# Patient Record
Sex: FEMALE | Race: WHITE | NOT HISPANIC OR LATINO | Employment: OTHER | URBAN - METROPOLITAN AREA
[De-identification: names, ages, dates, MRNs, and addresses within clinical notes are randomized per-mention and may not be internally consistent; named-entity substitution may affect disease eponyms.]

---

## 2017-01-18 ENCOUNTER — RECORDS - HEALTHEAST (OUTPATIENT)
Dept: LAB | Facility: CLINIC | Age: 62
End: 2017-01-18

## 2017-01-18 ENCOUNTER — RECORDS - HEALTHEAST (OUTPATIENT)
Dept: ADMINISTRATIVE | Facility: OTHER | Age: 62
End: 2017-01-18

## 2017-01-18 LAB
CHOLEST SERPL-MCNC: 164 MG/DL
FASTING STATUS PATIENT QL REPORTED: ABNORMAL
HDLC SERPL-MCNC: 52 MG/DL
LDLC SERPL CALC-MCNC: 64 MG/DL
TRIGL SERPL-MCNC: 240 MG/DL

## 2017-01-25 ENCOUNTER — HOSPITAL ENCOUNTER (OUTPATIENT)
Dept: NUCLEAR MEDICINE | Facility: HOSPITAL | Age: 62
Discharge: HOME | End: 2017-01-25
Attending: INTERNAL MEDICINE

## 2017-01-25 DIAGNOSIS — C50.919 BREAST CANCER (H): ICD-10-CM

## 2017-01-31 ENCOUNTER — RECORDS - HEALTHEAST (OUTPATIENT)
Dept: ADMINISTRATIVE | Facility: OTHER | Age: 62
End: 2017-01-31

## 2017-02-01 ENCOUNTER — HOSPITAL ENCOUNTER (OUTPATIENT)
Dept: MRI IMAGING | Facility: CLINIC | Age: 62
Discharge: HOME | End: 2017-02-01
Attending: INTERNAL MEDICINE

## 2017-02-01 DIAGNOSIS — C50.919 BREAST CANCER (H): ICD-10-CM

## 2017-02-01 DIAGNOSIS — R11.0 NAUSEA: ICD-10-CM

## 2017-05-03 ENCOUNTER — RECORDS - HEALTHEAST (OUTPATIENT)
Dept: ADMINISTRATIVE | Facility: OTHER | Age: 62
End: 2017-05-03

## 2017-05-09 ENCOUNTER — HOSPITAL ENCOUNTER (OUTPATIENT)
Dept: NUCLEAR MEDICINE | Facility: HOSPITAL | Age: 62
Discharge: HOME | End: 2017-05-09
Attending: INTERNAL MEDICINE

## 2017-05-09 DIAGNOSIS — C50.919 BREAST CANCER (H): ICD-10-CM

## 2017-05-16 ENCOUNTER — RECORDS - HEALTHEAST (OUTPATIENT)
Dept: ADMINISTRATIVE | Facility: OTHER | Age: 62
End: 2017-05-16

## 2017-06-28 ENCOUNTER — HOSPITAL ENCOUNTER (OUTPATIENT)
Dept: MRI IMAGING | Facility: HOSPITAL | Age: 62
Discharge: HOME | End: 2017-06-28
Attending: RADIOLOGY

## 2017-06-28 DIAGNOSIS — C79.51 BONE METASTASIS: ICD-10-CM

## 2017-08-01 ENCOUNTER — RECORDS - HEALTHEAST (OUTPATIENT)
Dept: ADMINISTRATIVE | Facility: OTHER | Age: 62
End: 2017-08-01

## 2017-08-02 ENCOUNTER — HOSPITAL ENCOUNTER (OUTPATIENT)
Dept: NUCLEAR MEDICINE | Facility: HOSPITAL | Age: 62
Discharge: HOME | End: 2017-08-02
Attending: INTERNAL MEDICINE

## 2017-08-02 DIAGNOSIS — C50.919 BREAST CANCER (H): ICD-10-CM

## 2017-10-03 ENCOUNTER — RECORDS - HEALTHEAST (OUTPATIENT)
Dept: ADMINISTRATIVE | Facility: OTHER | Age: 62
End: 2017-10-03

## 2017-10-10 ENCOUNTER — RECORDS - HEALTHEAST (OUTPATIENT)
Dept: ADMINISTRATIVE | Facility: OTHER | Age: 62
End: 2017-10-10

## 2017-10-25 ENCOUNTER — RECORDS - HEALTHEAST (OUTPATIENT)
Dept: ADMINISTRATIVE | Facility: OTHER | Age: 62
End: 2017-10-25

## 2017-10-25 ENCOUNTER — COMMUNICATION - HEALTHEAST (OUTPATIENT)
Dept: PULMONOLOGY | Facility: OTHER | Age: 62
End: 2017-10-25

## 2017-10-30 ENCOUNTER — COMMUNICATION - HEALTHEAST (OUTPATIENT)
Dept: PULMONOLOGY | Facility: OTHER | Age: 62
End: 2017-10-30

## 2017-11-07 ENCOUNTER — RECORDS - HEALTHEAST (OUTPATIENT)
Dept: ADMINISTRATIVE | Facility: OTHER | Age: 62
End: 2017-11-07

## 2017-11-09 ENCOUNTER — OFFICE VISIT - HEALTHEAST (OUTPATIENT)
Dept: PULMONOLOGY | Facility: OTHER | Age: 62
End: 2017-11-09

## 2017-11-09 DIAGNOSIS — R91.8 PULMONARY INFILTRATE: ICD-10-CM

## 2017-11-15 ENCOUNTER — HOSPITAL ENCOUNTER (OUTPATIENT)
Dept: RADIOLOGY | Facility: CLINIC | Age: 62
Discharge: HOME | End: 2017-11-15
Attending: INTERNAL MEDICINE | Admitting: INTERNAL MEDICINE

## 2017-11-15 ENCOUNTER — HOSPITAL ENCOUNTER (OUTPATIENT)
Dept: CARDIOLOGY | Facility: CLINIC | Age: 62
Discharge: HOME | End: 2017-11-15
Attending: INTERNAL MEDICINE | Admitting: INTERNAL MEDICINE

## 2017-11-15 DIAGNOSIS — R93.89 ABNORMAL CHEST CT: ICD-10-CM

## 2017-11-15 DIAGNOSIS — R93.89 ABNORMAL FINDINGS ON DIAGNOSTIC IMAGING OF OTHER SPECIFIED BODY STRUCTURES: ICD-10-CM

## 2017-11-15 DIAGNOSIS — R91.8 PULMONARY INFILTRATE: ICD-10-CM

## 2017-11-15 ASSESSMENT — MIFFLIN-ST. JEOR: SCORE: 1493.43

## 2017-11-16 ENCOUNTER — AMBULATORY - HEALTHEAST (OUTPATIENT)
Dept: PULMONOLOGY | Facility: OTHER | Age: 62
End: 2017-11-16

## 2017-11-16 LAB
LAB AP CHARGES (HE HISTORICAL CONVERSION): NORMAL
LAB AP CHARGES (HE HISTORICAL CONVERSION): NORMAL
LAB MED GENERAL PATH INTERP (HE HISTORICAL CONVERSION): NORMAL
PATH REPORT.COMMENTS IMP SPEC: NORMAL
PATH REPORT.FINAL DX SPEC: NORMAL
PATH REPORT.FINAL DX SPEC: NORMAL
PATH REPORT.GROSS SPEC: NORMAL
PATH REPORT.MICROSCOPIC SPEC OTHER STN: NORMAL
PATH REPORT.MICROSCOPIC SPEC OTHER STN: NORMAL
PATH REPORT.RELEVANT HX SPEC: NORMAL
PATH REPORT.RELEVANT HX SPEC: NORMAL
RESULT FLAG (HE HISTORICAL CONVERSION): NORMAL
SPECIMEN DESCRIPTION: NORMAL

## 2017-11-17 ENCOUNTER — OFFICE VISIT - HEALTHEAST (OUTPATIENT)
Dept: PULMONOLOGY | Facility: OTHER | Age: 62
End: 2017-11-17

## 2017-11-17 DIAGNOSIS — R91.8 LUNG INFILTRATE: ICD-10-CM

## 2017-11-29 ENCOUNTER — HOSPITAL ENCOUNTER (OUTPATIENT)
Dept: CT IMAGING | Facility: HOSPITAL | Age: 62
Discharge: HOME | End: 2017-11-29
Attending: INTERNAL MEDICINE

## 2017-11-29 DIAGNOSIS — R91.8 LUNG INFILTRATE: ICD-10-CM

## 2017-12-01 ENCOUNTER — HOSPITAL ENCOUNTER (OUTPATIENT)
Dept: NUCLEAR MEDICINE | Facility: HOSPITAL | Age: 62
Discharge: HOME | End: 2017-12-01
Attending: INTERNAL MEDICINE

## 2017-12-01 DIAGNOSIS — C50.919 BREAST CANCER (H): ICD-10-CM

## 2017-12-13 ENCOUNTER — COMMUNICATION - HEALTHEAST (OUTPATIENT)
Dept: PULMONOLOGY | Facility: OTHER | Age: 62
End: 2017-12-13

## 2017-12-15 ENCOUNTER — AMBULATORY - HEALTHEAST (OUTPATIENT)
Dept: PULMONOLOGY | Facility: OTHER | Age: 62
End: 2017-12-15

## 2017-12-15 DIAGNOSIS — J18.9 PNEUMONIA: ICD-10-CM

## 2017-12-15 DIAGNOSIS — D84.9 IMMUNOCOMPROMISED (H): ICD-10-CM

## 2018-01-29 ENCOUNTER — RECORDS - HEALTHEAST (OUTPATIENT)
Dept: LAB | Facility: CLINIC | Age: 63
End: 2018-01-29

## 2018-01-29 LAB
ALBUMIN SERPL-MCNC: 3.7 G/DL (ref 3.5–5)
ALP SERPL-CCNC: 59 U/L (ref 45–120)
ALT SERPL W P-5'-P-CCNC: 59 U/L (ref 0–45)
ANION GAP SERPL CALCULATED.3IONS-SCNC: 12 MMOL/L (ref 5–18)
AST SERPL W P-5'-P-CCNC: 47 U/L (ref 0–40)
BILIRUB SERPL-MCNC: 0.4 MG/DL (ref 0–1)
BUN SERPL-MCNC: 16 MG/DL (ref 8–22)
CALCIUM SERPL-MCNC: 9 MG/DL (ref 8.5–10.5)
CHLORIDE BLD-SCNC: 107 MMOL/L (ref 98–107)
CHOLEST SERPL-MCNC: 170 MG/DL
CO2 SERPL-SCNC: 22 MMOL/L (ref 22–31)
CREAT SERPL-MCNC: 0.69 MG/DL (ref 0.6–1.1)
FASTING STATUS PATIENT QL REPORTED: ABNORMAL
GFR SERPL CREATININE-BSD FRML MDRD: >60 ML/MIN/1.73M2
GLUCOSE BLD-MCNC: 166 MG/DL (ref 70–125)
HDLC SERPL-MCNC: 51 MG/DL
LDLC SERPL CALC-MCNC: 64 MG/DL
POTASSIUM BLD-SCNC: 4.4 MMOL/L (ref 3.5–5)
PROT SERPL-MCNC: 6.6 G/DL (ref 6–8)
SODIUM SERPL-SCNC: 141 MMOL/L (ref 136–145)
TRIGL SERPL-MCNC: 273 MG/DL

## 2018-02-27 ENCOUNTER — RECORDS - HEALTHEAST (OUTPATIENT)
Dept: ADMINISTRATIVE | Facility: OTHER | Age: 63
End: 2018-02-27

## 2018-03-20 ENCOUNTER — HOSPITAL ENCOUNTER (OUTPATIENT)
Dept: NUCLEAR MEDICINE | Facility: HOSPITAL | Age: 63
Discharge: HOME | End: 2018-03-20
Attending: INTERNAL MEDICINE

## 2018-03-20 DIAGNOSIS — C50.919 BREAST CANCER (H): ICD-10-CM

## 2018-03-21 ENCOUNTER — RECORDS - HEALTHEAST (OUTPATIENT)
Dept: ADMINISTRATIVE | Facility: OTHER | Age: 63
End: 2018-03-21

## 2018-03-27 ENCOUNTER — RECORDS - HEALTHEAST (OUTPATIENT)
Dept: ADMINISTRATIVE | Facility: OTHER | Age: 63
End: 2018-03-27

## 2018-04-06 ENCOUNTER — COMMUNICATION - HEALTHEAST (OUTPATIENT)
Dept: PULMONOLOGY | Facility: OTHER | Age: 63
End: 2018-04-06

## 2018-04-06 ENCOUNTER — HOSPITAL ENCOUNTER (OUTPATIENT)
Dept: RADIOLOGY | Facility: HOSPITAL | Age: 63
Discharge: HOME | End: 2018-04-06
Attending: INTERNAL MEDICINE

## 2018-04-06 DIAGNOSIS — D84.9 IMMUNOCOMPROMISED (H): ICD-10-CM

## 2018-04-06 DIAGNOSIS — J18.9 PNEUMONIA: ICD-10-CM

## 2018-04-09 ENCOUNTER — OFFICE VISIT - HEALTHEAST (OUTPATIENT)
Dept: PULMONOLOGY | Facility: OTHER | Age: 63
End: 2018-04-09

## 2018-04-09 DIAGNOSIS — J01.90 ACUTE SINUSITIS: ICD-10-CM

## 2018-04-09 DIAGNOSIS — J30.9 ALLERGIC RHINITIS: ICD-10-CM

## 2018-04-11 ENCOUNTER — COMMUNICATION - HEALTHEAST (OUTPATIENT)
Dept: PULMONOLOGY | Facility: OTHER | Age: 63
End: 2018-04-11

## 2018-04-16 ENCOUNTER — COMMUNICATION - HEALTHEAST (OUTPATIENT)
Dept: PULMONOLOGY | Facility: OTHER | Age: 63
End: 2018-04-16

## 2018-06-06 ENCOUNTER — COMMUNICATION - HEALTHEAST (OUTPATIENT)
Dept: PULMONOLOGY | Facility: OTHER | Age: 63
End: 2018-06-06

## 2018-06-12 ENCOUNTER — HOSPITAL ENCOUNTER (OUTPATIENT)
Dept: NUCLEAR MEDICINE | Facility: HOSPITAL | Age: 63
Discharge: HOME | End: 2018-06-12
Attending: INTERNAL MEDICINE

## 2018-06-12 DIAGNOSIS — C50.919 BREAST CANCER (H): ICD-10-CM

## 2018-06-13 ENCOUNTER — RECORDS - HEALTHEAST (OUTPATIENT)
Dept: ADMINISTRATIVE | Facility: OTHER | Age: 63
End: 2018-06-13

## 2018-06-19 ENCOUNTER — COMMUNICATION - HEALTHEAST (OUTPATIENT)
Dept: PULMONOLOGY | Facility: OTHER | Age: 63
End: 2018-06-19

## 2018-06-19 ENCOUNTER — RECORDS - HEALTHEAST (OUTPATIENT)
Dept: ADMINISTRATIVE | Facility: OTHER | Age: 63
End: 2018-06-19

## 2018-06-21 ENCOUNTER — COMMUNICATION - HEALTHEAST (OUTPATIENT)
Dept: PULMONOLOGY | Facility: OTHER | Age: 63
End: 2018-06-21

## 2018-06-25 ENCOUNTER — COMMUNICATION - HEALTHEAST (OUTPATIENT)
Dept: PULMONOLOGY | Facility: OTHER | Age: 63
End: 2018-06-25

## 2018-09-04 ENCOUNTER — HOSPITAL ENCOUNTER (OUTPATIENT)
Dept: NUCLEAR MEDICINE | Facility: HOSPITAL | Age: 63
Discharge: HOME | End: 2018-09-04
Attending: INTERNAL MEDICINE

## 2018-09-04 DIAGNOSIS — C50.919 BREAST CANCER (H): ICD-10-CM

## 2018-09-05 ENCOUNTER — RECORDS - HEALTHEAST (OUTPATIENT)
Dept: ADMINISTRATIVE | Facility: OTHER | Age: 63
End: 2018-09-05

## 2018-09-11 ENCOUNTER — RECORDS - HEALTHEAST (OUTPATIENT)
Dept: ADMINISTRATIVE | Facility: OTHER | Age: 63
End: 2018-09-11

## 2018-11-26 ENCOUNTER — HOSPITAL ENCOUNTER (OUTPATIENT)
Dept: NUCLEAR MEDICINE | Facility: HOSPITAL | Age: 63
Discharge: HOME | End: 2018-11-26
Attending: INTERNAL MEDICINE

## 2018-11-26 ENCOUNTER — COMMUNICATION - HEALTHEAST (OUTPATIENT)
Dept: TELEHEALTH | Facility: CLINIC | Age: 63
End: 2018-11-26

## 2018-11-26 DIAGNOSIS — C50.919 BREAST CANCER (H): ICD-10-CM

## 2018-11-26 ASSESSMENT — MIFFLIN-ST. JEOR: SCORE: 1479.83

## 2019-02-27 ENCOUNTER — RECORDS - HEALTHEAST (OUTPATIENT)
Dept: LAB | Facility: CLINIC | Age: 64
End: 2019-02-27

## 2019-02-27 LAB
ALBUMIN SERPL-MCNC: 3.7 G/DL (ref 3.5–5)
ALP SERPL-CCNC: 76 U/L (ref 45–120)
ALT SERPL W P-5'-P-CCNC: 48 U/L (ref 0–45)
ANION GAP SERPL CALCULATED.3IONS-SCNC: 11 MMOL/L (ref 5–18)
AST SERPL W P-5'-P-CCNC: 40 U/L (ref 0–40)
BILIRUB SERPL-MCNC: 0.3 MG/DL (ref 0–1)
BUN SERPL-MCNC: 17 MG/DL (ref 8–22)
CALCIUM SERPL-MCNC: 9.1 MG/DL (ref 8.5–10.5)
CHLORIDE BLD-SCNC: 107 MMOL/L (ref 98–107)
CHOLEST SERPL-MCNC: 127 MG/DL
CO2 SERPL-SCNC: 22 MMOL/L (ref 22–31)
CREAT SERPL-MCNC: 0.76 MG/DL (ref 0.6–1.1)
FASTING STATUS PATIENT QL REPORTED: ABNORMAL
GFR SERPL CREATININE-BSD FRML MDRD: >60 ML/MIN/1.73M2
GLUCOSE BLD-MCNC: 123 MG/DL (ref 70–125)
HDLC SERPL-MCNC: 42 MG/DL
LDLC SERPL CALC-MCNC: 32 MG/DL
POTASSIUM BLD-SCNC: 4.4 MMOL/L (ref 3.5–5)
PROT SERPL-MCNC: 6.5 G/DL (ref 6–8)
SODIUM SERPL-SCNC: 140 MMOL/L (ref 136–145)
TRIGL SERPL-MCNC: 263 MG/DL
TSH SERPL DL<=0.005 MIU/L-ACNC: 0.7 UIU/ML (ref 0.3–5)
VIT B12 SERPL-MCNC: 471 PG/ML (ref 213–816)

## 2019-03-18 ENCOUNTER — HOSPITAL ENCOUNTER (OUTPATIENT)
Dept: MAMMOGRAPHY | Facility: CLINIC | Age: 64
Discharge: HOME | End: 2019-03-18
Attending: FAMILY MEDICINE

## 2019-03-18 DIAGNOSIS — Z12.31 VISIT FOR SCREENING MAMMOGRAM: ICD-10-CM

## 2019-05-30 LAB
HPV SOURCE: ABNORMAL
HUMAN PAPILLOMA VIRUS 16 DNA: NEGATIVE
HUMAN PAPILLOMA VIRUS 18 DNA: NEGATIVE
HUMAN PAPILLOMA VIRUS FINAL DIAGNOSIS: ABNORMAL
HUMAN PAPILLOMA VIRUS OTHER HR: POSITIVE
SPECIMEN DESCRIPTION: ABNORMAL

## 2019-06-05 ENCOUNTER — RECORDS - HEALTHEAST (OUTPATIENT)
Dept: ADMINISTRATIVE | Facility: OTHER | Age: 64
End: 2019-06-05

## 2019-06-05 LAB
BKR LAB AP ABNORMAL BLEEDING: NO
BKR LAB AP BIRTH CONTROL/HORMONES: NORMAL
BKR LAB AP CERVICAL APPEARANCE: NORMAL
BKR LAB AP GYN ADEQUACY: NORMAL
BKR LAB AP GYN INTERPRETATION: NORMAL
BKR LAB AP HPV REFLEX: NORMAL
BKR LAB AP LMP: 1999
BKR LAB AP PATIENT STATUS: NORMAL
BKR LAB AP PREVIOUS ABNORMAL: NORMAL
BKR LAB AP PREVIOUS NORMAL: 2010
HIGH RISK?: NO
PATH REPORT.COMMENTS IMP SPEC: NORMAL
RESULT FLAG (HE HISTORICAL CONVERSION): NORMAL

## 2019-09-16 ENCOUNTER — RECORDS - HEALTHEAST (OUTPATIENT)
Dept: LAB | Facility: CLINIC | Age: 64
End: 2019-09-16

## 2019-09-16 LAB
ALBUMIN SERPL-MCNC: 3 G/DL (ref 3.5–5)
ALP SERPL-CCNC: 68 U/L (ref 45–120)
ALT SERPL W P-5'-P-CCNC: 62 U/L (ref 0–45)
ANION GAP SERPL CALCULATED.3IONS-SCNC: 10 MMOL/L (ref 5–18)
AST SERPL W P-5'-P-CCNC: 54 U/L (ref 0–40)
BILIRUB SERPL-MCNC: 0.3 MG/DL (ref 0–1)
BUN SERPL-MCNC: 10 MG/DL (ref 8–22)
CALCIUM SERPL-MCNC: 8.4 MG/DL (ref 8.5–10.5)
CHLORIDE BLD-SCNC: 105 MMOL/L (ref 98–107)
CO2 SERPL-SCNC: 24 MMOL/L (ref 22–31)
CREAT SERPL-MCNC: 0.71 MG/DL (ref 0.6–1.1)
GFR SERPL CREATININE-BSD FRML MDRD: >60 ML/MIN/1.73M2
GLUCOSE BLD-MCNC: 172 MG/DL (ref 70–125)
LIPASE SERPL-CCNC: 30 U/L (ref 0–52)
POTASSIUM BLD-SCNC: 4 MMOL/L (ref 3.5–5)
PROT SERPL-MCNC: 5.9 G/DL (ref 6–8)
SODIUM SERPL-SCNC: 139 MMOL/L (ref 136–145)

## 2019-10-02 ENCOUNTER — RECORDS - HEALTHEAST (OUTPATIENT)
Dept: ADMINISTRATIVE | Facility: OTHER | Age: 64
End: 2019-10-02

## 2019-10-02 LAB
PATH REPORT.COMMENTS IMP SPEC: NORMAL
PATH REPORT.FINAL DX SPEC: NORMAL
PATH REPORT.RELEVANT HX SPEC: NORMAL
RESULT FLAG (HE HISTORICAL CONVERSION): NORMAL

## 2019-10-07 ENCOUNTER — AMBULATORY - HEALTHEAST (OUTPATIENT)
Dept: PHARMACY | Facility: CLINIC | Age: 64
End: 2019-10-07

## 2019-10-07 DIAGNOSIS — E11.9 TYPE 2 DIABETES MELLITUS WITHOUT COMPLICATION, WITH LONG-TERM CURRENT USE OF INSULIN (H): ICD-10-CM

## 2019-10-07 DIAGNOSIS — C50.912 BREAST CANCER METASTASIZED TO BRAIN, LEFT (H): ICD-10-CM

## 2019-10-07 DIAGNOSIS — Z79.4 TYPE 2 DIABETES MELLITUS WITHOUT COMPLICATION, WITH LONG-TERM CURRENT USE OF INSULIN (H): ICD-10-CM

## 2019-10-07 DIAGNOSIS — R60.9 EDEMA, UNSPECIFIED TYPE: ICD-10-CM

## 2019-10-07 DIAGNOSIS — G47.00 INSOMNIA, UNSPECIFIED TYPE: ICD-10-CM

## 2019-10-07 DIAGNOSIS — M25.559 ARTHRALGIA OF HIP, UNSPECIFIED LATERALITY: ICD-10-CM

## 2019-10-07 DIAGNOSIS — K21.9 GASTROESOPHAGEAL REFLUX DISEASE, ESOPHAGITIS PRESENCE NOT SPECIFIED: ICD-10-CM

## 2019-10-07 DIAGNOSIS — F32.A DEPRESSION, UNSPECIFIED DEPRESSION TYPE: ICD-10-CM

## 2019-10-07 DIAGNOSIS — E78.2 MIXED HYPERLIPIDEMIA: ICD-10-CM

## 2019-10-07 DIAGNOSIS — Z71.89 ENCOUNTER FOR HERB AND VITAMIN SUPPLEMENT MANAGEMENT: ICD-10-CM

## 2019-10-07 DIAGNOSIS — C79.31 BREAST CANCER METASTASIZED TO BRAIN, LEFT (H): ICD-10-CM

## 2019-10-16 ENCOUNTER — RECORDS - HEALTHEAST (OUTPATIENT)
Dept: ADMINISTRATIVE | Facility: OTHER | Age: 64
End: 2019-10-16

## 2019-10-16 ENCOUNTER — HOSPITAL ENCOUNTER (OUTPATIENT)
Dept: ULTRASOUND IMAGING | Facility: HOSPITAL | Age: 64
Discharge: HOME | End: 2019-10-16
Attending: INTERNAL MEDICINE

## 2019-10-16 DIAGNOSIS — R60.9 SWELLING: ICD-10-CM

## 2019-10-25 ENCOUNTER — RECORDS - HEALTHEAST (OUTPATIENT)
Dept: LAB | Facility: CLINIC | Age: 64
End: 2019-10-25

## 2019-10-25 LAB
ALBUMIN SERPL-MCNC: 2.7 G/DL (ref 3.5–5)
ALP SERPL-CCNC: 73 U/L (ref 45–120)
ALT SERPL W P-5'-P-CCNC: 17 U/L (ref 0–45)
ANION GAP SERPL CALCULATED.3IONS-SCNC: 11 MMOL/L (ref 5–18)
AST SERPL W P-5'-P-CCNC: 25 U/L (ref 0–40)
BASOPHILS # BLD AUTO: 0 THOU/UL (ref 0–0.2)
BASOPHILS NFR BLD AUTO: 1 % (ref 0–2)
BILIRUB SERPL-MCNC: 0.2 MG/DL (ref 0–1)
BUN SERPL-MCNC: 11 MG/DL (ref 8–22)
CALCIUM SERPL-MCNC: 8.4 MG/DL (ref 8.5–10.5)
CHLORIDE BLD-SCNC: 108 MMOL/L (ref 98–107)
CO2 SERPL-SCNC: 24 MMOL/L (ref 22–31)
CREAT SERPL-MCNC: 0.73 MG/DL (ref 0.6–1.1)
EOSINOPHIL # BLD AUTO: 0.1 THOU/UL (ref 0–0.4)
EOSINOPHIL NFR BLD AUTO: 2 % (ref 0–6)
ERYTHROCYTE [DISTWIDTH] IN BLOOD BY AUTOMATED COUNT: 16.6 % (ref 11–14.5)
GFR SERPL CREATININE-BSD FRML MDRD: >60 ML/MIN/1.73M2
GLUCOSE BLD-MCNC: 148 MG/DL (ref 70–125)
HCT VFR BLD AUTO: 32.4 % (ref 35–47)
HGB BLD-MCNC: 9.4 G/DL (ref 12–16)
LYMPHOCYTES # BLD AUTO: 0.3 THOU/UL (ref 0.8–4.4)
LYMPHOCYTES NFR BLD AUTO: 5 % (ref 20–40)
MAGNESIUM SERPL-MCNC: 1.8 MG/DL (ref 1.8–2.6)
MCH RBC QN AUTO: 24.8 PG (ref 27–34)
MCHC RBC AUTO-ENTMCNC: 29 G/DL (ref 32–36)
MCV RBC AUTO: 86 FL (ref 80–100)
MONOCYTES # BLD AUTO: 0.5 THOU/UL (ref 0–0.9)
MONOCYTES NFR BLD AUTO: 9 % (ref 2–10)
NEUTROPHILS # BLD AUTO: 4.8 THOU/UL (ref 2–7.7)
NEUTROPHILS NFR BLD AUTO: 83 % (ref 50–70)
PLATELET # BLD AUTO: 282 THOU/UL (ref 140–440)
PMV BLD AUTO: 10.7 FL (ref 8.5–12.5)
POTASSIUM BLD-SCNC: 4.7 MMOL/L (ref 3.5–5)
PROT SERPL-MCNC: 5.6 G/DL (ref 6–8)
RBC # BLD AUTO: 3.79 MILL/UL (ref 3.8–5.4)
SODIUM SERPL-SCNC: 143 MMOL/L (ref 136–145)
WBC: 5.8 THOU/UL (ref 4–11)

## 2019-10-27 NOTE — PROGRESS NOTES
SUBJECTIVE/OBJECTIVE:                Elenita Hardin is a 64 year old female coming in for a transitions of care visit.    She was discharged from [facility] on 10/19/19 for bilateral upper extremity edema/lymphedema.   Hospital follow-up visit with PCP today prior to our visit.    Chief Complaint: Hospital follow-up comprehensive medication review and education.    Allergies/ADRs: Reviewed in eCW and HE Epic  Tobacco:  reports that she has quit smoking. Her smoking use included cigarettes. She smoked 0.50 packs per day. She has never used smokeless tobacco.  Alcohol: 1 glass of wine/week  PMH: Reviewed in eCW and HE Epic, significant for breast cancer w/ bone metastasis, s/p mastectomy, T2DM, HTN, HLD, MDD, insomnia, chronic pain    Medication Reconciliation/Adherence/Access:  Patient takes medications directly from bottles.  Patient takes medications 3 time(s) per day.   Per patient, misses medication 0 times per week.   Medication barriers: none reported  Discharge Med Rec:  Started:   - potassium chloride 20 mEq daily with furosemide (qty-5 tabs)  Changed:  - acetaminophen 1000 mg three times a day  - furosemide 20 mg twice a day  Stopped:  - gabapentin 100 mg and 400 mg  - Maalox      Bilateral upper extremity edema: Current medications:  Furosemide 20 mg twice a day  Potassium 20 mEq once a day- 5 tabs dispensed at discharge, she's unsure if she needs to continue this  Continues to have significant edema in both upper limbs without much improvement from furosemide.  Gabapentin was stopped during hospitalization and at discharge but she has resumed taking it since discharge because she had significant neuropathy pain. Gabapentin was started at beginning of October and noticed edema symptoms after this was started. She is interested in stopping gabapentin if there is an alternative for her neuropathy pain.  Everolimus therapy was started in April. She is aware peripheral edema is a significant  side effect of this medication but notes the timing of gabapentin and wishes to pursue changing this first.     Breast Cancer with Metastasis: Current medications:  Exemestane 25 mg once daily   Everolimus 10 mg (2x 5 mg tab) once daily    Follows with [facility]. Takes both of these with lunch.  Denies any issues with cost.     Chronic pain, Neuropathy, Bone Pain:  Current medications include:   Oxycodone 5-10 mg q4h as needed-1 tab for pain level 5-8, 2 tabs pain level 9-10  Naproxen  mg twice daily  Cyclobenzaprine 5 mg three times a day as needed  Gabapentin 100 mg three times a day and 400 mg at bedtime- resumed after discharge for pain  Lidocaine 4% patch, apply 3 patches on for 12 h as needed- difficulty putting on, not routinely using  Pain is described as Exhausting, Shooting and Stabbing.     How is your pain? At times tolerable with discomfort and other times unmanageable.    Patient reports the following side effects:  Edema, Fatigue.    Type 2 Diabetes: Current medications:  Basaglar 40 units every morning  Humalog 3 units: 15 g carbs three times a day w/ meals  Metformin 500 mg twice a day   SMBG: Noemi CGM- unable to upload readings today  Denies experiencing any medication side effects.  Patient is not experiencing hypoglycemia  Recent symptoms of high blood sugar? tingling and numbness  Eye exam: up to date; Foot exam: up to date  ACEi/ARB: No. Urine Albumin: 2/27/19- Alb:Cr <30 mg/g  Aspirin: Taking 81mg daily and denies side effects  Hemoglobin A1c:  9/27/2019- 8.1%  9/5/2019- 7.0%  5/29/2019- 7.8%     Today's Vitals: /72 (BP Location: Right arm, Patient Position: Sitting, Cuff Size: Adult Large)   Pulse 80    Relevant Labs:  Comprehensive Metabolic PanelResulted: 10/25/2019 7:32 PM- [facility]  Component Name Value Ref Range   Sodium 143 136 - 145 mmol/L   Potassium 4.7 3.5 - 5 mmol/L   Chloride 108 (H) 98 - 107 mmol/L   CO2 24 22 - 31 mmol/L   Anion Gap, Calculation 11  5 - 18 mmol/L   Glucose 148 (H) 70 - 125 mg/dL   BUN 11 8 - 22 mg/dL   Creatinine 0.73 0.6 - 1.1 mg/dL   GFR MDRD Af Amer >60 >60 mL/min/1.73m2   GFR MDRD Non Af Amer >60 >60 mL/min/1.73m2   Bilirubin, Total 0.2 0 - 1 mg/dL   Calcium 8.4 (L) 8.5 - 10.5 mg/dL   Protein, Total 5.6 (L) 6 - 8 g/dL   Albumin 2.7 (L) 3.5 - 5 g/dL   Alkaline Phosphatase 73 45 - 120 U/L   AST 25 0 - 40 U/L   ALT 17 0 - 45 U/L       ASSESSMENT:                 Medication Adherence: good, no issues identified    Bilateral upper extremity edema: Needs improvement.   Peripheral edema symptoms may be medication-related with gabapentin and everolimus having highest potential to cause this.  Associated incidience of peripheral edema is 1.7-8.3% with gabapentin vs 13-39% with everolimus and 19% with everolimus plus exemestane.  Timing of symptom onset with initiation of gabapentin makes it reasonable to consider alternate therapy for neuropathy pain to see if edema improves.  May consider switching gabapentin to Lyrica or nortriptyline for neuropathic pain. Lyrica still has potential to cause peripheral edema but typically requires lower dose for similar/better pain relief.  Further workup of potential lymphedema by oncology needed to assess safety of continuing current chemo regimen.  Diuretic therapy does not appear effective at improving edema, further assessment of appropriateness and safety recommended.  Recommend repeating BMP to assess if patient should continue potassium supplement with diuretic.    Breast Cancer with Metastasis: Needs further assessment. See above    Chronic pain, Neuropathy, Bone Pain: Needs improvement. See above.  Current regimen of oxycodone, naproxen, and cyclobenzaprine appears to provide adequate pain relief for musculoskeletal pain.  Benefits and risks of opioid therapy reviewed with patient.     Type 2 Diabetes: Improving with use of Buy buy tea CGM.   Patient is not meeting A1c goal of < 8%. Self monitoring  of blood glucose is mostly at goal of fasting  mg/dL and post prandial < 180 mg/dL.   Aspirin therapy is safe and appropriate. Reasonable to continue current insulin regimen and recheck A1c in 2 months.      PLAN:                Post Discharge Medication Reconciliation Status: discharge medications reconciled, continue medications without change.    1. Consider switching gabapentin 700 mg/day to Lyrica 75 mg/day (divided) or nortriptyline 25-50 mg at bedtime  2. Recommend repeat BMP and further evaluation of need for ongoing potassium supplementation with diuretic     I spent 30 minutes with this patient today. I offer these suggestions for consideration by Dr. Jones. A copy of the visit note was provided to the patient's primary care provider.    Will follow up as needed at patient or provider request.    The patient declined a summary of these recommendations as an after visit summary.    Grace Wiseman, PharmD  Medication Therapy Management Pharmacist  [facility]  Pager: [phone]

## 2019-10-28 ENCOUNTER — RECORDS - HEALTHEAST (OUTPATIENT)
Dept: LAB | Facility: CLINIC | Age: 64
End: 2019-10-28

## 2019-10-28 ENCOUNTER — OFFICE VISIT (OUTPATIENT)
Dept: PHARMACY | Facility: PHYSICIAN GROUP | Age: 64
End: 2019-10-28
Payer: COMMERCIAL

## 2019-10-28 DIAGNOSIS — R60.0 PERIPHERAL EDEMA: Primary | ICD-10-CM

## 2019-10-28 DIAGNOSIS — G62.9 NEUROPATHY: ICD-10-CM

## 2019-10-28 DIAGNOSIS — C50.919 MALIGNANT NEOPLASM OF FEMALE BREAST, UNSPECIFIED ESTROGEN RECEPTOR STATUS, UNSPECIFIED LATERALITY, UNSPECIFIED SITE OF BREAST (H): ICD-10-CM

## 2019-10-28 DIAGNOSIS — Z79.4 TYPE 2 DIABETES MELLITUS WITHOUT COMPLICATION, WITH LONG-TERM CURRENT USE OF INSULIN (H): ICD-10-CM

## 2019-10-28 DIAGNOSIS — E11.9 TYPE 2 DIABETES MELLITUS WITHOUT COMPLICATION, WITH LONG-TERM CURRENT USE OF INSULIN (H): ICD-10-CM

## 2019-10-28 DIAGNOSIS — G89.3 CHRONIC PAIN DUE TO NEOPLASM: ICD-10-CM

## 2019-10-28 PROCEDURE — 99607 MTMS BY PHARM ADDL 15 MIN: CPT | Performed by: PHARMACIST

## 2019-10-28 PROCEDURE — 99605 MTMS BY PHARM NP 15 MIN: CPT | Performed by: PHARMACIST

## 2019-10-29 LAB — TSH SERPL DL<=0.005 MIU/L-ACNC: 2.42 UIU/ML (ref 0.3–5)

## 2019-11-03 VITALS — DIASTOLIC BLOOD PRESSURE: 72 MMHG | SYSTOLIC BLOOD PRESSURE: 136 MMHG | HEART RATE: 80 BPM

## 2019-11-03 RX ORDER — FLASH GLUCOSE SCANNING READER
EACH MISCELLANEOUS
COMMUNITY

## 2019-11-03 RX ORDER — ASPIRIN 81 MG/1
81 TABLET ORAL DAILY
COMMUNITY
End: 2022-01-01

## 2019-11-03 RX ORDER — OXYCODONE HYDROCHLORIDE 5 MG/1
5-10 TABLET ORAL EVERY 4 HOURS PRN
COMMUNITY
Start: 2019-09-30 | End: 2021-07-16

## 2019-11-03 RX ORDER — FUROSEMIDE 20 MG
20 TABLET ORAL 2 TIMES DAILY
COMMUNITY
Start: 2019-10-19 | End: 2021-07-16

## 2019-11-03 RX ORDER — EZETIMIBE AND SIMVASTATIN 10; 20 MG/1; MG/1
1 TABLET ORAL DAILY
COMMUNITY
End: 2021-08-23

## 2019-11-03 RX ORDER — EXEMESTANE 25 MG/1
25 TABLET ORAL
COMMUNITY
End: 2021-07-16

## 2019-11-03 RX ORDER — PREGABALIN 25 MG/1
25 CAPSULE ORAL 3 TIMES DAILY
COMMUNITY
End: 2021-07-16

## 2019-11-03 RX ORDER — CYCLOBENZAPRINE HCL 5 MG
5 TABLET ORAL 3 TIMES DAILY PRN
COMMUNITY
End: 2021-07-16

## 2019-11-03 RX ORDER — LIDOCAINE 4 G/G
PATCH TOPICAL
COMMUNITY
Start: 2019-10-01 | End: 2021-07-16

## 2019-11-03 RX ORDER — EVEROLIMUS 5 MG/1
10 TABLET ORAL
COMMUNITY
End: 2021-07-16

## 2019-11-03 RX ORDER — INSULIN GLARGINE 100 [IU]/ML
40 INJECTION, SOLUTION SUBCUTANEOUS EVERY MORNING
COMMUNITY
End: 2019-11-03 | Stop reason: ALTCHOICE

## 2019-11-03 RX ORDER — VITS A,C,E/LUTEIN/MINERALS 300MCG-200
1 TABLET ORAL DAILY
COMMUNITY
End: 2021-07-16

## 2019-11-03 RX ORDER — INSULIN LISPRO 100 [IU]/ML
1-20 INJECTION, SOLUTION INTRAVENOUS; SUBCUTANEOUS
Status: ON HOLD | COMMUNITY
End: 2021-07-28

## 2019-11-03 RX ORDER — POTASSIUM CHLORIDE 1500 MG/1
20 TABLET, EXTENDED RELEASE ORAL DAILY
COMMUNITY
Start: 2019-10-19 | End: 2021-07-16

## 2019-11-03 RX ORDER — ESCITALOPRAM OXALATE 20 MG/1
20 TABLET ORAL AT BEDTIME
COMMUNITY
End: 2021-07-16

## 2019-11-03 RX ORDER — DEXAMETHASONE 0.5 MG/5ML
10 SOLUTION ORAL 4 TIMES DAILY PRN
COMMUNITY
End: 2021-07-16

## 2019-11-03 RX ORDER — ACETAMINOPHEN 500 MG
1000 TABLET ORAL 3 TIMES DAILY PRN
COMMUNITY
Start: 2019-09-30 | End: 2021-08-23

## 2019-11-03 RX ORDER — ZOLPIDEM TARTRATE 5 MG/1
5 TABLET ORAL
COMMUNITY
End: 2021-07-16

## 2019-11-03 RX ORDER — MULTIVIT WITH MINERALS/LUTEIN
1 TABLET ORAL DAILY
COMMUNITY
End: 2021-08-23

## 2019-11-03 RX ORDER — INSULIN GLARGINE 100 [IU]/ML
55 INJECTION, SOLUTION SUBCUTANEOUS EVERY MORNING
Status: ON HOLD | COMMUNITY
End: 2021-07-28

## 2019-11-03 RX ORDER — PHENOL 1.4 %
1-2 AEROSOL, SPRAY (ML) MUCOUS MEMBRANE DAILY
Status: ON HOLD | COMMUNITY
End: 2022-01-01

## 2019-11-03 RX ORDER — FLASH GLUCOSE SENSOR
KIT MISCELLANEOUS
COMMUNITY

## 2019-11-11 ENCOUNTER — RECORDS - HEALTHEAST (OUTPATIENT)
Dept: LAB | Facility: CLINIC | Age: 64
End: 2019-11-11

## 2019-11-11 LAB
ALBUMIN SERPL-MCNC: 3 G/DL (ref 3.5–5)
ALP SERPL-CCNC: 76 U/L (ref 45–120)
ALT SERPL W P-5'-P-CCNC: 16 U/L (ref 0–45)
ANION GAP SERPL CALCULATED.3IONS-SCNC: 12 MMOL/L (ref 5–18)
AST SERPL W P-5'-P-CCNC: 24 U/L (ref 0–40)
BILIRUB SERPL-MCNC: 0.3 MG/DL (ref 0–1)
BUN SERPL-MCNC: 14 MG/DL (ref 8–22)
CALCIUM SERPL-MCNC: 8.5 MG/DL (ref 8.5–10.5)
CHLORIDE BLD-SCNC: 104 MMOL/L (ref 98–107)
CO2 SERPL-SCNC: 25 MMOL/L (ref 22–31)
CREAT SERPL-MCNC: 0.73 MG/DL (ref 0.6–1.1)
GFR SERPL CREATININE-BSD FRML MDRD: >60 ML/MIN/1.73M2
GLUCOSE BLD-MCNC: 155 MG/DL (ref 70–125)
POTASSIUM BLD-SCNC: 3.7 MMOL/L (ref 3.5–5)
PROT SERPL-MCNC: 5.7 G/DL (ref 6–8)
SODIUM SERPL-SCNC: 141 MMOL/L (ref 136–145)

## 2019-11-20 ENCOUNTER — RECORDS - HEALTHEAST (OUTPATIENT)
Dept: ADMINISTRATIVE | Facility: OTHER | Age: 64
End: 2019-11-20

## 2019-11-25 ENCOUNTER — AMBULATORY - HEALTHEAST (OUTPATIENT)
Dept: PALLIATIVE MEDICINE | Facility: OTHER | Age: 64
End: 2019-11-25

## 2019-11-25 DIAGNOSIS — C50.919 METASTATIC BREAST CANCER: ICD-10-CM

## 2019-11-25 DIAGNOSIS — C79.89 METASTATIC CANCER TO PELVIS (H): ICD-10-CM

## 2019-11-27 ENCOUNTER — OFFICE VISIT - HEALTHEAST (OUTPATIENT)
Dept: PALLIATIVE MEDICINE | Facility: OTHER | Age: 64
End: 2019-11-27

## 2019-12-06 ENCOUNTER — AMBULATORY - HEALTHEAST (OUTPATIENT)
Dept: INTERVENTIONAL RADIOLOGY/VASCULAR | Facility: HOSPITAL | Age: 64
End: 2019-12-06

## 2019-12-06 DIAGNOSIS — C50.919 MALIGNANT NEOPLASM OF FEMALE BREAST, UNSPECIFIED ESTROGEN RECEPTOR STATUS, UNSPECIFIED LATERALITY, UNSPECIFIED SITE OF BREAST (H): ICD-10-CM

## 2019-12-11 ENCOUNTER — COMMUNICATION - HEALTHEAST (OUTPATIENT)
Dept: MULTI SPECIALTY CLINIC | Facility: CLINIC | Age: 64
End: 2019-12-11

## 2019-12-12 ENCOUNTER — HOSPITAL ENCOUNTER (OUTPATIENT)
Dept: INTERVENTIONAL RADIOLOGY/VASCULAR | Facility: HOSPITAL | Age: 64
Discharge: HOME | End: 2019-12-12
Attending: PHYSICIAN ASSISTANT | Admitting: RADIOLOGY

## 2019-12-12 DIAGNOSIS — C50.919 MALIGNANT NEOPLASM OF FEMALE BREAST, UNSPECIFIED ESTROGEN RECEPTOR STATUS, UNSPECIFIED LATERALITY, UNSPECIFIED SITE OF BREAST (H): ICD-10-CM

## 2019-12-12 ASSESSMENT — MIFFLIN-ST. JEOR: SCORE: 1369.15

## 2019-12-18 ENCOUNTER — RECORDS - HEALTHEAST (OUTPATIENT)
Dept: LAB | Facility: CLINIC | Age: 64
End: 2019-12-18

## 2019-12-18 ENCOUNTER — TRANSFERRED RECORDS (OUTPATIENT)
Dept: HEALTH INFORMATION MANAGEMENT | Facility: CLINIC | Age: 64
End: 2019-12-18

## 2019-12-18 LAB
CHOLEST SERPL-MCNC: 140 MG/DL
FASTING STATUS PATIENT QL REPORTED: ABNORMAL
HBA1C MFR BLD: 5.8 % (ref 4.2–6.1)
HDLC SERPL-MCNC: 34 MG/DL
LDLC SERPL CALC-MCNC: 63 MG/DL
TRIGL SERPL-MCNC: 217 MG/DL

## 2020-01-24 ENCOUNTER — HOME CARE/HOSPICE - HEALTHEAST (OUTPATIENT)
Dept: HOME HEALTH SERVICES | Facility: HOME HEALTH | Age: 65
End: 2020-01-24

## 2020-01-27 ENCOUNTER — RECORDS - HEALTHEAST (OUTPATIENT)
Dept: ADMINISTRATIVE | Facility: OTHER | Age: 65
End: 2020-01-27

## 2020-01-28 ENCOUNTER — HOME CARE/HOSPICE - HEALTHEAST (OUTPATIENT)
Dept: HOME HEALTH SERVICES | Facility: HOME HEALTH | Age: 65
End: 2020-01-28

## 2020-01-30 ENCOUNTER — HOSPITAL ENCOUNTER (OUTPATIENT)
Dept: NUCLEAR MEDICINE | Facility: HOSPITAL | Age: 65
Discharge: HOME | End: 2020-01-30
Attending: INTERNAL MEDICINE

## 2020-01-30 DIAGNOSIS — C50.412 PRIMARY MALIGNANT NEOPLASM OF UPPER OUTER QUADRANT OF FEMALE BREAST, LEFT (H): ICD-10-CM

## 2020-03-04 ENCOUNTER — RECORDS - HEALTHEAST (OUTPATIENT)
Dept: ADMINISTRATIVE | Facility: OTHER | Age: 65
End: 2020-03-04

## 2020-03-24 ENCOUNTER — HOSPITAL ENCOUNTER (OUTPATIENT)
Dept: CT IMAGING | Facility: HOSPITAL | Age: 65
Discharge: HOME | End: 2020-03-24
Attending: INTERNAL MEDICINE

## 2020-03-24 DIAGNOSIS — C50.412 PRIMARY MALIGNANT NEOPLASM OF UPPER OUTER QUADRANT OF BREAST, LEFT (H): ICD-10-CM

## 2020-03-24 LAB
CREAT BLD-MCNC: 0.6 MG/DL (ref 0.6–1.1)
GFR SERPL CREATININE-BSD FRML MDRD: >60 ML/MIN/1.73M2

## 2020-03-26 ENCOUNTER — HOSPITAL ENCOUNTER (OUTPATIENT)
Dept: NUCLEAR MEDICINE | Facility: HOSPITAL | Age: 65
Discharge: HOME | End: 2020-03-26
Attending: INTERNAL MEDICINE

## 2020-03-26 DIAGNOSIS — C50.412 PRIMARY MALIGNANT NEOPLASM OF UPPER OUTER QUADRANT OF BREAST, LEFT (H): ICD-10-CM

## 2020-05-12 ENCOUNTER — RECORDS - HEALTHEAST (OUTPATIENT)
Dept: LAB | Facility: CLINIC | Age: 65
End: 2020-05-12

## 2020-05-14 LAB — BACTERIA SPEC CULT: ABNORMAL

## 2020-06-02 ENCOUNTER — RECORDS - HEALTHEAST (OUTPATIENT)
Dept: ADMINISTRATIVE | Facility: OTHER | Age: 65
End: 2020-06-02

## 2020-06-17 ENCOUNTER — HOSPITAL ENCOUNTER (OUTPATIENT)
Dept: CT IMAGING | Facility: HOSPITAL | Age: 65
Discharge: HOME | End: 2020-06-17
Attending: NURSE PRACTITIONER

## 2020-06-17 ENCOUNTER — COMMUNICATION - HEALTHEAST (OUTPATIENT)
Dept: TELEHEALTH | Facility: CLINIC | Age: 65
End: 2020-06-17

## 2020-06-17 DIAGNOSIS — C50.919 BREAST CANCER (H): ICD-10-CM

## 2020-06-17 LAB
CREAT BLD-MCNC: 0.7 MG/DL (ref 0.6–1.1)
GFR SERPL CREATININE-BSD FRML MDRD: >60 ML/MIN/1.73M2

## 2020-06-23 ENCOUNTER — HOSPITAL ENCOUNTER (OUTPATIENT)
Dept: NUCLEAR MEDICINE | Facility: HOSPITAL | Age: 65
Discharge: HOME | End: 2020-06-23
Attending: NURSE PRACTITIONER

## 2020-06-23 DIAGNOSIS — C50.919 BREAST CANCER (H): ICD-10-CM

## 2020-09-11 ENCOUNTER — RECORDS - HEALTHEAST (OUTPATIENT)
Dept: ADMINISTRATIVE | Facility: OTHER | Age: 65
End: 2020-09-11

## 2020-09-24 ENCOUNTER — HOSPITAL ENCOUNTER (OUTPATIENT)
Dept: CT IMAGING | Facility: HOSPITAL | Age: 65
Discharge: HOME | End: 2020-09-24
Attending: INTERNAL MEDICINE

## 2020-09-24 ENCOUNTER — HOSPITAL ENCOUNTER (OUTPATIENT)
Dept: NUCLEAR MEDICINE | Facility: HOSPITAL | Age: 65
Discharge: HOME | End: 2020-09-24
Attending: INTERNAL MEDICINE

## 2020-09-24 DIAGNOSIS — C50.919 BREAST CANCER (H): ICD-10-CM

## 2020-09-24 DIAGNOSIS — C79.51 SECONDARY MALIGNANT NEOPLASM OF BONE AND BONE MARROW (H): ICD-10-CM

## 2020-09-24 DIAGNOSIS — C79.52 SECONDARY MALIGNANT NEOPLASM OF BONE AND BONE MARROW (H): ICD-10-CM

## 2020-09-24 LAB
CREAT BLD-MCNC: 0.6 MG/DL (ref 0.6–1.1)
GFR SERPL CREATININE-BSD FRML MDRD: >60 ML/MIN/1.73M2

## 2020-09-24 ASSESSMENT — MIFFLIN-ST. JEOR: SCORE: 1307.46

## 2020-11-18 ENCOUNTER — RECORDS - HEALTHEAST (OUTPATIENT)
Dept: LAB | Facility: CLINIC | Age: 65
End: 2020-11-18

## 2020-11-18 LAB — TSH SERPL DL<=0.005 MIU/L-ACNC: 1.19 UIU/ML (ref 0.3–5)

## 2020-11-19 LAB
ALBUMIN SERPL-MCNC: 3.6 G/DL (ref 3.5–5)
ALP SERPL-CCNC: 113 U/L (ref 45–120)
ALT SERPL W P-5'-P-CCNC: 17 U/L (ref 0–45)
ANION GAP SERPL CALCULATED.3IONS-SCNC: 13 MMOL/L (ref 5–18)
AST SERPL W P-5'-P-CCNC: 21 U/L (ref 0–40)
BILIRUB SERPL-MCNC: 0.3 MG/DL (ref 0–1)
BUN SERPL-MCNC: 18 MG/DL (ref 8–22)
CALCIUM SERPL-MCNC: 8.6 MG/DL (ref 8.5–10.5)
CHLORIDE BLD-SCNC: 105 MMOL/L (ref 98–107)
CHOLEST SERPL-MCNC: 107 MG/DL
CO2 SERPL-SCNC: 24 MMOL/L (ref 22–31)
CREAT SERPL-MCNC: 0.67 MG/DL (ref 0.6–1.1)
FASTING STATUS PATIENT QL REPORTED: ABNORMAL
GFR SERPL CREATININE-BSD FRML MDRD: >60 ML/MIN/1.73M2
GLUCOSE BLD-MCNC: 59 MG/DL (ref 70–125)
HDLC SERPL-MCNC: 48 MG/DL
LDLC SERPL CALC-MCNC: 19 MG/DL
POTASSIUM BLD-SCNC: 4 MMOL/L (ref 3.5–5)
PROT SERPL-MCNC: 6.2 G/DL (ref 6–8)
SODIUM SERPL-SCNC: 142 MMOL/L (ref 136–145)
TRIGL SERPL-MCNC: 202 MG/DL

## 2020-11-30 ENCOUNTER — RECORDS - HEALTHEAST (OUTPATIENT)
Dept: ADMINISTRATIVE | Facility: OTHER | Age: 65
End: 2020-11-30

## 2020-12-09 ENCOUNTER — HOSPITAL ENCOUNTER (OUTPATIENT)
Dept: NUCLEAR MEDICINE | Facility: CLINIC | Age: 65
Discharge: HOME | End: 2020-12-09
Attending: INTERNAL MEDICINE

## 2020-12-09 ENCOUNTER — HOSPITAL ENCOUNTER (OUTPATIENT)
Dept: CT IMAGING | Facility: CLINIC | Age: 65
Discharge: HOME | End: 2020-12-09
Attending: INTERNAL MEDICINE

## 2020-12-09 DIAGNOSIS — C50.919 PRIMARY MALIGNANT NEOPLASM OF FEMALE BREAST (H): ICD-10-CM

## 2020-12-09 LAB
CREAT BLD-MCNC: 0.8 MG/DL (ref 0.6–1.1)
GFR SERPL CREATININE-BSD FRML MDRD: >60 ML/MIN/1.73M2

## 2021-01-01 ENCOUNTER — OFFICE VISIT (OUTPATIENT)
Dept: INFECTIOUS DISEASES | Facility: CLINIC | Age: 66
End: 2021-01-01
Payer: COMMERCIAL

## 2021-01-01 ENCOUNTER — LAB (OUTPATIENT)
Dept: LAB | Facility: CLINIC | Age: 66
End: 2021-01-01

## 2021-01-01 ENCOUNTER — HOSPITAL ENCOUNTER (OUTPATIENT)
Dept: CARDIOLOGY | Facility: HOSPITAL | Age: 66
Discharge: HOME | End: 2021-12-23
Attending: INTERNAL MEDICINE | Admitting: INTERNAL MEDICINE
Payer: COMMERCIAL

## 2021-01-01 VITALS — DIASTOLIC BLOOD PRESSURE: 60 MMHG | TEMPERATURE: 98.2 F | HEART RATE: 96 BPM | SYSTOLIC BLOOD PRESSURE: 126 MMHG

## 2021-01-01 DIAGNOSIS — Z01.818 EXAMINATION PRIOR TO CHEMOTHERAPY: ICD-10-CM

## 2021-01-01 DIAGNOSIS — M46.20 PARASPINAL ABSCESS (H): ICD-10-CM

## 2021-01-01 LAB
BKR LAB AP GYN ADEQUACY: ABNORMAL
BKR LAB AP GYN INTERPRETATION: ABNORMAL
BKR LAB AP HPV REFLEX: ABNORMAL
BKR LAB AP PREVIOUS ABNL DX: ABNORMAL
BKR LAB AP PREVIOUS ABNORMAL: ABNORMAL
C REACTIVE PROTEIN LHE: 5.9 MG/DL (ref 0–0.8)
HUMAN PAPILLOMA VIRUS 16 DNA: NEGATIVE
HUMAN PAPILLOMA VIRUS 18 DNA: NEGATIVE
HUMAN PAPILLOMA VIRUS FINAL DIAGNOSIS: ABNORMAL
HUMAN PAPILLOMA VIRUS OTHER HR: POSITIVE
LVEF ECHO: NORMAL
PATH REPORT.COMMENTS IMP SPEC: ABNORMAL
PATH REPORT.COMMENTS IMP SPEC: ABNORMAL
PATH REPORT.RELEVANT HX SPEC: ABNORMAL

## 2021-01-01 PROCEDURE — 86141 C-REACTIVE PROTEIN HS: CPT

## 2021-01-01 PROCEDURE — 36415 COLL VENOUS BLD VENIPUNCTURE: CPT

## 2021-01-01 PROCEDURE — 93308 TTE F-UP OR LMTD: CPT | Mod: 26 | Performed by: INTERNAL MEDICINE

## 2021-01-01 PROCEDURE — 93321 DOPPLER ECHO F-UP/LMTD STD: CPT | Mod: 26 | Performed by: INTERNAL MEDICINE

## 2021-01-01 PROCEDURE — 255N000002 HC RX 255 OP 636: Performed by: INTERNAL MEDICINE

## 2021-01-01 PROCEDURE — 99213 OFFICE O/P EST LOW 20 MIN: CPT

## 2021-01-01 RX ORDER — MEGESTROL ACETATE 40 MG/ML
SUSPENSION ORAL
COMMUNITY
Start: 2021-10-19 | End: 2022-01-01

## 2021-01-01 RX ORDER — EZETIMIBE AND SIMVASTATIN 10; 20 MG/1; MG/1
1 TABLET ORAL AT BEDTIME
Status: ON HOLD | COMMUNITY
Start: 2021-11-22 | End: 2022-01-01

## 2021-01-01 RX ORDER — CEPHALEXIN 500 MG/1
500 CAPSULE ORAL 2 TIMES DAILY
Qty: 120 CAPSULE | Refills: 3 | Status: SHIPPED | OUTPATIENT
Start: 2021-01-01 | End: 2022-01-01

## 2021-01-01 RX ADMIN — PERFLUTREN 3 ML: 6.52 INJECTION, SUSPENSION INTRAVENOUS at 15:51

## 2021-01-13 ENCOUNTER — RECORDS - HEALTHEAST (OUTPATIENT)
Dept: LAB | Facility: CLINIC | Age: 66
End: 2021-01-13

## 2021-01-13 LAB
C DIFF TOX B STL QL: NEGATIVE
RIBOTYPE 027/NAP1/BI: NORMAL

## 2021-02-10 ENCOUNTER — AMBULATORY - HEALTHEAST (OUTPATIENT)
Dept: PALLIATIVE MEDICINE | Facility: OTHER | Age: 66
End: 2021-02-10

## 2021-02-10 DIAGNOSIS — C79.89 METASTATIC CANCER TO PELVIS (H): ICD-10-CM

## 2021-02-10 DIAGNOSIS — C50.919 METASTATIC BREAST CANCER: ICD-10-CM

## 2021-02-10 DIAGNOSIS — G89.3 CHRONIC PAIN DUE TO NEOPLASM: ICD-10-CM

## 2021-04-27 ENCOUNTER — RECORDS - HEALTHEAST (OUTPATIENT)
Dept: ADMINISTRATIVE | Facility: OTHER | Age: 66
End: 2021-04-27

## 2021-05-05 ENCOUNTER — HOSPITAL ENCOUNTER (OUTPATIENT)
Dept: CARDIOLOGY | Facility: HOSPITAL | Age: 66
Discharge: HOME | End: 2021-05-05
Attending: INTERNAL MEDICINE
Payer: COMMERCIAL

## 2021-05-05 DIAGNOSIS — Z01.818 EXAMINATION PRIOR TO CHEMOTHERAPY: ICD-10-CM

## 2021-05-05 LAB
BSA FOR ECHO PROCEDURE: 1.87 M2
CV BLOOD PRESSURE: NORMAL MMHG
CV ECHO HEIGHT: 65 IN
CV ECHO WEIGHT: 169 LBS
EJECTION FRACTION: 51 % (ref 55–75)
FRACTIONAL SHORTENING: 30.9 % (ref 28–44)
INTERVENTRICULAR SEPTUM IN END DIASTOLE: 1.07 CM (ref 0.6–0.9)
IVS/PW RATIO: 1.1
LEFT VENTRICLE DIASTOLIC VOLUME INDEX: 34.1 CM3/M2 (ref 29–61)
LEFT VENTRICLE DIASTOLIC VOLUME: 63.8 CM3 (ref 46–106)
LEFT VENTRICLE HEART RATE: 101 BPM
LEFT VENTRICLE MASS INDEX: 102.9 G/M2
LEFT VENTRICLE SYSTOLIC VOLUME INDEX: 16.7 CM3/M2 (ref 8–24)
LEFT VENTRICLE SYSTOLIC VOLUME: 31.3 CM3 (ref 14–42)
LEFT VENTRICULAR INTERNAL DIMENSION IN DIASTOLE: 5.01 CM (ref 3.8–5.2)
LEFT VENTRICULAR INTERNAL DIMENSION IN SYSTOLE: 3.46 CM (ref 2.2–3.5)
LEFT VENTRICULAR MASS: 192.5 G
LEFT VENTRICULAR POSTERIOR WALL IN END DIASTOLE: 1.01 CM (ref 0.6–0.9)
NUC REST DIASTOLIC VOLUME INDEX: 2704 LBS
NUC REST SYSTOLIC VOLUME INDEX: 65 IN
TRICUSPID VALVE ANULAR PLANE SYSTOLIC EXCURSION: 2.3 CM

## 2021-05-05 ASSESSMENT — MIFFLIN-ST. JEOR: SCORE: 1307.46

## 2021-05-25 ENCOUNTER — RECORDS - HEALTHEAST (OUTPATIENT)
Dept: ADMINISTRATIVE | Facility: CLINIC | Age: 66
End: 2021-05-25

## 2021-05-26 ENCOUNTER — RECORDS - HEALTHEAST (OUTPATIENT)
Dept: ADMINISTRATIVE | Facility: CLINIC | Age: 66
End: 2021-05-26

## 2021-05-27 ENCOUNTER — RECORDS - HEALTHEAST (OUTPATIENT)
Dept: ADMINISTRATIVE | Facility: CLINIC | Age: 66
End: 2021-05-27

## 2021-05-27 VITALS — HEIGHT: 65 IN | WEIGHT: 169 LBS | BODY MASS INDEX: 28.16 KG/M2

## 2021-05-28 ENCOUNTER — RECORDS - HEALTHEAST (OUTPATIENT)
Dept: ADMINISTRATIVE | Facility: CLINIC | Age: 66
End: 2021-05-28

## 2021-05-30 ENCOUNTER — RECORDS - HEALTHEAST (OUTPATIENT)
Dept: ADMINISTRATIVE | Facility: CLINIC | Age: 66
End: 2021-05-30

## 2021-05-31 VITALS — BODY MASS INDEX: 34.43 KG/M2 | WEIGHT: 212 LBS

## 2021-05-31 VITALS — BODY MASS INDEX: 35.61 KG/M2 | WEIGHT: 214 LBS

## 2021-05-31 VITALS — BODY MASS INDEX: 34.99 KG/M2 | WEIGHT: 210 LBS | HEIGHT: 65 IN

## 2021-06-01 VITALS — BODY MASS INDEX: 36.23 KG/M2 | WEIGHT: 217.7 LBS

## 2021-06-02 ENCOUNTER — RECORDS - HEALTHEAST (OUTPATIENT)
Dept: ADMINISTRATIVE | Facility: CLINIC | Age: 66
End: 2021-06-02

## 2021-06-02 VITALS — HEIGHT: 65 IN | WEIGHT: 207 LBS | BODY MASS INDEX: 34.49 KG/M2

## 2021-06-02 NOTE — PROGRESS NOTES
MTM Transition of Care Encounter  Assessment & Plan                                                     Post Discharge Medication Reconciliation Status: discharge medications reconciled and changed, per note/orders (see AVS)    Pain: Improved. Reviwed different pain medications she is now on. Could consider increasing gabapentin in the future if needed.     Breast Cancer: Patient to continue to follow up with oncology.     Type 2 Diabetes:  reasonably well controlled. A1C was not at goal of <7%, possibly higher due to stress and pain. FBG not at goal  mg/dL, but patient only has two values to report to me -- consider increasing basal insulin 2 units. Edema is a possible side effect from Tresiba, therefore could consider switching to Lantus to see it improves. Would also recommend trying to max metformin to see if that will improve her BG and lower her insulin need. Patient will discuss this with PCP at upcoming follow up.   Everolimus likely contributing to elevated BG.   Due for microalbuminuria recheck, but possible it has more recently been checked at [facility].   PLAN:   1. Discuss with PCP: Increase metformin to 2000 mg/day and change Tresiba to Lantus.     Hyperlipidemia: Patient is on a moderate intensity statin. Recommended moving to PM due to simvastatin.     Depression: Stable. Recommended to continue current regimen.     Insomnia: Stable. Recommended to continue current regimen.     GERD: Stable. Recommended to continue current regimen.     Edema: Not helpful. See about regarding Lantus. If no improvement, could consider switching to a stronger diuretic such as furosemide PRN.     Supplements: Ok to continue supplement. Last Hgb level was low, but I do not have other more recent values to compare. No Vitamin D level on file, but may have been checked at [facility].     Follow Up  As needed with MTM     Subjective & Objective                                                       Elenita Hardin is a  64 y.o. female called for a transitions of care visit. she was discharged from [facility] on 9/30/19 for hip pain.    Chief Complaint: Is going stir crazy. Nerve pain is so much better.     Pain: Admitted with poor pain control. Pain in low back and pelvic area. Hx fracture and bone mets. Discharged on gabapentin 400 mg HS, oxycodone 5 mg, lidocaine 4% patch, naproxen 375 mg two times a day/ibuprofen and bowel regimen of Miralax and senna S. Recommended to schedule oxycodone 5-10 mg every 4-6 hours. Nerve pain from fracture is incredibly painful, but gabapentin has helped. Yesterday was a bad day -- took oxycodone AM and PM, but other day took 1/2 AM. Takes the edge off. Two max per day. Has dulcolax PRN at home -- did not receive bowel regimen that she was discharged on. Using lidocaine 4% on at night -- is helpful. Started naproxen two times a day, no ibuprofen. Taking APAP 1000 mg in between naproxen.     Breast Cancer: Taking Exemestane 25 mg daily and Everolimus 10 mg daily for metastatic breast cancer and Dexamethasone four times a day swish and spit PRN mouth sores.     Type 2 Diabetes: Currently taking Tresiba 40 units daily, Humalog 1-20 units three times a day (correction scale + carb count 4 units:15 g CHO) and metformin 500 mg two times a day. Humalog 12 with breakfast this morning.   135 this morning, 203 before bed, 113 before dinner, 199 before lunch, fasting   Has a Noemi. Tresiba causes her to retain water. Was on Lantus in hospital which does not make her swell. Reports blood sugars high due to everolimus.   Feels like her BG also higher because she was in a lot of pain and stressed.   Has not been on more metformin.   Last A1c checked 9/27/19 = 8.1%.   Microalbumin checked 2015  Is taking aspirin 81 mg for primary prevention.     Hyperlipidemia: Currently taking ezetimibe-simvastatin 10-20 mg daily AM. Denies myalgias. Last lipids checked 2/27/19.    Depression: Currently taking escitalopram  20 mg daily. Is off during the summer, but is back on in the fall. Helps with SAD.     Insomnia: Currently taking zolpidem 10 mg PRN sleep. Using almost every night due to pain. Helps her sleep. No hangover feeling.      GERD: Currently taking omeprazole 20 mg two times a day. Denies GERD now.     Edema: Currently taking spironolactone 25 mg PRN. Has been needing more due to tresiba. Not really helped.     Supplements: Currently taking B complex, multivitamin, eye vitamin, calcium carbonate 600 mg daily, Vitamin D 5000 IU daily, and iron supplement daily  Last Hgb level = 10.5 on 9/27/19  Last DEXA 2015 T score 0.3  No results found for: WMIGFBCE95MS      PMH: reviewed in EPIC   Allergies/ADRs: reviewed in EPIC   Alcohol: reviewed in EPIC  Tobacco:   Social History     Tobacco Use   Smoking Status Former Smoker     Packs/day: 0.75     Years: 20.00     Pack years: 15.00   Smokeless Tobacco Never Used     Recent Vitals:   BP Readings from Last 3 Encounters:   09/30/19 136/83   04/09/18 112/74   11/17/17 114/76      Wt Readings from Last 3 Encounters:   09/28/19 198 lb 1.6 oz (89.9 kg)   11/26/18 207 lb (93.9 kg)   04/09/18 217 lb 11.2 oz (98.7 kg)     ----------------    The patient declined an after visit summary    I spent 30 minutes with this patient today;  . I offer these suggestions with the understanding that I don't fully understand Elenita's past medical history and the complexity of her health conditions.  Elenita should make no changes without the approval of his primary care provider.. A copy of the visit note was provided to the patient's provider.     Sudha Carney Pharm.D., BCACP  Medication Therapy Management Pharmacist  [facility]     Current Outpatient Medications   Medication Sig Dispense Refill     acetaminophen (TYLENOL) 500 MG tablet Take 2 tablets (1,000 mg total) by mouth 3 (three) times a day.  0     aluminum-magnesium hydroxide-simethicone (MAALOX ADVANCED) 200-200-20 mg/5  mL Susp Take 30 mL by mouth every 4 (four) hours as needed.       aspirin 81 MG EC tablet Take 81 mg by mouth daily.       b complex vitamins tablet Take 1 tablet by mouth daily.       blood-glucose meter kit by Other route 2 (two) times a day. Use as instructed       calcium carbonate (OS-BENJI) 600 mg calcium (1,500 mg) tablet Take 600 mg by mouth daily.       cholecalciferol, vitamin D3, 5,000 unit Tab Take 5,000 Units by mouth daily.              dexamethasone 0.5 mg/5 mL solution Take 1 mg by mouth 4 (four) times a day. Swish and spit   Must be alcohol-free formulation             escitalopram oxalate (LEXAPRO) 20 MG tablet Take 20 mg by mouth at bedtime.              everolimus, antineoplastic, 5 mg Tab Take 10 mg by mouth daily with lunch.       exemestane (AROMASIN) 25 mg tablet Take 25 mg by mouth daily with lunch.       ezetimibe-simvastatin (VYTORIN) 10-20 mg per tablet Take 1 tablet by mouth every morning.              folic acid/multivit-min/lutein (CENTRUM SILVER ORAL) Take 1 tablet by mouth daily.       gabapentin (NEURONTIN) 400 MG capsule Take 1 capsule (400 mg total) by mouth at bedtime. 30 capsule 1     generic lancets (ACCU-CHEK MULTICLIX LANCET) Misc Use 3 times daily 102 each 2     ibuprofen (ADVIL,MOTRIN) 200 MG tablet Take 600 mg by mouth every 6 (six) hours as needed for pain.       insulin lispro (HUMALOG) 100 unit/mL injection Inject 1-20 Units under the skin 3 (three) times a day before meals. Sliding scale + carb count       iron bis-glycinat/vit C/FA/B12 (GENTLE IRON ORAL) Take 1 tablet by mouth daily.       LANCETS & BLOOD GLUCOSE STRIPS MISC Use As Directed.       lidocaine 4 % patch Place 3 patches on the skin daily. Remove and discard patch with 12 hours or as directed by MD. 15 patch 0     metFORMIN (GLUCOPHAGE) 500 MG tablet Take 500 mg by mouth 2 (two) times a day with meals.       naproxen (NAPROSYN) 375 MG tablet Take 1 tablet (375 mg total) by mouth 2 (two) times a day with  meals. 30 tablet 1     omeprazole (PRILOSEC) 20 MG capsule Take 20 mg by mouth 2 (two) times a day before meals.       oxyCODONE (ROXICODONE) 5 MG immediate release tablet Take 1-2 tablets (5-10 mg total) by mouth every 4 (four) hours as needed. 1 tab for pain score 5-8 and 2 tabs for pain score 9-10 25 tablet 0     polyethylene glycol (MIRALAX) 17 gram packet Take 1 packet (17 g total) by mouth daily.  0     senna-docusate (PERICOLACE) 8.6-50 mg tablet Take 1 tablet by mouth 2 (two) times a day.  0     spironolactone (ALDACTONE) 25 MG tablet Take 1 tablet by mouth daily as needed for edema.  0     TRESIBA FLEXTOUCH U-100 100 unit/mL (3 mL) InPn Inject 40 Units under the skin every morning.         3     vitamin A-vitamin C-vit E-min (OCUVITE) Tab tablet Take 1 tablet by mouth daily.       zolpidem (AMBIEN) 5 MG tablet Take 10 mg by mouth at bedtime as needed for sleep.              No current facility-administered medications for this visit.

## 2021-06-03 ENCOUNTER — RECORDS - HEALTHEAST (OUTPATIENT)
Dept: ADMINISTRATIVE | Facility: CLINIC | Age: 66
End: 2021-06-03

## 2021-06-04 VITALS — HEIGHT: 65 IN | WEIGHT: 182.6 LBS | BODY MASS INDEX: 30.42 KG/M2

## 2021-06-04 NOTE — PROGRESS NOTES
H&P documented within 30 days (by Skylar Gilliland CNP on 12/12/19).  I have also reviewed the pertinent progress notes since the initial H&P. I have performed an assessment and examined the patient, as necessary, to update the patient's current status that may have changed.

## 2021-06-04 NOTE — TELEPHONE ENCOUNTER
Spoke with Elenita today at 404 PM regarding her outpatient Palliative Care appt today in the lung clinic. She indicates she went to the pain clinic. Does not recall hearing about the lung clinic. Unfortunately no referral was ordered to the lung clinic as requested in Palliative Care last inpatient note. Pt is frustrated, crying. Having pain. Does not want to talk long.  She is focused on getting her port Thursday at [facility] and then starting chemotherapy Monday. Gave permission to call Breonna Gilliland at [facility] (who was involved in her inpatient care last week) (will see if can get prompt appt with [facility] provider Denilson Valles/perhaps be seen by a provider at [facility] tomorrow while getting port). Tried to offer her [facility] home visit contact information but she was not interested.

## 2021-06-04 NOTE — PRE-PROCEDURE
Procedure Name: port   Date/Time: 12/12/2019 12:20 PM  Written consent obtained?: Yes  Risks and benefits: Risks, benefits and alternatives were discussed  Consent given by: patient  Expected level of sedation: moderate  ASA Class: Class 3- Severe systemic disease, definite functional limitations  Mallampati: Grade 2- soft palate, base of uvula, tonsillar pillars, and portion of posterior pharyngeal wall visible  Patient states understanding of procedure being performed: Yes  Patient's understanding of procedure matches consent: Yes  Procedure consent matches procedure scheduled: Yes  Appropriately NPO: yes  Lungs: lungs clear with good breath sounds bilaterally  Heart: normal heart sounds and rate  History & Physical reviewed: Abbreviated history and physical done prior to moderate sedation  Statement of review: I have reviewed the lab findings, diagnostic data, medications, and the plan for sedation

## 2021-06-04 NOTE — PROCEDURES
[facility]    Procedure: IR Procedure Note  Date/Time: 12/12/2019 2:27 PM  Performed by: Cristian Zhang MD  Authorized by: Cristian Zhang MD       Universal Protocol    Site marked: NA    Prior images obtained and reviewed: NA    Required items: required blood products, implants, devices, and special equipment available    Patient identity confirmed: verbally with patient, arm band, provided demographic data and hospital-assigned identification number    Reevaluation: Patient was reevaluated immediately before administering moderate or deep sedation or anesthesia    Confirmation checklist: patient's identity using two indicators, relevant allergies, procedure was appropriate and matched the consent or emergent situation and correct equipment/implants were available    Time out: Immediately prior to procedure a time out was called to verify the correct patient, procedure, equipment, support staff and site/side marked as required    Universal Protocol: Joint Commission Universal Protocol was followed    Preparation: Patient was prepped and draped in the usual sterile fashion    Anesthesia    Local anesthesia used?: Yes    Sedation    Patient sedation: Yes    Vital signs: Vital signs monitored during sedation  Specimens: none  Complications: None  Condition: Stable    Post-procedure   Length of time physician present for 1:1 monitoring during sedation: 30

## 2021-06-05 VITALS — BODY MASS INDEX: 28.16 KG/M2 | HEIGHT: 65 IN | WEIGHT: 169 LBS

## 2021-06-14 NOTE — PROGRESS NOTES
CCx: Follow-up of left upper lobe pulmonary infiltrate in the background of ongoing chemotherapy for metastatic breast cancer    HPI:Ms. Hardin is a 62-year-old lady with a past medical history significant for depression, diabetes, fibromyalgia, hyperlipidemia osteoarthritis, breast cancer diagnosed in August 2010 status post left-sided mastectomy and chemoradiation for metastatic disease with subsequent treatment with anastrozole for the last 6 years who was recently diagnosed with new metastatic disease to have vertebrae and ribs.   She was seen by me in clinic last week for evaluation of a left upper lobe infiltrate while on chemotherapy and had no symptoms that were concerning for an infectious process.  She underwent a bronchoscopy with a trans-bronchial biopsy 2 days ago and is here to follow-up with the results of these tests.  She states that she has significant congestion in her chest since a bronchoscopy but is otherwise feeling well.  Of note, unfortunately, the patient's BAL was not run for infectious organisms as the entire sample was obtained for cytology.  I explained to the patient and we have filed an incident report to this effect.    Pertinent medical history:  She presents to clinic today for an abnormal CT scan of her chest which demonstrates bilateral consolidation in the left and the right upper and middle lobe.  She denies fevers, chills, night sweats but does state that she becomes winded and wheezy when walking up a flight of stairs.  She notes that the symptoms generally become worse when she is initiated on her Faslodex and Ibrance therapy (resumed earlier this year for recurrence of her breast cancer).  She endorses hot flashes and some night sweats but denies weight loss.    ROS:  Positives alluded to in the HPI.  Remainder of 10 point review of systems is negative.    PMH (unchanged from prior visit):  1. Depression  2. Diabetes  3. Fibromyalgia  4. Osteoarthritis  5. Breast  cancer diagnosed in August 2010 status post left-sided mastectomy  6. Chemoradiation with doxorubicin and Cytoxan in November - December 2010 ×4  7. 12 rounds of Taxol January 2011 through March 2011.  8. Maintenance anastrozole from March 2011 to January 2017    PSH (unchanged from prior visit):  1. Left-sided mastectomy    Allergies:  Reviewed in epic    Family HX (unchanged from prior visit):  1. Mother: Pancreatic cancer, lung cancer  2. Father: Coronary artery disease    Social Hx (unchanged from prior visit):  Marital status:   Number of children: 2 biologic and 3 from her   Resides in a house, concern for mold in one of the bathrooms  Occupational history: retired    service: No  Pets: No  Smoking history: 40 years of intermittent tobacco use  Alcohol use: Social   Recreational drug use: No  Hobbies: Reading, smith  Recent Travel: No    Current Meds:  Current Outpatient Prescriptions   Medication Sig Dispense Refill     aspirin 81 MG EC tablet Take 81 mg by mouth daily.       blood-glucose meter kit by Other route 2 (two) times a day. Use as instructed       calcium carbonate-vitamin D2 500 mg(1,250mg) -200 unit tablet Take 1 tablet by mouth 2 (two) times a day. 2000mg calium per day and 7000iu daily of Vitamin d       cholecalciferol, vitamin D3, 5,000 unit Tab Take by mouth. 7000 units a day       CINNAMON BARK (CINNAMON ORAL) Take by mouth.       CLOBETASOL PROPIONATE (CLOBETASOL TOP) Apply topically.       dexamethasone 0.5 mg/5 mL solution Take by mouth daily.       escitalopram oxalate (LEXAPRO) 20 MG tablet Take 20 mg by mouth daily.       ezetimibe-simvastatin (VYTORIN) 10-20 mg per tablet Take 1 tablet by mouth at bedtime.       generic lancets (ACCU-CHEK MULTICLIX LANCET) Misc Use 3 times daily 102 each 2     GREEN TEA EXTRACT ORAL Take by mouth. Patient takes 3 daily       insulin aspart (NOVOLOG) 100 unit/mL injection Inject under the skin 3 (three) times a day  before meals.       insulin detemir (LEVEMIR) 100 unit/mL injection Inject 20 Units under the skin every morning.       LANCETS & BLOOD GLUCOSE STRIPS MISC Use As Directed.       metFORMIN (GLUCOPHAGE) 500 MG tablet Take 500 mg by mouth 2 (two) times a day with meals.       multivitamin with minerals (THERA-M) 9 mg iron-400 mcg Tab tablet Take by mouth daily.       OMEGA-3/DHA/EPA/FISH OIL (FISH OIL-OMEGA-3 FATTY ACIDS) 300-1,000 mg capsule Take 2 g by mouth daily.       traMADol (ULTRAM) 50 mg tablet Take 50 mg by mouth every 6 (six) hours as needed for pain.       vitamin A-vitamin C-vit E-min (OCUVITE) Tab tablet Take by mouth daily.       zolpidem (AMBIEN) 5 MG tablet Take 5 mg by mouth bedtime as needed for sleep.       No current facility-administered medications for this visit.      Labs 11/15/17:  BRONCHOALVEOLAR LAVAGE, UPPER LOBE OF LEFT LUNG:     -  NEGATIVE FOR ATYPICAL OR MALIGNANT CELLS     -  MILD ACUTE AND CHRONIC INFLAMMATORY EXUDATE     -  SINGLE LARGE MULTINUCLEATED HISTIOCYTE (SEE COMMENT BELOW)  The finding of a large and multinucleated histiocyte, even though only one cell, is indicative of a possible granulomatous inflammation. This may be an infectious inflammation, or may be sarcoidosis. As noted above, no malignant cells are identified.    TRANSBRONCHIAL BIOPSY, UPPER LOBE OF LEFT LUNG:     -  NORMAL AND REACTIVE BRONCHIAL EPITHELIUM     -  MINIMAL CHRONIC INFLAMMATORY INFILTRATE IN SUBEPITHELIAL STROMA     -  NEGATIVE FOR ACUTE INFLAMMATION OR GRANULOMATOUS TISSUE     -  NEGATIVE FOR METASTATIC NEOPLASM    Imaging studies (unchanged from prior visit):  CT Chest 10/3/17:  1. Unchanged appearance of the scattered areas of scarring in the lungs likely related to prior treatment.  No new pulmonary nodules identified.  2. Bony hepatic lesions have not changed from prior study.  3. No new lymphadenopathy.  4. Hepatic steatosis.    Bone scan 8/2/17:  1.  Probable metastatic disease in bilateral  ribs, mid to upper thoracic spine and right aspect of the sacrum, not significantly changed from the prior examination.    Physical Exam:  /76  Pulse 70  Resp 16  Wt 214 lb (97.1 kg)  SpO2 98% Comment: RA  BMI 35.61 kg/m2  Heart Rate: 70  Resp: 16  BP: 114/76  SpO2: 98 % (RA)  Weight: 214 lb (97.1 kg)  Physical Exam   Constitutional: She is oriented to person, place, and time. She appears well-developed and well-nourished.   HENT:   Head: Normocephalic and atraumatic.   Eyes: Pupils are equal, round, and reactive to light.   Neck: Normal range of motion.   Cardiovascular: Normal rate and regular rhythm.    Pulmonary/Chest: Effort normal and breath sounds normal.   Abdominal: Soft.   Musculoskeletal: Normal range of motion.   Neurological: She is alert and oriented to person, place, and time.   Skin: Skin is warm.   Psychiatric: She has a normal mood and affect.       Assessment and Plan:Elenita COX Wilfred is a 62 y.o. with a past medical history significant for depression, diabetes, fibromyalgia, hyperlipidemia osteoarthritis, breast cancer diagnosed in August 2010 status post left-sided mastectomy and chemoradiation for metastatic disease who presents to clinic today for follow-up visit after undergoing a bronchoscopy with bronchoalveolar lavage and transbronchial biopsies left upper lobe pulmonary infiltrate concerning for an infectious versus inflammatory versus metastatic process.  Cytology from both the transbronchial biopsies and the BAL were unremarkable, however, unfortunately the BAL fluid was only stained for cytology and not run for any infections leaving is still concerned that this could be an infectious process.  For the present we would recommend;    1. Left upper lobe pulmonary infiltrate status post bronchoscopy with BAL and transbronchial biopsies: Otology from both the BAL and transbronchial biopsies is unremarkable and unfortunately we were not able to run the BAL fluid for  infection.  I discussed this latter in depth with the patient recommended the following plan:.    Repeat CT scan of the chest next week as it will be almost 5 weeks from her initial scan.  If the latter demonstrates worsening of her left upper lobe infiltrate have explained to her that we would have to repeat her bronchoscopy with lavage and potentially with more transbronchial biopsies.  She is in agreement with this plan.    If the aforementioned are unrevealing we will order a 6 minute walk test with complete pulmonary function testing.  2. Follow-up: We will determine this based on the results of his CT scan.      Cyndi Lucasqvi  Pulmonary and Critical Care  [id number]

## 2021-06-14 NOTE — PROGRESS NOTES
CCx:Establishment of care for abnormal CT in the background of ongoing treatment for breast cancer    HPI:Ms. Hardin is a 62-year-old lady with a past medical history significant for depression, diabetes, fibromyalgia, hyperlipidemia osteoarthritis, breast cancer diagnosed in August 2010 status post left-sided mastectomy and chemoradiation for metastatic disease with subsequent treatment with anastrozole for the last 6 years who was recently diagnosed with new metastatic disease to have vertebrae and ribs.  She presents to clinic today for an abnormal CT scan of her chest which demonstrates bilateral consolidation in the left and the right upper and middle lobe.  She denies fevers, chills, night sweats but does state that she becomes winded and wheezy when walking up a flight of stairs.  She notes that the symptoms generally become worse when she is initiated on her Faslodex and Ibrance therapy (resumed earlier this year for recurrence of her breast cancer).  She endorses hot flashes and some night sweats but denies weight loss.    ROS:  Positives alluded to in the HPI.  Remainder of 10 point review of systems is negative.    PMH:  1. Depression  2. Diabetes  3. Fibromyalgia  4. Osteoarthritis  5. Breast cancer diagnosed in August 2010 status post left-sided mastectomy  6. Chemoradiation with doxorubicin and Cytoxan in November - December 2010 ×4  7. 12 rounds of Taxol January 2011 through March 2011.  8. Maintenance anastrozole from March 2011 to January 2017    PSH:  1. Left-sided breast    Allergies:  Reviewed in epic    Family HX:  1. Mother: Pancreatic cancer, lung cancer  2. Father: Coronary artery disease    Social Hx:  Marital status:   Number of children: 2 biologic and 3 from her   Resides in a house, concern for mold in one of the bathrooms  Occupational history: retired    service: No  Pets: No  Smoking history: 40 years of intermittent tobacco use  Alcohol  use: Social   Recreational drug use: No  Hobbies: Reading, smith  Recent Travel: No    Current Meds:  Current Outpatient Prescriptions   Medication Sig Dispense Refill     aspirin 81 MG EC tablet Take 81 mg by mouth daily.       blood-glucose meter kit by Other route 2 (two) times a day. Use as instructed       calcium carbonate-vitamin D2 500 mg(1,250mg) -200 unit tablet Take 1 tablet by mouth 2 (two) times a day. 2000mg calium per day and 7000iu daily of Vitamin d       cholecalciferol, vitamin D3, 5,000 unit Tab Take by mouth. 7000 units a day       CINNAMON BARK (CINNAMON ORAL) Take by mouth.       CLOBETASOL PROPIONATE (CLOBETASOL TOP) Apply topically.       dexamethasone 0.5 mg/5 mL solution Take by mouth daily.       escitalopram oxalate (LEXAPRO) 20 MG tablet Take 20 mg by mouth daily.       ezetimibe-simvastatin (VYTORIN) 10-20 mg per tablet Take 1 tablet by mouth at bedtime.       generic lancets (ACCU-CHEK MULTICLIX LANCET) Misc Use 3 times daily 102 each 2     GREEN TEA EXTRACT ORAL Take by mouth. Patient takes 3 daily       insulin aspart (NOVOLOG) 100 unit/mL injection Inject under the skin 3 (three) times a day before meals.       insulin detemir (LEVEMIR) 100 unit/mL injection Inject 28 Units under the skin bedtime.        LANCETS & BLOOD GLUCOSE STRIPS MISC Use As Directed.       metFORMIN (GLUCOPHAGE) 500 MG tablet Take 500 mg by mouth 2 (two) times a day with meals.       multivitamin with minerals (THERA-M) 9 mg iron-400 mcg Tab tablet Take by mouth daily.       OMEGA-3/DHA/EPA/FISH OIL (FISH OIL-OMEGA-3 FATTY ACIDS) 300-1,000 mg capsule Take 2 g by mouth daily.       traMADol (ULTRAM) 50 mg tablet Take 50 mg by mouth every 6 (six) hours as needed for pain.       vitamin A-vitamin C-vit E-min (OCUVITE) Tab tablet Take by mouth daily.       zolpidem (AMBIEN) 5 MG tablet Take 5 mg by mouth bedtime as needed for sleep.       No current facility-administered medications for this visit.       Labs:  Reviewed.     Imaging studies:  CT Chest 10/3/17:  1. Unchanged appearance of the scattered areas of scarring in the lungs likely related to prior treatment.  No new pulmonary nodules identified.  2. Bony hepatic lesions have not changed from prior study.  3. No new lymphadenopathy.  4. Hepatic steatosis.    Bone scan 8/2/17:  1.  Probable metastatic disease in bilateral ribs, mid to upper thoracic spine and right aspect of the sacrum, not significantly changed from the prior examination.    Physical Exam:  /68  Pulse 92  Resp 17  Wt 212 lb (96.2 kg)  SpO2 98% Comment: RA  BMI 34.43 kg/m2  Heart Rate: 92  Resp: 17  BP: 134/68  SpO2: 98 % (RA)  Weight: 212 lb (96.2 kg)  Physical Exam   Constitutional: She is oriented to person, place, and time. She appears well-developed and well-nourished.   HENT:   Head: Normocephalic and atraumatic.   Eyes: Pupils are equal, round, and reactive to light.   Neck: Normal range of motion.   Cardiovascular: Normal rate and regular rhythm.    Pulmonary/Chest: Effort normal and breath sounds normal.   Abdominal: Soft.   Musculoskeletal: Normal range of motion.   Neurological: She is alert and oriented to person, place, and time.   Skin: Skin is warm.   Psychiatric: She has a normal mood and affect.       Assessment and Plan:Elenita COX Wilfred is a 62 y.o. with a past medical history significant for depression, diabetes, fibromyalgia, hyperlipidemia osteoarthritis, breast cancer diagnosed in August 2010 status post left-sided mastectomy and chemoradiation for metastatic disease who presents to clinic today for bilateral consolidation noted on chest CT scan in the background of Ibrance and Faslodex chemotherapy using concerns for organizing pneumonia related to the latter versus an infection.  For the present we would recommend;    1. Bilateral pulmonary infiltrates: Less likely infectious; inflammatory versus malignant process given her widely metastatic breast  cancer.    We have arranged for the patient to undergo a bronchoscopy with bronchoalveolar lavage and transbronchial biopsies early next week.    If the aforementioned are unrevealing we will order a 6 minute walk test with complete pulmonary function testing.  2. Follow-up: She will be seen in clinic later next week to follow-up with the results of these tests.      Cyndi Carroll  Pulmonary and Critical Care  [phone]

## 2021-06-14 NOTE — PROCEDURES
Procedures    FIBEROPTIC BRONCHOSCOPY PROCEDURE NOTE (NON-OR)  Date of Procedure: 11/15/2017  Performing Physician: Cristian Laughlin  Pre-Procedure Diagnosis:   Abnormal chest CT  Post-Procedure Diagnosis:  Same as Pre-Procedure Diagnosis  Procedure:  Diagnostic Flexible Fiberoptic Bronchoscopy   Indications:  Elenita Hardin is a 62 y.o. female with a history of breast cancer with bone metastases s/p bilateral rib radiation, chemotherapy, now with persistent bilateral pulmonary opacities on chest CT  Preop evaluation:  Procedure:  Intravenous Sedation.    Expected level:  Moderate sedation  ASA Class:  ASA 4 - Patient with severe systemic disease that is a constant threat to life  Mallampati:  III (soft and hard palate and base of uvula visible).  Anesthesia:  2 mg Versed IV, 100 mcg fentanyl, 6 mL of 4% lidocaine above the cords, 12 ml of 1% lidocaine below the cords  Specimen:  1. Bronchoalveolar lavage, posterior left upper lobe  2. Transbronchial biopsies, posterior left upper lobe  Estimated Blood Loss:  Minimal ml  Complications:  none    Findings:  Vocal Cords  Normal    Trachea  Normal    Caridad  Normal    Right Bronchial Tree  Normal    Left Bronchial Tree  Normal     Procedure Details:   The patient was seen and the risks, benefits, complications, treatment options, and expected outcomes were discussed with the patient.  The risks and potential complications of their problem and proposed treatment include but are not limited to infection, bleeding, pain, adverse drug reaction, pulmonary aspiration, pneumothorax, the need for additional procedures, failure to diagnose a condition, creating a complication requiring transfusion or operation, and complication secondary to the anesthetic.  The patient concurred with the proposed plan, giving informed consent.  The patient was identified as Elenita Hardin with Date of Birth 1955 and the procedure verified as Diagnostic Flexible Fiberoptic  Bronchoscopy .  A Time Out was held and the above information confirmed.    After application of topical nasopharyngeal anesthesia, the patient was placed in the supine position and the bronchoscope was passed through the left naris.  The vocal cords were visualized. The cord findings are noted above.  The scope was then passed into the trachea  Careful inspection of the tracheal lumen was accomplished.  The scope was advanced into the right main bronchus and then into the RUL, RML, and RLL bronchi and segmental bronchi.  The scope was sequentially passed into the left main and then left upper and lower bronchi and segmental bronchi with no abnormalities identified.  Bronchoalveolar lavage was performed in the posterior left upper lobe; 50 mL instilled, 15 mL pink-tinged return.  Under fluoroscopic guidance, transbronchial biopsies were obtained from the posterior left upper lobe; 5 passes made, 5 samples obtained.  Minimal airway bleeding was noted that resolved spontaneously and with cold saline.  The bronchoscope was then withdrawn.    The patient tolerated the procedure well.      Cristian Laughlin, 11/15/2017, 9:13 AM    Referring Physician: Cyndi Carroll  Primary Care Physician: MD Cristian Childs MD  Pulmonary and Critical Care Medicine  [facility]  Cell [phone]  Office [phone]  Pager [phone]

## 2021-06-14 NOTE — PROGRESS NOTES
initial VS  SpO2 98%  RR 18  HR 64  EtCO2 25           Bronchoscope Procedure     At 0821 4cc of 4% lido neb given    2cc 4% lido given X3, above cords  2cc 1% lido given X6, below cords    Bronchoscope procedure was done successfully without any complication. Bronchoalveolar lavage, posterior left upper lobe and transbronchial biopsies, posterior left upper lobe done, sample sent. Patient on RA pre-procedure; Patient left the procedure room on RA.     Post-procedure VS  SpO2 96  HR 69  RR 22  EtCO2 remained b/n 25-40 throughout the procedure.

## 2021-06-14 NOTE — PROGRESS NOTES
Call from Raine in [facility].  This patient had Bronch with BAL on Wed Nov 15.  Raine stated that specimen somehow first got sent to cytology and they added a liquid that they use for their specimens.  This liquid makes it impossible for them to run the other testing that was ordered on this specimen.  They are only able to do the cytology and surgical path.  Will notify both Henry Laughlin and Carly.

## 2021-06-14 NOTE — PROGRESS NOTES
Patient  inform  that Bronch is scheduled for Wed .Nov  15 th.at 0800 at [facility].  Instructed to arrive at 0700.  An IV will be placed and IV sedation will be given.  Biopsies may be done.  Instructed to have nothing to eat or drink for 6 hours before procedure.  Medication instructions: Hold morning Meds.  Stop Aspirin date:  Five days before procedure. Nov. 10th.  Stop blood thinner date: N/A  Patient understands that they will need a ride home after the procedure and someone to stay with them at home after the procedure.  Patient had no further questions and is agreeable to procedure.  Phone number given for questions.

## 2021-06-14 NOTE — PROGRESS NOTES
I called Ms. Moran to discuss the results of her CT scan which demonstrated improvement in her previously noted left upper lobe opacity.  As per our prior discussion we had decided that if this was worsening we would repeat a bronchoscopy with biopsies (given that her prior bronchoscopy did not reveal any abnormalities on the biopsies but that her BAL fluid had been misplaced).  I have explained to the patient that if she develops any respiratory symptoms she should call us immediately and we would see her in clinic otherwise we will follow-up with her in 3 months with a follow-up chest x-ray.  She is in agreement with this plan.    Cyndi Carroll  Pulmonary and Critical Care  [phone]

## 2021-06-14 NOTE — SEDATION DOCUMENTATION
Pt returns to [facility] per w/c NAD. Stable .Report to Theresa SOLIS Family at bedside.

## 2021-06-16 PROBLEM — R60.0 EDEMA OF BOTH UPPER EXTREMITIES: Status: ACTIVE | Noted: 2019-10-17

## 2021-06-16 PROBLEM — I89.0 LYMPHEDEMA: Status: ACTIVE | Noted: 2020-01-23

## 2021-06-16 PROBLEM — S32.599G: Status: ACTIVE | Noted: 2020-01-23

## 2021-06-16 PROBLEM — R74.02 ELEVATION OF LEVEL OF TRANSAMINASE AND LACTIC ACID DEHYDROGENASE (LDH): Status: ACTIVE | Noted: 2019-12-04

## 2021-06-16 PROBLEM — G89.3 CANCER ASSOCIATED PAIN: Status: ACTIVE | Noted: 2019-09-27

## 2021-06-16 PROBLEM — M54.30 SCIATICA: Status: ACTIVE | Noted: 2019-12-03

## 2021-06-16 PROBLEM — R74.01 ELEVATION OF LEVEL OF TRANSAMINASE AND LACTIC ACID DEHYDROGENASE (LDH): Status: ACTIVE | Noted: 2019-12-04

## 2021-06-16 PROBLEM — M25.551 HIP PAIN, RIGHT: Status: ACTIVE | Noted: 2019-12-03

## 2021-06-16 PROBLEM — M84.454A: Status: ACTIVE | Noted: 2020-01-23

## 2021-06-16 PROBLEM — M84.550A: Status: ACTIVE | Noted: 2020-01-22

## 2021-06-16 PROBLEM — M25.559 HIP PAIN: Status: ACTIVE | Noted: 2019-09-27

## 2021-06-16 PROBLEM — E11.9 TYPE 2 DIABETES MELLITUS WITHOUT COMPLICATIONS (H): Status: ACTIVE | Noted: 2019-10-17

## 2021-06-17 NOTE — PROGRESS NOTES
Pulmonary Clinic Follow-up Visit    Assessment/Plan:  63 year old female former smoker with a history of breast cancer diagnosed in 2010 s/p left mastectomy and chemoradiation for metastatic disease, DM, dyslipidemia, presenting for follow-up of abnormal chest CT and chronic rhinosinusitis.    Abnormal chest CT: Left upper lobe focal consolidation that appeared infectious, s/p antibiotics with improved cough and fatigue.  Bronchoscopy with BAL of MEME showed no malignant cells on cytology; the specimen was processed incorrectly and could not be sent for microbiologic cultures.  I again apologized to the patient for this very serious laboratory error; a report has been filed on this incident.  She was diagnosed with a right-sided pneumonia on January 2 at [facility] and received a Z-veronica and prednisone; she feels that her cough and breathing have improved.  CXR April 6 showed resolution, and she had a chest CT on March 21 at [facility]; the images and interpretation are not currently available to me.    Plan:  - obtain images and interpretation from chest CT from 3/21/18  - I will contact the patient after I review these    Chronic allergic rhinosinusitis: Worsening left sinus pressure and eyes watering when outside the last few days, with occasional sweats/subjective fever.  Has chronic seasonal allergies.  Uses occasional loratadine alternating with a first-generation antihistamine.  Prior use of nasal fluticasone did not help and only changed her sense of taste.  Decongestant use in the past did not help.    Plan:  - doxycycline 100 mg BID x 7 days  - prednisone 40 mg daily x 5 days  - nasal azelastine two sprays in each nostril BID  - I will call her in June after she returns from [facility] to check on her sinus symptoms  - encouraged her to call any time with questions or concerning symptoms    CCx: abnormal chest CT, allergic rhinosinusitis    HPI: 63 year old female former smoker with a history of  breast cancer diagnosed in 2010 s/p left mastectomy and chemoradiation for metastatic disease, DM, dyslipidemia, presenting for follow-up of abnormal chest CT and chronic rhinosinusitis. Left upper lobe focal consolidation that appeared infectious, s/p antibiotics with improved cough and fatigue.  Bronchoscopy with BAL of MEME showed no malignant cells on cytology; the specimen was processed incorrectly and could not be sent for microbiologic cultures.  I again apologized to the patient for this very serious laboratory error; a report has been filed on this incident.  She was diagnosed with a right-sided pneumonia on January 2 at [facility] and received a Z-veronica and prednisone; she feels that her cough and breathing have improved.  CXR April 6 showed resolution, and she had a chest CT on March 21 at [facility]; the images and interpretation are not currently available to me.  Worsening left sinus pressure and eyes watering when outside the last few days, with occasional sweats/subjective fever.  Has chronic seasonal allergies.  Uses occasional loratadine alternating with a first-generation antihistamine.  Prior use of nasal fluticasone did not help and only changed her sense of taste.  Decongestant use in the past did not help.    ROS:  A 12-system review was obtained and was negative with the exception of the symptoms endorsed in the history of present illness.    PMH:  - Depression  - Diabetes  - Fibromyalgia  - Osteoarthritis  - Breast cancer diagnosed in August 2010 status post left-sided mastectomy; chemoradiation with doxorubicin and Cytoxan in November - December 2010 ×4; 12 rounds of Taxol January 2011 through March 2011; maintenance anastrozole from March 2011 to January 2017    PSH:  Past Surgical History:   Procedure Laterality Date     APPENDECTOMY       biopsy of upper and right tongue       BREAST RECONSTRUCTION Left     March 11, 2015     LIPOMA RESECTION  1985    R Hand     MASTECTOMY -  LEFT Left 2010     OVARIAN CYST REMOVAL         Allergies:  Allergies   Allergen Reactions     Aleve [Naproxen Sodium] Hives     Celebrex [Celecoxib]      Naproxen Hives     Sulfa (Sulfonamide Antibiotics) Hives       Family HX:  Family History   Problem Relation Age of Onset     Lung cancer Mother      Pancreatic cancer Mother      Kidney cancer Maternal Grandmother      Lung cancer Paternal Aunt      Breast cancer Cousin        Social Hx:  Social History     Social History     Marital status:      Spouse name: N/A     Number of children: N/A     Years of education: N/A     Occupational History     Not on file.     Social History Main Topics     Smoking status: Former Smoker     Packs/day: 0.75     Years: 20.00     Smokeless tobacco: Never Used     Alcohol use 0.6 oz/week     1 Glasses of wine per week     Drug use: No     Sexual activity: Yes     Partners: Male     Birth control/ protection: Post-menopausal     Other Topics Concern     Not on file     Social History Narrative    Patient works at home, owns online bookstore               Current Meds:  Current Outpatient Prescriptions   Medication Sig Dispense Refill     aspirin 81 MG EC tablet Take 81 mg by mouth daily.       blood-glucose meter kit by Other route 2 (two) times a day. Use as instructed       calcium carbonate-vitamin D2 500 mg(1,250mg) -200 unit tablet Take 1 tablet by mouth 2 (two) times a day. 2000mg calium per day and 7000iu daily of Vitamin d       cholecalciferol, vitamin D3, 5,000 unit Tab Take by mouth. 7000 units a day       CINNAMON BARK (CINNAMON ORAL) Take by mouth.       CLOBETASOL PROPIONATE (CLOBETASOL TOP) Apply topically.       dexamethasone 0.5 mg/5 mL solution Take by mouth daily.       escitalopram oxalate (LEXAPRO) 20 MG tablet Take 20 mg by mouth daily.       ezetimibe-simvastatin (VYTORIN) 10-20 mg per tablet Take 1 tablet by mouth at bedtime.       generic lancets (ACCU-CHEK MULTICLIX LANCET) Misc Use 3 times daily  102 each 2     GREEN TEA EXTRACT ORAL Take by mouth. Patient takes 3 daily       HUMALOG KWIKPEN INSULIN 100 unit/mL pen Per sliding scale  3     insulin detemir (LEVEMIR) 100 unit/mL injection Inject 20 Units under the skin every morning.       LANCETS & BLOOD GLUCOSE STRIPS MISC Use As Directed.       metFORMIN (GLUCOPHAGE) 500 MG tablet Take 500 mg by mouth 2 (two) times a day with meals.       multivitamin with minerals (THERA-M) 9 mg iron-400 mcg Tab tablet Take by mouth daily.       OMEGA-3/DHA/EPA/FISH OIL (FISH OIL-OMEGA-3 FATTY ACIDS) 300-1,000 mg capsule Take 2 g by mouth daily.       traMADol (ULTRAM) 50 mg tablet Take 50 mg by mouth every 6 (six) hours as needed for pain.       vitamin A-vitamin C-vit E-min (OCUVITE) Tab tablet Take by mouth daily.       zolpidem (AMBIEN) 5 MG tablet Take 5 mg by mouth bedtime as needed for sleep.       azelastine (ASTELIN) 137 mcg (0.1 %) nasal spray 2 sprays in each nostril twice daily 30 mL 12     doxycycline (MONODOX) 100 MG capsule Take 1 capsule (100 mg total) by mouth 2 (two) times a day for 7 days. 14 capsule 0     predniSONE (DELTASONE) 20 MG tablet Take 2 tablets (40 mg total) by mouth daily for 5 days. 10 tablet 0     No current facility-administered medications for this visit.        Physical Exam:  /74  Pulse 80  Resp 16  Wt 217 lb 11.2 oz (98.7 kg)  SpO2 97% Comment: RA  BMI 36.23 kg/m2  Gen: alert, oriented, no distress  HEENT: nasal turbinates are mildly edematous, no oropharyngeal lesions, no cervical or supraclavicular lymphadenopathy  CV: RRR, no M/G/R  Resp: CTAB, no focal crackles or wheezes  Abd: soft, nontender, no palpable organomegaly  Skin: no apparent rashes  Ext: no cyanosis, clubbing or edema  Neuro: alert, nonfocal    Labs:  reviewed    Imaging studies:  Chest CT (11/29/17):  - images directly reviewed, formal interpretation follows:  FINDINGS:  LUNGS AND PLEURA: There is a small area of focal consolidation in the apical posterior  segment of the left upper lobe with air bronchograms. There is mild volume loss in this region. There is mild subpleural linear opacity in the right upper lobe which   could represent subpleural fibrosis. The linear subpleural opacity is also seen in the right middle lobe. No pneumothorax or pleural effusion. No focal nodule or mass.     MEDIASTINUM: No mediastinal or hilar lymphadenopathy. No pericardial effusion. No coronary artery calcification. Patient is status post left mastectomy and left axillary node dissection. No axillary lymphadenopathy.     LIMITED UPPER ABDOMEN: There is diffuse hepatic steatosis with probable mild sparing around the gallbladder fossa. The adrenal glands are normal. No lymphadenopathy in the visualized upper abdomen.     MUSCULOSKELETAL: There is diffuse increased density in the right fifth rib suspicious for bony metastasis. There are mixed lytic and sclerotic lesions in the thoracic spine in the T3, T4, T5, T10 and T12 levels. There are also mixed lytic and sclerotic   lesions in left posterior ribs consistent with osseous metastases. No acute fracture.     IMPRESSION:   CONCLUSION:  1.  Consolidation with air bronchograms and mild volume loss in the left upper lobe most likely representing pneumonia. Patient is status post left mastectomy and less likely would be postradiation change. The opacities are new from prior CT of 2/3/2016   which was post mastectomy, I would favor infectious or inflammatory infiltrate. Subpleural changes on the right could represent a posttreatment change.  2.  Mixed osteoblastic and osteolytic bony metastases in the thoracic spine and bilateral ribs.    CXR (4/6/18):  - images directly reviewed, formal interpretation follows:  FINDINGS: A band of fibrosis in the left upper lobe. Changes to bilateral ribs are stable. Surgical clips overlie the left apex. No evidence of pneumonia.    Bone scan (3/20/18):  FINDINGS: Again noted is abnormal uptake in several  ribs, thoracic spine, and right sacral alar. These findings are unchanged when compared to previous. Right ankle degenerative or posttraumatic uptake is also similar to previous. No new abnormal foci of   uptake.     IMPRESSION:   CONCLUSION:  1.  Stable skeletal metastases.    Cristian Laughlin MD  Pulmonary and Critical Care Medicine  [facility]  Cell [phone]  Office [phone]  Pager [phone]

## 2021-07-02 NOTE — H&P
H&P by Skylar Gilliland CNP at 12/12/2019 12:30 PM     Author: Skylar Gilliland CNP Service: Interventional Radiology Author Type: Nurse Practitioner    Filed: 12/12/2019 12:20 PM Date of Service: 12/12/2019 12:30 PM Status: Signed    : Skylar Gilliland CNP (Nurse Practitioner)         Interventional Radiology - History and Physical  12/12/2019    Procedure Requested: port placement -RIGHt side  Requesting Provider: Mireille Blank PA-C    HPI: Elenita Hardin is a 64 y.o. old female metastatic breast cancer with bony mets and bone marrow involvement.To undergo chemotherapy and port placement requested.    Of note - pt had previous right chest port in the past ([id number]).     NPO Status: Mn  Anticoagulation/Antiplatelets/Bleeding tendencies: ASA   Antibiotics: Ancef for IR     Review of Systems: A comprehensive 10-point review of systems was performed. All systems were reviewed and negative with exception to those reported in the HPI.    PMH:  Past Medical History:   Diagnosis Date   ? Allergic rhinitis    ? Bone cancer (H) 2011    breast mets   ? Breast cancer (H) 2010    left mastectomy   ? Depression    ? DM (diabetes mellitus) (H)    ? Fibromyalgia    ? Hx antineoplastic chemotherapy 2010   ? Hx of radiation therapy 2010   ? Hyperlipemia    ? Lactose intolerance    ? Mini stroke (H)     R EYE   ? Osteoarthritis    ? Tobacco dependency        PSH:  Past Surgical History:   Procedure Laterality Date   ? APPENDECTOMY     ? biopsy of upper and right tongue     ? BREAST RECONSTRUCTION Left     March 11, 2015   ? IR LUMBAR TRANSFORAMINAL EPIDURAL STEROID INJECTION  12/5/2019   ? LIPOMA RESECTION  1985    R Hand   ? MASTECTOMY Left 2010   ? OVARIAN CYST REMOVAL     ? REDUCTION MAMMAPLASTY Right 2015    hx of left mastectomy and right breast reduction       ALLERGIES  Gabapentin and Sulfa (sulfonamide antibiotics)    MEDICATIONS:  Current Outpatient Medications on File Prior to Encounter    Medication Sig Dispense Refill   ? acetaminophen (TYLENOL) 500 MG tablet Take 2 tablets (1,000 mg total) by mouth 3 (three) times a day. (Patient taking differently: Take 1,000 mg by mouth 2 (two) times a day.       )  0   ? aspirin 81 MG EC tablet Take 81 mg by mouth daily.     ? b complex vitamins tablet Take 1 tablet by mouth daily.     ? blood-glucose meter kit by Other route 2 (two) times a day. Use as instructed     ? bumetanide (BUMEX) 1 MG tablet Take 1 mg by mouth as needed.      ? calcium carbonate (OS-BENJI) 600 mg calcium (1,500 mg) tablet Take 600 mg by mouth daily.     ? cholecalciferol, vitamin D3, 5,000 unit Tab Take 5,000 Units by mouth daily.            ? dexamethasone 0.5 mg/5 mL solution Take 1 mg by mouth 4 (four) times a day as needed. Swish and spit   Must be alcohol-free formulation           ? escitalopram oxalate (LEXAPRO) 20 MG tablet Take 20 mg by mouth at bedtime.            ? everolimus, antineoplastic, 5 mg Tab Take 10 mg by mouth daily with lunch.     ? ezetimibe-simvastatin (VYTORIN) 10-20 mg per tablet Take 1 tablet by mouth at bedtime.      ? fluticasone propionate (FLONASE) 50 mcg/actuation nasal spray Apply 1 spray into each nostril daily as needed for rhinitis.     ? folic acid/multivit-min/lutein (CENTRUM SILVER ORAL) Take 1 tablet by mouth daily.     ? generic lancets (ACCU-CHEK MULTICLIX LANCET) Misc Use 3 times daily 102 each 2   ? HYDROmorphone (DILAUDID) 2 MG tablet Take 1-2 tablets (2-4 mg total) by mouth every 6 (six) hours as needed. 30 tablet 0   ? HYDROmorphone (DILAUDID) 2 MG tablet Take 1 tablet (2 mg total) by mouth every 6 (six) hours. 20 tablet 0   ? hydrOXYzine pamoate (VISTARIL) 25 MG capsule Take 1 capsule (25 mg total) by mouth 3 (three) times a day as needed for anxiety. 12 capsule 0   ? insulin glargine (LANTUS) 100 unit/mL vial Inject 30 Units under the skin every morning.      ? insulin lispro (HUMALOG) 100 unit/mL injection Inject 1-20 Units under the  "skin 3 (three) times a day before meals. Sliding scale + carb count     ? iron bis-glycinat/vit C/FA/B12 (GENTLE IRON ORAL) Take 1 tablet by mouth daily.     ? LANCETS & BLOOD GLUCOSE STRIPS MISC Use As Directed.     ? lidocaine 4 % patch Place 3 patches on the skin daily. Remove and discard patch with 12 hours or as directed by MD. (Patient taking differently: Place 3 patches on the skin daily as needed. Remove and discard patch with 12 hours or as directed by MD.) 15 patch 0   ? melatonin 3 mg Tab tablet Take 1 tablet (3 mg total) by mouth at bedtime as needed.  0   ? metFORMIN (GLUCOPHAGE) 500 MG tablet Take 500 mg by mouth 2 (two) times a day with meals.     ? potassium chloride (K-DUR,KLOR-CON) 20 MEQ tablet Take 1 tablet (20 mEq total) by mouth daily. Do not take if not taking Lasix 5 tablet 0   ? pregabalin (LYRICA) 25 MG capsule Take 50 mg by mouth 3 (three) times a day.     ? vitamin A-vitamin C-vit E-min (OCUVITE) Tab tablet Take 1 tablet by mouth daily.     ? zolpidem (AMBIEN) 5 MG tablet Take 10 mg by mouth at bedtime.      ? [DISCONTINUED] exemestane (AROMASIN) 25 mg tablet Take 25 mg by mouth daily with lunch.     ? [DISCONTINUED] methylPREDNISolone (MEDROL) 4 MG tablet Take 1 tablet (4 mg total) by mouth daily with breakfast. 1 tablet 0     No current facility-administered medications on file prior to encounter.          LABS:    Hemoglobin (g/dL)   Date Value   12/03/2019 11.5 (L)     Platelets (thou/uL)   Date Value   12/07/2019 290       EXAM:  /70   Pulse 97   Temp 99  F (37.2  C) (Oral)   Resp 16   Ht 5' 5\" (1.651 m)   Wt 182 lb 9.6 oz (82.8 kg)   SpO2 97%   BMI 30.39 kg/m      General: Stable. In no acute distress  Neuro: A&O x3.   Resp: Lungs CTA bilaterally.  Cardio: S1S2 and reg, without murmur, clicks or rubs.  Abdomen: +BS. Soft, non-distended and non-tender.  Pre-Sedation Assessment:  Mallampati Airway Classification: Class 2: upper half of tonsil fossa visible  Previous " reaction to anesthesia/sedation: no  Sedation plan based on assessment: Moderate  ASA Classification: ASA 3 - Patient with moderate systemic disease with functional limitations     ASSESSMENT:    Metastatic breast cancer; chemotherapy    PLAN:    Port placement     The procedure, risks and moderate sedation were discussed with patient, all questions answered and patient agrees to proceed with the procedure.  Written consent obtained.      NOTE: notified by IR RN patient lost balance getting gown on and fell and hit head (frontal region) on bedside rail ; PE with no abrasions or bleeding or bruising or swelling noted on head (entire head examined), skin intact, neuro check WNL, denies pain or HA or neck pain-Dr. Zhang made aware of incident    Skylar Gilliland, CNP  Interventional Radiology  [phone]

## 2021-07-13 ENCOUNTER — RECORDS - HEALTHEAST (OUTPATIENT)
Dept: ADMINISTRATIVE | Facility: CLINIC | Age: 66
End: 2021-07-13

## 2021-07-16 ENCOUNTER — HOSPITAL ENCOUNTER (EMERGENCY)
Dept: CT IMAGING | Facility: HOSPITAL | Age: 66
DRG: 853 | End: 2021-07-16
Attending: EMERGENCY MEDICINE
Payer: COMMERCIAL

## 2021-07-16 ENCOUNTER — HOSPITAL ENCOUNTER (INPATIENT)
Facility: HOSPITAL | Age: 66
LOS: 12 days | Discharge: HOME HEALTH | DRG: 853 | End: 2021-07-28
Attending: EMERGENCY MEDICINE | Admitting: EMERGENCY MEDICINE
Payer: COMMERCIAL

## 2021-07-16 ENCOUNTER — HOSPITAL ENCOUNTER (INPATIENT)
Dept: RADIOLOGY | Facility: HOSPITAL | Age: 66
DRG: 853 | End: 2021-07-16
Attending: EMERGENCY MEDICINE
Payer: COMMERCIAL

## 2021-07-16 ENCOUNTER — HOSPITAL ENCOUNTER (INPATIENT)
Dept: MRI IMAGING | Facility: HOSPITAL | Age: 66
DRG: 853 | End: 2021-07-16
Attending: EMERGENCY MEDICINE
Payer: COMMERCIAL

## 2021-07-16 DIAGNOSIS — M25.551 HIP PAIN, RIGHT: ICD-10-CM

## 2021-07-16 DIAGNOSIS — K59.03 DRUG INDUCED CONSTIPATION: ICD-10-CM

## 2021-07-16 DIAGNOSIS — R44.3 HALLUCINATIONS: ICD-10-CM

## 2021-07-16 DIAGNOSIS — M54.31 SCIATICA OF RIGHT SIDE: ICD-10-CM

## 2021-07-16 DIAGNOSIS — M46.20 PARASPINAL ABSCESS (H): ICD-10-CM

## 2021-07-16 DIAGNOSIS — R10.32 LEFT GROIN PAIN: Primary | ICD-10-CM

## 2021-07-16 DIAGNOSIS — G89.3 CANCER ASSOCIATED PAIN: ICD-10-CM

## 2021-07-16 DIAGNOSIS — R41.0 CONFUSION: ICD-10-CM

## 2021-07-16 LAB
ALBUMIN SERPL-MCNC: 2.8 G/DL (ref 3.5–5)
ALBUMIN UR-MCNC: 30 MG/DL
ALP SERPL-CCNC: 157 U/L (ref 45–120)
ALT SERPL W P-5'-P-CCNC: 36 U/L (ref 0–45)
AMMONIA PLAS-SCNC: 25 UMOL/L (ref 11–35)
ANION GAP SERPL CALCULATED.3IONS-SCNC: 12 MMOL/L (ref 5–18)
APPEARANCE UR: CLEAR
APTT PPP: 48 SECONDS (ref 22–38)
AST SERPL W P-5'-P-CCNC: 23 U/L (ref 0–40)
BACTERIA #/AREA URNS HPF: ABNORMAL /HPF
BASOPHILS # BLD MANUAL: 0 10E3/UL (ref 0–0.2)
BASOPHILS NFR BLD MANUAL: 0 %
BILIRUB SERPL-MCNC: 0.8 MG/DL (ref 0–1)
BILIRUB UR QL STRIP: NEGATIVE
BUN SERPL-MCNC: 14 MG/DL (ref 8–22)
CALCIUM SERPL-MCNC: 8.8 MG/DL (ref 8.5–10.5)
CHLORIDE BLD-SCNC: 99 MMOL/L (ref 98–107)
CO2 SERPL-SCNC: 21 MMOL/L (ref 22–31)
COLOR UR AUTO: ABNORMAL
CREAT SERPL-MCNC: 0.69 MG/DL (ref 0.6–1.1)
EOSINOPHIL # BLD MANUAL: 0.3 10E3/UL (ref 0–0.7)
EOSINOPHIL NFR BLD MANUAL: 6 %
ERYTHROCYTE [DISTWIDTH] IN BLOOD BY AUTOMATED COUNT: 15.9 % (ref 10–15)
GFR SERPL CREATININE-BSD FRML MDRD: >90 ML/MIN/1.73M2
GLUCOSE BLD-MCNC: 109 MG/DL (ref 70–125)
GLUCOSE BLDC GLUCOMTR-MCNC: 101 MG/DL (ref 70–125)
GLUCOSE BLDC GLUCOMTR-MCNC: 110 MG/DL (ref 70–125)
GLUCOSE BLDC GLUCOMTR-MCNC: 73 MG/DL (ref 70–125)
GLUCOSE UR STRIP-MCNC: NEGATIVE MG/DL
HBA1C MFR BLD: 6.3 %
HCT VFR BLD AUTO: 26.7 % (ref 35–47)
HGB BLD-MCNC: 8.5 G/DL (ref 11.7–15.7)
HGB UR QL STRIP: ABNORMAL
INR PPP: 1.45 (ref 0.85–1.15)
KETONES UR STRIP-MCNC: 10 MG/DL
LACTATE SERPL-SCNC: 0.8 MMOL/L (ref 0.7–2)
LEUKOCYTE ESTERASE UR QL STRIP: NEGATIVE
LYMPHOCYTES # BLD MANUAL: 0.1 10E3/UL (ref 0.8–5.3)
LYMPHOCYTES NFR BLD MANUAL: 2 %
MAGNESIUM SERPL-MCNC: 2.2 MG/DL (ref 1.8–2.6)
MCH RBC QN AUTO: 31 PG (ref 26.5–33)
MCHC RBC AUTO-ENTMCNC: 31.8 G/DL (ref 31.5–36.5)
MCV RBC AUTO: 97 FL (ref 78–100)
MONOCYTES # BLD MANUAL: 0.7 10E3/UL (ref 0–1.3)
MONOCYTES NFR BLD MANUAL: 16 %
NEUTROPHILS # BLD MANUAL: 3.3 10E3/UL (ref 1.6–8.3)
NEUTROPHILS NFR BLD MANUAL: 76 %
NITRATE UR QL: NEGATIVE
PH UR STRIP: 5.5 [PH] (ref 5–7)
PLAT MORPH BLD: ABNORMAL
PLATELET # BLD AUTO: 248 10E3/UL (ref 150–450)
POLYCHROMASIA BLD QL SMEAR: SLIGHT
POTASSIUM BLD-SCNC: 4 MMOL/L (ref 3.5–5)
PROCALCITONIN SERPL-MCNC: 3.64 NG/ML (ref 0–0.49)
PROT SERPL-MCNC: 6.6 G/DL (ref 6–8)
RBC # BLD AUTO: 2.74 10E6/UL (ref 3.8–5.2)
RBC MORPH BLD: ABNORMAL
RBC URINE: 1 /HPF
SARS-COV-2 RNA RESP QL NAA+PROBE: NEGATIVE
SODIUM SERPL-SCNC: 132 MMOL/L (ref 136–145)
SP GR UR STRIP: 1.01 (ref 1–1.03)
TRANSITIONAL EPI: 1 /HPF
TROPONIN I SERPL-MCNC: 0.01 NG/ML (ref 0–0.29)
UROBILINOGEN UR STRIP-MCNC: <2 MG/DL
WBC # BLD AUTO: 4.3 10E3/UL (ref 4–11)
WBC URINE: 6 /HPF

## 2021-07-16 PROCEDURE — 36592 COLLECT BLOOD FROM PICC: CPT | Performed by: EMERGENCY MEDICINE

## 2021-07-16 PROCEDURE — 83605 ASSAY OF LACTIC ACID: CPT | Performed by: EMERGENCY MEDICINE

## 2021-07-16 PROCEDURE — 82040 ASSAY OF SERUM ALBUMIN: CPT | Performed by: EMERGENCY MEDICINE

## 2021-07-16 PROCEDURE — 82140 ASSAY OF AMMONIA: CPT | Performed by: EMERGENCY MEDICINE

## 2021-07-16 PROCEDURE — 84484 ASSAY OF TROPONIN QUANT: CPT | Performed by: EMERGENCY MEDICINE

## 2021-07-16 PROCEDURE — 73630 X-RAY EXAM OF FOOT: CPT | Mod: LT

## 2021-07-16 PROCEDURE — 93005 ELECTROCARDIOGRAM TRACING: CPT | Performed by: EMERGENCY MEDICINE

## 2021-07-16 PROCEDURE — 999N000250 HC STATISTIC ECG ASSIST

## 2021-07-16 PROCEDURE — 93010 ELECTROCARDIOGRAM REPORT: CPT | Performed by: INTERNAL MEDICINE

## 2021-07-16 PROCEDURE — C9803 HOPD COVID-19 SPEC COLLECT: HCPCS

## 2021-07-16 PROCEDURE — 70551 MRI BRAIN STEM W/O DYE: CPT

## 2021-07-16 PROCEDURE — 99285 EMERGENCY DEPT VISIT HI MDM: CPT | Mod: 25

## 2021-07-16 PROCEDURE — 83735 ASSAY OF MAGNESIUM: CPT | Performed by: EMERGENCY MEDICINE

## 2021-07-16 PROCEDURE — 85014 HEMATOCRIT: CPT | Performed by: EMERGENCY MEDICINE

## 2021-07-16 PROCEDURE — 85730 THROMBOPLASTIN TIME PARTIAL: CPT | Performed by: EMERGENCY MEDICINE

## 2021-07-16 PROCEDURE — 87635 SARS-COV-2 COVID-19 AMP PRB: CPT | Performed by: EMERGENCY MEDICINE

## 2021-07-16 PROCEDURE — 120N000001 HC R&B MED SURG/OB

## 2021-07-16 PROCEDURE — 250N000013 HC RX MED GY IP 250 OP 250 PS 637: Performed by: EMERGENCY MEDICINE

## 2021-07-16 PROCEDURE — 85610 PROTHROMBIN TIME: CPT | Performed by: EMERGENCY MEDICINE

## 2021-07-16 PROCEDURE — 258N000003 HC RX IP 258 OP 636: Performed by: EMERGENCY MEDICINE

## 2021-07-16 PROCEDURE — 99223 1ST HOSP IP/OBS HIGH 75: CPT | Performed by: EMERGENCY MEDICINE

## 2021-07-16 PROCEDURE — 83036 HEMOGLOBIN GLYCOSYLATED A1C: CPT | Performed by: EMERGENCY MEDICINE

## 2021-07-16 PROCEDURE — 250N000011 HC RX IP 250 OP 636: Performed by: EMERGENCY MEDICINE

## 2021-07-16 PROCEDURE — 81001 URINALYSIS AUTO W/SCOPE: CPT | Performed by: EMERGENCY MEDICINE

## 2021-07-16 PROCEDURE — 84145 PROCALCITONIN (PCT): CPT | Performed by: EMERGENCY MEDICINE

## 2021-07-16 PROCEDURE — 71045 X-RAY EXAM CHEST 1 VIEW: CPT

## 2021-07-16 PROCEDURE — 70450 CT HEAD/BRAIN W/O DYE: CPT

## 2021-07-16 RX ORDER — ASPIRIN 81 MG/1
81 TABLET ORAL DAILY
Status: DISCONTINUED | OUTPATIENT
Start: 2021-07-16 | End: 2021-07-28 | Stop reason: HOSPADM

## 2021-07-16 RX ORDER — NICOTINE POLACRILEX 4 MG
15-30 LOZENGE BUCCAL
Status: DISCONTINUED | OUTPATIENT
Start: 2021-07-16 | End: 2021-07-28 | Stop reason: HOSPADM

## 2021-07-16 RX ORDER — EZETIMIBE 10 MG/1
10 TABLET ORAL AT BEDTIME
Status: DISCONTINUED | OUTPATIENT
Start: 2021-07-16 | End: 2021-07-28 | Stop reason: HOSPADM

## 2021-07-16 RX ORDER — OXYBUTYNIN CHLORIDE 5 MG/1
5 TABLET ORAL 2 TIMES DAILY
Status: ON HOLD | COMMUNITY
End: 2022-01-01

## 2021-07-16 RX ORDER — MULTIPLE VITAMINS W/ MINERALS TAB 9MG-400MCG
1 TAB ORAL DAILY
Status: DISCONTINUED | OUTPATIENT
Start: 2021-07-17 | End: 2021-07-28 | Stop reason: HOSPADM

## 2021-07-16 RX ORDER — HYDROMORPHONE HYDROCHLORIDE 4 MG/1
4 TABLET ORAL EVERY 4 HOURS PRN
Status: DISCONTINUED | OUTPATIENT
Start: 2021-07-16 | End: 2021-07-17

## 2021-07-16 RX ORDER — NAPROXEN SODIUM 220 MG
220 TABLET ORAL 2 TIMES DAILY PRN
Status: ON HOLD | COMMUNITY
End: 2021-07-28

## 2021-07-16 RX ORDER — HALOPERIDOL 5 MG/ML
1 INJECTION INTRAMUSCULAR EVERY 6 HOURS PRN
Status: DISCONTINUED | OUTPATIENT
Start: 2021-07-16 | End: 2021-07-17

## 2021-07-16 RX ORDER — NALOXONE HYDROCHLORIDE 0.4 MG/ML
0.4 INJECTION, SOLUTION INTRAMUSCULAR; INTRAVENOUS; SUBCUTANEOUS
Status: DISCONTINUED | OUTPATIENT
Start: 2021-07-16 | End: 2021-07-24

## 2021-07-16 RX ORDER — IBUPROFEN 200 MG
200 TABLET ORAL 3 TIMES DAILY
Status: ON HOLD | COMMUNITY
End: 2022-01-01

## 2021-07-16 RX ORDER — NALOXONE HYDROCHLORIDE 0.4 MG/ML
0.2 INJECTION, SOLUTION INTRAMUSCULAR; INTRAVENOUS; SUBCUTANEOUS
Status: DISCONTINUED | OUTPATIENT
Start: 2021-07-16 | End: 2021-07-24

## 2021-07-16 RX ORDER — EZETIMIBE AND SIMVASTATIN 10; 20 MG/1; MG/1
1 TABLET ORAL DAILY
Status: DISCONTINUED | OUTPATIENT
Start: 2021-07-16 | End: 2021-07-16 | Stop reason: RX

## 2021-07-16 RX ORDER — SODIUM CHLORIDE 9 MG/ML
INJECTION, SOLUTION INTRAVENOUS CONTINUOUS
Status: DISCONTINUED | OUTPATIENT
Start: 2021-07-16 | End: 2021-07-17

## 2021-07-16 RX ORDER — BENZTROPINE MESYLATE 0.5 MG/1
1 TABLET ORAL 3 TIMES DAILY PRN
Status: DISCONTINUED | OUTPATIENT
Start: 2021-07-16 | End: 2021-07-28 | Stop reason: HOSPADM

## 2021-07-16 RX ORDER — HYDROMORPHONE HYDROCHLORIDE 4 MG/1
4 TABLET ORAL 3 TIMES DAILY
Status: ON HOLD | COMMUNITY
End: 2022-01-01

## 2021-07-16 RX ORDER — DEXTROSE MONOHYDRATE 25 G/50ML
25-50 INJECTION, SOLUTION INTRAVENOUS
Status: DISCONTINUED | OUTPATIENT
Start: 2021-07-16 | End: 2021-07-28 | Stop reason: HOSPADM

## 2021-07-16 RX ORDER — CALCIUM CARBONATE 500 MG/1
500 TABLET, CHEWABLE ORAL DAILY
Status: DISCONTINUED | OUTPATIENT
Start: 2021-07-17 | End: 2021-07-28 | Stop reason: HOSPADM

## 2021-07-16 RX ORDER — OXYCODONE HYDROCHLORIDE 30 MG/1
30 TABLET, FILM COATED, EXTENDED RELEASE ORAL EVERY 12 HOURS
Status: ON HOLD | COMMUNITY
End: 2021-07-26

## 2021-07-16 RX ORDER — DULOXETIN HYDROCHLORIDE 30 MG/1
30 CAPSULE, DELAYED RELEASE ORAL EVERY MORNING
Status: DISCONTINUED | OUTPATIENT
Start: 2021-07-17 | End: 2021-07-28 | Stop reason: HOSPADM

## 2021-07-16 RX ORDER — DOCUSATE SODIUM 100 MG/1
200 CAPSULE, LIQUID FILLED ORAL DAILY
Status: DISCONTINUED | OUTPATIENT
Start: 2021-07-17 | End: 2021-07-28 | Stop reason: HOSPADM

## 2021-07-16 RX ORDER — IBUPROFEN 400 MG/1
400 TABLET, FILM COATED ORAL EVERY 6 HOURS PRN
Status: DISCONTINUED | OUTPATIENT
Start: 2021-07-16 | End: 2021-07-28 | Stop reason: HOSPADM

## 2021-07-16 RX ORDER — OLANZAPINE 10 MG/2ML
5 INJECTION, POWDER, FOR SOLUTION INTRAMUSCULAR EVERY 6 HOURS PRN
Status: DISCONTINUED | OUTPATIENT
Start: 2021-07-16 | End: 2021-07-17

## 2021-07-16 RX ORDER — VITAMIN B COMPLEX
25 TABLET ORAL DAILY
Status: DISCONTINUED | OUTPATIENT
Start: 2021-07-16 | End: 2021-07-28 | Stop reason: HOSPADM

## 2021-07-16 RX ORDER — OXYBUTYNIN CHLORIDE 5 MG/1
5 TABLET ORAL 2 TIMES DAILY PRN
Status: DISCONTINUED | OUTPATIENT
Start: 2021-07-16 | End: 2021-07-20

## 2021-07-16 RX ORDER — HYDROXYZINE HYDROCHLORIDE 25 MG/1
25 TABLET, FILM COATED ORAL 3 TIMES DAILY PRN
Status: DISCONTINUED | OUTPATIENT
Start: 2021-07-16 | End: 2021-07-16 | Stop reason: CLARIF

## 2021-07-16 RX ORDER — NAPROXEN SODIUM 220 MG
220 TABLET ORAL 2 TIMES DAILY PRN
Status: DISCONTINUED | OUTPATIENT
Start: 2021-07-16 | End: 2021-07-17

## 2021-07-16 RX ORDER — ACETAMINOPHEN 500 MG
1000 TABLET ORAL 3 TIMES DAILY PRN
Status: DISCONTINUED | OUTPATIENT
Start: 2021-07-16 | End: 2021-07-20

## 2021-07-16 RX ORDER — SIMVASTATIN 10 MG
20 TABLET ORAL EVERY EVENING
Status: DISCONTINUED | OUTPATIENT
Start: 2021-07-16 | End: 2021-07-28 | Stop reason: HOSPADM

## 2021-07-16 RX ORDER — DOCUSATE SODIUM 100 MG/1
200 CAPSULE, LIQUID FILLED ORAL DAILY
COMMUNITY
End: 2022-01-01

## 2021-07-16 RX ORDER — CEFTRIAXONE 1 G/1
1 INJECTION, POWDER, FOR SOLUTION INTRAMUSCULAR; INTRAVENOUS EVERY 24 HOURS
Status: DISCONTINUED | OUTPATIENT
Start: 2021-07-16 | End: 2021-07-20

## 2021-07-16 RX ORDER — HYDROXYZINE HYDROCHLORIDE 25 MG/1
25 TABLET, FILM COATED ORAL 3 TIMES DAILY PRN
COMMUNITY
End: 2021-08-23

## 2021-07-16 RX ORDER — ZOLPIDEM TARTRATE 10 MG/1
10 TABLET ORAL AT BEDTIME
Status: ON HOLD | COMMUNITY
End: 2022-01-01

## 2021-07-16 RX ORDER — DULOXETIN HYDROCHLORIDE 30 MG/1
30 CAPSULE, DELAYED RELEASE ORAL EVERY MORNING
Status: ON HOLD | COMMUNITY
End: 2022-01-01

## 2021-07-16 RX ORDER — CHOLECALCIFEROL (VITAMIN D3) 125 MCG
1 CAPSULE ORAL DAILY
Status: ON HOLD | COMMUNITY
End: 2022-01-01

## 2021-07-16 RX ADMIN — AZITHROMYCIN MONOHYDRATE 500 MG: 500 INJECTION, POWDER, LYOPHILIZED, FOR SOLUTION INTRAVENOUS at 20:04

## 2021-07-16 RX ADMIN — Medication 12.5 MG: at 21:22

## 2021-07-16 RX ADMIN — SIMVASTATIN 20 MG: 10 TABLET, FILM COATED ORAL at 21:06

## 2021-07-16 RX ADMIN — ASPIRIN 81 MG: 81 TABLET, COATED ORAL at 20:19

## 2021-07-16 RX ADMIN — CEFTRIAXONE SODIUM 1 G: 1 INJECTION, POWDER, FOR SOLUTION INTRAMUSCULAR; INTRAVENOUS at 21:00

## 2021-07-16 RX ADMIN — HALOPERIDOL LACTATE 1 MG: 5 INJECTION, SOLUTION INTRAMUSCULAR at 22:20

## 2021-07-16 RX ADMIN — SODIUM CHLORIDE 1000 ML: 9 INJECTION, SOLUTION INTRAVENOUS at 18:47

## 2021-07-16 RX ADMIN — SODIUM CHLORIDE: 9 INJECTION, SOLUTION INTRAVENOUS at 19:52

## 2021-07-16 RX ADMIN — Medication 25 MCG: at 23:15

## 2021-07-16 RX ADMIN — ACETAMINOPHEN 1000 MG: 500 TABLET ORAL at 22:30

## 2021-07-16 RX ADMIN — EZETIMIBE 10 MG: 10 TABLET ORAL at 21:23

## 2021-07-16 ASSESSMENT — ENCOUNTER SYMPTOMS
VOMITING: 0
COUGH: 1
HEADACHES: 1
APPETITE CHANGE: 1
WOUND: 1
FEVER: 0
DYSURIA: 0
CONFUSION: 1
SPEECH DIFFICULTY: 1
ABDOMINAL PAIN: 1
TROUBLE SWALLOWING: 0
HALLUCINATIONS: 1
MYALGIAS: 1
DIARRHEA: 0

## 2021-07-16 ASSESSMENT — MIFFLIN-ST. JEOR
SCORE: 1295.67
SCORE: 1248.49

## 2021-07-16 ASSESSMENT — ACTIVITIES OF DAILY LIVING (ADL)
DEPENDENT_IADLS:: CLEANING;COOKING;LAUNDRY;SHOPPING;MEAL PREPARATION;MEDICATION MANAGEMENT;MONEY MANAGEMENT;TRANSPORTATION

## 2021-07-16 NOTE — PHARMACY-ADMISSION MEDICATION HISTORY
Pharmacy Note - Admission Medication History    Pertinent Provider Information:  believes Elenita uses less Basaglar since she is off her oral cancer treatment but he is unsure exactly how much she injects.The prescription is written for 55 units daily per their outpatient pharmacy.      ______________________________________________________________________    Prior To Admission (PTA) med list completed and updated in EMR.       Prior to Admission Medications   Prescriptions Last Dose Informant Patient Reported? Taking?   Continuous Blood Gluc  (FREESTYLE KIMBERLY 14 DAY READER) COLETTE 7/16/2021 at Unknown time  Yes Yes   Sig: Use to check blood sugar at least 4 times a day or as directed   Continuous Blood Gluc Sensor (FREESTYLE KIMBERLY 14 DAY SENSOR) MISC 7/16/2021 at Unknown time  Yes Yes   Sig: Use as directed to test blood sugar at least 4 times a day or as directed   DULoxetine (CYMBALTA) 30 MG capsule 7/15/2021 at Unknown time  Yes Yes   Sig: Take 30 mg by mouth every morning And 60 mg by mouth in the evening   HYDROmorphone (DILAUDID) 4 MG tablet 7/16/2021 at Unknown time  Yes Yes   Sig: Take 4 mg by mouth every 4 hours as needed for severe pain    IRON-VIT C-VIT B12-FOLIC ACID (GENTLE IRON) 28-60-0.008-0.4 MG CAPS 7/16/2021 at Unknown time  Yes Yes   Sig: Take 1 capsule by mouth daily   Vitamin D3 (CHOLECALCIFEROL) 25 mcg (1000 units) tablet 7/16/2021 at Unknown time  Yes Yes   Sig: Take 1 tablet by mouth daily   acetaminophen (TYLENOL) 500 MG tablet 7/16/2021 at Unknown time  Yes Yes   Sig: Take 1,000 mg by mouth 3 times daily as needed for pain   aspirin 81 MG EC tablet 7/13/2021  Yes No   Sig: Take 81 mg by mouth daily   calcium carbonate (CALCIUM CARBONATE) 600 MG tablet 7/16/2021 at Unknown time  Yes Yes   Sig: Take 600 mg by mouth daily   docusate sodium (COLACE) 100 MG capsule 7/16/2021 at Unknown time  Yes Yes   Sig: Take 200 mg by mouth daily   ezetimibe-simvastatin (VYTORIN) 10-20 MG tablet  7/15/2021 at Unknown time  Yes Yes   Sig: Take 1 tablet by mouth daily   hydrOXYzine (ATARAX) 25 MG tablet 7/16/2021 at Unknown time  Yes Yes   Sig: Take 25 mg by mouth 3 times daily as needed for anxiety    ibuprofen (ADVIL/MOTRIN) 200 MG tablet 7/16/2021 at AM  Yes Yes   Sig: Take 400 mg by mouth every 6 hours as needed for mild pain   insulin glargine (BASAGLAR KWIKPEN) 100 UNIT/ML pen  Pharmacy Yes No   Sig: Inject 55 Units Subcutaneous every morning    insulin lispro (HUMALOG KWIKPEN) 100 UNIT/ML (1 unit dial) pen Unknown at Unknown time  Yes No   Sig: Inject 1-20 Units Subcutaneous 3 times daily (before meals) (sliding scale + 3 units for every 15 grams of carbs)   multivitamin (CENTRUM SILVER) tablet 7/16/2021 at Unknown time  Yes Yes   Sig: Take 1 tablet by mouth daily   naproxen sodium (ANAPROX) 220 MG tablet Past Week at Unknown time  Yes Yes   Sig: Take 220 mg by mouth 2 times daily as needed for moderate pain   omeprazole (PRILOSEC) 20 MG DR capsule Unknown at Unknown time  Yes No   Sig: Take 20 mg by mouth daily as needed   oxyCODONE (OXYCONTIN) 30 MG 12 hr tablet 7/15/2021 at AM  Yes Yes   Sig: Take 30 mg by mouth every 12 hours   oxybutynin (DITROPAN) 5 MG tablet 7/16/2021 at Unknown time  Yes Yes   Sig: Take 5 mg by mouth 2 times daily as needed    vitamin (B COMPLEX) tablet 7/16/2021 at Unknown time  Yes Yes   Sig: Take 1 tablet by mouth daily   zolpidem (AMBIEN) 10 MG tablet 7/13/2021  Yes Yes   Sig: Take 10 mg by mouth nightly as needed for sleep      Facility-Administered Medications: None       Information source(s):   Method of interview communication: in-person    Summary of Changes to PTA Med List  New:     -duloxetine   -hydroxyzine  -docusate  -dilaudid  -oxycontin  -oxybutynin    Discontinued:     -everolimus 10 mg by mouth daily  -cyclobenzaprine 5 mg by mouth three times daily as needed  -escitalopram 20 mg daily as needed  -exmestane 25 mg by mouth daily  -furosemide 20 mg by  mouth twice daily  -metforming 500 mg by mouth twice daily  -pregabalin 25 mg by mouth three times daily    Changed:   -zolpidem 5 mg nightly as needed >>> 10 mg nightly as needed    Patient was asked about OTC/herbal products specifically.  PTA med list reflects this.    In the past week, patient estimated taking medication this percent of the time:  greater than 90%.    Allergies were reviewed, assessed, and updated with the patient.      Patient does not use any multi-dose medications prior to admission.    The information provided in this note is only as accurate as the sources available at the time of the update(s).    Thank you for the opportunity to participate in the care of this patient.    Ariella Barker, [facility]  7/16/2021 12:45 PM

## 2021-07-16 NOTE — H&P
ADMISSION HISTORY & PHYSICAL      Sandi Jones, [phone]  ASSESSMENT AND PLAN:  66 year old female with metastatic breast cancer presents with confusion and agitation.    1.  Acute metabolic encephalopathy: At this point, suspecting secondary to OxyContin.  She was recently started on this on top of her usual pain regimen and took 1 dose 2 days ago and 1 dose yesterday, symptoms started after starting this medication.  Her  reports somewhat similar symptoms once started on MS Contin.  Had MRI shows no acute findings to account for the symptoms.  We will hold her OxyContin.  Her  states that it was given to them as an as needed medication for breakthrough pain.  I will place a pain team consult.  Will initiate encephalopathy order set.    2.  Fever: After she came up to the floor she developed a temperature of 100.7.  UA shows 6 white cells, culture ordered.  Procalcitonin ordered and is pending.  Chest x-ray shows patchy airspace infiltrate throughout the lungs with diffuse indistinctness of the pulmonary interstitium with a differential of pulmonary edema versus inflammatory versus infectious process.  She has had no cough or respiratory symptoms.  We will start Rocephin and Zithromax to cover the potential both lung and urine bacteria.  Lactate ordered and is pending.  She is likely dehydrated as she has not really been eating and drinking, will initiate IV fluids also.    3.  Metastatic breast cancer.  Stage IV with spread to pelvis, femur, vertebrae, ribs and lymph nodes of her left arm.  Dr. Varma from [facility] is her primary oncologist.  Pain medications prescribed through [facility], will consult oncology.  She is also on as needed Dilaudid and Vistaril which she gets approximately 3 times a day.    4.  Type 2 diabetes: Blood sugar normal here, she has not really eaten over the past couple days.  She is on Humalog sliding scale and also basal insulin but her   states that she does not receive that every day and he has decreased that dose and is not sure how much she is getting.  She usually gets basal insulin in the morning.  We will hold off on ordering for now as I have no idea how much she is usually on or when she last actually received it.  Recommend resuming in the morning as typically scheduled with dosing based on how much she is eating and blood sugar levels.  Will initiate diabetic order set.  No recent A1c on file.    5.  Depression: Continue home Cymbalta    Barriers to discharge: Encephalopathy      CHIEF COMPLAINT:  Confusion, disorientation, hallucinations    HISTORY OF PRESENTING ILLNESS:  Elenita Hardin is a 66 year old female with history significant for metastatic breast cancer with resulting chronic pain.  She was brought in by her  with disorientation, slurred speech, intermittent combativeness and visual hallucinations and frequent falls despite using her walker.  2 days ago she was started on OxyContin by Denilson Valles from [facility] for increased pain.  She took 1 dose of 30 mg OxyContin CR approximately 48 hours ago.  The next morning she took another dose of OxyContin with her usual morning meds.  She slept most of the day yesterday and woke up at 4 PM disoriented and hallucinating.  She did not take her usual evening meds at that time.  Her symptoms have persisted today.  She has had no fever at home but now upon arrival to the floor has fever.  She currently remains confused but is currently not hallucinating.  Her  is present to provide history.    PMH/PSH:  Patient Active Problem List   Diagnosis     Post-mastectomy lymphedema syndrome     Scar condition and fibrosis of skin     Personal history of breast cancer     Diabetes (H)     Obesity (BMI 30-39.9)     Abnormal chest CT     Hip pain     Cancer associated pain     Metastatic breast cancer (H)     Edema of both upper extremities     Insulin dependent  diabetes mellitus     Sciatica     Hip pain, right     Elevation of level of transaminase or lactic acid dehydrogenase (LDH)     Palliative care patient     Encounter for palliative care     Depression, unspecified depression type     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, pelvis, initial encounter for fracture     Closed fracture of multiple pubic rami with delayed healing, subsequent encounter     Lymphedema     Pathological fracture, pelvis, initial encounter for fracture     Left groin pain     Confusion       ALLERGIES:  Allergies   Allergen Reactions     Sulfa Drugs Hives and Rash     welts         MEDICATIONS:  Reviewed.  Current Facility-Administered Medications   Medication     acetaminophen (TYLENOL) tablet 1,000 mg     aspirin EC tablet 81 mg     calcium carbonate (OS-BENJI) tablet 600 mg     docusate sodium (COLACE) capsule 200 mg     [START ON 7/17/2021] DULoxetine (CYMBALTA) DR capsule 30 mg     ezetimibe-simvastatin (VYTORIN) 10-20 MG per tablet 1 tablet     HYDROmorphone (DILAUDID) tablet 4 mg     hydrOXYzine (ATARAX) tablet 25 mg     ibuprofen (ADVIL/MOTRIN) tablet 400 mg     IRON-VIT C-VIT B12-FOLIC ACID 28-60-0.008-0.4 MG CAPS 1 capsule     multivitamin (CENTRUM SILVER) TABS 1 tablet     naproxen sodium (ANAPROX) tablet 220 mg     [START ON 7/17/2021] omeprazole (priLOSEC) CR capsule 20 mg     oxybutynin (DITROPAN) tablet 5 mg     vitamin (B COMPLEX) TABS 1 tablet     Vitamin D3 (CHOLECALCIFEROL) tablet 25 mcg       SOCIAL HISTORY:  Social History     Socioeconomic History     Marital status:      Spouse name: Not on file     Number of children: Not on file     Years of education: Not on file     Highest education level: Not on file   Occupational History     Not on file   Tobacco Use     Smoking status: Former Smoker     Packs/day: 0.75     Years: 20.00     Pack years: 15.00     Types: Cigarettes     Smokeless tobacco: Never Used   Substance and Sexual Activity     Alcohol use: Yes      "Alcohol/week: 1.0 standard drinks     Comment: 1 glass of wine/week     Drug use: No     Sexual activity: Yes     Partners: Male     Birth control/protection: Post-menopausal   Other Topics Concern     Parent/sibling w/ CABG, MI or angioplasty before 65F 55M? Not Asked   Social History Narrative    Patient works at home, owns [employer].  She lives with her  and 1 of her sons.         Social Determinants of Health     Financial Resource Strain:      Difficulty of Paying Living Expenses:    Food Insecurity:      Worried About Running Out of Food in the Last Year:      Ran Out of Food in the Last Year:    Transportation Needs:      Lack of Transportation (Medical):      Lack of Transportation (Non-Medical):    Physical Activity:      Days of Exercise per Week:      Minutes of Exercise per Session:    Stress:      Feeling of Stress :    Social Connections:      Frequency of Communication with Friends and Family:      Frequency of Social Gatherings with Friends and Family:      Attends Scientologist Services:      Active Member of Clubs or Organizations:      Attends Club or Organization Meetings:      Marital Status:    Intimate Partner Violence:      Fear of Current or Ex-Partner:      Emotionally Abused:      Physically Abused:      Sexually Abused:        FAMILY HISTORY:  Family History   Problem Relation Age of Onset     Lung Cancer Mother      Pancreatic Cancer Mother      Kidney Cancer Maternal Grandmother      Lung Cancer Paternal Aunt      Breast Cancer Cousin        ROS:  12 point ROS was performed and found to be negative except for the pertinent positives mentioned in the HPI.      PHYSICAL EXAM:  BP (!) 169/78   Pulse 118   Temp (!) 100.7  F (38.2  C) (Oral)   Resp 28   Ht 1.651 m (5' 5\")   Wt 75.5 kg (166 lb 6.4 oz)   SpO2 97%   BMI 27.69 kg/m    No intake/output data recorded.  No intake/output data recorded.  CONSTITUTIONAL: Awake, answers questions but appears confused  HEENT: " Atraumatic      Sclera- anicteric      Mucous membrane-dry  LUNGS: Clear to auscultation bilaterally  CARDIOVASCULAR: S1S2 regular. No murmurs, rubs or gallops,no pedal edema  GI: Soft. Non-tender, non-distended. No organomegaly. No guarding or rigidity. Bowel sounds- active  NEURO: Cranial nerves II-XII grossly intact. No focal neurological deficit. No involuntary movements  INTGM: No skin rash, no cyanosis or clubbing  LYMPH: no adenopathy  MSK: no joint swelling or tenderness  PSYCH: alert and confused      DIAGNOSTIC DATA:  Recent Results (from the past 24 hour(s))   Glucose by meter    Collection Time: 07/16/21 10:23 AM   Result Value Ref Range    GLUCOSE BY METER POCT 110 70 - 125 mg/dL   Comprehensive metabolic panel    Collection Time: 07/16/21 10:54 AM   Result Value Ref Range    Sodium 132 (L) 136 - 145 mmol/L    Potassium 4.0 3.5 - 5.0 mmol/L    Chloride 99 98 - 107 mmol/L    Carbon Dioxide (CO2) 21 (L) 22 - 31 mmol/L    Anion Gap 12 5 - 18 mmol/L    Urea Nitrogen 14 8 - 22 mg/dL    Creatinine 0.69 0.60 - 1.10 mg/dL    Calcium 8.8 8.5 - 10.5 mg/dL    Glucose 109 70 - 125 mg/dL    Alkaline Phosphatase 157 (H) 45 - 120 U/L    AST 23 0 - 40 U/L    ALT 36 0 - 45 U/L    Protein Total 6.6 6.0 - 8.0 g/dL    Albumin 2.8 (L) 3.5 - 5.0 g/dL    Bilirubin Total 0.8 0.0 - 1.0 mg/dL    GFR Estimate >90 >60 mL/min/1.73m2   INR    Collection Time: 07/16/21 10:54 AM   Result Value Ref Range    INR 1.45 (H) 0.85 - 1.15   PTT    Collection Time: 07/16/21 10:54 AM   Result Value Ref Range    aPTT 48 (H) 22 - 38 Seconds   Ammonia (on ice)    Collection Time: 07/16/21 10:54 AM   Result Value Ref Range    Ammonia 25 11 - 35 umol/L   Troponin I (now)    Collection Time: 07/16/21 10:54 AM   Result Value Ref Range    Troponin I 0.01 0.00 - 0.29 ng/mL   CBC with platelets and differential    Collection Time: 07/16/21 10:54 AM   Result Value Ref Range    WBC Count 4.3 4.0 - 11.0 10e3/uL    RBC Count 2.74 (L) 3.80 - 5.20 10e6/uL     Hemoglobin 8.5 (L) 11.7 - 15.7 g/dL    Hematocrit 26.7 (L) 35.0 - 47.0 %    MCV 97 78 - 100 fL    MCH 31.0 26.5 - 33.0 pg    MCHC 31.8 31.5 - 36.5 g/dL    RDW 15.9 (H) 10.0 - 15.0 %    Platelet Count 248 150 - 450 10e3/uL   Magnesium    Collection Time: 07/16/21 10:54 AM   Result Value Ref Range    Magnesium 2.2 1.8 - 2.6 mg/dL   Manual Differential    Collection Time: 07/16/21 10:54 AM   Result Value Ref Range    % Neutrophils 76 %    % Lymphocytes 2 %    % Monocytes 16 %    % Eosinophils 6 %    % Basophils 0 %    Absolute Neutrophils 3.3 1.6 - 8.3 10e3/uL    Absolute Lymphocytes 0.1 (L) 0.8 - 5.3 10e3/uL    Absolute Monocytes 0.7 0.0 - 1.3 10e3/uL    Absolute Eosinophils 0.3 0.0 - 0.7 10e3/uL    Absolute Basophils 0.0 0.0 - 0.2 10e3/uL    RBC Morphology Confirmed RBC Indices     Platelet Assessment  Automated Count Confirmed. Platelet morphology is normal.     Automated Count Confirmed. Platelet morphology is normal.    Polychromasia Slight (A) None Seen   SARS-COV2 (COVID-19) Virus RT-PCR    Collection Time: 07/16/21 10:56 AM    Specimen: Nasopharyngeal; Swab   Result Value Ref Range    SARS CoV2 PCR Negative Negative   UA with Microscopic reflex to Culture    Collection Time: 07/16/21  3:38 PM    Specimen: Urine, Clean Catch   Result Value Ref Range    Color Urine Light Yellow Colorless, Straw, Light Yellow, Yellow    Appearance Urine Clear Clear    Glucose Urine Negative Negative mg/dL    Bilirubin Urine Negative Negative    Ketones Urine 10  (A) Negative mg/dL    Specific Gravity Urine 1.008 1.001 - 1.030    Blood Urine 0.03 mg/dL (A) Negative    pH Urine 5.5 5.0 - 7.0    Protein Albumin Urine 30  (A) Negative mg/dL    Urobilinogen Urine <2.0 <2.0 mg/dL    Nitrite Urine Negative Negative    Leukocyte Esterase Urine Negative Negative    Bacteria Urine Few (A) None Seen /HPF    RBC Urine 1 <=2 /HPF    WBC Urine 6 (H) <=5 /HPF    Transitional Epithelials Urine 1 <=1 /HPF     All lab studies reviewed  personally    MR Brain w/o Contrast    Result Date: 7/16/2021  EXAM: MR BRAIN W/O CONTRAST LOCATION: [facility] DATE/TIME: 7/16/2021 12:29 PM INDICATION: Confusion. COMPARISON: Head CT 07/16/2021, brain MRI 02/01/2017 TECHNIQUE: Routine multiplanar multisequence head MRI without intravenous contrast. FINDINGS: There is no restricted diffusion. Diffusely diminished T1 signal intensity throughout the calvarium and upper visualized cervical spine consistent with the sclerosis noted on the comparison CT. Paranasal sinuses are free from significant disease. Mastoid  air cells appear free from significant disease. Intraorbital contents are unremarkable. Ventricles are within normal limits in size for the patient's age. Intracranial flow voids are intact. There is no mass effect, midline shift, or extraaxial collection. There are scattered foci of T2/FLAIR hyperintensity within the cerebral white matter that are nonspecific but most commonly reflect the sequela of chronic small vessel ischemic disease. No evidence for acute or chronic intracranial blood products.     IMPRESSION: 1.  No acute intracranial finding. No evidence for recent ischemia, intracranial hemorrhage, or mass. 2.  A few T2 hyperintense foci in the white matter are nonspecific but most commonly reflect gliosis related to prior ischemic or inflammatory insult. 3.  Presumed osseous metastatic disease within the calvarium and upper visualized cervical spine.    XR Chest Port 1 View    Result Date: 7/16/2021  EXAM: XR CHEST PORT 1 VIEW LOCATION: [facility] DATE/TIME: 7/16/2021 12:20 PM INDICATION: Confusion. Slightly decreased O2 sat. COMPARISON: 04/06/2018.     IMPRESSION: Right IJ chemotherapy port is in place with tip in the high right atrium. Heart and mediastinal size are normal. There is patchy airspace infiltrate throughout the bilateral lungs with diffuse indistinctness of the pulmonary interstitium. These findings may  represent pulmonary edema. An inflammatory infectious process is also a consideration. No definite pleural effusion or pneumothorax. Extensive sclerotic osseous lesions are present, compatible with known bony metastases.         XR Foot Left G/E 3 Views    Result Date: 7/16/2021  EXAM: XR FOOT LEFT G/E 3 VIEWS LOCATION: [facility] DATE/TIME: 7/16/2021 1:02 PM INDICATION: 5th toe trauma COMPARISON: None.     IMPRESSION: Bones are demineralized. There is no definitive evidence of acute fracture, with attention to the fifth toe. Mild, scattered degenerative changes.    Head CT w/o contrast    Result Date: 7/16/2021  EXAM: CT HEAD W/O CONTRAST LOCATION: [facility] DATE/TIME: 7/16/2021 11:12 AM INDICATION: Mental status change, unknown cause. Breast cancer. COMPARISON: Brain MRI 02/01/2017 TECHNIQUE: Routine CT Head without IV contrast. Multiplanar reformats. Dose reduction techniques were used. FINDINGS: INTRACRANIAL CONTENTS: No intracranial hemorrhage, extraaxial collection, or mass effect.  No CT evidence of acute infarct. Mild presumed chronic small vessel ischemic changes. Normal ventricles and sulci. VISUALIZED ORBITS/SINUSES/MASTOIDS: No intraorbital abnormality. No paranasal sinus mucosal disease. No middle ear or mastoid effusion. BONES/SOFT TISSUES: No acute abnormality. Diffusely sclerotic calvarium and upper visualized cervical spine structures.     IMPRESSION: 1.  No acute intracranial injury, hemorrhage, mass, or CT evidence of recent ischemia. 2.  Diffusely sclerotic calvarium presumed to reflect metastatic disease given history of breast cancer.    Radiology report reviewed.        Brnadon Valles MD  [facility] Medicine Service

## 2021-07-16 NOTE — ED NOTES
Purewick placed. Pt tolerated well. Soiled peripad removed and clean brief placed on pt. Call light in reach.

## 2021-07-16 NOTE — ED NOTES
Pt back from MRI and xray. Back on monitors. Call light in reach. Pt offers no complaints at this time. Awaiting admission.

## 2021-07-16 NOTE — ED TRIAGE NOTES
Patient was assisted out of car, history of breast  cancer, in treatment. Here for confusion since last night. Patient is weepy, diabetic, blood sugar per patient 105. Patient is on narcotics, did have dilaudid this am. MD evaluating in triage. Patient is having visual hallucinations while in triage,  states started last night

## 2021-07-16 NOTE — ED NOTES
Accessed pt's power port without difficulty using sterile technique. Flushed with saline flush. Labs drawn from port. Pt tolerated well. Dressing applied. Labs sent. Pt resting on cart. Spouse at bedside. Call light in reach. Pt marked ready for CT.

## 2021-07-16 NOTE — CONSULTS
"Care Management Initial Consult    General Information  Assessment completed with: Patient, Spouse or significant other, Denilson   Type of CM/SW Visit: Initial Assessment    Primary Care Provider verified and updated as needed: Yes   Readmission within the last 30 days: no previous admission in last 30 days      Reason for Consult: discharge planning  Advance Care Planning: Advance Care Planning Reviewed: other (comment) (\"doesn't have\")          Communication Assessment  Patient's communication style: spoken language (English or Bilingual)             Cognitive  Cognitive/Neuro/Behavioral: orientation, speech        Orientation: disoriented x 4     Best Language: 1 - Mild to moderate  Speech: inappropriate words    Living Environment:   People in home: spouse   Denilson  Current living Arrangements: house (\"split level house then able to live on the upper level\")      Able to return to prior arrangements: yes       Family/Social Support:  Care provided by: spouse/significant other  Provides care for: no one, unable/limited ability to care for self  Marital Status:     Denilson       Description of Support System: Supportive, Involved    Support Assessment: Adequate family and caregiver support, Adequate social supports    Current Resources:   Patient receiving home care services: No     Community Resources: None  Equipment currently used at home: walker, rolling, walker, standard (\"power standing reclining chair\")  Supplies currently used at home: Hearing Aid Batteries, Other (\"hearing aids, glasses\")    Employment/Financial:  Employment Status: retired        Financial Concerns:     Referral to Financial Counselor: No       Lifestyle & Psychosocial Needs:  Social Determinants of Health     Tobacco Use: Medium Risk     Smoking Tobacco Use: Former Smoker     Smokeless Tobacco Use: Never Used   Alcohol Use:      Frequency of Alcohol Consumption:      Average Number of Drinks:      Frequency of " "Binge Drinking:    Financial Resource Strain:      Difficulty of Paying Living Expenses:    Food Insecurity:      Worried About Running Out of Food in the Last Year:      Ran Out of Food in the Last Year:    Transportation Needs:      Lack of Transportation (Medical):      Lack of Transportation (Non-Medical):    Physical Activity:      Days of Exercise per Week:      Minutes of Exercise per Session:    Stress:      Feeling of Stress :    Social Connections:      Frequency of Communication with Friends and Family:      Frequency of Social Gatherings with Friends and Family:      Attends Muslim Services:      Active Member of Clubs or Organizations:      Attends Club or Organization Meetings:      Marital Status:    Intimate Partner Violence:      Fear of Current or Ex-Partner:      Emotionally Abused:      Physically Abused:      Sexually Abused:    Depression:      PHQ-2 Score:    Housing Stability:      Unable to Pay for Housing in the Last Year:      Number of Places Lived in the Last Year:      Unstable Housing in the Last Year:        Functional Status:  Prior to admission patient needed assistance:   Dependent ADLs:: Ambulation-walker, Bathing, Dressing, Eating, Grooming, Toileting  Dependent IADLs:: Cleaning, Cooking, Laundry, Shopping, Meal Preparation, Medication Management, Money Management, Transportation  Assesssment of Functional Status: Not at  functional baseline (d/t confusion)    Mental Health Status:          Chemical Dependency Status:                Values/Beliefs:  Spiritual, Cultural Beliefs, Muslim Practices, Values that affect care:                 Additional Information:  Elenita lives in a house with her . He is her primary care giver and she is total cares at baseline. He states, \"I am retired and so I can take care of her all the time, but now this confusion is a new thing\". It is a split entry house so \"she has to use about 7 steps to get to the upper level of the house and " "then she can live on that level almost all the time\".    May need Home Care help at discharge.     to transport at discharge.    Clara Jeffery RN      "

## 2021-07-16 NOTE — ED NOTES
"[facility] ED Handoff Report    ED Chief Complaint:  chief complaint    ED Diagnosis:  (R41.0) Confusion  Comment: started last night  Plan: admission and further evaluation       PMH:    Past Medical History:   Diagnosis Date     Allergic rhinitis      Bone cancer (H) 2011    breast mets     Breast cancer (H) 2010    left mastectomy     Depression      DM (diabetes mellitus) (H)      Fibromyalgia      Hx antineoplastic chemotherapy 2010     Hx of radiation therapy 2010     Hyperlipemia      Lactose intolerance      Mini stroke (H)     R EYE     Osteoarthritis      Tobacco dependency         Code Status:  No Order     Falls Risk: Yes    Current Living Situation/Residence: home  With spouse    Elimination Status:  incontinent    Activity Level:  Needs assistance    Patients Preferred Language:  English     Needed: No    Infection:  [unfilled]     Vital Signs:  /58   Pulse 98   Temp 98.6  F (37  C)   Resp 15   Ht 1.651 m (5' 5\")   Wt 70.8 kg (156 lb)   SpO2 94%   BMI 25.96 kg/m       Cardiac Rhythm: sinus tachycardia    Pain Score:  0/10    Is the Patient Confused:  Yes    Last Food or Drink: today PTA    Focused Assessment:  Pt brought in by spouse from home. Pt fell last Sunday and again early this AM. Pt confused, not making sense when she talks, hallucinating upon arrival to ER triage. Pt has metastatic breast cancer, has been getting radiation and chemo. Pt has port that has been accessed, pt tolerated well.     Tests Performed:  Labs and imaging    Abnormal Results:    Abnormal Labs Reviewed   COMPREHENSIVE METABOLIC PANEL - Abnormal; Notable for the following components:       Result Value    Sodium 132 (*)     Carbon Dioxide (CO2) 21 (*)     Alkaline Phosphatase 157 (*)     Albumin 2.8 (*)     All other components within normal limits   INR - Abnormal; Notable for the following components:    INR 1.45 (*)     All other components within normal limits   PARTIAL THROMBOPLASTIN " TIME - Abnormal; Notable for the following components:    aPTT 48 (*)     All other components within normal limits   CBC WITH PLATELETS AND DIFFERENTIAL - Abnormal; Notable for the following components:    RBC Count 2.74 (*)     Hemoglobin 8.5 (*)     Hematocrit 26.7 (*)     RDW 15.9 (*)     All other components within normal limits   DIFFERENTIAL - Abnormal; Notable for the following components:    Absolute Lymphocytes 0.1 (*)     Polychromasia Slight (*)     All other components within normal limits       XR Foot Left G/E 3 Views   Final Result   IMPRESSION: Bones are demineralized. There is no definitive evidence of acute fracture, with attention to the fifth toe. Mild, scattered degenerative changes.      MR Brain w/o Contrast   Final Result   IMPRESSION:   1.  No acute intracranial finding. No evidence for recent ischemia, intracranial hemorrhage, or mass.   2.  A few T2 hyperintense foci in the white matter are nonspecific but most commonly reflect gliosis related to prior ischemic or inflammatory insult.   3.  Presumed osseous metastatic disease within the calvarium and upper visualized cervical spine.      XR Chest Port 1 View   Preliminary Result   IMPRESSION: Right IJ chemotherapy port is in place with tip in the high right atrium. Heart and mediastinal size are normal. There is patchy airspace infiltrate throughout the bilateral lungs with diffuse indistinctness of the pulmonary interstitium.    These findings may represent pulmonary edema. An inflammatory infectious process is also a consideration. No definite pleural effusion or pneumothorax. Extensive sclerotic osseous lesions are present, compatible with known bony metastases.                          Head CT w/o contrast   Final Result   IMPRESSION:   1.  No acute intracranial injury, hemorrhage, mass, or CT evidence of recent ischemia.   2.  Diffusely sclerotic calvarium presumed to reflect metastatic disease given history of breast cancer.            Treatments Provided:  See chart    Family Dynamics/Concerns: no    Family Updated On Visitor Policy: Yes    Plan of Care Communicated to Family: Yes    Who Was Updated about Plan of Care: pt's  has been with pt and has been updated    Belongings Checklist Done and Signed by Patient: Yes    Asymptomatic swab completed. Negative result    ED Medications:  Medications - No data to display     Additional Information: see chart    @[facility]@ 7/16/2021 2:46 PM

## 2021-07-16 NOTE — ED PROVIDER NOTES
EMERGENCY DEPARTMENT ENCOUNTER      NAME: Elenita Hardin  AGE: 66 year old female  YOB: 1955  MRN: [id number]  EVALUATION DATE & TIME: No admission date for patient encounter.    PCP: No primary care provider on file.        Chief Complaint   Patient presents with     Altered Mental Status         FINAL IMPRESSION:  1. Confusion    2. Hallucinations          ED COURSE & MEDICAL DECISION MAKING:    Pertinent Labs & Imaging studies reviewed. (See chart for details)  66 year old female presents to the Emergency Department for evaluation of hallucinations    ED Course as of Jul 16 1611 Fri Jul 16, 2021   1601 Patient presents with hallucinations and confusion and intermittent combativeness and has been dragging her left leg for about 2 days and is also had some slurred speech      1601 Patient was recently started on OxyContin otherwise no new medications.  No history of seizures.  No known brain metastasis.      1602 Based on duration of symptoms stroke code was not initiated.  Plan for CT head to look for intracranial hemorrhage or brain metastasis      1602 Differential includes medication effects, sepsis, electrolyte abnormalities      1602 Blood sugar was normal.  Ammonia levels normal.  Patient has some slightly decreased oxygen levels of her chest x-ray was ordered      1603 Chest x-ray shows likely pulmonary edema.      1606 SARS CoV2 PCR: Negative   1607 Procalcitonin ordered based on chest x-ray.  Procalcitonin elevated would consider pneumonia however no fever or leukocytosis      1607 UA is pending.      1607 Check patient and he seems to be clinically improving.  I discussed with patient's       1607 Discussed with Dr. Valles hospitalist who agreed to admit patient      1608 No clonus on examination to suggest serotonin syndrome      1608 Covid negative          10:13 AM I immediately met with the patient in triage to gather history and to perform my initial exam. I  discussed the plan for care while in the Emergency Department. PPE (gloves, face shield, and N95 mask) was worn by me during patient encounters while patient wore mask.   11:44 AM I re-evaluated patient and updated her on results. Gathered additional history.    At the conclusion of the encounter I discussed the results of all of the tests and the disposition. The questions were answered. The patient or family acknowledged understanding and was agreeable with the care plan.       MEDICATIONS GIVEN IN THE EMERGENCY:  Medications - No data to display    NEW PRESCRIPTIONS STARTED AT TODAY'S ER VISIT  New Prescriptions    No medications on file            =================================================================    HPI  HPI limited due to confusion.  Patient information was obtained from: Patient's     Use of : N/A       Elenita Hardin is a 66 year old female with a pertinent history of metastatic breast cancer s/p radiation therapy and antineoplastic chemotherapy, IDDM, HLD, who presents to this ED by private vehicle for evaluation of altered mental status.    Per Patient's , patient took an oxycontin Yesterday morning and then slept all day. When she awoke at 1600 she was confused and disoriented. Patient was hallucinating and talking about people who were not there like they were present, such as her sister who lives in [place]. Patient woke up this morning at 0400 to use the bathroom when she fell, then became uncooperative and combative. Patient injured her left small toe.  was able to get patient back to the power chair but when she awoke again she was still confused. No fever, vomiting, or diarrhea. Of note, patient fell 5 days ago in a parking lot. She was fine 4 days ago but began to complain of hip pain 3 days ago. Patient has stage IV cancer and receives chemotherapy once every four weeks.  Patient had a dry cough family has been contributing to allergies.      Patient has been dragging her left leg the last 2 days and her speech has been noted to be more garbled and slurred. Patient has had decreased appetite the last few days and has been falling asleep while eating. Patient does not administered her own medications. She did not take an Ambien last night. No alcohol use.    Patient is unable to provide a history. She endorses feeling hot, abdominal pain, and headache. No dysuria.    No additional medical concerns or complaints at this time.      REVIEW OF SYSTEMS ROS limited due to confusion.  Review of Systems   Constitutional: Positive for appetite change (decrease). Negative for fever.   HENT: Negative for trouble swallowing.    Eyes: Positive for visual disturbance.   Respiratory: Positive for cough.    Cardiovascular: Negative for chest pain.   Gastrointestinal: Positive for abdominal pain. Negative for diarrhea and vomiting.   Endocrine: Negative for polyuria. Polydipsia: dry cough.   Genitourinary: Negative for dysuria.   Musculoskeletal: Positive for myalgias.   Skin: Positive for wound. Negative for rash.   Neurological: Positive for speech difficulty (more garbled and slurred) and headaches.        Positive for dragging left leg.   Psychiatric/Behavioral: Positive for behavioral problems, confusion and hallucinations.      PAST MEDICAL HISTORY:  Past Medical History:   Diagnosis Date     Allergic rhinitis      Bone cancer (H) 2011    breast mets     Breast cancer (H) 2010    left mastectomy     Depression      DM (diabetes mellitus) (H)      Fibromyalgia      Hx antineoplastic chemotherapy 2010     Hx of radiation therapy 2010     Hyperlipemia      Lactose intolerance      Mini stroke (H)     R EYE     Osteoarthritis      Tobacco dependency        PAST SURGICAL HISTORY:  Past Surgical History:   Procedure Laterality Date     APPENDECTOMY       INSERT INTRACORONARY STENT  1985    R Hand     IR CHEST PORT PLACEMENT > 5 YRS OF AGE  12/12/2019     IR LUMBAR  TRANSFORAMINAL EPIDURAL STEROID INJECTION  12/5/2019     IR LUMBAR TRANSFORAMINAL EPIDURAL STRD INJ  12/5/2019     IR PORT PLACEMENT >5 YEARS  12/12/2019     MAMMOPLASTY REDUCTION Right 2015    hx of left mastectomy and right breast reduction     MASTECTOMY Left 2010     OTHER SURGICAL HISTORY      biopsy of upper and right tongue     OVARIAN CYST REMOVAL       REVISE RECONSTRUCTED BREAST Left     March 11, 2015           CURRENT MEDICATIONS:    Patient's Medications   New Prescriptions    No medications on file   Previous Medications    ACETAMINOPHEN (TYLENOL) 500 MG TABLET    Take 1,000 mg by mouth 3 times daily as needed for pain    ASPIRIN 81 MG EC TABLET    Take 81 mg by mouth daily    CALCIUM CARBONATE (CALCIUM CARBONATE) 600 MG TABLET    Take 600 mg by mouth daily    CONTINUOUS BLOOD GLUC  (FREESTYLE KIMBERLY 14 DAY READER) COLETTE    Use to check blood sugar at least 4 times a day or as directed    CONTINUOUS BLOOD GLUC SENSOR (FREESTYLE KIMBERLY 14 DAY SENSOR) MISC    Use as directed to test blood sugar at least 4 times a day or as directed    DOCUSATE SODIUM (COLACE) 100 MG CAPSULE    Take 200 mg by mouth daily    DULOXETINE (CYMBALTA) 30 MG CAPSULE    Take 30 mg by mouth every morning And 60 mg by mouth in the evening    EZETIMIBE-SIMVASTATIN (VYTORIN) 10-20 MG TABLET    Take 1 tablet by mouth daily    HYDROMORPHONE (DILAUDID) 4 MG TABLET    Take 4 mg by mouth every 4 hours as needed for severe pain     HYDROXYZINE (ATARAX) 25 MG TABLET    Take 25 mg by mouth 3 times daily as needed for anxiety     IBUPROFEN (ADVIL/MOTRIN) 200 MG TABLET    Take 400 mg by mouth every 6 hours as needed for mild pain    INSULIN GLARGINE (BASAGLAR KWIKPEN) 100 UNIT/ML PEN    Inject 55 Units Subcutaneous every morning     INSULIN LISPRO (HUMALOG KWIKPEN) 100 UNIT/ML (1 UNIT DIAL) PEN    Inject 1-20 Units Subcutaneous 3 times daily (before meals) (sliding scale + 3 units for every 15 grams of carbs)    IRON-VIT C-VIT B12-FOLIC  ACID (GENTLE IRON) 28-60-0.008-0.4 MG CAPS    Take 1 capsule by mouth daily    MULTIVITAMIN (CENTRUM SILVER) TABLET    Take 1 tablet by mouth daily    NAPROXEN SODIUM (ANAPROX) 220 MG TABLET    Take 220 mg by mouth 2 times daily as needed for moderate pain    OMEPRAZOLE (PRILOSEC) 20 MG DR CAPSULE    Take 20 mg by mouth daily as needed    OXYBUTYNIN (DITROPAN) 5 MG TABLET    Take 5 mg by mouth 2 times daily as needed     OXYCODONE (OXYCONTIN) 30 MG 12 HR TABLET    Take 30 mg by mouth every 12 hours    VITAMIN (B COMPLEX) TABLET    Take 1 tablet by mouth daily    VITAMIN D3 (CHOLECALCIFEROL) 25 MCG (1000 UNITS) TABLET    Take 1 tablet by mouth daily    ZOLPIDEM (AMBIEN) 10 MG TABLET    Take 10 mg by mouth nightly as needed for sleep   Modified Medications    No medications on file   Discontinued Medications    CHOLECALCIFEROL (VITAMIN D3) 125 MCG (5000 UT) TABS TABLET    Take 5,000 Units by mouth daily    CYCLOBENZAPRINE (FLEXERIL) 5 MG TABLET    Take 5 mg by mouth 3 times daily as needed    DEXAMETHASONE AF (DECADRON) 0.1 MG/ML SOLUTION    Take 10 mLs by mouth 4 times daily as needed Swish and spit 4 times a day as needed for mouth sores with chemo    ESCITALOPRAM (LEXAPRO) 20 MG TABLET    Take 20 mg by mouth At Bedtime    EVEROLIMUS (AFINITOR) 5 MG TABLET    Take 10 mg by mouth daily (with lunch)    EXEMESTANE (AROMASIN) 25 MG TABLET    Take 25 mg by mouth daily (with lunch)    FUROSEMIDE (LASIX) 20 MG TABLET    Take 20 mg by mouth 2 times daily    LIDOCAINE (LIDOCARE) 4 % PATCH    Place 3 patches on the skin daily. Remove and discard patch with 12 hours or as directed by MD.    METFORMIN (GLUCOPHAGE) 500 MG TABLET    Take 500 mg by mouth 2 times daily (with meals)    MULTIVITAMIN (OCUVITE) TABS TABLET    Take 1 tablet by mouth daily    NAPROXEN (NAPROSYN) 375 MG TABLET    Take 375 mg by mouth 2 times daily (with meals)    OXYCODONE (ROXICODONE) 5 MG TABLET    Take 5-10 mg by mouth every 4 hours as needed for pain  (Pain score 5-8: 1 tablet, Pain score 9-10: 2 tablets)    POTASSIUM CHLORIDE ER (K-DUR/KLOR-CON M) 20 MEQ CR TABLET    Take 20 mEq by mouth daily (Do not take if not taking Lasix)    PREGABALIN (LYRICA) 25 MG CAPSULE    Take 25 mg by mouth 3 times daily    ZOLPIDEM (AMBIEN) 5 MG TABLET    Take 5 mg by mouth nightly as needed for sleep       ALLERGIES:  Allergies   Allergen Reactions     Sulfa Drugs Hives and Rash     welts         FAMILY HISTORY:  Family History   Problem Relation Age of Onset     Lung Cancer Mother      Pancreatic Cancer Mother      Kidney Cancer Maternal Grandmother      Lung Cancer Paternal Aunt      Breast Cancer Cousin        SOCIAL HISTORY:   Social History     Socioeconomic History     Marital status:      Spouse name: Not on file     Number of children: Not on file     Years of education: Not on file     Highest education level: Not on file   Occupational History     Not on file   Tobacco Use     Smoking status: Former Smoker     Packs/day: 0.75     Years: 20.00     Pack years: 15.00     Types: Cigarettes     Smokeless tobacco: Never Used   Substance and Sexual Activity     Alcohol use: Yes     Alcohol/week: 1.0 standard drinks     Comment: 1 glass of wine/week     Drug use: No     Sexual activity: Yes     Partners: Male     Birth control/protection: Post-menopausal   Other Topics Concern     Parent/sibling w/ CABG, MI or angioplasty before 65F 55M? Not Asked   Social History Narrative    Patient works at home, owns [employer].  She lives with her  and 1 of her sons.         Social Determinants of Health     Financial Resource Strain:      Difficulty of Paying Living Expenses:    Food Insecurity:      Worried About Running Out of Food in the Last Year:      Ran Out of Food in the Last Year:    Transportation Needs:      Lack of Transportation (Medical):      Lack of Transportation (Non-Medical):    Physical Activity:      Days of Exercise per Week:      Minutes of  Exercise per Session:    Stress:      Feeling of Stress :    Social Connections:      Frequency of Communication with Friends and Family:      Frequency of Social Gatherings with Friends and Family:      Attends Gnosticist Services:      Active Member of Clubs or Organizations:      Attends Club or Organization Meetings:      Marital Status:    Intimate Partner Violence:      Fear of Current or Ex-Partner:      Emotionally Abused:      Physically Abused:      Sexually Abused:        VITALS:  Patient Vitals for the past 24 hrs:   BP Temp Pulse Resp SpO2 Height Weight   07/16/21 1559 101/58 -- 103 27 99 % -- --   07/16/21 1538 -- -- 101 20 97 % -- --   07/16/21 1520 -- -- 101 25 95 % -- --   07/16/21 1515 102/51 -- 97 21 97 % -- --   07/16/21 1510 -- -- 97 24 98 % -- --   07/16/21 1505 108/60 -- 98 19 96 % -- --   07/16/21 1500 -- -- 98 21 96 % -- --   07/16/21 1455 -- -- 101 27 97 % -- --   07/16/21 1450 -- -- 99 20 95 % -- --   07/16/21 1445 107/57 -- 100 19 96 % -- --   07/16/21 1440 -- -- 99 21 96 % -- --   07/16/21 1435 -- -- 97 15 94 % -- --   07/16/21 1430 101/58 -- 98 15 94 % -- --   07/16/21 1425 -- -- 97 22 96 % -- --   07/16/21 1420 -- -- 97 24 96 % -- --   07/16/21 1415 100/55 -- 97 21 96 % -- --   07/16/21 1410 -- -- 97 24 95 % -- --   07/16/21 1405 -- -- 96 19 95 % -- --   07/16/21 1400 101/52 -- 96 21 95 % -- --   07/16/21 1355 -- -- 94 15 95 % -- --   07/16/21 1350 110/55 -- 95 16 95 % -- --   07/16/21 1345 -- -- 96 18 95 % -- --   07/16/21 1340 -- -- 95 18 95 % -- --   07/16/21 1335 -- -- 96 19 96 % -- --   07/16/21 1330 106/54 -- 96 16 95 % -- --   07/16/21 1329 -- -- 94 16 95 % -- --   07/16/21 1325 -- -- 96 24 96 % -- --   07/16/21 1320 -- -- 96 25 95 % -- --   07/16/21 1315 113/55 -- 95 14 95 % -- --   07/16/21 1310 111/59 -- 94 16 96 % -- --   07/16/21 1229 112/57 -- -- -- -- -- --   07/16/21 1215 112/57 -- 99 25 95 % -- --   07/16/21 1212 108/54 -- 98 23 95 % -- --   07/16/21 1200 108/54 -- 97 14  "91 % -- --   07/16/21 1145 111/54 -- 100 30 94 % -- --   07/16/21 1130 110/52 -- 100 13 92 % -- --   07/16/21 1128 112/59 -- 102 26 95 % -- --   07/16/21 1100 95/49 -- 105 15 90 % -- --   07/16/21 1045 101/50 -- 109 24 93 % -- --   07/16/21 1043 -- -- 109 17 93 % -- --   07/16/21 1019 (!) 141/69 98.6  F (37  C) 120 18 94 % 1.651 m (5' 5\") 70.8 kg (156 lb)       PHYSICAL EXAM      Vitals: /58   Pulse 103   Temp 98.6  F (37  C)   Resp 27   Ht 1.651 m (5' 5\")   Wt 70.8 kg (156 lb)   SpO2 99%   BMI 25.96 kg/m    General: Sitting in a wheelchair in triage. Crying and visibly upset.  Eyes: Conjunctivae non-injected. Sclera anicteric. Eyes are equal. No occular nystagmus.  HENT: Atraumatic.  Neck: Supple.  Respiratory/Chest: Respiration unlabored. No increased work of breathing.  Heart: RRR  Abdomen: non distended. Soft and non tender.  Musculoskeletal: Bruising and tenderness to left fifth digit. No clonus to ankles.  Skin: Normal color. No rash or diaphoresis. Tenderness and bruising to left 5th toe.  Neurologic: Face symmetric, moves all extremities spontaneously. Questionable left leg weakness. Speech clear. Follows commands, mild confused, mildly tremulous.   Psychiatric: Cooperative, pleasant    LAB:  All pertinent labs reviewed and interpreted.  Results for orders placed or performed during the hospital encounter of 07/16/21   Head CT w/o contrast    Impression    IMPRESSION:  1.  No acute intracranial injury, hemorrhage, mass, or CT evidence of recent ischemia.  2.  Diffusely sclerotic calvarium presumed to reflect metastatic disease given history of breast cancer.   MR Brain w/o Contrast    Impression    IMPRESSION:  1.  No acute intracranial finding. No evidence for recent ischemia, intracranial hemorrhage, or mass.  2.  A few T2 hyperintense foci in the white matter are nonspecific but most commonly reflect gliosis related to prior ischemic or inflammatory insult.  3.  Presumed osseous metastatic " disease within the calvarium and upper visualized cervical spine.   XR Chest Port 1 View    Impression    IMPRESSION: Right IJ chemotherapy port is in place with tip in the high right atrium. Heart and mediastinal size are normal. There is patchy airspace infiltrate throughout the bilateral lungs with diffuse indistinctness of the pulmonary interstitium.   These findings may represent pulmonary edema. An inflammatory infectious process is also a consideration. No definite pleural effusion or pneumothorax. Extensive sclerotic osseous lesions are present, compatible with known bony metastases.                  XR Foot Left G/E 3 Views    Impression    IMPRESSION: Bones are demineralized. There is no definitive evidence of acute fracture, with attention to the fifth toe. Mild, scattered degenerative changes.   Comprehensive metabolic panel   Result Value Ref Range    Sodium 132 (L) 136 - 145 mmol/L    Potassium 4.0 3.5 - 5.0 mmol/L    Chloride 99 98 - 107 mmol/L    Carbon Dioxide (CO2) 21 (L) 22 - 31 mmol/L    Anion Gap 12 5 - 18 mmol/L    Urea Nitrogen 14 8 - 22 mg/dL    Creatinine 0.69 0.60 - 1.10 mg/dL    Calcium 8.8 8.5 - 10.5 mg/dL    Glucose 109 70 - 125 mg/dL    Alkaline Phosphatase 157 (H) 45 - 120 U/L    AST 23 0 - 40 U/L    ALT 36 0 - 45 U/L    Protein Total 6.6 6.0 - 8.0 g/dL    Albumin 2.8 (L) 3.5 - 5.0 g/dL    Bilirubin Total 0.8 0.0 - 1.0 mg/dL    GFR Estimate >90 >60 mL/min/1.73m2   Result Value Ref Range    INR 1.45 (H) 0.85 - 1.15   Result Value Ref Range    aPTT 48 (H) 22 - 38 Seconds   Ammonia (on ice)   Result Value Ref Range    Ammonia 25 11 - 35 umol/L   Troponin I (now)   Result Value Ref Range    Troponin I 0.01 0.00 - 0.29 ng/mL   Glucose by meter   Result Value Ref Range    GLUCOSE BY METER POCT 110 70 - 125 mg/dL   CBC with platelets and differential   Result Value Ref Range    WBC Count 4.3 4.0 - 11.0 10e3/uL    RBC Count 2.74 (L) 3.80 - 5.20 10e6/uL    Hemoglobin 8.5 (L) 11.7 - 15.7 g/dL     Hematocrit 26.7 (L) 35.0 - 47.0 %    MCV 97 78 - 100 fL    MCH 31.0 26.5 - 33.0 pg    MCHC 31.8 31.5 - 36.5 g/dL    RDW 15.9 (H) 10.0 - 15.0 %    Platelet Count 248 150 - 450 10e3/uL   SARS-COV2 (COVID-19) Virus RT-PCR    Specimen: Nasopharyngeal; Swab   Result Value Ref Range    SARS CoV2 PCR Negative Negative   Result Value Ref Range    Magnesium 2.2 1.8 - 2.6 mg/dL   Manual Differential   Result Value Ref Range    % Neutrophils 76 %    % Lymphocytes 2 %    % Monocytes 16 %    % Eosinophils 6 %    % Basophils 0 %    Absolute Neutrophils 3.3 1.6 - 8.3 10e3/uL    Absolute Lymphocytes 0.1 (L) 0.8 - 5.3 10e3/uL    Absolute Monocytes 0.7 0.0 - 1.3 10e3/uL    Absolute Eosinophils 0.3 0.0 - 0.7 10e3/uL    Absolute Basophils 0.0 0.0 - 0.2 10e3/uL    RBC Morphology Confirmed RBC Indices     Platelet Assessment  Automated Count Confirmed. Platelet morphology is normal.     Automated Count Confirmed. Platelet morphology is normal.    Polychromasia Slight (A) None Seen   UA with Microscopic reflex to Culture    Specimen: Urine, Clean Catch   Result Value Ref Range    Color Urine Light Yellow Colorless, Straw, Light Yellow, Yellow    Appearance Urine Clear Clear    Glucose Urine Negative Negative mg/dL    Bilirubin Urine Negative Negative    Ketones Urine 10  (A) Negative mg/dL    Specific Gravity Urine 1.008 1.001 - 1.030    Blood Urine 0.03 mg/dL (A) Negative    pH Urine 5.5 5.0 - 7.0    Protein Albumin Urine 30  (A) Negative mg/dL    Urobilinogen Urine <2.0 <2.0 mg/dL    Nitrite Urine Negative Negative    Leukocyte Esterase Urine Negative Negative    Bacteria Urine Few (A) None Seen /HPF    RBC Urine 1 <=2 /HPF    WBC Urine 6 (H) <=5 /HPF    Transitional Epithelials Urine 1 <=1 /HPF       RADIOLOGY:  Reviewed all pertinent imaging. Please see official radiology report.  XR Foot Left G/E 3 Views   Final Result   IMPRESSION: Bones are demineralized. There is no definitive evidence of acute fracture, with attention to the  fifth toe. Mild, scattered degenerative changes.      MR Brain w/o Contrast   Final Result   IMPRESSION:   1.  No acute intracranial finding. No evidence for recent ischemia, intracranial hemorrhage, or mass.   2.  A few T2 hyperintense foci in the white matter are nonspecific but most commonly reflect gliosis related to prior ischemic or inflammatory insult.   3.  Presumed osseous metastatic disease within the calvarium and upper visualized cervical spine.      XR Chest Port 1 View   Final Result   IMPRESSION: Right IJ chemotherapy port is in place with tip in the high right atrium. Heart and mediastinal size are normal. There is patchy airspace infiltrate throughout the bilateral lungs with diffuse indistinctness of the pulmonary interstitium.    These findings may represent pulmonary edema. An inflammatory infectious process is also a consideration. No definite pleural effusion or pneumothorax. Extensive sclerotic osseous lesions are present, compatible with known bony metastases.                          Head CT w/o contrast   Final Result   IMPRESSION:   1.  No acute intracranial injury, hemorrhage, mass, or CT evidence of recent ischemia.   2.  Diffusely sclerotic calvarium presumed to reflect metastatic disease given history of breast cancer.          EKG:    Performed at: 1041    Impression: Sinus tachycardia    Rate: 110 bpm  Rhythm: Sinus  Axis: -30  [place] Interval: 144 ms  QRS Interval: 90 ms  QTc Interval: 400 ms  ST Changes: No ST elevations or depressions.  Comparison: 12/3/19; Criteria for Inferior infarct are no longer Present. Nonspecific T wave abnormality now evident in inferior leads. Nonspecific T wave abnormality, worse in Anterolateral leads.    I have independently reviewed and interpreted the EKG(s) documented above.    I, Stacy Charlton, am serving as a scribe to document services personally performed by Dr. Romano based on my observation and the provider's statements to me. I, Faisal Romano MD  attest that Stacy Charlton is acting in a scribe capacity, has observed my performance of the services and has documented them in accordance with my direction.    Faisal Romano M.D.  Emergency Medicine  [facility] EMERGENCY DEPARTMENT  [address] [place] [id number]  [phone]  Dept: [phone]     Garrett Romano MD  07/16/21 6104

## 2021-07-17 ENCOUNTER — HOSPITAL ENCOUNTER (INPATIENT)
Dept: RADIOLOGY | Facility: HOSPITAL | Age: 66
DRG: 853 | End: 2021-07-17
Attending: INTERNAL MEDICINE
Payer: COMMERCIAL

## 2021-07-17 LAB
ANION GAP SERPL CALCULATED.3IONS-SCNC: 12 MMOL/L (ref 5–18)
BUN SERPL-MCNC: 12 MG/DL (ref 8–22)
CALCIUM SERPL-MCNC: 8.8 MG/DL (ref 8.5–10.5)
CHLORIDE BLD-SCNC: 107 MMOL/L (ref 98–107)
CO2 SERPL-SCNC: 19 MMOL/L (ref 22–31)
CREAT SERPL-MCNC: 0.64 MG/DL (ref 0.6–1.1)
ERYTHROCYTE [DISTWIDTH] IN BLOOD BY AUTOMATED COUNT: 15.9 % (ref 10–15)
GFR SERPL CREATININE-BSD FRML MDRD: >90 ML/MIN/1.73M2
GLUCOSE BLD-MCNC: 119 MG/DL (ref 70–125)
GLUCOSE BLDC GLUCOMTR-MCNC: 105 MG/DL (ref 70–125)
GLUCOSE BLDC GLUCOMTR-MCNC: 162 MG/DL (ref 70–125)
GLUCOSE BLDC GLUCOMTR-MCNC: 177 MG/DL (ref 70–125)
GLUCOSE BLDC GLUCOMTR-MCNC: 189 MG/DL (ref 70–125)
HCT VFR BLD AUTO: 28.9 % (ref 35–47)
HGB BLD-MCNC: 9.2 G/DL (ref 11.7–15.7)
MAGNESIUM SERPL-MCNC: 2 MG/DL (ref 1.8–2.6)
MAGNESIUM SERPL-MCNC: 2.3 MG/DL (ref 1.8–2.6)
MCH RBC QN AUTO: 30.7 PG (ref 26.5–33)
MCHC RBC AUTO-ENTMCNC: 31.8 G/DL (ref 31.5–36.5)
MCV RBC AUTO: 96 FL (ref 78–100)
PLATELET # BLD AUTO: 334 10E3/UL (ref 150–450)
POTASSIUM BLD-SCNC: 3.3 MMOL/L (ref 3.5–5)
POTASSIUM BLD-SCNC: 3.5 MMOL/L (ref 3.5–5)
RBC # BLD AUTO: 3 10E6/UL (ref 3.8–5.2)
SODIUM SERPL-SCNC: 138 MMOL/L (ref 136–145)
WBC # BLD AUTO: 5.1 10E3/UL (ref 4–11)

## 2021-07-17 PROCEDURE — 258N000003 HC RX IP 258 OP 636: Performed by: EMERGENCY MEDICINE

## 2021-07-17 PROCEDURE — 120N000001 HC R&B MED SURG/OB

## 2021-07-17 PROCEDURE — 250N000011 HC RX IP 250 OP 636: Performed by: INTERNAL MEDICINE

## 2021-07-17 PROCEDURE — 250N000009 HC RX 250: Performed by: PAIN MEDICINE

## 2021-07-17 PROCEDURE — 250N000013 HC RX MED GY IP 250 OP 250 PS 637: Performed by: PAIN MEDICINE

## 2021-07-17 PROCEDURE — 250N000011 HC RX IP 250 OP 636: Performed by: EMERGENCY MEDICINE

## 2021-07-17 PROCEDURE — 99233 SBSQ HOSP IP/OBS HIGH 50: CPT | Performed by: INTERNAL MEDICINE

## 2021-07-17 PROCEDURE — 99222 1ST HOSP IP/OBS MODERATE 55: CPT | Performed by: PAIN MEDICINE

## 2021-07-17 PROCEDURE — 250N000012 HC RX MED GY IP 250 OP 636 PS 637: Performed by: EMERGENCY MEDICINE

## 2021-07-17 PROCEDURE — 80048 BASIC METABOLIC PNL TOTAL CA: CPT | Performed by: EMERGENCY MEDICINE

## 2021-07-17 PROCEDURE — 250N000013 HC RX MED GY IP 250 OP 250 PS 637: Performed by: INTERNAL MEDICINE

## 2021-07-17 PROCEDURE — 250N000013 HC RX MED GY IP 250 OP 250 PS 637: Performed by: EMERGENCY MEDICINE

## 2021-07-17 PROCEDURE — 71046 X-RAY EXAM CHEST 2 VIEWS: CPT

## 2021-07-17 PROCEDURE — 83735 ASSAY OF MAGNESIUM: CPT | Performed by: INTERNAL MEDICINE

## 2021-07-17 PROCEDURE — 85014 HEMATOCRIT: CPT | Performed by: EMERGENCY MEDICINE

## 2021-07-17 PROCEDURE — 84132 ASSAY OF SERUM POTASSIUM: CPT | Performed by: INTERNAL MEDICINE

## 2021-07-17 RX ORDER — HYDROMORPHONE HYDROCHLORIDE 4 MG/1
4 TABLET ORAL
Status: DISCONTINUED | OUTPATIENT
Start: 2021-07-17 | End: 2021-07-25

## 2021-07-17 RX ORDER — HYDROMORPHONE HYDROCHLORIDE 2 MG/1
2 TABLET ORAL
Status: DISCONTINUED | OUTPATIENT
Start: 2021-07-17 | End: 2021-07-17

## 2021-07-17 RX ORDER — POLYETHYLENE GLYCOL 3350 17 G/17G
17 POWDER, FOR SOLUTION ORAL DAILY PRN
Status: DISCONTINUED | OUTPATIENT
Start: 2021-07-17 | End: 2021-07-28 | Stop reason: HOSPADM

## 2021-07-17 RX ORDER — LIDOCAINE 50 MG/G
OINTMENT TOPICAL 4 TIMES DAILY
Status: DISCONTINUED | OUTPATIENT
Start: 2021-07-17 | End: 2021-07-28 | Stop reason: HOSPADM

## 2021-07-17 RX ORDER — POTASSIUM CHLORIDE 1500 MG/1
40 TABLET, EXTENDED RELEASE ORAL ONCE
Status: COMPLETED | OUTPATIENT
Start: 2021-07-17 | End: 2021-07-17

## 2021-07-17 RX ADMIN — ENOXAPARIN SODIUM 40 MG: 100 INJECTION SUBCUTANEOUS at 16:28

## 2021-07-17 RX ADMIN — DOCUSATE SODIUM 200 MG: 100 CAPSULE, LIQUID FILLED ORAL at 08:02

## 2021-07-17 RX ADMIN — INSULIN ASPART 1 UNITS: 100 INJECTION, SOLUTION INTRAVENOUS; SUBCUTANEOUS at 16:49

## 2021-07-17 RX ADMIN — SIMVASTATIN 20 MG: 10 TABLET, FILM COATED ORAL at 20:34

## 2021-07-17 RX ADMIN — CEFTRIAXONE SODIUM 1 G: 1 INJECTION, POWDER, FOR SOLUTION INTRAMUSCULAR; INTRAVENOUS at 19:29

## 2021-07-17 RX ADMIN — LIDOCAINE: 50 OINTMENT TOPICAL at 16:28

## 2021-07-17 RX ADMIN — LIDOCAINE: 50 OINTMENT TOPICAL at 20:23

## 2021-07-17 RX ADMIN — IBUPROFEN 400 MG: 400 TABLET, FILM COATED ORAL at 20:35

## 2021-07-17 RX ADMIN — EZETIMIBE 10 MG: 10 TABLET ORAL at 20:35

## 2021-07-17 RX ADMIN — ACETAMINOPHEN 1000 MG: 500 TABLET ORAL at 19:21

## 2021-07-17 RX ADMIN — INSULIN ASPART 1 UNITS: 100 INJECTION, SOLUTION INTRAVENOUS; SUBCUTANEOUS at 12:02

## 2021-07-17 RX ADMIN — Medication 1 TABLET: at 08:11

## 2021-07-17 RX ADMIN — OMEPRAZOLE 20 MG: 20 CAPSULE, DELAYED RELEASE ORAL at 08:02

## 2021-07-17 RX ADMIN — Medication 1 TABLET: at 08:02

## 2021-07-17 RX ADMIN — HYDROMORPHONE HYDROCHLORIDE 4 MG: 4 TABLET ORAL at 23:51

## 2021-07-17 RX ADMIN — ACETAMINOPHEN 1000 MG: 500 TABLET ORAL at 10:54

## 2021-07-17 RX ADMIN — AZITHROMYCIN MONOHYDRATE 250 MG: 500 INJECTION, POWDER, LYOPHILIZED, FOR SOLUTION INTRAVENOUS at 10:54

## 2021-07-17 RX ADMIN — POTASSIUM CHLORIDE 40 MEQ: 20 TABLET, EXTENDED RELEASE ORAL at 19:23

## 2021-07-17 RX ADMIN — SODIUM CHLORIDE: 9 INJECTION, SOLUTION INTRAVENOUS at 08:02

## 2021-07-17 RX ADMIN — HYDROMORPHONE HYDROCHLORIDE 4 MG: 4 TABLET ORAL at 20:35

## 2021-07-17 RX ADMIN — LIDOCAINE: 50 OINTMENT TOPICAL at 10:53

## 2021-07-17 RX ADMIN — CALCIUM CARBONATE (ANTACID) CHEW TAB 500 MG 500 MG: 500 CHEW TAB at 08:04

## 2021-07-17 RX ADMIN — POLYETHYLENE GLYCOL 3350 17 G: 17 POWDER, FOR SOLUTION ORAL at 19:26

## 2021-07-17 RX ADMIN — DULOXETINE HYDROCHLORIDE 30 MG: 30 CAPSULE, DELAYED RELEASE ORAL at 08:02

## 2021-07-17 RX ADMIN — Medication 25 MCG: at 09:35

## 2021-07-17 RX ADMIN — HYDROMORPHONE HYDROCHLORIDE 4 MG: 4 TABLET ORAL at 14:03

## 2021-07-17 RX ADMIN — ASPIRIN 81 MG: 81 TABLET, COATED ORAL at 09:35

## 2021-07-17 ASSESSMENT — MIFFLIN-ST. JEOR: SCORE: 1355.54

## 2021-07-17 NOTE — PLAN OF CARE
Problem: Adult Inpatient Plan of Care  Goal: Plan of Care Review  Outcome: Improving    Close observation with 1:1 staff.   Patient was confused and was reoriented few times. Slept on and off during the night. Denied pain. Vital signs are stable. Continue to monitor.

## 2021-07-17 NOTE — PLAN OF CARE
Problem: Adult Inpatient Plan of Care  Goal: Plan of Care Review  Outcome: No Change   Pt is on 1:1. Confused, restless, agitated, pulling out iv and trying to get out of bed. Prn Seroquel and Haldol given but ineffective. Hospitalist paged. Pt slept 5 minutes before getting Zyprexa order.

## 2021-07-17 NOTE — CONSULTS
"[facility] ACUTE PAIN SERVICE CONSULTATION ([facility], [facility])     Date of Admission:  7/16/2021  Date of Consult (When I saw the patient): 07/17/21  Physician requesting consult: Dr. Valles   Reason for consult: Pain medication management for cancer pain     Assessment/Plan:     Elenita Hardin is a 66 year old female who was admitted on 7/16/2021.  I was asked to see the patient for Pain medication management for cancer pain. Admitted for encephalopathy after a new prescription for OxyContin & fever present as well. History of DM, metastatic breast cancer. Per the  the patient takes ambien, dilaudid 4mg and was given OxyContin back in April. She also previously tried fentanyl in April and lyrica in October. Was admitted in January for pathologic pubic rami/sacral fractures.  Unable to report pain and falls asleep mid conversation.  Called her  x2 and have not heard back.  It is not clear to me why the OxyContin was started as it appears that it was prescribed back in April and she has had other adverse reactions to long-acting opioids.    Addendum:  notes that this confusion began on Thursday am when she woke up. She was \"complaing about her right hip pain.\" That has been present for 2 years. He indicates chemo and radiation where helpful although the \"sciatic nerve\" on the right continues to \"hurt\". She took one OxyContin on Wednesday and then again on Thursday am, then slept all day on Thursday. She was then very confused on Thursday evening. She was taking dilaudid 4mg every 4 hours and also taking vistaril and ambien.     PLAN:   1) Cancer pain with adverse reaction to opioid. Stage IV with spread to pelvis, femur, vertebrae, ribs and lymph nodes of her left arm. CT scan on 7/16 did indicate Diffusely sclerotic calvarium presumed to be from mets. Would resume low dose dilaudid and consider oncology, palliative care/hospice consult. Unable to reach " family.   2)Multimodal Medication Therapy  Topical:  Lidocaine ointment 5%  NSAID'S: discontinue naproxen 2 NSAIDs ordered   Muscle Relaxants: None   Adjuvants:  Tyenol 1G TID, vistaril listed as home med - may benefit from steroids   Antidepressants/anxiolytics: cymbalta   Opioids:  Hydromorphone(Dilaudid)2mg today, may need to change to sublingual - can increase to 4mg with caution   3)Non-medication interventions- rest and turning   4)Constipation Prophylaxis  Sennokot BID  5) Follow up   -Opioid prescriber has been [facility]  -Discharge Recommendations - We recommend prescribing the following at the time of discharge: TBD          History of Present Illness (HPI):       Elenita Hardin is a 66 year old old female who presented with encephalopathy..  He has a known history of metastatic breast cancer which is in the pelvis femur vertebrae, ribs, lymph nodes in the left arm.  She did have a CT scan here on 7/16 that did indicate diffuse sclerotic calvarium.  This was presumed to be from metastatic disease.  Per chart review pain began with pathologic sacral cranial fractures back in January.  Per the  I can see that she has tried fentanyl, Lyrica, MS Contin.  She does have similar symptoms with encephalopathy when she starts other opioids.  For now we will resume the Dilaudid and may need to change to liquid.         07/12/2021  1   06/07/2021  Zolpidem Tartrate 10 MG Tablet  30.00  30 Ni Robert   [id number]   Sup ([id number])   1  0.50 [place]   06/20/2021  1   06/04/2021  Hydromorphone 4 MG Tablet  120.00  20 Br And   [id number]   Sup (3686)   0  96.[place]   06/08/2021  1   06/07/2021  Zolpidem Tartrate 10 MG Tablet  30.00  30 Ni Robert   [id number]   Sup (3686)   0  0.50 [place]   05/07/2021  1   05/07/2021  Hydromorphone 4 MG Tablet  120.00  20 Br And   [id number]   Sup (3686)   0  96.[place]   05/05/2021  1   03/05/2021  Zolpidem Tartrate 10 MG Tablet   30.00  30 St Too   [id number]   Sup (3686)   2  0.50 [place]   04/13/2021  3   04/09/2021  Oxycontin Er 30 MG Tablet  60.00  30 Br And   [id number]   Sup (3686)   0  90.[place]   04/05/2021  1   03/05/2021  Zolpidem Tartrate 10 MG Tablet  30.00  30 St Too   [id number]   Sup (3686)   1  0.50 [place]   04/02/2021  1   04/01/2021  Hydromorphone 4 MG Tablet  120.00  20 Br And   [id number]   Sup (3686)   0  96.[place]   04/02/2021  1   04/01/2021  Fentanyl 25 Mcg/Hr Patch  10.00  30 Br And   [id number]   Sup (3686)   0  60.00 [place]   03/30/2021  1   10/17/2020  Pregabalin 75 MG Capsule  90.00                  Medical History  [unfilled]  [unfilled]  Patient Active Problem List    Diagnosis Date Noted     Confusion 07/16/2021     Priority: Medium     Depression, unspecified depression type      Priority: Medium     Closed fracture of multiple pubic rami with delayed healing, subsequent encounter 01/23/2020     Priority: Medium     Lymphedema 01/23/2020     Priority: Medium     Pathological fracture, pelvis, initial encounter for fracture 01/23/2020     Priority: Medium     Metastatic breast cancer (H)      Priority: Medium     Left groin pain      Priority: Medium     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, pelvis, initial encounter for fracture 01/22/2020     Priority: Medium     Encounter for palliative care      Priority: Medium     Elevation of level of transaminase or lactic acid dehydrogenase (LDH) 12/04/2019     Priority: Medium     IMO Regulatory Load OCT 2020         Palliative care patient      Priority: Medium     Sciatica 12/03/2019     Priority: Medium     Hip pain, right 12/03/2019     Priority: Medium     Edema of both upper extremities 10/17/2019     Priority: Medium     Insulin dependent diabetes mellitus 10/17/2019     Priority: Medium     IMO SNOMED LOAD SPRING 2020 [.6/.18/.2020 9:04 PM]  Michael Palomares:           Hip pain 09/27/2019      Priority: Medium     Cancer associated pain 09/27/2019     Priority: Medium     Abnormal chest CT      Priority: Medium     Obesity (BMI 30-39.9) 09/25/2015     Priority: Medium     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     Priority: Medium     Scar condition and fibrosis of skin 06/29/2015     Priority: Medium     Personal history of breast cancer 06/29/2015     Priority: Medium     Diabetes (H) 07/11/2013     Priority: Medium        Surgical History  She  has a past surgical history that includes IR Lumbar Transforaminal Epidural Strd Inj (12/5/2019); IR Chest Port Placement > 5 Yrs of Age (12/12/2019); Mastectomy (Left, 2010); appendectomy; Ovarian Cyst Removal; Insert Intracoronary Stent (1985); other surgical history; Revise reconstructed breast (Left); Mammoplasty reduction (Right, 2015); Ir Lumbar Transforaminal Epidural Steroid Injection (12/5/2019); and Ir Port Placement >5 Years (12/12/2019).     Past Surgical History:   Procedure Laterality Date     APPENDECTOMY       INSERT INTRACORONARY STENT  1985    R Hand     IR CHEST PORT PLACEMENT > 5 YRS OF AGE  12/12/2019     IR LUMBAR TRANSFORAMINAL EPIDURAL STEROID INJECTION  12/5/2019     IR LUMBAR TRANSFORAMINAL EPIDURAL STRD INJ  12/5/2019     IR PORT PLACEMENT >5 YEARS  12/12/2019     MAMMOPLASTY REDUCTION Right 2015    hx of left mastectomy and right breast reduction     MASTECTOMY Left 2010     OTHER SURGICAL HISTORY      biopsy of upper and right tongue     OVARIAN CYST REMOVAL       REVISE RECONSTRUCTED BREAST Left     March 11, 2015       Allergies  Allergies   Allergen Reactions     Sulfa Drugs Hives and Rash     welts         Prior to Admission Medications   Medications Prior to Admission   Medication Sig Dispense Refill Last Dose     acetaminophen (TYLENOL) 500 MG tablet Take 1,000 mg by mouth 3 times daily as needed for pain   7/16/2021 at Unknown time     calcium carbonate (CALCIUM CARBONATE) 600 MG tablet Take 600 mg by mouth daily    7/16/2021 at Unknown time     Continuous Blood Gluc  (FREESTYLE KIMBERLY 14 DAY READER) COLETTE Use to check blood sugar at least 4 times a day or as directed   7/16/2021 at Unknown time     Continuous Blood Gluc Sensor (FREESTYLE KIMBERLY 14 DAY SENSOR) MISC Use as directed to test blood sugar at least 4 times a day or as directed   7/16/2021 at Unknown time     docusate sodium (COLACE) 100 MG capsule Take 200 mg by mouth daily   7/16/2021 at Unknown time     DULoxetine (CYMBALTA) 30 MG capsule Take 30 mg by mouth every morning And 60 mg by mouth in the evening   7/15/2021 at Unknown time     ezetimibe-simvastatin (VYTORIN) 10-20 MG tablet Take 1 tablet by mouth daily   7/15/2021 at Unknown time     HYDROmorphone (DILAUDID) 4 MG tablet Take 4 mg by mouth every 4 hours as needed for severe pain    7/16/2021 at Unknown time     hydrOXYzine (ATARAX) 25 MG tablet Take 25 mg by mouth 3 times daily as needed for anxiety    7/16/2021 at Unknown time     ibuprofen (ADVIL/MOTRIN) 200 MG tablet Take 400 mg by mouth every 6 hours as needed for mild pain   7/16/2021 at AM     IRON-VIT C-VIT B12-FOLIC ACID (GENTLE IRON) 28-60-0.008-0.4 MG CAPS Take 1 capsule by mouth daily   7/16/2021 at Unknown time     multivitamin (CENTRUM SILVER) tablet Take 1 tablet by mouth daily   7/16/2021 at Unknown time     naproxen sodium (ANAPROX) 220 MG tablet Take 220 mg by mouth 2 times daily as needed for moderate pain   Past Week at Unknown time     oxybutynin (DITROPAN) 5 MG tablet Take 5 mg by mouth 2 times daily as needed    7/16/2021 at Unknown time     oxyCODONE (OXYCONTIN) 30 MG 12 hr tablet Take 30 mg by mouth every 12 hours   7/15/2021 at AM     vitamin (B COMPLEX) tablet Take 1 tablet by mouth daily   7/16/2021 at Unknown time     Vitamin D3 (CHOLECALCIFEROL) 25 mcg (1000 units) tablet Take 1 tablet by mouth daily   7/16/2021 at Unknown time     zolpidem (AMBIEN) 10 MG tablet Take 10 mg by mouth nightly as needed for sleep   7/13/2021  "    aspirin 81 MG EC tablet Take 81 mg by mouth daily   7/13/2021     insulin glargine (BASAGLAR KWIKPEN) 100 UNIT/ML pen Inject 55 Units Subcutaneous every morning         insulin lispro (HUMALOG KWIKPEN) 100 UNIT/ML (1 unit dial) pen Inject 1-20 Units Subcutaneous 3 times daily (before meals) (sliding scale + 3 units for every 15 grams of carbs)   Unknown at Unknown time     omeprazole (PRILOSEC) 20 MG DR capsule Take 20 mg by mouth daily as needed   Unknown at Unknown time       Social History  Reviewed, and she  reports that she has quit smoking. Her smoking use included cigarettes. She has a 15.00 pack-year smoking history. She has never used smokeless tobacco. She reports current alcohol use of about 1.0 standard drinks of alcohol per week. She reports that she does not use drugs.  Social History     Tobacco Use     Smoking status: Former Smoker     Packs/day: 0.75     Years: 20.00     Pack years: 15.00     Types: Cigarettes     Smokeless tobacco: Never Used   Substance Use Topics     Alcohol use: Yes     Alcohol/week: 1.0 standard drinks     Comment: 1 glass of wine/week       Family History  Reviewed care everywhere to find family history  Nothing relevant to pain consult    Reviewed, and family history includes Breast Cancer in her cousin; Kidney Cancer in her maternal grandmother; Lung Cancer in her mother and paternal aunt; Pancreatic Cancer in her mother.    Review of Systems  Complete ROS reviewed, unless noted  , all other systems reviewed (with patient) and all others found to be negative.         Objective:     Physical Exam:  [unfilled]  BP 97/50   Pulse 113   Temp 98.1  F (36.7  C) (Oral)   Resp 20   Ht 1.651 m (5' 5\")   Wt 75.5 kg (166 lb 6.4 oz)   SpO2 95%   BMI 27.69 kg/m    Weight:   @THISENCWEIGHTS(1)@  Weight change:   Body mass index is 27.69 kg/m .      General Appearance:  Lethargic   ~Keep her eyes open throughout the interview   Head:  Normocephalic, without obvious " abnormality, atraumatic   Eyes:  PERRL, conjunctiva/corneas clear, EOM's intact   ENT/Throat: Lips small   Lymph/Neck: Supple, symmetrical, trachea midline, no adenopathy, thyroid: not enlarged, symmetric    Lungs:   Clear to auscultation bilaterally, respirations unlabored   Chest Wall:  No tenderness or deformity   Cardiovascular/Heart:  Regular rate and rhythm, S1, S2 normal,no murmur, rub or gallop.     Abdomen:   Soft, non-tender, bowel sounds active all four quadrants,  no masses, no organomegaly   Musculoskeletal: Extremities multiple abrasions   Skin: Skin color pale, lesions noted on the legs   Neurologic:  Is lethargic and slow to respond and does not provide meaningful responses            Imaging Reviewed Personally By Myself    MR Brain w/o Contrast    Result Date: 7/16/2021  EXAM: MR BRAIN W/O CONTRAST LOCATION: [facility] DATE/TIME: 7/16/2021 12:29 PM INDICATION: Confusion. COMPARISON: Head CT 07/16/2021, brain MRI 02/01/2017 TECHNIQUE: Routine multiplanar multisequence head MRI without intravenous contrast. FINDINGS: There is no restricted diffusion. Diffusely diminished T1 signal intensity throughout the calvarium and upper visualized cervical spine consistent with the sclerosis noted on the comparison CT. Paranasal sinuses are free from significant disease. Mastoid  air cells appear free from significant disease. Intraorbital contents are unremarkable. Ventricles are within normal limits in size for the patient's age. Intracranial flow voids are intact. There is no mass effect, midline shift, or extraaxial collection. There are scattered foci of T2/FLAIR hyperintensity within the cerebral white matter that are nonspecific but most commonly reflect the sequela of chronic small vessel ischemic disease. No evidence for acute or chronic intracranial blood products.     IMPRESSION: 1.  No acute intracranial finding. No evidence for recent ischemia, intracranial hemorrhage, or mass. 2.  A  few T2 hyperintense foci in the white matter are nonspecific but most commonly reflect gliosis related to prior ischemic or inflammatory insult. 3.  Presumed osseous metastatic disease within the calvarium and upper visualized cervical spine.    XR Chest Port 1 View    Result Date: 7/16/2021  EXAM: XR CHEST PORT 1 VIEW LOCATION: [facility] DATE/TIME: 7/16/2021 12:20 PM INDICATION: Confusion. Slightly decreased O2 sat. COMPARISON: 04/06/2018.     IMPRESSION: Right IJ chemotherapy port is in place with tip in the high right atrium. Heart and mediastinal size are normal. There is patchy airspace infiltrate throughout the bilateral lungs with diffuse indistinctness of the pulmonary interstitium. These findings may represent pulmonary edema. An inflammatory infectious process is also a consideration. No definite pleural effusion or pneumothorax. Extensive sclerotic osseous lesions are present, compatible with known bony metastases.         XR Foot Left G/E 3 Views    Result Date: 7/16/2021  EXAM: XR FOOT LEFT G/E 3 VIEWS LOCATION: [facility] DATE/TIME: 7/16/2021 1:02 PM INDICATION: 5th toe trauma COMPARISON: None.     IMPRESSION: Bones are demineralized. There is no definitive evidence of acute fracture, with attention to the fifth toe. Mild, scattered degenerative changes.    Head CT w/o contrast    Result Date: 7/16/2021  EXAM: CT HEAD W/O CONTRAST LOCATION: [facility] DATE/TIME: 7/16/2021 11:12 AM INDICATION: Mental status change, unknown cause. Breast cancer. COMPARISON: Brain MRI 02/01/2017 TECHNIQUE: Routine CT Head without IV contrast. Multiplanar reformats. Dose reduction techniques were used. FINDINGS: INTRACRANIAL CONTENTS: No intracranial hemorrhage, extraaxial collection, or mass effect.  No CT evidence of acute infarct. Mild presumed chronic small vessel ischemic changes. Normal ventricles and sulci. VISUALIZED ORBITS/SINUSES/MASTOIDS: No intraorbital abnormality. No  paranasal sinus mucosal disease. No middle ear or mastoid effusion. BONES/SOFT TISSUES: No acute abnormality. Diffusely sclerotic calvarium and upper visualized cervical spine structures.     IMPRESSION: 1.  No acute intracranial injury, hemorrhage, mass, or CT evidence of recent ischemia. 2.  Diffusely sclerotic calvarium presumed to reflect metastatic disease given history of breast cancer.      Labs Reviewed Personally By Myself  Results for orders placed or performed during the hospital encounter of 07/16/21   Head CT w/o contrast     Status: None    Narrative    EXAM: CT HEAD W/O CONTRAST  LOCATION: [facility]  DATE/TIME: 7/16/2021 11:12 AM    INDICATION: Mental status change, unknown cause. Breast cancer.  COMPARISON: Brain MRI 02/01/2017  TECHNIQUE: Routine CT Head without IV contrast. Multiplanar reformats. Dose reduction techniques were used.    FINDINGS:  INTRACRANIAL CONTENTS: No intracranial hemorrhage, extraaxial collection, or mass effect.  No CT evidence of acute infarct. Mild presumed chronic small vessel ischemic changes. Normal ventricles and sulci.     VISUALIZED ORBITS/SINUSES/MASTOIDS: No intraorbital abnormality. No paranasal sinus mucosal disease. No middle ear or mastoid effusion.    BONES/SOFT TISSUES: No acute abnormality. Diffusely sclerotic calvarium and upper visualized cervical spine structures.      Impression    IMPRESSION:  1.  No acute intracranial injury, hemorrhage, mass, or CT evidence of recent ischemia.  2.  Diffusely sclerotic calvarium presumed to reflect metastatic disease given history of breast cancer.   MR Brain w/o Contrast     Status: None    Narrative    EXAM: MR BRAIN W/O CONTRAST  LOCATION: [facility]  DATE/TIME: 7/16/2021 12:29 PM    INDICATION: Confusion.  COMPARISON: Head CT 07/16/2021, brain MRI 02/01/2017  TECHNIQUE: Routine multiplanar multisequence head MRI without intravenous contrast.    FINDINGS:  There is no restricted  diffusion. Diffusely diminished T1 signal intensity throughout the calvarium and upper visualized cervical spine consistent with the sclerosis noted on the comparison CT. Paranasal sinuses are free from significant disease. Mastoid   air cells appear free from significant disease. Intraorbital contents are unremarkable. Ventricles are within normal limits in size for the patient's age. Intracranial flow voids are intact. There is no mass effect, midline shift, or extraaxial   collection. There are scattered foci of T2/FLAIR hyperintensity within the cerebral white matter that are nonspecific but most commonly reflect the sequela of chronic small vessel ischemic disease. No evidence for acute or chronic intracranial blood   products.      Impression    IMPRESSION:  1.  No acute intracranial finding. No evidence for recent ischemia, intracranial hemorrhage, or mass.  2.  A few T2 hyperintense foci in the white matter are nonspecific but most commonly reflect gliosis related to prior ischemic or inflammatory insult.  3.  Presumed osseous metastatic disease within the calvarium and upper visualized cervical spine.   XR Chest Port 1 View     Status: None    Narrative    EXAM: XR CHEST PORT 1 VIEW  LOCATION: [facility]  DATE/TIME: 7/16/2021 12:20 PM    INDICATION: Confusion. Slightly decreased O2 sat.  COMPARISON: 04/06/2018.      Impression    IMPRESSION: Right IJ chemotherapy port is in place with tip in the high right atrium. Heart and mediastinal size are normal. There is patchy airspace infiltrate throughout the bilateral lungs with diffuse indistinctness of the pulmonary interstitium.   These findings may represent pulmonary edema. An inflammatory infectious process is also a consideration. No definite pleural effusion or pneumothorax. Extensive sclerotic osseous lesions are present, compatible with known bony metastases.                  XR Foot Left G/E 3 Views     Status: None    Narrative    EXAM:  XR FOOT LEFT G/E 3 VIEWS  LOCATION: [facility]  DATE/TIME: 7/16/2021 1:02 PM    INDICATION: 5th toe trauma  COMPARISON: None.      Impression    IMPRESSION: Bones are demineralized. There is no definitive evidence of acute fracture, with attention to the fifth toe. Mild, scattered degenerative changes.   Comprehensive metabolic panel     Status: Abnormal   Result Value Ref Range    Sodium 132 (L) 136 - 145 mmol/L    Potassium 4.0 3.5 - 5.0 mmol/L    Chloride 99 98 - 107 mmol/L    Carbon Dioxide (CO2) 21 (L) 22 - 31 mmol/L    Anion Gap 12 5 - 18 mmol/L    Urea Nitrogen 14 8 - 22 mg/dL    Creatinine 0.69 0.60 - 1.10 mg/dL    Calcium 8.8 8.5 - 10.5 mg/dL    Glucose 109 70 - 125 mg/dL    Alkaline Phosphatase 157 (H) 45 - 120 U/L    AST 23 0 - 40 U/L    ALT 36 0 - 45 U/L    Protein Total 6.6 6.0 - 8.0 g/dL    Albumin 2.8 (L) 3.5 - 5.0 g/dL    Bilirubin Total 0.8 0.0 - 1.0 mg/dL    GFR Estimate >90 >60 mL/min/1.73m2   INR     Status: Abnormal   Result Value Ref Range    INR 1.45 (H) 0.85 - 1.15   PTT     Status: Abnormal   Result Value Ref Range    aPTT 48 (H) 22 - 38 Seconds   Ammonia (on ice)     Status: Normal   Result Value Ref Range    Ammonia 25 11 - 35 umol/L   Troponin I (now)     Status: Normal   Result Value Ref Range    Troponin I 0.01 0.00 - 0.29 ng/mL   Glucose by meter     Status: Normal   Result Value Ref Range    GLUCOSE BY METER POCT 110 70 - 125 mg/dL   CBC with platelets and differential     Status: Abnormal   Result Value Ref Range    WBC Count 4.3 4.0 - 11.0 10e3/uL    RBC Count 2.74 (L) 3.80 - 5.20 10e6/uL    Hemoglobin 8.5 (L) 11.7 - 15.7 g/dL    Hematocrit 26.7 (L) 35.0 - 47.0 %    MCV 97 78 - 100 fL    MCH 31.0 26.5 - 33.0 pg    MCHC 31.8 31.5 - 36.5 g/dL    RDW 15.9 (H) 10.0 - 15.0 %    Platelet Count 248 150 - 450 10e3/uL   SARS-COV2 (COVID-19) Virus RT-PCR     Status: Normal    Specimen: Nasopharyngeal; Swab   Result Value Ref Range    SARS CoV2 PCR Negative Negative    Narrative     Testing was performed using the [employer]  SARS-CoV-2 & Influenza A/B Assay on the [employer]  System.  This test should be ordered for the detection of SARS-COV-2 in individuals who meet SARS-CoV-2 clinical and/or epidemiological criteria. Test performance is unknown in asymptomatic patients.  This test is for in vitro diagnostic use under the FDA EUA for laboratories certified under CLIA to perform moderate and/or high complexity testing. This test has not been FDA cleared or approved.  A negative test does not rule out the presence of PCR inhibitors in the specimen or target RNA in concentration below the limit of detection for the assay. The possibility of a false negative should be considered if the patient's recent exposure or clinical presentation suggests COVID-19.  [facility] are certified under the Clinical Laboratory Improvement Amendments of 1988 (CLIA-88) as qualified to perform moderate and/or high complexity laboratory testing.   Magnesium     Status: Normal   Result Value Ref Range    Magnesium 2.2 1.8 - 2.6 mg/dL   Manual Differential     Status: Abnormal   Result Value Ref Range    % Neutrophils 76 %    % Lymphocytes 2 %    % Monocytes 16 %    % Eosinophils 6 %    % Basophils 0 %    Absolute Neutrophils 3.3 1.6 - 8.3 10e3/uL    Absolute Lymphocytes 0.1 (L) 0.8 - 5.3 10e3/uL    Absolute Monocytes 0.7 0.0 - 1.3 10e3/uL    Absolute Eosinophils 0.3 0.0 - 0.7 10e3/uL    Absolute Basophils 0.0 0.0 - 0.2 10e3/uL    RBC Morphology Confirmed RBC Indices     Platelet Assessment  Automated Count Confirmed. Platelet morphology is normal.     Automated Count Confirmed. Platelet morphology is normal.    Polychromasia Slight (A) None Seen   UA with Microscopic reflex to Culture     Status: Abnormal    Specimen: Urine, Clean Catch   Result Value Ref Range    Color Urine Light Yellow Colorless, Straw, Light Yellow, Yellow    Appearance Urine Clear Clear    Glucose Urine Negative Negative mg/dL  "   Bilirubin Urine Negative Negative    Ketones Urine 10  (A) Negative mg/dL    Specific Gravity Urine 1.008 1.001 - 1.030    Blood Urine 0.03 mg/dL (A) Negative    pH Urine 5.5 5.0 - 7.0    Protein Albumin Urine 30  (A) Negative mg/dL    Urobilinogen Urine <2.0 <2.0 mg/dL    Nitrite Urine Negative Negative    Leukocyte Esterase Urine Negative Negative    Bacteria Urine Few (A) None Seen /HPF    RBC Urine 1 <=2 /HPF    WBC Urine 6 (H) <=5 /HPF    Transitional Epithelials Urine 1 <=1 /HPF    Narrative    Urine Culture not indicated   Procalcitonin     Status: Abnormal   Result Value Ref Range    Procalcitonin 3.64 (H) 0.00 - 0.49 ng/mL   Lactic Acid STAT     Status: Normal   Result Value Ref Range    Lactic Acid 0.8 0.7 - 2.0 mmol/L   Hemoglobin A1c     Status: Abnormal   Result Value Ref Range    Hemoglobin A1C 6.3 (H) <=5.6 %   Glucose by meter     Status: Normal   Result Value Ref Range    GLUCOSE BY METER POCT 73 70 - 125 mg/dL   Glucose by meter     Status: Normal   Result Value Ref Range    GLUCOSE BY METER POCT 101 70 - 125 mg/dL   Social Work/ Care Management IP Consult     Status: None ()    Narrative    Clara Jeffery RN     7/16/2021  2:21 PM  Care Management Initial Consult    General Information  Assessment completed with: Patient, Spouse or significant other,   Denilson   Type of CM/SW Visit: Initial Assessment    Primary Care Provider verified and updated as needed: Yes   Readmission within the last 30 days: no previous admission in   last 30 days      Reason for Consult: discharge planning  Advance Care Planning: Advance Care Planning Reviewed: other   (comment) (\"doesn't have\")          Communication Assessment  Patient's communication style: spoken language (English or   Bilingual)             Cognitive  Cognitive/Neuro/Behavioral: orientation, speech          Orientation: disoriented x 4     Best Language: 1 - Mild to   moderate  Speech: inappropriate words    Living Environment:   People in " "home: spouse   Denilson  Current living Arrangements: house (\"split level house then able   to live on the upper level\")      Able to return to prior arrangements: yes       Family/Social Support:  Care provided by: spouse/significant other  Provides care for: no one, unable/limited ability to care for   self  Marital Status:     Denilson       Description of Support System: Supportive, Involved    Support Assessment: Adequate family and caregiver support,   Adequate social supports    Current Resources:   Patient receiving home care services: No     Community Resources: None  Equipment currently used at home: walker, rolling, walker,   standard (\"power standing reclining chair\")  Supplies currently used at home: Hearing Aid Batteries, Other   (\"hearing aids, glasses\")    Employment/Financial:  Employment Status: retired        Financial Concerns:     Referral to Financial Counselor: No       Lifestyle & Psychosocial Needs:  Social Determinants of Health     Tobacco Use: Medium Risk     Smoking Tobacco Use: Former Smoker     Smokeless Tobacco Use: Never Used   Alcohol Use:      Frequency of Alcohol Consumption:      Average Number of Drinks:      Frequency of Binge Drinking:    Financial Resource Strain:      Difficulty of Paying Living Expenses:    Food Insecurity:      Worried About Running Out of Food in the Last Year:      Ran Out of Food in the Last Year:    Transportation Needs:      Lack of Transportation (Medical):      Lack of Transportation (Non-Medical):    Physical Activity:      Days of Exercise per Week:      Minutes of Exercise per Session:    Stress:      Feeling of Stress :    Social Connections:      Frequency of Communication with Friends and Family:      Frequency of Social Gatherings with Friends and Family:      Attends Restorationist Services:      Active Member of Clubs or Organizations:      Attends Club or Organization Meetings:      Marital Status:    Intimate Partner Violence:  " "    Fear of Current or Ex-Partner:      Emotionally Abused:      Physically Abused:      Sexually Abused:    Depression:      PHQ-2 Score:    Housing Stability:      Unable to Pay for Housing in the Last Year:      Number of Places Lived in the Last Year:      Unstable Housing in the Last Year:        Functional Status:  Prior to admission patient needed assistance:   Dependent ADLs:: Ambulation-walker, Bathing, Dressing, Eating,   Grooming, Toileting  Dependent IADLs:: Cleaning, Cooking, Laundry, Shopping, Meal   Preparation, Medication Management, Money Management,   Transportation  Assesssment of Functional Status: Not at  functional baseline   (d/t confusion)    Mental Health Status:          Chemical Dependency Status:                Values/Beliefs:  Spiritual, Cultural Beliefs, Hinduism Practices, Values that   affect care:                 Additional Information:  Elenita lives in a house with her . He is her primary care   giver and she is total cares at baseline. He states, \"I am   retired and so I can take care of her all the time, but now this   confusion is a new thing\". It is a split entry house so \"she has   to use about 7 steps to get to the upper level of the house and   then she can live on that level almost all the time\".    May need Home Care help at discharge.     to transport at discharge.    Clara Jeffery RN       Asymptomatic COVID-19 Virus (Coronavirus) by PCR Nasopharyngeal     Status: Normal    Specimen: Nasopharyngeal; Swab    Narrative    The following orders were created for panel order Asymptomatic COVID-19 Virus (Coronavirus) by PCR Nasopharyngeal.  Procedure                               Abnormality         Status                     ---------                               -----------         ------                     SARS-COV2 (COVID-19) Vir...[[id number]]  Normal              Final result                 Please view results for these tests on the individual orders. "   CBC with platelets + differential     Status: Abnormal    Narrative    The following orders were created for panel order CBC with platelets + differential.  Procedure                               Abnormality         Status                     ---------                               -----------         ------                     CBC with platelets and d...[[id number]]  Abnormal            Final result               Manual Differential[[id number]]          Abnormal            Final result                 Please view results for these tests on the individual orders.           Total time spent 65 minutes with greater than 50% in consultation, education and coordination of care.     Also discussed with MD Dr. Kenney and called .     Thank you for this consultation.          Janice DALTON, CNS-BC, CNP, ACHPN  Acute Care Pain Management Program Hour 7a-1700  [facility] ([facility] JO, Joes, [place]i)   Page via [employer]- Click HERE to page Janice or call [phone]

## 2021-07-17 NOTE — PROGRESS NOTES
Care Management Follow Up Note    Length of Stay (days) 1    Patient plan of care discussed at Interdisciplinary Rounds: yes                Expected Discharge Date: 7/18/21 or 7/19/21.       Concerns to be Addressed / Barriers to Discharge: Alteration in mentation likely due to change in pain medication (history of metastatic breast cancer, Stage IV, with spread to pelvis, femur, vertebrae, ribs and lymph nodes of her left arm). Pain team following.     Anticipated Discharge Disposition: Goal is home but may need skilled in home care.   Anticipated Discharge Services: To be determined by destination, patient/family preferences, medical needs and mobility status closer to the time of discharge.  Anticipated Discharge DME: Per therapy (if indicated).     Plan:  Patient is  and her  is her primary caregiver. Their goal is to return home at discharge but would consider in-home skilled home care if recommended. Family plans to transport.     Susan Barragan RN    Abbreviation  Code:  [facility] [employer] [place] Wheelchair ([facility] WC), [employer] [place] Stretcher ([facility] STR), Patient (pt), Transitional Care Unit (TCU), Skilled Nursing Facility (SNF), Long Term Care (LTC), Assisted Living (intermediate or AL), Care Management (CM), Physical Therapy (PT), Occupational Therapy (OT), Speech Therapy (ST), Respiratory Therapy (RT).

## 2021-07-17 NOTE — PROGRESS NOTES
[facility]    Medicine Progress Note - Hospitalist Service       Date of Admission:  7/16/2021    Summary: 66 year old female with history significant for metastatic breast cancer, multiple bony lesions with resulting chronic pain, presented in the company of her  with disorientation, slurred speech, intermittent combativeness and visual hallucinations and frequent falls despite using her walker.    Two days prior to admission her pain was increasing in intensity despite being on her routine Dilaudid and medical marijuana and she decided to take one tablet of OxyContin that was prescribed in April 2021 for prn pain breakthrough by Denilson Valles from [facility].  The following morning she took another dose of OxyContin with her usual morning meds.  She slept most of the day prior to admission and then woke up at 4 PM disoriented and hallucinating.  Her symptoms persisted and she was brought in for evaluation, she had no reported fever at home but upon arrival to the floor was febrile.    Assessment & Plan         #Acute metabolic encephalopathy: Much improved today after holding OxyContin and Dilaudid overnight and starting treatment of pneumonia.  Likely multifactorial secondary to underlying chronic illness, dehydration, possibly early infection and then additional dosing of OxyContin.  Brain MRI shows no acute findings to account for the symptoms.   Appreciate pain team consult.    #Acute on chronic cancer bone pain: Continue oral Dilaudid as previously prescribed.  Resume home dosing medical cannabis.  Will follow patient's pain curve and titrate medications as needed     #Community-acquired pneumonia/aspiration pneumonia: After she came up to the floor she developed a temperature of 100.7.  UA shows 6 white cells, culture ordered.  Procalcitonin is elevated.    Portable single view chest x-ray shows patchy airspace infiltrate throughout the lungs with diffuse  indistinctness of the pulmonary interstitium with a differential of pulmonary edema versus inflammatory versus infectious process.  She has had no cough or respiratory symptoms.  We will start Rocephin and Zithromax to cover the potential both lung and urine bacteria.  Lactate is normal.    Repeat due 2 view chest film and plan IV antibiotics through Monday changing to orals for another 7 days.    #Dehydration: Resolving with IV fluids and now able to take p.o. prior to admission not been eating and drinking very well.     #Metastatic breast cancer.  Stage IV with spread to pelvis, femur, vertebrae, ribs and lymph nodes of her left arm.  Dr. Varma from [facility] is her primary oncologist.  Pain medications prescribed through [facility], will consult oncology.  She is also on as needed Dilaudid and Vistaril which she gets approximately 3 times a day.     #Type 2 diabetes, insulin dependent: Blood sugar normal here, she has not really eaten over the past couple days.  She is on Humalog sliding scale and also basal insulin but her  states that she does not receive that every day and he has decreased that dose and is not sure how much she is getting.  Plan to hold basal insulin and reestablish patient's baseline as she begins to eat and drink.     #Depression: Continue home Cymbalta        Diet: Consistent Carb 45 grams CHO per Meal Diet    DVT Prophylaxis: Enoxaparin (Lovenox) SQ and Pneumatic Compression Devices  Jiang Catheter: Not present  Central Lines: None  Code Status: Full Code      Disposition Plan   Expected discharge: 7/19/2021 recommended to prior living arrangement once adequate pain management/ tolerating PO medications.     The patient's care was discussed with the Bedside Nurse, Patient and Patient's Family.    Freeman Kenney MD  Hospitalist Service  [facility]  Securely message with the Vocera Web Console (learn more here)  Text page via [employer]  Paging/Directory      Risk Factors Present on Admission         # Hyponatremia: Na = 132 mmol/L (Ref range: 136 - 145 mmol/L) on admission, will monitor as appropriate     # Coagulation Defect: INR = 1.45 (Ref range: 0.85 - 1.15) and/or PTT = 48 Seconds (Ref range: 22 - 38 Seconds) on admission, will monitor for bleeding  # Platelet Defect: home medication list includes an antiplatelet medication      ______________________________________________________________________    Interval History   Patient awake and alert and calm this morning, complains of pain in pelvis and hips which is chronic for her.  Feels that her medical marijuana has been very helpful and we will resume this inpatient.    Data reviewed today: I reviewed all medications, new labs and imaging results over the last 24 hours. I personally reviewed no images or EKG's today.    Physical Exam   Vital Signs: Temp: 98.1  F (36.7  C) Temp src: Oral BP: 97/50 Pulse: 113   Resp: 20 SpO2: 95 % O2 Device: None (Room air)    Weight: 166 lbs 6.4 oz  Constitutional: awake, alert, cooperative and mild distress  ENT: normocepalic, without obvious abnormality, atramatic  Respiratory: No increased work of breathing, good air exchange, clear to auscultation bilaterally, no crackles or wheezing  Cardiovascular: Normal apical impulse, regular rate and rhythm, normal S1 and S2, no S3 or S4, and no murmur noted  GI: normal bowel sounds, soft, non-distended and non-tender  Neurologic: Mental Status Exam:  Level of Alertness:   awake  Orientation:   person, place, time  Memory:   normal  Cranial Nerves:  cranial nerves II-XII are grossly intact  Motor Exam:  Motor exam is symmetrical 5 out of 5 all extremities bilaterally    Data   Recent Labs   Lab 07/17/21  1201 07/17/21  0810 07/17/21  0705 07/16/21  1054   WBC  --   --  5.1 4.3   HGB  --   --  9.2* 8.5*   MCV  --   --  96 97   PLT  --   --  334 248   INR  --   --   --  1.45*   NA  --   --  138 132*   POTASSIUM  --   --   3.5 4.0   CHLORIDE  --   --  107 99   CO2  --   --  19* 21*   BUN  --   --  12 14   CR  --   --  0.64 0.69   ANIONGAP  --   --  12 12   BENJI  --   --  8.8 8.8   * 105 119 109   ALBUMIN  --   --   --  2.8*   PROTTOTAL  --   --   --  6.6   BILITOTAL  --   --   --  0.8   ALKPHOS  --   --   --  157*   ALT  --   --   --  36   AST  --   --   --  23

## 2021-07-17 NOTE — PLAN OF CARE
Problem: Adult Inpatient Plan of Care  Goal: Plan of Care Review  Outcome: Improving   Janice Mitchellkeeley called into room this morning to talk to pt's . Dr. Kenney notified  is here. On unit at this time to see pt. RN 1:1 sitter updated  on the day's plan of care.     Problem: Adult Inpatient Plan of Care  Goal: Absence of Hospital-Acquired Illness or Injury  Outcome: Improving  Intervention: Identify and Manage Fall Risk  Recent Flowsheet Documentation  Taken 7/17/2021 0815 by Felecia Wynn RN  Safety Promotion/Fall Prevention:    activity supervised    bedside attendant  Intervention: Prevent Skin Injury  Recent Flowsheet Documentation  Taken 7/17/2021 0815 by Felecia Wynn RN  Body Position: position changed independently    Pt able to turn side to side to have brief changed. Ceiling lift used to position pt in chair for lunch. Tolerated well.     Tylenol given for headache at 1054. Dilaudid 4 mg given at 1403 for 6/10 pain. Helpful.

## 2021-07-18 ENCOUNTER — APPOINTMENT (OUTPATIENT)
Dept: PHYSICAL THERAPY | Facility: HOSPITAL | Age: 66
DRG: 853 | End: 2021-07-18
Attending: INTERNAL MEDICINE
Payer: COMMERCIAL

## 2021-07-18 LAB
C REACTIVE PROTEIN LHE: 31.7 MG/DL (ref 0–0.8)
GLUCOSE BLDC GLUCOMTR-MCNC: 119 MG/DL (ref 70–125)
GLUCOSE BLDC GLUCOMTR-MCNC: 146 MG/DL (ref 70–125)
GLUCOSE BLDC GLUCOMTR-MCNC: 165 MG/DL (ref 70–125)
GLUCOSE BLDC GLUCOMTR-MCNC: 176 MG/DL (ref 70–125)
HOLD SPECIMEN: NORMAL
MAGNESIUM SERPL-MCNC: 2.2 MG/DL (ref 1.8–2.6)
POTASSIUM BLD-SCNC: 3.5 MMOL/L (ref 3.5–5)

## 2021-07-18 PROCEDURE — 258N000003 HC RX IP 258 OP 636: Performed by: EMERGENCY MEDICINE

## 2021-07-18 PROCEDURE — 250N000009 HC RX 250: Performed by: PAIN MEDICINE

## 2021-07-18 PROCEDURE — 120N000001 HC R&B MED SURG/OB

## 2021-07-18 PROCEDURE — 250N000013 HC RX MED GY IP 250 OP 250 PS 637: Performed by: PAIN MEDICINE

## 2021-07-18 PROCEDURE — 250N000013 HC RX MED GY IP 250 OP 250 PS 637: Performed by: EMERGENCY MEDICINE

## 2021-07-18 PROCEDURE — 250N000011 HC RX IP 250 OP 636: Performed by: INTERNAL MEDICINE

## 2021-07-18 PROCEDURE — 97530 THERAPEUTIC ACTIVITIES: CPT | Mod: GP

## 2021-07-18 PROCEDURE — 86141 C-REACTIVE PROTEIN HS: CPT | Performed by: INTERNAL MEDICINE

## 2021-07-18 PROCEDURE — 36415 COLL VENOUS BLD VENIPUNCTURE: CPT | Performed by: INTERNAL MEDICINE

## 2021-07-18 PROCEDURE — 84132 ASSAY OF SERUM POTASSIUM: CPT | Performed by: INTERNAL MEDICINE

## 2021-07-18 PROCEDURE — 250N000011 HC RX IP 250 OP 636: Performed by: EMERGENCY MEDICINE

## 2021-07-18 PROCEDURE — 250N000013 HC RX MED GY IP 250 OP 250 PS 637: Performed by: INTERNAL MEDICINE

## 2021-07-18 PROCEDURE — 99233 SBSQ HOSP IP/OBS HIGH 50: CPT | Performed by: INTERNAL MEDICINE

## 2021-07-18 PROCEDURE — 97162 PT EVAL MOD COMPLEX 30 MIN: CPT | Mod: GP

## 2021-07-18 PROCEDURE — 83735 ASSAY OF MAGNESIUM: CPT | Performed by: INTERNAL MEDICINE

## 2021-07-18 PROCEDURE — 99233 SBSQ HOSP IP/OBS HIGH 50: CPT | Performed by: PAIN MEDICINE

## 2021-07-18 RX ORDER — KETAMINE HYDROCHLORIDE 10 MG/ML
10 INJECTION INTRAMUSCULAR; INTRAVENOUS EVERY 12 HOURS PRN
Status: DISCONTINUED | OUTPATIENT
Start: 2021-07-18 | End: 2021-07-19

## 2021-07-18 RX ADMIN — HYDROMORPHONE HYDROCHLORIDE 4 MG: 4 TABLET ORAL at 22:02

## 2021-07-18 RX ADMIN — IBUPROFEN 400 MG: 400 TABLET, FILM COATED ORAL at 03:41

## 2021-07-18 RX ADMIN — AZITHROMYCIN MONOHYDRATE 250 MG: 500 INJECTION, POWDER, LYOPHILIZED, FOR SOLUTION INTRAVENOUS at 11:33

## 2021-07-18 RX ADMIN — CEFTRIAXONE SODIUM 1 G: 1 INJECTION, POWDER, FOR SOLUTION INTRAMUSCULAR; INTRAVENOUS at 19:59

## 2021-07-18 RX ADMIN — HYDROMORPHONE HYDROCHLORIDE 4 MG: 4 TABLET ORAL at 08:18

## 2021-07-18 RX ADMIN — ASPIRIN 81 MG: 81 TABLET, COATED ORAL at 09:27

## 2021-07-18 RX ADMIN — LIDOCAINE: 50 OINTMENT TOPICAL at 09:28

## 2021-07-18 RX ADMIN — INSULIN ASPART 1 UNITS: 100 INJECTION, SOLUTION INTRAVENOUS; SUBCUTANEOUS at 18:19

## 2021-07-18 RX ADMIN — DULOXETINE HYDROCHLORIDE 30 MG: 30 CAPSULE, DELAYED RELEASE ORAL at 08:10

## 2021-07-18 RX ADMIN — KETAMINE HYDROCHLORIDE 10 MG: 10 INJECTION, SOLUTION INTRAMUSCULAR; INTRAVENOUS at 19:39

## 2021-07-18 RX ADMIN — HYDROMORPHONE HYDROCHLORIDE 4 MG: 4 TABLET ORAL at 14:28

## 2021-07-18 RX ADMIN — LIDOCAINE: 50 OINTMENT TOPICAL at 14:02

## 2021-07-18 RX ADMIN — POLYETHYLENE GLYCOL 3350 17 G: 17 POWDER, FOR SOLUTION ORAL at 09:25

## 2021-07-18 RX ADMIN — Medication 1 TABLET: at 09:27

## 2021-07-18 RX ADMIN — Medication 25 MCG: at 11:24

## 2021-07-18 RX ADMIN — OMEPRAZOLE 20 MG: 20 CAPSULE, DELAYED RELEASE ORAL at 08:10

## 2021-07-18 RX ADMIN — INSULIN ASPART 1 UNITS: 100 INJECTION, SOLUTION INTRAVENOUS; SUBCUTANEOUS at 11:27

## 2021-07-18 RX ADMIN — CALCIUM CARBONATE (ANTACID) CHEW TAB 500 MG 500 MG: 500 CHEW TAB at 09:26

## 2021-07-18 RX ADMIN — DOCUSATE SODIUM 200 MG: 100 CAPSULE, LIQUID FILLED ORAL at 09:26

## 2021-07-18 RX ADMIN — SIMVASTATIN 20 MG: 10 TABLET, FILM COATED ORAL at 22:03

## 2021-07-18 RX ADMIN — ENOXAPARIN SODIUM 40 MG: 100 INJECTION SUBCUTANEOUS at 18:18

## 2021-07-18 RX ADMIN — OMEPRAZOLE 20 MG: 20 CAPSULE, DELAYED RELEASE ORAL at 06:20

## 2021-07-18 RX ADMIN — EZETIMIBE 10 MG: 10 TABLET ORAL at 22:03

## 2021-07-18 NOTE — PROGRESS NOTES
Care Management Follow Up Note    Length of Stay (days) 2    Patient plan of care discussed at Interdisciplinary Rounds: yes                Expected Discharge Date: 7/20/21.       Concerns to be Addressed / Barriers to Discharge: Alteration in mentation likely due to change in pain medication (history of metastatic breast cancer, Stage IV, with spread to pelvis, femur, vertebrae, ribs and lymph nodes of her left arm) (improving). Pain team following.     Anticipated Discharge Disposition: Goal is home but may need skilled in home care.   Anticipated Discharge Services: To be determined by destination, patient/family preferences, medical needs and mobility status closer to the time of discharge.  Anticipated Discharge DME: Per therapy (if indicated).     Plan:  Patient is  and her  is her primary caregiver. Their goal is to return home at discharge but would consider in-home skilled home care if recommended. Family plans to transport.     Susan Barragan RN    Abbreviation  Code:  [facility] [place] Wheelchair ([facility] WC), [facility] [place] Stretcher ([facility] STR), Patient (pt), Transitional Care Unit (TCU), Skilled Nursing Facility (SNF), Long Term Care (LTC), Assisted Living (retirement or AL), Care Management (CM), Physical Therapy (PT), Occupational Therapy (OT), Speech Therapy (ST), Respiratory Therapy (RT).

## 2021-07-18 NOTE — PLAN OF CARE
Pt A&O x4 with some forgetfulness.  Pt is aware of lack of mobility.  1:1 sitter/observation trial off on overnight.  Pt did not pull at lines and used call light appropriately.  Bed alarm on.  Repositioned every 2 hours.  Floated heels.  Pt had intake of about 500 mL and output >1000 mL.  Had to make pt a lab draw due to no blood return from port a cath.  VSS.  Pt behavior appropriate for situation.  Full bed/linene change Will continue to monitor.    Problem: Adult Inpatient Plan of Care  Goal: Plan of Care Review  Outcome: Improving     Administered PRN Dilaudid x1 and Ibuprofen x1 per request.      Problem: Pain Chronic (Persistent)  Goal: Acceptable Pain Control and Functional Ability  Outcome: Improving

## 2021-07-18 NOTE — PLAN OF CARE
A&Ox4 for most of shift.  Carries conversation without difficult.  At times mis-remembering events (talking about making spaghetti for lunch) or rambling speech.  Very fatigued.  Transferred using Addison lift.  Tachycardic, HR in 100s, increased to 110s/120s with activity and discomfort.  BP elevated, 1513 133/61, 1910 177/98 - this was taken when patient was experiencing discomfort (extreme bout of chills).  Reassessed BP when more comfortable - 1940 146/67  Moves limbs independently, 1A with rolling.  Very high intake and output, continuously gulping water due to dry mouth.  Problem: Violence Risk or Actual  Goal: Anger and Impulse Control  7/17/2021 2217 by Josh Watson, RN  Outcome: Improving     Problem: Pain Chronic (Persistent)  Goal: Acceptable Pain Control and Functional Ability  Outcome: Improving

## 2021-07-18 NOTE — PROGRESS NOTES
[facility] ACUTE PAIN SERVICE    ([facility], [facility], [facility])  Daily PAIN Progress Note    Assessment/Plan:  Elenita Hardin is a 66 year old female who was admitted on 7/16/2021.   . I was asked to see the patient for chronic cancer pain. Admitted for encephalopathy and fever.  It is likely that 2 doses of OxyContin as well as community-acquired pneumonia are contributing here to the encephalopathy on admission.  This has resolved though.  History of DM, metastatic breast cancer. Pain began 2 years ago. Takes dilaudid and medical cannabis at baseline. In 24 hours she has used 12mg po dilaudid.     PLAN:   1) Cancer pain with adverse reaction to opioid. Stage IV with spread to pelvis, femur, vertebrae, ribs and lymph nodes of her left arm.  Would continue with Dilaudid as needed and would avoid OxyContin.  Could trial low-dose ketamine.  2)Multimodal Medication Therapy  Topical:  Lidocaine ointment 5%  NSAID'S: ibuprofen    Muscle Relaxants: None   Adjuvants:  Tyenol 1G TID, vistaril listed as home med - may benefit from steroids   Antidepressants/anxiolytics: cymbalta   Opioids:  Hydromorphone(Dilaudid) 4mg PRN   3)Non-medication interventions- rest and turning   4)Constipation Prophylaxis  Sennokot BID  5) Follow up   -Opioid prescriber has been [facility]  -Discharge Recommendations - We recommend prescribing the following at the time of discharge: TBD                 Subjective:  Reports pain is poorly controlled.  She is more alert and oriented today.  She is frustrated about this event because she knew that OxyContin has caused encephalopathy for her in the past.  She states she was desperate though, because the pain was so severe.  She has been having this pain for over 2 years.  She did discuss the plan to resume OxyContin with Denilson Valles her palliative care nurse practitioner.  She informs me that she was given a prognosis of 2 to 5 years back in  2011.  Unfortunately she continues to suffer.  She is very tearful as she talks about her children and her sister and her lack of quality of life.  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  She acknowledges some benefit with the Dilaudid as well as the medical cannabis.  Discussed with Dr. Kenney today.     Called  to discuss (0939am) he informs me about the poor trials with fentanyl and morphine.     Active Problems:    Confusion     LOS: 2 days       aspirin  81 mg Oral Daily     azithromycin  250 mg Intravenous Q24H     B Complex-B12  1 tablet Oral Daily     calcium carbonate  500 mg Oral Daily     cefTRIAXone  1 g Intravenous Q24H     docusate sodium  200 mg Oral Daily     DULoxetine  30 mg Oral QAM     enoxaparin ANTICOAGULANT  40 mg Subcutaneous Q24H     ezetimibe  10 mg Oral At Bedtime    And     simvastatin  20 mg Oral QPM     insulin aspart  1-3 Units Subcutaneous TID AC     insulin aspart  1-3 Units Subcutaneous At Bedtime     lidocaine   Topical 4x Daily     multivitamin w/minerals  1 tablet Oral Daily     omeprazole  20 mg Oral QAM AC     Vitamin D3  25 mcg Oral Daily       Objective:  Vital signs in last 24 hours:  Temp:  [97.8  F (36.6  C)-98.7  F (37.1  C)] 98  F (36.7  C)  Pulse:  [] 91  Resp:  [16-20] 20  BP: (133-177)/(61-89) 133/64  SpO2:  [96 %-99 %] 99 %  Weight:   Weight:   @THISENCWEIGHTS(1)@  Weight change: 10.7 kg (23 lb 9.6 oz)  Body mass index is 29.89 kg/m .    Intake/Output last 3 shifts:  I/O last 3 completed shifts:  In: 3034 [P.O.:2900; I.V.:134]  Out: 5400 [Urine:5400]  Intake/Output this shift:  No intake/output data recorded.    Review of Systems:   As per subjective, all others negative.    Physical Exam:    General Appearance:  Alert, cooperative, no distress, appears stated age    Head:   Alopecia   Eyes:  PERRL, conjunctiva/corneas clear, EOM's intact   Nose: Nares normal, septum midline, mucosa normal, no drainage    Throat: Lips normal    Neck: Supple, symmetrical, trachea midline, no adenopathy, thyroid: not enlarged, symmetric    Back:   Symmetric, no curvature, ROM normal   Lungs:   Clear to auscultation bilaterally, respirations unlabored   Chest Wall:  No tenderness or deformity   Heart:  Regular rate and rhythm, S1, S2 normal,no murmur, rub or gallop   Abdomen:   Soft, non-tender, bowel sounds active all four quadrants,  no masses, no organomegaly   Extremities: Extremities normal, atraumatic   Skin: Skin color pale    Neurologic: Alert and oriented X 3, Moves all 4 extremities    Lab Results:  Personally Reviewed.   Recent Labs   Lab 07/17/21 0705 07/16/21  1054   WBC 5.1 4.3   HGB 9.2* 8.5*   HCT 28.9* 26.7*    248     Recent Labs   Lab 07/17/21 0705 07/16/21  1054    132*   CO2 19* 21*   BUN 12 14   ALBUMIN  --  2.8*   ALKPHOS  --  157*   ALT  --  36   AST  --  23     Recent Labs   Lab 07/16/21  1054   INR 1.45*          Total unit/floor time 35  minutes, time consisted of the following, examination of patient, reviewing the record and lab results, and completing documentation. Coordination of care time with Dr. Kenney.     Janice Arredondo APRN, CNS-BC, CNP, ACHPN  Acute Care Pain Management Program Hour 7a-1700  [facility] ([facility], [place]es, [place]i)   Page via [employer]- Click HERE to page Janice or call [phone]

## 2021-07-18 NOTE — PROGRESS NOTES
[facility]    Medicine Progress Note - Hospitalist Service       Date of Admission:  7/16/2021    Summary: 66 year old female with history significant for metastatic breast cancer, multiple bony lesions with resulting chronic pain, presented in the company of her  with disorientation, slurred speech, intermittent combativeness and visual hallucinations and frequent falls despite using her walker.    Two days prior to admission her pain was increasing in intensity despite being on her routine Dilaudid and medical marijuana and she decided to take one tablet of OxyContin that was prescribed in April 2021 for prn pain breakthrough by Denilson Valles from [facility].  The following morning she took another dose of OxyContin with her usual morning meds.  She slept most of the day prior to admission and then woke up at 4 PM disoriented and hallucinating.  Her symptoms persisted and she was brought in for evaluation, she had no reported fever at home but upon arrival to the floor was febrile.    Assessment & Plan              #Acute metabolic encephalopathy: Resolved after stopping OxyContin and starting treatment of pneumonia.  Likely multifactorial secondary to underlying chronic illness, dehydration, possibly early infection and then additional dosing of OxyContin.  Brain MRI shows no acute findings to account for the symptoms.   Appreciate pain team consult.    #Acute on chronic cancer bone pain: Continue oral Dilaudid as previously prescribed.  Resume home dosing medical cannabis.  Will follow patient's pain curve and titrate medications as needed.  Acute pain team planning to trial ketamine, low-dose every 12 hours as needed for pain breakthrough.     #Community-acquired pneumonia/aspiration pneumonia: After she came up to the floor she developed a temperature of 100.7.  UA shows 6 white cells, culture ordered.  Procalcitonin is elevated.    Initiated Rocephin and Zithromax to  cover the potential both lung and urine bacteria.  Lactate is normal.  Initial portable film with question of infiltrates but repeat 2 view chest film without clear evidence of infiltrate.  However with fever, underlying immunocompromise state, elevated procalcitonin we will plan minimum 3 days IV antibiotics through Monday 7/19 then changing to orals for a total of 10 days therapy.    #Dehydration: Resolved with IV fluids and now able to take p.o. Prior to admission not been eating and drinking very well.     #Metastatic breast cancer.  Stage IV with spread to pelvis, femur, vertebrae, ribs and lymph nodes of her left arm.  Dr. Varma from [facility] is her primary oncologist.  Pain medications prescribed through [facility], will consult.     #Type 2 diabetes, insulin dependent: Initial blood sugar on admission normal range.  Had not been eating well over the previous 2 days prior to admission. Hold basal insulin and reestablish patient's baseline as she begins to eat and drink.     #Depression: Continue home Cymbalta     Diet: Consistent Carb 45 grams CHO per Meal Diet    DVT Prophylaxis: Enoxaparin (Lovenox) SQ and Pneumatic Compression Devices  Jiang Catheter: Not present  Central Lines: None  Code Status: Full Code      Disposition Plan   Expected discharge: 07/20/2021 recommended to prior living arrangement once adequate pain management/ tolerating PO medications and antibiotic plan established.     The patient's care was discussed with the Patient and Acute pain Consultant.    Freeman Kenney MD  Hospitalist Service  [facility]  Securely message with the Vocera Web Console (learn more here)  Text page via [employer] Paging/Directory    ______________________________________________________________________    Interval History   Patient reports pain at baseline and currently adequately controlled.  She is interested in increasing her mobility.  States that her appetite is back  to baseline.  Denies chest pain, shortness of breath, nausea, diarrhea.    Data reviewed today: I reviewed all medications, new labs and imaging results over the last 24 hours. I personally reviewed the chest x-ray image(s) showing Absence of clear infiltrate.    Physical Exam   Vital Signs: Temp: 98  F (36.7  C) Temp src: Oral BP: 133/64 Pulse: 91   Resp: 20 SpO2: 99 % O2 Device: None (Room air)    Weight: 179 lbs 9.6 oz  Constitutional: awake, alert, cooperative, no apparent distress, and appears stated age  ENT: normocepalic, without obvious abnormality, atramatic  Respiratory: No increased work of breathing, good air exchange, clear to auscultation bilaterally, no crackles or wheezing  Cardiovascular: Normal apical impulse, regular rate and rhythm, normal S1 and S2, no S3 or S4, and no murmur noted  GI: normal bowel sounds, soft, non-distended and non-tender    Data   Recent Labs   Lab 07/18/21  0813 07/18/21  0207 07/17/21  2041 07/17/21  1709 07/17/21  1648 07/17/21  0705 07/16/21  1054   WBC  --   --   --   --   --  5.1 4.3   HGB  --   --   --   --   --  9.2* 8.5*   MCV  --   --   --   --   --  96 97   PLT  --   --   --   --   --  334 248   INR  --   --   --   --   --   --  1.45*   NA  --   --   --   --   --  138 132*   POTASSIUM  --  3.5  --  3.3*  --  3.5 4.0   CHLORIDE  --   --   --   --   --  107 99   CO2  --   --   --   --   --  19* 21*   BUN  --   --   --   --   --  12 14   CR  --   --   --   --   --  0.64 0.69   ANIONGAP  --   --   --   --   --  12 12   BENJI  --   --   --   --   --  8.8 8.8     --  162*  --  189* 119 109   ALBUMIN  --   --   --   --   --   --  2.8*   PROTTOTAL  --   --   --   --   --   --  6.6   BILITOTAL  --   --   --   --   --   --  0.8   ALKPHOS  --   --   --   --   --   --  157*   ALT  --   --   --   --   --   --  36   AST  --   --   --   --   --   --  23

## 2021-07-18 NOTE — PLAN OF CARE
"  Problem: Adult Inpatient Plan of Care  Goal: Plan of Care Review  Outcome: Improving     Problem: Adult Inpatient Plan of Care  Goal: Absence of Hospital-Acquired Illness or Injury  Intervention: Identify and Manage Fall Risk  Recent Flowsheet Documentation  Taken 7/18/2021 0818 by Yasmin Ball, RN  Safety Promotion/Fall Prevention:    activity supervised    assistive device/personal items within reach    bed alarm on    chair alarm on    lighting adjusted    nonskid shoes/slippers when out of bed    patient and family education    room door open    safety round/check completed     Problem: Adult Inpatient Plan of Care  Goal: Optimal Comfort and Wellbeing  Outcome: Improving     Problem: Pain Chronic (Persistent)  Goal: Acceptable Pain Control and Functional Ability  Intervention: Develop Pain Management Plan  Recent Flowsheet Documentation  Taken 7/18/2021 0818 by Yasmin Ball, RN  Pain Management Interventions: medication (see MAR)     VSS. Pt reports pain in her hips, lower back and legs today. PRN Dilaudid given x 2. Pt does have Ketamine PRN, but has yet to use. Tearful when working with therapy. \"I just hurt\" but determined to get up with therapy in room. Pt was a luis lift to the chair earlier today, but used a walker to stand with therapy. Using a purewick for incontinence. Has been alert and orientated this shift. Appropriate with staff. Port in place. Flushes with good blood return.   "

## 2021-07-18 NOTE — PROGRESS NOTES
07/18/21 1400   Quick Adds   Type of Visit Initial PT Evaluation       Present no   Living Environment   People in home spouse;child(isaiah), adult   Current Living Arrangements house   Home Accessibility stairs within home   Number of Stairs, Within Home, Primary 7   Stair Railings, Within Home, Primary railings on both sides of stairs   Self-Care   Equipment Currently Used at Home walker, rolling;other (see comments)  (FWW inside, 4WW outside )   Disability/Function   Walking or Climbing Stairs Difficulty yes   Walking or Climbing Stairs stair climbing difficulty, assistance 1 person   Dressing/Bathing Difficulty   (shower bench, handheld shower head , SBA , sometimes A undre)   Toileting issues yes  (sock aide for getting socks on )   Number of times patient has fallen within last six months   (2 in last week)   General Information   Onset of Illness/Injury or Date of Surgery 07/16/21   Referring Physician Dr. Freeman Kenney    Patient/Family Therapy Goals Statement (PT) return home   (pt adament she does not want TCU)   Existing Precautions/Restrictions fall   General Observations pt weepy at times during eval    Cognition   Orientation Status (Cognition)   (pt reports difficulty with memory )   Pain Assessment   Patient Currently in Pain Yes, see Vital Sign flowsheet  (4/10 back, pt also reporting L groin pain and R hip with act)   Range of Motion (ROM)   ROM Quick Adds Ankle, Left   ROM Comment decreased DF, pt reports no brace for L ankle    Strength   Manual Muscle Testing Quick Adds Deficits observed during functional mobility   Strength Comments general weakness    Bed Mobility   Bed Mobility Limitations decreased ability to use legs for bridging/pushing   Impairments Contributing to Impaired Bed Mobility pain;decreased strength;decreased ROM   Assistive Device (Bed Mobility) draw sheet;other (see comments)  (HOB elevated )   Comment (Bed Mobility) mod/max Ax1 Pt wanted to pull up on PT's  arm    Transfers   Transfers sit-stand transfer   Transfer Safety Concerns Noted decreased weight-shifting ability;other (see comments)  (increased time and effort to get to standing position )   Impairments Contributing to Impaired Transfers impaired balance;pain;decreased ROM;decreased strength;decreased sensation   Transfer Safety Comments significant flexed posture to start stand, mod Ax1 to achieve full stand , pt transferred x2    Sit-Stand Transfer   Sit-Stand [place] (Transfers) moderate assist (50% patient effort);1 person to manage equipment;verbal cues   Assistive Device (Sit-Stand Transfers) walker, front-wheeled   Sit/Stand Transfer Comments pt able to stand 15-20 sec modAx1 to control decent to seated position    Balance   Balance Comments unsteady    Clinical Impression   Criteria for Skilled Therapeutic Intervention yes, treatment indicated   PT Diagnosis (PT) decreased transfers and mobility   Influenced by the following impairments pain,decreased strength    Functional limitations due to impairments pain    Clinical Presentation Evolving/Changing   Clinical Presentation Rationale medical diagnosis    Clinical Decision Making (Complexity) moderate complexity   Therapy Frequency (PT) Daily   Predicted Duration of Therapy Intervention (days/wks) 10   Planned Therapy Interventions (PT) bed mobility training;transfer training   Anticipated Equipment Needs at Discharge (PT) gait belt;walker, standard;walker, rolling   Risk & Benefits of therapy have been explained evaluation/treatment results reviewed   Clinical Impression Comments decreased mobilty    PT Discharge Planning    PT Discharge Recommendation (DC Rec)   (pt adament about no TCU , but would benefit from TCU,)   PT Rationale for DC Rec decreased transfers and mobility   PT Brief overview of current status  pt has split entry home, needs mod AX1 for bed mobility and sit<>stand    Total Evaluation Time   Total Evaluation Time (Minutes) 10

## 2021-07-19 ENCOUNTER — HOSPITAL ENCOUNTER (INPATIENT)
Dept: CT IMAGING | Facility: HOSPITAL | Age: 66
DRG: 853 | End: 2021-07-19
Payer: COMMERCIAL

## 2021-07-19 ENCOUNTER — APPOINTMENT (OUTPATIENT)
Dept: OCCUPATIONAL THERAPY | Facility: HOSPITAL | Age: 66
DRG: 853 | End: 2021-07-19
Attending: INTERNAL MEDICINE
Payer: COMMERCIAL

## 2021-07-19 LAB
ATRIAL RATE - MUSE: 115 BPM
DIASTOLIC BLOOD PRESSURE - MUSE: NORMAL MMHG
GLUCOSE BLDC GLUCOMTR-MCNC: 122 MG/DL (ref 70–125)
GLUCOSE BLDC GLUCOMTR-MCNC: 157 MG/DL (ref 70–125)
GLUCOSE BLDC GLUCOMTR-MCNC: 170 MG/DL (ref 70–125)
GLUCOSE BLDC GLUCOMTR-MCNC: 191 MG/DL (ref 70–125)
HOLD SPECIMEN: NORMAL
INTERPRETATION ECG - MUSE: NORMAL
LACTATE SERPL-SCNC: 0.8 MMOL/L (ref 0.7–2)
MAGNESIUM SERPL-MCNC: 1.9 MG/DL (ref 1.8–2.6)
P AXIS - MUSE: 80 DEGREES
POTASSIUM BLD-SCNC: 3.2 MMOL/L (ref 3.5–5)
POTASSIUM BLD-SCNC: 3.8 MMOL/L (ref 3.5–5)
PR INTERVAL - MUSE: 150 MS
QRS DURATION - MUSE: 102 MS
QT - MUSE: 350 MS
QTC - MUSE: 484 MS
R AXIS - MUSE: -34 DEGREES
SYSTOLIC BLOOD PRESSURE - MUSE: NORMAL MMHG
T AXIS - MUSE: 79 DEGREES
VENTRICULAR RATE- MUSE: 115 BPM

## 2021-07-19 PROCEDURE — 36415 COLL VENOUS BLD VENIPUNCTURE: CPT | Performed by: INTERNAL MEDICINE

## 2021-07-19 PROCEDURE — 250N000011 HC RX IP 250 OP 636

## 2021-07-19 PROCEDURE — 250N000013 HC RX MED GY IP 250 OP 250 PS 637: Performed by: INTERNAL MEDICINE

## 2021-07-19 PROCEDURE — 250N000013 HC RX MED GY IP 250 OP 250 PS 637: Performed by: EMERGENCY MEDICINE

## 2021-07-19 PROCEDURE — 71260 CT THORAX DX C+: CPT

## 2021-07-19 PROCEDURE — 99233 SBSQ HOSP IP/OBS HIGH 50: CPT | Performed by: INTERNAL MEDICINE

## 2021-07-19 PROCEDURE — 99232 SBSQ HOSP IP/OBS MODERATE 35: CPT | Performed by: NURSE PRACTITIONER

## 2021-07-19 PROCEDURE — 83605 ASSAY OF LACTIC ACID: CPT | Performed by: INTERNAL MEDICINE

## 2021-07-19 PROCEDURE — 83735 ASSAY OF MAGNESIUM: CPT | Performed by: SURGERY

## 2021-07-19 PROCEDURE — 250N000009 HC RX 250: Performed by: INTERNAL MEDICINE

## 2021-07-19 PROCEDURE — 97535 SELF CARE MNGMENT TRAINING: CPT | Mod: GO

## 2021-07-19 PROCEDURE — 120N000001 HC R&B MED SURG/OB

## 2021-07-19 PROCEDURE — 97166 OT EVAL MOD COMPLEX 45 MIN: CPT | Mod: GO

## 2021-07-19 PROCEDURE — 99223 1ST HOSP IP/OBS HIGH 75: CPT

## 2021-07-19 PROCEDURE — 250N000013 HC RX MED GY IP 250 OP 250 PS 637: Performed by: PAIN MEDICINE

## 2021-07-19 PROCEDURE — 250N000011 HC RX IP 250 OP 636: Performed by: INTERNAL MEDICINE

## 2021-07-19 PROCEDURE — 250N000011 HC RX IP 250 OP 636: Performed by: EMERGENCY MEDICINE

## 2021-07-19 PROCEDURE — 87040 BLOOD CULTURE FOR BACTERIA: CPT | Performed by: INTERNAL MEDICINE

## 2021-07-19 PROCEDURE — 84132 ASSAY OF SERUM POTASSIUM: CPT | Performed by: INTERNAL MEDICINE

## 2021-07-19 PROCEDURE — 258N000003 HC RX IP 258 OP 636: Performed by: EMERGENCY MEDICINE

## 2021-07-19 RX ORDER — POTASSIUM CHLORIDE 1500 MG/1
40 TABLET, EXTENDED RELEASE ORAL ONCE
Status: COMPLETED | OUTPATIENT
Start: 2021-07-19 | End: 2021-07-19

## 2021-07-19 RX ORDER — IOPAMIDOL 755 MG/ML
100 INJECTION, SOLUTION INTRAVASCULAR ONCE
Status: COMPLETED | OUTPATIENT
Start: 2021-07-19 | End: 2021-07-19

## 2021-07-19 RX ORDER — SALIVA STIMULANT COMB. NO.3
1 SPRAY, NON-AEROSOL (ML) MUCOUS MEMBRANE 4 TIMES DAILY
Status: DISCONTINUED | OUTPATIENT
Start: 2021-07-19 | End: 2021-07-28 | Stop reason: HOSPADM

## 2021-07-19 RX ORDER — KETAMINE HYDROCHLORIDE 10 MG/ML
20 INJECTION INTRAMUSCULAR; INTRAVENOUS EVERY 12 HOURS
Status: DISCONTINUED | OUTPATIENT
Start: 2021-07-19 | End: 2021-07-20

## 2021-07-19 RX ADMIN — Medication 1 TABLET: at 11:28

## 2021-07-19 RX ADMIN — INSULIN ASPART 1 UNITS: 100 INJECTION, SOLUTION INTRAVENOUS; SUBCUTANEOUS at 17:15

## 2021-07-19 RX ADMIN — Medication 1 TABLET: at 08:22

## 2021-07-19 RX ADMIN — KETAMINE HYDROCHLORIDE 20 MG: 10 INJECTION, SOLUTION INTRAMUSCULAR; INTRAVENOUS at 15:01

## 2021-07-19 RX ADMIN — OMEPRAZOLE 20 MG: 20 CAPSULE, DELAYED RELEASE ORAL at 07:07

## 2021-07-19 RX ADMIN — LIDOCAINE: 50 OINTMENT TOPICAL at 08:22

## 2021-07-19 RX ADMIN — Medication 25 MCG: at 11:28

## 2021-07-19 RX ADMIN — IOPAMIDOL 100 ML: 755 INJECTION, SOLUTION INTRAVENOUS at 18:06

## 2021-07-19 RX ADMIN — EZETIMIBE 10 MG: 10 TABLET ORAL at 21:34

## 2021-07-19 RX ADMIN — CALCIUM CARBONATE (ANTACID) CHEW TAB 500 MG 500 MG: 500 CHEW TAB at 08:22

## 2021-07-19 RX ADMIN — ENOXAPARIN SODIUM 40 MG: 100 INJECTION SUBCUTANEOUS at 17:05

## 2021-07-19 RX ADMIN — ASPIRIN 81 MG: 81 TABLET, COATED ORAL at 08:22

## 2021-07-19 RX ADMIN — Medication 1 SPRAY: at 17:06

## 2021-07-19 RX ADMIN — DOCUSATE SODIUM 200 MG: 100 CAPSULE, LIQUID FILLED ORAL at 08:22

## 2021-07-19 RX ADMIN — AZITHROMYCIN MONOHYDRATE 250 MG: 500 INJECTION, POWDER, LYOPHILIZED, FOR SOLUTION INTRAVENOUS at 11:29

## 2021-07-19 RX ADMIN — INSULIN ASPART 1 UNITS: 100 INJECTION, SOLUTION INTRAVENOUS; SUBCUTANEOUS at 13:06

## 2021-07-19 RX ADMIN — LIDOCAINE: 50 OINTMENT TOPICAL at 17:05

## 2021-07-19 RX ADMIN — SIMVASTATIN 20 MG: 10 TABLET, FILM COATED ORAL at 21:34

## 2021-07-19 RX ADMIN — DULOXETINE HYDROCHLORIDE 30 MG: 30 CAPSULE, DELAYED RELEASE ORAL at 08:22

## 2021-07-19 RX ADMIN — HYDROMORPHONE HYDROCHLORIDE 4 MG: 4 TABLET ORAL at 07:07

## 2021-07-19 RX ADMIN — POLYETHYLENE GLYCOL 3350 17 G: 17 POWDER, FOR SOLUTION ORAL at 08:36

## 2021-07-19 RX ADMIN — ACETAMINOPHEN 1000 MG: 500 TABLET ORAL at 00:19

## 2021-07-19 RX ADMIN — POTASSIUM CHLORIDE 40 MEQ: 1500 TABLET, EXTENDED RELEASE ORAL at 08:22

## 2021-07-19 RX ADMIN — CEFTRIAXONE SODIUM 1 G: 1 INJECTION, POWDER, FOR SOLUTION INTRAMUSCULAR; INTRAVENOUS at 21:41

## 2021-07-19 NOTE — PLAN OF CARE
Problem: Adult Inpatient Plan of Care  Goal: Plan of Care Review  Outcome: Improving     Problem: Pain Chronic (Persistent)  Goal: Acceptable Pain Control and Functional Ability  Outcome: Improving   Patient complains of pain in both hips.  Encouraging repositioning and Ketamine dose increased.  States some little help.    Patient has been alert and oriented.  Voice slightly slow at times.  States she is tired and got little sleep last night.  Addison lift used for transfers.

## 2021-07-19 NOTE — PROGRESS NOTES
Care Management Follow Up    Length of Stay (days): 3    Expected Discharge Date: 07/21/2021     Concerns to be Addressed:       Patient plan of care discussed at interdisciplinary rounds: Yes  Anticipated Discharge  Disposition:  Home  with home care   Anticipated Discharge Services:  Home care   Anticipated Discharge DME:      Patient/family educated on Medicare website which has current facility and service quality ratings:    Education Provided on the Discharge Plan:    Patient/Family in Agreement with the Plan:      Referrals Placed by CM/SW:    Private pay costs discussed:       Additional Information:      JERRELL GARRISON met with patient   in her room and discuss her discharge goal. PT recommendations is TCU. Pt declined to go discharge TCU. Pt reports her discharge goal is to go home with home care when she is medically to ready. Pt family to transport at discharge. CM to continue following.     Merle ESTRELLA/PIO/MELI   7/19/21

## 2021-07-19 NOTE — PROGRESS NOTES
Occupational Therapy       07/19/21 0915   Quick Adds   Type of Visit Initial Occupational Therapy Evaluation   Living Environment   Transportation Anticipated family or friend will provide   Living Environment Comments see PT comments   Self-Care   Usual Activity Tolerance good   Current Activity Tolerance fair   Regular Exercise No   Equipment Currently Used at Home walker, rolling;grab bar, toilet;grab bar, tub/shower;raised toilet seat;shower chair   Instrumental Activities of Daily Living (IADL)   IADL Comments pt does all dressing, G/H, mobility/transfers   Disability/Function   Hearing Difficulty or Deaf yes   Use of hearing assistive devices right hearing aid;left hearing aid   Wear Glasses or Blind yes   Concentrating, Remembering or Making Decisions Difficulty yes   Difficulty Communicating no   Walking or Climbing Stairs Difficulty no   Dressing/Bathing Difficulty no   Toileting issues no   Fall history within last six months yes   Number of times patient has fallen within last six months 2   General Information   Onset of Illness/Injury or Date of Surgery 07/16/21   Referring Physician Dr Jones   Patient/Family Therapy Goal Statement (OT) go home   Additional Occupational Profile Info/Pertinent History of Current Problem mod complexity   Existing Precautions/Restrictions other (see comments)  (none)   Limitations/Impairments other (see comments)  (none)   Left Upper Extremity (Weight-bearing Status) full weight-bearing (FWB)   Right Upper Extremity (Weight-bearing Status) full weight-bearing (FWB)   Left Lower Extremity (Weight-bearing Status) full weight-bearing (FWB)   Right Lower Extremity (Weight-bearing Status) full weight-bearing (FWB)   Cognitive Status Examination   Orientation Status person;place;time   Affect/Mental Status (Cognitive) confused   Follows Commands follows two-step commands;25-49% accuracy   Safety  Deficit minimal deficit   Memory Deficit minimal deficit   Attention Deficit minimal deficit   Executive Function Deficit minimal deficit   Visual Perception   Visual Impairment/Limitations corrective lenses full-time   Sensory   Sensory Quick Adds No deficits were identified   Pain Assessment   Patient Currently in Pain Yes, see Vital Sign flowsheet   Integumentary/Edema   Integumentary/Edema no deficits were identifed   Posture   Posture forward head position   Range of Motion Comprehensive   General Range of Motion no range of motion deficits identified   Strength Comprehensive (MMT)   General Manual Muscle Testing (MMT) Assessment no strength deficits identified   Muscle Tone Assessment   Muscle Tone Quick Adds No deficits were identified   Coordination   Upper Extremity Coordination No deficits were identified   Bed Mobility   Bed Mobility supine-sit;sit-supine   Supine-Sit [place] (Bed Mobility) contact guard   Sit-Supine [place] (Bed Mobility) contact guard   Assistive Device (Bed Mobility) bed rails   Transfers   Transfers bed-chair transfer   Transfer Comments luis   Transfer Skill: Bed to Chair/Chair to Bed   Bed-Chair [place] (Transfers) dependent (less than 25% patient effort)   Balance   Balance Assessment sit to stand balance   Sit-to-Stand Balance poor balance   Activities of Daily Living   BADL Assessment lower body dressing   Lower Body Dressing Assessment   [place] Level (Lower Body Dressing) maximum assist (25% patient effort)   Clinical Impression   Criteria for Skilled Therapeutic Interventions Met (OT) skilled treatment is necessary   OT Diagnosis confusion   OT Problem List-Impairments impacting ADL mobility;cognition;balance;strength;postural control   Assessment of Occupational Performance 3-5 Performance Deficits   Identified Performance Deficits decreased ADLs, strength, mobility   Planned Therapy Interventions (OT) ADL retraining;strengthening;transfer training    Clinical Decision Making Complexity (OT) moderate complexity   Therapy Frequency (OT) Daily   Predicted Duration of Therapy 7 days   OT Discharge Planning    OT Discharge Recommendation (DC Rec) Transitional Care Facility   OT Rationale for DC Rec needs assist with all ADLs and mobility   Total Evaluation Time (Minutes)   Total Evaluation Time (Minutes) 15   Coping Strategies   Trust Relationship/Rapport care explained;emotional support provided

## 2021-07-19 NOTE — PROGRESS NOTES
[facility]    Medicine Progress Note - Hospitalist Service       Date of Admission:  7/16/2021     Summary: 66 year old female with history significant for metastatic breast cancer, multiple bony lesions with resulting chronic pain, presented in the company of her  with disorientation, slurred speech, intermittent combativeness and visual hallucinations and frequent falls despite using her walker.    Two days prior to admission her pain was increasing in intensity despite being on her routine Dilaudid and medical marijuana and she decided to take one tablet of OxyContin that was prescribed in April 2021 for prn pain breakthrough by  [facility].  The following morning she took another dose of OxyContin with her usual morning meds.  She slept most of the day prior to admission and then woke up at 4 PM disoriented and hallucinating.  Her symptoms persisted and she was brought in for evaluation, she had no reported fever at home but upon arrival to the floor was febrile.    Assessment & Plan            #Acute metabolic encephalopathy: Resolved after stopping OxyContin and starting treatment of pneumonia.  Likely multifactorial secondary to underlying chronic illness, dehydration, possibly early infection and then additional dosing of OxyContin.  Brain MRI shows no acute findings to account for the symptoms.   Appreciate pain team consult.    #Acute on chronic cancer bone pain: Continue oral Dilaudid as previously prescribed.  Resume home dosing medical cannabis.  Will follow patient's pain curve and titrate medications as needed.  Acute pain team trialing ketamine, low-dose, patient tried last pm, not much relief. Will increase dose to 20 mg and scheduled every 12 hours - will discuss w pain consultant today if this regimen needs additional adjusting.     #Fever: After she came up to the floor she developed a temperature of 100.7.  UA shows 6 white cells, culture ordered.  Procalcitonin is elevated.    Initiated Rocephin and Zithromax to cover the potential both lung and urine bacteria.  Lactate is normal.  Initial portable film with question of infiltrates but repeat 2 view chest film without clear evidence of infiltrate.  Patient with recurrent fever 7/18/2021, underlying immunocompromise state, elevated procalcitonin, elevated CRP we will plan IV antibiotics through Tuesday 7/20 then changing to orals for a total of 10 days therapy. Draw blood culture from peripheral blood and from portacath. Consult ID.    #Dehydration: Resolved with IV fluids and now able to take p.o. Prior to admission not been eating and drinking very well.     #Metastatic breast cancer.  Stage IV with spread to pelvis, femur, vertebrae, ribs and lymph nodes of her left arm.  Dr. Varma from [facility] is her primary oncologist.  Pain medications prescribed through [facility].     #Type 2 diabetes, insulin dependent: Initial blood sugar on admission normal range.  Had not been eating well over the previous 2 days prior to admission. Hold basal insulin and reestablish patient's baseline as she begins to eat and drink.     #Depression: Continue home Cymbalta    #Hypokalemia: replace       Diet: Consistent Carb 45 grams CHO per Meal Diet    DVT Prophylaxis: Enoxaparin (Lovenox) SQ and Pneumatic Compression Devices  Jiang Catheter: Not present  Central Lines: None  Code Status: Full Code      Disposition Plan   Expected discharge: 07/21/2021 recommended to prior living arrangement once adequate pain management/ tolerating PO medications.     The patient's care was discussed with the Patient.    Freeman Kenney MD  Hospitalist Service  [facility]  Securely message with the Vocera Web Console (learn more here)  Text page via [employer] Paging/Directory  _____________________________________________    Interval History   Patient reports days of increased severe pain in right hip  radiating down right leg when sitting up or standing - doesn't feel that the dose of ketamine last pm helped much in pain relief. No dyspnea, chest pain, abd pain, cough. No BM yet for days.    Data reviewed today: I reviewed all medications, new labs and imaging results over the last 24 hours. I personally reviewed no images or EKG's today.    Physical Exam   Vital Signs: Temp: 98.3  F (36.8  C) Temp src: Oral BP: 133/84 Pulse: 108   Resp: 20 SpO2: 98 % O2 Device: None (Room air)    Weight: 179 lbs 9.6 oz  Constitutional: Tearful and awake  ENT: normocepalic, without obvious abnormality, atramatic  Respiratory: No increased work of breathing, good air exchange, clear to auscultation bilaterally, no crackles or wheezing  Cardiovascular: Normal apical impulse, regular rate and rhythm, normal S1 and S2, no S3 or S4, and no murmur noted  GI: normal bowel sounds, soft, non-distended and non-tender  Neurologic: Mental Status Exam:  Level of Alertness:   awake  Orientation:   person, place, time  Memory:   normal  Cranial Nerves:  cranial nerves II-XII are grossly intact  Motor Exam:  Motor exam is symmetrical 5 out of 5 all extremities bilaterally  Sensory:  Sensory intact    Data   Recent Labs   Lab 07/19/21  0813 07/19/21  0554 07/18/21  2207 07/18/21  1751 07/18/21  0207 07/17/21  1709 07/17/21  0705 07/16/21  1054   WBC  --   --   --   --   --   --  5.1 4.3   HGB  --   --   --   --   --   --  9.2* 8.5*   MCV  --   --   --   --   --   --  96 97   PLT  --   --   --   --   --   --  334 248   INR  --   --   --   --   --   --   --  1.45*   NA  --   --   --   --   --   --  138 132*   POTASSIUM  --  3.2*  --   --  3.5 3.3* 3.5 4.0   CHLORIDE  --   --   --   --   --   --  107 99   CO2  --   --   --   --   --   --  19* 21*   BUN  --   --   --   --   --   --  12 14   CR  --   --   --   --   --   --  0.64 0.69   ANIONGAP  --   --   --   --   --   --  12 12   BENJI  --   --   --   --   --   --  8.8 8.8     --  165* 176*  --    --  119 109   ALBUMIN  --   --   --   --   --   --   --  2.8*   PROTTOTAL  --   --   --   --   --   --   --  6.6   BILITOTAL  --   --   --   --   --   --   --  0.8   ALKPHOS  --   --   --   --   --   --   --  157*   ALT  --   --   --   --   --   --   --  36   AST  --   --   --   --   --   --   --  23

## 2021-07-19 NOTE — CONSULTS
"Consultation - [facility]  Elenita Hardin,  1955, MRN [id number]    [facility]  Hallucinations [R44.3]  Confusion [R41.0]    PCP: Sandi Jones, [phone]   Code status:  Full Code       Extended Emergency Contact Information  Primary Emergency Contact: Denilson Moran  Address: [address] [place] [address]  Home Phone: [phone]  Relation: Spouse  Secondary Emergency Contact: Hakan Moran  Mobile Phone: [phone]  Relation: Son       Assessment and Plan   Active Problems:    Confusion    Impression: Fevers, multiple potential etiologies.    Initial presentation of encephalopathy which has improved.    Had a Port-A-Cath in for couple years, but seems to be working okay.    Has multiple excoriations on both legs that she says are \"from mosquito bites\" none are grossly purulent.    Has distended abdomen.    Recommendations: Agree with work-up to date including blood cultures x2.    We will check a CT of the chest abdomen pelvis to see if there is any cause for her abdominal distention.    Further recommendations to follow clinical course.    Thank you for consulting [facility]    Sahil Garza MD  [phone]     Chief Complaint <principal problem not specified>       HPI   We have been requested by Dr. Freeman Kenney to evaluate Elenita Hardin for fevers who is a 66 year old year old female.    Patient is a 66-year-old woman with past medical history significant for stage IV metastatic breast cancer widely static to her spine, femur, ribs, lymph nodes.    She also has a history of type 2 diabetes mellitus.    She initially presented to the hospital a few days ago with encephalopathy felt to possibly be related to medications. According to her , her encephalopathy is greatly improved.    Over the last few days, she has been noted to have fever up to 101.7. " Blood cultures x2 have been obtained.    Patient's main complaint includes stomach upset as well as pain related to her widely metastatic cancer.    She is also had problems with excoriations on both lower extremities. She says these are due to mosquito bites. She scratched so hard that she is blood on them.    Additionally, she says she put a walker down on her left fifth toe and this is bleeding now.    She denies any dyspnea or cough. She was noted to have some infiltrates on chest x-ray on admission. She was started on azithromycin and ceftriaxone for possible community-acquired pneumonia.         Medical History  Patient Active Problem List   Diagnosis     Post-mastectomy lymphedema syndrome     Scar condition and fibrosis of skin     Personal history of breast cancer     Diabetes (H)     Obesity (BMI 30-39.9)     Abnormal chest CT     Hip pain     Cancer associated pain     Metastatic breast cancer (H)     Edema of both upper extremities     Insulin dependent diabetes mellitus     Sciatica     Hip pain, right     Elevation of level of transaminase or lactic acid dehydrogenase (LDH)     Palliative care patient     Encounter for palliative care     Depression, unspecified depression type     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, pelvis, initial encounter for fracture     Closed fracture of multiple pubic rami with delayed healing, subsequent encounter     Lymphedema     Pathological fracture, pelvis, initial encounter for fracture     Left groin pain     Confusion      Surgical History  She  has a past surgical history that includes IR Lumbar Transforaminal Epidural Strd Inj (12/5/2019); IR Chest Port Placement > 5 Yrs of Age (12/12/2019); Mastectomy (Left, 2010); appendectomy; Ovarian Cyst Removal; Insert Intracoronary Stent (1985); other surgical history; Revise reconstructed breast (Left); Mammoplasty reduction (Right, 2015); Ir Lumbar Transforaminal Epidural Steroid Injection (12/5/2019); and Ir Port  Placement >5 Years (12/12/2019).   Social History  Reviewed, and she  reports that she has quit smoking. Her smoking use included cigarettes. She has a 15.00 pack-year smoking history. She has never used smokeless tobacco. She reports current alcohol use of about 1.0 standard drinks of alcohol per week. She reports that she does not use drugs.   Allergies  Allergies   Allergen Reactions     Sulfa Drugs Hives and Rash     welts      Family History  Noncontributory to current problem except as mentioned above    Psychosocial Needs  Social History     Social History Narrative    Patient works at home, owns [email or web address].  She lives with her  and 1 of her sons.         Additional psychosocial needs reviewed per nursing assessment.     Prior to Admission Medications   Medications Prior to Admission   Medication Sig Dispense Refill Last Dose     acetaminophen (TYLENOL) 500 MG tablet Take 1,000 mg by mouth 3 times daily as needed for pain   7/16/2021 at Unknown time     calcium carbonate (CALCIUM CARBONATE) 600 MG tablet Take 600 mg by mouth daily   7/16/2021 at Unknown time     Continuous Blood Gluc  (FREESTYLE KIMBERLY 14 DAY READER) COLETTE Use to check blood sugar at least 4 times a day or as directed   7/16/2021 at Unknown time     Continuous Blood Gluc Sensor (FREESTYLE KIMBERLY 14 DAY SENSOR) MISC Use as directed to test blood sugar at least 4 times a day or as directed   7/16/2021 at Unknown time     docusate sodium (COLACE) 100 MG capsule Take 200 mg by mouth daily   7/16/2021 at Unknown time     DULoxetine (CYMBALTA) 30 MG capsule Take 30 mg by mouth every morning And 60 mg by mouth in the evening   7/15/2021 at Unknown time     ezetimibe-simvastatin (VYTORIN) 10-20 MG tablet Take 1 tablet by mouth daily   7/15/2021 at Unknown time     HYDROmorphone (DILAUDID) 4 MG tablet Take 4 mg by mouth every 4 hours as needed for severe pain    7/16/2021 at Unknown time     hydrOXYzine (ATARAX) 25 MG tablet Take 25  "mg by mouth 3 times daily as needed for anxiety    7/16/2021 at Unknown time     ibuprofen (ADVIL/MOTRIN) 200 MG tablet Take 400 mg by mouth every 6 hours as needed for mild pain   7/16/2021 at AM     IRON-VIT C-VIT B12-FOLIC ACID (GENTLE IRON) 28-60-0.008-0.4 MG CAPS Take 1 capsule by mouth daily   7/16/2021 at Unknown time     multivitamin (CENTRUM SILVER) tablet Take 1 tablet by mouth daily   7/16/2021 at Unknown time     naproxen sodium (ANAPROX) 220 MG tablet Take 220 mg by mouth 2 times daily as needed for moderate pain   Past Week at Unknown time     oxybutynin (DITROPAN) 5 MG tablet Take 5 mg by mouth 2 times daily as needed    7/16/2021 at Unknown time     oxyCODONE (OXYCONTIN) 30 MG 12 hr tablet Take 30 mg by mouth every 12 hours   7/15/2021 at AM     vitamin (B COMPLEX) tablet Take 1 tablet by mouth daily   7/16/2021 at Unknown time     Vitamin D3 (CHOLECALCIFEROL) 25 mcg (1000 units) tablet Take 1 tablet by mouth daily   7/16/2021 at Unknown time     zolpidem (AMBIEN) 10 MG tablet Take 10 mg by mouth nightly as needed for sleep   7/13/2021     aspirin 81 MG EC tablet Take 81 mg by mouth daily   7/13/2021     insulin glargine (BASAGLAR KWIKPEN) 100 UNIT/ML pen Inject 55 Units Subcutaneous every morning         insulin lispro (HUMALOG KWIKPEN) 100 UNIT/ML (1 unit dial) pen Inject 1-20 Units Subcutaneous 3 times daily (before meals) (sliding scale + 3 units for every 15 grams of carbs)   Unknown at Unknown time     omeprazole (PRILOSEC) 20 MG DR capsule Take 20 mg by mouth daily as needed   Unknown at Unknown time          Review of Systems:  Pertinent items are noted in HPI., otherwise all others negative. Physical Exam:  Temp:  [97.7  F (36.5  C)-101.3  F (38.5  C)] 99.3  F (37.4  C)  Pulse:  [] 102  Resp:  [19-24] 19  BP: (130-158)/(60-84) 138/80  SpO2:  [92 %-98 %] 94 %    /80 (BP Location: Right arm)   Pulse 102   Temp 99.3  F (37.4  C) (Oral)   Resp 19   Ht 1.651 m (5' 5\")   Wt 81.5 " kg (179 lb 9.6 oz)   SpO2 94%   BMI 29.89 kg/m    General appearance: alert, appears stated age and cooperative  Head: Normocephalic, without obvious abnormality, atraumatic  Eyes: EOMI, no icterus  Neck: no adenopathy and supple, symmetrical, trachea midline  Lungs: clear to auscultation bilaterally   There is a Port-A-Cath in the right chest. There is a little bit of erythema toward where it crosses over the right clavicle  Heart: Regular rate and rhythm, no murmur  Abdomen: soft, non-tender; bowel sounds normal; no masses,  no organomegaly  Extremities: Full excoriations on both lower legs. She has a bloody wound on her left great toe.  Skin: No rash, but excoriations as described above.  Lymph nodes: Cervical, supraclavicular, and axillary nodes normal.  Neurologic: Mental status: Emotional and weepy, but answers questions and is fairly lucid.       Pertinent Labs  Lab Results: personally reviewed.   WBC   Date/Time Value Ref Range Status   01/25/2020 07:09 AM 3.3 (L) 4.0 - 11.0 thou/uL Final   01/22/2020 07:57 PM 5.6 4.0 - 11.0 thou/uL Final   12/03/2019 04:50 PM 10.1 4.0 - 11.0 thou/uL Final     WBC Count   Date/Time Value Ref Range Status   07/17/2021 07:05 AM 5.1 4.0 - 11.0 10e3/uL Final   07/16/2021 10:54 AM 4.3 4.0 - 11.0 10e3/uL Final     Hemoglobin   Date/Time Value Ref Range Status   07/17/2021 07:05 AM 9.2 (L) 11.7 - 15.7 g/dL Final   07/16/2021 10:54 AM 8.5 (L) 11.7 - 15.7 g/dL Final   01/25/2020 07:09 AM 9.6 (L) 12.0 - 16.0 g/dL Final     Hematocrit   Date/Time Value Ref Range Status   07/17/2021 07:05 AM 28.9 (L) 35.0 - 47.0 % Final   07/16/2021 10:54 AM 26.7 (L) 35.0 - 47.0 % Final   01/25/2020 07:09 AM 29.9 (L) 35.0 - 47.0 % Final     Platelet Count   Date/Time Value Ref Range Status   07/17/2021 07:05  150 - 450 10e3/uL Final   07/16/2021 10:54  150 - 450 10e3/uL Final   01/25/2020 07:09  140 - 440 thou/uL Final        Sodium   Date/Time Value Ref Range Status   07/17/2021  07:05  136 - 145 mmol/L Final   07/16/2021 10:54  (L) 136 - 145 mmol/L Final   11/18/2020 02:36  136 - 145 mmol/L Final     Carbon Dioxide (CO2)   Date/Time Value Ref Range Status   07/17/2021 07:05 AM 19 (L) 22 - 31 mmol/L Final   07/16/2021 10:54 AM 21 (L) 22 - 31 mmol/L Final   11/18/2020 02:36 PM 24 22 - 31 mmol/L Final     Urea Nitrogen   Date/Time Value Ref Range Status   07/17/2021 07:05 AM 12 8 - 22 mg/dL Final   07/16/2021 10:54 AM 14 8 - 22 mg/dL Final   11/18/2020 02:36 PM 18 8 - 22 mg/dL Final     Collected Updated Procedure Result Status    07/19/2021 1249 07/19/2021 1301 Blood Culture Line, Other [[id number]]   Blood from Line, Other    In process Component Value   No component results              07/19/2021 1201 07/19/2021 1212 Blood Culture Arm, Right [[id number]]   Blood from Arm, Right    In process Component Value   No component results              07/16/2021 1056 07/16/2021 1139 Asymptomatic COVID-19 Virus (Coronavirus) by PCR Nasopharyngeal [[id number]]    Swab from Nasopharyngeal    Final result Component Value   No component results           07/16/2021 1056 07/16/2021 1139 SARS-COV2 (COVID-19) Virus RT-PCR [[id number]]    Swab from Nasopharyngeal    Final result           Pertinent Radiology  Radiology Results:   XR Chest 2 Views    Result Date: 7/17/2021  EXAM: XR CHEST 2 VW LOCATION: [facility] DATE/TIME: 7/17/2021 3:31 PM INDICATION: evaluate infiltrates seen on portable CXR for pneumonia vs fluid overload COMPARISON: 7/16/2021     IMPRESSION: Indistinct pulmonary vasculature. Left breast implant projects over the left lung, which increases the appearance of airspace disease. No definite new consolidation. No pleural effusions or pneumothorax. Unchanged cardiac size. Right internal  jugular port tip projects at the cavoatrial junction. Left axillary surgical clips. Lower thoracic vertebral body compression fracture. Bony metastases better seen on  prior imaging.

## 2021-07-19 NOTE — PROGRESS NOTES
[facility] ACUTE PAIN SERVICE    Daily PAIN Progress Note    Assessment/Plan:  Elenita Hardin is a 66 year old female who was admitted on 7/16/2021. Pain team was asked to see the patient for chronic cancer pain. Admitted for encephalopathy and fever.  It is likely that 2 doses of OxyContin as well as community-acquired pneumonia were contributing to the encephalopathy on admission, this has been resolved and OxyContin has been stopped. Oncology and Infectious Disease has been consulted.   History of DM, metastatic breast cancer. Pain began 2 years ago. Takes Dilaudid and medical cannabis at baseline. Has used gabapentin and Lyrica in the [ast but reports BLE edema with use.      Opioid Induced Respiratory Depression Risk Assessment: moderate-age, concomitant CNS depressants.    The patient's home MME was about 48 mg, but appears she had taken an Oxycontin 30 mg for a couple of days prior to admission(was prescribed in April).  In the last 24 hours, patient has utilized 3 doses of Dilaudid 4 mg, for total MME of about 48 mg.    PLAN:   1) Cancer pain with adverse reaction to opioid. Stage IV with spread to pelvis, femur, vertebrae, ribs and lymph nodes of her left arm.  Would continue with Dilaudid as needed, would avoid OxyContin, trial low-dose ketamine.  2)Multimodal Medication Therapy  Topical:  Lidocaine ointment 5% qid  NSAID'S: ibuprofen  400 mg q 6 h prn   Muscle Relaxants: None   Adjuvants:  Tyenol 1G TID prn, ketamine 20 mg po q 12 h  Antidepressants/anxiolytics: Cymbalta 30 mg q day, Seroquel 12.5 mg q 6 h prn for agitation, haloperidol  Opioids:  Hydromorphone(Dilaudid) 4 mg q 3 h prn   3)Non-medication interventions- rest and turning   4)Constipation Prophylaxis- docusate 200 mg daily, Miralax daily PRN    -Opioid prescriber has been [facility]  -Discharge Recommendations - We recommend prescribing the following at the time of discharge: TBD   -Opioid prescriber has  "been [facility], CNP  -[place]  pulled from system on 07/17/21. This indicates current opioid use. Most recent MN :  07/12/21 zolpidem 10 mg #30  06/20/21 Dilaudid 4 mg #120  Not filled Oxycontin 30 mg #60 since 04/13/21  Has not filled Pregabalin 75 mg since 03/30/2021    Subjective:  Reports pain 7/10 in low back, both hips and radiating down RLE, aching. Denies N/V/C/D, chest pain, sob. Would like acupuncture today. Did try ketamine but not sure if helpful last night due to multiple interruptions to rest from staff per patient report.     Active Problems:    Confusion      Objective:  Vital signs in last 24 hours:  /84 (BP Location: Right arm)   Pulse 108   Temp 98.3  F (36.8  C) (Oral)   Resp 20   Ht 1.651 m (5' 5\")   Wt 81.5 kg (179 lb 9.6 oz)   SpO2 98%   BMI 29.89 kg/m    Weight:   Vitals:    07/16/21 1019 07/16/21 1708 07/17/21 1516   Weight: 70.8 kg (156 lb) 75.5 kg (166 lb 6.4 oz) 81.5 kg (179 lb 9.6 oz)      Weight change:   Body mass index is 29.89 kg/m .    Intake/Output last 3 shifts:  I/O last 3 completed shifts:  In: 620 [P.O.:620]  Out: 1400 [Urine:1400]  Intake/Output this shift:  No intake/output data recorded.    Review of Systems:   As per subjective, all others negative.    Physical Exam:  General Appearance:  Alert, cooperative, no distress, up in recliner    Head:  alopecia   Eyes:  PERRL, conjunctiva/corneas clear, EOM's intact   Nose: Nares normal, septum midline   Throat: Lips, mucosa, and tongue normal; teeth and gums normal   Neck: Supple, symmetrical, trachea midline   Back:   Symmetric, no curvature, ROM normal   Lungs:   Clear to auscultation bilaterally, respirations unlabored   Chest Wall:  No tenderness or deformity   Heart:  Regular rate and rhythm, S1, S2 normal    Abdomen:   Soft, non-tender, bowel sounds active all four quadrants   Extremities: Extremities normal, atraumatic   Skin: Skin warm, dry, pale   Neurologic: Alert and oriented X 3     Imaging: " Reviewed      Labs: Reviewed   Recent Results (from the past 24 hour(s))   Glucose by meter    Collection Time: 07/18/21  5:51 PM   Result Value Ref Range    GLUCOSE BY METER POCT 176 (H) 70 - 125 mg/dL   Glucose by meter    Collection Time: 07/18/21 10:07 PM   Result Value Ref Range    GLUCOSE BY METER POCT 165 (H) 70 - 125 mg/dL   Lactic Acid STAT    Collection Time: 07/19/21 12:11 AM   Result Value Ref Range    Lactic Acid 0.8 0.7 - 2.0 mmol/L   Potassium    Collection Time: 07/19/21  5:54 AM   Result Value Ref Range    Potassium 3.2 (L) 3.5 - 5.0 mmol/L   Magnesium    Collection Time: 07/19/21  5:54 AM   Result Value Ref Range    Magnesium 1.9 1.8 - 2.6 mg/dL   Glucose by meter    Collection Time: 07/19/21  8:13 AM   Result Value Ref Range    GLUCOSE BY METER POCT 122 70 - 125 mg/dL       Total time spent 25 minutes with greater than 50% in consultation, education and coordination of care, examination of patient, review of medical record, lab and imaging results, completion of documentation, and discussion with RN, MD, Senior Acupuncturist, and pharmacist.      LOUISE DelacruzP-C  Acute Care Pain Management Program  [facility] ([place], [place], [place])  Monday-Friday 8a-4p   Page via online paging system  or call [phone]

## 2021-07-20 ENCOUNTER — APPOINTMENT (OUTPATIENT)
Dept: OCCUPATIONAL THERAPY | Facility: HOSPITAL | Age: 66
DRG: 853 | End: 2021-07-20
Payer: COMMERCIAL

## 2021-07-20 ENCOUNTER — APPOINTMENT (OUTPATIENT)
Dept: PHYSICAL THERAPY | Facility: HOSPITAL | Age: 66
DRG: 853 | End: 2021-07-20
Payer: COMMERCIAL

## 2021-07-20 LAB
C REACTIVE PROTEIN LHE: 35.4 MG/DL (ref 0–0.8)
CREAT SERPL-MCNC: 0.63 MG/DL (ref 0.6–1.1)
GFR SERPL CREATININE-BSD FRML MDRD: >90 ML/MIN/1.73M2
GLUCOSE BLDC GLUCOMTR-MCNC: 193 MG/DL (ref 70–125)
GLUCOSE BLDC GLUCOMTR-MCNC: 197 MG/DL (ref 70–125)
GLUCOSE BLDC GLUCOMTR-MCNC: 234 MG/DL (ref 70–125)
GLUCOSE BLDC GLUCOMTR-MCNC: 269 MG/DL (ref 70–125)
MAGNESIUM SERPL-MCNC: 1.9 MG/DL (ref 1.8–2.6)
PLATELET # BLD AUTO: 317 10E3/UL (ref 150–450)
POTASSIUM BLD-SCNC: 3.3 MMOL/L (ref 3.5–5)
POTASSIUM BLD-SCNC: 3.5 MMOL/L (ref 3.5–5)
PROCALCITONIN SERPL-MCNC: 1.36 NG/ML (ref 0–0.49)

## 2021-07-20 PROCEDURE — 250N000013 HC RX MED GY IP 250 OP 250 PS 637: Performed by: PAIN MEDICINE

## 2021-07-20 PROCEDURE — 85049 AUTOMATED PLATELET COUNT: CPT | Performed by: INTERNAL MEDICINE

## 2021-07-20 PROCEDURE — 250N000011 HC RX IP 250 OP 636: Performed by: INTERNAL MEDICINE

## 2021-07-20 PROCEDURE — 99232 SBSQ HOSP IP/OBS MODERATE 35: CPT

## 2021-07-20 PROCEDURE — 84132 ASSAY OF SERUM POTASSIUM: CPT | Performed by: INTERNAL MEDICINE

## 2021-07-20 PROCEDURE — 250N000013 HC RX MED GY IP 250 OP 250 PS 637: Performed by: EMERGENCY MEDICINE

## 2021-07-20 PROCEDURE — 36415 COLL VENOUS BLD VENIPUNCTURE: CPT | Performed by: INTERNAL MEDICINE

## 2021-07-20 PROCEDURE — 82565 ASSAY OF CREATININE: CPT | Performed by: INTERNAL MEDICINE

## 2021-07-20 PROCEDURE — 83735 ASSAY OF MAGNESIUM: CPT | Performed by: INTERNAL MEDICINE

## 2021-07-20 PROCEDURE — 84145 PROCALCITONIN (PCT): CPT

## 2021-07-20 PROCEDURE — 99233 SBSQ HOSP IP/OBS HIGH 50: CPT | Performed by: INTERNAL MEDICINE

## 2021-07-20 PROCEDURE — 97129 THER IVNTJ 1ST 15 MIN: CPT | Mod: GO

## 2021-07-20 PROCEDURE — 97535 SELF CARE MNGMENT TRAINING: CPT | Mod: GO

## 2021-07-20 PROCEDURE — 120N000001 HC R&B MED SURG/OB

## 2021-07-20 PROCEDURE — 86141 C-REACTIVE PROTEIN HS: CPT

## 2021-07-20 PROCEDURE — 258N000003 HC RX IP 258 OP 636: Performed by: EMERGENCY MEDICINE

## 2021-07-20 PROCEDURE — 250N000009 HC RX 250: Performed by: INTERNAL MEDICINE

## 2021-07-20 PROCEDURE — 97530 THERAPEUTIC ACTIVITIES: CPT | Mod: GP

## 2021-07-20 PROCEDURE — 250N000013 HC RX MED GY IP 250 OP 250 PS 637: Performed by: INTERNAL MEDICINE

## 2021-07-20 PROCEDURE — 250N000011 HC RX IP 250 OP 636: Performed by: EMERGENCY MEDICINE

## 2021-07-20 PROCEDURE — 97110 THERAPEUTIC EXERCISES: CPT | Mod: GP

## 2021-07-20 PROCEDURE — 99232 SBSQ HOSP IP/OBS MODERATE 35: CPT | Performed by: CLINICAL NURSE SPECIALIST

## 2021-07-20 RX ORDER — HYDROXYZINE HCL 10 MG/5 ML
25 SOLUTION, ORAL ORAL EVERY 6 HOURS PRN
Status: DISCONTINUED | OUTPATIENT
Start: 2021-07-20 | End: 2021-07-20

## 2021-07-20 RX ORDER — ZOLPIDEM TARTRATE 5 MG/1
10 TABLET ORAL AT BEDTIME
Status: DISCONTINUED | OUTPATIENT
Start: 2021-07-20 | End: 2021-07-28 | Stop reason: HOSPADM

## 2021-07-20 RX ORDER — POTASSIUM CHLORIDE 1500 MG/1
40 TABLET, EXTENDED RELEASE ORAL ONCE
Status: COMPLETED | OUTPATIENT
Start: 2021-07-20 | End: 2021-07-20

## 2021-07-20 RX ORDER — ACETAMINOPHEN 500 MG
1000 TABLET ORAL 3 TIMES DAILY
Status: DISCONTINUED | OUTPATIENT
Start: 2021-07-20 | End: 2021-07-21

## 2021-07-20 RX ORDER — OXYBUTYNIN CHLORIDE 5 MG/1
5 TABLET ORAL 2 TIMES DAILY
Status: DISCONTINUED | OUTPATIENT
Start: 2021-07-20 | End: 2021-07-28 | Stop reason: HOSPADM

## 2021-07-20 RX ORDER — CEFDINIR 300 MG/1
300 CAPSULE ORAL EVERY 12 HOURS SCHEDULED
Status: COMPLETED | OUTPATIENT
Start: 2021-07-20 | End: 2021-07-26

## 2021-07-20 RX ORDER — HYDROXYZINE HCL 10 MG/5 ML
25 SOLUTION, ORAL ORAL EVERY 6 HOURS PRN
Status: DISCONTINUED | OUTPATIENT
Start: 2021-07-20 | End: 2021-07-28 | Stop reason: HOSPADM

## 2021-07-20 RX ORDER — KETAMINE HYDROCHLORIDE 10 MG/ML
30 INJECTION INTRAMUSCULAR; INTRAVENOUS EVERY 8 HOURS
Status: DISCONTINUED | OUTPATIENT
Start: 2021-07-20 | End: 2021-07-21

## 2021-07-20 RX ADMIN — IBUPROFEN 400 MG: 400 TABLET, FILM COATED ORAL at 08:58

## 2021-07-20 RX ADMIN — ASPIRIN 81 MG: 81 TABLET, COATED ORAL at 08:29

## 2021-07-20 RX ADMIN — Medication 25 MCG: at 08:29

## 2021-07-20 RX ADMIN — HYDROMORPHONE HYDROCHLORIDE 4 MG: 4 TABLET ORAL at 01:59

## 2021-07-20 RX ADMIN — OXYBUTYNIN CHLORIDE 5 MG: 5 TABLET ORAL at 21:27

## 2021-07-20 RX ADMIN — Medication 1 SPRAY: at 12:33

## 2021-07-20 RX ADMIN — Medication 1 SPRAY: at 16:26

## 2021-07-20 RX ADMIN — Medication 1 TABLET: at 08:29

## 2021-07-20 RX ADMIN — ENOXAPARIN SODIUM 40 MG: 100 INJECTION SUBCUTANEOUS at 16:26

## 2021-07-20 RX ADMIN — POLYETHYLENE GLYCOL 3350 17 G: 17 POWDER, FOR SOLUTION ORAL at 08:29

## 2021-07-20 RX ADMIN — ZOLPIDEM TARTRATE 10 MG: 5 TABLET ORAL at 21:27

## 2021-07-20 RX ADMIN — AZITHROMYCIN MONOHYDRATE 250 MG: 500 INJECTION, POWDER, LYOPHILIZED, FOR SOLUTION INTRAVENOUS at 11:16

## 2021-07-20 RX ADMIN — KETAMINE HYDROCHLORIDE 30 MG: 10 INJECTION, SOLUTION INTRAMUSCULAR; INTRAVENOUS at 18:25

## 2021-07-20 RX ADMIN — DOCUSATE SODIUM 200 MG: 100 CAPSULE, LIQUID FILLED ORAL at 08:30

## 2021-07-20 RX ADMIN — INSULIN ASPART 1 UNITS: 100 INJECTION, SOLUTION INTRAVENOUS; SUBCUTANEOUS at 12:34

## 2021-07-20 RX ADMIN — LIDOCAINE: 50 OINTMENT TOPICAL at 21:29

## 2021-07-20 RX ADMIN — LIDOCAINE: 50 OINTMENT TOPICAL at 08:29

## 2021-07-20 RX ADMIN — EZETIMIBE 10 MG: 10 TABLET ORAL at 21:27

## 2021-07-20 RX ADMIN — SIMVASTATIN 20 MG: 10 TABLET, FILM COATED ORAL at 21:27

## 2021-07-20 RX ADMIN — Medication 1 SPRAY: at 08:29

## 2021-07-20 RX ADMIN — ACETAMINOPHEN 1000 MG: 500 TABLET ORAL at 21:27

## 2021-07-20 RX ADMIN — ACETAMINOPHEN 1000 MG: 500 TABLET ORAL at 14:52

## 2021-07-20 RX ADMIN — OXYBUTYNIN CHLORIDE 5 MG: 5 TABLET ORAL at 11:16

## 2021-07-20 RX ADMIN — POTASSIUM CHLORIDE 40 MEQ: 1500 TABLET, EXTENDED RELEASE ORAL at 04:26

## 2021-07-20 RX ADMIN — INSULIN ASPART 1 UNITS: 100 INJECTION, SOLUTION INTRAVENOUS; SUBCUTANEOUS at 09:16

## 2021-07-20 RX ADMIN — CALCIUM CARBONATE (ANTACID) CHEW TAB 500 MG 500 MG: 500 CHEW TAB at 08:29

## 2021-07-20 RX ADMIN — KETAMINE HYDROCHLORIDE 20 MG: 10 INJECTION, SOLUTION INTRAMUSCULAR; INTRAVENOUS at 06:10

## 2021-07-20 RX ADMIN — Medication 1 SPRAY: at 21:30

## 2021-07-20 RX ADMIN — LIDOCAINE: 50 OINTMENT TOPICAL at 12:33

## 2021-07-20 RX ADMIN — DULOXETINE HYDROCHLORIDE 30 MG: 30 CAPSULE, DELAYED RELEASE ORAL at 08:29

## 2021-07-20 RX ADMIN — OMEPRAZOLE 20 MG: 20 CAPSULE, DELAYED RELEASE ORAL at 08:30

## 2021-07-20 RX ADMIN — INSULIN ASPART 2 UNITS: 100 INJECTION, SOLUTION INTRAVENOUS; SUBCUTANEOUS at 17:39

## 2021-07-20 RX ADMIN — ACETAMINOPHEN 1000 MG: 500 TABLET ORAL at 11:16

## 2021-07-20 RX ADMIN — CEFDINIR 300 MG: 300 CAPSULE ORAL at 20:43

## 2021-07-20 NOTE — CONSULTS
Consultation    Elenita Hardin MRN# [id number]   YOB: 1955 Age: 66 year old   Date of Admission: 7/16/2021  Requesting physician:   Reason for consult:            Assessment and Plan:     Assessment & Plan         Elenita is a 66 year old female with    1.   Metastatic breast adenocarcinoma with bone mets and bone marrow involvement:   - On 3rd line chemo Doxil 50 mg/m2 IV which is given every 4 weeks until progression or intolerance.   - Last received cycle 3 of Doxil on 6/29/21. She did not receive Neulasta support for neutropenia prevention with cycle 3.   - She also receives Zometa 4 mg IV every 4 weeks.   - She is scheduled for consideration of cycle 4 on 7/27/2021 with Dr. Varma.    2. Acute metabolic encephalopathy, multifactorial.  - Resolved after stopping OxyContin and starting treatment of pneumonia.  - Brain MRI shows no acute findings to account for the symptoms.  - Appreciate palliative care team to help with pain management. I will notify our palliative care NP Denilson Valles regarding this event.    3. Acute on chronic cancer bone pain:   - Appreciate palliative care team assistance in managing pain.   - She is scheduled to follow up with palliative care NP on 7/27/21.    4. Community-acquired pneumonia/aspiration pneumonia:   - ID following; on abx       If there are any questions or concerns, please do not hesitate to call. We will continue to follow.    Clara Cook PA-C  [facility]  [phone]                Chief Complaint:   Altered Mental Status           History of Present Illness:   This patient is a 66 year old female     Mrs. Elenita Moran is a 66-year-old woman with metastatic lobular carcinoma of left breast to multiple bone sites she is here for radiation follow-up.    ONCOLOGIC HISTORY:    1.     The patient had presented with multifocal tumor in the left breast, 7 x 6 x 4 cm, in August 2010.    2.     The patient underwent left breast mastectomy.  Her tumor was ER positive 63%, IN positive 17%, and HER-2 negative by FISH.    3.     The patient was treated with Adriamycin plus Cytoxan, followed by weekly Taxol.    4.     In May 2011, the patient had started anastrozole 1 mg orally daily.    5.     The patient was found to have a bony metastasis and the patient underwent radiation from July 2011 until October 2011 for L1, L2, and L3 vertebral bodies.    6.     In January 2015, PET scan showed no evidence of malignancy.    7.  On January 31, 2017 patient has started second line hormonal treatment with Faslodex plus Ibrance.    8.  The patient also underwent CyberKnife to painful L3 vertebral body mass she completed 2400 cGy in 4 fractions on 3/28/2017.  Prior to that she was also treated to a right rib lesion as well as T4, T6-T7 and a posterior left-sided rib lesion.  The latter received 3000 cGy in 10 fractions completing those treatments 3/14/2017..      9. She had disease progression in March 2019 per PET imaging and therapy was changed to everolimus and exemestane on 4/24/19.    10. From 12/6/2019 until 3/30/2020 she was treated with 1st line chemo (refused PIQRAY) and has completed 6 cycles of Abraxane and but discontinue due to neuropathy. She had partial response to treatment.     11. On 3/30/2020 she started 3rd line hormonal treatment: Piqray orally daily plus Faslodex.     12. On 9/24/2020 CT chest abdomen and pelvis Stable appearance of the sacral fractures, left superior and inferior pubic rami fracture, and T10 vertebral body fracture and left 11th rib posteriorly.    13. On 2/9/2021 PET scan demonstrated progressive disease with renewed metabolic activity in a few scattered skeletal metastases. Significant muscular FDG uptake, which decreases the bioavailability of FDG for tumor. As a result, the scan likely underestimates the extent of disease activity. Healing fractures in the pelvis and spine with a presumed small hematoma overlying the sacral  "fractures.    14. On 2021 she started Xeloda orally due to progression.     15. On 2021 PET scan showed mild progression in anterolateral 5th rib and possible liver lesion.     16. On 5/3/2021 she has started Doxil 50 mg/m2 IV every 4 weeks    The patient was last seen in clinic on 21 for consideration of cycle 3 of Doxil. She continues to have pain in the right hip and continues to use Dilaudid. She had a lot of side effects due to fentanyl patch. She is now on medical marijuana which has improved her appetite and anxiety. She does not have diarrhea. However she has constipation. She is able to take iron twice a week. Her hemoglobin has improved. She feels that she tolerates treatment reasonably well. She has noticed discoloration of her hands. She does not have it in her feet. The patient continues to have incontinence issues.    The patient came to the ER on 21 due to slurred speech, word finding, confusion, and hallucination after taking two doses of oxycontin for severe pain, as prescribed by Denilson Valles, palliative care NP. She now feels better now that she is off the oxycontin. MRI of the brain was negative. She developed a fever and CXR showed patchy airspace infiltrate throughout the lungs with diffuse indistinctness of the pulmonary interstitium. She was started on antibiotics.    Today she is tearful but feeling better. She expresses much gratitude regarding her care. She states that she will never take oxycontin again. She denies feeling confused and having any hallucinations. No fevers today.         Physical Exam:   Vitals were reviewed  Blood pressure 132/82, pulse 112, temperature 97.2  F (36.2  C), temperature source Oral, resp. rate 18, height 1.651 m (5' 5\"), weight 81.5 kg (179 lb 9.6 oz), SpO2 99 %.  Temperatures:  Current - Temp: 97.2  F (36.2  C); Max - Temp  Av.5  F (36.9  C)  Min: 97.2  F (36.2  C)  Max: 99.3  F (37.4  C)  Respiration range: Resp  Av.4  Min: " 18  Max: 19  Pulse range: Pulse  Av.2  Min: 90  Max: 112  Blood pressure range: Systolic (24hrs), Av , Min:126 , Max:169   ; Diastolic (24hrs), Av, Min:61, Max:82    Pulse oximetry range: SpO2  Av %  Min: 94 %  Max: 99 %    Intake/Output Summary (Last 24 hours) at 2021 1323  Last data filed at 2021 1109  Gross per 24 hour   Intake 3905 ml   Output 3800 ml   Net 105 ml       GENERAL: No acute distress. Tearful.  SKIN: No rashes or jaundice.  HEENT: Normocephalic, atraumatic. Eyes anicteric. Oropharynx is clear.  HEART: Regular rate and rhythm with no murmurs.  LUNGS: Clear bilaterally.  ABDOMEN: Soft, nontender, nondistended with no palpable hepatosplenomegaly.  EXTREMITIES: No clubbing, cyanosis, or edema.  MUSCULOSKELETAL: No pain to percussion over entire spine.  MENTAL: Alert and oriented to person, place, and time.  NEURO: Cranial nerves II through XII grossly intact with no focal motor or sensory deficits.              Past Medical History:   I have reviewed this patient's past medical history  Past Medical History:   Diagnosis Date     Allergic rhinitis      Bone cancer (H)     breast mets     Breast cancer (H) 2010    left mastectomy     Depression      DM (diabetes mellitus) (H)      Fibromyalgia      Hx antineoplastic chemotherapy 2010     Hx of radiation therapy 2010     Hyperlipemia      Lactose intolerance      Mini stroke (H)     R EYE     Osteoarthritis      Tobacco dependency              Past Surgical History:   I have reviewed this patient's past surgical history  Past Surgical History:   Procedure Laterality Date     APPENDECTOMY       INSERT INTRACORONARY STENT  1985    R Hand     IR CHEST PORT PLACEMENT > 5 YRS OF AGE  2019     IR LUMBAR TRANSFORAMINAL EPIDURAL STEROID INJECTION  2019     IR LUMBAR TRANSFORAMINAL EPIDURAL STRD INJ  2019     IR PORT PLACEMENT >5 YEARS  2019     MAMMOPLASTY REDUCTION Right 2015    hx of left mastectomy and  right breast reduction     MASTECTOMY Left 2010     OTHER SURGICAL HISTORY      biopsy of upper and right tongue     OVARIAN CYST REMOVAL       REVISE RECONSTRUCTED BREAST Left     March 11, 2015               Social History:   I have reviewed this patient's social history  Social History     Tobacco Use     Smoking status: Former Smoker     Packs/day: 0.75     Years: 20.00     Pack years: 15.00     Types: Cigarettes     Smokeless tobacco: Never Used   Substance Use Topics     Alcohol use: Yes     Alcohol/week: 1.0 standard drinks     Comment: 1 glass of wine/week             Family History:   I have reviewed this patient's family history  Family History   Problem Relation Age of Onset     Lung Cancer Mother      Pancreatic Cancer Mother      Kidney Cancer Maternal Grandmother      Lung Cancer Paternal Aunt      Breast Cancer Cousin              Allergies:     Allergies   Allergen Reactions     Sulfa Drugs Hives and Rash     welts               Medications:   I have reviewed this patient's current medications  Medications Prior to Admission   Medication Sig Dispense Refill Last Dose     acetaminophen (TYLENOL) 500 MG tablet Take 1,000 mg by mouth 3 times daily as needed for pain   7/16/2021 at Unknown time     calcium carbonate (CALCIUM CARBONATE) 600 MG tablet Take 600 mg by mouth daily   7/16/2021 at Unknown time     Continuous Blood Gluc  (FREESTYLE KIMBERLY 14 DAY READER) COLETTE Use to check blood sugar at least 4 times a day or as directed   7/16/2021 at Unknown time     Continuous Blood Gluc Sensor (FREESTYLE KIMBERLY 14 DAY SENSOR) MISC Use as directed to test blood sugar at least 4 times a day or as directed   7/16/2021 at Unknown time     docusate sodium (COLACE) 100 MG capsule Take 200 mg by mouth daily   7/16/2021 at Unknown time     DULoxetine (CYMBALTA) 30 MG capsule Take 30 mg by mouth every morning And 60 mg by mouth in the evening   7/15/2021 at Unknown time     ezetimibe-simvastatin (VYTORIN) 10-20  MG tablet Take 1 tablet by mouth daily   7/15/2021 at Unknown time     HYDROmorphone (DILAUDID) 4 MG tablet Take 4 mg by mouth every 4 hours as needed for severe pain    7/16/2021 at Unknown time     hydrOXYzine (ATARAX) 25 MG tablet Take 25 mg by mouth 3 times daily as needed for anxiety    7/16/2021 at Unknown time     ibuprofen (ADVIL/MOTRIN) 200 MG tablet Take 400 mg by mouth every 6 hours as needed for mild pain   7/16/2021 at AM     IRON-VIT C-VIT B12-FOLIC ACID (GENTLE IRON) 28-60-0.008-0.4 MG CAPS Take 1 capsule by mouth daily   7/16/2021 at Unknown time     multivitamin (CENTRUM SILVER) tablet Take 1 tablet by mouth daily   7/16/2021 at Unknown time     naproxen sodium (ANAPROX) 220 MG tablet Take 220 mg by mouth 2 times daily as needed for moderate pain   Past Week at Unknown time     oxybutynin (DITROPAN) 5 MG tablet Take 5 mg by mouth 2 times daily as needed    7/16/2021 at Unknown time     oxyCODONE (OXYCONTIN) 30 MG 12 hr tablet Take 30 mg by mouth every 12 hours   7/15/2021 at AM     vitamin (B COMPLEX) tablet Take 1 tablet by mouth daily   7/16/2021 at Unknown time     Vitamin D3 (CHOLECALCIFEROL) 25 mcg (1000 units) tablet Take 1 tablet by mouth daily   7/16/2021 at Unknown time     zolpidem (AMBIEN) 10 MG tablet Take 10 mg by mouth nightly as needed for sleep   7/13/2021     aspirin 81 MG EC tablet Take 81 mg by mouth daily   7/13/2021     insulin glargine (BASAGLAR KWIKPEN) 100 UNIT/ML pen Inject 55 Units Subcutaneous every morning         insulin lispro (HUMALOG KWIKPEN) 100 UNIT/ML (1 unit dial) pen Inject 1-20 Units Subcutaneous 3 times daily (before meals) (sliding scale + 3 units for every 15 grams of carbs)   Unknown at Unknown time     omeprazole (PRILOSEC) 20 MG DR capsule Take 20 mg by mouth daily as needed   Unknown at Unknown time     Current Facility-Administered Medications Ordered in Epic   Medication Dose Route Frequency Last Rate Last Admin     acetaminophen (TYLENOL) tablet 1,000  mg  1,000 mg Oral TID   1,000 mg at 07/20/21 1116     artificial saliva (BIOTENE MT) solution 1 spray  1 spray Swish & Spit 4x Daily   1 spray at 07/20/21 1233     aspirin EC tablet 81 mg  81 mg Oral Daily   81 mg at 07/20/21 0829     benztropine (COGENTIN) tablet 1 mg  1 mg Oral TID PRN         calcium carbonate (TUMS) chewable tablet 500 mg  500 mg Oral Daily   500 mg at 07/20/21 0829     cefTRIAXone (ROCEPHIN) 1 g vial to attach to  mL bag for ADULTS or NS 50 mL bag for PEDS  1 g Intravenous Q24H   1 g at 07/19/21 2141     glucose gel 15-30 g  15-30 g Oral Q15 Min PRN        Or     dextrose 50 % injection 25-50 mL  25-50 mL Intravenous Q15 Min PRN        Or     glucagon injection 1 mg  1 mg Subcutaneous Q15 Min PRN         docusate sodium (COLACE) capsule 200 mg  200 mg Oral Daily   200 mg at 07/20/21 0830     DULoxetine (CYMBALTA) DR capsule 30 mg  30 mg Oral QAM   30 mg at 07/20/21 0829     enoxaparin ANTICOAGULANT (LOVENOX) injection 40 mg  40 mg Subcutaneous Q24H   40 mg at 07/19/21 1705     ezetimibe (ZETIA) tablet 10 mg  10 mg Oral At Bedtime   10 mg at 07/19/21 2134    And     simvastatin (ZOCOR) tablet 20 mg  20 mg Oral QPM   20 mg at 07/19/21 2134     HYDROmorphone (DILAUDID) tablet 4 mg  4 mg Oral Q3H PRN   4 mg at 07/20/21 0159     ibuprofen (ADVIL/MOTRIN) tablet 400 mg  400 mg Oral Q6H PRN   400 mg at 07/20/21 0858     insulin aspart (NovoLOG) injection (RAPID ACTING)  1-3 Units Subcutaneous TID AC   1 Units at 07/20/21 1234     insulin aspart (NovoLOG) injection (RAPID ACTING)  1-3 Units Subcutaneous At Bedtime         ketamine (KETALAR) ORAL for ANALGESIA 20 mg  20 mg Oral Q12H   20 mg at 07/20/21 0610     lidocaine (XYLOCAINE) 5 % ointment   Topical 4x Daily   Given at 07/20/21 1233     multivitamin w/minerals (THERA-VIT-M) tablet 1 tablet  1 tablet Oral Daily   1 tablet at 07/20/21 0829     naloxone (NARCAN) injection 0.2 mg  0.2 mg Intravenous Q2 Min PRN        Or     naloxone (NARCAN)  injection 0.4 mg  0.4 mg Intravenous Q2 Min PRN        Or     naloxone (NARCAN) injection 0.2 mg  0.2 mg Intramuscular Q2 Min PRN        Or     naloxone (NARCAN) injection 0.4 mg  0.4 mg Intramuscular Q2 Min PRN         omeprazole (priLOSEC) CR capsule 20 mg  20 mg Oral QAM AC   20 mg at 07/20/21 0830     oxybutynin (DITROPAN) tablet 5 mg  5 mg Oral BID   5 mg at 07/20/21 1116     polyethylene glycol (MIRALAX) Packet 17 g  17 g Oral Daily PRN   17 g at 07/20/21 0829     QUEtiapine (SEROquel) half-tab 12.5 mg  12.5 mg Oral Q6H PRN   12.5 mg at 07/16/21 2122     [START ON 7/21/2021] vitamin B complex with vitamin C (STRESS TAB) tablet 1 tablet  1 tablet Oral Daily         Vitamin D3 (CHOLECALCIFEROL) tablet 25 mcg  25 mcg Oral Daily   25 mcg at 07/20/21 0829     zolpidem (AMBIEN) tablet 10 mg  10 mg Oral At Bedtime         No current [facility]-ordered outpatient medications on file.             Review of Systems:     The 10 point Review of Systems is negative other than noted in the HPI.            Data:   Data   Results for orders placed or performed during the hospital encounter of 07/16/21 (from the past 24 hour(s))   Glucose by meter   Result Value Ref Range    GLUCOSE BY METER POCT 157 (H) 70 - 125 mg/dL   CT Chest/Abdomen/Pelvis w Contrast    Narrative    EXAM: CT CHEST/ABDOMEN/PELVIS W CONTRAST  LOCATION: [facility]   DATE/TIME: 7/19/2021 5:36 PM    INDICATION: Sepsis  COMPARISON: 12/9/2020  TECHNIQUE: CT scan of the chest, abdomen, and pelvis was performed following injection of IV contrast. Multiplanar reformats were obtained. Dose reduction techniques were used.   CONTRAST: 100 mL [id number]    FINDINGS:   LUNGS AND PLEURA: Stable linear scarring posterior left upper lobe unchanged. Linear scarring peripheral right middle lobe and lateral right upper lobe also unchanged, no new pneumonia. There is a new tiny right pleural effusion.    MEDIASTINUM/AXILLAE: No lymphadenopathy.  Normal-sized heart. Port-A-Cath present    CORONARY ARTERY CALCIFICATION: None.    HEPATOBILIARY: Normal.    PANCREAS: Normal.    SPLEEN: Normal.    ADRENAL GLANDS: Normal.    KIDNEYS/BLADDER: There is minimal bilateral hydronephrosis and mild prominence of each ureter. Bladder is distended with estimated volume more than 800 cc consistent with bladder outlet obstruction or neurogenic bladder.    BOWEL: There are a few loops of mildly dilated air and fluid distended loops of jejunum without a definitive site of transition or obstructing focus. This may relate to a mild mid small bowel obstruction or mild ileus. There is no inflammatory bowel wall   thickening.    LYMPH NODES: Normal.    VASCULATURE: Unremarkable.    PELVIC ORGANS: No adnexal lesion or ascites.    MUSCULOSKELETAL: Diffuse mixed lytic/sclerotic bony metastases unchanged. There are prominent bilateral sacral alar fractures with dense bony sclerosis and what appears to be heterotopic ossifications about the sacral alar fractures.    Prior fractures left pubic ring unchanged but there are now small surrounding soft tissue fullness suggesting small hematomas associated with the fractures. There are tiny air bubble seen adjacent to the left inferior pubic ramus fracture within the   abductor musculature these air bubbles reside in a small collection that measures 2.5 x 1 cm (image 452). These air bubbles are new compared to previous exam. There is also a small fluid collection associated with the superior pubic ramus fracture (image   415) measuring 2 x 1.5 cm. In addition, there is also a tiny collection along the undersurface of the left pubic symphysis measuring 2.5 x 1.2 cm (image 425).    There is mild fullness of right paraspinous, erectus spinal muscle at low lumbar spine which appears new, can't exclude myositis or inflammation involving the right paraspinous musculature. No definite discrete fluid collection evident.      Impression     IMPRESSION:  1.  Mild new fullness of right paraspinal musculature low lumbar spine raising possibility of myositis. A discrete abscess collection is not evident.  2.  Left obturator ring fracture with small fluid pockets associated with the fractures, one of which also demonstrates a few tiny air bubbles.  3.  Complex bilateral sacral alar fractures with surrounding zones of heterotopic ossification.  4.  Prominent distention of bladder with borderline bilateral hydronephrosis. Suggest determining post void residual.  5.  MRI of pelvis and lower lumbar spine region may be beneficial to assess for paraspinal myositis or fluid collection, as well as to assess the tiny fluid collections associated with left obturator ring fractures.   Glucose by meter   Result Value Ref Range    GLUCOSE BY METER POCT 191 (H) 70 - 125 mg/dL   Extra Tube ([place] Draw)    Narrative    The following orders were created for panel order Extra Tube ([place] Draw).  Procedure                               Abnormality         Status                     ---------                               -----------         ------                     Extra Blood Culture Bottle[[id number]]                       Final result                 Please view results for these tests on the individual orders.   Extra Blood Culture Bottle   Result Value Ref Range    Hold Specimen [facility]    Creatinine   Result Value Ref Range    Creatinine 0.63 0.60 - 1.10 mg/dL    GFR Estimate >90 >60 mL/min/1.73m2   Magnesium   Result Value Ref Range    Magnesium 1.9 1.8 - 2.6 mg/dL   Potassium   Result Value Ref Range    Potassium 3.3 (L) 3.5 - 5.0 mmol/L   CRP inflammation   Result Value Ref Range    CRP 35.4 (H) 0.0-<0.8 mg/dL   Platelet count   Result Value Ref Range    Platelet Count 317 150 - 450 10e3/uL   Procalcitonin   Result Value Ref Range    Procalcitonin 1.36 (H) 0.00 - 0.49 ng/mL   Glucose by meter   Result Value Ref Range    GLUCOSE BY METER POCT 193 (H) 70 - 125 mg/dL    Potassium   Result Value Ref Range    Potassium 3.5 3.5 - 5.0 mmol/L   Glucose by meter   Result Value Ref Range    GLUCOSE BY METER POCT 234 (H) 70 - 125 mg/dL

## 2021-07-20 NOTE — PHARMACY-PHARMACOTHERAPY NOTE
Medical Cannabis: Registration in the [facility] Medical Cannabis Registry is confirmed.         Medical Cannabis visually inspected and does NOT appear obviously adulterated.

## 2021-07-20 NOTE — PROGRESS NOTES
[facility]    Medicine Progress Note - Hospitalist Service    Date of Admission:  7/16/2021     Summary: 66 year old female with history significant for metastatic breast cancer, multiple bony lesions with resulting chronic pain, presented in the company of her  with disorientation, slurred speech, intermittent combativeness and visual hallucinations and frequent falls despite using her walker.    Two days prior to admission her pain was increasing in intensity despite being on her routine Dilaudid and medical marijuana and she decided to take one tablet of OxyContin that was prescribed in April 2021 for prn pain breakthrough by  [facility].  The following morning she took another dose of OxyContin with her usual morning meds.  She slept most of the day prior to admission and then woke up at 4 PM disoriented and hallucinating.  Her symptoms persisted and she was brought in for evaluation, she had no reported fever at home but upon arrival to the floor was febrile.     Assessment & Plan      #Acute metabolic encephalopathy: Resolved after stopping OxyContin and starting treatment of pneumonia.  Likely multifactorial secondary to underlying chronic illness, dehydration, possibly early infection and then additional dosing of OxyContin.  Brain MRI shows no acute findings to account for the symptoms.   Appreciate pain team consult.     #Acute on chronic cancer bone pain: Continue oral Dilaudid as previously prescribed.  Resume home dosing medical cannabis.  Will follow patient's pain curve and titrate medications as needed.  Acute pain team trialing ketamine, low-dose, patient tried last pm, not much relief. Will increase dose to 30 mg and scheduled every 8 hours    #History of left lumbar spine foraminal stenosis with neuropathic pain: responded well to epidural injection. Plan for outpatient followup with spine clinic to set up for repeat injection.     #Fever: After she came up  to the floor she developed a temperature of 100.7.  UA shows 6 white cells, culture ordered. Procalcitonin is elevated.  Initiated Rocephin and Zithromax to cover the potential both lung and urine bacteria.  Lactate is normal.  Initial portable film with question of infiltrates but repeat 2 view chest film without clear evidence of infiltrate.  Patient with recurrent fever 7/18/2021, underlying immunocompromise state, elevated procalcitonin, elevated CRP we will plan IV antibiotics through Tuesday 7/20 then changing to orals for a total of 10 days therapy. Draw blood culture from peripheral blood and from portacath. Consult ID.     #Dehydration: Resolved with IV fluids and now able to take p.o. Prior to admission not been eating and drinking very well.     #Metastatic breast cancer.  Stage IV with spread to pelvis, femur, vertebrae, ribs and lymph nodes of her left arm.  Dr. Varma from [facility] is her primary oncologist.  Pain medications prescribed through [facility].     #Type 2 diabetes, insulin dependent: Initial blood sugar on admission normal range.  Had not been eating well over the previous 2 days prior to admission. Hold basal insulin and reestablish patient's baseline as she begins to eat and drink.     #Depression: Continue home Cymbalta     #Hypokalemia: replace    DVT prophylaxis:    Medical:  subcutaneous enoxaparin    Mechanical:  PCD's    Disposition Plan: Expected discharge: 07/22/2021 recommended to Home once adequate pain management/ tolerating PO medications.     Diet: Orders Placed This Encounter      Consistent Carb 45 grams CHO per Meal Diet    Jiang Catheter: Not present  Central Lines/Port-a-cath: Present  Drains: Not present    The patient's care was discussed with the Patient and ID Consultant.    Freeman Kenney MD  Hospitalist Service  [facility]  Securely message with the Vocera Web Console (learn more here)  Text page via Mail[employer]Inside  "Paging/Directory    ______________________________________________________________________    Interval History   Patient able to mobilize better today, feels her thinking is better. Good appetite. Having urinary retention    ROS: Denies chest pain, denies shortness of breath, slept well and good appetite    Data reviewed from the last 24 hours:   I personally reviewed:  Radiology: CT reports  Reviewed medications, Laboratory results and Consultant recommendations       Physical Exam   /63 (BP Location: Right arm)   Pulse 89   Temp 98.6  F (37  C) (Oral)   Resp 19   Ht 1.651 m (5' 5\")   Wt 81.5 kg (179 lb 9.6 oz)   SpO2 97%   BMI 29.89 kg/m      Physical Exam    General Appearance:  HEENT:  Awake, Alert, Cooperative, in no distress and appears stated age   Normocephalic, atraumatic, conjunctiva clear without icterus and ears without discharge   Lungs:   Clear to auscultation bilaterally, no wheezing, good air exchange, normal work of breathing   Cardiovascular:  Regular Rate and Rythm, normal apical impulse, normal S1 and S2, no lower extremity edema bilaterally   Abdomen: Soft and non-tender, active bowel sounds   Skin:  Skin color, texture normal and no bruising or bleeding. Healing excoriations over shins bilaterally, turgor normal.   Neurologic:    Neuropsychiatric: Alert & Oriented X 3, Facial symmetry preserved and upper & lower extremities moving well with symmetry  Calm, normal eye contact, Affect normal     Data   Recent Labs   Lab 07/20/21  1216 07/20/21  0941 07/20/21  0930 07/20/21  0537 07/20/21  0206 07/19/21  2047 07/19/21  1320 07/17/21  0810 07/17/21  0705 07/16/21  1054   WBC  --   --   --   --   --   --   --   --  5.1 4.3   HGB  --   --   --   --   --   --   --   --  9.2* 8.5*   MCV  --   --   --   --   --   --   --   --  96 97   PLT  --   --   --  317  --   --   --   --  334 248   INR  --   --   --   --   --   --   --   --   --  1.45*   NA  --   --   --   --   --   --   --   --  138 " 132*   POTASSIUM  --  3.5  --   --  3.3*  --  3.8   < > 3.5 4.0   CHLORIDE  --   --   --   --   --   --   --   --  107 99   CO2  --   --   --   --   --   --   --   --  19* 21*   BUN  --   --   --   --   --   --   --   --  12 14   CR  --   --   --   --  0.63  --   --   --  0.64 0.69   ANIONGAP  --   --   --   --   --   --   --   --  12 12   BENJI  --   --   --   --   --   --   --   --  8.8 8.8   *  --  193*  --   --  191*  --   --  119 109   ALBUMIN  --   --   --   --   --   --   --   --   --  2.8*   PROTTOTAL  --   --   --   --   --   --   --   --   --  6.6   BILITOTAL  --   --   --   --   --   --   --   --   --  0.8   ALKPHOS  --   --   --   --   --   --   --   --   --  157*   ALT  --   --   --   --   --   --   --   --   --  36   AST  --   --   --   --   --   --   --   --   --  23    < > = values in this interval not displayed.       Freeman Kenney MD, [facility]  Internal Medicine Hospitalist  7/20/2021  4:17 PM

## 2021-07-20 NOTE — PROVIDER NOTIFICATION
Pt had emesis of likely >750ml (500ml in emesis bag + soaked gown/sheets.  She had just received PO HS medications.  Pt has been chugging water for days but states this is the first time she has had an emesis.  On call hospitalist paged.    Maricarmen Jones RN

## 2021-07-20 NOTE — PLAN OF CARE
Problem: Pain Chronic (Persistent)  Goal: Acceptable Pain Control and Functional Ability  Outcome: Improving  Intervention: Develop Pain Management Plan  Recent Flowsheet Documentation  Taken 7/20/2021 0155 by Tiffanie Verduzco RN  Pain Management Interventions: medication (see MAR)   Pt c/o pain on right hip, requested and took prn oral dilaudid which she had 400ml output of  emesis few minutes after that, denied nausea and states she feels better now, a/o x4, VSS port cath patent C/D/I, purewick in place, potassium 3.3, did protocol and coverage  given.

## 2021-07-20 NOTE — PLAN OF CARE
Problem: Adult Inpatient Plan of Care  Goal: Optimal Comfort and Wellbeing  Outcome: Improving    Pt reports only chronic pain, declines use of lidocaine.    While assessing patient, following administration of HS meds, she projectile vomited in bed, plus another 500ml into an emesis bag.  She was assisted to change sheets and gown and get cleaned up.  Denies nausea.  On call hospitalist paged, no response at this time.    Maricarmen Jones RN

## 2021-07-20 NOTE — PLAN OF CARE
Problem: Adult Inpatient Plan of Care  Goal: Plan of Care Review  Outcome: Improving     Problem: Pain Chronic (Persistent)  Goal: Acceptable Pain Control and Functional Ability  Outcome: Improving  Intervention: Develop Pain Management Plan  Recent Flowsheet Documentation  Taken 7/20/2021 1528 by Armando Guerrero RN  Pain Management Interventions: relaxation techniques promoted  Taken 7/20/2021 0858 by Armando Guerrero RN  Pain Management Interventions:   medication (see MAR)   relaxation techniques promoted  Intervention: Manage Persistent Pain  Recent Flowsheet Documentation  Taken 7/20/2021 1531 by Armando Guerrero RN  Bowel Elimination Promotion:   adequate fluid intake promoted   other (see comments)  Taken 7/20/2021 0900 by Armando Guerrero RN  Bowel Elimination Promotion: (medicated)   adequate fluid intake promoted   other (see comments)   Pt stating having most pain in right hip.  Rating it lower then yesterday.  3-4 however when she moves it is much worse.  Pt has been tearful and overwhelmed today.  Encouraged relaxation and deep breathing.

## 2021-07-20 NOTE — PROGRESS NOTES
"Infectious Disease Progress Note    Assessment/Plan     Impression: Fevers, multiple potential etiologies.  Improved today.      Initial presentation of encephalopathy which has improved.     Had a Port-A-Cath in for couple years, but seems to be working okay.     Has multiple excoriations on both legs that she says are \"from mosquito bites\" none are grossly purulent.     Has distended abdomen.  CT shows old fractures and some fluid around fractures.      Recommendations: Agree with work-up to date including blood cultures x2.    Could change to po antibiotics when taking po  Active Problems:    Confusion      RIDGE BARAJAS MD   [phone]      Subjective  Feels much better today.  No fever.     Objective    Vital signs in last 24 hours  Temp:  [97.2  F (36.2  C)-99.3  F (37.4  C)] 97.2  F (36.2  C)  Pulse:  [] 112  Resp:  [18-19] 18  BP: (126-169)/(61-82) 132/82  SpO2:  [94 %-99 %] 99 %  Wt Readings from Last 3 Encounters:   07/17/21 81.5 kg (179 lb 9.6 oz)   04/09/18 98.7 kg (217 lb 11.2 oz)   11/17/17 97.1 kg (214 lb)           Intake/Output last 3 shifts  I/O last 3 completed shifts:  In: 3725 [P.O.:3600; I.V.:125]  Out: 3800 [Urine:2650; Emesis/NG output:1150]  Intake/Output this shift:  I/O this shift:  In: 240 [P.O.:240]  Out: -     Review of Systems   Pertinent items are noted in HPI., otherwise negative.    Physical Exam  General appearance: alert, appears stated age and cooperative  Head: Normocephalic, without obvious abnormality, atraumatic  Eyes: EOMI, no icterus  Neck: no adenopathy and supple, symmetrical, trachea midline  Lungs: clear to auscultation bilaterally   There is a Port-A-Cath in the right chest. There is a little bit of erythema toward where it crosses over the right clavicle  Heart: Regular rate and rhythm, no murmur  Abdomen: soft, non-tender; bowel sounds normal; no masses,  no organomegaly  Extremities: Full excoriations on both lower legs. She has a bloody wound on her " left great toe.  Skin: No rash, but excoriations as described above.  Lymph nodes: Cervical, supraclavicular, and axillary nodes normal.  Neurologic: Mental status: Emotional and weepy, but answers questions and is quite lucid.    Pertinent Labs   Lab Results: personally reviewed.     Recent Labs   Lab 07/20/21  0537 07/17/21  0705 07/16/21  1054   WBC  --  5.1 4.3   HGB  --  9.2* 8.5*   HCT  --  28.9* 26.7*    334 248        Recent Labs   Lab 07/17/21  0705 07/16/21  1054    132*   CO2 19* 21*   BUN 12 14     Collected Updated Procedure Result Status    07/19/2021 1249 07/20/2021 0616 Blood Culture Line, Other [[id number]]   Blood from Line, Other    Preliminary result Component Value   Culture No growth after 12 hours P              07/19/2021 1201 07/20/2021 0616 Blood Culture Arm, Right [[id number]]   Blood from Arm, Right    Preliminary result Component Value   Culture No growth after 12 hours P              07/16/2021 1056 07/16/2021 1139 Asymptomatic COVID-19 Virus (Coronavirus) by PCR Nasopharyngeal [[id number]]    Swab from Nasopharyngeal    Final result Component Value   No component results           07/16/2021 1056 07/16/2021 1139 SARS-COV2 (COVID-19) Virus RT-PCR [[id number]]    Swab from Nasopharyngeal    Final        Pertinent Radiology   Radiology Results:   Procedure Component Value Units Date/Time   CT Chest/Abdomen/Pelvis w Contrast [[id number]] Collected: 07/19/21 1736   Order Status: Completed Updated: 07/19/21 2014   Narrative:     EXAM: CT CHEST/ABDOMEN/PELVIS W CONTRAST   LOCATION: [facility]   DATE/TIME: 7/19/2021 5:36 PM     INDICATION: Sepsis   COMPARISON: 12/9/2020   TECHNIQUE: CT scan of the chest, abdomen, and pelvis was performed following injection of IV contrast. Multiplanar reformats were obtained. Dose reduction techniques were used.   CONTRAST: 100 mL Isovue-370     FINDINGS:   LUNGS AND PLEURA: Stable linear scarring posterior left  upper lobe unchanged. Linear scarring peripheral right middle lobe and lateral right upper lobe also unchanged, no new pneumonia. There is a new tiny right pleural effusion.     MEDIASTINUM/AXILLAE: No lymphadenopathy. Normal-sized heart. Port-A-Cath present     CORONARY ARTERY CALCIFICATION: None.     HEPATOBILIARY: Normal.     PANCREAS: Normal.     SPLEEN: Normal.     ADRENAL GLANDS: Normal.     KIDNEYS/BLADDER: There is minimal bilateral hydronephrosis and mild prominence of each ureter. Bladder is distended with estimated volume more than 800 cc consistent with bladder outlet obstruction or neurogenic bladder.     BOWEL: There are a few loops of mildly dilated air and fluid distended loops of jejunum without a definitive site of transition or obstructing focus. This may relate to a mild mid small bowel obstruction or mild ileus. There is no inflammatory bowel wall    thickening.     LYMPH NODES: Normal.     VASCULATURE: Unremarkable.     PELVIC ORGANS: No adnexal lesion or ascites.     MUSCULOSKELETAL: Diffuse mixed lytic/sclerotic bony metastases unchanged. There are prominent bilateral sacral alar fractures with dense bony sclerosis and what appears to be heterotopic ossifications about the sacral alar fractures.     Prior fractures left pubic ring unchanged but there are now small surrounding soft tissue fullness suggesting small hematomas associated with the fractures. There are tiny air bubble seen adjacent to the left inferior pubic ramus fracture within the   abductor musculature these air bubbles reside in a small collection that measures 2.5 x 1 cm (image 452). These air bubbles are new compared to previous exam. There is also a small fluid collection associated with the superior pubic ramus fracture (image    415) measuring 2 x 1.5 cm. In addition, there is also a tiny collection along the undersurface of the left pubic symphysis measuring 2.5 x 1.2 cm (image 425).     There is mild fullness of right  paraspinous, erectus spinal muscle at low lumbar spine which appears new, can't exclude myositis or inflammation involving the right paraspinous musculature. No definite discrete fluid collection evident.    Impression:     IMPRESSION:   1.  Mild new fullness of right paraspinal musculature low lumbar spine raising possibility of myositis. A discrete abscess collection is not evident.   2.  Left obturator ring fracture with small fluid pockets associated with the fractures, one of which also demonstrates a few tiny air bubbles.   3.  Complex bilateral sacral alar fractures with surrounding zones of heterotopic ossification.   4.  Prominent distention of bladder with borderline bilateral hydronephrosis. Suggest determining post void residual.   5.  MRI of pelvis and lower lumbar spine region may be beneficial to assess for paraspinal myositis or fluid collection, as well as to assess the tiny fluid collections associated with left obturator ring fractures.

## 2021-07-20 NOTE — PROGRESS NOTES
"[facility] ACUTE PAIN SERVICE    ([facility], [facility], [facility])   Daily PAIN Progress Note    Assessment/Plan:  Elenita Hardin is a 66 year old female who was admitted on 7/16/2021.   Pain Service was asked to see the patient for cancer associated pain. Admitted for evaluation of altered mental status, fevers. Patient presented disoriented with slurred speech, intermittent combativeness, visual hallucination and frequent falls. From review with spouse patient had taken OxyContin Wed evening and Thursday AM, then slept all day on Thursday.  When she woke up on Thursday evening she was confused. (newly prescribed)  She had also been taking hydromorphone 4 mg every four hours, medicalo cannabis, vistaril and Ambien.  Medical history Stage IV Breast Cancer with mets to pelvis, femur, vertebrae, ribs and lymph nodes of her left arm,  Type II diabetes.    Infectious Disease is following.     Encephalopathy improving with OxyContin stopped, also being treated for pneumonia, possible UTI.  Brain MRI with no acute findings.    Patient identifies difficulty with sciatic type pain since 2018, with pain recently flaring up.  Describes pain as being located in right side of  lumbar spine, going to mid buttocks and down right leg.  Pain minimal at rest, increases with movement.  Pain is sharp gripping pain.  Pain at a \"3\" at time of visit.  She states she was able to ambulate to commode without pain flaring up.      PLAN:   1) Pain is consistent with cancer associated pain, mediation overdose with addition of long acting opioid.   Stage IV with spread to pelvis, femur, vertebrae, ribs and lymph nodes of her left arm.  Per review of pelvic CT:  MRI of pelvis and lower lumbar spine region may be beneficial to assess for paraspinal myositis or fluid collection, as well as to assess the tiny fluid collections associated with left obturator ring fractures.  , possible myositis per MRI imaging and " " Would continue with Dilaudid as needed, would avoid OxyContin, trial low-dose ketamine.  2)Multimodal Medication Therapy  Topical:  Lidocaine ointment 5% qid  NSAID'S: ibuprofen  400 mg q 6 h prn   Muscle Relaxants: None   Adjuvants:  Tyenol 1G TID prn, ketamine 20 mg po q 12 h, vistaril 25 mg every 6 hours prn  Antidepressants/anxiolytics: Cymbalta 30 mg q day, Seroquel 12.5 mg q 6 h prn for agitation, haloperidol  Opioids:  Hydromorphone(Dilaudid) 4 mg q 3 h prn   3)Non-medication interventions- rest and turning   4)Constipation Prophylaxis- docusate 200 mg daily, Miralax daily PRN     -Opioid prescriber has been [facility]  -Discharge Recommendations - We recommend prescribing the following at the time of discharge: TBD   -Opioid prescriber has been Denilson Valles, CNP  -[place]  pulled from system on 07/17/21. This indicates current opioid use. Most recent [place] :  07/12/21 zolpidem 10 mg #30  06/20/21 Dilaudid 4 mg #120  Not filled Oxycontin 30 mg #60 since 04/13/21 (please dispose of any unused supply)  Has not filled Pregabalin 75 mg since 03/30/2021       Active Problems:    Confusion     LOS: 4 days       Subjective:  Patient reports pain is \"3\" at rest, increases to a \"10\" out of \"10\" \"if moves a wrong\".      acetaminophen  1,000 mg Oral TID     artificial saliva  1 spray Swish & Spit 4x Daily     aspirin  81 mg Oral Daily     azithromycin  250 mg Intravenous Q24H     B Complex-B12  1 tablet Oral Daily     calcium carbonate  500 mg Oral Daily     cefTRIAXone  1 g Intravenous Q24H     docusate sodium  200 mg Oral Daily     DULoxetine  30 mg Oral QAM     enoxaparin ANTICOAGULANT  40 mg Subcutaneous Q24H     ezetimibe  10 mg Oral At Bedtime    And     simvastatin  20 mg Oral QPM     insulin aspart  1-3 Units Subcutaneous TID AC     insulin aspart  1-3 Units Subcutaneous At Bedtime     ketamine  20 mg Oral Q12H     lidocaine   Topical 4x Daily     multivitamin w/minerals  1 tablet Oral Daily "     omeprazole  20 mg Oral QAM AC     oxybutynin  5 mg Oral BID     Vitamin D3  25 mcg Oral Daily     zolpidem  10 mg Oral At Bedtime       Objective:  Vital signs in last 24 hours:  Temp:  [97.2  F (36.2  C)-99.3  F (37.4  C)] 97.2  F (36.2  C)  Pulse:  [] 112  Resp:  [18-19] 18  BP: (126-169)/(61-80) 169/80  SpO2:  [94 %-99 %] 99 %  Weight:   Weight change:   Body mass index is 29.89 kg/m .    Intake/Output last 3 shifts:  I/O last 3 completed shifts:  In: 3725 [P.O.:3600; I.V.:125]  Out: 3800 [Urine:2650; Emesis/NG output:1150]  Intake/Output this shift:  No intake/output data recorded.    Review of Systems:   As per subjective, all others negative.    Physical Exam:    General Appearance:  Alert, irritable, tearful at times, frustrated   Head:  Normocephalic, without obvious abnormality, atraumatic   Eyes:  PERRL, conjunctiva/corneas clear, EOM's intact   Nose: Nares normal, septum midline, mucosa normal, no drainage   Throat: Lips, mucosa, and tongue normal; teeth and gums normal   Neck: Supple, symmetrical, trachea midline, no adenopathy, thyroid: not enlarged, symmetric, no carotid bruit or JVD   Back:   Symmetric, no curvature, ROM normal, no CVA tenderness   Lungs:   Clear to auscultation bilaterally, respirations unlabored   Chest Wall:  No tenderness or deformity   Heart:  Regular rate and rhythm, S1, S2 normal,no murmur, rub or gallop   Abdomen:   Soft, non-tender, bowel sounds active all four quadrants,  no masses, no organomegaly   Extremities: Extremities normal, atraumatic, no cyanosis or edema   Skin: Skin color, texture, turgor normal, no rashes or lesions   Neurologic: Alert and oriented X 3, Moves all 4 extremities          Imaging:  Personally Reviewed.  CT Chest/Abdomen/Pelvis w Contrast    Result Date: 7/19/2021  EXAM: CT CHEST/ABDOMEN/PELVIS W CONTRAST LOCATION: [facility] DATE/TIME: 7/19/2021 5:36 PM INDICATION: Sepsis COMPARISON: 12/9/2020 TECHNIQUE: CT scan  of the chest, abdomen, and pelvis was performed following injection of IV contrast. Multiplanar reformats were obtained. Dose reduction techniques were used.     IMPRESSION: 1.  Mild new fullness of right paraspinal musculature low lumbar spine raising possibility of myositis. A discrete abscess collection is not evident. 2.  Left obturator ring fracture with small fluid pockets associated with the fractures, one of which also demonstrates a few tiny air bubbles. 3.  Complex bilateral sacral alar fractures with surrounding zones of heterotopic ossification. 4.  Prominent distention of bladder with borderline bilateral hydronephrosis. Suggest determining post void residual. 5.  MRI of pelvis and lower lumbar spine region may be beneficial to assess for paraspinal myositis or fluid collection, as well as to assess the tiny fluid collections associated with left obturator ring fractures.    MR Brain w/o Contrast    Result Date: 7/16/2021  EXAM: MR BRAIN W/O CONTRAST LOCATION: [facility] DATE/TIME: 7/16/2021 12:29 PM INDICATION: Confusion. COMPARISON: Head CT 07/16/2021, brain MRI 02/01/2017     IMPRESSION: 1.  No acute intracranial finding. No evidence for recent ischemia, intracranial hemorrhage, or mass. 2.  A few T2 hyperintense foci in the white matter are nonspecific but most commonly reflect gliosis related to prior ischemic or inflammatory insult. 3.  Presumed osseous metastatic disease within the calvarium and upper visualized cervical spine.    XR Chest 2 Views    Result Date: 7/17/2021  EXAM: XR CHEST 2 VW LOCATION: [facility] DATE/TIME: 7/17/2021 3:31 PM INDICATION: evaluate infiltrates seen on portable CXR for pneumonia vs fluid overload COMPARISON: 7/16/2021     IMPRESSION: Indistinct pulmonary vasculature. Left breast implant projects over the left lung, which increases the appearance of airspace disease. No definite new consolidation. No pleural effusions or pneumothorax.  Unchanged cardiac size. Right internal  jugular port tip projects at the cavoatrial junction. Left axillary surgical clips. Lower thoracic vertebral body compression fracture. Bony metastases better seen on prior imaging.    XR Chest Port 1 View    Result Date: 7/16/2021  EXAM: XR CHEST PORT 1 VIEW LOCATION: [facility] DATE/TIME: 7/16/2021 12:20 PM INDICATION: Confusion. Slightly decreased O2 sat. COMPARISON: 04/06/2018.     IMPRESSION: Right IJ chemotherapy port is in place with tip in the high right atrium. Heart and mediastinal size are normal. There is patchy airspace infiltrate throughout the bilateral lungs with diffuse indistinctness of the pulmonary interstitium. These findings may represent pulmonary edema. An inflammatory infectious process is also a consideration. No definite pleural effusion or pneumothorax. Extensive sclerotic osseous lesions are present, compatible with known bony metastases.         XR Foot Left G/E 3 Views    Result Date: 7/16/2021  EXAM: XR FOOT LEFT G/E 3 VIEWS LOCATION: [facility] DATE/TIME: 7/16/2021 1:02 PM INDICATION: 5th toe trauma COMPARISON: None.     IMPRESSION: Bones are demineralized. There is no definitive evidence of acute fracture, with attention to the fifth toe. Mild, scattered degenerative changes.    Head CT w/o contrast    Result Date: 7/16/2021  EXAM: CT HEAD W/O CONTRAST LOCATION: [facility] DATE/TIME: 7/16/2021 11:12 AM INDICATION: Mental status change, unknown cause. Breast cancer. COMPARISON: Brain MRI 02/01/2017 TECHNIQUE: Routine CT Head without IV contrast. Multiplanar reformats. Dose reduction techniques were used. FINDINGS: INTRACRANIAL CONTENTS: No intracranial hemorrhage, extraaxial collection, or mass effect.  No CT evidence of acute infarct. Mild presumed chronic small vessel ischemic changes. Normal ventricles and sulci. VISUALIZED ORBITS/SINUSES/MASTOIDS: No intraorbital abnormality. No paranasal sinus  mucosal disease. No middle ear or mastoid effusion. BONES/SOFT TISSUES: No acute abnormality. Diffusely sclerotic calvarium and upper visualized cervical spine structures.     IMPRESSION: 1.  No acute intracranial injury, hemorrhage, mass, or CT evidence of recent ischemia. 2.  Diffusely sclerotic calvarium presumed to reflect metastatic disease given history of breast cancer.      Lab Results:  Personally Reviewed.   Recent Labs   Lab 07/20/21  0537 07/17/21  0705 07/16/21  1054   WBC  --  5.1 4.3   HGB  --  9.2* 8.5*   HCT  --  28.9* 26.7*    334 248     Recent Labs   Lab 07/17/21  0705 07/16/21  1054    132*   CO2 19* 21*   BUN 12 14   ALBUMIN  --  2.8*   ALKPHOS  --  157*   ALT  --  36   AST  --  23     Recent Labs   Lab 07/16/21  1054   INR 1.45*       Total time spent 25 minutes with greater than 50% in consultation, education and coordination of care.     Also discussed with RN      Estephanie Castanon APRN, CNS-BC, DNP  Acute Care Pain Management Program  [facility] ([place], [place], [place])   With questions call [phone]  Preference if for [employer] Paging - Lg  Click HERE to page Yessenia

## 2021-07-21 ENCOUNTER — APPOINTMENT (OUTPATIENT)
Dept: OCCUPATIONAL THERAPY | Facility: HOSPITAL | Age: 66
DRG: 853 | End: 2021-07-21
Payer: COMMERCIAL

## 2021-07-21 ENCOUNTER — HOSPITAL ENCOUNTER (INPATIENT)
Dept: MRI IMAGING | Facility: HOSPITAL | Age: 66
DRG: 853 | End: 2021-07-21
Attending: FAMILY MEDICINE
Payer: COMMERCIAL

## 2021-07-21 ENCOUNTER — RECORDS - HEALTHEAST (OUTPATIENT)
Dept: ADMINISTRATIVE | Facility: CLINIC | Age: 66
End: 2021-07-21

## 2021-07-21 LAB
GLUCOSE BLDC GLUCOMTR-MCNC: 178 MG/DL (ref 70–125)
GLUCOSE BLDC GLUCOMTR-MCNC: 180 MG/DL (ref 70–125)
GLUCOSE BLDC GLUCOMTR-MCNC: 236 MG/DL (ref 70–125)
GLUCOSE BLDC GLUCOMTR-MCNC: 242 MG/DL (ref 70–125)
POTASSIUM BLD-SCNC: 3.6 MMOL/L (ref 3.5–5)
PROCALCITONIN SERPL-MCNC: 0.99 NG/ML (ref 0–0.49)

## 2021-07-21 PROCEDURE — 99233 SBSQ HOSP IP/OBS HIGH 50: CPT | Performed by: FAMILY MEDICINE

## 2021-07-21 PROCEDURE — 250N000013 HC RX MED GY IP 250 OP 250 PS 637: Performed by: PAIN MEDICINE

## 2021-07-21 PROCEDURE — 250N000012 HC RX MED GY IP 250 OP 636 PS 637: Performed by: EMERGENCY MEDICINE

## 2021-07-21 PROCEDURE — 93005 ELECTROCARDIOGRAM TRACING: CPT

## 2021-07-21 PROCEDURE — 120N000001 HC R&B MED SURG/OB

## 2021-07-21 PROCEDURE — 250N000013 HC RX MED GY IP 250 OP 250 PS 637: Performed by: FAMILY MEDICINE

## 2021-07-21 PROCEDURE — 250N000013 HC RX MED GY IP 250 OP 250 PS 637: Performed by: INTERNAL MEDICINE

## 2021-07-21 PROCEDURE — 36415 COLL VENOUS BLD VENIPUNCTURE: CPT | Performed by: INTERNAL MEDICINE

## 2021-07-21 PROCEDURE — 250N000013 HC RX MED GY IP 250 OP 250 PS 637

## 2021-07-21 PROCEDURE — 250N000013 HC RX MED GY IP 250 OP 250 PS 637: Performed by: CLINICAL NURSE SPECIALIST

## 2021-07-21 PROCEDURE — 250N000011 HC RX IP 250 OP 636: Performed by: INTERNAL MEDICINE

## 2021-07-21 PROCEDURE — 250N000009 HC RX 250: Performed by: INTERNAL MEDICINE

## 2021-07-21 PROCEDURE — 84132 ASSAY OF SERUM POTASSIUM: CPT | Performed by: INTERNAL MEDICINE

## 2021-07-21 PROCEDURE — 99232 SBSQ HOSP IP/OBS MODERATE 35: CPT

## 2021-07-21 PROCEDURE — 250N000013 HC RX MED GY IP 250 OP 250 PS 637: Performed by: EMERGENCY MEDICINE

## 2021-07-21 PROCEDURE — 250N000012 HC RX MED GY IP 250 OP 636 PS 637

## 2021-07-21 PROCEDURE — 250N000012 HC RX MED GY IP 250 OP 636 PS 637: Performed by: FAMILY MEDICINE

## 2021-07-21 PROCEDURE — 97535 SELF CARE MNGMENT TRAINING: CPT | Mod: GO

## 2021-07-21 PROCEDURE — 84145 PROCALCITONIN (PCT): CPT

## 2021-07-21 PROCEDURE — 255N000002 HC RX 255 OP 636: Performed by: FAMILY MEDICINE

## 2021-07-21 PROCEDURE — 72197 MRI PELVIS W/O & W/DYE: CPT

## 2021-07-21 PROCEDURE — 999N000054 HC STATISTIC EKG NON-CHARGEABLE

## 2021-07-21 PROCEDURE — 72158 MRI LUMBAR SPINE W/O & W/DYE: CPT

## 2021-07-21 PROCEDURE — A9585 GADOBUTROL INJECTION: HCPCS | Performed by: FAMILY MEDICINE

## 2021-07-21 PROCEDURE — 99232 SBSQ HOSP IP/OBS MODERATE 35: CPT | Performed by: CLINICAL NURSE SPECIALIST

## 2021-07-21 RX ORDER — AZITHROMYCIN 250 MG/1
250 TABLET, FILM COATED ORAL DAILY
Status: DISCONTINUED | OUTPATIENT
Start: 2021-07-21 | End: 2021-07-21

## 2021-07-21 RX ORDER — AZITHROMYCIN 250 MG/1
250 TABLET, FILM COATED ORAL DAILY
Status: COMPLETED | OUTPATIENT
Start: 2021-07-21 | End: 2021-07-26

## 2021-07-21 RX ORDER — VITAMIN B COMPLEX
1 TABLET ORAL DAILY
Status: DISCONTINUED | OUTPATIENT
Start: 2021-07-21 | End: 2021-07-28 | Stop reason: HOSPADM

## 2021-07-21 RX ORDER — GADOBUTROL 604.72 MG/ML
8 INJECTION INTRAVENOUS ONCE
Status: COMPLETED | OUTPATIENT
Start: 2021-07-21 | End: 2021-07-21

## 2021-07-21 RX ORDER — ACETAMINOPHEN 325 MG/1
975 TABLET ORAL 3 TIMES DAILY
Status: DISCONTINUED | OUTPATIENT
Start: 2021-07-21 | End: 2021-07-28 | Stop reason: HOSPADM

## 2021-07-21 RX ADMIN — EZETIMIBE 10 MG: 10 TABLET ORAL at 20:04

## 2021-07-21 RX ADMIN — ENOXAPARIN SODIUM 40 MG: 100 INJECTION SUBCUTANEOUS at 17:06

## 2021-07-21 RX ADMIN — ACETAMINOPHEN 1000 MG: 500 TABLET ORAL at 09:05

## 2021-07-21 RX ADMIN — ZOLPIDEM TARTRATE 10 MG: 5 TABLET ORAL at 20:05

## 2021-07-21 RX ADMIN — OXYBUTYNIN CHLORIDE 5 MG: 5 TABLET ORAL at 20:04

## 2021-07-21 RX ADMIN — IBUPROFEN 400 MG: 400 TABLET, FILM COATED ORAL at 09:04

## 2021-07-21 RX ADMIN — CEFDINIR 300 MG: 300 CAPSULE ORAL at 09:05

## 2021-07-21 RX ADMIN — OMEPRAZOLE 20 MG: 20 CAPSULE, DELAYED RELEASE ORAL at 09:05

## 2021-07-21 RX ADMIN — HYDROMORPHONE HYDROCHLORIDE 4 MG: 4 TABLET ORAL at 19:32

## 2021-07-21 RX ADMIN — LIDOCAINE: 50 OINTMENT TOPICAL at 20:05

## 2021-07-21 RX ADMIN — GADOBUTROL 8 ML: 604.72 INJECTION INTRAVENOUS at 22:26

## 2021-07-21 RX ADMIN — SIMVASTATIN 20 MG: 10 TABLET, FILM COATED ORAL at 20:03

## 2021-07-21 RX ADMIN — Medication 1 SPRAY: at 17:07

## 2021-07-21 RX ADMIN — Medication 1 TABLET: at 13:26

## 2021-07-21 RX ADMIN — Medication 1 SPRAY: at 20:04

## 2021-07-21 RX ADMIN — AZITHROMYCIN MONOHYDRATE 250 MG: 250 TABLET ORAL at 13:26

## 2021-07-21 RX ADMIN — INSULIN ASPART 2 UNITS: 100 INJECTION, SOLUTION INTRAVENOUS; SUBCUTANEOUS at 12:25

## 2021-07-21 RX ADMIN — DULOXETINE HYDROCHLORIDE 30 MG: 30 CAPSULE, DELAYED RELEASE ORAL at 09:05

## 2021-07-21 RX ADMIN — DOCUSATE SODIUM 200 MG: 100 CAPSULE, LIQUID FILLED ORAL at 09:05

## 2021-07-21 RX ADMIN — HYDROMORPHONE HYDROCHLORIDE 4 MG: 4 TABLET ORAL at 09:04

## 2021-07-21 RX ADMIN — ACETAMINOPHEN 975 MG: 325 TABLET ORAL at 13:26

## 2021-07-21 RX ADMIN — CEFDINIR 300 MG: 300 CAPSULE ORAL at 20:04

## 2021-07-21 RX ADMIN — ASPIRIN 81 MG: 81 TABLET, COATED ORAL at 09:05

## 2021-07-21 RX ADMIN — OXYBUTYNIN CHLORIDE 5 MG: 5 TABLET ORAL at 09:05

## 2021-07-21 RX ADMIN — KETAMINE HYDROCHLORIDE 30 MG: 10 INJECTION, SOLUTION INTRAMUSCULAR; INTRAVENOUS at 02:19

## 2021-07-21 RX ADMIN — CALCIUM CARBONATE (ANTACID) CHEW TAB 500 MG 500 MG: 500 CHEW TAB at 09:05

## 2021-07-21 RX ADMIN — ACETAMINOPHEN 975 MG: 325 TABLET ORAL at 20:03

## 2021-07-21 RX ADMIN — METHADONE HYDROCHLORIDE 2.5 MG: 5 TABLET ORAL at 22:51

## 2021-07-21 RX ADMIN — LIDOCAINE: 50 OINTMENT TOPICAL at 17:06

## 2021-07-21 RX ADMIN — INSULIN ASPART 1 UNITS: 100 INJECTION, SOLUTION INTRAVENOUS; SUBCUTANEOUS at 22:54

## 2021-07-21 RX ADMIN — INSULIN ASPART 1 UNITS: 100 INJECTION, SOLUTION INTRAVENOUS; SUBCUTANEOUS at 17:51

## 2021-07-21 RX ADMIN — HYDROMORPHONE HYDROCHLORIDE 4 MG: 4 TABLET ORAL at 03:43

## 2021-07-21 RX ADMIN — Medication 1 TABLET: at 09:04

## 2021-07-21 RX ADMIN — Medication 25 MCG: at 09:05

## 2021-07-21 RX ADMIN — METHADONE HYDROCHLORIDE 2.5 MG: 5 TABLET ORAL at 13:26

## 2021-07-21 RX ADMIN — INSULIN ASPART 1 UNITS: 100 INJECTION, SOLUTION INTRAVENOUS; SUBCUTANEOUS at 09:06

## 2021-07-21 RX ADMIN — KETAMINE HYDROCHLORIDE 30 MG: 10 INJECTION, SOLUTION INTRAMUSCULAR; INTRAVENOUS at 10:27

## 2021-07-21 RX ADMIN — INSULIN GLARGINE 5 UNITS: 100 INJECTION, SOLUTION SUBCUTANEOUS at 20:05

## 2021-07-21 NOTE — PLAN OF CARE
Problem: Adult Inpatient Plan of Care  Goal: Plan of Care Review  Outcome: Improving  Plan of care discussed with patient.  Goal: Absence of Hospital-Acquired Illness or Injury  Outcome: Improving  Intervention: Identify and Manage Fall Risk  Recent Flowsheet Documentation  Taken 7/21/2021 1600 by Mario Smith RN  Safety Promotion/Fall Prevention:    activity supervised. Yes.    assistive device/personal items within reach. Yes.    bed alarm on. Yes.     Problem: Pain Chronic (Persistent)  Goal: Acceptable Pain Control and Functional Ability  Outcome: Improving  Intervention: Develop Pain Management Plan  Recent Flowsheet Documentation  Taken 7/21/2021 1604 by Mario Smith RN  Pain Management Interventions: (stated she can tolerate pain at this time.)    declines    emotional support   at the beginning of the shift Patient stated her pain was 2-3 when writer offered pain med patient stated she doesn't need med pain is tolerable emotional support was given. At 1900 patient c/o hip pain 6/10 PRN dilaudid was given with effective result. Patient went down to MRI at present time. Will continue to monitor.

## 2021-07-21 NOTE — PLAN OF CARE
Problem: Adult Inpatient Plan of Care  Goal: Patient-Specific Goal (Individualized)  Outcome: Improving  Flowsheets (Taken 7/21/2021 1423)  Anxieties, Fears or Concerns: pain control  Patient-Specific Goals (Include Timeframe): pain medications still being adjusted by pain/palliative team     Problem: Pain Chronic (Persistent)  Goal: Acceptable Pain Control and Functional Ability  Outcome: No Change  Intervention: Develop Pain Management Plan  Recent Flowsheet Documentation  Taken 7/21/2021 0914 by Susan Solomon RN  Pain Management Interventions:   medication (see MAR)   MD notified (comment)   emotional support     Problem: Adult Inpatient Plan of Care  Goal: Optimal Comfort and Wellbeing  Outcome: Improving  Intervention: Provide Person-Centered Care  Recent Flowsheet Documentation  Taken 7/21/2021 0918 by Susan Solomon RN  Trust Relationship/Rapport:   care explained   choices provided   emotional support provided   empathic listening provided   questions answered   questions encouraged   reassurance provided   thoughts/feelings acknowledged     Problem: Adult Inpatient Plan of Care  Goal: Plan of Care Review  Flowsheets (Taken 7/21/2021 1423)  Plan of Care Reviewed With: patient  Outcome Summary: pt started shift very upset and anxious regarding continued hospital stay- allowed pt to vent feelings- offered healing arts visit- which she accepted.

## 2021-07-21 NOTE — PLAN OF CARE
Problem: Adult Inpatient Plan of Care  Goal: Optimal Comfort and Wellbeing  Intervention: Provide Person-Centered Care  Recent Flowsheet Documentation  Taken 7/21/2021 0523 by Whitney Mandel RN  Trust Relationship/Rapport: emotional support provided  Taken 7/20/2021 2101 by Whitney Mandel RN  Trust Relationship/Rapport: emotional support provided     Problem: Pain Chronic (Persistent)  Goal: Acceptable Pain Control and Functional Ability  Intervention: Develop Pain Management Plan  Recent Flowsheet Documentation  Taken 7/20/2021 2126 by Whitney Mandel RN  Pain Management Interventions: medication (see MAR)   Patient alert and oriented x 4. Able to verbalize her needs. Complained of right hip pain 5/10, Prn Hydromorphone 4 mg given with relief. Repositioned and made comfortable.Slept on and off.

## 2021-07-21 NOTE — PROGRESS NOTES
CHART CHECK     Primary Oncologist/Hematologist:            Assessment and Plan:New actions/orders today (07/21/2021) are underlined.       Assessment & Plan         Elenita is a 66 year old female with       1. Metastatic breast adenocarcinoma with bone mets and bone marrow involvement:   - On 3rd line chemo Doxil 50 mg/m2 IV which is given every 4 weeks until progression or intolerance.   - Last received cycle 3 of Doxil on 6/29/21. She did not receive Neulasta support for neutropenia prevention with cycle 3.   - She also receives Zometa 4 mg IV every 4 weeks.   - She is scheduled for consideration of cycle 4 on 7/27/2021 with Dr. Varma.     2. Acute metabolic encephalopathy, multifactorial.  - Resolved after stopping OxyContin and starting treatment of pneumonia.  - Brain MRI shows no acute findings to account for the symptoms.  - Appreciate palliative care team to help with pain management. I will notify our palliative care NP Denilson Valles regarding this event.     3. Acute on chronic cancer bone pain:   - Appreciate palliative care team assistance in managing pain.   - She is scheduled to follow up with palliative care NP on 7/27/21.     4. Community-acquired pneumonia/aspiration pneumonia:   - ID following; on abx    5. 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     If there are any questions or concerns, please do not hesitate to call. We will continue to follow.     Clara Cook PA-C  [facility]  [phone]          EXAM: CT CHEST/ABDOMEN/PELVIS W CONTRAST  LOCATION: [facility]   DATE/TIME: 7/19/2021 5:36 PM     INDICATION: Sepsis  COMPARISON: 12/9/2020  TECHNIQUE: CT scan of the chest, abdomen, and pelvis was performed following injection of IV contrast. Multiplanar reformats were obtained. Dose reduction techniques were used.   CONTRAST: 100 mL [id number]     FINDINGS:   LUNGS AND PLEURA:  Stable linear scarring posterior left upper lobe unchanged. Linear scarring peripheral right middle lobe and lateral right upper lobe also unchanged, no new pneumonia. There is a new tiny right pleural effusion.     MEDIASTINUM/AXILLAE: No lymphadenopathy. Normal-sized heart. Port-A-Cath present     CORONARY ARTERY CALCIFICATION: None.     HEPATOBILIARY: Normal.     PANCREAS: Normal.     SPLEEN: Normal.     ADRENAL GLANDS: Normal.     KIDNEYS/BLADDER: There is minimal bilateral hydronephrosis and mild prominence of each ureter. Bladder is distended with estimated volume more than 800 cc consistent with bladder outlet obstruction or neurogenic bladder.     BOWEL: There are a few loops of mildly dilated air and fluid distended loops of jejunum without a definitive site of transition or obstructing focus. This may relate to a mild mid small bowel obstruction or mild ileus. There is no inflammatory bowel wall   thickening.     LYMPH NODES: Normal.     VASCULATURE: Unremarkable.     PELVIC ORGANS: No adnexal lesion or ascites.     MUSCULOSKELETAL: Diffuse mixed lytic/sclerotic bony metastases unchanged. There are prominent bilateral sacral alar fractures with dense bony sclerosis and what appears to be heterotopic ossifications about the sacral alar fractures.     Prior fractures left pubic ring unchanged but there are now small surrounding soft tissue fullness suggesting small hematomas associated with the fractures. There are tiny air bubble seen adjacent to the left inferior pubic ramus fracture within the   abductor musculature these air bubbles reside in a small collection that measures 2.5 x 1 cm (image [id number]). These air bubbles are new compared to previous exam. There is also a small fluid collection associated with the superior pubic ramus fracture (image   415) measuring 2 x 1.5 cm. In addition, there is also a tiny collection along the undersurface of the left pubic symphysis measuring 2.5 x 1.2 cm (image  [id number]).     There is mild fullness of right paraspinous, erectus spinal muscle at low lumbar spine which appears new, can't exclude myositis or inflammation involving the right paraspinous musculature. No definite discrete fluid collection evident.                                                                      IMPRESSION:  1.  Mild new fullness of right paraspinal musculature low lumbar spine raising possibility of myositis. A discrete abscess collection is not evident.  2.  Left obturator ring fracture with small fluid pockets associated with the fractures, one of which also demonstrates a few tiny air bubbles.  3.  Complex bilateral sacral alar fractures with surrounding zones of heterotopic ossification.  4.  Prominent distention of bladder with borderline bilateral hydronephrosis. Suggest determining post void residual.  5.  MRI of pelvis and lower lumbar spine region may be beneficial to assess for paraspinal myositis or fluid collection, as well as to assess the tiny fluid collections associated with left obturator ring fractures.

## 2021-07-21 NOTE — PROGRESS NOTES
"Infectious Disease Progress Note    Assessment/Plan     Impression: Fevers, multiple potential etiologies.  Improved today.      Initial presentation of encephalopathy which has improved.     Had a Port-A-Cath in for couple years, but seems to be working okay.     Has multiple excoriations on both legs that she says are \"from mosquito bites\" none are grossly purulent.  Improved.      Has distended abdomen.  CT shows old fractures and some fluid around fractures.     Says her longstanding sinus symptoms are improved.      Recommendations: change to po cefdinir and azithromycin for 5 days.     OK to discharge to home anytime from Infectious Disease standpoint.    Active Problems:    Confusion      RIDGE BARAJAS MD   [phone]      Subjective  Feels much better today.  No fever. Says sinus symptoms feel better.     Objective    Vital signs in last 24 hours  Temp:  [98.6  F (37  C)-98.8  F (37.1  C)] 98.8  F (37.1  C)  Pulse:  [] 115  Resp:  [16-20] 16  BP: (120-155)/(63-81) 155/79  SpO2:  [97 %-98 %] 97 %  Wt Readings from Last 3 Encounters:   07/17/21 81.5 kg (179 lb 9.6 oz)   04/09/18 98.7 kg (217 lb 11.2 oz)   11/17/17 97.1 kg (214 lb)           Intake/Output last 3 shifts  I/O last 3 completed shifts:  In: 1480 [P.O.:1480]  Out: 3700 [Urine:3700]  Intake/Output this shift:  I/O this shift:  In: -   Out: 1000 [Urine:1000]    Review of Systems   Pertinent items are noted in HPI., otherwise negative.    Physical Exam  General appearance: alert, appears stated age and cooperative  Head: Normocephalic, without obvious abnormality, atraumatic  Eyes: EOMI, no icterus  Neck: no adenopathy and supple, symmetrical, trachea midline  Lungs: normal respiratory pattern  Extremities: Full excoriations on both lower legs. She has a bloody wound on her left great toe.  Skin: No rash, but excoriations as described above--this are improved.   Lymph nodes: Cervical, supraclavicular, and axillary nodes " normal.  Neurologic: Mental status:clear.   Pertinent Labs   Lab Results: personally reviewed.     Recent Labs   Lab 07/20/21  0537 07/17/21  0705 07/16/21  1054   WBC  --  5.1 4.3   HGB  --  9.2* 8.5*   HCT  --  28.9* 26.7*    334 248        Recent Labs   Lab 07/17/21  0705 07/16/21  1054    132*   CO2 19* 21*   BUN 12 14     Collected Updated Procedure Result Status    07/19/2021 1249 07/20/2021 0616 Blood Culture Line, Other [[id number]]   Blood from Line, Other    Preliminary result Component Value   Culture No growth after 12 hours P              07/19/2021 1201 07/20/2021 0616 Blood Culture Arm, Right [[id number]]   Blood from Arm, Right    Preliminary result Component Value   Culture No growth after 12 hours P              07/16/2021 1056 07/16/2021 1139 Asymptomatic COVID-19 Virus (Coronavirus) by PCR Nasopharyngeal [[id number]]    Swab from Nasopharyngeal    Final result Component Value   No component results           07/16/2021 1056 07/16/2021 1139 SARS-COV2 (COVID-19) Virus RT-PCR [[id number]]    Swab from Nasopharyngeal    Final        Pertinent Radiology   Radiology Results:   Procedure Component Value Units Date/Time   CT Chest/Abdomen/Pelvis w Contrast [[id number]] Collected: 07/19/21 1736   Order Status: Completed Updated: 07/19/21 2014   Narrative:     EXAM: CT CHEST/ABDOMEN/PELVIS W CONTRAST   LOCATION: [facility]   DATE/TIME: 7/19/2021 5:36 PM     INDICATION: Sepsis   COMPARISON: 12/9/2020   TECHNIQUE: CT scan of the chest, abdomen, and pelvis was performed following injection of IV contrast. Multiplanar reformats were obtained. Dose reduction techniques were used.   CONTRAST: 100 mL Isovue-370     FINDINGS:   LUNGS AND PLEURA: Stable linear scarring posterior left upper lobe unchanged. Linear scarring peripheral right middle lobe and lateral right upper lobe also unchanged, no new pneumonia. There is a new tiny right pleural effusion.      MEDIASTINUM/AXILLAE: No lymphadenopathy. Normal-sized heart. Port-A-Cath present     CORONARY ARTERY CALCIFICATION: None.     HEPATOBILIARY: Normal.     PANCREAS: Normal.     SPLEEN: Normal.     ADRENAL GLANDS: Normal.     KIDNEYS/BLADDER: There is minimal bilateral hydronephrosis and mild prominence of each ureter. Bladder is distended with estimated volume more than 800 cc consistent with bladder outlet obstruction or neurogenic bladder.     BOWEL: There are a few loops of mildly dilated air and fluid distended loops of jejunum without a definitive site of transition or obstructing focus. This may relate to a mild mid small bowel obstruction or mild ileus. There is no inflammatory bowel wall    thickening.     LYMPH NODES: Normal.     VASCULATURE: Unremarkable.     PELVIC ORGANS: No adnexal lesion or ascites.     MUSCULOSKELETAL: Diffuse mixed lytic/sclerotic bony metastases unchanged. There are prominent bilateral sacral alar fractures with dense bony sclerosis and what appears to be heterotopic ossifications about the sacral alar fractures.     Prior fractures left pubic ring unchanged but there are now small surrounding soft tissue fullness suggesting small hematomas associated with the fractures. There are tiny air bubble seen adjacent to the left inferior pubic ramus fracture within the   abductor musculature these air bubbles reside in a small collection that measures 2.5 x 1 cm (image 452). These air bubbles are new compared to previous exam. There is also a small fluid collection associated with the superior pubic ramus fracture (image    415) measuring 2 x 1.5 cm. In addition, there is also a tiny collection along the undersurface of the left pubic symphysis measuring 2.5 x 1.2 cm (image 425).     There is mild fullness of right paraspinous, erectus spinal muscle at low lumbar spine which appears new, can't exclude myositis or inflammation involving the right paraspinous musculature. No definite  discrete fluid collection evident.    Impression:     IMPRESSION:   1.  Mild new fullness of right paraspinal musculature low lumbar spine raising possibility of myositis. A discrete abscess collection is not evident.   2.  Left obturator ring fracture with small fluid pockets associated with the fractures, one of which also demonstrates a few tiny air bubbles.   3.  Complex bilateral sacral alar fractures with surrounding zones of heterotopic ossification.   4.  Prominent distention of bladder with borderline bilateral hydronephrosis. Suggest determining post void residual.   5.  MRI of pelvis and lower lumbar spine region may be beneficial to assess for paraspinal myositis or fluid collection, as well as to assess the tiny fluid collections associated with left obturator ring fractures.

## 2021-07-21 NOTE — CONSULTS
[facility]     Row Name 07/21/21 0938    Post-session Anxiety  7   07/21/21 0939    Post-session Pain  6   07/21/21 0939    Row Name 07/21/21 0915    Healing Presence  Yes   07/21/21 0939    Essential Oil Method  Topical (EO/Topical Oil);Inhalation (EO/Topical oil)   07/21/21 0939    Essential Oils  Lavender Massage Oil - HC;Support Healing process blend - HC   07/21/21 0939    Healing Music  Patient declined   07/21/21 0939    Reason Declined  Not interested   07/21/21 0939    Massage  Hand;Foot   07/21/21 0939    Intervention Reason  Anxiety/Stress;Pain   07/21/21 0939    Patient Desires Treatment  yes   07/21/21 0939    Pre-Session Anxiety  10   07/21/21 0939    Pre-session Pain  6   07/21/21 0939      \

## 2021-07-21 NOTE — PROGRESS NOTES
"[facility] ACUTE PAIN SERVICE    ([facility], [facility], [facility])   Daily PAIN Progress Note    Assessment/Plan:  Elenita Hardin is a 66 year old female who was admitted on 7/16/2021.   Pain Service was asked to see the patient for cancer associated pain. Admitted for evaluation of altered mental status, fevers. Patient presented disoriented with slurred speech, intermittent combativeness, visual hallucination and frequent falls. From review with spouse patient had taken OxyContin Wed evening and Thursday AM, then slept all day on Thursday.  When she woke up on Thursday evening she was confused. (newly prescribed)  She had also been taking hydromorphone 4 mg every four hours, medical cannabis, vistaril and Ambien.  Medical history Stage IV Breast Cancer with mets to pelvis, femur, vertebrae, ribs and lymph nodes of her left arm (next chemo cycle 4 due 7/27/2021 with Dr. Varma),  Type II diabetes. Infectious Disease is following. Encephalopathy improving with OxyContin stopped, also being treated for pneumonia, possible UTI.  Brain MRI with no acute findings.  Patient identifies difficulty with sciatic type pain since 2018, with pain recently flaring up.  Describes pain as being located in right side of  lumbar spine, going to mid buttocks and down right leg.  Pain minimal at rest, increases with movement.  Pain is sharp gripping pain.  Pain at a \"3\" at time of visit.    Discussed with her starting a long acting pain medication.  She would like to try that.  Also informed her that we can follow up with her Outpatient Pain Provider regarding ongoing pain management.      Patient has used x 2 doses of dilaudid 4 mg = 32 MME.      PLAN:   1) Pain is consistent with cancer associated pain, mediation overdose with addition of long acting opioid.   Stage IV with spread to pelvis, femur, vertebrae, ribs and lymph nodes of her left arm.  Per review of pelvic CT:  MRI of pelvis and lower lumbar " "spine region may be beneficial to assess for paraspinal myositis or fluid collection, as well as to assess the tiny fluid collections associated with left obturator ring fractures, possible myositis per MRI imaging and  Would continue with Dilaudid as needed, would avoid OxyContin, trial low-dose ketamine.  2)Multimodal Medication Therapy  Topical:  Lidocaine ointment 5% qid  NSAID'S: ibuprofen  400 mg q 6 h prn   Muscle Relaxants: None   Adjuvants:  Tyenol 1G TID, ketamine 30 mg po q8h, (just for hospital only)   vistaril 25 mg every 6 hours prn  Antidepressants/anxiolytics: Cymbalta 30 mg q day, Seroquel 12.5 mg q 6 h prn for agitation, haloperidol  Opioids: Methadone 2.5 mg every 8 hours,  Hydromorphone(Dilaudid) 4 mg q 3 h prn   3)Non-medication interventions- rest and turning   4)Constipation Prophylaxis- docusate 200 mg daily, Miralax daily PRN     -Opioid prescriber has been [facility]  -Discharge Recommendations - We recommend prescribing the following at the time of discharge: Follow up with [facility] NP and opioid prescriber. Follow up outpatient with spine clinic to set up repeat epidural injection, which was effective in past.   -Opioid prescriber has been [facility] CNP  -MN  pulled from system on 07/17/21. This indicates current opioid use. Most recent MN :  07/12/21 zolpidem 10 mg #30  06/20/21 Dilaudid 4 mg #120  Not filled Oxycontin 30 mg #60 since 04/13/21 (please dispose of any unused supply)  Has not filled Pregabalin 75 mg since 03/30/2021     Active Problems:    Confusion     LOS: 5 days       Subjective:  Patient reports pain is right hip and \"sciatica\" pain, sharp \"4\" at rest.      acetaminophen  1,000 mg Oral TID     artificial saliva  1 spray Swish & Spit 4x Daily     aspirin  81 mg Oral Daily     calcium carbonate  500 mg Oral Daily     cefdinir  300 mg Oral Q12H GREG     docusate sodium  200 mg Oral Daily     DULoxetine  30 mg Oral QAM     " "enoxaparin ANTICOAGULANT  40 mg Subcutaneous Q24H     ezetimibe  10 mg Oral At Bedtime    And     simvastatin  20 mg Oral QPM     insulin aspart  1-3 Units Subcutaneous TID AC     insulin aspart  1-3 Units Subcutaneous At Bedtime     ketamine  30 mg Oral Q8H     lidocaine   Topical 4x Daily     multivitamin w/minerals  1 tablet Oral Daily     omeprazole  20 mg Oral QAM AC     oxybutynin  5 mg Oral BID     vitamin B complex with vitamin C  1 tablet Oral Daily     Vitamin D3  25 mcg Oral Daily     zolpidem  10 mg Oral At Bedtime       Objective:  Vital signs in last 24 hours:  Temp:  [98.6  F (37  C)-98.8  F (37.1  C)] 98.8  F (37.1  C)  Pulse:  [] 115  Resp:  [16-20] 16  BP: (120-155)/(63-81) 155/79  SpO2:  [97 %-98 %] 97 %  Weight:   Weight change:   Body mass index is 29.89 kg/m .    Intake/Output last 3 shifts:  I/O last 3 completed shifts:  In: 1480 [P.O.:1480]  Out: 3700 [Urine:3700]  Intake/Output this shift:  I/O this shift:  In: -   Out: 1000 [Urine:1000]    Review of Systems:   As per subjective, all others negative.    Physical Exam:    General Appearance:  Alert, cooperative, easily becomes tearful   Head:  Normocephalic, without obvious abnormality, atraumatic   Eyes:  PERRL, conjunctiva/corneas clear, EOM's intact   Nose: Nares normal, septum midline, mucosa normal, no drainage   Throat: Lips, mucosa, and tongue normal; teeth and gums normal   Neck: Supple, symmetrical, trachea midline   Back:   Symmetric, no curvature, limited range of motion   Lungs:   respirations unlabored   Chest Wall:  No tenderness or deformity   Heart:  Regular rate and rhythm   Abdomen:   Soft, non-tender, bowel sounds active all four quadrants,  no masses, no organomegaly   Extremities: Bilateral lower extremity excoriations  \"mosquito bites\"   Skin: Skin color, texture, turgor normal, no rashes or lesions   Neurologic: Alert and oriented X 3, Moves all 4 extremities          Imaging:  Personally Reviewed.  CT " Chest/Abdomen/Pelvis w Contrast    Result Date: 7/19/2021  EXAM: CT CHEST/ABDOMEN/PELVIS W CONTRAST LOCATION: [facility] DATE/TIME: 7/19/2021 5:36 PM INDICATION: Sepsis COMPARISON: 12/9/2020    IMPRESSION: 1.  Mild new fullness of right paraspinal musculature low lumbar spine raising possibility of myositis. A discrete abscess collection is not evident. 2.  Left obturator ring fracture with small fluid pockets associated with the fractures, one of which also demonstrates a few tiny air bubbles. 3.  Complex bilateral sacral alar fractures with surrounding zones of heterotopic ossification. 4.  Prominent distention of bladder with borderline bilateral hydronephrosis. Suggest determining post void residual. 5.  MRI of pelvis and lower lumbar spine region may be beneficial to assess for paraspinal myositis or fluid collection, as well as to assess the tiny fluid collections associated with left obturator ring fractures.    MR Brain w/o Contrast    Result Date: 7/16/2021  EXAM: MR BRAIN W/O CONTRAST LOCATION: [facility] DATE/TIME: 7/16/2021 12:29 PM INDICATION: Confusion. COMPARISON: Head CT 07/16/2021, brain MRI 02/01/2017 TECHNIQUE: Routine multiplanar multisequence head MRI without intravenous contrast. FINDINGS: There is no restricted diffusion. Diffusely diminished T1 signal intensity throughout the calvarium and upper visualized cervical spine consistent with the sclerosis noted on the comparison CT. Paranasal sinuses are free from significant disease. Mastoid  air cells appear free from significant disease. Intraorbital contents are unremarkable. Ventricles are within normal limits in size for the patient's age. Intracranial flow voids are intact. There is no mass effect, midline shift, or extraaxial collection. There are scattered foci of T2/FLAIR hyperintensity within the cerebral white matter that are nonspecific but most commonly reflect the sequela of chronic small  vessel ischemic disease. No evidence for acute or chronic intracranial blood products.     IMPRESSION: 1.  No acute intracranial finding. No evidence for recent ischemia, intracranial hemorrhage, or mass. 2.  A few T2 hyperintense foci in the white matter are nonspecific but most commonly reflect gliosis related to prior ischemic or inflammatory insult. 3.  Presumed osseous metastatic disease within the calvarium and upper visualized cervical spine.    XR Chest 2 Views    Result Date: 7/17/2021  EXAM: XR CHEST 2 VW LOCATION: [facility] DATE/TIME: 7/17/2021 3:31 PM INDICATION: evaluate infiltrates seen on portable CXR for pneumonia vs fluid overload COMPARISON: 7/16/2021     IMPRESSION: Indistinct pulmonary vasculature. Left breast implant projects over the left lung, which increases the appearance of airspace disease. No definite new consolidation. No pleural effusions or pneumothorax. Unchanged cardiac size. Right internal  jugular port tip projects at the cavoatrial junction. Left axillary surgical clips. Lower thoracic vertebral body compression fracture. Bony metastases better seen on prior imaging.    XR Chest Port 1 View    Result Date: 7/16/2021  EXAM: XR CHEST PORT 1 VIEW LOCATION: [facility] DATE/TIME: 7/16/2021 12:20 PM INDICATION: Confusion. Slightly decreased O2 sat. COMPARISON: 04/06/2018.     IMPRESSION: Right IJ chemotherapy port is in place with tip in the high right atrium. Heart and mediastinal size are normal. There is patchy airspace infiltrate throughout the bilateral lungs with diffuse indistinctness of the pulmonary interstitium. These findings may represent pulmonary edema. An inflammatory infectious process is also a consideration. No definite pleural effusion or pneumothorax. Extensive sclerotic osseous lesions are present, compatible with known bony metastases.         XR Foot Left G/E 3 Views    Result Date: 7/16/2021  EXAM: XR FOOT LEFT G/E 3 VIEWS LOCATION:  [facility] DATE/TIME: 7/16/2021 1:02 PM INDICATION: 5th toe trauma COMPARISON: None.     IMPRESSION: Bones are demineralized. There is no definitive evidence of acute fracture, with attention to the fifth toe. Mild, scattered degenerative changes.    Head CT w/o contrast    Result Date: 7/16/2021  EXAM: CT HEAD W/O CONTRAST LOCATION: [facility] DATE/TIME: 7/16/2021 11:12 AM INDICATION: Mental status change, unknown cause. Breast cancer. COMPARISON: Brain MRI 02/01/2017 TECHNIQUE: Routine CT Head without IV contrast. Multiplanar reformats. Dose reduction techniques were used. FINDINGS: INTRACRANIAL CONTENTS: No intracranial hemorrhage, extraaxial collection, or mass effect.  No CT evidence of acute infarct. Mild presumed chronic small vessel ischemic changes. Normal ventricles and sulci. VISUALIZED ORBITS/SINUSES/MASTOIDS: No intraorbital abnormality. No paranasal sinus mucosal disease. No middle ear or mastoid effusion. BONES/SOFT TISSUES: No acute abnormality. Diffusely sclerotic calvarium and upper visualized cervical spine structures.     IMPRESSION: 1.  No acute intracranial injury, hemorrhage, mass, or CT evidence of recent ischemia. 2.  Diffusely sclerotic calvarium presumed to reflect metastatic disease given history of breast cancer.      Lab Results:  Personally Reviewed.   Recent Labs   Lab 07/20/21  0537 07/17/21  0705 07/16/21  1054   WBC  --  5.1 4.3   HGB  --  9.2* 8.5*   HCT  --  28.9* 26.7*    334 248     Recent Labs   Lab 07/17/21  0705 07/16/21  1054    132*   CO2 19* 21*   BUN 12 14   ALBUMIN  --  2.8*   ALKPHOS  --  157*   ALT  --  36   AST  --  23     Recent Labs   Lab 07/16/21  1054   INR 1.45*       Total time spent 25 minutes with greater than 50% in consultation, education and coordination of care.     Also discussed with RN and  MD Estephanie Castanon APRN, CNS-BC, DNP  Acute Care Pain Management Program  [facility] ([place], [place], [place])   With  questions call [phone]  Preference if for [facility]  Click HERE to page Yessenia

## 2021-07-21 NOTE — PROGRESS NOTES
[facility] PROGRESS NOTE      ADMIT DATE: 7/16/2021     FACILITY: [facility]    PCP: Sandi Jones, [phone]    ASSESSMENT AND PLAN:    66 year old female with history significant for metastatic breast cancer, multiple bony lesions with resulting chronic pain, presented in the company of her  with disorientation, slurred speech, intermittent combativeness and visual hallucinations and frequent falls despite using her walker.    Two days prior to admission her pain was increasing in intensity despite being on her routine Dilaudid and medical marijuana and she decided to take one tablet of OxyContin that was prescribed in April 2021 for prn pain breakthrough by  [facility].  The following morning she took another dose of OxyContin with her usual morning meds.  She slept most of the day prior to admission and then woke up at 4 PM disoriented and hallucinating.  Her symptoms persisted and she was brought in for evaluation, she had no reported fever at home but upon arrival to the floor was febrile.    Active Problems:    Confusion      #Acute metabolic encephalopathy: Resolved after stopping OxyContin and starting treatment of pneumonia.  Likely multifactorial secondary to underlying chronic illness, dehydration, possibly early infection and then additional dosing of OxyContin.  Brain MRI shows no acute findings to account for the symptoms.   Appreciate pain team consult.     #Acute on chronic cancer bone pain: Continue oral Dilaudid as previously prescribed.  Resume home dosing medical cannabis.  Will follow patient's pain curve and titrate medications as needed.  Acute pain team trialing ketamine, low-dose, patient tried last pm, not much relief. Pain team started patient on methadone on 7/21 and ketamine discontinued.      #History of left lumbar spine foraminal stenosis with neuropathic pain: responded well to epidural injection. Plan for outpatient followup with spine clinic  "to set up for repeat injection.     #Fever: After she came up to the floor she developed a temperature of 100.7.  UA shows 6 white cells, culture ordered. Procalcitonin is elevated.  Initiated Rocephin and Zithromax to cover the potential both lung and urine bacteria.  Lactate is normal.  Initial portable film with question of infiltrates but repeat 2 view chest film without clear evidence of infiltrate.  Patient with recurrent fever 7/18/2021, underlying immunocompromise state, elevated procalcitonin, elevated CRP we will plan IV antibiotics through Tuesday 7/20 then changing to orals for a total of 10 days therapy. Draw blood culture from peripheral blood and from portacath. Consulted ID-->ID switched patient to PO cefdinir and zithromax on 7/21.     #myositis  -CT abdomen on admission shows: \"There is mild fullness of right paraspinous, erectus spinal muscle at low lumbar spine which appears new, can't exclude myositis or inflammation involving the right paraspinous musculature. No definite discrete fluid collection evident.\" Radiology stated: \"MRI of pelvis and lower lumbar spine region may be beneficial to assess for paraspinal myositis or fluid collection\"  -discussed imaging results with Heme/Onc who recommended pursuing MRI  -MRI pelvis and lower lumbar spine ordered and pending       #Dehydration: Resolved with IV fluids and now able to take p.o. Prior to admission not been eating and drinking very well.     #Metastatic breast cancer.  Stage IV with spread to pelvis, femur, vertebrae, ribs and lymph nodes of her left arm. On 3rd line chemo Doxil 50 mg/m2 IV which is given every 4 weeks until progression or intolerance. Last received cycle 3 of Doxil on 6/29/21. She did not receive Neulasta support for neutropenia prevention with cycle 3. She also receives Zometa 4 mg IV every 4 weeks. She is scheduled for consideration of cycle 4 on 7/27/2021 with Dr. Varma. Dr. Varma from [facility] is her primary " "oncologist.  Pain medications prescribed through [facility].     #Type 2 diabetes, insulin dependent: Initial blood sugar on admission normal range.  Had not been eating well over the previous 2 days prior to admission. Appetite increasing. Blood sugars are elevated now. Start Lantus 5 units subcutaneous at bedtime and c/w low resistance sliding scale insulin for now.      #Depression: Continue home Cymbalta     #Hypokalemia: replace             FEN/GI: diabetic diet  DVT proph: lovenox  Code status: Full Code      Discharge barriers:  -pain control, myositis work-up, Heme/Onc following   -ADOD: 1-2 days       SUBJECTIVE:    Patient complains of right hip pain. She states she neuropathy in both feet at baseline due to her chemo. She feels \"sciatic pain\" in her right hip. She denies any other complaints at this time.     ROS:  12 Points review of systems reviewed and is negative except for what has already been mentioned above    OBJECTIVE:  Patient Vitals for the past 24 hrs:   BP Temp Temp src Pulse Resp SpO2   07/21/21 0041 (!) 150/81 98.7  F (37.1  C) Oral 107 20 98 %   07/20/21 2009 131/75 98.6  F (37  C) Oral 97 20 98 %   07/20/21 1531 120/63 98.6  F (37  C) Oral 89 19 97 %   07/20/21 0945 132/82 -- -- -- -- --   07/20/21 0834 (!) 169/80 97.2  F (36.2  C) Oral 112 18 99 %          Intake/Output Summary (Last 24 hours) at 7/21/2021 0718  Last data filed at 7/21/2021 0400  Gross per 24 hour   Intake 1480 ml   Output 3700 ml   Net -2220 ml     GENRL: Alert and oriented X 3. Not in acute distress. Satting at 97% on room air.   HEENT: NC/AT      Neck- supple      Sclera- anicteric      Mucous membrane- moist and pink  CHEST: Clear to auscultation bilaterally  HEART: S1S2 regular. No murmurs, rubs or gallops  ABDMN: Soft. Non-tender.No guarding or rigidity. Bowel sounds- active  EXTRM: Extremities are warm and non-tender. Non-tender on palpation of right hip and right side of lower back. No pedal edema. "   NEURO: No involuntary movements  INTGM: please see nursing assessment for full skin assessment  PULSES: 2+ and symmetric all extremities  PSYCH: normal affect, normal speech     DIAGNOSTIC DATA:          Recent Results (from the past 24 hour(s))   Glucose by meter    Collection Time: 07/20/21  9:30 AM   Result Value Ref Range    GLUCOSE BY METER POCT 193 (H) 70 - 125 mg/dL   Potassium    Collection Time: 07/20/21  9:41 AM   Result Value Ref Range    Potassium 3.5 3.5 - 5.0 mmol/L   Glucose by meter    Collection Time: 07/20/21 12:16 PM   Result Value Ref Range    GLUCOSE BY METER POCT 234 (H) 70 - 125 mg/dL   Glucose by meter    Collection Time: 07/20/21  5:19 PM   Result Value Ref Range    GLUCOSE BY METER POCT 269 (H) 70 - 125 mg/dL   Glucose by meter    Collection Time: 07/20/21  8:45 PM   Result Value Ref Range    GLUCOSE BY METER POCT 197 (H) 70 - 125 mg/dL        Results for orders placed or performed during the hospital encounter of 07/16/21   Head CT w/o contrast    Impression    IMPRESSION:  1.  No acute intracranial injury, hemorrhage, mass, or CT evidence of recent ischemia.  2.  Diffusely sclerotic calvarium presumed to reflect metastatic disease given history of breast cancer.   MR Brain w/o Contrast    Impression    IMPRESSION:  1.  No acute intracranial finding. No evidence for recent ischemia, intracranial hemorrhage, or mass.  2.  A few T2 hyperintense foci in the white matter are nonspecific but most commonly reflect gliosis related to prior ischemic or inflammatory insult.  3.  Presumed osseous metastatic disease within the calvarium and upper visualized cervical spine.   XR Chest Port 1 View    Impression    IMPRESSION: Right IJ chemotherapy port is in place with tip in the high right atrium. Heart and mediastinal size are normal. There is patchy airspace infiltrate throughout the bilateral lungs with diffuse indistinctness of the pulmonary interstitium.   These findings may represent pulmonary  edema. An inflammatory infectious process is also a consideration. No definite pleural effusion or pneumothorax. Extensive sclerotic osseous lesions are present, compatible with known bony metastases.                  XR Foot Left G/E 3 Views    Impression    IMPRESSION: Bones are demineralized. There is no definitive evidence of acute fracture, with attention to the fifth toe. Mild, scattered degenerative changes.   XR Chest 2 Views    Impression    IMPRESSION: Indistinct pulmonary vasculature. Left breast implant projects over the left lung, which increases the appearance of airspace disease. No definite new consolidation. No pleural effusions or pneumothorax. Unchanged cardiac size. Right internal   jugular port tip projects at the cavoatrial junction. Left axillary surgical clips. Lower thoracic vertebral body compression fracture. Bony metastases better seen on prior imaging.   CT Chest/Abdomen/Pelvis w Contrast    Impression    IMPRESSION:  1.  Mild new fullness of right paraspinal musculature low lumbar spine raising possibility of myositis. A discrete abscess collection is not evident.  2.  Left obturator ring fracture with small fluid pockets associated with the fractures, one of which also demonstrates a few tiny air bubbles.  3.  Complex bilateral sacral alar fractures with surrounding zones of heterotopic ossification.  4.  Prominent distention of bladder with borderline bilateral hydronephrosis. Suggest determining post void residual.  5.  MRI of pelvis and lower lumbar spine region may be beneficial to assess for paraspinal myositis or fluid collection, as well as to assess the tiny fluid collections associated with left obturator ring fractures.          All recent labs reviewed personally  Radiology report reviewed.         The total time spent in preparing this progress note is about 40 minutes, >50% time spent in care co-ordination that includes reviewing labs, images, discussing the plan of care  with patient/family, consultants, and .      Cherri Gonzalez MD.   [facility]   [phone]   Pager [phone]

## 2021-07-21 NOTE — PROGRESS NOTES
"Diabetes Care Screen Note  Situation:  Diabetes blood glucose screen    Background:  Noted blood sugars remain elevated above hospital goal of < 180 for improved outcomes.  Home PTA diabetes meds:  Basaglar every evening but not the 55 units listed in PTA per , less but ? Dose  Humalog at meals 1:5 I:C ratio  Current Inpatient diabetes meds:  Very sensitive correction scale, 1 unit to decrease every 100 mg/dl over 140  Values;  A1C: 6.3 but Hgb 8.5           GFR:>90        BMI:29.89       Intake documented 100%    Assessment: Note blood sugars starting to rise above hospital goal of < 180    Recommendations:  Sole use of sliding scale insulin in the inpatient hospital setting is strongly discouraged.  An insulin regimen with basal, nutritional, and correction components is the preferred treatment for non critically ill hospitalized patient with good nutritional intake.    See \"Guidelines for Insulin Initiation and Care in Hospitalized Adults\" link in the Diabetes Management order set for dosing guidelines    Hospital BG goals < 180 for improved outcomes. There is impaired immune function at BG levels above 180 mg/dl.    Thanks.     Dominga Cutler RN, Certified Diabetes Care and   [address] [address]  [email or web address]  [email or web address]   Office: [phone]  Pager: [phone]                                    "

## 2021-07-22 LAB
C REACTIVE PROTEIN LHE: 30.8 MG/DL (ref 0–0.8)
CK SERPL-CCNC: 77 U/L (ref 30–190)
CREAT SERPL-MCNC: 0.57 MG/DL (ref 0.6–1.1)
GFR SERPL CREATININE-BSD FRML MDRD: >90 ML/MIN/1.73M2
GLUCOSE BLDC GLUCOMTR-MCNC: 167 MG/DL (ref 70–125)
GLUCOSE BLDC GLUCOMTR-MCNC: 171 MG/DL (ref 70–125)
GLUCOSE BLDC GLUCOMTR-MCNC: 179 MG/DL (ref 70–125)
GLUCOSE BLDC GLUCOMTR-MCNC: 271 MG/DL (ref 70–125)
GLUCOSE BLDC GLUCOMTR-MCNC: 287 MG/DL (ref 70–125)
HOLD SPECIMEN: NORMAL
MAGNESIUM SERPL-MCNC: 1.8 MG/DL (ref 1.8–2.6)
MAGNESIUM SERPL-MCNC: 1.9 MG/DL (ref 1.8–2.6)
POTASSIUM BLD-SCNC: 3.3 MMOL/L (ref 3.5–5)
POTASSIUM BLD-SCNC: 3.5 MMOL/L (ref 3.5–5)

## 2021-07-22 PROCEDURE — 250N000013 HC RX MED GY IP 250 OP 250 PS 637: Performed by: EMERGENCY MEDICINE

## 2021-07-22 PROCEDURE — 99232 SBSQ HOSP IP/OBS MODERATE 35: CPT

## 2021-07-22 PROCEDURE — 83735 ASSAY OF MAGNESIUM: CPT | Performed by: FAMILY MEDICINE

## 2021-07-22 PROCEDURE — 120N000001 HC R&B MED SURG/OB

## 2021-07-22 PROCEDURE — 250N000012 HC RX MED GY IP 250 OP 636 PS 637

## 2021-07-22 PROCEDURE — 36415 COLL VENOUS BLD VENIPUNCTURE: CPT | Performed by: FAMILY MEDICINE

## 2021-07-22 PROCEDURE — 99233 SBSQ HOSP IP/OBS HIGH 50: CPT | Performed by: FAMILY MEDICINE

## 2021-07-22 PROCEDURE — 250N000013 HC RX MED GY IP 250 OP 250 PS 637: Performed by: INTERNAL MEDICINE

## 2021-07-22 PROCEDURE — 250N000013 HC RX MED GY IP 250 OP 250 PS 637

## 2021-07-22 PROCEDURE — 99232 SBSQ HOSP IP/OBS MODERATE 35: CPT | Performed by: CLINICAL NURSE SPECIALIST

## 2021-07-22 PROCEDURE — 250N000013 HC RX MED GY IP 250 OP 250 PS 637: Performed by: FAMILY MEDICINE

## 2021-07-22 PROCEDURE — 86141 C-REACTIVE PROTEIN HS: CPT

## 2021-07-22 PROCEDURE — 250N000013 HC RX MED GY IP 250 OP 250 PS 637: Performed by: CLINICAL NURSE SPECIALIST

## 2021-07-22 PROCEDURE — 82550 ASSAY OF CK (CPK): CPT

## 2021-07-22 PROCEDURE — 84132 ASSAY OF SERUM POTASSIUM: CPT | Performed by: FAMILY MEDICINE

## 2021-07-22 PROCEDURE — 250N000013 HC RX MED GY IP 250 OP 250 PS 637: Performed by: PAIN MEDICINE

## 2021-07-22 PROCEDURE — 82565 ASSAY OF CREATININE: CPT | Performed by: INTERNAL MEDICINE

## 2021-07-22 PROCEDURE — 250N000012 HC RX MED GY IP 250 OP 636 PS 637: Performed by: FAMILY MEDICINE

## 2021-07-22 RX ORDER — POTASSIUM CHLORIDE 1500 MG/1
40 TABLET, EXTENDED RELEASE ORAL ONCE
Status: COMPLETED | OUTPATIENT
Start: 2021-07-22 | End: 2021-07-22

## 2021-07-22 RX ADMIN — SIMVASTATIN 20 MG: 10 TABLET, FILM COATED ORAL at 20:39

## 2021-07-22 RX ADMIN — METHADONE HYDROCHLORIDE 2.5 MG: 5 TABLET ORAL at 21:56

## 2021-07-22 RX ADMIN — ACETAMINOPHEN 975 MG: 325 TABLET ORAL at 08:24

## 2021-07-22 RX ADMIN — Medication 1 TABLET: at 08:24

## 2021-07-22 RX ADMIN — Medication 1 SPRAY: at 20:42

## 2021-07-22 RX ADMIN — OXYBUTYNIN CHLORIDE 5 MG: 5 TABLET ORAL at 08:24

## 2021-07-22 RX ADMIN — POTASSIUM CHLORIDE 40 MEQ: 1500 TABLET, EXTENDED RELEASE ORAL at 10:11

## 2021-07-22 RX ADMIN — CEFDINIR 300 MG: 300 CAPSULE ORAL at 20:41

## 2021-07-22 RX ADMIN — ZOLPIDEM TARTRATE 10 MG: 5 TABLET ORAL at 20:40

## 2021-07-22 RX ADMIN — METHADONE HYDROCHLORIDE 2.5 MG: 5 TABLET ORAL at 14:02

## 2021-07-22 RX ADMIN — DULOXETINE HYDROCHLORIDE 30 MG: 30 CAPSULE, DELAYED RELEASE ORAL at 08:24

## 2021-07-22 RX ADMIN — AZITHROMYCIN MONOHYDRATE 250 MG: 250 TABLET ORAL at 08:24

## 2021-07-22 RX ADMIN — METHADONE HYDROCHLORIDE 2.5 MG: 5 TABLET ORAL at 06:44

## 2021-07-22 RX ADMIN — LIDOCAINE: 50 OINTMENT TOPICAL at 17:07

## 2021-07-22 RX ADMIN — OXYBUTYNIN CHLORIDE 5 MG: 5 TABLET ORAL at 20:39

## 2021-07-22 RX ADMIN — INSULIN ASPART 1 UNITS: 100 INJECTION, SOLUTION INTRAVENOUS; SUBCUTANEOUS at 08:24

## 2021-07-22 RX ADMIN — CALCIUM CARBONATE (ANTACID) CHEW TAB 500 MG 500 MG: 500 CHEW TAB at 08:24

## 2021-07-22 RX ADMIN — IBUPROFEN 400 MG: 400 TABLET, FILM COATED ORAL at 17:26

## 2021-07-22 RX ADMIN — DOCUSATE SODIUM 200 MG: 100 CAPSULE, LIQUID FILLED ORAL at 08:24

## 2021-07-22 RX ADMIN — ACETAMINOPHEN 975 MG: 325 TABLET ORAL at 14:02

## 2021-07-22 RX ADMIN — ACETAMINOPHEN 975 MG: 325 TABLET ORAL at 20:39

## 2021-07-22 RX ADMIN — CEFDINIR 300 MG: 300 CAPSULE ORAL at 08:24

## 2021-07-22 RX ADMIN — INSULIN GLARGINE 8 UNITS: 100 INJECTION, SOLUTION SUBCUTANEOUS at 20:40

## 2021-07-22 RX ADMIN — INSULIN ASPART 1 UNITS: 100 INJECTION, SOLUTION INTRAVENOUS; SUBCUTANEOUS at 21:54

## 2021-07-22 RX ADMIN — OMEPRAZOLE 20 MG: 20 CAPSULE, DELAYED RELEASE ORAL at 06:44

## 2021-07-22 RX ADMIN — Medication 25 MCG: at 08:24

## 2021-07-22 RX ADMIN — HYDROMORPHONE HYDROCHLORIDE 4 MG: 4 TABLET ORAL at 06:52

## 2021-07-22 RX ADMIN — EZETIMIBE 10 MG: 10 TABLET ORAL at 20:41

## 2021-07-22 RX ADMIN — INSULIN ASPART 1 UNITS: 100 INJECTION, SOLUTION INTRAVENOUS; SUBCUTANEOUS at 17:31

## 2021-07-22 RX ADMIN — LIDOCAINE: 50 OINTMENT TOPICAL at 20:42

## 2021-07-22 NOTE — PROGRESS NOTES
"Infectious Disease Progress Note    Assessment/Plan     Impression: Fevers, multiple potential etiologies. Fevers resolved     Initial presentation of encephalopathy which has improved.     Had a Port-A-Cath in for couple years, but seems to be working okay.     Has multiple excoriations on both legs that she says are \"from mosquito bites\" none are grossly purulent.  Improved.      Has distended abdomen.  CT shows old fractures and some fluid around fractures. MRI confirms fluid--unclear if abscess or cancer    Says her longstanding sinus symptoms are improved.      Recommendations: continue cefdinir and azithromycin    Agree with aspiration of fluid per Dr JACKSON.  Send for culture and cytology.     Active Problems:    Confusion      RIDGE BARAJAS MD   [phone]      Subjective  Feels much better today. Still has hip pain.     Objective    Vital signs in last 24 hours  Temp:  [97.9  F (36.6  C)-98.4  F (36.9  C)] 98.1  F (36.7  C)  Pulse:  [] 113  Resp:  [18-22] 18  BP: (130-156)/(61-73) 156/73  SpO2:  [97 %-100 %] 97 %  Wt Readings from Last 3 Encounters:   07/17/21 81.5 kg (179 lb 9.6 oz)   04/09/18 98.7 kg (217 lb 11.2 oz)   11/17/17 97.1 kg (214 lb)           Intake/Output last 3 shifts  I/O last 3 completed shifts:  In: 2160 [P.O.:2160]  Out: 7780 [Urine:7780]  Intake/Output this shift:  I/O this shift:  In: 940 [P.O.:940]  Out: 1600 [Urine:1600]    Review of Systems   Pertinent items are noted in HPI., otherwise negative.    Physical Exam  General appearance: alert, appears stated age and cooperative  Head: Normocephalic, without obvious abnormality, atraumatic  Eyes: EOMI, no icterus  Neck: no adenopathy and supple, symmetrical, trachea midline  Lungs: normal respiratory pattern  Extremities: Full excoriations on both lower legs. She has a bloody wound on her left great toe and left fifth toe--improving  Skin: No rash, but excoriations as described above--this are improved.   Lymph nodes: " Cervical, supraclavicular, and axillary nodes normal.  Neurologic: Mental status:clear.   Pertinent Labs   Lab Results: personally reviewed.     Recent Labs   Lab 07/20/21  0537 07/17/21  0705 07/16/21  1054   WBC  --  5.1 4.3   HGB  --  9.2* 8.5*   HCT  --  28.9* 26.7*    334 248      Results for JULES ROPER (MRN [id number]) as of 7/22/2021 13:51   Ref. Range 7/18/2021 07:22 7/20/2021 02:06 7/22/2021 07:03   CRP Latest Ref Range: 0.0-<0.8 mg/dL 31.7 (H) 35.4 (H) 30.8 (H)     Recent Labs   Lab 07/17/21  0705 07/16/21  1054    132*   CO2 19* 21*   BUN 12 14     Collected Updated Procedure Result Status    07/19/2021 1249 07/20/2021 0616 Blood Culture Line, Other [[id number]]   Blood from Line, Other    Preliminary result Component Value   Culture No growth after 12 hours P              07/19/2021 1201 07/20/2021 0616 Blood Culture Arm, Right [[id number]]   Blood from Arm, Right    Preliminary result Component Value   Culture No growth after 12 hours P              07/16/2021 1056 07/16/2021 1139 Asymptomatic COVID-19 Virus (Coronavirus) by PCR Nasopharyngeal [[id number]]    Swab from Nasopharyngeal    Final result Component Value   No component results           07/16/2021 1056 07/16/2021 1139 SARS-COV2 (COVID-19) Virus RT-PCR [[id number]]    Swab from Nasopharyngeal    Final        Pertinent Radiology   Radiology Results:   Procedure Component Value Units Date/Time   CT Chest/Abdomen/Pelvis w Contrast [[id number]] Collected: 07/19/21 1736   Order Status: Completed Updated: 07/19/21 2014   Narrative:     EXAM: CT CHEST/ABDOMEN/PELVIS W CONTRAST   LOCATION: [facility]   DATE/TIME: 7/19/2021 5:36 PM     INDICATION: Sepsis   COMPARISON: 12/9/2020   TECHNIQUE: CT scan of the chest, abdomen, and pelvis was performed following injection of IV contrast. Multiplanar reformats were obtained. Dose reduction techniques were used.   CONTRAST: 100 mL Isovue-370      FINDINGS:   LUNGS AND PLEURA: Stable linear scarring posterior left upper lobe unchanged. Linear scarring peripheral right middle lobe and lateral right upper lobe also unchanged, no new pneumonia. There is a new tiny right pleural effusion.     MEDIASTINUM/AXILLAE: No lymphadenopathy. Normal-sized heart. Port-A-Cath present     CORONARY ARTERY CALCIFICATION: None.     HEPATOBILIARY: Normal.     PANCREAS: Normal.     SPLEEN: Normal.     ADRENAL GLANDS: Normal.     KIDNEYS/BLADDER: There is minimal bilateral hydronephrosis and mild prominence of each ureter. Bladder is distended with estimated volume more than 800 cc consistent with bladder outlet obstruction or neurogenic bladder.     BOWEL: There are a few loops of mildly dilated air and fluid distended loops of jejunum without a definitive site of transition or obstructing focus. This may relate to a mild mid small bowel obstruction or mild ileus. There is no inflammatory bowel wall    thickening.     LYMPH NODES: Normal.     VASCULATURE: Unremarkable.     PELVIC ORGANS: No adnexal lesion or ascites.     MUSCULOSKELETAL: Diffuse mixed lytic/sclerotic bony metastases unchanged. There are prominent bilateral sacral alar fractures with dense bony sclerosis and what appears to be heterotopic ossifications about the sacral alar fractures.     Prior fractures left pubic ring unchanged but there are now small surrounding soft tissue fullness suggesting small hematomas associated with the fractures. There are tiny air bubble seen adjacent to the left inferior pubic ramus fracture within the   abductor musculature these air bubbles reside in a small collection that measures 2.5 x 1 cm (image 452). These air bubbles are new compared to previous exam. There is also a small fluid collection associated with the superior pubic ramus fracture (image    415) measuring 2 x 1.5 cm. In addition, there is also a tiny collection along the undersurface of the left pubic symphysis  measuring 2.5 x 1.2 cm (image 425).     There is mild fullness of right paraspinous, erectus spinal muscle at low lumbar spine which appears new, can't exclude myositis or inflammation involving the right paraspinous musculature. No definite discrete fluid collection evident.    Impression:     IMPRESSION:   1.  Mild new fullness of right paraspinal musculature low lumbar spine raising possibility of myositis. A discrete abscess collection is not evident.   2.  Left obturator ring fracture with small fluid pockets associated with the fractures, one of which also demonstrates a few tiny air bubbles.   3.  Complex bilateral sacral alar fractures with surrounding zones of heterotopic ossification.   4.  Prominent distention of bladder with borderline bilateral hydronephrosis. Suggest determining post void residual.   5.  MRI of pelvis and lower lumbar spine region may be beneficial to assess for paraspinal myositis or fluid collection, as well as to assess the tiny fluid collections associated with left obturator ring fractures.

## 2021-07-22 NOTE — SIGNIFICANT EVENT
Significant Event Note    Time of event: 11:30 PM July 21, 2021    Description of event:  Radiology reported abnormal MRI, will follow full report.  Essentially extensive metastatic disease and intramuscular drainable abscesses.  Patient has been afebrile, WBC is normal and procalcitonin was decreasing while on azithromycin and ceftriaxone.  She has been followed by infectious disease in which she was switched to cefdinir and oral azithromycin yesterday.    Plan:  Consult IR for drains placement  Hold aspirin and Lovenox    Discussed with: Radiology    Jeremiah Vergara MD

## 2021-07-22 NOTE — PLAN OF CARE
Problem: Adult Inpatient Plan of Care  Goal: Readiness for Transition of Care  Outcome: No Change  Flowsheets (Taken 7/22/2021 1428)  Anticipated Changes Related to Illness: inability to care for self  Concerns to be Addressed: patient refuses services  Intervention: Mutually Develop Transition Plan  Recent Flowsheet Documentation  Taken 7/22/2021 1428 by Susan Solomon RN  Anticipated Changes Related to Illness: inability to care for self  Concerns to be Addressed: patient refuses services     Problem: Adult Inpatient Plan of Care  Goal: Plan of Care Review  Outcome: Improving  Flowsheets (Taken 7/22/2021 1428)  Plan of Care Reviewed With:   spouse   patient  Outcome Summary: pt reports better pain level today-no prns given- refused to work with Pt/OT. to have drain placed for abcess- CT stated will be done tomorrow- NPO after MN.  Progress: improving

## 2021-07-22 NOTE — PLAN OF CARE
Problem: Adult Inpatient Plan of Care  Goal: Optimal Comfort and Wellbeing  Outcome: No Change     Slept intermittently. Purewick in place. Increased pain to R hip this am, PRN dilaudid given x1.

## 2021-07-22 NOTE — PROGRESS NOTES
[facility] ACUTE PAIN SERVICE    ([facility], [facility], [facility])   Daily PAIN Progress Note    Assessment/Plan:  Elenita Hardin is a 66 year old female who was admitted on 7/16/2021.   Pain Service was asked to see the patient for cancer associated pain. Admitted for evaluation of altered mental status, fevers. Patient presented disoriented with slurred speech, intermittent combativeness, visual hallucination and frequent falls. From review with spouse patient had taken OxyContin Wed evening and Thursday AM, then slept all day on Thursday.  When she woke up on Thursday evening she was confused. (newly prescribed)  She had also been taking hydromorphone 4 mg every four hours, medical cannabis, vistaril and Ambien.  Medical history Stage IV Breast Cancer with mets to pelvis, femur, vertebrae, ribs and lymph nodes of her left arm (next chemo cycle 4 due 7/27/2021 with Dr. Varma),  Type II diabetes. Infectious Disease is following. Encephalopathy improving with OxyContin stopped, also being treated for pneumonia, possible UTI.  Brain MRI with no acute findings.  Patient identifies difficulty with sciatic type pain since 2018, with pain recently flaring up, has old sacral fractures and paraspinal myositis, hem/onc recommended a MRI of the lumbar and pelvis for better assessment.     Abnormal Lumbar MRI reported 7/21/2021: 1.  Diffuse sclerotic metastatic disease of thoracic spine, lumbar spine, sacrum and pelvis with no evidence of a pathologic fracture in the lumbar spine. 2.  No evidence of canal compromise or neural foraminal narrowing in the lumbar region. 3.  Prominent metastatic involvement of sacrum and sacral alar regions, sacroiliac joints with extraosseous soft tissue extension in these regions. Please refer to MRI of the pelvis for further evaluation. 4.  Probable extra osseous extension of metastasis into the right sacral neural foramen along with enhancement of the cauda  equina from L4 to the sacrum and thickening and enhancement of the thecal sac. 5.  Bony destruction and fluid within the sacroiliac joints bilaterally highly suggestive of an inflammatory/infectious etiology. 6.  Multiple abscess formations right greater than left of posterior musculature from L4 to  mid sacral region. Probable involvement of the iliacus muscles bilaterally right greater than left. These findings were communicated by phone to Dr. Vergara at 11:30 PM on 07/21/2021.  Plan for drain placement tomorrow.       Patient has used x 3 doses of dilaudid 4 mg and x 3 doses of methadone 2.5 mg = 12 mg dilaudid po + methadone 7.5 mg/day = approx 70.5 - 85.5 MME.     Patient feels the methadone is helping with the pain, not requiring as much breakthrough pain medication today.     PLAN:   1) Pain is consistent with cancer associated pain, mediation overdose with addition of long acting opioid.   Stage IV with spread to pelvis, femur, vertebrae, ribs and lymph nodes of her left arm.  Per review of pelvic CT:  MRI of pelvis and lower lumbar spine region may be beneficial to assess for paraspinal myositis or fluid collection, as well as to assess the tiny fluid collections associated with left obturator ring fractures, possible myositis per MRI imaging   2)Multimodal Medication Therapy  Topical:  Lidocaine ointment 5% qid, artificial saliva x 1 spray QID   NSAID'S: ibuprofen  400 mg q 6 h prn   Muscle Relaxants: None   Adjuvants:  Tyenol 975 mg po TID, cymbalta 30 mg po qam, vistaril 25 mg every 6 hours prn, self administered medical cannabis, benzotropine 1 mg po tid prn extrapyramidal effects  Antidepressants/anxiolytics: Cymbalta 30 mg q day, Seroquel 12.5 mg q 6 h prn for agitation, haloperidol  Opioids: Methadone 2.5 mg every 8 hours,  Hydromorphone(Dilaudid) 4 mg q 3 h prn   3)Non-medication interventions- rest and turning   4)Constipation Prophylaxis- docusate 200 mg daily, Miralax daily PRN     -Opioid  prescriber has been [facility]  -Discharge Recommendations - We recommend prescribing the following at the time of discharge: Follow up with [facility] care NP and opioid prescriber. Follow up outpatient with spine clinic to set up repeat epidural injection, which was effective in past.   -Opioid prescriber has been [facility]  -[place]  pulled from system on 07/17/21. This indicates current opioid use. Most recent MN :  07/12/21 zolpidem 10 mg #30  06/20/21 Dilaudid 4 mg #120  Not filled Oxycontin 30 mg #60 since 04/13/21 (please dispose of any unused supply)  Has not filled Pregabalin 75 mg since 03/30/2021     Active Problems:    Confusion     LOS: 6 days       Subjective:  Patient reports pain is right buttock sharp, increases with movement. States that acupuncture was helpful for pain and relaxation      acetaminophen  975 mg Oral TID     artificial saliva  1 spray Swish & Spit 4x Daily     [Held by provider] aspirin  81 mg Oral Daily     azithromycin  250 mg Oral Daily     B-Complex/B-12  1 tablet Oral Daily     calcium carbonate  500 mg Oral Daily     cefdinir  300 mg Oral Q12H GREG     docusate sodium  200 mg Oral Daily     DULoxetine  30 mg Oral QAM     [Held by provider] enoxaparin ANTICOAGULANT  40 mg Subcutaneous Q24H     ezetimibe  10 mg Oral At Bedtime    And     simvastatin  20 mg Oral QPM     insulin aspart  1-3 Units Subcutaneous TID AC     insulin aspart  1-3 Units Subcutaneous At Bedtime     insulin glargine  5 Units Subcutaneous At Bedtime     lidocaine   Topical 4x Daily     medical cannabis  1 Dose Other See Admin Instructions     methadone  2.5 mg Oral Q8H     multivitamin w/minerals  1 tablet Oral Daily     omeprazole  20 mg Oral QAM AC     oxybutynin  5 mg Oral BID     potassium chloride  40 mEq Oral Once     Vitamin D3  25 mcg Oral Daily     zolpidem  10 mg Oral At Bedtime       Objective:  Vital signs in last 24 hours:  Temp:  [97.9  F (36.6  C)-98.4   F (36.9  C)] 98.1  F (36.7  C)  Pulse:  [] 113  Resp:  [18-22] 18  BP: (130-156)/(61-73) 156/73  SpO2:  [97 %-100 %] 97 %  Weight:   Weight change:   Body mass index is 29.89 kg/m .    Intake/Output last 3 shifts:  I/O last 3 completed shifts:  In: 2160 [P.O.:2160]  Out: 7780 [Urine:7780]  Intake/Output this shift:  I/O this shift:  In: 520 [P.O.:520]  Out: 1000 [Urine:1000]    Review of Systems:   As per subjective, all others negative.    Physical Exam:    General Appearance:  Alert, cooperative, easily becomes tearful   Head:  Normocephalic, without obvious abnormality, atraumatic   Eyes:  PERRL, conjunctiva/corneas clear, EOM's intact   Nose: Nares normal, septum midline, mucosa normal, no drainage   Throat: Lips, mucosa, and tongue normal; teeth and gums normal   Neck: Supple, symmetrical, trachea midline   Back:   Symmetric, no curvature, ROM normal, no CVA tenderness   Lungs:   respirations unlabored   Chest Wall:  No tenderness or deformity   Heart:  Regular rate and rhythm   Abdomen:   Soft, non-tender, bowel sounds    Extremities: Extremities normal, atraumatic, no cyanosis or edema   Skin: Skin color, texture, turgor normal, no rashes or lesions   Neurologic: Alert and oriented X 3, Moves all 4 extremities          Imaging:  Personally Reviewed.  CT Chest/Abdomen/Pelvis w Contrast    Result Date: 7/19/2021  EXAM: CT CHEST/ABDOMEN/PELVIS W CONTRAST LOCATION: [facility] DATE/TIME: 7/19/2021 5:36 PM INDICATION: Sepsis COMPARISON: 12/9/2020 TECHNIQUE: CT scan of the chest, abdomen, and pelvis was performed following injection of IV contrast. Multiplanar reformats were obtained. Dose reduction techniques were used. CONTRAST: 100 mL Isovue-370 FINDINGS: LUNGS AND PLEURA: Stable linear scarring posterior left upper lobe unchanged. Linear scarring peripheral right middle lobe and lateral right upper lobe also unchanged, no new pneumonia. There is a new tiny right pleural  effusion. MEDIASTINUM/AXILLAE: No lymphadenopathy. Normal-sized heart. Port-A-Cath present CORONARY ARTERY CALCIFICATION: None. HEPATOBILIARY: Normal. PANCREAS: Normal. SPLEEN: Normal. ADRENAL GLANDS: Normal. KIDNEYS/BLADDER: There is minimal bilateral hydronephrosis and mild prominence of each ureter. Bladder is distended with estimated volume more than 800 cc consistent with bladder outlet obstruction or neurogenic bladder. BOWEL: There are a few loops of mildly dilated air and fluid distended loops of jejunum without a definitive site of transition or obstructing focus. This may relate to a mild mid small bowel obstruction or mild ileus. There is no inflammatory bowel wall  thickening. LYMPH NODES: Normal. VASCULATURE: Unremarkable. PELVIC ORGANS: No adnexal lesion or ascites. MUSCULOSKELETAL: Diffuse mixed lytic/sclerotic bony metastases unchanged. There are prominent bilateral sacral alar fractures with dense bony sclerosis and what appears to be heterotopic ossifications about the sacral alar fractures. Prior fractures left pubic ring unchanged but there are now small surrounding soft tissue fullness suggesting small hematomas associated with the fractures. There are tiny air bubble seen adjacent to the left inferior pubic ramus fracture within the abductor musculature these air bubbles reside in a small collection that measures 2.5 x 1 cm (image [id number]). These air bubbles are new compared to previous exam. There is also a small fluid collection associated with the superior pubic ramus fracture (image  415) measuring 2 x 1.5 cm. In addition, there is also a tiny collection along the undersurface of the left pubic symphysis measuring 2.5 x 1.2 cm (image 425). There is mild fullness of right paraspinous, erectus spinal muscle at low lumbar spine which appears new, can't exclude myositis or inflammation involving the right paraspinous musculature. No definite discrete fluid collection evident.     IMPRESSION: 1.   Mild new fullness of right paraspinal musculature low lumbar spine raising possibility of myositis. A discrete abscess collection is not evident. 2.  Left obturator ring fracture with small fluid pockets associated with the fractures, one of which also demonstrates a few tiny air bubbles. 3.  Complex bilateral sacral alar fractures with surrounding zones of heterotopic ossification. 4.  Prominent distention of bladder with borderline bilateral hydronephrosis. Suggest determining post void residual. 5.  MRI of pelvis and lower lumbar spine region may be beneficial to assess for paraspinal myositis or fluid collection, as well as to assess the tiny fluid collections associated with left obturator ring fractures.    MR Brain w/o Contrast    Result Date: 7/16/2021  EXAM: MR BRAIN W/O CONTRAST LOCATION: [facility] DATE/TIME: 7/16/2021 12:29 PM INDICATION: Confusion. COMPARISON: Head CT 07/16/2021, brain MRI 02/01/2017 TECHNIQUE: Routine multiplanar multisequence head MRI without intravenous contrast. FINDINGS: There is no restricted diffusion. Diffusely diminished T1 signal intensity throughout the calvarium and upper visualized cervical spine consistent with the sclerosis noted on the comparison CT. Paranasal sinuses are free from significant disease. Mastoid  air cells appear free from significant disease. Intraorbital contents are unremarkable. Ventricles are within normal limits in size for the patient's age. Intracranial flow voids are intact. There is no mass effect, midline shift, or extraaxial collection. There are scattered foci of T2/FLAIR hyperintensity within the cerebral white matter that are nonspecific but most commonly reflect the sequela of chronic small vessel ischemic disease. No evidence for acute or chronic intracranial blood products.     IMPRESSION: 1.  No acute intracranial finding. No evidence for recent ischemia, intracranial hemorrhage, or mass. 2.  A few T2 hyperintense foci in  the white matter are nonspecific but most commonly reflect gliosis related to prior ischemic or inflammatory insult. 3.  Presumed osseous metastatic disease within the calvarium and upper visualized cervical spine.    XR Chest 2 Views    Result Date: 7/17/2021  EXAM: XR CHEST 2 VW LOCATION: [facility] DATE/TIME: 7/17/2021 3:31 PM INDICATION: evaluate infiltrates seen on portable CXR for pneumonia vs fluid overload COMPARISON: 7/16/2021     IMPRESSION: Indistinct pulmonary vasculature. Left breast implant projects over the left lung, which increases the appearance of airspace disease. No definite new consolidation. No pleural effusions or pneumothorax. Unchanged cardiac size. Right internal  jugular port tip projects at the cavoatrial junction. Left axillary surgical clips. Lower thoracic vertebral body compression fracture. Bony metastases better seen on prior imaging.    MR Pelvis (Intrapelvic Organs) wo&w Contrast    Result Date: 7/22/2021  EXAM: MRI PELVIS WITHOUT AND WITH IV CONTRAST LOCATION: [facility] DATE/TIME: 7/21/2021 9:34 PM INDICATION: Soft tissue infection suspected, pelvis, xray done. Metastatic disease. Paraspinal abscesses. COMPARISON: Lumbar spine MRI today. CT pelvis 7/19/2021. TECHNIQUE: Routine MRI pelvis without and with IV contrast. Axial, sagittal, and coronal high-resolution T2 and post gadolinium T1 with fat saturation. CONTRAST: 8ml gadavist FINDINGS: There are 2 discrete fluid collections at the margins of the left pubic ring fractures suspicious for abscesses with edema and enhancement surrounding complex fluid collections. Both collections are somewhat dumbbell shaped; the more inferior collection adjacent to fracture inferior pubic ramus has maximal dimensions of approximately 4.5 x 1.5 cm with its largest loculation (2.5 x 1.3 cm) along the lateral aspect of the fractured bone within the abductor musculature; the more cephalad collection is  associated with the superior pubic ramus fracture and its largest extent is along the medial aspect of the fracture and adjacent to bladder measuring approximately 4 x 1.5 cm. There are other soft tissue abnormalities that likely relate to patient's chronic appearing sacral insufficiency fractures and possibly heterotopic ossification or tumor extension into soft tissue but no additional pelvic abscess. No adnexal lesions or ascites. Distended bladder.     IMPRESSION: 1.  There are 2 complex fluid collections are seen along the fractured left superior and inferior pubic rami which are nonspecific but given the enhancement pattern on current exam and air bubbles and fluid seen on the CT scan, these collections likely represent intramuscular abscesses.    XR Chest Port 1 View    Result Date: 7/16/2021  EXAM: XR CHEST PORT 1 VIEW LOCATION: [facility] DATE/TIME: 7/16/2021 12:20 PM INDICATION: Confusion. Slightly decreased O2 sat. COMPARISON: 04/06/2018.     IMPRESSION: Right IJ chemotherapy port is in place with tip in the high right atrium. Heart and mediastinal size are normal. There is patchy airspace infiltrate throughout the bilateral lungs with diffuse indistinctness of the pulmonary interstitium. These findings may represent pulmonary edema. An inflammatory infectious process is also a consideration. No definite pleural effusion or pneumothorax. Extensive sclerotic osseous lesions are present, compatible with known bony metastases.         XR Foot Left G/E 3 Views    Result Date: 7/16/2021  EXAM: XR FOOT LEFT G/E 3 VIEWS LOCATION: [facility] DATE/TIME: 7/16/2021 1:02 PM INDICATION: 5th toe trauma COMPARISON: None.     IMPRESSION: Bones are demineralized. There is no definitive evidence of acute fracture, with attention to the fifth toe. Mild, scattered degenerative changes.    MR Lumbar Spine w/o & w Contrast    Result Date: 7/21/2021  EXAM: MR LUMBAR SPINE W/O and W CONTRAST LOCATION:  [facility] DATE/TIME: 7/21/2021 9:34 PM INDICATION: Low back pain, cancer suspected Low back pain, infection suspected patient has a history of breast cancer. COMPARISON: 7/19/2021. CONTRAST: 8ml gadavist TECHNIQUE: Routine Lumbar Spine MRI without and with IV contrast. FINDINGS: Nomenclature is based on 5 lumbar type vertebral bodies. There is good anatomic alignment of the lumbar spine. The vertebral body heights are well-maintained throughout. There is diffuse abnormal bone marrow signal with edema and enhancement of the diffuse sclerotic metastasis involving both the anterior and posterior elements of the spine visualized on this study. This is most prominent involving the lower thoracic vertebral bodies visualized on this study and the upper lumbar vertebral bodies. Of  note there is an old fracture of the S2 vertebral body with abnormal angulation of the sacrum in this region.  Normal distal spinal cord and cauda equina with conus medullaris at L1.. There is no abnormal signal or change in size of the conus medullaris. There is enhancement of the cauda equina primarily from L4 to the sacrum along with thickening and enhancement of the thecal sac. There is diffuse abnormal bone marrow signal consistent with diffuse metastatic disease involving the sacrum especially the right sacral alar region, the iliac wings and the ischium especially on the left side. There is extra osseous extension of the tumor involving the sacral alar region and the sacroiliac joints bilaterally right greater than left. There is probable tumor extension into the sacral foramen primarily on the right side. Best visualized on the coronal images there is extra osseous extension of metastatic disease from the left ischium leading to compression upon the bladder. Please refer to the MRI of the pelvis for further evaluation. There is fluid visualized involving the sacroiliac joints bilaterally with widening and  distraction of these joint spaces. There is associated enhancement of these joints suggestive of an inflammatory infectious process. There is prominent edema involving the posterior musculature from the bottom of L3 to the sacrum. There are multiple rim-enhancing fluid collections involving the posterior musculature right greater than left from L4 to the sacrum. There is a conglomeration of abscesses at the L4 vertebral body level posteriorly on the right that measures 5.5 cm anteriorly posteriorly by 4.0 cm transversely. This is consistent with a multiseptated abscess collection. There is also an abscess formation on the left side posteriorly at the L5-S1 level measuring approximately 1.8 cm x 1.9 cm.  There is probable involve of the iliacus muscles bilaterally right greater than left. The psoas muscles appear within normal limits. The T12-L1 through the L5-S1 disc space levels have appropriate disc space height and signal. The disc spaces have minimal diffuse annular bulges but no significant canal compromise. There is mild facet arthropathy but the neural foramen are patent bilaterally.     IMPRESSION: 1.  Diffuse sclerotic metastatic disease of thoracic spine, lumbar spine, sacrum and pelvis with no evidence of a pathologic fracture in the lumbar spine. 2.  No evidence of canal compromise or neural foraminal narrowing in the lumbar region. 3.  Prominent metastatic involvement of sacrum and sacral alar regions, sacroiliac joints with extraosseous soft tissue extension in these regions. Please refer to MRI of the pelvis for further evaluation. 4.  Probable extra osseous extension of metastasis into the right sacral neural foramen along with enhancement of the cauda equina from L4 to the sacrum and thickening and enhancement of the thecal sac. 5.  Bony destruction and fluid within the sacroiliac joints bilaterally highly suggestive of an inflammatory/infectious etiology. 6.  Multiple abscess formations right  greater than left of posterior musculature from L4 to  mid sacral region. Probable involvement of the iliacus muscles bilaterally right greater than left. These findings were communicated by phone to Dr. Vergara at 11:30 PM on 07/21/2021.    Head CT w/o contrast    Result Date: 7/16/2021  EXAM: CT HEAD W/O CONTRAST LOCATION: [facility] DATE/TIME: 7/16/2021 11:12 AM INDICATION: Mental status change, unknown cause. Breast cancer. COMPARISON: Brain MRI 02/01/2017 TECHNIQUE: Routine CT Head without IV contrast. Multiplanar reformats. Dose reduction techniques were used. FINDINGS: INTRACRANIAL CONTENTS: No intracranial hemorrhage, extraaxial collection, or mass effect.  No CT evidence of acute infarct. Mild presumed chronic small vessel ischemic changes. Normal ventricles and sulci. VISUALIZED ORBITS/SINUSES/MASTOIDS: No intraorbital abnormality. No paranasal sinus mucosal disease. No middle ear or mastoid effusion. BONES/SOFT TISSUES: No acute abnormality. Diffusely sclerotic calvarium and upper visualized cervical spine structures.     IMPRESSION: 1.  No acute intracranial injury, hemorrhage, mass, or CT evidence of recent ischemia. 2.  Diffusely sclerotic calvarium presumed to reflect metastatic disease given history of breast cancer.      Lab Results:  Personally Reviewed.   Recent Labs   Lab 07/20/21  0537 07/17/21  0705 07/16/21  1054   WBC  --  5.1 4.3   HGB  --  9.2* 8.5*   HCT  --  28.9* 26.7*    334 248     Recent Labs   Lab 07/17/21  0705 07/16/21  1054    132*   CO2 19* 21*   BUN 12 14   ALBUMIN  --  2.8*   ALKPHOS  --  157*   ALT  --  36   AST  --  23     Recent Labs   Lab 07/16/21  1054   INR 1.45*         Total time spent 25 minutes with greater than 50% in consultation, education and coordination of care.     Also discussed with RN,  Senior Acupuncturist, and  time spent in discussion with Acute Inpatient Pain Pharmacist.       Estephanie DALTON, CNS-BC, DNP  Acute Care Pain  Management Program  [facility] ([facility])   With questions call [phone]  Preference if for [employer] Paging - [place]  Click HERE to page [place]

## 2021-07-22 NOTE — PROGRESS NOTES
[facility] [place]      ADMIT DATE: 7/16/2021     FACILITY: [facility]    PCP: Sandi Jones, [phone]    ASSESSMENT AND PLAN:    66 year old female with history significant for metastatic breast cancer, multiple bony lesions with resulting chronic pain, presented in the company of her  with disorientation, slurred speech, intermittent combativeness and visual hallucinations and frequent falls despite using her walker.    Two days prior to admission her pain was increasing in intensity despite being on her routine Dilaudid and medical marijuana and she decided to take one tablet of OxyContin that was prescribed in April 2021 for prn pain breakthrough by  [place] [facility].  The following morning she took another dose of OxyContin with her usual morning meds.  She slept most of the day prior to admission and then woke up at 4 PM disoriented and hallucinating.  Her symptoms persisted and she was brought in for evaluation, she had no reported fever at home but upon arrival to the floor was febrile.    Active Problems:    Confusion      #Acute metabolic encephalopathy: Resolved after stopping OxyContin and starting treatment of pneumonia.  Likely multifactorial secondary to underlying chronic illness, dehydration, possibly early infection and then additional dosing of OxyContin.  Brain MRI shows no acute findings to account for the symptoms.   Appreciate pain team consult.     #Acute on chronic cancer bone pain: Continue home Dilaudid at q 3 hour frequency .  C/w scheduled tylenol. Resume home dosing medical cannabis.  Will follow patient's pain curve and titrate medications as needed.  Acute pain team trialing ketamine, low-dose, patient tried last pm, not much relief. Pain team started patient on methadone on 7/21 and ketamine discontinued.      #History of left lumbar spine foraminal stenosis with neuropathic pain: responded well to epidural injection. Plan for outpatient  "followup with spine clinic to set up for repeat injection.     #Fever: After she came up to the floor she developed a temperature of 100.7.  UA shows 6 white cells, culture ordered. Procalcitonin is elevated.  Initiated Rocephin and Zithromax to cover the potential both lung and urine bacteria.  Lactate is normal.  Initial portable film with question of infiltrates but repeat 2 view chest film without clear evidence of infiltrate.  Patient with recurrent fever 7/18/2021, underlying immunocompromise state, elevated procalcitonin, elevated CRP we will plan IV antibiotics through Tuesday 7/20 then changing to orals for a total of 10 days therapy. Draw blood culture from peripheral blood and from portacath. Consulted ID-->ID switched patient to PO cefdinir and zithromax on 7/21.     #myositis  -CT abdomen on admission shows: \"There is mild fullness of right paraspinous, erectus spinal muscle at low lumbar spine which appears new, can't exclude myositis or inflammation involving the right paraspinous musculature. No definite discrete fluid collection evident.\" Radiology stated: \"MRI of pelvis and lower lumbar spine region may be beneficial to assess for paraspinal myositis or fluid collection\"  -discussed imaging results with Heme/Onc who recommended pursuing MRI  -MRI pelvis and lower lumbar spine showed: \"There are 2 complex fluid collections are seen along the fractured left superior and inferior pubic rami which are nonspecific but given the enhancement pattern on current exam and air bubbles and fluid seen on the CT scan, these collections likely   represent intramuscular abscesses.\"  -CT abscess aspiration ordered and pending  -fluids cultures and cytology pending       #Dehydration: Resolved with IV fluids and now able to take p.o. Prior to admission not been eating and drinking very well.     #Metastatic breast cancer.  Stage IV with spread to pelvis, femur, vertebrae, ribs and lymph nodes of her left arm. On 3rd " line chemo Doxil 50 mg/m2 IV which is given every 4 weeks until progression or intolerance. Last received cycle 3 of Doxil on 6/29/21. She did not receive Neulasta support for neutropenia prevention with cycle 3. She also receives Zometa 4 mg IV every 4 weeks. She is scheduled for consideration of cycle 4 on 7/27/2021 with Dr. Varma. Dr. Varma from [facility] is her primary oncologist.  Pain medications prescribed through [place] [facility].     #Type 2 diabetes, insulin dependent: Initial blood sugar on admission normal range.  Had not been eating well over the previous 2 days prior to admission. Appetite increasing. Blood sugars are elevated now. Start Lantus 5 units subcutaneous at bedtime on 7/21 and c/w low resistance sliding scale insulin for now. Switch from Lantus 5 units to 8 units on 7/22.      #Depression: Continue home Cymbalta     #Hypokalemia: replace per protocol             FEN/GI: diabetic diet, NPO at midnight for CT abscess aspiration   DVT proph: lovenox  Code status: Full Code      Discharge barriers:  -pain control, CT abscess aspiration ordered and pending, fluid cultures and cytology pending  -ADOD: 2-3 days       SUBJECTIVE:    Patient states her right hip pain is about the same as yesterday even on the methadone. She denies any other complaints today. Writer discusses latest MRI results with patient.     ROS:  12 Points review of systems reviewed and is negative except for what has already been mentioned above    OBJECTIVE:  Patient Vitals for the past 24 hrs:   BP Temp Temp src Pulse Resp SpO2   07/22/21 0031 130/61 97.9  F (36.6  C) Oral 98 22 97 %   07/21/21 2251 -- -- -- -- 18 --   07/21/21 2030 -- -- -- -- 18 --   07/21/21 1932 -- -- -- -- 18 --   07/21/21 1544 (!) 143/72 98.4  F (36.9  C) Oral 96 18 100 %          Intake/Output Summary (Last 24 hours) at 7/21/2021 0718  Last data filed at 7/21/2021 0400  Gross per 24 hour   Intake 1480 ml   Output 3700 ml   Net -2220 ml      GENRL: Alert and oriented X 3. Not in acute distress. Satting at 97% on room air.   HEENT: NC/AT      Neck- supple      Sclera- anicteric      Mucous membrane- moist and pink  CHEST: Clear to auscultation bilaterally  HEART: S1S2 regular. No murmurs, rubs or gallops  ABDMN: Soft. Non-tender.No guarding or rigidity. Bowel sounds- active  EXTRM: Extremities are warm and non-tender. Non-tender on palpation of right hip. No pedal edema.   NEURO: No involuntary movements  INTGM: please see nursing assessment for full skin assessment  PULSES: 2+ and symmetric all extremities  PSYCH: normal affect, normal speech     DIAGNOSTIC DATA:          Recent Results (from the past 24 hour(s))   Glucose by meter    Collection Time: 07/21/21  7:58 AM   Result Value Ref Range    GLUCOSE BY METER POCT 178 (H) 70 - 125 mg/dL   Glucose by meter    Collection Time: 07/21/21 12:15 PM   Result Value Ref Range    GLUCOSE BY METER POCT 242 (H) 70 - 125 mg/dL   Glucose by meter    Collection Time: 07/21/21  5:13 PM   Result Value Ref Range    GLUCOSE BY METER POCT 180 (H) 70 - 125 mg/dL   Glucose by meter    Collection Time: 07/21/21 10:52 PM   Result Value Ref Range    GLUCOSE BY METER POCT 236 (H) 70 - 125 mg/dL   Potassium    Collection Time: 07/22/21  7:03 AM   Result Value Ref Range    Potassium 3.3 (L) 3.5 - 5.0 mmol/L        Results for orders placed or performed during the hospital encounter of 07/16/21   Head CT w/o contrast    Impression    IMPRESSION:  1.  No acute intracranial injury, hemorrhage, mass, or CT evidence of recent ischemia.  2.  Diffusely sclerotic calvarium presumed to reflect metastatic disease given history of breast cancer.   MR Brain w/o Contrast    Impression    IMPRESSION:  1.  No acute intracranial finding. No evidence for recent ischemia, intracranial hemorrhage, or mass.  2.  A few T2 hyperintense foci in the white matter are nonspecific but most commonly reflect gliosis related to prior ischemic or  inflammatory insult.  3.  Presumed osseous metastatic disease within the calvarium and upper visualized cervical spine.   XR Chest Port 1 View    Impression    IMPRESSION: Right IJ chemotherapy port is in place with tip in the high right atrium. Heart and mediastinal size are normal. There is patchy airspace infiltrate throughout the bilateral lungs with diffuse indistinctness of the pulmonary interstitium.   These findings may represent pulmonary edema. An inflammatory infectious process is also a consideration. No definite pleural effusion or pneumothorax. Extensive sclerotic osseous lesions are present, compatible with known bony metastases.                  XR Foot Left G/E 3 Views    Impression    IMPRESSION: Bones are demineralized. There is no definitive evidence of acute fracture, with attention to the fifth toe. Mild, scattered degenerative changes.   XR Chest 2 Views    Impression    IMPRESSION: Indistinct pulmonary vasculature. Left breast implant projects over the left lung, which increases the appearance of airspace disease. No definite new consolidation. No pleural effusions or pneumothorax. Unchanged cardiac size. Right internal   jugular port tip projects at the cavoatrial junction. Left axillary surgical clips. Lower thoracic vertebral body compression fracture. Bony metastases better seen on prior imaging.   CT Chest/Abdomen/Pelvis w Contrast    Impression    IMPRESSION:  1.  Mild new fullness of right paraspinal musculature low lumbar spine raising possibility of myositis. A discrete abscess collection is not evident.  2.  Left obturator ring fracture with small fluid pockets associated with the fractures, one of which also demonstrates a few tiny air bubbles.  3.  Complex bilateral sacral alar fractures with surrounding zones of heterotopic ossification.  4.  Prominent distention of bladder with borderline bilateral hydronephrosis. Suggest determining post void residual.  5.  MRI of pelvis and  lower lumbar spine region may be beneficial to assess for paraspinal myositis or fluid collection, as well as to assess the tiny fluid collections associated with left obturator ring fractures.          All recent labs reviewed personally  Radiology report reviewed.         The total time spent in preparing this progress note is about 40 minutes, >50% time spent in care co-ordination that includes reviewing labs, images, discussing the plan of care with patient/family, consultants, and .      Cherri Gonzalez MD.   [facility] Medicine Service   [phone]   Pager [phone]

## 2021-07-22 NOTE — PROGRESS NOTES
Progress Note     Primary Oncologist/Hematologist:            Assessment and Plan:New actions/orders today (07/22/2021) are underlined.     Elenita is a 66 year old female with       1. Metastatic breast adenocarcinoma with bone mets and bone marrow involvement:   - On 3rd line chemo Doxil 50 mg/m2 IV which is given every 4 weeks until progression or intolerance.   - Last received cycle 3 of Doxil on 6/29/21. She did not receive Neulasta support for neutropenia prevention with cycle 3.   - She also receives Zometa 4 mg IV every 4 weeks.   - She is scheduled for consideration of cycle 4 on 7/27/2021 with Dr. Varma.     2. Acute metabolic encephalopathy, multifactorial.  - Resolved after stopping OxyContin and starting treatment of pneumonia.  - Brain MRI shows no acute findings to account for the symptoms.  - Appreciate palliative care team to help with pain management. I will notify our palliative care NP Denilson Valles regarding this event.     3. Acute on chronic cancer bone pain:   - Appreciate palliative care team assistance in managing pain.   - She is scheduled to follow up with palliative care NP on 7/27/21.     4. Community-acquired pneumonia/aspiration pneumonia:   - ID following; on abx     5. Extensive metastatic disease of the thoracic, lumbar, sacrum, and pelvis with not evidence of a pathologist fracture in the lumbar spine.  - Patient will benefit from radiation therapy; case discussed with Dr. Celia Torre MD. Patient's radiation oncologist is Dr. Maria Del Carmen Orozco MD.     6. Intramuscular abscesses along the fractured left superior and inferior pubic rami.  - IR has been consulted  - Fluid will be aspirated and sent for culture and cytology.  - ID following; she was switched from cefdinir to oral azithromycin. Appreciate ID involvement.    7. Anxiety:  - She has been on Vistaril for anxiety.  - She has been tearful each time I have seen her; she will benefit from an anxiolytic. Will defer to  primary team for anxiety management.      If there are any questions or concerns, please do not hesitate to call. We will continue to follow.     Clara Cook PA-C  [facility]  [phone]          Interval History:     Elenita feels overwhelmed by the news of the 2 intramuscular abscesses. She states that she will be see IR tomorrow for drain placement and that the fluid will be collected and cultured. She expresses a desire to have a break from her cancer. She feels very anxious. She states that she has been on Vistaril for anxiety and requests get Vistaril to help her cope. She takes Cymbalta and sees a therapist for her depression. Her appetite has been poor. She is also taking medical cannabis for pain management. She states that medical cannabis does not improve her appetite. She continues to have hip pain; pain at this time is 4 out of 10.  She denies any fevers, chills, nausea, vomiting, shortness of breath, and chest pain.                Review of Systems:     The 5 point Review of Systems is negative other than noted in the HPI             Medications:   Scheduled Medications    acetaminophen  975 mg Oral TID     artificial saliva  1 spray Swish & Spit 4x Daily     [Held by provider] aspirin  81 mg Oral Daily     azithromycin  250 mg Oral Daily     B-Complex/B-12  1 tablet Oral Daily     calcium carbonate  500 mg Oral Daily     cefdinir  300 mg Oral Q12H GREG     docusate sodium  200 mg Oral Daily     DULoxetine  30 mg Oral QAM     [Held by provider] enoxaparin ANTICOAGULANT  40 mg Subcutaneous Q24H     ezetimibe  10 mg Oral At Bedtime    And     simvastatin  20 mg Oral QPM     insulin aspart  1-3 Units Subcutaneous TID AC     insulin aspart  1-3 Units Subcutaneous At Bedtime     insulin glargine  5 Units Subcutaneous At Bedtime     lidocaine   Topical 4x Daily     medical cannabis  1 Dose Other See Admin Instructions     methadone  2.5 mg Oral Q8H     multivitamin w/minerals  1 tablet Oral Daily      "omeprazole  20 mg Oral QAM AC     oxybutynin  5 mg Oral BID     Vitamin D3  25 mcg Oral Daily     zolpidem  10 mg Oral At Bedtime     PRN Medications  benztropine, glucose **OR** dextrose **OR** glucagon, HYDROmorphone, hydrOXYzine, ibuprofen, naloxone **OR** naloxone **OR** naloxone **OR** naloxone, polyethylene glycol, QUEtiapine               Physical Exam:   Vitals were reviewed  Blood pressure (!) 156/73, pulse 113, temperature 98.1  F (36.7  C), temperature source Oral, resp. rate 18, height 1.651 m (5' 5\"), weight 81.5 kg (179 lb 9.6 oz), SpO2 97 %.  Wt Readings from Last 4 Encounters:   07/17/21 81.5 kg (179 lb 9.6 oz)   04/09/18 98.7 kg (217 lb 11.2 oz)   11/17/17 97.1 kg (214 lb)   11/09/17 96.2 kg (212 lb)       I/O last 3 completed shifts:  In: 2160 [P.O.:2160]  Out: 7780 [Urine:7780]    Constitutional: Awake, alert, cooperative, no apparent distress, tearful   Lungs: Clear to auscultation bilaterally, no crackles or wheezing   Cardiovascular: Regular rate and rhythm, normal S1 and S2, and no murmur noted   Abdomen: Normal bowel sounds, soft, non-distended, non-tender   Skin: No rashes, no cyanosis, no edema   Other:               Data:   All laboratory data and imaging studies reviewed.    CMP  Recent Labs   Lab 07/22/21  1144 07/22/21  0757 07/22/21  0703 07/21/21  2252 07/21/21  1713 07/21/21  0736 07/20/21  0941 07/20/21  0206 07/19/21  0813 07/19/21  0554 07/18/21  0722 07/17/21  0810 07/17/21  0705 07/16/21  1054   NA  --   --   --   --   --   --   --   --   --   --   --   --  138 132*   POTASSIUM  --   --  3.3*  --   --  3.6 3.5 3.3*   < > 3.2*  --    < > 3.5 4.0   CHLORIDE  --   --   --   --   --   --   --   --   --   --   --   --  107 99   CO2  --   --   --   --   --   --   --   --   --   --   --   --  19* 21*   ANIONGAP  --   --   --   --   --   --   --   --   --   --   --   --  12 12   * 171*  --  236* 180*  --   --   --   --   --   --   --  119 109   BUN  --   --   --   --   --   --   " --   --   --   --   --   --  12 14   CR  --   --   --   --   --   --   --  0.63  --   --   --   --  0.64 0.69   GFRESTIMATED  --   --   --   --   --   --   --  >90  --   --   --   --  >90 >90   BENJI  --   --   --   --   --   --   --   --   --   --   --   --  8.8 8.8   MAG  --   --  1.8  --   --   --   --  1.9  --  1.9 2.2   < > 2.3 2.2   PROTTOTAL  --   --   --   --   --   --   --   --   --   --   --   --   --  6.6   ALBUMIN  --   --   --   --   --   --   --   --   --   --   --   --   --  2.8*   BILITOTAL  --   --   --   --   --   --   --   --   --   --   --   --   --  0.8   ALKPHOS  --   --   --   --   --   --   --   --   --   --   --   --   --  157*   AST  --   --   --   --   --   --   --   --   --   --   --   --   --  23   ALT  --   --   --   --   --   --   --   --   --   --   --   --   --  36    < > = values in this interval not displayed.     CBC  Recent Labs   Lab 07/20/21  0537 07/17/21  0705 07/16/21  1054   WBC  --  5.1 4.3   RBC  --  3.00* 2.74*   HGB  --  9.2* 8.5*   HCT  --  28.9* 26.7*   MCV  --  96 97   MCH  --  30.7 31.0   MCHC  --  31.8 31.8   RDW  --  15.9* 15.9*    334 248     INR  Recent Labs   Lab 07/16/21  1054   INR 1.45*     Data   Results for orders placed or performed during the hospital encounter of 07/16/21 (from the past 24 hour(s))   Glucose by meter   Result Value Ref Range    GLUCOSE BY METER POCT 180 (H) 70 - 125 mg/dL   MR Pelvis (Intrapelvic Organs) wo&w Contrast    Narrative    EXAM: MRI PELVIS WITHOUT AND WITH IV CONTRAST  LOCATION: [facility]   DATE/TIME: 7/21/2021 9:34 PM    INDICATION: Soft tissue infection suspected, pelvis, xray done. Metastatic disease. Paraspinal abscesses.  COMPARISON: Lumbar spine MRI today. CT pelvis 7/19/2021.    TECHNIQUE: Routine MRI pelvis without and with IV contrast. Axial, sagittal, and coronal high-resolution T2 and post gadolinium T1 with fat saturation.   CONTRAST: 8ml gadavist    FINDINGS: There are 2 discrete  fluid collections at the margins of the left pubic ring fractures suspicious for abscesses with edema and enhancement surrounding complex fluid collections. Both collections are somewhat dumbbell shaped; the more inferior   collection adjacent to fracture inferior pubic ramus has maximal dimensions of approximately 4.5 x 1.5 cm with its largest loculation (2.5 x 1.3 cm) along the lateral aspect of the fractured bone within the abductor musculature; the more cephalad   collection is associated with the superior pubic ramus fracture and its largest extent is along the medial aspect of the fracture and adjacent to bladder measuring approximately 4 x 1.5 cm.    There are other soft tissue abnormalities that likely relate to patient's chronic appearing sacral insufficiency fractures and possibly heterotopic ossification or tumor extension into soft tissue but no additional pelvic abscess.    No adnexal lesions or ascites. Distended bladder.      Impression    IMPRESSION:  1.  There are 2 complex fluid collections are seen along the fractured left superior and inferior pubic rami which are nonspecific but given the enhancement pattern on current exam and air bubbles and fluid seen on the CT scan, these collections likely   represent intramuscular abscesses.   MR Lumbar Spine w/o & w Contrast    Narrative    EXAM: MR LUMBAR SPINE W/O and W CONTRAST  LOCATION: [facility] [place]   DATE/TIME: 7/21/2021 9:34 PM    INDICATION: Low back pain, cancer suspected  Low back pain, infection suspected patient has a history of breast cancer.  COMPARISON: 7/19/2021.  CONTRAST: 8ml gadavist  TECHNIQUE: Routine Lumbar Spine MRI without and with IV contrast.    FINDINGS:   Nomenclature is based on 5 lumbar type vertebral bodies. There is good anatomic alignment of the lumbar spine. The vertebral body heights are well-maintained throughout. There is diffuse abnormal bone marrow signal with edema and enhancement of the    diffuse sclerotic metastasis involving both the anterior and posterior elements of the spine visualized on this study. This is most prominent involving the lower thoracic vertebral bodies visualized on this study and the upper lumbar vertebral bodies. Of   note there is an old fracture of the S2 vertebral body with abnormal angulation of the sacrum in this region.  Normal distal spinal cord and cauda equina with conus medullaris at L1.. There is no abnormal signal or change in size of the conus   medullaris. There is enhancement of the cauda equina primarily from L4 to the sacrum along with thickening and enhancement of the thecal sac. There is diffuse abnormal bone marrow signal consistent with diffuse metastatic disease involving the sacrum   especially the right sacral alar region, the iliac wings and the ischium especially on the left side. There is extra osseous extension of the tumor involving the sacral alar region and the sacroiliac joints bilaterally right greater than left. There is   probable tumor extension into the sacral foramen primarily on the right side. Best visualized on the coronal images there is extra osseous extension of metastatic disease from the left ischium leading to compression upon the bladder. Please refer to the   MRI of the pelvis for further evaluation.     There is fluid visualized involving the sacroiliac joints bilaterally with widening and distraction of these joint spaces. There is associated enhancement of these joints suggestive of an inflammatory infectious process. There is prominent edema   involving the posterior musculature from the bottom of L3 to the sacrum. There are multiple rim-enhancing fluid collections involving the posterior musculature right greater than left from L4 to the sacrum. There is a conglomeration of abscesses at the   L4 vertebral body level posteriorly on the right that measures 5.5 cm anteriorly posteriorly by 4.0 cm transversely. This is  consistent with a multiseptated abscess collection. There is also an abscess formation on the left side posteriorly at the L5-S1   level measuring approximately 1.8 cm x 1.9 cm.  There is probable involve of the iliacus muscles bilaterally right greater than left. The psoas muscles appear within normal limits.    The T12-L1 through the L5-S1 disc space levels have appropriate disc space height and signal. The disc spaces have minimal diffuse annular bulges but no significant canal compromise. There is mild facet arthropathy but the neural foramen are patent   bilaterally.      Impression    IMPRESSION:  1.  Diffuse sclerotic metastatic disease of thoracic spine, lumbar spine, sacrum and pelvis with no evidence of a pathologic fracture in the lumbar spine.    2.  No evidence of canal compromise or neural foraminal narrowing in the lumbar region.    3.  Prominent metastatic involvement of sacrum and sacral alar regions, sacroiliac joints with extraosseous soft tissue extension in these regions. Please refer to MRI of the pelvis for further evaluation.    4.  Probable extra osseous extension of metastasis into the right sacral neural foramen along with enhancement of the cauda equina from L4 to the sacrum and thickening and enhancement of the thecal sac.    5.  Bony destruction and fluid within the sacroiliac joints bilaterally highly suggestive of an inflammatory/infectious etiology.    6.  Multiple abscess formations right greater than left of posterior musculature from L4 to  mid sacral region. Probable involvement of the iliacus muscles bilaterally right greater than left.    These findings were communicated by phone to Dr. Vergara at 11:30 PM on 07/21/2021.   Glucose by meter   Result Value Ref Range    GLUCOSE BY METER POCT 236 (H) 70 - 125 mg/dL   Potassium   Result Value Ref Range    Potassium 3.3 (L) 3.5 - 5.0 mmol/L   Extra Tube ([place] Draw)    Narrative    The following orders were created for panel order  Extra Tube ([place] Draw).  Procedure                               Abnormality         Status                     ---------                               -----------         ------                     Extra Purple Top Tube[[id number]]                            Final result                 Please view results for these tests on the individual orders.   Extra Purple Top Tube   Result Value Ref Range    Hold Specimen JI    Magnesium   Result Value Ref Range    Magnesium 1.8 1.8 - 2.6 mg/dL   CK total   Result Value Ref Range    CK 77 30 - 190 U/L   CRP inflammation   Result Value Ref Range    CRP 30.8 (H) 0.0-<0.8 mg/dL   Glucose by meter   Result Value Ref Range    GLUCOSE BY METER POCT 171 (H) 70 - 125 mg/dL   Glucose by meter   Result Value Ref Range    GLUCOSE BY METER POCT 167 (H) 70 - 125 mg/dL

## 2021-07-22 NOTE — PROGRESS NOTES
"Diabetes Care Screen Note  Situation:  Diabetes blood glucose screen    Background:  Noted blood sugars remain elevated above hospital goal of < 180 for improved outcomes.  Home PTA diabetes meds:  Basaglar 55 units every am but  stated (in PTA notes) that she takes less and not every day  Humalog 1:5 I:C ratio at meals plus a correction scale  Current Inpatient diabetes meds:  5 units of Lantus at hs  Correction scale: 1/100 over 140  Values;  A1C: 6.3 with 8.5 Hgb           GFR:.>90        BMI:29.89       Intake documented 100%    Assessment: Patient will need mealtime insulin for improved BG control     Recommendations:  See \"Guidelines for Insulin Initiation and Care in Hospitalized Adults\" link in the Diabetes Management order set for dosing guidelines    Assess BG pattern daily and adjust doses as needed.    Hospital BG goals < 180 for improved outcomes. There is impaired immune function at BG levels above 180 mg/dl.    Thanks.     Dominga Cutler RN, Certified Diabetes [place] [address]  [email or web address]   Office: [phone]  Pager: [phone]                                    "

## 2021-07-22 NOTE — PROGRESS NOTES
Care Management Follow Up    Length of Stay (days): 6    Expected Discharge Date: 7/24/21     Concerns to be Addressed:       Patient plan of care discussed at interdisciplinary rounds: Yes    Anticipated Discharge Disposition: Home with home care      Anticipated Discharge Services:  [facility]     Anticipated Discharge DME:      Patient/family educated on Medicare website which has current facility and service quality ratings:      Education Provided on the Discharge Plan:      Patient/Family in Agreement with the Plan:  Yes    Referrals Placed by CM/SW:    Private pay costs discussed:    Additional Information:      CM following medical progression. Current plan is to discharge home with home care once medically to cleared. ID is following. Pt family to transport at discharge.     Merle ESTRELLA/PIO/MELI   7/22/21

## 2021-07-23 ENCOUNTER — APPOINTMENT (OUTPATIENT)
Dept: CT IMAGING | Facility: HOSPITAL | Age: 66
DRG: 853 | End: 2021-07-23
Attending: FAMILY MEDICINE
Payer: COMMERCIAL

## 2021-07-23 LAB
APPEARANCE FLD: ABNORMAL
C REACTIVE PROTEIN LHE: 31.9 MG/DL (ref 0–0.8)
COLOR FLD: ABNORMAL
GLUCOSE BLDC GLUCOMTR-MCNC: 162 MG/DL (ref 70–125)
GLUCOSE BLDC GLUCOMTR-MCNC: 169 MG/DL (ref 70–125)
GLUCOSE BLDC GLUCOMTR-MCNC: 176 MG/DL (ref 70–125)
GLUCOSE BLDC GLUCOMTR-MCNC: 189 MG/DL (ref 70–125)
LYMPHOCYTES NFR FLD MANUAL: 3 %
MAGNESIUM SERPL-MCNC: 2.1 MG/DL (ref 1.8–2.6)
MONOS+MACROS NFR FLD MANUAL: 13 %
NEUTS BAND NFR FLD MANUAL: 84 %
PLATELET # BLD AUTO: 367 10E3/UL (ref 150–450)
POTASSIUM BLD-SCNC: 3.7 MMOL/L (ref 3.5–5)
RBC # FLD: ABNORMAL /UL
SARS-COV-2 RNA RESP QL NAA+PROBE: NEGATIVE
WBC # FLD AUTO: ABNORMAL /UL
WBC # FLD AUTO: ABNORMAL 10*3/UL

## 2021-07-23 PROCEDURE — 250N000012 HC RX MED GY IP 250 OP 636 PS 637

## 2021-07-23 PROCEDURE — 250N000013 HC RX MED GY IP 250 OP 250 PS 637: Performed by: FAMILY MEDICINE

## 2021-07-23 PROCEDURE — 120N000001 HC R&B MED SURG/OB

## 2021-07-23 PROCEDURE — 99231 SBSQ HOSP IP/OBS SF/LOW 25: CPT | Performed by: CLINICAL NURSE SPECIALIST

## 2021-07-23 PROCEDURE — 99232 SBSQ HOSP IP/OBS MODERATE 35: CPT

## 2021-07-23 PROCEDURE — 99233 SBSQ HOSP IP/OBS HIGH 50: CPT | Performed by: FAMILY MEDICINE

## 2021-07-23 PROCEDURE — 250N000013 HC RX MED GY IP 250 OP 250 PS 637: Performed by: CLINICAL NURSE SPECIALIST

## 2021-07-23 PROCEDURE — 87635 SARS-COV-2 COVID-19 AMP PRB: CPT | Performed by: FAMILY MEDICINE

## 2021-07-23 PROCEDURE — 87075 CULTR BACTERIA EXCEPT BLOOD: CPT

## 2021-07-23 PROCEDURE — 250N000013 HC RX MED GY IP 250 OP 250 PS 637: Performed by: INTERNAL MEDICINE

## 2021-07-23 PROCEDURE — 88342 IMHCHEM/IMCYTCHM 1ST ANTB: CPT | Mod: 26 | Performed by: PATHOLOGY

## 2021-07-23 PROCEDURE — 77012 CT SCAN FOR NEEDLE BIOPSY: CPT | Mod: XS

## 2021-07-23 PROCEDURE — 250N000013 HC RX MED GY IP 250 OP 250 PS 637

## 2021-07-23 PROCEDURE — 36415 COLL VENOUS BLD VENIPUNCTURE: CPT | Performed by: INTERNAL MEDICINE

## 2021-07-23 PROCEDURE — 87205 SMEAR GRAM STAIN: CPT | Performed by: FAMILY MEDICINE

## 2021-07-23 PROCEDURE — 250N000012 HC RX MED GY IP 250 OP 636 PS 637: Performed by: FAMILY MEDICINE

## 2021-07-23 PROCEDURE — 85049 AUTOMATED PLATELET COUNT: CPT | Performed by: INTERNAL MEDICINE

## 2021-07-23 PROCEDURE — 87070 CULTURE OTHR SPECIMN AEROBIC: CPT | Performed by: FAMILY MEDICINE

## 2021-07-23 PROCEDURE — 0K9G3ZX DRAINAGE OF LEFT TRUNK MUSCLE, PERCUTANEOUS APPROACH, DIAGNOSTIC: ICD-10-PCS | Performed by: RADIOLOGY

## 2021-07-23 PROCEDURE — 89051 BODY FLUID CELL COUNT: CPT | Performed by: FAMILY MEDICINE

## 2021-07-23 PROCEDURE — 272N000431 CT RETROPERITONEAL ABSCESS ASPIRATION

## 2021-07-23 PROCEDURE — 250N000011 HC RX IP 250 OP 636: Performed by: RADIOLOGY

## 2021-07-23 PROCEDURE — 88112 CYTOPATH CELL ENHANCE TECH: CPT | Mod: 26 | Performed by: PATHOLOGY

## 2021-07-23 PROCEDURE — 88342 IMHCHEM/IMCYTCHM 1ST ANTB: CPT | Mod: TC | Performed by: FAMILY MEDICINE

## 2021-07-23 PROCEDURE — 88305 TISSUE EXAM BY PATHOLOGIST: CPT | Mod: 26 | Performed by: PATHOLOGY

## 2021-07-23 PROCEDURE — 83735 ASSAY OF MAGNESIUM: CPT | Performed by: FAMILY MEDICINE

## 2021-07-23 PROCEDURE — 250N000013 HC RX MED GY IP 250 OP 250 PS 637: Performed by: EMERGENCY MEDICINE

## 2021-07-23 PROCEDURE — 84132 ASSAY OF SERUM POTASSIUM: CPT | Performed by: FAMILY MEDICINE

## 2021-07-23 PROCEDURE — 86141 C-REACTIVE PROTEIN HS: CPT

## 2021-07-23 PROCEDURE — 88305 TISSUE EXAM BY PATHOLOGIST: CPT | Mod: TC | Performed by: FAMILY MEDICINE

## 2021-07-23 RX ORDER — FLUMAZENIL 0.1 MG/ML
0.2 INJECTION, SOLUTION INTRAVENOUS
Status: DISCONTINUED | OUTPATIENT
Start: 2021-07-23 | End: 2021-07-27 | Stop reason: CLARIF

## 2021-07-23 RX ORDER — NALOXONE HYDROCHLORIDE 0.4 MG/ML
0.4 INJECTION, SOLUTION INTRAMUSCULAR; INTRAVENOUS; SUBCUTANEOUS
Status: DISCONTINUED | OUTPATIENT
Start: 2021-07-23 | End: 2021-07-27 | Stop reason: CLARIF

## 2021-07-23 RX ORDER — NALOXONE HYDROCHLORIDE 0.4 MG/ML
0.2 INJECTION, SOLUTION INTRAMUSCULAR; INTRAVENOUS; SUBCUTANEOUS
Status: DISCONTINUED | OUTPATIENT
Start: 2021-07-23 | End: 2021-07-27 | Stop reason: CLARIF

## 2021-07-23 RX ORDER — HYDRALAZINE HYDROCHLORIDE 20 MG/ML
5 INJECTION INTRAMUSCULAR; INTRAVENOUS EVERY 6 HOURS PRN
Status: DISCONTINUED | OUTPATIENT
Start: 2021-07-23 | End: 2021-07-28 | Stop reason: HOSPADM

## 2021-07-23 RX ORDER — FENTANYL CITRATE 50 UG/ML
25-50 INJECTION, SOLUTION INTRAMUSCULAR; INTRAVENOUS EVERY 5 MIN PRN
Status: DISCONTINUED | OUTPATIENT
Start: 2021-07-23 | End: 2021-07-27 | Stop reason: CLARIF

## 2021-07-23 RX ADMIN — CALCIUM CARBONATE (ANTACID) CHEW TAB 500 MG 500 MG: 500 CHEW TAB at 08:30

## 2021-07-23 RX ADMIN — FENTANYL CITRATE 50 MCG: 50 INJECTION, SOLUTION INTRAMUSCULAR; INTRAVENOUS at 15:16

## 2021-07-23 RX ADMIN — ZOLPIDEM TARTRATE 10 MG: 5 TABLET ORAL at 20:53

## 2021-07-23 RX ADMIN — ACETAMINOPHEN 975 MG: 325 TABLET ORAL at 08:30

## 2021-07-23 RX ADMIN — AZITHROMYCIN MONOHYDRATE 250 MG: 250 TABLET ORAL at 08:30

## 2021-07-23 RX ADMIN — ACETAMINOPHEN 975 MG: 325 TABLET ORAL at 20:51

## 2021-07-23 RX ADMIN — INSULIN GLARGINE 10 UNITS: 100 INJECTION, SOLUTION SUBCUTANEOUS at 22:04

## 2021-07-23 RX ADMIN — METHADONE HYDROCHLORIDE 2.5 MG: 5 TABLET ORAL at 22:05

## 2021-07-23 RX ADMIN — MIDAZOLAM HYDROCHLORIDE 1 MG: 1 INJECTION, SOLUTION INTRAMUSCULAR; INTRAVENOUS at 15:14

## 2021-07-23 RX ADMIN — MIDAZOLAM HYDROCHLORIDE 1 MG: 1 INJECTION, SOLUTION INTRAMUSCULAR; INTRAVENOUS at 14:59

## 2021-07-23 RX ADMIN — CEFDINIR 300 MG: 300 CAPSULE ORAL at 08:31

## 2021-07-23 RX ADMIN — DULOXETINE HYDROCHLORIDE 30 MG: 30 CAPSULE, DELAYED RELEASE ORAL at 08:31

## 2021-07-23 RX ADMIN — Medication 1 TABLET: at 08:31

## 2021-07-23 RX ADMIN — FENTANYL CITRATE 50 MCG: 50 INJECTION, SOLUTION INTRAMUSCULAR; INTRAVENOUS at 15:01

## 2021-07-23 RX ADMIN — INSULIN ASPART 1 UNITS: 100 INJECTION, SOLUTION INTRAVENOUS; SUBCUTANEOUS at 08:46

## 2021-07-23 RX ADMIN — Medication 1 SPRAY: at 08:34

## 2021-07-23 RX ADMIN — SIMVASTATIN 20 MG: 10 TABLET, FILM COATED ORAL at 20:51

## 2021-07-23 RX ADMIN — Medication 1 SPRAY: at 12:51

## 2021-07-23 RX ADMIN — EZETIMIBE 10 MG: 10 TABLET ORAL at 20:52

## 2021-07-23 RX ADMIN — CEFDINIR 300 MG: 300 CAPSULE ORAL at 20:52

## 2021-07-23 RX ADMIN — Medication 1 TABLET: at 08:30

## 2021-07-23 RX ADMIN — OXYBUTYNIN CHLORIDE 5 MG: 5 TABLET ORAL at 08:31

## 2021-07-23 RX ADMIN — Medication 12.5 MG: at 08:40

## 2021-07-23 RX ADMIN — METHADONE HYDROCHLORIDE 2.5 MG: 5 TABLET ORAL at 17:00

## 2021-07-23 RX ADMIN — Medication 25 MCG: at 08:30

## 2021-07-23 RX ADMIN — METHADONE HYDROCHLORIDE 2.5 MG: 5 TABLET ORAL at 07:16

## 2021-07-23 RX ADMIN — OMEPRAZOLE 20 MG: 20 CAPSULE, DELAYED RELEASE ORAL at 07:16

## 2021-07-23 RX ADMIN — Medication 1 SPRAY: at 20:52

## 2021-07-23 RX ADMIN — INSULIN ASPART 1 UNITS: 100 INJECTION, SOLUTION INTRAVENOUS; SUBCUTANEOUS at 18:44

## 2021-07-23 RX ADMIN — OXYBUTYNIN CHLORIDE 5 MG: 5 TABLET ORAL at 20:52

## 2021-07-23 RX ADMIN — ACETAMINOPHEN 975 MG: 325 TABLET ORAL at 17:00

## 2021-07-23 RX ADMIN — INSULIN ASPART 1 UNITS: 100 INJECTION, SOLUTION INTRAVENOUS; SUBCUTANEOUS at 12:49

## 2021-07-23 RX ADMIN — DOCUSATE SODIUM 200 MG: 100 CAPSULE, LIQUID FILLED ORAL at 08:30

## 2021-07-23 NOTE — PLAN OF CARE
Problem: Adult Inpatient Plan of Care  Goal: Absence of Hospital-Acquired Illness or Injury  Outcome: Declining  Intervention: Identify and Manage Fall Risk  Recent Flowsheet Documentation  Taken 7/22/2021 1700 by Felecia Wynn RN  Safety Promotion/Fall Prevention:   bed alarm on   fall prevention program maintained   mobility aid in reach   nonskid shoes/slippers when out of bed   patient and family education   room door open   Pt had assisted fall this evening in bathroom. Pt assessed by H/O, L toe cleansed.     PRN Ibu given this evening for R low back pain, helpful.

## 2021-07-23 NOTE — SEDATION DOCUMENTATION
Pt awake and able to asst staff with reposition to supine for second phase of procedure.  Pt calm and relaxed.

## 2021-07-23 NOTE — PROGRESS NOTES
"Infectious Disease Progress Note    Assessment/Plan     Impression: Fevers, multiple potential etiologies. Fevers resolved     Initial presentation of encephalopathy which has improved--likely secondary to narcotics     Had a Port-A-Cath in for couple years, but seems to be working okay.     Has multiple excoriations on both legs that she says are \"from mosquito bites\" none are grossly purulent.  Improved.      Has distended abdomen.  CT shows old fractures and some fluid around fractures. MRI confirms fluid--unclear if abscess or cancer.  S/p aspiration on 7/23.     Says her longstanding sinus symptoms are improved.      Recommendations: continue cefdinir and azithromycin pending analysis of aspiration on 7/23--sent for culture and cytology.     Principal Problem:    Hip pain, right  Active Problems:    Confusion      RIDGE BARAJAS MD   [phone]      Subjective  Feels good. Just back from aspiration.     Objective    Vital signs in last 24 hours  Temp:  [98  F (36.7  C)-98.8  F (37.1  C)] 98.8  F (37.1  C)  Pulse:  [] 114  Resp:  [10-18] 18  BP: (104-185)/(52-84) 185/84  SpO2:  [93 %-99 %] 99 %  Wt Readings from Last 3 Encounters:   07/17/21 81.5 kg (179 lb 9.6 oz)   04/09/18 98.7 kg (217 lb 11.2 oz)   11/17/17 97.1 kg (214 lb)           Intake/Output last 3 shifts  I/O last 3 completed shifts:  In: 960 [P.O.:960]  Out: 7300 [Urine:7300]  Intake/Output this shift:  No intake/output data recorded.    Review of Systems   Pertinent items are noted in HPI., otherwise negative.    Physical Exam  General appearance: alert, appears stated age and cooperative  Head: Normocephalic, without obvious abnormality, atraumatic  Eyes: EOMI, no icterus  Neck: no adenopathy and supple, symmetrical, trachea midline  Lungs: normal respiratory pattern  Extremities: Full excoriations on both lower legs. She has a bloody wound on her left great toe and left fifth toe--improving  Skin: No rash, but excoriations as described " above--this are improved.   Lymph nodes: Cervical, supraclavicular, and axillary nodes normal.  Neurologic: Mental status:clear.   Pertinent Labs   Lab Results: personally reviewed.     Recent Labs   Lab 07/23/21  0642 07/20/21  0537 07/17/21  0705   WBC  --   --  5.1   HGB  --   --  9.2*   HCT  --   --  28.9*    317 334      Results for JULES ROPER (MRN [id number]) as of 7/22/2021 13:51   Ref. Range 7/18/2021 07:22 7/20/2021 02:06 7/22/2021 07:03   CRP Latest Ref Range: 0.0-<0.8 mg/dL 31.7 (H) 35.4 (H) 30.8 (H)     Recent Labs   Lab 07/17/21  0705      CO2 19*   BUN 12     Collected Updated Procedure Result Status    07/19/2021 1249 07/20/2021 0616 Blood Culture Line, Other [[id number]]   Blood from Line, Other    Preliminary result Component Value   Culture No growth after 12 hours P              07/19/2021 1201 07/20/2021 0616 Blood Culture Arm, Right [[id number]]   Blood from Arm, Right    Preliminary result Component Value   Culture No growth after 12 hours P              07/16/2021 1056 07/16/2021 1139 Asymptomatic COVID-19 Virus (Coronavirus) by PCR Nasopharyngeal [[id number]]    Swab from Nasopharyngeal    Final result Component Value   No component results           07/16/2021 1056 07/16/2021 1139 SARS-COV2 (COVID-19) Virus RT-PCR [[id number]]    Swab from Nasopharyngeal    Final        Pertinent Radiology   Radiology Results:   Procedure Component Value Units Date/Time   CT Chest/Abdomen/Pelvis w Contrast [[id number]] Collected: 07/19/21 1736   Order Status: Completed Updated: 07/19/21 2014   Narrative:     EXAM: CT CHEST/ABDOMEN/PELVIS W CONTRAST   LOCATION: [facility]   DATE/TIME: 7/19/2021 5:36 PM     INDICATION: Sepsis   COMPARISON: 12/9/2020   TECHNIQUE: CT scan of the chest, abdomen, and pelvis was performed following injection of IV contrast. Multiplanar reformats were obtained. Dose reduction techniques were used.   CONTRAST: 100 mL  Isovue-370     FINDINGS:   LUNGS AND PLEURA: Stable linear scarring posterior left upper lobe unchanged. Linear scarring peripheral right middle lobe and lateral right upper lobe also unchanged, no new pneumonia. There is a new tiny right pleural effusion.     MEDIASTINUM/AXILLAE: No lymphadenopathy. Normal-sized heart. Port-A-Cath present     CORONARY ARTERY CALCIFICATION: None.     HEPATOBILIARY: Normal.     PANCREAS: Normal.     SPLEEN: Normal.     ADRENAL GLANDS: Normal.     KIDNEYS/BLADDER: There is minimal bilateral hydronephrosis and mild prominence of each ureter. Bladder is distended with estimated volume more than 800 cc consistent with bladder outlet obstruction or neurogenic bladder.     BOWEL: There are a few loops of mildly dilated air and fluid distended loops of jejunum without a definitive site of transition or obstructing focus. This may relate to a mild mid small bowel obstruction or mild ileus. There is no inflammatory bowel wall    thickening.     LYMPH NODES: Normal.     VASCULATURE: Unremarkable.     PELVIC ORGANS: No adnexal lesion or ascites.     MUSCULOSKELETAL: Diffuse mixed lytic/sclerotic bony metastases unchanged. There are prominent bilateral sacral alar fractures with dense bony sclerosis and what appears to be heterotopic ossifications about the sacral alar fractures.     Prior fractures left pubic ring unchanged but there are now small surrounding soft tissue fullness suggesting small hematomas associated with the fractures. There are tiny air bubble seen adjacent to the left inferior pubic ramus fracture within the   abductor musculature these air bubbles reside in a small collection that measures 2.5 x 1 cm (image 452). These air bubbles are new compared to previous exam. There is also a small fluid collection associated with the superior pubic ramus fracture (image    415) measuring 2 x 1.5 cm. In addition, there is also a tiny collection along the undersurface of the left pubic  symphysis measuring 2.5 x 1.2 cm (image 425).     There is mild fullness of right paraspinous, erectus spinal muscle at low lumbar spine which appears new, can't exclude myositis or inflammation involving the right paraspinous musculature. No definite discrete fluid collection evident.    Impression:     IMPRESSION:   1.  Mild new fullness of right paraspinal musculature low lumbar spine raising possibility of myositis. A discrete abscess collection is not evident.   2.  Left obturator ring fracture with small fluid pockets associated with the fractures, one of which also demonstrates a few tiny air bubbles.   3.  Complex bilateral sacral alar fractures with surrounding zones of heterotopic ossification.   4.  Prominent distention of bladder with borderline bilateral hydronephrosis. Suggest determining post void residual.   5.  MRI of pelvis and lower lumbar spine region may be beneficial to assess for paraspinal myositis or fluid collection, as well as to assess the tiny fluid collections associated with left obturator ring fractures.

## 2021-07-23 NOTE — PROVIDER NOTIFICATION
Pt transported back to P2 via cart with RN.  Pt awake and alert, pain free at present.  Bedside hand off report and assessment to P2 RN

## 2021-07-23 NOTE — PROGRESS NOTES
[facility] PROGRESS NOTE      ADMIT DATE: 7/16/2021     FACILITY: [facility]    PCP: Sandi Jones, [phone]    ASSESSMENT AND PLAN:    66 year old female with history significant for metastatic breast cancer, multiple bony lesions with resulting chronic pain, presented in the company of her  with disorientation, slurred speech, intermittent combativeness and visual hallucinations and frequent falls despite using her walker.    Two days prior to admission her pain was increasing in intensity despite being on her routine Dilaudid and medical marijuana and she decided to take one tablet of OxyContin that was prescribed in April 2021 for prn pain breakthrough by  [facility].  The following morning she took another dose of OxyContin with her usual morning meds.  She slept most of the day prior to admission and then woke up at 4 PM disoriented and hallucinating.  Her symptoms persisted and she was brought in for evaluation, she had no reported fever at home but upon arrival to the floor was febrile.    Principal Problem:    Hip pain, right  Active Problems:    Confusion      #Acute metabolic encephalopathy: Resolved after stopping OxyContin and starting treatment of pneumonia.  Likely multifactorial secondary to underlying chronic illness, dehydration, possibly early infection and then additional dosing of OxyContin.  Brain MRI shows no acute findings to account for the symptoms.   Appreciate pain team consult.     #Acute on chronic cancer bone pain: Continue home Dilaudid at q 3 hour frequency .  C/w scheduled tylenol. Resume home dosing medical cannabis.  Will follow patient's pain curve and titrate medications as needed.  Acute pain team trialing ketamine, low-dose, patient tried last pm, not much relief. Pain team started patient on methadone on 7/21 and ketamine discontinued. Pain improving.      #History of left lumbar spine foraminal stenosis with neuropathic pain: responded  "well to epidural injection. Plan for outpatient followup with spine clinic to set up for repeat injection.     #Fever: After she came up to the floor she developed a temperature of 100.7.  UA shows 6 white cells, culture ordered. Procalcitonin is elevated.  Initiated Rocephin and Zithromax to cover the potential both lung and urine bacteria.  Lactate is normal.  Initial portable film with question of infiltrates but repeat 2 view chest film without clear evidence of infiltrate.  Patient with recurrent fever 7/18/2021, underlying immunocompromise state, elevated procalcitonin, elevated CRP we will plan IV antibiotics through Tuesday 7/20 then changing to orals for a total of 10 days therapy. Draw blood culture from peripheral blood and from portacath. Consulted ID-->ID switched patient to PO cefdinir and zithromax on 7/21.     #myositis  -CT abdomen on admission shows: \"There is mild fullness of right paraspinous, erectus spinal muscle at low lumbar spine which appears new, can't exclude myositis or inflammation involving the right paraspinous musculature. No definite discrete fluid collection evident.\" Radiology stated: \"MRI of pelvis and lower lumbar spine region may be beneficial to assess for paraspinal myositis or fluid collection\"  -discussed imaging results with Heme/Onc who recommended pursuing MRI  -MRI pelvis and lower lumbar spine showed: \"There are 2 complex fluid collections are seen along the fractured left superior and inferior pubic rami which are nonspecific but given the enhancement pattern on current exam and air bubbles and fluid seen on the CT scan, these collections likely   represent intramuscular abscesses.\"  -CT abscess aspiration ordered and pending  -fluids cultures and cytology pending       #Dehydration: Resolved with IV fluids and now able to take p.o. Prior to admission not been eating and drinking very well.     #Metastatic breast cancer.  Stage IV with spread to pelvis, femur, " "vertebrae, ribs and lymph nodes of her left arm. On 3rd line chemo Doxil 50 mg/m2 IV which is given every 4 weeks until progression or intolerance. Last received cycle 3 of Doxil on 6/29/21. She did not receive Neulasta support for neutropenia prevention with cycle 3. She also receives Zometa 4 mg IV every 4 weeks. She is scheduled for consideration of cycle 4 on 7/27/2021 with Dr. Varma. Dr. Varma from [facility] is her primary oncologist.  Pain medications prescribed through [facility].     #Type 2 diabetes, insulin dependent: Initial blood sugar on admission normal range.  Had not been eating well over the previous 2 days prior to admission. Appetite increasing. Blood sugars are elevated. Start Lantus 5 units subcutaneous at bedtime on 7/21 and c/w low resistance sliding scale insulin for now. Switch from Lantus 5 units to 8 units on 7/22. Switch from Lantus 8 units to 10 units on 7/23.      #Depression and anxiety: Patient has been tearful per Heme/Onc and Pain team. When writer discussed anxiety/depression with patient, patient stated \"I'm okay\" and did not want her current meds adjusted. Continue home Cymbalta and atarax. C/w seroquel PRN and ambien started during this admission.      #Hypokalemia: replace per protocol             FEN/GI: NPO for CT abscess aspiration   DVT proph: lovenox-->holding prior to CT aspiration   Code status: Full Code      Discharge barriers:  -pain control, CT abscess aspiration ordered and pending, fluid cultures and cytology pending  -ADOD: 2-3 days       SUBJECTIVE:    Patient states her right hip pain is \"okay\" right now since she is not moving. When writer discussed anxiety/depression with patient, patient stated \"I'm okay\" and did not want her current meds adjusted. Patient denies any other complaints at this time.     ROS:  12 Points review of systems reviewed and is negative except for what has already been mentioned above    OBJECTIVE:  Patient Vitals for " the past 24 hrs:   BP Temp Temp src Pulse Resp SpO2   07/22/21 2311 121/64 98  F (36.7  C) Oral 84 18 96 %   07/22/21 1552 135/65 98.3  F (36.8  C) Oral 96 18 96 %   07/22/21 0758 (!) 156/73 98.1  F (36.7  C) Oral 113 18 97 %          Intake/Output Summary (Last 24 hours) at 7/21/2021 0718  Last data filed at 7/21/2021 0400  Gross per 24 hour   Intake 1480 ml   Output 3700 ml   Net -2220 ml     GENRL: Alert and oriented X 3. Not in acute distress. Satting at 95% on room air.   HEENT: NC/AT      Neck- supple      Sclera- anicteric      Mucous membrane- moist and pink  CHEST: Clear to auscultation bilaterally  HEART: S1S2 regular. No murmurs, rubs or gallops  ABDMN: Soft. Non-tender.No guarding or rigidity. Bowel sounds- active  EXTRM:  No pedal edema.   NEURO: No involuntary movements  INTGM: please see nursing assessment for full skin assessment  PULSES: 2+ and symmetric all extremities  PSYCH: normal affect, normal speech     DIAGNOSTIC DATA:          Recent Results (from the past 24 hour(s))   Glucose by meter    Collection Time: 07/22/21  7:57 AM   Result Value Ref Range    GLUCOSE BY METER POCT 171 (H) 70 - 125 mg/dL   Glucose by meter    Collection Time: 07/22/21 11:44 AM   Result Value Ref Range    GLUCOSE BY METER POCT 167 (H) 70 - 125 mg/dL   Magnesium    Collection Time: 07/22/21  2:42 PM   Result Value Ref Range    Magnesium 1.9 1.8 - 2.6 mg/dL   Potassium    Collection Time: 07/22/21  2:42 PM   Result Value Ref Range    Potassium 3.5 3.5 - 5.0 mmol/L   Creatinine    Collection Time: 07/22/21  2:42 PM   Result Value Ref Range    Creatinine 0.57 (L) 0.60 - 1.10 mg/dL    GFR Estimate >90 >60 mL/min/1.73m2   Glucose by meter    Collection Time: 07/22/21  5:25 PM   Result Value Ref Range    GLUCOSE BY METER POCT 179 (H) 70 - 125 mg/dL   Glucose by meter    Collection Time: 07/22/21  8:24 PM   Result Value Ref Range    GLUCOSE BY METER POCT 287 (H) 70 - 125 mg/dL   Glucose by meter    Collection Time: 07/22/21   9:47 PM   Result Value Ref Range    GLUCOSE BY METER POCT 271 (H) 70 - 125 mg/dL   Platelet count    Collection Time: 07/23/21  6:42 AM   Result Value Ref Range    Platelet Count 367 150 - 450 10e3/uL   Magnesium    Collection Time: 07/23/21  6:42 AM   Result Value Ref Range    Magnesium 2.1 1.8 - 2.6 mg/dL   Potassium    Collection Time: 07/23/21  6:42 AM   Result Value Ref Range    Potassium 3.7 3.5 - 5.0 mmol/L        Results for orders placed or performed during the hospital encounter of 07/16/21   Head CT w/o contrast    Impression    IMPRESSION:  1.  No acute intracranial injury, hemorrhage, mass, or CT evidence of recent ischemia.  2.  Diffusely sclerotic calvarium presumed to reflect metastatic disease given history of breast cancer.   MR Brain w/o Contrast    Impression    IMPRESSION:  1.  No acute intracranial finding. No evidence for recent ischemia, intracranial hemorrhage, or mass.  2.  A few T2 hyperintense foci in the white matter are nonspecific but most commonly reflect gliosis related to prior ischemic or inflammatory insult.  3.  Presumed osseous metastatic disease within the calvarium and upper visualized cervical spine.   XR Chest Port 1 View    Impression    IMPRESSION: Right IJ chemotherapy port is in place with tip in the high right atrium. Heart and mediastinal size are normal. There is patchy airspace infiltrate throughout the bilateral lungs with diffuse indistinctness of the pulmonary interstitium.   These findings may represent pulmonary edema. An inflammatory infectious process is also a consideration. No definite pleural effusion or pneumothorax. Extensive sclerotic osseous lesions are present, compatible with known bony metastases.                  XR Foot Left G/E 3 Views    Impression    IMPRESSION: Bones are demineralized. There is no definitive evidence of acute fracture, with attention to the fifth toe. Mild, scattered degenerative changes.   XR Chest 2 Views    Impression     IMPRESSION: Indistinct pulmonary vasculature. Left breast implant projects over the left lung, which increases the appearance of airspace disease. No definite new consolidation. No pleural effusions or pneumothorax. Unchanged cardiac size. Right internal   jugular port tip projects at the cavoatrial junction. Left axillary surgical clips. Lower thoracic vertebral body compression fracture. Bony metastases better seen on prior imaging.   CT Chest/Abdomen/Pelvis w Contrast    Impression    IMPRESSION:  1.  Mild new fullness of right paraspinal musculature low lumbar spine raising possibility of myositis. A discrete abscess collection is not evident.  2.  Left obturator ring fracture with small fluid pockets associated with the fractures, one of which also demonstrates a few tiny air bubbles.  3.  Complex bilateral sacral alar fractures with surrounding zones of heterotopic ossification.  4.  Prominent distention of bladder with borderline bilateral hydronephrosis. Suggest determining post void residual.  5.  MRI of pelvis and lower lumbar spine region may be beneficial to assess for paraspinal myositis or fluid collection, as well as to assess the tiny fluid collections associated with left obturator ring fractures.          All recent labs reviewed personally  Radiology report reviewed.         The total time spent in preparing this progress note is about 40 minutes, >50% time spent in care co-ordination that includes reviewing labs, images, discussing the plan of care with patient/family, consultants, and .      Cherri Gonzalez MD.   [facility]   [phone]   Pager [phone]

## 2021-07-23 NOTE — PROCEDURES
[facility]    Procedure: Imaging Procedure Note    Date/Time: 7/23/2021 4:08 PM  Performed by: Evan Castro DO  Authorized by: Evan Castro DO     UNIVERSAL PROTOCOL   Site Marked: Yes  Prior Images Obtained and Reviewed:  Yes  Required items: Required blood products, implants, devices and special equipment available    Patient identity confirmed:  Verbally with patient and provided demographic data  Patient was reevaluated immediately before administering moderate or deep sedation or anesthesia  Confirmation Checklist:  Patient's identity using two indicators, relevant allergies, procedure was appropriate and matched the consent or emergent situation and correct equipment/implants were available  Time out: Immediately prior to the procedure a time out was called    Universal Protocol: the Joint Commission Universal Protocol was followed    Preparation: Patient was prepped and draped in usual sterile fashion    ESBL (mL):  0         ANESTHESIA    Local Anesthetic: Lidocaine 1% without epinephrine  Anesthetic Total (mL):  8      SEDATION    Patient Sedated: Yes    Sedation:  Fentanyl and midazolam  Vital signs: Vital signs monitored during sedation    See dictated procedure note for full details.  PROCEDURE   Patient Tolerance:  Patient tolerated the procedure well with no immediate complications  Describe Procedure: EXAM:  1. PERCUTANEOUS ASPIRATION LEFT PARASYMPHYSEAL COLLECTION.  2. CT GUIDANCE  3. CONSCIOUS SEDATION    LOCATION: [facility]  DATE/TIME: 7/23/2021 3:44 PM    INDICATION: Parasymphyseal fluid collection.  TECHNIQUE: Dose reduction techniques were used.    PROCEDURE: Informed consent obtained. Site marked. Prior images reviewed. Required items made available. Patient identity confirmed verbally and with arm band. Patient reevaluated immediately before administering sedation. Universal protocol was followed. Time out performed. The site was  prepped and draped in sterile fashion. 8 mL of 1% lidocaine was infused into the local soft tissues. Using standard technique and under direct CT guidance, a 5 Telugu Yueh catheter was inserted the left parasymphyseal fluid collection for aspiration.    SPECIMEN: 8 mL of puslike fluid was aspirated and sent to lab for cultures and Gram stain.    BLOOD LOSS: Minimal.    The patient tolerated the procedure well. No immediate complications.    SEDATION: Versed 2 mg. Fentanyl 100 mcg. The procedure was performed with administration intravenous conscious sedation with appropriate preoperative, intraoperative, and postoperative evaluation.    30 minutes of supervised face to face conscious sedation time was provided by a radiology nurse under my direct supervision.    IMPRESSION:  Successful CT-guided aspiration left parasymphyseal collection.  Puslike fluid was aspirated.    Length of time physician/provider present for 1:1 monitoring during sedation: 30

## 2021-07-23 NOTE — PLAN OF CARE
Problem: Adult Inpatient Plan of Care  Goal: Absence of Hospital-Acquired Illness or Injury  Intervention: Identify and Manage Fall Risk   Encouraged pt to call for assistance. Bed in low position, alarm set, and call light within reach. Pt remained free of fall this shift.      Problem: Adult Inpatient Plan of Care  Goal: Plan of Care Review  Outcome: Improving   Tx plan explained, pt agreed to NPO for procedure this morning. Plan is for aspiration of fluid. Pt NPO since midnight.

## 2021-07-23 NOTE — PLAN OF CARE
Hospital coping:  Patient initially very frustrated with perceived procedure delay as she feels she was told the procedure would occur at 9:30 rather than updated time of 1300 today. Patient mood improved following procedure.    Cardiac: blood pressure elevated at 185/84 with a recheck of 172/78. Order for hydralazine obtained from MD. Recheck SBP less than 170 and medication was not administered.    GI/: Patient opting to use purewick throughout the shift rather than get up to use the bathroom/commode.    Diabetes: blood glucose slightly elevated throughout the shift.    Critical lab: abscess cultures preliminary positive. Infectious Disease informed.

## 2021-07-23 NOTE — SIGNIFICANT EVENT
Significant Event Note    Time of event: 7:26 PM July 22, 2021    Description of event:  Patient fell from the commode onto the ground.  She lowered herself as she had slipped in some loose stool. She did not hit her had or lose consciousness.  Patient did open a scabbed over injury on her left lateral fifth toe.  It was instructed to clean this thoroughly to prevent the risk of infection.  Discussed with the patient needing to be sure she waits until staff arrives to help them with things.  Patient verbalized understanding.  There was no appreciable spinal tenderness, hip tenderness, knee joint tenderness, ankle/foot tenderness or other obvious injury.  No imaging was felt necessary at this time.      Drake Terry MD

## 2021-07-23 NOTE — PRE-PROCEDURE
GENERAL PRE-PROCEDURE:   Procedure:  CT guided right paraspinal and left parasymphaseal collection aspiration / drain placement with moderate seadtion   Date/Time:  7/23/2021 2:51 PM    Written consent obtained?: Yes    Risks and benefits: Risks, benefits and alternatives were discussed    Consent given by:  Patient  Patient states understanding of procedure being performed: Yes    Patient's understanding of procedure matches consent: Yes    Procedure consent matches procedure scheduled: Yes    Expected level of sedation:  Moderate  Appropriately NPO:  Yes  ASA Class:  Class 3- Severe systemic disease, definite functional limitations  Mallampati  :  Grade 2- soft palate, base of uvula, tonsillar pillars, and portion of posterior pharyngeal wall visible  Lungs:  Lungs clear with good breath sounds bilaterally  Heart:  Normal heart sounds and rate  History & Physical reviewed:  History and physical reviewed and no updates needed  Statement of review:  I have reviewed the lab findings, diagnostic data, medications, and the plan for sedation

## 2021-07-23 NOTE — PROGRESS NOTES
CHART CHECK     Primary Oncologist/Hematologist:        Chart reviewed. Patient fell yesterday evening.         Assessment and Plan:      Elenita is a 66 year old female with       1. Metastatic breast adenocarcinoma with bone mets and bone marrow involvement:   - On 3rd line chemo Doxil 50 mg/m2 IV which is given every 4 weeks until progression or intolerance.   - Last received cycle 3 of Doxil on 6/29/21. She did not receive Neulasta support for neutropenia prevention with cycle 3.   - She also receives Zometa 4 mg IV every 4 weeks.   - She is scheduled for consideration of cycle 4 on 7/27/2021 with Dr. Varma.     2. Acute metabolic encephalopathy, multifactorial.  - Resolved after stopping OxyContin and starting treatment of pneumonia.  - Brain MRI shows no acute findings to account for the symptoms.  - Appreciate palliative care team to help with pain management. I will notify our palliative care NP Denilson Valles regarding this event.     3. Acute on chronic cancer bone pain:   - Appreciate palliative care team assistance in managing pain.   - She is scheduled to follow up with palliative care NP on 7/27/21.     4. Community-acquired pneumonia/aspiration pneumonia:   - ID following; on abx     5. Extensive metastatic disease of the thoracic, lumbar, sacrum, and pelvis with not evidence of a pathologist fracture in the lumbar spine.  - Patient will benefit from radiation therapy; case discussed with Dr. Celia Torre MD. Patient's radiation oncologist is Dr. Maria Del Carmen Orozco MD.      6. Intramuscular abscesses along the fractured left superior and inferior pubic rami.  - IR has been consulted  - Fluid will be aspirated and will be sent for culture and cytology.  - ID following; she was switched from cefdinir to oral azithromycin. Appreciate ID involvement.     7. Anxiety:  - Will defer to primary team for anxiety management.        The patient will not be seen over the weekend. If there are any questions or  concerns, please do not hesitate to call. The on-call physician over the weekend will be Dr. Lu.       Clara Cook PA-C  [facility]  [phone]

## 2021-07-23 NOTE — PROGRESS NOTES
[facility] ACUTE PAIN SERVICE    ([facility], [facility], [facility])   Daily PAIN Progress Note    Assessment/Plan:  Elenita Hardin is a 66 year old female who was admitted on 7/16/2021.   Pain Service was asked to see the patient for cancer associated pain. Admitted for evaluation of altered mental status, fevers. Patient presented disoriented with slurred speech, intermittent combativeness, visual hallucination and frequent falls. From review with spouse patient had taken OxyContin Wed evening and Thursday AM, then slept all day on Thursday.  When she woke up on Thursday evening she was confused. (newly prescribed)  She had also been taking hydromorphone 4 mg every four hours, medical cannabis, vistaril and Ambien.  Medical history Stage IV Breast Cancer with mets to pelvis, femur, vertebrae, ribs and lymph nodes of her left arm (next chemo cycle 4 due 7/27/2021 with Dr. Varma),  Type II diabetes. Infectious Disease is following. Encephalopathy improving with OxyContin stopped, also being treated for pneumonia, possible UTI.  Brain MRI with no acute findings.  Patient identifies difficulty with sciatic type pain since 2018, with pain recently flaring up, has old sacral fractures and paraspinal myositis, hem/onc recommended a MRI of the lumbar and pelvis for better assessment.      Abnormal Lumbar MRI reported 7/21/2021: 1.  Diffuse sclerotic metastatic disease of thoracic spine, lumbar spine, sacrum and pelvis with no evidence of a pathologic fracture in the lumbar spine. 2.  No evidence of canal compromise or neural foraminal narrowing in the lumbar region. 3.  Prominent metastatic involvement of sacrum and sacral alar regions, sacroiliac joints with extraosseous soft tissue extension in these regions. Please refer to MRI of the pelvis for further evaluation. 4.  Probable extra osseous extension of metastasis into the right sacral neural foramen along with enhancement of the cauda  equina from L4 to the sacrum and thickening and enhancement of the thecal sac. 5.  Bony destruction and fluid within the sacroiliac joints bilaterally highly suggestive of an inflammatory/infectious etiology. 6.  Multiple abscess formations right greater than left of posterior musculature from L4 to  mid sacral region. Probable involvement of the iliacus muscles bilaterally right greater than left.   Plan for drain placement this afternoon.       Frustrated because having drain placed later than she had expected.  Otherwise pain under good control.  Has not used any prns in addition to scheduled methadone 2.5 mg.      No changes otherwise to current pain plan today.    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   Also with intramuscular abscesses along the fractured left superior and inferior pubic rami - ID following.   2)Multimodal Medication Therapy  Topical:  Lidocaine ointment 5% qid, artificial saliva x 1 spray QID   NSAID'S: ibuprofen  400 mg q 6 h prn   Muscle Relaxants: None   Adjuvants:  Tyenol 975 mg po TID, cymbalta 30 mg po qam, atarax 25 mg every 6 hours prn, self administered medical cannabis, benzotropine 1 mg po tid prn extrapyramidal effects  Antidepressants/anxiolytics: Cymbalta 30 mg q day, Seroquel 12.5 mg q 6 h prn for agitation, haloperidol  Opioids: Methadone 2.5 mg every 8 hours,  Hydromorphone(Dilaudid) 2-4 mg q 3 h prn   3)Non-medication interventions- rest and turning   4)Constipation Prophylaxis- docusate 200 mg daily, Miralax daily PRN     -Opioid prescriber has been [facility]  -Discharge Recommendations - We recommend prescribing the following at the time of discharge: Follow up with Denilson Valles [facility] NP and opioid prescriber. Follow up outpatient with  spine clinic to set up repeat epidural injection, which was effective in past.   -Opioid prescriber has been Denilson Valles CNP  -[place]  pulled from system on 07/17/21. This indicates current opioid use. Most recent [place] :  07/12/21 zolpidem 10 mg #30  06/20/21 Dilaudid 4 mg #120  Not filled Oxycontin 30 mg #60 since 04/13/21 (please dispose of any unused supply)  Has not filled Pregabalin 75 mg since 03/30/2021     Principal Problem:    Hip pain, right  Active Problems:    Confusion     LOS: 7 days       Subjective:  Patient reports pain is not too bad when at rest.  Hasn't been out of bed yet today.      acetaminophen  975 mg Oral TID     artificial saliva  1 spray Swish & Spit 4x Daily     [Held by provider] aspirin  81 mg Oral Daily     azithromycin  250 mg Oral Daily     B-Complex/B-12  1 tablet Oral Daily     calcium carbonate  500 mg Oral Daily     cefdinir  300 mg Oral Q12H GREG     docusate sodium  200 mg Oral Daily     DULoxetine  30 mg Oral QAM     [Held by provider] enoxaparin ANTICOAGULANT  40 mg Subcutaneous Q24H     ezetimibe  10 mg Oral At Bedtime    And     simvastatin  20 mg Oral QPM     insulin aspart  1-3 Units Subcutaneous TID AC     insulin aspart  1-3 Units Subcutaneous At Bedtime     insulin glargine  8 Units Subcutaneous At Bedtime     lidocaine   Topical 4x Daily     medical cannabis  1 Dose Other See Admin Instructions     methadone  2.5 mg Oral Q8H     multivitamin w/minerals  1 tablet Oral Daily     omeprazole  20 mg Oral QAM AC     oxybutynin  5 mg Oral BID     Vitamin D3  25 mcg Oral Daily     zolpidem  10 mg Oral At Bedtime       Objective:  Vital signs in last 24 hours:  Temp:  [98  F (36.7  C)-98.5  F (36.9  C)] 98.5  F (36.9  C)  Pulse:  [] 115  Resp:  [18] 18  BP: (121-153)/(64-65) 153/65  SpO2:  [95 %-96 %] 95 %  Weight:   Weight change:   Body mass index is 29.89 kg/m .    Intake/Output last 3 shifts:  I/O last 3 completed shifts:  In: 1900 [P.O.:1900]  Out: 7000  [Urine:7000]  Intake/Output this shift:  I/O this shift:  In: -   Out: 900 [Urine:900]    Review of Systems:   As per subjective, all others negative.    Physical Exam:    General Appearance:  Alert, cooperative, frustrated, feels sad, port being accessed by RN   Head:  Normocephalic, without obvious abnormality, atraumatic   Eyes:  PERRL, conjunctiva/corneas clear, EOM's intact   Nose: Nares normal, septum midline, mucosa normal, no drainage   Throat: Lips, mucosa, and tongue normal; teeth and gums normal   Neck: Supple, symmetrical, trachea midline, no adenopathy, thyroid: not enlarged, symmetric, no carotid bruit or JVD   Back:   Symmetric, no curvature, ROM normal, no CVA tenderness   Lungs:   respirations unlabored   Chest Wall:  No tenderness or deformity   Heart:  Regular rate and rhythm   Abdomen:   Soft, non-tender, non distended   Extremities: Extremities normal, deconditioned   Skin: Skin color, texture, turgor normal, no rashes or lesions   Neurologic: Alert and oriented X 3, Moves all 4 extremities          Imaging:  Personally Reviewed.  CT Chest/Abdomen/Pelvis w Contrast    Result Date: 7/19/2021  EXAM: CT CHEST/ABDOMEN/PELVIS W CONTRAST LOCATION: [facility] DATE/TIME: 7/19/2021 5:36 PM INDICATION: Sepsis COMPARISON: 12/9/2020     IMPRESSION: 1.  Mild new fullness of right paraspinal musculature low lumbar spine raising possibility of myositis. A discrete abscess collection is not evident. 2.  Left obturator ring fracture with small fluid pockets associated with the fractures, one of which also demonstrates a few tiny air bubbles. 3.  Complex bilateral sacral alar fractures with surrounding zones of heterotopic ossification. 4.  Prominent distention of bladder with borderline bilateral hydronephrosis. Suggest determining post void residual. 5.  MRI of pelvis and lower lumbar spine region may be beneficial to assess for paraspinal myositis or fluid collection, as well as to  assess the tiny fluid collections associated with left obturator ring fractures.    MR Brain w/o Contrast    Result Date: 7/16/2021  EXAM: MR BRAIN W/O CONTRAST LOCATION: [facility] DATE/TIME: 7/16/2021 12:29 PM INDICATION: Confusion. COMPARISON: Head CT 07/16/2021, brain MRI 02/01/2017     IMPRESSION: 1.  No acute intracranial finding. No evidence for recent ischemia, intracranial hemorrhage, or mass. 2.  A few T2 hyperintense foci in the white matter are nonspecific but most commonly reflect gliosis related to prior ischemic or inflammatory insult. 3.  Presumed osseous metastatic disease within the calvarium and upper visualized cervical spine.    XR Chest 2 Views    Result Date: 7/17/2021  EXAM: XR CHEST 2 VW LOCATION: [facility] DATE/TIME: 7/17/2021 3:31 PM INDICATION: evaluate infiltrates seen on portable CXR for pneumonia vs fluid overload COMPARISON: 7/16/2021     IMPRESSION: Indistinct pulmonary vasculature. Left breast implant projects over the left lung, which increases the appearance of airspace disease. No definite new consolidation. No pleural effusions or pneumothorax. Unchanged cardiac size. Right internal  jugular port tip projects at the cavoatrial junction. Left axillary surgical clips. Lower thoracic vertebral body compression fracture. Bony metastases better seen on prior imaging.    MR Pelvis (Intrapelvic Organs) wo&w Contrast    Result Date: 7/22/2021  EXAM: MRI PELVIS WITHOUT AND WITH IV CONTRAST LOCATION: [facility] DATE/TIME: 7/21/2021 9:34 PM INDICATION: Soft tissue infection suspected, pelvis, xray done. Metastatic disease. Paraspinal abscesses.    IMPRESSION: 1.  There are 2 complex fluid collections are seen along the fractured left superior and inferior pubic rami which are nonspecific but given the enhancement pattern on current exam and air bubbles and fluid seen on the CT scan, these collections likely represent intramuscular  abscesses.    XR Chest Port 1 View    Result Date: 7/16/2021  EXAM: XR CHEST PORT 1 VIEW LOCATION: [facility] DATE/TIME: 7/16/2021 12:20 PM INDICATION: Confusion. Slightly decreased O2 sat. COMPARISON: 04/06/2018.     IMPRESSION: Right IJ chemotherapy port is in place with tip in the high right atrium. Heart and mediastinal size are normal. There is patchy airspace infiltrate throughout the bilateral lungs with diffuse indistinctness of the pulmonary interstitium. These findings may represent pulmonary edema. An inflammatory infectious process is also a consideration. No definite pleural effusion or pneumothorax. Extensive sclerotic osseous lesions are present, compatible with known bony metastases.         XR Foot Left G/E 3 Views    Result Date: 7/16/2021  EXAM: XR FOOT LEFT G/E 3 VIEWS LOCATION: [facility] DATE/TIME: 7/16/2021 1:02 PM INDICATION: 5th toe trauma COMPARISON: None.     IMPRESSION: Bones are demineralized. There is no definitive evidence of acute fracture, with attention to the fifth toe. Mild, scattered degenerative changes.    MR Lumbar Spine w/o & w Contrast    Result Date: 7/21/2021  EXAM: MR LUMBAR SPINE W/O and W CONTRAST LOCATION: [facility] DATE/TIME: 7/21/2021 9:34 PM INDICATION: Low back pain, cancer suspected Low back pain, infection suspected patient has a history of breast cancer. COMPARISON: 7/19/2021.     IMPRESSION: 1.  Diffuse sclerotic metastatic disease of thoracic spine, lumbar spine, sacrum and pelvis with no evidence of a pathologic fracture in the lumbar spine. 2.  No evidence of canal compromise or neural foraminal narrowing in the lumbar region. 3.  Prominent metastatic involvement of sacrum and sacral alar regions, sacroiliac joints with extraosseous soft tissue extension in these regions. Please refer to MRI of the pelvis for further evaluation. 4.  Probable extra osseous extension of metastasis into the right sacral  neural foramen along with enhancement of the cauda equina from L4 to the sacrum and thickening and enhancement of the thecal sac. 5.  Bony destruction and fluid within the sacroiliac joints bilaterally highly suggestive of an inflammatory/infectious etiology. 6.  Multiple abscess formations right greater than left of posterior musculature from L4 to  mid sacral region. Probable involvement of the iliacus muscles bilaterally right greater than left. These findings were communicated by phone to Dr. Vergara at 11:30 PM on 07/21/2021.    Head CT w/o contrast    Result Date: 7/16/2021  EXAM: CT HEAD W/O CONTRAST LOCATION: [facility] DATE/TIME: 7/16/2021 11:12 AM INDICATION: Mental status change, unknown cause. Breast cancer. COMPARISON: Brain MRI 02/01/2017     IMPRESSION: 1.  No acute intracranial injury, hemorrhage, mass, or CT evidence of recent ischemia. 2.  Diffusely sclerotic calvarium presumed to reflect metastatic disease given history of breast cancer.      Lab Results:  Personally Reviewed.   Recent Labs   Lab 07/23/21  0642 07/20/21  0537 07/17/21  0705 07/16/21  1054   WBC  --   --  5.1 4.3   HGB  --   --  9.2* 8.5*   HCT  --   --  28.9* 26.7*    [phone]     Recent Labs   Lab 07/17/21  0705 07/16/21  1054    132*   CO2 19* 21*   BUN 12 14   ALBUMIN  --  2.8*   ALKPHOS  --  157*   ALT  --  36   AST  --  23     Recent Labs   Lab 07/16/21  1054   INR 1.45*       Total time spent 25 minutes with greater than 50% in consultation, education and coordination of care.     Also discussed with RN      Estephanie DALTON, CNS-BC, DNP  Acute Care Pain Management Program  [facility] ([place], [place], [place])   With questions call [phone]  Preference if for [employer] Paging - Lg  Click HERE to page Yessenia

## 2021-07-23 NOTE — PROGRESS NOTES
"CLINICAL NUTRITION SERVICES - ASSESSMENT NOTE     Nutrition Prescription    RECOMMENDATIONS FOR MDs/PROVIDERS TO ORDER:  None at this time    Malnutrition Status:    Not noted         REASON FOR ASSESSMENT  Elenita Hardin is a/an 66 year old female assessed by the dietitian for LOS    NUTRITION HISTORY  Unable to obtain at this time as pt is sleeping    CURRENT NUTRITION ORDERS  Diet: NPO for procedure this morning  Intake/Tolerance: previously eating mostly 100% of those meals recorded (7/16-7/23)-previous diet was consistent CHO 45 g/meal    LABS  Labs reviewed: K 3.7, Mg 2.1, Glu 169    MEDICATIONS  Medications reviewed: zithromax, TUMS, colace, novolog, lantus, multi vitamin with minerals, prilosec, vit D 3    ANTHROPOMETRICS  Height: 165.1 cm (5' 5\")  Most Recent Weight: 81.5 kg (179 lb 9.6 oz)    IBW: 56.8 kg (125 lb)  BMI: Overweight BMI 25-29.9  Weight History:   Wt Readings from Last 10 Encounters:   07/17/21 81.5 kg (179 lb 9.6 oz)   04/09/18 98.7 kg (217 lb 11.2 oz)   11/17/17 97.1 kg (214 lb)   11/09/17 96.2 kg (212 lb)   01/06/16 98.4 kg (217 lb)   09/25/15 97.3 kg (214 lb 6.4 oz)   06/29/15 95.3 kg (210 lb)   per above data, pt has lost 38 lb in the past 3 years (17%) which is not clinically significant    Dosing Weight: 63 kg (adjusted weight)    ASSESSED NUTRITION NEEDS  Estimated Energy Needs: 0262-7938 kcals/day (25 - 30 kcals/kg)  Justification: Maintenance  Estimated Protein Needs: 50-63 grams protein/day (0.8 - 1 grams of pro/kg)  Justification: Maintenance  Estimated Fluid Needs: 2759-0028 mL/day (25 - 30 mL/kg)   Justification: Maintenance    PHYSICAL FINDINGS  See malnutrition section below.  No abnormal nutrition-related physical findings observed.     MALNUTRITION  % Intake: No decreased intake noted  % Weight Loss: Weight loss does not meet criteria  Subcutaneous Fat Loss: Unable to assess  Muscle Loss: Unable to assess  Fluid Accumulation/Edema: None noted  Malnutrition " Diagnosis: Patient does not meet two of the established criteria necessary for diagnosing malnutrition    NUTRITION DIAGNOSIS  None at this time    INTERVENTIONS  Follow for diet advance and po intake    Goals  Patient to consume % of nutritionally adequate meal trays TID, or the equivalent with supplements/snacks.(when diet advances)     Monitoring/Evaluation  Progress toward goals will be monitored and evaluated per protocol.

## 2021-07-23 NOTE — PROCEDURES
[facility]    Procedure: Imaging Procedure Note    Date/Time: 7/23/2021 4:00 PM  Performed by: Evan Castro DO  Authorized by: Evan Castro DO     UNIVERSAL PROTOCOL   Site Marked: Yes  Prior Images Obtained and Reviewed:  Yes  Required items: Required blood products, implants, devices and special equipment available    Patient identity confirmed:  Verbally with patient and provided demographic data  Patient was reevaluated immediately before administering moderate or deep sedation or anesthesia  Confirmation Checklist:  Patient's identity using two indicators, relevant allergies, procedure was appropriate and matched the consent or emergent situation and correct equipment/implants were available  Time out: Immediately prior to the procedure a time out was called    Universal Protocol: the Joint Commission Universal Protocol was followed    Preparation: Patient was prepped and draped in usual sterile fashion    ESBL (mL):  0         ANESTHESIA    Local Anesthetic: Lidocaine 1% without epinephrine  Anesthetic Total (mL):  8      SEDATION    Patient Sedated: Yes    Sedation:  Fentanyl and midazolam  Vital signs: Vital signs monitored during sedation    See dictated procedure note for full details.  PROCEDURE   Patient Tolerance:  Patient tolerated the procedure well with no immediate complications  Describe Procedure: EXAM:  1. PERCUTANEOUS ASPIRATION PLACEMENT RIGHT PARASPINAL COLLECTION  2. CT GUIDANCE  3. CONSCIOUS SEDATION    LOCATION: [facility]  DATE/TIME: 7/23/2021 2:34 PM    INDICATION: Right paraspinal collection.  TECHNIQUE: Dose reduction techniques were used.    PROCEDURE: Informed consent obtained. Site marked. Prior images reviewed. Required items made available. Patient identity confirmed verbally and with arm band. Patient reevaluated immediately before administering sedation. Universal protocol was followed. Time out performed. The site  was prepped and draped in sterile fashion. 8 mL of 1% lidocaine was infused into the local soft tissues. Using standard technique and under direct CT guidance, a 5 Arabic Yueh catheter was inserted into the fluid collection for aspiration.    SPECIMEN: 35 mL of puslike fluid was aspirated and a portion sent to lab for cultures and Gram stain.    BLOOD LOSS: Minimal.    The patient tolerated the procedure well. No immediate complications.    SEDATION: Versed 2 mg. Fentanyl 100 mcg. The procedure was performed with administration intravenous conscious sedation with appropriate preoperative, intraoperative, and postoperative evaluation.    30 minutes of supervised face to face conscious sedation time was provided by a radiology nurse under my direct supervision.    IMPRESSION:  Successful CT-guided aspiration right paraspinal collection.    Length of time physician/provider present for 1:1 monitoring during sedation: 30

## 2021-07-24 ENCOUNTER — APPOINTMENT (OUTPATIENT)
Dept: PHYSICAL THERAPY | Facility: HOSPITAL | Age: 66
DRG: 853 | End: 2021-07-24
Payer: COMMERCIAL

## 2021-07-24 ENCOUNTER — APPOINTMENT (OUTPATIENT)
Dept: OCCUPATIONAL THERAPY | Facility: HOSPITAL | Age: 66
DRG: 853 | End: 2021-07-24
Payer: COMMERCIAL

## 2021-07-24 LAB
BACTERIA BLD CULT: NO GROWTH
BACTERIA BLD CULT: NO GROWTH
GLUCOSE BLDC GLUCOMTR-MCNC: 134 MG/DL (ref 70–125)
GLUCOSE BLDC GLUCOMTR-MCNC: 191 MG/DL (ref 70–125)
GLUCOSE BLDC GLUCOMTR-MCNC: 192 MG/DL (ref 70–125)
GLUCOSE BLDC GLUCOMTR-MCNC: 236 MG/DL (ref 70–125)
HOLD SPECIMEN: NORMAL
MAGNESIUM SERPL-MCNC: 2 MG/DL (ref 1.8–2.6)
POTASSIUM BLD-SCNC: 3.3 MMOL/L (ref 3.5–5)
POTASSIUM BLD-SCNC: 3.9 MMOL/L (ref 3.5–5)
PROCALCITONIN SERPL-MCNC: 0.52 NG/ML (ref 0–0.49)

## 2021-07-24 PROCEDURE — 99233 SBSQ HOSP IP/OBS HIGH 50: CPT | Performed by: INTERNAL MEDICINE

## 2021-07-24 PROCEDURE — 83735 ASSAY OF MAGNESIUM: CPT | Performed by: FAMILY MEDICINE

## 2021-07-24 PROCEDURE — 250N000013 HC RX MED GY IP 250 OP 250 PS 637: Performed by: EMERGENCY MEDICINE

## 2021-07-24 PROCEDURE — 250N000013 HC RX MED GY IP 250 OP 250 PS 637

## 2021-07-24 PROCEDURE — 250N000013 HC RX MED GY IP 250 OP 250 PS 637: Performed by: CLINICAL NURSE SPECIALIST

## 2021-07-24 PROCEDURE — 99231 SBSQ HOSP IP/OBS SF/LOW 25: CPT | Performed by: CLINICAL NURSE SPECIALIST

## 2021-07-24 PROCEDURE — 250N000013 HC RX MED GY IP 250 OP 250 PS 637: Performed by: PAIN MEDICINE

## 2021-07-24 PROCEDURE — 250N000013 HC RX MED GY IP 250 OP 250 PS 637: Performed by: FAMILY MEDICINE

## 2021-07-24 PROCEDURE — 250N000013 HC RX MED GY IP 250 OP 250 PS 637: Performed by: INTERNAL MEDICINE

## 2021-07-24 PROCEDURE — 250N000009 HC RX 250: Performed by: PAIN MEDICINE

## 2021-07-24 PROCEDURE — 99207 PR CONSULT E&M CHANGED TO SUBSEQUENT LEVEL: CPT | Performed by: CLINICAL NURSE SPECIALIST

## 2021-07-24 PROCEDURE — 250N000012 HC RX MED GY IP 250 OP 636 PS 637: Performed by: FAMILY MEDICINE

## 2021-07-24 PROCEDURE — 99233 SBSQ HOSP IP/OBS HIGH 50: CPT | Performed by: FAMILY MEDICINE

## 2021-07-24 PROCEDURE — 120N000001 HC R&B MED SURG/OB

## 2021-07-24 PROCEDURE — 250N000012 HC RX MED GY IP 250 OP 636 PS 637

## 2021-07-24 PROCEDURE — 84132 ASSAY OF SERUM POTASSIUM: CPT | Performed by: FAMILY MEDICINE

## 2021-07-24 PROCEDURE — 258N000003 HC RX IP 258 OP 636: Performed by: FAMILY MEDICINE

## 2021-07-24 PROCEDURE — 250N000011 HC RX IP 250 OP 636: Performed by: FAMILY MEDICINE

## 2021-07-24 PROCEDURE — 84145 PROCALCITONIN (PCT): CPT | Performed by: FAMILY MEDICINE

## 2021-07-24 PROCEDURE — 97110 THERAPEUTIC EXERCISES: CPT | Mod: GP

## 2021-07-24 PROCEDURE — 97535 SELF CARE MNGMENT TRAINING: CPT | Mod: GO

## 2021-07-24 RX ORDER — POTASSIUM CHLORIDE 1500 MG/1
40 TABLET, EXTENDED RELEASE ORAL ONCE
Status: COMPLETED | OUTPATIENT
Start: 2021-07-24 | End: 2021-07-24

## 2021-07-24 RX ADMIN — DULOXETINE HYDROCHLORIDE 30 MG: 30 CAPSULE, DELAYED RELEASE ORAL at 08:51

## 2021-07-24 RX ADMIN — CEFDINIR 300 MG: 300 CAPSULE ORAL at 08:51

## 2021-07-24 RX ADMIN — METHADONE HYDROCHLORIDE 2.5 MG: 5 TABLET ORAL at 22:09

## 2021-07-24 RX ADMIN — DOCUSATE SODIUM 200 MG: 100 CAPSULE, LIQUID FILLED ORAL at 08:51

## 2021-07-24 RX ADMIN — VANCOMYCIN HYDROCHLORIDE 1250 MG: 1 INJECTION, POWDER, LYOPHILIZED, FOR SOLUTION INTRAVENOUS at 23:41

## 2021-07-24 RX ADMIN — LIDOCAINE 1 INCH: 50 OINTMENT TOPICAL at 16:17

## 2021-07-24 RX ADMIN — HYDROXYZINE HYDROCHLORIDE 25 MG: 10 SYRUP ORAL at 18:29

## 2021-07-24 RX ADMIN — Medication 1 SPRAY: at 12:57

## 2021-07-24 RX ADMIN — SIMVASTATIN 20 MG: 10 TABLET, FILM COATED ORAL at 20:58

## 2021-07-24 RX ADMIN — Medication 1 SPRAY: at 08:57

## 2021-07-24 RX ADMIN — Medication 1 SPRAY: at 16:17

## 2021-07-24 RX ADMIN — HYDROMORPHONE HYDROCHLORIDE 4 MG: 4 TABLET ORAL at 17:47

## 2021-07-24 RX ADMIN — ZOLPIDEM TARTRATE 10 MG: 5 TABLET ORAL at 20:58

## 2021-07-24 RX ADMIN — IBUPROFEN 400 MG: 400 TABLET, FILM COATED ORAL at 04:43

## 2021-07-24 RX ADMIN — ACETAMINOPHEN 975 MG: 325 TABLET ORAL at 08:52

## 2021-07-24 RX ADMIN — Medication 1 TABLET: at 08:51

## 2021-07-24 RX ADMIN — VANCOMYCIN HYDROCHLORIDE 1500 MG: 1 INJECTION, POWDER, LYOPHILIZED, FOR SOLUTION INTRAVENOUS at 01:10

## 2021-07-24 RX ADMIN — Medication 1 SPRAY: at 22:09

## 2021-07-24 RX ADMIN — VANCOMYCIN HYDROCHLORIDE 1250 MG: 1 INJECTION, POWDER, LYOPHILIZED, FOR SOLUTION INTRAVENOUS at 11:26

## 2021-07-24 RX ADMIN — CEFDINIR 300 MG: 300 CAPSULE ORAL at 20:54

## 2021-07-24 RX ADMIN — Medication 25 MCG: at 08:51

## 2021-07-24 RX ADMIN — ACETAMINOPHEN 975 MG: 325 TABLET ORAL at 20:57

## 2021-07-24 RX ADMIN — ACETAMINOPHEN 975 MG: 325 TABLET ORAL at 14:03

## 2021-07-24 RX ADMIN — LIDOCAINE: 50 OINTMENT TOPICAL at 20:59

## 2021-07-24 RX ADMIN — METHADONE HYDROCHLORIDE 2.5 MG: 5 TABLET ORAL at 14:03

## 2021-07-24 RX ADMIN — CALCIUM CARBONATE (ANTACID) CHEW TAB 500 MG 500 MG: 500 CHEW TAB at 08:51

## 2021-07-24 RX ADMIN — EZETIMIBE 10 MG: 10 TABLET ORAL at 20:58

## 2021-07-24 RX ADMIN — POTASSIUM CHLORIDE 40 MEQ: 20 TABLET, EXTENDED RELEASE ORAL at 08:52

## 2021-07-24 RX ADMIN — LIDOCAINE 1 INCH: 50 OINTMENT TOPICAL at 08:53

## 2021-07-24 RX ADMIN — AZITHROMYCIN MONOHYDRATE 250 MG: 250 TABLET ORAL at 08:52

## 2021-07-24 RX ADMIN — OMEPRAZOLE 20 MG: 20 CAPSULE, DELAYED RELEASE ORAL at 06:48

## 2021-07-24 RX ADMIN — INSULIN ASPART 1 UNITS: 100 INJECTION, SOLUTION INTRAVENOUS; SUBCUTANEOUS at 17:02

## 2021-07-24 RX ADMIN — HYDROMORPHONE HYDROCHLORIDE 4 MG: 4 TABLET ORAL at 11:34

## 2021-07-24 RX ADMIN — OXYBUTYNIN CHLORIDE 5 MG: 5 TABLET ORAL at 08:52

## 2021-07-24 RX ADMIN — INSULIN GLARGINE 10 UNITS: 100 INJECTION, SOLUTION SUBCUTANEOUS at 20:59

## 2021-07-24 RX ADMIN — METHADONE HYDROCHLORIDE 2.5 MG: 5 TABLET ORAL at 06:48

## 2021-07-24 RX ADMIN — OXYBUTYNIN CHLORIDE 5 MG: 5 TABLET ORAL at 20:58

## 2021-07-24 RX ADMIN — INSULIN ASPART 1 UNITS: 100 INJECTION, SOLUTION INTRAVENOUS; SUBCUTANEOUS at 12:56

## 2021-07-24 NOTE — PROGRESS NOTES
"[facility] ACUTE PAIN SERVICE    ([facility], [facility], [facility]   Daily PAIN Progress Note    Assessment/Plan:  Elenita Hardin is a 66 year old female who was admitted on 7/16/2021.   Pain Service was asked to see the patient for cancer associated pain. Admitted for evaluation of altered mental status, fevers. Patient presented disoriented with slurred speech, intermittent combativeness, visual hallucination and frequent falls.  Mental status issues have improved.     In 24 hours, patient has utilized 7.5mg of scheduled methadone one 4 mg hydromorphone.    Aspiration of right paraspinal collection and left para symphyseal collection.  No drain placed.  Fluid sent for cytology and cultures.  Infectious Disease following.  On Iv antibiotics.  Plan for follow up with Oncology as Outpatient next week.     Patient identifies that she continues to have pain right buttocks.  Pain levels are diminished some with the addition of the methadone.    She appears to be tolerating it well.  No sedation noted.  Patient states that she has felt like she is resting better.  Ambulated to BR tolerated with some increase in pain to a \"5\" and that was why she took prn dose of Hydromorphone.      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   Also with intramuscular abscesses along the fractured left superior and inferior pubic rami - ID following.   2)Multimodal Medication Therapy  Topical:  Lidocaine ointment 5% qid, artificial saliva x 1 spray QID   NSAID'S: ibuprofen  400 mg q 6 h prn   Muscle Relaxants: None   Adjuvants:  Tyenol 975 mg po TID, cymbalta 30 mg po qam, atarax 25 mg every 6 hours prn, self administered medical cannabis, benzotropine 1 mg po tid prn extrapyramidal " effects  Antidepressants/anxiolytics: Cymbalta 30 mg q day, Seroquel 12.5 mg q 6 h prn for agitation, haloperidol  Opioids: Methadone 2.5 mg every 8 hours,  Hydromorphone(Dilaudid) 2-4 mg q 3 h prn   3)Non-medication interventions- rest and turning   4)Constipation Prophylaxis- docusate 200 mg daily, Miralax daily PRN     -Opioid prescriber has been [facility]  -Discharge Recommendations - We recommend prescribing the following at the time of discharge: Follow up with [facility] NP and opioid prescriber. Follow up outpatient with spine clinic to set up repeat epidural injection, which was effective in past.   -Opioid prescriber has been [facility]  -MN  pulled from system on 07/17/21. This indicates current opioid use. Most recent MN :  07/12/21 zolpidem 10 mg #30  06/20/21 Dilaudid 4 mg #120  Not filled Oxycontin 30 mg #60 since 04/13/21 (please dispose of any unused supply)  Has not filled Pregabalin 75 mg since 03/30/2021     Principal Problem:    Hip pain, right  Active Problems:    Confusion     LOS: 7 days       Subjective:  Patient reports pain is not too bad when at rest.  Hasn't been out of bed yet today.      acetaminophen  975 mg Oral TID     artificial saliva  1 spray Swish & Spit 4x Daily     [Held by provider] aspirin  81 mg Oral Daily     azithromycin  250 mg Oral Daily     B-Complex/B-12  1 tablet Oral Daily     calcium carbonate  500 mg Oral Daily     cefdinir  300 mg Oral Q12H GREG     docusate sodium  200 mg Oral Daily     DULoxetine  30 mg Oral QAM     [Held by provider] enoxaparin ANTICOAGULANT  40 mg Subcutaneous Q24H     ezetimibe  10 mg Oral At Bedtime    And     simvastatin  20 mg Oral QPM     insulin aspart  1-3 Units Subcutaneous TID AC     insulin aspart  1-3 Units Subcutaneous At Bedtime     insulin glargine  10 Units Subcutaneous At Bedtime     lidocaine   Topical 4x Daily     medical cannabis  1 Dose Other See Admin Instructions      methadone  2.5 mg Oral Q8H     multivitamin w/minerals  1 tablet Oral Daily     omeprazole  20 mg Oral QAM AC     oxybutynin  5 mg Oral BID     sodium chloride (PF)  10-20 mL Intracatheter Q28 Days     vancomycin  1,250 mg Intravenous Q12H     Vitamin D3  25 mcg Oral Daily     zolpidem  10 mg Oral At Bedtime       Objective:  Vital signs in last 24 hours:  Temp:  [97.8  F (36.6  C)-99.2  F (37.3  C)] 98.1  F (36.7  C)  Pulse:  [] 90  Resp:  [10-20] 16  BP: (104-185)/(52-84) 111/60  SpO2:  [93 %-100 %] 95 %  Weight:   Weight change:   Body mass index is 29.89 kg/m .    Intake/Output last 3 shifts:  I/O last 3 completed shifts:  In: 2760 [P.O.:2760]  Out: 4350 [Urine:4350]  Intake/Output this shift:  No intake/output data recorded.    Review of Systems:   As per subjective, all others negative.    Physical Exam:    General Appearance:  Alert, cooperative, frustrated, feels sad, port being accessed by RN   Head:  Normocephalic, without obvious abnormality, atraumatic   Eyes:  PERRL, conjunctiva/corneas clear, EOM's intact   Nose: Nares normal, septum midline, mucosa normal, no drainage   Throat: Lips, mucosa, and tongue normal; teeth and gums normal   Neck: Supple, symmetrical, trachea midline, no adenopathy, thyroid: not enlarged, symmetric, no carotid bruit or JVD   Back:   Symmetric, no curvature, ROM normal, no CVA tenderness   Lungs:   respirations unlabored   Chest Wall:  No tenderness or deformity   Heart:  Regular rate and rhythm   Abdomen:   Soft, non-tender, non distended   Extremities: Extremities normal, deconditioned   Skin: Skin color, texture, turgor normal, lesions lower extremity healing   Neurologic: Alert and oriented X 3, Moves all 4 extremities          Imaging:  Personally Reviewed.  CT Chest/Abdomen/Pelvis w Contrast    Result Date: 7/19/2021  EXAM: CT CHEST/ABDOMEN/PELVIS W CONTRAST LOCATION: [facility] DATE/TIME: 7/19/2021 5:36 PM INDICATION: Sepsis COMPARISON:  12/9/2020     IMPRESSION: 1.  Mild new fullness of right paraspinal musculature low lumbar spine raising possibility of myositis. A discrete abscess collection is not evident. 2.  Left obturator ring fracture with small fluid pockets associated with the fractures, one of which also demonstrates a few tiny air bubbles. 3.  Complex bilateral sacral alar fractures with surrounding zones of heterotopic ossification. 4.  Prominent distention of bladder with borderline bilateral hydronephrosis. Suggest determining post void residual. 5.  MRI of pelvis and lower lumbar spine region may be beneficial to assess for paraspinal myositis or fluid collection, as well as to assess the tiny fluid collections associated with left obturator ring fractures.    MR Brain w/o Contrast    Result Date: 7/16/2021  EXAM: MR BRAIN W/O CONTRAST LOCATION: [facility] DATE/TIME: 7/16/2021 12:29 PM INDICATION: Confusion. COMPARISON: Head CT 07/16/2021, brain MRI 02/01/2017     IMPRESSION: 1.  No acute intracranial finding. No evidence for recent ischemia, intracranial hemorrhage, or mass. 2.  A few T2 hyperintense foci in the white matter are nonspecific but most commonly reflect gliosis related to prior ischemic or inflammatory insult. 3.  Presumed osseous metastatic disease within the calvarium and upper visualized cervical spine.    XR Chest 2 Views    Result Date: 7/17/2021  EXAM: XR CHEST 2 VW LOCATION: [facility] DATE/TIME: 7/17/2021 3:31 PM INDICATION: evaluate infiltrates seen on portable CXR for pneumonia vs fluid overload COMPARISON: 7/16/2021     IMPRESSION: Indistinct pulmonary vasculature. Left breast implant projects over the left lung, which increases the appearance of airspace disease. No definite new consolidation. No pleural effusions or pneumothorax. Unchanged cardiac size. Right internal  jugular port tip projects at the cavoatrial junction. Left axillary surgical clips. Lower thoracic vertebral  body compression fracture. Bony metastases better seen on prior imaging.    MR Pelvis (Intrapelvic Organs) wo&w Contrast    Result Date: 7/22/2021  EXAM: MRI PELVIS WITHOUT AND WITH IV CONTRAST LOCATION: [facility] DATE/TIME: 7/21/2021 9:34 PM INDICATION: Soft tissue infection suspected, pelvis, xray done. Metastatic disease. Paraspinal abscesses.    IMPRESSION: 1.  There are 2 complex fluid collections are seen along the fractured left superior and inferior pubic rami which are nonspecific but given the enhancement pattern on current exam and air bubbles and fluid seen on the CT scan, these collections likely represent intramuscular abscesses.    XR Chest Port 1 View    Result Date: 7/16/2021  EXAM: XR CHEST PORT 1 VIEW LOCATION: [facility] DATE/TIME: 7/16/2021 12:20 PM INDICATION: Confusion. Slightly decreased O2 sat. COMPARISON: 04/06/2018.     IMPRESSION: Right IJ chemotherapy port is in place with tip in the high right atrium. Heart and mediastinal size are normal. There is patchy airspace infiltrate throughout the bilateral lungs with diffuse indistinctness of the pulmonary interstitium. These findings may represent pulmonary edema. An inflammatory infectious process is also a consideration. No definite pleural effusion or pneumothorax. Extensive sclerotic osseous lesions are present, compatible with known bony metastases.         XR Foot Left G/E 3 Views    Result Date: 7/16/2021  EXAM: XR FOOT LEFT G/E 3 VIEWS LOCATION: [facility] DATE/TIME: 7/16/2021 1:02 PM INDICATION: 5th toe trauma COMPARISON: None.     IMPRESSION: Bones are demineralized. There is no definitive evidence of acute fracture, with attention to the fifth toe. Mild, scattered degenerative changes.    MR Lumbar Spine w/o & w Contrast    Result Date: 7/21/2021  EXAM: MR LUMBAR SPINE W/O and W CONTRAST LOCATION: [facility] DATE/TIME: 7/21/2021 9:34 PM INDICATION:  Low back pain, cancer suspected Low back pain, infection suspected patient has a history of breast cancer. COMPARISON: 7/19/2021.     IMPRESSION: 1.  Diffuse sclerotic metastatic disease of thoracic spine, lumbar spine, sacrum and pelvis with no evidence of a pathologic fracture in the lumbar spine. 2.  No evidence of canal compromise or neural foraminal narrowing in the lumbar region. 3.  Prominent metastatic involvement of sacrum and sacral alar regions, sacroiliac joints with extraosseous soft tissue extension in these regions. Please refer to MRI of the pelvis for further evaluation. 4.  Probable extra osseous extension of metastasis into the right sacral neural foramen along with enhancement of the cauda equina from L4 to the sacrum and thickening and enhancement of the thecal sac. 5.  Bony destruction and fluid within the sacroiliac joints bilaterally highly suggestive of an inflammatory/infectious etiology. 6.  Multiple abscess formations right greater than left of posterior musculature from L4 to  mid sacral region. Probable involvement of the iliacus muscles bilaterally right greater than left. These findings were communicated by phone to Dr. Vergara at 11:30 PM on 07/21/2021.    Head CT w/o contrast    Result Date: 7/16/2021  EXAM: CT HEAD W/O CONTRAST LOCATION: [facility] DATE/TIME: 7/16/2021 11:12 AM INDICATION: Mental status change, unknown cause. Breast cancer. COMPARISON: Brain MRI 02/01/2017     IMPRESSION: 1.  No acute intracranial injury, hemorrhage, mass, or CT evidence of recent ischemia. 2.  Diffusely sclerotic calvarium presumed to reflect metastatic disease given history of breast cancer.      Lab Results:  Personally Reviewed.   Recent Labs   Lab 07/23/21  0642 07/20/21  0537    317     No results for input(s): NA, CO2, BUN, CREATININE, ALBUMIN, BILITOT, ALKPHOS, ALT, AST, GLUCOSE in the last 168 hours.    Invalid input(s): K, CL, CALCIUM, LABALBU, PROT  No results for  input(s): INR in the last 168 hours.    Total time spent 25 minutes with greater than 50% in consultation, education and coordination of care.     Also discussed with RN      Estephanie DALTON, CNS-BC, DNP  Acute Care Pain Management Program  [facility] ([place], [place], [place])   With questions call [phone]  Preference if for [facility]  Click HERE to page [place]

## 2021-07-24 NOTE — PROGRESS NOTES
[facility] [place] PROGRESS NOTE      ADMIT DATE: 7/16/2021     FACILITY: [facility]    PCP: Sandi Jones, [phone]    ASSESSMENT AND PLAN:    66 year old female with history significant for metastatic breast cancer, multiple bony lesions with resulting chronic pain, presented in the company of her  with disorientation, slurred speech, intermittent combativeness and visual hallucinations and frequent falls despite using her walker.    Two days prior to admission her pain was increasing in intensity despite being on her routine Dilaudid and medical marijuana and she decided to take one tablet of OxyContin that was prescribed in April 2021 for prn pain breakthrough by  [place] [facility].  The following morning she took another dose of OxyContin with her usual morning meds.  She slept most of the day prior to admission and then woke up at 4 PM disoriented and hallucinating.  Her symptoms persisted and she was brought in for evaluation, she had no reported fever at home but upon arrival to the floor was febrile.    Principal Problem:    Hip pain, right  Active Problems:    Confusion      #Acute metabolic encephalopathy: Resolved after stopping OxyContin and starting treatment of pneumonia.  Likely multifactorial secondary to underlying chronic illness, dehydration, possibly early infection and then additional dosing of OxyContin.  Brain MRI shows no acute findings to account for the symptoms.   Appreciate pain team consult.     #Acute on chronic cancer bone pain: Continue home Dilaudid at q 3 hour frequency .  C/w scheduled tylenol. Resume home dosing medical cannabis.  Will follow patient's pain curve and titrate medications as needed.  Acute pain team trialing ketamine, low-dose, patient tried last pm, not much relief. Pain team started patient on methadone on 7/21 and ketamine discontinued. Pain has improved after the CT aspiration on 7/23. Pain improving.   -patient will see radiation  "therapy in OP setting and pain should show improvement with radiation therapy.     #History of left lumbar spine foraminal stenosis with neuropathic pain: responded well to epidural injection. Plan for outpatient followup with spine clinic to set up for repeat injection.     #Fever: After she came up to the floor she developed a temperature of 100.7.  UA shows 6 white cells, culture ordered. Procalcitonin is elevated.  Initiated Rocephin and Zithromax to cover the potential both lung and urine bacteria.  Lactate is normal.  Initial portable film with question of infiltrates but repeat 2 view chest film without clear evidence of infiltrate.  Patient with recurrent fever 7/18/2021, underlying immunocompromise state, elevated procalcitonin, elevated CRP with plan for IV antibiotics through Tuesday 7/20 then changing to orals for a total of 10 days therapy. Consulted ID-->ID switched patient to PO cefdinir and zithromax on 7/21.   -after CT aspiration on 7/23, gram stain culture from this aspiration shows growth of  GPC in pairs and patient started on IV vanco on 7/23 which will c/w along with azithromycin and cefdinir.     #myositis  -CT abdomen on admission shows: \"There is mild fullness of right paraspinous, erectus spinal muscle at low lumbar spine which appears new, can't exclude myositis or inflammation involving the right paraspinous musculature. No definite discrete fluid collection evident.\" Radiology stated: \"MRI of pelvis and lower lumbar spine region may be beneficial to assess for paraspinal myositis or fluid collection\"  -discussed imaging results with Heme/Onc who recommended pursuing MRI  -MRI pelvis and lower lumbar spine showed: \"There are 2 complex fluid collections are seen along the fractured left superior and inferior pubic rami which are nonspecific but given the enhancement pattern on current exam and air bubbles and fluid seen on the CT scan, these collections likely   represent intramuscular " "abscesses.\"  -CT abscess aspiration performed on 7/23 and fluid cultures sent and pending. Patient has had some pain relief in right hip/lower back after the aspiration.        #Dehydration: Resolved with IV fluids and now able to take p.o. Prior to admission not been eating and drinking very well.     #Metastatic breast cancer.  Stage IV with spread to pelvis, femur, vertebrae, ribs and lymph nodes of her left arm. On 3rd line chemo Doxil 50 mg/m2 IV which is given every 4 weeks until progression or intolerance. Last received cycle 3 of Doxil on 6/29/21. She did not receive Neulasta support for neutropenia prevention with cycle 3. She also receives Zometa 4 mg IV every 4 weeks. She is scheduled for consideration of cycle 4 on 7/27/2021 with Dr. Varma. Dr. Varma from [facility] is her primary oncologist.  Pain medications prescribed through [facility].     #Type 2 diabetes, insulin dependent: Initial blood sugar on admission normal range.  Had not been eating well over the previous 2 days prior to admission. Appetite increasing. Blood sugars are elevated. Start Lantus 5 units subcutaneous at bedtime on 7/21 and c/w low resistance sliding scale insulin for now. Switch from Lantus 5 units to 8 units on 7/22. Switch from Lantus 8 units to 10 units on 7/23. On 7/24, will switch to medium resistance sliding scale insulin.       #Depression and anxiety: Patient has been tearful per Heme/Onc and Pain team. When writer discussed anxiety/depression with patient, patient stated \"I'm okay\" and did not want her current meds adjusted. Continue home Cymbalta and atarax. C/w seroquel PRN and ambien started during this admission.      #Hypokalemia: replace per protocol             FEN/GI: diabetic diet  DVT proph: lovenox  Code status: Full Code      Discharge barriers:  - fluid cultures and cytology pending, ID following, IV antibxs  -ADOD: 1-2 days       SUBJECTIVE:    Patient states her right hip/lower back pain " is better today. She denies any other complaints at this time.     ROS:  12 Points review of systems reviewed and is negative except for what has already been mentioned above    OBJECTIVE:  Patient Vitals for the past 24 hrs:   BP Temp Temp src Pulse Resp SpO2   07/24/21 0712 111/60 98.1  F (36.7  C) Oral 90 16 95 %   07/24/21 0427 (!) 165/74 99.2  F (37.3  C) Oral -- 20 100 %   07/23/21 2328 116/53 98.6  F (37  C) Oral 103 18 93 %   07/23/21 2205 -- -- -- 98 -- --   07/23/21 1920 108/56 98.6  F (37  C) Oral 107 18 100 %   07/23/21 1810 132/62 99  F (37.2  C) Oral 103 18 100 %   07/23/21 1712 (!) 163/73 97.8  F (36.6  C) Oral 106 18 100 %   07/23/21 1702 (!) 172/78 -- -- -- -- --   07/23/21 1610 (!) 185/84 98.8  F (37.1  C) Oral 114 18 99 %   07/23/21 1527 -- -- -- 113 18 96 %   07/23/21 1525 122/58 -- -- 113 11 97 %   07/23/21 1520 114/56 -- -- 113 12 95 %   07/23/21 1515 121/61 -- -- 115 12 99 %   07/23/21 1510 (!) 144/65 -- -- 96 10 99 %   07/23/21 1505 104/52 -- -- 116 17 96 %   07/23/21 1500 120/59 -- -- (!) 121 17 93 %   07/23/21 1455 138/62 -- -- (!) 128 13 97 %   07/23/21 0759 (!) 153/65 98.5  F (36.9  C) Oral 115 18 95 %          Intake/Output Summary (Last 24 hours) at 7/21/2021 0718  Last data filed at 7/21/2021 0400  Gross per 24 hour   Intake 1480 ml   Output 3700 ml   Net -2220 ml     GENRL: Alert and oriented X 3. Not in acute distress. Satting at 99% on room air.   HEENT: NC/AT      Neck- supple      Sclera- anicteric      Mucous membrane- moist and pink  CHEST: Clear to auscultation bilaterally  HEART: S1S2 regular. No murmurs, rubs or gallops  ABDMN: Soft. Non-tender.No guarding or rigidity. Bowel sounds- active  EXTRM:  No pedal edema.   NEURO: No involuntary movements  INTGM: please see nursing assessment for full skin assessment  PULSES: 2+ and symmetric all extremities  PSYCH: normal affect, normal speech     DIAGNOSTIC DATA:          Recent Results (from the past 24 hour(s))   Glucose by meter     Collection Time: 07/23/21  8:41 AM   Result Value Ref Range    GLUCOSE BY METER POCT 169 (H) 70 - 125 mg/dL   Glucose by meter    Collection Time: 07/23/21 12:11 PM   Result Value Ref Range    GLUCOSE BY METER POCT 176 (H) 70 - 125 mg/dL   Gram stain    Collection Time: 07/23/21  3:00 PM    Specimen: Retroperitoneal; Abscess   Result Value Ref Range    Gram Stain Result 4+ PMNs (A)     Gram Stain Result 4+ Gram positive cocci in pairs (A)    Cell Count Body Fluid    Collection Time: 07/23/21  4:39 PM   Result Value Ref Range    Color Brown (A) Colorless    Clarity Turbid (A) Clear    Total Nucleated Cells      Total Nucleated Cells 171,882 /uL    RBC Count 108,000 /uL   Differential Body Fluid    Collection Time: 07/23/21  4:39 PM   Result Value Ref Range    % Neutrophils 84 %    % Lymphocytes 3 %    % Monocyte/Macrophages 13 %   Glucose by meter    Collection Time: 07/23/21  6:11 PM   Result Value Ref Range    GLUCOSE BY METER POCT 162 (H) 70 - 125 mg/dL   SARS-COV2 (COVID-19) Virus RT-PCR    Collection Time: 07/23/21  6:54 PM    Specimen: Nasopharyngeal; Swab   Result Value Ref Range    SARS CoV2 PCR Negative Negative   Glucose by meter    Collection Time: 07/23/21  9:20 PM   Result Value Ref Range    GLUCOSE BY METER POCT 189 (H) 70 - 125 mg/dL   Potassium    Collection Time: 07/24/21  6:57 AM   Result Value Ref Range    Potassium 3.3 (L) 3.5 - 5.0 mmol/L        Results for orders placed or performed during the hospital encounter of 07/16/21   Head CT w/o contrast    Impression    IMPRESSION:  1.  No acute intracranial injury, hemorrhage, mass, or CT evidence of recent ischemia.  2.  Diffusely sclerotic calvarium presumed to reflect metastatic disease given history of breast cancer.   MR Brain w/o Contrast    Impression    IMPRESSION:  1.  No acute intracranial finding. No evidence for recent ischemia, intracranial hemorrhage, or mass.  2.  A few T2 hyperintense foci in the white matter are nonspecific but  most commonly reflect gliosis related to prior ischemic or inflammatory insult.  3.  Presumed osseous metastatic disease within the calvarium and upper visualized cervical spine.   XR Chest Port 1 View    Impression    IMPRESSION: Right IJ chemotherapy port is in place with tip in the high right atrium. Heart and mediastinal size are normal. There is patchy airspace infiltrate throughout the bilateral lungs with diffuse indistinctness of the pulmonary interstitium.   These findings may represent pulmonary edema. An inflammatory infectious process is also a consideration. No definite pleural effusion or pneumothorax. Extensive sclerotic osseous lesions are present, compatible with known bony metastases.                  XR Foot Left G/E 3 Views    Impression    IMPRESSION: Bones are demineralized. There is no definitive evidence of acute fracture, with attention to the fifth toe. Mild, scattered degenerative changes.   XR Chest 2 Views    Impression    IMPRESSION: Indistinct pulmonary vasculature. Left breast implant projects over the left lung, which increases the appearance of airspace disease. No definite new consolidation. No pleural effusions or pneumothorax. Unchanged cardiac size. Right internal   jugular port tip projects at the cavoatrial junction. Left axillary surgical clips. Lower thoracic vertebral body compression fracture. Bony metastases better seen on prior imaging.   CT Chest/Abdomen/Pelvis w Contrast    Impression    IMPRESSION:  1.  Mild new fullness of right paraspinal musculature low lumbar spine raising possibility of myositis. A discrete abscess collection is not evident.  2.  Left obturator ring fracture with small fluid pockets associated with the fractures, one of which also demonstrates a few tiny air bubbles.  3.  Complex bilateral sacral alar fractures with surrounding zones of heterotopic ossification.  4.  Prominent distention of bladder with borderline bilateral hydronephrosis.  Suggest determining post void residual.  5.  MRI of pelvis and lower lumbar spine region may be beneficial to assess for paraspinal myositis or fluid collection, as well as to assess the tiny fluid collections associated with left obturator ring fractures.          All recent labs reviewed personally  Radiology report reviewed.         The total time spent in preparing this progress note is about 40 minutes, >50% time spent in care co-ordination that includes reviewing labs, images, discussing the plan of care with patient/family, consultants, and .      Cherri Gonzalez MD.   [facility]   [phone]   Pager [phone]

## 2021-07-24 NOTE — PHARMACY-VANCOMYCIN DOSING SERVICE
Pharmacy Vancomycin Initial Note  Date of Service 2021  Patient's  1955  66 year old, female    Indication: Abscess    Current estimated CrCl = Estimated Creatinine Clearance: 102.4 mL/min (A) (based on SCr of 0.57 mg/dL (L)).    Creatinine for last 3 days  2021:  2:42 PM Creatinine 0.57 mg/dL    Recent Vancomycin Level(s) for last 3 days  No results found for requested labs within last 72 hours.      Vancomycin IV Administrations (past 72 hours)      No vancomycin orders with administrations in past 72 hours.                Nephrotoxins and other renal medications (From now, onward)    Start     Dose/Rate Route Frequency Ordered Stop    21 0800  vancomycin 1250 mg in 0.9% NaCl 250 mL intermittent infusion 1,250 mg      1,250 mg  over 90 Minutes Intravenous EVERY 12 HOURS 21  vancomycin 1500 mg in 0.9% NaCl 250 ml intermittent infusion 1,500 mg      1,500 mg  over 90 Minutes Intravenous ONCE 21 171  ibuprofen (ADVIL/MOTRIN) tablet 400 mg      400 mg Oral EVERY 6 HOURS PRN 21 1713            Contrast Orders - past 72 hours (72h ago, onward)    None        Loading dose: N/A  Regimen: 1000 mg IV every 12 hours.  Start time: 20:00 on 2021  Exposure target: AUC24 (range)400-600 mg/L.hr   AUC24,ss: 430 mg/L.hr  Probability of AUC24 > 400: 58 %  Ctrough,ss: 12.9 mg/L  Probability of Ctrough,ss > 20: 18 %  Probability of nephrotoxicity (Lodise LENNY ): 8 %        Plan:  1. Start vancomycin  1500 mg IV once followed by 1250 mg q12h.   2. Vancomycin monitoring method: AUC  3. Vancomycin therapeutic monitoring goal: 400-600 mg*h/L  4. Pharmacy will check vancomycin levels as appropriate in 1-3 Days.    5. Serum creatinine levels will be ordered a minimum of twice weekly.      Gayatri Chicas [facility]

## 2021-07-24 NOTE — PROGRESS NOTES
ID on-call note    Called by RN that gram stain with GPC in pairs from aspirate this afternoon. IR note indicates 8 ml of purulent fluid aspirated.     I will start vancomycin pending return of culture results. Currently on cefdinir and azithromycin     ID will f/up in am.    Robert Gutierrez MD, MD  [facility]  Direct messaging: [facility] Paging  On-Call ID provider: [phone], option: [phone]

## 2021-07-24 NOTE — PROGRESS NOTES
"Infectious Disease Progress Note    Assessment/Plan     Impression: Fevers, multiple potential etiologies. Fevers resolved     Initial presentation of encephalopathy which has improved--likely secondary to narcotics     Had a Port-A-Cath in for couple years, but seems to be working okay.     Has multiple excoriations on both legs that she says are \"from mosquito bites\" none are grossly purulent.  Improved.      Has distended abdomen.  CT shows old fractures and some fluid around fractures. MRI confirms fluid--unclear if abscess or cancer.  S/p aspiration on 7/23-GS with GPC, culture is pending    Says her longstanding sinus symptoms are improved.      Recommendations:   continue cefdinir and azithromycin   Continue vancomycin IV pending analysis of aspiration on 7/23--sent for culture and cytology.     Principal Problem:    Hip pain, right  Active Problems:    Confusion      Deepthi Carpenter MD   [phone]      Subjective  Pain improved a bit. Tired as difficult to get sleep. vanc added yesterday evening after GPC on GS.     Objective    Vital signs in last 24 hours  Temp:  [97.8  F (36.6  C)-99.2  F (37.3  C)] 98.1  F (36.7  C)  Pulse:  [] 96  Resp:  [10-20] 18  BP: (104-185)/(52-84) 127/63  SpO2:  [93 %-100 %] 99 %  Wt Readings from Last 3 Encounters:   07/17/21 81.5 kg (179 lb 9.6 oz)   04/09/18 98.7 kg (217 lb 11.2 oz)   11/17/17 97.1 kg (214 lb)           Intake/Output last 3 shifts  I/O last 3 completed shifts:  In: 6510 [P.O.:6260; I.V.:250]  Out: 4100 [Urine:4100]  Intake/Output this shift:  No intake/output data recorded.    Review of Systems   Pertinent items are noted in HPI., otherwise negative.    Physical Exam  General appearance: alert, appears stated age and cooperative  Head: Normocephalic, without obvious abnormality, atraumatic  Lungs: normal respiratory pattern  Extremities: Full excoriations on both lower legs. She has a bloody wound on her left great toe and left fifth " toe--improving  Skin: No rash, but excoriations as described above--this are improved.   Neurologic: Mental status:clear.   Pertinent Labs   Lab Results: personally reviewed.     Recent Labs   Lab 07/23/21  0642 07/20/21  0537    317      Results for JULES ROPER (MRN [id number]) as of 7/22/2021 13:51   Ref. Range 7/18/2021 07:22 7/20/2021 02:06 7/22/2021 07:03   CRP Latest Ref Range: 0.0-<0.8 mg/dL 31.7 (H) 35.4 (H) 30.8 (H)     No results for input(s): NA, CO2, BUN, CREATININE, GLUCOSE in the last 168 hours.    Invalid input(s): K, CL, LABGLOM, CALCIUM  Collected Updated Procedure Result Status    07/19/2021 1249 07/20/2021 0616 Blood Culture Line, Other [[id number]]   Blood from Line, Other    Preliminary result Component Value   Culture No growth after 12 hours P              07/19/2021 1201 07/20/2021 0616 Blood Culture Arm, Right [[id number]]   Blood from Arm, Right    Preliminary result Component Value   Culture No growth after 12 hours P              07/16/2021 1056 07/16/2021 1139 Asymptomatic COVID-19 Virus (Coronavirus) by PCR Nasopharyngeal [[id number]]    Swab from Nasopharyngeal    Final result Component Value   No component results           07/16/2021 1056 07/16/2021 1139 SARS-COV2 (COVID-19) Virus RT-PCR [[id number]]    Swab from Nasopharyngeal    Final    Aspiration GS with GPC, culture pending    Pertinent Radiology   Radiology Results:   Procedure Component Value Units Date/Time   CT Chest/Abdomen/Pelvis w Contrast [[id number]] Collected: 07/19/21 1736   Order Status: Completed Updated: 07/19/21 2014   Narrative:     EXAM: CT CHEST/ABDOMEN/PELVIS W CONTRAST   LOCATION: [facility]   DATE/TIME: 7/19/2021 5:36 PM     INDICATION: Sepsis   COMPARISON: 12/9/2020   TECHNIQUE: CT scan of the chest, abdomen, and pelvis was performed following injection of IV contrast. Multiplanar reformats were obtained. Dose reduction techniques were used.   CONTRAST:  100 mL Isovue-370     FINDINGS:   LUNGS AND PLEURA: Stable linear scarring posterior left upper lobe unchanged. Linear scarring peripheral right middle lobe and lateral right upper lobe also unchanged, no new pneumonia. There is a new tiny right pleural effusion.     MEDIASTINUM/AXILLAE: No lymphadenopathy. Normal-sized heart. Port-A-Cath present     CORONARY ARTERY CALCIFICATION: None.     HEPATOBILIARY: Normal.     PANCREAS: Normal.     SPLEEN: Normal.     ADRENAL GLANDS: Normal.     KIDNEYS/BLADDER: There is minimal bilateral hydronephrosis and mild prominence of each ureter. Bladder is distended with estimated volume more than 800 cc consistent with bladder outlet obstruction or neurogenic bladder.     BOWEL: There are a few loops of mildly dilated air and fluid distended loops of jejunum without a definitive site of transition or obstructing focus. This may relate to a mild mid small bowel obstruction or mild ileus. There is no inflammatory bowel wall    thickening.     LYMPH NODES: Normal.     VASCULATURE: Unremarkable.     PELVIC ORGANS: No adnexal lesion or ascites.     MUSCULOSKELETAL: Diffuse mixed lytic/sclerotic bony metastases unchanged. There are prominent bilateral sacral alar fractures with dense bony sclerosis and what appears to be heterotopic ossifications about the sacral alar fractures.     Prior fractures left pubic ring unchanged but there are now small surrounding soft tissue fullness suggesting small hematomas associated with the fractures. There are tiny air bubble seen adjacent to the left inferior pubic ramus fracture within the   abductor musculature these air bubbles reside in a small collection that measures 2.5 x 1 cm (image 452). These air bubbles are new compared to previous exam. There is also a small fluid collection associated with the superior pubic ramus fracture (image    415) measuring 2 x 1.5 cm. In addition, there is also a tiny collection along the undersurface of the left  pubic symphysis measuring 2.5 x 1.2 cm (image 425).     There is mild fullness of right paraspinous, erectus spinal muscle at low lumbar spine which appears new, can't exclude myositis or inflammation involving the right paraspinous musculature. No definite discrete fluid collection evident.    Impression:     IMPRESSION:   1.  Mild new fullness of right paraspinal musculature low lumbar spine raising possibility of myositis. A discrete abscess collection is not evident.   2.  Left obturator ring fracture with small fluid pockets associated with the fractures, one of which also demonstrates a few tiny air bubbles.   3.  Complex bilateral sacral alar fractures with surrounding zones of heterotopic ossification.   4.  Prominent distention of bladder with borderline bilateral hydronephrosis. Suggest determining post void residual.   5.  MRI of pelvis and lower lumbar spine region may be beneficial to assess for paraspinal myositis or fluid collection, as well as to assess the tiny fluid collections associated with left obturator ring fractures.

## 2021-07-24 NOTE — PLAN OF CARE
Shift from 0700 to 1930-    Problem: Pain Chronic (Persistent)  Goal: Acceptable Pain Control and Functional Ability  7/24/2021 1850 by Natasha Rojas RN  Outcome: Improving  7/24/2021 1848 by Natasha Rojas RN  Outcome: Improving  Intervention: Develop Pain Management Plan  Recent Flowsheet Documentation  Taken 7/24/2021 1747 by Natasha Rojas RN  Pain Management Interventions:   emotional support   medication (see MAR)  Taken 7/24/2021 1620 by Natasha Rojas RN  Pain Management Interventions: emotional support  Taken 7/24/2021 1403 by Natasha Rojas RN  Pain Management Interventions:   medication (see MAR)   emotional support  Taken 7/24/2021 1134 by Natasha Rojas RN  Pain Management Interventions:   medication (see MAR)   emotional support  Taken 7/24/2021 0812 by Natasha Rojas RN  Pain Management Interventions: emotional support  Intervention: Manage Persistent Pain  Recent Flowsheet Documentation  Taken 7/24/2021 1527 by Natasha Rojas RN  Bowel Elimination Promotion:   adequate fluid intake promoted   ambulation promoted  Taken 7/24/2021 0843 by Natasha Rojas RN  Bowel Elimination Promotion:   adequate fluid intake promoted   ambulation promoted     Problem: Social, Occupational or Functional Impairment (Anxiety Signs/Symptoms)  Goal: Enhanced Social, Occupational or Functional Skills (Anxiety Signs/Symptoms)  Outcome: Improving  Intervention: Promote Social, Occupational and Functional Ability  Recent Flowsheet Documentation  Taken 7/24/2021 1527 by Natasha Rojas RN  Trust Relationship/Rapport:   care explained   choices provided   emotional support provided   empathic listening provided   reassurance provided   thoughts/feelings acknowledged   questions encouraged   questions answered   Right hip pain controlled with scheduled methadone, tylenol and prn dilaudid given twice today.   Purewick in place; Patient drinks a lot of water.   K+ protocol; K+ was 3.3; Given Kdur and recheck was 3.9;  "Recheck in am.  Refused chair but did work with PT/OT  Tearful today when talking about her  not making her a priority.   Given vistaril at end of shift for anxiety before  visits.   \"She feels like her mental health is struggling again.\"  Cannabis in lock box connected to bed; Pt using independently.  Continued on IV and oral antibiotics.  "

## 2021-07-24 NOTE — PLAN OF CARE
Problem: Pain Chronic (Persistent)  Goal: Acceptable Pain Control and Functional Ability  Outcome: Improving  Intervention: Develop Pain Management Plan  Recent Flowsheet Documentation  Taken 7/24/2021 6100 by Natalya Warren, RN  Pain Management Interventions:   medication (see MAR)   repositioned       Pain control adequate overnight with oral medication, repositioning and rest.  IV vanco started for preliminary positive cultures per MD order.

## 2021-07-25 LAB
GLUCOSE BLDC GLUCOMTR-MCNC: 133 MG/DL (ref 70–125)
GLUCOSE BLDC GLUCOMTR-MCNC: 178 MG/DL (ref 70–125)
GLUCOSE BLDC GLUCOMTR-MCNC: 193 MG/DL (ref 70–125)
GLUCOSE BLDC GLUCOMTR-MCNC: 206 MG/DL (ref 70–125)
GRAM STAIN RESULT: ABNORMAL
GRAM STAIN RESULT: ABNORMAL
MAGNESIUM SERPL-MCNC: 1.9 MG/DL (ref 1.8–2.6)
POTASSIUM BLD-SCNC: 3.6 MMOL/L (ref 3.5–5)
PROCALCITONIN SERPL-MCNC: 0.44 NG/ML (ref 0–0.49)
VANCOMYCIN SERPL-MCNC: 13.2 MG/L

## 2021-07-25 PROCEDURE — 83735 ASSAY OF MAGNESIUM: CPT | Performed by: FAMILY MEDICINE

## 2021-07-25 PROCEDURE — 250N000012 HC RX MED GY IP 250 OP 636 PS 637

## 2021-07-25 PROCEDURE — 84145 PROCALCITONIN (PCT): CPT | Performed by: FAMILY MEDICINE

## 2021-07-25 PROCEDURE — 250N000013 HC RX MED GY IP 250 OP 250 PS 637: Performed by: EMERGENCY MEDICINE

## 2021-07-25 PROCEDURE — 120N000001 HC R&B MED SURG/OB

## 2021-07-25 PROCEDURE — 250N000013 HC RX MED GY IP 250 OP 250 PS 637: Performed by: INTERNAL MEDICINE

## 2021-07-25 PROCEDURE — 250N000013 HC RX MED GY IP 250 OP 250 PS 637

## 2021-07-25 PROCEDURE — 250N000012 HC RX MED GY IP 250 OP 636 PS 637: Performed by: HOSPITALIST

## 2021-07-25 PROCEDURE — 250N000011 HC RX IP 250 OP 636: Performed by: INTERNAL MEDICINE

## 2021-07-25 PROCEDURE — 258N000003 HC RX IP 258 OP 636: Performed by: FAMILY MEDICINE

## 2021-07-25 PROCEDURE — 250N000013 HC RX MED GY IP 250 OP 250 PS 637: Performed by: CLINICAL NURSE SPECIALIST

## 2021-07-25 PROCEDURE — 250N000013 HC RX MED GY IP 250 OP 250 PS 637: Performed by: FAMILY MEDICINE

## 2021-07-25 PROCEDURE — 84132 ASSAY OF SERUM POTASSIUM: CPT | Performed by: FAMILY MEDICINE

## 2021-07-25 PROCEDURE — 250N000011 HC RX IP 250 OP 636: Performed by: FAMILY MEDICINE

## 2021-07-25 PROCEDURE — 80202 ASSAY OF VANCOMYCIN: CPT | Performed by: HOSPITALIST

## 2021-07-25 PROCEDURE — 99232 SBSQ HOSP IP/OBS MODERATE 35: CPT | Performed by: CLINICAL NURSE SPECIALIST

## 2021-07-25 PROCEDURE — 99233 SBSQ HOSP IP/OBS HIGH 50: CPT | Performed by: HOSPITALIST

## 2021-07-25 PROCEDURE — 99207 PR NO CHARGE LOS: CPT | Performed by: INTERNAL MEDICINE

## 2021-07-25 RX ORDER — HYDROMORPHONE HYDROCHLORIDE 4 MG/1
4 TABLET ORAL
Status: DISCONTINUED | OUTPATIENT
Start: 2021-07-25 | End: 2021-07-28 | Stop reason: HOSPADM

## 2021-07-25 RX ADMIN — Medication 1 SPRAY: at 09:32

## 2021-07-25 RX ADMIN — ACETAMINOPHEN 975 MG: 325 TABLET ORAL at 21:14

## 2021-07-25 RX ADMIN — CALCIUM CARBONATE (ANTACID) CHEW TAB 500 MG 500 MG: 500 CHEW TAB at 09:28

## 2021-07-25 RX ADMIN — HYDROXYZINE HYDROCHLORIDE 25 MG: 10 SYRUP ORAL at 11:57

## 2021-07-25 RX ADMIN — LIDOCAINE: 50 OINTMENT TOPICAL at 11:58

## 2021-07-25 RX ADMIN — HYDROMORPHONE HYDROCHLORIDE 4 MG: 4 TABLET ORAL at 11:56

## 2021-07-25 RX ADMIN — HYDROMORPHONE HYDROCHLORIDE 4 MG: 4 TABLET ORAL at 17:10

## 2021-07-25 RX ADMIN — VANCOMYCIN HYDROCHLORIDE 1250 MG: 1 INJECTION, POWDER, LYOPHILIZED, FOR SOLUTION INTRAVENOUS at 12:04

## 2021-07-25 RX ADMIN — LIDOCAINE: 50 OINTMENT TOPICAL at 12:30

## 2021-07-25 RX ADMIN — OXYBUTYNIN CHLORIDE 5 MG: 5 TABLET ORAL at 21:13

## 2021-07-25 RX ADMIN — INSULIN ASPART 1 UNITS: 100 INJECTION, SOLUTION INTRAVENOUS; SUBCUTANEOUS at 12:28

## 2021-07-25 RX ADMIN — LIDOCAINE: 50 OINTMENT TOPICAL at 09:29

## 2021-07-25 RX ADMIN — LIDOCAINE: 50 OINTMENT TOPICAL at 21:15

## 2021-07-25 RX ADMIN — IBUPROFEN 400 MG: 400 TABLET, FILM COATED ORAL at 04:39

## 2021-07-25 RX ADMIN — AZITHROMYCIN MONOHYDRATE 250 MG: 250 TABLET ORAL at 09:26

## 2021-07-25 RX ADMIN — Medication 25 MCG: at 09:28

## 2021-07-25 RX ADMIN — VANCOMYCIN HYDROCHLORIDE 1250 MG: 1 INJECTION, POWDER, LYOPHILIZED, FOR SOLUTION INTRAVENOUS at 22:48

## 2021-07-25 RX ADMIN — SIMVASTATIN 20 MG: 10 TABLET, FILM COATED ORAL at 21:14

## 2021-07-25 RX ADMIN — EZETIMIBE 10 MG: 10 TABLET ORAL at 21:14

## 2021-07-25 RX ADMIN — METHADONE HYDROCHLORIDE 2.5 MG: 5 TABLET ORAL at 21:58

## 2021-07-25 RX ADMIN — Medication 1 TABLET: at 09:28

## 2021-07-25 RX ADMIN — OXYBUTYNIN CHLORIDE 5 MG: 5 TABLET ORAL at 09:40

## 2021-07-25 RX ADMIN — DOCUSATE SODIUM 200 MG: 100 CAPSULE, LIQUID FILLED ORAL at 09:26

## 2021-07-25 RX ADMIN — ZOLPIDEM TARTRATE 10 MG: 5 TABLET ORAL at 21:58

## 2021-07-25 RX ADMIN — ENOXAPARIN SODIUM 40 MG: 100 INJECTION SUBCUTANEOUS at 17:11

## 2021-07-25 RX ADMIN — INSULIN ASPART 1 UNITS: 100 INJECTION, SOLUTION INTRAVENOUS; SUBCUTANEOUS at 17:32

## 2021-07-25 RX ADMIN — METHADONE HYDROCHLORIDE 2.5 MG: 5 TABLET ORAL at 06:51

## 2021-07-25 RX ADMIN — CEFDINIR 300 MG: 300 CAPSULE ORAL at 21:14

## 2021-07-25 RX ADMIN — LIDOCAINE: 50 OINTMENT TOPICAL at 17:12

## 2021-07-25 RX ADMIN — INSULIN ASPART 2 UNITS: 100 INJECTION, SOLUTION INTRAVENOUS; SUBCUTANEOUS at 18:23

## 2021-07-25 RX ADMIN — ACETAMINOPHEN 975 MG: 325 TABLET ORAL at 14:50

## 2021-07-25 RX ADMIN — Medication 1 TABLET: at 09:26

## 2021-07-25 RX ADMIN — METHADONE HYDROCHLORIDE 2.5 MG: 5 TABLET ORAL at 14:50

## 2021-07-25 RX ADMIN — ACETAMINOPHEN 975 MG: 325 TABLET ORAL at 09:24

## 2021-07-25 RX ADMIN — Medication 1 SPRAY: at 21:15

## 2021-07-25 RX ADMIN — INSULIN GLARGINE 13 UNITS: 100 INJECTION, SOLUTION SUBCUTANEOUS at 21:15

## 2021-07-25 RX ADMIN — Medication 1 SPRAY: at 17:12

## 2021-07-25 RX ADMIN — OMEPRAZOLE 20 MG: 20 CAPSULE, DELAYED RELEASE ORAL at 09:26

## 2021-07-25 RX ADMIN — CEFDINIR 300 MG: 300 CAPSULE ORAL at 09:27

## 2021-07-25 RX ADMIN — Medication 1 SPRAY: at 12:31

## 2021-07-25 RX ADMIN — DULOXETINE HYDROCHLORIDE 30 MG: 30 CAPSULE, DELAYED RELEASE ORAL at 09:26

## 2021-07-25 ASSESSMENT — MIFFLIN-ST. JEOR: SCORE: 1466.22

## 2021-07-25 NOTE — PLAN OF CARE
"Care Plan Progress Note      Name: Elenita Hardin  : 1955  MRN: [id number]    VS: /59 (BP Location: Right arm)   Pulse 111   Temp 98.7  F (37.1  C) (Oral)   Resp 18   Ht 1.651 m (5' 5\")   Wt 81.5 kg (179 lb 9.6 oz)   SpO2 96%   BMI 29.89 kg/m      Problem: Adult Inpatient Plan of Care  Goal: Optimal Comfort and Wellbeing  Outcome: No Change  Intervention: Provide Person-Centered Care  Recent Flowsheet Documentation  Taken 2021 by Tayler Arreola RN  Trust Relationship/Rapport: emotional support provided  Taken 2021 by Tayler Arreola RN  Trust Relationship/Rapport: emotional support provided     Problem: Adult Inpatient Plan of Care  Goal: Absence of Hospital-Acquired Illness or Injury  Intervention: Prevent Skin Injury  Recent Flowsheet Documentation  Taken 2021 by Tayler Arreola RN  Body Position: position changed independently  Taken 2021 by Tayler Arreola RN  Body Position: position changed independently  Taken 2021 by Tayler Arreola RN  Body Position: position changed independently  Taken 2021 by Tayler Arreola RN  Body Position: position changed independently     Problem: Pain Chronic (Persistent)  Goal: Acceptable Pain Control and Functional Ability  Intervention: Optimize Psychosocial Wellbeing  Recent Flowsheet Documentation  Taken 2021 by Tayler Arreola RN  Diversional Activities: (offered her canabis at bedside ) other (see comments)  Taken 2021 by Tayler Arreola RN  Diversional Activities: (offered her canabis at bedside ) other (see comments)     Problem: Social, Occupational or Functional Impairment (Anxiety Signs/Symptoms)  Goal: Enhanced Social, Occupational or Functional Skills (Anxiety Signs/Symptoms)  Intervention: Promote Social, Occupational and Functional Ability  Recent Flowsheet Documentation  Taken 2021 by Tayler Arreola RN  Trust Relationship/Rapport: " emotional support provided  Taken 7/24/2021 2107 by Tayler Arreola, RN  Trust Relationship/Rapport: emotional support provided     VSS, TPN and lipids infusing.  PCA dilaudid in use with minimal change in pain. Ice applied and patient up and ambulating frequently.  Good urine output.  +BM and flatus.  Surgical site intact. Leonardo site leaking, dressing changed..        Tayler Arreola, RN   7/25/2021  5:04 AM

## 2021-07-25 NOTE — PLAN OF CARE
Problem: Violence Risk or Actual  Goal: Anger and Impulse Control  Outcome: Improving     Problem: Pain Chronic (Persistent)  Goal: Acceptable Pain Control and Functional Ability  Outcome: Improving  Patient requested breakthrough Pain medication once with good relief.   Intervention: Develop Pain Management Plan  Recent Flowsheet Documentation  Taken 7/25/2021 0919 by Amenea Burns, RN  Pain Management Interventions: medication (see MAR)  Intervention: Optimize Psychosocial Wellbeing  Recent Flowsheet Documentation  Taken 7/25/2021 0845 by Ameena Burns, RN  Diversional Activities: other (see comments)

## 2021-07-25 NOTE — PROGRESS NOTES
Care Management Follow Up    Length of Stay (days): 9    Expected Discharge Date: 07/26/2021     Concerns to be Addressed: patient refuses services     Patient plan of care discussed at interdisciplinary rounds: Yes    Anticipated Discharge Disposition:  Home     Anticipated Discharge Services:  Home IV antibiotics, home RN  Anticipated Discharge DME:  Antibiotics      Referrals Placed by CM/SW:  [facility]    Additional Information:  The writer called and spoke with intake at [facility] to let them know the patient may need home antibiotics. They are aware and will plan on doing a benefits check on Monday morning. RN CM please follow to arrange antibiotics when Infectious Disease has the plan finalized.       Cristian Arenas RN

## 2021-07-25 NOTE — PROGRESS NOTES
[facility]    Medicine Progress Note - Hospitalist Service       Date of Admission:  7/16/2021    Assessment & Plan           Elenita Hardin is a 66 year old female admitted on 7/16/2021. She has history of stage IV breast cancer with bone mets, insulin-dependent diabetes, depression presented on 7/16 for evaluation of agitation and confusion, found to have sepsis secondary to spine abscesses.    #Spine abscesses  #Sepsis, improving  During hospital stay patient had fever up to 101.8F.  She has chronic back pain, MRI was obtained showed bony destruction and fluid within the SI joints bilaterally concerning for infection, as well as multiple abscesses right greater than left of the iliacus and paraspinal muscles from L4 to mid sacrum.  Underwent CT-guided aspiration on 7/23, cultures with preliminary staph aureus.  Some of the imaging findings are likely related to metastatic cancer but with positive culture at least some of this is infection. Numerous skin lesions may be source, patient immunocompromised has been on chemo; has also been receiving epidural steroid injections.  ID following.  Sepsis improving.  Patient initially treated with Rocephin and Azo throw, then changed to Omnicef and azithromycin, then started vancomycin yesterday.  Anticipate she will need IV antibiotics at home probably for several weeks.  Has a working port.  Care management notified of anticipated discharge needs.  Expect we may have sensitivities back tomorrow, she is fairly medically stable could likely go home tomorrow if arrangements can be made and I really would like to make this happen for her, cancer patient.  There was question of possible pneumonia on portable CXR on admission, but without clear infiltrate on subsequent CXR or CT. She remains on azithro and cephalosporin. Defer to ID if this is still needed.    #Acute encephalopathy  Presenting complaint.  She was hallucinating, agitated,  confused  Thought to be secondary to oxycodone which is probably not a great medication for her, possibly also was septic secondary to spine abscesses  Seems much better  Avoid oxycodone    #Chronic cancer pain  Due to bone mets  Pain team following here, appreciate assistance  Continue scheduled Tylenol, home medical cannabis, Cymbalta, Vistaril, ibuprofen  Pain team started her on methadone  Consideration being given to outpatient radiation, uncertain if this is an option with active infection.  Oncology were going to round on her tomorrow.    #Stage 4 breast cancer  Patient of Dr Varma  Has been on third line chemo  Oncology saw here and going to round on her tomorrow  Appreciate clarification of plan going forward- radiation/chemo may need to be on a pause with active infection?    #IDDM  Home regimen is Basaglar 55 units QAM and Humalog 3:15 carb count and sliding scale  Here has been on HS Lantus 10 units plus sliding scale sensitive  Discontinue bedtime sliding scale  Increase Lantus to 13 units, AM BG not at goal  With daytime BG rise, will restart mealtime insulin with carb count of 1:15 to start    #HLD  Home Vytorin    #Insomnia  Ok to continue home Ambien which she seems to be tolerating here even though this is not a great med in the hospital and is a very high dose    #OAB  #Urine retention?  Holding home Ditropan. Question of distended bladder and mild hydronephrosis on admission CT, PVR ordered 7/16 but I cannot find charted. Repeat random bladder scan to assess for retention.    #GERD, home PPI       Diet: Consistent Carb 45 grams CHO per Meal Diet    DVT Prophylaxis: Enoxaparin (Lovenox) SQ  Jiang Catheter: Not present  Central Lines: None  Code Status: Full Code      Disposition Plan   Expected discharge: 07/26/2021 recommended to prior living arrangement once antibiotic plan established.     The patient's care was discussed with the Patient.    Neha Doherty MD  [facility]  [facility]  Text page via Myandb Paging/Directory      Risk Factors Present on Admission                ______________________________________________________________________    Interval History   Patient states still with significant pain in her low back, hurts very much anytime that she moves.  Currently comfortable sitting in the chair but very much would like to go home.  Open to IV antibiotics at home which explained to her she likely will need this.  Appetite is okay.  Slept okay last night, chronically has insomnia, but slept a few hours.  No new issues.  She did ask that I call and update her , Denilson; I tried him multiple times and left VM stating I was trying to reach him, but no answer.    Data reviewed today: I reviewed all medications, new labs and imaging results over the last 24 hours. I personally reviewed no images or EKG's today.    Physical Exam   Vital Signs: Temp: 98.2  F (36.8  C) Temp src: Axillary BP: 134/60 Pulse: 107   Resp: 16 SpO2: 97 % O2 Device: None (Room air)    Weight: 204 lbs 0 oz  General: in no apparent distress, non-toxic and alert female sitting in bedside chair oriented x3  HEENT: Head normocephalic atraumatic, oral mucosa moist. Sclerae anicteric  CV: Regular rhythm, normal rate, no murmurs  Resp: No wheezes, no rales or rhonchi, no focal consolidations  GI: Belly soft, nondistended, nontender, bowel sounds present  Skin: Numerous scabs BLE  Extremities: No peripheral edema  Psych: Normal affect, mood dysthymic  Neuro: CNII-XII grossly intact, moving all 4 extremities      Data   Recent Labs   Lab 07/25/21  1208 07/25/21  0838 07/25/21  0653 07/24/21  2049 07/24/21  1301 07/24/21  0657 07/23/21  0642 07/22/21  1442 07/20/21  0930 07/20/21  0537 07/20/21  0206   PLT  --   --   --   --   --   --  367  --   --  317  --    POTASSIUM  --   --  3.6  --  3.9 3.3* 3.7 3.5   < >  --  3.3*   CR  --   --   --   --   --   --   --  0.57*  --   --  0.63   * 133*   --  191*  --   --   --   --   --   --   --     < > = values in this interval not displayed.

## 2021-07-25 NOTE — PLAN OF CARE
"Care Plan Progress Note      Name: Elenita Hardin  : 1955  MRN: [id number]    VS: /59 (BP Location: Right arm)   Pulse 111   Temp 98.7  F (37.1  C) (Oral)   Resp 18   Ht 1.651 m (5' 5\")   Wt 81.5 kg (179 lb 9.6 oz)   SpO2 96%   BMI 29.89 kg/m        Problem: Adult Inpatient Plan of Care  Goal: Optimal Comfort and Wellbeing  Outcome: No Change  Intervention: Provide Person-Centered Care  Recent Flowsheet Documentation  Taken 2021 by Tayler Arreola RN  Trust Relationship/Rapport: emotional support provided  Taken 2021 by Tayler Arreola RN  Trust Relationship/Rapport: emotional support provided     Problem: Pain Chronic (Persistent)  Goal: Acceptable Pain Control and Functional Ability  Outcome: No Change  Intervention: Optimize Psychosocial Wellbeing  Recent Flowsheet Documentation  Taken 2021 by Tayler Arreola RN  Diversional Activities: (offered her canabis at bedside ) other (see comments)  Taken 2021 by Tayler Arreola RN  Diversional Activities: (offered her canabis at bedside ) other (see comments)     Problem: Activity and Energy Impairment (Anxiety Signs/Symptoms)  Goal: Optimized Energy Level (Anxiety Signs/Symptoms)  Outcome: No Change     VSS, patient very tearful.  Crying while discussing mosquito bites from , and later about her .  Taking in large amounts of PO intake, refusing to get OOB to use the bathroom.  Purewick in place with large output. Sleeping off and on this shift.        Tayler Arreola RN   2021  5:10 AM    "

## 2021-07-25 NOTE — PROGRESS NOTES
ID chart check  Aspiration with staph aureus, sensitivities pending. On vanc. Will follow.  Deepthi Carpenter MD  [facility]  [phone]

## 2021-07-25 NOTE — PROGRESS NOTES
[facility] ACUTE PAIN SERVICE    ([facility], [facility], [facility])   Daily PAIN Progress Note    Assessment/Plan:  Elenita Hardin is a 66 year old female who was admitted on 7/16/2021.   Pain Service was asked to see the patient for cancer associated pain. Admitted for evaluation of altered mental status, fevers. Patient presented disoriented with slurred speech, intermittent combativeness, visual hallucination and frequent falls.  Mental status issues have improved.     In 24 hours, patient has utilized 7.5mg of scheduled methadone and 8mg hydromorphone.  Patient having increased pain after ambulating to Bathroom.  Sitting up in chair currently.  She struggles with persistent pain and becomes overwhelmed at times    Moderate constipation bowel movement about every other day.      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   Also with intramuscular abscesses along the fractured left superior and inferior pubic rami - ID following.   2)Multimodal Medication Therapy  Topical:  Lidocaine ointment 5% qid, artificial saliva x 1 spray QID   NSAID'S: ibuprofen  400 mg q 6 h prn   Muscle Relaxants: None   Adjuvants:  Tyenol 975 mg po TID, cymbalta 30 mg po qam, atarax 25 mg every 6 hours prn please give with the hydromorphone, self administered medical cannabis, benzotropine 1 mg po tid prn extrapyramidal effects  Antidepressants/anxiolytics: Cymbalta 30 mg q day, Seroquel 12.5 mg q 6 h prn for agitation, haloperidol  Opioids: Methadone 2.5 mg every 8 hours,  Hydromorphone(Dilaudid) 2-4 mg q 3 h prn (moderate to severe pain)  3)Non-medication interventions- rest and turning   4)Constipation Prophylaxis- docusate 200 mg daily, Miralax daily PRN     -Opioid prescriber has been Denilson Valles  "Palliative Care  -Discharge Recommendations - We recommend prescribing the following at the time of discharge: Methadone 2.5 mg tid 7 day script #21 tablets   Should have enough hydromorphone at home   Follow up with mirza Plascencia care NP and opioid prescriber. Follow up outpatient with spine clinic to set up repeat epidural injection, which was effective in past.   -Opioid prescriber has been Denilson Valles CNP  -MN  pulled from system on 07/17/21. This indicates current opioid use. Most recent MN :  07/12/21 zolpidem 10 mg #30  06/20/21 Dilaudid 4 mg #120  Not filled Oxycontin 30 mg #60 since 04/13/21 (please dispose of any unused supply)  Has not filled Pregabalin 75 mg since 03/30/2021     Principal Problem:    Hip pain, right  Active Problems:    Confusion     LOS: 7 days       Subjective:  \"12\" out of \"10\" after going to bathroom.  Sharp pain right buttock hard to describe.  Subsiding but persists now at \"5\"      acetaminophen  975 mg Oral TID     artificial saliva  1 spray Swish & Spit 4x Daily     [Held by provider] aspirin  81 mg Oral Daily     azithromycin  250 mg Oral Daily     B-Complex/B-12  1 tablet Oral Daily     calcium carbonate  500 mg Oral Daily     cefdinir  300 mg Oral Q12H GREG     docusate sodium  200 mg Oral Daily     DULoxetine  30 mg Oral QAM     enoxaparin ANTICOAGULANT  40 mg Subcutaneous Q24H     ezetimibe  10 mg Oral At Bedtime    And     simvastatin  20 mg Oral QPM     insulin aspart  1-3 Units Subcutaneous TID AC     insulin aspart  1-3 Units Subcutaneous At Bedtime     insulin glargine  10 Units Subcutaneous At Bedtime     lidocaine   Topical 4x Daily     medical cannabis  1 Dose Other See Admin Instructions     methadone  2.5 mg Oral Q8H     multivitamin w/minerals  1 tablet Oral Daily     omeprazole  20 mg Oral QAM AC     oxybutynin  5 mg Oral BID     sodium chloride (PF)  10-20 mL Intracatheter Q28 Days     vancomycin  1,250 mg Intravenous Q12H     Vitamin D3  25 mcg " Oral Daily     zolpidem  10 mg Oral At Bedtime       Objective:  Vital signs in last 24 hours:  Temp:  [98.1  F (36.7  C)-99.4  F (37.4  C)] 98.7  F (37.1  C)  Pulse:  [] 111  Resp:  [16-18] 18  BP: (117-164)/(57-75) 117/59  SpO2:  [96 %-99 %] 96 %  Weight:   Weight change:   Body mass index is 29.89 kg/m .    Intake/Output last 3 shifts:  I/O last 3 completed shifts:  In: 77045 [P.O.:9930; I.V.:250]  Out: 7850 [Urine:7850]  Intake/Output this shift:  I/O this shift:  In: -   Out: 700 [Urine:700]    Review of Systems:   As per subjective, all others negative.    Physical Exam:    General Appearance:  Alert, cooperative, overwhelmed   Head:  Normocephalic, without obvious abnormality, atraumatic   Eyes:  PERRL, conjunctiva/corneas clear, EOM's intact   Nose: Nares normal, septum midline, mucosa normal, no drainage   Throat: Lips, mucosa, and tongue normal; teeth and gums normal   Neck: Supple, symmetrical, trachea midline, no adenopathy, thyroid: not enlarged, symmetric, no carotid bruit or JVD   Back:   Symmetric, no curvature, ROM normal, no CVA tenderness   Lungs:   respirations unlabored   Chest Wall:  No tenderness or deformity   Heart:  Regular rate and rhythm   Abdomen:   Soft, non-tender, non distended   Extremities: Extremities normal, deconditioned   Skin: Skin color, texture, turgor normal, lesions lower extremity healing   Neurologic: Alert and oriented X 3, Moves all 4 extremities          Imaging:  Personally Reviewed.  CT Chest/Abdomen/Pelvis w Contrast    Result Date: 7/19/2021  EXAM: CT CHEST/ABDOMEN/PELVIS W CONTRAST LOCATION: [facility] DATE/TIME: 7/19/2021 5:36 PM INDICATION: Sepsis COMPARISON: 12/9/2020     IMPRESSION: 1.  Mild new fullness of right paraspinal musculature low lumbar spine raising possibility of myositis. A discrete abscess collection is not evident. 2.  Left obturator ring fracture with small fluid pockets associated with the fractures, one of  which also demonstrates a few tiny air bubbles. 3.  Complex bilateral sacral alar fractures with surrounding zones of heterotopic ossification. 4.  Prominent distention of bladder with borderline bilateral hydronephrosis. Suggest determining post void residual. 5.  MRI of pelvis and lower lumbar spine region may be beneficial to assess for paraspinal myositis or fluid collection, as well as to assess the tiny fluid collections associated with left obturator ring fractures.    MR Brain w/o Contrast    Result Date: 7/16/2021  EXAM: MR BRAIN W/O CONTRAST LOCATION: [facility] DATE/TIME: 7/16/2021 12:29 PM INDICATION: Confusion. COMPARISON: Head CT 07/16/2021, brain MRI 02/01/2017     IMPRESSION: 1.  No acute intracranial finding. No evidence for recent ischemia, intracranial hemorrhage, or mass. 2.  A few T2 hyperintense foci in the white matter are nonspecific but most commonly reflect gliosis related to prior ischemic or inflammatory insult. 3.  Presumed osseous metastatic disease within the calvarium and upper visualized cervical spine.    XR Chest 2 Views    Result Date: 7/17/2021  EXAM: XR CHEST 2 VW LOCATION: [facility] DATE/TIME: 7/17/2021 3:31 PM INDICATION: evaluate infiltrates seen on portable CXR for pneumonia vs fluid overload COMPARISON: 7/16/2021     IMPRESSION: Indistinct pulmonary vasculature. Left breast implant projects over the left lung, which increases the appearance of airspace disease. No definite new consolidation. No pleural effusions or pneumothorax. Unchanged cardiac size. Right internal  jugular port tip projects at the cavoatrial junction. Left axillary surgical clips. Lower thoracic vertebral body compression fracture. Bony metastases better seen on prior imaging.    MR Pelvis (Intrapelvic Organs) wo&w Contrast    Result Date: 7/22/2021  EXAM: MRI PELVIS WITHOUT AND WITH IV CONTRAST LOCATION: [facility] DATE/TIME: 7/21/2021 9:34 PM  INDICATION: Soft tissue infection suspected, pelvis, xray done. Metastatic disease. Paraspinal abscesses.    IMPRESSION: 1.  There are 2 complex fluid collections are seen along the fractured left superior and inferior pubic rami which are nonspecific but given the enhancement pattern on current exam and air bubbles and fluid seen on the CT scan, these collections likely represent intramuscular abscesses.    XR Chest Port 1 View    Result Date: 7/16/2021  EXAM: XR CHEST PORT 1 VIEW LOCATION: [facility] DATE/TIME: 7/16/2021 12:20 PM INDICATION: Confusion. Slightly decreased O2 sat. COMPARISON: 04/06/2018.     IMPRESSION: Right IJ chemotherapy port is in place with tip in the high right atrium. Heart and mediastinal size are normal. There is patchy airspace infiltrate throughout the bilateral lungs with diffuse indistinctness of the pulmonary interstitium. These findings may represent pulmonary edema. An inflammatory infectious process is also a consideration. No definite pleural effusion or pneumothorax. Extensive sclerotic osseous lesions are present, compatible with known bony metastases.         XR Foot Left G/E 3 Views    Result Date: 7/16/2021  EXAM: XR FOOT LEFT G/E 3 VIEWS LOCATION: [facility] DATE/TIME: 7/16/2021 1:02 PM INDICATION: 5th toe trauma COMPARISON: None.     IMPRESSION: Bones are demineralized. There is no definitive evidence of acute fracture, with attention to the fifth toe. Mild, scattered degenerative changes.    MR Lumbar Spine w/o & w Contrast    Result Date: 7/21/2021  EXAM: MR LUMBAR SPINE W/O and W CONTRAST LOCATION: [facility] DATE/TIME: 7/21/2021 9:34 PM INDICATION: Low back pain, cancer suspected Low back pain, infection suspected patient has a history of breast cancer. COMPARISON: 7/19/2021.     IMPRESSION: 1.  Diffuse sclerotic metastatic disease of thoracic spine, lumbar spine, sacrum and pelvis with no evidence of a pathologic  fracture in the lumbar spine. 2.  No evidence of canal compromise or neural foraminal narrowing in the lumbar region. 3.  Prominent metastatic involvement of sacrum and sacral alar regions, sacroiliac joints with extraosseous soft tissue extension in these regions. Please refer to MRI of the pelvis for further evaluation. 4.  Probable extra osseous extension of metastasis into the right sacral neural foramen along with enhancement of the cauda equina from L4 to the sacrum and thickening and enhancement of the thecal sac. 5.  Bony destruction and fluid within the sacroiliac joints bilaterally highly suggestive of an inflammatory/infectious etiology. 6.  Multiple abscess formations right greater than left of posterior musculature from L4 to  mid sacral region. Probable involvement of the iliacus muscles bilaterally right greater than left. These findings were communicated by phone to Dr. Vergara at 11:30 PM on 07/21/2021.    Head CT w/o contrast    Result Date: 7/16/2021  EXAM: CT HEAD W/O CONTRAST LOCATION: [facility] DATE/TIME: 7/16/2021 11:12 AM INDICATION: Mental status change, unknown cause. Breast cancer. COMPARISON: Brain MRI 02/01/2017     IMPRESSION: 1.  No acute intracranial injury, hemorrhage, mass, or CT evidence of recent ischemia. 2.  Diffusely sclerotic calvarium presumed to reflect metastatic disease given history of breast cancer.      Lab Results:  Personally Reviewed.   Recent Labs   Lab 07/23/21  0642 07/20/21  0537    317     No results for input(s): NA, CO2, BUN, CREATININE, ALBUMIN, BILITOT, ALKPHOS, ALT, AST, GLUCOSE in the last 168 hours.    Invalid input(s): K, CL, CALCIUM, LABALBU, PROT  No results for input(s): INR in the last 168 hours.    Total time spent 25 minutes with greater than 50% in consultation, education and coordination of care.     Also discussed with RN      Estephanie DALTON, CNS-BC, DNP  Acute Care Pain Management Program  [facility] ([place], [place],  [place])   With questions call [phone]  Preference if for [facility]  Click HERE to page Yessenia

## 2021-07-26 ENCOUNTER — APPOINTMENT (OUTPATIENT)
Dept: PHYSICAL THERAPY | Facility: HOSPITAL | Age: 66
DRG: 853 | End: 2021-07-26
Payer: COMMERCIAL

## 2021-07-26 LAB
ANION GAP SERPL CALCULATED.3IONS-SCNC: 10 MMOL/L (ref 5–18)
BACTERIA ABSC ANAEROBE+AEROBE CULT: ABNORMAL
BACTERIA ASPIRATE CULT: NORMAL
BASOPHILS # BLD MANUAL: 0 10E3/UL (ref 0–0.2)
BASOPHILS NFR BLD MANUAL: 0 %
BUN SERPL-MCNC: 7 MG/DL (ref 8–22)
CALCIUM SERPL-MCNC: 9.1 MG/DL (ref 8.5–10.5)
CHLORIDE BLD-SCNC: 106 MMOL/L (ref 98–107)
CO2 SERPL-SCNC: 27 MMOL/L (ref 22–31)
CREAT SERPL-MCNC: 0.6 MG/DL (ref 0.6–1.1)
EOSINOPHIL # BLD MANUAL: 0.1 10E3/UL (ref 0–0.7)
EOSINOPHIL NFR BLD MANUAL: 1 %
ERYTHROCYTE [DISTWIDTH] IN BLOOD BY AUTOMATED COUNT: 17 % (ref 10–15)
GFR SERPL CREATININE-BSD FRML MDRD: >90 ML/MIN/1.73M2
GLUCOSE BLD-MCNC: 128 MG/DL (ref 70–125)
GLUCOSE BLDC GLUCOMTR-MCNC: 105 MG/DL (ref 70–125)
GLUCOSE BLDC GLUCOMTR-MCNC: 131 MG/DL (ref 70–125)
GLUCOSE BLDC GLUCOMTR-MCNC: 159 MG/DL (ref 70–125)
GLUCOSE BLDC GLUCOMTR-MCNC: 190 MG/DL (ref 70–125)
HCT VFR BLD AUTO: 23.7 % (ref 35–47)
HGB BLD-MCNC: 7.6 G/DL (ref 11.7–15.7)
LYMPHOCYTES # BLD MANUAL: 0.7 10E3/UL (ref 0.8–5.3)
LYMPHOCYTES NFR BLD MANUAL: 6 %
MAGNESIUM SERPL-MCNC: 1.8 MG/DL (ref 1.8–2.6)
MCH RBC QN AUTO: 29.8 PG (ref 26.5–33)
MCHC RBC AUTO-ENTMCNC: 32.1 G/DL (ref 31.5–36.5)
MCV RBC AUTO: 93 FL (ref 78–100)
MONOCYTES # BLD MANUAL: 0.2 10E3/UL (ref 0–1.3)
MONOCYTES NFR BLD MANUAL: 2 %
NEUTROPHILS # BLD MANUAL: 11.2 10E3/UL (ref 1.6–8.3)
NEUTROPHILS NFR BLD MANUAL: 91 %
PLAT MORPH BLD: ABNORMAL
PLATELET # BLD AUTO: 332 10E3/UL (ref 150–450)
POTASSIUM BLD-SCNC: 3.7 MMOL/L (ref 3.5–5)
RBC # BLD AUTO: 2.55 10E6/UL (ref 3.8–5.2)
RBC MORPH BLD: ABNORMAL
SODIUM SERPL-SCNC: 143 MMOL/L (ref 136–145)
WBC # BLD AUTO: 12.3 10E3/UL (ref 4–11)

## 2021-07-26 PROCEDURE — 97116 GAIT TRAINING THERAPY: CPT | Mod: GP

## 2021-07-26 PROCEDURE — 83735 ASSAY OF MAGNESIUM: CPT | Performed by: HOSPITALIST

## 2021-07-26 PROCEDURE — 250N000013 HC RX MED GY IP 250 OP 250 PS 637: Performed by: INTERNAL MEDICINE

## 2021-07-26 PROCEDURE — 99232 SBSQ HOSP IP/OBS MODERATE 35: CPT | Performed by: CLINICAL NURSE SPECIALIST

## 2021-07-26 PROCEDURE — 250N000013 HC RX MED GY IP 250 OP 250 PS 637

## 2021-07-26 PROCEDURE — 250N000013 HC RX MED GY IP 250 OP 250 PS 637: Performed by: EMERGENCY MEDICINE

## 2021-07-26 PROCEDURE — 80048 BASIC METABOLIC PNL TOTAL CA: CPT | Performed by: HOSPITALIST

## 2021-07-26 PROCEDURE — 250N000011 HC RX IP 250 OP 636: Performed by: INTERNAL MEDICINE

## 2021-07-26 PROCEDURE — 99233 SBSQ HOSP IP/OBS HIGH 50: CPT

## 2021-07-26 PROCEDURE — 250N000013 HC RX MED GY IP 250 OP 250 PS 637: Performed by: CLINICAL NURSE SPECIALIST

## 2021-07-26 PROCEDURE — 97530 THERAPEUTIC ACTIVITIES: CPT | Mod: GP

## 2021-07-26 PROCEDURE — 250N000013 HC RX MED GY IP 250 OP 250 PS 637: Performed by: FAMILY MEDICINE

## 2021-07-26 PROCEDURE — 250N000011 HC RX IP 250 OP 636

## 2021-07-26 PROCEDURE — 250N000012 HC RX MED GY IP 250 OP 636 PS 637

## 2021-07-26 PROCEDURE — 258N000003 HC RX IP 258 OP 636: Performed by: FAMILY MEDICINE

## 2021-07-26 PROCEDURE — 99233 SBSQ HOSP IP/OBS HIGH 50: CPT | Performed by: HOSPITALIST

## 2021-07-26 PROCEDURE — 250N000012 HC RX MED GY IP 250 OP 636 PS 637: Performed by: HOSPITALIST

## 2021-07-26 PROCEDURE — 97110 THERAPEUTIC EXERCISES: CPT | Mod: GP

## 2021-07-26 PROCEDURE — 250N000011 HC RX IP 250 OP 636: Performed by: FAMILY MEDICINE

## 2021-07-26 PROCEDURE — 85027 COMPLETE CBC AUTOMATED: CPT | Performed by: HOSPITALIST

## 2021-07-26 PROCEDURE — 120N000001 HC R&B MED SURG/OB

## 2021-07-26 RX ORDER — PANTOPRAZOLE SODIUM 20 MG/1
40 TABLET, DELAYED RELEASE ORAL
Status: DISCONTINUED | OUTPATIENT
Start: 2021-07-27 | End: 2021-07-28 | Stop reason: HOSPADM

## 2021-07-26 RX ORDER — CEFAZOLIN SODIUM 2 G/100ML
2 INJECTION, SOLUTION INTRAVENOUS EVERY 8 HOURS
Status: DISCONTINUED | OUTPATIENT
Start: 2021-07-26 | End: 2021-07-28 | Stop reason: HOSPADM

## 2021-07-26 RX ORDER — METHADONE HYDROCHLORIDE 5 MG/1
2.5 TABLET ORAL EVERY 8 HOURS
Qty: 21 TABLET | Refills: 0 | Status: SHIPPED | OUTPATIENT
Start: 2021-07-26 | End: 2021-08-02

## 2021-07-26 RX ADMIN — LIDOCAINE 1 INCH: 50 OINTMENT TOPICAL at 14:03

## 2021-07-26 RX ADMIN — ACETAMINOPHEN 975 MG: 325 TABLET ORAL at 08:42

## 2021-07-26 RX ADMIN — INSULIN GLARGINE 13 UNITS: 100 INJECTION, SOLUTION SUBCUTANEOUS at 22:01

## 2021-07-26 RX ADMIN — Medication 1 TABLET: at 08:42

## 2021-07-26 RX ADMIN — ZOLPIDEM TARTRATE 10 MG: 5 TABLET ORAL at 21:51

## 2021-07-26 RX ADMIN — AZITHROMYCIN MONOHYDRATE 250 MG: 250 TABLET ORAL at 08:42

## 2021-07-26 RX ADMIN — LIDOCAINE 1 INCH: 50 OINTMENT TOPICAL at 10:13

## 2021-07-26 RX ADMIN — ACETAMINOPHEN 975 MG: 325 TABLET ORAL at 13:54

## 2021-07-26 RX ADMIN — DULOXETINE HYDROCHLORIDE 30 MG: 30 CAPSULE, DELAYED RELEASE ORAL at 08:41

## 2021-07-26 RX ADMIN — CALCIUM CARBONATE (ANTACID) CHEW TAB 500 MG 500 MG: 500 CHEW TAB at 08:42

## 2021-07-26 RX ADMIN — OXYBUTYNIN CHLORIDE 5 MG: 5 TABLET ORAL at 08:42

## 2021-07-26 RX ADMIN — INSULIN ASPART 2 UNITS: 100 INJECTION, SOLUTION INTRAVENOUS; SUBCUTANEOUS at 14:03

## 2021-07-26 RX ADMIN — DOCUSATE SODIUM 200 MG: 100 CAPSULE, LIQUID FILLED ORAL at 08:42

## 2021-07-26 RX ADMIN — HYDROXYZINE HYDROCHLORIDE 25 MG: 10 SYRUP ORAL at 08:34

## 2021-07-26 RX ADMIN — IBUPROFEN 400 MG: 400 TABLET, FILM COATED ORAL at 14:15

## 2021-07-26 RX ADMIN — HYDROMORPHONE HYDROCHLORIDE 4 MG: 4 TABLET ORAL at 13:54

## 2021-07-26 RX ADMIN — HYDROXYZINE HYDROCHLORIDE 25 MG: 10 SYRUP ORAL at 13:54

## 2021-07-26 RX ADMIN — HYDRALAZINE HYDROCHLORIDE 5 MG: 20 INJECTION INTRAMUSCULAR; INTRAVENOUS at 08:39

## 2021-07-26 RX ADMIN — METHADONE HYDROCHLORIDE 2.5 MG: 5 TABLET ORAL at 21:51

## 2021-07-26 RX ADMIN — INSULIN ASPART 1 UNITS: 100 INJECTION, SOLUTION INTRAVENOUS; SUBCUTANEOUS at 18:20

## 2021-07-26 RX ADMIN — HYDROMORPHONE HYDROCHLORIDE 4 MG: 4 TABLET ORAL at 04:12

## 2021-07-26 RX ADMIN — METHADONE HYDROCHLORIDE 2.5 MG: 5 TABLET ORAL at 06:53

## 2021-07-26 RX ADMIN — METHADONE HYDROCHLORIDE 2.5 MG: 5 TABLET ORAL at 13:55

## 2021-07-26 RX ADMIN — Medication 25 MCG: at 08:42

## 2021-07-26 RX ADMIN — INSULIN ASPART 1 UNITS: 100 INJECTION, SOLUTION INTRAVENOUS; SUBCUTANEOUS at 10:05

## 2021-07-26 RX ADMIN — ENOXAPARIN SODIUM 40 MG: 100 INJECTION SUBCUTANEOUS at 18:19

## 2021-07-26 RX ADMIN — HYDROMORPHONE HYDROCHLORIDE 4 MG: 4 TABLET ORAL at 08:34

## 2021-07-26 RX ADMIN — CEFDINIR 300 MG: 300 CAPSULE ORAL at 08:42

## 2021-07-26 RX ADMIN — INSULIN ASPART 2 UNITS: 100 INJECTION, SOLUTION INTRAVENOUS; SUBCUTANEOUS at 18:21

## 2021-07-26 RX ADMIN — SIMVASTATIN 20 MG: 10 TABLET, FILM COATED ORAL at 21:51

## 2021-07-26 RX ADMIN — ACETAMINOPHEN 975 MG: 325 TABLET ORAL at 21:50

## 2021-07-26 RX ADMIN — INSULIN ASPART 1 UNITS: 100 INJECTION, SOLUTION INTRAVENOUS; SUBCUTANEOUS at 14:01

## 2021-07-26 RX ADMIN — EZETIMIBE 10 MG: 10 TABLET ORAL at 21:51

## 2021-07-26 RX ADMIN — OMEPRAZOLE 20 MG: 20 CAPSULE, DELAYED RELEASE ORAL at 06:53

## 2021-07-26 RX ADMIN — VANCOMYCIN HYDROCHLORIDE 1250 MG: 1 INJECTION, POWDER, LYOPHILIZED, FOR SOLUTION INTRAVENOUS at 11:28

## 2021-07-26 RX ADMIN — CEFAZOLIN SODIUM 2 G: 2 INJECTION, SOLUTION INTRAVENOUS at 21:56

## 2021-07-26 RX ADMIN — OXYBUTYNIN CHLORIDE 5 MG: 5 TABLET ORAL at 21:51

## 2021-07-26 NOTE — PLAN OF CARE
Problem: Pain Chronic (Persistent)  Goal: Acceptable Pain Control and Functional Ability  Intervention: Develop Pain Management Plan  Recent Flowsheet Documentation  Taken 7/26/2021 0011 by Danielle Garcia RN  Pain Management Interventions:    emotional support    repositioned   R hip pain rated at 4/10. Prn dilaudid po given with good effect. Pt asleep on and off during the shift. Pt drinks a lot of water. Pure wick in place with a lot of urine output.

## 2021-07-26 NOTE — PLAN OF CARE
Problem: Pain Chronic (Persistent)  Goal: Acceptable Pain Control and Functional Ability  Outcome: Improving  Intervention: Develop Pain Management Plan  Recent Flowsheet Documentation  Taken 7/25/2021 2114 by Sendy Samuels RN  Pain Management Interventions: medication (see MAR)  Taken 7/25/2021 1710 by Sendy Samuels RN  Pain Management Interventions: medication (see MAR)  Intervention: Manage Persistent Pain  Recent Flowsheet Documentation  Taken 7/25/2021 1710 by Sendy Samuels RN  Bowel Elimination Promotion:   adequate fluid intake promoted   ambulation promoted  Intervention: Optimize Psychosocial Wellbeing  Recent Flowsheet Documentation  Taken 7/25/2021 1710 by Snedy Samuels RN  Diversional Activities: television   Hip pain is well controlled with lidocaine TP,   methadone, TY and dilaudid.    Problem: Diabetes Comorbidity  Goal: Blood Glucose Level Within Targeted Range  Outcome: Improving   ZM=512 and 193.Scheduled  insulin given.    Problem: Activity and Energy Impairment (Anxiety Signs/Symptoms)  Goal: Optimized Energy Level (Anxiety Signs/Symptoms)  Outcome: Improving   Pt has been pleasant, calm and cooperative. Walked in the hallways and tolerated well.

## 2021-07-26 NOTE — PROGRESS NOTES
Referral made to [facility] for anticipated IV abx at discharge. Awaiting to hear back on benefits. [facility]. No longer able to take if discharging on IV abx. [facility]. Will need to find HC for RN and also will need PT and OT at discharge.

## 2021-07-26 NOTE — PROGRESS NOTES
[facility] ACUTE PAIN SERVICE    ([facility], [facility], [facility])   Daily PAIN Progress Note    Assessment/Plan:  Elenita Hardin is a 66 year old female who was admitted on 7/16/2021.   Pain Service was asked to see the patient for cancer associated pain. Admitted for evaluation of altered mental status, fevers. Patient presented disoriented with slurred speech, intermittent combativeness, visual hallucination and frequent falls.  Mental status issues have improved, oxycontin possibly contributing.  This was stopped.   Medical history significant for Stage IV Breast Cancer with mets to pelvis, femur, vertebrae, ribs and lymph nodes.  Patient of Dr. Rizo, Type II Diabetes.  Infectious Disease following. Recent aspiration of paraspinal fluid with cultures showing MSSA.   Patient wanting to return home.  Plan for home once antibiotic therapy gets figured out.    Started on Methadone last week.  Tolerating well, some improvement in pain.  Is receiving prn hydromorphone.  Discussed with patient to continue to use hydromorphone for breakthrough pain and to hold on increasing methadone until follows up with Palliative Care/Oncology NP and with Dr. Varma this week.       Moderate constipation bowel movement about every other day, continue to monitor       PLAN:   1) Pain is consistent with cancer associated pain extensive metastasis disease of the thoracic, lumbar, sacral, and pelvis, c/b medivation overdose with addition of long acting opioid, sepsis.    intramuscular abscesses along the fractured left superior and inferior pubic rami - ID following.   2)Multimodal Medication Therapy  Topical:  Lidocaine ointment 5% qid, artificial saliva x 1 spray QID   NSAID'S: ibuprofen  400 mg q 6 h prn   Muscle Relaxants: None   Adjuvants:  Tyenol 975 mg po TID, cymbalta 30 mg po qam, atarax 25 mg every 6 hours prn please give with the hydromorphone, self administered medical cannabis, benzotropine 1  mg po tid prn extrapyramidal effects  Antidepressants/anxiolytics: Cymbalta 30 mg q day, Seroquel 12.5 mg q 6 h prn for agitation, haloperidol  Opioids: Methadone 2.5 mg every 8 hours,  Hydromorphone(Dilaudid) 2-4 mg q 3 h prn (moderate to severe pain)  3)Non-medication interventions- rest and turning   4)Constipation Prophylaxis- docusate 200 mg daily, Miralax daily PRN     -Opioid prescriber has been [facility]  -Discharge Recommendations - We recommend prescribing the following at the time of discharge: Methadone 2.5 mg tid 7 day script #21 tablets         Principal Problem:    Hip pain, right  Active Problems:    Confusion     LOS: 10 days       Subjective:  Patient reports pain is minimal pain at rest tolerated walking around unit feeling hopeful will be able to manage mobility at home.      acetaminophen  975 mg Oral TID     artificial saliva  1 spray Swish & Spit 4x Daily     [Held by provider] aspirin  81 mg Oral Daily     B-Complex/B-12  1 tablet Oral Daily     calcium carbonate  500 mg Oral Daily     docusate sodium  200 mg Oral Daily     DULoxetine  30 mg Oral QAM     enoxaparin ANTICOAGULANT  40 mg Subcutaneous Q24H     ezetimibe  10 mg Oral At Bedtime    And     simvastatin  20 mg Oral QPM     insulin aspart   Subcutaneous TID w/meals     insulin aspart  1-3 Units Subcutaneous TID AC     insulin glargine  13 Units Subcutaneous At Bedtime     lidocaine   Topical 4x Daily     medical cannabis  1 Dose Other See Admin Instructions     methadone  2.5 mg Oral Q8H     multivitamin w/minerals  1 tablet Oral Daily     omeprazole  20 mg Oral QAM AC     oxybutynin  5 mg Oral BID     sodium chloride (PF)  10-20 mL Intracatheter Q28 Days     vancomycin  1,250 mg Intravenous Q12H     Vitamin D3  25 mcg Oral Daily     zolpidem  10 mg Oral At Bedtime       Objective:  Vital signs in last 24 hours:  Temp:  [97.5  F (36.4  C)-99  F (37.2  C)] 98.7  F (37.1  C)  Pulse:  [] 118  Resp:  [16-18]  18  BP: (129-177)/(60-86) 177/86  SpO2:  [96 %-99 %] 97 %  Weight:   Weight change:   Body mass index is 33.95 kg/m .    Intake/Output last 3 shifts:  I/O last 3 completed shifts:  In: 3320 [P.O.:3320]  Out: 7850 [Urine:7850]  Intake/Output this shift:  I/O this shift:  In: 480 [P.O.:480]  Out: 1350 [Urine:1350]    Review of Systems:   As per subjective, all others negative.    Physical Exam:    General Appearance:  Alert, fatigues easily, resting in bed   Head:  Normocephalic, without obvious abnormality, atraumatic   Eyes:  PERRL, conjunctiva/corneas clear, EOM's intact   Nose: Nares normal, septum midline, mucosa normal, no drainage   Throat: Lips, mucosa, and tongue normal; teeth and gums normal   Neck: Supple, symmetrical, trachea midline   Back:   Symmetric, no curvature, ROM normal, no CVA tenderness   Lungs:   respirations unlabored   Chest Wall:  No tenderness or deformity   Heart:  Regular rate and rhythm, S1, S2 normal,no murmur, rub or gallop   Abdomen:   Soft, non-tender,non distended   Extremities: Extremities normal, atraumatic, no cyanosis or edema   Skin: Skin color, texture, turgor normal, no rashes or lesions   Neurologic: Alert and oriented X 3, decreased mobility due to pain          Imaging:  Reviewed.  Lab Results:  Personally Reviewed.   Recent Labs   Lab 07/26/21  0657 07/23/21  0642 07/20/21  0537   WBC 12.3*  --   --    HGB 7.6*  --   --    HCT 23.7*  --   --     [phone]     Recent Labs   Lab 07/26/21  0657      CO2 27   BUN 7*         Total time spent 25 minutes with greater than 50% in consultation, education and coordination of care.     Also discussed with RN and MD Estephanie Castanon APRN, CNS-BC, DNP  Acute Care Pain Management Program  [facility] (MARY [place], [place], [place])   With questions call [phone]  Preference if for [employer] Paging - Roxyg  Click HERE to page Yessenia

## 2021-07-26 NOTE — PROGRESS NOTES
[facility]    Medicine Progress Note - Hospitalist Service       Date of Admission:  7/16/2021    Assessment & Plan           Elenita Hardin is a 66 year old female admitted on 7/16/2021. She has history of stage IV breast cancer with bone mets, insulin-dependent diabetes, depression presented on 7/16 for evaluation of agitation and confusion, found to have sepsis secondary to spine abscesses.    #Spine abscesses  #Sepsis, improving  During hospital stay patient had fever up to 101.8F.  She has chronic back pain, MRI was obtained showed bony destruction and fluid within the SI joints bilaterally concerning for infection, as well as multiple abscesses right greater than left of the iliacus and paraspinal muscles from L4 to mid sacrum.  Underwent CT-guided aspiration on 7/23, cultures with 4+ MSSA.  Some of the imaging findings are likely related to metastatic cancer but with positive culture at least some of this is infection. Numerous skin lesions may be source, patient immunocompromised has been on chemo; has also been receiving epidural steroid injections (though not for years).  ID following.  Sepsis improving. WBC up today, had not been checked in a few days and may be just due to immune system not recovering as she gets further out from chemo.  Patient initially treated with Rocephin and Azithro, then changed to Omnicef and azithromycin, then started vancomycin 7/24.  Anticipate she will need IV antibiotics at home probably for several weeks.  Has a working port.  Care management notified of anticipated discharge needs.  Stable to discharge when discharge arrangements made.  There was question of possible pneumonia on portable CXR on admission, but without clear infiltrate on subsequent CXR or CT. She completed full course of azithro and cephalosporin.    #Acute encephalopathy  Presenting complaint.  She was hallucinating, agitated, confused  Thought to be secondary to  oxycontin which is probably not a great medication for her, possibly also was septic secondary to spine abscesses  Seems much better  Avoid oxycodone    #Chronic cancer pain  Due to bone mets  Pain team following here, appreciate assistance  Continue scheduled Tylenol, home medical cannabis, Cymbalta, Vistaril, ibuprofen  Pain team started her on methadone  Consideration being given to outpatient radiation, she will need to complete her IV antibiotics before this can be initiated.    #Stage 4 breast cancer  Patient of Dr Varma  Has been on third line chemo  Oncology saw here and going to round on her tomorrow  Appreciate clarification of plan going forward.  Per oncology, she does need to be off of IV antibiotics before she can proceed with any further cancer treatment.    #IDDM  Home regimen according to med rec is Basaglar 55 units QAM and Humalog 3:15 carb count and sliding scale.  states this is old information though and she is not taking this much insulin at home  Here has been on HS Lantus 10 units plus sliding scale sensitive  Discontinue bedtime sliding scale  Increase Lantus to 13 units, AM BG not at goal  With daytime BG rise, will restart mealtime insulin with carb count of 1:15 to start    #HLD  Home Vytorin    #Insomnia  Ok to continue home Ambien which she seems to be tolerating here even though this is not a great med in the hospital and is a very high dose    #OAB  #Urine retention?  Holding home Ditropan. Question of distended bladder and mild hydronephrosis on admission CT, PVR ordered 7/16 but I cannot find charted. Repeat random bladder scan to assess for retention- I still can't find this result, left note for RN to obtain    #GERD, home PPI    #Normocytic anemia  Hemoglobin check today 7.6, down from previous 9.2.  No active bleeding.  Follow with oncology.       Diet: Consistent Carb 45 grams CHO per Meal Diet    DVT Prophylaxis: Enoxaparin (Lovenox) SQ  Jiang Catheter: Not  present  Central Lines: None  Code Status: Full Code      Disposition Plan   Expected discharge: 07/27/2021 recommended to prior living arrangement once antibiotic plan established.     The patient's care was discussed with the Patient.    Neha Doherty MD  Hospitalist Service  [facility]  Text page via [email or web address] Paging/Directory      Risk Factors Present on Admission                ____________    Interval History   Patient states doing fine today. Pain is improving bit by bit. She would like to go home. Eating and drinking fine. Voiding fine, no BM today but one charted yesterday.    Culture back and shows MSSA. Anticipate patient could discharge home today on weeks of IV antibiotic. WBC did increase today 12.3 from 5.1 on 7/15, she is further out from chemo though (last chemo 6/29) so maybe this is why? Defer to ID if should monitor longer due to this. Afebrile by chart. Hoping to get her home soon. Stage 4 cancer patient who wants to go home    I called and updated  Denilson    Data reviewed today: I reviewed all medications, new labs and imaging results over the last 24 hours. I personally reviewed no images or EKG's today.    Physical Exam   Vital Signs: Temp: 98.2  F (36.8  C) Temp src: Oral BP: 108/60 Pulse: 89   Resp: 16 SpO2: 94 % O2 Device: None (Room air)    Weight: 204 lbs 0 oz  General: in no apparent distress, non-toxic and alert female lying in hospital bed oriented x3  HEENT: Head normocephalic atraumatic, oral mucosa moist. Sclerae anicteric  Skin: Numerous scabs BLE  Extremities: No peripheral edema  Psych: Normal affect, mood dysthymic  Neuro: CNII-XII grossly intact, moving all 4 extremities      Data   Recent Results (from the past 24 hour(s))   Glucose by meter    Collection Time: 07/25/21  5:15 PM   Result Value Ref Range    GLUCOSE BY METER POCT 206 (H) 70 - 125 mg/dL   Glucose by meter    Collection Time: 07/25/21  9:02 PM   Result Value Ref Range    GLUCOSE BY  METER POCT 193 (H) 70 - 125 mg/dL   Vancomycin level    Collection Time: 07/25/21  9:57 PM   Result Value Ref Range    Vancomycin 13.2   mg/L   Magnesium    Collection Time: 07/26/21  6:57 AM   Result Value Ref Range    Magnesium 1.8 1.8 - 2.6 mg/dL   Basic metabolic panel    Collection Time: 07/26/21  6:57 AM   Result Value Ref Range    Sodium 143 136 - 145 mmol/L    Potassium 3.7 3.5 - 5.0 mmol/L    Chloride 106 98 - 107 mmol/L    Carbon Dioxide (CO2) 27 22 - 31 mmol/L    Anion Gap 10 5 - 18 mmol/L    Urea Nitrogen 7 (L) 8 - 22 mg/dL    Creatinine 0.60 0.60 - 1.10 mg/dL    Calcium 9.1 8.5 - 10.5 mg/dL    Glucose 128 (H) 70 - 125 mg/dL    GFR Estimate >90 >60 mL/min/1.73m2   CBC with platelets and differential    Collection Time: 07/26/21  6:57 AM   Result Value Ref Range    WBC Count 12.3 (H) 4.0 - 11.0 10e3/uL    RBC Count 2.55 (L) 3.80 - 5.20 10e6/uL    Hemoglobin 7.6 (L) 11.7 - 15.7 g/dL    Hematocrit 23.7 (L) 35.0 - 47.0 %    MCV 93 78 - 100 fL    MCH 29.8 26.5 - 33.0 pg    MCHC 32.1 31.5 - 36.5 g/dL    RDW 17.0 (H) 10.0 - 15.0 %    Platelet Count 332 150 - 450 10e3/uL   Manual Differential    Collection Time: 07/26/21  6:57 AM   Result Value Ref Range    % Neutrophils 91 %    % Lymphocytes 6 %    % Monocytes 2 %    % Eosinophils 1 %    % Basophils 0 %    Absolute Neutrophils 11.2 (H) 1.6 - 8.3 10e3/uL    Absolute Lymphocytes 0.7 (L) 0.8 - 5.3 10e3/uL    Absolute Monocytes 0.2 0.0 - 1.3 10e3/uL    Absolute Eosinophils 0.1 0.0 - 0.7 10e3/uL    Absolute Basophils 0.0 0.0 - 0.2 10e3/uL    RBC Morphology Confirmed RBC Indices     Platelet Assessment  Automated Count Confirmed. Platelet morphology is normal.     Automated Count Confirmed. Platelet morphology is normal.   Glucose by meter    Collection Time: 07/26/21  8:22 AM   Result Value Ref Range    GLUCOSE BY METER POCT 105 70 - 125 mg/dL   Glucose by meter    Collection Time: 07/26/21 12:15 PM   Result Value Ref Range    GLUCOSE BY METER POCT 131 (H) 70 - 125  mg/dL

## 2021-07-26 NOTE — PHARMACY-VANCOMYCIN DOSING SERVICE
Pharmacy Vancomycin Note  Date of Service 2021  Patient's  1955   66 year old, female    Indication: Abscess  Day of Therapy: 2  Current vancomycin regimen:  1250 mg IV q12h  Current vancomycin monitoring method: AUC  Current vancomycin therapeutic monitoring goal: 400-600 mg*h/L    Current estimated CrCl = Estimated Creatinine Clearance: 109.1 mL/min (A) (based on SCr of 0.57 mg/dL (L)).    Creatinine for last 3 days  No results found for requested labs within last 72 hours.    Recent Vancomycin Levels (past 3 days)  2021:  9:57 PM Vancomycin 13.2 mg/L    Vancomycin IV Administrations (past 72 hours)                   vancomycin (VANCOCIN) 1,250 mg in sodium chloride 0.9 % 250 mL intermittent infusion (mg) 1,250 mg New Bag 21 2248     1,250 mg New Bag  1204     1,250 mg New Bag 21 2341     1,250 mg New Bag  1126    vancomycin (VANCOCIN) 1,500 mg in sodium chloride 0.9 % 250 mL intermittent infusion (mg) 1,500 mg New Bag 21 0110                Nephrotoxins and other renal medications (From now, onward)    Start     Dose/Rate Route Frequency Ordered Stop    21 1100  vancomycin (VANCOCIN) 1,250 mg in sodium chloride 0.9 % 250 mL intermittent infusion      1,250 mg  over 60 Minutes Intravenous EVERY 12 HOURS 21 2158      21 1712  ibuprofen (ADVIL/MOTRIN) tablet 400 mg      400 mg Oral EVERY 6 HOURS PRN 21 1713               Contrast Orders - past 72 hours (72h ago, onward)    None          Interpretation of levels and current regimen:  Vancomycin level is reflective of -600    Has serum creatinine changed greater than 50% in last 72 hours: No    Renal Function: Stable    Regimen: 1250 mg IV every 12 hours.  Start time: 23:00 on 2021  Exposure target: AUC24 (range)400-600 mg/L.hr   AUC24,ss: 475 mg/L.hr  Probability of AUC24 > 400: 83 %  Ctrough,ss: 13.6 mg/L  Probability of Ctrough,ss > 20: 7 %  Probability of nephrotoxicity (Lodise LENNY ):  9 %    Plan:  1. Continue Current Dose  2. Vancomycin monitoring method: AUC  3. Vancomycin therapeutic monitoring goal: 400-600 mg*h/L  4. Pharmacy will check vancomycin levels as appropriate in 3-5 Days.  5. Serum creatinine levels will be checked q3days.    Rachel Melendez RPH

## 2021-07-26 NOTE — PROGRESS NOTES
Progress Note     Primary Oncologist/Hematologist:            Assessment and Plan:New actions/orders today (07/26/2021) are underlined.     Elenita is a 66 year old female with       1. Metastatic breast adenocarcinoma with bone mets and bone marrow involvement:   - On 3rd line chemo Doxil 50 mg/m2 IV which is given every 4 weeks until progression or intolerance.   - Last received cycle 3 of Doxil on 6/29/21. She did not receive Neulasta support for neutropenia prevention with cycle 3.   - She also receives Zometa 4 mg IV every 4 weeks.   - Next cycle of Doxil was due 7/27/2021 however this will be postponed for at least 2 weeks.  The patient will be seen in outpatient setting for reevaluation at that time.  Would prefer course of IV antibiotics be tailored as best to allow IV chemotherapy sooner than later for her stage IV disease.     2. Acute metabolic encephalopathy, multifactorial.  - Resolved after stopping OxyContin and starting treatment of pneumonia.  - Brain MRI shows no acute findings to account for the symptoms.  - Appreciate palliative care team to help with pain management. I will notify our palliative care NP Denilson Valles regarding this event.     3. Acute on chronic cancer bone pain:   - Appreciate palliative care team assistance in managing pain.   - She is scheduled to follow up with palliative care NP on 7/27/21- if plan to discharge can change this appointment to telemedicine.     4. Community-acquired pneumonia/aspiration pneumonia:   - ID following; on abx     5. Diffuse sclerotic metastatic disease of thoracic, lumbar, sacrum, and pelvis with no evidence of pathologic fracture to lumbar spine. Also seen is probable extra osseous extension of metastasis into the right sacral neural foramen along with enhancement of the cauda equina from L4 to the sacrum and thickening and enhancement of the thecal sac.  - Patient will followup with Dr. Maria Del Carmen Orozco MD for palliative treatment plan as  outpatient. Will place consult order pending discharge. Patient would need to complete IV antibiotics prior to initiating radiation treatment.     6. Intramuscular abscess along the fractured left superior and inferior pubic rami.  - Aspirate shows MSSA- plan per ID      7. Anxiety:  - Will defer to primary team for anxiety management.      Ok with discharge from oncology standpoint with plan to followup in clinic in 2 weeks for treatment plan.     Celia Gilliland  [facility]  Office: [phone]  Cell: [phone] Monday through Friday, 8AM-5PM. After hours and weekends, please use answering service.         Interval History:     Elenita is hopeful she can go home today.  Her pain is better managed with current plan.  She is aware that we will have her follow-up with our palliative care team tomorrow via telemedicine if she discharges.  I discussed that we will have her follow-up in 2 weeks with Dr. Judah Lee to reevaluate plan for restarting chemotherapy.  She is eating and drinking all right.               Review of Systems:     The 5 point Review of Systems is negative other than noted in the HPI             Medications:   Scheduled Medications    acetaminophen  975 mg Oral TID     artificial saliva  1 spray Swish & Spit 4x Daily     [Held by provider] aspirin  81 mg Oral Daily     B-Complex/B-12  1 tablet Oral Daily     calcium carbonate  500 mg Oral Daily     docusate sodium  200 mg Oral Daily     DULoxetine  30 mg Oral QAM     enoxaparin ANTICOAGULANT  40 mg Subcutaneous Q24H     ezetimibe  10 mg Oral At Bedtime    And     simvastatin  20 mg Oral QPM     insulin aspart   Subcutaneous TID w/meals     insulin aspart  1-3 Units Subcutaneous TID AC     insulin glargine  13 Units Subcutaneous At Bedtime     lidocaine   Topical 4x Daily     medical cannabis  1 Dose Other See Admin Instructions     methadone  2.5 mg Oral Q8H     multivitamin w/minerals  1 tablet Oral Daily     oxybutynin  5 mg Oral BID      "[START ON 7/27/2021] pantoprazole  40 mg Oral QAM AC     sodium chloride (PF)  10-20 mL Intracatheter Q28 Days     vancomycin  1,250 mg Intravenous Q12H     Vitamin D3  25 mcg Oral Daily     zolpidem  10 mg Oral At Bedtime     PRN Medications  benztropine, glucose **OR** dextrose **OR** glucagon, fentaNYL, flumazenil, hydrALAZINE, HYDROmorphone, hydrOXYzine, ibuprofen, lidocaine (buffered or not buffered), midazolam, naloxone **OR** naloxone **OR** naloxone **OR** naloxone, polyethylene glycol, QUEtiapine, sodium chloride (PF), sodium chloride (PF)               Physical Exam:   Vitals were reviewed  Blood pressure 105/57, pulse 99, temperature 98.4  F (36.9  C), temperature source Oral, resp. rate 16, height 1.651 m (5' 5\"), weight 92.5 kg (204 lb), SpO2 95 %.  Wt Readings from Last 4 Encounters:   07/25/21 92.5 kg (204 lb)   04/09/18 98.7 kg (217 lb 11.2 oz)   11/17/17 97.1 kg (214 lb)   11/09/17 96.2 kg (212 lb)       I/O last 3 completed shifts:  In: 3320 [P.O.:3320]  Out: 7850 [Urine:7850]    Constitutional: Awake, alert, cooperative, no apparent distress   Lungs: Clear to auscultation bilaterally, no crackles or wheezing   Cardiovascular: Regular rate and rhythm, normal S1 and S2, and no murmur noted   Abdomen: Normal bowel sounds, soft, non-distended, non-tender   Skin: No rashes, no cyanosis, no edema   Other: PSYCH: Appropriate              Data:   All laboratory data and imaging studies reviewed.    CMP  Recent Labs   Lab 07/26/21  1215 07/26/21  0822 07/26/21  0657 07/25/21  2102 07/25/21  0653 07/24/21  1301 07/24/21  0657 07/23/21  0642 07/22/21  1442 07/20/21  0930 07/20/21  0206   NA  --   --  143  --   --   --   --   --   --   --   --    POTASSIUM  --   --  3.7  --  3.6 3.9 3.3* 3.7 3.5   < > 3.3*   CHLORIDE  --   --  106  --   --   --   --   --   --   --   --    CO2  --   --  27  --   --   --   --   --   --   --   --    ANIONGAP  --   --  10  --   --   --   --   --   --   --   --    * 105 " 128* 193*  --   --   --   --   --   --   --    BUN  --   --  7*  --   --   --   --   --   --   --   --    CR  --   --  0.60  --   --   --   --   --  0.57*  --  0.63   GFRESTIMATED  --   --  >90  --   --   --   --   --  >90  --  >90   BENJI  --   --  9.1  --   --   --   --   --   --   --   --    MAG  --   --  1.8  --  1.9  --  2.0 2.1 1.9   < > 1.9    < > = values in this interval not displayed.     CBC  Recent Labs   Lab 07/26/21  0657 07/23/21  0642 07/20/21  0537   WBC 12.3*  --   --    RBC 2.55*  --   --    HGB 7.6*  --   --    HCT 23.7*  --   --    MCV 93  --   --    MCH 29.8  --   --    MCHC 32.1  --   --    RDW 17.0*  --   --     367 317     INRNo lab results found in last 7 days.  Data   Results for orders placed or performed during the hospital encounter of 07/16/21 (from the past 24 hour(s))   Glucose by meter   Result Value Ref Range    GLUCOSE BY METER POCT 206 (H) 70 - 125 mg/dL   Glucose by meter   Result Value Ref Range    GLUCOSE BY METER POCT 193 (H) 70 - 125 mg/dL   Vancomycin level   Result Value Ref Range    Vancomycin 13.2   mg/L    Narrative    Traditional Dosing Therapeutic Range:  Trough 8-20 mg/L  Peak 20-50 mg/L    Critical:  Greater than 25.0 mg/L     CBC with Platelets & Differential    Narrative    The following orders were created for panel order CBC with Platelets & Differential.  Procedure                               Abnormality         Status                     ---------                               -----------         ------                     CBC with platelets and d...[[id number]]  Abnormal            Edited Result - FINAL      Manual Differential[[id number]]          Abnormal            Final result                 Please view results for these tests on the individual orders.   Magnesium   Result Value Ref Range    Magnesium 1.8 1.8 - 2.6 mg/dL   Basic metabolic panel   Result Value Ref Range    Sodium 143 136 - 145 mmol/L    Potassium 3.7 3.5 - 5.0 mmol/L    Chloride 106  "98 - 107 mmol/L    Carbon Dioxide (CO2) 27 22 - 31 mmol/L    Anion Gap 10 5 - 18 mmol/L    Urea Nitrogen 7 (L) 8 - 22 mg/dL    Creatinine 0.60 0.60 - 1.10 mg/dL    Calcium 9.1 8.5 - 10.5 mg/dL    Glucose 128 (H) 70 - 125 mg/dL    GFR Estimate >90 >60 mL/min/1.73m2   CBC with platelets and differential   Result Value Ref Range    WBC Count 12.3 (H) 4.0 - 11.0 10e3/uL    RBC Count 2.55 (L) 3.80 - 5.20 10e6/uL    Hemoglobin 7.6 (L) 11.7 - 15.7 g/dL    Hematocrit 23.7 (L) 35.0 - 47.0 %    MCV 93 78 - 100 fL    MCH 29.8 26.5 - 33.0 pg    MCHC 32.1 31.5 - 36.5 g/dL    RDW 17.0 (H) 10.0 - 15.0 %    Platelet Count 332 150 - 450 10e3/uL    Narrative    Previously reported component [ NRBCs ] is no longer reported.\"  Previously reported component [ NRBCs Absolute ] is no longer reported.\"   Manual Differential   Result Value Ref Range    % Neutrophils 91 %    % Lymphocytes 6 %    % Monocytes 2 %    % Eosinophils 1 %    % Basophils 0 %    Absolute Neutrophils 11.2 (H) 1.6 - 8.3 10e3/uL    Absolute Lymphocytes 0.7 (L) 0.8 - 5.3 10e3/uL    Absolute Monocytes 0.2 0.0 - 1.3 10e3/uL    Absolute Eosinophils 0.1 0.0 - 0.7 10e3/uL    Absolute Basophils 0.0 0.0 - 0.2 10e3/uL    RBC Morphology Confirmed RBC Indices     Platelet Assessment  Automated Count Confirmed. Platelet morphology is normal.     Automated Count Confirmed. Platelet morphology is normal.   Glucose by meter   Result Value Ref Range    GLUCOSE BY METER POCT 105 70 - 125 mg/dL   Glucose by meter   Result Value Ref Range    GLUCOSE BY METER POCT 131 (H) 70 - 125 mg/dL               "

## 2021-07-27 ENCOUNTER — APPOINTMENT (OUTPATIENT)
Dept: PHYSICAL THERAPY | Facility: HOSPITAL | Age: 66
DRG: 853 | End: 2021-07-27
Payer: COMMERCIAL

## 2021-07-27 ENCOUNTER — HOME INFUSION (PRE-WILLOW HOME INFUSION) (OUTPATIENT)
Dept: PHARMACY | Facility: CLINIC | Age: 66
End: 2021-07-27

## 2021-07-27 LAB
ATRIAL RATE - MUSE: 97 BPM
DIASTOLIC BLOOD PRESSURE - MUSE: NORMAL MMHG
ERYTHROCYTE [DISTWIDTH] IN BLOOD BY AUTOMATED COUNT: 17.1 % (ref 10–15)
GLUCOSE BLDC GLUCOMTR-MCNC: 104 MG/DL (ref 70–125)
GLUCOSE BLDC GLUCOMTR-MCNC: 143 MG/DL (ref 70–125)
GLUCOSE BLDC GLUCOMTR-MCNC: 159 MG/DL (ref 70–125)
GLUCOSE BLDC GLUCOMTR-MCNC: 212 MG/DL (ref 70–125)
HCT VFR BLD AUTO: 24.1 % (ref 35–47)
HGB BLD-MCNC: 7.6 G/DL (ref 11.7–15.7)
INTERPRETATION ECG - MUSE: NORMAL
MAGNESIUM SERPL-MCNC: 1.9 MG/DL (ref 1.8–2.6)
MCH RBC QN AUTO: 29.8 PG (ref 26.5–33)
MCHC RBC AUTO-ENTMCNC: 31.5 G/DL (ref 31.5–36.5)
MCV RBC AUTO: 95 FL (ref 78–100)
P AXIS - MUSE: 61 DEGREES
PATH REPORT.COMMENTS IMP SPEC: NORMAL
PATH REPORT.FINAL DX SPEC: NORMAL
PATH REPORT.GROSS SPEC: NORMAL
PATH REPORT.MICROSCOPIC SPEC OTHER STN: NORMAL
PATH REPORT.RELEVANT HX SPEC: NORMAL
PLATELET # BLD AUTO: 389 10E3/UL (ref 150–450)
POTASSIUM BLD-SCNC: 3.6 MMOL/L (ref 3.5–5)
PR INTERVAL - MUSE: 148 MS
QRS DURATION - MUSE: 96 MS
QT - MUSE: 384 MS
QTC - MUSE: 487 MS
R AXIS - MUSE: -31 DEGREES
RBC # BLD AUTO: 2.55 10E6/UL (ref 3.8–5.2)
SYSTOLIC BLOOD PRESSURE - MUSE: NORMAL MMHG
T AXIS - MUSE: 48 DEGREES
VENTRICULAR RATE- MUSE: 97 BPM
WBC # BLD AUTO: 11 10E3/UL (ref 4–11)

## 2021-07-27 PROCEDURE — 250N000013 HC RX MED GY IP 250 OP 250 PS 637: Performed by: PAIN MEDICINE

## 2021-07-27 PROCEDURE — 83735 ASSAY OF MAGNESIUM: CPT | Performed by: HOSPITALIST

## 2021-07-27 PROCEDURE — 250N000013 HC RX MED GY IP 250 OP 250 PS 637: Performed by: INTERNAL MEDICINE

## 2021-07-27 PROCEDURE — 84132 ASSAY OF SERUM POTASSIUM: CPT | Performed by: HOSPITALIST

## 2021-07-27 PROCEDURE — 120N000001 HC R&B MED SURG/OB

## 2021-07-27 PROCEDURE — 97116 GAIT TRAINING THERAPY: CPT | Mod: GP

## 2021-07-27 PROCEDURE — 250N000012 HC RX MED GY IP 250 OP 636 PS 637

## 2021-07-27 PROCEDURE — 99232 SBSQ HOSP IP/OBS MODERATE 35: CPT

## 2021-07-27 PROCEDURE — 97530 THERAPEUTIC ACTIVITIES: CPT | Mod: GP

## 2021-07-27 PROCEDURE — 250N000011 HC RX IP 250 OP 636: Performed by: INTERNAL MEDICINE

## 2021-07-27 PROCEDURE — 250N000013 HC RX MED GY IP 250 OP 250 PS 637: Performed by: CLINICAL NURSE SPECIALIST

## 2021-07-27 PROCEDURE — 93005 ELECTROCARDIOGRAM TRACING: CPT

## 2021-07-27 PROCEDURE — 250N000013 HC RX MED GY IP 250 OP 250 PS 637: Performed by: EMERGENCY MEDICINE

## 2021-07-27 PROCEDURE — 99233 SBSQ HOSP IP/OBS HIGH 50: CPT | Performed by: PAIN MEDICINE

## 2021-07-27 PROCEDURE — 93010 ELECTROCARDIOGRAM REPORT: CPT | Performed by: INTERNAL MEDICINE

## 2021-07-27 PROCEDURE — 99233 SBSQ HOSP IP/OBS HIGH 50: CPT | Performed by: HOSPITALIST

## 2021-07-27 PROCEDURE — 97110 THERAPEUTIC EXERCISES: CPT | Mod: GP

## 2021-07-27 PROCEDURE — 250N000013 HC RX MED GY IP 250 OP 250 PS 637: Performed by: HOSPITALIST

## 2021-07-27 PROCEDURE — 85027 COMPLETE CBC AUTOMATED: CPT | Performed by: HOSPITALIST

## 2021-07-27 PROCEDURE — 250N000011 HC RX IP 250 OP 636

## 2021-07-27 PROCEDURE — 250N000013 HC RX MED GY IP 250 OP 250 PS 637: Performed by: FAMILY MEDICINE

## 2021-07-27 PROCEDURE — 250N000012 HC RX MED GY IP 250 OP 636 PS 637: Performed by: HOSPITALIST

## 2021-07-27 RX ORDER — POLYETHYLENE GLYCOL 3350 17 G/17G
17 POWDER, FOR SOLUTION ORAL DAILY
Status: DISCONTINUED | OUTPATIENT
Start: 2021-07-27 | End: 2021-07-28 | Stop reason: HOSPADM

## 2021-07-27 RX ORDER — CEFAZOLIN SODIUM 2 G/100ML
2 INJECTION, SOLUTION INTRAVENOUS EVERY 8 HOURS
Qty: 8100 ML | Refills: 0 | Status: CANCELLED | OUTPATIENT
Start: 2021-07-27 | End: 2021-08-23

## 2021-07-27 RX ORDER — MAGNESIUM OXIDE 400 MG/1
400 TABLET ORAL DAILY
Status: DISCONTINUED | OUTPATIENT
Start: 2021-07-27 | End: 2021-07-28 | Stop reason: HOSPADM

## 2021-07-27 RX ORDER — ACETAMINOPHEN 500 MG
1000 TABLET ORAL 3 TIMES DAILY
Status: CANCELLED | COMMUNITY
Start: 2021-07-27

## 2021-07-27 RX ORDER — CEFAZOLIN SODIUM 2 G/100ML
2 INJECTION, SOLUTION INTRAVENOUS EVERY 8 HOURS
Qty: 8400 ML | Refills: 0
Start: 2021-07-27 | End: 2021-08-24

## 2021-07-27 RX ADMIN — CEFAZOLIN SODIUM 2 G: 2 INJECTION, SOLUTION INTRAVENOUS at 21:15

## 2021-07-27 RX ADMIN — DOCUSATE SODIUM 200 MG: 100 CAPSULE, LIQUID FILLED ORAL at 08:43

## 2021-07-27 RX ADMIN — Medication 25 MCG: at 08:44

## 2021-07-27 RX ADMIN — PANTOPRAZOLE SODIUM 40 MG: 20 TABLET, DELAYED RELEASE ORAL at 09:00

## 2021-07-27 RX ADMIN — HYDROXYZINE HYDROCHLORIDE 25 MG: 10 SYRUP ORAL at 19:05

## 2021-07-27 RX ADMIN — DULOXETINE HYDROCHLORIDE 30 MG: 30 CAPSULE, DELAYED RELEASE ORAL at 08:43

## 2021-07-27 RX ADMIN — HYDROMORPHONE HYDROCHLORIDE 4 MG: 4 TABLET ORAL at 19:05

## 2021-07-27 RX ADMIN — CALCIUM CARBONATE (ANTACID) CHEW TAB 500 MG 500 MG: 500 CHEW TAB at 08:42

## 2021-07-27 RX ADMIN — ACETAMINOPHEN 975 MG: 325 TABLET ORAL at 14:37

## 2021-07-27 RX ADMIN — EZETIMIBE 10 MG: 10 TABLET ORAL at 21:15

## 2021-07-27 RX ADMIN — OXYBUTYNIN CHLORIDE 5 MG: 5 TABLET ORAL at 21:16

## 2021-07-27 RX ADMIN — IBUPROFEN 400 MG: 400 TABLET, FILM COATED ORAL at 08:44

## 2021-07-27 RX ADMIN — Medication 1 TABLET: at 08:43

## 2021-07-27 RX ADMIN — ACETAMINOPHEN 975 MG: 325 TABLET ORAL at 21:15

## 2021-07-27 RX ADMIN — INSULIN ASPART 4 UNITS: 100 INJECTION, SOLUTION INTRAVENOUS; SUBCUTANEOUS at 14:14

## 2021-07-27 RX ADMIN — HYDROMORPHONE HYDROCHLORIDE 4 MG: 4 TABLET ORAL at 08:43

## 2021-07-27 RX ADMIN — OXYBUTYNIN CHLORIDE 5 MG: 5 TABLET ORAL at 08:42

## 2021-07-27 RX ADMIN — INSULIN ASPART 1 UNITS: 100 INJECTION, SOLUTION INTRAVENOUS; SUBCUTANEOUS at 14:13

## 2021-07-27 RX ADMIN — INSULIN ASPART 2 UNITS: 100 INJECTION, SOLUTION INTRAVENOUS; SUBCUTANEOUS at 18:53

## 2021-07-27 RX ADMIN — METHADONE HYDROCHLORIDE 2.5 MG: 5 TABLET ORAL at 15:34

## 2021-07-27 RX ADMIN — POLYETHYLENE GLYCOL 3350 17 G: 17 POWDER, FOR SOLUTION ORAL at 14:37

## 2021-07-27 RX ADMIN — CEFAZOLIN SODIUM 2 G: 2 INJECTION, SOLUTION INTRAVENOUS at 06:35

## 2021-07-27 RX ADMIN — CEFAZOLIN SODIUM 2 G: 2 INJECTION, SOLUTION INTRAVENOUS at 14:37

## 2021-07-27 RX ADMIN — LIDOCAINE: 50 OINTMENT TOPICAL at 21:26

## 2021-07-27 RX ADMIN — MAGNESIUM OXIDE TAB 400 MG (241.3 MG ELEMENTAL MG) 400 MG: 400 (241.3 MG) TAB at 14:37

## 2021-07-27 RX ADMIN — LIDOCAINE 1 INCH: 50 OINTMENT TOPICAL at 08:45

## 2021-07-27 RX ADMIN — INSULIN ASPART 1 UNITS: 100 INJECTION, SOLUTION INTRAVENOUS; SUBCUTANEOUS at 18:53

## 2021-07-27 RX ADMIN — SIMVASTATIN 20 MG: 10 TABLET, FILM COATED ORAL at 21:16

## 2021-07-27 RX ADMIN — INSULIN GLARGINE 13 UNITS: 100 INJECTION, SOLUTION SUBCUTANEOUS at 21:15

## 2021-07-27 RX ADMIN — ENOXAPARIN SODIUM 40 MG: 100 INJECTION SUBCUTANEOUS at 21:14

## 2021-07-27 RX ADMIN — HYDROMORPHONE HYDROCHLORIDE 4 MG: 4 TABLET ORAL at 12:29

## 2021-07-27 RX ADMIN — ZOLPIDEM TARTRATE 10 MG: 5 TABLET ORAL at 21:16

## 2021-07-27 RX ADMIN — LIDOCAINE: 50 OINTMENT TOPICAL at 18:52

## 2021-07-27 RX ADMIN — METHADONE HYDROCHLORIDE 2.5 MG: 5 TABLET ORAL at 21:16

## 2021-07-27 RX ADMIN — Medication 1 TABLET: at 08:44

## 2021-07-27 RX ADMIN — ACETAMINOPHEN 975 MG: 325 TABLET ORAL at 08:43

## 2021-07-27 RX ADMIN — LIDOCAINE 1 INCH: 50 OINTMENT TOPICAL at 14:16

## 2021-07-27 RX ADMIN — METHADONE HYDROCHLORIDE 2.5 MG: 5 TABLET ORAL at 06:28

## 2021-07-27 RX ADMIN — Medication 1 SPRAY: at 21:26

## 2021-07-27 NOTE — PROGRESS NOTES
Care Management Follow Up    Length of Stay (days): 11    Expected Discharge Date: 07/28/2021     Concerns to be Addressed: patient refuses services     Patient plan of care discussed at interdisciplinary rounds: Yes    Anticipated Discharge Disposition:  Home      Anticipated Discharge Services:  [facility] and they are to set up HC for RN, PT and OT  Anticipated Discharge DME:      Patient/family educated on Medicare website which has current facility and service quality ratings:    Education Provided on the Discharge Plan:    Patient/Family in Agreement with the Plan:      Referrals Placed by CM/SW:    Private pay costs discussed: insurance costs out of pocket expenses, co-pays and deductibles, Gaetano Hudson to meet with pt./ to do teach and inform of coverage    Additional Information:  Received benefits back from [facility]. See Alma Rosa Ramos's note. Referral also sent to Gaetano to have benefits run to see if costs would be less with [facility]. Will await return call from Gaetano with benefits.  Gaetano stated pt. Has 100% coverage and has met her deductible.  Home with [facility] and they are to set up HC for RN, OT and PT      Ashley Katz RN

## 2021-07-27 NOTE — PLAN OF CARE
Writer was with patient for 12 hour shift.  Pt got out of bed once with PT and walked in hallway.  Pt was tearful with PT because she was in pain and received pain meds only 10 min prior.  PT will call tomorrow to schedule pain medications with therapy an hour prior.  Pt is eating and drinking well. IV antibiotics ordered.  Pt usually takes vistaril with pain meds.Tammie Warren RN

## 2021-07-27 NOTE — PROGRESS NOTES
Therapy: IV ABX   Insurance: [facility] MEDICARE PLAN     Pt has a Medicare replacement plan (which follows Medicare guidelines), which does not cover IV ABX in the home. (Pt would have coverage for short term TCU or IC). Below is what pt would be responsible for if pt wanted to go w [facility]      Pt would have to self-pay for the drug dispensed & per-narciso (daily)    If not homebound, nursing would also be self-pay ($90.00 per visit)    Cost for Cefazolin 2gm Q8 is $35.01 per day for drug and supplies    In reference to admission date 07/16/2021 [facility] to check iv abx coverage.    Please contact Intake with any questions, [phone] or In Basket pool, [facility] ([id number]).

## 2021-07-27 NOTE — PROGRESS NOTES
"[facility] ACUTE PAIN SERVICE    ([facility], [facility], [facility])  Daily PAIN Progress Note    Assessment/Plan:    Elenita Hardin is a 66 year old female who was admitted on 7/16/2021. I was asked to see the patient for cancer associated pain. Admitted for evaluation of altered metal status, fevers. Patient had presented with slurred speech, intermittent combativeness, visual hallucinations and frequent falls. Was on oxycontin that was possibly contributing that was stopped. Brain MRI showed no acute findings to account for symptoms. Mental status has improved, today patient reported she still has time \"finding word\" and some reports of not remembering things. Medical history of metastatic breast cancer with metastases to pelvis, femur, vertebrae, ribs and lymph nodes. Type 2 DM, obesity, and depression. Currently on ongoing antibiotic therapy managed by ID, recent cultures on retroperitoneal spinal abscess showed gram positive cocci and staph aureus. ID is recommending at least 4 weeks of home IV cefazolin therapy with repeat MRI to check abscess formations that were present bilaterally from L4 to mid sacral region. Oncology following, also recommends IV antibiotics to best allow IV chemotherapy sooner for her stage IV disease. This patient sees palliative care NP Denilson Valles for pain management. Left VM to connect with provider to update on plan. Pt will likely discharge soon to be managed at home.    Describes pain primarily in her right hip. States that it currently 3-4/10 to right hip. States that this is managable; she was able to ambulate in [place] with therapy. Was started on methadone on 7/21 and reports it has done well managing her pain. Has PRN hydromorphone for breakthrough pain.  States pain is at its worst while sitting in the bathroom, no BM since last Thursday, discussed this with Dr. Doherty, plan to start Miralax and give suppository.  Pt does not smoke, has " prescription for medical self-directed cannabis use.    PLAN:   1) Pain is consistent with cancer associated pain with extensive metastatic involvement. Discussed checking 12-lead ECG with patient to monitor QTc with recent start of methadone therapy, on 7/16/21 QTc was 484. Will recheck today (resulted at 487).  Will offer a secondary review of Qtc prolonging medications. Discussed with ID and pharmacy. Will discontinue seroquel (not receiving this). Add mag daily. Discussed side effects and interactions for methadone with patient. Educated on proper storage, use, and disposal of narcotics. Pt understanding of plan, has  who will help administer medications and monitor for side effects at home. Will continue with current plan for pain management for discharging home.  2)Multimodal Medication Therapy  Topical:Lidocaine ointment 5% QID, artificial salvia spray QID  NSAID'S: ibuprofen 400 mg q6hrs  Adjuvants: Tyenol 975 mg po TID, cymbalta 30 mg po qam, atarax 25 mg every 6 hours prn please give with the hydromorphone, self administered medical cannabis, benzotropine 1 mg po tid prn extrapyramidal effects  Antidepressants/anxiolytics: Cymbalta 30 mg q day, Seroquel 12.5 mg q 6 h prn for agitation, haloperidol  Opioids: Methadone 2.5 mg every 8 hours,  Hydromorphone(Dilaudid) 2-4 mg q 3 h prn (moderate to severe pain)  IV Pain medication: none  3)Non-medication interventions: distraction, rest and turning, family involvement  4)Constipation Prophylaxis: docusate 200 mg daily, Miralax daily, suppository.   5) Follow up   -Opioid prescriber has been Denilson Valles Palliative Care NP  -Discharge Recommendations - We recommend prescribing the following at the time of discharge: Methadone 2.5 mg TID 7 day script # 21 tablets (called placed to Denilson Valles NP to discuss) placed call to Denilson () to discuss as well. (No answer).               Subjective:  Today patient is in positive mood.   Stated pain was  better managed today when nursing staff gave PRN dilaudid prior to therapy, was able to ambulate to and from nursing station in the [place] with walker.   Reports having assistive devices and adaptive devices for bathing at home and feels comfortable about transitioning to home.   Appetite is still reduced, tolerating foods without nausea/vomiting. Denies side effects from methadone. Is no longer sedated or confused.   Reports no BM since last Thursday, states sitting on toilet is the most painful. Plan to increase constipation prophylaxis today.       Provided education on use of opioids, storage, disposal, and overdose risk.       Principal Problem:    Hip pain, right  Active Problems:    Confusion     LOS: 11 days       acetaminophen  975 mg Oral TID     artificial saliva  1 spray Swish & Spit 4x Daily     [Held by provider] aspirin  81 mg Oral Daily     B-Complex/B-12  1 tablet Oral Daily     calcium carbonate  500 mg Oral Daily     ceFAZolin  2 g Intravenous Q8H     docusate sodium  200 mg Oral Daily     DULoxetine  30 mg Oral QAM     enoxaparin ANTICOAGULANT  40 mg Subcutaneous Q24H     ezetimibe  10 mg Oral At Bedtime    And     simvastatin  20 mg Oral QPM     insulin aspart   Subcutaneous TID w/meals     insulin aspart  1-3 Units Subcutaneous TID AC     insulin glargine  13 Units Subcutaneous At Bedtime     lidocaine   Topical 4x Daily     medical cannabis  1 Dose Other See Admin Instructions     methadone  2.5 mg Oral Q8H     multivitamin w/minerals  1 tablet Oral Daily     oxybutynin  5 mg Oral BID     pantoprazole  40 mg Oral QAM AC     sodium chloride (PF)  10-20 mL Intracatheter Q28 Days     Vitamin D3  25 mcg Oral Daily     zolpidem  10 mg Oral At Bedtime       Objective:  Vital signs in last 24 hours:  Temp:  [98.2  F (36.8  C)-99.6  F (37.6  C)] 98.9  F (37.2  C)  Pulse:  [] 100  Resp:  [16] 16  BP: (108-152)/(60-70) 152/70  SpO2:  [94 %-98 %] 98 %  Weight:   Weight:   @THISENCWEIGHTS(1)@  Weight  change:   Body mass index is 33.95 kg/m .    Intake/Output last 3 shifts:  I/O last 3 completed shifts:  In: 1840 [P.O.:1840]  Out: 5350 [Urine:5350]  Intake/Output this shift:  I/O this shift:  In: 1200 [P.O.:1200]  Out: 1500 [Urine:1500]    Review of Systems:   As per subjective, all others negative.    Physical Exam:    General Appearance:  Alert, cooperative, no distress, appears stated age, fatigues easily, sitting in recliner.   Head:  Normocephalic, without obvious abnormality, atraumatic   Eyes:  PERRL, conjunctiva/corneas clear, EOM's intact   Nose: Nares normal, septum midline, mucosa normal, no drainage   Throat: Lips, mucosa, and tongue, pink and dry. Teeth intact.   Neck: Supple, symmetrical, trachea midline, no adenopathy, thyroid: not enlarged, symmetric    Back:   Symmetric, no curvature, ROM normal   Lungs:   Clear to auscultation bilaterally, respirations unlabored   Chest Wall:  No tenderness or deformity   Heart:  Regular rate and rhythm, S1, S2 normal,no murmur, rub or gallop   Abdomen:   Soft, non-tender, bowel sounds active all four quadrants,  no masses, no organomegaly   Extremities: Extremities normal, atraumatic, no cyanosis or edema   Skin: Skin color pale, excoriations present on BLE, improving     Neurologic: Alert and oriented X 3, Moves all 4 extremities, decreased mobility related to pain and discomfort      Lab Results:  Personally Reviewed.   Recent Labs   Lab 07/27/21  0634 07/26/21  0657 07/23/21  0642   WBC 11.0 12.3*  --    HGB 7.6* 7.6*  --    HCT 24.1* 23.7*  --     332 367     Recent Labs   Lab 07/26/21  0657      CO2 27   BUN 7*     No results for input(s): INR in the last 168 hours.      Total unit/floor time 35  minutes, time consisted of the following, examination of patient, reviewing the record and lab results, and completing documentation. Coordination of care time with nurse and other healthcare providers  Including primary MD and ID. Made call to speak  with  and palliative care NP. Also discussed plan of care with daughter during visit.  Dr. Doherty joined us at the end of visit.       Janice Arredondo APRN, CNS-BC, CNP, ACHPN  Acute Care Pain Management Program Hour 7a-1700  [facility] ([place], [place], [place])   Page via [facility]- Click HERE to page Janice or call [phone]

## 2021-07-27 NOTE — PROGRESS NOTES
CHART CHECK     Primary Oncologist/Hematologist:            Assessment and Plan:New actions/orders today (07/27/2021) are underlined.     Elenita is a 66 year old female with       1. Metastatic breast adenocarcinoma with bone mets and bone marrow involvement:   - On 3rd line chemo Doxil 50 mg/m2 IV which is given every 4 weeks until progression or intolerance.   - Last received cycle 3 of Doxil on 6/29/21. She did not receive Neulasta support for neutropenia prevention with cycle 3.   - She also receives Zometa 4 mg IV every 4 weeks.   - Next cycle of Doxil was due 7/27/2021 however this will be postponed for at least 2 weeks.  The patient will be seen in outpatient setting for reevaluation at that time.  Would prefer course of IV antibiotics be tailored as best to allow IV chemotherapy sooner than later for her stage IV disease.       2. Acute metabolic encephalopathy, multifactorial.  - Resolved after stopping OxyContin and starting treatment of pneumonia.  - Brain MRI shows no acute findings to account for the symptoms.  - Appreciate palliative care team to help with pain management. I will notify our palliative care NP Denilson Valles regarding this event.     3. Acute on chronic cancer bone pain:   - Appreciate palliative care team assistance in managing pain.   - Our Palliative Care NP will not be able to see the patient via telemedicine today. We will reschedule her appointment to 2 weeks after discharge.     4. Community-acquired pneumonia/aspiration pneumonia:   - ID following; on abx     5. Diffuse sclerotic metastatic disease of thoracic, lumbar, sacrum, and pelvis with no evidence of pathologic fracture to lumbar spine. Also seen is probable extra osseous extension of metastasis into the right sacral neural foramen along with enhancement of the cauda equina from L4 to the sacrum and thickening and enhancement of the thecal sac.  - Patient will followup with Dr. Maria Del Carmen Orozco MD for palliative  treatment plan as outpatient. Will place consult order pending discharge. Patient would need to complete IV antibiotics prior to initiating radiation treatment.     6. Intramuscular abscess along the fractured left superior and inferior pubic rami.  - Aspirate shows MSSA- plan per ID      7. Anxiety:  - Will defer to primary team for anxiety management.      Ok with discharge from oncology standpoint with plan to follow up with medical oncology and palliative care in clinic in 2 weeks for treatment plan.    Clara Cook PA-C  [facility]  Office: [phone]  Cell: [phone] Monday through Friday, 8AM-5PM. After hours and weekends, please use answering service.                    Medications:   Scheduled Medications    acetaminophen  975 mg Oral TID     artificial saliva  1 spray Swish & Spit 4x Daily     [Held by provider] aspirin  81 mg Oral Daily     B-Complex/B-12  1 tablet Oral Daily     calcium carbonate  500 mg Oral Daily     ceFAZolin  2 g Intravenous Q8H     docusate sodium  200 mg Oral Daily     DULoxetine  30 mg Oral QAM     enoxaparin ANTICOAGULANT  40 mg Subcutaneous Q24H     ezetimibe  10 mg Oral At Bedtime    And     simvastatin  20 mg Oral QPM     insulin aspart   Subcutaneous TID w/meals     insulin aspart  1-3 Units Subcutaneous TID AC     insulin glargine  13 Units Subcutaneous At Bedtime     lidocaine   Topical 4x Daily     medical cannabis  1 Dose Other See Admin Instructions     methadone  2.5 mg Oral Q8H     multivitamin w/minerals  1 tablet Oral Daily     oxybutynin  5 mg Oral BID     pantoprazole  40 mg Oral QAM AC     sodium chloride (PF)  10-20 mL Intracatheter Q28 Days     Vitamin D3  25 mcg Oral Daily     zolpidem  10 mg Oral At Bedtime     PRN Medications  benztropine, glucose **OR** dextrose **OR** glucagon, hydrALAZINE, HYDROmorphone, hydrOXYzine, ibuprofen, polyethylene glycol, QUEtiapine, sodium chloride (PF), sodium chloride (PF)               Physical Exam:   Vitals were  "reviewed  Blood pressure 114/56, pulse 90, temperature 97.9  F (36.6  C), temperature source Axillary, resp. rate 16, height 1.651 m (5' 5\"), weight 92.5 kg (204 lb), SpO2 98 %.  Wt Readings from Last 4 Encounters:   07/25/21 92.5 kg (204 lb)   04/09/18 98.7 kg (217 lb 11.2 oz)   11/17/17 97.1 kg (214 lb)   11/09/17 96.2 kg (212 lb)       I/O last 3 completed shifts:  In: 1840 [P.O.:1840]  Out: 5350 [Urine:5350]    Constitutional: Awake, alert, cooperative, no apparent distress   Lungs: Clear to auscultation bilaterally, no crackles or wheezing   Cardiovascular: Regular rate and rhythm, normal S1 and S2, and no murmur noted   Abdomen: Normal bowel sounds, soft, non-distended, non-tender   Skin: No rashes, no cyanosis, no edema   Other: PSYCH: Appropriate              Data:   All laboratory data and imaging studies reviewed.    CMP  Recent Labs   Lab 07/27/21  1219 07/27/21  0807 07/27/21  0632 07/26/21  2053 07/26/21  1725 07/26/21  0657 07/25/21  0653 07/24/21  1301 07/24/21  0657 07/22/21  1725 07/22/21  1442   NA  --   --   --   --   --  143  --   --   --   --   --    POTASSIUM  --   --  3.6  --   --  3.7 3.6 3.9 3.3*   < > 3.5   CHLORIDE  --   --   --   --   --  106  --   --   --   --   --    CO2  --   --   --   --   --  27  --   --   --   --   --    ANIONGAP  --   --   --   --   --  10  --   --   --   --   --    * 104  --  190* 159* 128*  --   --   --   --   --    BUN  --   --   --   --   --  7*  --   --   --   --   --    CR  --   --   --   --   --  0.60  --   --   --   --  0.57*   GFRESTIMATED  --   --   --   --   --  >90  --   --   --   --  >90   BENJI  --   --   --   --   --  9.1  --   --   --   --   --    MAG  --   --  1.9  --   --  1.8 1.9  --  2.0   < > 1.9    < > = values in this interval not displayed.     CBC  Recent Labs   Lab 07/27/21  0634 07/26/21  0657 07/23/21  0642   WBC 11.0 12.3*  --    RBC 2.55* 2.55*  --    HGB 7.6* 7.6*  --    HCT 24.1* 23.7*  --    MCV 95 93  --    MCH 29.8 29.8  --  "   MCHC 31.5 32.1  --    RDW 17.1* 17.0*  --     332 367     INRNo lab results found in last 7 days.  Data   Results for orders placed or performed during the hospital encounter of 07/16/21 (from the past 24 hour(s))   Glucose by meter   Result Value Ref Range    GLUCOSE BY METER POCT 159 (H) 70 - 125 mg/dL   Glucose by meter   Result Value Ref Range    GLUCOSE BY METER POCT 190 (H) 70 - 125 mg/dL   Potassium   Result Value Ref Range    Potassium 3.6 3.5 - 5.0 mmol/L   Magnesium   Result Value Ref Range    Magnesium 1.9 1.8 - 2.6 mg/dL   CBC with platelets   Result Value Ref Range    WBC Count 11.0 4.0 - 11.0 10e3/uL    RBC Count 2.55 (L) 3.80 - 5.20 10e6/uL    Hemoglobin 7.6 (L) 11.7 - 15.7 g/dL    Hematocrit 24.1 (L) 35.0 - 47.0 %    MCV 95 78 - 100 fL    MCH 29.8 26.5 - 33.0 pg    MCHC 31.5 31.5 - 36.5 g/dL    RDW 17.1 (H) 10.0 - 15.0 %    Platelet Count 389 150 - 450 10e3/uL   Glucose by meter   Result Value Ref Range    GLUCOSE BY METER POCT 104 70 - 125 mg/dL   Glucose by meter   Result Value Ref Range    GLUCOSE BY METER POCT 143 (H) 70 - 125 mg/dL   ECG 12-LEAD WITH MUSE (LHE)   Result Value Ref Range    Systolic Blood Pressure  mmHg    Diastolic Blood Pressure  mmHg    Ventricular Rate 97 BPM    Atrial Rate 97 BPM    IL Interval 148 ms    QRS Duration 96 ms     ms    QTc 487 ms    P Axis 61 degrees    R AXIS -31 degrees    T Axis 48 degrees    Interpretation ECG       Sinus rhythm  Left axis deviation  Nonspecific T wave abnormality  Prolonged QT  Abnormal ECG  When compared with ECG of 16-JUL-2021 21:28,  Nonspecific T wave abnormality now evident in Inferior leads  Nonspecific T wave abnormality, worse in Anterolateral leads

## 2021-07-27 NOTE — PLAN OF CARE
Occupational Therapy Discharge Summary    Reason for therapy discharge:    Pt not interested in further OT services    Progress towards therapy goal(s). See goals on Care Plan in [facility] electronic health record for goal details.  Goals not met.  Barriers to achieving goals:   limited tolerance for therapy.    Therapy recommendation(s):    No further therapy is recommended.

## 2021-07-27 NOTE — PLAN OF CARE
Problem: Adult Inpatient Plan of Care  Goal: Optimal Comfort and Wellbeing        Outcome: Improving  Patient appears to bed resting comfortably. Denies pain when asked.     Problem: Sleep Disturbance (Anxiety Signs/Symptoms)  Goal: Improved Sleep (Anxiety Signs/Symptoms)  Outcome: Improving  Patient sleeping well throughout the night.

## 2021-07-27 NOTE — PROGRESS NOTES
[facility]    Medicine Progress Note - Hospitalist Service       Date of Admission:  7/16/2021    Assessment & Plan           Elenita Hardin is a 66 year old female admitted on 7/16/2021. She has history of stage IV breast cancer with bone mets, insulin-dependent diabetes, depression presented on 7/16 for evaluation of agitation and confusion, found to have sepsis secondary to spine abscesses.    #Spine abscesses  #Sepsis, improving  During hospital stay patient had fever up to 101.8F.  She has chronic back pain, MRI was obtained showed bony destruction and fluid within the SI joints bilaterally concerning for infection, as well as multiple abscesses right greater than left of the iliacus and paraspinal muscles from L4 to mid sacrum.  Underwent CT-guided aspiration on 7/23, cultures with 4+ MSSA.  Some of the imaging findings are likely related to metastatic cancer but with positive culture at least some of this is infection. Numerous skin lesions may be source, patient immunocompromised has been on chemo; has also been receiving epidural steroid injections (though not for years).  ID following.  Sepsis improving. WBC up today, had not been checked in a few days and may be just due to immune system recovering as she gets further out from chemo.  Patient was on IV/PO cephalosporins, then changed to vancomycin, now changed to Ancef.  ID recommend total 4-week course.  Would consider first day of treatment 7/24 since that is when the IV vancomycin was started.  Has a working port.  Care management notified of anticipated discharge needs.  Stable to discharge when discharge arrangements made. Hopefully tomorrow  There was question of possible pneumonia on portable CXR on admission, but without clear infiltrate on subsequent CXR or CT. She completed full course of azithro and cephalosporin.    #Acute encephalopathy  Presenting complaint.  She was hallucinating, agitated,  confused  Thought to be secondary to oxycontin which is probably not a great medication for her, possibly also was septic secondary to spine abscesses  Seems much better  Avoid oxycodone. Holding home Ditropan    #Chronic cancer pain  Due to bone mets  Pain team following here, appreciate assistance  Continue scheduled Tylenol, home medical cannabis, Cymbalta, Vistaril, ibuprofen  Pain team started her on methadone. QTc slightly prolonged 487 ms but having better pain relief with this. Adding oral magnesium to help with arrhythmia risk and constipation both.  Consideration being given to outpatient radiation, she will need to complete her IV antibiotics before this can be initiated.    #Stage 4 breast cancer  Original diagnosis 2010   Patient of Dr Varma  Has been on third line chemo  Oncology following here  Appreciate clarification of plan going forward.  Per oncology, she does need to be off of IV antibiotics before she can proceed with any further cancer treatment.    #IDDM  Home regimen according to med rec is Basaglar 55 units QAM and Humalog 3:15 carb count and sliding scale.  states this is old information though and she is not taking this much insulin at home  Here has been on HS Lantus 13 units plus novolog sliding scale sensitive and carb count of 1:15, BG well controlled with this    #HLD  Home Vytorin.  Statin may not be a great med for her anymore with increasing medical complexity, frailty, med interaction risk    #Insomnia  Ok to continue home Ambien which she seems to be tolerating here even though this is not a great med in the hospital and is a very high dose    #OAB    Holding home Ditropan. Question of distended bladder and mild hydronephrosis on admission CT, random bladder scan done 4cc    #GERD, home PPI    #Normocytic anemia  Hemoglobin stable 7.6.  No active bleeding.  Follow with oncology.    #Opioid induced constipation  She is too painful to spend much time sitting on  toilet  Continue stool softener  Add Miralax       Diet: Consistent Carb 45 grams CHO per Meal Diet    DVT Prophylaxis: Enoxaparin (Lovenox) SQ  Jiang Catheter: Not present  Central Lines: None  Code Status: Full Code      Disposition Plan   Expected discharge: 07/28/2021 recommended to prior living arrangement once home care set up.     The patient's care was discussed with the Patient and Patient's Family.    eNha Doherty MD  Hospitalist Service  [facility]  Text page via [employer] Paging/Directory      Risk Factors Present on Admission                ____________    Interval History   Patient doing ok today. Pain controlled. Looking forward to going home soon. No BM few days and open to adding miralax.    Dtr updated at bedside    Data reviewed today: I reviewed all medications, new labs and imaging results over the last 24 hours. I personally reviewed no images or EKG's today.    Physical Exam   Vital Signs: Temp: 97.9  F (36.6  C) Temp src: Axillary BP: 114/56 Pulse: 90   Resp: 16 SpO2: 98 % O2 Device: None (Room air)    Weight: 204 lbs 0 oz  General: in no apparent distress, non-toxic and alert female lying in hospital bed oriented x3  HEENT: Head normocephalic atraumatic, oral mucosa moist. Sclerae anicteric  Skin: Numerous scabs BLE  Extremities: No peripheral edema  Psych: Normal affect, mood dysthymic  Neuro: CNII-XII grossly intact, moving all 4 extremities      Data   Recent Results (from the past 24 hour(s))   Glucose by meter    Collection Time: 07/26/21  5:25 PM   Result Value Ref Range    GLUCOSE BY METER POCT 159 (H) 70 - 125 mg/dL   Glucose by meter    Collection Time: 07/26/21  8:53 PM   Result Value Ref Range    GLUCOSE BY METER POCT 190 (H) 70 - 125 mg/dL   Potassium    Collection Time: 07/27/21  6:32 AM   Result Value Ref Range    Potassium 3.6 3.5 - 5.0 mmol/L   Magnesium    Collection Time: 07/27/21  6:32 AM   Result Value Ref Range    Magnesium 1.9 1.8 - 2.6 mg/dL   CBC  with platelets    Collection Time: 07/27/21  6:34 AM   Result Value Ref Range    WBC Count 11.0 4.0 - 11.0 10e3/uL    RBC Count 2.55 (L) 3.80 - 5.20 10e6/uL    Hemoglobin 7.6 (L) 11.7 - 15.7 g/dL    Hematocrit 24.1 (L) 35.0 - 47.0 %    MCV 95 78 - 100 fL    MCH 29.8 26.5 - 33.0 pg    MCHC 31.5 31.5 - 36.5 g/dL    RDW 17.1 (H) 10.0 - 15.0 %    Platelet Count 389 150 - 450 10e3/uL   Glucose by meter    Collection Time: 07/27/21  8:07 AM   Result Value Ref Range    GLUCOSE BY METER POCT 104 70 - 125 mg/dL   Glucose by meter    Collection Time: 07/27/21 12:19 PM   Result Value Ref Range    GLUCOSE BY METER POCT 143 (H) 70 - 125 mg/dL   ECG 12-LEAD WITH MUSE (LHE)    Collection Time: 07/27/21 12:40 PM   Result Value Ref Range    Systolic Blood Pressure  mmHg    Diastolic Blood Pressure  mmHg    Ventricular Rate 97 BPM    Atrial Rate 97 BPM    AZ Interval 148 ms    QRS Duration 96 ms     ms    QTc 487 ms    P Axis 61 degrees    R AXIS -31 degrees    T Axis 48 degrees    Interpretation ECG       Sinus rhythm  Left axis deviation  Nonspecific T wave abnormality  Prolonged QT  Abnormal ECG  When compared with ECG of 16-JUL-2021 21:28,  Nonspecific T wave abnormality now evident in Inferior leads  Nonspecific T wave abnormality, worse in Anterolateral leads

## 2021-07-27 NOTE — PROGRESS NOTES
"Infectious Disease Progress Note    Assessment/Plan     Impression: Fevers, multiple potential etiologies. Fevers resolved     Initial presentation of encephalopathy which has improved--likely secondary to narcotics     Had a Port-A-Cath in for couple years, but seems to be working okay.     Has multiple excoriations on both legs that she says are \"from mosquito bites\" none are grossly purulent.  Improved.      Has distended abdomen.  CT shows old fractures and some fluid around fractures. MRI confirms fluid--unclear if abscess or cancer.  S/p aspiration on 7/23-GS with [facility], culture is pending    Says her longstanding sinus symptoms are improved.      Recommendations:   Would recommend sending home on at least 4 weeks of cefazolin with repeat MRI at that time.    OK to discharge to home anytime from Infectious Disease standpoint.      Principal Problem:    Hip pain, right  Active Problems:    Confusion      RIDGE BARAJAS MD    [phone]      Subjective  Feels good.  Awaiting discharge logistics.     Objective    Vital signs in last 24 hours  Temp:  [98.2  F (36.8  C)-99.6  F (37.6  C)] 98.9  F (37.2  C)  Pulse:  [] 100  Resp:  [16] 16  BP: (108-152)/(60-70) 152/70  SpO2:  [94 %-98 %] 98 %  Wt Readings from Last 3 Encounters:   07/25/21 92.5 kg (204 lb)   04/09/18 98.7 kg (217 lb 11.2 oz)   11/17/17 97.1 kg (214 lb)           Intake/Output last 3 shifts  I/O last 3 completed shifts:  In: 1840 [P.O.:1840]  Out: 5350 [Urine:5350]  Intake/Output this shift:  I/O this shift:  In: 1200 [P.O.:1200]  Out: 1500 [Urine:1500]    Review of Systems   Pertinent items are noted in HPI., otherwise negative.    Physical Exam  General appearance: alert, appears stated age and cooperative  Head: Normocephalic, without obvious abnormality, atraumatic  Lungs: normal respiratory pattern  Extremities: good ROM   Neurologic: Mental status:clear.   Pertinent Labs   Lab Results: personally reviewed.     Recent Labs   Lab " 07/27/21  0634 07/26/21  0657 07/23/21  0642   WBC 11.0 12.3*  --    HGB 7.6* 7.6*  --    HCT 24.1* 23.7*  --     332 367      Results for JULES ROPER (MRN [id number]) as of 7/22/2021 13:51   Ref. Range 7/18/2021 07:22 7/20/2021 02:06 7/22/2021 07:03   CRP Latest Ref Range: 0.0-<0.8 mg/dL 31.7 (H) 35.4 (H) 30.8 (H)     Recent Labs   Lab 07/26/21  0657      CO2 27   BUN 7*     7-Day Micro Results    Collected Updated Procedure Result Status    07/23/2021 1854 07/23/2021 2035 Asymptomatic COVID-19 Virus (Coronavirus) by PCR Nasopharyngeal [[id number]]    Swab from Nasopharyngeal    Final result Component Value   No component results           07/23/2021 1854 07/23/2021 2035 SARS-COV2 (COVID-19) Virus RT-PCR [[id number]]    Swab from Nasopharyngeal    Final result Component Value   SARS CoV2 PCR Negative   NEGATIVE: SARS-CoV-2 (COVID-19) RNA not detected, presumed negative.           07/23/2021 1500 07/26/2021 0806 Abscess Aerobic Bacterial Culture Routine [[id number]]    (Abnormal)   Abscess from Retroperitoneal    Final result Component Value   Culture 4+ Staphylococcus aureusAbnormal            07/23/2021 1500 07/25/2021 0759 Gram stain [[id number]]   (Abnormal)   Abscess from Retroperitoneal    Final result Component Value   Gram Stain Result 4+ PMNsAbnormal    Gram Stain Result 4+ Gram positive cocci in pairsAbnormal            07/23/2021 1500 07/26/2021 0707 Anaerobic Bacterial Culture Routine [[id number]]   Aspirate from Pelvis              Pertinent Radiology   Radiology Results:   Procedure Component Value Units Date/Time   CT Chest/Abdomen/Pelvis w Contrast [[id number]] Collected: 07/19/21 1736   Order Status: Completed Updated: 07/19/21 2014   Narrative:     EXAM: CT CHEST/ABDOMEN/PELVIS W CONTRAST   LOCATION: [facility]   DATE/TIME: 7/19/2021 5:36 PM     INDICATION: Sepsis   COMPARISON: 12/9/2020   TECHNIQUE: CT scan of the chest, abdomen, and  pelvis was performed following injection of IV contrast. Multiplanar reformats were obtained. Dose reduction techniques were used.   CONTRAST: 100 mL Isovue-370     FINDINGS:   LUNGS AND PLEURA: Stable linear scarring posterior left upper lobe unchanged. Linear scarring peripheral right middle lobe and lateral right upper lobe also unchanged, no new pneumonia. There is a new tiny right pleural effusion.     MEDIASTINUM/AXILLAE: No lymphadenopathy. Normal-sized heart. Port-A-Cath present     CORONARY ARTERY CALCIFICATION: None.     HEPATOBILIARY: Normal.     PANCREAS: Normal.     SPLEEN: Normal.     ADRENAL GLANDS: Normal.     KIDNEYS/BLADDER: There is minimal bilateral hydronephrosis and mild prominence of each ureter. Bladder is distended with estimated volume more than 800 cc consistent with bladder outlet obstruction or neurogenic bladder.     BOWEL: There are a few loops of mildly dilated air and fluid distended loops of jejunum without a definitive site of transition or obstructing focus. This may relate to a mild mid small bowel obstruction or mild ileus. There is no inflammatory bowel wall    thickening.     LYMPH NODES: Normal.     VASCULATURE: Unremarkable.     PELVIC ORGANS: No adnexal lesion or ascites.     MUSCULOSKELETAL: Diffuse mixed lytic/sclerotic bony metastases unchanged. There are prominent bilateral sacral alar fractures with dense bony sclerosis and what appears to be heterotopic ossifications about the sacral alar fractures.     Prior fractures left pubic ring unchanged but there are now small surrounding soft tissue fullness suggesting small hematomas associated with the fractures. There are tiny air bubble seen adjacent to the left inferior pubic ramus fracture within the   abductor musculature these air bubbles reside in a small collection that measures 2.5 x 1 cm (image 452). These air bubbles are new compared to previous exam. There is also a small fluid collection associated with the  superior pubic ramus fracture (image    415) measuring 2 x 1.5 cm. In addition, there is also a tiny collection along the undersurface of the left pubic symphysis measuring 2.5 x 1.2 cm (image 425).     There is mild fullness of right paraspinous, erectus spinal muscle at low lumbar spine which appears new, can't exclude myositis or inflammation involving the right paraspinous musculature. No definite discrete fluid collection evident.    Impression:     IMPRESSION:   1.  Mild new fullness of right paraspinal musculature low lumbar spine raising possibility of myositis. A discrete abscess collection is not evident.   2.  Left obturator ring fracture with small fluid pockets associated with the fractures, one of which also demonstrates a few tiny air bubbles.   3.  Complex bilateral sacral alar fractures with surrounding zones of heterotopic ossification.   4.  Prominent distention of bladder with borderline bilateral hydronephrosis. Suggest determining post void residual.   5.  MRI of pelvis and lower lumbar spine region may be beneficial to assess for paraspinal myositis or fluid collection, as well as to assess the tiny fluid collections associated with left obturator ring fractures.

## 2021-07-27 NOTE — PROGRESS NOTES
[facility]    Referral received  from Cristian Arenas CM,  for home IV antibiotics.    Benefits verified.   Pt does not have coverage under her [employer] Medicare plan.  She is self pay.  Cost for Cefazolin 2 gm IV q 8 hrs is $35.01/day for drug and supplies. Nursing is covered if she is homebound.  If not, [facility] charges $90/visit.  She should have coverage in a TCU or an infusion center.    Writer will speak with pt to review benefits, home infusion and to offer choice of provider/agency.    Thank you for the referral.    Alma Rosa Ramos, RN, BSN  [facility] Liaison  [phone] (Mon through Fri, 8:00 am-5:00 pm)  [phone] (Office)

## 2021-07-28 VITALS
SYSTOLIC BLOOD PRESSURE: 138 MMHG | HEART RATE: 115 BPM | HEIGHT: 65 IN | OXYGEN SATURATION: 94 % | TEMPERATURE: 98.1 F | RESPIRATION RATE: 18 BRPM | BODY MASS INDEX: 33.99 KG/M2 | WEIGHT: 204 LBS | DIASTOLIC BLOOD PRESSURE: 68 MMHG

## 2021-07-28 PROBLEM — Z95.828 PORT-A-CATH IN PLACE: Chronic | Status: ACTIVE | Noted: 2021-07-28

## 2021-07-28 PROBLEM — M46.20 PARASPINAL ABSCESS (H): Status: ACTIVE | Noted: 2021-07-28

## 2021-07-28 PROBLEM — Z95.828 PORT-A-CATH IN PLACE: Status: ACTIVE | Noted: 2021-07-28

## 2021-07-28 LAB
GLUCOSE BLDC GLUCOMTR-MCNC: 121 MG/DL (ref 70–125)
GLUCOSE BLDC GLUCOMTR-MCNC: 126 MG/DL (ref 70–125)
MAGNESIUM SERPL-MCNC: 1.8 MG/DL (ref 1.8–2.6)
POTASSIUM BLD-SCNC: 3.8 MMOL/L (ref 3.5–5)

## 2021-07-28 PROCEDURE — 250N000013 HC RX MED GY IP 250 OP 250 PS 637: Performed by: FAMILY MEDICINE

## 2021-07-28 PROCEDURE — 250N000013 HC RX MED GY IP 250 OP 250 PS 637: Performed by: INTERNAL MEDICINE

## 2021-07-28 PROCEDURE — 84132 ASSAY OF SERUM POTASSIUM: CPT | Performed by: HOSPITALIST

## 2021-07-28 PROCEDURE — 99239 HOSP IP/OBS DSCHRG MGMT >30: CPT | Performed by: INTERNAL MEDICINE

## 2021-07-28 PROCEDURE — 250N000013 HC RX MED GY IP 250 OP 250 PS 637: Performed by: HOSPITALIST

## 2021-07-28 PROCEDURE — 250N000013 HC RX MED GY IP 250 OP 250 PS 637: Performed by: PAIN MEDICINE

## 2021-07-28 PROCEDURE — 250N000013 HC RX MED GY IP 250 OP 250 PS 637: Performed by: CLINICAL NURSE SPECIALIST

## 2021-07-28 PROCEDURE — 99231 SBSQ HOSP IP/OBS SF/LOW 25: CPT

## 2021-07-28 PROCEDURE — 250N000011 HC RX IP 250 OP 636

## 2021-07-28 PROCEDURE — 83735 ASSAY OF MAGNESIUM: CPT | Performed by: HOSPITALIST

## 2021-07-28 PROCEDURE — 99231 SBSQ HOSP IP/OBS SF/LOW 25: CPT | Performed by: PAIN MEDICINE

## 2021-07-28 PROCEDURE — 250N000013 HC RX MED GY IP 250 OP 250 PS 637: Performed by: EMERGENCY MEDICINE

## 2021-07-28 RX ORDER — POLYETHYLENE GLYCOL 3350 17 G/17G
17 POWDER, FOR SOLUTION ORAL DAILY
Qty: 510 G | Refills: 1 | Status: SHIPPED | OUTPATIENT
Start: 2021-07-28 | End: 2022-01-01

## 2021-07-28 RX ORDER — LIDOCAINE 50 MG/G
OINTMENT TOPICAL 4 TIMES DAILY
Qty: 50 G | Refills: 0 | Status: ON HOLD | OUTPATIENT
Start: 2021-07-28 | End: 2022-01-01

## 2021-07-28 RX ORDER — INSULIN LISPRO 100 [IU]/ML
1-20 INJECTION, SOLUTION INTRAVENOUS; SUBCUTANEOUS
COMMUNITY
Start: 2021-07-28 | End: 2022-01-01

## 2021-07-28 RX ORDER — INSULIN GLARGINE 100 [IU]/ML
13 INJECTION, SOLUTION SUBCUTANEOUS AT BEDTIME
COMMUNITY
Start: 2021-07-28 | End: 2022-01-01

## 2021-07-28 RX ORDER — POLYETHYLENE GLYCOL 3350 17 G/17G
17 POWDER, FOR SOLUTION ORAL DAILY
Qty: 510 G | Refills: 1 | Status: SHIPPED | OUTPATIENT
Start: 2021-07-28 | End: 2021-07-28

## 2021-07-28 RX ADMIN — PANTOPRAZOLE SODIUM 40 MG: 20 TABLET, DELAYED RELEASE ORAL at 06:33

## 2021-07-28 RX ADMIN — POLYETHYLENE GLYCOL 3350 17 G: 17 POWDER, FOR SOLUTION ORAL at 08:36

## 2021-07-28 RX ADMIN — INSULIN ASPART: 100 INJECTION, SOLUTION INTRAVENOUS; SUBCUTANEOUS at 10:28

## 2021-07-28 RX ADMIN — Medication 25 MCG: at 08:36

## 2021-07-28 RX ADMIN — HYDROMORPHONE HYDROCHLORIDE 4 MG: 4 TABLET ORAL at 04:39

## 2021-07-28 RX ADMIN — INSULIN ASPART 2 UNITS: 100 INJECTION, SOLUTION INTRAVENOUS; SUBCUTANEOUS at 14:04

## 2021-07-28 RX ADMIN — METHADONE HYDROCHLORIDE 2.5 MG: 5 TABLET ORAL at 14:03

## 2021-07-28 RX ADMIN — Medication 1 TABLET: at 08:36

## 2021-07-28 RX ADMIN — CEFAZOLIN SODIUM 2 G: 2 INJECTION, SOLUTION INTRAVENOUS at 06:34

## 2021-07-28 RX ADMIN — OXYBUTYNIN CHLORIDE 5 MG: 5 TABLET ORAL at 08:36

## 2021-07-28 RX ADMIN — DOCUSATE SODIUM 200 MG: 100 CAPSULE, LIQUID FILLED ORAL at 08:36

## 2021-07-28 RX ADMIN — ACETAMINOPHEN 975 MG: 325 TABLET ORAL at 08:36

## 2021-07-28 RX ADMIN — CEFAZOLIN SODIUM 2 G: 2 INJECTION, SOLUTION INTRAVENOUS at 14:03

## 2021-07-28 RX ADMIN — METHADONE HYDROCHLORIDE 2.5 MG: 5 TABLET ORAL at 06:33

## 2021-07-28 RX ADMIN — HYDROXYZINE HYDROCHLORIDE 25 MG: 10 SYRUP ORAL at 04:39

## 2021-07-28 RX ADMIN — ACETAMINOPHEN 975 MG: 325 TABLET ORAL at 14:03

## 2021-07-28 RX ADMIN — CALCIUM CARBONATE (ANTACID) CHEW TAB 500 MG 500 MG: 500 CHEW TAB at 08:36

## 2021-07-28 RX ADMIN — HYDROMORPHONE HYDROCHLORIDE 4 MG: 4 TABLET ORAL at 08:35

## 2021-07-28 RX ADMIN — DULOXETINE HYDROCHLORIDE 30 MG: 30 CAPSULE, DELAYED RELEASE ORAL at 08:36

## 2021-07-28 RX ADMIN — MAGNESIUM OXIDE TAB 400 MG (241.3 MG ELEMENTAL MG) 400 MG: 400 (241.3 MG) TAB at 08:35

## 2021-07-28 NOTE — CONSULTS
[facility]     Row Name 07/28/21 0923    Healing Presence  Yes   07/28/21 0924    Essential Oil Method  Inhalation (EO/Topical oil);Topical (EO/Topical Oil)   07/28/21 0924    Essential Oils  Support Healing process blend - HC;Lavender Massage Oil - HC   07/28/21 0924    Massage  Hand;Foot   07/28/21 0924    Intervention Reason  Anxiety/Stress;Pain   07/28/21 0924    Patient Desires Treatment  yes   07/28/21 0924    Pre-Session Anxiety  10   07/28/21 0924    Post-session Anxiety  8   07/28/21 0924    Pre-session Pain  8   07/28/21 0924    Post-session Pain  8   07/28/21 0924

## 2021-07-28 NOTE — PROGRESS NOTES
[facility]    Writer called and spoke with pt's spouse, Denilson, at pt's request, to review benefits, home infusion and to offer choice of agency.  [facility] can provide home infusion supplies at no cost to the patient and the cost of the med would be $20/week at the most, per Rachel at [facility].  Explained this to Denilson and he would like to proceed with [facility] for pt's home IV antibiotic infusions. All of  Denilson's questions were answered.  Will update Susan Knight CM.    Alma Rosa Ramos RN, BSN  [facility] Liaison  [phone] (Mon through Fri 8:00 am-5:00 pm)  [phone] Office

## 2021-07-28 NOTE — PLAN OF CARE
Problem: Adult Inpatient Plan of Care  Goal: Optimal Comfort and Wellbeing  Outcome: Improving     Problem: Pain Chronic (Persistent)  Goal: Acceptable Pain Control and Functional Ability  Outcome: Improving  Intervention: Develop Pain Management Plan  Recent Flowsheet Documentation  Taken 7/27/2021 1637 by China Cui RN  Pain Management Interventions: repositioned     Problem: Diabetes Comorbidity  Goal: Blood Glucose Level Within Targeted Range  Outcome: Improving     Problem: Somatic Disturbance (Anxiety Signs/Symptoms)  Goal: Improved Somatic Symptoms (Anxiety Signs/Symptoms)  Outcome: Improving   Pt resting in room rating pain level 2-3/10.  Pt received scheduled pain medications and sleep medications.

## 2021-07-28 NOTE — DISCHARGE INSTRUCTIONS
Pain Team Instructions:   1. Recheck QtC in 2 weeks   2. Continue methadone 3 times per day  3. You have been taking dilaudid as needed (usually 4 times per day while hospitalized)  4. Take miralax and senna daily   5. Follow up with Denilson Valles

## 2021-07-28 NOTE — DISCHARGE SUMMARY
[facility] MEDICINE  DISCHARGE SUMMARY     Primary Care Physician: Sandi Jones  Admission Date: 7/16/2021   Discharge Provider: Kuldip Cortes MD Discharge Date: 7/28/2021   Diet:   Active Diet and Nourishment Order   Procedures     Consistent Carb 45 grams CHO per Meal Diet     Diet       Code Status: Full Code   Activity: DCACTIVITY: Activity as tolerated        Condition at Discharge: Satisfactory     REASON FOR PRESENTATION(See Admission Note for Details)   Confusion/fever    PRINCIPAL & ACTIVE DISCHARGE DIAGNOSES     Principal Problem:    Paraspinal abscess /RETROPERITONEAL ABSCESS(H); MSSA  Active Problems:    Metastatic breast cancer (H)    Confusion    Port-A-Cath in place      PENDING LABS     4+ PMNs      4+ Gram positive cocci in pairs     Final Diagnosis   A) RIGHT PARASPINAL ABSCESS, CYTOLOGY:    NEGATIVE FOR MALIGNANCY    MARKED AMORPHOUS DEBRIS WITH MIXED ACUTE AND CHRONIC INFLAMMATION     B) PELVIC ABSCESS, LEFT PARASYMPHYSEAL COLLECTION, CYTOLOGY:    NEGATIVE FOR MALIGNANCY    MARKED AMORPHOUS DEBRIS WITH MIXED ACUTE AND CHRONIC INFLAMMATION    Unresulted Labs Ordered in the Past 30 Days of this Admission     No orders found from 6/16/2021 to 7/17/2021.            PROCEDURES ( this hospitalization only)          RECOMMENDATIONS TO OUTPATIENT PROVIDER FOR F/U VISIT     Follow-up Appointments     Follow-up and recommended labs and tests       Follow up with primary care provider, Sandi Jones, within 7 days for   hospital follow- up.    Follow up with Dr Varma in 2 weeks in clinic  Follow up with Dr Garza with Infectious Disease in 3 weeks after   repeat MRI of your back               DISPOSITION     Home with home care    SUMMARY OF HOSPITAL COURSE:    67 Y/O woman with metastatic breast Ca on chemoRx (Dr TROY Varma- [facility]) who presented with hip/back pain fever and confusion  Evaluation suggested evidence of a paraspinal retroperitoneal abscess possibly  "associated with tumor her condition improved significantly with intravenous antibiotic cultures grew out methicillin sensitive staph aureus and she is being discharged on cefazolin for a total course of 28 days  See consultation notes below from infectious disease;  and the oncology service who know her well      INFECTIOUS DISEASE/DISCHARGE DAY  Impression: Fevers, multiple potential etiologies. Fevers resolved     Initial presentation of encephalopathy which has improved--likely secondary to narcotics     Had a Port-A-Cath in for couple years, but seems to be working okay.     Has multiple excoriations on both legs that she says are \"from mosquito bites\" none are grossly purulent.  Improved.      Had distended abdomen.  CT shows old fractures and some fluid around fractures. MRI confirms fluid--unclear if abscess or cancer.  S/p aspiration on 7/23-GS with [facility], culture is pending     Says her longstanding sinus symptoms are improved.      Recommendations:   Would recommend sending home on at least 4 weeks of cefazolin with repeat MRI at that time.      7/28/2021   Celia Gilliland PA-C MOPMICHELLE        Metastatic breast adenocarcinoma with bone mets and bone marrow involvement:   - On 3rd line chemo Doxil 50 mg/m2 IV which is given every 4 weeks until progression or intolerance.   - Last received cycle 3 of Doxil on 6/29/21. She did not receive Neulasta support for neutropenia prevention with cycle 3.   - She also receives Zometa 4 mg IV every 4 weeks.   - Next cycle of Doxil was due 7/27/2021 however this will be postponed for at least 2 weeks. We would like infection be controlled prior to restarting chemo. The patient will be seen in outpatient setting for reevaluation at that time.         - Plan per ID is 4 weeks of IV antibiotics       Acute metabolic encephalopathy, multifactorial.  - Resolved after stopping OxyContin and starting treatment of pneumonia.  - Brain MRI shows no acute findings to account " for the symptoms.     Acute on chronic cancer bone pain:   - Appreciate palliative care team to help with pain management while here.   - Plan for patient to followup with Denilson Valles for ongoing management(        I have personally reached out to Denilson and orders will be placed for followup)      Discharge Medications with Med changes:     Discharge Medication List as of 7/28/2021  2:57 PM      START taking these medications    Details   ceFAZolin (ANCEF) intermittent infusion 2 g in 100 mL dextrose PRE-MIX Inject 100 mLs (2 g) into the vein every 8 hours for 28 days, Disp-8400 mL, R-0, No Print Out      lidocaine (XYLOCAINE) 5 % external ointment Apply topically 4 times dailyDisp-50 g, [id number]      methadone (DOLOPHINE) 5 MG tablet Take 0.5 tablets (2.5 mg) by mouth every 8 hours for 7 days, Disp-21 tablet, R-0, E-Prescribe         CONTINUE these medications which have CHANGED    Details   insulin glargine (BASAGLAR KWIKPEN) 100 UNIT/ML pen Inject 13 Units Subcutaneous At Bedtime, HistoricalIf Basaglar is not covered by insurance, may substitute Lantus at same dose and frequency.        insulin lispro (HUMALOG KWIKPEN) 100 UNIT/ML (1 unit dial) KWIKPEN Inject 1-20 Units Subcutaneous 3 times daily (before meals) (sliding scale + 1 unit for every 15 grams of carbs), Historical      polyethylene glycol (MIRALAX) 17 GM/Dose powder Take 17 g by mouth daily, Disp-510 g, R-1, E-Prescribe         CONTINUE these medications which have NOT CHANGED    Details   acetaminophen (TYLENOL) 500 MG tablet Take 1,000 mg by mouth 3 times daily as needed for pain, Historical      calcium carbonate (CALCIUM CARBONATE) 600 MG tablet Take 600 mg by mouth daily, Historical      Continuous Blood Gluc  (FREESTYLE KIMBERLY 14 DAY READER) COLETTE Use to check blood sugar at least 4 times a day or as directed, Historical      Continuous Blood Gluc Sensor (FREESTYLE KIMBERLY 14 DAY SENSOR) MISC Use as directed to test blood sugar at  least 4 times a day or as directed, Historical      docusate sodium (COLACE) 100 MG capsule Take 200 mg by mouth daily, Historical      DULoxetine (CYMBALTA) 30 MG capsule Take 30 mg by mouth every morning And 60 mg by mouth in the evening, Historical      ezetimibe-simvastatin (VYTORIN) 10-20 MG tablet Take 1 tablet by mouth daily, Historical      HYDROmorphone (DILAUDID) 4 MG tablet Take 4 mg by mouth every 4 hours as needed for severe pain , Historical      hydrOXYzine (ATARAX) 25 MG tablet Take 25 mg by mouth 3 times daily as needed for anxiety , Historical      ibuprofen (ADVIL/MOTRIN) 200 MG tablet Take 400 mg by mouth every 6 hours as needed for mild pain, Historical      IRON-VIT C-VIT B12-FOLIC ACID (GENTLE IRON) 28-60-0.008-0.4 MG CAPS Take 1 capsule by mouth daily, Historical      medical cannabis (Patient's own supply) See Admin Instructions (The purpose of this order is to document that the patient reports taking medical cannabis.  This is not a prescription, and is not used to certify that the patient has a qualifying medical condition.), Historical      multivitamin (CENTRUM SILVER) tablet Take 1 tablet by mouth daily, Historical      oxybutynin (DITROPAN) 5 MG tablet Take 5 mg by mouth 2 times daily as needed , Historical      vitamin (B COMPLEX) tablet Take 1 tablet by mouth daily, Historical      Vitamin D3 (CHOLECALCIFEROL) 25 mcg (1000 units) tablet Take 1 tablet by mouth daily, Historical      zolpidem (AMBIEN) 10 MG tablet Take 10 mg by mouth nightly as needed for sleep, Historical      aspirin 81 MG EC tablet Take 81 mg by mouth daily, Historical      omeprazole (PRILOSEC) 20 MG DR capsule Take 20 mg by mouth daily as needed, Historical         STOP taking these medications       naproxen sodium (ANAPROX) 220 MG tablet Comments:   Reason for Stopping:         oxyCODONE (OXYCONTIN) 30 MG 12 hr tablet Comments:   Reason for Stopping:                     Rationale for medication changes:               Consults       SOCIAL WORK IP CONSULT  HEMATOLOGY & ONCOLOGY IP CONSULT  PAIN MANAGEMENT ADULT IP CONSULT  PHARMACY IP CONSULT  PHYSICAL THERAPY ADULT IP CONSULT  OCCUPATIONAL THERAPY ADULT IP CONSULT  INFECTIOUS DISEASES IP CONSULT  ACUPUNCTURE IP CONSULT HE  HEMATOLOGY & ONCOLOGY IP CONSULT  INTEGRATIVE THERAPIES CONSULT  INFECTIOUS DISEASES IP CONSULT  ACUPUNCTURE IP CONSULT HE  PHARMACY TO DOSE VANCO  INFECTIOUS DISEASE GENERAL ADULT IP CONSULT    Immunizations given this encounter     Most Recent Immunizations   Administered Date(s) Administered     DTaP / Hep B / IPV 05/08/2018     FLU 6-35 months 09/24/2009     Flu, Unspecified 09/05/2019     HepA-Adult 08/20/2018     HepB-Adult 08/20/2018     Influenza (IIV3) PF 12/01/2014     Influenza Vaccine, 6+MO IM (QUADRIVALENT W/PRESERVATIVES) 09/05/2019     Pneumococcal 23 valent 07/24/2017     TD (ADULT, 7+) 06/13/2013           Anticoagulation Information      Recent INR results: No results for input(s): INR in the last 168 hours.  Warfarin doses (if applicable) or name of other anticoagulant: NA      SIGNIFICANT IMAGING FINDINGS     Results for orders placed or performed during the hospital encounter of 07/16/21   Head CT w/o contrast    Impression    IMPRESSION:  1.  No acute intracranial injury, hemorrhage, mass, or CT evidence of recent ischemia.  2.  Diffusely sclerotic calvarium presumed to reflect metastatic disease given history of breast cancer.   MR Brain w/o Contrast    Impression    IMPRESSION:  1.  No acute intracranial finding. No evidence for recent ischemia, intracranial hemorrhage, or mass.  2.  A few T2 hyperintense foci in the white matter are nonspecific but most commonly reflect gliosis related to prior ischemic or inflammatory insult.  3.  Presumed osseous metastatic disease within the calvarium and upper visualized cervical spine.   XR Chest Port 1 View    Impression    IMPRESSION: Right IJ chemotherapy port is in place with tip  in the high right atrium. Heart and mediastinal size are normal. There is patchy airspace infiltrate throughout the bilateral lungs with diffuse indistinctness of the pulmonary interstitium.   These findings may represent pulmonary edema. An inflammatory infectious process is also a consideration. No definite pleural effusion or pneumothorax. Extensive sclerotic osseous lesions are present, compatible with known bony metastases.                  XR Foot Left G/E 3 Views    Impression    IMPRESSION: Bones are demineralized. There is no definitive evidence of acute fracture, with attention to the fifth toe. Mild, scattered degenerative changes.   XR Chest 2 Views    Impression    IMPRESSION: Indistinct pulmonary vasculature. Left breast implant projects over the left lung, which increases the appearance of airspace disease. No definite new consolidation. No pleural effusions or pneumothorax. Unchanged cardiac size. Right internal   jugular port tip projects at the cavoatrial junction. Left axillary surgical clips. Lower thoracic vertebral body compression fracture. Bony metastases better seen on prior imaging.   CT Chest/Abdomen/Pelvis w Contrast    Impression    IMPRESSION:  1.  Mild new fullness of right paraspinal musculature low lumbar spine raising possibility of myositis. A discrete abscess collection is not evident.  2.  Left obturator ring fracture with small fluid pockets associated with the fractures, one of which also demonstrates a few tiny air bubbles.  3.  Complex bilateral sacral alar fractures with surrounding zones of heterotopic ossification.  4.  Prominent distention of bladder with borderline bilateral hydronephrosis. Suggest determining post void residual.  5.  MRI of pelvis and lower lumbar spine region may be beneficial to assess for paraspinal myositis or fluid collection, as well as to assess the tiny fluid collections associated with left obturator ring fractures.   MR Lumbar Spine w/o & w  Contrast    Impression    IMPRESSION:  1.  Diffuse sclerotic metastatic disease of thoracic spine, lumbar spine, sacrum and pelvis with no evidence of a pathologic fracture in the lumbar spine.    2.  No evidence of canal compromise or neural foraminal narrowing in the lumbar region.    3.  Prominent metastatic involvement of sacrum and sacral alar regions, sacroiliac joints with extraosseous soft tissue extension in these regions. Please refer to MRI of the pelvis for further evaluation.    4.  Probable extra osseous extension of metastasis into the right sacral neural foramen along with enhancement of the cauda equina from L4 to the sacrum and thickening and enhancement of the thecal sac.    5.  Bony destruction and fluid within the sacroiliac joints bilaterally highly suggestive of an inflammatory/infectious etiology.    6.  Multiple abscess formations right greater than left of posterior musculature from L4 to  mid sacral region. Probable involvement of the iliacus muscles bilaterally right greater than left.    These findings were communicated by phone to Dr. Vergara at 11:30 PM on 07/21/2021.   MR Pelvis (Intrapelvic Organs) wo&w Contrast    Impression    IMPRESSION:  1.  There are 2 complex fluid collections are seen along the fractured left superior and inferior pubic rami which are nonspecific but given the enhancement pattern on current exam and air bubbles and fluid seen on the CT scan, these collections likely   represent intramuscular abscesses.   CT Retroperitoneal Abscess Aspiration    Impression    IMPRESSION:  1.  Successful CT-guided aspiration right paraspinal collection.  2.  Puslike fluid was aspirated.    Reference CPT Codes: 87865, 77450, 81184, 50345   CT Abdomen Peritoneum Abscess Aspiration    Impression    IMPRESSION:  1.  Successful CT-guided aspiration left parasymphyseal collection.  2.  Puslike fluid was aspirated.    Reference CPT Codes: 16674, 54777, 11861, 69627       SIGNIFICANT  LABORATORY FINDINGS     Culture 4+ Staphylococcus aureusAbnormal           Resulting Agency: [email or web address]   Susceptibility     Staphylococcus aureus     VIOLET     Cefazolin <=2 ug/mL Susceptible     Clindamycin <=0.5 ug/mL Susceptible     Erythromycin <=0.5 ug/mL Susceptible     Oxacillin <=0.25 ug/mL Susceptible     Vancomycin 1 ug/mL Susceptible                 Specimen Collected: 07/23/21  3:00 PM                       Recent Results (from the past 120 hour(s))   Glucose by meter    Collection Time: 07/23/21  6:11 PM   Result Value Ref Range    GLUCOSE BY METER POCT 162 (H) 70 - 125 mg/dL   SARS-COV2 (COVID-19) Virus RT-PCR    Collection Time: 07/23/21  6:54 PM    Specimen: Nasopharyngeal; Swab   Result Value Ref Range    SARS CoV2 PCR Negative Negative   Glucose by meter    Collection Time: 07/23/21  9:20 PM   Result Value Ref Range    GLUCOSE BY METER POCT 189 (H) 70 - 125 mg/dL   Procalcitonin    Collection Time: 07/24/21  6:56 AM   Result Value Ref Range    Procalcitonin 0.52 (H) 0.00 - 0.49 ng/mL   Potassium    Collection Time: 07/24/21  6:57 AM   Result Value Ref Range    Potassium 3.3 (L) 3.5 - 5.0 mmol/L   Magnesium    Collection Time: 07/24/21  6:57 AM   Result Value Ref Range    Magnesium 2.0 1.8 - 2.6 mg/dL   Extra Purple Top Tube    Collection Time: 07/24/21  6:57 AM   Result Value Ref Range    Hold Specimen JIC    Glucose by meter    Collection Time: 07/24/21  8:17 AM   Result Value Ref Range    GLUCOSE BY METER POCT 134 (H) 70 - 125 mg/dL   Glucose by meter    Collection Time: 07/24/21 11:35 AM   Result Value Ref Range    GLUCOSE BY METER POCT 236 (H) 70 - 125 mg/dL   Potassium    Collection Time: 07/24/21  1:01 PM   Result Value Ref Range    Potassium 3.9 3.5 - 5.0 mmol/L   Glucose by meter    Collection Time: 07/24/21  4:51 PM   Result Value Ref Range    GLUCOSE BY METER POCT 192 (H) 70 - 125 mg/dL   Glucose by meter    Collection Time: 07/24/21  8:49 PM   Result Value Ref Range    GLUCOSE BY METER POCT 191  (H) 70 - 125 mg/dL   Procalcitonin    Collection Time: 07/25/21  6:53 AM   Result Value Ref Range    Procalcitonin 0.44 0.00 - 0.49 ng/mL   Magnesium    Collection Time: 07/25/21  6:53 AM   Result Value Ref Range    Magnesium 1.9 1.8 - 2.6 mg/dL   Potassium    Collection Time: 07/25/21  6:53 AM   Result Value Ref Range    Potassium 3.6 3.5 - 5.0 mmol/L   Glucose by meter    Collection Time: 07/25/21  8:38 AM   Result Value Ref Range    GLUCOSE BY METER POCT 133 (H) 70 - 125 mg/dL   Glucose by meter    Collection Time: 07/25/21 12:08 PM   Result Value Ref Range    GLUCOSE BY METER POCT 178 (H) 70 - 125 mg/dL   Glucose by meter    Collection Time: 07/25/21  5:15 PM   Result Value Ref Range    GLUCOSE BY METER POCT 206 (H) 70 - 125 mg/dL   Glucose by meter    Collection Time: 07/25/21  9:02 PM   Result Value Ref Range    GLUCOSE BY METER POCT 193 (H) 70 - 125 mg/dL   Vancomycin level    Collection Time: 07/25/21  9:57 PM   Result Value Ref Range    Vancomycin 13.2   mg/L   Magnesium    Collection Time: 07/26/21  6:57 AM   Result Value Ref Range    Magnesium 1.8 1.8 - 2.6 mg/dL   Basic metabolic panel    Collection Time: 07/26/21  6:57 AM   Result Value Ref Range    Sodium 143 136 - 145 mmol/L    Potassium 3.7 3.5 - 5.0 mmol/L    Chloride 106 98 - 107 mmol/L    Carbon Dioxide (CO2) 27 22 - 31 mmol/L    Anion Gap 10 5 - 18 mmol/L    Urea Nitrogen 7 (L) 8 - 22 mg/dL    Creatinine 0.60 0.60 - 1.10 mg/dL    Calcium 9.1 8.5 - 10.5 mg/dL    Glucose 128 (H) 70 - 125 mg/dL    GFR Estimate >90 >60 mL/min/1.73m2   CBC with platelets and differential    Collection Time: 07/26/21  6:57 AM   Result Value Ref Range    WBC Count 12.3 (H) 4.0 - 11.0 10e3/uL    RBC Count 2.55 (L) 3.80 - 5.20 10e6/uL    Hemoglobin 7.6 (L) 11.7 - 15.7 g/dL    Hematocrit 23.7 (L) 35.0 - 47.0 %    MCV 93 78 - 100 fL    MCH 29.8 26.5 - 33.0 pg    MCHC 32.1 31.5 - 36.5 g/dL    RDW 17.0 (H) 10.0 - 15.0 %    Platelet Count 332 150 - 450 10e3/uL   Manual  Differential    Collection Time: 07/26/21  6:57 AM   Result Value Ref Range    % Neutrophils 91 %    % Lymphocytes 6 %    % Monocytes 2 %    % Eosinophils 1 %    % Basophils 0 %    Absolute Neutrophils 11.2 (H) 1.6 - 8.3 10e3/uL    Absolute Lymphocytes 0.7 (L) 0.8 - 5.3 10e3/uL    Absolute Monocytes 0.2 0.0 - 1.3 10e3/uL    Absolute Eosinophils 0.1 0.0 - 0.7 10e3/uL    Absolute Basophils 0.0 0.0 - 0.2 10e3/uL    RBC Morphology Confirmed RBC Indices     Platelet Assessment  Automated Count Confirmed. Platelet morphology is normal.     Automated Count Confirmed. Platelet morphology is normal.   Glucose by meter    Collection Time: 07/26/21  8:22 AM   Result Value Ref Range    GLUCOSE BY METER POCT 105 70 - 125 mg/dL   Glucose by meter    Collection Time: 07/26/21 12:15 PM   Result Value Ref Range    GLUCOSE BY METER POCT 131 (H) 70 - 125 mg/dL   Glucose by meter    Collection Time: 07/26/21  5:25 PM   Result Value Ref Range    GLUCOSE BY METER POCT 159 (H) 70 - 125 mg/dL   Glucose by meter    Collection Time: 07/26/21  8:53 PM   Result Value Ref Range    GLUCOSE BY METER POCT 190 (H) 70 - 125 mg/dL   Potassium    Collection Time: 07/27/21  6:32 AM   Result Value Ref Range    Potassium 3.6 3.5 - 5.0 mmol/L   Magnesium    Collection Time: 07/27/21  6:32 AM   Result Value Ref Range    Magnesium 1.9 1.8 - 2.6 mg/dL   CBC with platelets    Collection Time: 07/27/21  6:34 AM   Result Value Ref Range    WBC Count 11.0 4.0 - 11.0 10e3/uL    RBC Count 2.55 (L) 3.80 - 5.20 10e6/uL    Hemoglobin 7.6 (L) 11.7 - 15.7 g/dL    Hematocrit 24.1 (L) 35.0 - 47.0 %    MCV 95 78 - 100 fL    MCH 29.8 26.5 - 33.0 pg    MCHC 31.5 31.5 - 36.5 g/dL    RDW 17.1 (H) 10.0 - 15.0 %    Platelet Count 389 150 - 450 10e3/uL   Glucose by meter    Collection Time: 07/27/21  8:07 AM   Result Value Ref Range    GLUCOSE BY METER POCT 104 70 - 125 mg/dL   Glucose by meter    Collection Time: 07/27/21 12:19 PM   Result Value Ref Range    GLUCOSE BY METER  POCT 143 (H) 70 - 125 mg/dL   ECG 12-LEAD WITH MUSE ([facility])    Collection Time: 07/27/21 12:40 PM   Result Value Ref Range    Systolic Blood Pressure  mmHg    Diastolic Blood Pressure  mmHg    Ventricular Rate 97 BPM    Atrial Rate 97 BPM    NV Interval 148 ms    QRS Duration 96 ms     ms    QTc 487 ms    P Axis 61 degrees    R AXIS -31 degrees    T Axis 48 degrees    Interpretation ECG       Sinus rhythm  Left axis deviation  Nonspecific T wave abnormality  Prolonged QT  Abnormal ECG  When compared with ECG of 16-JUL-2021 21:28,  Nonspecific T wave abnormality now evident in Inferior leads  Nonspecific T wave abnormality, worse in Anterolateral leads  Confirmed by AARON ZHANG MD LOC:[facility] ([id number]) on 7/27/2021 2:58:11 PM     Glucose by meter    Collection Time: 07/27/21  5:14 PM   Result Value Ref Range    GLUCOSE BY METER POCT 212 (H) 70 - 125 mg/dL   Glucose by meter    Collection Time: 07/27/21  8:59 PM   Result Value Ref Range    GLUCOSE BY METER POCT 159 (H) 70 - 125 mg/dL   Magnesium    Collection Time: 07/28/21  6:39 AM   Result Value Ref Range    Magnesium 1.8 1.8 - 2.6 mg/dL   Potassium    Collection Time: 07/28/21  6:39 AM   Result Value Ref Range    Potassium 3.8 3.5 - 5.0 mmol/L   Glucose by meter    Collection Time: 07/28/21  9:26 AM   Result Value Ref Range    GLUCOSE BY METER POCT 121 70 - 125 mg/dL   Glucose by meter    Collection Time: 07/28/21 12:23 PM   Result Value Ref Range    GLUCOSE BY METER POCT 126 (H) 70 - 125 mg/dL       Discharge Orders        HOME CARE NURSING REFERRAL      Reason for your hospital stay    Confusion, sepsis, spine abscesses     Follow-up and recommended labs and tests     Follow up with primary care provider, Sandi Jones, within 7 days for hospital follow- up.    Follow up with Dr Varma in 2 weeks in clinic  Follow up with Dr Garza with Infectious Disease in 3 weeks after repeat MRI of your back     Activity    Your activity upon discharge: activity as tolerated      When to contact your care team    Call your primary doctor if you have any of the following: fever, chills, worsening pain, numbness in your legs, shortness of breath, dizziness, constipation, urinary troubles, or any other symptoms that are new or concerning to you.     Patient care order    [facility], [facility].  Post Discharge orders    Admit to home care.    Fax laboratory test results to: [phone], Attention: RIDGE BARAJAS MD, MD         Diagnosis: MSSA abscess    Routine central line cares and flushing.  Please perform the following tests, weekly:      CBC, diff, platelets      Creatinine      SGOT        C-reactive protein        Amikacin trough, draw one half hour before dose is infused    Call [phone] to schedule follow-up appointment in 3 weeks    @     Infectious Disease General Adult IP Consult     Diet    Follow this diet upon discharge: Resume your regular diet       Examination   Physical Exam   Temp:  [98.1  F (36.7  C)-99.6  F (37.6  C)] 98.1  F (36.7  C)  Pulse:  [105-115] 115  Resp:  [18-20] 18  BP: (113-138)/(57-68) 138/68  SpO2:  [93 %-94 %] 94 %  Wt Readings from Last 1 Encounters:   07/25/21 92.5 kg (204 lb)     Chronically ill but remarkably pleasant woman with Port-A-Cath in place right upper chest        Please see EMR for more detailed significant labs, imaging, consultant notes etc.    Kuldip SARMIENTO MD, personally saw the patient today and spent greater than 30 minutes discharging this patient.    Kuldip Cortes MD  [facility]    CC:Sandi Jones

## 2021-07-28 NOTE — PROGRESS NOTES
[facility] ACUTE PAIN SERVICE    ([facility], [facility], [facility])  Daily PAIN Progress Note    Assessment/Plan:  Elenita Hardin is a 66 year old female who was admitted on 7/16/2021. I was asked to see the patient for cancer associated pain. Admitted for evaluation of altered metal status, fevers. Patient had presented with slurred speech, intermittent combativeness, visual hallucinations and frequent falls. Was on oxycontin that was possibly contributing that was stopped.  Medical history of metastatic breast cancer with metastases to pelvis, femur, vertebrae, ribs and lymph nodes. Type 2 DM, obesity, and depression. Currently on ongoing antibiotic therapy managed by ID, on methadone and dilaudid.     PLAN:   1) Pain is consistent with cancer associated pain with extensive metastatic involvement. Qtc resulted at 487.  Will discontinue seroquel (not receiving this) and added mag daily.  Recheck in 14 days with Denilson Valles.   2)Multimodal Medication Therapy  Topical:Lidocaine ointment 5% QID, artificial salvia spray QID  NSAID'S: ibuprofen 400 mg q6hrs  Adjuvants: Tyenol 975 mg po TID, cymbalta 30 mg po qam, atarax 25 mg every 6 hours prn please give with the hydromorphone, self administered medical cannabis, benzotropine 1 mg po tid prn extrapyramidal effects  Antidepressants/anxiolytics: Cymbalta 30 mg q day, Seroquel 12.5 mg q 6 h prn for agitation, haloperidol  Opioids: Methadone 2.5 mg every 8 hours,  Hydromorphone(Dilaudid) 2-4 mg q 3 h prn (moderate to severe pain)  IV Pain medication: none  3)Non-medication interventions: distraction, rest and turning, family involvement  4)Constipation Prophylaxis: docusate 200 mg daily, Miralax daily, suppository.   5) Follow up   -Opioid prescriber has been [facility] NP  -Discharge Recommendations - We recommend prescribing the following at the time of discharge: Methadone 2.5 mg TID 7 day script # 21 tablets (called  placed to Denilson Valles NP to discuss) placed call to Denilson () to discuss as well. (No answer).               Subjective:  Today patient is tearful  Bed is wet with urine   Hip pain (last pain med at 4am)  Discussed QtC recheck         Principal Problem:    Hip pain, right  Active Problems:    Confusion     LOS: 11 days       acetaminophen  975 mg Oral TID     artificial saliva  1 spray Swish & Spit 4x Daily     [Held by provider] aspirin  81 mg Oral Daily     B-Complex/B-12  1 tablet Oral Daily     calcium carbonate  500 mg Oral Daily     ceFAZolin  2 g Intravenous Q8H     docusate sodium  200 mg Oral Daily     DULoxetine  30 mg Oral QAM     enoxaparin ANTICOAGULANT  40 mg Subcutaneous Q24H     ezetimibe  10 mg Oral At Bedtime    And     simvastatin  20 mg Oral QPM     insulin aspart   Subcutaneous TID w/meals     insulin aspart  1-3 Units Subcutaneous TID AC     insulin glargine  13 Units Subcutaneous At Bedtime     lidocaine   Topical 4x Daily     magnesium oxide  400 mg Oral Daily     medical cannabis  1 Dose Other See Admin Instructions     methadone  2.5 mg Oral Q8H     multivitamin w/minerals  1 tablet Oral Daily     oxybutynin  5 mg Oral BID     pantoprazole  40 mg Oral QAM AC     polyethylene glycol  17 g Oral Daily     sodium chloride (PF)  10-20 mL Intracatheter Q28 Days     Vitamin D3  25 mcg Oral Daily     zolpidem  10 mg Oral At Bedtime       Objective:  Vital signs in last 24 hours:  Temp:  [97.9  F (36.6  C)-99.6  F (37.6  C)] 98.1  F (36.7  C)  Pulse:  [] 115  Resp:  [16-20] 18  BP: (108-138)/(53-68) 138/68  SpO2:  [93 %-98 %] 94 %  Weight:   Weight:   @THISENCWEIGHTS(1)@  Weight change:   Body mass index is 33.95 kg/m .    Intake/Output last 3 shifts:  I/O last 3 completed shifts:  In: 2640 [P.O.:2640]  Out: 6075 [Urine:6075]  Intake/Output this shift:  I/O this shift:  In: 240 [P.O.:240]  Out: -     Review of Systems:   As per subjective, all others negative.    Physical  Exam:    General Appearance:  Alert, cooperative, tearful   Laying in bed    Head:  Normocephalic, without obvious abnormality, atraumatic   Eyes:  PERRL, conjunctiva/corneas clear, EOM's intact   Nose: Nares normal, septum midline, mucosa normal, no drainage   Throat: Lips, mucosa, and tongue, pink and dry. Teeth intact.   Neck: Supple, symmetrical, trachea midline, no adenopathy, thyroid: not enlarged, symmetric    Back:   Symmetric, no curvature, ROM normal   Lungs:   Clear to auscultation bilaterally, respirations unlabored   Chest Wall:  No tenderness or deformity   Heart:  Regular rate and rhythm, S1, S2 normal,no murmur, rub or gallop   Abdomen:   Soft, non-tender, bowel sounds active all four quadrants,  no masses, no organomegaly   Extremities: Extremities with small abrasions healing/scabbed    Skin: Skin color pale, excoriations present on BLE, improving     Neurologic: Alert and oriented X 3, Moves all 4 extremities, decreased mobility related to pain and discomfort      Lab Results:  Personally Reviewed.   Recent Labs   Lab 07/27/21  0634 07/26/21  0657 07/23/21  0642   WBC 11.0 12.3*  --    HGB 7.6* 7.6*  --    HCT 24.1* 23.7*  --     332 367     Recent Labs   Lab 07/26/21  0657      CO2 27   BUN 7*     No results for input(s): INR in the last 168 hours.      Total unit/floor time 25 minutes, time consisted of the following, examination of patient, reviewing the record and lab results, and completing documentation.        Janice Arredondo APRN, CNS-BC, CNP, ACHPN  Acute Care Pain Management Program Hour 7a-1700  [facility] ([place], [place], [place])   Page via [employer]- Click HERE to page Janice or call [phone]

## 2021-07-28 NOTE — PLAN OF CARE
Problem: Adult Inpatient Plan of Care  Goal: Optimal Comfort and Wellbeing  Outcome: Improving   Pt had a hard morning with no pain meds overnight, once pain meds given, pt able to participate with therapy and walked in the halls, sat in the chair for a few hours.  Pt had lunch with spouse and daughter, Neal Juarez.  Medicating with dilaudid and vistaril for lower back pain and right hip pain, scheduled lidocaine to right hip.  Patient takes her own medical marijuana for anxiety, self administers.Tammie Warren RN

## 2021-07-28 NOTE — PROGRESS NOTES
Care Management Follow Up    Length of Stay (days): 12    Expected Discharge Date: 07/29/2021     Concerns to be Addressed: patient refuses services     Patient plan of care discussed at interdisciplinary rounds: Yes    Anticipated Discharge Disposition:       Anticipated Discharge Services:    Anticipated Discharge DME:      Patient/family educated on Medicare website which has current facility and service quality ratings:    Education Provided on the Discharge Plan:    Patient/Family in Agreement with the Plan:      Referrals Placed by CM/SW:    Private pay costs discussed: insurance costs out of pocket expenses    Additional Information:  See below       Susan Knight RN          Message left for Rachel with [facility]. Awaiting call back     10:16 AM  Spoke to Rachel from [facility], she spoke to patient and  and plans to meet today to do a teach at 1030    11:16 AM  Spoke to Rachel from [facility]. Says teaching went well with patient and . She will get home care RN PT OT set up. Paged MD to determine if patient will be discharging today or not    11:55 AM  Met with patient. She declines TCU as recommended by therapy. Would like to go home with home care. Says  will transport at 1400 dose. Updated Rachel with [facility]. Updated nurse Mireille    Care Management Discharge Note    Discharge Date: 07/28/2021       Discharge Disposition: Home Care, Home Infusion    Discharge Services:      Discharge DME:      Discharge Transportation: family or friend will provide    Private pay costs discussed: insurance costs out of pocket expenses    PAS Confirmation Code:    Patient/family educated on Medicare website which has current facility and service quality ratings:      Education Provided on the Discharge Plan:    Persons Notified of Discharge Plans: patient, nurse, MD, Rachel from [facility]   Patient/Family in Agreement with the Plan: yes    Handoff Referral Completed: No    Additional Information:  See above         Susan Knight  RN

## 2021-07-28 NOTE — PLAN OF CARE
Patient discharging home with home infusion. Mell cath still accessed per instruction and was not heparinized due to patient infusion tonight. Patient and significant other informed. Discussed plan with home infusion.    Prior to receiving discharge orders, patient was insistent upon using purewick for voiding and declined therapy due to timing of pain medications. However, patient states she has a plan and help for home. Patient demonstrated ability to walk to bathroom prior to leaving.    Discussed diabetes management. Patient will use freedom kat monitor at home and states she has the supplies she needs.

## 2021-07-28 NOTE — PROGRESS NOTES
"Infectious Disease Progress Note    Assessment/Plan     Impression: Fevers, multiple potential etiologies. Fevers resolved     Initial presentation of encephalopathy which has improved--likely secondary to narcotics     Had a Port-A-Cath in for couple years, but seems to be working okay.     Has multiple excoriations on both legs that she says are \"from mosquito bites\" none are grossly purulent.  Improved.      Had distended abdomen.  CT shows old fractures and some fluid around fractures. MRI confirms fluid--unclear if abscess or cancer.  S/p aspiration on 7/23-GS with [facility], culture is pending    Says her longstanding sinus symptoms are improved.      Recommendations:   Would recommend sending home on at least 4 weeks of cefazolin with repeat MRI at that time.    OK to discharge to home anytime from Infectious Disease standpoint.    We'll sign off.  Please call if questions or problems.         Principal Problem:    Hip pain, right  Active Problems:    Confusion      RIDGE BARAJAS MD    [phone]      Subjective  Feels good.  Getting teaching re home IV antibiotics.     Objective    Vital signs in last 24 hours  Temp:  [97.9  F (36.6  C)-99.6  F (37.6  C)] 98.1  F (36.7  C)  Pulse:  [] 115  Resp:  [16-20] 18  BP: (108-138)/(53-68) 138/68  SpO2:  [93 %-98 %] 94 %  Wt Readings from Last 3 Encounters:   07/25/21 92.5 kg (204 lb)   04/09/18 98.7 kg (217 lb 11.2 oz)   11/17/17 97.1 kg (214 lb)           Intake/Output last 3 shifts  I/O last 3 completed shifts:  In: 2640 [P.O.:2640]  Out: 6075 [Urine:6075]  Intake/Output this shift:  I/O this shift:  In: 840 [P.O.:840]  Out: 400 [Urine:400]    Review of Systems   Pertinent items are noted in HPI., otherwise negative.    Physical Exam  General appearance: alert, appears stated age and cooperative  Head: Normocephalic, without obvious abnormality, atraumatic  Lungs: normal respiratory pattern  Extremities: good ROM, decreased swelling in toe.    Neurologic: " Mental status:clear.   Pertinent Labs   Lab Results: personally reviewed.     Recent Labs   Lab 07/27/21  0634 07/26/21  0657 07/23/21  0642   WBC 11.0 12.3*  --    HGB 7.6* 7.6*  --    HCT 24.1* 23.7*  --     332 367      Results for JULES ROPER (MRN [id number]) as of 7/22/2021 13:51   Ref. Range 7/18/2021 07:22 7/20/2021 02:06 7/22/2021 07:03   CRP Latest Ref Range: 0.0-<0.8 mg/dL 31.7 (H) 35.4 (H) 30.8 (H)     Recent Labs   Lab 07/26/21  0657      CO2 27   BUN 7*     7-Day Micro Results    Collected Updated Procedure Result Status    07/23/2021 1854 07/23/2021 2035 Asymptomatic COVID-19 Virus (Coronavirus) by PCR Nasopharyngeal [[id number]]    Swab from Nasopharyngeal    Final result Component Value   No component results           07/23/2021 1854 07/23/2021 2035 SARS-COV2 (COVID-19) Virus RT-PCR [[id number]]    Swab from Nasopharyngeal    Final result Component Value   SARS CoV2 PCR Negative   NEGATIVE: SARS-CoV-2 (COVID-19) RNA not detected, presumed negative.           07/23/2021 1500 07/26/2021 0806 Abscess Aerobic Bacterial Culture Routine [[id number]]    (Abnormal)   Abscess from Retroperitoneal    Final result Component Value   Culture 4+ Staphylococcus aureusAbnormal            07/23/2021 1500 07/25/2021 0759 Gram stain [[id number]]   (Abnormal)   Abscess from Retroperitoneal    Final result Component Value   Gram Stain Result 4+ PMNsAbnormal    Gram Stain Result 4+ Gram positive cocci in pairsAbnormal            07/23/2021 1500 07/26/2021 0707 Anaerobic Bacterial Culture Routine [[id number]]   Aspirate from Pelvis              Pertinent Radiology   Radiology Results:   Procedure Component Value Units Date/Time   CT Chest/Abdomen/Pelvis w Contrast [[id number]] Collected: 07/19/21 1736   Order Status: Completed Updated: 07/19/21 2014   Narrative:     EXAM: CT CHEST/ABDOMEN/PELVIS W CONTRAST   LOCATION: [facility]   DATE/TIME: 7/19/2021 5:36 PM      INDICATION: Sepsis   COMPARISON: 12/9/2020   TECHNIQUE: CT scan of the chest, abdomen, and pelvis was performed following injection of IV contrast. Multiplanar reformats were obtained. Dose reduction techniques were used.   CONTRAST: 100 mL Isovue-370     FINDINGS:   LUNGS AND PLEURA: Stable linear scarring posterior left upper lobe unchanged. Linear scarring peripheral right middle lobe and lateral right upper lobe also unchanged, no new pneumonia. There is a new tiny right pleural effusion.     MEDIASTINUM/AXILLAE: No lymphadenopathy. Normal-sized heart. Port-A-Cath present     CORONARY ARTERY CALCIFICATION: None.     HEPATOBILIARY: Normal.     PANCREAS: Normal.     SPLEEN: Normal.     ADRENAL GLANDS: Normal.     KIDNEYS/BLADDER: There is minimal bilateral hydronephrosis and mild prominence of each ureter. Bladder is distended with estimated volume more than 800 cc consistent with bladder outlet obstruction or neurogenic bladder.     BOWEL: There are a few loops of mildly dilated air and fluid distended loops of jejunum without a definitive site of transition or obstructing focus. This may relate to a mild mid small bowel obstruction or mild ileus. There is no inflammatory bowel wall    thickening.     LYMPH NODES: Normal.     VASCULATURE: Unremarkable.     PELVIC ORGANS: No adnexal lesion or ascites.     MUSCULOSKELETAL: Diffuse mixed lytic/sclerotic bony metastases unchanged. There are prominent bilateral sacral alar fractures with dense bony sclerosis and what appears to be heterotopic ossifications about the sacral alar fractures.     Prior fractures left pubic ring unchanged but there are now small surrounding soft tissue fullness suggesting small hematomas associated with the fractures. There are tiny air bubble seen adjacent to the left inferior pubic ramus fracture within the   abductor musculature these air bubbles reside in a small collection that measures 2.5 x 1 cm (image 452). These air bubbles  are new compared to previous exam. There is also a small fluid collection associated with the superior pubic ramus fracture (image    415) measuring 2 x 1.5 cm. In addition, there is also a tiny collection along the undersurface of the left pubic symphysis measuring 2.5 x 1.2 cm (image 425).     There is mild fullness of right paraspinous, erectus spinal muscle at low lumbar spine which appears new, can't exclude myositis or inflammation involving the right paraspinous musculature. No definite discrete fluid collection evident.    Impression:     IMPRESSION:   1.  Mild new fullness of right paraspinal musculature low lumbar spine raising possibility of myositis. A discrete abscess collection is not evident.   2.  Left obturator ring fracture with small fluid pockets associated with the fractures, one of which also demonstrates a few tiny air bubbles.   3.  Complex bilateral sacral alar fractures with surrounding zones of heterotopic ossification.   4.  Prominent distention of bladder with borderline bilateral hydronephrosis. Suggest determining post void residual.   5.  MRI of pelvis and lower lumbar spine region may be beneficial to assess for paraspinal myositis or fluid collection, as well as to assess the tiny fluid collections associated with left obturator ring fractures.

## 2021-07-28 NOTE — PROGRESS NOTES
CHART CHECK     Primary Oncologist/Hematologist:            Assessment and Plan:New actions/orders today (07/28/2021) are underlined.     Elenita is a 66 year old female with       Metastatic breast adenocarcinoma with bone mets and bone marrow involvement:   - On 3rd line chemo Doxil 50 mg/m2 IV which is given every 4 weeks until progression or intolerance.   - Last received cycle 3 of Doxil on 6/29/21. She did not receive Neulasta support for neutropenia prevention with cycle 3.   - She also receives Zometa 4 mg IV every 4 weeks.   - Next cycle of Doxil was due 7/27/2021 however this will be postponed for at least 2 weeks. We would like infection be controlled prior to restarting chemo. The patient will be seen in outpatient setting for reevaluation at that time.         - Plan per ID is 4 weeks of IV antibiotics      Acute metabolic encephalopathy, multifactorial.  - Resolved after stopping OxyContin and starting treatment of pneumonia.  - Brain MRI shows no acute findings to account for the symptoms.    Acute on chronic cancer bone pain:   - Appreciate palliative care team to help with pain management while here.   - Plan for patient to followup with Denilson Valles for ongoing management(        I have personally reached out to Denilson and orders will be placed for followup)    Community-acquired pneumonia/aspiration pneumonia:   - ID following; on abx     Diffuse sclerotic metastatic disease of thoracic, lumbar, sacrum, and pelvis with no evidence of pathologic fracture to lumbar spine. Also seen is probable extra osseous extension of metastasis into the right sacral neural foramen along with enhancement of the cauda equina from L4 to the sacrum and thickening and enhancement of the thecal sac.  - Patient will followup with Dr. Maria Del Carmen Orozco MD for palliative treatment plan as outpatient.     Intramuscular abscess along the fractured left superior and inferior pubic rami.  - Aspirate shows MSSA- plan per ID  "     Anxiety:  - Will defer to primary team for anxiety management.      Ok with discharge from oncology standpoint with plan to follow up with medical oncology in 2 weeks for treatment plan.    Celia Gilliland  [facility]  Office: [phone]  Cell: [phone] Monday through Friday, 8AM-5PM. After hours and weekends, please use answering service.                    Medications:   Scheduled Medications    acetaminophen  975 mg Oral TID     artificial saliva  1 spray Swish & Spit 4x Daily     [Held by provider] aspirin  81 mg Oral Daily     B-Complex/B-12  1 tablet Oral Daily     calcium carbonate  500 mg Oral Daily     ceFAZolin  2 g Intravenous Q8H     docusate sodium  200 mg Oral Daily     DULoxetine  30 mg Oral QAM     enoxaparin ANTICOAGULANT  40 mg Subcutaneous Q24H     ezetimibe  10 mg Oral At Bedtime    And     simvastatin  20 mg Oral QPM     insulin aspart   Subcutaneous TID w/meals     insulin aspart  1-3 Units Subcutaneous TID AC     insulin glargine  13 Units Subcutaneous At Bedtime     lidocaine   Topical 4x Daily     magnesium oxide  400 mg Oral Daily     medical cannabis  1 Dose Other See Admin Instructions     methadone  2.5 mg Oral Q8H     multivitamin w/minerals  1 tablet Oral Daily     oxybutynin  5 mg Oral BID     pantoprazole  40 mg Oral QAM AC     polyethylene glycol  17 g Oral Daily     sodium chloride (PF)  10-20 mL Intracatheter Q28 Days     Vitamin D3  25 mcg Oral Daily     zolpidem  10 mg Oral At Bedtime     PRN Medications  benztropine, glucose **OR** dextrose **OR** glucagon, hydrALAZINE, HYDROmorphone, hydrOXYzine, ibuprofen, polyethylene glycol, sodium chloride (PF), sodium chloride (PF)               Physical Exam:   Vitals were reviewed  Blood pressure 138/68, pulse 115, temperature 98.1  F (36.7  C), temperature source Oral, resp. rate 18, height 1.651 m (5' 5\"), weight 92.5 kg (204 lb), SpO2 94 %.  Wt Readings from Last 4 Encounters:   07/25/21 92.5 kg (204 lb) "   04/09/18 98.7 kg (217 lb 11.2 oz)   11/17/17 97.1 kg (214 lb)   11/09/17 96.2 kg (212 lb)       I/O last 3 completed shifts:  In: 2640 [P.O.:2640]  Out: 6075 [Urine:6075]    Constitutional: Awake, alert, cooperative, no apparent distress   Lungs: Clear to auscultation bilaterally, no crackles or wheezing   Cardiovascular: Regular rate and rhythm, normal S1 and S2, and no murmur noted   Abdomen: Normal bowel sounds, soft, non-distended, non-tender   Skin: No rashes, no cyanosis, no edema   Other: PSYCH: Appropriate              Data:   All laboratory data and imaging studies reviewed.    CMP  Recent Labs   Lab 07/28/21  0926 07/28/21  0639 07/27/21 2059 07/27/21  1714 07/27/21  1219 07/27/21  0632 07/26/21  0657 07/25/21  0653 07/22/21  1725 07/22/21  1442   NA  --   --   --   --   --   --  143  --   --   --    POTASSIUM  --  3.8  --   --   --  3.6 3.7 3.6   < > 3.5   CHLORIDE  --   --   --   --   --   --  106  --   --   --    CO2  --   --   --   --   --   --  27  --   --   --    ANIONGAP  --   --   --   --   --   --  10  --   --   --      --  159* 212* 143*  --  128*  --   --   --    BUN  --   --   --   --   --   --  7*  --   --   --    CR  --   --   --   --   --   --  0.60  --   --  0.57*   GFRESTIMATED  --   --   --   --   --   --  >90  --   --  >90   BENJI  --   --   --   --   --   --  9.1  --   --   --    MAG  --  1.8  --   --   --  1.9 1.8 1.9   < > 1.9    < > = values in this interval not displayed.     CBC  Recent Labs   Lab 07/27/21  0634 07/26/21  0657 07/23/21  0642   WBC 11.0 12.3*  --    RBC 2.55* 2.55*  --    HGB 7.6* 7.6*  --    HCT 24.1* 23.7*  --    MCV 95 93  --    MCH 29.8 29.8  --    MCHC 31.5 32.1  --    RDW 17.1* 17.0*  --     332 367     INRNo lab results found in last 7 days.  Data   Results for orders placed or performed during the hospital encounter of 07/16/21 (from the past 24 hour(s))   Glucose by meter   Result Value Ref Range    GLUCOSE BY METER POCT 143 (H) 70 - 125  mg/dL   ECG 12-LEAD WITH MUSE (LHE)   Result Value Ref Range    Systolic Blood Pressure  mmHg    Diastolic Blood Pressure  mmHg    Ventricular Rate 97 BPM    Atrial Rate 97 BPM    DE Interval 148 ms    QRS Duration 96 ms     ms    QTc 487 ms    P Axis 61 degrees    R AXIS -31 degrees    T Axis 48 degrees    Interpretation ECG       Sinus rhythm  Left axis deviation  Nonspecific T wave abnormality  Prolonged QT  Abnormal ECG  When compared with ECG of 16-JUL-2021 21:28,  Nonspecific T wave abnormality now evident in Inferior leads  Nonspecific T wave abnormality, worse in Anterolateral leads  Confirmed by AARON ZHANG MD LOC:JN ([id number]) on 7/27/2021 2:58:11 PM     Glucose by meter   Result Value Ref Range    GLUCOSE BY METER POCT 212 (H) 70 - 125 mg/dL   Glucose by meter   Result Value Ref Range    GLUCOSE BY METER POCT 159 (H) 70 - 125 mg/dL   Magnesium   Result Value Ref Range    Magnesium 1.8 1.8 - 2.6 mg/dL   Potassium   Result Value Ref Range    Potassium 3.8 3.5 - 5.0 mmol/L   Glucose by meter   Result Value Ref Range    GLUCOSE BY METER POCT 121 70 - 125 mg/dL

## 2021-07-28 NOTE — PLAN OF CARE
Problem: Pain Chronic (Persistent)  Goal: Acceptable Pain Control and Functional Ability  Outcome: Improving  Patient did c/o pain this morning. PRN dilaudid and hydroxyzine effect for pain relief.     Problem: Sleep Disturbance (Anxiety Signs/Symptoms)  Goal: Improved Sleep (Anxiety Signs/Symptoms)  Outcome: Improving  Patient reports that she has been sleeping well off and on throughout the night.    Priyank Zepeda RN

## 2021-07-28 NOTE — PLAN OF CARE
Physical Therapy Discharge Summary    Reason for therapy discharge:    Discharged to home with home therapy.    Progress towards therapy goal(s). See goals on Care Plan in [facility] electronic health record for goal details.  Goals partially met.  Barriers to achieving goals:   discharge from facility.    Therapy recommendation(s):    Continued therapy is recommended.  Rationale/Recommendations:  Recommend home PT.    Anna Marie Connolly, PT  7/28/2021

## 2021-08-05 ENCOUNTER — LAB REQUISITION (OUTPATIENT)
Dept: LAB | Facility: CLINIC | Age: 66
End: 2021-08-05
Payer: COMMERCIAL

## 2021-08-05 DIAGNOSIS — B95.61 METHICILLIN SUSCEPTIBLE STAPHYLOCOCCUS AUREUS INFECTION AS THE CAUSE OF DISEASES CLASSIFIED ELSEWHERE: ICD-10-CM

## 2021-08-05 DIAGNOSIS — G06.1 INTRASPINAL ABSCESS AND GRANULOMA: ICD-10-CM

## 2021-08-05 LAB
AMIKACIN SERPL-MCNC: <3 MG/L
AST SERPL W P-5'-P-CCNC: 32 U/L (ref 0–45)
BASOPHILS # BLD AUTO: 0 10E3/UL (ref 0–0.2)
BASOPHILS NFR BLD AUTO: 0 %
CREAT SERPL-MCNC: 0.52 MG/DL (ref 0.52–1.04)
CRP SERPL-MCNC: 180 MG/L (ref 0–8)
EOSINOPHIL # BLD AUTO: 0.2 10E3/UL (ref 0–0.7)
EOSINOPHIL NFR BLD AUTO: 2 %
ERYTHROCYTE [DISTWIDTH] IN BLOOD BY AUTOMATED COUNT: 17.8 % (ref 10–15)
GFR SERPL CREATININE-BSD FRML MDRD: >90 ML/MIN/1.73M2
HCT VFR BLD AUTO: 22.3 % (ref 35–47)
HGB BLD-MCNC: 6.7 G/DL (ref 11.7–15.7)
HOLD SPECIMEN: NORMAL
HOLD SPECIMEN: NORMAL
IMM GRANULOCYTES # BLD: 0.1 10E3/UL
IMM GRANULOCYTES NFR BLD: 1 %
LYMPHOCYTES # BLD AUTO: 0.3 10E3/UL (ref 0.8–5.3)
LYMPHOCYTES NFR BLD AUTO: 3 %
MCH RBC QN AUTO: 29.4 PG (ref 26.5–33)
MCHC RBC AUTO-ENTMCNC: 30 G/DL (ref 31.5–36.5)
MCV RBC AUTO: 98 FL (ref 78–100)
MONOCYTES # BLD AUTO: 0.7 10E3/UL (ref 0–1.3)
MONOCYTES NFR BLD AUTO: 6 %
NEUTROPHILS # BLD AUTO: 9.4 10E3/UL (ref 1.6–8.3)
NEUTROPHILS NFR BLD AUTO: 88 %
NRBC # BLD AUTO: 0 10E3/UL
NRBC BLD AUTO-RTO: 0 /100
PLATELET # BLD AUTO: 372 10E3/UL (ref 150–450)
RBC # BLD AUTO: 2.28 10E6/UL (ref 3.8–5.2)
WBC # BLD AUTO: 10.8 10E3/UL (ref 4–11)

## 2021-08-05 PROCEDURE — 84450 TRANSFERASE (AST) (SGOT): CPT | Mod: ORL

## 2021-08-05 PROCEDURE — 82565 ASSAY OF CREATININE: CPT | Mod: ORL

## 2021-08-05 PROCEDURE — 80150 ASSAY OF AMIKACIN: CPT | Mod: ORL

## 2021-08-05 PROCEDURE — 86140 C-REACTIVE PROTEIN: CPT | Mod: ORL

## 2021-08-05 PROCEDURE — 85025 COMPLETE CBC W/AUTO DIFF WBC: CPT | Mod: ORL

## 2021-08-09 ENCOUNTER — TELEPHONE (OUTPATIENT)
Dept: INFECTIOUS DISEASES | Facility: CLINIC | Age: 66
End: 2021-08-09

## 2021-08-09 NOTE — TELEPHONE ENCOUNTER
Dr Varma's RN Jose called and informed labs received. Pt is being seen tomorrow, will likely have a transfusion. Will informed provider. Closing encounter

## 2021-08-09 NOTE — TELEPHONE ENCOUNTER
Called [facility] @ [phone] fax [phone]  Sent labs to Dr Varma    ----- Message from Sahil Garza MD sent at 8/6/2021  7:56 AM CDT -----  Please send CBC results to [facility] and ask if they want to transfuse.

## 2021-08-12 ENCOUNTER — LAB REQUISITION (OUTPATIENT)
Dept: LAB | Facility: HOSPITAL | Age: 66
End: 2021-08-12
Payer: COMMERCIAL

## 2021-08-12 DIAGNOSIS — G06.1 INTRASPINAL ABSCESS AND GRANULOMA: ICD-10-CM

## 2021-08-12 DIAGNOSIS — B95.61 METHICILLIN SUSCEPTIBLE STAPHYLOCOCCUS AUREUS INFECTION AS THE CAUSE OF DISEASES CLASSIFIED ELSEWHERE: ICD-10-CM

## 2021-08-12 LAB
AST SERPL W P-5'-P-CCNC: 23 U/L (ref 0–40)
BASOPHILS # BLD MANUAL: 0 10E3/UL (ref 0–0.2)
BASOPHILS NFR BLD MANUAL: 0 %
C REACTIVE PROTEIN LHE: 18.1 MG/DL (ref 0–0.8)
CREAT SERPL-MCNC: 0.58 MG/DL (ref 0.6–1.1)
EOSINOPHIL # BLD MANUAL: 0.2 10E3/UL (ref 0–0.7)
EOSINOPHIL NFR BLD MANUAL: 2 %
ERYTHROCYTE [DISTWIDTH] IN BLOOD BY AUTOMATED COUNT: 17.9 % (ref 10–15)
GFR SERPL CREATININE-BSD FRML MDRD: >90 ML/MIN/1.73M2
HCT VFR BLD AUTO: 25.8 % (ref 35–47)
HGB BLD-MCNC: 8 G/DL (ref 11.7–15.7)
LYMPHOCYTES # BLD MANUAL: 0.2 10E3/UL (ref 0.8–5.3)
LYMPHOCYTES NFR BLD MANUAL: 2 %
MCH RBC QN AUTO: 29.4 PG (ref 26.5–33)
MCHC RBC AUTO-ENTMCNC: 31 G/DL (ref 31.5–36.5)
MCV RBC AUTO: 95 FL (ref 78–100)
MONOCYTES # BLD MANUAL: 0.4 10E3/UL (ref 0–1.3)
MONOCYTES NFR BLD MANUAL: 4 %
NEUTROPHILS # BLD MANUAL: 8.5 10E3/UL (ref 1.6–8.3)
NEUTROPHILS NFR BLD MANUAL: 92 %
PLAT MORPH BLD: ABNORMAL
PLATELET # BLD AUTO: 347 10E3/UL (ref 150–450)
POLYCHROMASIA BLD QL SMEAR: SLIGHT
RBC # BLD AUTO: 2.72 10E6/UL (ref 3.8–5.2)
RBC MORPH BLD: ABNORMAL
WBC # BLD AUTO: 9.2 10E3/UL (ref 4–11)

## 2021-08-12 PROCEDURE — 80150 ASSAY OF AMIKACIN: CPT | Mod: ORL

## 2021-08-12 PROCEDURE — 36591 DRAW BLOOD OFF VENOUS DEVICE: CPT | Mod: ORL

## 2021-08-12 PROCEDURE — 85027 COMPLETE CBC AUTOMATED: CPT | Mod: ORL

## 2021-08-12 PROCEDURE — 82565 ASSAY OF CREATININE: CPT | Mod: ORL

## 2021-08-12 PROCEDURE — 86141 C-REACTIVE PROTEIN HS: CPT | Mod: ORL

## 2021-08-12 PROCEDURE — 84450 TRANSFERASE (AST) (SGOT): CPT | Mod: ORL

## 2021-08-13 LAB — AMIKACIN SERPL-MCNC: <3 MG/L

## 2021-08-16 ENCOUNTER — TELEPHONE (OUTPATIENT)
Dept: INFECTIOUS DISEASES | Facility: CLINIC | Age: 66
End: 2021-08-16

## 2021-08-16 DIAGNOSIS — M46.20 PARASPINAL ABSCESS (H): Primary | ICD-10-CM

## 2021-08-16 NOTE — TELEPHONE ENCOUNTER
Labs in epic. CRP 18.1 mg/dL  F/u with Dr Garza before end of treatment? 8/24? Order MRI?  Per Dr Garza hosp note below:  Recommendations:   Would recommend sending home on at least 4 weeks of cefazolin with repeat MRI at that time.

## 2021-08-19 ENCOUNTER — HOSPITAL ENCOUNTER (OUTPATIENT)
Dept: MRI IMAGING | Facility: CLINIC | Age: 66
End: 2021-08-19
Attending: INTERNAL MEDICINE
Payer: COMMERCIAL

## 2021-08-19 ENCOUNTER — LAB REQUISITION (OUTPATIENT)
Dept: LAB | Facility: HOSPITAL | Age: 66
End: 2021-08-19
Payer: COMMERCIAL

## 2021-08-19 DIAGNOSIS — M46.20 PARASPINAL ABSCESS (H): ICD-10-CM

## 2021-08-19 DIAGNOSIS — B95.61 METHICILLIN SUSCEPTIBLE STAPHYLOCOCCUS AUREUS INFECTION AS THE CAUSE OF DISEASES CLASSIFIED ELSEWHERE: ICD-10-CM

## 2021-08-19 LAB
AST SERPL W P-5'-P-CCNC: 29 U/L (ref 0–40)
BASOPHILS # BLD MANUAL: 0 10E3/UL (ref 0–0.2)
BASOPHILS NFR BLD MANUAL: 0 %
C REACTIVE PROTEIN LHE: 20.5 MG/DL (ref 0–0.8)
CREAT SERPL-MCNC: 0.64 MG/DL (ref 0.6–1.1)
EOSINOPHIL # BLD MANUAL: 0.7 10E3/UL (ref 0–0.7)
EOSINOPHIL NFR BLD MANUAL: 3 %
ERYTHROCYTE [DISTWIDTH] IN BLOOD BY AUTOMATED COUNT: 16.7 % (ref 10–15)
GFR SERPL CREATININE-BSD FRML MDRD: >90 ML/MIN/1.73M2
HCT VFR BLD AUTO: 25.5 % (ref 35–47)
HGB BLD-MCNC: 7.9 G/DL (ref 11.7–15.7)
LYMPHOCYTES # BLD MANUAL: 0.2 10E3/UL (ref 0.8–5.3)
LYMPHOCYTES NFR BLD MANUAL: 1 %
MCH RBC QN AUTO: 29.5 PG (ref 26.5–33)
MCHC RBC AUTO-ENTMCNC: 31 G/DL (ref 31.5–36.5)
MCV RBC AUTO: 95 FL (ref 78–100)
MONOCYTES # BLD MANUAL: 0.5 10E3/UL (ref 0–1.3)
MONOCYTES NFR BLD MANUAL: 2 %
NEUTROPHILS # BLD MANUAL: 23 10E3/UL (ref 1.6–8.3)
NEUTROPHILS NFR BLD MANUAL: 94 %
PLAT MORPH BLD: ABNORMAL
PLATELET # BLD AUTO: 385 10E3/UL (ref 150–450)
RBC # BLD AUTO: 2.68 10E6/UL (ref 3.8–5.2)
RBC MORPH BLD: ABNORMAL
WBC # BLD AUTO: 24.5 10E3/UL (ref 4–11)

## 2021-08-19 PROCEDURE — 84450 TRANSFERASE (AST) (SGOT): CPT | Mod: ORL

## 2021-08-19 PROCEDURE — 80150 ASSAY OF AMIKACIN: CPT | Mod: ORL

## 2021-08-19 PROCEDURE — 72158 MRI LUMBAR SPINE W/O & W/DYE: CPT

## 2021-08-19 PROCEDURE — 36415 COLL VENOUS BLD VENIPUNCTURE: CPT | Mod: ORL

## 2021-08-19 PROCEDURE — 86141 C-REACTIVE PROTEIN HS: CPT | Mod: ORL

## 2021-08-19 PROCEDURE — 85027 COMPLETE CBC AUTOMATED: CPT | Mod: ORL

## 2021-08-19 PROCEDURE — 255N000002 HC RX 255 OP 636: Performed by: INTERNAL MEDICINE

## 2021-08-19 PROCEDURE — A9585 GADOBUTROL INJECTION: HCPCS | Performed by: INTERNAL MEDICINE

## 2021-08-19 PROCEDURE — 82565 ASSAY OF CREATININE: CPT | Mod: ORL

## 2021-08-19 PROCEDURE — 72197 MRI PELVIS W/O & W/DYE: CPT

## 2021-08-19 RX ORDER — GADOBUTROL 604.72 MG/ML
9 INJECTION INTRAVENOUS ONCE
Status: COMPLETED | OUTPATIENT
Start: 2021-08-19 | End: 2021-08-19

## 2021-08-19 RX ADMIN — GADOBUTROL 9 ML: 604.72 INJECTION INTRAVENOUS at 21:40

## 2021-08-20 LAB — AMIKACIN SERPL-MCNC: <3 MG/L

## 2021-08-23 ENCOUNTER — OFFICE VISIT (OUTPATIENT)
Dept: INFECTIOUS DISEASES | Facility: CLINIC | Age: 66
End: 2021-08-23
Payer: COMMERCIAL

## 2021-08-23 VITALS
TEMPERATURE: 98.1 F | BODY MASS INDEX: 25.96 KG/M2 | DIASTOLIC BLOOD PRESSURE: 60 MMHG | SYSTOLIC BLOOD PRESSURE: 118 MMHG | WEIGHT: 156 LBS | HEART RATE: 72 BPM

## 2021-08-23 DIAGNOSIS — M46.20 PARASPINAL ABSCESS (H): Primary | ICD-10-CM

## 2021-08-23 PROCEDURE — 99203 OFFICE O/P NEW LOW 30 MIN: CPT

## 2021-08-23 RX ORDER — METHADONE HYDROCHLORIDE 5 MG/1
2.5 TABLET ORAL EVERY MORNING
COMMUNITY
Start: 2021-08-10 | End: 2022-01-01

## 2021-08-23 RX ORDER — CAPECITABINE 500 MG/1
TABLET, FILM COATED ORAL
COMMUNITY
Start: 2021-04-13 | End: 2022-01-01

## 2021-08-23 RX ORDER — CHLORHEXIDINE GLUCONATE ORAL RINSE 1.2 MG/ML
15 SOLUTION DENTAL DAILY PRN
Status: ON HOLD | COMMUNITY
Start: 2021-07-05 | End: 2022-01-01

## 2021-08-23 RX ORDER — HYDROXYZINE PAMOATE 25 MG/1
25 CAPSULE ORAL 3 TIMES DAILY
Status: ON HOLD | COMMUNITY
Start: 2021-08-15 | End: 2022-01-01

## 2021-08-23 RX ORDER — BUMETANIDE 1 MG/1
1 TABLET ORAL DAILY PRN
Status: ON HOLD | COMMUNITY
End: 2022-01-01

## 2021-08-23 NOTE — PROGRESS NOTES
Infectious Disease Progress Note    Assessment/Plan  Impression: Widely metastatic breast cancer on salvage chemotherapy as well as certainly deferred radiation therapy    MSSA pelvic abscesses, receiving cefazolin with decreasing size of abscesses as seen on MRI last week.    Recommendations: Continue cefazolin at least a few more weeks.  We will get an MRI in about 3 weeks.  Follow-up with me in 4 weeks.        RIDGE BARAJAS MD  [phone]      Subjective  She is feeling better with little less pain.    She did have a blood transfusion when her hemoglobin got below 7.    Rating IV antibiotics without difficulties.  Has had some constipation.  Objective    Vital signs in last 24 hours  Temp:  [98.1  F (36.7  C)] 98.1  F (36.7  C)  Pulse:  [72] 72  BP: (118)/(60) 118/60  Wt Readings from Last 3 Encounters:   08/23/21 70.8 kg (156 lb)   07/25/21 92.5 kg (204 lb)   04/09/18 98.7 kg (217 lb 11.2 oz)           Review of Systems   Pertinent items are noted in HPI., otherwise negative.    Physical Exam  General appearance: alert, appears stated age and cooperative  Head: Normocephalic, without obvious abnormality, atraumatic  Eyes: Extraocular muscles intact, no icterus.  Neck: no adenopathy, no carotid bruit, no JVD, supple, symmetrical, trachea midline and Mild erythema over the catheter tract of the cath.  Patient and spouse say this is chronic.  Lungs: clear to auscultation bilaterally  Heart: Regular rate and rhythm, no murmur  Abdomen: soft, non-tender; bowel sounds normal; no masses,  no organomegaly  Extremities: Moves lower extremities    Skin: Skin color, texture, turgor normal. No rashes or lesions  Neurologic: Grossly normal    Pertinent Labs   CRP   Date Value Ref Range Status   08/19/2021 20.5 (H) 0.0-<0.8 mg/dL Final     Lab Results   Component Value Date    WBC 24.5 08/19/2021     Lab Results   Component Value Date    RBC 2.68 08/19/2021     Lab Results   Component Value Date    HGB 7.9 08/19/2021      Lab Results   Component Value Date    HCT 25.5 08/19/2021     Lab Results   Component Value Date    MCV 95 08/19/2021     Lab Results   Component Value Date    MCH 29.5 08/19/2021     Lab Results   Component Value Date    MCHC 31.0 08/19/2021     Lab Results   Component Value Date    RDW 16.7 08/19/2021     Lab Results   Component Value Date     08/19/2021     Last Comprehensive Metabolic Panel:  Sodium   Date Value Ref Range Status   07/26/2021 143 136 - 145 mmol/L Final     Comment:     Standard Replacement.     Potassium   Date Value Ref Range Status   07/28/2021 3.8 3.5 - 5.0 mmol/L Final     Comment:     Standard Replacement.     Chloride   Date Value Ref Range Status   07/26/2021 106 98 - 107 mmol/L Final     Comment:     Standard Replacement.     Carbon Dioxide (CO2)   Date Value Ref Range Status   07/26/2021 27 22 - 31 mmol/L Final     Comment:     Standard Replacement.     Anion Gap   Date Value Ref Range Status   07/26/2021 10 5 - 18 mmol/L Final     Glucose   Date Value Ref Range Status   07/26/2021 128 (H) 70 - 125 mg/dL Final     Comment:     Standard Replacement.     GLUCOSE BY METER POCT   Date Value Ref Range Status   07/28/2021 126 (H) 70 - 125 mg/dL Final     Urea Nitrogen   Date Value Ref Range Status   07/26/2021 7 (L) 8 - 22 mg/dL Final     Comment:     Standard Replacement.     Creatinine   Date Value Ref Range Status   08/19/2021 0.64 0.60 - 1.10 mg/dL Final     GFR Estimate   Date Value Ref Range Status   08/19/2021 >90 >60 mL/min/1.73m2 Final     Comment:     As of July 11, 2021, eGFR is calculated by the CKD-EPI creatinine equation, without race adjustment. eGFR can be influenced by muscle mass, exercise, and diet. The reported eGFR is an estimation only and is only applicable if the renal function is stable.   12/09/2020 >60 >60 mL/min/1.73m2 Final     Calcium   Date Value Ref Range Status   07/26/2021 9.1 8.5 - 10.5 mg/dL Final     Comment:     Standard Replacement.        Pertinent Radiology   Radiology Results:        Show images for MR Pelvis Bone wo & w Contrast   Study Result    Narrative & Impression   EXAM: MRI PELVIS WITHOUT AND WITH IV CONTRAST  LOCATION: [facility]  DATE/TIME: 8/19/2021 8:42 PM     INDICATION: Right posterior paraspinal muscle abscess and left parasymphysis abscess status post aspiration 07/23/2021. Breast cancer metastatic to bone and bone marrow.  COMPARISON: 08/05/2021 PET/CT, 07/23/2021 CT-guided aspiration, 07/21/2021 pelvic MRI, and 07/19/2021 CT CAP.     TECHNIQUE: Routine MRI pelvis without and with IV contrast. Axial, sagittal, and coronal high-resolution T2 and post gadolinium T1 with fat saturation.   CONTRAST: 9 mL Gadavist.     FINDINGS: Extensive patient motion artifact. Diffuse skeletal metastases and fractures of the left superior and inferior pubic rami and both sacral ala again seen.     The left superior pubic ramus fracture site fluid collection has decreased from 4.0 x 1.5 cm to 1.5 x 1.0 cm (image 20 series 5).     The left inferior pubic ramus fracture site fluid collection has decreased from 4.0 x 3.0 cm to 3.0 x 1.0 cm today (image 25). Persistent edema and enhancement in the adjacent obturator externus and obturator internus musculature.     The right sacral alar fracture site fluid collection has decreased from 5.0 x 4.5 cm to 4.0 x 3.5 cm today (image 12) and its inferolateral extension into the right superior gluteal musculature has decreased from 6.5 x 2.7 cm to 6.0 x 1.2 cm (image 16).     Partially visualized right posterior paraspinal musculature fluid collection better demonstrated at same day 8/19/2021 MRI lumbar spine.                                                                      IMPRESSION:  1.  Diffuse skeletal metastases and fractures of the left superior and inferior pubic rami and both sacral ala again seen.  2.  Left superior and inferior pubic rami fracture site fluid  collections have decreased in size.  3.  Right sacral

## 2021-08-26 ENCOUNTER — LAB REQUISITION (OUTPATIENT)
Dept: LAB | Facility: HOSPITAL | Age: 66
End: 2021-08-26
Payer: COMMERCIAL

## 2021-08-26 ENCOUNTER — TELEPHONE (OUTPATIENT)
Dept: INFECTIOUS DISEASES | Facility: CLINIC | Age: 66
End: 2021-08-26

## 2021-08-26 DIAGNOSIS — G06.1 INTRASPINAL ABSCESS AND GRANULOMA: ICD-10-CM

## 2021-08-26 DIAGNOSIS — B95.61 METHICILLIN SUSCEPTIBLE STAPHYLOCOCCUS AUREUS INFECTION AS THE CAUSE OF DISEASES CLASSIFIED ELSEWHERE: ICD-10-CM

## 2021-08-26 LAB
AST SERPL W P-5'-P-CCNC: 28 U/L (ref 0–40)
BASOPHILS # BLD MANUAL: 0.1 10E3/UL (ref 0–0.2)
BASOPHILS NFR BLD MANUAL: 1 %
C REACTIVE PROTEIN LHE: 12.3 MG/DL (ref 0–0.8)
CREAT SERPL-MCNC: 0.62 MG/DL (ref 0.6–1.1)
EOSINOPHIL # BLD MANUAL: 0.4 10E3/UL (ref 0–0.7)
EOSINOPHIL NFR BLD MANUAL: 7 %
ERYTHROCYTE [DISTWIDTH] IN BLOOD BY AUTOMATED COUNT: 17.2 % (ref 10–15)
GFR SERPL CREATININE-BSD FRML MDRD: >90 ML/MIN/1.73M2
HCT VFR BLD AUTO: 24.2 % (ref 35–47)
HGB BLD-MCNC: 7.3 G/DL (ref 11.7–15.7)
LYMPHOCYTES # BLD MANUAL: 0.2 10E3/UL (ref 0.8–5.3)
LYMPHOCYTES NFR BLD MANUAL: 4 %
MCH RBC QN AUTO: 28.3 PG (ref 26.5–33)
MCHC RBC AUTO-ENTMCNC: 30.2 G/DL (ref 31.5–36.5)
MCV RBC AUTO: 94 FL (ref 78–100)
MONOCYTES # BLD MANUAL: 0.1 10E3/UL (ref 0–1.3)
MONOCYTES NFR BLD MANUAL: 1 %
NEUTROPHILS # BLD MANUAL: 4.9 10E3/UL (ref 1.6–8.3)
NEUTROPHILS NFR BLD MANUAL: 87 %
PLAT MORPH BLD: ABNORMAL
PLATELET # BLD AUTO: 133 10E3/UL (ref 150–450)
POLYCHROMASIA BLD QL SMEAR: SLIGHT
RBC # BLD AUTO: 2.58 10E6/UL (ref 3.8–5.2)
RBC MORPH BLD: ABNORMAL
WBC # BLD AUTO: 5.6 10E3/UL (ref 4–11)

## 2021-08-26 PROCEDURE — 86141 C-REACTIVE PROTEIN HS: CPT | Mod: ORL

## 2021-08-26 PROCEDURE — 85027 COMPLETE CBC AUTOMATED: CPT | Mod: ORL

## 2021-08-26 PROCEDURE — 80150 ASSAY OF AMIKACIN: CPT | Mod: ORL

## 2021-08-26 PROCEDURE — 36415 COLL VENOUS BLD VENIPUNCTURE: CPT | Mod: ORL

## 2021-08-26 PROCEDURE — 84450 TRANSFERASE (AST) (SGOT): CPT | Mod: ORL

## 2021-08-26 PROCEDURE — 82565 ASSAY OF CREATININE: CPT | Mod: ORL

## 2021-08-26 NOTE — TELEPHONE ENCOUNTER
Home care RN Linda called reporting labs drawn about 30 minutes after pt's 2pm dose today. FYI on [facility] level.    RN #[phone]

## 2021-08-27 LAB — AMIKACIN SERPL-MCNC: <3 MG/L

## 2021-09-02 ENCOUNTER — LAB REQUISITION (OUTPATIENT)
Dept: LAB | Facility: CLINIC | Age: 66
End: 2021-09-02
Payer: COMMERCIAL

## 2021-09-02 DIAGNOSIS — B95.61 METHICILLIN SUSCEPTIBLE STAPHYLOCOCCUS AUREUS INFECTION AS THE CAUSE OF DISEASES CLASSIFIED ELSEWHERE: ICD-10-CM

## 2021-09-02 DIAGNOSIS — G06.1 INTRASPINAL ABSCESS AND GRANULOMA: ICD-10-CM

## 2021-09-02 LAB
AST SERPL W P-5'-P-CCNC: 20 U/L (ref 0–40)
BASOPHILS # BLD AUTO: 0 10E3/UL (ref 0–0.2)
BASOPHILS NFR BLD AUTO: 1 %
C REACTIVE PROTEIN LHE: 10.6 MG/DL (ref 0–0.8)
CK SERPL-CCNC: 125 U/L (ref 30–190)
EOSINOPHIL # BLD AUTO: 0.1 10E3/UL (ref 0–0.7)
EOSINOPHIL NFR BLD AUTO: 2 %
ERYTHROCYTE [DISTWIDTH] IN BLOOD BY AUTOMATED COUNT: 17.6 % (ref 10–15)
HCT VFR BLD AUTO: 26.7 % (ref 35–47)
HGB BLD-MCNC: 8.2 G/DL (ref 11.7–15.7)
IMM GRANULOCYTES # BLD: 0.1 10E3/UL
IMM GRANULOCYTES NFR BLD: 1 %
LYMPHOCYTES # BLD AUTO: 0.4 10E3/UL (ref 0.8–5.3)
LYMPHOCYTES NFR BLD AUTO: 7 %
MCH RBC QN AUTO: 28.7 PG (ref 26.5–33)
MCHC RBC AUTO-ENTMCNC: 30.7 G/DL (ref 31.5–36.5)
MCV RBC AUTO: 93 FL (ref 78–100)
MONOCYTES # BLD AUTO: 0.5 10E3/UL (ref 0–1.3)
MONOCYTES NFR BLD AUTO: 8 %
NEUTROPHILS # BLD AUTO: 4.5 10E3/UL (ref 1.6–8.3)
NEUTROPHILS NFR BLD AUTO: 81 %
NRBC # BLD AUTO: 0 10E3/UL
NRBC BLD AUTO-RTO: 0 /100
PLATELET # BLD AUTO: 150 10E3/UL (ref 150–450)
RBC # BLD AUTO: 2.86 10E6/UL (ref 3.8–5.2)
WBC # BLD AUTO: 5.5 10E3/UL (ref 4–11)

## 2021-09-02 PROCEDURE — 85025 COMPLETE CBC W/AUTO DIFF WBC: CPT | Mod: ORL

## 2021-09-02 PROCEDURE — 82550 ASSAY OF CK (CPK): CPT | Mod: ORL

## 2021-09-02 PROCEDURE — 86141 C-REACTIVE PROTEIN HS: CPT | Mod: ORL

## 2021-09-02 PROCEDURE — 84450 TRANSFERASE (AST) (SGOT): CPT | Mod: ORL

## 2021-09-09 ENCOUNTER — LAB REQUISITION (OUTPATIENT)
Dept: LAB | Facility: HOSPITAL | Age: 66
End: 2021-09-09
Payer: COMMERCIAL

## 2021-09-09 DIAGNOSIS — B95.61 METHICILLIN SUSCEPTIBLE STAPHYLOCOCCUS AUREUS INFECTION AS THE CAUSE OF DISEASES CLASSIFIED ELSEWHERE: ICD-10-CM

## 2021-09-09 DIAGNOSIS — G06.1 INTRASPINAL ABSCESS AND GRANULOMA: ICD-10-CM

## 2021-09-09 LAB
AST SERPL W P-5'-P-CCNC: 26 U/L (ref 0–40)
BASOPHILS # BLD AUTO: 0 10E3/UL (ref 0–0.2)
BASOPHILS NFR BLD AUTO: 1 %
C REACTIVE PROTEIN LHE: 9.1 MG/DL (ref 0–0.8)
CREAT SERPL-MCNC: 0.55 MG/DL (ref 0.6–1.1)
EOSINOPHIL # BLD AUTO: 0.2 10E3/UL (ref 0–0.7)
EOSINOPHIL NFR BLD AUTO: 2 %
ERYTHROCYTE [DISTWIDTH] IN BLOOD BY AUTOMATED COUNT: 17.6 % (ref 10–15)
GFR SERPL CREATININE-BSD FRML MDRD: >90 ML/MIN/1.73M2
HCT VFR BLD AUTO: 26.6 % (ref 35–47)
HGB BLD-MCNC: 7.9 G/DL (ref 11.7–15.7)
IMM GRANULOCYTES # BLD: 0.1 10E3/UL
IMM GRANULOCYTES NFR BLD: 1 %
LYMPHOCYTES # BLD AUTO: 0.6 10E3/UL (ref 0.8–5.3)
LYMPHOCYTES NFR BLD AUTO: 7 %
MCH RBC QN AUTO: 27.9 PG (ref 26.5–33)
MCHC RBC AUTO-ENTMCNC: 29.7 G/DL (ref 31.5–36.5)
MCV RBC AUTO: 94 FL (ref 78–100)
MONOCYTES # BLD AUTO: 1.5 10E3/UL (ref 0–1.3)
MONOCYTES NFR BLD AUTO: 17 %
NEUTROPHILS # BLD AUTO: 6.4 10E3/UL (ref 1.6–8.3)
NEUTROPHILS NFR BLD AUTO: 72 %
NRBC # BLD AUTO: 0 10E3/UL
NRBC BLD AUTO-RTO: 0 /100
PLATELET # BLD AUTO: 322 10E3/UL (ref 150–450)
RBC # BLD AUTO: 2.83 10E6/UL (ref 3.8–5.2)
WBC # BLD AUTO: 8.8 10E3/UL (ref 4–11)

## 2021-09-09 PROCEDURE — 85025 COMPLETE CBC W/AUTO DIFF WBC: CPT | Mod: ORL

## 2021-09-09 PROCEDURE — 82565 ASSAY OF CREATININE: CPT | Mod: ORL

## 2021-09-09 PROCEDURE — 36415 COLL VENOUS BLD VENIPUNCTURE: CPT | Mod: ORL

## 2021-09-09 PROCEDURE — 84450 TRANSFERASE (AST) (SGOT): CPT | Mod: ORL

## 2021-09-09 PROCEDURE — 86141 C-REACTIVE PROTEIN HS: CPT | Mod: ORL

## 2021-09-14 ENCOUNTER — HOSPITAL ENCOUNTER (OUTPATIENT)
Dept: MRI IMAGING | Facility: CLINIC | Age: 66
Discharge: HOME | End: 2021-09-14
Payer: COMMERCIAL

## 2021-09-14 DIAGNOSIS — M46.20 PARASPINAL ABSCESS (H): ICD-10-CM

## 2021-09-14 PROCEDURE — A9585 GADOBUTROL INJECTION: HCPCS

## 2021-09-14 PROCEDURE — 72197 MRI PELVIS W/O & W/DYE: CPT

## 2021-09-14 PROCEDURE — 255N000002 HC RX 255 OP 636

## 2021-09-14 RX ORDER — GADOBUTROL 604.72 MG/ML
7 INJECTION INTRAVENOUS ONCE
Status: COMPLETED | OUTPATIENT
Start: 2021-09-14 | End: 2021-09-14

## 2021-09-14 RX ADMIN — GADOBUTROL 7 ML: 604.72 INJECTION INTRAVENOUS at 17:33

## 2021-09-16 ENCOUNTER — LAB REQUISITION (OUTPATIENT)
Dept: LAB | Facility: CLINIC | Age: 66
End: 2021-09-16
Payer: COMMERCIAL

## 2021-09-16 DIAGNOSIS — B95.61 METHICILLIN SUSCEPTIBLE STAPHYLOCOCCUS AUREUS INFECTION AS THE CAUSE OF DISEASES CLASSIFIED ELSEWHERE: ICD-10-CM

## 2021-09-16 LAB
AST SERPL W P-5'-P-CCNC: 24 U/L (ref 0–40)
BASOPHILS # BLD AUTO: 0.1 10E3/UL (ref 0–0.2)
BASOPHILS NFR BLD AUTO: 1 %
C REACTIVE PROTEIN LHE: 10.6 MG/DL (ref 0–0.8)
CREAT SERPL-MCNC: 0.58 MG/DL (ref 0.6–1.1)
EOSINOPHIL # BLD AUTO: 0.1 10E3/UL (ref 0–0.7)
EOSINOPHIL NFR BLD AUTO: 2 %
ERYTHROCYTE [DISTWIDTH] IN BLOOD BY AUTOMATED COUNT: 18.2 % (ref 10–15)
GFR SERPL CREATININE-BSD FRML MDRD: >90 ML/MIN/1.73M2
HCT VFR BLD AUTO: 26 % (ref 35–47)
HGB BLD-MCNC: 7.8 G/DL (ref 11.7–15.7)
IMM GRANULOCYTES # BLD: 0.1 10E3/UL
IMM GRANULOCYTES NFR BLD: 1 %
LYMPHOCYTES # BLD AUTO: 0.7 10E3/UL (ref 0.8–5.3)
LYMPHOCYTES NFR BLD AUTO: 8 %
MCH RBC QN AUTO: 27.9 PG (ref 26.5–33)
MCHC RBC AUTO-ENTMCNC: 30 G/DL (ref 31.5–36.5)
MCV RBC AUTO: 93 FL (ref 78–100)
MONOCYTES # BLD AUTO: 1 10E3/UL (ref 0–1.3)
MONOCYTES NFR BLD AUTO: 12 %
NEUTROPHILS # BLD AUTO: 6.4 10E3/UL (ref 1.6–8.3)
NEUTROPHILS NFR BLD AUTO: 76 %
NRBC # BLD AUTO: 0 10E3/UL
NRBC BLD AUTO-RTO: 0 /100
PLATELET # BLD AUTO: 388 10E3/UL (ref 150–450)
RBC # BLD AUTO: 2.8 10E6/UL (ref 3.8–5.2)
WBC # BLD AUTO: 8.4 10E3/UL (ref 4–11)

## 2021-09-16 PROCEDURE — 86141 C-REACTIVE PROTEIN HS: CPT | Mod: ORL

## 2021-09-16 PROCEDURE — 82565 ASSAY OF CREATININE: CPT | Mod: ORL

## 2021-09-16 PROCEDURE — 85025 COMPLETE CBC W/AUTO DIFF WBC: CPT | Mod: ORL

## 2021-09-16 PROCEDURE — 84450 TRANSFERASE (AST) (SGOT): CPT | Mod: ORL

## 2021-09-20 ENCOUNTER — OFFICE VISIT (OUTPATIENT)
Dept: INFECTIOUS DISEASES | Facility: CLINIC | Age: 66
End: 2021-09-20
Payer: COMMERCIAL

## 2021-09-20 VITALS — SYSTOLIC BLOOD PRESSURE: 110 MMHG | DIASTOLIC BLOOD PRESSURE: 64 MMHG | TEMPERATURE: 98.9 F | HEART RATE: 100 BPM

## 2021-09-20 DIAGNOSIS — M46.20 PARASPINAL ABSCESS (H): Primary | ICD-10-CM

## 2021-09-20 PROCEDURE — 99214 OFFICE O/P EST MOD 30 MIN: CPT

## 2021-09-20 RX ORDER — CEFAZOLIN SODIUM 1 G/3ML
INJECTION, POWDER, FOR SOLUTION INTRAMUSCULAR; INTRAVENOUS
COMMUNITY
Start: 2021-09-13 | End: 2021-10-11

## 2021-09-20 RX ORDER — CEPHALEXIN 500 MG/1
1000 CAPSULE ORAL 3 TIMES DAILY
Qty: 168 CAPSULE | Refills: 0 | Status: SHIPPED | OUTPATIENT
Start: 2021-09-20 | End: 2021-10-11

## 2021-09-20 NOTE — PROGRESS NOTES
"Infectious Disease Progress Note    Assessment/Plan  Impression: Widely metastatic breast cancer on salvage chemotherapy as well as certainly deferred radiation therapy    MSSA pelvic abscesses, not as much improvement on the last MRI as the previous one. She may have a hit the maximal expected benefit of IV antibiotics.    Recommendations:     Change to oral cephalexin 1000 mg p.o. 3 times daily for 4 weeks.    Can decannulate her Port-A-Cath.    Follow-up with me in 3 weeks.    I have no objection to chemotherapy, as I do not think this is going to be a \"curable\" infection. We are aiming for suppression or control at this point    RIDGE BARAJAS MD  [phone]      Subjective  She says she is still having a bit more constant pain than she did before.    She seems mentally more clear and she says she is eating well.    Rating IV antibiotics without difficulties.  Has had some constipation.  Objective    Vital signs in last 24 hours  Temp:  [98.9  F (37.2  C)] 98.9  F (37.2  C)  Pulse:  [100] 100  BP: (110)/(64) 110/64  Wt Readings from Last 3 Encounters:   08/23/21 70.8 kg (156 lb)   07/25/21 92.5 kg (204 lb)   04/09/18 98.7 kg (217 lb 11.2 oz)           Review of Systems   Pertinent items are noted in HPI., otherwise negative.    Physical Exam  General appearance: alert, appears stated age and cooperative  Head: Normocephalic, without obvious abnormality, atraumatic  Eyes: Extraocular muscles intact, no icterus.  Neck: no adenopathy, no carotid bruit, no JVD, supple, symmetrical, trachea midline and no erythema over the catheter tract of the cath.  Patient and spouse say this is chronic.  Lungs: clear to auscultation bilaterally  Heart: Regular rate and rhythm, no murmur  Abdomen: soft, non-tender; bowel sounds normal; no masses,  no organomegaly  Extremities: Moves lower extremities    Skin: Skin color, texture, turgor normal. No rashes or lesions  Neurologic: Grossly normal    Pertinent Labs   CRP   Date " Value Ref Range Status   09/16/2021 10.6 (H) 0.0-<0.8 mg/dL Final     Lab Results   Component Value Date    WBC 8.4 09/16/2021     Lab Results   Component Value Date    RBC 2.80 09/16/2021     Lab Results   Component Value Date    HGB 7.8 09/16/2021     Lab Results   Component Value Date    HCT 26.0 09/16/2021     Lab Results   Component Value Date    MCV 93 09/16/2021     Lab Results   Component Value Date    MCH 27.9 09/16/2021     Lab Results   Component Value Date    MCHC 30.0 09/16/2021     Lab Results   Component Value Date    RDW 18.2 09/16/2021     Lab Results   Component Value Date     09/16/2021       Last Comprehensive Metabolic Panel:  Sodium   Date Value Ref Range Status   07/26/2021 143 136 - 145 mmol/L Final     Comment:     Standard Replacement.     Potassium   Date Value Ref Range Status   07/28/2021 3.8 3.5 - 5.0 mmol/L Final     Comment:     Standard Replacement.     Chloride   Date Value Ref Range Status   07/26/2021 106 98 - 107 mmol/L Final     Comment:     Standard Replacement.     Carbon Dioxide (CO2)   Date Value Ref Range Status   07/26/2021 27 22 - 31 mmol/L Final     Comment:     Standard Replacement.     Anion Gap   Date Value Ref Range Status   07/26/2021 10 5 - 18 mmol/L Final     Glucose   Date Value Ref Range Status   07/26/2021 128 (H) 70 - 125 mg/dL Final     Comment:     Standard Replacement.     GLUCOSE BY METER POCT   Date Value Ref Range Status   07/28/2021 126 (H) 70 - 125 mg/dL Final     Urea Nitrogen   Date Value Ref Range Status   07/26/2021 7 (L) 8 - 22 mg/dL Final     Comment:     Standard Replacement.     Creatinine   Date Value Ref Range Status   09/16/2021 0.58 (L) 0.60 - 1.10 mg/dL Final     GFR Estimate   Date Value Ref Range Status   09/16/2021 >90 >60 mL/min/1.73m2 Final     Comment:     As of July 11, 2021, eGFR is calculated by the CKD-EPI creatinine equation, without race adjustment. eGFR can be influenced by muscle mass, exercise, and diet. The reported  eGFR is an estimation only and is only applicable if the renal function is stable.   12/09/2020 >60 >60 mL/min/1.73m2 Final     Calcium   Date Value Ref Range Status   07/26/2021 9.1 8.5 - 10.5 mg/dL Final     Comment:     Standard Replacement.       Pertinent Radiology   Radiology Results:       MR Pelvis Bone wo & w Contrast    Result Date: 9/15/2021  EXAM: MRI PELVIS WITHOUT AND WITH IV CONTRAST LOCATION: [facility] DATE/TIME: 9/14/2021 4:54 PM INDICATION: Bone lesion, pelvis, breast cancer. Pelvic pain. COMPARISON: 8/19/2021 MRI and 7/19/2021 CT. TECHNIQUE: Routine MRI pelvis without and with IV contrast. Axial, sagittal, and coronal high-resolution T2 and post gadolinium T1 with fat saturation. CONTRAST: 7 mL Gadavist given. FINDINGS: Multiple marrow replacing lesions in the lower lumbar spine, pelvis and both proximal femurs compatible with the patient's known metastatic disease. There are chronic ununited moderately displaced fractures of both sacral ala and the left superior and inferior pubic rami. There is a small fluid collection associated with the fracture of the left inferior pubic ramus-this collection is mildly smaller than on the prior 7/19/2021 CT. There is a moderate-sized fluid collection associated with the right sacral ala fracture-this collection extends inferiorly and laterally through the sciatic notch along the posterolateral margin of the right hip. This collection is slightly smaller than  on the prior MRI. It previously measured 4.0 cm x 3.5 cm and now measures approximately 2.8 cm transverse by 2.5 cm AP. There are small peripherally enhancing fluid collections in the inferior portion of the posterior paraspinal musculature bilaterally-these have mildly decreased in size since the prior 8/19/2021 MRI. There is some enhancing intramuscular T2 signal within the left short adductors obturator musculature adjacent the inferior pubic ramus fracture as well as in the  right gluteus medius and minimus musculature adjacent to the fluid collection and right iliopsoas musculature as well as the left gluteus medius and minimus muscles. Pelvic intraperitoneal structures normal.     IMPRESSION: 1.  Extensive skeletal metastatic disease. 2.  Chronic ununited moderately displaced fractures of both sacral ala and the left superior and inferior pubic rami. 3.  The fluid collections associated with the right sacral ala and left inferior pubic ramus fracture have mildly decreased in size since the prior 8/19/2021 MRI. 4.  The paraspinal collections have mildly decreased in size as well. 5.  Multifocal areas of abnormal intramuscular T2 signal are stable.

## 2021-10-10 ENCOUNTER — HEALTH MAINTENANCE LETTER (OUTPATIENT)
Age: 66
End: 2021-10-10

## 2021-10-11 ENCOUNTER — OFFICE VISIT (OUTPATIENT)
Dept: INFECTIOUS DISEASES | Facility: CLINIC | Age: 66
End: 2021-10-11
Payer: COMMERCIAL

## 2021-10-11 VITALS — HEART RATE: 100 BPM | SYSTOLIC BLOOD PRESSURE: 108 MMHG | DIASTOLIC BLOOD PRESSURE: 56 MMHG | TEMPERATURE: 98.8 F

## 2021-10-11 DIAGNOSIS — M46.20 PARASPINAL ABSCESS (H): ICD-10-CM

## 2021-10-11 DIAGNOSIS — L30.4 INTERTRIGO: Primary | ICD-10-CM

## 2021-10-11 PROCEDURE — 99213 OFFICE O/P EST LOW 20 MIN: CPT

## 2021-10-11 RX ORDER — CEPHALEXIN 500 MG/1
500 CAPSULE ORAL 3 TIMES DAILY
Qty: 168 CAPSULE | Refills: 0 | Status: SHIPPED | OUTPATIENT
Start: 2021-10-11 | End: 2021-01-01

## 2021-10-11 RX ORDER — DULOXETIN HYDROCHLORIDE 60 MG/1
CAPSULE, DELAYED RELEASE ORAL
COMMUNITY
Start: 2021-09-27 | End: 2021-11-08

## 2021-10-11 RX ORDER — NYSTATIN 100000 U/G
CREAM TOPICAL 2 TIMES DAILY
Qty: 30 G | Refills: 1 | Status: ON HOLD | OUTPATIENT
Start: 2021-10-11 | End: 2022-01-01

## 2021-10-11 RX ORDER — OLANZAPINE 5 MG/1
5 TABLET ORAL EVERY EVENING
COMMUNITY
Start: 2021-09-21

## 2021-10-11 RX ORDER — OXYBUTYNIN CHLORIDE 10 MG/1
10 TABLET, EXTENDED RELEASE ORAL AT BEDTIME
Status: ON HOLD | COMMUNITY
Start: 2021-10-08 | End: 2022-01-01

## 2021-10-11 RX ORDER — PROCHLORPERAZINE MALEATE 10 MG
TABLET ORAL
COMMUNITY
Start: 2021-05-03 | End: 2021-01-01

## 2021-10-11 NOTE — PROGRESS NOTES
Infectious Disease Progress Note    Assessment/Plan  Impression: Widely metastatic breast cancer on doxorubicin chemotherapy      MSSA pelvic abscesses, not as much improvement on the last MRI as the previous one. She may have a hit the maximal expected benefit of IV antibiotics.    Pain continues to go away, most recently on the 1000 mg of cephalexin 3 times daily    Developed intertrigo    Recommendations:     Change to oral cephalexin 500 mg p.o. 3 times daily for 4 weeks.    Nystatin cream for intertrigo    Follow-up with me in 4 weeks.        RIDGE BARAJAS MD  [phone]      Subjective  She says her pain is much better.  She is requiring less narcotics.    She has developed a new rash on the anterior digitus areas around her belly and groin.    Rating IV antibiotics without difficulties.  Has had some constipation.  Objective    Vital signs in last 24 hours  Temp:  [98.8  F (37.1  C)] 98.8  F (37.1  C)  Pulse:  [100] 100  BP: (108)/(56) 108/56  Wt Readings from Last 3 Encounters:   08/23/21 70.8 kg (156 lb)   07/25/21 92.5 kg (204 lb)   04/09/18 98.7 kg (217 lb 11.2 oz)           Review of Systems   Pertinent items are noted in HPI., otherwise negative.    Physical Exam  General appearance: alert, appears stated age and cooperative  Head: Normocephalic, without obvious abnormality, atraumatic  Eyes: Extraocular muscles intact, no icterus.  Neck: no adenopathy, no carotid bruit, no JVD, supple, symmetrical, trachea midline and no erythema over the catheter tract of the cath.  Patient and spouse say this is chronic.  Lungs: Normal respiratory pattern  Extremities: Moves lower extremities    Skin:  Intertrigo noted in lower abdomen and groin  Neurologic: Grossly normal    Pertinent Labs   CRP   Date Value Ref Range Status   09/16/2021 10.6 (H) 0.0-<0.8 mg/dL Final     Lab Results   Component Value Date    WBC 8.4 09/16/2021     Lab Results   Component Value Date    RBC 2.80 09/16/2021     Lab Results    Component Value Date    HGB 7.8 09/16/2021     Lab Results   Component Value Date    HCT 26.0 09/16/2021     Lab Results   Component Value Date    MCV 93 09/16/2021     Lab Results   Component Value Date    MCH 27.9 09/16/2021     Lab Results   Component Value Date    MCHC 30.0 09/16/2021     Lab Results   Component Value Date    RDW 18.2 09/16/2021     Lab Results   Component Value Date     09/16/2021       Last Comprehensive Metabolic Panel:  Sodium   Date Value Ref Range Status   07/26/2021 143 136 - 145 mmol/L Final     Comment:     Standard Replacement.     Potassium   Date Value Ref Range Status   07/28/2021 3.8 3.5 - 5.0 mmol/L Final     Comment:     Standard Replacement.     Chloride   Date Value Ref Range Status   07/26/2021 106 98 - 107 mmol/L Final     Comment:     Standard Replacement.     Carbon Dioxide (CO2)   Date Value Ref Range Status   07/26/2021 27 22 - 31 mmol/L Final     Comment:     Standard Replacement.     Anion Gap   Date Value Ref Range Status   07/26/2021 10 5 - 18 mmol/L Final     Glucose   Date Value Ref Range Status   07/26/2021 128 (H) 70 - 125 mg/dL Final     Comment:     Standard Replacement.     GLUCOSE BY METER POCT   Date Value Ref Range Status   07/28/2021 126 (H) 70 - 125 mg/dL Final     Urea Nitrogen   Date Value Ref Range Status   07/26/2021 7 (L) 8 - 22 mg/dL Final     Comment:     Standard Replacement.     Creatinine   Date Value Ref Range Status   09/16/2021 0.58 (L) 0.60 - 1.10 mg/dL Final     GFR Estimate   Date Value Ref Range Status   09/16/2021 >90 >60 mL/min/1.73m2 Final     Comment:     As of July 11, 2021, eGFR is calculated by the CKD-EPI creatinine equation, without race adjustment. eGFR can be influenced by muscle mass, exercise, and diet. The reported eGFR is an estimation only and is only applicable if the renal function is stable.   12/09/2020 >60 >60 mL/min/1.73m2 Final     Calcium   Date Value Ref Range Status   07/26/2021 9.1 8.5 - 10.5 mg/dL Final      Comment:     Standard Replacement.       Pertinent Radiology   Radiology Results:       MR Pelvis Bone wo & w Contrast    Result Date: 9/15/2021  EXAM: MRI PELVIS WITHOUT AND WITH IV CONTRAST LOCATION: [facility] DATE/TIME: 9/14/2021 4:54 PM INDICATION: Bone lesion, pelvis, breast cancer. Pelvic pain. COMPARISON: 8/19/2021 MRI and 7/19/2021 CT. TECHNIQUE: Routine MRI pelvis without and with IV contrast. Axial, sagittal, and coronal high-resolution T2 and post gadolinium T1 with fat saturation. CONTRAST: 7 mL Gadavist given. FINDINGS: Multiple marrow replacing lesions in the lower lumbar spine, pelvis and both proximal femurs compatible with the patient's known metastatic disease. There are chronic ununited moderately displaced fractures of both sacral ala and the left superior and inferior pubic rami. There is a small fluid collection associated with the fracture of the left inferior pubic ramus-this collection is mildly smaller than on the prior 7/19/2021 CT. There is a moderate-sized fluid collection associated with the right sacral ala fracture-this collection extends inferiorly and laterally through the sciatic notch along the posterolateral margin of the right hip. This collection is slightly smaller than  on the prior MRI. It previously measured 4.0 cm x 3.5 cm and now measures approximately 2.8 cm transverse by 2.5 cm AP. There are small peripherally enhancing fluid collections in the inferior portion of the posterior paraspinal musculature bilaterally-these have mildly decreased in size since the prior 8/19/2021 MRI. There is some enhancing intramuscular T2 signal within the left short adductors obturator musculature adjacent the inferior pubic ramus fracture as well as in the right gluteus medius and minimus musculature adjacent to the fluid collection and right iliopsoas musculature as well as the left gluteus medius and minimus muscles. Pelvic intraperitoneal structures  normal.     IMPRESSION: 1.  Extensive skeletal metastatic disease. 2.  Chronic ununited moderately displaced fractures of both sacral ala and the left superior and inferior pubic rami. 3.  The fluid collections associated with the right sacral ala and left inferior pubic ramus fracture have mildly decreased in size since the prior 8/19/2021 MRI. 4.  The paraspinal collections have mildly decreased in size as well. 5.  Multifocal areas of abnormal intramuscular T2 signal are stable.

## 2021-11-08 ENCOUNTER — LAB (OUTPATIENT)
Dept: LAB | Facility: CLINIC | Age: 66
End: 2021-11-08
Payer: COMMERCIAL

## 2021-11-08 ENCOUNTER — OFFICE VISIT (OUTPATIENT)
Dept: INFECTIOUS DISEASES | Facility: CLINIC | Age: 66
End: 2021-11-08
Payer: COMMERCIAL

## 2021-11-08 VITALS
TEMPERATURE: 98.2 F | DIASTOLIC BLOOD PRESSURE: 56 MMHG | BODY MASS INDEX: 24.63 KG/M2 | HEART RATE: 90 BPM | WEIGHT: 148 LBS | SYSTOLIC BLOOD PRESSURE: 108 MMHG

## 2021-11-08 DIAGNOSIS — M46.20 PARASPINAL ABSCESS (H): ICD-10-CM

## 2021-11-08 DIAGNOSIS — M46.20 PARASPINAL ABSCESS (H): Primary | ICD-10-CM

## 2021-11-08 LAB — C REACTIVE PROTEIN LHE: 7.4 MG/DL (ref 0–0.8)

## 2021-11-08 PROCEDURE — 36415 COLL VENOUS BLD VENIPUNCTURE: CPT

## 2021-11-08 PROCEDURE — 87070 CULTURE OTHR SPECIMN AEROBIC: CPT

## 2021-11-08 PROCEDURE — 86141 C-REACTIVE PROTEIN HS: CPT

## 2021-11-08 PROCEDURE — 99214 OFFICE O/P EST MOD 30 MIN: CPT

## 2021-11-08 NOTE — PROGRESS NOTES
Infectious Disease Progress Note    Assessment/Plan  Impression: Widely metastatic breast cancer on doxorubicin chemotherapy      MSSA pelvic abscesses, not as much improvement on the last MRI as the previous one. She may have a hit the maximal expected benefit of IV antibiotics.    Still having pain near the biopsy site of her pelvic abscesses.    New development of blisters on the right foot, that look more like friction blisters.  One of them is unroofed and sent for Gram stain and culture.    Has a dental abscess for which she will have a root canal this afternoon.    Recommendations:     Wound culture sent from right great toe.    Check CRP today.    She will be getting a PET scan tomorrow.  I would like to know the results of the culture, CRP and PET scan before proceeding.        RIDGE BARAJAS MD  [phone]      Subjective  New complaints blisters on the right foot.  The started about a week ago.  She is a most prominent blister on the medial aspect of the left great toe.  She has a small blister on the lateral aspect of the foot near the fifth metatarsal phalangeal joint.  She has another blister near the first metatarsophalangeal joint.      Objective    Vital signs in last 24 hours  Temp:  [98.2  F (36.8  C)] 98.2  F (36.8  C)  Pulse:  [90] 90  BP: (108)/(56) 108/56  Wt Readings from Last 3 Encounters:   11/08/21 67.1 kg (148 lb)   08/23/21 70.8 kg (156 lb)   07/25/21 92.5 kg (204 lb)           Review of Systems   Pertinent items are noted in HPI., otherwise negative.    Physical Exam  General appearance: alert, appears stated age and cooperative  Head: Normocephalic, without obvious abnormality, atraumatic  Eyes: Extraocular muscles intact, no icterus.  Neck: no adenopathy, no carotid bruit, no JVD, supple, symmetrical, trachea midline and no erythema over the catheter tract of the cath.  Patient and spouse say this is chronic.  Lungs: Normal respiratory pattern  Extremities: Moves lower  extremities  Skin: Blister seen on the right great toe.  This was unroofed with a 21-gauge needle and some serosanguineous fluid sent for Gram stain and culture.    Neurologic: Grossly normal    Pertinent Labs   CRP   Date Value Ref Range Status   09/16/2021 10.6 (H) 0.0-<0.8 mg/dL Final     Lab Results   Component Value Date    WBC 8.4 09/16/2021     Lab Results   Component Value Date    RBC 2.80 09/16/2021     Lab Results   Component Value Date    HGB 7.8 09/16/2021     Lab Results   Component Value Date    HCT 26.0 09/16/2021     Lab Results   Component Value Date    MCV 93 09/16/2021     Lab Results   Component Value Date    MCH 27.9 09/16/2021     Lab Results   Component Value Date    MCHC 30.0 09/16/2021     Lab Results   Component Value Date    RDW 18.2 09/16/2021     Lab Results   Component Value Date     09/16/2021       Last Comprehensive Metabolic Panel:  Sodium   Date Value Ref Range Status   07/26/2021 143 136 - 145 mmol/L Final     Comment:     Standard Replacement.     Potassium   Date Value Ref Range Status   07/28/2021 3.8 3.5 - 5.0 mmol/L Final     Comment:     Standard Replacement.     Chloride   Date Value Ref Range Status   07/26/2021 106 98 - 107 mmol/L Final     Comment:     Standard Replacement.     Carbon Dioxide (CO2)   Date Value Ref Range Status   07/26/2021 27 22 - 31 mmol/L Final     Comment:     Standard Replacement.     Anion Gap   Date Value Ref Range Status   07/26/2021 10 5 - 18 mmol/L Final     Glucose   Date Value Ref Range Status   07/26/2021 128 (H) 70 - 125 mg/dL Final     Comment:     Standard Replacement.     GLUCOSE BY METER POCT   Date Value Ref Range Status   07/28/2021 126 (H) 70 - 125 mg/dL Final     Urea Nitrogen   Date Value Ref Range Status   07/26/2021 7 (L) 8 - 22 mg/dL Final     Comment:     Standard Replacement.     Creatinine   Date Value Ref Range Status   09/16/2021 0.58 (L) 0.60 - 1.10 mg/dL Final     GFR Estimate   Date Value Ref Range Status    09/16/2021 >90 >60 mL/min/1.73m2 Final     Comment:     As of July 11, 2021, eGFR is calculated by the CKD-EPI creatinine equation, without race adjustment. eGFR can be influenced by muscle mass, exercise, and diet. The reported eGFR is an estimation only and is only applicable if the renal function is stable.   12/09/2020 >60 >60 mL/min/1.73m2 Final     Calcium   Date Value Ref Range Status   07/26/2021 9.1 8.5 - 10.5 mg/dL Final     Comment:     Standard Replacement.       Pertinent Radiology   Radiology Results:     No results found.

## 2021-11-08 NOTE — PATIENT INSTRUCTIONS
When getting the PET scan on 11/9, please mention to the radiology tech that you had MRI scan at [facility] on 9/14, and ask the radiologist to compare.

## 2021-11-09 ENCOUNTER — TRANSFERRED RECORDS (OUTPATIENT)
Dept: HEALTH INFORMATION MANAGEMENT | Facility: CLINIC | Age: 66
End: 2021-11-09
Payer: COMMERCIAL

## 2021-11-10 LAB — BACTERIA WND CULT: NO GROWTH

## 2021-11-26 DIAGNOSIS — T45.1X5A ANTINEOPLASTIC CHEMOTHERAPY INDUCED ANEMIA: Primary | ICD-10-CM

## 2021-11-26 DIAGNOSIS — D64.81 ANTINEOPLASTIC CHEMOTHERAPY INDUCED ANEMIA: Primary | ICD-10-CM

## 2021-11-26 RX ORDER — HEPARIN SODIUM (PORCINE) LOCK FLUSH IV SOLN 100 UNIT/ML 100 UNIT/ML
5 SOLUTION INTRAVENOUS
Status: CANCELLED | OUTPATIENT
Start: 2021-11-26

## 2021-11-26 RX ORDER — HEPARIN SODIUM,PORCINE 10 UNIT/ML
5 VIAL (ML) INTRAVENOUS
Status: CANCELLED | OUTPATIENT
Start: 2021-11-26

## 2021-11-30 ENCOUNTER — INFUSION THERAPY VISIT (OUTPATIENT)
Dept: INFUSION THERAPY | Facility: HOSPITAL | Age: 66
End: 2021-11-30
Attending: PHYSICIAN ASSISTANT
Payer: COMMERCIAL

## 2021-11-30 VITALS
OXYGEN SATURATION: 97 % | TEMPERATURE: 98.3 F | DIASTOLIC BLOOD PRESSURE: 67 MMHG | HEART RATE: 95 BPM | SYSTOLIC BLOOD PRESSURE: 124 MMHG | RESPIRATION RATE: 18 BRPM

## 2021-11-30 DIAGNOSIS — D64.81 ANTINEOPLASTIC CHEMOTHERAPY INDUCED ANEMIA: Primary | ICD-10-CM

## 2021-11-30 DIAGNOSIS — T45.1X5A ANTINEOPLASTIC CHEMOTHERAPY INDUCED ANEMIA: Primary | ICD-10-CM

## 2021-11-30 LAB
ABO/RH(D): NORMAL
ABO/RH(D): NORMAL
ANTIBODY SCREEN: NEGATIVE
BLD PROD TYP BPU: NORMAL
BLD PROD TYP BPU: NORMAL
BLOOD COMPONENT TYPE: NORMAL
BLOOD COMPONENT TYPE: NORMAL
CODING SYSTEM: NORMAL
CODING SYSTEM: NORMAL
CROSSMATCH: NORMAL
CROSSMATCH: NORMAL
ISSUE DATE AND TIME: NORMAL
ISSUE DATE AND TIME: NORMAL
SPECIMEN EXPIRATION DATE: NORMAL
SPECIMEN EXPIRATION DATE: NORMAL
UNIT ABO/RH: NORMAL
UNIT ABO/RH: NORMAL
UNIT NUMBER: NORMAL
UNIT NUMBER: NORMAL
UNIT STATUS: NORMAL
UNIT STATUS: NORMAL
UNIT TYPE ISBT: 6200
UNIT TYPE ISBT: 6200

## 2021-11-30 PROCEDURE — P9016 RBC LEUKOCYTES REDUCED: HCPCS | Performed by: PHYSICIAN ASSISTANT

## 2021-11-30 PROCEDURE — 250N000011 HC RX IP 250 OP 636: Performed by: PHYSICIAN ASSISTANT

## 2021-11-30 PROCEDURE — 86900 BLOOD TYPING SEROLOGIC ABO: CPT | Mod: 91 | Performed by: PHYSICIAN ASSISTANT

## 2021-11-30 PROCEDURE — 36430 TRANSFUSION BLD/BLD COMPNT: CPT

## 2021-11-30 PROCEDURE — 86923 COMPATIBILITY TEST ELECTRIC: CPT | Performed by: PHYSICIAN ASSISTANT

## 2021-11-30 PROCEDURE — 86900 BLOOD TYPING SEROLOGIC ABO: CPT | Performed by: PHYSICIAN ASSISTANT

## 2021-11-30 RX ORDER — HEPARIN SODIUM,PORCINE 10 UNIT/ML
5 VIAL (ML) INTRAVENOUS
Status: CANCELLED | OUTPATIENT
Start: 2021-11-30

## 2021-11-30 RX ORDER — HEPARIN SODIUM (PORCINE) LOCK FLUSH IV SOLN 100 UNIT/ML 100 UNIT/ML
5 SOLUTION INTRAVENOUS
Status: DISCONTINUED | OUTPATIENT
Start: 2021-11-30 | End: 2021-11-30 | Stop reason: HOSPADM

## 2021-11-30 RX ORDER — HEPARIN SODIUM (PORCINE) LOCK FLUSH IV SOLN 100 UNIT/ML 100 UNIT/ML
5 SOLUTION INTRAVENOUS
Status: CANCELLED | OUTPATIENT
Start: 2021-11-30

## 2021-11-30 RX ADMIN — HEPARIN SODIUM (PORCINE) LOCK FLUSH IV SOLN 100 UNIT/ML 5 ML: 100 SOLUTION at 14:28

## 2021-11-30 NOTE — PROGRESS NOTES
Infusion Nursing Note:  Elenita Moran presents today for 2 units of pRBC.    Patient seen by provider today: No   present during visit today: Not Applicable.    Note: Elenita comes to infusion today in a w/c in stable condition accompanied by her . Confirms that she is here for a blood transfusion today. Port accessed under sterile technique with excellent blood return noted. T/S done. 2 units of pRBC given without issues. VSS throughout today's appointment. Up to the bathroom x 3. After the blood was completed, the line was flushed with NS, Heparin, and then de accessed. Covered the site with 2x2 gauze and paper tape. Left infusion in stable condition with her  around 1435.     Intravenous Access:  Labs drawn without difficulty.  Implanted Port.    Treatment Conditions:  Results reviewed, labs MET treatment parameters, ok to proceed with treatment.  Blood transfusion consent signed 11/26/21.    Post Infusion Assessment:  Patient tolerated infusion without incident.  Blood return noted pre and post infusion.  Site patent and intact, free from redness, edema or discomfort.  No evidence of extravasations.  Access discontinued per protocol.     Discharge Plan:   Discharge instructions reviewed with: Patient.  Patient and/or family verbalized understanding of discharge instructions and all questions answered.  Copy of AVS reviewed with patient and/or family.  Patient discharged in stable condition accompanied by: .  Departure Mode: Wheelchair.    Yasmin Ball RN

## 2021-12-01 ENCOUNTER — LAB REQUISITION (OUTPATIENT)
Dept: LAB | Facility: CLINIC | Age: 66
End: 2021-12-01

## 2021-12-01 DIAGNOSIS — Z01.419 ENCOUNTER FOR GYNECOLOGICAL EXAMINATION (GENERAL) (ROUTINE) WITHOUT ABNORMAL FINDINGS: ICD-10-CM

## 2021-12-01 PROCEDURE — G0123 SCREEN CERV/VAG THIN LAYER: HCPCS | Performed by: FAMILY MEDICINE

## 2021-12-01 PROCEDURE — 87624 HPV HI-RISK TYP POOLED RSLT: CPT | Performed by: FAMILY MEDICINE

## 2021-12-01 PROCEDURE — G0124 SCREEN C/V THIN LAYER BY MD: HCPCS | Performed by: PATHOLOGY

## 2021-12-13 NOTE — PROGRESS NOTES
Infectious Disease Progress Note    Assessment/Plan  Impression: Widely metastatic breast cancer on doxorubicin chemotherapy      MSSA pelvic abscesses, not as much improvement on the last MRI as the previous one. She may have a hit the maximal expected benefit of IV antibiotics.    Still having pain near the biopsy site of her pelvic abscesses.    Previous blister on her foot resolved.    PET scan last month showed improvement or stability.    Recommendations:     Wound culture sent from right great toe.    Check CRP today.  Plan to reduce cephalexin to twice a day and follow-up in 2 months        RIDGE BARAJAS MD  [phone]      Subjective  Generally feeling better, although she continues to have some pain.  Blister that she had in her foot on her last visit has resolved.  However she has gotten new blisters.    Denies diarrhea, has had constipation.  Denies yeast infections.  No fevers chills or sweats.      Objective    Vital signs in last 24 hours  Temp:  [98.2  F (36.8  C)] 98.2  F (36.8  C)  Pulse:  [96] 96  BP: (126)/(60) 126/60  Wt Readings from Last 3 Encounters:   11/08/21 67.1 kg (148 lb)   08/23/21 70.8 kg (156 lb)   07/25/21 92.5 kg (204 lb)           Review of Systems   Pertinent items are noted in HPI., otherwise negative.    Physical Exam  General appearance: alert, appears stated age and cooperative  Head: Normocephalic, without obvious abnormality, atraumatic  Eyes: Extraocular muscles intact, no icterus.  Neck: no adenopathy, no carotid bruit, no JVD, supple, symmetrical, trachea midline and no erythema over the catheter tract of the cath.  Patient and spouse say this is chronic.    Extremities: Moves lower extremities  Skin: Feet not examined today.    Neurologic: Grossly normal    Pertinent Labs   CRP   Date Value Ref Range Status   11/08/2021 7.4 (H) 0.0-<0.8 mg/dL Final     Lab Results   Component Value Date    WBC 8.4 09/16/2021     Lab Results   Component Value Date    RBC 2.80  09/16/2021     Lab Results   Component Value Date    HGB 7.8 09/16/2021     Lab Results   Component Value Date    HCT 26.0 09/16/2021     Lab Results   Component Value Date    MCV 93 09/16/2021     Lab Results   Component Value Date    MCH 27.9 09/16/2021     Lab Results   Component Value Date    MCHC 30.0 09/16/2021     Lab Results   Component Value Date    RDW 18.2 09/16/2021     Lab Results   Component Value Date     09/16/2021       Last Comprehensive Metabolic Panel:  Sodium   Date Value Ref Range Status   07/26/2021 143 136 - 145 mmol/L Final     Comment:     Standard Replacement.     Potassium   Date Value Ref Range Status   07/28/2021 3.8 3.5 - 5.0 mmol/L Final     Comment:     Standard Replacement.     Chloride   Date Value Ref Range Status   07/26/2021 106 98 - 107 mmol/L Final     Comment:     Standard Replacement.     Carbon Dioxide (CO2)   Date Value Ref Range Status   07/26/2021 27 22 - 31 mmol/L Final     Comment:     Standard Replacement.     Anion Gap   Date Value Ref Range Status   07/26/2021 10 5 - 18 mmol/L Final     Glucose   Date Value Ref Range Status   07/26/2021 128 (H) 70 - 125 mg/dL Final     Comment:     Standard Replacement.     GLUCOSE BY METER POCT   Date Value Ref Range Status   07/28/2021 126 (H) 70 - 125 mg/dL Final     Urea Nitrogen   Date Value Ref Range Status   07/26/2021 7 (L) 8 - 22 mg/dL Final     Comment:     Standard Replacement.     Creatinine   Date Value Ref Range Status   09/16/2021 0.58 (L) 0.60 - 1.10 mg/dL Final     GFR Estimate   Date Value Ref Range Status   09/16/2021 >90 >60 mL/min/1.73m2 Final     Comment:     As of July 11, 2021, eGFR is calculated by the CKD-EPI creatinine equation, without race adjustment. eGFR can be influenced by muscle mass, exercise, and diet. The reported eGFR is an estimation only and is only applicable if the renal function is stable.   12/09/2020 >60 >60 mL/min/1.73m2 Final     Calcium   Date Value Ref Range Status   07/26/2021  9.1 8.5 - 10.5 mg/dL Final     Comment:     Standard Replacement.       Pertinent Radiology   Radiology Results:     No results found.

## 2022-01-01 ENCOUNTER — APPOINTMENT (OUTPATIENT)
Dept: OCCUPATIONAL THERAPY | Facility: HOSPITAL | Age: 67
DRG: 205 | End: 2022-01-01
Payer: COMMERCIAL

## 2022-01-01 ENCOUNTER — OFFICE VISIT (OUTPATIENT)
Dept: VASCULAR SURGERY | Facility: CLINIC | Age: 67
End: 2022-01-01
Attending: FAMILY MEDICINE
Payer: COMMERCIAL

## 2022-01-01 ENCOUNTER — APPOINTMENT (OUTPATIENT)
Dept: MRI IMAGING | Facility: CLINIC | Age: 67
DRG: 040 | End: 2022-01-01
Attending: SPECIALIST
Payer: COMMERCIAL

## 2022-01-01 ENCOUNTER — OFFICE VISIT (OUTPATIENT)
Dept: VASCULAR SURGERY | Facility: CLINIC | Age: 67
End: 2022-01-01
Payer: COMMERCIAL

## 2022-01-01 ENCOUNTER — APPOINTMENT (OUTPATIENT)
Dept: OCCUPATIONAL THERAPY | Facility: CLINIC | Age: 67
DRG: 040 | End: 2022-01-01
Attending: HOSPITALIST
Payer: COMMERCIAL

## 2022-01-01 ENCOUNTER — LAB (OUTPATIENT)
Dept: LAB | Facility: HOSPITAL | Age: 67
End: 2022-01-01
Attending: PODIATRIST
Payer: COMMERCIAL

## 2022-01-01 ENCOUNTER — APPOINTMENT (OUTPATIENT)
Dept: PHYSICAL THERAPY | Facility: CLINIC | Age: 67
DRG: 040 | End: 2022-01-01
Attending: HOSPITALIST
Payer: COMMERCIAL

## 2022-01-01 ENCOUNTER — HOSPITAL ENCOUNTER (EMERGENCY)
Facility: HOSPITAL | Age: 67
Discharge: ANOTHER HOSPITAL | End: 2022-10-18
Attending: EMERGENCY MEDICINE | Admitting: EMERGENCY MEDICINE
Payer: COMMERCIAL

## 2022-01-01 ENCOUNTER — APPOINTMENT (OUTPATIENT)
Dept: CT IMAGING | Facility: CLINIC | Age: 67
DRG: 040 | End: 2022-01-01
Attending: HOSPITALIST
Payer: COMMERCIAL

## 2022-01-01 ENCOUNTER — APPOINTMENT (OUTPATIENT)
Dept: PHYSICAL THERAPY | Facility: HOSPITAL | Age: 67
DRG: 205 | End: 2022-01-01
Attending: INTERNAL MEDICINE
Payer: COMMERCIAL

## 2022-01-01 ENCOUNTER — HOSPITAL ENCOUNTER (OUTPATIENT)
Dept: INTERVENTIONAL RADIOLOGY/VASCULAR | Facility: HOSPITAL | Age: 67
Discharge: HOME | End: 2022-04-15
Attending: NURSE PRACTITIONER | Admitting: NURSE PRACTITIONER
Payer: COMMERCIAL

## 2022-01-01 ENCOUNTER — ANESTHESIA (OUTPATIENT)
Dept: SURGERY | Facility: CLINIC | Age: 67
DRG: 040 | End: 2022-01-01
Payer: COMMERCIAL

## 2022-01-01 ENCOUNTER — HOSPITAL ENCOUNTER (OUTPATIENT)
Dept: MRI IMAGING | Facility: HOSPITAL | Age: 67
Discharge: HOME | End: 2022-03-16
Attending: INTERNAL MEDICINE
Payer: COMMERCIAL

## 2022-01-01 ENCOUNTER — TELEPHONE (OUTPATIENT)
Dept: NEUROSURGERY | Facility: CLINIC | Age: 67
End: 2022-01-01

## 2022-01-01 ENCOUNTER — OFFICE VISIT (OUTPATIENT)
Dept: INFECTIOUS DISEASES | Facility: CLINIC | Age: 67
End: 2022-01-01
Payer: COMMERCIAL

## 2022-01-01 ENCOUNTER — MEDICAL CORRESPONDENCE (OUTPATIENT)
Dept: HEALTH INFORMATION MANAGEMENT | Facility: CLINIC | Age: 67
End: 2022-01-01

## 2022-01-01 ENCOUNTER — TRANSFERRED RECORDS (OUTPATIENT)
Dept: HEALTH INFORMATION MANAGEMENT | Facility: CLINIC | Age: 67
End: 2022-01-01

## 2022-01-01 ENCOUNTER — TRANSCRIBE ORDERS (OUTPATIENT)
Dept: OTHER | Age: 67
End: 2022-01-01

## 2022-01-01 ENCOUNTER — APPOINTMENT (OUTPATIENT)
Dept: ULTRASOUND IMAGING | Facility: HOSPITAL | Age: 67
DRG: 205 | End: 2022-01-01
Attending: INTERNAL MEDICINE
Payer: COMMERCIAL

## 2022-01-01 ENCOUNTER — HOSPITAL ENCOUNTER (OUTPATIENT)
Dept: RESPIRATORY THERAPY | Facility: CLINIC | Age: 67
Discharge: HOME | End: 2022-03-25
Attending: INTERNAL MEDICINE | Admitting: INTERNAL MEDICINE
Payer: COMMERCIAL

## 2022-01-01 ENCOUNTER — LAB REQUISITION (OUTPATIENT)
Dept: LAB | Facility: CLINIC | Age: 67
End: 2022-01-01

## 2022-01-01 ENCOUNTER — HOSPITAL ENCOUNTER (OUTPATIENT)
Dept: INTERVENTIONAL RADIOLOGY/VASCULAR | Facility: HOSPITAL | Age: 67
Discharge: HOME | End: 2022-03-14
Attending: INTERNAL MEDICINE
Payer: COMMERCIAL

## 2022-01-01 ENCOUNTER — APPOINTMENT (OUTPATIENT)
Dept: SURGERY | Facility: PHYSICIAN GROUP | Age: 67
End: 2022-01-01
Payer: COMMERCIAL

## 2022-01-01 ENCOUNTER — APPOINTMENT (OUTPATIENT)
Dept: OCCUPATIONAL THERAPY | Facility: HOSPITAL | Age: 67
DRG: 205 | End: 2022-01-01
Attending: INTERNAL MEDICINE
Payer: COMMERCIAL

## 2022-01-01 ENCOUNTER — HOSPITAL ENCOUNTER (OUTPATIENT)
Dept: CARDIOLOGY | Facility: CLINIC | Age: 67
Discharge: HOME | End: 2022-06-14
Attending: INTERNAL MEDICINE | Admitting: INTERNAL MEDICINE
Payer: COMMERCIAL

## 2022-01-01 ENCOUNTER — HOSPITAL ENCOUNTER (OUTPATIENT)
Dept: CT IMAGING | Facility: HOSPITAL | Age: 67
Discharge: HOME | End: 2022-03-18
Attending: INTERNAL MEDICINE | Admitting: INTERNAL MEDICINE
Payer: COMMERCIAL

## 2022-01-01 ENCOUNTER — APPOINTMENT (OUTPATIENT)
Dept: CARDIOLOGY | Facility: HOSPITAL | Age: 67
DRG: 205 | End: 2022-01-01
Attending: INTERNAL MEDICINE
Payer: COMMERCIAL

## 2022-01-01 ENCOUNTER — APPOINTMENT (OUTPATIENT)
Dept: GENERAL RADIOLOGY | Facility: CLINIC | Age: 67
DRG: 040 | End: 2022-01-01
Attending: HOSPITALIST
Payer: COMMERCIAL

## 2022-01-01 ENCOUNTER — TELEPHONE (OUTPATIENT)
Dept: CARDIOLOGY | Facility: CLINIC | Age: 67
End: 2022-01-01
Payer: COMMERCIAL

## 2022-01-01 ENCOUNTER — APPOINTMENT (OUTPATIENT)
Dept: CT IMAGING | Facility: HOSPITAL | Age: 67
DRG: 205 | End: 2022-01-01
Payer: COMMERCIAL

## 2022-01-01 ENCOUNTER — TELEPHONE (OUTPATIENT)
Dept: INFECTIOUS DISEASES | Facility: CLINIC | Age: 67
End: 2022-01-01
Payer: COMMERCIAL

## 2022-01-01 ENCOUNTER — APPOINTMENT (OUTPATIENT)
Dept: SPEECH THERAPY | Facility: CLINIC | Age: 67
DRG: 040 | End: 2022-01-01
Attending: HOSPITALIST
Payer: COMMERCIAL

## 2022-01-01 ENCOUNTER — APPOINTMENT (OUTPATIENT)
Dept: CT IMAGING | Facility: HOSPITAL | Age: 67
End: 2022-01-01
Attending: EMERGENCY MEDICINE
Payer: COMMERCIAL

## 2022-01-01 ENCOUNTER — HOSPITAL ENCOUNTER (INPATIENT)
Dept: NEUROLOGY | Facility: CLINIC | Age: 67
Discharge: HOME | DRG: 040 | End: 2022-10-19
Attending: PHYSICIAN ASSISTANT | Admitting: HOSPITALIST
Payer: COMMERCIAL

## 2022-01-01 ENCOUNTER — OFFICE VISIT (OUTPATIENT)
Dept: VASCULAR SURGERY | Facility: CLINIC | Age: 67
End: 2022-01-01
Attending: PODIATRIST
Payer: COMMERCIAL

## 2022-01-01 ENCOUNTER — OFFICE VISIT (OUTPATIENT)
Dept: PULMONOLOGY | Facility: OTHER | Age: 67
End: 2022-01-01
Payer: COMMERCIAL

## 2022-01-01 ENCOUNTER — OFFICE VISIT (OUTPATIENT)
Dept: CARDIOLOGY | Facility: CLINIC | Age: 67
End: 2022-01-01
Payer: COMMERCIAL

## 2022-01-01 ENCOUNTER — TELEPHONE (OUTPATIENT)
Dept: CARDIOLOGY | Facility: CLINIC | Age: 67
End: 2022-01-01

## 2022-01-01 ENCOUNTER — HEALTH MAINTENANCE LETTER (OUTPATIENT)
Age: 67
End: 2022-01-01

## 2022-01-01 ENCOUNTER — APPOINTMENT (OUTPATIENT)
Dept: CARDIOLOGY | Facility: CLINIC | Age: 67
DRG: 040 | End: 2022-01-01
Attending: HOSPITALIST
Payer: COMMERCIAL

## 2022-01-01 ENCOUNTER — LAB (OUTPATIENT)
Dept: CARDIOLOGY | Facility: CLINIC | Age: 67
End: 2022-01-01
Payer: COMMERCIAL

## 2022-01-01 ENCOUNTER — PATIENT OUTREACH (OUTPATIENT)
Dept: CARE COORDINATION | Facility: CLINIC | Age: 67
End: 2022-01-01
Payer: COMMERCIAL

## 2022-01-01 ENCOUNTER — APPOINTMENT (OUTPATIENT)
Dept: RADIOLOGY | Facility: HOSPITAL | Age: 67
End: 2022-01-01
Attending: EMERGENCY MEDICINE
Payer: COMMERCIAL

## 2022-01-01 ENCOUNTER — HOSPITAL ENCOUNTER (INPATIENT)
Facility: CLINIC | Age: 67
LOS: 34 days | Discharge: HOSPICE | DRG: 040 | End: 2022-11-21
Attending: HOSPITALIST | Admitting: HOSPITALIST
Payer: COMMERCIAL

## 2022-01-01 ENCOUNTER — ANESTHESIA EVENT (OUTPATIENT)
Dept: SURGERY | Facility: CLINIC | Age: 67
DRG: 040 | End: 2022-01-01
Payer: COMMERCIAL

## 2022-01-01 ENCOUNTER — APPOINTMENT (OUTPATIENT)
Dept: CT IMAGING | Facility: CLINIC | Age: 67
DRG: 040 | End: 2022-01-01
Attending: PHYSICIAN ASSISTANT
Payer: COMMERCIAL

## 2022-01-01 ENCOUNTER — DOCUMENTATION ONLY (OUTPATIENT)
Dept: OTHER | Facility: CLINIC | Age: 67
End: 2022-01-01
Payer: COMMERCIAL

## 2022-01-01 ENCOUNTER — APPOINTMENT (OUTPATIENT)
Dept: PHYSICAL THERAPY | Facility: HOSPITAL | Age: 67
DRG: 205 | End: 2022-01-01
Payer: COMMERCIAL

## 2022-01-01 ENCOUNTER — TELEPHONE (OUTPATIENT)
Dept: INTERVENTIONAL RADIOLOGY/VASCULAR | Facility: HOSPITAL | Age: 67
End: 2022-01-01
Payer: COMMERCIAL

## 2022-01-01 ENCOUNTER — APPOINTMENT (OUTPATIENT)
Dept: RADIOLOGY | Facility: HOSPITAL | Age: 67
DRG: 205 | End: 2022-01-01
Attending: INTERNAL MEDICINE
Payer: COMMERCIAL

## 2022-01-01 ENCOUNTER — HOSPITAL ENCOUNTER (INPATIENT)
Facility: CLINIC | Age: 67
LOS: 8 days | Discharge: HOME | End: 2022-11-30
Attending: INTERNAL MEDICINE | Admitting: INTERNAL MEDICINE
Payer: OTHER MISCELLANEOUS

## 2022-01-01 ENCOUNTER — HOSPITAL ENCOUNTER (OUTPATIENT)
Dept: MRI IMAGING | Facility: HOSPITAL | Age: 67
Discharge: HOME | End: 2022-03-25
Attending: INTERNAL MEDICINE | Admitting: INTERNAL MEDICINE
Payer: COMMERCIAL

## 2022-01-01 ENCOUNTER — HOSPITAL ENCOUNTER (OUTPATIENT)
Dept: NEUROLOGY | Facility: CLINIC | Age: 67
Discharge: HOME | DRG: 040 | End: 2022-10-18
Attending: HOSPITALIST | Admitting: HOSPITALIST
Payer: COMMERCIAL

## 2022-01-01 ENCOUNTER — TRANSFERRED RECORDS (OUTPATIENT)
Dept: HEALTH INFORMATION MANAGEMENT | Facility: CLINIC | Age: 67
End: 2022-01-01
Payer: COMMERCIAL

## 2022-01-01 ENCOUNTER — LAB (OUTPATIENT)
Dept: LAB | Facility: CLINIC | Age: 67
End: 2022-01-01
Attending: INTERNAL MEDICINE
Payer: COMMERCIAL

## 2022-01-01 ENCOUNTER — APPOINTMENT (OUTPATIENT)
Dept: GENERAL RADIOLOGY | Facility: CLINIC | Age: 67
DRG: 040 | End: 2022-01-01
Attending: INTERNAL MEDICINE
Payer: COMMERCIAL

## 2022-01-01 ENCOUNTER — HOSPITAL ENCOUNTER (OUTPATIENT)
Dept: NUCLEAR MEDICINE | Facility: HOSPITAL | Age: 67
Discharge: HOME | End: 2022-08-23
Attending: INTERNAL MEDICINE | Admitting: INTERNAL MEDICINE
Payer: COMMERCIAL

## 2022-01-01 ENCOUNTER — APPOINTMENT (OUTPATIENT)
Dept: CT IMAGING | Facility: CLINIC | Age: 67
DRG: 040 | End: 2022-01-01
Attending: INTERNAL MEDICINE
Payer: COMMERCIAL

## 2022-01-01 ENCOUNTER — HOSPITAL ENCOUNTER (INPATIENT)
Facility: HOSPITAL | Age: 67
LOS: 7 days | Discharge: HOME HEALTH | DRG: 205 | End: 2022-02-15
Attending: EMERGENCY MEDICINE | Admitting: INTERNAL MEDICINE
Payer: COMMERCIAL

## 2022-01-01 VITALS
TEMPERATURE: 97.9 F | DIASTOLIC BLOOD PRESSURE: 56 MMHG | BODY MASS INDEX: 25.54 KG/M2 | HEART RATE: 76 BPM | SYSTOLIC BLOOD PRESSURE: 100 MMHG | WEIGHT: 153.5 LBS

## 2022-01-01 VITALS — HEART RATE: 96 BPM | SYSTOLIC BLOOD PRESSURE: 110 MMHG | TEMPERATURE: 97.8 F | DIASTOLIC BLOOD PRESSURE: 62 MMHG

## 2022-01-01 VITALS
TEMPERATURE: 97 F | WEIGHT: 154 LBS | BODY MASS INDEX: 25.63 KG/M2 | HEART RATE: 76 BPM | RESPIRATION RATE: 16 BRPM | SYSTOLIC BLOOD PRESSURE: 100 MMHG | DIASTOLIC BLOOD PRESSURE: 60 MMHG

## 2022-01-01 VITALS
HEIGHT: 65 IN | SYSTOLIC BLOOD PRESSURE: 118 MMHG | HEART RATE: 104 BPM | WEIGHT: 140 LBS | DIASTOLIC BLOOD PRESSURE: 58 MMHG | BODY MASS INDEX: 23.32 KG/M2 | RESPIRATION RATE: 20 BRPM

## 2022-01-01 VITALS
SYSTOLIC BLOOD PRESSURE: 114 MMHG | RESPIRATION RATE: 16 BRPM | WEIGHT: 157.6 LBS | DIASTOLIC BLOOD PRESSURE: 66 MMHG | HEART RATE: 96 BPM | TEMPERATURE: 97.6 F | BODY MASS INDEX: 26.23 KG/M2 | HEART RATE: 84 BPM | SYSTOLIC BLOOD PRESSURE: 122 MMHG | DIASTOLIC BLOOD PRESSURE: 62 MMHG

## 2022-01-01 VITALS
WEIGHT: 158.6 LBS | HEART RATE: 60 BPM | SYSTOLIC BLOOD PRESSURE: 138 MMHG | DIASTOLIC BLOOD PRESSURE: 56 MMHG | BODY MASS INDEX: 26.39 KG/M2

## 2022-01-01 VITALS
DIASTOLIC BLOOD PRESSURE: 58 MMHG | BODY MASS INDEX: 25.96 KG/M2 | SYSTOLIC BLOOD PRESSURE: 104 MMHG | RESPIRATION RATE: 16 BRPM | HEART RATE: 86 BPM | WEIGHT: 156 LBS

## 2022-01-01 VITALS — DIASTOLIC BLOOD PRESSURE: 58 MMHG | HEART RATE: 88 BPM | SYSTOLIC BLOOD PRESSURE: 90 MMHG | TEMPERATURE: 98.1 F

## 2022-01-01 VITALS
HEART RATE: 91 BPM | WEIGHT: 207.5 LBS | RESPIRATION RATE: 16 BRPM | OXYGEN SATURATION: 98 % | TEMPERATURE: 97.9 F | BODY MASS INDEX: 34.53 KG/M2

## 2022-01-01 VITALS
TEMPERATURE: 97.5 F | HEIGHT: 65 IN | OXYGEN SATURATION: 97 % | WEIGHT: 175.04 LBS | SYSTOLIC BLOOD PRESSURE: 113 MMHG | RESPIRATION RATE: 14 BRPM | HEART RATE: 114 BPM | BODY MASS INDEX: 29.16 KG/M2 | DIASTOLIC BLOOD PRESSURE: 74 MMHG

## 2022-01-01 VITALS — SYSTOLIC BLOOD PRESSURE: 96 MMHG | HEART RATE: 88 BPM | DIASTOLIC BLOOD PRESSURE: 60 MMHG

## 2022-01-01 VITALS
BODY MASS INDEX: 27.46 KG/M2 | DIASTOLIC BLOOD PRESSURE: 52 MMHG | WEIGHT: 165 LBS | RESPIRATION RATE: 16 BRPM | SYSTOLIC BLOOD PRESSURE: 92 MMHG | HEART RATE: 87 BPM

## 2022-01-01 VITALS
RESPIRATION RATE: 16 BRPM | DIASTOLIC BLOOD PRESSURE: 64 MMHG | TEMPERATURE: 98.1 F | HEART RATE: 80 BPM | SYSTOLIC BLOOD PRESSURE: 112 MMHG

## 2022-01-01 VITALS — OXYGEN SATURATION: 99 % | SYSTOLIC BLOOD PRESSURE: 103 MMHG | DIASTOLIC BLOOD PRESSURE: 55 MMHG | HEART RATE: 102 BPM

## 2022-01-01 VITALS
RESPIRATION RATE: 18 BRPM | DIASTOLIC BLOOD PRESSURE: 78 MMHG | SYSTOLIC BLOOD PRESSURE: 119 MMHG | WEIGHT: 136.69 LBS | HEIGHT: 65 IN | HEART RATE: 115 BPM | TEMPERATURE: 98.2 F | BODY MASS INDEX: 22.77 KG/M2 | OXYGEN SATURATION: 95 %

## 2022-01-01 VITALS
HEART RATE: 96 BPM | OXYGEN SATURATION: 97 % | RESPIRATION RATE: 26 BRPM | SYSTOLIC BLOOD PRESSURE: 82 MMHG | TEMPERATURE: 98.2 F | DIASTOLIC BLOOD PRESSURE: 52 MMHG

## 2022-01-01 VITALS
RESPIRATION RATE: 16 BRPM | TEMPERATURE: 97.1 F | DIASTOLIC BLOOD PRESSURE: 51 MMHG | SYSTOLIC BLOOD PRESSURE: 94 MMHG | HEART RATE: 68 BPM

## 2022-01-01 VITALS
WEIGHT: 159 LBS | HEART RATE: 84 BPM | RESPIRATION RATE: 14 BRPM | DIASTOLIC BLOOD PRESSURE: 50 MMHG | SYSTOLIC BLOOD PRESSURE: 90 MMHG | BODY MASS INDEX: 26.46 KG/M2

## 2022-01-01 VITALS
TEMPERATURE: 97.9 F | WEIGHT: 154 LBS | BODY MASS INDEX: 25.63 KG/M2 | SYSTOLIC BLOOD PRESSURE: 122 MMHG | DIASTOLIC BLOOD PRESSURE: 60 MMHG | HEART RATE: 78 BPM

## 2022-01-01 VITALS
HEART RATE: 84 BPM | SYSTOLIC BLOOD PRESSURE: 108 MMHG | RESPIRATION RATE: 14 BRPM | WEIGHT: 156 LBS | DIASTOLIC BLOOD PRESSURE: 62 MMHG | BODY MASS INDEX: 25.96 KG/M2

## 2022-01-01 VITALS
TEMPERATURE: 98.4 F | OXYGEN SATURATION: 98 % | WEIGHT: 208.9 LBS | HEART RATE: 110 BPM | SYSTOLIC BLOOD PRESSURE: 108 MMHG | DIASTOLIC BLOOD PRESSURE: 70 MMHG | BODY MASS INDEX: 34.76 KG/M2

## 2022-01-01 VITALS — HEART RATE: 88 BPM | SYSTOLIC BLOOD PRESSURE: 100 MMHG | DIASTOLIC BLOOD PRESSURE: 50 MMHG | TEMPERATURE: 97.9 F

## 2022-01-01 VITALS — DIASTOLIC BLOOD PRESSURE: 78 MMHG | RESPIRATION RATE: 12 BRPM | TEMPERATURE: 96.8 F | SYSTOLIC BLOOD PRESSURE: 119 MMHG

## 2022-01-01 VITALS
WEIGHT: 160 LBS | TEMPERATURE: 97.9 F | RESPIRATION RATE: 16 BRPM | BODY MASS INDEX: 26.63 KG/M2 | SYSTOLIC BLOOD PRESSURE: 110 MMHG | HEART RATE: 96 BPM | DIASTOLIC BLOOD PRESSURE: 60 MMHG

## 2022-01-01 VITALS
DIASTOLIC BLOOD PRESSURE: 44 MMHG | HEART RATE: 88 BPM | RESPIRATION RATE: 16 BRPM | SYSTOLIC BLOOD PRESSURE: 84 MMHG | BODY MASS INDEX: 24.32 KG/M2 | HEIGHT: 65 IN | WEIGHT: 146 LBS

## 2022-01-01 VITALS
BODY MASS INDEX: 25.74 KG/M2 | DIASTOLIC BLOOD PRESSURE: 42 MMHG | SYSTOLIC BLOOD PRESSURE: 108 MMHG | RESPIRATION RATE: 16 BRPM | HEART RATE: 88 BPM | WEIGHT: 154.7 LBS

## 2022-01-01 VITALS
SYSTOLIC BLOOD PRESSURE: 102 MMHG | RESPIRATION RATE: 17 BRPM | DIASTOLIC BLOOD PRESSURE: 60 MMHG | TEMPERATURE: 97.7 F | OXYGEN SATURATION: 100 % | HEART RATE: 78 BPM

## 2022-01-01 VITALS
HEART RATE: 90 BPM | SYSTOLIC BLOOD PRESSURE: 110 MMHG | BODY MASS INDEX: 24 KG/M2 | RESPIRATION RATE: 16 BRPM | OXYGEN SATURATION: 98 % | WEIGHT: 144.2 LBS | DIASTOLIC BLOOD PRESSURE: 60 MMHG

## 2022-01-01 VITALS — TEMPERATURE: 98 F | HEART RATE: 100 BPM | DIASTOLIC BLOOD PRESSURE: 46 MMHG | SYSTOLIC BLOOD PRESSURE: 88 MMHG

## 2022-01-01 DIAGNOSIS — Z85.3 PERSONAL HISTORY OF BREAST CANCER: ICD-10-CM

## 2022-01-01 DIAGNOSIS — L89.153 STAGE III PRESSURE ULCER OF SACRAL REGION (H): Primary | ICD-10-CM

## 2022-01-01 DIAGNOSIS — S06.5XAA SUBDURAL HEMATOMA (H): Primary | ICD-10-CM

## 2022-01-01 DIAGNOSIS — I50.20 HEART FAILURE WITH REDUCED EJECTION FRACTION, NYHA CLASS I (H): Primary | ICD-10-CM

## 2022-01-01 DIAGNOSIS — J98.4 PNEUMONITIS: ICD-10-CM

## 2022-01-01 DIAGNOSIS — M84.454A PATHOLOGICAL FRACTURE, PELVIS, INITIAL ENCOUNTER FOR FRACTURE: ICD-10-CM

## 2022-01-01 DIAGNOSIS — Z51.5 PALLIATIVE CARE PATIENT: ICD-10-CM

## 2022-01-01 DIAGNOSIS — M84.550A PATHOLOGICAL FRACTURE IN NEOPLASTIC DISEASE, PELVIS, INITIAL ENCOUNTER FOR FRACTURE: ICD-10-CM

## 2022-01-01 DIAGNOSIS — R41.82 ALTERED MENTAL STATUS, UNSPECIFIED ALTERED MENTAL STATUS TYPE: ICD-10-CM

## 2022-01-01 DIAGNOSIS — R13.10 DYSPHAGIA: ICD-10-CM

## 2022-01-01 DIAGNOSIS — C50.919 PRIMARY MALIGNANT NEOPLASM OF FEMALE BREAST (H): ICD-10-CM

## 2022-01-01 DIAGNOSIS — M25.551 HIP PAIN, RIGHT: ICD-10-CM

## 2022-01-01 DIAGNOSIS — E11.69 TYPE 2 DIABETES MELLITUS WITH OTHER SPECIFIED COMPLICATION, UNSPECIFIED WHETHER LONG TERM INSULIN USE (H): ICD-10-CM

## 2022-01-01 DIAGNOSIS — S91.302A NON HEALING LEFT HEEL WOUND: Primary | ICD-10-CM

## 2022-01-01 DIAGNOSIS — Z79.4 TYPE 2 DIABETES MELLITUS WITHOUT COMPLICATION, WITH LONG-TERM CURRENT USE OF INSULIN (H): ICD-10-CM

## 2022-01-01 DIAGNOSIS — E87.5 HYPERKALEMIA: ICD-10-CM

## 2022-01-01 DIAGNOSIS — I50.20 HEART FAILURE WITH REDUCED EJECTION FRACTION, NYHA CLASS I (H): ICD-10-CM

## 2022-01-01 DIAGNOSIS — D64.81 ANTINEOPLASTIC CHEMOTHERAPY INDUCED ANEMIA: ICD-10-CM

## 2022-01-01 DIAGNOSIS — S91.302A NON HEALING LEFT HEEL WOUND: ICD-10-CM

## 2022-01-01 DIAGNOSIS — T45.1X5A ANTINEOPLASTIC CHEMOTHERAPY INDUCED ANEMIA: ICD-10-CM

## 2022-01-01 DIAGNOSIS — J18.9 PNEUMONIA OF BOTH LUNGS DUE TO INFECTIOUS ORGANISM, UNSPECIFIED PART OF LUNG: ICD-10-CM

## 2022-01-01 DIAGNOSIS — Z71.89 OTHER SPECIFIED COUNSELING: ICD-10-CM

## 2022-01-01 DIAGNOSIS — E11.9 TYPE 2 DIABETES MELLITUS WITHOUT COMPLICATION, WITH LONG-TERM CURRENT USE OF INSULIN (H): ICD-10-CM

## 2022-01-01 DIAGNOSIS — I95.9 HYPOTENSION, UNSPECIFIED HYPOTENSION TYPE: ICD-10-CM

## 2022-01-01 DIAGNOSIS — E87.1 HYPONATREMIA: ICD-10-CM

## 2022-01-01 DIAGNOSIS — L89.153 STAGE III PRESSURE ULCER OF SACRAL REGION (H): ICD-10-CM

## 2022-01-01 DIAGNOSIS — S32.599G: ICD-10-CM

## 2022-01-01 DIAGNOSIS — M46.20 PARASPINAL ABSCESS (H): Primary | ICD-10-CM

## 2022-01-01 DIAGNOSIS — J98.4 PNEUMONITIS: Primary | ICD-10-CM

## 2022-01-01 DIAGNOSIS — R10.32 LEFT GROIN PAIN: ICD-10-CM

## 2022-01-01 DIAGNOSIS — I50.9 HEART FAILURE, UNSPECIFIED (H): ICD-10-CM

## 2022-01-01 DIAGNOSIS — Z95.828 PORT-A-CATH IN PLACE: ICD-10-CM

## 2022-01-01 DIAGNOSIS — R39.15 URGENCY OF URINATION: ICD-10-CM

## 2022-01-01 DIAGNOSIS — J96.01 ACUTE RESPIRATORY FAILURE WITH HYPOXIA (H): ICD-10-CM

## 2022-01-01 DIAGNOSIS — I50.42 CHRONIC COMBINED SYSTOLIC AND DIASTOLIC CONGESTIVE HEART FAILURE (H): Primary | ICD-10-CM

## 2022-01-01 DIAGNOSIS — Z11.59 ENCOUNTER FOR SCREENING FOR OTHER VIRAL DISEASES: ICD-10-CM

## 2022-01-01 DIAGNOSIS — L89.313 DECUBITUS ULCER OF RIGHT ISCHIAL AREA, STAGE III (H): ICD-10-CM

## 2022-01-01 DIAGNOSIS — Z11.59 ENCOUNTER FOR SCREENING FOR OTHER VIRAL DISEASES: Primary | ICD-10-CM

## 2022-01-01 DIAGNOSIS — D69.6 THROMBOCYTOPENIA (H): ICD-10-CM

## 2022-01-01 DIAGNOSIS — R30.0 DYSURIA: ICD-10-CM

## 2022-01-01 DIAGNOSIS — I50.22 CHRONIC SYSTOLIC HEART FAILURE (H): ICD-10-CM

## 2022-01-01 DIAGNOSIS — L89.302 PRESSURE INJURY OF BUTTOCK, STAGE 2, UNSPECIFIED LATERALITY (H): Primary | ICD-10-CM

## 2022-01-01 DIAGNOSIS — D64.9 CHRONIC ANEMIA: ICD-10-CM

## 2022-01-01 DIAGNOSIS — N39.0 URINARY TRACT INFECTION WITHOUT HEMATURIA, SITE UNSPECIFIED: ICD-10-CM

## 2022-01-01 DIAGNOSIS — R06.02 SHORTNESS OF BREATH: ICD-10-CM

## 2022-01-01 DIAGNOSIS — R09.02 HYPOXIA: Primary | ICD-10-CM

## 2022-01-01 DIAGNOSIS — Z79.899 HIGH RISK MEDICATION USE: ICD-10-CM

## 2022-01-01 DIAGNOSIS — J38.00 VOCAL CORD PARALYSIS: Primary | ICD-10-CM

## 2022-01-01 DIAGNOSIS — N17.9 ACUTE RENAL FAILURE, UNSPECIFIED ACUTE RENAL FAILURE TYPE (H): ICD-10-CM

## 2022-01-01 DIAGNOSIS — Z51.5 END OF LIFE CARE: Primary | ICD-10-CM

## 2022-01-01 DIAGNOSIS — R47.89 OTHER SPEECH DISTURBANCES: ICD-10-CM

## 2022-01-01 DIAGNOSIS — C50.412 MALIGNANT NEOPLASM OF UPPER-OUTER QUADRANT OF LEFT FEMALE BREAST (H): ICD-10-CM

## 2022-01-01 DIAGNOSIS — E78.5 HYPERLIPIDEMIA, UNSPECIFIED: ICD-10-CM

## 2022-01-01 DIAGNOSIS — C50.919 METASTATIC BREAST CANCER: ICD-10-CM

## 2022-01-01 DIAGNOSIS — S06.5XAA SUBDURAL HEMATOMA (H): ICD-10-CM

## 2022-01-01 DIAGNOSIS — Z95.828 PORT-A-CATH IN PLACE: Chronic | ICD-10-CM

## 2022-01-01 DIAGNOSIS — I62.9 INTRACRANIAL HEMORRHAGE (H): Primary | ICD-10-CM

## 2022-01-01 DIAGNOSIS — M46.20 PARASPINAL ABSCESS (H): ICD-10-CM

## 2022-01-01 DIAGNOSIS — C50.919 METASTATIC BREAST CANCER: Chronic | ICD-10-CM

## 2022-01-01 DIAGNOSIS — I89.0 LYMPHEDEMA: Primary | ICD-10-CM

## 2022-01-01 DIAGNOSIS — Z95.828 PORT-A-CATH IN PLACE: Primary | ICD-10-CM

## 2022-01-01 DIAGNOSIS — R79.89 ELEVATED TROPONIN: ICD-10-CM

## 2022-01-01 DIAGNOSIS — R09.02 HYPOXIA: ICD-10-CM

## 2022-01-01 DIAGNOSIS — Z01.818 EXAMINATION PRIOR TO CHEMOTHERAPY: ICD-10-CM

## 2022-01-01 LAB
ABO/RH(D): NORMAL
ALBUMIN SERPL BCG-MCNC: 2.8 G/DL (ref 3.5–5.2)
ALBUMIN SERPL-MCNC: 1.9 G/DL (ref 3.4–5)
ALBUMIN SERPL-MCNC: 2 G/DL (ref 3.4–5)
ALBUMIN SERPL-MCNC: 2.6 G/DL (ref 3.5–5)
ALBUMIN SERPL-MCNC: 3 G/DL (ref 3.5–5)
ALBUMIN UR-MCNC: 50 MG/DL
ALBUMIN UR-MCNC: 70 MG/DL
ALP SERPL-CCNC: 172 U/L (ref 45–120)
ALP SERPL-CCNC: 216 U/L (ref 45–120)
ALP SERPL-CCNC: 437 U/L (ref 40–150)
ALP SERPL-CCNC: 468 U/L (ref 40–150)
ALP SERPL-CCNC: 564 U/L (ref 35–104)
ALT SERPL W P-5'-P-CCNC: 10 U/L (ref 0–45)
ALT SERPL W P-5'-P-CCNC: 13 U/L (ref 0–45)
ALT SERPL W P-5'-P-CCNC: 86 U/L (ref 0–50)
ALT SERPL W P-5'-P-CCNC: 93 U/L (ref 0–50)
ALT SERPL W P-5'-P-CCNC: 99 U/L (ref 10–35)
ANION GAP SERPL CALCULATED.3IONS-SCNC: 10 MMOL/L (ref 3–14)
ANION GAP SERPL CALCULATED.3IONS-SCNC: 10 MMOL/L (ref 3–14)
ANION GAP SERPL CALCULATED.3IONS-SCNC: 11 MMOL/L (ref 3–14)
ANION GAP SERPL CALCULATED.3IONS-SCNC: 11 MMOL/L (ref 5–18)
ANION GAP SERPL CALCULATED.3IONS-SCNC: 12 MMOL/L (ref 3–14)
ANION GAP SERPL CALCULATED.3IONS-SCNC: 12 MMOL/L (ref 5–18)
ANION GAP SERPL CALCULATED.3IONS-SCNC: 12 MMOL/L (ref 5–18)
ANION GAP SERPL CALCULATED.3IONS-SCNC: 13 MMOL/L (ref 3–14)
ANION GAP SERPL CALCULATED.3IONS-SCNC: 13 MMOL/L (ref 3–14)
ANION GAP SERPL CALCULATED.3IONS-SCNC: 13 MMOL/L (ref 5–18)
ANION GAP SERPL CALCULATED.3IONS-SCNC: 13 MMOL/L (ref 5–18)
ANION GAP SERPL CALCULATED.3IONS-SCNC: 13 MMOL/L (ref 7–15)
ANION GAP SERPL CALCULATED.3IONS-SCNC: 14 MMOL/L (ref 3–14)
ANION GAP SERPL CALCULATED.3IONS-SCNC: 14 MMOL/L (ref 5–18)
ANION GAP SERPL CALCULATED.3IONS-SCNC: 15 MMOL/L (ref 3–14)
ANION GAP SERPL CALCULATED.3IONS-SCNC: 15 MMOL/L (ref 5–18)
ANION GAP SERPL CALCULATED.3IONS-SCNC: 15 MMOL/L (ref 5–18)
ANION GAP SERPL CALCULATED.3IONS-SCNC: 20 MMOL/L (ref 7–15)
ANION GAP SERPL CALCULATED.3IONS-SCNC: 5 MMOL/L (ref 3–14)
ANION GAP SERPL CALCULATED.3IONS-SCNC: 7 MMOL/L (ref 3–14)
ANION GAP SERPL CALCULATED.3IONS-SCNC: 8 MMOL/L (ref 3–14)
ANION GAP SERPL CALCULATED.3IONS-SCNC: 8 MMOL/L (ref 3–14)
ANION GAP SERPL CALCULATED.3IONS-SCNC: 8 MMOL/L (ref 5–18)
ANION GAP SERPL CALCULATED.3IONS-SCNC: 8 MMOL/L (ref 5–18)
ANION GAP SERPL CALCULATED.3IONS-SCNC: 9 MMOL/L (ref 3–14)
ANION GAP SERPL CALCULATED.3IONS-SCNC: NORMAL MMOL/L
ANTIBODY SCREEN: NEGATIVE
APPEARANCE FLD: ABNORMAL
APPEARANCE UR: ABNORMAL
APPEARANCE UR: ABNORMAL
APTT PPP: 38 SECONDS (ref 22–38)
APTT PPP: 39 SECONDS (ref 22–38)
AST SERPL W P-5'-P-CCNC: 124 U/L (ref 0–45)
AST SERPL W P-5'-P-CCNC: 143 U/L (ref 0–45)
AST SERPL W P-5'-P-CCNC: 154 U/L (ref 10–35)
AST SERPL W P-5'-P-CCNC: 31 U/L (ref 0–40)
AST SERPL W P-5'-P-CCNC: 35 U/L (ref 0–40)
ATRIAL RATE - MUSE: 107 BPM
ATRIAL RATE - MUSE: 123 BPM
ATRIAL RATE - MUSE: 138 BPM
ATRIAL RATE - MUSE: 87 BPM
ATRIAL RATE - MUSE: 96 BPM
BACTERIA BLD CULT: NO GROWTH
BACTERIA PLR CULT: NO GROWTH
BACTERIA PLR CULT: NORMAL
BACTERIA TISS BX CULT: ABNORMAL
BACTERIA TISS BX CULT: NORMAL
BACTERIA UR CULT: ABNORMAL
BACTERIA UR CULT: ABNORMAL
BACTERIA UR CULT: NO GROWTH
BASOPHILS # BLD AUTO: 0 10E3/UL (ref 0–0.2)
BASOPHILS # BLD AUTO: 0 10E3/UL (ref 0–0.2)
BASOPHILS # BLD AUTO: 0.1 10E3/UL (ref 0–0.2)
BASOPHILS # BLD AUTO: 0.1 10E3/UL (ref 0–0.2)
BASOPHILS # BLD MANUAL: 0 10E3/UL (ref 0–0.2)
BASOPHILS # BLD MANUAL: 0 10E3/UL (ref 0–0.2)
BASOPHILS NFR BLD AUTO: 0 %
BASOPHILS NFR BLD AUTO: 0 %
BASOPHILS NFR BLD AUTO: 1 %
BASOPHILS NFR BLD AUTO: 1 %
BASOPHILS NFR BLD MANUAL: 0 %
BASOPHILS NFR BLD MANUAL: 0 %
BILIRUB DIRECT SERPL-MCNC: 0.2 MG/DL
BILIRUB DIRECT SERPL-MCNC: 0.2 MG/DL
BILIRUB SERPL-MCNC: 0.3 MG/DL
BILIRUB SERPL-MCNC: 0.4 MG/DL (ref 0–1)
BILIRUB SERPL-MCNC: 0.4 MG/DL (ref 0–1)
BILIRUB SERPL-MCNC: 0.5 MG/DL (ref 0.2–1.3)
BILIRUB SERPL-MCNC: 0.8 MG/DL (ref 0.2–1.3)
BILIRUB UR QL STRIP: NEGATIVE
BILIRUB UR QL STRIP: NEGATIVE
BLD PROD TYP BPU: NORMAL
BLOOD COMPONENT TYPE: NORMAL
BNP SERPL-MCNC: 1511 PG/ML (ref 0–112)
BUN SERPL-MCNC: 104 MG/DL (ref 7–30)
BUN SERPL-MCNC: 11 MG/DL (ref 8–22)
BUN SERPL-MCNC: 110.2 MG/DL (ref 8–23)
BUN SERPL-MCNC: 116 MG/DL (ref 7–30)
BUN SERPL-MCNC: 121 MG/DL (ref 7–30)
BUN SERPL-MCNC: 13 MG/DL (ref 7–30)
BUN SERPL-MCNC: 14 MG/DL (ref 7–30)
BUN SERPL-MCNC: 14 MG/DL (ref 8–22)
BUN SERPL-MCNC: 15 MG/DL (ref 7–30)
BUN SERPL-MCNC: 15 MG/DL (ref 7–30)
BUN SERPL-MCNC: 15 MG/DL (ref 8–22)
BUN SERPL-MCNC: 15 MG/DL (ref 8–22)
BUN SERPL-MCNC: 16 MG/DL (ref 7–30)
BUN SERPL-MCNC: 16 MG/DL (ref 8–22)
BUN SERPL-MCNC: 17 MG/DL (ref 7–30)
BUN SERPL-MCNC: 17 MG/DL (ref 8–22)
BUN SERPL-MCNC: 17 MG/DL (ref 8–22)
BUN SERPL-MCNC: 19 MG/DL (ref 7–30)
BUN SERPL-MCNC: 19 MG/DL (ref 7–30)
BUN SERPL-MCNC: 19 MG/DL (ref 8–22)
BUN SERPL-MCNC: 21 MG/DL (ref 7–30)
BUN SERPL-MCNC: 21 MG/DL (ref 7–30)
BUN SERPL-MCNC: 22 MG/DL (ref 7–30)
BUN SERPL-MCNC: 22 MG/DL (ref 7–30)
BUN SERPL-MCNC: 25 MG/DL (ref 7–30)
BUN SERPL-MCNC: 25 MG/DL (ref 7–30)
BUN SERPL-MCNC: 27 MG/DL (ref 7–30)
BUN SERPL-MCNC: 29 MG/DL (ref 7–30)
BUN SERPL-MCNC: 30 MG/DL (ref 7–30)
BUN SERPL-MCNC: 30 MG/DL (ref 8–22)
BUN SERPL-MCNC: 32 MG/DL (ref 8–22)
BUN SERPL-MCNC: 44 MG/DL (ref 8–22)
BUN SERPL-MCNC: 47 MG/DL (ref 7–30)
BUN SERPL-MCNC: 47.8 MG/DL (ref 8–23)
BUN SERPL-MCNC: 55 MG/DL (ref 7–30)
BUN SERPL-MCNC: 57 MG/DL (ref 8–22)
BUN SERPL-MCNC: 75 MG/DL (ref 7–30)
BUN SERPL-MCNC: 83 MG/DL (ref 7–30)
BUN SERPL-MCNC: 92 MG/DL (ref 7–30)
BUN SERPL-MCNC: NORMAL MG/DL
C DIFF TOX B STL QL: NEGATIVE
C PNEUM DNA SPEC QL NAA+PROBE: NOT DETECTED
C REACTIVE PROTEIN LHE: 9.4 MG/DL (ref 0–0.8)
C REACTIVE PROTEIN LHE: 9.9 MG/DL (ref 0–0.8)
CALCIUM SERPL-MCNC: 6.6 MG/DL (ref 8.5–10.1)
CALCIUM SERPL-MCNC: 7 MG/DL (ref 8.5–10.1)
CALCIUM SERPL-MCNC: 7.4 MG/DL (ref 8.5–10.1)
CALCIUM SERPL-MCNC: 7.6 MG/DL (ref 8.5–10.1)
CALCIUM SERPL-MCNC: 7.6 MG/DL (ref 8.5–10.5)
CALCIUM SERPL-MCNC: 7.7 MG/DL (ref 8.5–10.1)
CALCIUM SERPL-MCNC: 7.7 MG/DL (ref 8.5–10.5)
CALCIUM SERPL-MCNC: 7.8 MG/DL (ref 8.5–10.1)
CALCIUM SERPL-MCNC: 7.8 MG/DL (ref 8.8–10.2)
CALCIUM SERPL-MCNC: 7.9 MG/DL (ref 8.5–10.1)
CALCIUM SERPL-MCNC: 8 MG/DL (ref 8.5–10.1)
CALCIUM SERPL-MCNC: 8.1 MG/DL (ref 8.5–10.1)
CALCIUM SERPL-MCNC: 8.1 MG/DL (ref 8.5–10.5)
CALCIUM SERPL-MCNC: 8.1 MG/DL (ref 8.5–10.5)
CALCIUM SERPL-MCNC: 8.2 MG/DL (ref 8.8–10.2)
CALCIUM SERPL-MCNC: 8.3 MG/DL (ref 8.5–10.1)
CALCIUM SERPL-MCNC: 8.4 MG/DL (ref 8.5–10.1)
CALCIUM SERPL-MCNC: 8.5 MG/DL (ref 8.5–10.5)
CALCIUM SERPL-MCNC: 8.6 MG/DL (ref 8.5–10.1)
CALCIUM SERPL-MCNC: 8.6 MG/DL (ref 8.5–10.1)
CALCIUM SERPL-MCNC: 8.6 MG/DL (ref 8.5–10.5)
CALCIUM SERPL-MCNC: 8.6 MG/DL (ref 8.5–10.5)
CALCIUM SERPL-MCNC: 8.7 MG/DL (ref 8.5–10.1)
CALCIUM SERPL-MCNC: 8.7 MG/DL (ref 8.5–10.1)
CALCIUM SERPL-MCNC: 8.7 MG/DL (ref 8.5–10.5)
CALCIUM SERPL-MCNC: 8.8 MG/DL (ref 8.5–10.1)
CALCIUM SERPL-MCNC: 8.8 MG/DL (ref 8.5–10.5)
CALCIUM SERPL-MCNC: 8.9 MG/DL (ref 8.5–10.1)
CALCIUM SERPL-MCNC: 8.9 MG/DL (ref 8.5–10.5)
CALCIUM SERPL-MCNC: 9 MG/DL (ref 8.5–10.1)
CALCIUM SERPL-MCNC: NORMAL MG/DL
CHLORIDE BLD-SCNC: 100 MMOL/L (ref 94–109)
CHLORIDE BLD-SCNC: 100 MMOL/L (ref 98–107)
CHLORIDE BLD-SCNC: 101 MMOL/L (ref 94–109)
CHLORIDE BLD-SCNC: 102 MMOL/L (ref 94–109)
CHLORIDE BLD-SCNC: 102 MMOL/L (ref 94–109)
CHLORIDE BLD-SCNC: 102 MMOL/L (ref 98–107)
CHLORIDE BLD-SCNC: 102 MMOL/L (ref 98–107)
CHLORIDE BLD-SCNC: 104 MMOL/L (ref 94–109)
CHLORIDE BLD-SCNC: 104 MMOL/L (ref 98–107)
CHLORIDE BLD-SCNC: 104 MMOL/L (ref 98–107)
CHLORIDE BLD-SCNC: 105 MMOL/L (ref 94–109)
CHLORIDE BLD-SCNC: 105 MMOL/L (ref 94–109)
CHLORIDE BLD-SCNC: 105 MMOL/L (ref 98–107)
CHLORIDE BLD-SCNC: 106 MMOL/L (ref 94–109)
CHLORIDE BLD-SCNC: 106 MMOL/L (ref 98–107)
CHLORIDE BLD-SCNC: 107 MMOL/L (ref 94–109)
CHLORIDE BLD-SCNC: 108 MMOL/L (ref 94–109)
CHLORIDE BLD-SCNC: 110 MMOL/L (ref 94–109)
CHLORIDE BLD-SCNC: 112 MMOL/L (ref 94–109)
CHLORIDE BLD-SCNC: 112 MMOL/L (ref 94–109)
CHLORIDE BLD-SCNC: 97 MMOL/L (ref 94–109)
CHLORIDE BLD-SCNC: 97 MMOL/L (ref 98–107)
CHLORIDE BLD-SCNC: 98 MMOL/L (ref 94–109)
CHLORIDE BLD-SCNC: 98 MMOL/L (ref 94–109)
CHLORIDE BLD-SCNC: 98 MMOL/L (ref 98–107)
CHLORIDE BLD-SCNC: 99 MMOL/L (ref 94–109)
CHLORIDE BLD-SCNC: 99 MMOL/L (ref 94–109)
CHLORIDE BLD-SCNC: 99 MMOL/L (ref 98–107)
CHLORIDE BLD-SCNC: NORMAL MMOL/L
CHLORIDE SERPL-SCNC: 100 MMOL/L (ref 98–107)
CHLORIDE SERPL-SCNC: 89 MMOL/L (ref 98–107)
CHOLEST SERPL-MCNC: 94 MG/DL
CO2 SERPL-SCNC: 15 MMOL/L (ref 20–32)
CO2 SERPL-SCNC: 16 MMOL/L (ref 20–32)
CO2 SERPL-SCNC: 17 MMOL/L (ref 20–32)
CO2 SERPL-SCNC: 18 MMOL/L (ref 20–32)
CO2 SERPL-SCNC: 19 MMOL/L (ref 20–32)
CO2 SERPL-SCNC: 19 MMOL/L (ref 22–31)
CO2 SERPL-SCNC: 20 MMOL/L (ref 20–32)
CO2 SERPL-SCNC: 20 MMOL/L (ref 22–31)
CO2 SERPL-SCNC: 21 MMOL/L (ref 20–32)
CO2 SERPL-SCNC: 21 MMOL/L (ref 20–32)
CO2 SERPL-SCNC: 22 MMOL/L (ref 20–32)
CO2 SERPL-SCNC: 22 MMOL/L (ref 20–32)
CO2 SERPL-SCNC: 23 MMOL/L (ref 20–32)
CO2 SERPL-SCNC: 23 MMOL/L (ref 20–32)
CO2 SERPL-SCNC: 23 MMOL/L (ref 22–31)
CO2 SERPL-SCNC: 24 MMOL/L (ref 22–31)
CO2 SERPL-SCNC: 24 MMOL/L (ref 22–31)
CO2 SERPL-SCNC: 25 MMOL/L (ref 22–31)
CO2 SERPL-SCNC: NORMAL MMOL/L
CODING SYSTEM: NORMAL
COLOR FLD: YELLOW
COLOR UR AUTO: YELLOW
COLOR UR AUTO: YELLOW
CREAT BLD-MCNC: 0.6 MG/DL (ref 0.6–1.1)
CREAT SERPL-MCNC: 0.54 MG/DL (ref 0.52–1.04)
CREAT SERPL-MCNC: 0.57 MG/DL (ref 0.52–1.04)
CREAT SERPL-MCNC: 0.58 MG/DL (ref 0.52–1.04)
CREAT SERPL-MCNC: 0.58 MG/DL (ref 0.52–1.04)
CREAT SERPL-MCNC: 0.59 MG/DL (ref 0.52–1.04)
CREAT SERPL-MCNC: 0.59 MG/DL (ref 0.52–1.04)
CREAT SERPL-MCNC: 0.6 MG/DL (ref 0.52–1.04)
CREAT SERPL-MCNC: 0.61 MG/DL (ref 0.6–1.1)
CREAT SERPL-MCNC: 0.62 MG/DL (ref 0.6–1.1)
CREAT SERPL-MCNC: 0.63 MG/DL (ref 0.52–1.04)
CREAT SERPL-MCNC: 0.63 MG/DL (ref 0.52–1.04)
CREAT SERPL-MCNC: 0.63 MG/DL (ref 0.6–1.1)
CREAT SERPL-MCNC: 0.64 MG/DL (ref 0.52–1.04)
CREAT SERPL-MCNC: 0.64 MG/DL (ref 0.6–1.1)
CREAT SERPL-MCNC: 0.64 MG/DL (ref 0.6–1.1)
CREAT SERPL-MCNC: 0.66 MG/DL (ref 0.6–1.1)
CREAT SERPL-MCNC: 0.67 MG/DL (ref 0.52–1.04)
CREAT SERPL-MCNC: 0.68 MG/DL (ref 0.52–1.04)
CREAT SERPL-MCNC: 0.68 MG/DL (ref 0.52–1.04)
CREAT SERPL-MCNC: 0.69 MG/DL (ref 0.6–1.1)
CREAT SERPL-MCNC: 0.69 MG/DL (ref 0.6–1.1)
CREAT SERPL-MCNC: 0.7 MG/DL (ref 0.52–1.04)
CREAT SERPL-MCNC: 0.71 MG/DL (ref 0.52–1.04)
CREAT SERPL-MCNC: 0.73 MG/DL (ref 0.52–1.04)
CREAT SERPL-MCNC: 0.75 MG/DL (ref 0.6–1.1)
CREAT SERPL-MCNC: 0.76 MG/DL (ref 0.6–1.1)
CREAT SERPL-MCNC: 0.78 MG/DL (ref 0.52–1.04)
CREAT SERPL-MCNC: 0.8 MG/DL (ref 0.52–1.04)
CREAT SERPL-MCNC: 0.81 MG/DL (ref 0.52–1.04)
CREAT SERPL-MCNC: 0.82 MG/DL (ref 0.52–1.04)
CREAT SERPL-MCNC: 0.86 MG/DL (ref 0.52–1.04)
CREAT SERPL-MCNC: 0.86 MG/DL (ref 0.6–1.1)
CREAT SERPL-MCNC: 0.87 MG/DL (ref 0.52–1.04)
CREAT SERPL-MCNC: 1.13 MG/DL (ref 0.6–1.1)
CREAT SERPL-MCNC: 1.18 MG/DL (ref 0.52–1.04)
CREAT SERPL-MCNC: 1.34 MG/DL (ref 0.51–0.95)
CREAT SERPL-MCNC: 1.36 MG/DL (ref 0.52–1.04)
CREAT SERPL-MCNC: 1.69 MG/DL (ref 0.52–1.04)
CREAT SERPL-MCNC: 2.08 MG/DL (ref 0.52–1.04)
CREAT SERPL-MCNC: 2.26 MG/DL (ref 0.52–1.04)
CREAT SERPL-MCNC: 2.49 MG/DL (ref 0.52–1.04)
CREAT SERPL-MCNC: 2.94 MG/DL (ref 0.52–1.04)
CREAT SERPL-MCNC: 3.25 MG/DL (ref 0.51–0.95)
CREAT SERPL-MCNC: 3.32 MG/DL (ref 0.52–1.04)
CREAT SERPL-MCNC: NORMAL MG/DL
CROSSMATCH: NORMAL
CRP SERPL-MCNC: 175 MG/L (ref 0–8)
CRP SERPL-MCNC: 177 MG/L (ref 0–8)
CRP SERPL-MCNC: 249 MG/L (ref 0–8)
CRP SERPL-MCNC: 251 MG/L (ref 0–8)
DEPRECATED HCO3 PLAS-SCNC: 14 MMOL/L (ref 22–29)
DEPRECATED HCO3 PLAS-SCNC: 21 MMOL/L (ref 22–29)
DIASTOLIC BLOOD PRESSURE - MUSE: 46 MMHG
DIASTOLIC BLOOD PRESSURE - MUSE: NORMAL MMHG
DLCOCOR-%PRED-PRE: 65 %
DLCOCOR-PRE: 13.33 ML/MIN/MMHG
DLCOUNC-%PRED-PRE: 49 %
DLCOUNC-PRE: 10.14 ML/MIN/MMHG
DLCOUNC-PRED: 20.36 ML/MIN/MMHG
EOSINOPHIL # BLD AUTO: 0 10E3/UL (ref 0–0.7)
EOSINOPHIL # BLD AUTO: 0 10E3/UL (ref 0–0.7)
EOSINOPHIL # BLD AUTO: 0.1 10E3/UL (ref 0–0.7)
EOSINOPHIL # BLD AUTO: 0.1 10E3/UL (ref 0–0.7)
EOSINOPHIL # BLD MANUAL: 0 10E3/UL (ref 0–0.7)
EOSINOPHIL # BLD MANUAL: 0.1 10E3/UL (ref 0–0.7)
EOSINOPHIL NFR BLD AUTO: 0 %
EOSINOPHIL NFR BLD AUTO: 1 %
EOSINOPHIL NFR BLD MANUAL: 0 %
EOSINOPHIL NFR BLD MANUAL: 1 %
ERV-%PRED-PRE: 51 %
ERV-PRE: 0.41 L
ERV-PRED: 0.8 L
ERYTHROCYTE [DISTWIDTH] IN BLOOD BY AUTOMATED COUNT: 14.6 % (ref 10–15)
ERYTHROCYTE [DISTWIDTH] IN BLOOD BY AUTOMATED COUNT: 14.9 % (ref 10–15)
ERYTHROCYTE [DISTWIDTH] IN BLOOD BY AUTOMATED COUNT: 15.3 % (ref 10–15)
ERYTHROCYTE [DISTWIDTH] IN BLOOD BY AUTOMATED COUNT: 15.4 % (ref 10–15)
ERYTHROCYTE [DISTWIDTH] IN BLOOD BY AUTOMATED COUNT: 15.5 % (ref 10–15)
ERYTHROCYTE [DISTWIDTH] IN BLOOD BY AUTOMATED COUNT: 15.5 % (ref 10–15)
ERYTHROCYTE [DISTWIDTH] IN BLOOD BY AUTOMATED COUNT: 15.7 % (ref 10–15)
ERYTHROCYTE [DISTWIDTH] IN BLOOD BY AUTOMATED COUNT: 15.8 % (ref 10–15)
ERYTHROCYTE [DISTWIDTH] IN BLOOD BY AUTOMATED COUNT: 15.8 % (ref 10–15)
ERYTHROCYTE [DISTWIDTH] IN BLOOD BY AUTOMATED COUNT: 16.8 % (ref 10–15)
ERYTHROCYTE [DISTWIDTH] IN BLOOD BY AUTOMATED COUNT: 17.7 % (ref 10–15)
ERYTHROCYTE [DISTWIDTH] IN BLOOD BY AUTOMATED COUNT: 18.7 % (ref 10–15)
ERYTHROCYTE [DISTWIDTH] IN BLOOD BY AUTOMATED COUNT: 18.7 % (ref 10–15)
ERYTHROCYTE [DISTWIDTH] IN BLOOD BY AUTOMATED COUNT: 18.9 % (ref 10–15)
ERYTHROCYTE [DISTWIDTH] IN BLOOD BY AUTOMATED COUNT: 19 % (ref 10–15)
ERYTHROCYTE [DISTWIDTH] IN BLOOD BY AUTOMATED COUNT: 19.4 % (ref 10–15)
ERYTHROCYTE [DISTWIDTH] IN BLOOD BY AUTOMATED COUNT: 19.5 % (ref 10–15)
ERYTHROCYTE [DISTWIDTH] IN BLOOD BY AUTOMATED COUNT: 19.7 % (ref 10–15)
ERYTHROCYTE [DISTWIDTH] IN BLOOD BY AUTOMATED COUNT: 19.8 % (ref 10–15)
ERYTHROCYTE [DISTWIDTH] IN BLOOD BY AUTOMATED COUNT: 19.9 % (ref 10–15)
ERYTHROCYTE [DISTWIDTH] IN BLOOD BY AUTOMATED COUNT: 20.6 % (ref 10–15)
ERYTHROCYTE [DISTWIDTH] IN BLOOD BY AUTOMATED COUNT: 20.8 % (ref 10–15)
ERYTHROCYTE [DISTWIDTH] IN BLOOD BY AUTOMATED COUNT: 22.4 % (ref 10–15)
ERYTHROCYTE [DISTWIDTH] IN BLOOD BY AUTOMATED COUNT: 25.1 % (ref 10–15)
ERYTHROCYTE [DISTWIDTH] IN BLOOD BY AUTOMATED COUNT: 25.1 % (ref 10–15)
ERYTHROCYTE [DISTWIDTH] IN BLOOD BY AUTOMATED COUNT: 25.2 % (ref 10–15)
ERYTHROCYTE [DISTWIDTH] IN BLOOD BY AUTOMATED COUNT: 26.7 % (ref 10–15)
ERYTHROCYTE [DISTWIDTH] IN BLOOD BY AUTOMATED COUNT: 26.9 % (ref 10–15)
ERYTHROCYTE [DISTWIDTH] IN BLOOD BY AUTOMATED COUNT: 27.1 % (ref 10–15)
ERYTHROCYTE [DISTWIDTH] IN BLOOD BY AUTOMATED COUNT: 27.3 % (ref 10–15)
EXPTIME-PRE: 5.49 SEC
FEF2575-%PRED-POST: 88 %
FEF2575-%PRED-PRE: 74 %
FEF2575-POST: 1.8 L/SEC
FEF2575-PRE: 1.51 L/SEC
FEF2575-PRED: 2.03 L/SEC
FEFMAX-%PRED-PRE: 72 %
FEFMAX-PRE: 4.34 L/SEC
FEFMAX-PRED: 6.02 L/SEC
FERRITIN SERPL-MCNC: 8629 NG/ML (ref 8–252)
FEV1-%PRED-PRE: 72 %
FEV1-PRE: 1.71 L
FEV1FEV6-PRE: 77 %
FEV1FEV6-PRED: 80 %
FEV1FVC-PRE: 77 %
FEV1FVC-PRED: 78 %
FEV1SVC-PRE: 81 %
FEV1SVC-PRED: 73 %
FIFMAX-PRE: 2.08 L/SEC
FLUAV H1 2009 PAND RNA SPEC QL NAA+PROBE: NOT DETECTED
FLUAV H1 RNA SPEC QL NAA+PROBE: NOT DETECTED
FLUAV H3 RNA SPEC QL NAA+PROBE: NOT DETECTED
FLUAV RNA SPEC QL NAA+PROBE: NEGATIVE
FLUAV RNA SPEC QL NAA+PROBE: NOT DETECTED
FLUBV RNA RESP QL NAA+PROBE: NEGATIVE
FLUBV RNA SPEC QL NAA+PROBE: NOT DETECTED
FOLATE SERPL-MCNC: 23 NG/ML (ref 4.6–34.8)
FRAGMENTS BLD QL SMEAR: SLIGHT
FRCPLETH-%PRED-PRE: 80 %
FRCPLETH-PRE: 2.24 L
FRCPLETH-PRED: 2.77 L
FVC-%PRED-PRE: 72 %
FVC-PRE: 2.21 L
FVC-PRED: 3.05 L
GFR SERPL CREATININE-BSD FRML MDRD: 15 ML/MIN/1.73M2
GFR SERPL CREATININE-BSD FRML MDRD: 15 ML/MIN/1.73M2
GFR SERPL CREATININE-BSD FRML MDRD: 17 ML/MIN/1.73M2
GFR SERPL CREATININE-BSD FRML MDRD: 21 ML/MIN/1.73M2
GFR SERPL CREATININE-BSD FRML MDRD: 23 ML/MIN/1.73M2
GFR SERPL CREATININE-BSD FRML MDRD: 26 ML/MIN/1.73M2
GFR SERPL CREATININE-BSD FRML MDRD: 33 ML/MIN/1.73M2
GFR SERPL CREATININE-BSD FRML MDRD: 42 ML/MIN/1.73M2
GFR SERPL CREATININE-BSD FRML MDRD: 43 ML/MIN/1.73M2
GFR SERPL CREATININE-BSD FRML MDRD: 50 ML/MIN/1.73M2
GFR SERPL CREATININE-BSD FRML MDRD: 53 ML/MIN/1.73M2
GFR SERPL CREATININE-BSD FRML MDRD: 73 ML/MIN/1.73M2
GFR SERPL CREATININE-BSD FRML MDRD: 74 ML/MIN/1.73M2
GFR SERPL CREATININE-BSD FRML MDRD: 74 ML/MIN/1.73M2
GFR SERPL CREATININE-BSD FRML MDRD: 78 ML/MIN/1.73M2
GFR SERPL CREATININE-BSD FRML MDRD: 79 ML/MIN/1.73M2
GFR SERPL CREATININE-BSD FRML MDRD: 80 ML/MIN/1.73M2
GFR SERPL CREATININE-BSD FRML MDRD: 83 ML/MIN/1.73M2
GFR SERPL CREATININE-BSD FRML MDRD: 85 ML/MIN/1.73M2
GFR SERPL CREATININE-BSD FRML MDRD: 87 ML/MIN/1.73M2
GFR SERPL CREATININE-BSD FRML MDRD: 90 ML/MIN/1.73M2
GFR SERPL CREATININE-BSD FRML MDRD: >60 ML/MIN/1.73M2
GFR SERPL CREATININE-BSD FRML MDRD: >90 ML/MIN/1.73M2
GFR SERPL CREATININE-BSD FRML MDRD: NORMAL ML/MIN/{1.73_M2}
GLUCOSE BLD-MCNC: 113 MG/DL (ref 70–99)
GLUCOSE BLD-MCNC: 113 MG/DL (ref 70–99)
GLUCOSE BLD-MCNC: 115 MG/DL (ref 70–99)
GLUCOSE BLD-MCNC: 116 MG/DL (ref 70–125)
GLUCOSE BLD-MCNC: 121 MG/DL (ref 70–99)
GLUCOSE BLD-MCNC: 124 MG/DL (ref 70–125)
GLUCOSE BLD-MCNC: 125 MG/DL (ref 70–125)
GLUCOSE BLD-MCNC: 125 MG/DL (ref 70–99)
GLUCOSE BLD-MCNC: 126 MG/DL (ref 70–99)
GLUCOSE BLD-MCNC: 128 MG/DL (ref 70–99)
GLUCOSE BLD-MCNC: 128 MG/DL (ref 70–99)
GLUCOSE BLD-MCNC: 129 MG/DL (ref 70–125)
GLUCOSE BLD-MCNC: 129 MG/DL (ref 70–99)
GLUCOSE BLD-MCNC: 129 MG/DL (ref 70–99)
GLUCOSE BLD-MCNC: 130 MG/DL (ref 70–125)
GLUCOSE BLD-MCNC: 131 MG/DL (ref 70–125)
GLUCOSE BLD-MCNC: 132 MG/DL (ref 70–99)
GLUCOSE BLD-MCNC: 133 MG/DL (ref 70–99)
GLUCOSE BLD-MCNC: 134 MG/DL (ref 70–99)
GLUCOSE BLD-MCNC: 134 MG/DL (ref 70–99)
GLUCOSE BLD-MCNC: 135 MG/DL (ref 70–99)
GLUCOSE BLD-MCNC: 141 MG/DL (ref 70–99)
GLUCOSE BLD-MCNC: 142 MG/DL (ref 70–99)
GLUCOSE BLD-MCNC: 143 MG/DL (ref 70–125)
GLUCOSE BLD-MCNC: 143 MG/DL (ref 70–99)
GLUCOSE BLD-MCNC: 144 MG/DL (ref 70–125)
GLUCOSE BLD-MCNC: 145 MG/DL (ref 70–99)
GLUCOSE BLD-MCNC: 146 MG/DL (ref 70–125)
GLUCOSE BLD-MCNC: 146 MG/DL (ref 70–99)
GLUCOSE BLD-MCNC: 154 MG/DL (ref 70–99)
GLUCOSE BLD-MCNC: 156 MG/DL (ref 70–99)
GLUCOSE BLD-MCNC: 157 MG/DL (ref 70–125)
GLUCOSE BLD-MCNC: 159 MG/DL (ref 70–99)
GLUCOSE BLD-MCNC: 169 MG/DL (ref 70–125)
GLUCOSE BLD-MCNC: 177 MG/DL (ref 70–99)
GLUCOSE BLD-MCNC: 178 MG/DL (ref 70–99)
GLUCOSE BLD-MCNC: 202 MG/DL (ref 70–99)
GLUCOSE BLD-MCNC: 220 MG/DL (ref 70–99)
GLUCOSE BLD-MCNC: 266 MG/DL (ref 70–99)
GLUCOSE BLD-MCNC: 391 MG/DL (ref 70–99)
GLUCOSE BLD-MCNC: 97 MG/DL (ref 70–125)
GLUCOSE BLD-MCNC: NORMAL MG/DL
GLUCOSE BLDC GLUCOMTR-MCNC: 106 MG/DL (ref 70–99)
GLUCOSE BLDC GLUCOMTR-MCNC: 113 MG/DL (ref 70–99)
GLUCOSE BLDC GLUCOMTR-MCNC: 113 MG/DL (ref 70–99)
GLUCOSE BLDC GLUCOMTR-MCNC: 119 MG/DL (ref 70–99)
GLUCOSE BLDC GLUCOMTR-MCNC: 119 MG/DL (ref 70–99)
GLUCOSE BLDC GLUCOMTR-MCNC: 120 MG/DL (ref 70–99)
GLUCOSE BLDC GLUCOMTR-MCNC: 121 MG/DL (ref 70–99)
GLUCOSE BLDC GLUCOMTR-MCNC: 122 MG/DL (ref 70–99)
GLUCOSE BLDC GLUCOMTR-MCNC: 124 MG/DL (ref 70–99)
GLUCOSE BLDC GLUCOMTR-MCNC: 125 MG/DL (ref 70–99)
GLUCOSE BLDC GLUCOMTR-MCNC: 125 MG/DL (ref 70–99)
GLUCOSE BLDC GLUCOMTR-MCNC: 126 MG/DL (ref 70–99)
GLUCOSE BLDC GLUCOMTR-MCNC: 127 MG/DL (ref 70–99)
GLUCOSE BLDC GLUCOMTR-MCNC: 128 MG/DL (ref 70–99)
GLUCOSE BLDC GLUCOMTR-MCNC: 128 MG/DL (ref 70–99)
GLUCOSE BLDC GLUCOMTR-MCNC: 129 MG/DL (ref 70–99)
GLUCOSE BLDC GLUCOMTR-MCNC: 131 MG/DL (ref 70–99)
GLUCOSE BLDC GLUCOMTR-MCNC: 132 MG/DL (ref 70–99)
GLUCOSE BLDC GLUCOMTR-MCNC: 133 MG/DL (ref 70–99)
GLUCOSE BLDC GLUCOMTR-MCNC: 134 MG/DL (ref 70–99)
GLUCOSE BLDC GLUCOMTR-MCNC: 134 MG/DL (ref 70–99)
GLUCOSE BLDC GLUCOMTR-MCNC: 135 MG/DL (ref 70–99)
GLUCOSE BLDC GLUCOMTR-MCNC: 135 MG/DL (ref 70–99)
GLUCOSE BLDC GLUCOMTR-MCNC: 136 MG/DL (ref 70–99)
GLUCOSE BLDC GLUCOMTR-MCNC: 137 MG/DL (ref 70–99)
GLUCOSE BLDC GLUCOMTR-MCNC: 137 MG/DL (ref 70–99)
GLUCOSE BLDC GLUCOMTR-MCNC: 139 MG/DL (ref 70–99)
GLUCOSE BLDC GLUCOMTR-MCNC: 139 MG/DL (ref 70–99)
GLUCOSE BLDC GLUCOMTR-MCNC: 140 MG/DL (ref 70–99)
GLUCOSE BLDC GLUCOMTR-MCNC: 140 MG/DL (ref 70–99)
GLUCOSE BLDC GLUCOMTR-MCNC: 141 MG/DL (ref 70–99)
GLUCOSE BLDC GLUCOMTR-MCNC: 143 MG/DL (ref 70–99)
GLUCOSE BLDC GLUCOMTR-MCNC: 144 MG/DL (ref 70–99)
GLUCOSE BLDC GLUCOMTR-MCNC: 145 MG/DL (ref 70–99)
GLUCOSE BLDC GLUCOMTR-MCNC: 145 MG/DL (ref 70–99)
GLUCOSE BLDC GLUCOMTR-MCNC: 146 MG/DL (ref 70–99)
GLUCOSE BLDC GLUCOMTR-MCNC: 146 MG/DL (ref 70–99)
GLUCOSE BLDC GLUCOMTR-MCNC: 147 MG/DL (ref 70–99)
GLUCOSE BLDC GLUCOMTR-MCNC: 147 MG/DL (ref 70–99)
GLUCOSE BLDC GLUCOMTR-MCNC: 148 MG/DL (ref 70–99)
GLUCOSE BLDC GLUCOMTR-MCNC: 150 MG/DL (ref 70–99)
GLUCOSE BLDC GLUCOMTR-MCNC: 153 MG/DL (ref 70–99)
GLUCOSE BLDC GLUCOMTR-MCNC: 153 MG/DL (ref 70–99)
GLUCOSE BLDC GLUCOMTR-MCNC: 154 MG/DL (ref 70–99)
GLUCOSE BLDC GLUCOMTR-MCNC: 155 MG/DL (ref 70–99)
GLUCOSE BLDC GLUCOMTR-MCNC: 155 MG/DL (ref 70–99)
GLUCOSE BLDC GLUCOMTR-MCNC: 156 MG/DL (ref 70–99)
GLUCOSE BLDC GLUCOMTR-MCNC: 158 MG/DL (ref 70–99)
GLUCOSE BLDC GLUCOMTR-MCNC: 158 MG/DL (ref 70–99)
GLUCOSE BLDC GLUCOMTR-MCNC: 159 MG/DL (ref 70–99)
GLUCOSE BLDC GLUCOMTR-MCNC: 159 MG/DL (ref 70–99)
GLUCOSE BLDC GLUCOMTR-MCNC: 161 MG/DL (ref 70–99)
GLUCOSE BLDC GLUCOMTR-MCNC: 162 MG/DL (ref 70–99)
GLUCOSE BLDC GLUCOMTR-MCNC: 163 MG/DL (ref 70–99)
GLUCOSE BLDC GLUCOMTR-MCNC: 163 MG/DL (ref 70–99)
GLUCOSE BLDC GLUCOMTR-MCNC: 164 MG/DL (ref 70–99)
GLUCOSE BLDC GLUCOMTR-MCNC: 165 MG/DL (ref 70–99)
GLUCOSE BLDC GLUCOMTR-MCNC: 166 MG/DL (ref 70–99)
GLUCOSE BLDC GLUCOMTR-MCNC: 168 MG/DL (ref 70–99)
GLUCOSE BLDC GLUCOMTR-MCNC: 169 MG/DL (ref 70–99)
GLUCOSE BLDC GLUCOMTR-MCNC: 179 MG/DL (ref 70–99)
GLUCOSE BLDC GLUCOMTR-MCNC: 181 MG/DL (ref 70–99)
GLUCOSE BLDC GLUCOMTR-MCNC: 182 MG/DL (ref 70–99)
GLUCOSE BLDC GLUCOMTR-MCNC: 189 MG/DL (ref 70–99)
GLUCOSE BLDC GLUCOMTR-MCNC: 195 MG/DL (ref 70–99)
GLUCOSE BLDC GLUCOMTR-MCNC: 197 MG/DL (ref 70–99)
GLUCOSE BLDC GLUCOMTR-MCNC: 199 MG/DL (ref 70–99)
GLUCOSE BLDC GLUCOMTR-MCNC: 204 MG/DL (ref 70–99)
GLUCOSE BLDC GLUCOMTR-MCNC: 208 MG/DL (ref 70–99)
GLUCOSE BLDC GLUCOMTR-MCNC: 219 MG/DL (ref 70–99)
GLUCOSE BLDC GLUCOMTR-MCNC: 221 MG/DL (ref 70–99)
GLUCOSE BLDC GLUCOMTR-MCNC: 226 MG/DL (ref 70–99)
GLUCOSE BLDC GLUCOMTR-MCNC: 227 MG/DL (ref 70–99)
GLUCOSE BLDC GLUCOMTR-MCNC: 241 MG/DL (ref 70–99)
GLUCOSE BLDC GLUCOMTR-MCNC: 247 MG/DL (ref 70–99)
GLUCOSE BLDC GLUCOMTR-MCNC: 309 MG/DL (ref 70–99)
GLUCOSE BLDC GLUCOMTR-MCNC: 90 MG/DL (ref 70–99)
GLUCOSE BLDC GLUCOMTR-MCNC: 91 MG/DL (ref 70–99)
GLUCOSE BLDC GLUCOMTR-MCNC: 95 MG/DL (ref 70–99)
GLUCOSE BLDC GLUCOMTR-MCNC: 97 MG/DL (ref 70–99)
GLUCOSE FLD-MCNC: 174 MG/DL
GLUCOSE SERPL-MCNC: 122 MG/DL (ref 70–99)
GLUCOSE SERPL-MCNC: 134 MG/DL (ref 70–99)
GLUCOSE UR STRIP-MCNC: 50 MG/DL
GLUCOSE UR STRIP-MCNC: NEGATIVE MG/DL
GRAM STAIN RESULT: NORMAL
GRAM STAIN RESULT: NORMAL
H CAPSUL AG SER IA-ACNC: NOT DETECTED
H CAPSUL AG SER QL IA: NOT DETECTED
H CAPSUL AG UR QL IA: NOT DETECTED
H CAPSUL AG UR-MCNC: NOT DETECTED NG/ML
HADV DNA SPEC QL NAA+PROBE: NOT DETECTED
HBA1C MFR BLD: 5.6 %
HBA1C MFR BLD: 6.4 % (ref 0–5.6)
HCOV PNL SPEC NAA+PROBE: NOT DETECTED
HCT VFR BLD AUTO: 17.1 % (ref 35–47)
HCT VFR BLD AUTO: 19.8 % (ref 35–47)
HCT VFR BLD AUTO: 19.9 % (ref 35–47)
HCT VFR BLD AUTO: 20.5 % (ref 35–47)
HCT VFR BLD AUTO: 20.6 % (ref 35–47)
HCT VFR BLD AUTO: 21.3 % (ref 35–47)
HCT VFR BLD AUTO: 21.5 % (ref 35–47)
HCT VFR BLD AUTO: 21.6 % (ref 35–47)
HCT VFR BLD AUTO: 21.6 % (ref 35–47)
HCT VFR BLD AUTO: 22 % (ref 35–47)
HCT VFR BLD AUTO: 22.2 % (ref 35–47)
HCT VFR BLD AUTO: 22.3 % (ref 35–47)
HCT VFR BLD AUTO: 22.9 % (ref 35–47)
HCT VFR BLD AUTO: 23 % (ref 35–47)
HCT VFR BLD AUTO: 23.3 % (ref 35–47)
HCT VFR BLD AUTO: 23.3 % (ref 35–47)
HCT VFR BLD AUTO: 23.7 % (ref 35–47)
HCT VFR BLD AUTO: 23.7 % (ref 35–47)
HCT VFR BLD AUTO: 24 % (ref 35–47)
HCT VFR BLD AUTO: 24 % (ref 35–47)
HCT VFR BLD AUTO: 24.8 % (ref 35–47)
HCT VFR BLD AUTO: 25.1 % (ref 35–47)
HCT VFR BLD AUTO: 25.2 % (ref 35–47)
HCT VFR BLD AUTO: 25.2 % (ref 35–47)
HCT VFR BLD AUTO: 25.7 % (ref 35–47)
HCT VFR BLD AUTO: 26.6 % (ref 35–47)
HCT VFR BLD AUTO: 26.9 % (ref 35–47)
HCT VFR BLD AUTO: 27 % (ref 35–47)
HCT VFR BLD AUTO: 27.8 % (ref 35–47)
HCT VFR BLD AUTO: 28.2 % (ref 35–47)
HCT VFR BLD AUTO: 29.5 % (ref 35–47)
HCT VFR BLD AUTO: 29.7 % (ref 35–47)
HDLC SERPL-MCNC: 26 MG/DL
HGB BLD-MCNC: 10.1 G/DL (ref 11.7–15.7)
HGB BLD-MCNC: 13.3 G/DL (ref 11.7–15.7)
HGB BLD-MCNC: 6 G/DL (ref 11.7–15.7)
HGB BLD-MCNC: 6.7 G/DL (ref 11.7–15.7)
HGB BLD-MCNC: 7 G/DL (ref 11.7–15.7)
HGB BLD-MCNC: 7.1 G/DL (ref 11.7–15.7)
HGB BLD-MCNC: 7.2 G/DL (ref 11.7–15.7)
HGB BLD-MCNC: 7.3 G/DL (ref 11.7–15.7)
HGB BLD-MCNC: 7.3 G/DL (ref 11.7–15.7)
HGB BLD-MCNC: 7.4 G/DL (ref 11.7–15.7)
HGB BLD-MCNC: 7.6 G/DL (ref 11.7–15.7)
HGB BLD-MCNC: 7.7 G/DL (ref 11.7–15.7)
HGB BLD-MCNC: 7.7 G/DL (ref 11.7–15.7)
HGB BLD-MCNC: 7.9 G/DL (ref 11.7–15.7)
HGB BLD-MCNC: 8 G/DL (ref 11.7–15.7)
HGB BLD-MCNC: 8.2 G/DL (ref 11.7–15.7)
HGB BLD-MCNC: 8.2 G/DL (ref 11.7–15.7)
HGB BLD-MCNC: 8.3 G/DL (ref 11.7–15.7)
HGB BLD-MCNC: 8.3 G/DL (ref 11.7–15.7)
HGB BLD-MCNC: 8.4 G/DL (ref 11.7–15.7)
HGB BLD-MCNC: 8.5 G/DL (ref 11.7–15.7)
HGB BLD-MCNC: 8.6 G/DL (ref 11.7–15.7)
HGB BLD-MCNC: 8.8 G/DL (ref 11.7–15.7)
HGB BLD-MCNC: 8.9 G/DL (ref 11.7–15.7)
HGB BLD-MCNC: 9.2 G/DL (ref 11.7–15.7)
HGB BLD-MCNC: 9.3 G/DL (ref 11.7–15.7)
HGB UR QL STRIP: ABNORMAL
HGB UR QL STRIP: ABNORMAL
HMPV RNA SPEC QL NAA+PROBE: NOT DETECTED
HOLD SPECIMEN: NORMAL
HOWELL-JOLLY BOD BLD QL SMEAR: PRESENT
HPIV1 RNA SPEC QL NAA+PROBE: NOT DETECTED
HPIV2 RNA SPEC QL NAA+PROBE: NOT DETECTED
HPIV3 RNA SPEC QL NAA+PROBE: NOT DETECTED
HPIV4 RNA SPEC QL NAA+PROBE: NOT DETECTED
IC-%PRED-PRE: 69 %
IC-PRE: 1.69 L
IC-PRED: 2.45 L
IMM GRANULOCYTES # BLD: 0.1 10E3/UL
IMM GRANULOCYTES # BLD: 0.7 10E3/UL
IMM GRANULOCYTES NFR BLD: 1 %
IMM GRANULOCYTES NFR BLD: 3 %
INR PPP: 1.27 (ref 0.85–1.15)
INR PPP: 1.28 (ref 0.85–1.15)
INTERPRETATION ECG - MUSE: NORMAL
IRON SATN MFR SERPL: 36 % (ref 15–46)
IRON SERPL-MCNC: 51 UG/DL (ref 35–180)
ISSUE DATE AND TIME: NORMAL
KETONES UR STRIP-MCNC: NEGATIVE MG/DL
KETONES UR STRIP-MCNC: NEGATIVE MG/DL
L PNEUMO1 AG UR QL IA: NEGATIVE
LACTATE SERPL-SCNC: 0.6 MMOL/L (ref 0.7–2)
LACTATE SERPL-SCNC: 0.9 MMOL/L (ref 0.7–2)
LACTATE SERPL-SCNC: 1.1 MMOL/L (ref 0.7–2)
LACTATE SERPL-SCNC: 1.1 MMOL/L (ref 0.7–2)
LACTATE SERPL-SCNC: 1.2 MMOL/L (ref 0.7–2)
LACTATE SERPL-SCNC: 1.2 MMOL/L (ref 0.7–2)
LACTATE SERPL-SCNC: 1.3 MMOL/L (ref 0.7–2)
LACTATE SERPL-SCNC: 1.5 MMOL/L (ref 0.7–2)
LACTATE SERPL-SCNC: 1.8 MMOL/L (ref 0.7–2)
LDH FLD L TO P-CCNC: 141 U/L
LDH SERPL L TO P-CCNC: 420 U/L (ref 125–220)
LDH SERPL L TO P-CCNC: 559 U/L (ref 125–220)
LDLC SERPL CALC-MCNC: 40 MG/DL
LEUKOCYTE ESTERASE UR QL STRIP: ABNORMAL
LEUKOCYTE ESTERASE UR QL STRIP: ABNORMAL
LVEF ECHO: NORMAL
LVEF ECHO: NORMAL
LYMPHOCYTES # BLD AUTO: 0.4 10E3/UL (ref 0.8–5.3)
LYMPHOCYTES # BLD AUTO: 1.3 10E3/UL (ref 0.8–5.3)
LYMPHOCYTES # BLD MANUAL: 0 10E3/UL (ref 0.8–5.3)
LYMPHOCYTES # BLD MANUAL: 0.1 10E3/UL (ref 0.8–5.3)
LYMPHOCYTES NFR BLD AUTO: 13 %
LYMPHOCYTES NFR BLD AUTO: 2 %
LYMPHOCYTES NFR BLD AUTO: 4 %
LYMPHOCYTES NFR BLD AUTO: 7 %
LYMPHOCYTES NFR BLD MANUAL: 0 %
LYMPHOCYTES NFR BLD MANUAL: 1 %
LYMPHOCYTES NFR FLD MANUAL: 38 %
M PNEUMO DNA SPEC QL NAA+PROBE: NOT DETECTED
MAGNESIUM SERPL-MCNC: 1.5 MG/DL (ref 1.8–2.6)
MAGNESIUM SERPL-MCNC: 1.5 MG/DL (ref 1.8–2.6)
MAGNESIUM SERPL-MCNC: 1.7 MG/DL (ref 1.8–2.6)
MAGNESIUM SERPL-MCNC: 1.7 MG/DL (ref 1.8–2.6)
MAGNESIUM SERPL-MCNC: 1.8 MG/DL (ref 1.8–2.6)
MAGNESIUM SERPL-MCNC: 1.9 MG/DL (ref 1.8–2.6)
MAGNESIUM SERPL-MCNC: 2 MG/DL (ref 1.8–2.6)
MAGNESIUM SERPL-MCNC: 2 MG/DL (ref 1.8–2.6)
MAGNESIUM SERPL-MCNC: 2.3 MG/DL (ref 1.6–2.3)
MAGNESIUM SERPL-MCNC: 2.4 MG/DL (ref 1.6–2.3)
MCH RBC QN AUTO: 28.9 PG (ref 26.5–33)
MCH RBC QN AUTO: 29.2 PG (ref 26.5–33)
MCH RBC QN AUTO: 29.3 PG (ref 26.5–33)
MCH RBC QN AUTO: 29.4 PG (ref 26.5–33)
MCH RBC QN AUTO: 29.5 PG (ref 26.5–33)
MCH RBC QN AUTO: 29.6 PG (ref 26.5–33)
MCH RBC QN AUTO: 29.7 PG (ref 26.5–33)
MCH RBC QN AUTO: 29.8 PG (ref 26.5–33)
MCH RBC QN AUTO: 29.8 PG (ref 26.5–33)
MCH RBC QN AUTO: 29.9 PG (ref 26.5–33)
MCH RBC QN AUTO: 30 PG (ref 26.5–33)
MCH RBC QN AUTO: 30 PG (ref 26.5–33)
MCH RBC QN AUTO: 30.2 PG (ref 26.5–33)
MCH RBC QN AUTO: 30.3 PG (ref 26.5–33)
MCH RBC QN AUTO: 30.4 PG (ref 26.5–33)
MCH RBC QN AUTO: 30.5 PG (ref 26.5–33)
MCH RBC QN AUTO: 30.9 PG (ref 26.5–33)
MCH RBC QN AUTO: 31 PG (ref 26.5–33)
MCH RBC QN AUTO: 31.2 PG (ref 26.5–33)
MCH RBC QN AUTO: 31.7 PG (ref 26.5–33)
MCH RBC QN AUTO: 31.9 PG (ref 26.5–33)
MCH RBC QN AUTO: 32 PG (ref 26.5–33)
MCH RBC QN AUTO: 32.8 PG (ref 26.5–33)
MCH RBC QN AUTO: 33 PG (ref 26.5–33)
MCH RBC QN AUTO: 33.3 PG (ref 26.5–33)
MCH RBC QN AUTO: 33.3 PG (ref 26.5–33)
MCH RBC QN AUTO: 33.8 PG (ref 26.5–33)
MCH RBC QN AUTO: 33.8 PG (ref 26.5–33)
MCH RBC QN AUTO: 34.8 PG (ref 26.5–33)
MCH RBC QN AUTO: 35.1 PG (ref 26.5–33)
MCHC RBC AUTO-ENTMCNC: 31.2 G/DL (ref 31.5–36.5)
MCHC RBC AUTO-ENTMCNC: 31.3 G/DL (ref 31.5–36.5)
MCHC RBC AUTO-ENTMCNC: 31.5 G/DL (ref 31.5–36.5)
MCHC RBC AUTO-ENTMCNC: 31.9 G/DL (ref 31.5–36.5)
MCHC RBC AUTO-ENTMCNC: 31.9 G/DL (ref 31.5–36.5)
MCHC RBC AUTO-ENTMCNC: 32 G/DL (ref 31.5–36.5)
MCHC RBC AUTO-ENTMCNC: 33 G/DL (ref 31.5–36.5)
MCHC RBC AUTO-ENTMCNC: 33.3 G/DL (ref 31.5–36.5)
MCHC RBC AUTO-ENTMCNC: 33.3 G/DL (ref 31.5–36.5)
MCHC RBC AUTO-ENTMCNC: 33.5 G/DL (ref 31.5–36.5)
MCHC RBC AUTO-ENTMCNC: 33.5 G/DL (ref 31.5–36.5)
MCHC RBC AUTO-ENTMCNC: 33.6 G/DL (ref 31.5–36.5)
MCHC RBC AUTO-ENTMCNC: 33.7 G/DL (ref 31.5–36.5)
MCHC RBC AUTO-ENTMCNC: 33.8 G/DL (ref 31.5–36.5)
MCHC RBC AUTO-ENTMCNC: 33.8 G/DL (ref 31.5–36.5)
MCHC RBC AUTO-ENTMCNC: 34 G/DL (ref 31.5–36.5)
MCHC RBC AUTO-ENTMCNC: 34.1 G/DL (ref 31.5–36.5)
MCHC RBC AUTO-ENTMCNC: 34.2 G/DL (ref 31.5–36.5)
MCHC RBC AUTO-ENTMCNC: 34.3 G/DL (ref 31.5–36.5)
MCHC RBC AUTO-ENTMCNC: 34.5 G/DL (ref 31.5–36.5)
MCHC RBC AUTO-ENTMCNC: 34.7 G/DL (ref 31.5–36.5)
MCHC RBC AUTO-ENTMCNC: 35 G/DL (ref 31.5–36.5)
MCHC RBC AUTO-ENTMCNC: 35 G/DL (ref 31.5–36.5)
MCHC RBC AUTO-ENTMCNC: 35.1 G/DL (ref 31.5–36.5)
MCHC RBC AUTO-ENTMCNC: 35.1 G/DL (ref 31.5–36.5)
MCHC RBC AUTO-ENTMCNC: 35.4 G/DL (ref 31.5–36.5)
MCHC RBC AUTO-ENTMCNC: 35.4 G/DL (ref 31.5–36.5)
MCHC RBC AUTO-ENTMCNC: 35.9 G/DL (ref 31.5–36.5)
MCV RBC AUTO: 101 FL (ref 78–100)
MCV RBC AUTO: 101 FL (ref 78–100)
MCV RBC AUTO: 103 FL (ref 78–100)
MCV RBC AUTO: 84 FL (ref 78–100)
MCV RBC AUTO: 85 FL (ref 78–100)
MCV RBC AUTO: 85 FL (ref 78–100)
MCV RBC AUTO: 86 FL (ref 78–100)
MCV RBC AUTO: 87 FL (ref 78–100)
MCV RBC AUTO: 88 FL (ref 78–100)
MCV RBC AUTO: 89 FL (ref 78–100)
MCV RBC AUTO: 90 FL (ref 78–100)
MCV RBC AUTO: 91 FL (ref 78–100)
MCV RBC AUTO: 92 FL (ref 78–100)
MCV RBC AUTO: 93 FL (ref 78–100)
MCV RBC AUTO: 94 FL (ref 78–100)
MCV RBC AUTO: 95 FL (ref 78–100)
MCV RBC AUTO: 95 FL (ref 78–100)
MCV RBC AUTO: 96 FL (ref 78–100)
MCV RBC AUTO: 97 FL (ref 78–100)
MCV RBC AUTO: 97 FL (ref 78–100)
MCV RBC AUTO: 98 FL (ref 78–100)
MCV RBC AUTO: 98 FL (ref 78–100)
METAMYELOCYTES # BLD MANUAL: 0.3 10E3/UL
METAMYELOCYTES NFR BLD MANUAL: 1 %
MONOCYTES # BLD AUTO: 0.4 10E3/UL (ref 0–1.3)
MONOCYTES # BLD AUTO: 0.5 10E3/UL (ref 0–1.3)
MONOCYTES # BLD AUTO: 1 10E3/UL (ref 0–1.3)
MONOCYTES # BLD AUTO: 1.1 10E3/UL (ref 0–1.3)
MONOCYTES # BLD MANUAL: 0.1 10E3/UL (ref 0–1.3)
MONOCYTES # BLD MANUAL: 0.5 10E3/UL (ref 0–1.3)
MONOCYTES NFR BLD AUTO: 12 %
MONOCYTES NFR BLD AUTO: 4 %
MONOCYTES NFR BLD AUTO: 5 %
MONOCYTES NFR BLD AUTO: 8 %
MONOCYTES NFR BLD MANUAL: 1 %
MONOCYTES NFR BLD MANUAL: 2 %
MONOS+MACROS NFR FLD MANUAL: 2 %
MUGA LV EJECTION FRACTION: 49 %
MUGA PRIOR EJECTION FRACTION: 68 %
NEUTROPHILS # BLD AUTO: 10.2 10E3/UL (ref 1.6–8.3)
NEUTROPHILS # BLD AUTO: 18 10E3/UL (ref 1.6–8.3)
NEUTROPHILS # BLD AUTO: 4.6 10E3/UL (ref 1.6–8.3)
NEUTROPHILS # BLD AUTO: 7 10E3/UL (ref 1.6–8.3)
NEUTROPHILS # BLD MANUAL: 25.7 10E3/UL (ref 1.6–8.3)
NEUTROPHILS # BLD MANUAL: 6.4 10E3/UL (ref 1.6–8.3)
NEUTROPHILS NFR BLD AUTO: 72 %
NEUTROPHILS NFR BLD AUTO: 83 %
NEUTROPHILS NFR BLD AUTO: 90 %
NEUTROPHILS NFR BLD AUTO: 91 %
NEUTROPHILS NFR BLD MANUAL: 97 %
NEUTROPHILS NFR BLD MANUAL: 97 %
NEUTS BAND NFR FLD MANUAL: 25 %
NITRATE UR QL: NEGATIVE
NITRATE UR QL: NEGATIVE
NONHDLC SERPL-MCNC: 68 MG/DL
NRBC # BLD AUTO: 0 10E3/UL
NRBC BLD AUTO-RTO: 0 /100
NRBC BLD AUTO-RTO: 0 /100
NRBC BLD AUTO-RTO: 1 /100
NT-PROBNP SERPL-MCNC: 834 PG/ML (ref 0–900)
OTHER CELLS FLD MANUAL: 35 %
P AXIS - MUSE: 62 DEGREES
P AXIS - MUSE: 63 DEGREES
P AXIS - MUSE: 66 DEGREES
P AXIS - MUSE: 66 DEGREES
P AXIS - MUSE: 70 DEGREES
PATH REPORT.ADDENDUM SPEC: ABNORMAL
PATH REPORT.COMMENTS IMP SPEC: ABNORMAL
PATH REPORT.COMMENTS IMP SPEC: NORMAL
PATH REPORT.COMMENTS IMP SPEC: YES
PATH REPORT.FINAL DX SPEC: ABNORMAL
PATH REPORT.FINAL DX SPEC: NORMAL
PATH REPORT.GROSS SPEC: ABNORMAL
PATH REPORT.GROSS SPEC: NORMAL
PATH REPORT.MICROSCOPIC SPEC OTHER STN: ABNORMAL
PATH REPORT.MICROSCOPIC SPEC OTHER STN: NORMAL
PATH REPORT.RELEVANT HX SPEC: ABNORMAL
PATH REPORT.RELEVANT HX SPEC: NORMAL
PH FLD: 8 PH
PH UR STRIP: 5.5 [PH] (ref 5–7)
PH UR STRIP: 6 [PH] (ref 5–7)
PHOSPHATE SERPL-MCNC: 2.7 MG/DL (ref 2.5–4.5)
PHOSPHATE SERPL-MCNC: 7.2 MG/DL (ref 2.5–4.5)
PHOSPHATE SERPL-MCNC: 8.7 MG/DL (ref 2.5–4.5)
PHQ9 SCORE: 11
PLAT MORPH BLD: ABNORMAL
PLAT MORPH BLD: ABNORMAL
PLATELET # BLD AUTO: 103 10E3/UL (ref 150–450)
PLATELET # BLD AUTO: 114 10E3/UL (ref 150–450)
PLATELET # BLD AUTO: 123 10E3/UL (ref 150–450)
PLATELET # BLD AUTO: 123 10E3/UL (ref 150–450)
PLATELET # BLD AUTO: 240 10E3/UL (ref 150–450)
PLATELET # BLD AUTO: 246 10E3/UL (ref 150–450)
PLATELET # BLD AUTO: 252 10E3/UL (ref 150–450)
PLATELET # BLD AUTO: 258 10E3/UL (ref 150–450)
PLATELET # BLD AUTO: 259 10E3/UL (ref 150–450)
PLATELET # BLD AUTO: 263 10E3/UL (ref 150–450)
PLATELET # BLD AUTO: 291 10E3/UL (ref 150–450)
PLATELET # BLD AUTO: 329 10E3/UL (ref 150–450)
PLATELET # BLD AUTO: 35 10E3/UL (ref 150–450)
PLATELET # BLD AUTO: 38 10E3/UL (ref 150–450)
PLATELET # BLD AUTO: 43 10E3/UL (ref 150–450)
PLATELET # BLD AUTO: 48 10E3/UL (ref 150–450)
PLATELET # BLD AUTO: 48 10E3/UL (ref 150–450)
PLATELET # BLD AUTO: 50 10E3/UL (ref 150–450)
PLATELET # BLD AUTO: 52 10E3/UL (ref 150–450)
PLATELET # BLD AUTO: 53 10E3/UL (ref 150–450)
PLATELET # BLD AUTO: 54 10E3/UL (ref 150–450)
PLATELET # BLD AUTO: 55 10E3/UL (ref 150–450)
PLATELET # BLD AUTO: 55 10E3/UL (ref 150–450)
PLATELET # BLD AUTO: 57 10E3/UL (ref 150–450)
PLATELET # BLD AUTO: 57 10E3/UL (ref 150–450)
PLATELET # BLD AUTO: 58 10E3/UL (ref 150–450)
PLATELET # BLD AUTO: 61 10E3/UL (ref 150–450)
PLATELET # BLD AUTO: 61 10E3/UL (ref 150–450)
PLATELET # BLD AUTO: 62 10E3/UL (ref 150–450)
PLATELET # BLD AUTO: 67 10E3/UL (ref 150–450)
PLATELET # BLD AUTO: 68 10E3/UL (ref 150–450)
PLATELET # BLD AUTO: 70 10E3/UL (ref 150–450)
PLATELET # BLD AUTO: 70 10E3/UL (ref 150–450)
PLATELET # BLD AUTO: 71 10E3/UL (ref 150–450)
PLATELET # BLD AUTO: 72 10E3/UL (ref 150–450)
PLATELET # BLD AUTO: 72 10E3/UL (ref 150–450)
PLATELET # BLD AUTO: 73 10E3/UL (ref 150–450)
PLATELET # BLD AUTO: 74 10E3/UL (ref 150–450)
PLATELET # BLD AUTO: 74 10E3/UL (ref 150–450)
PLATELET # BLD AUTO: 75 10E3/UL (ref 150–450)
PLATELET # BLD AUTO: 76 10E3/UL (ref 150–450)
PLATELET # BLD AUTO: 77 10E3/UL (ref 150–450)
PLATELET # BLD AUTO: 78 10E3/UL (ref 150–450)
PLATELET # BLD AUTO: 78 10E3/UL (ref 150–450)
PLATELET # BLD AUTO: 82 10E3/UL (ref 150–450)
PLATELET # BLD AUTO: 84 10E3/UL (ref 150–450)
PLATELET # BLD AUTO: 87 10E3/UL (ref 150–450)
PLATELET # BLD AUTO: 92 10E3/UL (ref 150–450)
PLATELET # BLD AUTO: 94 10E3/UL (ref 150–450)
PLATELET # BLD AUTO: 97 10E3/UL (ref 150–450)
POTASSIUM BLD-SCNC: 2.9 MMOL/L (ref 3.4–5.3)
POTASSIUM BLD-SCNC: 2.9 MMOL/L (ref 3.5–5)
POTASSIUM BLD-SCNC: 3.1 MMOL/L (ref 3.4–5.3)
POTASSIUM BLD-SCNC: 3.1 MMOL/L (ref 3.5–5)
POTASSIUM BLD-SCNC: 3.2 MMOL/L (ref 3.5–5)
POTASSIUM BLD-SCNC: 3.3 MMOL/L (ref 3.4–5.3)
POTASSIUM BLD-SCNC: 3.3 MMOL/L (ref 3.4–5.3)
POTASSIUM BLD-SCNC: 3.4 MMOL/L (ref 3.4–5.3)
POTASSIUM BLD-SCNC: 3.4 MMOL/L (ref 3.5–5)
POTASSIUM BLD-SCNC: 3.4 MMOL/L (ref 3.5–5)
POTASSIUM BLD-SCNC: 3.5 MMOL/L (ref 3.4–5.3)
POTASSIUM BLD-SCNC: 3.5 MMOL/L (ref 3.5–5)
POTASSIUM BLD-SCNC: 3.6 MMOL/L (ref 3.4–5.3)
POTASSIUM BLD-SCNC: 3.7 MMOL/L (ref 3.4–5.3)
POTASSIUM BLD-SCNC: 3.7 MMOL/L (ref 3.5–5)
POTASSIUM BLD-SCNC: 3.8 MMOL/L (ref 3.4–5.3)
POTASSIUM BLD-SCNC: 3.8 MMOL/L (ref 3.5–5)
POTASSIUM BLD-SCNC: 3.9 MMOL/L (ref 3.4–5.3)
POTASSIUM BLD-SCNC: 4 MMOL/L (ref 3.4–5.3)
POTASSIUM BLD-SCNC: 4 MMOL/L (ref 3.4–5.3)
POTASSIUM BLD-SCNC: 4.1 MMOL/L (ref 3.4–5.3)
POTASSIUM BLD-SCNC: 4.1 MMOL/L (ref 3.5–5)
POTASSIUM BLD-SCNC: 4.1 MMOL/L (ref 3.5–5)
POTASSIUM BLD-SCNC: 4.2 MMOL/L (ref 3.4–5.3)
POTASSIUM BLD-SCNC: 4.2 MMOL/L (ref 3.5–5)
POTASSIUM BLD-SCNC: 4.3 MMOL/L (ref 3.5–5)
POTASSIUM BLD-SCNC: 4.3 MMOL/L (ref 3.5–5)
POTASSIUM BLD-SCNC: 4.4 MMOL/L (ref 3.4–5.3)
POTASSIUM BLD-SCNC: 4.5 MMOL/L (ref 3.5–5)
POTASSIUM BLD-SCNC: 4.6 MMOL/L (ref 3.4–5.3)
POTASSIUM BLD-SCNC: 4.6 MMOL/L (ref 3.5–5)
POTASSIUM BLD-SCNC: 4.8 MMOL/L (ref 3.4–5.3)
POTASSIUM BLD-SCNC: 4.9 MMOL/L (ref 3.4–5.3)
POTASSIUM BLD-SCNC: 4.9 MMOL/L (ref 3.5–5)
POTASSIUM BLD-SCNC: 5.1 MMOL/L (ref 3.4–5.3)
POTASSIUM BLD-SCNC: 5.1 MMOL/L (ref 3.4–5.3)
POTASSIUM BLD-SCNC: 5.2 MMOL/L (ref 3.4–5.3)
POTASSIUM BLD-SCNC: 5.3 MMOL/L (ref 3.4–5.3)
POTASSIUM BLD-SCNC: 5.5 MMOL/L (ref 3.4–5.3)
POTASSIUM BLD-SCNC: 6.3 MMOL/L (ref 3.4–5.3)
POTASSIUM BLD-SCNC: 7.2 MMOL/L (ref 3.4–5.3)
POTASSIUM BLD-SCNC: NORMAL MMOL/L
POTASSIUM SERPL-SCNC: 5 MMOL/L (ref 3.4–5.3)
POTASSIUM SERPL-SCNC: 6.2 MMOL/L (ref 3.4–5.3)
POTASSIUM SERPL-SCNC: 7 MMOL/L (ref 3.4–5.3)
PR INTERVAL - MUSE: 132 MS
PR INTERVAL - MUSE: 154 MS
PR INTERVAL - MUSE: 158 MS
PR INTERVAL - MUSE: 160 MS
PR INTERVAL - MUSE: 224 MS
PROCALCITONIN SERPL IA-MCNC: 4.96 NG/ML
PROCALCITONIN SERPL-MCNC: 0.13 NG/ML (ref 0–0.49)
PROCALCITONIN SERPL-MCNC: 0.13 NG/ML (ref 0–0.49)
PROCALCITONIN SERPL-MCNC: 14.38 NG/ML
PROCALCITONIN SERPL-MCNC: 3.18 NG/ML
PROCALCITONIN SERPL-MCNC: 8.07 NG/ML
PROT FLD-MCNC: 2.4 G/DL
PROT SERPL-MCNC: 5.2 G/DL (ref 6.8–8.8)
PROT SERPL-MCNC: 5.5 G/DL (ref 6.8–8.8)
PROT SERPL-MCNC: 5.6 G/DL (ref 6.4–8.3)
PROT SERPL-MCNC: 5.8 G/DL (ref 6–8)
PROT SERPL-MCNC: 6.5 G/DL (ref 6–8)
PROT SERPL-MCNC: 6.5 G/DL (ref 6–8)
QRS DURATION - MUSE: 78 MS
QRS DURATION - MUSE: 92 MS
QRS DURATION - MUSE: 92 MS
QRS DURATION - MUSE: 98 MS
QRS DURATION - MUSE: 98 MS
QT - MUSE: 274 MS
QT - MUSE: 322 MS
QT - MUSE: 366 MS
QT - MUSE: 368 MS
QT - MUSE: 384 MS
QTC - MUSE: 415 MS
QTC - MUSE: 460 MS
QTC - MUSE: 462 MS
QTC - MUSE: 464 MS
QTC - MUSE: 488 MS
R AXIS - MUSE: -11 DEGREES
R AXIS - MUSE: -22 DEGREES
R AXIS - MUSE: -23 DEGREES
R AXIS - MUSE: -56 DEGREES
R AXIS - MUSE: 33 DEGREES
RBC # BLD AUTO: 2.03 10E6/UL (ref 3.8–5.2)
RBC # BLD AUTO: 2.1 10E6/UL (ref 3.8–5.2)
RBC # BLD AUTO: 2.11 10E6/UL (ref 3.8–5.2)
RBC # BLD AUTO: 2.15 10E6/UL (ref 3.8–5.2)
RBC # BLD AUTO: 2.27 10E6/UL (ref 3.8–5.2)
RBC # BLD AUTO: 2.28 10E6/UL (ref 3.8–5.2)
RBC # BLD AUTO: 2.3 10E6/UL (ref 3.8–5.2)
RBC # BLD AUTO: 2.32 10E6/UL (ref 3.8–5.2)
RBC # BLD AUTO: 2.37 10E6/UL (ref 3.8–5.2)
RBC # BLD AUTO: 2.43 10E6/UL (ref 3.8–5.2)
RBC # BLD AUTO: 2.44 10E6/UL (ref 3.8–5.2)
RBC # BLD AUTO: 2.45 10E6/UL (ref 3.8–5.2)
RBC # BLD AUTO: 2.49 10E6/UL (ref 3.8–5.2)
RBC # BLD AUTO: 2.53 10E6/UL (ref 3.8–5.2)
RBC # BLD AUTO: 2.55 10E6/UL (ref 3.8–5.2)
RBC # BLD AUTO: 2.59 10E6/UL (ref 3.8–5.2)
RBC # BLD AUTO: 2.59 10E6/UL (ref 3.8–5.2)
RBC # BLD AUTO: 2.62 10E6/UL (ref 3.8–5.2)
RBC # BLD AUTO: 2.63 10E6/UL (ref 3.8–5.2)
RBC # BLD AUTO: 2.71 10E6/UL (ref 3.8–5.2)
RBC # BLD AUTO: 2.75 10E6/UL (ref 3.8–5.2)
RBC # BLD AUTO: 2.76 10E6/UL (ref 3.8–5.2)
RBC # BLD AUTO: 2.76 10E6/UL (ref 3.8–5.2)
RBC # BLD AUTO: 2.84 10E6/UL (ref 3.8–5.2)
RBC # BLD AUTO: 2.84 10E6/UL (ref 3.8–5.2)
RBC # BLD AUTO: 2.91 10E6/UL (ref 3.8–5.2)
RBC # BLD AUTO: 2.93 10E6/UL (ref 3.8–5.2)
RBC # BLD AUTO: 2.99 10E6/UL (ref 3.8–5.2)
RBC # BLD AUTO: 3.06 10E6/UL (ref 3.8–5.2)
RBC # BLD AUTO: 3.19 10E6/UL (ref 3.8–5.2)
RBC MORPH BLD: ABNORMAL
RBC MORPH BLD: ABNORMAL
RBC URINE: 62 /HPF
RSV RNA SPEC QL NAA+PROBE: NOT DETECTED
RSV RNA SPEC QL NAA+PROBE: NOT DETECTED
RV+EV RNA SPEC QL NAA+PROBE: NOT DETECTED
RVPLETH-%PRED-PRE: 88 %
RVPLETH-PRE: 1.83 L
RVPLETH-PRED: 2.06 L
S PNEUM AG SPEC QL: NEGATIVE
SARS-COV-2 RNA RESP QL NAA+PROBE: NEGATIVE
SARS-COV-2 RNA RESP QL NAA+PROBE: POSITIVE
SCANNED LAB RESULT: NORMAL
SODIUM SERPL-SCNC: 123 MMOL/L (ref 136–145)
SODIUM SERPL-SCNC: 126 MMOL/L (ref 133–144)
SODIUM SERPL-SCNC: 127 MMOL/L (ref 133–144)
SODIUM SERPL-SCNC: 128 MMOL/L (ref 133–144)
SODIUM SERPL-SCNC: 130 MMOL/L (ref 133–144)
SODIUM SERPL-SCNC: 130 MMOL/L (ref 133–144)
SODIUM SERPL-SCNC: 131 MMOL/L (ref 133–144)
SODIUM SERPL-SCNC: 132 MMOL/L (ref 133–144)
SODIUM SERPL-SCNC: 133 MMOL/L (ref 133–144)
SODIUM SERPL-SCNC: 134 MMOL/L (ref 133–144)
SODIUM SERPL-SCNC: 134 MMOL/L (ref 136–145)
SODIUM SERPL-SCNC: 135 MMOL/L (ref 133–144)
SODIUM SERPL-SCNC: 136 MMOL/L (ref 133–144)
SODIUM SERPL-SCNC: 136 MMOL/L (ref 136–145)
SODIUM SERPL-SCNC: 137 MMOL/L (ref 133–144)
SODIUM SERPL-SCNC: 137 MMOL/L (ref 136–145)
SODIUM SERPL-SCNC: 137 MMOL/L (ref 136–145)
SODIUM SERPL-SCNC: 138 MMOL/L (ref 133–144)
SODIUM SERPL-SCNC: 138 MMOL/L (ref 136–145)
SODIUM SERPL-SCNC: 141 MMOL/L (ref 136–145)
SODIUM SERPL-SCNC: NORMAL MMOL/L
SP GR UR STRIP: 1.01 (ref 1–1.03)
SP GR UR STRIP: 1.01 (ref 1–1.03)
SPECIMEN EXPIRATION DATE: NORMAL
SPECIMEN STATUS: NORMAL
SPHEROCYTES BLD QL SMEAR: SLIGHT
SYSTOLIC BLOOD PRESSURE - MUSE: 97 MMHG
SYSTOLIC BLOOD PRESSURE - MUSE: NORMAL MMHG
T AXIS - MUSE: -58 DEGREES
T AXIS - MUSE: 102 DEGREES
T AXIS - MUSE: 110 DEGREES
T AXIS - MUSE: 173 DEGREES
T AXIS - MUSE: 81 DEGREES
TIBC SERPL-MCNC: 140 UG/DL (ref 240–430)
TLCPLETH-%PRED-PRE: 76 %
TLCPLETH-PRE: 3.93 L
TLCPLETH-PRED: 5.11 L
TRIGL SERPL-MCNC: 141 MG/DL
TROPONIN I SERPL-MCNC: 0.07 NG/ML (ref 0–0.29)
TROPONIN I SERPL-MCNC: 0.12 NG/ML (ref 0–0.29)
TROPONIN T SERPL HS-MCNC: 164 NG/L
TROPONIN T SERPL HS-MCNC: 183 NG/L
UNIT ABO/RH: NORMAL
UNIT NUMBER: NORMAL
UNIT STATUS: NORMAL
UNIT TYPE ISBT: 5100
UNIT TYPE ISBT: 6200
UROBILINOGEN UR STRIP-MCNC: <2 MG/DL
UROBILINOGEN UR STRIP-MCNC: NORMAL MG/DL
VA-%PRED-PRE: 72 %
VA-PRE: 3.57 L
VANCOMYCIN SERPL-MCNC: 15.7 MG/L
VANCOMYCIN SERPL-MCNC: 27 MG/L
VC-%PRED-PRE: 64 %
VC-PRE: 2.11 L
VC-PRED: 3.25 L
VENTRICULAR RATE- MUSE: 107 BPM
VENTRICULAR RATE- MUSE: 123 BPM
VENTRICULAR RATE- MUSE: 138 BPM
VENTRICULAR RATE- MUSE: 87 BPM
VENTRICULAR RATE- MUSE: 96 BPM
VIT B12 SERPL-MCNC: 3472 PG/ML (ref 232–1245)
WBC # BLD AUTO: 1.1 10E3/UL (ref 4–11)
WBC # BLD AUTO: 1.1 10E3/UL (ref 4–11)
WBC # BLD AUTO: 1.5 10E3/UL (ref 4–11)
WBC # BLD AUTO: 10.6 10E3/UL (ref 4–11)
WBC # BLD AUTO: 11.1 10E3/UL (ref 4–11)
WBC # BLD AUTO: 12.1 10E3/UL (ref 4–11)
WBC # BLD AUTO: 14.1 10E3/UL (ref 4–11)
WBC # BLD AUTO: 15.2 10E3/UL (ref 4–11)
WBC # BLD AUTO: 16.4 10E3/UL (ref 4–11)
WBC # BLD AUTO: 18.2 10E3/UL (ref 4–11)
WBC # BLD AUTO: 19.6 10E3/UL (ref 4–11)
WBC # BLD AUTO: 19.6 10E3/UL (ref 4–11)
WBC # BLD AUTO: 2.4 10E3/UL (ref 4–11)
WBC # BLD AUTO: 26.5 10E3/UL (ref 4–11)
WBC # BLD AUTO: 27.3 10E3/UL (ref 4–11)
WBC # BLD AUTO: 3.1 10E3/UL (ref 4–11)
WBC # BLD AUTO: 3.5 10E3/UL (ref 4–11)
WBC # BLD AUTO: 3.8 10E3/UL (ref 4–11)
WBC # BLD AUTO: 4.9 10E3/UL (ref 4–11)
WBC # BLD AUTO: 5.2 10E3/UL (ref 4–11)
WBC # BLD AUTO: 5.5 10E3/UL (ref 4–11)
WBC # BLD AUTO: 5.6 10E3/UL (ref 4–11)
WBC # BLD AUTO: 5.6 10E3/UL (ref 4–11)
WBC # BLD AUTO: 5.8 10E3/UL (ref 4–11)
WBC # BLD AUTO: 5.8 10E3/UL (ref 4–11)
WBC # BLD AUTO: 6.6 10E3/UL (ref 4–11)
WBC # BLD AUTO: 6.7 10E3/UL (ref 4–11)
WBC # BLD AUTO: 9.6 10E3/UL (ref 4–11)
WBC # BLD AUTO: 9.9 10E3/UL (ref 4–11)
WBC # FLD AUTO: 23 /UL
WBC CLUMPS #/AREA URNS HPF: PRESENT /HPF
WBC URINE: >182 /HPF

## 2022-01-01 PROCEDURE — 250N000013 HC RX MED GY IP 250 OP 250 PS 637: Performed by: HOSPITALIST

## 2022-01-01 PROCEDURE — 250N000011 HC RX IP 250 OP 636: Performed by: HOSPITALIST

## 2022-01-01 PROCEDURE — 250N000013 HC RX MED GY IP 250 OP 250 PS 637: Performed by: INTERNAL MEDICINE

## 2022-01-01 PROCEDURE — 120N000001 HC R&B MED SURG/OB

## 2022-01-01 PROCEDURE — 93306 TTE W/DOPPLER COMPLETE: CPT | Mod: 26 | Performed by: INTERNAL MEDICINE

## 2022-01-01 PROCEDURE — 99232 SBSQ HOSP IP/OBS MODERATE 35: CPT | Mod: CS | Performed by: HOSPITALIST

## 2022-01-01 PROCEDURE — 86850 RBC ANTIBODY SCREEN: CPT | Performed by: EMERGENCY MEDICINE

## 2022-01-01 PROCEDURE — 80048 BASIC METABOLIC PNL TOTAL CA: CPT | Performed by: HOSPITALIST

## 2022-01-01 PROCEDURE — 92526 ORAL FUNCTION THERAPY: CPT | Mod: GN | Performed by: SPEECH-LANGUAGE PATHOLOGIST

## 2022-01-01 PROCEDURE — 82947 ASSAY GLUCOSE BLOOD QUANT: CPT | Performed by: HOSPITALIST

## 2022-01-01 PROCEDURE — 97162 PT EVAL MOD COMPLEX 30 MIN: CPT | Mod: GP | Performed by: PHYSICAL THERAPIST

## 2022-01-01 PROCEDURE — 99232 SBSQ HOSP IP/OBS MODERATE 35: CPT | Mod: CS | Performed by: INTERNAL MEDICINE

## 2022-01-01 PROCEDURE — 85018 HEMOGLOBIN: CPT | Performed by: HOSPITALIST

## 2022-01-01 PROCEDURE — 99232 SBSQ HOSP IP/OBS MODERATE 35: CPT | Mod: GV | Performed by: INTERNAL MEDICINE

## 2022-01-01 PROCEDURE — 88333 PATH CONSLTJ SURG CYTO XM 1: CPT | Mod: TC | Performed by: INTERNAL MEDICINE

## 2022-01-01 PROCEDURE — 82746 ASSAY OF FOLIC ACID SERUM: CPT | Performed by: HOSPITALIST

## 2022-01-01 PROCEDURE — 83735 ASSAY OF MAGNESIUM: CPT | Performed by: INTERNAL MEDICINE

## 2022-01-01 PROCEDURE — 82310 ASSAY OF CALCIUM: CPT | Performed by: INTERNAL MEDICINE

## 2022-01-01 PROCEDURE — 85007 BL SMEAR W/DIFF WBC COUNT: CPT | Performed by: EMERGENCY MEDICINE

## 2022-01-01 PROCEDURE — 250N000009 HC RX 250: Performed by: FAMILY MEDICINE

## 2022-01-01 PROCEDURE — 250N000013 HC RX MED GY IP 250 OP 250 PS 637: Performed by: PHYSICIAN ASSISTANT

## 2022-01-01 PROCEDURE — 87040 BLOOD CULTURE FOR BACTERIA: CPT | Performed by: EMERGENCY MEDICINE

## 2022-01-01 PROCEDURE — 999N000052 EEG VIDEO 2-12 HRS UNMONITORED

## 2022-01-01 PROCEDURE — 99214 OFFICE O/P EST MOD 30 MIN: CPT | Mod: 25 | Performed by: FAMILY MEDICINE

## 2022-01-01 PROCEDURE — 86698 HISTOPLASMA ANTIBODY: CPT

## 2022-01-01 PROCEDURE — 97530 THERAPEUTIC ACTIVITIES: CPT | Mod: GP

## 2022-01-01 PROCEDURE — U0003 INFECTIOUS AGENT DETECTION BY NUCLEIC ACID (DNA OR RNA); SEVERE ACUTE RESPIRATORY SYNDROME CORONAVIRUS 2 (SARS-COV-2) (CORONAVIRUS DISEASE [COVID-19]), AMPLIFIED PROBE TECHNIQUE, MAKING USE OF HIGH THROUGHPUT TECHNOLOGIES AS DESCRIBED BY CMS-2020-01-R: HCPCS | Performed by: EMERGENCY MEDICINE

## 2022-01-01 PROCEDURE — 99233 SBSQ HOSP IP/OBS HIGH 50: CPT | Performed by: INTERNAL MEDICINE

## 2022-01-01 PROCEDURE — 86923 COMPATIBILITY TEST ELECTRIC: CPT | Performed by: HOSPITALIST

## 2022-01-01 PROCEDURE — 97530 THERAPEUTIC ACTIVITIES: CPT | Mod: GP | Performed by: PHYSICAL THERAPIST

## 2022-01-01 PROCEDURE — 250N000011 HC RX IP 250 OP 636: Performed by: INTERNAL MEDICINE

## 2022-01-01 PROCEDURE — 83986 ASSAY PH BODY FLUID NOS: CPT | Performed by: INTERNAL MEDICINE

## 2022-01-01 PROCEDURE — 93010 ELECTROCARDIOGRAM REPORT: CPT | Mod: 59 | Performed by: GENERAL ACUTE CARE HOSPITAL

## 2022-01-01 PROCEDURE — 36415 COLL VENOUS BLD VENIPUNCTURE: CPT | Performed by: HOSPITALIST

## 2022-01-01 PROCEDURE — 81003 URINALYSIS AUTO W/O SCOPE: CPT | Performed by: HOSPITALIST

## 2022-01-01 PROCEDURE — 360N000075 HC SURGERY LEVEL 2, PER MIN: Performed by: SURGERY

## 2022-01-01 PROCEDURE — 250N000011 HC RX IP 250 OP 636: Performed by: PHYSICIAN ASSISTANT

## 2022-01-01 PROCEDURE — 999N000141 HC STATISTIC PRE-PROCEDURE NURSING ASSESSMENT: Performed by: SURGERY

## 2022-01-01 PROCEDURE — 82310 ASSAY OF CALCIUM: CPT | Performed by: HOSPITALIST

## 2022-01-01 PROCEDURE — P9037 PLATE PHERES LEUKOREDU IRRAD: HCPCS | Performed by: HOSPITALIST

## 2022-01-01 PROCEDURE — 82374 ASSAY BLOOD CARBON DIOXIDE: CPT | Performed by: HOSPITALIST

## 2022-01-01 PROCEDURE — 11042 DBRDMT SUBQ TIS 1ST 20SQCM/<: CPT | Performed by: FAMILY MEDICINE

## 2022-01-01 PROCEDURE — 83605 ASSAY OF LACTIC ACID: CPT | Performed by: INTERNAL MEDICINE

## 2022-01-01 PROCEDURE — 99232 SBSQ HOSP IP/OBS MODERATE 35: CPT | Performed by: SPECIALIST

## 2022-01-01 PROCEDURE — 99233 SBSQ HOSP IP/OBS HIGH 50: CPT | Mod: CS | Performed by: HOSPITALIST

## 2022-01-01 PROCEDURE — 258N000002 HC RX IP 258 OP 250: Performed by: HOSPITALIST

## 2022-01-01 PROCEDURE — 99232 SBSQ HOSP IP/OBS MODERATE 35: CPT | Performed by: INTERNAL MEDICINE

## 2022-01-01 PROCEDURE — 110N000005 HC R&B HOSPICE, ACCENT

## 2022-01-01 PROCEDURE — 272N000221 US BIOPSY LIVER

## 2022-01-01 PROCEDURE — G0463 HOSPITAL OUTPT CLINIC VISIT: HCPCS | Mod: 25

## 2022-01-01 PROCEDURE — 93005 ELECTROCARDIOGRAM TRACING: CPT | Performed by: PHYSICIAN ASSISTANT

## 2022-01-01 PROCEDURE — 255N000002 HC RX 255 OP 636: Performed by: INTERNAL MEDICINE

## 2022-01-01 PROCEDURE — 71045 X-RAY EXAM CHEST 1 VIEW: CPT

## 2022-01-01 PROCEDURE — 99233 SBSQ HOSP IP/OBS HIGH 50: CPT | Mod: CS | Performed by: INTERNAL MEDICINE

## 2022-01-01 PROCEDURE — 80048 BASIC METABOLIC PNL TOTAL CA: CPT | Performed by: INTERNAL MEDICINE

## 2022-01-01 PROCEDURE — 85027 COMPLETE CBC AUTOMATED: CPT | Performed by: INTERNAL MEDICINE

## 2022-01-01 PROCEDURE — 210N000001 HC R&B IMCU HEART CARE

## 2022-01-01 PROCEDURE — 83605 ASSAY OF LACTIC ACID: CPT | Performed by: HOSPITALIST

## 2022-01-01 PROCEDURE — 99239 HOSP IP/OBS DSCHRG MGMT >30: CPT | Mod: GV | Performed by: INTERNAL MEDICINE

## 2022-01-01 PROCEDURE — 82607 VITAMIN B-12: CPT | Performed by: HOSPITALIST

## 2022-01-01 PROCEDURE — 80048 BASIC METABOLIC PNL TOTAL CA: CPT

## 2022-01-01 PROCEDURE — 97110 THERAPEUTIC EXERCISES: CPT | Mod: GO

## 2022-01-01 PROCEDURE — 84145 PROCALCITONIN (PCT): CPT | Performed by: HOSPITALIST

## 2022-01-01 PROCEDURE — 250N000011 HC RX IP 250 OP 636: Performed by: PSYCHIATRY & NEUROLOGY

## 2022-01-01 PROCEDURE — 258N000003 HC RX IP 258 OP 636: Performed by: INTERNAL MEDICINE

## 2022-01-01 PROCEDURE — 88112 CYTOPATH CELL ENHANCE TECH: CPT | Mod: 26 | Performed by: PATHOLOGY

## 2022-01-01 PROCEDURE — 99222 1ST HOSP IP/OBS MODERATE 55: CPT | Mod: AI | Performed by: INTERNAL MEDICINE

## 2022-01-01 PROCEDURE — 85014 HEMATOCRIT: CPT | Performed by: HOSPITALIST

## 2022-01-01 PROCEDURE — 99207 PR CDG-CODE CATEGORY CHANGED: CPT | Performed by: INTERNAL MEDICINE

## 2022-01-01 PROCEDURE — 93010 ELECTROCARDIOGRAM REPORT: CPT | Performed by: INTERNAL MEDICINE

## 2022-01-01 PROCEDURE — 999N000208 ECHOCARDIOGRAM COMPLETE

## 2022-01-01 PROCEDURE — 99214 OFFICE O/P EST MOD 30 MIN: CPT | Performed by: INTERNAL MEDICINE

## 2022-01-01 PROCEDURE — 87633 RESP VIRUS 12-25 TARGETS: CPT

## 2022-01-01 PROCEDURE — C9803 HOPD COVID-19 SPEC COLLECT: HCPCS

## 2022-01-01 PROCEDURE — 85610 PROTHROMBIN TIME: CPT | Performed by: EMERGENCY MEDICINE

## 2022-01-01 PROCEDURE — 86140 C-REACTIVE PROTEIN: CPT | Performed by: HOSPITALIST

## 2022-01-01 PROCEDURE — 250N000011 HC RX IP 250 OP 636: Performed by: NURSE PRACTITIONER

## 2022-01-01 PROCEDURE — U0005 INFEC AGEN DETEC AMPLI PROBE: HCPCS | Performed by: HOSPITALIST

## 2022-01-01 PROCEDURE — 97530 THERAPEUTIC ACTIVITIES: CPT | Mod: GO

## 2022-01-01 PROCEDURE — 87040 BLOOD CULTURE FOR BACTERIA: CPT | Performed by: PHYSICIAN ASSISTANT

## 2022-01-01 PROCEDURE — 84100 ASSAY OF PHOSPHORUS: CPT | Performed by: INTERNAL MEDICINE

## 2022-01-01 PROCEDURE — 343N000001 HC RX 343: Performed by: INTERNAL MEDICINE

## 2022-01-01 PROCEDURE — 97535 SELF CARE MNGMENT TRAINING: CPT | Mod: GO

## 2022-01-01 PROCEDURE — 84132 ASSAY OF SERUM POTASSIUM: CPT | Performed by: HOSPITALIST

## 2022-01-01 PROCEDURE — 250N000012 HC RX MED GY IP 250 OP 636 PS 637: Performed by: EMERGENCY MEDICINE

## 2022-01-01 PROCEDURE — 99232 SBSQ HOSP IP/OBS MODERATE 35: CPT | Performed by: HOSPITALIST

## 2022-01-01 PROCEDURE — U0003 INFECTIOUS AGENT DETECTION BY NUCLEIC ACID (DNA OR RNA); SEVERE ACUTE RESPIRATORY SYNDROME CORONAVIRUS 2 (SARS-COV-2) (CORONAVIRUS DISEASE [COVID-19]), AMPLIFIED PROBE TECHNIQUE, MAKING USE OF HIGH THROUGHPUT TECHNOLOGIES AS DESCRIBED BY CMS-2020-01-R: HCPCS

## 2022-01-01 PROCEDURE — G0463 HOSPITAL OUTPT CLINIC VISIT: HCPCS

## 2022-01-01 PROCEDURE — P9037 PLATE PHERES LEUKOREDU IRRAD: HCPCS | Performed by: INTERNAL MEDICINE

## 2022-01-01 PROCEDURE — 84132 ASSAY OF SERUM POTASSIUM: CPT | Performed by: PHYSICIAN ASSISTANT

## 2022-01-01 PROCEDURE — 92526 ORAL FUNCTION THERAPY: CPT | Mod: GN

## 2022-01-01 PROCEDURE — 85049 AUTOMATED PLATELET COUNT: CPT | Performed by: INTERNAL MEDICINE

## 2022-01-01 PROCEDURE — 80202 ASSAY OF VANCOMYCIN: CPT

## 2022-01-01 PROCEDURE — 87015 SPECIMEN INFECT AGNT CONCNTJ: CPT | Performed by: INTERNAL MEDICINE

## 2022-01-01 PROCEDURE — 86140 C-REACTIVE PROTEIN: CPT | Performed by: INTERNAL MEDICINE

## 2022-01-01 PROCEDURE — P9037 PLATE PHERES LEUKOREDU IRRAD: HCPCS

## 2022-01-01 PROCEDURE — 85018 HEMOGLOBIN: CPT | Performed by: INTERNAL MEDICINE

## 2022-01-01 PROCEDURE — 99231 SBSQ HOSP IP/OBS SF/LOW 25: CPT | Performed by: SURGERY

## 2022-01-01 PROCEDURE — P9035 PLATELET PHERES LEUKOREDUCED: HCPCS | Performed by: HOSPITALIST

## 2022-01-01 PROCEDURE — 97162 PT EVAL MOD COMPLEX 30 MIN: CPT | Mod: GP

## 2022-01-01 PROCEDURE — 97166 OT EVAL MOD COMPLEX 45 MIN: CPT | Mod: GO

## 2022-01-01 PROCEDURE — 272N000500 HC NEEDLE CR2

## 2022-01-01 PROCEDURE — A9585 GADOBUTROL INJECTION: HCPCS | Performed by: HOSPITALIST

## 2022-01-01 PROCEDURE — 36415 COLL VENOUS BLD VENIPUNCTURE: CPT | Performed by: PHYSICIAN ASSISTANT

## 2022-01-01 PROCEDURE — 78472 GATED HEART PLANAR SINGLE: CPT

## 2022-01-01 PROCEDURE — 99233 SBSQ HOSP IP/OBS HIGH 50: CPT | Performed by: HOSPITALIST

## 2022-01-01 PROCEDURE — 82565 ASSAY OF CREATININE: CPT | Performed by: HOSPITALIST

## 2022-01-01 PROCEDURE — 250N000012 HC RX MED GY IP 250 OP 636 PS 637: Performed by: INTERNAL MEDICINE

## 2022-01-01 PROCEDURE — 84132 ASSAY OF SERUM POTASSIUM: CPT | Performed by: INTERNAL MEDICINE

## 2022-01-01 PROCEDURE — 88112 CYTOPATH CELL ENHANCE TECH: CPT | Mod: TC | Performed by: INTERNAL MEDICINE

## 2022-01-01 PROCEDURE — 88305 TISSUE EXAM BY PATHOLOGIST: CPT | Mod: 26 | Performed by: PATHOLOGY

## 2022-01-01 PROCEDURE — 99291 CRITICAL CARE FIRST HOUR: CPT | Mod: 25

## 2022-01-01 PROCEDURE — 97116 GAIT TRAINING THERAPY: CPT | Mod: GP

## 2022-01-01 PROCEDURE — 83550 IRON BINDING TEST: CPT | Performed by: HOSPITALIST

## 2022-01-01 PROCEDURE — 83605 ASSAY OF LACTIC ACID: CPT | Performed by: EMERGENCY MEDICINE

## 2022-01-01 PROCEDURE — 85027 COMPLETE CBC AUTOMATED: CPT | Performed by: HOSPITALIST

## 2022-01-01 PROCEDURE — 97602 WOUND(S) CARE NON-SELECTIVE: CPT

## 2022-01-01 PROCEDURE — 99213 OFFICE O/P EST LOW 20 MIN: CPT

## 2022-01-01 PROCEDURE — 94726 PLETHYSMOGRAPHY LUNG VOLUMES: CPT

## 2022-01-01 PROCEDURE — 83615 LACTATE (LD) (LDH) ENZYME: CPT | Performed by: INTERNAL MEDICINE

## 2022-01-01 PROCEDURE — C1788 PORT, INDWELLING, IMP: HCPCS

## 2022-01-01 PROCEDURE — 84145 PROCALCITONIN (PCT): CPT | Performed by: INTERNAL MEDICINE

## 2022-01-01 PROCEDURE — 36415 COLL VENOUS BLD VENIPUNCTURE: CPT | Performed by: INTERNAL MEDICINE

## 2022-01-01 PROCEDURE — 71250 CT THORAX DX C-: CPT

## 2022-01-01 PROCEDURE — 258N000003 HC RX IP 258 OP 636: Performed by: HOSPITALIST

## 2022-01-01 PROCEDURE — 99233 SBSQ HOSP IP/OBS HIGH 50: CPT | Performed by: SPECIALIST

## 2022-01-01 PROCEDURE — 97140 MANUAL THERAPY 1/> REGIONS: CPT | Mod: GP

## 2022-01-01 PROCEDURE — 258N000003 HC RX IP 258 OP 636: Performed by: NURSE PRACTITIONER

## 2022-01-01 PROCEDURE — 83735 ASSAY OF MAGNESIUM: CPT | Performed by: HOSPITALIST

## 2022-01-01 PROCEDURE — 93005 ELECTROCARDIOGRAM TRACING: CPT

## 2022-01-01 PROCEDURE — 99233 SBSQ HOSP IP/OBS HIGH 50: CPT

## 2022-01-01 PROCEDURE — 87075 CULTR BACTERIA EXCEPT BLOOD: CPT | Performed by: SURGERY

## 2022-01-01 PROCEDURE — 80061 LIPID PANEL: CPT | Performed by: FAMILY MEDICINE

## 2022-01-01 PROCEDURE — 85049 AUTOMATED PLATELET COUNT: CPT | Performed by: HOSPITALIST

## 2022-01-01 PROCEDURE — 82248 BILIRUBIN DIRECT: CPT | Performed by: INTERNAL MEDICINE

## 2022-01-01 PROCEDURE — 92610 EVALUATE SWALLOWING FUNCTION: CPT | Mod: GN | Performed by: SPEECH-LANGUAGE PATHOLOGIST

## 2022-01-01 PROCEDURE — 272N000706 US THORACENTESIS

## 2022-01-01 PROCEDURE — 82962 GLUCOSE BLOOD TEST: CPT

## 2022-01-01 PROCEDURE — 250N000009 HC RX 250: Performed by: INTERNAL MEDICINE

## 2022-01-01 PROCEDURE — A9560 TC99M LABELED RBC: HCPCS | Performed by: INTERNAL MEDICINE

## 2022-01-01 PROCEDURE — 71046 X-RAY EXAM CHEST 2 VIEWS: CPT

## 2022-01-01 PROCEDURE — 70450 CT HEAD/BRAIN W/O DYE: CPT

## 2022-01-01 PROCEDURE — 99292 CRITICAL CARE ADDL 30 MIN: CPT

## 2022-01-01 PROCEDURE — P9016 RBC LEUKOCYTES REDUCED: HCPCS | Performed by: HOSPITALIST

## 2022-01-01 PROCEDURE — 80048 BASIC METABOLIC PNL TOTAL CA: CPT | Performed by: NURSE PRACTITIONER

## 2022-01-01 PROCEDURE — 95813 EEG EXTND MNTR 61-119 MIN: CPT | Mod: 26 | Performed by: PSYCHIATRY & NEUROLOGY

## 2022-01-01 PROCEDURE — 258N000001 HC RX 258: Performed by: HOSPITALIST

## 2022-01-01 PROCEDURE — 96375 TX/PRO/DX INJ NEW DRUG ADDON: CPT

## 2022-01-01 PROCEDURE — 36415 COLL VENOUS BLD VENIPUNCTURE: CPT

## 2022-01-01 PROCEDURE — 81001 URINALYSIS AUTO W/SCOPE: CPT | Performed by: EMERGENCY MEDICINE

## 2022-01-01 PROCEDURE — 84295 ASSAY OF SERUM SODIUM: CPT | Performed by: HOSPITALIST

## 2022-01-01 PROCEDURE — 200N000001 HC R&B ICU

## 2022-01-01 PROCEDURE — 88305 TISSUE EXAM BY PATHOLOGIST: CPT | Mod: TC | Performed by: INTERNAL MEDICINE

## 2022-01-01 PROCEDURE — 85014 HEMATOCRIT: CPT

## 2022-01-01 PROCEDURE — 96367 TX/PROPH/DG ADDL SEQ IV INF: CPT

## 2022-01-01 PROCEDURE — 84157 ASSAY OF PROTEIN OTHER: CPT | Performed by: INTERNAL MEDICINE

## 2022-01-01 PROCEDURE — 88360 TUMOR IMMUNOHISTOCHEM/MANUAL: CPT | Mod: 26 | Performed by: PATHOLOGY

## 2022-01-01 PROCEDURE — U0005 INFEC AGEN DETEC AMPLI PROBE: HCPCS

## 2022-01-01 PROCEDURE — 36591 DRAW BLOOD OFF VENOUS DEVICE: CPT | Performed by: INTERNAL MEDICINE

## 2022-01-01 PROCEDURE — 250N000011 HC RX IP 250 OP 636: Performed by: ANESTHESIOLOGY

## 2022-01-01 PROCEDURE — 84132 ASSAY OF SERUM POTASSIUM: CPT | Performed by: EMERGENCY MEDICINE

## 2022-01-01 PROCEDURE — 80053 COMPREHEN METABOLIC PANEL: CPT | Performed by: HOSPITALIST

## 2022-01-01 PROCEDURE — 250N000009 HC RX 250: Performed by: NURSE ANESTHETIST, CERTIFIED REGISTERED

## 2022-01-01 PROCEDURE — 0JB90ZZ EXCISION OF BUTTOCK SUBCUTANEOUS TISSUE AND FASCIA, OPEN APPROACH: ICD-10-PCS | Performed by: SURGERY

## 2022-01-01 PROCEDURE — 11042 DBRDMT SUBQ TIS 1ST 20SQCM/<: CPT | Performed by: SURGERY

## 2022-01-01 PROCEDURE — 87899 AGENT NOS ASSAY W/OPTIC: CPT

## 2022-01-01 PROCEDURE — 250N000009 HC RX 250

## 2022-01-01 PROCEDURE — 36415 COLL VENOUS BLD VENIPUNCTURE: CPT | Performed by: EMERGENCY MEDICINE

## 2022-01-01 PROCEDURE — 85730 THROMBOPLASTIN TIME PARTIAL: CPT | Performed by: EMERGENCY MEDICINE

## 2022-01-01 PROCEDURE — 87075 CULTR BACTERIA EXCEPT BLOOD: CPT | Performed by: INTERNAL MEDICINE

## 2022-01-01 PROCEDURE — 83036 HEMOGLOBIN GLYCOSYLATED A1C: CPT | Performed by: INTERNAL MEDICINE

## 2022-01-01 PROCEDURE — 250N000011 HC RX IP 250 OP 636: Performed by: NURSE ANESTHETIST, CERTIFIED REGISTERED

## 2022-01-01 PROCEDURE — 370N000017 HC ANESTHESIA TECHNICAL FEE, PER MIN: Performed by: SURGERY

## 2022-01-01 PROCEDURE — 255N000002 HC RX 255 OP 636: Performed by: HOSPITALIST

## 2022-01-01 PROCEDURE — P9035 PLATELET PHERES LEUKOREDUCED: HCPCS

## 2022-01-01 PROCEDURE — 94060 EVALUATION OF WHEEZING: CPT

## 2022-01-01 PROCEDURE — 85027 COMPLETE CBC AUTOMATED: CPT | Performed by: EMERGENCY MEDICINE

## 2022-01-01 PROCEDURE — 120N000013 HC R&B IMCU

## 2022-01-01 PROCEDURE — 87449 NOS EACH ORGANISM AG IA: CPT

## 2022-01-01 PROCEDURE — 89050 BODY FLUID CELL COUNT: CPT | Performed by: INTERNAL MEDICINE

## 2022-01-01 PROCEDURE — 0HD8XZZ EXTRACTION OF BUTTOCK SKIN, EXTERNAL APPROACH: ICD-10-PCS | Performed by: SURGERY

## 2022-01-01 PROCEDURE — 83036 HEMOGLOBIN GLYCOSYLATED A1C: CPT | Performed by: HOSPITALIST

## 2022-01-01 PROCEDURE — 84450 TRANSFERASE (AST) (SGOT): CPT | Performed by: HOSPITALIST

## 2022-01-01 PROCEDURE — 83735 ASSAY OF MAGNESIUM: CPT | Performed by: PHYSICIAN ASSISTANT

## 2022-01-01 PROCEDURE — 80048 BASIC METABOLIC PNL TOTAL CA: CPT | Performed by: FAMILY MEDICINE

## 2022-01-01 PROCEDURE — 83880 ASSAY OF NATRIURETIC PEPTIDE: CPT | Performed by: PHYSICIAN ASSISTANT

## 2022-01-01 PROCEDURE — 84100 ASSAY OF PHOSPHORUS: CPT | Performed by: HOSPITALIST

## 2022-01-01 PROCEDURE — 258N000003 HC RX IP 258 OP 636: Performed by: NURSE ANESTHETIST, CERTIFIED REGISTERED

## 2022-01-01 PROCEDURE — 80048 BASIC METABOLIC PNL TOTAL CA: CPT | Performed by: PHYSICIAN ASSISTANT

## 2022-01-01 PROCEDURE — 250N000009 HC RX 250: Performed by: PHYSICIAN ASSISTANT

## 2022-01-01 PROCEDURE — 71275 CT ANGIOGRAPHY CHEST: CPT

## 2022-01-01 PROCEDURE — 86901 BLOOD TYPING SEROLOGIC RH(D): CPT | Performed by: HOSPITALIST

## 2022-01-01 PROCEDURE — 99214 OFFICE O/P EST MOD 30 MIN: CPT | Performed by: NURSE PRACTITIONER

## 2022-01-01 PROCEDURE — 87636 SARSCOV2 & INF A&B AMP PRB: CPT | Performed by: PHYSICIAN ASSISTANT

## 2022-01-01 PROCEDURE — 80053 COMPREHEN METABOLIC PANEL: CPT | Performed by: INTERNAL MEDICINE

## 2022-01-01 PROCEDURE — 99231 SBSQ HOSP IP/OBS SF/LOW 25: CPT | Performed by: PHYSICIAN ASSISTANT

## 2022-01-01 PROCEDURE — 250N000012 HC RX MED GY IP 250 OP 636 PS 637: Performed by: HOSPITALIST

## 2022-01-01 PROCEDURE — XW043E5 INTRODUCTION OF REMDESIVIR ANTI-INFECTIVE INTO CENTRAL VEIN, PERCUTANEOUS APPROACH, NEW TECHNOLOGY GROUP 5: ICD-10-PCS | Performed by: HOSPITALIST

## 2022-01-01 PROCEDURE — 85014 HEMATOCRIT: CPT | Performed by: INTERNAL MEDICINE

## 2022-01-01 PROCEDURE — 99285 EMERGENCY DEPT VISIT HI MDM: CPT | Mod: 25

## 2022-01-01 PROCEDURE — 99215 OFFICE O/P EST HI 40 MIN: CPT | Performed by: NURSE PRACTITIONER

## 2022-01-01 PROCEDURE — 87088 URINE BACTERIA CULTURE: CPT | Performed by: NURSE PRACTITIONER

## 2022-01-01 PROCEDURE — 0W993ZZ DRAINAGE OF RIGHT PLEURAL CAVITY, PERCUTANEOUS APPROACH: ICD-10-PCS | Performed by: RADIOLOGY

## 2022-01-01 PROCEDURE — 272N000001 HC OR GENERAL SUPPLY STERILE: Performed by: SURGERY

## 2022-01-01 PROCEDURE — 85025 COMPLETE CBC W/AUTO DIFF WBC: CPT | Performed by: PHYSICIAN ASSISTANT

## 2022-01-01 PROCEDURE — 83880 ASSAY OF NATRIURETIC PEPTIDE: CPT | Performed by: EMERGENCY MEDICINE

## 2022-01-01 PROCEDURE — 99223 1ST HOSP IP/OBS HIGH 75: CPT | Performed by: INTERNAL MEDICINE

## 2022-01-01 PROCEDURE — 85025 COMPLETE CBC W/AUTO DIFF WBC: CPT | Performed by: INTERNAL MEDICINE

## 2022-01-01 PROCEDURE — 99221 1ST HOSP IP/OBS SF/LOW 40: CPT | Performed by: NURSE PRACTITIONER

## 2022-01-01 PROCEDURE — 99232 SBSQ HOSP IP/OBS MODERATE 35: CPT | Performed by: SURGERY

## 2022-01-01 PROCEDURE — 70553 MRI BRAIN STEM W/O & W/DYE: CPT

## 2022-01-01 PROCEDURE — 80202 ASSAY OF VANCOMYCIN: CPT | Performed by: HOSPITALIST

## 2022-01-01 PROCEDURE — 75563 CARD MRI W/STRESS IMG & DYE: CPT | Mod: 26 | Performed by: GENERAL ACUTE CARE HOSPITAL

## 2022-01-01 PROCEDURE — 85610 PROTHROMBIN TIME: CPT | Performed by: HOSPITALIST

## 2022-01-01 PROCEDURE — 86140 C-REACTIVE PROTEIN: CPT

## 2022-01-01 PROCEDURE — 78472 GATED HEART PLANAR SINGLE: CPT | Mod: 26 | Performed by: INTERNAL MEDICINE

## 2022-01-01 PROCEDURE — 80051 ELECTROLYTE PANEL: CPT | Performed by: HOSPITALIST

## 2022-01-01 PROCEDURE — 99152 MOD SED SAME PHYS/QHP 5/>YRS: CPT

## 2022-01-01 PROCEDURE — 84484 ASSAY OF TROPONIN QUANT: CPT | Performed by: EMERGENCY MEDICINE

## 2022-01-01 PROCEDURE — 96361 HYDRATE IV INFUSION ADD-ON: CPT

## 2022-01-01 PROCEDURE — 99223 1ST HOSP IP/OBS HIGH 75: CPT | Performed by: SPECIALIST

## 2022-01-01 PROCEDURE — 89051 BODY FLUID CELL COUNT: CPT | Performed by: INTERNAL MEDICINE

## 2022-01-01 PROCEDURE — 99231 SBSQ HOSP IP/OBS SF/LOW 25: CPT | Mod: GV | Performed by: INTERNAL MEDICINE

## 2022-01-01 PROCEDURE — 82565 ASSAY OF CREATININE: CPT | Performed by: INTERNAL MEDICINE

## 2022-01-01 PROCEDURE — 97110 THERAPEUTIC EXERCISES: CPT | Mod: GP

## 2022-01-01 PROCEDURE — 72197 MRI PELVIS W/O & W/DYE: CPT

## 2022-01-01 PROCEDURE — 99223 1ST HOSP IP/OBS HIGH 75: CPT | Mod: AI | Performed by: HOSPITALIST

## 2022-01-01 PROCEDURE — 99239 HOSP IP/OBS DSCHRG MGMT >30: CPT | Performed by: INTERNAL MEDICINE

## 2022-01-01 PROCEDURE — 84155 ASSAY OF PROTEIN SERUM: CPT | Performed by: INTERNAL MEDICINE

## 2022-01-01 PROCEDURE — 96365 THER/PROPH/DIAG IV INF INIT: CPT

## 2022-01-01 PROCEDURE — 93016 CV STRESS TEST SUPVJ ONLY: CPT | Performed by: INTERNAL MEDICINE

## 2022-01-01 PROCEDURE — 258N000003 HC RX IP 258 OP 636: Performed by: EMERGENCY MEDICINE

## 2022-01-01 PROCEDURE — 87040 BLOOD CULTURE FOR BACTERIA: CPT | Performed by: HOSPITALIST

## 2022-01-01 PROCEDURE — 99222 1ST HOSP IP/OBS MODERATE 55: CPT | Mod: FS | Performed by: SURGERY

## 2022-01-01 PROCEDURE — 96365 THER/PROPH/DIAG IV INF INIT: CPT | Mod: 59

## 2022-01-01 PROCEDURE — 85014 HEMATOCRIT: CPT | Performed by: PHYSICIAN ASSISTANT

## 2022-01-01 PROCEDURE — 99213 OFFICE O/P EST LOW 20 MIN: CPT | Performed by: INTERNAL MEDICINE

## 2022-01-01 PROCEDURE — 86850 RBC ANTIBODY SCREEN: CPT | Performed by: HOSPITALIST

## 2022-01-01 PROCEDURE — 99222 1ST HOSP IP/OBS MODERATE 55: CPT | Performed by: INTERNAL MEDICINE

## 2022-01-01 PROCEDURE — 272N000602 HC WOUND GLUE CR1

## 2022-01-01 PROCEDURE — 999N000157 HC STATISTIC RCP TIME EA 10 MIN

## 2022-01-01 PROCEDURE — 95813 EEG EXTND MNTR 61-119 MIN: CPT

## 2022-01-01 PROCEDURE — 82565 ASSAY OF CREATININE: CPT

## 2022-01-01 PROCEDURE — 87106 FUNGI IDENTIFICATION YEAST: CPT | Performed by: SURGERY

## 2022-01-01 PROCEDURE — 250N000011 HC RX IP 250 OP 636: Performed by: RADIOLOGY

## 2022-01-01 PROCEDURE — 250N000009 HC RX 250: Performed by: SURGERY

## 2022-01-01 PROCEDURE — 258N000001 HC RX 258: Performed by: EMERGENCY MEDICINE

## 2022-01-01 PROCEDURE — 87205 SMEAR GRAM STAIN: CPT | Performed by: INTERNAL MEDICINE

## 2022-01-01 PROCEDURE — A9585 GADOBUTROL INJECTION: HCPCS | Performed by: INTERNAL MEDICINE

## 2022-01-01 PROCEDURE — 88333 PATH CONSLTJ SURG CYTO XM 1: CPT | Mod: 26 | Performed by: PATHOLOGY

## 2022-01-01 PROCEDURE — 84484 ASSAY OF TROPONIN QUANT: CPT | Performed by: HOSPITALIST

## 2022-01-01 PROCEDURE — 88342 IMHCHEM/IMCYTCHM 1ST ANTB: CPT | Mod: 26 | Performed by: PATHOLOGY

## 2022-01-01 PROCEDURE — 99207 PR CONSULT E&M CHANGED TO SUBSEQUENT LEVEL: CPT | Performed by: INTERNAL MEDICINE

## 2022-01-01 PROCEDURE — 99291 CRITICAL CARE FIRST HOUR: CPT | Mod: 25 | Performed by: STUDENT IN AN ORGANIZED HEALTH CARE EDUCATION/TRAINING PROGRAM

## 2022-01-01 PROCEDURE — 82310 ASSAY OF CALCIUM: CPT | Performed by: PHYSICIAN ASSISTANT

## 2022-01-01 PROCEDURE — 99223 1ST HOSP IP/OBS HIGH 75: CPT

## 2022-01-01 PROCEDURE — 94729 DIFFUSING CAPACITY: CPT

## 2022-01-01 PROCEDURE — 87385 HISTOPLASMA CAPSUL AG IA: CPT

## 2022-01-01 PROCEDURE — 0FB03ZX EXCISION OF LIVER, PERCUTANEOUS APPROACH, DIAGNOSTIC: ICD-10-PCS | Performed by: RADIOLOGY

## 2022-01-01 PROCEDURE — 250N000009 HC RX 250: Performed by: RADIOLOGY

## 2022-01-01 PROCEDURE — 250N000009 HC RX 250: Performed by: EMERGENCY MEDICINE

## 2022-01-01 PROCEDURE — 99223 1ST HOSP IP/OBS HIGH 75: CPT | Performed by: PHYSICIAN ASSISTANT

## 2022-01-01 PROCEDURE — 86901 BLOOD TYPING SEROLOGIC RH(D): CPT | Performed by: EMERGENCY MEDICINE

## 2022-01-01 PROCEDURE — 93018 CV STRESS TEST I&R ONLY: CPT | Performed by: GENERAL ACUTE CARE HOSPITAL

## 2022-01-01 PROCEDURE — 99204 OFFICE O/P NEW MOD 45 MIN: CPT | Mod: 25 | Performed by: FAMILY MEDICINE

## 2022-01-01 PROCEDURE — 99207 PR APP CREDIT; MD BILLING SHARED VISIT: CPT | Performed by: INTERNAL MEDICINE

## 2022-01-01 PROCEDURE — 93005 ELECTROCARDIOGRAM TRACING: CPT | Performed by: EMERGENCY MEDICINE

## 2022-01-01 PROCEDURE — 95711 VEEG 2-12 HR UNMONITORED: CPT

## 2022-01-01 PROCEDURE — 87486 CHLMYD PNEUM DNA AMP PROBE: CPT

## 2022-01-01 PROCEDURE — 250N000009 HC RX 250: Performed by: NURSE PRACTITIONER

## 2022-01-01 PROCEDURE — 36415 COLL VENOUS BLD VENIPUNCTURE: CPT | Performed by: NURSE PRACTITIONER

## 2022-01-01 PROCEDURE — 99214 OFFICE O/P EST MOD 30 MIN: CPT

## 2022-01-01 PROCEDURE — 80053 COMPREHEN METABOLIC PANEL: CPT | Performed by: EMERGENCY MEDICINE

## 2022-01-01 PROCEDURE — 87493 C DIFF AMPLIFIED PROBE: CPT | Performed by: INTERNAL MEDICINE

## 2022-01-01 PROCEDURE — 95718 EEG PHYS/QHP 2-12 HR W/VEEG: CPT | Performed by: PSYCHIATRY & NEUROLOGY

## 2022-01-01 PROCEDURE — 99292 CRITICAL CARE ADDL 30 MIN: CPT | Mod: 25 | Performed by: STUDENT IN AN ORGANIZED HEALTH CARE EDUCATION/TRAINING PROGRAM

## 2022-01-01 PROCEDURE — 250N000011 HC RX IP 250 OP 636: Performed by: EMERGENCY MEDICINE

## 2022-01-01 PROCEDURE — 84145 PROCALCITONIN (PCT): CPT

## 2022-01-01 PROCEDURE — 75563 CARD MRI W/STRESS IMG & DYE: CPT

## 2022-01-01 PROCEDURE — 85730 THROMBOPLASTIN TIME PARTIAL: CPT | Performed by: HOSPITALIST

## 2022-01-01 PROCEDURE — 82728 ASSAY OF FERRITIN: CPT | Performed by: HOSPITALIST

## 2022-01-01 PROCEDURE — 97112 NEUROMUSCULAR REEDUCATION: CPT | Mod: GP

## 2022-01-01 PROCEDURE — 82945 GLUCOSE OTHER FLUID: CPT | Performed by: INTERNAL MEDICINE

## 2022-01-01 PROCEDURE — 84484 ASSAY OF TROPONIN QUANT: CPT | Performed by: PHYSICIAN ASSISTANT

## 2022-01-01 PROCEDURE — 36590 REMOVAL TUNNELED CV CATH: CPT

## 2022-01-01 PROCEDURE — 710N000009 HC RECOVERY PHASE 1, LEVEL 1, PER MIN: Performed by: SURGERY

## 2022-01-01 PROCEDURE — 96376 TX/PRO/DX INJ SAME DRUG ADON: CPT

## 2022-01-01 PROCEDURE — 250N000013 HC RX MED GY IP 250 OP 250 PS 637: Performed by: RADIOLOGY

## 2022-01-01 PROCEDURE — 87086 URINE CULTURE/COLONY COUNT: CPT | Performed by: EMERGENCY MEDICINE

## 2022-01-01 PROCEDURE — 83605 ASSAY OF LACTIC ACID: CPT | Performed by: PHYSICIAN ASSISTANT

## 2022-01-01 PROCEDURE — 250N000009 HC RX 250: Performed by: HOSPITALIST

## 2022-01-01 PROCEDURE — C1769 GUIDE WIRE: HCPCS

## 2022-01-01 PROCEDURE — 84484 ASSAY OF TROPONIN QUANT: CPT | Performed by: INTERNAL MEDICINE

## 2022-01-01 PROCEDURE — 999N000122 MR MYOCARDIUM  OVERREAD

## 2022-01-01 RX ORDER — FENTANYL CITRATE 50 UG/ML
25-50 INJECTION, SOLUTION INTRAMUSCULAR; INTRAVENOUS EVERY 5 MIN PRN
Status: DISCONTINUED | OUTPATIENT
Start: 2022-01-01 | End: 2022-01-01 | Stop reason: HOSPADM

## 2022-01-01 RX ORDER — METOPROLOL SUCCINATE 50 MG/1
50 TABLET, EXTENDED RELEASE ORAL DAILY
Qty: 90 TABLET | Refills: 3 | Status: ON HOLD | OUTPATIENT
Start: 2022-01-01 | End: 2022-01-01

## 2022-01-01 RX ORDER — POLYETHYLENE GLYCOL 3350 17 G/17G
17 POWDER, FOR SOLUTION ORAL DAILY PRN
Status: DISCONTINUED | OUTPATIENT
Start: 2022-01-01 | End: 2022-01-01 | Stop reason: HOSPADM

## 2022-01-01 RX ORDER — CEFTRIAXONE 1 G/1
1 INJECTION, POWDER, FOR SOLUTION INTRAMUSCULAR; INTRAVENOUS ONCE
Status: DISCONTINUED | OUTPATIENT
Start: 2022-01-01 | End: 2022-01-01 | Stop reason: DRUGHIGH

## 2022-01-01 RX ORDER — DULOXETIN HYDROCHLORIDE 30 MG/1
30 CAPSULE, DELAYED RELEASE ORAL EVERY MORNING
Status: DISCONTINUED | OUTPATIENT
Start: 2022-01-01 | End: 2022-01-01 | Stop reason: HOSPADM

## 2022-01-01 RX ORDER — HYDROMORPHONE HYDROCHLORIDE 1 MG/ML
1 SOLUTION ORAL
Status: DISCONTINUED | OUTPATIENT
Start: 2022-01-01 | End: 2022-01-01 | Stop reason: HOSPADM

## 2022-01-01 RX ORDER — LEVETIRACETAM 5 MG/ML
500 INJECTION INTRAVASCULAR EVERY 12 HOURS
Status: DISCONTINUED | OUTPATIENT
Start: 2022-01-01 | End: 2022-01-01

## 2022-01-01 RX ORDER — LEVETIRACETAM 100 MG/ML
500 SOLUTION ORAL 2 TIMES DAILY
Status: DISCONTINUED | OUTPATIENT
Start: 2022-01-01 | End: 2022-01-01 | Stop reason: HOSPADM

## 2022-01-01 RX ORDER — DULOXETIN HYDROCHLORIDE 60 MG/1
1 CAPSULE, DELAYED RELEASE ORAL AT BEDTIME
COMMUNITY
Start: 2022-01-01

## 2022-01-01 RX ORDER — BUMETANIDE 0.25 MG/ML
2 INJECTION INTRAMUSCULAR; INTRAVENOUS ONCE
Status: COMPLETED | OUTPATIENT
Start: 2022-01-01 | End: 2022-01-01

## 2022-01-01 RX ORDER — FUROSEMIDE 40 MG
40 TABLET ORAL DAILY
Status: DISCONTINUED | OUTPATIENT
Start: 2022-01-01 | End: 2022-01-01

## 2022-01-01 RX ORDER — ALBUTEROL SULFATE 5 MG/ML
2.5 SOLUTION RESPIRATORY (INHALATION) EVERY 6 HOURS PRN
Status: DISCONTINUED | OUTPATIENT
Start: 2022-01-01 | End: 2022-01-01 | Stop reason: HOSPADM

## 2022-01-01 RX ORDER — SODIUM CHLORIDE 9 MG/ML
INJECTION, SOLUTION INTRAVENOUS CONTINUOUS
Status: DISCONTINUED | OUTPATIENT
Start: 2022-01-01 | End: 2022-01-01

## 2022-01-01 RX ORDER — VALACYCLOVIR HYDROCHLORIDE 1 G/1
1000 TABLET, FILM COATED ORAL 3 TIMES DAILY
Status: COMPLETED | OUTPATIENT
Start: 2022-01-01 | End: 2022-01-01

## 2022-01-01 RX ORDER — PIPERACILLIN SODIUM, TAZOBACTAM SODIUM 2; .25 G/10ML; G/10ML
2.25 INJECTION, POWDER, LYOPHILIZED, FOR SOLUTION INTRAVENOUS EVERY 8 HOURS
Status: DISCONTINUED | OUTPATIENT
Start: 2022-01-01 | End: 2022-01-01 | Stop reason: DRUGHIGH

## 2022-01-01 RX ORDER — NALOXONE HYDROCHLORIDE 0.4 MG/ML
0.4 INJECTION, SOLUTION INTRAMUSCULAR; INTRAVENOUS; SUBCUTANEOUS
Status: DISCONTINUED | OUTPATIENT
Start: 2022-01-01 | End: 2022-01-01 | Stop reason: HOSPADM

## 2022-01-01 RX ORDER — ZOLPIDEM TARTRATE 5 MG/1
5 TABLET ORAL AT BEDTIME
Status: DISCONTINUED | OUTPATIENT
Start: 2022-01-01 | End: 2022-01-01 | Stop reason: HOSPADM

## 2022-01-01 RX ORDER — ALBUTEROL SULFATE 0.83 MG/ML
2.5 SOLUTION RESPIRATORY (INHALATION) ONCE
Status: COMPLETED | OUTPATIENT
Start: 2022-01-01 | End: 2022-01-01

## 2022-01-01 RX ORDER — ACETAMINOPHEN 325 MG/10.15ML
650 LIQUID ORAL EVERY 6 HOURS PRN
Status: DISCONTINUED | OUTPATIENT
Start: 2022-01-01 | End: 2022-01-01 | Stop reason: HOSPADM

## 2022-01-01 RX ORDER — INSULIN ASPART 100 [IU]/ML
INJECTION, SOLUTION INTRAVENOUS; SUBCUTANEOUS
Status: ON HOLD | COMMUNITY
End: 2022-01-01

## 2022-01-01 RX ORDER — LIDOCAINE 40 MG/G
CREAM TOPICAL
Status: CANCELLED | OUTPATIENT
Start: 2022-01-01

## 2022-01-01 RX ORDER — LORAZEPAM 1 MG/1
1 TABLET ORAL
Status: DISCONTINUED | OUTPATIENT
Start: 2022-01-01 | End: 2022-01-01 | Stop reason: HOSPADM

## 2022-01-01 RX ORDER — PANTOPRAZOLE SODIUM 20 MG/1
40 TABLET, DELAYED RELEASE ORAL AT BEDTIME
Status: DISCONTINUED | OUTPATIENT
Start: 2022-01-01 | End: 2022-01-01

## 2022-01-01 RX ORDER — AMOXICILLIN 250 MG
1 CAPSULE ORAL 2 TIMES DAILY PRN
Status: DISCONTINUED | OUTPATIENT
Start: 2022-01-01 | End: 2022-01-01 | Stop reason: HOSPADM

## 2022-01-01 RX ORDER — PROCHLORPERAZINE MALEATE 5 MG
5 TABLET ORAL EVERY 6 HOURS PRN
Status: DISCONTINUED | OUTPATIENT
Start: 2022-01-01 | End: 2022-01-01 | Stop reason: HOSPADM

## 2022-01-01 RX ORDER — PIPERACILLIN SODIUM, TAZOBACTAM SODIUM 3; .375 G/15ML; G/15ML
3.38 INJECTION, POWDER, LYOPHILIZED, FOR SOLUTION INTRAVENOUS EVERY 6 HOURS
Status: DISCONTINUED | OUTPATIENT
Start: 2022-01-01 | End: 2022-01-01

## 2022-01-01 RX ORDER — HYDROMORPHONE HYDROCHLORIDE 2 MG/1
2 TABLET ORAL
Status: DISCONTINUED | OUTPATIENT
Start: 2022-01-01 | End: 2022-01-01 | Stop reason: HOSPADM

## 2022-01-01 RX ORDER — LISINOPRIL 2.5 MG/1
1 TABLET ORAL
COMMUNITY
Start: 2022-01-01 | End: 2022-01-01

## 2022-01-01 RX ORDER — OXYBUTYNIN CHLORIDE 5 MG/1
5 TABLET ORAL 2 TIMES DAILY
Status: DISCONTINUED | OUTPATIENT
Start: 2022-01-01 | End: 2022-01-01 | Stop reason: HOSPADM

## 2022-01-01 RX ORDER — SPIRONOLACTONE 25 MG
12.5 TABLET ORAL DAILY
Status: DISCONTINUED | OUTPATIENT
Start: 2022-01-01 | End: 2022-01-01 | Stop reason: HOSPADM

## 2022-01-01 RX ORDER — CIPROFLOXACIN 250 MG/1
TABLET, FILM COATED ORAL
COMMUNITY
Start: 2022-01-01 | End: 2022-01-01

## 2022-01-01 RX ORDER — EZETIMIBE 10 MG/1
10 TABLET ORAL DAILY
Status: DISCONTINUED | OUTPATIENT
Start: 2022-01-01 | End: 2022-01-01 | Stop reason: HOSPADM

## 2022-01-01 RX ORDER — DULOXETIN HYDROCHLORIDE 60 MG/1
60 CAPSULE, DELAYED RELEASE ORAL EVERY EVENING
Status: DISCONTINUED | OUTPATIENT
Start: 2022-01-01 | End: 2022-01-01 | Stop reason: HOSPADM

## 2022-01-01 RX ORDER — AMOXICILLIN 250 MG
2 CAPSULE ORAL 2 TIMES DAILY PRN
Status: DISCONTINUED | OUTPATIENT
Start: 2022-01-01 | End: 2022-01-01 | Stop reason: HOSPADM

## 2022-01-01 RX ORDER — IPRATROPIUM BROMIDE AND ALBUTEROL SULFATE 2.5; .5 MG/3ML; MG/3ML
3 SOLUTION RESPIRATORY (INHALATION) ONCE
Status: COMPLETED | OUTPATIENT
Start: 2022-01-01 | End: 2022-01-01

## 2022-01-01 RX ORDER — INSULIN GLARGINE 100 [IU]/ML
4 INJECTION, SOLUTION SUBCUTANEOUS EVERY MORNING
COMMUNITY
Start: 2022-01-01 | End: 2022-01-01

## 2022-01-01 RX ORDER — FUROSEMIDE 10 MG/ML
40 INJECTION INTRAMUSCULAR; INTRAVENOUS ONCE
Status: COMPLETED | OUTPATIENT
Start: 2022-01-01 | End: 2022-01-01

## 2022-01-01 RX ORDER — OLANZAPINE 5 MG/1
5 TABLET, ORALLY DISINTEGRATING ORAL EVERY 6 HOURS PRN
Status: DISCONTINUED | OUTPATIENT
Start: 2022-01-01 | End: 2022-01-01 | Stop reason: HOSPADM

## 2022-01-01 RX ORDER — DOCUSATE SODIUM 100 MG/1
100 CAPSULE, LIQUID FILLED ORAL DAILY
Status: DISCONTINUED | OUTPATIENT
Start: 2022-01-01 | End: 2022-01-01

## 2022-01-01 RX ORDER — CIPROFLOXACIN 500 MG/1
1 TABLET, FILM COATED ORAL
COMMUNITY
Start: 2022-01-01 | End: 2022-01-01

## 2022-01-01 RX ORDER — CLOBETASOL PROPIONATE 0.5 MG/G
OINTMENT TOPICAL DAILY PRN
Status: ON HOLD | COMMUNITY
End: 2022-01-01

## 2022-01-01 RX ORDER — OXYBUTYNIN CHLORIDE 10 MG/1
10 TABLET, EXTENDED RELEASE ORAL EVERY EVENING
Status: ON HOLD | COMMUNITY
Start: 2022-01-01 | End: 2022-01-01

## 2022-01-01 RX ORDER — GLYCOPYRROLATE 0.2 MG/ML
0.2 INJECTION, SOLUTION INTRAMUSCULAR; INTRAVENOUS EVERY 4 HOURS PRN
Status: DISCONTINUED | OUTPATIENT
Start: 2022-01-01 | End: 2022-01-01 | Stop reason: HOSPADM

## 2022-01-01 RX ORDER — FUROSEMIDE 10 MG/ML
10 INJECTION INTRAMUSCULAR; INTRAVENOUS ONCE
Status: COMPLETED | OUTPATIENT
Start: 2022-01-01 | End: 2022-01-01

## 2022-01-01 RX ORDER — CAFFEINE 200 MG
200 TABLET ORAL
Status: DISCONTINUED | OUTPATIENT
Start: 2022-01-01 | End: 2022-01-01 | Stop reason: HOSPADM

## 2022-01-01 RX ORDER — ONDANSETRON 2 MG/ML
4 INJECTION INTRAMUSCULAR; INTRAVENOUS EVERY 6 HOURS PRN
Status: DISCONTINUED | OUTPATIENT
Start: 2022-01-01 | End: 2022-01-01 | Stop reason: HOSPADM

## 2022-01-01 RX ORDER — LIDOCAINE/PRILOCAINE 2.5 %-2.5%
CREAM (GRAM) TOPICAL ONCE
Status: COMPLETED | OUTPATIENT
Start: 2022-01-01 | End: 2022-01-01

## 2022-01-01 RX ORDER — ATROPINE SULFATE 10 MG/ML
2 SOLUTION/ DROPS OPHTHALMIC EVERY 4 HOURS PRN
Status: DISCONTINUED | OUTPATIENT
Start: 2022-01-01 | End: 2022-01-01 | Stop reason: HOSPADM

## 2022-01-01 RX ORDER — OXYBUTYNIN CHLORIDE 5 MG/1
5 TABLET ORAL 2 TIMES DAILY
Status: DISCONTINUED | OUTPATIENT
Start: 2022-01-01 | End: 2022-01-01

## 2022-01-01 RX ORDER — NALOXONE HYDROCHLORIDE 0.4 MG/ML
0.2 INJECTION, SOLUTION INTRAMUSCULAR; INTRAVENOUS; SUBCUTANEOUS
Status: DISCONTINUED | OUTPATIENT
Start: 2022-01-01 | End: 2022-01-01 | Stop reason: HOSPADM

## 2022-01-01 RX ORDER — ACETAMINOPHEN 325 MG/1
1300 TABLET ORAL ONCE
Status: COMPLETED | OUTPATIENT
Start: 2022-01-01 | End: 2022-01-01

## 2022-01-01 RX ORDER — GADOBUTROL 604.72 MG/ML
7 INJECTION INTRAVENOUS ONCE
Status: COMPLETED | OUTPATIENT
Start: 2022-01-01 | End: 2022-01-01

## 2022-01-01 RX ORDER — CEFAZOLIN SODIUM/WATER 2 G/20 ML
2 SYRINGE (ML) INTRAVENOUS SEE ADMIN INSTRUCTIONS
Status: DISCONTINUED | OUTPATIENT
Start: 2022-01-01 | End: 2022-01-01 | Stop reason: HOSPADM

## 2022-01-01 RX ORDER — SALIVA STIMULANT COMB. NO.3
1 SPRAY, NON-AEROSOL (ML) MUCOUS MEMBRANE
Status: DISCONTINUED | OUTPATIENT
Start: 2022-01-01 | End: 2022-01-01 | Stop reason: HOSPADM

## 2022-01-01 RX ORDER — DEXAMETHASONE SODIUM PHOSPHATE 4 MG/ML
6 INJECTION, SOLUTION INTRA-ARTICULAR; INTRALESIONAL; INTRAMUSCULAR; INTRAVENOUS; SOFT TISSUE EVERY 24 HOURS
Status: DISCONTINUED | OUTPATIENT
Start: 2022-01-01 | End: 2022-01-01

## 2022-01-01 RX ORDER — CEPHALEXIN 500 MG/1
500 CAPSULE ORAL 2 TIMES DAILY
COMMUNITY
End: 2022-01-01

## 2022-01-01 RX ORDER — ONDANSETRON 2 MG/ML
4 INJECTION INTRAMUSCULAR; INTRAVENOUS EVERY 6 HOURS PRN
Status: DISCONTINUED | OUTPATIENT
Start: 2022-01-01 | End: 2022-01-01

## 2022-01-01 RX ORDER — DOCUSATE SODIUM 100 MG/1
100 CAPSULE, LIQUID FILLED ORAL 2 TIMES DAILY PRN
Status: DISCONTINUED | OUTPATIENT
Start: 2022-01-01 | End: 2022-01-01 | Stop reason: HOSPADM

## 2022-01-01 RX ORDER — MINERAL OIL/HYDROPHIL PETROLAT
OINTMENT (GRAM) TOPICAL DAILY
Status: DISCONTINUED | OUTPATIENT
Start: 2022-01-01 | End: 2022-01-01 | Stop reason: HOSPADM

## 2022-01-01 RX ORDER — NALOXONE HYDROCHLORIDE 0.4 MG/ML
0.1 INJECTION, SOLUTION INTRAMUSCULAR; INTRAVENOUS; SUBCUTANEOUS
Status: DISCONTINUED | OUTPATIENT
Start: 2022-01-01 | End: 2022-01-01 | Stop reason: HOSPADM

## 2022-01-01 RX ORDER — LIDOCAINE 50 MG/G
OINTMENT TOPICAL 4 TIMES DAILY PRN
Status: DISCONTINUED | OUTPATIENT
Start: 2022-01-01 | End: 2022-01-01 | Stop reason: HOSPADM

## 2022-01-01 RX ORDER — POTASSIUM CHLORIDE 1500 MG/1
40 TABLET, EXTENDED RELEASE ORAL ONCE
Status: COMPLETED | OUTPATIENT
Start: 2022-01-01 | End: 2022-01-01

## 2022-01-01 RX ORDER — CEPHALEXIN 500 MG/1
500 CAPSULE ORAL 2 TIMES DAILY
Status: DISCONTINUED | OUTPATIENT
Start: 2022-01-01 | End: 2022-01-01 | Stop reason: HOSPADM

## 2022-01-01 RX ORDER — ASPIRIN 81 MG/1
81 TABLET ORAL DAILY
Status: DISCONTINUED | OUTPATIENT
Start: 2022-01-01 | End: 2022-01-01

## 2022-01-01 RX ORDER — LORAZEPAM 2 MG/ML
1 INJECTION INTRAMUSCULAR
Status: DISCONTINUED | OUTPATIENT
Start: 2022-01-01 | End: 2022-01-01 | Stop reason: HOSPADM

## 2022-01-01 RX ORDER — DEXTROSE MONOHYDRATE 25 G/50ML
25-50 INJECTION, SOLUTION INTRAVENOUS
Status: DISCONTINUED | OUTPATIENT
Start: 2022-01-01 | End: 2022-01-01

## 2022-01-01 RX ORDER — KETAMINE HYDROCHLORIDE 10 MG/ML
INJECTION INTRAMUSCULAR; INTRAVENOUS PRN
Status: DISCONTINUED | OUTPATIENT
Start: 2022-01-01 | End: 2022-01-01

## 2022-01-01 RX ORDER — AMOXICILLIN 250 MG
1 CAPSULE ORAL DAILY
Status: DISCONTINUED | OUTPATIENT
Start: 2022-01-01 | End: 2022-01-01 | Stop reason: HOSPADM

## 2022-01-01 RX ORDER — HYDROMORPHONE HYDROCHLORIDE 5 MG/5ML
2 SOLUTION ORAL
Status: DISCONTINUED | OUTPATIENT
Start: 2022-01-01 | End: 2022-01-01 | Stop reason: HOSPADM

## 2022-01-01 RX ORDER — LIDOCAINE 40 MG/G
CREAM TOPICAL
Status: DISCONTINUED | OUTPATIENT
Start: 2022-01-01 | End: 2022-01-01 | Stop reason: HOSPADM

## 2022-01-01 RX ORDER — PANTOPRAZOLE SODIUM 40 MG/1
40 TABLET, DELAYED RELEASE ORAL AT BEDTIME
Status: DISCONTINUED | OUTPATIENT
Start: 2022-01-01 | End: 2022-01-01

## 2022-01-01 RX ORDER — DEXTROSE MONOHYDRATE 25 G/50ML
25 INJECTION, SOLUTION INTRAVENOUS ONCE
Status: COMPLETED | OUTPATIENT
Start: 2022-01-01 | End: 2022-01-01

## 2022-01-01 RX ORDER — SALIVA STIMULANT COMB. NO.3
2 SPRAY, NON-AEROSOL (ML) MUCOUS MEMBRANE
Status: DISCONTINUED | OUTPATIENT
Start: 2022-01-01 | End: 2022-01-01 | Stop reason: HOSPADM

## 2022-01-01 RX ORDER — MAGNESIUM OXIDE 400 MG/1
400 TABLET ORAL 3 TIMES DAILY
Status: COMPLETED | OUTPATIENT
Start: 2022-01-01 | End: 2022-01-01

## 2022-01-01 RX ORDER — CARBOXYMETHYLCELLULOSE SODIUM 5 MG/ML
1-2 SOLUTION/ DROPS OPHTHALMIC
Status: DISCONTINUED | OUTPATIENT
Start: 2022-01-01 | End: 2022-01-01 | Stop reason: HOSPADM

## 2022-01-01 RX ORDER — OXYBUTYNIN CHLORIDE 5 MG/1
5 TABLET ORAL 2 TIMES DAILY PRN
Status: DISCONTINUED | OUTPATIENT
Start: 2022-01-01 | End: 2022-01-01 | Stop reason: HOSPADM

## 2022-01-01 RX ORDER — ALBUTEROL SULFATE 0.83 MG/ML
2.5 SOLUTION RESPIRATORY (INHALATION)
Status: DISCONTINUED | OUTPATIENT
Start: 2022-01-01 | End: 2022-01-01 | Stop reason: HOSPADM

## 2022-01-01 RX ORDER — LEVETIRACETAM 500 MG/1
500 TABLET ORAL 2 TIMES DAILY
Status: DISCONTINUED | OUTPATIENT
Start: 2022-01-01 | End: 2022-01-01

## 2022-01-01 RX ORDER — BUMETANIDE 2 MG/1
2 TABLET ORAL DAILY PRN
Status: ON HOLD | COMMUNITY
End: 2022-01-01

## 2022-01-01 RX ORDER — AMINOPHYLLINE 25 MG/ML
50 INJECTION, SOLUTION INTRAVENOUS
Status: DISCONTINUED | OUTPATIENT
Start: 2022-01-01 | End: 2022-01-01 | Stop reason: HOSPADM

## 2022-01-01 RX ORDER — SPIRONOLACTONE 25 MG/1
12.5 TABLET ORAL DAILY
Qty: 30 TABLET | Refills: 0 | Status: SHIPPED | OUTPATIENT
Start: 2022-01-01 | End: 2022-01-01

## 2022-01-01 RX ORDER — POTASSIUM CHLORIDE 20MEQ/15ML
40 LIQUID (ML) ORAL ONCE
Status: COMPLETED | OUTPATIENT
Start: 2022-01-01 | End: 2022-01-01

## 2022-01-01 RX ORDER — ACETAMINOPHEN 650 MG/1
650 SUPPOSITORY RECTAL EVERY 4 HOURS PRN
Status: DISCONTINUED | OUTPATIENT
Start: 2022-01-01 | End: 2022-01-01 | Stop reason: HOSPADM

## 2022-01-01 RX ORDER — ASPIRIN 81 MG/1
81 TABLET ORAL DAILY
Status: DISCONTINUED | OUTPATIENT
Start: 2022-01-01 | End: 2022-01-01 | Stop reason: HOSPADM

## 2022-01-01 RX ORDER — BUMETANIDE 2 MG/1
2 TABLET ORAL
Status: DISCONTINUED | OUTPATIENT
Start: 2022-01-01 | End: 2022-01-01

## 2022-01-01 RX ORDER — ACETAMINOPHEN 650 MG/1
650 SUPPOSITORY RECTAL EVERY 6 HOURS PRN
Status: DISCONTINUED | OUTPATIENT
Start: 2022-01-01 | End: 2022-01-01 | Stop reason: HOSPADM

## 2022-01-01 RX ORDER — METHADONE HYDROCHLORIDE 5 MG/1
5 TABLET ORAL AT BEDTIME
Status: ON HOLD | COMMUNITY
End: 2022-01-01

## 2022-01-01 RX ORDER — BUMETANIDE 1 MG/1
2 TABLET ORAL DAILY
Status: DISCONTINUED | OUTPATIENT
Start: 2022-01-01 | End: 2022-01-01 | Stop reason: HOSPADM

## 2022-01-01 RX ORDER — ONDANSETRON 4 MG/1
4 TABLET, ORALLY DISINTEGRATING ORAL EVERY 6 HOURS PRN
Status: DISCONTINUED | OUTPATIENT
Start: 2022-01-01 | End: 2022-01-01

## 2022-01-01 RX ORDER — LIDOCAINE HYDROCHLORIDE 20 MG/ML
JELLY TOPICAL EVERY 4 HOURS PRN
Status: DISCONTINUED | OUTPATIENT
Start: 2022-01-01 | End: 2022-01-01

## 2022-01-01 RX ORDER — VANCOMYCIN HYDROCHLORIDE 1 G/200ML
1000 INJECTION, SOLUTION INTRAVENOUS EVERY 12 HOURS
Status: DISCONTINUED | OUTPATIENT
Start: 2022-01-01 | End: 2022-01-01

## 2022-01-01 RX ORDER — IOPAMIDOL 755 MG/ML
100 INJECTION, SOLUTION INTRAVASCULAR ONCE
Status: COMPLETED | OUTPATIENT
Start: 2022-01-01 | End: 2022-01-01

## 2022-01-01 RX ORDER — ONDANSETRON 4 MG/1
4 TABLET, ORALLY DISINTEGRATING ORAL EVERY 6 HOURS PRN
Status: DISCONTINUED | OUTPATIENT
Start: 2022-01-01 | End: 2022-01-01 | Stop reason: HOSPADM

## 2022-01-01 RX ORDER — VIT C/VIT E/LUTEIN/MIN/OMEGA-3 100-15-2
CAPSULE ORAL
COMMUNITY
Start: 2021-07-30 | End: 2022-01-01

## 2022-01-01 RX ORDER — SIMVASTATIN 20 MG
TABLET ORAL
COMMUNITY
Start: 2022-01-01 | End: 2022-01-01

## 2022-01-01 RX ORDER — ACETAMINOPHEN 325 MG/10.15ML
650 LIQUID ORAL EVERY 6 HOURS PRN
Qty: 300 ML | Refills: 0 | Status: SHIPPED | OUTPATIENT
Start: 2022-01-01 | End: 2022-12-10

## 2022-01-01 RX ORDER — BUMETANIDE 0.25 MG/ML
2 INJECTION INTRAMUSCULAR; INTRAVENOUS
Status: DISCONTINUED | OUTPATIENT
Start: 2022-01-01 | End: 2022-01-01

## 2022-01-01 RX ORDER — PEN NEEDLE, DIABETIC 31 GX5/16"
NEEDLE, DISPOSABLE MISCELLANEOUS
COMMUNITY
Start: 2022-01-01

## 2022-01-01 RX ORDER — NICOTINE POLACRILEX 4 MG
15-30 LOZENGE BUCCAL
Status: DISCONTINUED | OUTPATIENT
Start: 2022-01-01 | End: 2022-01-01

## 2022-01-01 RX ORDER — NITROGLYCERIN 0.4 MG/1
0.4 TABLET SUBLINGUAL EVERY 5 MIN PRN
Status: DISCONTINUED | OUTPATIENT
Start: 2022-01-01 | End: 2022-01-01

## 2022-01-01 RX ORDER — INSULIN ASPART 100 [IU]/ML
INJECTION, SOLUTION INTRAVENOUS; SUBCUTANEOUS
Status: ON HOLD | COMMUNITY
Start: 2022-01-01 | End: 2022-01-01

## 2022-01-01 RX ORDER — NALOXONE HYDROCHLORIDE 0.4 MG/ML
0.2 INJECTION, SOLUTION INTRAMUSCULAR; INTRAVENOUS; SUBCUTANEOUS
Status: DISCONTINUED | OUTPATIENT
Start: 2022-01-01 | End: 2022-01-01

## 2022-01-01 RX ORDER — PIPERACILLIN SODIUM, TAZOBACTAM SODIUM 3; .375 G/15ML; G/15ML
3.38 INJECTION, POWDER, LYOPHILIZED, FOR SOLUTION INTRAVENOUS EVERY 8 HOURS
Status: DISCONTINUED | OUTPATIENT
Start: 2022-01-01 | End: 2022-01-01

## 2022-01-01 RX ORDER — POLYETHYLENE GLYCOL 3350 17 G/17G
17 POWDER, FOR SOLUTION ORAL DAILY
Status: DISCONTINUED | OUTPATIENT
Start: 2022-01-01 | End: 2022-01-01 | Stop reason: HOSPADM

## 2022-01-01 RX ORDER — HEPARIN SODIUM,PORCINE 10 UNIT/ML
3 VIAL (ML) INTRAVENOUS
Status: DISCONTINUED | OUTPATIENT
Start: 2022-01-01 | End: 2022-01-01 | Stop reason: HOSPADM

## 2022-01-01 RX ORDER — HEPARIN SODIUM (PORCINE) LOCK FLUSH IV SOLN 100 UNIT/ML 100 UNIT/ML
6 SOLUTION INTRAVENOUS ONCE
Status: COMPLETED | OUTPATIENT
Start: 2022-01-01 | End: 2022-01-01

## 2022-01-01 RX ORDER — DEXAMETHASONE SODIUM PHOSPHATE 10 MG/ML
6 INJECTION, SOLUTION INTRAMUSCULAR; INTRAVENOUS ONCE
Status: COMPLETED | OUTPATIENT
Start: 2022-01-01 | End: 2022-01-01

## 2022-01-01 RX ORDER — LISINOPRIL 2.5 MG/1
2.5 TABLET ORAL DAILY
Qty: 30 TABLET | Refills: 0 | Status: SHIPPED | OUTPATIENT
Start: 2022-01-01 | End: 2022-01-01

## 2022-01-01 RX ORDER — METOPROLOL TARTRATE 1 MG/ML
5 INJECTION, SOLUTION INTRAVENOUS EVERY 4 HOURS PRN
Status: DISCONTINUED | OUTPATIENT
Start: 2022-01-01 | End: 2022-01-01

## 2022-01-01 RX ORDER — SIMVASTATIN 20 MG
20 TABLET ORAL EVERY EVENING
Qty: 30 TABLET | Refills: 0 | Status: SHIPPED | OUTPATIENT
Start: 2022-01-01 | End: 2022-01-01

## 2022-01-01 RX ORDER — POTASSIUM CHLORIDE 29.8 MG/ML
20 INJECTION INTRAVENOUS
Status: COMPLETED | OUTPATIENT
Start: 2022-01-01 | End: 2022-01-01

## 2022-01-01 RX ORDER — BUMETANIDE 2 MG/1
2 TABLET ORAL 2 TIMES DAILY
Qty: 180 TABLET | Refills: 3 | Status: ON HOLD | OUTPATIENT
Start: 2022-01-01 | End: 2022-01-01

## 2022-01-01 RX ORDER — LIDOCAINE 50 MG/G
OINTMENT TOPICAL
COMMUNITY
Start: 2021-07-28 | End: 2022-01-01

## 2022-01-01 RX ORDER — HYDROXYZINE HYDROCHLORIDE 25 MG/1
25 TABLET, FILM COATED ORAL 3 TIMES DAILY
Status: DISCONTINUED | OUTPATIENT
Start: 2022-01-01 | End: 2022-01-01 | Stop reason: HOSPADM

## 2022-01-01 RX ORDER — FUROSEMIDE 10 MG/ML
20 INJECTION INTRAMUSCULAR; INTRAVENOUS ONCE
Status: COMPLETED | OUTPATIENT
Start: 2022-01-01 | End: 2022-01-01

## 2022-01-01 RX ORDER — FUROSEMIDE 10 MG/ML
40 INJECTION INTRAMUSCULAR; INTRAVENOUS EVERY 12 HOURS
Status: DISCONTINUED | OUTPATIENT
Start: 2022-01-01 | End: 2022-01-01

## 2022-01-01 RX ORDER — HEPARIN SODIUM,PORCINE 10 UNIT/ML
5-10 VIAL (ML) INTRAVENOUS
Status: DISCONTINUED | OUTPATIENT
Start: 2022-01-01 | End: 2022-01-01 | Stop reason: HOSPADM

## 2022-01-01 RX ORDER — SPIRONOLACTONE 25 MG/1
12.5 TABLET ORAL DAILY
Qty: 90 TABLET | Refills: 3 | Status: ON HOLD | OUTPATIENT
Start: 2022-01-01 | End: 2022-01-01

## 2022-01-01 RX ORDER — LISINOPRIL 2.5 MG/1
2.5 TABLET ORAL DAILY
Qty: 30 TABLET | Refills: 3 | Status: SHIPPED | OUTPATIENT
Start: 2022-01-01 | End: 2022-01-01

## 2022-01-01 RX ORDER — BUMETANIDE 1 MG/1
2 TABLET ORAL DAILY
Qty: 30 TABLET | Refills: 0 | Status: SHIPPED | OUTPATIENT
Start: 2022-01-01 | End: 2022-01-01

## 2022-01-01 RX ORDER — LORAZEPAM 2 MG/ML
1 CONCENTRATE ORAL
Status: DISCONTINUED | OUTPATIENT
Start: 2022-01-01 | End: 2022-01-01 | Stop reason: HOSPADM

## 2022-01-01 RX ORDER — SODIUM CHLORIDE, SODIUM LACTATE, POTASSIUM CHLORIDE, CALCIUM CHLORIDE 600; 310; 30; 20 MG/100ML; MG/100ML; MG/100ML; MG/100ML
INJECTION, SOLUTION INTRAVENOUS CONTINUOUS
Status: DISCONTINUED | OUTPATIENT
Start: 2022-01-01 | End: 2022-01-01 | Stop reason: HOSPADM

## 2022-01-01 RX ORDER — PANTOPRAZOLE SODIUM 40 MG/1
40 TABLET, DELAYED RELEASE ORAL
Status: DISCONTINUED | OUTPATIENT
Start: 2022-01-01 | End: 2022-01-01 | Stop reason: HOSPADM

## 2022-01-01 RX ORDER — PIPERACILLIN SODIUM, TAZOBACTAM SODIUM 3; .375 G/15ML; G/15ML
3.38 INJECTION, POWDER, LYOPHILIZED, FOR SOLUTION INTRAVENOUS ONCE
Status: COMPLETED | OUTPATIENT
Start: 2022-01-01 | End: 2022-01-01

## 2022-01-01 RX ORDER — BUMETANIDE 0.25 MG/ML
2 INJECTION INTRAMUSCULAR; INTRAVENOUS EVERY 24 HOURS
Status: DISCONTINUED | OUTPATIENT
Start: 2022-01-01 | End: 2022-01-01

## 2022-01-01 RX ORDER — GADOBUTROL 604.72 MG/ML
6 INJECTION INTRAVENOUS ONCE
Status: COMPLETED | OUTPATIENT
Start: 2022-01-01 | End: 2022-01-01

## 2022-01-01 RX ORDER — ACETAMINOPHEN 325 MG/1
650 TABLET ORAL EVERY 4 HOURS PRN
Status: DISCONTINUED | OUTPATIENT
Start: 2022-01-01 | End: 2022-01-01 | Stop reason: HOSPADM

## 2022-01-01 RX ORDER — HEPARIN SODIUM (PORCINE) LOCK FLUSH IV SOLN 100 UNIT/ML 100 UNIT/ML
500 SOLUTION INTRAVENOUS ONCE
Status: COMPLETED | OUTPATIENT
Start: 2022-01-01 | End: 2022-01-01

## 2022-01-01 RX ORDER — HYDROMORPHONE HYDROCHLORIDE 4 MG/1
4 TABLET ORAL 3 TIMES DAILY PRN
Qty: 12 TABLET | Refills: 0 | Status: ON HOLD | OUTPATIENT
Start: 2022-01-01 | End: 2022-01-01

## 2022-01-01 RX ORDER — DULOXETIN HYDROCHLORIDE 30 MG/1
60 CAPSULE, DELAYED RELEASE ORAL AT BEDTIME
Status: DISCONTINUED | OUTPATIENT
Start: 2022-01-01 | End: 2022-01-01 | Stop reason: HOSPADM

## 2022-01-01 RX ORDER — MINERAL OIL/HYDROPHIL PETROLAT
OINTMENT (GRAM) TOPICAL
Status: DISCONTINUED | OUTPATIENT
Start: 2022-01-01 | End: 2022-01-01 | Stop reason: HOSPADM

## 2022-01-01 RX ORDER — DULOXETIN HYDROCHLORIDE 30 MG/1
60 CAPSULE, DELAYED RELEASE ORAL EVERY EVENING
COMMUNITY
End: 2022-01-01 | Stop reason: DRUGHIGH

## 2022-01-01 RX ORDER — OMEGA-3 FATTY ACIDS/FISH OIL 300-1000MG
400 CAPSULE ORAL 3 TIMES DAILY
Status: ON HOLD | COMMUNITY
End: 2022-01-01

## 2022-01-01 RX ORDER — NALOXONE HYDROCHLORIDE 0.4 MG/ML
0.4 INJECTION, SOLUTION INTRAMUSCULAR; INTRAVENOUS; SUBCUTANEOUS
Status: DISCONTINUED | OUTPATIENT
Start: 2022-01-01 | End: 2022-01-01

## 2022-01-01 RX ORDER — ASPIRIN 81 MG/1
81 TABLET ORAL DAILY
Status: ON HOLD | COMMUNITY
End: 2022-01-01

## 2022-01-01 RX ORDER — DEXMEDETOMIDINE HYDROCHLORIDE 4 UG/ML
INJECTION, SOLUTION INTRAVENOUS PRN
Status: DISCONTINUED | OUTPATIENT
Start: 2022-01-01 | End: 2022-01-01

## 2022-01-01 RX ORDER — POTASSIUM CHLORIDE 1500 MG/1
20 TABLET, EXTENDED RELEASE ORAL ONCE
Status: COMPLETED | OUTPATIENT
Start: 2022-01-01 | End: 2022-01-01

## 2022-01-01 RX ORDER — LORAZEPAM 0.5 MG/1
TABLET ORAL
Status: ON HOLD | COMMUNITY
Start: 2022-01-01 | End: 2022-01-01

## 2022-01-01 RX ORDER — DEXTROSE MONOHYDRATE 25 G/50ML
50 INJECTION, SOLUTION INTRAVENOUS ONCE
Status: COMPLETED | OUTPATIENT
Start: 2022-01-01 | End: 2022-01-01

## 2022-01-01 RX ORDER — POLYETHYLENE GLYCOL 3350 17 G/17G
POWDER, FOR SOLUTION ORAL
COMMUNITY
Start: 2021-07-28 | End: 2022-01-01

## 2022-01-01 RX ORDER — DIAZEPAM 10 MG/2ML
5 INJECTION, SOLUTION INTRAMUSCULAR; INTRAVENOUS EVERY 6 HOURS PRN
Status: DISCONTINUED | OUTPATIENT
Start: 2022-01-01 | End: 2022-01-01 | Stop reason: HOSPADM

## 2022-01-01 RX ORDER — CEPHALEXIN 500 MG/1
500 CAPSULE ORAL 2 TIMES DAILY
Qty: 180 CAPSULE | Refills: 3 | Status: ON HOLD | OUTPATIENT
Start: 2022-01-01 | End: 2022-01-01

## 2022-01-01 RX ORDER — ACETAMINOPHEN 325 MG/10.15ML
650 LIQUID ORAL EVERY 4 HOURS PRN
Status: DISCONTINUED | OUTPATIENT
Start: 2022-01-01 | End: 2022-01-01 | Stop reason: HOSPADM

## 2022-01-01 RX ORDER — DEXAMETHASONE SODIUM PHOSPHATE 10 MG/ML
10 INJECTION, SOLUTION INTRAMUSCULAR; INTRAVENOUS ONCE
Status: COMPLETED | OUTPATIENT
Start: 2022-01-01 | End: 2022-01-01

## 2022-01-01 RX ORDER — SIMVASTATIN 10 MG
20 TABLET ORAL EVERY EVENING
Status: DISCONTINUED | OUTPATIENT
Start: 2022-01-01 | End: 2022-01-01 | Stop reason: HOSPADM

## 2022-01-01 RX ORDER — METOPROLOL SUCCINATE 25 MG/1
25 TABLET, EXTENDED RELEASE ORAL DAILY
Status: DISCONTINUED | OUTPATIENT
Start: 2022-01-01 | End: 2022-01-01 | Stop reason: HOSPADM

## 2022-01-01 RX ORDER — LISINOPRIL 2.5 MG/1
2.5 TABLET ORAL DAILY
Qty: 30 TABLET | Refills: 3 | Status: ON HOLD | OUTPATIENT
Start: 2022-01-01 | End: 2022-01-01

## 2022-01-01 RX ORDER — AMOXICILLIN 250 MG
1-2 CAPSULE ORAL DAILY
COMMUNITY
End: 2022-01-01

## 2022-01-01 RX ORDER — HYDROMORPHONE HYDROCHLORIDE 2 MG/1
2 TABLET ORAL 3 TIMES DAILY PRN
Status: DISCONTINUED | OUTPATIENT
Start: 2022-01-01 | End: 2022-01-01

## 2022-01-01 RX ORDER — HYDROMORPHONE HYDROCHLORIDE 1 MG/ML
0.5 INJECTION, SOLUTION INTRAMUSCULAR; INTRAVENOUS; SUBCUTANEOUS
Status: DISCONTINUED | OUTPATIENT
Start: 2022-01-01 | End: 2022-01-01 | Stop reason: HOSPADM

## 2022-01-01 RX ORDER — FLUMAZENIL 0.1 MG/ML
0.2 INJECTION, SOLUTION INTRAVENOUS
Status: DISCONTINUED | OUTPATIENT
Start: 2022-01-01 | End: 2022-01-01 | Stop reason: HOSPADM

## 2022-01-01 RX ORDER — HYDROMORPHONE HYDROCHLORIDE 1 MG/ML
0.3 INJECTION, SOLUTION INTRAMUSCULAR; INTRAVENOUS; SUBCUTANEOUS
Status: DISCONTINUED | OUTPATIENT
Start: 2022-01-01 | End: 2022-01-01 | Stop reason: HOSPADM

## 2022-01-01 RX ORDER — HYDROMORPHONE HYDROCHLORIDE 1 MG/ML
1 SOLUTION ORAL
Qty: 60 ML | Refills: 0 | Status: SHIPPED | OUTPATIENT
Start: 2022-01-01 | End: 2022-12-10

## 2022-01-01 RX ORDER — BUMETANIDE 1 MG/1
TABLET ORAL
Qty: 270 TABLET | Refills: 3 | Status: SHIPPED | OUTPATIENT
Start: 2022-01-01 | End: 2022-01-01

## 2022-01-01 RX ORDER — OXYBUTYNIN CHLORIDE 10 MG/1
10 TABLET, EXTENDED RELEASE ORAL EVERY EVENING
Status: DISCONTINUED | OUTPATIENT
Start: 2022-01-01 | End: 2022-01-01 | Stop reason: HOSPADM

## 2022-01-01 RX ORDER — REGADENOSON 0.08 MG/ML
0.4 INJECTION, SOLUTION INTRAVENOUS ONCE
Status: COMPLETED | OUTPATIENT
Start: 2022-01-01 | End: 2022-01-01

## 2022-01-01 RX ORDER — LISINOPRIL 2.5 MG/1
2.5 TABLET ORAL DAILY
Status: DISCONTINUED | OUTPATIENT
Start: 2022-01-01 | End: 2022-01-01 | Stop reason: HOSPADM

## 2022-01-01 RX ORDER — CEFAZOLIN SODIUM/WATER 2 G/20 ML
2 SYRINGE (ML) INTRAVENOUS
Status: COMPLETED | OUTPATIENT
Start: 2022-01-01 | End: 2022-01-01

## 2022-01-01 RX ORDER — NICOTINE POLACRILEX 4 MG
15-30 LOZENGE BUCCAL
Status: DISCONTINUED | OUTPATIENT
Start: 2022-01-01 | End: 2022-01-01 | Stop reason: HOSPADM

## 2022-01-01 RX ORDER — NYSTATIN 100000 U/G
CREAM TOPICAL 2 TIMES DAILY
Status: DISCONTINUED | OUTPATIENT
Start: 2022-01-01 | End: 2022-01-01

## 2022-01-01 RX ORDER — INSULIN ASPART 100 [IU]/ML
INJECTION, SOLUTION INTRAVENOUS; SUBCUTANEOUS
COMMUNITY
Start: 2022-01-01 | End: 2022-01-01

## 2022-01-01 RX ORDER — PROCHLORPERAZINE 25 MG
12.5 SUPPOSITORY, RECTAL RECTAL EVERY 12 HOURS PRN
Status: DISCONTINUED | OUTPATIENT
Start: 2022-01-01 | End: 2022-01-01 | Stop reason: HOSPADM

## 2022-01-01 RX ORDER — WATER 10 ML/10ML
INJECTION INTRAMUSCULAR; INTRAVENOUS; SUBCUTANEOUS
Status: COMPLETED
Start: 2022-01-01 | End: 2022-01-01

## 2022-01-01 RX ORDER — DEXTROSE MONOHYDRATE 25 G/50ML
25-50 INJECTION, SOLUTION INTRAVENOUS
Status: DISCONTINUED | OUTPATIENT
Start: 2022-01-01 | End: 2022-01-01 | Stop reason: HOSPADM

## 2022-01-01 RX ORDER — GADOBUTROL 604.72 MG/ML
14 INJECTION INTRAVENOUS ONCE
Status: COMPLETED | OUTPATIENT
Start: 2022-01-01 | End: 2022-01-01

## 2022-01-01 RX ORDER — HYDROMORPHONE HYDROCHLORIDE 4 MG/1
4 TABLET ORAL 3 TIMES DAILY
Status: DISCONTINUED | OUTPATIENT
Start: 2022-01-01 | End: 2022-01-01 | Stop reason: HOSPADM

## 2022-01-01 RX ORDER — SODIUM CHLORIDE 450 MG/100ML
INJECTION, SOLUTION INTRAVENOUS CONTINUOUS
Status: DISCONTINUED | OUTPATIENT
Start: 2022-01-01 | End: 2022-01-01

## 2022-01-01 RX ORDER — PIPERACILLIN SODIUM, TAZOBACTAM SODIUM 3; .375 G/15ML; G/15ML
3.38 INJECTION, POWDER, LYOPHILIZED, FOR SOLUTION INTRAVENOUS EVERY 8 HOURS
Status: DISCONTINUED | OUTPATIENT
Start: 2022-01-01 | End: 2022-01-01 | Stop reason: DRUGHIGH

## 2022-01-01 RX ORDER — MAGNESIUM SULFATE 4 G/50ML
4 INJECTION INTRAVENOUS ONCE
Status: COMPLETED | OUTPATIENT
Start: 2022-01-01 | End: 2022-01-01

## 2022-01-01 RX ORDER — HEPARIN SODIUM (PORCINE) LOCK FLUSH IV SOLN 100 UNIT/ML 100 UNIT/ML
5-10 SOLUTION INTRAVENOUS
Status: DISCONTINUED | OUTPATIENT
Start: 2022-01-01 | End: 2022-01-01 | Stop reason: HOSPADM

## 2022-01-01 RX ORDER — GLYCOPYRROLATE 1 MG/1
2 TABLET ORAL EVERY 4 HOURS PRN
Status: DISCONTINUED | OUTPATIENT
Start: 2022-01-01 | End: 2022-01-01 | Stop reason: HOSPADM

## 2022-01-01 RX ORDER — HEPARIN SODIUM (PORCINE) LOCK FLUSH IV SOLN 100 UNIT/ML 100 UNIT/ML
30 SOLUTION INTRAVENOUS ONCE
Status: COMPLETED | OUTPATIENT
Start: 2022-01-01 | End: 2022-01-01

## 2022-01-01 RX ORDER — DULOXETIN HYDROCHLORIDE 30 MG/1
60 CAPSULE, DELAYED RELEASE ORAL DAILY
Status: DISCONTINUED | OUTPATIENT
Start: 2022-01-01 | End: 2022-01-01 | Stop reason: HOSPADM

## 2022-01-01 RX ORDER — GUAIFENESIN 600 MG/1
600 TABLET, EXTENDED RELEASE ORAL 2 TIMES DAILY
Status: DISCONTINUED | OUTPATIENT
Start: 2022-01-01 | End: 2022-01-01

## 2022-01-01 RX ORDER — INSULIN GLARGINE 100 [IU]/ML
4 INJECTION, SOLUTION SUBCUTANEOUS EVERY MORNING
COMMUNITY
End: 2022-01-01

## 2022-01-01 RX ORDER — DOCUSATE SODIUM 100 MG/1
100 CAPSULE, LIQUID FILLED ORAL DAILY
Status: ON HOLD | COMMUNITY
Start: 2021-07-28 | End: 2022-01-01

## 2022-01-01 RX ORDER — IBUPROFEN 200 MG
200 TABLET ORAL 3 TIMES DAILY
Status: DISCONTINUED | OUTPATIENT
Start: 2022-01-01 | End: 2022-01-01 | Stop reason: HOSPADM

## 2022-01-01 RX ORDER — MULTIPLE VITAMINS W/ MINERALS TAB 9MG-400MCG
1 TAB ORAL DAILY
Status: DISCONTINUED | OUTPATIENT
Start: 2022-01-01 | End: 2022-01-01

## 2022-01-01 RX ORDER — SPIRONOLACTONE 25 MG/1
12.5 TABLET ORAL DAILY
Qty: 90 TABLET | Refills: 3 | Status: SHIPPED | OUTPATIENT
Start: 2022-01-01 | End: 2022-01-01

## 2022-01-01 RX ORDER — LEVOFLOXACIN 500 MG/1
500 TABLET, FILM COATED ORAL DAILY
Status: COMPLETED | OUTPATIENT
Start: 2022-01-01 | End: 2022-01-01

## 2022-01-01 RX ORDER — HYDROMORPHONE HYDROCHLORIDE 1 MG/ML
0.5 INJECTION, SOLUTION INTRAMUSCULAR; INTRAVENOUS; SUBCUTANEOUS ONCE
Status: COMPLETED | OUTPATIENT
Start: 2022-01-01 | End: 2022-01-01

## 2022-01-01 RX ORDER — BISACODYL 10 MG
10 SUPPOSITORY, RECTAL RECTAL DAILY PRN
Status: DISCONTINUED | OUTPATIENT
Start: 2022-01-01 | End: 2022-01-01 | Stop reason: HOSPADM

## 2022-01-01 RX ORDER — OLANZAPINE 5 MG/1
5 TABLET ORAL EVERY EVENING
Status: DISCONTINUED | OUTPATIENT
Start: 2022-01-01 | End: 2022-01-01 | Stop reason: HOSPADM

## 2022-01-01 RX ORDER — DOXYCYCLINE 100 MG/10ML
100 INJECTION, POWDER, LYOPHILIZED, FOR SOLUTION INTRAVENOUS ONCE
Status: DISCONTINUED | OUTPATIENT
Start: 2022-01-01 | End: 2022-01-01 | Stop reason: DRUGHIGH

## 2022-01-01 RX ORDER — PROPOFOL 10 MG/ML
INJECTION, EMULSION INTRAVENOUS PRN
Status: DISCONTINUED | OUTPATIENT
Start: 2022-01-01 | End: 2022-01-01

## 2022-01-01 RX ORDER — HEPARIN SODIUM,PORCINE 10 UNIT/ML
5-10 VIAL (ML) INTRAVENOUS EVERY 24 HOURS
Status: DISCONTINUED | OUTPATIENT
Start: 2022-01-01 | End: 2022-01-01 | Stop reason: HOSPADM

## 2022-01-01 RX ORDER — METOPROLOL SUCCINATE 25 MG/1
25 TABLET, EXTENDED RELEASE ORAL DAILY
Qty: 30 TABLET | Refills: 0 | Status: SHIPPED | OUTPATIENT
Start: 2022-01-01 | End: 2022-01-01

## 2022-01-01 RX ORDER — SALIVA STIMULANT COMB. NO.3
2 SPRAY, NON-AEROSOL (ML) MUCOUS MEMBRANE
Qty: 44.3 ML | Refills: 0 | Status: SHIPPED | OUTPATIENT
Start: 2022-01-01

## 2022-01-01 RX ORDER — SODIUM CHLORIDE 9 MG/ML
INJECTION, SOLUTION INTRAVENOUS CONTINUOUS
Status: DISCONTINUED | OUTPATIENT
Start: 2022-01-01 | End: 2022-01-01 | Stop reason: HOSPADM

## 2022-01-01 RX ORDER — BUMETANIDE 1 MG/1
2 TABLET ORAL DAILY
Qty: 120 TABLET | Refills: 3 | Status: SHIPPED | OUTPATIENT
Start: 2022-01-01 | End: 2022-01-01

## 2022-01-01 RX ORDER — BLOOD SUGAR DIAGNOSTIC
STRIP MISCELLANEOUS
COMMUNITY

## 2022-01-01 RX ORDER — ONDANSETRON 4 MG/1
4 TABLET, ORALLY DISINTEGRATING ORAL EVERY 6 HOURS PRN
Qty: 20 TABLET | Refills: 0 | Status: SHIPPED | OUTPATIENT
Start: 2022-01-01

## 2022-01-01 RX ORDER — CEFAZOLIN SODIUM 2 G/100ML
2 INJECTION, SOLUTION INTRAVENOUS
Status: COMPLETED | OUTPATIENT
Start: 2022-01-01 | End: 2022-01-01

## 2022-01-01 RX ORDER — CARBOXYMETHYLCELLULOSE SODIUM 5 MG/ML
1 SOLUTION/ DROPS OPHTHALMIC
Qty: 1 EACH | Refills: 0 | Status: SHIPPED | OUTPATIENT
Start: 2022-01-01

## 2022-01-01 RX ORDER — PIPERACILLIN SODIUM, TAZOBACTAM SODIUM 4; .5 G/20ML; G/20ML
4.5 INJECTION, POWDER, LYOPHILIZED, FOR SOLUTION INTRAVENOUS EVERY 6 HOURS
Status: DISCONTINUED | OUTPATIENT
Start: 2022-01-01 | End: 2022-01-01

## 2022-01-01 RX ORDER — PROCHLORPERAZINE MALEATE 10 MG
10 TABLET ORAL EVERY 6 HOURS PRN
Status: ON HOLD | COMMUNITY
End: 2022-01-01

## 2022-01-01 RX ORDER — LIDOCAINE HYDROCHLORIDE AND EPINEPHRINE 10; 10 MG/ML; UG/ML
INJECTION, SOLUTION INFILTRATION; PERINEURAL PRN
Status: COMPLETED | OUTPATIENT
Start: 2022-01-01 | End: 2022-01-01

## 2022-01-01 RX ORDER — DEXAMETHASONE 4 MG/1
4 TABLET ORAL DAILY
COMMUNITY
Start: 2022-01-01 | End: 2022-01-01

## 2022-01-01 RX ORDER — OXYBUTYNIN CHLORIDE 10 MG/1
10 TABLET, EXTENDED RELEASE ORAL AT BEDTIME
Status: DISCONTINUED | OUTPATIENT
Start: 2022-01-01 | End: 2022-01-01 | Stop reason: HOSPADM

## 2022-01-01 RX ORDER — DEXMEDETOMIDINE HYDROCHLORIDE 4 UG/ML
INJECTION, SOLUTION INTRAVENOUS CONTINUOUS PRN
Status: DISCONTINUED | OUTPATIENT
Start: 2022-01-01 | End: 2022-01-01

## 2022-01-01 RX ORDER — LORAZEPAM 2 MG/ML
1 CONCENTRATE ORAL EVERY 4 HOURS PRN
Qty: 10 ML | Refills: 0 | Status: SHIPPED | OUTPATIENT
Start: 2022-01-01

## 2022-01-01 RX ORDER — FENTANYL CITRATE 0.05 MG/ML
50 INJECTION, SOLUTION INTRAMUSCULAR; INTRAVENOUS
Status: DISCONTINUED | OUTPATIENT
Start: 2022-01-01 | End: 2022-01-01 | Stop reason: HOSPADM

## 2022-01-01 RX ORDER — SPIRONOLACTONE 25 MG
12.5 TABLET ORAL DAILY
Status: DISCONTINUED | OUTPATIENT
Start: 2022-01-01 | End: 2022-01-01

## 2022-01-01 RX ORDER — LEVETIRACETAM 500 MG/1
500 TABLET ORAL 2 TIMES DAILY
Status: DISCONTINUED | OUTPATIENT
Start: 2022-01-01 | End: 2022-01-01 | Stop reason: HOSPADM

## 2022-01-01 RX ORDER — ATROPINE SULFATE 10 MG/ML
2 SOLUTION/ DROPS OPHTHALMIC EVERY 4 HOURS PRN
Qty: 5 ML | Refills: 0 | Status: SHIPPED | OUTPATIENT
Start: 2022-01-01

## 2022-01-01 RX ORDER — CAFFEINE CITRATE 20 MG/ML
60 SOLUTION INTRAVENOUS
Status: DISCONTINUED | OUTPATIENT
Start: 2022-01-01 | End: 2022-01-01 | Stop reason: HOSPADM

## 2022-01-01 RX ORDER — CALCIUM GLUCONATE 94 MG/ML
1 INJECTION, SOLUTION INTRAVENOUS ONCE
Status: COMPLETED | OUTPATIENT
Start: 2022-01-01 | End: 2022-01-01

## 2022-01-01 RX ORDER — LISINOPRIL 2.5 MG/1
2.5 TABLET ORAL DAILY
Status: DISCONTINUED | OUTPATIENT
Start: 2022-01-01 | End: 2022-01-01

## 2022-01-01 RX ORDER — GUAIFENESIN 600 MG/1
600 TABLET, EXTENDED RELEASE ORAL 2 TIMES DAILY PRN
Status: DISCONTINUED | OUTPATIENT
Start: 2022-01-01 | End: 2022-01-01 | Stop reason: HOSPADM

## 2022-01-01 RX ORDER — FENTANYL CITRATE 50 UG/ML
INJECTION, SOLUTION INTRAMUSCULAR; INTRAVENOUS PRN
Status: DISCONTINUED | OUTPATIENT
Start: 2022-01-01 | End: 2022-01-01

## 2022-01-01 RX ORDER — PIPERACILLIN SODIUM, TAZOBACTAM SODIUM 2; .25 G/10ML; G/10ML
2.25 INJECTION, POWDER, LYOPHILIZED, FOR SOLUTION INTRAVENOUS EVERY 6 HOURS
Status: DISCONTINUED | OUTPATIENT
Start: 2022-01-01 | End: 2022-01-01 | Stop reason: DRUGHIGH

## 2022-01-01 RX ORDER — ZOLPIDEM TARTRATE 5 MG/1
5 TABLET ORAL AT BEDTIME
Status: DISCONTINUED | OUTPATIENT
Start: 2022-01-01 | End: 2022-01-01

## 2022-01-01 RX ORDER — POTASSIUM CHLORIDE 7.45 MG/ML
10 INJECTION INTRAVENOUS
Status: DISPENSED | OUTPATIENT
Start: 2022-01-01 | End: 2022-01-01

## 2022-01-01 RX ADMIN — GUAIFENESIN 10 ML: 200 SOLUTION ORAL at 14:09

## 2022-01-01 RX ADMIN — PIPERACILLIN AND TAZOBACTAM 2.25 G: 2; .25 INJECTION, POWDER, FOR SOLUTION INTRAVENOUS at 05:09

## 2022-01-01 RX ADMIN — GUAIFENESIN 600 MG: 600 TABLET, EXTENDED RELEASE ORAL at 11:09

## 2022-01-01 RX ADMIN — PIPERACILLIN SODIUM AND TAZOBACTAM SODIUM 3.38 G: 3; .375 INJECTION, POWDER, LYOPHILIZED, FOR SOLUTION INTRAVENOUS at 03:29

## 2022-01-01 RX ADMIN — OXYBUTYNIN 10 MG: 10 TABLET, FILM COATED, EXTENDED RELEASE ORAL at 21:33

## 2022-01-01 RX ADMIN — LEVETIRACETAM 500 MG: 500 TABLET, FILM COATED ORAL at 09:30

## 2022-01-01 RX ADMIN — SODIUM CHLORIDE: 9 INJECTION, SOLUTION INTRAVENOUS at 12:12

## 2022-01-01 RX ADMIN — DOCUSATE SODIUM 100 MG: 100 CAPSULE, LIQUID FILLED ORAL at 09:31

## 2022-01-01 RX ADMIN — OXYBUTYNIN CHLORIDE 5 MG: 5 TABLET ORAL at 20:55

## 2022-01-01 RX ADMIN — HYDROMORPHONE HYDROCHLORIDE 4 MG: 4 TABLET ORAL at 14:04

## 2022-01-01 RX ADMIN — PIPERACILLIN SODIUM AND TAZOBACTAM SODIUM 3.38 G: 3; .375 INJECTION, POWDER, LYOPHILIZED, FOR SOLUTION INTRAVENOUS at 03:27

## 2022-01-01 RX ADMIN — GUAIFENESIN 600 MG: 600 TABLET, EXTENDED RELEASE ORAL at 21:05

## 2022-01-01 RX ADMIN — PANTOPRAZOLE SODIUM 40 MG: 40 TABLET, DELAYED RELEASE ORAL at 20:36

## 2022-01-01 RX ADMIN — Medication 5 ML: at 08:17

## 2022-01-01 RX ADMIN — GUAIFENESIN 600 MG: 600 TABLET, EXTENDED RELEASE ORAL at 08:47

## 2022-01-01 RX ADMIN — LEVETIRACETAM 500 MG: 500 TABLET, FILM COATED ORAL at 10:52

## 2022-01-01 RX ADMIN — SODIUM CHLORIDE: 4.5 INJECTION, SOLUTION INTRAVENOUS at 08:04

## 2022-01-01 RX ADMIN — ZOLPIDEM TARTRATE 5 MG: 5 TABLET ORAL at 22:06

## 2022-01-01 RX ADMIN — GUAIFENESIN 600 MG: 600 TABLET, EXTENDED RELEASE ORAL at 20:37

## 2022-01-01 RX ADMIN — LEVETIRACETAM 500 MG: 500 TABLET, FILM COATED ORAL at 08:34

## 2022-01-01 RX ADMIN — SODIUM BICARBONATE 50 MEQ: 84 INJECTION, SOLUTION INTRAVENOUS at 11:46

## 2022-01-01 RX ADMIN — VALACYCLOVIR HYDROCHLORIDE 1000 MG: 1 TABLET, FILM COATED ORAL at 09:42

## 2022-01-01 RX ADMIN — PIPERACILLIN SODIUM AND TAZOBACTAM SODIUM 3.38 G: 3; .375 INJECTION, POWDER, LYOPHILIZED, FOR SOLUTION INTRAVENOUS at 19:19

## 2022-01-01 RX ADMIN — DULOXETINE HYDROCHLORIDE 60 MG: 30 CAPSULE, DELAYED RELEASE PELLETS ORAL at 20:12

## 2022-01-01 RX ADMIN — HYDROMORPHONE HYDROCHLORIDE 4 MG: 4 TABLET ORAL at 14:17

## 2022-01-01 RX ADMIN — METOPROLOL TARTRATE 25 MG: 25 TABLET, FILM COATED ORAL at 20:15

## 2022-01-01 RX ADMIN — MULTIPLE VITAMINS W/ MINERALS TAB 1 TABLET: TAB at 08:53

## 2022-01-01 RX ADMIN — GUAIFENESIN 600 MG: 600 TABLET, EXTENDED RELEASE ORAL at 09:36

## 2022-01-01 RX ADMIN — HYDROMORPHONE HYDROCHLORIDE 2 MG: 2 TABLET ORAL at 09:36

## 2022-01-01 RX ADMIN — PIPERACILLIN SODIUM AND TAZOBACTAM SODIUM 3.38 G: 3; .375 INJECTION, POWDER, LYOPHILIZED, FOR SOLUTION INTRAVENOUS at 18:40

## 2022-01-01 RX ADMIN — GUAIFENESIN 600 MG: 600 TABLET, EXTENDED RELEASE ORAL at 20:23

## 2022-01-01 RX ADMIN — METOPROLOL TARTRATE 25 MG: 25 TABLET, FILM COATED ORAL at 20:28

## 2022-01-01 RX ADMIN — MAGNESIUM OXIDE TAB 400 MG (241.3 MG ELEMENTAL MG) 400 MG: 400 (241.3 MG) TAB at 20:53

## 2022-01-01 RX ADMIN — MAGNESIUM OXIDE TAB 400 MG (241.3 MG ELEMENTAL MG) 400 MG: 400 (241.3 MG) TAB at 20:37

## 2022-01-01 RX ADMIN — DULOXETINE HYDROCHLORIDE 30 MG: 30 CAPSULE, DELAYED RELEASE ORAL at 08:09

## 2022-01-01 RX ADMIN — ACETAMINOPHEN 650 MG: 325 TABLET, FILM COATED ORAL at 04:34

## 2022-01-01 RX ADMIN — GUAIFENESIN 600 MG: 600 TABLET, EXTENDED RELEASE ORAL at 22:24

## 2022-01-01 RX ADMIN — ANORECTAL OINTMENT: 15.7; .44; 24; 20.6 OINTMENT TOPICAL at 10:27

## 2022-01-01 RX ADMIN — LEVETIRACETAM 500 MG: 500 TABLET, FILM COATED ORAL at 08:36

## 2022-01-01 RX ADMIN — PIPERACILLIN AND TAZOBACTAM 2.25 G: 2; .25 INJECTION, POWDER, FOR SOLUTION INTRAVENOUS at 20:31

## 2022-01-01 RX ADMIN — OXYBUTYNIN CHLORIDE 5 MG: 5 TABLET ORAL at 12:50

## 2022-01-01 RX ADMIN — PANTOPRAZOLE SODIUM 40 MG: 40 TABLET, DELAYED RELEASE ORAL at 20:12

## 2022-01-01 RX ADMIN — DULOXETINE HYDROCHLORIDE 30 MG: 30 CAPSULE, DELAYED RELEASE ORAL at 09:30

## 2022-01-01 RX ADMIN — MULTIPLE VITAMINS W/ MINERALS TAB 1 TABLET: TAB at 09:43

## 2022-01-01 RX ADMIN — ALTEPLASE 1 MG: 2.2 INJECTION, POWDER, LYOPHILIZED, FOR SOLUTION INTRAVENOUS at 10:12

## 2022-01-01 RX ADMIN — DULOXETINE HYDROCHLORIDE 30 MG: 30 CAPSULE, DELAYED RELEASE ORAL at 08:12

## 2022-01-01 RX ADMIN — ZOLPIDEM TARTRATE 5 MG: 5 TABLET ORAL at 21:04

## 2022-01-01 RX ADMIN — IBUPROFEN 200 MG: 200 TABLET, FILM COATED ORAL at 14:09

## 2022-01-01 RX ADMIN — PROCHLORPERAZINE MALEATE 5 MG: 5 TABLET ORAL at 08:12

## 2022-01-01 RX ADMIN — PANTOPRAZOLE SODIUM 40 MG: 40 TABLET, DELAYED RELEASE ORAL at 20:37

## 2022-01-01 RX ADMIN — OXYBUTYNIN CHLORIDE 5 MG: 5 TABLET ORAL at 11:20

## 2022-01-01 RX ADMIN — LEVOFLOXACIN 500 MG: 500 TABLET, FILM COATED ORAL at 08:47

## 2022-01-01 RX ADMIN — NYSTATIN: 100000 CREAM TOPICAL at 21:43

## 2022-01-01 RX ADMIN — MULTIPLE VITAMINS W/ MINERALS TAB 1 TABLET: TAB at 09:30

## 2022-01-01 RX ADMIN — GUAIFENESIN 600 MG: 600 TABLET, EXTENDED RELEASE ORAL at 09:31

## 2022-01-01 RX ADMIN — METOPROLOL TARTRATE 25 MG: 25 TABLET, FILM COATED ORAL at 09:27

## 2022-01-01 RX ADMIN — PIPERACILLIN SODIUM AND TAZOBACTAM SODIUM 3.38 G: 3; .375 INJECTION, POWDER, LYOPHILIZED, FOR SOLUTION INTRAVENOUS at 03:51

## 2022-01-01 RX ADMIN — DEXMEDETOMIDINE HYDROCHLORIDE 0.5 MCG/KG/HR: 4 INJECTION, SOLUTION INTRAVENOUS at 13:46

## 2022-01-01 RX ADMIN — OXYBUTYNIN 10 MG: 10 TABLET, FILM COATED, EXTENDED RELEASE ORAL at 20:38

## 2022-01-01 RX ADMIN — MAGNESIUM OXIDE TAB 400 MG (241.3 MG ELEMENTAL MG) 400 MG: 400 (241.3 MG) TAB at 14:08

## 2022-01-01 RX ADMIN — ASPIRIN 81 MG: 81 TABLET, COATED ORAL at 14:44

## 2022-01-01 RX ADMIN — PIPERACILLIN SODIUM AND TAZOBACTAM SODIUM 3.38 G: 3; .375 INJECTION, POWDER, LYOPHILIZED, FOR SOLUTION INTRAVENOUS at 03:40

## 2022-01-01 RX ADMIN — PANTOPRAZOLE SODIUM 40 MG: 40 TABLET, DELAYED RELEASE ORAL at 20:45

## 2022-01-01 RX ADMIN — SPIRONOLACTONE 12.5 MG: 25 TABLET, FILM COATED ORAL at 15:00

## 2022-01-01 RX ADMIN — PIPERACILLIN AND TAZOBACTAM 4.5 G: 4; .5 INJECTION, POWDER, FOR SOLUTION INTRAVENOUS at 15:55

## 2022-01-01 RX ADMIN — LEVETIRACETAM 500 MG: 100 SOLUTION ORAL at 21:55

## 2022-01-01 RX ADMIN — SODIUM CHLORIDE: 4.5 INJECTION, SOLUTION INTRAVENOUS at 18:04

## 2022-01-01 RX ADMIN — GUAIFENESIN 600 MG: 600 TABLET, EXTENDED RELEASE ORAL at 20:07

## 2022-01-01 RX ADMIN — Medication 30 UNITS: at 10:55

## 2022-01-01 RX ADMIN — PIPERACILLIN AND TAZOBACTAM 4.5 G: 4; .5 INJECTION, POWDER, FOR SOLUTION INTRAVENOUS at 15:59

## 2022-01-01 RX ADMIN — INSULIN ASPART 2 UNITS: 100 INJECTION, SOLUTION INTRAVENOUS; SUBCUTANEOUS at 20:40

## 2022-01-01 RX ADMIN — GADOBUTROL 7 ML: 604.72 INJECTION INTRAVENOUS at 18:34

## 2022-01-01 RX ADMIN — METOPROLOL TARTRATE 25 MG: 25 TABLET, FILM COATED ORAL at 10:57

## 2022-01-01 RX ADMIN — METOPROLOL TARTRATE 12.5 MG: 25 TABLET, FILM COATED ORAL at 20:42

## 2022-01-01 RX ADMIN — DOCUSATE SODIUM 100 MG: 100 CAPSULE, LIQUID FILLED ORAL at 08:53

## 2022-01-01 RX ADMIN — HYDROMORPHONE HYDROCHLORIDE 2 MG: 2 TABLET ORAL at 05:51

## 2022-01-01 RX ADMIN — LEVETIRACETAM 500 MG: 500 TABLET, FILM COATED ORAL at 08:16

## 2022-01-01 RX ADMIN — ACETAMINOPHEN 650 MG: 325 TABLET, FILM COATED ORAL at 14:32

## 2022-01-01 RX ADMIN — LEVETIRACETAM 500 MG: 500 TABLET, FILM COATED ORAL at 08:54

## 2022-01-01 RX ADMIN — POTASSIUM CHLORIDE 10 MEQ: 7.46 INJECTION, SOLUTION INTRAVENOUS at 13:25

## 2022-01-01 RX ADMIN — HYDROXYZINE HYDROCHLORIDE 25 MG: 25 TABLET, FILM COATED ORAL at 08:32

## 2022-01-01 RX ADMIN — MIDAZOLAM 1 MG: 1 INJECTION INTRAMUSCULAR; INTRAVENOUS at 09:22

## 2022-01-01 RX ADMIN — OXYBUTYNIN 10 MG: 10 TABLET, FILM COATED, EXTENDED RELEASE ORAL at 20:25

## 2022-01-01 RX ADMIN — HYDROMORPHONE HYDROCHLORIDE 2 MG: 1 SOLUTION ORAL at 08:41

## 2022-01-01 RX ADMIN — OXYBUTYNIN CHLORIDE 5 MG: 5 TABLET ORAL at 08:05

## 2022-01-01 RX ADMIN — GUAIFENESIN 600 MG: 600 TABLET, EXTENDED RELEASE ORAL at 09:30

## 2022-01-01 RX ADMIN — REMDESIVIR 100 MG: 100 INJECTION, POWDER, LYOPHILIZED, FOR SOLUTION INTRAVENOUS at 16:37

## 2022-01-01 RX ADMIN — FUROSEMIDE 40 MG: 40 TABLET ORAL at 08:53

## 2022-01-01 RX ADMIN — POLYETHYLENE GLYCOL 3350 17 G: 17 POWDER, FOR SOLUTION ORAL at 08:30

## 2022-01-01 RX ADMIN — METOPROLOL TARTRATE 12.5 MG: 25 TABLET, FILM COATED ORAL at 08:55

## 2022-01-01 RX ADMIN — PHYTONADIONE 1 MG: 2 INJECTION, EMULSION INTRAMUSCULAR; INTRAVENOUS; SUBCUTANEOUS at 19:54

## 2022-01-01 RX ADMIN — HYDROMORPHONE HYDROCHLORIDE 4 MG: 4 TABLET ORAL at 21:50

## 2022-01-01 RX ADMIN — VANCOMYCIN HYDROCHLORIDE 750 MG: 1 INJECTION, POWDER, LYOPHILIZED, FOR SOLUTION INTRAVENOUS at 22:23

## 2022-01-01 RX ADMIN — PANTOPRAZOLE SODIUM 40 MG: 40 TABLET, DELAYED RELEASE ORAL at 06:25

## 2022-01-01 RX ADMIN — INSULIN ASPART 1 UNITS: 100 INJECTION, SOLUTION INTRAVENOUS; SUBCUTANEOUS at 13:01

## 2022-01-01 RX ADMIN — BUMETANIDE 2 MG: 2 TABLET ORAL at 09:30

## 2022-01-01 RX ADMIN — Medication 5 ML: at 06:53

## 2022-01-01 RX ADMIN — INSULIN ASPART 1 UNITS: 100 INJECTION, SOLUTION INTRAVENOUS; SUBCUTANEOUS at 11:36

## 2022-01-01 RX ADMIN — SIMVASTATIN 20 MG: 10 TABLET, FILM COATED ORAL at 20:57

## 2022-01-01 RX ADMIN — SPIRONOLACTONE 12.5 MG: 25 TABLET ORAL at 13:14

## 2022-01-01 RX ADMIN — ZOLPIDEM TARTRATE 5 MG: 5 TABLET ORAL at 20:43

## 2022-01-01 RX ADMIN — NYSTATIN: 100000 CREAM TOPICAL at 21:06

## 2022-01-01 RX ADMIN — Medication 5 ML: at 09:52

## 2022-01-01 RX ADMIN — EZETIMIBE 10 MG: 10 TABLET ORAL at 09:26

## 2022-01-01 RX ADMIN — OLANZAPINE 5 MG: 5 TABLET, FILM COATED ORAL at 20:19

## 2022-01-01 RX ADMIN — SODIUM CHLORIDE: 9 INJECTION, SOLUTION INTRAVENOUS at 20:31

## 2022-01-01 RX ADMIN — PANTOPRAZOLE SODIUM 40 MG: 40 TABLET, DELAYED RELEASE ORAL at 06:30

## 2022-01-01 RX ADMIN — DULOXETINE HYDROCHLORIDE 30 MG: 30 CAPSULE, DELAYED RELEASE ORAL at 09:42

## 2022-01-01 RX ADMIN — VALACYCLOVIR HYDROCHLORIDE 1000 MG: 1 TABLET, FILM COATED ORAL at 08:35

## 2022-01-01 RX ADMIN — PANTOPRAZOLE SODIUM 40 MG: 40 TABLET, DELAYED RELEASE ORAL at 20:05

## 2022-01-01 RX ADMIN — OXYBUTYNIN CHLORIDE 5 MG: 5 TABLET ORAL at 15:17

## 2022-01-01 RX ADMIN — DEXTROSE MONOHYDRATE 300 ML: 100 INJECTION, SOLUTION INTRAVENOUS at 00:09

## 2022-01-01 RX ADMIN — Medication 5 ML: at 08:06

## 2022-01-01 RX ADMIN — GADOBUTROL 14 ML: 604.72 INJECTION INTRAVENOUS at 13:23

## 2022-01-01 RX ADMIN — OXYBUTYNIN CHLORIDE 5 MG: 5 TABLET ORAL at 14:35

## 2022-01-01 RX ADMIN — LEVETIRACETAM 500 MG: 500 TABLET, FILM COATED ORAL at 08:46

## 2022-01-01 RX ADMIN — OXYBUTYNIN 10 MG: 10 TABLET, FILM COATED, EXTENDED RELEASE ORAL at 19:41

## 2022-01-01 RX ADMIN — LEVOFLOXACIN 500 MG: 500 TABLET, FILM COATED ORAL at 08:16

## 2022-01-01 RX ADMIN — HYDROMORPHONE HYDROCHLORIDE 2 MG: 2 TABLET ORAL at 16:47

## 2022-01-01 RX ADMIN — OXYBUTYNIN CHLORIDE 5 MG: 5 TABLET ORAL at 17:11

## 2022-01-01 RX ADMIN — OXYBUTYNIN 10 MG: 10 TABLET, FILM COATED, EXTENDED RELEASE ORAL at 20:37

## 2022-01-01 RX ADMIN — ENOXAPARIN SODIUM 40 MG: 40 INJECTION SUBCUTANEOUS at 20:26

## 2022-01-01 RX ADMIN — OXYBUTYNIN CHLORIDE 5 MG: 5 TABLET ORAL at 14:07

## 2022-01-01 RX ADMIN — METOPROLOL TARTRATE 25 MG: 25 TABLET, FILM COATED ORAL at 20:07

## 2022-01-01 RX ADMIN — VANCOMYCIN HYDROCHLORIDE 1250 MG: 10 INJECTION, POWDER, LYOPHILIZED, FOR SOLUTION INTRAVENOUS at 11:48

## 2022-01-01 RX ADMIN — GUAIFENESIN 10 ML: 200 SOLUTION ORAL at 21:32

## 2022-01-01 RX ADMIN — METOPROLOL TARTRATE 25 MG: 25 TABLET, FILM COATED ORAL at 21:04

## 2022-01-01 RX ADMIN — ZOLPIDEM TARTRATE 2.5 MG: 5 TABLET, FILM COATED ORAL at 21:42

## 2022-01-01 RX ADMIN — HYDROMORPHONE HYDROCHLORIDE 4 MG: 4 TABLET ORAL at 20:18

## 2022-01-01 RX ADMIN — DULOXETINE HYDROCHLORIDE 30 MG: 30 CAPSULE, DELAYED RELEASE ORAL at 09:26

## 2022-01-01 RX ADMIN — OXYBUTYNIN 10 MG: 10 TABLET, FILM COATED, EXTENDED RELEASE ORAL at 20:11

## 2022-01-01 RX ADMIN — MULTIPLE VITAMINS W/ MINERALS TAB 1 TABLET: TAB at 08:36

## 2022-01-01 RX ADMIN — OXYBUTYNIN 10 MG: 10 TABLET, FILM COATED, EXTENDED RELEASE ORAL at 20:48

## 2022-01-01 RX ADMIN — MIDAZOLAM HYDROCHLORIDE 0.5 MG: 1 INJECTION, SOLUTION INTRAMUSCULAR; INTRAVENOUS at 09:26

## 2022-01-01 RX ADMIN — PIPERACILLIN AND TAZOBACTAM 4.5 G: 4; .5 INJECTION, POWDER, FOR SOLUTION INTRAVENOUS at 17:31

## 2022-01-01 RX ADMIN — GUAIFENESIN 10 ML: 200 SOLUTION ORAL at 16:51

## 2022-01-01 RX ADMIN — ALBUTEROL SULFATE 2.5 MG: 2.5 SOLUTION RESPIRATORY (INHALATION) at 14:34

## 2022-01-01 RX ADMIN — METOPROLOL TARTRATE 12.5 MG: 25 TABLET, FILM COATED ORAL at 09:30

## 2022-01-01 RX ADMIN — ACETAMINOPHEN 650 MG: 325 TABLET, FILM COATED ORAL at 08:28

## 2022-01-01 RX ADMIN — MAGNESIUM OXIDE TAB 400 MG (241.3 MG ELEMENTAL MG) 400 MG: 400 (241.3 MG) TAB at 20:42

## 2022-01-01 RX ADMIN — Medication 10 MG: at 14:06

## 2022-01-01 RX ADMIN — INSULIN ASPART 1 UNITS: 100 INJECTION, SOLUTION INTRAVENOUS; SUBCUTANEOUS at 20:20

## 2022-01-01 RX ADMIN — NYSTATIN: 100000 CREAM TOPICAL at 21:27

## 2022-01-01 RX ADMIN — HYDROXYZINE HYDROCHLORIDE 25 MG: 25 TABLET, FILM COATED ORAL at 14:47

## 2022-01-01 RX ADMIN — FENTANYL CITRATE 25 MCG: 50 INJECTION, SOLUTION INTRAMUSCULAR; INTRAVENOUS at 09:28

## 2022-01-01 RX ADMIN — LEVETIRACETAM 500 MG: 5 INJECTION INTRAVENOUS at 04:01

## 2022-01-01 RX ADMIN — VALACYCLOVIR HYDROCHLORIDE 1000 MG: 1 TABLET, FILM COATED ORAL at 08:05

## 2022-01-01 RX ADMIN — GUAIFENESIN 10 ML: 200 SOLUTION ORAL at 08:25

## 2022-01-01 RX ADMIN — SODIUM CHLORIDE: 4.5 INJECTION, SOLUTION INTRAVENOUS at 23:46

## 2022-01-01 RX ADMIN — OXYBUTYNIN CHLORIDE 5 MG: 5 TABLET ORAL at 15:42

## 2022-01-01 RX ADMIN — PIPERACILLIN AND TAZOBACTAM 4.5 G: 4; .5 INJECTION, POWDER, FOR SOLUTION INTRAVENOUS at 02:30

## 2022-01-01 RX ADMIN — NYSTATIN: 100000 CREAM TOPICAL at 20:12

## 2022-01-01 RX ADMIN — Medication 5 ML: at 05:50

## 2022-01-01 RX ADMIN — GUAIFENESIN 600 MG: 600 TABLET, EXTENDED RELEASE ORAL at 09:27

## 2022-01-01 RX ADMIN — IBUPROFEN 200 MG: 200 TABLET, FILM COATED ORAL at 20:56

## 2022-01-01 RX ADMIN — MULTIPLE VITAMINS W/ MINERALS TAB 1 TABLET: TAB at 08:55

## 2022-01-01 RX ADMIN — OXYBUTYNIN CHLORIDE 5 MG: 5 TABLET ORAL at 17:52

## 2022-01-01 RX ADMIN — GUAIFENESIN 10 ML: 200 SOLUTION ORAL at 14:13

## 2022-01-01 RX ADMIN — LEVETIRACETAM 500 MG: 500 TABLET, FILM COATED ORAL at 20:48

## 2022-01-01 RX ADMIN — DULOXETINE HYDROCHLORIDE 60 MG: 30 CAPSULE, DELAYED RELEASE ORAL at 08:51

## 2022-01-01 RX ADMIN — PIPERACILLIN AND TAZOBACTAM 4.5 G: 4; .5 INJECTION, POWDER, FOR SOLUTION INTRAVENOUS at 16:37

## 2022-01-01 RX ADMIN — LEVETIRACETAM 500 MG: 500 TABLET, FILM COATED ORAL at 09:31

## 2022-01-01 RX ADMIN — DULOXETINE HYDROCHLORIDE 30 MG: 30 CAPSULE, DELAYED RELEASE ORAL at 08:55

## 2022-01-01 RX ADMIN — PIPERACILLIN SODIUM AND TAZOBACTAM SODIUM 3.38 G: 3; .375 INJECTION, POWDER, LYOPHILIZED, FOR SOLUTION INTRAVENOUS at 18:04

## 2022-01-01 RX ADMIN — LEVOFLOXACIN 500 MG: 500 TABLET, FILM COATED ORAL at 08:45

## 2022-01-01 RX ADMIN — MULTIPLE VITAMINS W/ MINERALS TAB 1 TABLET: TAB at 09:31

## 2022-01-01 RX ADMIN — PERFLUTREN: 6.52 INJECTION, SUSPENSION INTRAVENOUS at 11:30

## 2022-01-01 RX ADMIN — DULOXETINE HYDROCHLORIDE 60 MG: 30 CAPSULE, DELAYED RELEASE PELLETS ORAL at 20:48

## 2022-01-01 RX ADMIN — Medication 10 MG: at 14:13

## 2022-01-01 RX ADMIN — OXYBUTYNIN CHLORIDE 5 MG: 5 TABLET ORAL at 11:08

## 2022-01-01 RX ADMIN — OXYBUTYNIN CHLORIDE 5 MG: 5 TABLET ORAL at 15:55

## 2022-01-01 RX ADMIN — Medication 5 ML: at 18:14

## 2022-01-01 RX ADMIN — GUAIFENESIN 600 MG: 600 TABLET, EXTENDED RELEASE ORAL at 08:46

## 2022-01-01 RX ADMIN — GUAIFENESIN 600 MG: 600 TABLET, EXTENDED RELEASE ORAL at 08:53

## 2022-01-01 RX ADMIN — Medication 5 ML: at 06:44

## 2022-01-01 RX ADMIN — OXYBUTYNIN CHLORIDE 5 MG: 5 TABLET ORAL at 11:27

## 2022-01-01 RX ADMIN — FUROSEMIDE 40 MG: 10 INJECTION, SOLUTION INTRAMUSCULAR; INTRAVENOUS at 20:17

## 2022-01-01 RX ADMIN — LEVETIRACETAM 500 MG: 500 TABLET, FILM COATED ORAL at 20:46

## 2022-01-01 RX ADMIN — OXYBUTYNIN CHLORIDE 5 MG: 5 TABLET ORAL at 17:31

## 2022-01-01 RX ADMIN — POTASSIUM CHLORIDE 40 MEQ: 1500 TABLET, EXTENDED RELEASE ORAL at 06:25

## 2022-01-01 RX ADMIN — DULOXETINE HYDROCHLORIDE 60 MG: 30 CAPSULE, DELAYED RELEASE PELLETS ORAL at 20:54

## 2022-01-01 RX ADMIN — DOCUSATE SODIUM 100 MG: 100 CAPSULE, LIQUID FILLED ORAL at 09:37

## 2022-01-01 RX ADMIN — PIPERACILLIN AND TAZOBACTAM 4.5 G: 4; .5 INJECTION, POWDER, FOR SOLUTION INTRAVENOUS at 14:45

## 2022-01-01 RX ADMIN — DULOXETINE HYDROCHLORIDE 30 MG: 30 CAPSULE, DELAYED RELEASE ORAL at 08:16

## 2022-01-01 RX ADMIN — FUROSEMIDE 10 MG: 10 INJECTION, SOLUTION INTRAMUSCULAR; INTRAVENOUS at 11:50

## 2022-01-01 RX ADMIN — OXYBUTYNIN CHLORIDE 5 MG: 5 TABLET ORAL at 16:10

## 2022-01-01 RX ADMIN — GUAIFENESIN 10 ML: 200 SOLUTION ORAL at 10:42

## 2022-01-01 RX ADMIN — ACETAMINOPHEN 650 MG: 325 TABLET, FILM COATED ORAL at 08:05

## 2022-01-01 RX ADMIN — LEVETIRACETAM 500 MG: 5 INJECTION INTRAVENOUS at 03:45

## 2022-01-01 RX ADMIN — DOCUSATE SODIUM 100 MG: 100 CAPSULE, LIQUID FILLED ORAL at 09:27

## 2022-01-01 RX ADMIN — INSULIN ASPART 1 UNITS: 100 INJECTION, SOLUTION INTRAVENOUS; SUBCUTANEOUS at 08:28

## 2022-01-01 RX ADMIN — DOCUSATE SODIUM 100 MG: 100 CAPSULE, LIQUID FILLED ORAL at 10:58

## 2022-01-01 RX ADMIN — OXYBUTYNIN CHLORIDE 5 MG: 5 TABLET ORAL at 08:29

## 2022-01-01 RX ADMIN — HYDROXYZINE HYDROCHLORIDE 25 MG: 25 TABLET, FILM COATED ORAL at 08:51

## 2022-01-01 RX ADMIN — OXYBUTYNIN CHLORIDE 5 MG: 5 TABLET ORAL at 08:54

## 2022-01-01 RX ADMIN — REGADENOSON 0.4 MG: 0.08 INJECTION, SOLUTION INTRAVENOUS at 14:21

## 2022-01-01 RX ADMIN — ANORECTAL OINTMENT: 15.7; .44; 24; 20.6 OINTMENT TOPICAL at 10:00

## 2022-01-01 RX ADMIN — OXYBUTYNIN CHLORIDE 5 MG: 5 TABLET ORAL at 15:18

## 2022-01-01 RX ADMIN — ANORECTAL OINTMENT: 15.7; .44; 24; 20.6 OINTMENT TOPICAL at 08:25

## 2022-01-01 RX ADMIN — DULOXETINE HYDROCHLORIDE 30 MG: 30 CAPSULE, DELAYED RELEASE ORAL at 08:32

## 2022-01-01 RX ADMIN — BUMETANIDE 2 MG: 0.25 INJECTION INTRAMUSCULAR; INTRAVENOUS at 20:25

## 2022-01-01 RX ADMIN — PANTOPRAZOLE SODIUM 40 MG: 40 TABLET, DELAYED RELEASE ORAL at 16:54

## 2022-01-01 RX ADMIN — SODIUM CHLORIDE: 9 INJECTION, SOLUTION INTRAVENOUS at 10:25

## 2022-01-01 RX ADMIN — IBUPROFEN 200 MG: 200 TABLET, FILM COATED ORAL at 14:04

## 2022-01-01 RX ADMIN — FUROSEMIDE 40 MG: 10 INJECTION, SOLUTION INTRAMUSCULAR; INTRAVENOUS at 20:39

## 2022-01-01 RX ADMIN — PERFLUTREN 4 ML: 6.52 INJECTION, SUSPENSION INTRAVENOUS at 14:25

## 2022-01-01 RX ADMIN — DULOXETINE HYDROCHLORIDE 60 MG: 60 CAPSULE, DELAYED RELEASE ORAL at 20:37

## 2022-01-01 RX ADMIN — LORAZEPAM 1 MG: 2 LIQUID ORAL at 00:40

## 2022-01-01 RX ADMIN — LEVETIRACETAM 500 MG: 500 TABLET, FILM COATED ORAL at 21:04

## 2022-01-01 RX ADMIN — MAGNESIUM OXIDE TAB 400 MG (241.3 MG ELEMENTAL MG) 400 MG: 400 (241.3 MG) TAB at 08:14

## 2022-01-01 RX ADMIN — OXYBUTYNIN CHLORIDE 5 MG: 5 TABLET ORAL at 11:55

## 2022-01-01 RX ADMIN — VALACYCLOVIR HYDROCHLORIDE 1000 MG: 1 TABLET, FILM COATED ORAL at 21:00

## 2022-01-01 RX ADMIN — MIDAZOLAM 1 MG: 1 INJECTION INTRAMUSCULAR; INTRAVENOUS at 09:12

## 2022-01-01 RX ADMIN — PANTOPRAZOLE SODIUM 40 MG: 40 TABLET, DELAYED RELEASE ORAL at 20:07

## 2022-01-01 RX ADMIN — LIDOCAINE AND PRILOCAINE: 25; 25 CREAM TOPICAL at 09:52

## 2022-01-01 RX ADMIN — VANCOMYCIN HYDROCHLORIDE 750 MG: 1 INJECTION, POWDER, LYOPHILIZED, FOR SOLUTION INTRAVENOUS at 09:15

## 2022-01-01 RX ADMIN — DOCUSATE SODIUM 50 MG AND SENNOSIDES 8.6 MG 1 TABLET: 8.6; 5 TABLET, FILM COATED ORAL at 08:32

## 2022-01-01 RX ADMIN — VANCOMYCIN HYDROCHLORIDE 1250 MG: 10 INJECTION, POWDER, LYOPHILIZED, FOR SOLUTION INTRAVENOUS at 15:30

## 2022-01-01 RX ADMIN — ACETAMINOPHEN 650 MG: 325 TABLET, FILM COATED ORAL at 18:04

## 2022-01-01 RX ADMIN — INSULIN GLARGINE 5 UNITS: 100 INJECTION, SOLUTION SUBCUTANEOUS at 21:12

## 2022-01-01 RX ADMIN — EZETIMIBE 10 MG: 10 TABLET ORAL at 20:55

## 2022-01-01 RX ADMIN — MAGNESIUM OXIDE TAB 400 MG (241.3 MG ELEMENTAL MG) 400 MG: 400 (241.3 MG) TAB at 08:51

## 2022-01-01 RX ADMIN — GUAIFENESIN 600 MG: 600 TABLET, EXTENDED RELEASE ORAL at 21:17

## 2022-01-01 RX ADMIN — SODIUM CHLORIDE: 4.5 INJECTION, SOLUTION INTRAVENOUS at 22:59

## 2022-01-01 RX ADMIN — MULTIPLE VITAMINS W/ MINERALS TAB 1 TABLET: TAB at 08:45

## 2022-01-01 RX ADMIN — DOCUSATE SODIUM 100 MG: 100 CAPSULE, LIQUID FILLED ORAL at 09:43

## 2022-01-01 RX ADMIN — LEVETIRACETAM 500 MG: 500 TABLET, FILM COATED ORAL at 21:42

## 2022-01-01 RX ADMIN — PIPERACILLIN AND TAZOBACTAM 4.5 G: 4; .5 INJECTION, POWDER, FOR SOLUTION INTRAVENOUS at 10:23

## 2022-01-01 RX ADMIN — ACETAMINOPHEN 650 MG: 325 TABLET, FILM COATED ORAL at 22:07

## 2022-01-01 RX ADMIN — PIPERACILLIN SODIUM AND TAZOBACTAM SODIUM 3.38 G: 3; .375 INJECTION, POWDER, LYOPHILIZED, FOR SOLUTION INTRAVENOUS at 17:52

## 2022-01-01 RX ADMIN — LEVETIRACETAM 500 MG: 500 TABLET, FILM COATED ORAL at 09:13

## 2022-01-01 RX ADMIN — ZOLPIDEM TARTRATE 2.5 MG: 5 TABLET, FILM COATED ORAL at 20:48

## 2022-01-01 RX ADMIN — Medication 5 ML: at 08:46

## 2022-01-01 RX ADMIN — OXYBUTYNIN CHLORIDE 5 MG: 5 TABLET ORAL at 15:05

## 2022-01-01 RX ADMIN — INSULIN ASPART 1 UNITS: 100 INJECTION, SOLUTION INTRAVENOUS; SUBCUTANEOUS at 12:07

## 2022-01-01 RX ADMIN — OXYBUTYNIN 10 MG: 10 TABLET, FILM COATED, EXTENDED RELEASE ORAL at 22:24

## 2022-01-01 RX ADMIN — INSULIN ASPART 1 UNITS: 100 INJECTION, SOLUTION INTRAVENOUS; SUBCUTANEOUS at 11:34

## 2022-01-01 RX ADMIN — HYDROMORPHONE HYDROCHLORIDE 2 MG: 2 TABLET ORAL at 09:51

## 2022-01-01 RX ADMIN — METOPROLOL TARTRATE 25 MG: 25 TABLET, FILM COATED ORAL at 08:32

## 2022-01-01 RX ADMIN — FUROSEMIDE 40 MG: 10 INJECTION, SOLUTION INTRAMUSCULAR; INTRAVENOUS at 20:56

## 2022-01-01 RX ADMIN — HYDROMORPHONE HYDROCHLORIDE 1 MG: 2 TABLET ORAL at 20:42

## 2022-01-01 RX ADMIN — HYDROXYZINE HYDROCHLORIDE 25 MG: 25 TABLET, FILM COATED ORAL at 21:49

## 2022-01-01 RX ADMIN — DOCUSATE SODIUM 100 MG: 100 CAPSULE, LIQUID FILLED ORAL at 08:12

## 2022-01-01 RX ADMIN — DULOXETINE HYDROCHLORIDE 30 MG: 30 CAPSULE, DELAYED RELEASE ORAL at 11:08

## 2022-01-01 RX ADMIN — Medication 5 ML: at 08:55

## 2022-01-01 RX ADMIN — NYSTATIN: 100000 CREAM TOPICAL at 21:12

## 2022-01-01 RX ADMIN — METOPROLOL TARTRATE 25 MG: 25 TABLET, FILM COATED ORAL at 20:23

## 2022-01-01 RX ADMIN — LEVETIRACETAM 500 MG: 500 TABLET, FILM COATED ORAL at 21:17

## 2022-01-01 RX ADMIN — LIDOCAINE HYDROCHLORIDE,EPINEPHRINE BITARTRATE 15 ML: 10; .01 INJECTION, SOLUTION INFILTRATION; PERINEURAL at 09:26

## 2022-01-01 RX ADMIN — HYDROMORPHONE HYDROCHLORIDE 2 MG: 1 SOLUTION ORAL at 03:05

## 2022-01-01 RX ADMIN — DULOXETINE HYDROCHLORIDE 30 MG: 30 CAPSULE, DELAYED RELEASE ORAL at 09:27

## 2022-01-01 RX ADMIN — ZOLPIDEM TARTRATE 2.5 MG: 5 TABLET, FILM COATED ORAL at 20:05

## 2022-01-01 RX ADMIN — GUAIFENESIN 10 ML: 200 SOLUTION ORAL at 16:07

## 2022-01-01 RX ADMIN — LEVETIRACETAM 500 MG: 500 TABLET, FILM COATED ORAL at 08:05

## 2022-01-01 RX ADMIN — NYSTATIN: 100000 CREAM TOPICAL at 09:32

## 2022-01-01 RX ADMIN — GUAIFENESIN 600 MG: 600 TABLET, EXTENDED RELEASE ORAL at 21:16

## 2022-01-01 RX ADMIN — POLYETHYLENE GLYCOL 3350 17 G: 17 POWDER, FOR SOLUTION ORAL at 08:12

## 2022-01-01 RX ADMIN — INSULIN ASPART 1 UNITS: 100 INJECTION, SOLUTION INTRAVENOUS; SUBCUTANEOUS at 17:51

## 2022-01-01 RX ADMIN — OXYBUTYNIN 10 MG: 10 TABLET, FILM COATED, EXTENDED RELEASE ORAL at 21:17

## 2022-01-01 RX ADMIN — POTASSIUM CHLORIDE 40 MEQ: 1500 TABLET, EXTENDED RELEASE ORAL at 09:30

## 2022-01-01 RX ADMIN — DULOXETINE HYDROCHLORIDE 60 MG: 30 CAPSULE, DELAYED RELEASE PELLETS ORAL at 20:45

## 2022-01-01 RX ADMIN — PIPERACILLIN AND TAZOBACTAM 4.5 G: 4; .5 INJECTION, POWDER, FOR SOLUTION INTRAVENOUS at 22:37

## 2022-01-01 RX ADMIN — OXYBUTYNIN CHLORIDE 5 MG: 5 TABLET ORAL at 08:13

## 2022-01-01 RX ADMIN — POTASSIUM CHLORIDE 40 MEQ: 1500 TABLET, EXTENDED RELEASE ORAL at 08:51

## 2022-01-01 RX ADMIN — POTASSIUM CHLORIDE 40 MEQ: 20 SOLUTION ORAL at 12:29

## 2022-01-01 RX ADMIN — SODIUM CHLORIDE: 9 INJECTION, SOLUTION INTRAVENOUS at 23:17

## 2022-01-01 RX ADMIN — LIDOCAINE HYDROCHLORIDE: 20 JELLY TOPICAL at 09:15

## 2022-01-01 RX ADMIN — Medication 5 ML: at 16:37

## 2022-01-01 RX ADMIN — DULOXETINE HYDROCHLORIDE 60 MG: 30 CAPSULE, DELAYED RELEASE PELLETS ORAL at 21:00

## 2022-01-01 RX ADMIN — GUAIFENESIN 600 MG: 600 TABLET, EXTENDED RELEASE ORAL at 08:41

## 2022-01-01 RX ADMIN — OXYBUTYNIN 10 MG: 10 TABLET, FILM COATED, EXTENDED RELEASE ORAL at 20:57

## 2022-01-01 RX ADMIN — PIPERACILLIN AND TAZOBACTAM 2.25 G: 2; .25 INJECTION, POWDER, FOR SOLUTION INTRAVENOUS at 09:50

## 2022-01-01 RX ADMIN — REMDESIVIR 100 MG: 100 INJECTION, POWDER, LYOPHILIZED, FOR SOLUTION INTRAVENOUS at 16:20

## 2022-01-01 RX ADMIN — Medication 5 ML: at 21:07

## 2022-01-01 RX ADMIN — ZOLPIDEM TARTRATE 2.5 MG: 5 TABLET, FILM COATED ORAL at 21:16

## 2022-01-01 RX ADMIN — POTASSIUM CHLORIDE 10 MEQ: 7.46 INJECTION, SOLUTION INTRAVENOUS at 08:56

## 2022-01-01 RX ADMIN — DULOXETINE HYDROCHLORIDE 30 MG: 30 CAPSULE, DELAYED RELEASE ORAL at 08:36

## 2022-01-01 RX ADMIN — LEVETIRACETAM 500 MG: 500 TABLET, FILM COATED ORAL at 21:05

## 2022-01-01 RX ADMIN — HYDROXYZINE HYDROCHLORIDE 25 MG: 25 TABLET, FILM COATED ORAL at 20:38

## 2022-01-01 RX ADMIN — ACETAMINOPHEN 650 MG: 325 TABLET, FILM COATED ORAL at 08:25

## 2022-01-01 RX ADMIN — SODIUM CHLORIDE 50 ML: 9 INJECTION, SOLUTION INTRAVENOUS at 16:40

## 2022-01-01 RX ADMIN — ONDANSETRON 4 MG: 4 TABLET, ORALLY DISINTEGRATING ORAL at 17:03

## 2022-01-01 RX ADMIN — OXYBUTYNIN CHLORIDE 5 MG: 5 TABLET ORAL at 17:35

## 2022-01-01 RX ADMIN — PIPERACILLIN AND TAZOBACTAM 4.5 G: 4; .5 INJECTION, POWDER, FOR SOLUTION INTRAVENOUS at 22:10

## 2022-01-01 RX ADMIN — MULTIPLE VITAMINS W/ MINERALS TAB 1 TABLET: TAB at 11:08

## 2022-01-01 RX ADMIN — DULOXETINE HYDROCHLORIDE 60 MG: 30 CAPSULE, DELAYED RELEASE PELLETS ORAL at 20:35

## 2022-01-01 RX ADMIN — PANTOPRAZOLE SODIUM 40 MG: 40 TABLET, DELAYED RELEASE ORAL at 21:00

## 2022-01-01 RX ADMIN — OXYBUTYNIN CHLORIDE 5 MG: 5 TABLET ORAL at 08:16

## 2022-01-01 RX ADMIN — Medication 5 ML: at 11:09

## 2022-01-01 RX ADMIN — DULOXETINE HYDROCHLORIDE 60 MG: 30 CAPSULE, DELAYED RELEASE ORAL at 11:56

## 2022-01-01 RX ADMIN — Medication 5 ML: at 20:27

## 2022-01-01 RX ADMIN — DOCUSATE SODIUM 100 MG: 100 CAPSULE, LIQUID FILLED ORAL at 08:05

## 2022-01-01 RX ADMIN — ACETAMINOPHEN 650 MG: 325 TABLET, FILM COATED ORAL at 05:49

## 2022-01-01 RX ADMIN — HYDROMORPHONE HYDROCHLORIDE 2 MG: 2 TABLET ORAL at 14:31

## 2022-01-01 RX ADMIN — INSULIN ASPART 1 UNITS: 100 INJECTION, SOLUTION INTRAVENOUS; SUBCUTANEOUS at 18:40

## 2022-01-01 RX ADMIN — LIDOCAINE HYDROCHLORIDE 30 ML: 10 INJECTION, SOLUTION EPIDURAL; INFILTRATION; INTRACAUDAL; PERINEURAL at 09:26

## 2022-01-01 RX ADMIN — Medication 2 DROP: at 10:04

## 2022-01-01 RX ADMIN — PIPERACILLIN AND TAZOBACTAM 2.25 G: 2; .25 INJECTION, POWDER, FOR SOLUTION INTRAVENOUS at 20:05

## 2022-01-01 RX ADMIN — INSULIN ASPART 1 UNITS: 100 INJECTION, SOLUTION INTRAVENOUS; SUBCUTANEOUS at 17:46

## 2022-01-01 RX ADMIN — PIPERACILLIN AND TAZOBACTAM 2.25 G: 2; .25 INJECTION, POWDER, FOR SOLUTION INTRAVENOUS at 22:55

## 2022-01-01 RX ADMIN — LEVETIRACETAM 500 MG: 500 TABLET, FILM COATED ORAL at 20:16

## 2022-01-01 RX ADMIN — GUAIFENESIN 600 MG: 600 TABLET, EXTENDED RELEASE ORAL at 20:42

## 2022-01-01 RX ADMIN — Medication 5 ML: at 18:39

## 2022-01-01 RX ADMIN — OXYBUTYNIN CHLORIDE 5 MG: 5 TABLET ORAL at 08:40

## 2022-01-01 RX ADMIN — ZOLPIDEM TARTRATE 5 MG: 5 TABLET ORAL at 22:00

## 2022-01-01 RX ADMIN — DOCUSATE SODIUM 100 MG: 100 CAPSULE, LIQUID FILLED ORAL at 11:08

## 2022-01-01 RX ADMIN — SPIRONOLACTONE 12.5 MG: 25 TABLET ORAL at 09:13

## 2022-01-01 RX ADMIN — OXYBUTYNIN 10 MG: 10 TABLET, FILM COATED, EXTENDED RELEASE ORAL at 20:05

## 2022-01-01 RX ADMIN — LEVETIRACETAM 500 MG: 500 TABLET, FILM COATED ORAL at 22:24

## 2022-01-01 RX ADMIN — DULOXETINE HYDROCHLORIDE 60 MG: 30 CAPSULE, DELAYED RELEASE PELLETS ORAL at 21:17

## 2022-01-01 RX ADMIN — GUAIFENESIN 10 ML: 200 SOLUTION ORAL at 17:33

## 2022-01-01 RX ADMIN — ANORECTAL OINTMENT: 15.7; .44; 24; 20.6 OINTMENT TOPICAL at 09:29

## 2022-01-01 RX ADMIN — HEPARIN 6 ML: 100 SYRINGE at 14:59

## 2022-01-01 RX ADMIN — VANCOMYCIN HYDROCHLORIDE 1500 MG: 5 INJECTION, POWDER, LYOPHILIZED, FOR SOLUTION INTRAVENOUS at 22:22

## 2022-01-01 RX ADMIN — LEVETIRACETAM 500 MG: 500 TABLET, FILM COATED ORAL at 08:10

## 2022-01-01 RX ADMIN — HYDROMORPHONE HYDROCHLORIDE 0.5 MG: 1 INJECTION, SOLUTION INTRAMUSCULAR; INTRAVENOUS; SUBCUTANEOUS at 18:43

## 2022-01-01 RX ADMIN — FUROSEMIDE 40 MG: 40 TABLET ORAL at 10:57

## 2022-01-01 RX ADMIN — ACETAMINOPHEN 650 MG: 325 TABLET, FILM COATED ORAL at 20:54

## 2022-01-01 RX ADMIN — GUAIFENESIN 10 ML: 200 SOLUTION ORAL at 04:21

## 2022-01-01 RX ADMIN — INSULIN ASPART 1 UNITS: 100 INJECTION, SOLUTION INTRAVENOUS; SUBCUTANEOUS at 17:06

## 2022-01-01 RX ADMIN — GUAIFENESIN 600 MG: 600 TABLET, EXTENDED RELEASE ORAL at 20:16

## 2022-01-01 RX ADMIN — PIPERACILLIN AND TAZOBACTAM 2.25 G: 2; .25 INJECTION, POWDER, FOR SOLUTION INTRAVENOUS at 17:58

## 2022-01-01 RX ADMIN — VANCOMYCIN HYDROCHLORIDE 1250 MG: 10 INJECTION, POWDER, LYOPHILIZED, FOR SOLUTION INTRAVENOUS at 05:10

## 2022-01-01 RX ADMIN — PANTOPRAZOLE SODIUM 40 MG: 40 TABLET, DELAYED RELEASE ORAL at 21:42

## 2022-01-01 RX ADMIN — PANTOPRAZOLE SODIUM 40 MG: 40 TABLET, DELAYED RELEASE ORAL at 21:05

## 2022-01-01 RX ADMIN — LEVETIRACETAM 500 MG: 500 TABLET, FILM COATED ORAL at 20:28

## 2022-01-01 RX ADMIN — INSULIN ASPART 1 UNITS: 100 INJECTION, SOLUTION INTRAVENOUS; SUBCUTANEOUS at 12:14

## 2022-01-01 RX ADMIN — DULOXETINE HYDROCHLORIDE 60 MG: 30 CAPSULE, DELAYED RELEASE PELLETS ORAL at 21:04

## 2022-01-01 RX ADMIN — SODIUM CHLORIDE 1000 ML: 9 INJECTION, SOLUTION INTRAVENOUS at 14:18

## 2022-01-01 RX ADMIN — Medication 24.7 MCI.: at 11:31

## 2022-01-01 RX ADMIN — GUAIFENESIN 600 MG: 600 TABLET, EXTENDED RELEASE ORAL at 21:34

## 2022-01-01 RX ADMIN — METHADONE HYDROCHLORIDE 2.5 MG: 5 TABLET ORAL at 08:35

## 2022-01-01 RX ADMIN — OXYBUTYNIN 10 MG: 10 TABLET, FILM COATED, EXTENDED RELEASE ORAL at 20:23

## 2022-01-01 RX ADMIN — Medication 5 ML: at 17:42

## 2022-01-01 RX ADMIN — OXYBUTYNIN 10 MG: 10 TABLET, FILM COATED, EXTENDED RELEASE ORAL at 21:21

## 2022-01-01 RX ADMIN — OXYBUTYNIN CHLORIDE 5 MG: 5 TABLET ORAL at 09:26

## 2022-01-01 RX ADMIN — ANORECTAL OINTMENT: 15.7; .44; 24; 20.6 OINTMENT TOPICAL at 21:25

## 2022-01-01 RX ADMIN — EZETIMIBE 10 MG: 10 TABLET ORAL at 08:13

## 2022-01-01 RX ADMIN — POTASSIUM CHLORIDE 20 MEQ: 1500 TABLET, EXTENDED RELEASE ORAL at 09:08

## 2022-01-01 RX ADMIN — PIPERACILLIN AND TAZOBACTAM 4.5 G: 4; .5 INJECTION, POWDER, FOR SOLUTION INTRAVENOUS at 22:25

## 2022-01-01 RX ADMIN — METOPROLOL TARTRATE 25 MG: 25 TABLET, FILM COATED ORAL at 08:46

## 2022-01-01 RX ADMIN — LORAZEPAM 1 MG: 1 TABLET ORAL at 16:00

## 2022-01-01 RX ADMIN — FENTANYL CITRATE 50 MCG: 50 INJECTION, SOLUTION INTRAMUSCULAR; INTRAVENOUS at 09:12

## 2022-01-01 RX ADMIN — PANTOPRAZOLE SODIUM 40 MG: 40 TABLET, DELAYED RELEASE ORAL at 20:48

## 2022-01-01 RX ADMIN — GUAIFENESIN 600 MG: 600 TABLET, EXTENDED RELEASE ORAL at 21:04

## 2022-01-01 RX ADMIN — GUAIFENESIN 600 MG: 600 TABLET, EXTENDED RELEASE ORAL at 20:38

## 2022-01-01 RX ADMIN — INSULIN ASPART 1 UNITS: 100 INJECTION, SOLUTION INTRAVENOUS; SUBCUTANEOUS at 00:25

## 2022-01-01 RX ADMIN — LEVETIRACETAM 500 MG: 5 INJECTION INTRAVENOUS at 14:17

## 2022-01-01 RX ADMIN — ACETAMINOPHEN 650 MG: 325 TABLET, FILM COATED ORAL at 10:22

## 2022-01-01 RX ADMIN — LEVETIRACETAM 500 MG: 500 TABLET, FILM COATED ORAL at 21:35

## 2022-01-01 RX ADMIN — LEVETIRACETAM 500 MG: 100 SOLUTION ORAL at 21:12

## 2022-01-01 RX ADMIN — LEVETIRACETAM 500 MG: 500 TABLET, FILM COATED ORAL at 08:06

## 2022-01-01 RX ADMIN — MULTIPLE VITAMINS W/ MINERALS TAB 1 TABLET: TAB at 08:48

## 2022-01-01 RX ADMIN — ANORECTAL OINTMENT: 15.7; .44; 24; 20.6 OINTMENT TOPICAL at 20:51

## 2022-01-01 RX ADMIN — VALACYCLOVIR HYDROCHLORIDE 1000 MG: 1 TABLET, FILM COATED ORAL at 15:58

## 2022-01-01 RX ADMIN — DULOXETINE HYDROCHLORIDE 30 MG: 30 CAPSULE, DELAYED RELEASE ORAL at 08:48

## 2022-01-01 RX ADMIN — INSULIN ASPART 1 UNITS: 100 INJECTION, SOLUTION INTRAVENOUS; SUBCUTANEOUS at 13:11

## 2022-01-01 RX ADMIN — LEVETIRACETAM 500 MG: 500 TABLET, FILM COATED ORAL at 20:26

## 2022-01-01 RX ADMIN — LEVETIRACETAM 500 MG: 500 TABLET, FILM COATED ORAL at 08:48

## 2022-01-01 RX ADMIN — NYSTATIN: 100000 CREAM TOPICAL at 08:48

## 2022-01-01 RX ADMIN — ACETAMINOPHEN 650 MG: 325 TABLET, FILM COATED ORAL at 08:42

## 2022-01-01 RX ADMIN — OLANZAPINE 5 MG: 5 TABLET, FILM COATED ORAL at 20:38

## 2022-01-01 RX ADMIN — OXYBUTYNIN CHLORIDE 5 MG: 5 TABLET ORAL at 09:27

## 2022-01-01 RX ADMIN — HYDROMORPHONE HYDROCHLORIDE 4 MG: 4 TABLET ORAL at 09:22

## 2022-01-01 RX ADMIN — GUAIFENESIN 600 MG: 600 TABLET, EXTENDED RELEASE ORAL at 21:22

## 2022-01-01 RX ADMIN — OXYBUTYNIN 10 MG: 10 TABLET, FILM COATED, EXTENDED RELEASE ORAL at 21:05

## 2022-01-01 RX ADMIN — Medication 5 MG: at 02:11

## 2022-01-01 RX ADMIN — POTASSIUM CHLORIDE 20 MEQ: 29.8 INJECTION, SOLUTION INTRAVENOUS at 00:01

## 2022-01-01 RX ADMIN — DOCUSATE SODIUM 100 MG: 100 CAPSULE, LIQUID FILLED ORAL at 09:33

## 2022-01-01 RX ADMIN — DULOXETINE HYDROCHLORIDE 60 MG: 60 CAPSULE, DELAYED RELEASE ORAL at 20:19

## 2022-01-01 RX ADMIN — PIPERACILLIN AND TAZOBACTAM 2.25 G: 2; .25 INJECTION, POWDER, FOR SOLUTION INTRAVENOUS at 12:07

## 2022-01-01 RX ADMIN — HYDROMORPHONE HYDROCHLORIDE 2 MG: 2 TABLET ORAL at 20:16

## 2022-01-01 RX ADMIN — GUAIFENESIN 600 MG: 600 TABLET, EXTENDED RELEASE ORAL at 08:36

## 2022-01-01 RX ADMIN — OXYBUTYNIN CHLORIDE 5 MG: 5 TABLET ORAL at 12:29

## 2022-01-01 RX ADMIN — EZETIMIBE 10 MG: 10 TABLET ORAL at 08:32

## 2022-01-01 RX ADMIN — INSULIN ASPART 1 UNITS: 100 INJECTION, SOLUTION INTRAVENOUS; SUBCUTANEOUS at 17:24

## 2022-01-01 RX ADMIN — ZOLPIDEM TARTRATE 5 MG: 5 TABLET ORAL at 20:28

## 2022-01-01 RX ADMIN — DULOXETINE HYDROCHLORIDE 30 MG: 30 CAPSULE, DELAYED RELEASE ORAL at 09:04

## 2022-01-01 RX ADMIN — LORAZEPAM 1 MG: 2 LIQUID ORAL at 09:20

## 2022-01-01 RX ADMIN — ACETAMINOPHEN 650 MG: 325 TABLET, FILM COATED ORAL at 21:04

## 2022-01-01 RX ADMIN — MAGNESIUM OXIDE TAB 400 MG (241.3 MG ELEMENTAL MG) 400 MG: 400 (241.3 MG) TAB at 08:33

## 2022-01-01 RX ADMIN — PIPERACILLIN AND TAZOBACTAM 4.5 G: 4; .5 INJECTION, POWDER, FOR SOLUTION INTRAVENOUS at 22:28

## 2022-01-01 RX ADMIN — Medication 5 ML: at 06:34

## 2022-01-01 RX ADMIN — INSULIN ASPART 1 UNITS: 100 INJECTION, SOLUTION INTRAVENOUS; SUBCUTANEOUS at 05:02

## 2022-01-01 RX ADMIN — GUAIFENESIN 600 MG: 600 TABLET, EXTENDED RELEASE ORAL at 08:48

## 2022-01-01 RX ADMIN — PIPERACILLIN AND TAZOBACTAM 4.5 G: 4; .5 INJECTION, POWDER, FOR SOLUTION INTRAVENOUS at 16:28

## 2022-01-01 RX ADMIN — SODIUM CHLORIDE 50 ML: 9 INJECTION, SOLUTION INTRAVENOUS at 17:57

## 2022-01-01 RX ADMIN — VALACYCLOVIR HYDROCHLORIDE 1000 MG: 1 TABLET, FILM COATED ORAL at 08:45

## 2022-01-01 RX ADMIN — IBUPROFEN 200 MG: 200 TABLET, FILM COATED ORAL at 08:13

## 2022-01-01 RX ADMIN — Medication 5 ML: at 13:21

## 2022-01-01 RX ADMIN — NYSTATIN: 100000 CREAM TOPICAL at 08:52

## 2022-01-01 RX ADMIN — ANORECTAL OINTMENT: 15.7; .44; 24; 20.6 OINTMENT TOPICAL at 22:10

## 2022-01-01 RX ADMIN — Medication 5 ML: at 10:03

## 2022-01-01 RX ADMIN — OXYBUTYNIN CHLORIDE 5 MG: 5 TABLET ORAL at 09:42

## 2022-01-01 RX ADMIN — LEVETIRACETAM 500 MG: 5 INJECTION INTRAVENOUS at 03:38

## 2022-01-01 RX ADMIN — ZOLPIDEM TARTRATE 5 MG: 5 TABLET ORAL at 21:49

## 2022-01-01 RX ADMIN — LIDOCAINE HYDROCHLORIDE: 20 JELLY TOPICAL at 09:19

## 2022-01-01 RX ADMIN — PANTOPRAZOLE SODIUM 40 MG: 40 TABLET, DELAYED RELEASE ORAL at 21:35

## 2022-01-01 RX ADMIN — DULOXETINE HYDROCHLORIDE 30 MG: 30 CAPSULE, DELAYED RELEASE ORAL at 08:05

## 2022-01-01 RX ADMIN — INSULIN ASPART 1 UNITS: 100 INJECTION, SOLUTION INTRAVENOUS; SUBCUTANEOUS at 13:30

## 2022-01-01 RX ADMIN — METOPROLOL TARTRATE 12.5 MG: 25 TABLET, FILM COATED ORAL at 20:48

## 2022-01-01 RX ADMIN — ZOLPIDEM TARTRATE 2.5 MG: 5 TABLET, FILM COATED ORAL at 21:34

## 2022-01-01 RX ADMIN — EZETIMIBE 10 MG: 10 TABLET ORAL at 08:51

## 2022-01-01 RX ADMIN — HYDROMORPHONE HYDROCHLORIDE 4 MG: 4 TABLET ORAL at 20:53

## 2022-01-01 RX ADMIN — LEVETIRACETAM 500 MG: 500 TABLET, FILM COATED ORAL at 21:03

## 2022-01-01 RX ADMIN — HEPARIN SODIUM (PORCINE) LOCK FLUSH IV SOLN 100 UNIT/ML 500 UNITS: 100 SOLUTION at 09:34

## 2022-01-01 RX ADMIN — HYDROXYZINE HYDROCHLORIDE 25 MG: 25 TABLET, FILM COATED ORAL at 14:08

## 2022-01-01 RX ADMIN — OXYBUTYNIN 10 MG: 10 TABLET, FILM COATED, EXTENDED RELEASE ORAL at 21:00

## 2022-01-01 RX ADMIN — PANTOPRAZOLE SODIUM 40 MG: 40 TABLET, DELAYED RELEASE ORAL at 06:55

## 2022-01-01 RX ADMIN — METOPROLOL TARTRATE 5 MG: 5 INJECTION INTRAVENOUS at 17:47

## 2022-01-01 RX ADMIN — OXYBUTYNIN 10 MG: 10 TABLET, FILM COATED, EXTENDED RELEASE ORAL at 20:35

## 2022-01-01 RX ADMIN — ZOLPIDEM TARTRATE 2.5 MG: 5 TABLET, FILM COATED ORAL at 20:38

## 2022-01-01 RX ADMIN — PIPERACILLIN SODIUM AND TAZOBACTAM SODIUM 3.38 G: 3; .375 INJECTION, POWDER, LYOPHILIZED, FOR SOLUTION INTRAVENOUS at 10:45

## 2022-01-01 RX ADMIN — FUROSEMIDE 40 MG: 10 INJECTION, SOLUTION INTRAMUSCULAR; INTRAVENOUS at 09:28

## 2022-01-01 RX ADMIN — INSULIN ASPART 1 UNITS: 100 INJECTION, SOLUTION INTRAVENOUS; SUBCUTANEOUS at 01:02

## 2022-01-01 RX ADMIN — PIPERACILLIN AND TAZOBACTAM 4.5 G: 4; .5 INJECTION, POWDER, FOR SOLUTION INTRAVENOUS at 04:27

## 2022-01-01 RX ADMIN — DULOXETINE HYDROCHLORIDE 30 MG: 30 CAPSULE, DELAYED RELEASE ORAL at 10:22

## 2022-01-01 RX ADMIN — PHENYLEPHRINE HYDROCHLORIDE 100 MCG: 10 INJECTION INTRAVENOUS at 13:49

## 2022-01-01 RX ADMIN — METOPROLOL TARTRATE 25 MG: 25 TABLET, FILM COATED ORAL at 21:19

## 2022-01-01 RX ADMIN — PANTOPRAZOLE SODIUM 40 MG: 40 TABLET, DELAYED RELEASE ORAL at 21:04

## 2022-01-01 RX ADMIN — DULOXETINE HYDROCHLORIDE 30 MG: 30 CAPSULE, DELAYED RELEASE ORAL at 08:13

## 2022-01-01 RX ADMIN — INSULIN ASPART 1 UNITS: 100 INJECTION, SOLUTION INTRAVENOUS; SUBCUTANEOUS at 08:50

## 2022-01-01 RX ADMIN — PIPERACILLIN AND TAZOBACTAM 4.5 G: 4; .5 INJECTION, POWDER, FOR SOLUTION INTRAVENOUS at 21:59

## 2022-01-01 RX ADMIN — PIPERACILLIN SODIUM AND TAZOBACTAM SODIUM 3.38 G: 3; .375 INJECTION, POWDER, LYOPHILIZED, FOR SOLUTION INTRAVENOUS at 11:37

## 2022-01-01 RX ADMIN — ZOLPIDEM TARTRATE 5 MG: 5 TABLET ORAL at 20:36

## 2022-01-01 RX ADMIN — GUAIFENESIN 600 MG: 600 TABLET, EXTENDED RELEASE ORAL at 08:05

## 2022-01-01 RX ADMIN — OXYBUTYNIN CHLORIDE 5 MG: 5 TABLET ORAL at 12:13

## 2022-01-01 RX ADMIN — METOPROLOL TARTRATE 25 MG: 25 TABLET, FILM COATED ORAL at 21:00

## 2022-01-01 RX ADMIN — OXYBUTYNIN CHLORIDE 5 MG: 5 TABLET ORAL at 12:35

## 2022-01-01 RX ADMIN — OXYBUTYNIN CHLORIDE 5 MG: 5 TABLET ORAL at 11:28

## 2022-01-01 RX ADMIN — HYDROMORPHONE HYDROCHLORIDE 2 MG: 2 TABLET ORAL at 16:19

## 2022-01-01 RX ADMIN — OXYBUTYNIN 10 MG: 10 TABLET, FILM COATED, EXTENDED RELEASE ORAL at 20:17

## 2022-01-01 RX ADMIN — PANTOPRAZOLE SODIUM 40 MG: 40 TABLET, DELAYED RELEASE ORAL at 21:21

## 2022-01-01 RX ADMIN — PIPERACILLIN SODIUM AND TAZOBACTAM SODIUM 3.38 G: 3; .375 INJECTION, POWDER, LYOPHILIZED, FOR SOLUTION INTRAVENOUS at 17:50

## 2022-01-01 RX ADMIN — LEVETIRACETAM 500 MG: 500 TABLET, FILM COATED ORAL at 20:38

## 2022-01-01 RX ADMIN — MAGNESIUM OXIDE TAB 400 MG (241.3 MG ELEMENTAL MG) 400 MG: 400 (241.3 MG) TAB at 09:21

## 2022-01-01 RX ADMIN — METOPROLOL TARTRATE 25 MG: 25 TABLET, FILM COATED ORAL at 20:38

## 2022-01-01 RX ADMIN — OXYBUTYNIN CHLORIDE 5 MG: 5 TABLET ORAL at 12:43

## 2022-01-01 RX ADMIN — VALACYCLOVIR HYDROCHLORIDE 1000 MG: 1 TABLET, FILM COATED ORAL at 16:15

## 2022-01-01 RX ADMIN — GUAIFENESIN 600 MG: 600 TABLET, EXTENDED RELEASE ORAL at 20:49

## 2022-01-01 RX ADMIN — PANTOPRAZOLE SODIUM 40 MG: 40 TABLET, DELAYED RELEASE ORAL at 05:55

## 2022-01-01 RX ADMIN — Medication 5 ML: at 08:42

## 2022-01-01 RX ADMIN — FUROSEMIDE 40 MG: 10 INJECTION, SOLUTION INTRAMUSCULAR; INTRAVENOUS at 08:31

## 2022-01-01 RX ADMIN — HYDROMORPHONE HYDROCHLORIDE 2 MG: 2 TABLET ORAL at 17:22

## 2022-01-01 RX ADMIN — POTASSIUM CHLORIDE 10 MEQ: 7.46 INJECTION, SOLUTION INTRAVENOUS at 10:56

## 2022-01-01 RX ADMIN — HYDROMORPHONE HYDROCHLORIDE 2 MG: 2 TABLET ORAL at 13:30

## 2022-01-01 RX ADMIN — DOCUSATE SODIUM 100 MG: 100 CAPSULE, LIQUID FILLED ORAL at 08:48

## 2022-01-01 RX ADMIN — POTASSIUM CHLORIDE 10 MEQ: 7.46 INJECTION, SOLUTION INTRAVENOUS at 12:04

## 2022-01-01 RX ADMIN — HYDROMORPHONE HYDROCHLORIDE 2 MG: 2 TABLET ORAL at 23:58

## 2022-01-01 RX ADMIN — Medication 1 SPRAY: at 20:52

## 2022-01-01 RX ADMIN — OXYBUTYNIN CHLORIDE 5 MG: 5 TABLET ORAL at 16:32

## 2022-01-01 RX ADMIN — DULOXETINE HYDROCHLORIDE 60 MG: 60 CAPSULE, DELAYED RELEASE ORAL at 21:49

## 2022-01-01 RX ADMIN — MULTIPLE VITAMINS W/ MINERALS TAB 1 TABLET: TAB at 08:21

## 2022-01-01 RX ADMIN — POLYETHYLENE GLYCOL 3350 17 G: 17 POWDER, FOR SOLUTION ORAL at 18:10

## 2022-01-01 RX ADMIN — GUAIFENESIN 10 ML: 200 SOLUTION ORAL at 17:13

## 2022-01-01 RX ADMIN — PIPERACILLIN SODIUM AND TAZOBACTAM SODIUM 3.38 G: 3; .375 INJECTION, POWDER, LYOPHILIZED, FOR SOLUTION INTRAVENOUS at 09:44

## 2022-01-01 RX ADMIN — PIPERACILLIN AND TAZOBACTAM 4.5 G: 4; .5 INJECTION, POWDER, FOR SOLUTION INTRAVENOUS at 15:42

## 2022-01-01 RX ADMIN — OXYBUTYNIN CHLORIDE 5 MG: 5 TABLET ORAL at 17:06

## 2022-01-01 RX ADMIN — VALACYCLOVIR HYDROCHLORIDE 1000 MG: 1 TABLET, FILM COATED ORAL at 08:48

## 2022-01-01 RX ADMIN — ZOLPIDEM TARTRATE 2.5 MG: 5 TABLET, FILM COATED ORAL at 20:23

## 2022-01-01 RX ADMIN — VANCOMYCIN HYDROCHLORIDE 1000 MG: 1 INJECTION, SOLUTION INTRAVENOUS at 17:06

## 2022-01-01 RX ADMIN — POTASSIUM CHLORIDE 10 MEQ: 7.46 INJECTION, SOLUTION INTRAVENOUS at 14:52

## 2022-01-01 RX ADMIN — DEXMEDETOMIDINE HYDROCHLORIDE 20 MCG: 200 INJECTION INTRAVENOUS at 13:43

## 2022-01-01 RX ADMIN — GUAIFENESIN 600 MG: 600 TABLET, EXTENDED RELEASE ORAL at 20:06

## 2022-01-01 RX ADMIN — HYDROMORPHONE HYDROCHLORIDE 2 MG: 2 TABLET ORAL at 18:34

## 2022-01-01 RX ADMIN — METOPROLOL TARTRATE 12.5 MG: 25 TABLET, FILM COATED ORAL at 08:53

## 2022-01-01 RX ADMIN — PHENYLEPHRINE HYDROCHLORIDE 100 MCG: 10 INJECTION INTRAVENOUS at 13:53

## 2022-01-01 RX ADMIN — DOCUSATE SODIUM 50 MG AND SENNOSIDES 8.6 MG 1 TABLET: 8.6; 5 TABLET, FILM COATED ORAL at 08:51

## 2022-01-01 RX ADMIN — Medication 5 ML: at 14:42

## 2022-01-01 RX ADMIN — DULOXETINE HYDROCHLORIDE 30 MG: 30 CAPSULE, DELAYED RELEASE ORAL at 08:43

## 2022-01-01 RX ADMIN — OXYBUTYNIN CHLORIDE 5 MG: 5 TABLET ORAL at 10:22

## 2022-01-01 RX ADMIN — METOPROLOL TARTRATE 25 MG: 25 TABLET, FILM COATED ORAL at 20:35

## 2022-01-01 RX ADMIN — MIDAZOLAM HYDROCHLORIDE 0.5 MG: 1 INJECTION, SOLUTION INTRAMUSCULAR; INTRAVENOUS at 13:19

## 2022-01-01 RX ADMIN — DULOXETINE HYDROCHLORIDE 60 MG: 30 CAPSULE, DELAYED RELEASE PELLETS ORAL at 22:54

## 2022-01-01 RX ADMIN — PIPERACILLIN SODIUM AND TAZOBACTAM SODIUM 3.38 G: 3; .375 INJECTION, POWDER, LYOPHILIZED, FOR SOLUTION INTRAVENOUS at 02:01

## 2022-01-01 RX ADMIN — GUAIFENESIN 600 MG: 600 TABLET, EXTENDED RELEASE ORAL at 08:09

## 2022-01-01 RX ADMIN — OXYBUTYNIN CHLORIDE 5 MG: 5 TABLET ORAL at 12:00

## 2022-01-01 RX ADMIN — BUMETANIDE 2 MG: 1 TABLET ORAL at 15:00

## 2022-01-01 RX ADMIN — HYDROXYZINE HYDROCHLORIDE 25 MG: 25 TABLET, FILM COATED ORAL at 20:54

## 2022-01-01 RX ADMIN — LEVETIRACETAM 500 MG: 5 INJECTION INTRAVENOUS at 14:44

## 2022-01-01 RX ADMIN — DULOXETINE HYDROCHLORIDE 60 MG: 30 CAPSULE, DELAYED RELEASE PELLETS ORAL at 21:21

## 2022-01-01 RX ADMIN — MULTIPLE VITAMINS W/ MINERALS TAB 1 TABLET: TAB at 08:05

## 2022-01-01 RX ADMIN — LEVETIRACETAM 500 MG: 500 TABLET, FILM COATED ORAL at 09:27

## 2022-01-01 RX ADMIN — DULOXETINE HYDROCHLORIDE 30 MG: 30 CAPSULE, DELAYED RELEASE ORAL at 08:53

## 2022-01-01 RX ADMIN — ANORECTAL OINTMENT: 15.7; .44; 24; 20.6 OINTMENT TOPICAL at 21:05

## 2022-01-01 RX ADMIN — Medication 5 ML: at 11:07

## 2022-01-01 RX ADMIN — LEVETIRACETAM 500 MG: 500 TABLET, FILM COATED ORAL at 20:43

## 2022-01-01 RX ADMIN — DULOXETINE HYDROCHLORIDE 30 MG: 30 CAPSULE, DELAYED RELEASE ORAL at 08:41

## 2022-01-01 RX ADMIN — Medication 5 ML: at 14:54

## 2022-01-01 RX ADMIN — VANCOMYCIN HYDROCHLORIDE 1000 MG: 1 INJECTION, SOLUTION INTRAVENOUS at 03:23

## 2022-01-01 RX ADMIN — ONDANSETRON 4 MG: 2 INJECTION INTRAMUSCULAR; INTRAVENOUS at 04:08

## 2022-01-01 RX ADMIN — PROPOFOL 20 MG: 10 INJECTION, EMULSION INTRAVENOUS at 14:06

## 2022-01-01 RX ADMIN — ENOXAPARIN SODIUM 40 MG: 40 INJECTION SUBCUTANEOUS at 20:17

## 2022-01-01 RX ADMIN — BUMETANIDE 2 MG: 2 TABLET ORAL at 16:28

## 2022-01-01 RX ADMIN — Medication 5 ML: at 12:11

## 2022-01-01 RX ADMIN — ZOLPIDEM TARTRATE 2.5 MG: 5 TABLET, FILM COATED ORAL at 21:04

## 2022-01-01 RX ADMIN — HYDROMORPHONE HYDROCHLORIDE 2 MG: 1 SOLUTION ORAL at 21:29

## 2022-01-01 RX ADMIN — INSULIN ASPART 1 UNITS: 100 INJECTION, SOLUTION INTRAVENOUS; SUBCUTANEOUS at 21:09

## 2022-01-01 RX ADMIN — Medication 5 ML: at 10:26

## 2022-01-01 RX ADMIN — LEVETIRACETAM 500 MG: 100 SOLUTION ORAL at 21:56

## 2022-01-01 RX ADMIN — PANTOPRAZOLE SODIUM 40 MG: 40 TABLET, DELAYED RELEASE ORAL at 20:26

## 2022-01-01 RX ADMIN — LEVETIRACETAM 500 MG: 500 TABLET, FILM COATED ORAL at 23:18

## 2022-01-01 RX ADMIN — PIPERACILLIN AND TAZOBACTAM 4.5 G: 4; .5 INJECTION, POWDER, FOR SOLUTION INTRAVENOUS at 09:31

## 2022-01-01 RX ADMIN — BUMETANIDE 2 MG: 0.25 INJECTION, SOLUTION INTRAMUSCULAR; INTRAVENOUS at 08:53

## 2022-01-01 RX ADMIN — LIDOCAINE HYDROCHLORIDE 1 ML: 20 JELLY TOPICAL at 13:17

## 2022-01-01 RX ADMIN — ASPIRIN 81 MG: 81 TABLET, COATED ORAL at 08:13

## 2022-01-01 RX ADMIN — ZOLPIDEM TARTRATE 2.5 MG: 5 TABLET, FILM COATED ORAL at 20:07

## 2022-01-01 RX ADMIN — BUMETANIDE 2 MG: 0.25 INJECTION, SOLUTION INTRAMUSCULAR; INTRAVENOUS at 16:37

## 2022-01-01 RX ADMIN — DOCUSATE SODIUM 100 MG: 100 CAPSULE, LIQUID FILLED ORAL at 08:36

## 2022-01-01 RX ADMIN — DULOXETINE HYDROCHLORIDE 30 MG: 30 CAPSULE, DELAYED RELEASE ORAL at 08:21

## 2022-01-01 RX ADMIN — HYDROXYZINE HYDROCHLORIDE 25 MG: 25 TABLET, FILM COATED ORAL at 14:17

## 2022-01-01 RX ADMIN — PIPERACILLIN AND TAZOBACTAM 2.25 G: 2; .25 INJECTION, POWDER, FOR SOLUTION INTRAVENOUS at 03:46

## 2022-01-01 RX ADMIN — VALACYCLOVIR HYDROCHLORIDE 1000 MG: 1 TABLET, FILM COATED ORAL at 16:29

## 2022-01-01 RX ADMIN — IPRATROPIUM BROMIDE AND ALBUTEROL SULFATE 3 ML: .5; 3 SOLUTION RESPIRATORY (INHALATION) at 17:52

## 2022-01-01 RX ADMIN — Medication 5 ML: at 18:10

## 2022-01-01 RX ADMIN — DULOXETINE HYDROCHLORIDE 60 MG: 30 CAPSULE, DELAYED RELEASE PELLETS ORAL at 21:05

## 2022-01-01 RX ADMIN — Medication 5 ML: at 05:47

## 2022-01-01 RX ADMIN — LEVETIRACETAM 500 MG: 500 TABLET, FILM COATED ORAL at 20:37

## 2022-01-01 RX ADMIN — FUROSEMIDE 40 MG: 10 INJECTION, SOLUTION INTRAMUSCULAR; INTRAVENOUS at 16:01

## 2022-01-01 RX ADMIN — LEVETIRACETAM 500 MG: 500 TABLET, FILM COATED ORAL at 08:41

## 2022-01-01 RX ADMIN — LORAZEPAM 1 MG: 2 LIQUID ORAL at 10:31

## 2022-01-01 RX ADMIN — OXYBUTYNIN 10 MG: 10 TABLET, FILM COATED, EXTENDED RELEASE ORAL at 20:34

## 2022-01-01 RX ADMIN — Medication 1 SPRAY: at 15:50

## 2022-01-01 RX ADMIN — DULOXETINE HYDROCHLORIDE 60 MG: 30 CAPSULE, DELAYED RELEASE PELLETS ORAL at 20:07

## 2022-01-01 RX ADMIN — ACETAMINOPHEN 1300 MG: 325 TABLET ORAL at 10:43

## 2022-01-01 RX ADMIN — MIDAZOLAM HYDROCHLORIDE 1 MG: 1 INJECTION, SOLUTION INTRAMUSCULAR; INTRAVENOUS at 09:10

## 2022-01-01 RX ADMIN — HYDROXYZINE HYDROCHLORIDE 25 MG: 25 TABLET, FILM COATED ORAL at 09:30

## 2022-01-01 RX ADMIN — IBUPROFEN 200 MG: 200 TABLET, FILM COATED ORAL at 21:50

## 2022-01-01 RX ADMIN — OXYBUTYNIN CHLORIDE 5 MG: 5 TABLET ORAL at 16:37

## 2022-01-01 RX ADMIN — SODIUM CHLORIDE 50 ML: 9 INJECTION, SOLUTION INTRAVENOUS at 17:42

## 2022-01-01 RX ADMIN — WHITE PETROLATUM: 1.75 OINTMENT TOPICAL at 10:23

## 2022-01-01 RX ADMIN — Medication 5 ML: at 13:31

## 2022-01-01 RX ADMIN — INSULIN ASPART 1 UNITS: 100 INJECTION, SOLUTION INTRAVENOUS; SUBCUTANEOUS at 05:16

## 2022-01-01 RX ADMIN — ONDANSETRON 4 MG: 2 INJECTION INTRAMUSCULAR; INTRAVENOUS at 06:53

## 2022-01-01 RX ADMIN — PIPERACILLIN AND TAZOBACTAM 4.5 G: 4; .5 INJECTION, POWDER, FOR SOLUTION INTRAVENOUS at 09:38

## 2022-01-01 RX ADMIN — METOPROLOL TARTRATE 12.5 MG: 25 TABLET, FILM COATED ORAL at 08:36

## 2022-01-01 RX ADMIN — LEVETIRACETAM 500 MG: 100 SOLUTION ORAL at 11:56

## 2022-01-01 RX ADMIN — OXYBUTYNIN CHLORIDE 5 MG: 5 TABLET ORAL at 13:15

## 2022-01-01 RX ADMIN — ASPIRIN 81 MG: 81 TABLET, COATED ORAL at 18:07

## 2022-01-01 RX ADMIN — FUROSEMIDE 40 MG: 40 TABLET ORAL at 08:42

## 2022-01-01 RX ADMIN — LEVETIRACETAM 500 MG: 500 TABLET, FILM COATED ORAL at 20:17

## 2022-01-01 RX ADMIN — VALACYCLOVIR HYDROCHLORIDE 1000 MG: 1 TABLET, FILM COATED ORAL at 21:05

## 2022-01-01 RX ADMIN — ACETAMINOPHEN 650 MG: 325 TABLET, FILM COATED ORAL at 20:37

## 2022-01-01 RX ADMIN — PIPERACILLIN AND TAZOBACTAM 4.5 G: 4; .5 INJECTION, POWDER, FOR SOLUTION INTRAVENOUS at 04:30

## 2022-01-01 RX ADMIN — LEVETIRACETAM 500 MG: 100 SOLUTION ORAL at 08:51

## 2022-01-01 RX ADMIN — OXYBUTYNIN CHLORIDE 5 MG: 5 TABLET ORAL at 09:43

## 2022-01-01 RX ADMIN — NYSTATIN: 100000 CREAM TOPICAL at 08:20

## 2022-01-01 RX ADMIN — METOPROLOL TARTRATE 25 MG: 25 TABLET, FILM COATED ORAL at 08:48

## 2022-01-01 RX ADMIN — MULTIPLE VITAMINS W/ MINERALS TAB 1 TABLET: TAB at 08:09

## 2022-01-01 RX ADMIN — MAGNESIUM OXIDE TAB 400 MG (241.3 MG ELEMENTAL MG) 400 MG: 400 (241.3 MG) TAB at 13:36

## 2022-01-01 RX ADMIN — INSULIN ASPART 1 UNITS: 100 INJECTION, SOLUTION INTRAVENOUS; SUBCUTANEOUS at 17:42

## 2022-01-01 RX ADMIN — WATER 2 ML: 1 INJECTION INTRAMUSCULAR; INTRAVENOUS; SUBCUTANEOUS at 13:27

## 2022-01-01 RX ADMIN — DULOXETINE HYDROCHLORIDE 60 MG: 30 CAPSULE, DELAYED RELEASE PELLETS ORAL at 20:23

## 2022-01-01 RX ADMIN — LEVETIRACETAM 500 MG: 500 TABLET, FILM COATED ORAL at 09:42

## 2022-01-01 RX ADMIN — DULOXETINE HYDROCHLORIDE 60 MG: 30 CAPSULE, DELAYED RELEASE PELLETS ORAL at 20:17

## 2022-01-01 RX ADMIN — FUROSEMIDE 40 MG: 10 INJECTION, SOLUTION INTRAMUSCULAR; INTRAVENOUS at 12:19

## 2022-01-01 RX ADMIN — DULOXETINE HYDROCHLORIDE 30 MG: 30 CAPSULE, DELAYED RELEASE ORAL at 08:45

## 2022-01-01 RX ADMIN — METOPROLOL TARTRATE 12.5 MG: 25 TABLET, FILM COATED ORAL at 20:16

## 2022-01-01 RX ADMIN — BUMETANIDE 2 MG: 0.25 INJECTION, SOLUTION INTRAMUSCULAR; INTRAVENOUS at 09:16

## 2022-01-01 RX ADMIN — GADOBUTROL 6 ML: 604.72 INJECTION INTRAVENOUS at 19:12

## 2022-01-01 RX ADMIN — HYDROXYZINE HYDROCHLORIDE 25 MG: 25 TABLET, FILM COATED ORAL at 20:17

## 2022-01-01 RX ADMIN — INSULIN ASPART 1 UNITS: 100 INJECTION, SOLUTION INTRAVENOUS; SUBCUTANEOUS at 21:00

## 2022-01-01 RX ADMIN — SODIUM CHLORIDE: 9 INJECTION, SOLUTION INTRAVENOUS at 17:06

## 2022-01-01 RX ADMIN — INSULIN ASPART 1 UNITS: 100 INJECTION, SOLUTION INTRAVENOUS; SUBCUTANEOUS at 16:36

## 2022-01-01 RX ADMIN — POTASSIUM CHLORIDE 20 MEQ: 29.8 INJECTION, SOLUTION INTRAVENOUS at 01:17

## 2022-01-01 RX ADMIN — LEVETIRACETAM 500 MG: 100 SOLUTION ORAL at 20:58

## 2022-01-01 RX ADMIN — ASPIRIN 81 MG: 81 TABLET, COATED ORAL at 08:12

## 2022-01-01 RX ADMIN — Medication 5 ML: at 01:21

## 2022-01-01 RX ADMIN — FENTANYL CITRATE 50 MCG: 50 INJECTION, SOLUTION INTRAMUSCULAR; INTRAVENOUS at 14:33

## 2022-01-01 RX ADMIN — REMDESIVIR 200 MG: 100 INJECTION, POWDER, LYOPHILIZED, FOR SOLUTION INTRAVENOUS at 16:49

## 2022-01-01 RX ADMIN — PANTOPRAZOLE SODIUM 40 MG: 40 TABLET, DELAYED RELEASE ORAL at 22:24

## 2022-01-01 RX ADMIN — METOPROLOL SUCCINATE 25 MG: 25 TABLET, EXTENDED RELEASE ORAL at 14:16

## 2022-01-01 RX ADMIN — ZOLPIDEM TARTRATE 2.5 MG: 5 TABLET, FILM COATED ORAL at 21:05

## 2022-01-01 RX ADMIN — PIPERACILLIN SODIUM AND TAZOBACTAM SODIUM 3.38 G: 3; .375 INJECTION, POWDER, LYOPHILIZED, FOR SOLUTION INTRAVENOUS at 09:34

## 2022-01-01 RX ADMIN — Medication 5 ML: at 22:46

## 2022-01-01 RX ADMIN — PIPERACILLIN AND TAZOBACTAM 3.38 G: 3; .375 INJECTION, POWDER, FOR SOLUTION INTRAVENOUS at 12:10

## 2022-01-01 RX ADMIN — GUAIFENESIN 10 ML: 200 SOLUTION ORAL at 16:41

## 2022-01-01 RX ADMIN — Medication 5 ML: at 08:09

## 2022-01-01 RX ADMIN — ZOLPIDEM TARTRATE 2.5 MG: 5 TABLET, FILM COATED ORAL at 20:49

## 2022-01-01 RX ADMIN — IOPAMIDOL 100 ML: 755 INJECTION, SOLUTION INTRAVENOUS at 18:31

## 2022-01-01 RX ADMIN — HYDROMORPHONE HYDROCHLORIDE 1 MG: 2 TABLET ORAL at 21:55

## 2022-01-01 RX ADMIN — IBUPROFEN 200 MG: 200 TABLET, FILM COATED ORAL at 09:30

## 2022-01-01 RX ADMIN — DEXTROSE MONOHYDRATE 300 ML: 100 INJECTION, SOLUTION INTRAVENOUS at 20:40

## 2022-01-01 RX ADMIN — SPIRONOLACTONE 12.5 MG: 25 TABLET ORAL at 09:31

## 2022-01-01 RX ADMIN — DULOXETINE HYDROCHLORIDE 60 MG: 30 CAPSULE, DELAYED RELEASE PELLETS ORAL at 20:28

## 2022-01-01 RX ADMIN — LEVETIRACETAM 500 MG: 500 TABLET, FILM COATED ORAL at 09:37

## 2022-01-01 RX ADMIN — OXYBUTYNIN 10 MG: 10 TABLET, FILM COATED, EXTENDED RELEASE ORAL at 20:42

## 2022-01-01 RX ADMIN — Medication 5 ML: at 14:00

## 2022-01-01 RX ADMIN — DULOXETINE HYDROCHLORIDE 60 MG: 30 CAPSULE, DELAYED RELEASE PELLETS ORAL at 20:25

## 2022-01-01 RX ADMIN — LEVETIRACETAM 500 MG: 500 TABLET, FILM COATED ORAL at 08:55

## 2022-01-01 RX ADMIN — PANTOPRAZOLE SODIUM 40 MG: 40 TABLET, DELAYED RELEASE ORAL at 20:16

## 2022-01-01 RX ADMIN — EZETIMIBE 10 MG: 10 TABLET ORAL at 10:23

## 2022-01-01 RX ADMIN — VANCOMYCIN HYDROCHLORIDE 750 MG: 1 INJECTION, POWDER, LYOPHILIZED, FOR SOLUTION INTRAVENOUS at 08:03

## 2022-01-01 RX ADMIN — ANORECTAL OINTMENT: 15.7; .44; 24; 20.6 OINTMENT TOPICAL at 11:02

## 2022-01-01 RX ADMIN — LEVETIRACETAM 500 MG: 500 TABLET, FILM COATED ORAL at 21:22

## 2022-01-01 RX ADMIN — OXYBUTYNIN 10 MG: 10 TABLET, FILM COATED, EXTENDED RELEASE ORAL at 21:35

## 2022-01-01 RX ADMIN — NYSTATIN: 100000 CREAM TOPICAL at 20:44

## 2022-01-01 RX ADMIN — DULOXETINE HYDROCHLORIDE 30 MG: 30 CAPSULE, DELAYED RELEASE ORAL at 08:14

## 2022-01-01 RX ADMIN — DEXAMETHASONE SODIUM PHOSPHATE 6 MG: 10 INJECTION, SOLUTION INTRAMUSCULAR; INTRAVENOUS at 19:09

## 2022-01-01 RX ADMIN — PROPOFOL 20 MG: 10 INJECTION, EMULSION INTRAVENOUS at 14:13

## 2022-01-01 RX ADMIN — INSULIN ASPART 1 UNITS: 100 INJECTION, SOLUTION INTRAVENOUS; SUBCUTANEOUS at 05:39

## 2022-01-01 RX ADMIN — PANTOPRAZOLE SODIUM 40 MG: 40 TABLET, DELAYED RELEASE ORAL at 22:54

## 2022-01-01 RX ADMIN — METOPROLOL TARTRATE 12.5 MG: 25 TABLET, FILM COATED ORAL at 20:49

## 2022-01-01 RX ADMIN — OXYBUTYNIN CHLORIDE 5 MG: 5 TABLET ORAL at 09:30

## 2022-01-01 RX ADMIN — SODIUM CHLORIDE 1000 ML: 9 INJECTION, SOLUTION INTRAVENOUS at 10:45

## 2022-01-01 RX ADMIN — PIPERACILLIN AND TAZOBACTAM 4.5 G: 4; .5 INJECTION, POWDER, FOR SOLUTION INTRAVENOUS at 20:48

## 2022-01-01 RX ADMIN — INSULIN ASPART 1 UNITS: 100 INJECTION, SOLUTION INTRAVENOUS; SUBCUTANEOUS at 08:12

## 2022-01-01 RX ADMIN — OXYBUTYNIN 10 MG: 10 TABLET, FILM COATED, EXTENDED RELEASE ORAL at 20:46

## 2022-01-01 RX ADMIN — WHITE PETROLATUM: 1.75 OINTMENT TOPICAL at 13:17

## 2022-01-01 RX ADMIN — ACETAMINOPHEN 650 MG: 325 TABLET, FILM COATED ORAL at 20:05

## 2022-01-01 RX ADMIN — INSULIN ASPART 3 UNITS: 100 INJECTION, SOLUTION INTRAVENOUS; SUBCUTANEOUS at 08:39

## 2022-01-01 RX ADMIN — OLANZAPINE 5 MG: 5 TABLET, FILM COATED ORAL at 20:57

## 2022-01-01 RX ADMIN — NYSTATIN: 100000 CREAM TOPICAL at 08:13

## 2022-01-01 RX ADMIN — ASPIRIN 81 MG: 81 TABLET, COATED ORAL at 08:31

## 2022-01-01 RX ADMIN — OLANZAPINE 5 MG: 5 TABLET, FILM COATED ORAL at 21:51

## 2022-01-01 RX ADMIN — HYDROMORPHONE HYDROCHLORIDE 2 MG: 1 SOLUTION ORAL at 10:01

## 2022-01-01 RX ADMIN — SPIRONOLACTONE 12.5 MG: 25 TABLET ORAL at 08:42

## 2022-01-01 RX ADMIN — Medication 5 ML: at 06:02

## 2022-01-01 RX ADMIN — POLYETHYLENE GLYCOL 3350 17 G: 17 POWDER, FOR SOLUTION ORAL at 09:43

## 2022-01-01 RX ADMIN — ANORECTAL OINTMENT: 15.7; .44; 24; 20.6 OINTMENT TOPICAL at 22:29

## 2022-01-01 RX ADMIN — Medication 5 ML: at 07:04

## 2022-01-01 RX ADMIN — OXYBUTYNIN CHLORIDE 5 MG: 5 TABLET ORAL at 09:28

## 2022-01-01 RX ADMIN — OXYBUTYNIN 10 MG: 10 TABLET, FILM COATED, EXTENDED RELEASE ORAL at 20:16

## 2022-01-01 RX ADMIN — PIPERACILLIN AND TAZOBACTAM 4.5 G: 4; .5 INJECTION, POWDER, FOR SOLUTION INTRAVENOUS at 04:40

## 2022-01-01 RX ADMIN — ACETAMINOPHEN 650 MG: 325 TABLET, FILM COATED ORAL at 13:27

## 2022-01-01 RX ADMIN — ACETAMINOPHEN 650 MG: 325 TABLET, FILM COATED ORAL at 10:42

## 2022-01-01 RX ADMIN — OXYBUTYNIN CHLORIDE 5 MG: 5 TABLET ORAL at 08:36

## 2022-01-01 RX ADMIN — FUROSEMIDE 40 MG: 10 INJECTION, SOLUTION INTRAMUSCULAR; INTRAVENOUS at 07:56

## 2022-01-01 RX ADMIN — ZOLPIDEM TARTRATE 2.5 MG: 5 TABLET, FILM COATED ORAL at 21:21

## 2022-01-01 RX ADMIN — HYDROMORPHONE HYDROCHLORIDE 4 MG: 4 TABLET ORAL at 13:29

## 2022-01-01 RX ADMIN — SIMVASTATIN 20 MG: 10 TABLET, FILM COATED ORAL at 20:42

## 2022-01-01 RX ADMIN — DOCUSATE SODIUM 100 MG: 100 CAPSULE, LIQUID FILLED ORAL at 08:16

## 2022-01-01 RX ADMIN — HYDROXYZINE HYDROCHLORIDE 25 MG: 25 TABLET, FILM COATED ORAL at 14:04

## 2022-01-01 RX ADMIN — LIDOCAINE HYDROCHLORIDE 1 ML: 20 JELLY TOPICAL at 09:06

## 2022-01-01 RX ADMIN — GUAIFENESIN 600 MG: 600 TABLET, EXTENDED RELEASE ORAL at 20:48

## 2022-01-01 RX ADMIN — HYDROXYZINE HYDROCHLORIDE 25 MG: 25 TABLET, FILM COATED ORAL at 08:14

## 2022-01-01 RX ADMIN — OXYBUTYNIN CHLORIDE 5 MG: 5 TABLET ORAL at 08:33

## 2022-01-01 RX ADMIN — INSULIN ASPART: 100 INJECTION, SOLUTION INTRAVENOUS; SUBCUTANEOUS at 09:41

## 2022-01-01 RX ADMIN — HYDROXYZINE HYDROCHLORIDE 25 MG: 25 TABLET, FILM COATED ORAL at 13:29

## 2022-01-01 RX ADMIN — POTASSIUM CHLORIDE 40 MEQ: 1500 TABLET, EXTENDED RELEASE ORAL at 08:16

## 2022-01-01 RX ADMIN — HYDROXYZINE HYDROCHLORIDE 25 MG: 25 TABLET, FILM COATED ORAL at 20:41

## 2022-01-01 RX ADMIN — ZOLPIDEM TARTRATE 5 MG: 5 TABLET ORAL at 20:38

## 2022-01-01 RX ADMIN — VALACYCLOVIR HYDROCHLORIDE 1000 MG: 1 TABLET, FILM COATED ORAL at 21:17

## 2022-01-01 RX ADMIN — Medication 2 G: at 13:43

## 2022-01-01 RX ADMIN — GUAIFENESIN 600 MG: 600 TABLET, EXTENDED RELEASE ORAL at 08:21

## 2022-01-01 RX ADMIN — Medication 5 ML: at 10:48

## 2022-01-01 RX ADMIN — INSULIN ASPART 1 UNITS: 100 INJECTION, SOLUTION INTRAVENOUS; SUBCUTANEOUS at 00:23

## 2022-01-01 RX ADMIN — OXYBUTYNIN CHLORIDE 5 MG: 5 TABLET ORAL at 10:24

## 2022-01-01 RX ADMIN — ZOLPIDEM TARTRATE 5 MG: 5 TABLET ORAL at 21:05

## 2022-01-01 RX ADMIN — VANCOMYCIN HYDROCHLORIDE 750 MG: 1 INJECTION, POWDER, LYOPHILIZED, FOR SOLUTION INTRAVENOUS at 20:39

## 2022-01-01 RX ADMIN — ANORECTAL OINTMENT: 15.7; .44; 24; 20.6 OINTMENT TOPICAL at 08:54

## 2022-01-01 RX ADMIN — GUAIFENESIN 600 MG: 600 TABLET, EXTENDED RELEASE ORAL at 08:55

## 2022-01-01 RX ADMIN — Medication 5 ML: at 17:53

## 2022-01-01 RX ADMIN — OXYBUTYNIN CHLORIDE 5 MG: 5 TABLET ORAL at 13:27

## 2022-01-01 RX ADMIN — ACETAMINOPHEN 650 MG: 325 TABLET, FILM COATED ORAL at 02:46

## 2022-01-01 RX ADMIN — OXYBUTYNIN CHLORIDE 5 MG: 5 TABLET ORAL at 09:31

## 2022-01-01 RX ADMIN — IBUPROFEN 200 MG: 200 TABLET, FILM COATED ORAL at 20:18

## 2022-01-01 RX ADMIN — HYDROMORPHONE HYDROCHLORIDE 1 MG: 2 TABLET ORAL at 15:16

## 2022-01-01 RX ADMIN — IBUPROFEN 200 MG: 200 TABLET, FILM COATED ORAL at 08:51

## 2022-01-01 RX ADMIN — GUAIFENESIN 600 MG: 600 TABLET, EXTENDED RELEASE ORAL at 10:57

## 2022-01-01 RX ADMIN — DULOXETINE HYDROCHLORIDE 30 MG: 30 CAPSULE, DELAYED RELEASE ORAL at 08:07

## 2022-01-01 RX ADMIN — PHENYLEPHRINE HYDROCHLORIDE 100 MCG: 10 INJECTION INTRAVENOUS at 14:17

## 2022-01-01 RX ADMIN — OXYBUTYNIN CHLORIDE 5 MG: 5 TABLET ORAL at 09:13

## 2022-01-01 RX ADMIN — HYDROMORPHONE HYDROCHLORIDE 4 MG: 4 TABLET ORAL at 00:24

## 2022-01-01 RX ADMIN — HYDROMORPHONE HYDROCHLORIDE 4 MG: 4 TABLET ORAL at 20:38

## 2022-01-01 RX ADMIN — NYSTATIN: 100000 CREAM TOPICAL at 21:30

## 2022-01-01 RX ADMIN — INSULIN ASPART 1 UNITS: 100 INJECTION, SOLUTION INTRAVENOUS; SUBCUTANEOUS at 12:31

## 2022-01-01 RX ADMIN — PIPERACILLIN AND TAZOBACTAM 4.5 G: 4; .5 INJECTION, POWDER, FOR SOLUTION INTRAVENOUS at 10:05

## 2022-01-01 RX ADMIN — GUAIFENESIN 600 MG: 600 TABLET, EXTENDED RELEASE ORAL at 21:42

## 2022-01-01 RX ADMIN — DEXTROSE MONOHYDRATE 50 ML: 25 INJECTION, SOLUTION INTRAVENOUS at 00:07

## 2022-01-01 RX ADMIN — BUMETANIDE 2 MG: 2 TABLET ORAL at 13:14

## 2022-01-01 RX ADMIN — OXYBUTYNIN CHLORIDE 5 MG: 5 TABLET ORAL at 09:04

## 2022-01-01 RX ADMIN — OLANZAPINE 5 MG: 5 TABLET, FILM COATED ORAL at 20:54

## 2022-01-01 RX ADMIN — PIPERACILLIN SODIUM AND TAZOBACTAM SODIUM 3.38 G: 3; .375 INJECTION, POWDER, LYOPHILIZED, FOR SOLUTION INTRAVENOUS at 20:26

## 2022-01-01 RX ADMIN — FUROSEMIDE 40 MG: 10 INJECTION, SOLUTION INTRAMUSCULAR; INTRAVENOUS at 20:43

## 2022-01-01 RX ADMIN — ACETAMINOPHEN 650 MG: 325 TABLET, FILM COATED ORAL at 08:13

## 2022-01-01 RX ADMIN — OXYBUTYNIN 10 MG: 10 TABLET, FILM COATED, EXTENDED RELEASE ORAL at 21:04

## 2022-01-01 RX ADMIN — MULTIPLE VITAMINS W/ MINERALS TAB 1 TABLET: TAB at 08:16

## 2022-01-01 RX ADMIN — OXYBUTYNIN 10 MG: 10 TABLET, FILM COATED, EXTENDED RELEASE ORAL at 20:28

## 2022-01-01 RX ADMIN — DOCUSATE SODIUM 50 MG AND SENNOSIDES 8.6 MG 1 TABLET: 8.6; 5 TABLET, FILM COATED ORAL at 08:13

## 2022-01-01 RX ADMIN — EZETIMIBE 10 MG: 10 TABLET ORAL at 09:30

## 2022-01-01 RX ADMIN — Medication 5 ML: at 09:31

## 2022-01-01 RX ADMIN — DEXTROSE MONOHYDRATE 25 G: 25 INJECTION, SOLUTION INTRAVENOUS at 20:24

## 2022-01-01 RX ADMIN — GUAIFENESIN 10 ML: 200 SOLUTION ORAL at 18:45

## 2022-01-01 RX ADMIN — OXYBUTYNIN CHLORIDE 5 MG: 5 TABLET ORAL at 08:48

## 2022-01-01 RX ADMIN — HYDROXYZINE HYDROCHLORIDE 25 MG: 25 TABLET, FILM COATED ORAL at 13:36

## 2022-01-01 RX ADMIN — DULOXETINE HYDROCHLORIDE 30 MG: 30 CAPSULE, DELAYED RELEASE ORAL at 09:31

## 2022-01-01 RX ADMIN — OXYBUTYNIN CHLORIDE 5 MG: 5 TABLET ORAL at 16:28

## 2022-01-01 RX ADMIN — LEVETIRACETAM 500 MG: 500 TABLET, FILM COATED ORAL at 08:09

## 2022-01-01 RX ADMIN — POTASSIUM CHLORIDE 40 MEQ: 1500 TABLET, EXTENDED RELEASE ORAL at 12:09

## 2022-01-01 RX ADMIN — OXYBUTYNIN CHLORIDE 5 MG: 5 TABLET ORAL at 10:23

## 2022-01-01 RX ADMIN — SIMVASTATIN 20 MG: 10 TABLET, FILM COATED ORAL at 20:18

## 2022-01-01 RX ADMIN — VALACYCLOVIR HYDROCHLORIDE 1000 MG: 1 TABLET, FILM COATED ORAL at 21:09

## 2022-01-01 RX ADMIN — INSULIN ASPART 1 UNITS: 100 INJECTION, SOLUTION INTRAVENOUS; SUBCUTANEOUS at 20:42

## 2022-01-01 RX ADMIN — SPIRONOLACTONE 12.5 MG: 25 TABLET ORAL at 09:27

## 2022-01-01 RX ADMIN — MAGNESIUM OXIDE TAB 400 MG (241.3 MG ELEMENTAL MG) 400 MG: 400 (241.3 MG) TAB at 16:04

## 2022-01-01 RX ADMIN — ASPIRIN 81 MG: 81 TABLET, COATED ORAL at 08:14

## 2022-01-01 RX ADMIN — HYDROMORPHONE HYDROCHLORIDE 4 MG: 4 TABLET ORAL at 14:10

## 2022-01-01 RX ADMIN — IBUPROFEN 200 MG: 200 TABLET, FILM COATED ORAL at 20:38

## 2022-01-01 RX ADMIN — ANORECTAL OINTMENT: 15.7; .44; 24; 20.6 OINTMENT TOPICAL at 11:00

## 2022-01-01 RX ADMIN — Medication 5 ML: at 05:56

## 2022-01-01 RX ADMIN — LEVETIRACETAM 1000 MG: 100 INJECTION, SOLUTION INTRAVENOUS at 14:53

## 2022-01-01 RX ADMIN — INSULIN ASPART 1 UNITS: 100 INJECTION, SOLUTION INTRAVENOUS; SUBCUTANEOUS at 05:11

## 2022-01-01 RX ADMIN — HYDROMORPHONE HYDROCHLORIDE 4 MG: 4 TABLET ORAL at 10:23

## 2022-01-01 RX ADMIN — HYDROMORPHONE HYDROCHLORIDE 2 MG: 2 TABLET ORAL at 16:16

## 2022-01-01 RX ADMIN — Medication 5 ML: at 17:12

## 2022-01-01 RX ADMIN — DOCUSATE SODIUM 100 MG: 100 CAPSULE, LIQUID FILLED ORAL at 08:45

## 2022-01-01 RX ADMIN — VALACYCLOVIR HYDROCHLORIDE 1000 MG: 1 TABLET, FILM COATED ORAL at 15:52

## 2022-01-01 RX ADMIN — DULOXETINE HYDROCHLORIDE 30 MG: 30 CAPSULE, DELAYED RELEASE ORAL at 08:34

## 2022-01-01 RX ADMIN — LORAZEPAM 1 MG: 2 LIQUID ORAL at 17:21

## 2022-01-01 RX ADMIN — LEVETIRACETAM 500 MG: 500 TABLET, FILM COATED ORAL at 08:21

## 2022-01-01 RX ADMIN — OXYBUTYNIN CHLORIDE 5 MG: 5 TABLET ORAL at 08:42

## 2022-01-01 RX ADMIN — GUAIFENESIN 600 MG: 600 TABLET, EXTENDED RELEASE ORAL at 09:42

## 2022-01-01 RX ADMIN — ZOLPIDEM TARTRATE 2.5 MG: 5 TABLET, FILM COATED ORAL at 20:06

## 2022-01-01 RX ADMIN — ZOLPIDEM TARTRATE 2.5 MG: 5 TABLET, FILM COATED ORAL at 22:23

## 2022-01-01 RX ADMIN — SODIUM CHLORIDE 10 UNITS: 9 INJECTION, SOLUTION INTRAVENOUS at 12:28

## 2022-01-01 RX ADMIN — SIMVASTATIN 20 MG: 10 TABLET, FILM COATED ORAL at 21:49

## 2022-01-01 RX ADMIN — POTASSIUM CHLORIDE 40 MEQ: 1500 TABLET, EXTENDED RELEASE ORAL at 09:23

## 2022-01-01 RX ADMIN — DULOXETINE HYDROCHLORIDE 60 MG: 30 CAPSULE, DELAYED RELEASE PELLETS ORAL at 22:24

## 2022-01-01 RX ADMIN — IBUPROFEN 200 MG: 200 TABLET, FILM COATED ORAL at 14:47

## 2022-01-01 RX ADMIN — OXYBUTYNIN CHLORIDE 5 MG: 5 TABLET ORAL at 08:47

## 2022-01-01 RX ADMIN — Medication 5 ML: at 09:48

## 2022-01-01 RX ADMIN — PIPERACILLIN AND TAZOBACTAM 4.5 G: 4; .5 INJECTION, POWDER, FOR SOLUTION INTRAVENOUS at 04:05

## 2022-01-01 RX ADMIN — HYDROMORPHONE HYDROCHLORIDE 4 MG: 4 TABLET ORAL at 20:42

## 2022-01-01 RX ADMIN — GUAIFENESIN 600 MG: 600 TABLET, EXTENDED RELEASE ORAL at 08:45

## 2022-01-01 RX ADMIN — HYDROMORPHONE HYDROCHLORIDE 2 MG: 2 TABLET ORAL at 15:52

## 2022-01-01 RX ADMIN — GUAIFENESIN 10 ML: 200 SOLUTION ORAL at 11:01

## 2022-01-01 RX ADMIN — FENTANYL CITRATE 50 MCG: 50 INJECTION, SOLUTION INTRAMUSCULAR; INTRAVENOUS at 09:25

## 2022-01-01 RX ADMIN — PIPERACILLIN AND TAZOBACTAM 4.5 G: 4; .5 INJECTION, POWDER, FOR SOLUTION INTRAVENOUS at 09:25

## 2022-01-01 RX ADMIN — DULOXETINE HYDROCHLORIDE 60 MG: 30 CAPSULE, DELAYED RELEASE PELLETS ORAL at 20:05

## 2022-01-01 RX ADMIN — MAGNESIUM SULFATE HEPTAHYDRATE 4 G: 80 INJECTION, SOLUTION INTRAVENOUS at 15:57

## 2022-01-01 RX ADMIN — GUAIFENESIN 10 ML: 200 SOLUTION ORAL at 09:42

## 2022-01-01 RX ADMIN — OXYBUTYNIN 10 MG: 10 TABLET, FILM COATED, EXTENDED RELEASE ORAL at 21:51

## 2022-01-01 RX ADMIN — MAGNESIUM OXIDE TAB 400 MG (241.3 MG ELEMENTAL MG) 400 MG: 400 (241.3 MG) TAB at 20:57

## 2022-01-01 RX ADMIN — DULOXETINE HYDROCHLORIDE 60 MG: 30 CAPSULE, DELAYED RELEASE PELLETS ORAL at 20:47

## 2022-01-01 RX ADMIN — ENOXAPARIN SODIUM 40 MG: 40 INJECTION SUBCUTANEOUS at 20:39

## 2022-01-01 RX ADMIN — PANTOPRAZOLE SODIUM 40 MG: 40 TABLET, DELAYED RELEASE ORAL at 20:17

## 2022-01-01 RX ADMIN — VALACYCLOVIR HYDROCHLORIDE 1000 MG: 1 TABLET, FILM COATED ORAL at 15:48

## 2022-01-01 RX ADMIN — PIPERACILLIN AND TAZOBACTAM 4.5 G: 4; .5 INJECTION, POWDER, FOR SOLUTION INTRAVENOUS at 09:42

## 2022-01-01 RX ADMIN — GUAIFENESIN 10 ML: 200 SOLUTION ORAL at 08:36

## 2022-01-01 RX ADMIN — VANCOMYCIN HYDROCHLORIDE 750 MG: 1 INJECTION, POWDER, LYOPHILIZED, FOR SOLUTION INTRAVENOUS at 20:47

## 2022-01-01 RX ADMIN — OXYBUTYNIN CHLORIDE 5 MG: 5 TABLET ORAL at 13:25

## 2022-01-01 RX ADMIN — FUROSEMIDE 40 MG: 10 INJECTION, SOLUTION INTRAMUSCULAR; INTRAVENOUS at 19:19

## 2022-01-01 RX ADMIN — PIPERACILLIN AND TAZOBACTAM 4.5 G: 4; .5 INJECTION, POWDER, FOR SOLUTION INTRAVENOUS at 21:16

## 2022-01-01 RX ADMIN — SIMVASTATIN 20 MG: 10 TABLET, FILM COATED ORAL at 20:54

## 2022-01-01 RX ADMIN — SODIUM BICARBONATE 50 MEQ: 84 INJECTION, SOLUTION INTRAVENOUS at 00:08

## 2022-01-01 RX ADMIN — NYSTATIN: 100000 CREAM TOPICAL at 08:56

## 2022-01-01 RX ADMIN — VALACYCLOVIR HYDROCHLORIDE 1000 MG: 1 TABLET, FILM COATED ORAL at 22:14

## 2022-01-01 RX ADMIN — ZOLPIDEM TARTRATE 2.5 MG: 5 TABLET, FILM COATED ORAL at 21:03

## 2022-01-01 RX ADMIN — ZOLPIDEM TARTRATE 5 MG: 5 TABLET ORAL at 22:19

## 2022-01-01 RX ADMIN — NYSTATIN: 100000 CREAM TOPICAL at 20:54

## 2022-01-01 RX ADMIN — HYDROXYZINE HYDROCHLORIDE 25 MG: 25 TABLET, FILM COATED ORAL at 09:21

## 2022-01-01 RX ADMIN — VALACYCLOVIR HYDROCHLORIDE 1000 MG: 1 TABLET, FILM COATED ORAL at 21:04

## 2022-01-01 RX ADMIN — HYDROMORPHONE HYDROCHLORIDE 4 MG: 4 TABLET ORAL at 08:32

## 2022-01-01 RX ADMIN — MIDAZOLAM HYDROCHLORIDE 0.5 MG: 1 INJECTION, SOLUTION INTRAMUSCULAR; INTRAVENOUS at 09:20

## 2022-01-01 RX ADMIN — OXYBUTYNIN CHLORIDE 5 MG: 5 TABLET ORAL at 08:09

## 2022-01-01 RX ADMIN — ENOXAPARIN SODIUM 40 MG: 40 INJECTION SUBCUTANEOUS at 20:43

## 2022-01-01 RX ADMIN — SODIUM CHLORIDE 10 UNITS: 9 INJECTION, SOLUTION INTRAVENOUS at 00:08

## 2022-01-01 RX ADMIN — HYDROMORPHONE HYDROCHLORIDE 2 MG: 2 TABLET ORAL at 12:52

## 2022-01-01 RX ADMIN — OXYBUTYNIN CHLORIDE 5 MG: 5 TABLET ORAL at 09:37

## 2022-01-01 RX ADMIN — METOPROLOL TARTRATE 25 MG: 25 TABLET, FILM COATED ORAL at 21:35

## 2022-01-01 RX ADMIN — LEVETIRACETAM 500 MG: 500 TABLET, FILM COATED ORAL at 11:08

## 2022-01-01 RX ADMIN — SODIUM CHLORIDE: 4.5 INJECTION, SOLUTION INTRAVENOUS at 00:32

## 2022-01-01 RX ADMIN — OXYBUTYNIN 10 MG: 10 TABLET, FILM COATED, EXTENDED RELEASE ORAL at 20:06

## 2022-01-01 RX ADMIN — PIPERACILLIN AND TAZOBACTAM 4.5 G: 4; .5 INJECTION, POWDER, FOR SOLUTION INTRAVENOUS at 15:50

## 2022-01-01 RX ADMIN — CEFAZOLIN SODIUM 2 G: 2 INJECTION, SOLUTION INTRAVENOUS at 09:12

## 2022-01-01 RX ADMIN — ONDANSETRON 4 MG: 4 TABLET, ORALLY DISINTEGRATING ORAL at 08:13

## 2022-01-01 RX ADMIN — PIPERACILLIN AND TAZOBACTAM 4.5 G: 4; .5 INJECTION, POWDER, FOR SOLUTION INTRAVENOUS at 09:21

## 2022-01-01 RX ADMIN — LEVETIRACETAM 500 MG: 500 TABLET, FILM COATED ORAL at 10:25

## 2022-01-01 RX ADMIN — OXYBUTYNIN CHLORIDE 5 MG: 5 TABLET ORAL at 12:39

## 2022-01-01 RX ADMIN — HYDROMORPHONE HYDROCHLORIDE 2 MG: 2 TABLET ORAL at 15:40

## 2022-01-01 RX ADMIN — FUROSEMIDE 40 MG: 10 INJECTION, SOLUTION INTRAMUSCULAR; INTRAVENOUS at 20:53

## 2022-01-01 RX ADMIN — CALCIUM GLUCONATE 1 G: 98 INJECTION, SOLUTION INTRAVENOUS at 11:46

## 2022-01-01 RX ADMIN — DULOXETINE HYDROCHLORIDE 60 MG: 30 CAPSULE, DELAYED RELEASE PELLETS ORAL at 20:39

## 2022-01-01 RX ADMIN — HYDROMORPHONE HYDROCHLORIDE 2 MG: 2 TABLET ORAL at 18:42

## 2022-01-01 RX ADMIN — FENTANYL CITRATE 25 MCG: 50 INJECTION, SOLUTION INTRAMUSCULAR; INTRAVENOUS at 09:22

## 2022-01-01 RX ADMIN — OXYBUTYNIN CHLORIDE 5 MG: 5 TABLET ORAL at 14:01

## 2022-01-01 RX ADMIN — DULOXETINE HYDROCHLORIDE 60 MG: 30 CAPSULE, DELAYED RELEASE PELLETS ORAL at 20:42

## 2022-01-01 RX ADMIN — LEVETIRACETAM 500 MG: 500 TABLET, FILM COATED ORAL at 08:13

## 2022-01-01 RX ADMIN — ANORECTAL OINTMENT: 15.7; .44; 24; 20.6 OINTMENT TOPICAL at 21:19

## 2022-01-01 RX ADMIN — ACETAMINOPHEN 650 MG: 325 TABLET, FILM COATED ORAL at 08:31

## 2022-01-01 RX ADMIN — LEVETIRACETAM 500 MG: 100 SOLUTION ORAL at 09:32

## 2022-01-01 RX ADMIN — OXYBUTYNIN CHLORIDE 5 MG: 5 TABLET ORAL at 10:52

## 2022-01-01 RX ADMIN — ANORECTAL OINTMENT: 15.7; .44; 24; 20.6 OINTMENT TOPICAL at 23:19

## 2022-01-01 RX ADMIN — LEVOFLOXACIN 500 MG: 500 TABLET, FILM COATED ORAL at 08:05

## 2022-01-01 RX ADMIN — METOPROLOL TARTRATE 25 MG: 25 TABLET, FILM COATED ORAL at 09:42

## 2022-01-01 RX ADMIN — LEVETIRACETAM 500 MG: 5 INJECTION INTRAVENOUS at 15:15

## 2022-01-01 RX ADMIN — Medication 5 ML: at 08:37

## 2022-01-01 RX ADMIN — PIPERACILLIN AND TAZOBACTAM 2.25 G: 2; .25 INJECTION, POWDER, FOR SOLUTION INTRAVENOUS at 05:11

## 2022-01-01 RX ADMIN — VALACYCLOVIR HYDROCHLORIDE 1000 MG: 1 TABLET, FILM COATED ORAL at 17:22

## 2022-01-01 RX ADMIN — MULTIPLE VITAMINS W/ MINERALS TAB 1 TABLET: TAB at 09:20

## 2022-01-01 RX ADMIN — DULOXETINE HYDROCHLORIDE 30 MG: 30 CAPSULE, DELAYED RELEASE ORAL at 09:52

## 2022-01-01 RX ADMIN — LEVETIRACETAM 500 MG: 500 TABLET, FILM COATED ORAL at 20:05

## 2022-01-01 RX ADMIN — LEVOFLOXACIN 500 MG: 500 TABLET, FILM COATED ORAL at 15:58

## 2022-01-01 RX ADMIN — PANTOPRAZOLE SODIUM 40 MG: 40 TABLET, DELAYED RELEASE ORAL at 20:23

## 2022-01-01 RX ADMIN — HYDROMORPHONE HYDROCHLORIDE 2 MG: 1 SOLUTION ORAL at 05:54

## 2022-01-01 RX ADMIN — FUROSEMIDE 40 MG: 10 INJECTION, SOLUTION INTRAMUSCULAR; INTRAVENOUS at 08:12

## 2022-01-01 RX ADMIN — OXYBUTYNIN CHLORIDE 5 MG: 5 TABLET ORAL at 08:55

## 2022-01-01 RX ADMIN — FENTANYL CITRATE 50 MCG: 50 INJECTION, SOLUTION INTRAMUSCULAR; INTRAVENOUS at 14:43

## 2022-01-01 RX ADMIN — Medication 5 ML: at 16:40

## 2022-01-01 RX ADMIN — HYDROMORPHONE HYDROCHLORIDE 2 MG: 1 SOLUTION ORAL at 17:26

## 2022-01-01 RX ADMIN — VANCOMYCIN HYDROCHLORIDE 1000 MG: 1 INJECTION, SOLUTION INTRAVENOUS at 04:36

## 2022-01-01 RX ADMIN — CEPHALEXIN 500 MG: 500 CAPSULE ORAL at 17:35

## 2022-01-01 RX ADMIN — LIDOCAINE HYDROCHLORIDE: 20 JELLY TOPICAL at 09:14

## 2022-01-01 RX ADMIN — METOPROLOL TARTRATE 25 MG: 25 TABLET, FILM COATED ORAL at 21:05

## 2022-01-01 RX ADMIN — NYSTATIN: 100000 CREAM TOPICAL at 09:50

## 2022-01-01 RX ADMIN — MAGNESIUM OXIDE TAB 400 MG (241.3 MG ELEMENTAL MG) 400 MG: 400 (241.3 MG) TAB at 13:29

## 2022-01-01 RX ADMIN — VANCOMYCIN HYDROCHLORIDE 750 MG: 1 INJECTION, POWDER, LYOPHILIZED, FOR SOLUTION INTRAVENOUS at 08:12

## 2022-01-01 RX ADMIN — DEXTROSE MONOHYDRATE 50 ML: 25 INJECTION, SOLUTION INTRAVENOUS at 12:29

## 2022-01-01 RX ADMIN — PIPERACILLIN AND TAZOBACTAM 4.5 G: 4; .5 INJECTION, POWDER, FOR SOLUTION INTRAVENOUS at 04:01

## 2022-01-01 RX ADMIN — HYDROXYZINE HYDROCHLORIDE 25 MG: 25 TABLET, FILM COATED ORAL at 10:23

## 2022-01-01 RX ADMIN — DULOXETINE HYDROCHLORIDE 60 MG: 30 CAPSULE, DELAYED RELEASE PELLETS ORAL at 20:16

## 2022-01-01 RX ADMIN — MULTIPLE VITAMINS W/ MINERALS TAB 1 TABLET: TAB at 09:27

## 2022-01-01 RX ADMIN — Medication 5 ML: at 05:19

## 2022-01-01 RX ADMIN — FUROSEMIDE 40 MG: 10 INJECTION, SOLUTION INTRAMUSCULAR; INTRAVENOUS at 08:51

## 2022-01-01 RX ADMIN — Medication 5 ML: at 06:11

## 2022-01-01 RX ADMIN — ANORECTAL OINTMENT: 15.7; .44; 24; 20.6 OINTMENT TOPICAL at 10:38

## 2022-01-01 RX ADMIN — VALACYCLOVIR HYDROCHLORIDE 1000 MG: 1 TABLET, FILM COATED ORAL at 08:17

## 2022-01-01 RX ADMIN — GUAIFENESIN 600 MG: 600 TABLET, EXTENDED RELEASE ORAL at 21:03

## 2022-01-01 RX ADMIN — PANTOPRAZOLE SODIUM 40 MG: 40 TABLET, DELAYED RELEASE ORAL at 21:34

## 2022-01-01 RX ADMIN — PIPERACILLIN SODIUM AND TAZOBACTAM SODIUM 3.38 G: 3; .375 INJECTION, POWDER, LYOPHILIZED, FOR SOLUTION INTRAVENOUS at 09:37

## 2022-01-01 RX ADMIN — LISINOPRIL 2.5 MG: 2.5 TABLET ORAL at 14:17

## 2022-01-01 RX ADMIN — LORAZEPAM 1 MG: 2 LIQUID ORAL at 22:21

## 2022-01-01 RX ADMIN — OXYBUTYNIN 10 MG: 10 TABLET, FILM COATED, EXTENDED RELEASE ORAL at 20:54

## 2022-01-01 RX ADMIN — HYDROMORPHONE HYDROCHLORIDE 2 MG: 2 TABLET ORAL at 21:04

## 2022-01-01 RX ADMIN — DULOXETINE HYDROCHLORIDE 30 MG: 30 CAPSULE, DELAYED RELEASE ORAL at 09:37

## 2022-01-01 RX ADMIN — OXYBUTYNIN CHLORIDE 5 MG: 5 TABLET ORAL at 08:45

## 2022-01-01 RX ADMIN — DOCUSATE SODIUM 100 MG: 100 CAPSULE, LIQUID FILLED ORAL at 08:07

## 2022-01-01 RX ADMIN — DULOXETINE HYDROCHLORIDE 30 MG: 30 CAPSULE, DELAYED RELEASE ORAL at 08:46

## 2022-01-01 RX ADMIN — ANORECTAL OINTMENT: 15.7; .44; 24; 20.6 OINTMENT TOPICAL at 22:00

## 2022-01-01 RX ADMIN — POTASSIUM CHLORIDE 40 MEQ: 1500 TABLET, EXTENDED RELEASE ORAL at 14:31

## 2022-01-01 RX ADMIN — METOPROLOL TARTRATE 5 MG: 5 INJECTION INTRAVENOUS at 18:51

## 2022-01-01 RX ADMIN — PIPERACILLIN AND TAZOBACTAM 4.5 G: 4; .5 INJECTION, POWDER, FOR SOLUTION INTRAVENOUS at 04:12

## 2022-01-01 RX ADMIN — ACETAMINOPHEN 650 MG: 325 TABLET, FILM COATED ORAL at 10:31

## 2022-01-01 RX ADMIN — HUMAN ALBUMIN MICROSPHERES AND PERFLUTREN 9 ML: 10; .22 INJECTION, SOLUTION INTRAVENOUS at 11:44

## 2022-01-01 RX ADMIN — PANTOPRAZOLE SODIUM 40 MG: 40 TABLET, DELAYED RELEASE ORAL at 06:52

## 2022-01-01 RX ADMIN — HYDROXYZINE HYDROCHLORIDE 25 MG: 25 TABLET, FILM COATED ORAL at 20:57

## 2022-01-01 RX ADMIN — HYDROMORPHONE HYDROCHLORIDE 1 MG: 1 SOLUTION ORAL at 15:53

## 2022-01-01 RX ADMIN — GUAIFENESIN 600 MG: 600 TABLET, EXTENDED RELEASE ORAL at 08:16

## 2022-01-01 RX ADMIN — LEVETIRACETAM 500 MG: 500 TABLET, FILM COATED ORAL at 09:04

## 2022-01-01 RX ADMIN — METOPROLOL TARTRATE 25 MG: 25 TABLET, FILM COATED ORAL at 08:06

## 2022-01-01 RX ADMIN — LEVETIRACETAM 500 MG: 500 TABLET, FILM COATED ORAL at 20:06

## 2022-01-01 RX ADMIN — LEVETIRACETAM 500 MG: 500 TABLET, FILM COATED ORAL at 09:52

## 2022-01-01 RX ADMIN — DOCUSATE SODIUM 50 MG AND SENNOSIDES 8.6 MG 1 TABLET: 8.6; 5 TABLET, FILM COATED ORAL at 18:07

## 2022-01-01 RX ADMIN — PIPERACILLIN AND TAZOBACTAM 4.5 G: 4; .5 INJECTION, POWDER, FOR SOLUTION INTRAVENOUS at 05:05

## 2022-01-01 RX ADMIN — IBUPROFEN 200 MG: 200 TABLET, FILM COATED ORAL at 08:33

## 2022-01-01 RX ADMIN — GUAIFENESIN 600 MG: 600 TABLET, EXTENDED RELEASE ORAL at 09:43

## 2022-01-01 RX ADMIN — HYDROMORPHONE HYDROCHLORIDE 2 MG: 2 TABLET ORAL at 05:56

## 2022-01-01 RX ADMIN — PIPERACILLIN SODIUM AND TAZOBACTAM SODIUM 3.38 G: 3; .375 INJECTION, POWDER, LYOPHILIZED, FOR SOLUTION INTRAVENOUS at 10:41

## 2022-01-01 RX ADMIN — ANORECTAL OINTMENT: 15.7; .44; 24; 20.6 OINTMENT TOPICAL at 08:18

## 2022-01-01 RX ADMIN — LEVETIRACETAM 500 MG: 500 TABLET, FILM COATED ORAL at 21:34

## 2022-01-01 RX ADMIN — OXYBUTYNIN 10 MG: 10 TABLET, FILM COATED, EXTENDED RELEASE ORAL at 20:55

## 2022-01-01 RX ADMIN — Medication 5 ML: at 00:10

## 2022-01-01 RX ADMIN — Medication 5 ML: at 09:28

## 2022-01-01 RX ADMIN — OXYBUTYNIN CHLORIDE 5 MG: 5 TABLET ORAL at 13:38

## 2022-01-01 RX ADMIN — ANORECTAL OINTMENT: 15.7; .44; 24; 20.6 OINTMENT TOPICAL at 21:24

## 2022-01-01 RX ADMIN — HYDROMORPHONE HYDROCHLORIDE 2 MG: 2 TABLET ORAL at 08:34

## 2022-01-01 RX ADMIN — SODIUM CHLORIDE: 9 INJECTION, SOLUTION INTRAVENOUS at 09:15

## 2022-01-01 RX ADMIN — PIPERACILLIN SODIUM AND TAZOBACTAM SODIUM 3.38 G: 3; .375 INJECTION, POWDER, LYOPHILIZED, FOR SOLUTION INTRAVENOUS at 03:20

## 2022-01-01 RX ADMIN — SIMVASTATIN 20 MG: 10 TABLET, FILM COATED ORAL at 20:38

## 2022-01-01 RX ADMIN — PANTOPRAZOLE SODIUM 40 MG: 40 TABLET, DELAYED RELEASE ORAL at 20:38

## 2022-01-01 RX ADMIN — Medication 5 ML: at 09:41

## 2022-01-01 RX ADMIN — FENTANYL CITRATE 50 MCG: 50 INJECTION, SOLUTION INTRAMUSCULAR; INTRAVENOUS at 09:14

## 2022-01-01 RX ADMIN — DULOXETINE HYDROCHLORIDE 60 MG: 30 CAPSULE, DELAYED RELEASE PELLETS ORAL at 21:42

## 2022-01-01 RX ADMIN — ZOLPIDEM TARTRATE 2.5 MG: 5 TABLET, FILM COATED ORAL at 20:47

## 2022-01-01 RX ADMIN — ANORECTAL OINTMENT: 15.7; .44; 24; 20.6 OINTMENT TOPICAL at 21:21

## 2022-01-01 RX ADMIN — ACETAMINOPHEN 650 MG: 325 TABLET, FILM COATED ORAL at 20:15

## 2022-01-01 RX ADMIN — MULTIPLE VITAMINS W/ MINERALS TAB 1 TABLET: TAB at 08:46

## 2022-01-01 RX ADMIN — OXYBUTYNIN CHLORIDE 5 MG: 5 TABLET ORAL at 19:10

## 2022-01-01 RX ADMIN — VALACYCLOVIR HYDROCHLORIDE 1000 MG: 1 TABLET, FILM COATED ORAL at 16:25

## 2022-01-01 RX ADMIN — Medication 5 ML: at 06:58

## 2022-01-01 RX ADMIN — LORAZEPAM 1 MG: 1 TABLET ORAL at 02:53

## 2022-01-01 RX ADMIN — LEVETIRACETAM 500 MG: 500 TABLET, FILM COATED ORAL at 20:23

## 2022-01-01 RX ADMIN — OXYBUTYNIN CHLORIDE 5 MG: 5 TABLET ORAL at 13:59

## 2022-01-01 RX ADMIN — PANTOPRAZOLE SODIUM 40 MG: 40 TABLET, DELAYED RELEASE ORAL at 06:24

## 2022-01-01 RX ADMIN — VALACYCLOVIR HYDROCHLORIDE 1000 MG: 1 TABLET, FILM COATED ORAL at 21:42

## 2022-01-01 RX ADMIN — FUROSEMIDE 20 MG: 10 INJECTION, SOLUTION INTRAVENOUS at 14:19

## 2022-01-01 RX ADMIN — SODIUM CHLORIDE 7.62 UNITS: 9 INJECTION, SOLUTION INTRAVENOUS at 20:23

## 2022-01-01 RX ADMIN — Medication 5 ML: at 08:53

## 2022-01-01 RX ADMIN — ZOLPIDEM TARTRATE 2.5 MG: 5 TABLET, FILM COATED ORAL at 20:37

## 2022-01-01 RX ADMIN — PIPERACILLIN AND TAZOBACTAM 4.5 G: 4; .5 INJECTION, POWDER, FOR SOLUTION INTRAVENOUS at 09:39

## 2022-01-01 RX ADMIN — MULTIPLE VITAMINS W/ MINERALS TAB 1 TABLET: TAB at 09:37

## 2022-01-01 RX ADMIN — OXYBUTYNIN 10 MG: 10 TABLET, FILM COATED, EXTENDED RELEASE ORAL at 20:18

## 2022-01-01 RX ADMIN — OXYBUTYNIN 10 MG: 10 TABLET, FILM COATED, EXTENDED RELEASE ORAL at 21:16

## 2022-01-01 RX ADMIN — DULOXETINE HYDROCHLORIDE 60 MG: 60 CAPSULE, DELAYED RELEASE ORAL at 20:53

## 2022-01-01 RX ADMIN — LEVETIRACETAM 500 MG: 5 INJECTION INTRAVENOUS at 05:11

## 2022-01-01 RX ADMIN — DULOXETINE HYDROCHLORIDE 60 MG: 30 CAPSULE, DELAYED RELEASE PELLETS ORAL at 21:34

## 2022-01-01 RX ADMIN — ACETAMINOPHEN 650 MG: 325 TABLET, FILM COATED ORAL at 18:22

## 2022-01-01 RX ADMIN — DEXAMETHASONE SODIUM PHOSPHATE 10 MG: 10 INJECTION, SOLUTION INTRAMUSCULAR; INTRAVENOUS at 10:58

## 2022-01-01 RX ADMIN — METOPROLOL TARTRATE 25 MG: 25 TABLET, FILM COATED ORAL at 08:16

## 2022-01-01 RX ADMIN — PANTOPRAZOLE SODIUM 40 MG: 40 TABLET, DELAYED RELEASE ORAL at 20:43

## 2022-01-01 RX ADMIN — PANTOPRAZOLE SODIUM 40 MG: 40 TABLET, DELAYED RELEASE ORAL at 20:28

## 2022-01-01 RX ADMIN — PIPERACILLIN AND TAZOBACTAM 4.5 G: 4; .5 INJECTION, POWDER, FOR SOLUTION INTRAVENOUS at 21:21

## 2022-01-01 RX ADMIN — DULOXETINE HYDROCHLORIDE 30 MG: 30 CAPSULE, DELAYED RELEASE ORAL at 08:51

## 2022-01-01 RX ADMIN — DULOXETINE HYDROCHLORIDE 60 MG: 60 CAPSULE, DELAYED RELEASE ORAL at 20:55

## 2022-01-01 RX ADMIN — LEVETIRACETAM 500 MG: 500 TABLET, FILM COATED ORAL at 20:35

## 2022-01-01 RX ADMIN — OXYBUTYNIN CHLORIDE 5 MG: 5 TABLET ORAL at 08:14

## 2022-01-01 RX ADMIN — DULOXETINE HYDROCHLORIDE 60 MG: 30 CAPSULE, DELAYED RELEASE PELLETS ORAL at 21:35

## 2022-01-01 RX ADMIN — OLANZAPINE 5 MG: 5 TABLET, FILM COATED ORAL at 20:55

## 2022-01-01 RX ADMIN — PANTOPRAZOLE SODIUM 40 MG: 40 TABLET, DELAYED RELEASE ORAL at 21:17

## 2022-01-01 RX ADMIN — OXYBUTYNIN CHLORIDE 5 MG: 5 TABLET ORAL at 08:46

## 2022-01-01 RX ADMIN — OXYBUTYNIN 10 MG: 10 TABLET, FILM COATED, EXTENDED RELEASE ORAL at 20:47

## 2022-01-01 RX ADMIN — METOPROLOL TARTRATE 25 MG: 25 TABLET, FILM COATED ORAL at 20:46

## 2022-01-01 RX ADMIN — ASPIRIN 81 MG: 81 TABLET, COATED ORAL at 08:51

## 2022-01-01 RX ADMIN — OXYBUTYNIN 10 MG: 10 TABLET, FILM COATED, EXTENDED RELEASE ORAL at 21:42

## 2022-01-01 RX ADMIN — CEPHALEXIN 500 MG: 500 CAPSULE ORAL at 10:22

## 2022-01-01 RX ADMIN — OXYBUTYNIN CHLORIDE 5 MG: 5 TABLET ORAL at 08:51

## 2022-01-01 RX ADMIN — LEVETIRACETAM 500 MG: 500 TABLET, FILM COATED ORAL at 20:49

## 2022-01-01 RX ADMIN — LEVETIRACETAM 500 MG: 500 TABLET, FILM COATED ORAL at 20:08

## 2022-01-01 RX ADMIN — ONDANSETRON 4 MG: 4 TABLET, ORALLY DISINTEGRATING ORAL at 11:59

## 2022-01-01 RX ADMIN — PANTOPRAZOLE SODIUM 40 MG: 40 TABLET, DELAYED RELEASE ORAL at 21:16

## 2022-01-01 RX ADMIN — OXYBUTYNIN CHLORIDE 5 MG: 5 TABLET ORAL at 15:50

## 2022-01-01 RX ADMIN — DULOXETINE HYDROCHLORIDE 30 MG: 30 CAPSULE, DELAYED RELEASE ORAL at 09:13

## 2022-01-01 RX ADMIN — GUAIFENESIN 600 MG: 600 TABLET, EXTENDED RELEASE ORAL at 20:05

## 2022-01-01 RX ADMIN — ZOLPIDEM TARTRATE 2.5 MG: 5 TABLET, FILM COATED ORAL at 20:42

## 2022-01-01 RX ADMIN — DULOXETINE HYDROCHLORIDE 60 MG: 60 CAPSULE, DELAYED RELEASE ORAL at 20:57

## 2022-01-01 RX ADMIN — VALACYCLOVIR HYDROCHLORIDE 1000 MG: 1 TABLET, FILM COATED ORAL at 09:30

## 2022-01-01 RX ADMIN — ZOLPIDEM TARTRATE 2.5 MG: 5 TABLET, FILM COATED ORAL at 20:16

## 2022-01-01 RX ADMIN — MULTIPLE VITAMINS W/ MINERALS TAB 1 TABLET: TAB at 09:42

## 2022-01-01 RX ADMIN — ASPIRIN 81 MG: 81 TABLET, COATED ORAL at 09:31

## 2022-01-01 RX ADMIN — NYSTATIN: 100000 CREAM TOPICAL at 09:16

## 2022-01-01 ASSESSMENT — ACTIVITIES OF DAILY LIVING (ADL)
ADLS_ACUITY_SCORE: 69
ADLS_ACUITY_SCORE: 53
WALKING_OR_CLIMBING_STAIRS_DIFFICULTY: YES
ADLS_ACUITY_SCORE: 72
ADLS_ACUITY_SCORE: 72
ADLS_ACUITY_SCORE: 53
ADLS_ACUITY_SCORE: 72
ADLS_ACUITY_SCORE: 70
ADLS_ACUITY_SCORE: 37
ADLS_ACUITY_SCORE: 76
ADLS_ACUITY_SCORE: 10
ADLS_ACUITY_SCORE: 74
ADLS_ACUITY_SCORE: 74
ADLS_ACUITY_SCORE: 12
ADLS_ACUITY_SCORE: 76
ADLS_ACUITY_SCORE: 73
ADLS_ACUITY_SCORE: 74
ADLS_ACUITY_SCORE: 76
ADLS_ACUITY_SCORE: 74
ADLS_ACUITY_SCORE: 72
ADLS_ACUITY_SCORE: 53
ADLS_ACUITY_SCORE: 69
ADLS_ACUITY_SCORE: 14
ADLS_ACUITY_SCORE: 72
ADLS_ACUITY_SCORE: 53
ADLS_ACUITY_SCORE: 14
ADLS_ACUITY_SCORE: 73
ADLS_ACUITY_SCORE: 73
ADLS_ACUITY_SCORE: 35
ADLS_ACUITY_SCORE: 75
ADLS_ACUITY_SCORE: 65
ADLS_ACUITY_SCORE: 73
ADLS_ACUITY_SCORE: 70
ADLS_ACUITY_SCORE: 13
ADLS_ACUITY_SCORE: 11
SWALLOWING: 2-->DIFFICULTY SWALLOWING LIQUIDS/FOODS
ADLS_ACUITY_SCORE: 45
ADLS_ACUITY_SCORE: 72
ADLS_ACUITY_SCORE: 72
ADLS_ACUITY_SCORE: 66
EATING: 2-->COMPLETELY DEPENDENT
ADLS_ACUITY_SCORE: 72
ADLS_ACUITY_SCORE: 66
EATING/SWALLOWING: EATING;SWALLOWING LIQUIDS;SWALLOWING SOLID FOOD
ADLS_ACUITY_SCORE: 77
ADLS_ACUITY_SCORE: 76
ADLS_ACUITY_SCORE: 37
ADLS_ACUITY_SCORE: 75
ADLS_ACUITY_SCORE: 6
DEPENDENT_IADLS:: CLEANING;COOKING;LAUNDRY;SHOPPING;TRANSPORTATION
TOILETING_ASSISTANCE: TOILETING DIFFICULTY, REQUIRES EQUIPMENT
ADLS_ACUITY_SCORE: 69
ADLS_ACUITY_SCORE: 12
ADLS_ACUITY_SCORE: 73
ADLS_ACUITY_SCORE: 11
ADLS_ACUITY_SCORE: 71
ADLS_ACUITY_SCORE: 76
ADLS_ACUITY_SCORE: 70
ADLS_ACUITY_SCORE: 65
ADLS_ACUITY_SCORE: 71
ADLS_ACUITY_SCORE: 72
ADLS_ACUITY_SCORE: 73
ADLS_ACUITY_SCORE: 72
ADLS_ACUITY_SCORE: 74
ADLS_ACUITY_SCORE: 71
ADLS_ACUITY_SCORE: 14
ADLS_ACUITY_SCORE: 11
ADLS_ACUITY_SCORE: 11
ADLS_ACUITY_SCORE: 65
ADLS_ACUITY_SCORE: 51
EQUIPMENT_CURRENTLY_USED_AT_HOME: WALKER, ROLLING;OTHER (SEE COMMENTS)
ADLS_ACUITY_SCORE: 76
ADLS_ACUITY_SCORE: 12
ADLS_ACUITY_SCORE: 76
ADLS_ACUITY_SCORE: 76
ADLS_ACUITY_SCORE: 72
ADLS_ACUITY_SCORE: 65
ADLS_ACUITY_SCORE: 71
ADLS_ACUITY_SCORE: 12
ADLS_ACUITY_SCORE: 71
ADLS_ACUITY_SCORE: 11
ADLS_ACUITY_SCORE: 72
ADLS_ACUITY_SCORE: 9
ADLS_ACUITY_SCORE: 13
ADLS_ACUITY_SCORE: 14
ADLS_ACUITY_SCORE: 10
ADLS_ACUITY_SCORE: 45
ADLS_ACUITY_SCORE: 11
ADLS_ACUITY_SCORE: 70
ADLS_ACUITY_SCORE: 69
TOILETING: 2-->COMPLETELY DEPENDENT
ADLS_ACUITY_SCORE: 70
SWALLOWING: 2-->DIFFICULTY SWALLOWING LIQUIDS/FOODS
ADLS_ACUITY_SCORE: 14
ADLS_ACUITY_SCORE: 11
ADLS_ACUITY_SCORE: 69
ADLS_ACUITY_SCORE: 11
BATHING: 2-->COMPLETELY DEPENDENT (NOT DEVELOPMENTALLY APPROPRIATE)
ADLS_ACUITY_SCORE: 69
ADLS_ACUITY_SCORE: 73
ADLS_ACUITY_SCORE: 66
ADLS_ACUITY_SCORE: 74
ADLS_ACUITY_SCORE: 72
ADLS_ACUITY_SCORE: 76
ADLS_ACUITY_SCORE: 71
ADLS_ACUITY_SCORE: 71
ADLS_ACUITY_SCORE: 74
ADLS_ACUITY_SCORE: 37
ADLS_ACUITY_SCORE: 77
ADLS_ACUITY_SCORE: 14
ADLS_ACUITY_SCORE: 74
ADLS_ACUITY_SCORE: 6
ADLS_ACUITY_SCORE: 69
ADLS_ACUITY_SCORE: 66
ADLS_ACUITY_SCORE: 10
ADLS_ACUITY_SCORE: 74
ADLS_ACUITY_SCORE: 45
ADLS_ACUITY_SCORE: 75
ADLS_ACUITY_SCORE: 11
ADLS_ACUITY_SCORE: 75
ADLS_ACUITY_SCORE: 70
ADLS_ACUITY_SCORE: 53
ADLS_ACUITY_SCORE: 72
ADLS_ACUITY_SCORE: 75
ADLS_ACUITY_SCORE: 73
ADLS_ACUITY_SCORE: 9
ADLS_ACUITY_SCORE: 76
ADLS_ACUITY_SCORE: 75
ADLS_ACUITY_SCORE: 76
ADLS_ACUITY_SCORE: 11
ADLS_ACUITY_SCORE: 70
ADLS_ACUITY_SCORE: 11
ADLS_ACUITY_SCORE: 69
ADLS_ACUITY_SCORE: 72
ADLS_ACUITY_SCORE: 75
ADLS_ACUITY_SCORE: 11
ADLS_ACUITY_SCORE: 11
DIFFICULTY_COMMUNICATING: YES
ADLS_ACUITY_SCORE: 37
ADLS_ACUITY_SCORE: 76
ADLS_ACUITY_SCORE: 72
ADLS_ACUITY_SCORE: 13
ADLS_ACUITY_SCORE: 69
ADLS_ACUITY_SCORE: 71
ADLS_ACUITY_SCORE: 11
ADLS_ACUITY_SCORE: 74
ADLS_ACUITY_SCORE: 72
ADLS_ACUITY_SCORE: 74
ADLS_ACUITY_SCORE: 75
ADLS_ACUITY_SCORE: 65
ADLS_ACUITY_SCORE: 14
ADLS_ACUITY_SCORE: 35
ADLS_ACUITY_SCORE: 69
ADLS_ACUITY_SCORE: 45
DIFFICULTY_EATING/SWALLOWING: YES
ADLS_ACUITY_SCORE: 11
ADLS_ACUITY_SCORE: 74
ADLS_ACUITY_SCORE: 74
DRESSING/BATHING_DIFFICULTY: YES
ADLS_ACUITY_SCORE: 73
ADLS_ACUITY_SCORE: 10
CONCENTRATING,_REMEMBERING_OR_MAKING_DECISIONS_DIFFICULTY: YES
ADLS_ACUITY_SCORE: 73
ADLS_ACUITY_SCORE: 11
ADLS_ACUITY_SCORE: 75
ADLS_ACUITY_SCORE: 53
ADLS_ACUITY_SCORE: 71
ADLS_ACUITY_SCORE: 62
ADLS_ACUITY_SCORE: 11
ADLS_ACUITY_SCORE: 35
ADLS_ACUITY_SCORE: 73
ADLS_ACUITY_SCORE: 10
ADLS_ACUITY_SCORE: 12
ADLS_ACUITY_SCORE: 6
ADLS_ACUITY_SCORE: 73
ADLS_ACUITY_SCORE: 11
ADLS_ACUITY_SCORE: 75
ADLS_ACUITY_SCORE: 74
ADLS_ACUITY_SCORE: 74
ADLS_ACUITY_SCORE: 66
ADLS_ACUITY_SCORE: 43
ADLS_ACUITY_SCORE: 73
ADLS_ACUITY_SCORE: 74
ADLS_ACUITY_SCORE: 73
ADLS_ACUITY_SCORE: 72
ADLS_ACUITY_SCORE: 70
ADLS_ACUITY_SCORE: 12
ADLS_ACUITY_SCORE: 9
ADLS_ACUITY_SCORE: 13
ADLS_ACUITY_SCORE: 74
ADLS_ACUITY_SCORE: 72
ADLS_ACUITY_SCORE: 14
ADLS_ACUITY_SCORE: 74
ADLS_ACUITY_SCORE: 13
ADLS_ACUITY_SCORE: 53
ADLS_ACUITY_SCORE: 12
ADLS_ACUITY_SCORE: 66
ADLS_ACUITY_SCORE: 69
ADLS_ACUITY_SCORE: 11
ADLS_ACUITY_SCORE: 73
ADLS_ACUITY_SCORE: 45
ADLS_ACUITY_SCORE: 77
ADLS_ACUITY_SCORE: 71
ADLS_ACUITY_SCORE: 13
ADLS_ACUITY_SCORE: 72
ADLS_ACUITY_SCORE: 65
ADLS_ACUITY_SCORE: 70
ADLS_ACUITY_SCORE: 14
ADLS_ACUITY_SCORE: 73
ADLS_ACUITY_SCORE: 76
EATING: 2-->COMPLETELY DEPENDENT (NOT DEVELOPMENTALLY APPROPRIATE)
ADLS_ACUITY_SCORE: 9
ADLS_ACUITY_SCORE: 74
ADLS_ACUITY_SCORE: 75
ADLS_ACUITY_SCORE: 75
ADLS_ACUITY_SCORE: 53
ADLS_ACUITY_SCORE: 45
ADLS_ACUITY_SCORE: 70
ADLS_ACUITY_SCORE: 71
ADLS_ACUITY_SCORE: 72
ADLS_ACUITY_SCORE: 11
ADLS_ACUITY_SCORE: 53
ADLS_ACUITY_SCORE: 72
WALKING_OR_CLIMBING_STAIRS: OTHER (SEE COMMENTS)
ADLS_ACUITY_SCORE: 9
ADLS_ACUITY_SCORE: 53
ADLS_ACUITY_SCORE: 75
ADLS_ACUITY_SCORE: 73
FALL_HISTORY_WITHIN_LAST_SIX_MONTHS: YES
ADLS_ACUITY_SCORE: 76
ADLS_ACUITY_SCORE: 74
ADLS_ACUITY_SCORE: 12
ADLS_ACUITY_SCORE: 71
ADLS_ACUITY_SCORE: 66
ADLS_ACUITY_SCORE: 9
ADLS_ACUITY_SCORE: 16
ADLS_ACUITY_SCORE: 33
ADLS_ACUITY_SCORE: 73
ADLS_ACUITY_SCORE: 73
ADLS_ACUITY_SCORE: 35
ADLS_ACUITY_SCORE: 9
ADLS_ACUITY_SCORE: 72
ADLS_ACUITY_SCORE: 73
ADLS_ACUITY_SCORE: 74
ADLS_ACUITY_SCORE: 76
ADLS_ACUITY_SCORE: 70
ADLS_ACUITY_SCORE: 16
ADLS_ACUITY_SCORE: 73
ADLS_ACUITY_SCORE: 53
ADLS_ACUITY_SCORE: 53
ADLS_ACUITY_SCORE: 74
ADLS_ACUITY_SCORE: 70
ADLS_ACUITY_SCORE: 70
ADLS_ACUITY_SCORE: 69
ADLS_ACUITY_SCORE: 73
ADLS_ACUITY_SCORE: 73
ADLS_ACUITY_SCORE: 69
ADLS_ACUITY_SCORE: 14
ADLS_ACUITY_SCORE: 53
ADLS_ACUITY_SCORE: 71
ADLS_ACUITY_SCORE: 13
ADLS_ACUITY_SCORE: 71
ADLS_ACUITY_SCORE: 70
ADLS_ACUITY_SCORE: 64
ADLS_ACUITY_SCORE: 76
ADLS_ACUITY_SCORE: 73
ADLS_ACUITY_SCORE: 71
ADLS_ACUITY_SCORE: 73
ADLS_ACUITY_SCORE: 75
ADLS_ACUITY_SCORE: 53
ADLS_ACUITY_SCORE: 75
ADLS_ACUITY_SCORE: 45
TRANSFERRING: 2-->COMPLETELY DEPENDENT
ADLS_ACUITY_SCORE: 72
WALKING_OR_CLIMBING_STAIRS: AMBULATION DIFFICULTY, ASSISTANCE 1 PERSON;STAIR CLIMBING DIFFICULTY, ASSISTANCE 1 PERSON;TRANSFERRING DIFFICULTY, ASSISTANCE 1 PERSON
ADLS_ACUITY_SCORE: 35
COMMUNICATION: DIFFICULTY SPEAKING
ADLS_ACUITY_SCORE: 72
ADLS_ACUITY_SCORE: 73
ADLS_ACUITY_SCORE: 72
ADLS_ACUITY_SCORE: 66
ADLS_ACUITY_SCORE: 73
ADLS_ACUITY_SCORE: 66
ADLS_ACUITY_SCORE: 14
ADLS_ACUITY_SCORE: 81
ADLS_ACUITY_SCORE: 74
ADLS_ACUITY_SCORE: 65
ADLS_ACUITY_SCORE: 69
ADLS_ACUITY_SCORE: 72
ADLS_ACUITY_SCORE: 71
ADLS_ACUITY_SCORE: 45
ADLS_ACUITY_SCORE: 43
ADLS_ACUITY_SCORE: 75
ADLS_ACUITY_SCORE: 74
ADLS_ACUITY_SCORE: 11
ADLS_ACUITY_SCORE: 45
ADLS_ACUITY_SCORE: 70
ADLS_ACUITY_SCORE: 70
ADLS_ACUITY_SCORE: 76
TOILETING: 2-->COMPLETELY DEPENDENT (NOT DEVELOPMENTALLY APPROPRIATE)
ADLS_ACUITY_SCORE: 73
ADLS_ACUITY_SCORE: 76
ADLS_ACUITY_SCORE: 76
ADLS_ACUITY_SCORE: 69
ADLS_ACUITY_SCORE: 10
ADLS_ACUITY_SCORE: 53
ADLS_ACUITY_SCORE: 75
ADLS_ACUITY_SCORE: 76
ADLS_ACUITY_SCORE: 51
ADLS_ACUITY_SCORE: 64
ADLS_ACUITY_SCORE: 75
ADLS_ACUITY_SCORE: 73
ADLS_ACUITY_SCORE: 76
ADLS_ACUITY_SCORE: 45
ADLS_ACUITY_SCORE: 75
ADLS_ACUITY_SCORE: 73
ADLS_ACUITY_SCORE: 35
ADLS_ACUITY_SCORE: 70
TOILETING_ASSISTANCE: TOILETING DIFFICULTY, REQUIRES EQUIPMENT
ADLS_ACUITY_SCORE: 72
ADLS_ACUITY_SCORE: 53
ADLS_ACUITY_SCORE: 75
ADLS_ACUITY_SCORE: 66
ADLS_ACUITY_SCORE: 69
ADLS_ACUITY_SCORE: 72
ADLS_ACUITY_SCORE: 12
ADLS_ACUITY_SCORE: 76
ADLS_ACUITY_SCORE: 71
ADLS_ACUITY_SCORE: 72
ADLS_ACUITY_SCORE: 75
ADLS_ACUITY_SCORE: 72
ADLS_ACUITY_SCORE: 75
ADLS_ACUITY_SCORE: 14
ADLS_ACUITY_SCORE: 75
ADLS_ACUITY_SCORE: 11
ADLS_ACUITY_SCORE: 74
ADLS_ACUITY_SCORE: 16
ADLS_ACUITY_SCORE: 10
ADLS_ACUITY_SCORE: 14
DOING_ERRANDS_INDEPENDENTLY_DIFFICULTY: YES
ADLS_ACUITY_SCORE: 72
ADLS_ACUITY_SCORE: 10
SWALLOWING: 2-->DIFFICULTY SWALLOWING LIQUIDS/FOODS
ADLS_ACUITY_SCORE: 75
ADLS_ACUITY_SCORE: 65
ADLS_ACUITY_SCORE: 74
DRESSING/BATHING_DIFFICULTY: YES
ADLS_ACUITY_SCORE: 6
ADLS_ACUITY_SCORE: 73
ADLS_ACUITY_SCORE: 10
ADLS_ACUITY_SCORE: 74
ADLS_ACUITY_SCORE: 76
ADLS_ACUITY_SCORE: 9
ADLS_ACUITY_SCORE: 69
ADLS_ACUITY_SCORE: 75
ADLS_ACUITY_SCORE: 73
ADLS_ACUITY_SCORE: 75
ADLS_ACUITY_SCORE: 71
ADLS_ACUITY_SCORE: 69
ADLS_ACUITY_SCORE: 10
ADLS_ACUITY_SCORE: 74
ADLS_ACUITY_SCORE: 45
ADLS_ACUITY_SCORE: 74
ADLS_ACUITY_SCORE: 76
ADLS_ACUITY_SCORE: 66
ADLS_ACUITY_SCORE: 71
ADLS_ACUITY_SCORE: 74
ADLS_ACUITY_SCORE: 11
ADLS_ACUITY_SCORE: 11
ADLS_ACUITY_SCORE: 75
ADLS_ACUITY_SCORE: 62
DRESS: 2-->COMPLETELY DEPENDENT
ADLS_ACUITY_SCORE: 53
ADLS_ACUITY_SCORE: 75
ADLS_ACUITY_SCORE: 65
ADLS_ACUITY_SCORE: 73
ADLS_ACUITY_SCORE: 69
ADLS_ACUITY_SCORE: 70
ADLS_ACUITY_SCORE: 16
ADLS_ACUITY_SCORE: 72
ADLS_ACUITY_SCORE: 12
ADLS_ACUITY_SCORE: 67
ADLS_ACUITY_SCORE: 12
ADLS_ACUITY_SCORE: 14
ADLS_ACUITY_SCORE: 67
ADLS_ACUITY_SCORE: 71
ADLS_ACUITY_SCORE: 11
ADLS_ACUITY_SCORE: 65
ADLS_ACUITY_SCORE: 76
ADLS_ACUITY_SCORE: 11
ADLS_ACUITY_SCORE: 66
ADLS_ACUITY_SCORE: 12
ADLS_ACUITY_SCORE: 76
ADLS_ACUITY_SCORE: 71
TRANSFERRING: 2-->COMPLETELY DEPENDENT (NOT DEVELOPMENTALLY APPROPRIATE)
ADLS_ACUITY_SCORE: 6
ADLS_ACUITY_SCORE: 71
ADLS_ACUITY_SCORE: 10
ADLS_ACUITY_SCORE: 73
ADLS_ACUITY_SCORE: 74
ADLS_ACUITY_SCORE: 73
ADLS_ACUITY_SCORE: 73
ADLS_ACUITY_SCORE: 66
ADLS_ACUITY_SCORE: 11
ADLS_ACUITY_SCORE: 72
ADLS_ACUITY_SCORE: 65
ADLS_ACUITY_SCORE: 11
ADLS_ACUITY_SCORE: 65
ADLS_ACUITY_SCORE: 70
ADLS_ACUITY_SCORE: 75
ADLS_ACUITY_SCORE: 35
ADLS_ACUITY_SCORE: 72
ADLS_ACUITY_SCORE: 65
ADLS_ACUITY_SCORE: 69
ADLS_ACUITY_SCORE: 73
EATING: 2-->COMPLETELY DEPENDENT
ADLS_ACUITY_SCORE: 76
TOILETING_ISSUES: YES
ADLS_ACUITY_SCORE: 10
ADLS_ACUITY_SCORE: 76
ADLS_ACUITY_SCORE: 76
ADLS_ACUITY_SCORE: 71
ADLS_ACUITY_SCORE: 45
DRESS: 2-->COMPLETELY DEPENDENT (NOT DEVELOPMENTALLY APPROPRIATE)
ADLS_ACUITY_SCORE: 53
ADLS_ACUITY_SCORE: 9
ADLS_ACUITY_SCORE: 72
ADLS_ACUITY_SCORE: 62
ADLS_ACUITY_SCORE: 9
ADLS_ACUITY_SCORE: 69
ADLS_ACUITY_SCORE: 70
ADLS_ACUITY_SCORE: 71
ADLS_ACUITY_SCORE: 63
ADLS_ACUITY_SCORE: 75
ADLS_ACUITY_SCORE: 9
ADLS_ACUITY_SCORE: 72
ADLS_ACUITY_SCORE: 74
ADLS_ACUITY_SCORE: 11
ADLS_ACUITY_SCORE: 13
ADLS_ACUITY_SCORE: 74
ADLS_ACUITY_SCORE: 66
CHANGE_IN_FUNCTIONAL_STATUS_SINCE_ONSET_OF_CURRENT_ILLNESS/INJURY: YES
ADLS_ACUITY_SCORE: 73
ADLS_ACUITY_SCORE: 76
BATHING: 2-->COMPLETELY DEPENDENT (NOT DEVELOPMENTALLY APPROPRIATE)
ADLS_ACUITY_SCORE: 66
ADLS_ACUITY_SCORE: 70
ADLS_ACUITY_SCORE: 76
ADLS_ACUITY_SCORE: 76
SWALLOWING: 2-->DIFFICULTY SWALLOWING LIQUIDS/FOODS
ADLS_ACUITY_SCORE: 75
ADLS_ACUITY_SCORE: 76
ADLS_ACUITY_SCORE: 65
ADLS_ACUITY_SCORE: 74
ADLS_ACUITY_SCORE: 72
ADLS_ACUITY_SCORE: 12
ADLS_ACUITY_SCORE: 65
ADLS_ACUITY_SCORE: 14
ADLS_ACUITY_SCORE: 53
ADLS_ACUITY_SCORE: 11
ADLS_ACUITY_SCORE: 75
ADLS_ACUITY_SCORE: 74
ADLS_ACUITY_SCORE: 71
ADLS_ACUITY_SCORE: 66
ADLS_ACUITY_SCORE: 77
ADLS_ACUITY_SCORE: 53
ADLS_ACUITY_SCORE: 66
ADLS_ACUITY_SCORE: 69
ADLS_ACUITY_SCORE: 11
ADLS_ACUITY_SCORE: 10
ADLS_ACUITY_SCORE: 72
TOILETING: 2-->COMPLETELY DEPENDENT (NOT DEVELOPMENTALLY APPROPRIATE)
ADLS_ACUITY_SCORE: 53
TOILETING: 2-->COMPLETELY DEPENDENT
ADLS_ACUITY_SCORE: 73
ADLS_ACUITY_SCORE: 73
ADLS_ACUITY_SCORE: 72
ADLS_ACUITY_SCORE: 77
EATING: 2-->COMPLETELY DEPENDENT (NOT DEVELOPMENTALLY APPROPRIATE)
ADLS_ACUITY_SCORE: 72
ADLS_ACUITY_SCORE: 69
ADLS_ACUITY_SCORE: 45
ADLS_ACUITY_SCORE: 9
ADLS_ACUITY_SCORE: 71
ADLS_ACUITY_SCORE: 64
ADLS_ACUITY_SCORE: 75
ADLS_ACUITY_SCORE: 66
ADLS_ACUITY_SCORE: 74
ADLS_ACUITY_SCORE: 14
ADLS_ACUITY_SCORE: 12
ADLS_ACUITY_SCORE: 12
ADLS_ACUITY_SCORE: 11
ADLS_ACUITY_SCORE: 66
ADLS_ACUITY_SCORE: 35
ADLS_ACUITY_SCORE: 75
ADLS_ACUITY_SCORE: 11
ADLS_ACUITY_SCORE: 74
ADLS_ACUITY_SCORE: 65
ADLS_ACUITY_SCORE: 45
ADLS_ACUITY_SCORE: 73
ADLS_ACUITY_SCORE: 74
ADLS_ACUITY_SCORE: 76
ADLS_ACUITY_SCORE: 13
ADLS_ACUITY_SCORE: 73
ADLS_ACUITY_SCORE: 65
ADLS_ACUITY_SCORE: 11
ADLS_ACUITY_SCORE: 71
ADLS_ACUITY_SCORE: 75
ADLS_ACUITY_SCORE: 53
ADLS_ACUITY_SCORE: 73
ADLS_ACUITY_SCORE: 35
ADLS_ACUITY_SCORE: 72
ADLS_ACUITY_SCORE: 45
ADLS_ACUITY_SCORE: 76
ADLS_ACUITY_SCORE: 72
ADLS_ACUITY_SCORE: 74
ADLS_ACUITY_SCORE: 76
ADLS_ACUITY_SCORE: 62
ADLS_ACUITY_SCORE: 74
ADLS_ACUITY_SCORE: 11
ADLS_ACUITY_SCORE: 53
ADLS_ACUITY_SCORE: 76
ADLS_ACUITY_SCORE: 73
ADLS_ACUITY_SCORE: 73
ADLS_ACUITY_SCORE: 74
ADLS_ACUITY_SCORE: 14
ADLS_ACUITY_SCORE: 74
ADLS_ACUITY_SCORE: 64
ADLS_ACUITY_SCORE: 74
ADLS_ACUITY_SCORE: 11
ADLS_ACUITY_SCORE: 16
ADLS_ACUITY_SCORE: 73
ADLS_ACUITY_SCORE: 68
ADLS_ACUITY_SCORE: 76
ADLS_ACUITY_SCORE: 75
ADLS_ACUITY_SCORE: 69
ADLS_ACUITY_SCORE: 11
ADLS_ACUITY_SCORE: 72
ADLS_ACUITY_SCORE: 11
ADLS_ACUITY_SCORE: 76
ADLS_ACUITY_SCORE: 73
EATING/SWALLOWING: EATING;SWALLOWING LIQUIDS;SWALLOWING SOLID FOOD
ADLS_ACUITY_SCORE: 70
ADLS_ACUITY_SCORE: 72
ADLS_ACUITY_SCORE: 11
ADLS_ACUITY_SCORE: 11
ADLS_ACUITY_SCORE: 37
ADLS_ACUITY_SCORE: 53
ADLS_ACUITY_SCORE: 51
ADLS_ACUITY_SCORE: 73
DRESS: 2-->COMPLETELY DEPENDENT (NOT DEVELOPMENTALLY APPROPRIATE)
ADLS_ACUITY_SCORE: 14
ADLS_ACUITY_SCORE: 74
ADLS_ACUITY_SCORE: 70
ADLS_ACUITY_SCORE: 76
ADLS_ACUITY_SCORE: 71
ADLS_ACUITY_SCORE: 70
ADLS_ACUITY_SCORE: 12
ADLS_ACUITY_SCORE: 53
ADLS_ACUITY_SCORE: 70
ADLS_ACUITY_SCORE: 73
ADLS_ACUITY_SCORE: 76
DRESS: 2-->COMPLETELY DEPENDENT
ADLS_ACUITY_SCORE: 64
ADLS_ACUITY_SCORE: 45
ADLS_ACUITY_SCORE: 73
ADLS_ACUITY_SCORE: 65
ADLS_ACUITY_SCORE: 75
ADLS_ACUITY_SCORE: 35
ADLS_ACUITY_SCORE: 9
ADLS_ACUITY_SCORE: 14
DRESSING/BATHING: BATHING DIFFICULTY, REQUIRES EQUIPMENT;DRESSING DIFFICULTY, REQUIRES EQUIPMENT
ADLS_ACUITY_SCORE: 37
ADLS_ACUITY_SCORE: 14
ADLS_ACUITY_SCORE: 70
ADLS_ACUITY_SCORE: 74
ADLS_ACUITY_SCORE: 75
ADLS_ACUITY_SCORE: 11
ADLS_ACUITY_SCORE: 66
ADLS_ACUITY_SCORE: 71
ADLS_ACUITY_SCORE: 72
ADLS_ACUITY_SCORE: 74
ADLS_ACUITY_SCORE: 6
ADLS_ACUITY_SCORE: 65
ADLS_ACUITY_SCORE: 14
ADLS_ACUITY_SCORE: 11
ADLS_ACUITY_SCORE: 9
ADLS_ACUITY_SCORE: 75
ADLS_ACUITY_SCORE: 51
ADLS_ACUITY_SCORE: 11
ADLS_ACUITY_SCORE: 11
ADLS_ACUITY_SCORE: 73
ADLS_ACUITY_SCORE: 45
ADLS_ACUITY_SCORE: 53
ADLS_ACUITY_SCORE: 12
ADLS_ACUITY_SCORE: 75
ADLS_ACUITY_SCORE: 12
ADLS_ACUITY_SCORE: 10
ADLS_ACUITY_SCORE: 77
TRANSFERRING: 2-->COMPLETELY DEPENDENT (NOT DEVELOPMENTALLY APPROPRIATE)
WEAR_GLASSES_OR_BLIND: YES
ADLS_ACUITY_SCORE: 71
ADLS_ACUITY_SCORE: 6
ADLS_ACUITY_SCORE: 75
DRESSING/BATHING: BATHING DIFFICULTY, REQUIRES EQUIPMENT;DRESSING DIFFICULTY, REQUIRES EQUIPMENT
ADLS_ACUITY_SCORE: 9
ADLS_ACUITY_SCORE: 71
ADLS_ACUITY_SCORE: 66
ADLS_ACUITY_SCORE: 13
ADLS_ACUITY_SCORE: 73
ADLS_ACUITY_SCORE: 75
ADLS_ACUITY_SCORE: 11
ADLS_ACUITY_SCORE: 72
DOING_ERRANDS_INDEPENDENTLY_DIFFICULTY: YES
ADLS_ACUITY_SCORE: 70
ADLS_ACUITY_SCORE: 70
ADLS_ACUITY_SCORE: 75
ADLS_ACUITY_SCORE: 76
ADLS_ACUITY_SCORE: 76
ADLS_ACUITY_SCORE: 72
ADLS_ACUITY_SCORE: 70
ADLS_ACUITY_SCORE: 66
ADLS_ACUITY_SCORE: 76
ADLS_ACUITY_SCORE: 75
ADLS_ACUITY_SCORE: 75
ADLS_ACUITY_SCORE: 71
ADLS_ACUITY_SCORE: 12
ADLS_ACUITY_SCORE: 11
ADLS_ACUITY_SCORE: 11
ADLS_ACUITY_SCORE: 76
ADLS_ACUITY_SCORE: 9
ADLS_ACUITY_SCORE: 66
ADLS_ACUITY_SCORE: 73
ADLS_ACUITY_SCORE: 53
ADLS_ACUITY_SCORE: 74
ADLS_ACUITY_SCORE: 51
ADLS_ACUITY_SCORE: 69
ADLS_ACUITY_SCORE: 72
ADLS_ACUITY_SCORE: 71
ADLS_ACUITY_SCORE: 62
ADLS_ACUITY_SCORE: 73
ADLS_ACUITY_SCORE: 72
ADLS_ACUITY_SCORE: 13
ADLS_ACUITY_SCORE: 73
ADLS_ACUITY_SCORE: 66
ADLS_ACUITY_SCORE: 74
ADLS_ACUITY_SCORE: 75
ADLS_ACUITY_SCORE: 12
WALKING_OR_CLIMBING_STAIRS_DIFFICULTY: YES
ADLS_ACUITY_SCORE: 74
ADLS_ACUITY_SCORE: 14
ADLS_ACUITY_SCORE: 73
ADLS_ACUITY_SCORE: 13
ADLS_ACUITY_SCORE: 12
ADLS_ACUITY_SCORE: 65
ADLS_ACUITY_SCORE: 72
ADLS_ACUITY_SCORE: 70
ADLS_ACUITY_SCORE: 72
ADLS_ACUITY_SCORE: 11
ADLS_ACUITY_SCORE: 74
ADLS_ACUITY_SCORE: 16
ADLS_ACUITY_SCORE: 74
ADLS_ACUITY_SCORE: 6
TRANSFERRING: 2-->COMPLETELY DEPENDENT
ADLS_ACUITY_SCORE: 53
ADLS_ACUITY_SCORE: 6
ADLS_ACUITY_SCORE: 53
ADLS_ACUITY_SCORE: 71
ADLS_ACUITY_SCORE: 72
CHANGE_IN_FUNCTIONAL_STATUS_SINCE_ONSET_OF_CURRENT_ILLNESS/INJURY: YES
ADLS_ACUITY_SCORE: 53
ADLS_ACUITY_SCORE: 10
ADLS_ACUITY_SCORE: 71
ADLS_ACUITY_SCORE: 10
ADLS_ACUITY_SCORE: 64
ADLS_ACUITY_SCORE: 75
ADLS_ACUITY_SCORE: 14
DIFFICULTY_EATING/SWALLOWING: YES
FALL_HISTORY_WITHIN_LAST_SIX_MONTHS: NO
ADLS_ACUITY_SCORE: 73
ADLS_ACUITY_SCORE: 16
ADLS_ACUITY_SCORE: 73
ADLS_ACUITY_SCORE: 11
ADLS_ACUITY_SCORE: 11
TOILETING_ISSUES: YES
ADLS_ACUITY_SCORE: 75
ADLS_ACUITY_SCORE: 66
ADLS_ACUITY_SCORE: 75
WEAR_GLASSES_OR_BLIND: YES
ADLS_ACUITY_SCORE: 11
ADLS_ACUITY_SCORE: 74
ADLS_ACUITY_SCORE: 9
ADLS_ACUITY_SCORE: 73
ADLS_ACUITY_SCORE: 11
ADLS_ACUITY_SCORE: 9
ADLS_ACUITY_SCORE: 14
ADLS_ACUITY_SCORE: 37
ADLS_ACUITY_SCORE: 71
ADLS_ACUITY_SCORE: 11
ADLS_ACUITY_SCORE: 37
ADLS_ACUITY_SCORE: 70
ADLS_ACUITY_SCORE: 73
ADLS_ACUITY_SCORE: 72
ADLS_ACUITY_SCORE: 69
ADLS_ACUITY_SCORE: 14
ADLS_ACUITY_SCORE: 72
ADLS_ACUITY_SCORE: 71
ADLS_ACUITY_SCORE: 72
ADLS_ACUITY_SCORE: 13
ADLS_ACUITY_SCORE: 69
CONCENTRATING,_REMEMBERING_OR_MAKING_DECISIONS_DIFFICULTY: YES
ADLS_ACUITY_SCORE: 35
ADLS_ACUITY_SCORE: 72
ADLS_ACUITY_SCORE: 12
ADLS_ACUITY_SCORE: 66
ADLS_ACUITY_SCORE: 76
ADLS_ACUITY_SCORE: 73
ADLS_ACUITY_SCORE: 9
ADLS_ACUITY_SCORE: 72
ADLS_ACUITY_SCORE: 9
ADLS_ACUITY_SCORE: 72
ADLS_ACUITY_SCORE: 70
ADLS_ACUITY_SCORE: 75
ADLS_ACUITY_SCORE: 76
ADLS_ACUITY_SCORE: 45
ADLS_ACUITY_SCORE: 14
ADLS_ACUITY_SCORE: 66
ADLS_ACUITY_SCORE: 69
ADLS_ACUITY_SCORE: 73
ADLS_ACUITY_SCORE: 62
ADLS_ACUITY_SCORE: 66
ADLS_ACUITY_SCORE: 73
ADLS_ACUITY_SCORE: 73

## 2022-01-01 ASSESSMENT — MIFFLIN-ST. JEOR
SCORE: 1155.88
SCORE: 1167.23
SCORE: 1177.72
SCORE: 1195.58
SCORE: 1186.79
SCORE: 1173.64
SCORE: 1199.49

## 2022-01-01 ASSESSMENT — ENCOUNTER SYMPTOMS
NERVOUS/ANXIOUS: 0
VOMITING: 0
ABDOMINAL PAIN: 0
SORE THROAT: 0
DIARRHEA: 0
MYALGIAS: 0
LIGHT-HEADEDNESS: 0
ABDOMINAL PAIN: 0
FREQUENCY: 0
HEADACHES: 0
CHILLS: 0
SHORTNESS OF BREATH: 1
NAUSEA: 0
COUGH: 1
DYSURIA: 0
FEVER: 0
HEMATURIA: 0

## 2022-01-01 ASSESSMENT — PAIN SCALES - GENERAL
PAINLEVEL: SEVERE PAIN (6)
PAINLEVEL: MILD PAIN (2)
PAINLEVEL: MODERATE PAIN (5)
PAINLEVEL: NO PAIN (0)
PAINLEVEL: MODERATE PAIN (4)
PAINLEVEL: NO PAIN (0)
PAINLEVEL: SEVERE PAIN (6)
PAINLEVEL: MILD PAIN (2)
PAINLEVEL: MILD PAIN (3)
PAINLEVEL: NO PAIN (0)
PAINLEVEL: MILD PAIN (3)
PAINLEVEL: MILD PAIN (2)

## 2022-01-01 ASSESSMENT — VISUAL ACUITY
OU: BASELINE
OU: BASELINE

## 2022-02-08 PROBLEM — J96.01 ACUTE RESPIRATORY FAILURE WITH HYPOXIA (H): Status: ACTIVE | Noted: 2022-01-01

## 2022-02-08 NOTE — ED PROVIDER NOTES
Emergency Department Encounter   NAME: Elenita Moran ; AGE: 67 year old female ; YOB: 1955 ; MRN: [id number] ; EVALUATION DATE & TIME: 2/8/2022  5:38 PM ; PCP: Sandi Jones   ED PROVIDER: Eli Moore PA-C    Chief Complaint   Patient presents with     Shortness of Breath       Medical Decision Making & Final Diagnosis     1. Acute respiratory failure with hypoxia (H)         ED Course as of 02/08/22 2325 Tue Feb 08, 2022 1804 Elenita is a 67 year old female with a relevant PMH of metastatic breast cancer, DMT2 on insulin, anemia related to chemo, and lymphedema who presents to the ED for evaluation of sudden onset shortness of breath and hypoxia.    My exam is notable for tachycardia to 130, pulse ox 94% on 6 L by OxiMax, and tachypnea in a woman speaking in 2-3 word sentences.  1+ pitting edema to bilateral lower extremities without tenderness.  Expiratory wheezing in upper lung fields.  Rales in lower lung fields.  No JVD.   1810 I considered a broad differential including PE, flash pulmonary edema, anaphylaxis, acute heart failure, pneumonia, pleural effusion, spontaneous pneumothorax, Covid-19, severe anemia, cardiac arrhythmia, and others   2319 EKG reveals sinus tachycardia with a rate of 123. Minimal ST depression in lead III. No significant ST elevation or depression. No pathologic arrhythmia. No STEMI. Labs notable for lactate 1.5, BNP 1500, mild hypomagnesia 1.7, negative troponin, hemoglobin improved to 8.8 and otherwise unremarkable labs. Influenza and Covid negative. CT PE study reveals bilateral pleural effusions with diffuse groundglass opacities consistent with Covid pneumonia or other etiology.   2321 no PE on work-up. BNP is elevated so there is a component of fluid overload and likely acute on chronic heart failure contributing to presentation. Echo 12/21 revealed ejection fraction around 40 to 45% and possible anterior septal wall motion abnormalities. She does  take 1 mg p.o. Bumex at home and did not take this today. We will plan to start diuresis with 2 mg Bumex IV. Given expiratory wheezing on exam she was given a DuoNeb upon arrival with improvement of respirations. Given this, we will plan for Decadron dosing as well. Initial Covid is negative but patient is unvaccinated and was endorsing a mild cough at night prior to arrival. Also considered community-acquired pneumonia. Ordered antibiotics given negative Covid test. Less concerned for anaphylaxis or reaction to chemo infusion as she had had 9 doses of this already without reaction. CT without evidence of pneumothorax. Patient is anemic but it is baseline and nothing on history to suggest acute GI bleed or other hemorrhage. EKG and monitor without evidence of cardiac arrhythmia. Patient was placed on 4 L by oxime mask with improvement in oxygen saturation and following DuoNeb and Decadron clinical tachypnea and respiratory distress improved. We will plan to admit for further management and O2 therapy. Discussed with patient and her  who are agreeable to the plan.          Critical Care:  Performed by: Eli Moore PA-C  Authorized by: Eli Moore PA-C    Total critical care time: 30 minutes  Critical care time was exclusive of separately billable procedures and treating other patients.  Critical care was necessary to treat or prevent imminent or life-threatening deterioration of the following conditions: acute hypoxic respiratory failure  Critical care was time spent personally by me on the following activities: development of treatment plan with patient or surrogate, discussions with consultants, examination of patient, evaluation of patient's response to treatment, obtaining history from patient or surrogate, ordering and performing treatments and interventions, ordering and review of laboratory studies, ordering and review of radiographic studies and re-evaluation of patient's condition, this excludes  any separately billable procedures.    ED Course   5:40 PM I met and introduced myself to the patient. I gathered initial history and performed my physical exam. We discussed plan for initial workup.   I did see the patient while wearing full COVID-compliant PPE.  5:50 PM Staffed with Dr. Cifuentes.  7:18 PM updated patient and her  on results  7:26 PM Discussed with Dr Morin, hospitalist who is agreeable to admit.      MEDICATIONS GIVEN IN THE EMERGENCY:   Medications   lidocaine 1 % 0.1-1 mL (has no administration in time range)   lidocaine (LMX4) cream (has no administration in time range)   sodium chloride (PF) 0.9% PF flush 3 mL (3 mLs Intracatheter Given 2/8/22 2222)   sodium chloride (PF) 0.9% PF flush 3 mL (has no administration in time range)   melatonin tablet 1 mg (has no administration in time range)   enoxaparin ANTICOAGULANT (LOVENOX) injection 40 mg (40 mg Subcutaneous Given 2/8/22 2026)   furosemide (LASIX) injection 40 mg (has no administration in time range)   dexamethasone (DECADRON) injection 6 mg (has no administration in time range)   piperacillin-tazobactam (ZOSYN) 3.375 g vial to attach to  mL bag (3.375 g Intravenous New Bag 2/8/22 2026)     Followed by   piperacillin-tazobactam (ZOSYN) 3.375 g vial to attach to  mL bag (has no administration in time range)   vancomycin (VANCOCIN) 1,500 mg in sodium chloride 0.9 % 250 mL intermittent infusion (1,500 mg Intravenous New Bag 2/8/22 2222)     Followed by   vancomycin (VANCOCIN) 750 mg in sodium chloride 0.9 % 250 mL intermittent infusion (has no administration in time range)   ipratropium - albuterol 0.5 mg/2.5 mg/3 mL (DUONEB) neb solution 3 mL (3 mLs Nebulization Given 2/8/22 1752)   iopamidol (ISOVUE-370) solution 100 mL (100 mLs Intravenous Given 2/8/22 1831)   dexamethasone PF (DECADRON) injection 6 mg (6 mg Intravenous Given 2/8/22 1909)   bumetanide (BUMEX) injection 2 mg (2 mg Intravenous Given 2/8/22 2025)      NEW  PRESCRIPTIONS STARTED AT TODAY'S ER VISIT:  New Prescriptions    No medications on file     =================================================================   History   Patient information was obtained from: patient and  Denilson   Use of Intrepreter: N/A    Elenita COX Rah Moran is a 67 year old female with a relevant PMH of metastatic breast cancer, DMT2 on insulin, anemia related to chemo, and lymphedema who presents to the ED for evaluation of sudden onset shortness of breath and hypoxia.   Patient was having her ninth infusion of Doxil when she got up to go to the bathroom around 445 with sudden onset shortness of breath and pleuritic chest pain in the center of her chest.  She was referred to the ED and found to be tachycardic around 125 with oxygen saturation at 84%.  Reports mild shortness of breath and cough at night for the last weeks.  Denies fever, chills, sick contacts, abdominal pain, nausea, vomiting, urinary symptoms, melena, diarrhea, or abnormal swelling in legs.  No history of lung diseases. She is not vaccinated against covid 19.   ______________________________________________________________________  Past Medical History:   Diagnosis Date     Allergic rhinitis      Bone cancer (H) 2011     Breast cancer (H) 2010     Depression      DM (diabetes mellitus) (H)      Fibromyalgia      Hx antineoplastic chemotherapy 2010     Hx of radiation therapy 2010     Hyperlipemia      Lactose intolerance      Mini stroke (H)      Osteoarthritis      Tobacco dependency         Past Surgical History:   Procedure Laterality Date     APPENDECTOMY       INSERT INTRACORONARY STENT  1985    R Hand     IR CHEST PORT PLACEMENT > 5 YRS OF AGE  12/12/2019     IR LUMBAR TRANSFORAMINAL EPIDURAL STEROID INJECTION  12/5/2019     IR LUMBAR TRANSFORAMINAL EPIDURAL STRD INJ  12/5/2019     IR PORT PLACEMENT >5 YEARS  12/12/2019     MAMMOPLASTY REDUCTION Right 2015    hx of left mastectomy and right breast reduction      MASTECTOMY Left 2010     OTHER SURGICAL HISTORY      biopsy of upper and right tongue     OVARIAN CYST REMOVAL       REVISE RECONSTRUCTED BREAST Left     March 11, 2015       Family History   Problem Relation Age of Onset     Lung Cancer Mother      Pancreatic Cancer Mother      Kidney Cancer Maternal Grandmother      Lung Cancer Paternal Aunt      Breast Cancer Cousin        Social History     Tobacco Use     Smoking status: Former Smoker     Packs/day: 0.75     Years: 20.00     Pack years: 15.00     Types: Cigarettes     Smokeless tobacco: Never Used   Substance Use Topics     Alcohol use: Yes     Alcohol/week: 1.0 standard drink     Comment: 1 glass of wine/week     Drug use: No       REVIEW OF SYSTEMS:    Review of Systems   Constitutional: Negative for chills and fever.   HENT: Negative for sore throat.    Eyes: Negative for visual disturbance.   Respiratory: Positive for cough and shortness of breath.    Cardiovascular: Positive for chest pain and leg swelling (baseline).   Gastrointestinal: Negative for abdominal pain, diarrhea, nausea and vomiting.   Genitourinary: Negative for dysuria, frequency, hematuria and urgency.   Musculoskeletal: Negative for myalgias.   Skin: Negative for rash.   Neurological: Negative for light-headedness and headaches.   Psychiatric/Behavioral: The patient is not nervous/anxious.    All other systems reviewed and are negative.        Physical Exam   /77   Pulse 106   Temp 98.8  F (37.1  C) (Temporal)   Resp 27   Wt 67.1 kg (148 lb)   SpO2 97%   BMI 24.63 kg/m      Physical Exam  Vitals reviewed.   Constitutional:       Appearance: Normal appearance.      Comments: Speaking in 2-3 word sentences. oxymax in place on 6L w pulse ox 94%.    HENT:      Head: Normocephalic.   Eyes:      Conjunctiva/sclera: Conjunctivae normal.   Cardiovascular:      Rate and Rhythm: Regular rhythm.      Heart sounds: Normal heart sounds.      Comments: Tachycardic to 130. No JVD  Pulmonary:       Effort: Tachypnea and accessory muscle usage present.      Breath sounds: Wheezing (expiratory in upper lung fields) and rales (lower lung fields) present. No rhonchi.   Abdominal:      General: There is no distension.      Palpations: Abdomen is soft.      Tenderness: There is no abdominal tenderness. There is no guarding or rebound.   Musculoskeletal:         General: No swelling or deformity. Normal range of motion.      Cervical back: Normal range of motion.      Comments: 1+ pitting edema to BLEs. No lower extremity TTP or erythema   Skin:     General: Skin is warm.   Neurological:      General: No focal deficit present.      Mental Status: She is alert.   Psychiatric:         Mood and Affect: Mood normal.         Lab Work (Reviewed and Interpreted):   Labs Ordered and Resulted from Time of ED Arrival to Time of ED Departure   BASIC METABOLIC PANEL - Abnormal       Result Value    Sodium 138      Potassium 4.3      Chloride 106      Carbon Dioxide (CO2) 19 (*)     Anion Gap 13      Urea Nitrogen 15      Creatinine 0.69      Calcium 8.7      Glucose 146 (*)     GFR Estimate >90     B-TYPE NATRIURETIC PEPTIDE ([facility] ONLY) - Abnormal    BNP 1,511 (*)    MAGNESIUM - Abnormal    Magnesium 1.7 (*)    CBC WITH PLATELETS AND DIFFERENTIAL - Abnormal    WBC Count 9.6      RBC Count 2.93 (*)     Hemoglobin 8.8 (*)     Hematocrit 28.2 (*)     MCV 96      MCH 30.0      MCHC 31.2 (*)     RDW 19.9 (*)     Platelet Count 329      % Neutrophils 72      % Lymphocytes 13      % Monocytes 12      % Eosinophils 1      % Basophils 1      % Immature Granulocytes 1      NRBCs per 100 WBC 0      Absolute Neutrophils 7.0      Absolute Lymphocytes 1.3      Absolute Monocytes 1.1      Absolute Eosinophils 0.1      Absolute Basophils 0.1      Absolute Immature Granulocytes 0.1      Absolute NRBCs 0.0     TROPONIN I - Normal    Troponin I 0.12     INFLUENZA A/B & SARS-COV2 PCR MULTIPLEX - Normal    Influenza A PCR Negative       Influenza B PCR Negative      SARS CoV2 PCR Negative     LACTIC ACID WHOLE BLOOD - Normal    Lactic Acid 1.5     ISTAT CREATININE POCT - Normal    Creatinine POCT 0.6      GFR, ESTIMATED POCT >60     CREATININE - Normal    Creatinine 0.69      GFR Estimate >90     PROCALCITONIN   TROPONIN I   ISTAT CREATININE POCT   BLOOD CULTURE   BLOOD CULTURE       Imaging (Reviewed and Interpreted):   CT Chest Pulmonary Embolism w Contrast   Final Result   IMPRESSION:   1.  No evidence for pulmonary emboli.   2.  Extensive airspace and groundglass opacities throughout both lungs compatible with pneumonia, including Covid 19.   3.  Moderate-sized bilateral pleural effusions and bibasilar atelectasis.   4.  Diffuse skeletal metastasis.      Imaging features can be seen with (COVID-19)  pneumonia, though are nonspecific and can occur with a variety of infectious and noninfectious processes.      Echocardiogram Complete    (Results Pending)       EKG (Reviewed and Interpreted):   Performed at: [facility] Emergency Department. 08-Feb-2022, 18:00:20    Impression: Sinus tachycardia. Anteroseptal infarct, age undetermined. Abnormal ECG.    Rate: 123 BPM bpm  Rhythm: Sinus tachycardia  Axis: (62) - (33) - (-58)  SC Interval: 158 ms  QRS Interval: 78 ms  QTc Interval: 460 ms  ST Changes: ST now depressed in inferior leads. Nonspecific T wave abnormality no longer evident in anterior leads  Comparison: Compared with ECG of 27-Jul-2021, 12:40. Anteroseptal infarct is now present. ST now depressed in inferior leads. Nonspecific T wave abnormality no longer evident in anterior leads.    EKG results reviewed and interpreted by Dr. Vane ED MD.     PROCEDURES:   n/a     Eli Moore PA-C   Emergency Medicine   [facility] EMERGENCY DEPARTMENT  [facility] [facility] [address]  [phone]  Dept: [phone]        Eli Moore PA-C  02/08/22 7001

## 2022-02-08 NOTE — ED NOTES
Expected Patient Referral to ED  5:30 PM    Referring Clinic/Provider:  Onc     Reason for referral/Clinical facts:  Pt with history of metastatic breast cancer, received dose of doxyl today, was dyspnec and hypoxic to 85% when she got up to use restroom.  Apparently has recent cough, but no fevers.  Pt on 2L and 95%.  She's coming by private with .  Recommends workup, hospitalization.    Recommendations provided:  Send to ED for further evaluation    Caller was informed that this institution does possess the capabilities and/or resources to provide for patient and should be transferred to our facility.    Discussed that if direct admit is sought and any hurdles are encountered, this ED would be happy to see the patient and evaluate.    Informed caller that recommendations provided are recommendations based only on the facts provided and that they responsible to accept or reject the advice, or to seek a formal in person consultation as needed and that this ED will see/treat patient should they arrive.      Jaleel Red, DO  Emergency Medicine  [facility] EMERGENCY DEPARTMENT  [address] [place] [id number] [phone]       Jaleel Red MD  02/08/22 7761

## 2022-02-08 NOTE — ED PROVIDER NOTES
Emergency Department Midlevel Supervisory Note     I personally saw the patient and performed a substantive portion of the visit including all aspects of the medical decision making.    ED Course:  5:49 PM Eli Moore PA-C staffed patient with me. I agree with their assessment and plan of management, and I will see the patient.  5:54 PM I met with the patient to introduce myself, gather additional history, perform my initial exam, and discuss the plan.     Brief HPI:     Elenita Moran is a 67 year old female with a relevant PMH of metastatic breast cancer, DMT2 on insulin, anemia related to chemo, and lymphedema who presents to the ED for evaluation of sudden onset shortness of breath and hypoxia.   Patient was having her ninth infusion of Doxil when she got up to go to the bathroom around 445 with sudden onset shortness of breath and pleuritic chest pain in the center of her chest.  She was referred to the ED and found to be tachycardic around 125 with oxygen saturation at 84%.  Reports mild shortness of breath and cough at night for the last weeks.  Denies fever, chills, sick contacts, abdominal pain, nausea, vomiting, urinary symptoms, melena, diarrhea, or abnormal swelling in legs.  No history of lung diseases. She is not vaccinated against covid 19.     I, Lynn Noyola, am serving as a scribe to document services personally performed by Jillian Cifuentes, based on my observations and the provider's statements to me.   I, Jillian Cifuentes, attest that Lynn Noyola was acting in a scribe capacity, has observed my performance of the services and has documented them in accordance with my direction.    Brief Physical Exam:  Constitutional:  Alert, in no acute distress  EYES: Conjunctivae clear  HENT:  Atraumatic, normocephalic  Respiratory:  Respirations even, unlabored, in no acute respiratory distress  Cardiovascular:  Regular rate and rhythm, good peripheral perfusion  GI: Soft, nondistended, nontender, no  palpable masses, no rebound, no guarding   Musculoskeletal:  No edema. No cyanosis. Range of motion major extremities intact.    Integument: Warm, Dry, No erythema, No rash.   Neurologic:  Alert & oriented, no focal deficits noted  Psych: Normal mood and affect     MDM:    67-year-old female immunosuppressed on chemotherapy, unvaccinated for COVID-19 here with 1 day of shortness of breath that started at the end of chemo infusion.  Clinically she was found to be hypoxic, requiring several liters of oxygen by oxime mask, initially tachypneic and dyspneic with tachycardia.  Differential includes PE, flash pulmonary edema, infectious etiology.  Less likely, a reaction to her transfusion as she has tolerated the same medications in the past.  We did send her for stat CT PE after an i-STAT creatinine and there is no signs of pulmonary embolus.  The comment by radiology is that it does look like groundglass opacities bilaterally consistent with what they have been seen typically with Covid pneumonia, though obviously the differential includes infectious pathology other than Covid as well.  Her Covid test in the ED is negative, but given the appearance of her CT scan, her hypoxia, I am still moderately suspicious for this.  She was wheezing on arrival and improved with a DuoNeb so some Decadron was given as well.  We also gave IV antibiotics to cover in case this is a bacterial pneumonia.  BNP is elevated to 1500, and she is already on Bumex at home.  IV Bumex was given in the ED, and case discussed with hospitalist Dr. Morin for admission.    Critical Care  Performed by: Jillian Cifuentes MD  Authorized by: Jillian Cifuentes MD  Total critical care time: 45 minutes  Critical care time was exclusive of separately billable procedures and treating other patients.  Critical care was necessary to treat or prevent imminent or life-threatening deterioration of the following conditions: hypoxia  Critical care was time spent  personally by me on the following activities: development of treatment plan with patient or surrogate, discussions with consultants, examination of patient, evaluation of patient's response to treatment, obtaining history from patient or surrogate, ordering and performing treatments and interventions, ordering and review of laboratory studies, ordering and review of radiographic studies and re-evaluation of patient's condition, this excludes any separately billable procedures.          ED Course as of 02/08/22 1818 Tue Feb 08, 2022 1804 Elenita is a 67 year old female with a relevant PMH of metastatic breast cancer, DMT2 on insulin, anemia related to chemo, and lymphedema who presents to the ED for evaluation of sudden onset shortness of breath and hypoxia.    My exam is notable for tachycardia to 130, pulse ox 94% on 6 L by OxiMax, and tachypnea in a woman speaking in 2-3 word sentences.  1+ pitting edema to bilateral lower extremities without tenderness.  Expiratory wheezing in upper lung fields.  Rales in lower lung fields.  No JVD.   1810 I considered a broad differential including PE, flash pulmonary edema, anaphylaxis, acute heart failure, pneumonia, pleural effusion, spontaneous pneumothorax, Covid-19, severe anemia, cardiac arrhythmia, and others       1. Acute respiratory failure with hypoxia (H)        Labs and Imaging:     I have reviewed the relevant laboratory and radiology studies    Procedures:  I was present for the key portions of this procedure: critical care time        [facility] EMERGENCY DEPARTMENT  [address] [place] [id number] [phone]     Jillian Cifuentes MD  02/08/22 2001

## 2022-02-08 NOTE — IP AVS SNAPSHOT
[facility]  [address] [place] [id number]  Phone: [phone]  Fax: [phone]                                  After Visit Summary   2/8/2022    Elenita Moran   MRN: [id number]           After Visit Summary Signature Page    I have received my discharge instructions, and my questions have been answered. I have discussed any challenges I see with this plan with the nurse or doctor.    ..........................................................................................................................................  Patient/Patient Representative Signature      ..........................................................................................................................................  Patient Representative Print Name and Relationship to Patient    ..................................................               ................................................  Date                                   Time    ..........................................................................................................................................  Reviewed by Signature/Title    ...................................................              ..............................................  Date                                               Time          22EPIC Rev 08/18

## 2022-02-09 NOTE — PROGRESS NOTES
Progress Note    Assessment/Plan  67-year-old with metastatic breast cancer, diabetes, chronic MSSA infection of the pelvis who presented to the ED with dizziness weakness and hypoxia.  She has had chronic cough for several weeks.  PET scan done on 2/12 showed progression of underlying breast cancer.  Awaiting oncology to discuss it with the patient about treatment options.  Patient was treated with IV antibiotics and IV Lasix s/p which hypoxia is improving.  ID and pulmonary also consulted.    Acute respiratory failure  --Multifactorial due to acute systolic CHF/pulmonary edema, drug-induced pneumonitis, pneumonia, pleural effusion malignant versus parapneumonic  --Patient is COVID-19 negative  --Initially treated with dexamethasone, Zosyn and vancomycin with IV Lasix  --Pulmonary consulted and did not recommend continuing with dexamethasone and therefore would hold off on the same.  --Continue Zosyn Vanco as per ID, respiratory labs are pending  --Thoracentesis ordered by pulmonary to rule out parapneumonic versus malignant effusion  --Continue supplemental oxygen  --Echo shows EF of 30 to 35%.  BNP is quite elevated at 1500.  Portends guarded prognosis  --Troponin is unremarkable    Breast cancer with metastasis to the liver and bone  --As per oncology notes PET scan done on 2/1 showed progression of the disease  --Patient is on third line chemo with doxorubicin  --Awaiting oncology input regarding more chemotherapy versus hospice care  --Continue methadone  --CODE STATUS discussed and patient wants to be full code    Non-insulin-dependent diabetes  --Hold oral hypoglycemic agents in the hospital  --Order 5 units of Lantus and mealtime insulin  --Order sliding scale NovoLog  --Ordered diabetic diet    Hypomagnesemia  --Continue magnesium replacement as per protocol      History of GERD  --Ordered omeprazole    Decubitus ulcer of the sacrum  --Order wound care    Moderate protein calorie moderation  --Albumin of  3  --Patient has decreased p.o. intake    Barriers to discharge: Acute respiratory failure, Thoracentesis    Anticipated discharge date: 2/12        Subjective  Patient new to me.  Chart reviewed.  Oncology notes with that PET scan from 2/1/2022 shows progression of underlying breast cancer with multiple lesions throughout the liver including left hepatic lobe.  Patient was upset with this finding.  Offered support and defer further discussion regarding treatment options to oncology.  CODE STATUS discussed and she wants to remain full code.  Patient is less short of breath after diuretics and antibiotics.  Pulmonary has ordered thoracentesis.  Echo shows EF of 30 to 35%.    Patient denies any chest pain;review of system is limited.  Objective    /72   Pulse 119   Temp 98.8  F (37.1  C) (Temporal)   Resp (!) 5   Wt 67.1 kg (148 lb)   SpO2 99%   BMI 24.63 kg/m    Weight:   Wt Readings from Last 5 Encounters:   02/08/22 67.1 kg (148 lb)   11/08/21 67.1 kg (148 lb)   08/23/21 70.8 kg (156 lb)   07/25/21 92.5 kg (204 lb)   04/09/18 98.7 kg (217 lb 11.2 oz)       I/O last 3 completed shifts:  In: 750 [P.O.:400; IV Piggyback:350]  Out: 5500 [Urine:5500]  No intake/output data recorded.          Physical Exam  Awake anxious  No pallor, icterus, clubbing, cyanosis  Body mass index is 24.63 kg/m .  Does not appear to be in respiratory distress at rest  No sinus tenderness  Moist membranes  Neck supple  CVS: Systolic murmur heard resp: Minimal crackles at the bases   abd: soft, No t/g/r  Neuro: no involuntary movements such as tremors  Vasc: no leg edema     Pertinent Labs  ----------------------  Recent Labs   Lab 02/09/22  1130 02/09/22  0900 02/09/22  0851 02/08/22  1807 02/08/22  1803   NA  --   --  141  --  138   POTASSIUM  --   --  4.2  --  4.3   CO2  --   --  23  --  19*   BUN  --   --  14  --  15   CR  --   --  0.63 0.6 0.69  0.69   MAG  --   --  1.5*  --  1.7*   * 134* 169*  --  146*   ALBUMIN  --    --  3.0*  --   --    BILITOTAL  --   --  0.4  --   --    ALKPHOS  --   --  216*  --   --    ALT  --   --  10  --   --    AST  --   --  35  --   --      Recent Labs   Lab 22  0851 22  1803   WBC 11.1* 9.6   HGB 8.6* 8.8*   HCT 26.9* 28.2*    329     No results for input(s): INR in the last 168 hours.  Glucose Values Latest Ref Rng & Units 2022   Bedside Glucose (mg/dl )  - -- --   GLUCOSE 70 - 125 mg/dL 146(H) 169(H)   Some recent data might be hidden         Pertinent Radiology   Radiology Results: Personally reviewed impression/s  Echocardiogram Complete    Result Date: 2022  [id number]^[email or web address]^PEPE^[place] [address]  Name: JULES KRUGER MRN: [id number] : 1955 Study Date: 2022 10:45 AM Age: 67 yrs Gender: Female Patient Location: [facility] Reason For Study: 2nd degree AV Block Ordering Physician: PEPE BARRERA Performed By: JASMYNE  BSA: 1.7 m2 Height: 65 in Weight: 148 lb HR: 119 BP: 117/68 mmHg ______________________________________________________________________________ Procedure Complete Echo Adult. Definity (NDC #24869-126) given intravenously. ______________________________________________________________________________ Interpretation Summary  1. The left ventricle is normal in size. Left ventricular systolic performance is moderately reduced. The ejection fraction is estimated to be 30-35%. 2. There is moderate global reduction in left ventricular systolic performance. 3. There is mild aortic insufficiency. 4. There is moderate mitral insufficiency. 5. Normal right ventricular size with mildly reduced right ventricular systolic performance. 6. There is mild to moderate left atrial enlargement.  When compared to the prior real-time echocardiogram dated 2021, there has been a further diminution in left ventricular systolic performance. The degree of mitral  insufficiency has increased from mild-moderate to now moderate. ______________________________________________________________________________ Left ventricle: The left ventricle is normal in size. Left ventricular systolic performance is moderately reduced. The ejection fraction is estimated to be 30-35%. There is moderate global reduction in left ventricular systolic performance. Left ventricular wall thickness is normal.  Assessment of left atrial pressure (LAP): The cumulative findings are indeterminate in evaluating left atrial pressure.  Right ventricle: Normal right ventricular size with mildly reduced right ventricular systolic performance.  Left atrium: There is mild to moderate left atrial enlargement.  Right atrium: The right atrium is of normal size.  IVC: The IVC is of normal caliber.  Aortic valve: The aortic valve is comprised of three cusps. There is no significant aortic stenosis. There is mild aortic insufficiency.  Mitral valve: The mitral valve appears morphologically normal. There is moderate mitral insufficiency.  Tricuspid valve: The tricuspid valve is grossly morphologically normal. There is mild tricuspid insufficiency.  Pulmonic valve: The pulmonic valve is grossly morphologically normal. There is mild pulmonic insufficiency.  Thoracic aorta: The aortic root and proximal ascending aorta are of normal dimension.  Pericardium: There is no significant pericardial effusion. ______________________________________________________________________________ ______________________________________________________________________________ MMode/2D Measurements & Calculations RVDd: 4.3 cm IVSd: 0.75 cm LVIDd: 5.6 cm LVIDs: 4.4 cm LVPWd: 0.99 cm FS: 21.8 % LV mass(C)d: 182.6 grams LV mass(C)dI: 104.9 grams/m2 Ao root diam: 3.0 cm LA dimension: 4.4 cm asc Aorta Diam: 2.7 cm LA/Ao: 1.5 LVOT diam: 1.7 cm LVOT area: 2.2 cm2 LA Volume Indexed (AL/bp): 65.9 ml/m2  RWT: 0.35  Doppler Measurements & Calculations LV  V1 max P.1 mmHg LV V1 max: 112.5 cm/sec LV V1 VTI: 17.2 cm SV(LVOT): 38.5 ml SI(LVOT): 22.1 ml/m2 PA acc time: 0.14 sec  ______________________________________________________________________________ Report approved by: Ashley Goodman 2022 12:49 PM       CT Chest Pulmonary Embolism w Contrast    Result Date: 2022  EXAM: CT CHEST PULMONARY EMBOLISM W CONTRAST LOCATION: [facility] DATE/TIME: 2022 6:08 PM INDICATION: Shortness of breath. PE suspected, high prob COMPARISON: CT chest, abdomen and pelvis 2021 TECHNIQUE: CT chest pulmonary angiogram during arterial phase injection of IV contrast. Multiplanar reformats and MIP reconstructions were performed. Dose reduction techniques were used. CONTRAST: isovue 370 100ml FINDINGS: ANGIOGRAM CHEST: Pulmonary arteries are normal caliber and negative for pulmonary emboli. Thoracic aorta is negative for dissection. No CT evidence of right heart strain. LUNGS AND PLEURA: Moderate sized bilateral pleural effusions and bibasilar atelectasis. Extensive airspace and groundglass opacities throughout both lungs. MEDIASTINUM/AXILLAE: Nonenlarged mediastinal lymph nodes. Right anterior chest wall Port-A-Cath tip in the distal SVC. CORONARY ARTERY CALCIFICATION: None. UPPER ABDOMEN: Normal. MUSCULOSKELETAL: Extensive sclerotic metastasis throughout the visualized skeleton as seen previously. Severe T10 vertebral body compression fractures unchanged. Left mastectomy and breast prosthesis.     IMPRESSION: 1.  No evidence for pulmonary emboli. 2.  Extensive airspace and groundglass opacities throughout both lungs compatible with pneumonia, including Covid 19. 3.  Moderate-sized bilateral pleural effusions and bibasilar atelectasis. 4.  Diffuse skeletal metastasis. Imaging features can be seen with (COVID-19)  pneumonia, though are nonspecific and can occur with a variety of infectious and noninfectious processes.    EKG Results: not  reviewed.

## 2022-02-09 NOTE — ED NOTES
Pt with significant increase in work of breathing. She is tachypneic and hypoxic in the low 80s. Oxygen was placed by nasal cannula. Pt's saturations increased to 89-90% on 5 LPM by NC. Pt was switched to an Oxymask and placed on 6 LPM. Oxygen saturations rebounded appropriately. Provider aware and at bedside. However, she is still tachypneic and tachycardic.

## 2022-02-09 NOTE — PHARMACY-VANCOMYCIN DOSING SERVICE
"Pharmacy Vancomycin Initial Note  Date of Service 2022  Patient's  1955  67 year old, female    Indication: [facility]-Associated Pneumonia    Current estimated CrCl = Estimated Creatinine Clearance: 96.4 mL/min (based on SCr of 0.6 mg/dL).    Creatinine for last 3 days  2022:  6:03 PM Creatinine 0.69 mg/dL;  6:03 PM Creatinine 0.69 mg/dL;  6:07 PM Creatinine POCT 0.6 mg/dL    Recent Vancomycin Level(s) for last 3 days  No results found for requested labs within last 72 hours.      Vancomycin IV Administrations (past 72 hours)      No vancomycin orders with administrations in past 72 hours.                Nephrotoxins and other renal medications (From now, onward)    Start     Dose/Rate Route Frequency Ordered Stop    22 0830  vancomycin (VANCOCIN) 750 mg in sodium chloride 0.9 % 250 mL intermittent infusion        \"Followed by\" Linked Group Details    750 mg  over 60-90 Minutes Intravenous EVERY 12 HOURS 22 0800  furosemide (LASIX) injection 40 mg         40 mg  over 1-3 Minutes Intravenous EVERY 12 HOURS 22 0200  piperacillin-tazobactam (ZOSYN) 3.375 g vial to attach to  mL bag        Note to Pharmacy: Extended infusion dosing to start 6 hours after initial infusion.   \"Followed by\" Linked Group Details    3.375 g  over 240 Minutes Intravenous EVERY 8 HOURS 22  piperacillin-tazobactam (ZOSYN) 3.375 g vial to attach to  mL bag        \"Followed by\" Linked Group Details    3.375 g  over 30 Minutes Intravenous ONCE 22  vancomycin (VANCOCIN) 1,500 mg in sodium chloride 0.9 % 250 mL intermittent infusion        \"Followed by\" Linked Group Details    1,500 mg  over 90 Minutes Intravenous ONCE 22 193  bumetanide (BUMEX) injection 2 mg         2 mg Intravenous ONCE 22 1918            Contrast Orders - past 72 hours (72h ago, onward)            " Start     Dose/Rate Route Frequency Ordered Stop    02/08/22 1900  iopamidol (ISOVUE-370) solution 100 mL         100 mL Intravenous ONCE 02/08/22 1831 02/08/22 1831          [employer] Prediction of Planned Initial Vancomycin Regimen  Loading dose: 1500 mg at 20:30 02/08/2022.  Regimen: 750 mg IV every 12 hours.  Start time: 20:13 on 02/08/2022  Exposure target: AUC24 (range)400-600 mg/L.hr   AUC24,ss: 439 mg/L.hr  Probability of AUC24 > 400: 60 %  Ctrough,ss: 13.9 mg/L  Probability of Ctrough,ss > 20: 21 %  Probability of nephrotoxicity (Lodise LENNY 2009): 9 %          Plan:  1. Start vancomycin  1500mg IV x 1 followed by vancomycin 750 mg IV q12h.   2. Vancomycin monitoring method: AUC  3. Vancomycin therapeutic monitoring goal: 400-600 mg*h/L  4. Pharmacy will check vancomycin levels as appropriate in 1-3 Days.    5. Serum creatinine levels will be ordered daily for the first week of therapy and at least twice weekly for subsequent weeks.      Teri Sheikh, [facility]

## 2022-02-09 NOTE — ED NOTES
Pt having echo done.  She continues to be breathing well without SOB, on 2 liters NC. Cristal is working well.  Pt continues to deny any pain. Continues to be occasionally weepy regarding missing her , he is unable to visit due to the policy. Providing support.

## 2022-02-09 NOTE — CONSULTS
[facility] Pulmonary/Critical Care Consult Team Note    Elenita Moran,  1955, MRN [id number]  Admitting Dx: Acute respiratory failure with hypoxia (H) [J96.01]  Date / Time of Admission:  2022  5:38 PM    Recent Events:  Intake/Output last 3 shifts:  I/O last 3 completed shifts:  In: 350 [IV Piggyback:350]  Out: 3500 [Urine:3500]  Discussed her CT scans  She has had a cough that is productive of clear phlegm  She has shortness of Breath  She has never been on supplemental O2 before  She is currently on 2L NC  She has worsening of her dyspnea when she lays flat  She has been sleeping in a recliner like chair    Assessment/Plan: Elenita Moran is a 67 year old female with PMHx of Metastatic breast carcinoma with bony metastases and bone marrow involvement currently on chemo, DM, MSSA pelvic abscess being treated as an outpt and dental abscess with dyspnea and Chest CT remarkable for bilateral infiltrates and pleural effusion    PULM/ID: New bilateral infiltrates infectious vs drug induced pneumonitis  - sputum culture if able  - will see response to Abx, Vanc and Zosyn  - ID following and ordered studies    Pleural Effusion - malignant vs parapneumonic  - recommend thoracentesis  - placed lab orders    Acute resp failure with hypoxia  - due to above    Immunocompromised   - would encourage primary team to offer COVID vaccination    Medical Care Time excluding procedures and family discussions greater than: 1 Hour        Risk Factors Present on Admission:  Clinically Significant Risk Factors Present on Admission           # Hypomagnesemia: Mg = 1.5 mg/dL (Ref range: 1.8 - 2.6 mg/dL) on admission, will replace as needed     # Platelet Defect: home medication list includes an antiplatelet medication   # Anemia: based on hgb <11           Natalya Zhu DO  Pulmonary and Critical Care Attending  pgr [phone]    Allergies   Allergen Reactions     Gabapentin      Other reaction(s):  *Unknown  Upper body edema/swelling.  Interacted with Oncology treatment.  Upper body edema/swelling.  Interacted with Oncology treatment.       Sulfa Drugs Hives and Rash     welts         Meds: See MAR    Physical Exam:  /59   Pulse 105   Temp 98.8  F (37.1  C) (Temporal)   Resp (!) 5   Wt 67.1 kg (148 lb)   SpO2 100%   BMI 24.63 kg/m    Intake/Output this shift:  I/O this shift:  In: 400 [P.O.:400]  Out: 2000 [Urine:2000]  GEN: sitting up in bed  HEENT: NC in place, MMM  CVS: tachy, regular rhythm, no murmurs  RESP: bilateral rhonchi, no wheezing  ABD: Soft, No abdominal pain with palpation, no guarding, no rigidity  EXT: Warm, well perfused, no edema  NEURO:  Following commands, answering questions, moving all extremities  PSYCH: tearful     Pertinent Labs: Latest lab results in EHR personally reviewed.   CMP  Recent Labs   Lab 02/09/22  1130 02/09/22  0900 02/09/22  0851 02/08/22  1807 02/08/22  1803   NA  --   --  141  --  138   POTASSIUM  --   --  4.2  --  4.3   CHLORIDE  --   --  104  --  106   CO2  --   --  23  --  19*   ANIONGAP  --   --  14  --  13   * 134* 169*  --  146*   BUN  --   --  14  --  15   CR  --   --  0.63 0.6 0.69  0.69   GFRESTIMATED  --   --  >90 >60 >90  >90   BENJI  --   --  8.5  --  8.7   MAG  --   --  1.5*  --  1.7*     CBC  Recent Labs   Lab 02/09/22  0851 02/08/22  1803   WBC 11.1* 9.6   RBC 2.84* 2.93*   HGB 8.6* 8.8*   HCT 26.9* 28.2*   MCV 95 96   MCH 30.3 30.0   MCHC 32.0 31.2*   RDW 19.7* 19.9*    329     INRNo lab results found in last 7 days.  Arterial Blood GasNo lab results found in last 7 days.      Imaging: personally reviewed.   Results for orders placed or performed during the hospital encounter of 02/08/22   CT Chest Pulmonary Embolism w Contrast    Narrative    EXAM: CT CHEST PULMONARY EMBOLISM W CONTRAST  LOCATION: [facility]  DATE/TIME: 2/8/2022 6:08 PM    INDICATION: Shortness of breath. PE suspected, high  prob  COMPARISON: CT chest, abdomen and pelvis 07/19/2021  TECHNIQUE: CT chest pulmonary angiogram during arterial phase injection of IV contrast. Multiplanar reformats and MIP reconstructions were performed. Dose reduction techniques were used.   CONTRAST: isovue 370 100ml    FINDINGS:  ANGIOGRAM CHEST: Pulmonary arteries are normal caliber and negative for pulmonary emboli. Thoracic aorta is negative for dissection. No CT evidence of right heart strain.    LUNGS AND PLEURA: Moderate sized bilateral pleural effusions and bibasilar atelectasis. Extensive airspace and groundglass opacities throughout both lungs.    MEDIASTINUM/AXILLAE: Nonenlarged mediastinal lymph nodes. Right anterior chest wall Port-A-Cath tip in the distal SVC.    CORONARY ARTERY CALCIFICATION: None.    UPPER ABDOMEN: Normal.    MUSCULOSKELETAL: Extensive sclerotic metastasis throughout the visualized skeleton as seen previously. Severe T10 vertebral body compression fractures unchanged. Left mastectomy and breast prosthesis.      Impression    IMPRESSION:  1.  No evidence for pulmonary emboli.  2.  Extensive airspace and groundglass opacities throughout both lungs compatible with pneumonia, including Covid 19.  3.  Moderate-sized bilateral pleural effusions and bibasilar atelectasis.  4.  Diffuse skeletal metastasis.    Imaging features can be seen with (COVID-19)  pneumonia, though are nonspecific and can occur with a variety of infectious and noninfectious processes.       Patient Active Problem List   Diagnosis     Post-mastectomy lymphedema syndrome     Scar condition and fibrosis of skin     Personal history of breast cancer     Diabetes (H)     Obesity (BMI 30-39.9)     Abnormal chest CT     Hip pain     Cancer associated pain     Metastatic breast cancer (H)     Edema of both upper extremities     Insulin dependent diabetes mellitus     Sciatica     Hip pain, right     Elevation of level of transaminase or lactic acid dehydrogenase (LDH)      Palliative care patient     Encounter for palliative care     Depression, unspecified depression type     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, pelvis, initial encounter for fracture     Closed fracture of multiple pubic rami with delayed healing, subsequent encounter     Lymphedema     Pathological fracture, pelvis, initial encounter for fracture     Left groin pain     Confusion     Paraspinal abscess (H)     Port-A-Cath in place     Antineoplastic chemotherapy induced anemia     Acute respiratory failure with hypoxia (H)       Natalya Zhu DO  Pulmonary and Critical Care Attending  pgr [phone]

## 2022-02-09 NOTE — ED NOTES
Pt weepy; purewick dislodged and pt wet and cold, changed bedding, gown, reassured her made her more comfortable. Switched to nc at 3liters

## 2022-02-09 NOTE — ED NOTES
Nursing assessment--    Pt is intermittently weepy regarding hospitalization and her health. Active listening provided.  Purwick was not functioning properly and pt had soaked the bed.  Linen chage, lindsay care and functioning purwick placed.  Pt does have a small nickel sized sore on her coccyx.  Meprilex applies.  Junior LUNA and diet order started.  Pt states she is breathing much better than upon first arrival.  Lasix given this am., on 2 liter NC now and oxygenation is good. Lungs diminished.  She jackie any pain.

## 2022-02-09 NOTE — H&P
Admission History and Physical   Elenita Moran,    1955,   [id number]    [facility]   Acute respiratory failure with hypoxia (H) [J96.01]    PCP: Sandi Jones,    Code status:  Full Code       Extended Emergency Contact Information  Primary Emergency Contact: Denilson Moran  Address: [address] [place] [address] [place]  Home Phone: [phone]  Relation: Spouse  Secondary Emergency Contact: Hakan Moran  Mobile Phone: [phone]  Relation: Son       Active Problems:    Acute respiratory failure with hypoxia (H)     ASSESSMENT AND PLAN:  Acute SOB   CT  chest with no PE , extensive opacities, pleural effusions   DD includes covid PNA, bacterial infection, pulm edema   begin IV abx , iv steroids , iv lasix  Consult ID to help with clarification of etiology   Isolate for now - plan repeat covid test    Check echo to eval LVEF   Tele admit , trend trops, blood cx , procal     Acute resp failure   Due to above   On 4 L/min of O2 in ED   Consult icu if deteriorates     Metastatic breast ca with skeletal mets   No bone pain currently   Prn analgesia     chronic  anemia   No sign of bleeding   daily cbc   transfuse prn     Dm   c/w home insulin  ssi added      Full code     DVT PPX:  lovenox subcutaneous     Barriers to discharge hypoxia     Anticipated Discharge date  inpt > 2 mn       Chief Complaint:  Sob today     HPI:  Pt presents to the ED for evaluation of sudden onset shortness of breath and hypoxia.   Patient was having her ninth infusion of Doxil when she got up to go to the bathroom and noted sudden onset shortness of breath and pleuritic chest pain . In  ED found to be tachycardic around 125 with oxygen saturation at 84%.  Reports mild shortness of breath and cough at night for the last weeks.  Denies fever, chills, sick contacts, abdominal pain, nausea, vomiting, urinary symptoms, melena, diarrhea, or abnormal swelling in legs.  No history of lung  diseases. She is not vaccinated against covid 19.       Medical History  Past Medical History:   Diagnosis Date     Allergic rhinitis      Bone cancer (H) 2011    breast mets     Breast cancer (H) 2010    left mastectomy     Depression      DM (diabetes mellitus) (H)      Fibromyalgia      Hx antineoplastic chemotherapy 2010     Hx of radiation therapy 2010     Hyperlipemia      Lactose intolerance      Mini stroke (H)     R EYE     Osteoarthritis      Tobacco dependency           Surgical History  She  has a past surgical history that includes IR Lumbar Transforaminal Epidural Strd Inj (12/5/2019); IR Chest Port Placement > 5 Yrs of Age (12/12/2019); Mastectomy (Left, 2010); appendectomy; Ovarian Cyst Removal; Insert Intracoronary Stent (1985); other surgical history; Revise reconstructed breast (Left); Mammoplasty reduction (Right, 2015); Ir Lumbar Transforaminal Epidural Steroid Injection (12/5/2019); and Ir Port Placement >5 Years (12/12/2019).      SOCIAL HISTORY:  Social History     Socioeconomic History     Marital status:      Spouse name: Not on file     Number of children: Not on file     Years of education: Not on file     Highest education level: Not on file   Occupational History     Not on file   Tobacco Use     Smoking status: Former Smoker     Packs/day: 0.75     Years: 20.00     Pack years: 15.00     Types: Cigarettes     Smokeless tobacco: Never Used   Substance and Sexual Activity     Alcohol use: Yes     Alcohol/week: 1.0 standard drink     Comment: 1 glass of wine/week     Drug use: No     Sexual activity: Yes     Partners: Male     Birth control/protection: Post-menopausal   Other Topics Concern     Parent/sibling w/ CABG, MI or angioplasty before 65F 55M? 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  She lives with her  and 1 of her sons.         Social Determinants of Health     Financial Resource Strain: Not on file   Food Insecurity:  Not on file   Transportation Needs: Not on file   Physical Activity: Not on file   Stress: Not on file   Social Connections: Not on file   Intimate Partner Violence: Not on file   Housing Stability: Not on file       FAMILY HISTORY:  Family History   Problem Relation Age of Onset     Lung Cancer Mother      Pancreatic Cancer Mother      Kidney Cancer Maternal Grandmother      Lung Cancer Paternal Aunt      Breast Cancer Cousin          ALLERGIES:  Allergies   Allergen Reactions     Gabapentin      Other reaction(s): *Unknown  Upper body edema/swelling.  Interacted with Oncology treatment.  Upper body edema/swelling.  Interacted with Oncology treatment.       Sulfa Drugs Hives and Rash     welts         MEDICATIONS:  (Not in a hospital admission)        ROS:  12 point review of systems reviewed and is negative except as stated above      PHYSICAL EXAM:  /82   Pulse 114   Temp 98.8  F (37.1  C) (Temporal)   Resp 22   Wt 67.1 kg (148 lb)   SpO2 95%   BMI 24.63 kg/m      Physical Exam  aao3   HENT:      Head: Normocephalic.   Cardiovascular:      Rate and Rhythm: Regular rhythm.      Heart sounds: Normal heart sounds.      Comments: Tachycardic to 130. No JVD  Pulmonary:   Basal crackles   Abdominal:      General: There is no distension.      Palpations: Abdomen is soft.      Tenderness: There is no abdominal tenderness. There is no guarding or rebound.   Musculoskeletal:         Comments: 1+ pitting edema to BLEs. No lower extremity TTP or erythema   Neurological:      General: No focal deficit present.      Mental Status: She is alert.       DIAGNOSTIC DATA:        EKG Results: Personally reviewed        Advanced Care Planning       Niko Morin MD

## 2022-02-09 NOTE — PHARMACY-ADMISSION MEDICATION HISTORY
Pharmacy Note - Admission Medication History    Pertinent Provider Information:   - patient states that she previously used medical cannabis, but has not had it refilled in a few months  - [facility] and is on cephalexin for a staph infection   ______________________________________________________________________    Prior To Admission (PTA) med list completed and updated in EMR.       PTA Med List   Medication Sig Last Dose     aspirin 81 MG EC tablet Take 81 mg by mouth daily 2/7/2022 at pm     bumetanide (BUMEX) 1 MG tablet Take 1 mg by mouth daily as needed (feet edema)  Unknown at prn     calcium carbonate (CALCIUM CARBONATE) 600 MG tablet Take 1,000 mg by mouth daily  2/7/2022 at Unknown time     cephALEXin (KEFLEX) 500 MG capsule Take 1 capsule (500 mg) by mouth 2 times daily 2/8/2022 at am     chlorhexidine (PERIDEX) 0.12 % solution Take 15 mLs by mouth daily as needed  Unknown at prn     Cholecalciferol (VITAMIN D) 125 MCG (5000 UT) capsule Take 1 tablet by mouth daily  2/7/2022 at Unknown time     clobetasol (TEMOVATE) 0.05 % external ointment Apply topically twice a week Past Week at Unknown time     DOXOrubicin HCl Liposomal (DOXIL IV) Inject into the vein every 28 (twenty-eight) days  2/8/2022 at Unknown time     DULoxetine (CYMBALTA) 30 MG capsule Take 60 mg by mouth every evening 2/7/2022 at pm     DULoxetine (CYMBALTA) 30 MG capsule Take 30 mg by mouth every morning  2/8/2022 at am     ezetimibe-simvastatin (VYTORIN) 10-20 MG tablet Take 1 tablet by mouth daily 2/7/2022 at pm     HYDROmorphone (DILAUDID) 4 MG tablet Take 4 mg by mouth 3 times daily  2/8/2022 at am     hydrOXYzine (VISTARIL) 25 MG capsule Take 25 mg by mouth 3 times daily With hydromorphone 2/8/2022 at am     ibuprofen (ADVIL/MOTRIN) 200 MG tablet Take 200 mg by mouth 3 times daily  2/8/2022 at am     IRON-VIT C-VIT B12-FOLIC ACID (GENTLE IRON) 28-60-0.008-0.4 MG CAPS Take 1 capsule by mouth daily 2/7/2022 at  Unknown time     lidocaine (XYLOCAINE) 5 % external ointment Apply topically 4 times daily (Patient taking differently: Apply topically 4 times daily as needed for moderate pain ) Unknown at prn     methadone (DOLOPHINE) 5 MG tablet Take 5 mg by mouth At Bedtime 2/7/2022 at hs     methadone (DOLOPHINE) 5 MG tablet Take 2.5 mg by mouth every morning Sometimes takes an additional 2.5mg in the afternoon if she needs it 2/8/2022 at am     OLANZapine (ZYPREXA) 5 MG tablet Take 5 mg by mouth every evening  2/7/2022 at Unknown time     omeprazole (PRILOSEC) 20 MG DR capsule Take 20 mg by mouth At Bedtime  2/7/2022 at Unknown time     oxybutynin (DITROPAN) 5 MG tablet Take 5 mg by mouth 2 times daily  2/8/2022 at am     oxybutynin ER (DITROPAN-XL) 10 MG 24 hr tablet Take 10 mg by mouth At Bedtime  2/7/2022 at hs     polyethylene glycol (MIRALAX) 17 GM/Dose powder Take 17 g by mouth daily Unknown at Unknown time     Prenatal Vit-Fe Fumarate-FA (PRENATAL MULTIVITAMIN  PLUS IRON) 27-1 MG TABS Take by mouth daily 2/7/2022 at Unknown time     psyllium (METAMUCIL/KONSYL) Packet Take 1 packet by mouth daily as needed for constipation Unknown at prn     senna-docusate (SENOKOT-S/PERICOLACE) 8.6-50 MG tablet Take 1 tablet by mouth daily 2/8/2022 at am     vitamin (B COMPLEX) tablet Take 1 tablet by mouth daily 2/7/2022 at Unknown time     zolpidem (AMBIEN) 10 MG tablet Take 5 mg by mouth At Bedtime  2/7/2022 at hs       Information source(s): Patient, Clinic records and [facility]  Method of interview communication: in-person    Summary of Changes to PTA Med List  New: senna-doc,metamucil, clobetasol oint,  Discontinued: insulin, capecitabine,megestrol,  Changed: zolpidem, vitD, oxybutynin, methadone, chlorhexidine, hydromorphone, hydroxyzine, ibuprofen, bumex, calcium    Patient was asked about OTC/herbal products specifically.  PTA med list reflects this.    In the past week, patient estimated taking medication  this percent of the time:  greater than 90%.    Allergies were reviewed, assessed, and updated with the patient.      Patient does not use any multi-dose medications prior to admission.    The information provided in this note is only as accurate as the sources available at the time of the update(s).    Thank you for the opportunity to participate in the care of this patient.    Sandi Lacey [facility]  2/8/2022 8:25 PM

## 2022-02-09 NOTE — CONSULTS
Consultation - [facility]  Elenita Moran,  1955, MRN [id number]    [facility]  Acute respiratory failure with hypoxia (H) [J96.01]    PCP: Sandi Jones, [phone]   Code status:  Full Code       Extended Emergency Contact Information  Primary Emergency Contact: Denilson Moran  Address: [address] [place] [address]  Home Phone: [phone]  Relation: Spouse  Secondary Emergency Contact: Hakan Moran  Mobile Phone: [phone]  Relation: Son       Assessment and Plan   Active Problems:    Acute respiratory failure with hypoxia (H)    Impression: Hypoxemia, cough for several weeks.  New infiltrates on chest CT that were not there 6 months ago.    Patient receiving doxorubicin for metastatic breast cancer.    Recommendations: Agree with work-up to date.    Send a respiratory multiplex PCR.    Check antigens for Legionella and pneumococcus.    This may not be infectious given doxorubicin can also cause pneumonitis.    Would recommend pulmonary medicine consultation.    Thank you for consulting [facility]    Sahil Garza MD  [phone]     Chief Complaint <principal problem not specified>       HPI   We have been requested by Dr. Niko Morin to evaluate Elenita Moran for hypoxemia who is a 67 year old year old female.    Patient is a 67-year-old woman well-known to me from multiple previous consultations and she is a regular patient in my clinic for chronic MSSA infection of pelvis.    She has been battling metastatic breast cancer, with most recent PET/CT in December showing stability.    She comes to the ED yesterday from chemotherapy where she was dizzy, weak and hypoxic.    She says she has had a chronic cough for several weeks.  Is been productive of mostly clear phlegm although occasional colored material in it.    No fevers, chills, sweats, pain,  "nausea, vomiting, diarrhea.  She has a Port-A-Cath in her right infraclavicular fossa.    He says she stays home and only leaves the house to go to doctors appointments.    She has not had Covid vaccinations because \"I do not trust the government\"    PCR for COVID-19 and influenza is negative here in the ED.         Medical History  Patient Active Problem List   Diagnosis     Post-mastectomy lymphedema syndrome     Scar condition and fibrosis of skin     Personal history of breast cancer     Diabetes (H)     Obesity (BMI 30-39.9)     Abnormal chest CT     Hip pain     Cancer associated pain     Metastatic breast cancer (H)     Edema of both upper extremities     Insulin dependent diabetes mellitus     Sciatica     Hip pain, right     Elevation of level of transaminase or lactic acid dehydrogenase (LDH)     Palliative care patient     Encounter for palliative care     Depression, unspecified depression type     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, pelvis, initial encounter for fracture     Closed fracture of multiple pubic rami with delayed healing, subsequent encounter     Lymphedema     Pathological fracture, pelvis, initial encounter for fracture     Left groin pain     Confusion     Paraspinal abscess (H)     Port-A-Cath in place     Antineoplastic chemotherapy induced anemia     Acute respiratory failure with hypoxia (H)     Past Medical History:   Diagnosis Date     Allergic rhinitis      Bone cancer (H) 2011    breast mets     Breast cancer (H) 2010    left mastectomy     Depression      DM (diabetes mellitus) (H)      Fibromyalgia      Hx antineoplastic chemotherapy 2010     Hx of radiation therapy 2010     Hyperlipemia      Lactose intolerance      Mini stroke (H)     R EYE     Osteoarthritis      Tobacco dependency     Surgical History  She  has a past surgical history that includes IR Lumbar Transforaminal Epidural Strd Inj (12/5/2019); IR Chest Port Placement > 5 Yrs of Age (12/12/2019); " Mastectomy (Left, 2010); appendectomy; Ovarian Cyst Removal; Insert Intracoronary Stent (1985); other surgical history; Revise reconstructed breast (Left); Mammoplasty reduction (Right, 2015); Ir Lumbar Transforaminal Epidural Steroid Injection (12/5/2019); and Ir Port Placement >5 Years (12/12/2019).   Social History  Reviewed, and she  reports that she has quit smoking. Her smoking use included cigarettes. She has a 15.00 pack-year smoking history. She has never used smokeless tobacco. She reports current alcohol use of about 1.0 standard drink of alcohol per week. She reports that she does not use drugs.   Allergies  Allergies   Allergen Reactions     Gabapentin      Other reaction(s): *Unknown  Upper body edema/swelling.  Interacted with Oncology treatment.  Upper body edema/swelling.  Interacted with Oncology treatment.       Sulfa Drugs Hives and Rash     welts      Family History  Noncontributory to current problem except as mentioned above    Psychosocial Needs  Social History     Social History Narrative    Patient works at home, owns [employer].  She lives with her  and 1 of her sons.         Additional psychosocial needs reviewed per nursing assessment.     Prior to Admission Medications   (Not in a hospital admission)         Review of Systems:  Pertinent items are noted in HPI., otherwise all others negative. Physical Exam:  Temp:  [98.8  F (37.1  C)] 98.8  F (37.1  C)  Pulse:  [] 102  Resp:  [14-38] 22  BP: (109-134)/(58-82) 114/65  SpO2:  [84 %-100 %] 99 %    /65   Pulse 102   Temp 98.8  F (37.1  C) (Temporal)   Resp 22   Wt 67.1 kg (148 lb)   SpO2 99%   BMI 24.63 kg/m    General appearance: alert, appears stated age and cooperative  Head: Normocephalic, without obvious abnormality, atraumatic  Eyes: EOMI, no icterus  Neck: no adenopathy and supple, symmetrical, trachea midline  Lungs: Fairly clear no rhonchi rales or wheezes are appreciated.  Heart: Regular rate and  rhythm, no murmur appreciated  Abdomen: soft, non-tender; bowel sounds normal; no masses,  no organomegaly  Extremities: Warm and dry, no significant edema  Skin: Skin color, texture, turgor normal. No rashes or lesions  Neurologic: Grossly normal       Pertinent Labs  Lab Results: personally reviewed.   WBC Count   Date/Time Value Ref Range Status   02/09/2022 08:51 AM 11.1 (H) 4.0 - 11.0 10e3/uL Final   02/08/2022 06:03 PM 9.6 4.0 - 11.0 10e3/uL Final   09/16/2021 01:00 PM 8.4 4.0 - 11.0 10e3/uL Final     Hemoglobin   Date/Time Value Ref Range Status   02/09/2022 08:51 AM 8.6 (L) 11.7 - 15.7 g/dL Final   02/08/2022 06:03 PM 8.8 (L) 11.7 - 15.7 g/dL Final   09/16/2021 01:00 PM 7.8 (L) 11.7 - 15.7 g/dL Final     Hematocrit   Date/Time Value Ref Range Status   02/09/2022 08:51 AM 26.9 (L) 35.0 - 47.0 % Final   02/08/2022 06:03 PM 28.2 (L) 35.0 - 47.0 % Final   09/16/2021 01:00 PM 26.0 (L) 35.0 - 47.0 % Final     Platelet Count   Date/Time Value Ref Range Status   02/09/2022 08:51  150 - 450 10e3/uL Final   02/08/2022 06:03  150 - 450 10e3/uL Final   09/16/2021 01:00  150 - 450 10e3/uL Final        Sodium   Date/Time Value Ref Range Status   02/08/2022 06:03  136 - 145 mmol/L Final   07/26/2021 06:57  136 - 145 mmol/L Final     Comment:     Standard Replacement.   07/17/2021 07:05  136 - 145 mmol/L Final     Carbon Dioxide (CO2)   Date/Time Value Ref Range Status   02/08/2022 06:03 PM 19 (L) 22 - 31 mmol/L Final   07/26/2021 06:57 AM 27 22 - 31 mmol/L Final     Comment:     Standard Replacement.   07/17/2021 07:05 AM 19 (L) 22 - 31 mmol/L Final     Urea Nitrogen   Date/Time Value Ref Range Status   02/08/2022 06:03 PM 15 8 - 22 mg/dL Final   07/26/2021 06:57 AM 7 (L) 8 - 22 mg/dL Final     Comment:     Standard Replacement.   07/17/2021 07:05 AM 12 8 - 22 mg/dL Final     Results for JULES KRUGER (MRN [id number]) as of 2/9/2022 09:35   Ref. Range 2/8/2022 18:03   BNP  Latest Ref Range: 0 - 112 pg/mL 1,511 (H)   Lactic Acid Latest Ref Range: 0.7 - 2.0 mmol/L 1.5   Troponin I Latest Ref Range: 0.00 - 0.29 ng/mL 0.12        Pertinent Radiology  Radiology Results: CT scan from yesterday is personally reviewed and compared with CT scan from July 2021    CT Chest Pulmonary Embolism w Contrast    Result Date: 2/8/2022  EXAM: CT CHEST PULMONARY EMBOLISM W CONTRAST LOCATION: [facility] DATE/TIME: 2/8/2022 6:08 PM INDICATION: Shortness of breath. PE suspected, high prob COMPARISON: CT chest, abdomen and pelvis 07/19/2021 TECHNIQUE: CT chest pulmonary angiogram during arterial phase injection of IV contrast. Multiplanar reformats and MIP reconstructions were performed. Dose reduction techniques were used. CONTRAST: isovue 370 100ml FINDINGS: ANGIOGRAM CHEST: Pulmonary arteries are normal caliber and negative for pulmonary emboli. Thoracic aorta is negative for dissection. No CT evidence of right heart strain. LUNGS AND PLEURA: Moderate sized bilateral pleural effusions and bibasilar atelectasis. Extensive airspace and groundglass opacities throughout both lungs. MEDIASTINUM/AXILLAE: Nonenlarged mediastinal lymph nodes. Right anterior chest wall Port-A-Cath tip in the distal SVC. CORONARY ARTERY CALCIFICATION: None. UPPER ABDOMEN: Normal. MUSCULOSKELETAL: Extensive sclerotic metastasis throughout the visualized skeleton as seen previously. Severe T10 vertebral body compression fractures unchanged. Left mastectomy and breast prosthesis.     IMPRESSION: 1.  No evidence for pulmonary emboli. 2.  Extensive airspace and groundglass opacities throughout both lungs compatible with pneumonia, including Covid 19. 3.  Moderate-sized bilateral pleural effusions and bibasilar atelectasis. 4.  Diffuse skeletal metastasis. Imaging features can be seen with (COVID-19)  pneumonia, though are nonspecific and can occur with a variety of infectious and noninfectious processes.

## 2022-02-09 NOTE — PROGRESS NOTES
Elenita Moran MRN# [id number]   YOB: 1955 Age: 67 year old   Date of Admission: 2/8/2022  Requesting physician:   Reason for consult:            Assessment and Plan:     The following is the patient's outpatient oncology office note from 2/8/2022 when she was seen. Please do not hesitate to call if there are any questions or concerns.        Assessment  1. Metastatic breast carcinoma with bony metastases and bone marrow involvement, on 3rd line chemo Doxil , on PET scan from 11/9/21 stable disease; await recent PET scan.    2. Persistent non-radiating, moderate bony pain has improved after radiation    3. Iron deficiency anemia, due to malabsorption, oral iron intolerant; Hgb is 7.6 today,2/8/2022.    4. Persistent pain in the right hip, probably due to sciatica.    5. Sacral fractures, swelling of left psoas and left gluteus medius, osteoporosis vs, post-radiation effect vs. metastases    6.  Peripheral neuropathy grade 2-3 in upper and lower extremities.    7.  DM type 2, on insulin , improved    8.  Dysphagia, probably related to hiatal hernia, encouraged to increase oral intake and hydration    9.  Gastroesophageal reflux, on omeprazole, managed reasonably well.    10.  Excruciating pain on the right side not controlled well with fentanyl patch and hydromorphone    11.  Hand-foot syndrome grade 2, discussed moisturizing medications four times a day.    12.  The patient has multiple abscesses in the right gluteal region. She is on 1 intravenous antibiotics 3 times a day; she is following ID.    13.  The patient has developed a pressure sore of the sacrum. She will need to see wound care.    14.  Hypoglycemia, glucose reading today is 66.  She did take 12 grams of glucose tablets in exam room.    15. Shortness of breath, mutlifactorial etiology -  pneumonia vs anemia vs PE vs chemotherapy induced; will send to ER for further evaluation.    Plan    The patient will proceed with cycle  9 of Doxil 50 mg/m  IV every 28 days.    The patient will get Neulasta support.    The patient will continue olanzapine 5 mg at bedtime to prevent nausea and vomiting.    Repeat Echo from 12/23/21 is 40-45%.    She also has a pressure sore of the sacrum. She will need wound care involvement.    The patient will follow up with provider next week with Dr. Marsh with CBC CMP CA27-29 CEA.    PET scan was done last week; will obtain report.    During her infusion, the patient used the bathroom and became weak. Her oxygen saturation declined rapidly to 85% on room air. She is currently on 2 L of supplemental NC oxygen and Oxygen saturation is now up at 95%.    On clinical exam, crackles heard in right posterior lung. Suspect pneumonia; plan to treat with antibiotic but given the acute shortness of breath and drop in oxygen saturation I discussed with the patient that she needs to go to the ER for further evaluation and treatment. I recommend a CT chest angiogram to rule out PE and to see if she has pneumonia; covid testing; and getting an echocardiogram as she is on Doxil and her last Echo from 2 months ago was 40-45%. Her hgb is 7.6; she is tachycardic and more short of breath. She will need 2 units of pRBC. Case discussed with ER physician Dr. Aguiar who will await her arrival.    Addendum to office note on 2/8/22  We obtained the patient's recent PET scan result that was done on 2/1/22. Results show persistent widespread osseous metastases with development of innumerable lesions throughout the liver parenchyma including an expample int he inferior left hepatic lobe suspicious for progression of disease. There is also development of bilateral airspace opacities and bilateral pleural effusions suspicious for multifocal inflammatory/infectious process.     Since we just received the PET scan report, we HAVE NOT discussed the above information with her. She has is not responding to treatment and has progressed. We will  discuss the findings with the patient as well as discuss further treatment options. At this time, we would like her to focus on recovering from her acute symptoms.     Case disccussed with Dr. Marsh.    Clara Cook PA-C  [facility]  Office: [phone]               Chief Complaint:   Shortness of Breath           History of Present Illness:   Mrs. Elenita Moran is a 66-year-old woman with metastatic lobular carcinoma of left breast to multiple bone sites she is here for radiation follow-up.    ONCOLOGIC HISTORY:    1.     The patient had presented with multifocal tumor in the left breast, 7 x 6 x 4 cm, in August 2010.    2.     The patient underwent left breast mastectomy. Her tumor was ER positive 63%, MS positive 17%, and HER-2 negative by FISH.    3.     The patient was treated with Adriamycin plus Cytoxan, followed by weekly Taxol.    4.     In May 2011, the patient had started anastrozole 1 mg orally daily.    5.     The patient was found to have a bony metastasis and the patient underwent radiation from July 2011 until October 2011 for L1, L2, and L3 vertebral bodies.    6.     In January 2015, PET scan showed no evidence of malignancy.    7.  On January 31, 2017 patient has started second line hormonal treatment with Faslodex plus Ibrance.    8.  The patient also underwent CyberKnife to painful L3 vertebral body mass she completed 2400 cGy in 4 fractions on 3/28/2017.  Prior to that she was also treated to a right rib lesion as well as T4, T6-T7 and a posterior left-sided rib lesion.  The latter received 3000 cGy in 10 fractions completing those treatments 3/14/2017..    9. She had disease progression in March 2019 per PET imaging and therapy was changed to everolimus and exemestane on 4/24/19.    10. From 12/6/2019 until 3/30/2020 she was treated with 1st line chemo (refused PIQRAY) and has completed 6 cycles of Abraxane and but discontinue due to neuropathy. She had partial response to  treatment.     11. On 3/30/2020 she started 3rd line hormonal treatment: Piqray orally daily plus Faslodex.     12. On 9/24/2020 CT chest abdomen and pelvis Stable appearance of the sacral fractures, left superior and inferior pubic rami fracture, and T10 vertebral body fracture and left 11th rib posteriorly.    13. On 2/9/2021 PET scan demonstrated progressive disease with renewed metabolic activity in a few scattered skeletal metastases. Significant muscular FDG uptake, which decreases the bioavailability of FDG for tumor. As a result, the scan likely underestimates the extent of disease activity. Healing fractures in the pelvis and spine with a presumed small hematoma overlying the sacral fractures.    14. On 2/23/2021 she started Xeloda orally due to progression.     15. On 4/23/2021 PET scan showed mild progression in anterolateral 5th rib and possible liver lesion.     16. On 5/3/2021 she has started Doxil 50 mg/m2 IV every 4 weeks    17. On 7/19/2021 CT CAP showed mild fullness in right paraspinal muscles. left obturator ring fracture. complex B/L sacral ala fractures.     18. On 7/21/2021 MRI of pelvis showed two complex fluid collections along fracured left superior and inferior pubic rami    19. On 8/5/2021 PET/CT showed . While there is persistent FDG avid disease throughout the osseous structures, there is resolution of the right hepatic lobe lesion suggesting partial response to therapy. worsening inflammatory change associated with the pathologically fractured left anterior pubic ramus and right sacrum/iliac bone with development of right gluteal and right paraspinal intramuscular hematomas.    20.  On 11/9/2021 PET scan revealed stable bony metastases. No worsening.    Interval History (HPI from 2/8/2022)  Elenita is seen today with her .  Overall she has been feeling about the same.  Over the last week she has noticed slightly more shortness of breath.  Her hemoglobin is low at 7.6.  Her  appetite is fair.  She denies any chest pain.  She rarely feels nauseated.  Her energy remains low.  Today her heart rate is 107 bpm.  Reports of having a pressure sore at her sacrum.  She and her  have been applying Neosporin and alternating it with zinc oxide.  The area does not have any swelling, redness, or discharge.  The patient's  inquires about the need for a wound care nurse.  She continues to have bilateral peripheral edema.  Her last ejection fraction was 40 to 45%.  She had a PET scan that was done last week; we currently do not have the report. Today the patient denies any fevers, chills, URI-like symptoms, abdominal discomfort, diarrhea, constipation, or rash.           Physical Exam:   Vitals were reviewed  Blood pressure 117/68, pulse 105, temperature 98.8  F (37.1  C), temperature source Temporal, resp. rate (!) 5, weight 67.1 kg (148 lb), SpO2 100 %.  Temperatures:  Current - Temp: 98.8  F (37.1  C); Max - Temp  Av.8  F (37.1  C)  Min: 98.8  F (37.1  C)  Max: 98.8  F (37.1  C)  Respiration range: Resp  Av.3  Min: 5  Max: 38  Pulse range: Pulse  Av.9  Min: 96  Max: 129  Blood pressure range: Systolic (24hrs), Av , Min:109 , Max:134   ; Diastolic (24hrs), Av, Min:58, Max:82    Pulse oximetry range: SpO2  Av %  Min: 78 %  Max: 100 %    Intake/Output Summary (Last 24 hours) at 2022 1145  Last data filed at 2022 1050  Gross per 24 hour   Intake 750 ml   Output 5000 ml   Net -4250 ml                     Past Medical History:   I have reviewed this patient's past medical history  Past Medical History:   Diagnosis Date     Allergic rhinitis      Bone cancer (H) 2011    breast mets     Breast cancer (H) 2010    left mastectomy     Depression      DM (diabetes mellitus) (H)      Fibromyalgia      Hx antineoplastic chemotherapy 2010     Hx of radiation therapy 2010     Hyperlipemia      Lactose intolerance      Mini stroke (H)     R EYE     Osteoarthritis       Tobacco dependency              Past Surgical History:   I have reviewed this patient's past surgical history  Past Surgical History:   Procedure Laterality Date     APPENDECTOMY       INSERT INTRACORONARY STENT  1985    R Hand     IR CHEST PORT PLACEMENT > 5 YRS OF AGE  12/12/2019     IR LUMBAR TRANSFORAMINAL EPIDURAL STEROID INJECTION  12/5/2019     IR LUMBAR TRANSFORAMINAL EPIDURAL STRD INJ  12/5/2019     IR PORT PLACEMENT >5 YEARS  12/12/2019     MAMMOPLASTY REDUCTION Right 2015    hx of left mastectomy and right breast reduction     MASTECTOMY Left 2010     OTHER SURGICAL HISTORY      biopsy of upper and right tongue     OVARIAN CYST REMOVAL       REVISE RECONSTRUCTED BREAST Left     March 11, 2015               Social History:   I have reviewed this patient's social history  Social History     Tobacco Use     Smoking status: Former Smoker     Packs/day: 0.75     Years: 20.00     Pack years: 15.00     Types: Cigarettes     Smokeless tobacco: Never Used   Substance Use Topics     Alcohol use: Yes     Alcohol/week: 1.0 standard drink     Comment: 1 glass of wine/week             Family History:   I have reviewed this patient's family history  Family History   Problem Relation Age of Onset     Lung Cancer Mother      Pancreatic Cancer Mother      Kidney Cancer Maternal Grandmother      Lung Cancer Paternal Aunt      Breast Cancer Cousin              Allergies:     Allergies   Allergen Reactions     Gabapentin      Other reaction(s): *Unknown  Upper body edema/swelling.  Interacted with Oncology treatment.  Upper body edema/swelling.  Interacted with Oncology treatment.       Sulfa Drugs Hives and Rash     welts               Medications:   I have reviewed this patient's current medications  (Not in a hospital admission)    Current Facility-Administered Medications Ordered in Epic   Medication Dose Route Frequency Last Rate Last Admin     acetaminophen (TYLENOL) tablet 650 mg  650 mg Oral Q4H PRN          dexamethasone (DECADRON) injection 6 mg  6 mg Intravenous Q24H         glucose gel 15-30 g  15-30 g Oral Q15 Min PRN        Or     dextrose 50 % injection 25-50 mL  25-50 mL Intravenous Q15 Min PRN        Or     glucagon injection 1 mg  1 mg Subcutaneous Q15 Min PRN         enoxaparin ANTICOAGULANT (LOVENOX) injection 40 mg  40 mg Subcutaneous Q24H   40 mg at 02/08/22 2026     furosemide (LASIX) injection 40 mg  40 mg Intravenous Q12H   40 mg at 02/09/22 0756     insulin aspart (NovoLOG) injection (RAPID ACTING)   Subcutaneous QAM AC         insulin aspart (NovoLOG) injection (RAPID ACTING)   Subcutaneous Daily with lunch         insulin aspart (NovoLOG) injection (RAPID ACTING)   Subcutaneous Daily with supper         insulin aspart (NovoLOG) injection (RAPID ACTING)  1-7 Units Subcutaneous TID AC         insulin glargine (LANTUS PEN) injection 10 Units  10 Units Subcutaneous At Bedtime         lidocaine (LMX4) cream   Topical Q1H PRN         lidocaine 1 % 0.1-1 mL  0.1-1 mL Other Q1H PRN         melatonin tablet 1 mg  1 mg Oral At Bedtime PRN         piperacillin-tazobactam (ZOSYN) 3.375 g vial to attach to  mL bag  3.375 g Intravenous Q8H   3.375 g at 02/09/22 1045     sodium chloride (PF) 0.9% PF flush 3 mL  3 mL Intracatheter Q8H   3 mL at 02/09/22 0802     sodium chloride (PF) 0.9% PF flush 3 mL  3 mL Intracatheter q1 min prn         vancomycin (VANCOCIN) 750 mg in sodium chloride 0.9 % 250 mL intermittent infusion  750 mg Intravenous Q12H   Stopped at 02/09/22 0945     Current Outpatient Medications Ordered in Epic   Medication     aspirin 81 MG EC tablet     bumetanide (BUMEX) 1 MG tablet     calcium carbonate (CALCIUM CARBONATE) 600 MG tablet     cephALEXin (KEFLEX) 500 MG capsule     chlorhexidine (PERIDEX) 0.12 % solution     Cholecalciferol (VITAMIN D) 125 MCG (5000 UT) capsule     clobetasol (TEMOVATE) 0.05 % external ointment     DOXOrubicin HCl Liposomal (DOXIL IV)     DULoxetine (CYMBALTA) 30  MG capsule     DULoxetine (CYMBALTA) 30 MG capsule     ezetimibe-simvastatin (VYTORIN) 10-20 MG tablet     HYDROmorphone (DILAUDID) 4 MG tablet     hydrOXYzine (VISTARIL) 25 MG capsule     ibuprofen (ADVIL/MOTRIN) 200 MG tablet     IRON-VIT C-VIT B12-FOLIC ACID (GENTLE IRON) 28-60-0.008-0.4 MG CAPS     lidocaine (XYLOCAINE) 5 % external ointment     methadone (DOLOPHINE) 5 MG tablet     methadone (DOLOPHINE) 5 MG tablet     OLANZapine (ZYPREXA) 5 MG tablet     omeprazole (PRILOSEC) 20 MG DR capsule     oxybutynin (DITROPAN) 5 MG tablet     oxybutynin ER (DITROPAN-XL) 10 MG 24 hr tablet     polyethylene glycol (MIRALAX) 17 GM/Dose powder     Prenatal Vit-Fe Fumarate-FA (PRENATAL MULTIVITAMIN  PLUS IRON) 27-1 MG TABS     psyllium (METAMUCIL/KONSYL) Packet     senna-docusate (SENOKOT-S/PERICOLACE) 8.6-50 MG tablet     vitamin (B COMPLEX) tablet     zolpidem (AMBIEN) 10 MG tablet     Continuous Blood Gluc  (FREESTYLE KIMBERLY 14 DAY READER) COLETTE     Continuous Blood Gluc Sensor (FREESTYLE KIMBERLY 14 DAY SENSOR) [facility]     medical cannabis (Patient's own supply)     nystatin (MYCOSTATIN) 336995 UNIT/GM external cream             Review of Systems:     The 10 point Review of Systems is negative other than noted in the HPI.            Data:   Data   Results for orders placed or performed during the hospital encounter of 02/08/22 (from the past 24 hour(s))   ECG 12-LEAD WITH MUSE (LHE)   Result Value Ref Range    Systolic Blood Pressure  mmHg    Diastolic Blood Pressure  mmHg    Ventricular Rate 123 BPM    Atrial Rate 123 BPM    SD Interval 158 ms    QRS Duration 78 ms     ms    QTc 460 ms    P Axis 62 degrees    R AXIS 33 degrees    T Axis -58 degrees    Interpretation ECG       Sinus tachycardia  Anteroseptal infarct , age undetermined  Abnormal ECG  When compared with ECG of 27-JUL-2021 12:40,  Anteroseptal infarct is now Present  ST now depressed in Inferior leads  Nonspecific T wave abnormality no longer evident  in Anterior leads  Confirmed by SEE ED PROVIDER NOTE FOR, ECG INTERPRETATION ([id number]),  JONH ERAZO ([id number]) on 2/9/2022 5:51:38 AM     Symptomatic; Yes; 2/7/2022 Influenza A/B & SARS-CoV2 (COVID-19) Virus PCR Multiplex Nasopharyngeal    Specimen: Nasopharyngeal; Swab   Result Value Ref Range    Influenza A PCR Negative Negative    Influenza B PCR Negative Negative    SARS CoV2 PCR Negative Negative    Narrative    Testing was performed using the armando SARS-CoV-2 & Influenza A/B Assay on the [employer] System. This test should be ordered for the detection of SARS-CoV-2 and influenza viruses in individuals who meet clinical and/or epidemiological criteria. Test performance is unknown in asymptomatic patients. This test is for in vitro diagnostic use under the FDA EUA for laboratories certified under CLIA to perform moderate and/or high complexity testing. This test has not been FDA cleared or approved. A negative result does not rule out the presence of PCR inhibitors in the specimen or target RNA in concentration below the limit of detection for the assay. If only one viral target is positive but coinfection with multiple targets is suspected, the sample should be re-tested with another FDA cleared, approved or authorized test, if coinfection would change clinical management. [facility] are certified under the Clinical Laboratory Improvement Amendments of 1988 (CLIA-88) as  qualified to perform moderate and/or high complexity laboratory testing.   CBC with platelets differential    Narrative    The following orders were created for panel order CBC with platelets differential.  Procedure                               Abnormality         Status                     ---------                               -----------         ------                     CBC with platelets and d...[[id number]]  Abnormal            Final result                 Please view results for these tests on the individual  orders.   Basic metabolic panel   Result Value Ref Range    Sodium 138 136 - 145 mmol/L    Potassium 4.3 3.5 - 5.0 mmol/L    Chloride 106 98 - 107 mmol/L    Carbon Dioxide (CO2) 19 (L) 22 - 31 mmol/L    Anion Gap 13 5 - 18 mmol/L    Urea Nitrogen 15 8 - 22 mg/dL    Creatinine 0.69 0.60 - 1.10 mg/dL    Calcium 8.7 8.5 - 10.5 mg/dL    Glucose 146 (H) 70 - 125 mg/dL    GFR Estimate >90 >60 mL/min/1.73m2   Troponin I   Result Value Ref Range    Troponin I 0.12 0.00 - 0.29 ng/mL   B-Type Natriuretic Peptide ([facility] Only)   Result Value Ref Range    BNP 1,511 (H) 0 - 112 pg/mL   Magnesium   Result Value Ref Range    Magnesium 1.7 (L) 1.8 - 2.6 mg/dL   Lactic acid whole blood   Result Value Ref Range    Lactic Acid 1.5 0.7 - 2.0 mmol/L   Blood Culture Portacath    Specimen: Portacath; Blood   Result Value Ref Range    Culture No growth after 12 hours    [place] Draw    Narrative    The following orders were created for panel order [place] Draw.  Procedure                               Abnormality         Status                     ---------                               -----------         ------                     Extra Blue Top Tube[[id number]]                              Final result               Extra Red Top Tube[[id number]]                               Final result                 Please view results for these tests on the individual orders.   CBC with platelets and differential   Result Value Ref Range    WBC Count 9.6 4.0 - 11.0 10e3/uL    RBC Count 2.93 (L) 3.80 - 5.20 10e6/uL    Hemoglobin 8.8 (L) 11.7 - 15.7 g/dL    Hematocrit 28.2 (L) 35.0 - 47.0 %    MCV 96 78 - 100 fL    MCH 30.0 26.5 - 33.0 pg    MCHC 31.2 (L) 31.5 - 36.5 g/dL    RDW 19.9 (H) 10.0 - 15.0 %    Platelet Count 329 150 - 450 10e3/uL    % Neutrophils 72 %    % Lymphocytes 13 %    % Monocytes 12 %    % Eosinophils 1 %    % Basophils 1 %    % Immature Granulocytes 1 %    NRBCs per 100 WBC 0 <1 /100    Absolute Neutrophils 7.0 1.6 - 8.3 10e3/uL     Absolute Lymphocytes 1.3 0.8 - 5.3 10e3/uL    Absolute Monocytes 1.1 0.0 - 1.3 10e3/uL    Absolute Eosinophils 0.1 0.0 - 0.7 10e3/uL    Absolute Basophils 0.1 0.0 - 0.2 10e3/uL    Absolute Immature Granulocytes 0.1 <=0.4 10e3/uL    Absolute NRBCs 0.0 10e3/uL   Extra Blue Top Tube   Result Value Ref Range    Hold Specimen JIC    Extra Red Top Tube   Result Value Ref Range    Hold Specimen JIC    Creatinine   Result Value Ref Range    Creatinine 0.69 0.60 - 1.10 mg/dL    GFR Estimate >90 >60 mL/min/1.73m2   Creatinine POCT   Result Value Ref Range    Creatinine POCT 0.6 0.6 - 1.1 mg/dL    GFR, ESTIMATED POCT >60 >60 mL/min/1.73m2   CT Chest Pulmonary Embolism w Contrast    Narrative    EXAM: CT CHEST PULMONARY EMBOLISM W CONTRAST  LOCATION: [facility]  DATE/TIME: 2/8/2022 6:08 PM    INDICATION: Shortness of breath. PE suspected, high prob  COMPARISON: CT chest, abdomen and pelvis 07/19/2021  TECHNIQUE: CT chest pulmonary angiogram during arterial phase injection of IV contrast. Multiplanar reformats and MIP reconstructions were performed. Dose reduction techniques were used.   CONTRAST: isovue 370 100ml    FINDINGS:  ANGIOGRAM CHEST: Pulmonary arteries are normal caliber and negative for pulmonary emboli. Thoracic aorta is negative for dissection. No CT evidence of right heart strain.    LUNGS AND PLEURA: Moderate sized bilateral pleural effusions and bibasilar atelectasis. Extensive airspace and groundglass opacities throughout both lungs.    MEDIASTINUM/AXILLAE: Nonenlarged mediastinal lymph nodes. Right anterior chest wall Port-A-Cath tip in the distal SVC.    CORONARY ARTERY CALCIFICATION: None.    UPPER ABDOMEN: Normal.    MUSCULOSKELETAL: Extensive sclerotic metastasis throughout the visualized skeleton as seen previously. Severe T10 vertebral body compression fractures unchanged. Left mastectomy and breast prosthesis.      Impression    IMPRESSION:  1.  No evidence for pulmonary  emboli.  2.  Extensive airspace and groundglass opacities throughout both lungs compatible with pneumonia, including Covid 19.  3.  Moderate-sized bilateral pleural effusions and bibasilar atelectasis.  4.  Diffuse skeletal metastasis.    Imaging features can be seen with (COVID-19)  pneumonia, though are nonspecific and can occur with a variety of infectious and noninfectious processes.   Blood Culture Arm, Left    Specimen: Arm, Left; Blood   Result Value Ref Range    Culture No growth after 12 hours    Infectious Diseases IP Consult: Patient to be seen: Routine - within 24 hours; resp failure/pulm infiltrates/immunes suppresed ? infectious cause; Consultant may enter orders: Yes; Requesting provider? Hospitalist (if different from attending phys...    Narrative    Sahil Garza MD     2022  9:36 AM  Consultation - [facility]  Elenita Magañanaraderek Luisa,  1955, MRN [id number]    [facility]  Acute respiratory failure with hypoxia (H) [J96.01]    PCP: Sandi Jones, [phone]   Code status:  Full Code       Extended Emergency Contact Information  Primary Emergency Contact: Denilson Moran  Address: [address] [place] [address]  Home Phone: [phone]  Relation: Spouse  Secondary Emergency Contact: Hakan Moran  Mobile Phone: [phone]  Relation: Son       Assessment and Plan   Active Problems:    Acute respiratory failure with hypoxia (H)    Impression: Hypoxemia, cough for several weeks.  New infiltrates   on chest CT that were not there 6 months ago.    Patient receiving doxorubicin for metastatic breast cancer.    Recommendations: Agree with work-up to date.    Send a respiratory multiplex PCR.    Check antigens for Legionella and pneumococcus.    This may not be infectious given doxorubicin can also cause   pneumonitis.    Would recommend pulmonary medicine consultation.    Thank you for consulting [facility]  "[facility]    Sahil Garza MD  [phone]     Chief Complaint <principal problem not specified>       HPI   We have been requested by Dr. Niko Morin to evaluate Elenita Moran for hypoxemia who is a 67 year old year old   female.    Patient is a 67-year-old woman well-known to me from multiple   previous consultations and she is a regular patient in my clinic   for chronic MSSA infection of pelvis.    She has been battling metastatic breast cancer, with most recent   PET/CT in December showing stability.    She comes to the ED yesterday from chemotherapy where she was   dizzy, weak and hypoxic.    She says she has had a chronic cough for several weeks.  Is been   productive of mostly clear phlegm although occasional colored   material in it.    No fevers, chills, sweats, pain, nausea, vomiting, diarrhea.  She   has a Port-A-Cath in her right infraclavicular fossa.    He says she stays home and only leaves the house to go to doctors   appointments.    She has not had Covid vaccinations because \"I do not trust the   government\"    PCR for COVID-19 and influenza is negative here in the ED.         Medical History  Patient Active Problem List   Diagnosis     Post-mastectomy lymphedema syndrome     Scar condition and fibrosis of skin     Personal history of breast cancer     Diabetes (H)     Obesity (BMI 30-39.9)     Abnormal chest CT     Hip pain     Cancer associated pain     Metastatic breast cancer (H)     Edema of both upper extremities     Insulin dependent diabetes mellitus     Sciatica     Hip pain, right     Elevation of level of transaminase or lactic acid dehydrogenase   (LDH)     Palliative care patient     Encounter for palliative care     Depression, unspecified depression type     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, pelvis, initial   encounter for fracture     Closed fracture of multiple pubic rami with delayed healing,   subsequent encounter "     Lymphedema     Pathological fracture, pelvis, initial encounter for fracture     Left groin pain     Confusion     Paraspinal abscess (H)     Port-A-Cath in place     Antineoplastic chemotherapy induced anemia     Acute respiratory failure with hypoxia (H)     Past Medical History:   Diagnosis Date     Allergic rhinitis      Bone cancer (H) 2011    breast mets     Breast cancer (H) 2010    left mastectomy     Depression      DM (diabetes mellitus) (H)      Fibromyalgia      Hx antineoplastic chemotherapy 2010     Hx of radiation therapy 2010     Hyperlipemia      Lactose intolerance      Mini stroke (H)     R EYE     Osteoarthritis      Tobacco dependency     Surgical History  She  has a past surgical history that includes IR Lumbar   Transforaminal Epidural Strd Inj (12/5/2019); IR Chest Port   Placement > 5 Yrs of Age (12/12/2019); Mastectomy (Left, 2010);   appendectomy; Ovarian Cyst Removal; Insert Intracoronary Stent   (1985); other surgical history; Revise reconstructed breast   (Left); Mammoplasty reduction (Right, 2015); Ir Lumbar   Transforaminal Epidural Steroid Injection (12/5/2019); and Ir   Port Placement >5 Years (12/12/2019).   Social History  Reviewed, and she  reports that she has quit smoking. Her smoking   use included cigarettes. She has a 15.00 pack-year smoking   history. She has never used smokeless tobacco. She reports   current alcohol use of about 1.0 standard drink of alcohol per   week. She reports that she does not use drugs.   Allergies  Allergies   Allergen Reactions     Gabapentin      Other reaction(s): *Unknown  Upper body edema/swelling.  Interacted with Oncology treatment.  Upper body edema/swelling.  Interacted with Oncology treatment.       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   She lives with   her  and 1 of her sons.         Additional psychosocial needs reviewed per nursing assessment.     Prior to Admission Medications   (Not in a hospital admission)         Review of Systems:  Pertinent items are noted in HPI., otherwise all others negative.   Physical Exam:  Temp:  [98.8  F (37.1  C)] 98.8  F (37.1  C)  Pulse:  [] 102  Resp:  [14-38] 22  BP: (109-134)/(58-82) 114/65  SpO2:  [84 %-100 %] 99 %    /65   Pulse 102   Temp 98.8  F (37.1  C) (Temporal)     Resp 22   Wt 67.1 kg (148 lb)   SpO2 99%   BMI 24.63 kg/m    General appearance: alert, appears stated age and cooperative  Head: Normocephalic, without obvious abnormality, atraumatic  Eyes: EOMI, no icterus  Neck: no adenopathy and supple, symmetrical, trachea midline  Lungs: Fairly clear no rhonchi rales or wheezes are appreciated.  Heart: Regular rate and rhythm, no murmur appreciated  Abdomen: soft, non-tender; bowel sounds normal; no masses,  no   organomegaly  Extremities: Warm and dry, no significant edema  Skin: Skin color, texture, turgor normal. No rashes or lesions  Neurologic: Grossly normal       Pertinent Labs  Lab Results: personally reviewed.   WBC Count   Date/Time Value Ref Range Status   02/09/2022 08:51 AM 11.1 (H) 4.0 - 11.0 10e3/uL Final   02/08/2022 06:03 PM 9.6 4.0 - 11.0 10e3/uL Final   09/16/2021 01:00 PM 8.4 4.0 - 11.0 10e3/uL Final     Hemoglobin   Date/Time Value Ref Range Status   02/09/2022 08:51 AM 8.6 (L) 11.7 - 15.7 g/dL Final   02/08/2022 06:03 PM 8.8 (L) 11.7 - 15.7 g/dL Final   09/16/2021 01:00 PM 7.8 (L) 11.7 - 15.7 g/dL Final     Hematocrit   Date/Time Value Ref Range Status   02/09/2022 08:51 AM 26.9 (L) 35.0 - 47.0 % Final   02/08/2022 06:03 PM 28.2 (L) 35.0 - 47.0 % Final   09/16/2021 01:00 PM 26.0 (L) 35.0 - 47.0 % Final     Platelet Count   Date/Time Value Ref Range Status   02/09/2022 08:51  150 - 450 10e3/uL Final   02/08/2022 06:03  150 - 450 10e3/uL Final    09/16/2021 01:00  150 - 450 10e3/uL Final        Sodium   Date/Time Value Ref Range Status   02/08/2022 06:03  136 - 145 mmol/L Final   07/26/2021 06:57  136 - 145 mmol/L Final     Comment:     Standard Replacement.   07/17/2021 07:05  136 - 145 mmol/L Final     Carbon Dioxide (CO2)   Date/Time Value Ref Range Status   02/08/2022 06:03 PM 19 (L) 22 - 31 mmol/L Final   07/26/2021 06:57 AM 27 22 - 31 mmol/L Final     Comment:     Standard Replacement.   07/17/2021 07:05 AM 19 (L) 22 - 31 mmol/L Final     Urea Nitrogen   Date/Time Value Ref Range Status   02/08/2022 06:03 PM 15 8 - 22 mg/dL Final   07/26/2021 06:57 AM 7 (L) 8 - 22 mg/dL Final     Comment:     Standard Replacement.   07/17/2021 07:05 AM 12 8 - 22 mg/dL Final     Results for JULES KRUGER BROOKE (MRN [id number]) as of   2/9/2022 09:35   Ref. Range 2/8/2022 18:03   BNP Latest Ref Range: 0 - 112 pg/mL 1,511 (H)   Lactic Acid Latest Ref Range: 0.7 - 2.0 mmol/L 1.5   Troponin I Latest Ref Range: 0.00 - 0.29 ng/mL 0.12        Pertinent Radiology  Radiology Results: CT scan from yesterday is personally reviewed   and compared with CT scan from July 2021    CT Chest Pulmonary Embolism w Contrast    Result Date: 2/8/2022  EXAM: CT CHEST PULMONARY EMBOLISM W CONTRAST LOCATION: [facility] DATE/TIME: 2/8/2022 6:08 PM   INDICATION: Shortness of breath. PE suspected, high prob   COMPARISON: CT chest, abdomen and pelvis 07/19/2021 TECHNIQUE: CT   chest pulmonary angiogram during arterial phase injection of IV   contrast. Multiplanar reformats and MIP reconstructions were   performed. Dose reduction techniques were used. CONTRAST: isovue   370 100ml FINDINGS: ANGIOGRAM CHEST: Pulmonary arteries are   normal caliber and negative for pulmonary emboli. Thoracic aorta   is negative for dissection. No CT evidence of right heart strain.   LUNGS AND PLEURA: Moderate sized bilateral pleural effusions and   bibasilar  atelectasis. Extensive airspace and groundglass   opacities throughout both lungs. MEDIASTINUM/AXILLAE: Nonenlarged   mediastinal lymph nodes. Right anterior chest wall Port-A-Cath   tip in the distal SVC. CORONARY ARTERY CALCIFICATION: None. UPPER   ABDOMEN: Normal. MUSCULOSKELETAL: Extensive sclerotic metastasis   throughout the visualized skeleton as seen previously. Severe T10   vertebral body compression fractures unchanged. Left mastectomy   and breast prosthesis.     IMPRESSION: 1.  No evidence for pulmonary emboli. 2.  Extensive   airspace and groundglass opacities throughout both lungs   compatible with pneumonia, including Covid 19. 3.  Moderate-sized   bilateral pleural effusions and bibasilar atelectasis. 4.    Diffuse skeletal metastasis. Imaging features can be seen with   (COVID-19)  pneumonia, though are nonspecific and can occur with   a variety of infectious and noninfectious processes.                CBC with Platelets & Differential    Narrative    The following orders were created for panel order CBC with Platelets & Differential.  Procedure                               Abnormality         Status                     ---------                               -----------         ------                     CBC with platelets and d...[[id number]]  Abnormal            Final result                 Please view results for these tests on the individual orders.   Procalcitonin   Result Value Ref Range    Procalcitonin 0.13 0.00 - 0.49 ng/mL   Basic metabolic panel   Result Value Ref Range    Sodium 141 136 - 145 mmol/L    Potassium 4.2 3.5 - 5.0 mmol/L    Chloride 104 98 - 107 mmol/L    Carbon Dioxide (CO2) 23 22 - 31 mmol/L    Anion Gap 14 5 - 18 mmol/L    Urea Nitrogen 14 8 - 22 mg/dL    Creatinine 0.63 0.60 - 1.10 mg/dL    Calcium 8.5 8.5 - 10.5 mg/dL    Glucose 169 (H) 70 - 125 mg/dL    GFR Estimate >90 >60 mL/min/1.73m2   Troponin I   Result Value Ref Range    Troponin I 0.07 0.00 - 0.29 ng/mL   CBC  with platelets and differential   Result Value Ref Range    WBC Count 11.1 (H) 4.0 - 11.0 10e3/uL    RBC Count 2.84 (L) 3.80 - 5.20 10e6/uL    Hemoglobin 8.6 (L) 11.7 - 15.7 g/dL    Hematocrit 26.9 (L) 35.0 - 47.0 %    MCV 95 78 - 100 fL    MCH 30.3 26.5 - 33.0 pg    MCHC 32.0 31.5 - 36.5 g/dL    RDW 19.7 (H) 10.0 - 15.0 %    Platelet Count 291 150 - 450 10e3/uL    % Neutrophils 91 %    % Lymphocytes 4 %    % Monocytes 4 %    % Eosinophils 0 %    % Basophils 0 %    % Immature Granulocytes 1 %    NRBCs per 100 WBC 0 <1 /100    Absolute Neutrophils 10.2 (H) 1.6 - 8.3 10e3/uL    Absolute Lymphocytes 0.4 (L) 0.8 - 5.3 10e3/uL    Absolute Monocytes 0.4 0.0 - 1.3 10e3/uL    Absolute Eosinophils 0.0 0.0 - 0.7 10e3/uL    Absolute Basophils 0.0 0.0 - 0.2 10e3/uL    Absolute Immature Granulocytes 0.1 <=0.4 10e3/uL    Absolute NRBCs 0.0 10e3/uL   Extra Tube    Narrative    The following orders were created for panel order Extra Tube.  Procedure                               Abnormality         Status                     ---------                               -----------         ------                     Extra Blue Top Tube[[id number]]                              Final result                 Please view results for these tests on the individual orders.   Extra Blue Top Tube   Result Value Ref Range    Hold Specimen JI    Magnesium   Result Value Ref Range    Magnesium 1.5 (L) 1.8 - 2.6 mg/dL   Glucose by meter   Result Value Ref Range    GLUCOSE BY METER POCT 134 (H) 70 - 99 mg/dL   Glucose by meter   Result Value Ref Range    GLUCOSE BY METER POCT 189 (H) 70 - 99 mg/dL

## 2022-02-10 NOTE — PLAN OF CARE
"  Problem: Adult Inpatient Plan of Care  Goal: Plan of Care Review  Outcome: No Change  Flowsheets (Taken 2/10/2022 0435)  Plan of Care Reviewed With: patient  Progress: improving  Goal: Patient-Specific Goal (Individualized)  Outcome: No Change  Goal: Absence of Hospital-Acquired Illness or Injury  Outcome: No Change  Intervention: Identify and Manage Fall Risk  Recent Flowsheet Documentation  Taken 2/10/2022 0357 by Luli Mullen RN  Safety Promotion/Fall Prevention:    activity supervised    bed alarm on  Taken 2/9/2022 2330 by Luli Mullen RN  Safety Promotion/Fall Prevention:    activity supervised    bed alarm on  Goal: Optimal Comfort and Wellbeing  Outcome: No Change  Goal: Readiness for Transition of Care  Outcome: No Change     Vitals:    02/09/22 1547 02/09/22 1923 02/09/22 2330 02/10/22 0357   BP: 127/75 126/77 107/68 98/57   BP Location: Right arm Right arm Right arm Right arm   Patient Position:   Semi-Arciniega's Semi-Arciniega's   Pulse: (!) 123 107 97    Resp: 20 20 20 20   Temp: 98.2  F (36.8  C) 97.9  F (36.6  C) 97.6  F (36.4  C) 98.5  F (36.9  C)   TempSrc: Oral Oral Oral Oral   SpO2: 98% 98% 99% 98%   Weight: 63.8 kg (140 lb 9.6 oz)   64.2 kg (141 lb 8 oz)   Height: 1.651 m (5' 5\")       Pain is under control with scheduled medications. Weaned to room air. Lungs are diminished with some exp wheezes, and fine crackles. Sleeping well. IV antibiotics. Port a cath. Will continue to monitor. Wound on buttocks is pink and mepilex is CDI. P;ure wic.k. Luli Mullen RN  "

## 2022-02-10 NOTE — PROGRESS NOTES
"Pt requested to visit with a . She is feeling discouraged and afraid concerning her cancer and infections. Loree is important. It has been a source of comfort and strength. Elenita relays that she doesn't \"think Bryan is listening.\" She needs assurance from Bryan and is not feeling it.     provided empathetic listening, validation of feelings and prayer. Pt was tearful. Elenita especially appreciated prayer that asked God to be more present. She does not have a Religious community at this time. Her former parish in [place] has become less welcoming for her. This is yet another loss.    Spiritual Care is available as requested or needed throughout pt's stay.    BROOKE Anaya Div.    "

## 2022-02-10 NOTE — PROGRESS NOTES
[facility]:  PULMONARY PROGRESS NOTE     Date / Time of Admission:  2/8/2022  5:38 PM    ID: Elenita Moran is a 67 year old female with metastatic breast carcinoma with bony metastases on chemotherapy, immunosuppression secondary to doxorubicin (last dose 2/8), nonischemic systolic cardiomyopathy (LVEF 40 to 45% 12/2021), history of MSSA pelvic abscess and dental abscess on cephalexin who was admitted to [facility] on 2/8/2022 with sudden onset of dyspnea, now admitted for new bilateral pulmonary ground glass opacities and bilateral pleural effusions (R > L).    Reason for Consult:  Abnormal CT findings         Assessment: 1.   Bilateral ground glass opacities.  Etiology for opacities unclear.  Possible CHF exacerbation given worsening LVEF on repeat TTE compared to prior (? If this related to chemo) with sudden flash-pulmonary edema given acutely worsening symptoms.  Could be acute infectious process given immunocompromised status with chemotherapy.  PCT normal at 0.13 on 2/9.  No viral prodromes.  Also possible to have acute ILD/pneumonitis related to doxyrubicin.  We don't normally see this occur immediately after infusion, but possible.  Findings are not what we would see with malignancy (ie, not lymphangitic spread)  2. Bilateral pleural effusions, R > L.  Underwent thoracentesis 2/9, 450 mL removed.  Reported as clear yellow fluid but patient describes it as reddish.   Protein ratio 0.5, so borderline exudative vs transudative.   3. Acute on chronic CHF with systolic dysfunction.  ? If flash pulm edema, pleural effusions related to CHF rather than acute infectious process or medication-induced ILD/pneumonitis.    4. Metastatic breast carcinoma with bony metastases on chemotherapy.  5. Chronic immunosuppression secondary to doxorubicin (last infusion 2/8).  6. Leukocytosis, unspecified.  Worsening compared to yesterday.         Plan:     Wean supplemental O2 as tolerates,  "goal O2 sat > 92%.     Follow-up pleural fluid studies.  I added cell count, results pending.    Follow-up pleural fluid cytology.    Holding off on steroids until cell count/info identified.     Add-on LDH to AM labs.  No LDH available yesterday.  Uncertain upper limit of normal with our lab - will identify this for Light's criteria calculation.    Antibiotics per ID.  Currently on Zosyn/Vancomycin (start 2/8 evening)    Agree with diuresis per primary team.    Patient and/or family were educated on the above recommendations.   Thank you for allowing our service to participate in the care of this patient.  Please call with any questions or concerns.    Total patient care time: 35 minutes, with over 50% spent in counseling/coordination of care.      Nalini Arzate MD, MPH  Pulmonary/Critical Care Medicine  02/10/2022  4:24 PM        Subjective/Interval History:   Patient tearful about progression of her metastatic cancer  Reports feeling better today compared to yesterday  Recalled pleural fluid extracted - said it looked \"reddish\" like there was blood inside.  Denies any chest pain/pressure.  Minimal cough.  No hemoptysis.  Still on oxygen.      Review of Systems:  Pertinent items are noted in HPI.  The Review of Systems is negative other than noted in the HPI        Allergies/Medications:   Allergies:     Allergies   Allergen Reactions     Gabapentin      Other reaction(s): *Unknown  Upper body edema/swelling.  Interacted with Oncology treatment.  Upper body edema/swelling.  Interacted with Oncology treatment.       Sulfa Drugs Hives and Rash     welts         Continuous Infusions:    Scheduled Medications:    aspirin  81 mg Oral Daily     [Held by provider] dexamethasone  6 mg Intravenous Q24H     DULoxetine  30 mg Oral QAM     DULoxetine  60 mg Oral QPM     enoxaparin ANTICOAGULANT  40 mg Subcutaneous Q24H     ezetimibe  10 mg Oral Daily     furosemide  40 mg Intravenous Q12H     HYDROmorphone  4 mg Oral TID     " "hydrOXYzine  25 mg Oral TID     ibuprofen  200 mg Oral TID     insulin aspart   Subcutaneous QAM AC     insulin aspart   Subcutaneous Daily with lunch     insulin aspart   Subcutaneous Daily with supper     insulin aspart  1-7 Units Subcutaneous TID AC     [Held by provider] insulin glargine  5 Units Subcutaneous At Bedtime     magnesium oxide  400 mg Oral TID     methadone  2.5 mg Oral QAM     mineral oil-hydrophilic petrolatum   Topical Daily     OLANZapine  5 mg Oral QPM     oxybutynin  5 mg Oral BID     oxybutynin ER  10 mg Oral At Bedtime     pantoprazole  40 mg Oral QAM AC     piperacillin-tazobactam  3.375 g Intravenous Q8H     polyethylene glycol  17 g Oral Daily     senna-docusate  1 tablet Oral Daily     simvastatin  20 mg Oral QPM     sodium chloride (PF)  3 mL Intracatheter Q8H     vancomycin  750 mg Intravenous Q12H     zolpidem  5 mg Oral At Bedtime            Objective:   Vitals:  /59 (BP Location: Right arm)   Pulse 97   Temp 98.5  F (36.9  C) (Oral)   Resp 18   Ht 1.651 m (5' 5\")   Wt 64.2 kg (141 lb 8 oz)   SpO2 99%   BMI 23.55 kg/m    GEN: Pleasant female, sitting in the chair in no acute distress.  Tearful on occasion.   HEENT: Normocephalic, atraumatic.  Extraoccular eye movements intact, anicteric sclera. Moist mucous membranes.   NECK: Supple.    PULM: Non-labored breathing.  No use of accessory muscles.  Fine inspiratory rales bilateral bases.  CVS: Regular rate and rhythm.  Normal S1, S2.  No rubs, murmurs, or gallops.    ABDOMEN: Normoactive bowel sounds.  Non-tender to palpation.  Non-distended.    EXTREMITES:  No clubbing, cyanosis, or edema.    NEURO:  Awake.  Oriented to person, place, time and situation.  Cranial nerves 2-12 grossly intact.  Moving all extremities.      Intake/Output:  I/O last 3 completed shifts:  In: 1393 [P.O.:1390; I.V.:3]  Out: 2900 [Urine:2900]        Pertinent Studies:   All laboratory data reviewed  Serum Glucose range:   Recent Labs   Lab " 02/10/22  1145 02/10/22  0742 02/10/22  0626 02/09/22  2111 02/09/22  1639 02/09/22  1130   * 113* 116 163* 153* 189*     ABG: No lab results found in last 7 days.  CBC:   Recent Labs   Lab 02/10/22  0626 02/09/22  0851 02/08/22  1803   WBC 19.6*  19.6* 11.1* 9.6   HGB 7.3* 8.6* 8.8*   HCT 23.3* 26.9* 28.2*   MCV 95 95 96    291 329   NEUTROPHIL 90 91 72   LYMPH 2 4 13   MONOCYTE 5 4 12   EOSINOPHIL 0 0 1     Chemistry:   Recent Labs   Lab 02/10/22  0626 02/09/22  0851 02/08/22  1807 02/08/22  1803    141  --  138   POTASSIUM 3.2* 4.2  --  4.3   CHLORIDE 100 104  --  106   CO2 25 23  --  19*   BUN 16 14  --  15   CR 0.66 0.63 0.6 0.69  0.69   GFRESTIMATED >90 >90 >60 >90  >90   BENJI 7.6* 8.5  --  8.7   MAG 2.0 1.5*  --  1.7*   PROTTOTAL 5.8* 6.5  6.5  --   --    ALBUMIN 2.6* 3.0*  --   --    AST 31 35  --   --    ALT 13 10  --   --    ALKPHOS 172* 216*  --   --    BILITOTAL 0.4 0.4  --   --      Coags:  No results for input(s): INR, PROTIME, PTT in the last 168 hours.    Invalid input(s): APTT  Cardiac Markers:  Recent Labs   Lab 02/09/22  0851   TROPONINI 0.07      Microbiology:  Blood cultures x2, 2/8: Pending, NGTD  Legionella urine antigen, 2/9: Pending  Streptococcus urine antigen, 2/9: Pending  Left pleural fluid, 2/9: Gram stain 2+ WBC, no organisms.  Aerobic culture NGTD.  Anaerobic culture pending.        Cardiology/Radiology:   Cardiac: All cardiac studies reviewed by me.    EKG: Reviewed    TTE, 2/9: Summary:  1. The left ventricle is normal in size. Left ventricular systolic performance is moderately reduced. The ejection fraction is estimated to be 30-35%. 2. There is moderate global reduction in left  ventricular systolic performance. 3. There is mild aortic insufficiency. 4. There is moderate mitral insufficiency. 5. Normal right ventricular size with mildly reduced right ventricular systolic performance. 6. There is mild to moderate left atrial enlargement.  When compared to  the prior real-time echocardiogram dated 23 December 2021, there has been a further diminution in left ventricular systolic performance. The degree of mitral insufficiency has increased from mild-moderate to now moderate.    Radiology: All imaging studies reviewed by me.    CT chest PE study, 2/8/22:   FINDINGS: ANGIOGRAM CHEST: Pulmonary arteries are normal caliber and negative for pulmonary emboli. Thoracic aorta is negative for dissection. No CT evidence of right heart strain. LUNGS AND PLEURA: Moderate sized bilateral pleural effusions and bibasilar atelectasis. Extensive airspace and groundglass opacities throughout both lungs. MEDIASTINUM/AXILLAE: Nonenlarged mediastinal lymph nodes. Right anterior chest wall Port-A-Cath tip in the distal SVC. CORONARY ARTERY CALCIFICATION: None. UPPER ABDOMEN: Normal. MUSCULOSKELETAL: Extensive sclerotic metastasis throughout the visualized skeleton as seen previously. Severe T10 vertebral body compression fractures unchanged. Left mastectomy and breast prosthesis.     IMPRESSION: 1.  No evidence for pulmonary emboli. 2.  Extensive airspace and groundglass opacities throughout both lungs compatible with pneumonia, including Covid 19. 3.  Moderate-sized bilateral pleural effusions and bibasilar atelectasis. 4.  Diffuse skeletal metastasis.

## 2022-02-10 NOTE — CONSULTS
Wound Ostomy  WOC Assessment       Allergies:  Gabapentin  Sulfa Drugs    Diagnosis:   Patient Active Problem List    Diagnosis Date Noted     Acute respiratory failure with hypoxia (H) 02/08/2022     Priority: Medium     Antineoplastic chemotherapy induced anemia 11/26/2021     Priority: Medium     Paraspinal abscess (H) 07/28/2021     Priority: Medium     LOCATION: [facility]  DATE/TIME: 7/23/2021 2:34 PM   INDICATION: Right paraspinal collection.  SPECIMEN: 35 mL of puslike fluid was aspirated and a portion sent to lab for cultures and Gram stain.  IMPRESSION:  1.  Successful CT-guided aspiration right paraspinal collection.  2.  Puslike fluid was aspirated.      Abnormal   4+ PMNs      4+ Gram positive cocci in pairs               Port-A-Cath in place 07/28/2021     Priority: Medium     Confusion 07/16/2021     Priority: Medium     Depression, unspecified depression type      Priority: Medium     Closed fracture of multiple pubic rami with delayed healing, subsequent encounter 01/23/2020     Priority: Medium     Lymphedema 01/23/2020     Priority: Medium     Pathological fracture, pelvis, initial encounter for fracture 01/23/2020     Priority: Medium     Metastatic breast cancer (H)      Priority: Medium     Left groin pain      Priority: Medium     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, pelvis, initial encounter for fracture 01/22/2020     Priority: Medium     Encounter for palliative care      Priority: Medium     Elevation of level of transaminase or lactic acid dehydrogenase (LDH) 12/04/2019     Priority: Medium     IMO Regulatory Load OCT 2020         Palliative care patient      Priority: Medium     Sciatica 12/03/2019     Priority: Medium     Hip pain, right 12/03/2019     Priority: Medium     Edema of both upper extremities 10/17/2019     Priority: Medium     Insulin dependent diabetes mellitus 10/17/2019     Priority: Medium     IMO SNOMED LOAD SPRING 2020 [.6/.18/.2020  9:04 PM]  Michael Palomares:           Hip pain 09/27/2019     Priority: Medium     Cancer associated pain 09/27/2019     Priority: Medium     Abnormal chest CT      Priority: Medium     Obesity (BMI 30-39.9) 09/25/2015     Priority: Medium     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       Zhao:  Zhao Score: 18      Wound culture obtained: No    Edema:  No    Anatomic Site/Laterality: L sacrum/coccyx area     Reason for ongoing care:   Wound assessment and plan of care    Encounter Type:  Initial Wound Type:   Pressure Injury (Pressure Ulcer): Present on Admit    Stage:  Stage 3    Associated conditions/related trauma: full thickness pressure injury.     Assessment:    Length: 3cm    Width: 1.5cm    Depth: 0.2cm    Tunneling/Undermining: No    Wound Bed: 100% Agranular    Exudate: No    Periwound Skin: Erythema    Treatment Plan: Silicone foam     Anatomic Site/Laterality: R foot    Reason for ongoing care:   Skin integrity    Encounter Type:  Initial Skin Integrity:    Reabsorbed blister now with dry epithelial. Will order aquaphor.     Nursing care provided was coordinated care with RN.    Discussed plan of care with nurse and patient.    Outcomes and treatment recommendations are to promote skin integrity, contain exudate and promote wound healing.    Actions taken by WOC RN: 2 minutes of education, WOC Discharge recommendations entered and Requested MD order specialty mattress.    Planned Follow Up: Weekly.    Plan for next visit: Reassess wound(s) and  Reassess skin integrity

## 2022-02-10 NOTE — PROGRESS NOTES
Infectious Disease Progress Note    Assessment/Plan  Impression: Hypoxemia, cough for several weeks.  New infiltrates on chest CT that were not there 6 months ago.     Patient receiving doxorubicin for metastatic breast cancer.     Recommendations: Agree with work-up to date.     Send a respiratory multiplex PCR.     Check antigens for Legionella and pneumococcus.     This may not be infectious given doxorubicin can also cause pneumonitis.     Progression of malignancy also a possibility.    Appreciated Dr. Zhu's consultation.    Active Problems:    Acute respiratory failure with hypoxia (H)      RIDGE BARAJAS MD  [phone]      Subjective  Feels better today.  Complains of pressure sore.  Breathing is easier.    Had thoracentesis.    Objective    Vital signs in last 24 hours  Temp:  [97.6  F (36.4  C)-98.8  F (37.1  C)] 98.5  F (36.9  C)  Pulse:  [] 97  Resp:  [16-20] 18  BP: ()/(57-77) 103/59  SpO2:  [96 %-100 %] 99 %  Wt Readings from Last 3 Encounters:   02/10/22 64.2 kg (141 lb 8 oz)   11/08/21 67.1 kg (148 lb)   08/23/21 70.8 kg (156 lb)           Intake/Output last 3 shifts  I/O last 3 completed shifts:  In: 1003 [P.O.:1000; I.V.:3]  Out: 3650 [Urine:3650]  Intake/Output this shift:  I/O this shift:  In: 300 [P.O.:300]  Out: 950 [Urine:950]    Review of Systems   Pertinent items are noted in HPI., otherwise negative.    Physical Exam  General appearance: alert, appears stated age and cooperative  Head: Normocephalic, without obvious abnormality, atraumatic  Eyes: EOMI, no icterus  Neck: no adenopathy and supple, symmetrical, trachea midline  Lungs: Fairly clear no rhonchi rales or wheezes are appreciated.  Heart: Regular rate and rhythm, no murmur appreciated  Abdomen: soft, non-tender; bowel sounds normal; no masses,  no organomegaly  Extremities: Warm and dry, no significant edema  Skin: Skin color, texture, turgor normal. Sacral pressure sore noted.  Does not appear  infected.    Neurologic: Grossly normal    Pertinent Labs   Lab Results: personally reviewed.     Recent Labs   Lab 02/10/22  0626 02/09/22  0851 02/08/22  1803   WBC 19.6*  19.6* 11.1* 9.6   HGB 7.3* 8.6* 8.8*   HCT 23.3* 26.9* 28.2*    291 329        Recent Labs   Lab 02/10/22  0626 02/09/22  0851 02/08/22  1803    141 138   CO2 25 23 19*   BUN 16 14 15       Results for JULES KRUGER (MRN [id number]) as of 2/10/2022 12:44  Pleural fluid. Ref. Range 2/9/2022 17:57   Glucose Fluid Latest Units: mg/dL 174   Lactate Dehydrogenase Fluid Latest Units: U/L 141   Ph Fluid Latest Units: pH 8.0   Protein Total Fluid Latest Units: g/dL 2.4       No results found for: CULT  Collected Updated Procedure Result Status    02/09/2022 1757 02/10/2022 0148 Pleural fluid Aerobic Bacterial Culture Routine with Gram Stain [[id number]]    Pleural fluid from Pleural Cavity    Preliminary result Component Value   Gram Stain Result No organisms seen P    2+ WBC seen P    Predominantly mononuclear cells           02/09/2022 1757 02/09/2022 1916 Glucose fluid [[id number]]    Pleural fluid from Pleural Cavity    Final result Component Value Units   Glucose fluid 174 mg/dL           02/09/2022 1757 02/10/2022 0006 Lactate dehydrogenase fluid [[id number]]    Pleural fluid from Pleural Cavity    Final result Component Value Units   Lactate dehydrogenase fluid 141 U/L           02/09/2022 1757 02/09/2022 1837 Anaerobic bacterial culture [[id number]]   Pleural fluid from Pleural Cavity    In process Component Value   No component results           02/09/2022 1757 02/09/2022 1935 pH fluid [[id number]]    Pleural fluid from Pleural Cavity    Final result Component Value Units   pH Fluid 8.0 pH           02/09/2022 1757 02/09/2022 2319 Protein fluid [[id number]]    Pleural fluid from Pleural Cavity, Right    Final result Component Value Units   Protein Total Fluid 2.4 g/dL           02/09/2022 1757 02/10/2022  0903 Cytology, non-gynecologic [[id number]]   Pleural fluid from Pleural Cavity, Right    In process Component Value   No component results           02/09/2022 1757 02/10/2022 1113 Cell count with differential fluid [[id number]]    Pleural fluid from Pleural Cavity    In process Component Value   No component results           02/09/2022 1757 02/10/2022 1113 Cell Count Body Fluid [[id number]]   Pleural fluid from Pleural Cavity    In process Component Value   No component results           02/09/2022 1142 02/09/2022 1456 Legionella pneumophila antigen urine [[id number]]   Urine, Midstream    Final result Component Value   Legionella pneumophila serogroup 1 urinary antigen Negative   Suggests no recent or current infection. Infection due to Legionella cannot be ruled out, since other serogroups and species may cause disease, antigen may not be present in urine in early infection, and the level of antigen present in the urine may be below detectable limits of the test.           02/09/2022 1142 02/09/2022 1457 Streptococcus pneumoniae antigen [[id number]]   Urine, Midstream    Final result Component Value   Streptococcus pneumoniae antigen Negative   A negative Streptococcus pneumoniae antigen result does not rule out infection with Streptococcus pneumoniae.           02/09/2022 1142 02/09/2022 1150 Blastomyces Agn Quant EIA Non Blood [[id number]]   Urine, Clean Catch    In process Component Value   No component results           02/09/2022 1142 02/09/2022 1150 Histoplasma Galactomannan Antigen Quant by EIA, Urine [[id number]]   Urine, Clean Catch    In process Component Value   No component results           02/09/2022 0851 02/09/2022 1033 Histoplasma Antigen Quantitative by EIA [[id number]]   Blood from Arm, Right    In process Component Value   No component results           02/08/2022 1839 02/09/2022 2116 Blood Culture Arm, Left [[id number]]   Blood from Arm, Left    Preliminary result Component  Value   Culture No growth after 1 day P           2022 2116 Blood Culture Portacat [[id number]]   Blood from Portacath    Preliminary result Component Value   Culture No growth after 1 day P           2022 1802 2022 1911 Symptomatic; Yes; 2022 Influenza A/B & SARS-CoV2 (COVID-19) Virus PCR Multiplex Nasopharyngeal [[id number]]    Swab from Nasopharyngeal    Final result Component Value   Influenza A PCR Negative   Influenza B PCR Negative   SARS CoV2 PCR Negative   NEGATIVE: SARS-CoV-2 (COVID-19) RNA not detected, presumed negative.               Pertinent Radiology   Radiology Results:   Echocardiogram Complete    Result Date: 2022  [id number]^HOLLY^PEPE^[place] [address]  Name: JULES KRUGER MRN: [id number] : 1955 Study Date: 2022 10:45 AM Age: 67 yrs Gender: Female Patient Location: [facility] Reason For Study: 2nd degree AV Block Ordering Physician: PEPE BARRERA Performed By: JASMYNE  BSA: 1.7 m2 Height: 65 in Weight: 148 lb HR: 119 BP: 117/68 mmHg ______________________________________________________________________________ Procedure Complete Echo Adult. Definity (NDC #74911-258) given intravenously. ______________________________________________________________________________ Interpretation Summary  1. The left ventricle is normal in size. Left ventricular systolic performance is moderately reduced. The ejection fraction is estimated to be 30-35%. 2. There is moderate global reduction in left ventricular systolic performance. 3. There is mild aortic insufficiency. 4. There is moderate mitral insufficiency. 5. Normal right ventricular size with mildly reduced right ventricular systolic performance. 6. There is mild to moderate left atrial enlargement.  When compared to the prior real-time echocardiogram dated 2021, there has been a further diminution in left  ventricular systolic performance. The degree of mitral insufficiency has increased from mild-moderate to now moderate. ______________________________________________________________________________ Left ventricle: The left ventricle is normal in size. Left ventricular systolic performance is moderately reduced. The ejection fraction is estimated to be 30-35%. There is moderate global reduction in left ventricular systolic performance. Left ventricular wall thickness is normal.  Assessment of left atrial pressure (LAP): The cumulative findings are indeterminate in evaluating left atrial pressure.  Right ventricle: Normal right ventricular size with mildly reduced right ventricular systolic performance.  Left atrium: There is mild to moderate left atrial enlargement.  Right atrium: The right atrium is of normal size.  IVC: The IVC is of normal caliber.  Aortic valve: The aortic valve is comprised of three cusps. There is no significant aortic stenosis. There is mild aortic insufficiency.  Mitral valve: The mitral valve appears morphologically normal. There is moderate mitral insufficiency.  Tricuspid valve: The tricuspid valve is grossly morphologically normal. There is mild tricuspid insufficiency.  Pulmonic valve: The pulmonic valve is grossly morphologically normal. There is mild pulmonic insufficiency.  Thoracic aorta: The aortic root and proximal ascending aorta are of normal dimension.  Pericardium: There is no significant pericardial effusion. ______________________________________________________________________________ ______________________________________________________________________________ MMode/2D Measurements & Calculations RVDd: 4.3 cm IVSd: 0.75 cm LVIDd: 5.6 cm LVIDs: 4.4 cm LVPWd: 0.99 cm FS: 21.8 % LV mass(C)d: 182.6 grams LV mass(C)dI: 104.9 grams/m2 Ao root diam: 3.0 cm LA dimension: 4.4 cm asc Aorta Diam: 2.7 cm LA/Ao: 1.5 LVOT diam: 1.7 cm LVOT area: 2.2 cm2 LA Volume Indexed (AL/bp): 65.9  ml/m2  RWT: 0.35  Doppler Measurements & Calculations LV V1 max P.1 mmHg LV V1 max: 112.5 cm/sec LV V1 VTI: 17.2 cm SV(LVOT): 38.5 ml SI(LVOT): 22.1 ml/m2 PA acc time: 0.14 sec  ______________________________________________________________________________ Report approved by: Ashley Goodman 2022 12:49 PM       US Thoracentesis    Result Date: 2022  EXAM: 1. RIGHT THORACENTESIS 2. ULTRASOUND GUIDANCE LOCATION: [facility] DATE/TIME: 2022 5:34 PM INDICATION: Pleural effusion. PROCEDURE: Informed consent obtained. Time out performed. The chest was prepped and draped in sterile fashion. 10 mL of 1 % lidocaine was infused into the local soft tissues. Under direct ultrasound guidance, a 5 [place] catheter system was placed into the pleural effusion. 0.45 liters of clear yellow fluid were removed and sent to lab, if requested. Patient tolerated procedure well. Ultrasound imaging was obtained and placed in the patient's permanent medical record.     IMPRESSION: Status post right ultrasound-guided thoracentesis. Reference CPT Code: 14318    CT Chest Pulmonary Embolism w Contrast    Result Date: 2022  EXAM: CT CHEST PULMONARY EMBOLISM W CONTRAST LOCATION: [facility] DATE/TIME: 2022 6:08 PM INDICATION: Shortness of breath. PE suspected, high prob COMPARISON: CT chest, abdomen and pelvis 2021 TECHNIQUE: CT chest pulmonary angiogram during arterial phase injection of IV contrast. Multiplanar reformats and MIP reconstructions were performed. Dose reduction techniques were used. CONTRAST: isovue 370 100ml FINDINGS: ANGIOGRAM CHEST: Pulmonary arteries are normal caliber and negative for pulmonary emboli. Thoracic aorta is negative for dissection. No CT evidence of right heart strain. LUNGS AND PLEURA: Moderate sized bilateral pleural effusions and bibasilar atelectasis. Extensive airspace and groundglass opacities throughout both lungs.  MEDIASTINUM/AXILLAE: Nonenlarged mediastinal lymph nodes. Right anterior chest wall Port-A-Cath tip in the distal SVC. CORONARY ARTERY CALCIFICATION: None. UPPER ABDOMEN: Normal. MUSCULOSKELETAL: Extensive sclerotic metastasis throughout the visualized skeleton as seen previously. Severe T10 vertebral body compression fractures unchanged. Left mastectomy and breast prosthesis.     IMPRESSION: 1.  No evidence for pulmonary emboli. 2.  Extensive airspace and groundglass opacities throughout both lungs compatible with pneumonia, including Covid 19. 3.  Moderate-sized bilateral pleural effusions and bibasilar atelectasis. 4.  Diffuse skeletal metastasis. Imaging features can be seen with (COVID-19)  pneumonia, though are nonspecific and can occur with a variety of infectious and noninfectious processes.

## 2022-02-10 NOTE — PROGRESS NOTES
[facility]    Medicine Progress Note - Hospitalist Service    Date of Admission:  2/8/2022    A/P    67-year-old with metastatic breast cancer, diabetes, chronic MSSA infection of the pelvis who presented to the ED with dizziness weakness and hypoxia.  She has had chronic cough for several weeks.  PET scan done on 2/12 showed progression of underlying breast cancer.  Awaiting oncology to discuss it with the patient about treatment options.  Patient was treated with IV antibiotics and IV Lasix s/p which hypoxia is improving.  ID and pulmonary also consulted.     1.Acute respiratory failure  -Multifactorial due to acute systolic CHF/pulmonary edema, drug-induced pneumonitis(At risk from Doxorubicin), pneumonia, pleural effusion malignant versus parapneumonic  -Patient is COVID-19 negative  -Initially treated with dexamethasone, Zosyn and vancomycin with IV Lasix  -Pulmonary consulted and did not recommend continuing with dexamethasone and therefore would hold off on the same.  -Continue Zosyn Vanco as per ID, respiratory labs are pending, such as checking antigens for Legionella and pneumococcus  -Thoracentesis per pulmonary to rule out parapneumonic versus malignant effusion  -Continue supplemental oxygen  -Echo shows EF of 30 to 35%.  BNP is quite elevated at 1500.  Portends guarded prognosis    2.Breast cancer with metastasis to the liver and bone  -As per oncology notes PET scan done on 2/1 showed progression of the disease  -Patient is on third line chemo with doxorubicin--now has met her lifelong limit with this and has lower EF and cannot have it per onc--Oncology notes other options  -Awaiting oncology input regarding more chemotherapy vs goals  -Continue methadone  -CODE STATUS discussed and patient wants to be full code  -please note I called [facility] and talked to Clara Cook--she needs a formal consult to be placed for them to see her--I asked them to see her asap to help with goals       3.Non-insulin-dependent diabetes  -Order 5 units of Lantus and mealtime insulin--hold off steroids  -Order sliding scale NovoLog  -Ordered diabetic diet     4.Hypomagnesemia  -Continue magnesium replacement as per protocol     5.History of GERD  - omeprazole     6.Decubitus ulcer of the sacrum  -Order wound care     7.Moderate protein calorie moderation  -Albumin of 3  -Patient has decreased p.o. intake    8.Goals of care--really need [facility] to assist with this --I called them and talked to Clara Cook today           Diet: Combination Diet Moderate Consistent Carb (60 g CHO per Meal) Diet; 2 gm NA Diet    DVT Prophylaxis: Enoxaparin (Lovenox) SQ  Jiang Catheter: Not present  Central Lines: PRESENT     Cardiac Monitoring: None  Code Status: Full Code      Disposition Plan   Expected Discharge: 02/14/2022     Anticipated discharge location:  sob, cough     The patient's care was discussed with the Care Coordinator/ and Patient. I called  and left message that [facility] to be here tomorrow 12:30-2 pm so he can be here too    Ivanna Thompson MD  Hospitalist Service  [facility]  Securely message with the Vocera Web Console (learn more here)  Text page via [employer] Paging/Directory         ______________________________________________________________________    Interval History   She was tearful right away  She noted how she was told her cancer was worse in ER without her family  She notes she wants to get stable and be able to go home  She notes she wants more chemo treatment  She notes  is out and she has another oncologist covering    Still has sob      Data reviewed today: I reviewed all medications, new labs and imaging results over the last 24 hours. I personally reviewed no images or EKG's today.    Physical Exam   Vital Signs: Temp: 98.5  F (36.9  C) Temp src: Oral BP: 103/59 Pulse: 97   Resp: 18 SpO2: 99 % O2 Device: Nasal cannula Oxygen Delivery: 1 LPM  Weight:  141 lbs 8 oz  Constitutional: awake, fatigued, alert, cooperative and mild distress  Respiratory: no increased work of breathing, moderate air exchange, no retractions and diminished breath sounds right base and left base  Cardiovascular: Normal apical impulse, regular rate and rhythm, normal S1 and S2, no S3 or S4, and no murmur noted  GI: normal bowel sounds, soft, non-distended and non-tender  Skin: no bruising or bleeding  Musculoskeletal: no lower extremity pitting edema present  Neurologic: Mental Status Exam:  Level of Alertness:   awake  Neuropsychiatric: General: normal, calm and normal eye contact  Affect: tearful    Data   Recent Labs   Lab 02/10/22  1145 02/10/22  0742 02/10/22  0626 02/09/22  0900 02/09/22  0851 02/08/22  1807 02/08/22  1803   WBC  --   --  19.6*  19.6*  --  11.1*  --  9.6   HGB  --   --  7.3*  --  8.6*  --  8.8*   MCV  --   --  95  --  95  --  96   PLT  --   --  259  --  291  --  329   NA  --   --  137  --  141  --  138   POTASSIUM  --   --  3.2*  --  4.2  --  4.3   CHLORIDE  --   --  100  --  104  --  106   CO2  --   --  25  --  23  --  19*   BUN  --   --  16  --  14  --  15   CR  --   --  0.66  --  0.63 0.6 0.69  0.69   ANIONGAP  --   --  12  --  14  --  13   BENJI  --   --  7.6*  --  8.5  --  8.7   * 113* 116   < > 169*  --  146*   ALBUMIN  --   --  2.6*  --  3.0*  --   --    PROTTOTAL  --   --  5.8*  --  6.5  6.5  --   --    BILITOTAL  --   --  0.4  --  0.4  --   --    ALKPHOS  --   --  172*  --  216*  --   --    ALT  --   --  13  --  10  --   --    AST  --   --  31  --  35  --   --     < > = values in this interval not displayed.     Recent Results (from the past 24 hour(s))   US Thoracentesis    Narrative    EXAM:   1. RIGHT THORACENTESIS  2. ULTRASOUND GUIDANCE  LOCATION: [facility]  DATE/TIME: 2/9/2022 5:34 PM    INDICATION: Pleural effusion.    PROCEDURE: Informed consent obtained. Time out performed. The chest was prepped and draped in sterile  fashion. 10 mL of 1 % lidocaine was infused into the local soft tissues. Under direct ultrasound guidance, a 5 P[place]ig catheter system was placed into   the pleural effusion.     0.45 liters of clear yellow fluid were removed and sent to lab, if requested.    Patient tolerated procedure well.    Ultrasound imaging was obtained and placed in the patient's permanent medical record.      Impression    IMPRESSION:  Status post right ultrasound-guided thoracentesis.    Reference CPT Code: 59327

## 2022-02-10 NOTE — PLAN OF CARE
Problem: Gas Exchange Impaired  Goal: Optimal Gas Exchange  Outcome: Improving  Intervention: Optimize Oxygenation and Ventilation  Recent Flowsheet Documentation  Taken 2/10/2022 1230 by Kim Zhong RN  Head of Bed (HOB) Positioning: HOB at 20 degrees  Taken 2/10/2022 1142 by Kim Zhong, RN  Head of Bed (HOB) Positioning: HOB at 30 degrees  Taken 2/10/2022 0800 by Kim Zhong RN  Head of Bed (HOB) Positioning: HOB at 30 degrees     Problem: Infection Progression (Sepsis)  Goal: Absence of Infection Signs and Symptoms  Outcome: Improving  Intervention: Promote Recovery  Recent Flowsheet Documentation  Taken 2/10/2022 1142 by Kim Zhong, RN  Activity Management: activity adjusted per tolerance  Taken 2/10/2022 0800 by Kim Zhong, RN  Activity Management: activity adjusted per tolerance   Sepsis protocol fired this am due to elevated WBC 19.6. Lactic acid checked and was 1.8. VSS. Continued on IV antibiotics. Lungs diminished with few expiratory wheezes,crackles in lower bases this am. Enc. CDB and reposition every 2 hrs. IV Lasix given. Output 1250 cc's clear yellow urine per purewick.

## 2022-02-10 NOTE — CONSULTS
Care Management Initial Consult       Concerns to be Addressed:   Elevated white blood cell count, cultures pending; currently receiving IV Vancomycin and IV Zosyn (ID following). Low hemoglobin. Supplemental oxygen.   Patient plan of care discussed at interdisciplinary rounds: Yes    Anticipated Discharge Disposition:  Home.     Anticipated Discharge Services:  Home PT and home health aide (minimally).  Anticipated Discharge DME:  Anticipates needing home oxygen. Does have a walker and a wheelchair.     Patient/family educated on Medicare website which has current facility and service quality ratings:  Yes  Education Provided on the Discharge Plan:  Per team  Patient/Family in Agreement with the Plan:  Yes    Referrals Placed by CM/SW:  Potential referral to [facility] for benefit check (may need long term IV antibiotics).   Private pay costs discussed: Not applicable at this time.     Additional Information:  See below.    General Information  Assessment completed with: Patient, Elenita and her  Denilson at the bedside.  Type of CM/SW Visit: Initial Assessment    Primary Care Provider verified and updated as needed: Yes   Readmission within the last 30 days:        Reason for Consult: discharge planning  Advance Care Planning: Advance Care Planning Reviewed:  (Patient states she has healthcare directives on file here.)  States spouse Denilson is her HCA.  General Information Comments: Goal is for patient to return home as soon as possible.     Communication Assessment  Patient's communication style:    Spoken, English    Cognitive  Cognitive/Neuro/Behavioral: WDL                      Living Environment:   People in home: spouse  Denilson  Current living Arrangements: house (Split leve, 7 steps up to main level then single level.)      Able to return to prior arrangements: yes    Family/Social Support:  Care provided by: spouse/significant other  Provides care for: no one, unable/limited ability to care for  self  Marital Status:     Denilson       Description of Support System: Supportive       Current Resources:   Patient receiving home care services: No     Community Resources: [facility]  Equipment currently used at home: walker, rolling,wheelchair, manual; leg brace  Supplies currently used at home:      Employment/Financial:  Employment Status: disabled        Financial Concerns: No concerns identified   Referral to Financial Counselor: No     Lifestyle & Psychosocial Needs:  Social Determinants of Health     Tobacco Use: Medium Risk     Smoking Tobacco Use: Former Smoker     Smokeless Tobacco Use: Never Used   Alcohol Use: Not on file   Financial Resource Strain: Not on file   Food Insecurity: Not on file   Transportation Needs: Not on file   Physical Activity: Not on file   Stress: Not on file   Social Connections: Not on file   Intimate Partner Violence: Not on file   Depression: Not on file   Housing Stability: Not on file       Functional Status:  Prior to admission patient needed assistance:   Dependent ADLs:: Ambulation-walker  Dependent IADLs:: Cleaning,Cooking,Laundry,Shopping,Transportation    Mental Health Status:  Mental Health Status: Current Concern  Mental Health Management: Individual Therapy    Chemical Dependency Status:  Chemical Dependency Status: No Current Concerns           Values/Beliefs:  Spiritual, Cultural Beliefs, Gnosticism Practices, Values that affect care:         [place]       Additional Information:  Writer met with patient and her  Denilson at the bedside to review role of care management services, discuss goals of care and assess need for any possible services at discharge. Patient alert, oriented and engaged in the conversation. She is undergoing treatment for recurrent breast cancer and is grieving what she anticipates to be nearing end of life. Referral made to spiritual care per her request. She and her  live in a split-level home with 7 steps up to  "the main level. Once on that level, she has access to everything she needs. Her goal is to get home as soon as possible. In addition to her supportive , she anticipates that a girlfriend of her son's may be able to provide assist. She uses a walker (which her granddaughter has named \"Eleonora\") and has a wheelchair for longer distances. She states that she has had home IV antibiotics in the past and Denilson is well versed in the administration of IV infusions if needed (Denilson concurs). She noted that Denilson is her healthcare agent and they state that a copy should be on file with the hospital.   For discharge planning, patient believes she will need home oxygen (briefly described this process to patient and Denilson) and is interested in home care for home PT and a home health aide. Her goal is to get home as soon as possible.   12:30 PM:  Per previous notes, patient had used [facility] for home infusions last July 2021. CM will watch for ID recommendations.   2:12 PM:  Advance directives received and forwarded to ACP for review and validation.   2:30 PM:  Advance directives have been validated and added to patient's chart.    Susan Barragan RN      "

## 2022-02-10 NOTE — PROGRESS NOTES
"SPIRITUAL HEALTH SERVICES  SPIRITUAL ASSESSMENT Progress Note  [facility] P3    ILLNESS CIRCUMSTANCES:     Notified by JERRELL Davis that Elenita would like to see a . Elenita is known to me from previous admissions.  Assessed emotional/spiritual needs and resources while offering reflective conversation.    Context of Serious Illness/Symptom(s) - Metastasized breast cancer; recently diagnosed CHF; Came to ED due to symptoms of SOB earlier this week during chemo treatment. \"I was told yesterday that it has metastasized to my liver.\" Says that O2 is helping quite a bit.     DISTRESS:     Emotional/Existential/Relational Distress - Elenita has been dealing with multiple health issues for a long time. She is aware that further treatments may be less effective. She says \"I'm not ready though. I want more time with my  and my grandkids.\" She is willing to put up with unpleasant side effects of chemo if it gives her more time with her family.     Spiritual/Restorationist Distress - She is coming to terms with her mortality and says \"I am praying... asking God, Radha, my parents to help me. I don't hear anything in return though, which is hard. I'm not experiencing peace about this.\" I empathized with how heartbreaking and lonely this feels and encouraged her to continue sharing her authentic hopes and concerns in prayer.     Social/Cultural/Economic Distress -Eelnita wants to sell her car and update her HCD and will. She identifies her  as her health care agent. Given her limited energy, I encouraged Elenita to focus on the most important tasks and the ones that only she uniquely can do.     SPIRIT (Coping):     Muslim/Loree - Samaritan     Spiritual Practice(s) - Prayer    Emotional/Existential/Relational Connections - Not connected to a Yazidism community; welcomes spiritual support here in the hospital; Identifies conversations that she needs to have with loved ones when she gets home. Identifies that her " "existential hope/goal is to find/experience peace before she dies.     SENSE-MAKING:    Goals of Care - Elenita stays that her short-term goal is to get home as soon as possible. When asked about long-term goal/hopes, she says \"I know this cancer isn't curable. What I want is more time. I want to make it to summer\"     Meaning/Sense-Making - Clear on her goals. Accepting of the fact that much of her health is due to \"A double dose of bad genes.\" Desiring peace in her personal relationships and in her spiritual life.      PLAN: Will follow-up again with Elenita tomorrow.    Lynn Major M.Div.      Office: [phone] (for non-urgent requests)  Page through [facility] for time-sensitive requests  "

## 2022-02-10 NOTE — PROGRESS NOTES
Elenita Moran MRN# [id number]   YOB: 1955 Age: 67 year old   Date of Admission: 2/8/2022  Requesting physician: Dr. Thompson  Reason for consult: Progression of breast cancer           Assessment and Plan:     [facility] has received request for consult.  Attending physician will be reviewing patient's chart this afternoon and we will see her tomorrow.  I will place an early note in the morning however will not be able to physically see patient until between 1230 and 2.  Please call with specific questions or concerns in the meanwhile.      Case disccussed with Dr. Marsh.    Celia Gilliland  [facility]  Office: [phone]  Cell: [phone] Monday through Friday, 8AM-5PM. After hours and weekends, please use answering service.                Chief Complaint:   Shortness of Breath           History of Present Illness:   Mrs. Elenita Moran is a 66-year-old woman with metastatic lobular carcinoma of left breast to multiple bone sites she is here for radiation follow-up.    ONCOLOGIC HISTORY:    1.     The patient had presented with multifocal tumor in the left breast, 7 x 6 x 4 cm, in August 2010.    2.     The patient underwent left breast mastectomy. Her tumor was ER positive 63%, NJ positive 17%, and HER-2 negative by FISH.    3.     The patient was treated with Adriamycin plus Cytoxan, followed by weekly Taxol.    4.     In May 2011, the patient had started anastrozole 1 mg orally daily.    5.     The patient was found to have a bony metastasis and the patient underwent radiation from July 2011 until October 2011 for L1, L2, and L3 vertebral bodies.    6.     In January 2015, PET scan showed no evidence of malignancy.    7.  On January 31, 2017 patient has started second line hormonal treatment with Faslodex plus Ibrance.    8.  The patient also underwent CyberKnife to painful L3 vertebral body mass she completed 2400 cGy in 4 fractions on 3/28/2017.  Prior  to that she was also treated to a right rib lesion as well as T4, T6-T7 and a posterior left-sided rib lesion.  The latter received 3000 cGy in 10 fractions completing those treatments 3/14/2017..    9. She had disease progression in March 2019 per PET imaging and therapy was changed to everolimus and exemestane on 4/24/19.    10. From 12/6/2019 until 3/30/2020 she was treated with 1st line chemo (refused PIQRAY) and has completed 6 cycles of Abraxane and but discontinue due to neuropathy. She had partial response to treatment.     11. On 3/30/2020 she started 3rd line hormonal treatment: Piqray orally daily plus Faslodex.     12. On 9/24/2020 CT chest abdomen and pelvis Stable appearance of the sacral fractures, left superior and inferior pubic rami fracture, and T10 vertebral body fracture and left 11th rib posteriorly.    13. On 2/9/2021 PET scan demonstrated progressive disease with renewed metabolic activity in a few scattered skeletal metastases. Significant muscular FDG uptake, which decreases the bioavailability of FDG for tumor. As a result, the scan likely underestimates the extent of disease activity. Healing fractures in the pelvis and spine with a presumed small hematoma overlying the sacral fractures.    14. On 2/23/2021 she started Xeloda orally due to progression.     15. On 4/23/2021 PET scan showed mild progression in anterolateral 5th rib and possible liver lesion.     16. On 5/3/2021 she has started Doxil 50 mg/m2 IV every 4 weeks    17. On 7/19/2021 CT CAP showed mild fullness in right paraspinal muscles. left obturator ring fracture. complex B/L sacral ala fractures.     18. On 7/21/2021 MRI of pelvis showed two complex fluid collections along fracured left superior and inferior pubic rami    19. On 8/5/2021 PET/CT showed . While there is persistent FDG avid disease throughout the osseous structures, there is resolution of the right hepatic lobe lesion suggesting partial response to therapy.  "worsening inflammatory change associated with the pathologically fractured left anterior pubic ramus and right sacrum/iliac bone with development of right gluteal and right paraspinal intramuscular hematomas.    20.  On 2021 PET scan revealed stable bony metastases. No worsening.    Interval History (HPI from 2022)  Elenita was seen 22 with her .  Overall she had been feeling about the same.  Over the last week she has noticed slightly more shortness of breath.  Her hemoglobin is low at 7.6.  Her appetite is fair.  She denies any chest pain.  She rarely feels nauseated.  Her energy remains low.  Today her heart rate is 107 bpm.  Reports of having a pressure sore at her sacrum.  She and her  have been applying Neosporin and alternating it with zinc oxide.  The area does not have any swelling, redness, or discharge.  The patient's  inquires about the need for a wound care nurse.  She continues to have bilateral peripheral edema.  Her last ejection fraction was 40 to 45%.  She had a PET scan that was done last week; we currently do not have the report. Today the patient denies any fevers, chills, URI-like symptoms, abdominal discomfort, diarrhea, constipation, or rash.           Physical Exam:   Vitals were reviewed  Blood pressure 103/59, pulse 97, temperature 98.5  F (36.9  C), temperature source Oral, resp. rate 18, height 1.651 m (5' 5\"), weight 64.2 kg (141 lb 8 oz), SpO2 99 %.  Temperatures:  Current - Temp: 98.8  F (37.1  C); Max - Temp  Av.8  F (37.1  C)  Min: 98.8  F (37.1  C)  Max: 98.8  F (37.1  C)  Respiration range: Resp  Av.3  Min: 5  Max: 38  Pulse range: Pulse  Av.9  Min: 96  Max: 129  Blood pressure range: Systolic (24hrs), Av , Min:109 , Max:134   ; Diastolic (24hrs), Av, Min:58, Max:82    Pulse oximetry range: SpO2  Av %  Min: 78 %  Max: 100 %    Intake/Output Summary (Last 24 hours) at 2022 1145  Last data filed at 2022 " 1050  Gross per 24 hour   Intake 750 ml   Output 5000 ml   Net -4250 ml

## 2022-02-11 NOTE — PROGRESS NOTES
[facility]    Medicine Progress Note - Hospitalist Service    Date of Admission:  2/8/2022    A/P      67-year-old with metastatic breast cancer, diabetes, chronic MSSA infection of the pelvis who presented to the ED with dizziness weakness and hypoxia.  She has had chronic cough for several weeks.  PET scan done on 2/12 showed progression of underlying breast cancer.  Awaiting oncology to discuss it with the patient about treatment options.  Patient was treated with IV antibiotics and IV Lasix s/p which hypoxia is improving.  ID and pulmonary also consulted.     1.Acute respiratory failure, concern sepsis  -Multifactorial due to acute systolic CHF/pulmonary edema, drug-induced pneumonitis(At risk from Doxorubicin), pneumonia, pleural effusion malignant versus parapneumonic  -Patient is COVID-19 negative  -Initially treated with dexamethasone(now on hold), Zosyn and vancomycin with IV Lasix. WBC trending up now 26( ID following )  -Pulmonary consulted and did not recommend continuing with dexamethasone and therefore would hold off on the same.  -Continue Zosyn Vanco as per ID, respiratory labs are pending, such as checking antigens for Legionella and pneumococcus, also multiplex PCR  -Thoracentesis per pulmonary to rule out parapneumonic versus malignant effusion  -Continue supplemental oxygen  -Echo shows EF of 30 to 35%(likely effect doxorubicin).  BNP is quite elevated at 1500.  Guarded prognosis     2.Breast cancer with metastasis to the liver and bone  -As per oncology notes PET scan done on 2/1 showed progression of the disease  -Patient is on third line chemo with doxorubicin--now has met her lifelong limit with this and has lower EF and cannot have it per onc--Oncology notes other options  -Awaiting oncology input regarding more chemotherapy vs goals  -Continue methadone  -CODE STATUS discussed and patient wants to be full code  -please note I called [facility] and talked to Clara Cook on  2/10/22--she needs a formal consult to be placed for them to see her--I asked them to see her asap to help with goals --today at 12:30-2 pm [facility] coming--I told pt and she is calling  this am to remind him too     3.Non-insulin-dependent diabetes  -Order 5 units of Lantus and mealtime insulin--hold off steroids  -Order sliding scale NovoLog  -Ordered diabetic diet     4.Hypomagnesemia  -Continue magnesium replacement as per protocol     5.History of GERD  - omeprazole     6.Decubitus ulcer of the sacrum  -Order wound care     7.Moderate protein calorie moderation  -Albumin of 3  -Patient has decreased p.o. intake     8.Goals of care--really need [facility] to assist with this --I called them and talked to Clara Cook today               Diet: Combination Diet Moderate Consistent Carb (60 g CHO per Meal) Diet; 2 gm NA Diet    DVT Prophylaxis: Enoxaparin (Lovenox) SQ  Jiang Catheter: Not present  Central Lines: PRESENT     Cardiac Monitoring: None  Code Status: Full Code      Disposition Plan   Expected Discharge: 02/14/2022     Anticipated discharge location:  Awaiting care coordination huddle  Delays:     onc meeting at 12:30 go over goals        The patient's care was discussed with the Care Coordinator/ and Patient. I called  to update at 1709 and left message    Ivanna Thompson MD  Hospitalist Service  [facility]  Securely message with the Vocera Web Console (learn more here)  Text page via [employer] Paging/Directory         Clinically Significant Risk Factors Present on Admission                 ______________________________________________________________________    Interval History   She feels better  Got some sleep   Got up to chair this am  Feels breathing better  Was not tearful this am with me      Data reviewed today: I reviewed all medications, new labs and imaging results over the last 24 hours. I personally reviewed no images or EKG's today.    Physical Exam    Vital Signs: Temp: 98.6  F (37  C) Temp src: Oral BP: 126/67 Pulse: 108   Resp: 20 SpO2: 99 % O2 Device: Nasal cannula Oxygen Delivery: 1 LPM  Weight: 139 lbs 3 oz  Constitutional: awake, alert, cooperative and no apparent distress  Eyes: sclera clear  Respiratory: No increased work of breathing, good air exchange, clear to auscultation bilaterally, no crackles or wheezing  Cardiovascular: Normal apical impulse, regular rate and rhythm, normal S1 and S2, no S3 or S4, and no murmur noted  GI: normal bowel sounds, soft, non-distended and non-tender  Skin: no bruising or bleeding  Musculoskeletal: no lower extremity pitting edema present  Neurologic: Mental Status Exam:  Level of Alertness:   awake  Neuropsychiatric: General: normal, calm and normal eye contact    Data   Recent Labs   Lab 02/11/22  0739 02/11/22  0656 02/10/22  2136 02/10/22  1633 02/10/22  0742 02/10/22  0626 02/09/22  0900 02/09/22  0851 02/08/22  1807 02/08/22  1803   WBC  --  26.5*  --   --   --  19.6*  19.6*  --  11.1*  --  9.6   HGB  --  8.0*  --   --   --  7.3*  --  8.6*  --  8.8*   MCV  --  96  --   --   --  95  --  95  --  96   PLT  --  246  --   --   --  259  --  291  --  329   NA  --   --   --   --   --  137  --  141  --  138   POTASSIUM  --   --   --   --   --  3.2*  --  4.2  --  4.3   CHLORIDE  --   --   --   --   --  100  --  104  --  106   CO2  --   --   --   --   --  25  --  23  --  19*   BUN  --   --   --   --   --  16  --  14  --  15   CR  --  0.64  --   --   --  0.66  --  0.63   < > 0.69  0.69   ANIONGAP  --   --   --   --   --  12  --  14  --  13   BENJI  --   --   --   --   --  7.6*  --  8.5  --  8.7   *  --  143* 143*   < > 116   < > 169*  --  146*   ALBUMIN  --   --   --   --   --  2.6*  --  3.0*  --   --    PROTTOTAL  --   --   --   --   --  5.8*  --  6.5  6.5  --   --    BILITOTAL  --   --   --   --   --  0.4  --  0.4  --   --    ALKPHOS  --   --   --   --   --  172*  --  216*  --   --    ALT  --   --   --   --   --   13  --  10  --   --    AST  --   --   --   --   --  31  --  35  --   --     < > = values in this interval not displayed.     No results found for this or any previous visit (from the past 24 hour(s)).

## 2022-02-11 NOTE — PLAN OF CARE
Problem: Adult Inpatient Plan of Care  Goal: Plan of Care Review  Outcome: Improving  Flowsheets (Taken 2/10/2022 2215)  Plan of Care Reviewed With: patient  Progress: improving  Goal: Patient-Specific Goal (Individualized)  Outcome: Improving  Goal: Absence of Hospital-Acquired Illness or Injury  Outcome: Improving  Intervention: Identify and Manage Fall Risk  Recent Flowsheet Documentation  Taken 2/10/2022 2045 by Luli uMllen RN  Safety Promotion/Fall Prevention:   activity supervised   bed alarm on  Intervention: Prevent Skin Injury  Recent Flowsheet Documentation  Taken 2/10/2022 2038 by Luli Mullen RN  Body Position: turned  Goal: Optimal Comfort and Wellbeing  Outcome: Improving  Goal: Readiness for Transition of Care  Outcome: Improving     Problem: Gas Exchange Impaired  Goal: Optimal Gas Exchange  Outcome: Improving     Problem: Adjustment to Illness (Sepsis/Septic Shock)  Goal: Optimal Coping  Outcome: Improving     Problem: Infection Progression (Sepsis)  Goal: Absence of Infection Signs and Symptoms  Outcome: Improving     Problem: Nutrition Impaired (Sepsis/Septic Shock)  Goal: Optimal Nutrition Intake  Outcome: Improving     Vitals:    02/10/22 0940 02/10/22 1142 02/10/22 1624 02/10/22 1902   BP: 125/61 103/59 99/58 122/72   BP Location: Right arm Right arm Right arm Right arm   Patient Position:       Pulse: 103 97 99 104   Resp: 18 18 16 18   Temp: 98.8  F (37.1  C) 98.5  F (36.9  C) 99.3  F (37.4  C) 98.9  F (37.2  C)   TempSrc: Oral Oral Oral Oral   SpO2: 97% 99% 99% 96%   Weight:       Height:        Chronic pain. Pain is 2/10. 1 liter oxygen. IV antibiotics. Airbed arrived and patient transferred to chair and back to bed SBA with walker. Blood sugar 143. Lungs diminished with exp wheeze. Luli Mullen, RN

## 2022-02-11 NOTE — PROGRESS NOTES
"Care Management Follow Up    Length of Stay (days): 3    Expected Discharge Date: 02/14/2022 or 2/15/2022    Concerns to be Addressed:   Leukocytosis (26.5), IV Zosyn and Vancomycin (ID following). Lab monitoring. Care conference with [facility] today between 1230 and 2 PM.   Patient plan of care discussed at interdisciplinary rounds: Yes  Follow up from rounds/notes:   Per oncology notes, plan is to continue to pursue all treatment, full code.    Anticipated Discharge Disposition:  Home.     Anticipated Discharge Services:  Home care for PT, home health aide and nursing (pending outcome of conference).   Anticipated Discharge DME:  Has a walker and wheelchair.     Patient/family educated on Medicare website which has current facility and service quality ratings:  Yes  Education Provided on the Discharge Plan:  Per team  Patient/Family in Agreement with the Plan:  Yes    Referrals Placed by CM/SW:  Will work on home care referrals today once goals of care are determined. See below.   Private pay costs discussed: Not applicable     Additional Information:  Patient lives with her  Denilson who is her primary caregiver and health care agent. She is undergoing treatment for recurrent breast cancer and is grieving what she anticipates to be nearing end of life. Referral made to spiritual care per her request. She and her  live in a split-level home with 7 steps up to the main level. Once on that level, she has access to everything she needs. Her goal is to get home as soon as possible. In addition to her supportive , she anticipates that a girlfriend of her son's may be able to provide assist. She uses a walker (which her granddaughter has named \"Eleonora\") and has a wheelchair for longer distances. She states that she has had home IV antibiotics in the past and Denilson is well versed in the administration of IV infusions if needed (Denilson concurred).   For discharge planning, patient believes she will need home " oxygen (briefly described this process to patient and Denilson) and is interested in home care for home PT and a home health aide. Her goal is to get home as soon as possible.   Per previous notes, patient had used [facility] for home infusions last July 2021. CM will watch for ID recommendations.   1:31 PM:  Initial home care referral made to [facility] for possible home PT and home health aide. They are full for the weekend but will review for possible start of care next week.  2:42 PM:  [facility] can accept for start of care next week (request start of care for 1-5 days post discharge) for skilled nursing, PT and a home health aide. Referral also made to [facility] in case needs home IV (vs treatment at infusion center).     Susan Barragan RN

## 2022-02-11 NOTE — PLAN OF CARE
Problem: Gas Exchange Impaired  Goal: Optimal Gas Exchange  Outcome: Improving   Patient is on room air throughout the shift.  Problem: Pain Acute  Goal: Acceptable Pain Control and Functional Ability  Outcome: Improving   Patient denied pain throughout the shift and refused her scheduled pain meds, Dilaudid and Methadone. Took the Ibuprofen and Hydroxyzine.    Patient request that I only administer 1 unit of insulin for CHO at breakfast and lunch.

## 2022-02-11 NOTE — PROGRESS NOTES
"   02/11/22 1500   Quick Adds   Type of Visit Initial PT Evaluation   Living Environment   People in home spouse   Current Living Arrangements house   Home Accessibility stairs to enter home;stairs within home  (split level)   Number of Stairs, Main Entrance   (ramp outside)   Number of Stairs, Within Home, Primary 7   Stair Railings, Within Home, Primary railings on both sides of stairs  (once up 7 steps, stays on 1 level)   Self-Care   Usual Activity Tolerance poor  (it was fair to poor )   Current Activity Tolerance fair  (\"feeling better without excess fluid\")   Equipment Currently Used at Home walker, rolling;other (see comments);wheelchair, manual  (wheelchair on ramp, 4WW community, FWW in house)   Activity/Exercise/Self-Care Comment reports the last few weeks she hasn't felt well, this morning she was feeling much better without all that excess fluid   Disability/Function   Hearing Difficulty or Deaf yes  (has B HA)   Use of hearing assistive devices bilateral hearing aids   Wear Glasses or Blind yes  (glasses)   Fall history within last six months yes   Number of times patient has fallen within last six months 1  (not recently)   General Information   Onset of Illness/Injury or Date of Surgery 02/08/22   Referring Physician  Ivanna Thompson MD   Patient/Family Therapy Goals Statement (PT) my goal is to just get home and then figure out my next steps there   Pertinent History of Current Problem (include personal factors and/or comorbidities that impact the POC) moderate complexity   Existing Precautions/Restrictions   (L Ankle brace- recommended family to bring it in as able)   Weight-Bearing Status - LLE   (no restriction noted, however decreased control with amb)   General Observations per oncology note: 2/1/22 PET/CT shows persistent widespread osseous disease, with the development of innumerable lesions throughout the liver parenchyma suspicious for progression of disease.    Cognition "   Affect/Mental Status (Cognition) WNL   Follows Commands (Cognition) WNL   Cognitive Status Comments patient was motivated for PT   Strength   Strength Comments BLE grossly decreased especially L ankle   Bed Mobility   Comment (Bed Mobility) sup>sit SBA with bedrail   Transfers   Transfer Safety Comments sit<>stand CGA with VC for safe hand placement and slow transitions   Gait/Stairs (Locomotion)   Distance in Feet (Required for LE Total Joints) 30   Clinical Impression   Criteria for Skilled Therapeutic Intervention yes, treatment indicated   PT Diagnosis (PT) impaired functional mobility   Influenced by the following impairments weakness, pain   Functional limitations due to impairments gait   Clinical Presentation Evolving/Changing   Clinical Presentation Rationale patient presents as medically diagnosed   Clinical Decision Making (Complexity) moderate complexity   Therapy Frequency (PT) Daily   Predicted Duration of Therapy Intervention (days/wks) 4 days   Planned Therapy Interventions (PT) gait training;transfer training   Anticipated Equipment Needs at Discharge (PT) walker, rolling;wheelchair   Risk & Benefits of therapy have been explained patient   PT Discharge Planning    PT Discharge Recommendation (DC Rec) home with assist;home with home care physical therapy  (pending prognosis/progress/goals of care)   PT Rationale for DC Rec Recommend 24 hour supervision for safety with all mobility.    Total Evaluation Time   Total Evaluation Time (Minutes) 15

## 2022-02-11 NOTE — PLAN OF CARE
Problem: Adult Inpatient Plan of Care  Goal: Plan of Care Review  2/11/2022 0447 by Luli Mullen RN  Outcome: Improving  2/10/2022 2215 by Luli Mullen RN  Outcome: Improving  Flowsheets (Taken 2/10/2022 2215)  Plan of Care Reviewed With: patient  Progress: improving  Goal: Patient-Specific Goal (Individualized)  2/11/2022 0447 by Luli Mullen RN  Outcome: Improving  2/10/2022 2215 by Luli Mullen RN  Outcome: Improving  Goal: Absence of Hospital-Acquired Illness or Injury  2/11/2022 0447 by Luli Mullen RN  Outcome: Improving  2/10/2022 2215 by Luli Mullen RN  Outcome: Improving  Intervention: Identify and Manage Fall Risk  Recent Flowsheet Documentation  Taken 2/11/2022 0329 by Luli Mullen RN  Safety Promotion/Fall Prevention:   activity supervised   bed alarm on  Taken 2/11/2022 0048 by Luli Mullen RN  Safety Promotion/Fall Prevention:   activity supervised   bed alarm on  Taken 2/10/2022 2045 by Luli Mullen RN  Safety Promotion/Fall Prevention:   activity supervised   bed alarm on  Intervention: Prevent Skin Injury  Recent Flowsheet Documentation  Taken 2/11/2022 0329 by Luli Mullen RN  Body Position: position changed independently  Taken 2/11/2022 0048 by Luli Mullen RN  Body Position: position changed independently  Taken 2/10/2022 2038 by Luli Mullen RN  Body Position: turned  Goal: Optimal Comfort and Wellbeing  2/11/2022 0447 by Luli Mullen RN  Outcome: Improving  2/10/2022 2215 by Luli Mullen RN  Outcome: Improving  Goal: Readiness for Transition of Care  2/11/2022 0447 by Luli Mullen RN  Outcome: Improving  2/10/2022 2215 by Luli Mullen RN  Outcome: Improving  Intervention: Mutually Develop Transition Plan  Recent Flowsheet Documentation  Taken 2/11/2022 0100 by Luli Mullen RN  Equipment Currently Used at Home: walker, rolling     Problem: Gas Exchange Impaired  Goal: Optimal Gas Exchange  2/11/2022 0447 by Luli Mullen  "RN  Outcome: Improving  2/10/2022 2215 by Luli Mullen RN  Outcome: Improving  Intervention: Optimize Oxygenation and Ventilation  Recent Flowsheet Documentation  Taken 2/11/2022 0329 by Luli Mullen RN  Head of Bed (HOB) Positioning: HOB at 45 degrees  Taken 2/11/2022 0048 by Luli Mullen RN  Head of Bed (HOB) Positioning: HOB at 45 degrees     Problem: Adjustment to Illness (Sepsis/Septic Shock)  Goal: Optimal Coping  2/11/2022 0447 by Luli Mullen RN  Outcome: Improving  2/10/2022 2215 by Luli Mullen RN  Outcome: Improving     Problem: Infection Progression (Sepsis)  Goal: Absence of Infection Signs and Symptoms  2/11/2022 0447 by Luli Mullen RN  Outcome: Improving  2/10/2022 2215 by Luli Mullen RN  Outcome: Improving  Intervention: Promote Recovery  Recent Flowsheet Documentation  Taken 2/11/2022 0329 by Luli Mullen RN  Activity Management: activity adjusted per tolerance  Taken 2/11/2022 0048 by Luli Mullen RN  Activity Management: activity adjusted per tolerance     Problem: Nutrition Impaired (Sepsis/Septic Shock)  Goal: Optimal Nutrition Intake  2/11/2022 0447 by Luli Mullen RN  Outcome: Improving  2/10/2022 2215 by Luli Mullen RN  Outcome: Improving   BP (!) 141/75 (BP Location: Right arm, Patient Position: Right side)   Pulse 103   Temp 98.5  F (36.9  C) (Oral)   Resp 18   Ht 1.651 m (5' 5\")   Wt 63.1 kg (139 lb 3 oz)   SpO2 95%   BMI 23.16 kg/m  .   IV antibiotics. VSS. On 1 liter nasal cannula. Unit collect. Alert and oriented. Lungs are diminished with faint exp wheezes. Pressure ulcer on buttocks is pink/red with mepilex on it. Air bed. Slept very well tonight. Didn't drink much but great output in purewick. Luli Mullen, IRMA   "

## 2022-02-11 NOTE — PROGRESS NOTES
Infectious Disease Progress Note    Assessment/Plan  Impression: Hypoxemia, cough for several weeks.  New infiltrates on chest CT that were not there 6 months ago.     Patient receiveddoxorubicin for metastatic breast cancer.     Recommendations: Agree with work-up to date.    Will reorder respiratory panel PCR.     Check antigens for Legionella and pneumococcus.     This may not be infectious given doxorubicin can also cause pneumonitis.     Progression of malignancy also a possibility.    Appreciated Dr. Zhu and Huey consultation.  Discussed with Dr Arzate.    Active Problems:    Acute respiratory failure with hypoxia (H)      RIDGE BARAJAS MD  [phone]      Subjective  Feels better today.  Complains of pressure sore.  Breathing is easier.    Had thoracentesis.    Objective    Vital signs in last 24 hours  Temp:  [97.9  F (36.6  C)-99.3  F (37.4  C)] 98.6  F (37  C)  Pulse:  [] 108  Resp:  [16-20] 20  BP: ()/(58-75) 126/67  SpO2:  [95 %-99 %] 99 %  Wt Readings from Last 3 Encounters:   02/11/22 65.1 kg (143 lb 8 oz)   11/08/21 67.1 kg (148 lb)   08/23/21 70.8 kg (156 lb)           Intake/Output last 3 shifts  I/O last 3 completed shifts:  In: 1570 [P.O.:1570]  Out: 3800 [Urine:3800]  Intake/Output this shift:  I/O this shift:  In: 120 [P.O.:120]  Out: 1000 [Urine:1000]    Review of Systems   Pertinent items are noted in HPI., otherwise negative.    Physical Exam  General appearance: alert, appears stated age and cooperative  Head: Normocephalic, without obvious abnormality, atraumatic  Eyes: EOMI, no icterus  Neck: no adenopathy and supple, symmetrical, trachea midline  Lungs: Fairly clear no rhonchi rales or wheezes are appreciated.  Heart: Regular rate and rhythm, no murmur appreciated  Abdomen: soft, non-tender; bowel sounds normal; no masses,  no organomegaly  Extremities: Warm and dry, no significant edema  Sore on right foot does not appear infected.   Skin: Skin color, texture,  turgor normal. Sacral pressure sore noted.  Does not appear infected.    Neurologic: Grossly normal    Pertinent Labs   Lab Results: personally reviewed.     Recent Labs   Lab 02/11/22  0656 02/10/22  0626 02/09/22  0851   WBC 26.5* 19.6*  19.6* 11.1*   HGB 8.0* 7.3* 8.6*   HCT 25.1* 23.3* 26.9*    259 291        Recent Labs   Lab 02/11/22  0656 02/10/22  0626 02/09/22  0851    137 141   CO2 23 25 23   BUN 17 16 14       Results for JULES KRUGER (MRN [id number]) as of 2/10/2022 12:44  Pleural fluid. Ref. Range 2/9/2022 17:57   Glucose Fluid Latest Units: mg/dL 174   Lactate Dehydrogenase Fluid Latest Units: U/L 141   Ph Fluid Latest Units: pH 8.0   Protein Total Fluid Latest Units: g/dL 2.4       No results found for: CULT  Collected Updated Procedure Result Status    02/09/2022 1757 02/10/2022 0148 Pleural fluid Aerobic Bacterial Culture Routine with Gram Stain [[id number]]    Pleural fluid from Pleural Cavity    Preliminary result Component Value   Gram Stain Result No organisms seen P    2+ WBC seen P    Predominantly mononuclear cells           02/09/2022 1757 02/09/2022 1916 Glucose fluid [[id number]]    Pleural fluid from Pleural Cavity    Final result Component Value Units   Glucose fluid 174 mg/dL           02/09/2022 1757 02/10/2022 0006 Lactate dehydrogenase fluid [[id number]]    Pleural fluid from Pleural Cavity    Final result Component Value Units   Lactate dehydrogenase fluid 141 U/L           02/09/2022 1757 02/09/2022 1837 Anaerobic bacterial culture [[id number]]   Pleural fluid from Pleural Cavity    In process Component Value   No component results           02/09/2022 1757 02/09/2022 1935 pH fluid [[id number]]    Pleural fluid from Pleural Cavity    Final result Component Value Units   pH Fluid 8.0 pH           02/09/2022 1757 02/09/2022 2319 Protein fluid [[id number]]    Pleural fluid from Pleural Cavity, Right    Final result Component Value Units   Protein  Total Fluid 2.4 g/dL           02/09/2022 1757 02/10/2022 0903 Cytology, non-gynecologic [[id number]]   Pleural fluid from Pleural Cavity, Right    In process Component Value   No component results           02/09/2022 1757 02/10/2022 1113 Cell count with differential fluid [[id number]]    Pleural fluid from Pleural Cavity    In process Component Value   No component results           02/09/2022 1757 02/10/2022 1113 Cell Count Body Fluid [[id number]]   Pleural fluid from Pleural Cavity    In process Component Value   No component results           02/09/2022 1142 02/09/2022 1456 Legionella pneumophila antigen urine [[id number]]   Urine, Midstream    Final result Component Value   Legionella pneumophila serogroup 1 urinary antigen Negative   Suggests no recent or current infection. Infection due to Legionella cannot be ruled out, since other serogroups and species may cause disease, antigen may not be present in urine in early infection, and the level of antigen present in the urine may be below detectable limits of the test.           02/09/2022 1142 02/09/2022 1457 Streptococcus pneumoniae antigen [[id number]]   Urine, Midstream    Final result Component Value   Streptococcus pneumoniae antigen Negative   A negative Streptococcus pneumoniae antigen result does not rule out infection with Streptococcus pneumoniae.           02/09/2022 1142 02/09/2022 1150 Blastomyces Agn Quant EIA Non Blood [[id number]]   Urine, Clean Catch    In process Component Value   No component results           02/09/2022 1142 02/09/2022 1150 Histoplasma Galactomannan Antigen Quant by EIA, Urine [[id number]]   Urine, Clean Catch    In process Component Value   No component results           02/09/2022 0851 02/09/2022 1033 Histoplasma Antigen Quantitative by EIA [[id number]]   Blood from Arm, Right    In process Component Value   No component results           02/08/2022 1839 02/09/2022 2116 Blood Culture Arm, Left [[id number]]    Blood from Arm, Left    Preliminary result Component Value   Culture No growth after 1 day P           2022 2116 Blood Culture Portacath [[id number]]   Blood from Portacath    Preliminary result Component Value   Culture No growth after 1 day P           2022 [id number] 1911 Symptomatic; Yes; 2022 Influenza A/B & SARS-CoV2 (COVID-19) Virus PCR Multiplex Nasopharyngeal [[id number]]    Swab from Nasopharyngeal    Final result Component Value   Influenza A PCR Negative   Influenza B PCR Negative   SARS CoV2 PCR Negative   NEGATIVE: SARS-CoV-2 (COVID-19) RNA not detected, presumed negative.               Pertinent Radiology   Radiology Results:   Echocardiogram Complete    Result Date: 2022  [id number]^PEPE^[place] [address]  Name: JULES KRUGER MRN: [id number] : 1955 Study Date: 2022 10:45 AM Age: 67 yrs Gender: Female Patient Location: [facility] Reason For Study: 2nd degree AV Block Ordering Physician: PEPE BARRERA Performed By: JASMYNE  BSA: 1.7 m2 Height: 65 in Weight: 148 lb HR: 119 BP: 117/68 mmHg ______________________________________________________________________________ Procedure Complete Echo Adult. Definity (NDC #84092-229) given intravenously. ______________________________________________________________________________ Interpretation Summary  1. The left ventricle is normal in size. Left ventricular systolic performance is moderately reduced. The ejection fraction is estimated to be 30-35%. 2. There is moderate global reduction in left ventricular systolic performance. 3. There is mild aortic insufficiency. 4. There is moderate mitral insufficiency. 5. Normal right ventricular size with mildly reduced right ventricular systolic performance. 6. There is mild to moderate left atrial enlargement.  When compared to the prior real-time echocardiogram dated   2021, there has been a further diminution in left ventricular systolic performance. The degree of mitral insufficiency has increased from mild-moderate to now moderate. ______________________________________________________________________________ Left ventricle: The left ventricle is normal in size. Left ventricular systolic performance is moderately reduced. The ejection fraction is estimated to be 30-35%. There is moderate global reduction in left ventricular systolic performance. Left ventricular wall thickness is normal.  Assessment of left atrial pressure (LAP): The cumulative findings are indeterminate in evaluating left atrial pressure.  Right ventricle: Normal right ventricular size with mildly reduced right ventricular systolic performance.  Left atrium: There is mild to moderate left atrial enlargement.  Right atrium: The right atrium is of normal size.  IVC: The IVC is of normal caliber.  Aortic valve: The aortic valve is comprised of three cusps. There is no significant aortic stenosis. There is mild aortic insufficiency.  Mitral valve: The mitral valve appears morphologically normal. There is moderate mitral insufficiency.  Tricuspid valve: The tricuspid valve is grossly morphologically normal. There is mild tricuspid insufficiency.  Pulmonic valve: The pulmonic valve is grossly morphologically normal. There is mild pulmonic insufficiency.  Thoracic aorta: The aortic root and proximal ascending aorta are of normal dimension.  Pericardium: There is no significant pericardial effusion. ______________________________________________________________________________ ______________________________________________________________________________ MMode/2D Measurements & Calculations RVDd: 4.3 cm IVSd: 0.75 cm LVIDd: 5.6 cm LVIDs: 4.4 cm LVPWd: 0.99 cm FS: 21.8 % LV mass(C)d: 182.6 grams LV mass(C)dI: 104.9 grams/m2 Ao root diam: 3.0 cm LA dimension: 4.4 cm asc Aorta Diam: 2.7 cm LA/Ao: 1.5 LVOT diam: 1.7 cm  LVOT area: 2.2 cm2 LA Volume Indexed (AL/bp): 65.9 ml/m2  RWT: 0.35  Doppler Measurements & Calculations LV V1 max P.1 mmHg LV V1 max: 112.5 cm/sec LV V1 VTI: 17.2 cm SV(LVOT): 38.5 ml SI(LVOT): 22.1 ml/m2 PA acc time: 0.14 sec  ______________________________________________________________________________ Report approved by: Ashley Goodmna 2022 12:49 PM       US Thoracentesis    Result Date: 2022  EXAM: 1. RIGHT THORACENTESIS 2. ULTRASOUND GUIDANCE LOCATION: [facility] DATE/TIME: 2022 5:34 PM INDICATION: Pleural effusion. PROCEDURE: Informed consent obtained. Time out performed. The chest was prepped and draped in sterile fashion. 10 mL of 1 % lidocaine was infused into the local soft tissues. Under direct ultrasound guidance, a 5 Hungarian catheter system was placed into the pleural effusion. 0.45 liters of clear yellow fluid were removed and sent to lab, if requested. Patient tolerated procedure well. Ultrasound imaging was obtained and placed in the patient's permanent medical record.     IMPRESSION: Status post right ultrasound-guided thoracentesis. Reference CPT Code: 05651    CT Chest Pulmonary Embolism w Contrast    Result Date: 2022  EXAM: CT CHEST PULMONARY EMBOLISM W CONTRAST LOCATION: [facility] DATE/TIME: 2022 6:08 PM INDICATION: Shortness of breath. PE suspected, high prob COMPARISON: CT chest, abdomen and pelvis 2021 TECHNIQUE: CT chest pulmonary angiogram during arterial phase injection of IV contrast. Multiplanar reformats and MIP reconstructions were performed. Dose reduction techniques were used. CONTRAST: isovue 370 100ml FINDINGS: ANGIOGRAM CHEST: Pulmonary arteries are normal caliber and negative for pulmonary emboli. Thoracic aorta is negative for dissection. No CT evidence of right heart strain. LUNGS AND PLEURA: Moderate sized bilateral pleural effusions and bibasilar atelectasis. Extensive airspace and  groundglass opacities throughout both lungs. MEDIASTINUM/AXILLAE: Nonenlarged mediastinal lymph nodes. Right anterior chest wall Port-A-Cath tip in the distal SVC. CORONARY ARTERY CALCIFICATION: None. UPPER ABDOMEN: Normal. MUSCULOSKELETAL: Extensive sclerotic metastasis throughout the visualized skeleton as seen previously. Severe T10 vertebral body compression fractures unchanged. Left mastectomy and breast prosthesis.     IMPRESSION: 1.  No evidence for pulmonary emboli. 2.  Extensive airspace and groundglass opacities throughout both lungs compatible with pneumonia, including Covid 19. 3.  Moderate-sized bilateral pleural effusions and bibasilar atelectasis. 4.  Diffuse skeletal metastasis. Imaging features can be seen with (COVID-19)  pneumonia, though are nonspecific and can occur with a variety of infectious and noninfectious processes.

## 2022-02-11 NOTE — PROGRESS NOTES
"SPIRITUAL HEALTH SERVICES NOTE  [facility], P3    SPIRITUAL ASSESSMENT    Feeling more hopeful today    Willing to try a different treatment    Anticipating a liver biopsy in the future    CARE PROVIDED  Empathic listening and presence  Normalized/validated her feelings of relief, concern, and hopefulness  Prayer shared    FULL SPIRITUAL CARE NOTE  Follow-up visit with Elenita. She says that she had conversations with Dr. Arzate and Dr. Marsh today and that she has been more hopeful today as a result. She is hopeful that what she is feeling in her lungs might be due pneumonia, which can be treated with antibiotics. She anticipates that the spots in her liver may be biopsied, which is encouraging because it may be responsive to a different treatment. She is still hoping to discharge to home over the weekend and spend Waller's Day with her , saying \"I will try any antibiotic if will allow me to go home.\" She is also encouraged that she has more range of motion in her legs today - something that she hasn't experienced in recent weeks.     Visit Length: 20 minutes    Plan of Care: Will remain available for further support as patient/family needs/desires.    Lynn Major M.Div.      Office: [phone] (for non-urgent requests)  Please Vocera or page through [facility] for time-sensitive requests    "

## 2022-02-11 NOTE — PHARMACY-VANCOMYCIN DOSING SERVICE
"Pharmacy Vancomycin Note  Date of Service February 10, 2022  Patient's  1955   67 year old, female    Indication: Healthcare-Associated Pneumonia  Day of Therapy: 3  Current vancomycin regimen:  750 mg IV q12h  Current vancomycin monitoring method: AUC  Current vancomycin therapeutic monitoring goal: 400-600 mg*h/L    InsightRX Prediction of Current Vancomycin Regimen  Loading dose: 1500 mg at 20:30 2022.  Regimen: 750 mg IV every 12 hours.  Start time: 08:39 on 2022  Exposure target: AUC24 (range)400-600 mg/L.hr   AUC24,ss: 464 mg/L.hr  Probability of AUC24 > 400: 79 %  Ctrough,ss: 14.8 mg/L  Probability of Ctrough,ss > 20: 10 %  Probability of nephrotoxicity (Lodise LENNY ): 10 %      Current estimated CrCl = Estimated Creatinine Clearance: 83.8 mL/min (based on SCr of 0.66 mg/dL).    Creatinine for last 3 days  2022:  6:03 PM Creatinine 0.69 mg/dL;  6:03 PM Creatinine 0.69 mg/dL;  6:07 PM Creatinine POCT 0.6 mg/dL  2022:  8:51 AM Creatinine 0.63 mg/dL  2/10/2022:  6:26 AM Creatinine 0.66 mg/dL    Recent Vancomycin Levels (past 3 days)  2/10/2022:  8:34 PM Vancomycin 15.7 mg/L    Vancomycin IV Administrations (past 72 hours)                   vancomycin (VANCOCIN) 750 mg in sodium chloride 0.9 % 250 mL intermittent infusion (mg) 750 mg New Bag 02/10/22 2039     750 mg New Bag  0915     750 mg New Bag 22 2047     750 mg New Bag  0803    vancomycin (VANCOCIN) 1,500 mg in sodium chloride 0.9 % 250 mL intermittent infusion (mg) 1,500 mg New Bag 22 2222                Nephrotoxins and other renal medications (From now, onward)    Start     Dose/Rate Route Frequency Ordered Stop    22 2100  ibuprofen (ADVIL/MOTRIN) tablet 200 mg         200 mg Oral 3 TIMES DAILY 22 1659      22 0830  vancomycin (VANCOCIN) 750 mg in sodium chloride 0.9 % 250 mL intermittent infusion        \"Followed by\" Linked Group Details    750 mg  over 60-90 Minutes Intravenous EVERY 12 " "HOURS 02/08/22 2014 02/09/22 0800  furosemide (LASIX) injection 40 mg         40 mg  over 1-3 Minutes Intravenous EVERY 12 HOURS 02/08/22 1950      02/09/22 0200  piperacillin-tazobactam (ZOSYN) 3.375 g vial to attach to  mL bag        Note to Pharmacy: Extended infusion dosing to start 6 hours after initial infusion.   \"Followed by\" Linked Group Details    3.375 g  over 240 Minutes Intravenous EVERY 8 HOURS 02/08/22 2002               Contrast Orders - past 72 hours (72h ago, onward)            Start     Dose/Rate Route Frequency Ordered Stop    02/09/22 1200  perflutren lipid microsphere (DEFINITY) injection SUSP          Intravenous ONCE 02/09/22 1143 02/09/22 1130    02/08/22 1900  iopamidol (ISOVUE-370) solution 100 mL         100 mL Intravenous ONCE 02/08/22 1831 02/08/22 1831          Interpretation of levels and current regimen:  Vancomycin level is reflective of -600    Has serum creatinine changed greater than 50% in last 72 hours: No    Renal Function: Stable    InsightRX Prediction of Planned New Vancomycin Regimen  Loading dose: 1500 mg at 20:30 02/08/2022.  Regimen: 750 mg IV every 12 hours.  Start time: 08:39 on 02/11/2022  Exposure target: AUC24 (range)400-600 mg/L.hr   AUC24,ss: 464 mg/L.hr  Probability of AUC24 > 400: 79 %  Ctrough,ss: 14.8 mg/L  Probability of Ctrough,ss > 20: 10 %  Probability of nephrotoxicity (Lodise LENNY 2009): 10 %      Plan:  1. Continue Current Dose  2. Vancomycin monitoring method: AUC  3. Vancomycin therapeutic monitoring goal: 400-600 mg*h/L  4. Pharmacy will check vancomycin levels as appropriate in 3-5 Days.  5. Serum creatinine levels will be ordered a minimum of twice weekly.    Mireille Barreto [facility]    "

## 2022-02-11 NOTE — PROGRESS NOTES
Elenita Moran MRN# [id number]   YOB: 1955 Age: 67 year old   Date of Admission: 2/8/2022  Requesting physician: Dr. Thompson  Reason for consult: Progression of breast cancer           Assessment and Plan:     I saw Elenita today to review the results of her PET/CT, and discuss next lines of treatment.  We reviewed her hospital course.  She presented with hypoxic respiratory failure, and has responded quickly to the combination of antibiotics, and diuretics.  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 Her breathing has improved, she is now off oxygen.  Her lower extremity edema has disappeared.  She is in good spirits, and is hoping to get out of the hospital soon.    PET/CT shows grossly stable osseous disease, but the development of innumerable liver metastases, indicating progression of disease on Doxil.  Given her desire for additional treatment, her reasonable performance status, absence of any impaired liver function, I think it would be reasonable to pursue additional therapy once she completes treatment for her PNA.  We discussed the possibility of a Eribulin, Gemcitabine, Vinorelbine or the combination of Everolimus and Exemestane.  In addition, an ultrasound-guided liver biopsy might be helpful to determine her hormone status, and evaluate for targetable mutations or additional systemic treatments. She remains FULL-CODE.     I have ordered an ultrasound-guided core liver biopsy, to be done in the hospital if possible.  Her Lovenox prophylaxis will need to be held prior to biopsy.    We will continue to follow peripherally and as needed while she is admitted. We will arrange appropriate follow-up in our clinic on 2/22/22 to continue discussion of next line therapy.              Chief Complaint:   Shortness of Breath           History of Present Illness:   Mrs. Elenita Moran is a 66-year-old woman with metastatic  lobular carcinoma of left breast to multiple bone sites she is here for radiation follow-up.    ONCOLOGIC HISTORY:    1.     The patient had presented with multifocal tumor in the left breast, 7 x 6 x 4 cm, in August 2010.    2.     The patient underwent left breast mastectomy. Her tumor was ER positive 63%, CA positive 17%, and HER-2 negative by FISH.    3.     The patient was treated with Adriamycin plus Cytoxan, followed by weekly Taxol.    4.     In May 2011, the patient had started anastrozole 1 mg orally daily.    5.     The patient was found to have a bony metastasis and the patient underwent radiation from July 2011 until October 2011 for L1, L2, and L3 vertebral bodies.    6.     In January 2015, PET scan showed no evidence of malignancy.    7.  On January 31, 2017 patient has started second line hormonal treatment with Faslodex plus Ibrance.    8.  The patient also underwent CyberKnife to painful L3 vertebral body mass she completed 2400 cGy in 4 fractions on 3/28/2017.  Prior to that she was also treated to a right rib lesion as well as T4, T6-T7 and a posterior left-sided rib lesion.  The latter received 3000 cGy in 10 fractions completing those treatments 3/14/2017..    9. She had disease progression in March 2019 per PET imaging and therapy was changed to everolimus and exemestane on 4/24/19.    10. From 12/6/2019 until 3/30/2020 she was treated with 1st line chemo (refused PIQRAY) and has completed 6 cycles of Abraxane and but discontinue due to neuropathy. She had partial response to treatment.     11. On 3/30/2020 she started 3rd line hormonal treatment: Piqray orally daily plus Faslodex.     12. On 9/24/2020 CT chest abdomen and pelvis Stable appearance of the sacral fractures, left superior and inferior pubic rami fracture, and T10 vertebral body fracture and left 11th rib posteriorly.    13. On 2/9/2021 PET scan demonstrated progressive disease with renewed metabolic activity in a few scattered  "skeletal metastases. Significant muscular FDG uptake, which decreases the bioavailability of FDG for tumor. As a result, the scan likely underestimates the extent of disease activity. Healing fractures in the pelvis and spine with a presumed small hematoma overlying the sacral fractures.    14. On 2/23/2021 she started Xeloda orally due to progression.     15. On 4/23/2021 PET scan showed mild progression in anterolateral 5th rib and possible liver lesion.     16. On 5/3/2021 she has started Doxil 50 mg/m2 IV every 4 weeks    17. On 7/19/2021 CT CAP showed mild fullness in right paraspinal muscles. left obturator ring fracture. complex B/L sacral ala fractures.     18. On 7/21/2021 MRI of pelvis showed two complex fluid collections along fracured left superior and inferior pubic rami    19. On 8/5/2021 PET/CT showed . While there is persistent FDG avid disease throughout the osseous structures, there is resolution of the right hepatic lobe lesion suggesting partial response to therapy. worsening inflammatory change associated with the pathologically fractured left anterior pubic ramus and right sacrum/iliac bone with development of right gluteal and right paraspinal intramuscular hematomas.    20.  On 11/9/2021 PET scan revealed stable bony metastases. No worsening.    21. 2/1/22 PET/CT shows persistent widespread osseous disease, with the development of innumerable lesions throughout the liver parenchyma suspicious for progression of disease.     Interval History  Elenita seen at bedside. She is feeling much better with antibiotics and diuresis. Her LE edema has resolved. She has a rising WBC, but no fevers, or subjectively worsening symptoms. She is anxious about the results of her PET/CT.          Physical Exam:   Vitals were reviewed  Blood pressure 115/64, pulse 103, temperature 98  F (36.7  C), temperature source Oral, resp. rate 18, height 1.651 m (5' 5\"), weight 65.1 kg (143 lb 8 oz), SpO2 95 %.  Temperatures: "  Current - Temp: 98.8  F (37.1  C); Max - Temp  Av.8  F (37.1  C)  Min: 98.8  F (37.1  C)  Max: 98.8  F (37.1  C)  Respiration range: Resp  Av.3  Min: 5  Max: 38  Pulse range: Pulse  Av.9  Min: 96  Max: 129  Blood pressure range: Systolic (24hrs), Av , Min:109 , Max:134   ; Diastolic (24hrs), Av, Min:58, Max:82    Pulse oximetry range: SpO2  Av %  Min: 78 %  Max: 100 %    Intake/Output Summary (Last 24 hours) at 2022 1145  Last data filed at 2022 1050  Gross per 24 hour   Intake 750 ml   Output 5000 ml   Net -4250 ml       RADIOLOGY:   22 CT chest:  IMPRESSION:  1.  No evidence for pulmonary emboli.  2.  Extensive airspace and groundglass opacities throughout both lungs compatible with pneumonia, including Covid 19.  3.  Moderate-sized bilateral pleural effusions and bibasilar atelectasis.  4.  Diffuse skeletal metastasis.     Imaging features can be seen with (COVID-19)  pneumonia, though are nonspecific and can occur with a variety of infectious and noninfectious processes.

## 2022-02-11 NOTE — PROGRESS NOTES
"[facility]:  PULMONARY PROGRESS NOTE     Date / Time of Admission:  2/8/2022  5:38 PM    ID: Elenita Moran is a 67 year old female with metastatic breast carcinoma with bony metastases on chemotherapy, immunosuppression secondary to doxorubicin (last dose 2/8), nonischemic systolic cardiomyopathy (LVEF 40 to 45% 12/2021), history of MSSA pelvic abscess and dental abscess on cephalexin who was admitted to [facility] on 2/8/2022 with sudden onset of dyspnea, now admitted for new bilateral pulmonary ground glass opacities and bilateral pleural effusions (R > L).    Reason for Consult:  Abnormal CT findings         Assessment: 1.   Bilateral ground glass opacities.  Etiology for opacities unclear.  Possible CHF exacerbation given worsening LVEF on repeat TTE compared to prior (? If this related to chemo) with sudden flash-pulmonary edema given acutely worsening symptoms.  Could be acute infectious process given immunocompromised status with chemotherapy.  PCT normal at 0.13 on 2/9.  No viral prodromes.  Also possible to have acute ILD/pneumonitis related to doxyrubicin.  We don't normally see this occur immediately after infusion, but possible.  Findings are not what we would see with malignancy (ie, not lymphangitic spread)  2. Bilateral pleural effusions, R > L - Transudative.  Underwent thoracentesis 2/9, 450 mL removed.  Reported as clear yellow fluid but patient describes it as reddish.   Protein ratio 0.5, so borderline exudative vs transudative.  LDH fluid 141.  Serum LDH next day is 420 (on 2/10).  So doesn't meet LDH criteria. Cell count also difficult to interpret.  Lymphocyte predominant at 38%, but \"other cells\" may actually be macrophages/monocytes also.  Pathology pending.    3. Acute on chronic CHF with systolic dysfunction.  ? If flash pulm edema, pleural effusions related to CHF rather than acute infectious process or medication-induced ILD/pneumonitis.  Pleural " fluid cell counts   4. Metastatic breast carcinoma with bony metastases on chemotherapy.  5. Chronic immunosuppression secondary to doxorubicin (last infusion 2/8).  6. Leukocytosis, unspecified.  Worsening again.  Check CRP, CXR today.  Less concerned for pulm etiology as O2 needs down compared to admission, also clinically feeling better.         Plan:     Wean supplemental O2 as tolerates, goal O2 sat > 92%.  May be able to transition off O2 at rest.    Given rising WBC, add-on CRP to morning labs.  I ordered CXR to assess pulm progression - suspect improving with diuresis/abx.    Follow-up pleural fluid studies, cytology.    No indication for steroids at this moment.    Antibiotics per ID.  Currently on Zosyn/Vancomycin (start 2/8 evening)    Agree with diuresis per primary team.    Oncology team to speak with patient and her  this afternoon.    Patient and/or family were educated on the above recommendations.   Thank you for allowing our service to participate in the care of this patient.  Please call with any questions or concerns.    Total patient care time: 33 minutes, with over 50% spent in counseling/coordination of care.      Nalini Arzate MD, MPH  Pulmonary/Critical Care Medicine  02/11/2022  10:53 AM        Subjective/Interval History:   Patient in better spirits today.   States that when she got up to the commode and for weight measurements, she had less dyspnea than previously.  Still on oxygen, at 1 L/min.   Denies any chest pain/pressure.  Minimal cough.  No hemoptysis.  Looking forward to Oncology discussions today.    Review of Systems:  Pertinent items are noted in HPI.  The Review of Systems is negative other than noted in the HPI        Allergies/Medications:   Allergies:     Allergies   Allergen Reactions     Gabapentin      Other reaction(s): *Unknown  Upper body edema/swelling.  Interacted with Oncology treatment.  Upper body edema/swelling.  Interacted with Oncology treatment.        "Sulfa Drugs Hives and Rash     welts         Continuous Infusions:    Scheduled Medications:    aspirin  81 mg Oral Daily     [Held by provider] dexamethasone  6 mg Intravenous Q24H     DULoxetine  30 mg Oral QAM     DULoxetine  60 mg Oral QPM     enoxaparin ANTICOAGULANT  40 mg Subcutaneous Q24H     ezetimibe  10 mg Oral Daily     furosemide  40 mg Intravenous Q12H     HYDROmorphone  4 mg Oral TID     hydrOXYzine  25 mg Oral TID     ibuprofen  200 mg Oral TID     insulin aspart   Subcutaneous QAM AC     insulin aspart   Subcutaneous Daily with lunch     insulin aspart   Subcutaneous Daily with supper     insulin aspart  1-7 Units Subcutaneous TID AC     [Held by provider] insulin glargine  5 Units Subcutaneous At Bedtime     methadone  2.5 mg Oral QAM     mineral oil-hydrophilic petrolatum   Topical Daily     OLANZapine  5 mg Oral QPM     oxybutynin  5 mg Oral BID     oxybutynin ER  10 mg Oral At Bedtime     pantoprazole  40 mg Oral QAM AC     piperacillin-tazobactam  3.375 g Intravenous Q8H     polyethylene glycol  17 g Oral Daily     potassium chloride  40 mEq Oral Once     senna-docusate  1 tablet Oral Daily     simvastatin  20 mg Oral QPM     sodium chloride (PF)  3 mL Intracatheter Q8H     vancomycin  750 mg Intravenous Q12H     zolpidem  5 mg Oral At Bedtime            Objective:   Vitals:  /67 (BP Location: Right arm)   Pulse 108   Temp 98.6  F (37  C) (Oral)   Resp 20   Ht 1.651 m (5' 5\")   Wt 65.1 kg (143 lb 8 oz)   SpO2 99%   BMI 23.88 kg/m    GEN: Pleasant female, sitting in the bed in no acute distress.    HEENT: Normocephalic, atraumatic.  Extraoccular eye movements intact, anicteric sclera. Moist mucous membranes.   NECK: Supple.    PULM: Non-labored breathing.  No use of accessory muscles.  Fine inspiratory rales bilateral bases.  CVS: Regular rate and rhythm.  Normal S1, S2.  No rubs, murmurs, or gallops.    ABDOMEN: Normoactive bowel sounds.  Non-tender to palpation.  Non-distended.  " "  EXTREMITES:  No clubbing, cyanosis, or edema.    NEURO:  Awake.  Oriented to person, place, time and situation.  Cranial nerves 2-12 grossly intact.  Moving all extremities.      Intake/Output:  I/O last 3 completed shifts:  In: 1570 [P.O.:1570]  Out: 3800 [Urine:3800]        Pertinent Studies:   All laboratory data reviewed  Serum Glucose range:   Recent Labs   Lab 02/11/22  0739 02/11/22  0656 02/10/22  2136 02/10/22  1633 02/10/22  1145 02/10/22  0742   * 125 143* 143* 133* 113*     ABG: No lab results found in last 7 days.  CBC:   Recent Labs   Lab 02/11/22  0656 02/10/22  0626 02/09/22  0851   WBC 26.5* 19.6*  19.6* 11.1*   HGB 8.0* 7.3* 8.6*   HCT 25.1* 23.3* 26.9*   MCV 96 95 95    259 291   NEUTROPHIL 97 90 91   LYMPH 0 2 4   MONOCYTE 2 5 4   EOSINOPHIL 0 0 0     Chemistry:   Recent Labs   Lab 02/11/22  0656 02/10/22  0626 02/09/22  0851 02/08/22  1807 02/08/22  1803    137 141  --  138   POTASSIUM 2.9* 3.2* 4.2  --  4.3   CHLORIDE 100 100 104  --  106   CO2 23 25 23  --  19*   BUN 17 16 14  --  15   CR 0.66  0.64 0.66 0.63   < > 0.69  0.69   GFRESTIMATED >90  >90 >90 >90   < > >90  >90   BENJI 7.7* 7.6* 8.5  --  8.7   MAG  --  2.0 1.5*  --  1.7*   PROTTOTAL  --  5.8* 6.5  6.5  --   --    ALBUMIN  --  2.6* 3.0*  --   --    AST  --  31 35  --   --    ALT  --  13 10  --   --    ALKPHOS  --  172* 216*  --   --    BILITOTAL  --  0.4 0.4  --   --     < > = values in this interval not displayed.     Coags:  No results for input(s): INR, PROTIME, PTT in the last 168 hours.    Invalid input(s): APTT  Cardiac Markers:  Recent Labs   Lab 02/09/22  0851   TROPONINI 0.07      Serum LDH, 2/10:  420.    R pleural fluid, 2/9:   - , protein 2.4, glucose 174, pH 8.0  - 23 WBC, 38% lymph, 35% \"other cells\", 25% neutrophils, 2% mono/macro    Microbiology:  Blood cultures x2, 2/8: Pending, NGTD  Legionella urine antigen, 2/9: Pending  Streptococcus urine antigen, 2/9: Pending  Left pleural " fluid, 2/9: Gram stain 2+ WBC, no organisms.  Aerobic culture NGTD.  Anaerobic culture NGTD.        Cardiology/Radiology:   Cardiac: All cardiac studies reviewed by me.    EKG: Reviewed    TTE, 2/9: Summary:  1. The left ventricle is normal in size. Left ventricular systolic performance is moderately reduced. The ejection fraction is estimated to be 30-35%. 2. There is moderate global reduction in left  ventricular systolic performance. 3. There is mild aortic insufficiency. 4. There is moderate mitral insufficiency. 5. Normal right ventricular size with mildly reduced right ventricular systolic performance. 6. There is mild to moderate left atrial enlargement.  When compared to the prior real-time echocardiogram dated 23 December 2021, there has been a further diminution in left ventricular systolic performance. The degree of mitral insufficiency has increased from mild-moderate to now moderate.    Radiology: All imaging studies reviewed by me.    CT chest PE study, 2/8/22:   FINDINGS: ANGIOGRAM CHEST: Pulmonary arteries are normal caliber and negative for pulmonary emboli. Thoracic aorta is negative for dissection. No CT evidence of right heart strain. LUNGS AND PLEURA: Moderate sized bilateral pleural effusions and bibasilar atelectasis. Extensive airspace and groundglass opacities throughout both lungs. MEDIASTINUM/AXILLAE: Nonenlarged mediastinal lymph nodes. Right anterior chest wall Port-A-Cath tip in the distal SVC. CORONARY ARTERY CALCIFICATION: None. UPPER ABDOMEN: Normal. MUSCULOSKELETAL: Extensive sclerotic metastasis throughout the visualized skeleton as seen previously. Severe T10 vertebral body compression fractures unchanged. Left mastectomy and breast prosthesis.     IMPRESSION: 1.  No evidence for pulmonary emboli. 2.  Extensive airspace and groundglass opacities throughout both lungs compatible with pneumonia, including Covid 19. 3.  Moderate-sized bilateral pleural effusions and bibasilar  atelectasis. 4.  Diffuse skeletal metastasis.

## 2022-02-11 NOTE — PROGRESS NOTES
I will be by between 12:30 -2pm to discuss her recent scan, future treatment options, and goals of care.    Kayode Marsh MD  [facility]  [phone]

## 2022-02-12 NOTE — PROGRESS NOTES
Care Management Follow Up    Length of Stay (days): 4    Expected Discharge Date: 2/15/22     Concerns to be Addressed:       Patient plan of care discussed at interdisciplinary rounds: Yes    Anticipated Discharge Disposition:       Anticipated Discharge Services:    Anticipated Discharge DME:      Patient/family educated on Medicare website which has current facility and service quality ratings:    Education Provided on the Discharge Plan:    Patient/Family in Agreement with the Plan:      Referrals Placed by CM/SW:    Private pay costs discussed: Not applicable    Additional Information:  CM is following for needs at discharge. Currently plan is home with [facility] for RN PT BRADLEYA      Susan Knight RN

## 2022-02-12 NOTE — PROGRESS NOTES
Infectious Disease Progress Note    Assessment/Plan  Impression: Hypoxemia, cough for several weeks.  New infiltrates on chest CT that were not there 6 months ago.     Patient received doxorubicin for metastatic breast cancer.     Recommendations:   On zosyn  Discontinue vancomycin    I dont see resp panel done     Check antigens for Legionella and pneumococcus.>>negative  Histo antigen >>neg     This may not be infectious given doxorubicin can also cause pneumonitis.    Pulmonary following      Aleshia Morin M.D.      Subjective  She dowplays respiratory syx  No cough  Some SOB with aling that she attributes to her mask  No fevers/cills      Objective    Vital signs in last 24 hours  Temp:  [97.8  F (36.6  C)-98.7  F (37.1  C)] 97.9  F (36.6  C)  Pulse:  [] 101  Resp:  [18-20] 20  BP: (106-115)/(63-73) 109/63  SpO2:  [92 %-98 %] 97 %  Wt Readings from Last 3 Encounters:   02/11/22 65.1 kg (143 lb 8 oz)   11/08/21 67.1 kg (148 lb)   08/23/21 70.8 kg (156 lb)           Intake/Output last 3 shifts  I/O last 3 completed shifts:  In: 840 [P.O.:840]  Out: 3950 [Urine:3950]  Intake/Output this shift:  I/O this shift:  In: 240 [P.O.:240]  Out: -     Review of Systems   Pertinent items are noted in HPI., otherwise negative.    Physical Exam  Gen. appearance nontoxic  Eyes no conjunctivitis or icterus  Neck no stiffness   Heart  no edema  Lungs  no wheeze  Abdomen soft not tender  Extremities no synovitis, trace edema  Skin  no rash or emboli  Neurologic alert oriented no focal deficits      Neurologic: Grossly normal    Pertinent Labs   Lab Results: personally reviewed.     Recent Labs   Lab 02/12/22  0620 02/11/22  0656 02/10/22  0626   WBC 27.3* 26.5* 19.6*  19.6*   HGB 8.5* 8.0* 7.3*   HCT 26.6* 25.1* 23.3*    246 259        Recent Labs   Lab 02/12/22  0620 02/11/22  0656 02/10/22  0626    138 137   CO2 23 23 25   BUN 11 17 16       Results for KARWACKI MARLYS, JULES M (MRN [id number]) as of  2/10/2022 12:44  Pleural fluid. Ref. Range 2/9/2022 17:57   Glucose Fluid Latest Units: mg/dL 174   Lactate Dehydrogenase Fluid Latest Units: U/L 141   Ph Fluid Latest Units: pH 8.0   Protein Total Fluid Latest Units: g/dL 2.4       No results found for: CULT  Collected Updated Procedure Result Status    02/09/2022 1757 02/10/2022 0148 Pleural fluid Aerobic Bacterial Culture Routine with Gram Stain [[id number]]    Pleural fluid from Pleural Cavity    Preliminary result Component Value   Gram Stain Result No organisms seen P    2+ WBC seen P    Predominantly mononuclear cells           02/09/2022 1757 02/09/2022 1916 Glucose fluid [[id number]]    Pleural fluid from Pleural Cavity    Final result Component Value Units   Glucose fluid 174 mg/dL           02/09/2022 1757 02/10/2022 0006 Lactate dehydrogenase fluid [[id number]]    Pleural fluid from Pleural Cavity    Final result Component Value Units   Lactate dehydrogenase fluid 141 U/L           02/09/2022 1757 02/09/2022 1837 Anaerobic bacterial culture [[id number]]   Pleural fluid from Pleural Cavity    In process Component Value   No component results           02/09/2022 1757 02/09/2022 1935 pH fluid [[id number]]    Pleural fluid from Pleural Cavity    Final result Component Value Units   pH Fluid 8.0 pH           02/09/2022 1757 02/09/2022 2319 Protein fluid [[id number]]    Pleural fluid from Pleural Cavity, Right    Final result Component Value Units   Protein Total Fluid 2.4 g/dL           02/09/2022 1757 02/10/2022 0903 Cytology, non-gynecologic [[id number]]   Pleural fluid from Pleural Cavity, Right    In process Component Value   No component results           02/09/2022 1757 02/10/2022 1113 Cell count with differential fluid [[id number]]    Pleural fluid from Pleural Cavity    In process Component Value   No component results           02/09/2022 1757 02/10/2022 1113 Cell Count Body Fluid [[id number]]   Pleural fluid from Pleural Cavity     In process Component Value   No component results           02/09/2022 1142 02/09/2022 1456 Legionella pneumophila antigen urine [[id number]]   Urine, Midstream    Final result Component Value   Legionella pneumophila serogroup 1 urinary antigen Negative   Suggests no recent or current infection. Infection due to Legionella cannot be ruled out, since other serogroups and species may cause disease, antigen may not be present in urine in early infection, and the level of antigen present in the urine may be below detectable limits of the test.           02/09/2022 1142 02/09/2022 1457 Streptococcus pneumoniae antigen [[id number]]   Urine, Midstream    Final result Component Value   Streptococcus pneumoniae antigen Negative   A negative Streptococcus pneumoniae antigen result does not rule out infection with Streptococcus pneumoniae.           02/09/2022 1142 02/09/2022 1150 Blastomyces Agn Quant EIA Non Blood [[id number]]   Urine, Clean Catch    In process Component Value   No component results           02/09/2022 1142 02/09/2022 1150 Histoplasma Galactomannan Antigen Quant by EIA, Urine [[id number]]   Urine, Clean Catch    In process Component Value   No component results           02/09/2022 0851 02/09/2022 1033 Histoplasma Antigen Quantitative by EIA [[id number]]   Blood from Arm, Right    In process Component Value   No component results           02/08/2022 1839 02/09/2022 2116 Blood Culture Arm, Left [[id number]]   Blood from Arm, Left    Preliminary result Component Value   Culture No growth after 1 day P           02/08/2022 1803 02/09/2022 2116 Blood Culture Portacath [[id number]]   Blood from Portacath    Preliminary result Component Value   Culture No growth after 1 day P           02/08/2022 1802 02/08/2022 1911 Symptomatic; Yes; 2/7/2022 Influenza A/B & SARS-CoV2 (COVID-19) Virus PCR Multiplex Nasopharyngeal [[id number]]    Swab from Nasopharyngeal    Final result Component Value   Influenza  A PCR Negative   Influenza B PCR Negative   SARS CoV2 PCR Negative   NEGATIVE: SARS-CoV-2 (COVID-19) RNA not detected, presumed negative.               Pertinent Radiology   Radiology Results:   Echocardiogram Complete    Result Date: 2022  [id number]^HOLLY^PE[place] [address]  Name: JULES KRUGER MRN: [id number] : 1955 Study Date: 2022 10:45 AM Age: 67 yrs Gender: Female Patient Location: [facility] Reason For Study: 2nd degree AV Block Ordering Physician: PEPE BARRERA Performed By: JASMYNE  BSA: 1.7 m2 Height: 65 in Weight: 148 lb HR: 119 BP: 117/68 mmHg ______________________________________________________________________________ Procedure Complete Echo Adult. Definity (NDC #14243-705) given intravenously. ______________________________________________________________________________ Interpretation Summary  1. The left ventricle is normal in size. Left ventricular systolic performance is moderately reduced. The ejection fraction is estimated to be 30-35%. 2. There is moderate global reduction in left ventricular systolic performance. 3. There is mild aortic insufficiency. 4. There is moderate mitral insufficiency. 5. Normal right ventricular size with mildly reduced right ventricular systolic performance. 6. There is mild to moderate left atrial enlargement.  When compared to the prior real-time echocardiogram dated 2021, there has been a further diminution in left ventricular systolic performance. The degree of mitral insufficiency has increased from mild-moderate to now moderate. ______________________________________________________________________________ Left ventricle: The left ventricle is normal in size. Left ventricular systolic performance is moderately reduced. The ejection fraction is estimated to be 30-35%. There is moderate global reduction in left ventricular systolic performance. Left  ventricular wall thickness is normal.  Assessment of left atrial pressure (LAP): The cumulative findings are indeterminate in evaluating left atrial pressure.  Right ventricle: Normal right ventricular size with mildly reduced right ventricular systolic performance.  Left atrium: There is mild to moderate left atrial enlargement.  Right atrium: The right atrium is of normal size.  IVC: The IVC is of normal caliber.  Aortic valve: The aortic valve is comprised of three cusps. There is no significant aortic stenosis. There is mild aortic insufficiency.  Mitral valve: The mitral valve appears morphologically normal. There is moderate mitral insufficiency.  Tricuspid valve: The tricuspid valve is grossly morphologically normal. There is mild tricuspid insufficiency.  Pulmonic valve: The pulmonic valve is grossly morphologically normal. There is mild pulmonic insufficiency.  Thoracic aorta: The aortic root and proximal ascending aorta are of normal dimension.  Pericardium: There is no significant pericardial effusion. ______________________________________________________________________________ ______________________________________________________________________________ MMode/2D Measurements & Calculations RVDd: 4.3 cm IVSd: 0.75 cm LVIDd: 5.6 cm LVIDs: 4.4 cm LVPWd: 0.99 cm FS: 21.8 % LV mass(C)d: 182.6 grams LV mass(C)dI: 104.9 grams/m2 Ao root diam: 3.0 cm LA dimension: 4.4 cm asc Aorta Diam: 2.7 cm LA/Ao: 1.5 LVOT diam: 1.7 cm LVOT area: 2.2 cm2 LA Volume Indexed (AL/bp): 65.9 ml/m2  RWT: 0.35  Doppler Measurements & Calculations LV V1 max P.1 mmHg LV V1 max: 112.5 cm/sec LV V1 VTI: 17.2 cm SV(LVOT): 38.5 ml SI(LVOT): 22.1 ml/m2 PA acc time: 0.14 sec  ______________________________________________________________________________ Report approved by: Ashley Goodman 2022 12:49 PM       US Thoracentesis    Result Date: 2022  EXAM: 1. RIGHT THORACENTESIS 2. ULTRASOUND GUIDANCE LOCATION: [facility] DATE/TIME: 2/9/2022 5:34 PM INDICATION: Pleural effusion. PROCEDURE: Informed consent obtained. Time out performed. The chest was prepped and draped in sterile fashion. 10 mL of 1 % lidocaine was infused into the local soft tissues. Under direct ultrasound guidance, a 5 Lao catheter system was placed into the pleural effusion. 0.45 liters of clear yellow fluid were removed and sent to lab, if requested. Patient tolerated procedure well. Ultrasound imaging was obtained and placed in the patient's permanent medical record.     IMPRESSION: Status post right ultrasound-guided thoracentesis. Reference CPT Code: 68132    XR Chest Port 1 View    Result Date: 2/11/2022  EXAM: XR CHEST PORT 1 VIEW LOCATION: [facility] DATE/TIME: 2/11/2022 12:22 PM INDICATION: Worsening leukocytosis, evaluate for new abnormalities.  Being treated for pneumonia pulm edema currently. COMPARISON: 07/17/2021     IMPRESSION: Port-A-Cath tip in the SVC. Mild interstitial infiltrates in both lungs have not significantly changed. Would favor pneumonia.    CT Chest Pulmonary Embolism w Contrast    Result Date: 2/8/2022  EXAM: CT CHEST PULMONARY EMBOLISM W CONTRAST LOCATION: [facility] DATE/TIME: 2/8/2022 6:08 PM INDICATION: Shortness of breath. PE suspected, high prob COMPARISON: CT chest, abdomen and pelvis 07/19/2021 TECHNIQUE: CT chest pulmonary angiogram during arterial phase injection of IV contrast. Multiplanar reformats and MIP reconstructions were performed. Dose reduction techniques were used. CONTRAST: isovue 370 100ml FINDINGS: ANGIOGRAM CHEST: Pulmonary arteries are normal caliber and negative for pulmonary emboli. Thoracic aorta is negative for dissection. No CT evidence of right heart strain. LUNGS AND PLEURA: Moderate sized bilateral pleural effusions and bibasilar atelectasis. Extensive airspace and groundglass opacities throughout both lungs.  MEDIASTINUM/AXILLAE: Nonenlarged mediastinal lymph nodes. Right anterior chest wall Port-A-Cath tip in the distal SVC. CORONARY ARTERY CALCIFICATION: None. UPPER ABDOMEN: Normal. MUSCULOSKELETAL: Extensive sclerotic metastasis throughout the visualized skeleton as seen previously. Severe T10 vertebral body compression fractures unchanged. Left mastectomy and breast prosthesis.     IMPRESSION: 1.  No evidence for pulmonary emboli. 2.  Extensive airspace and groundglass opacities throughout both lungs compatible with pneumonia, including Covid 19. 3.  Moderate-sized bilateral pleural effusions and bibasilar atelectasis. 4.  Diffuse skeletal metastasis. Imaging features can be seen with (COVID-19)  pneumonia, though are nonspecific and can occur with a variety of infectious and noninfectious processes.

## 2022-02-12 NOTE — PROGRESS NOTES
02/12/22 0850   Quick Adds   Type of Visit Initial Occupational Therapy Evaluation   Living Environment   People in home spouse   Current Living Arrangements house   Home Accessibility stairs to enter home;stairs within home  (ramp)   Living Environment Comments Pt can meet needs on 1 level (wash/dry) in basement, bathtub w/ bench, standard toilet w/ grab bars. Pt has a number of reachers, dressing stick,    Self-Care   Usual Activity Tolerance fair   Current Activity Tolerance poor   Equipment Currently Used at Home walker, rolling;other (see comments);wheelchair, manual;walker, standard   Activity/Exercise/Self-Care Comment Pt previously IND with most BADLs, pt's  and son assist with LB dressing and stairs.    Disability/Function   Hearing Difficulty or Deaf yes   Wear Glasses or Blind yes   Vision Management glasses   Fall history within last six months yes   Number of times patient has fallen within last six months 1  (1 year ago)   Change in Functional Status Since Onset of Current Illness/Injury no   General Information   Onset of Illness/Injury or Date of Surgery 02/08/22   Referring Physician Ivanna Thompson MD   Patient/Family Therapy Goal Statement (OT) Not endorsed   Additional Occupational Profile Info/Pertinent History of Current Problem 67-year-old with metastatic breast cancer, diabetes, chronic MSSA infection of the pelvis who presented to the ED with dizziness weakness and hypoxia.  She has had chronic cough for several weeks.  PET scan done on 2/12 showed progression of underlying breast cancer.  Awaiting oncology to discuss it with the patient about treatment options.  Patient was treated with IV antibiotics and IV Lasix s/p which hypoxia is improving.  ID and pulmonary also consulted   Existing Precautions/Restrictions fall   Cognitive Status Examination   Orientation Status orientation to person, place and time   Affect/Mental Status (Cognitive) WFL   Visual Perception   Visual  Impairment/Limitations WFL   Sensory   Sensory Comments Bilateral foot numbness 2/2 neuropathy   Pain Assessment   Patient Currently in Pain No   Integumentary/Edema   Integumentary/Edema Comments Pt's swelling improved    Posture   Posture forward head position   Range of Motion Comprehensive   General Range of Motion bilateral upper extremity ROM WFL   Strength Comprehensive (MMT)   Comment, General Manual Muscle Testing (MMT) Assessment Pt is generally deconditioned   Muscle Tone Assessment   Muscle Tone Quick Adds No deficits were identified   Coordination   Upper Extremity Coordination No deficits were identified   Bed Mobility   Bed Mobility supine-sit   Supine-Sit Iota (Bed Mobility) supervision;verbal cues;nonverbal cues (demo/gesture)   Assistive Device (Bed Mobility) bed rails   Transfers   Transfers sit-stand transfer;bed-chair transfer   Transfer Skill: Bed to Chair/Chair to Bed   Bed-Chair Iota (Transfers) contact guard;nonverbal cues (demo/gesture);verbal cues   Assistive Device (Bed-Chair Transfers) rolling walker   Sit-Stand Transfer   Sit-Stand Iota (Transfers) contact guard;nonverbal cues (demo/gesture);verbal cues   Assistive Device (Sit-Stand Transfers) walker, standard   Balance   Balance Assessment identified impairments contributing to balance disturbance   Impairments Contributing to Balance Dysfunction decreased sensation;decreased strength   Activities of Daily Living   BADL Assessment grooming   Grooming Assessment   Iota Level (Grooming) supervision;set up;verbal cues   Position (Grooming) supported sitting   Clinical Impression   Criteria for Skilled Therapeutic Interventions Met (OT) yes   OT Diagnosis ADL dysfunction   OT Problem List-Impairments impacting ADL activity tolerance impaired;balance;strength   Assessment of Occupational Performance 1-3 Performance Deficits   Identified Performance Deficits Dressing, bathing, activity tolerance   Planned  Therapy Interventions (OT) ADL retraining;balance training;bed mobility training;transfer training   Clinical Decision Making Complexity (OT) moderate complexity   Therapy Frequency (OT) 5x/week   Predicted Duration of Therapy 1 week   Risk & Benefits of therapy have been explained evaluation/treatment results reviewed;care plan/treatment goals reviewed;risks/benefits reviewed;current/potential barriers reviewed;participants voiced agreement with care plan;participants included;patient   OT Discharge Planning    OT Discharge Recommendation (DC Rec) Home with assist   OT Rationale for DC Rec Pt is below functional baseline, pt has a good set up at home but would like a home health aide to assist with showers to prevent caregiver burn out.   Total Evaluation Time (Minutes)   Total Evaluation Time (Minutes) 5

## 2022-02-12 NOTE — PROGRESS NOTES
[facility]    Medicine Progress Note - Hospitalist Service    Date of Admission:  2/8/2022    A/P       67-year-old with metastatic breast cancer, diabetes, chronic MSSA infection of the pelvis who presented to the ED with dizziness weakness and hypoxia.  She has had chronic cough for several weeks.  PET scan done on 2/12 showed progression of underlying breast cancer.  Awaiting oncology to discuss it with the patient about treatment options.  Patient was treated with IV antibiotics and IV Lasix s/p which hypoxia is improving.  ID and pulmonary also consulted.     1.Acute respiratory failure, concern sepsis  -Multifactorial due to acute systolic CHF/pulmonary edema, drug-induced pneumonitis(At risk from Doxorubicin), pneumonia, pleural effusion malignant versus parapneumonic  -Patient is COVID-19 negative  -Initially treated with dexamethasone(now on hold), Zosyn and vancomycin with IV Lasix. WBC trending up now 27( ID following )   -on 2/12--Id stopping vanco  -Pulmonary consulted and did not recommend continuing with dexamethasone and therefore would hold off on the same.  -Continue Zosyn Vanco as per ID, respiratory labs are pending, such as checking antigens for Legionella and pneumococcus, also multiplex PCR  -Thoracentesis per pulmonary to rule out parapneumonic versus malignant effusion  -Continue supplemental oxygen  -Echo shows EF of 30 to 35%(likely effect doxorubicin).  BNP is quite elevated at 1500.       2.Breast cancer with metastasis to the liver and bone  -As per oncology notes PET scan done on 2/1 showed progression of the disease  -Patient is on third line chemo with doxorubicin--now has met her lifelong limit with this and has lower EF and cannot have it per onc--Oncology notes other options  -Awaiting oncology input regarding more chemotherapy vs goals  -Continue methadone  -CODE STATUS discussed and patient wants to be full code  -[facility] has offered more chemo-wants to  get liver bx-ordered will be done on Monday     3.Non-insulin-dependent diabetes  -Order sliding scale NovoLog  -Ordered diabetic diet     4.Hypomagnesemia  -Continue magnesium replacement as per protocol     5.History of GERD  - omeprazole     6.Decubitus ulcer of the sacrum  -Order wound care     7.Moderate protein calorie moderation  -Albumin of 3  -Patient has decreased p.o. intake     8.Goals of care--Mopha did and she wants full code, more chemo as goal            Diet: Combination Diet Moderate Consistent Carb (60 g CHO per Meal) Diet; 2 gm NA Diet    DVT Prophylaxis: Enoxaparin (Lovenox) SQ  Jiang Catheter: Not present  Central Lines: PRESENT     Cardiac Monitoring: ACTIVE order. Indication: sob  Code Status: Full Code      Disposition Plan   Expected Discharge: 02/15/2022     Anticipated discharge location:  Awaiting care coordination huddle  Delays:     here for liver bx, iv antibiotics        The patient's care was discussed with the Care Coordinator/ and Patient.    Ivanna Thompson MD  Hospitalist Service  [facility]  Securely message with the Vocera Web Console (learn more here)  Text page via [employer] Paging/Directory         Clinically Significant Risk Factors Present on Admission                 ______________________________________________________________________    Interval History   She feels better   was sitting in chair  Cough less  Feels much better  Doing more , getting up more  Eating ok      Data reviewed today: I reviewed all medications, new labs and imaging results over the last 24 hours. I personally reviewed no images or EKG's today.    Physical Exam   Vital Signs: Temp: 97.8  F (36.6  C) Temp src: Oral BP: 108/60 Pulse: 100   Resp: 20 SpO2: 97 % O2 Device: None (Room air) Oxygen Delivery: 1 LPM  Weight: 145 lbs 7 oz  Constitutional: awake, looks better today, alert, cooperative and no apparent distress  Respiratory: no increased work of breathing,  good air exchange, no retractions and clear to auscultation  Cardiovascular: Normal apical impulse, regular rate and rhythm, normal S1 and S2, no S3 or S4, and no murmur noted  GI: normal bowel sounds, soft, non-distended and non-tender  Skin: no bruising or bleeding  Musculoskeletal: no lower extremity pitting edema present  Neurologic: Mental Status Exam:  Level of Alertness:   awake  Neuropsychiatric: Affect: normal and pleasant    Data   Recent Labs   Lab 02/12/22  1425 02/12/22  1209 02/12/22  0755 02/12/22  0620 02/11/22  2049 02/11/22  1633 02/11/22  0739 02/11/22  0656 02/10/22  0742 02/10/22  0626 02/09/22  0900 02/09/22  0851   WBC  --   --   --  27.3*  --   --   --  26.5*  --  19.6*  19.6*  --  11.1*   HGB  --   --   --  8.5*  --   --   --  8.0*  --  7.3*  --  8.6*   MCV  --   --   --  94  --   --   --  96  --  95  --  95   PLT  --   --   --  263  --   --   --  246  --  259  --  291   NA  --   --   --  136  --   --   --  138  --  137  --  141   POTASSIUM 3.8  --   --  3.1*  --  3.5  --  2.9*  --  3.2*  --  4.2   CHLORIDE  --   --   --  99  --   --   --  100  --  100  --  104   CO2  --   --   --  23  --   --   --  23  --  25  --  23   BUN  --   --   --  11  --   --   --  17  --  16  --  14   CR  --   --   --  0.61  --   --   --  0.66  0.64  --  0.66  --  0.63   ANIONGAP  --   --   --  14  --   --   --  15  --  12  --  14   BENJI  --   --   --  8.1*  --   --   --  7.7*  --  7.6*  --  8.5   GLC  --  125* 126* 131*   < >  --    < > 125   < > 116   < > 169*   ALBUMIN  --   --   --   --   --   --   --   --   --  2.6*  --  3.0*   PROTTOTAL  --   --   --   --   --   --   --   --   --  5.8*  --  6.5  6.5   BILITOTAL  --   --   --   --   --   --   --   --   --  0.4  --  0.4   ALKPHOS  --   --   --   --   --   --   --   --   --  172*  --  216*   ALT  --   --   --   --   --   --   --   --   --  13  --  10   AST  --   --   --   --   --   --   --   --   --  31  --  35    < > = values in this interval not displayed.      No results found for this or any previous visit (from the past 24 hour(s)).

## 2022-02-12 NOTE — PROGRESS NOTES
02/12/22 0850   Quick Adds   Type of Visit Initial Occupational Therapy Evaluation   Living Environment   People in home spouse   Current Living Arrangements house   Home Accessibility stairs to enter home;stairs within home  (ramp)   Living Environment Comments Pt can meet needs on 1 level (wash/dry) in basement, bathtub w/ bench, standard toilet w/ grab bars. Pt has a number of reachers, dressing stick,    Self-Care   Usual Activity Tolerance fair   Current Activity Tolerance poor   Equipment Currently Used at Home walker, rolling;other (see comments);wheelchair, manual;walker, standard   Activity/Exercise/Self-Care Comment Pt previously IND with most BADLs, pt's  and son assist with LB dressing and stairs.    Disability/Function   Hearing Difficulty or Deaf yes   Wear Glasses or Blind yes   Vision Management glasses   Fall history within last six months yes   Number of times patient has fallen within last six months 1  (1 year ago)   Change in Functional Status Since Onset of Current Illness/Injury no   General Information   Onset of Illness/Injury or Date of Surgery 02/08/22   Referring Physician Ivanna Thompson MD   Patient/Family Therapy Goal Statement (OT) Not endorsed   Additional Occupational Profile Info/Pertinent History of Current Problem 67-year-old with metastatic breast cancer, diabetes, chronic MSSA infection of the pelvis who presented to the ED with dizziness weakness and hypoxia.  She has had chronic cough for several weeks.  PET scan done on 2/12 showed progression of underlying breast cancer.  Awaiting oncology to discuss it with the patient about treatment options.  Patient was treated with IV antibiotics and IV Lasix s/p which hypoxia is improving.  ID and pulmonary also consulted   Existing Precautions/Restrictions fall   Cognitive Status Examination   Orientation Status orientation to person, place and time   Affect/Mental Status (Cognitive) WFL   Visual Perception   Visual  Impairment/Limitations WFL   Sensory   Sensory Comments Bilateral foot numbness 2/2 neuropathy   Pain Assessment   Patient Currently in Pain No   Integumentary/Edema   Integumentary/Edema Comments Pt's swelling improved    Posture   Posture forward head position   Range of Motion Comprehensive   General Range of Motion bilateral upper extremity ROM WFL   Strength Comprehensive (MMT)   Comment, General Manual Muscle Testing (MMT) Assessment Pt is generally deconditioned   Muscle Tone Assessment   Muscle Tone Quick Adds No deficits were identified   Coordination   Upper Extremity Coordination No deficits were identified   Bed Mobility   Bed Mobility supine-sit   Supine-Sit [place] (Bed Mobility) supervision;verbal cues;nonverbal cues (demo/gesture)   Assistive Device (Bed Mobility) bed rails   Transfers   Transfers sit-stand transfer;bed-chair transfer   Transfer Skill: Bed to Chair/Chair to Bed   Bed-Chair [place] (Transfers) contact guard;nonverbal cues (demo/gesture);verbal cues   Assistive Device (Bed-Chair Transfers) rolling walker   Sit-Stand Transfer   Sit-Stand [place] (Transfers) contact guard;nonverbal cues (demo/gesture);verbal cues   Assistive Device (Sit-Stand Transfers) walker, standard   Balance   Balance Assessment identified impairments contributing to balance disturbance   Impairments Contributing to Balance Dysfunction decreased sensation;decreased strength   Activities of Daily Living   BADL Assessment grooming   Grooming Assessment   [place] Level (Grooming) supervision;set up;verbal cues   Position (Grooming) supported sitting   Clinical Impression   Criteria for Skilled Therapeutic Interventions Met (OT) yes   OT Diagnosis ADL dysfunction   OT Problem List-Impairments impacting ADL activity tolerance impaired;balance;strength   Assessment of Occupational Performance 1-3 Performance Deficits   Identified Performance Deficits Dressing, bathing, activity tolerance   Planned  Therapy Interventions (OT) ADL retraining;balance training;bed mobility training;transfer training   Clinical Decision Making Complexity (OT) moderate complexity   Therapy Frequency (OT) Daily   Predicted Duration of Therapy 1 week   Risk & Benefits of therapy have been explained evaluation/treatment results reviewed;care plan/treatment goals reviewed;risks/benefits reviewed;current/potential barriers reviewed;participants voiced agreement with care plan;participants included;patient   OT Discharge Planning    OT Discharge Recommendation (DC Rec) Home with assist   OT Rationale for DC Rec Pt is below functional baseline, pt has a good set up at home but would like a home health aide to assist with showers to prevent caregiver burn out.   Total Evaluation Time (Minutes)   Total Evaluation Time (Minutes) 5

## 2022-02-12 NOTE — PLAN OF CARE
"Tele: 's.     Problem: Adult Inpatient Plan of Care  Goal: Plan of Care Review  Outcome: Improving  Flowsheets (Taken 2/12/2022 1458)  Plan of Care Reviewed With: patient  Progress: improving     Problem: Gas Exchange Impaired  Goal: Optimal Gas Exchange  Outcome: Improving  Intervention: Optimize Oxygenation and Ventilation  Recent Flowsheet Documentation  Taken 2/12/2022 1348 by Nirmala Cook, RN  Head of Bed (HOB) Positioning: HOB at 30 degrees   Tolerating RA. Pulmonology signed off. IV Lasix. K+ 3.8, recheck am.     Problem: Infection Progression (Sepsis)  Goal: Absence of Infection Signs and Symptoms  Outcome: Improving  Intervention: Promote Recovery  Recent Flowsheet Documentation  Taken 2/12/2022 1348 by Nirmala Cook, RN  Activity Management: ambulated in room  Taken 2/12/2022 0900 by Nirmala Cook, RN  Activity Management: activity adjusted per tolerance   ID still following. Vanco d/c'd. Still on IV Zosyn. Lactic 1.1. CRP \"stable\". WBC rising. A-febrile.   "

## 2022-02-12 NOTE — PROGRESS NOTES
"[facility]:  PULMONARY PROGRESS NOTE     Date / Time of Admission:  2/8/2022  5:38 PM    ID: Elenita Moran is a 67 year old female with metastatic breast carcinoma with bony metastases on chemotherapy, immunosuppression secondary to doxorubicin (last dose 2/8), nonischemic systolic cardiomyopathy (LVEF 40 to 45% 12/2021), history of MSSA pelvic abscess and dental abscess on cephalexin who was admitted to [facility] on 2/8/2022 with sudden onset of dyspnea, now admitted for new bilateral pulmonary ground glass opacities and bilateral pleural effusions (R > L).    Reason for Consult:  Abnormal CT findings         Assessment: 1.   Bilateral ground glass opacities, improved.  Etiology for opacities unclear.  Possible CHF exacerbation given worsening LVEF on repeat TTE compared to prior (? If this related to chemo) with sudden flash-pulmonary edema given acutely worsening symptoms.  Could be acute infectious process given immunocompromised status with chemotherapy.  PCT normal at 0.13 on 2/9.  No viral prodromes.  Also possible to have acute ILD/pneumonitis related to doxyrubicin but improved, no need for steroids.  We also don't normally see pneumonitis occur immediately after infusion, but possible.  Findings are not what we would see with malignancy (ie, not lymphangitic spread)  2. Bilateral pleural effusions, R > L - Transudative.  Underwent thoracentesis 2/9, 450 mL removed.  Reported as clear yellow fluid but patient describes it as reddish.   Protein ratio 0.5, so borderline exudative vs transudative.  LDH fluid 141.  Serum LDH next day is 420 (on 2/10).  So doesn't meet LDH criteria. Cell count also difficult to interpret.  Lymphocyte predominant at 38%, but \"other cells\" may actually be macrophages/monocytes also.  Pathology pending.    3. Acute on chronic CHF with systolic dysfunction.  ? If flash pulm edema, pleural effusions related to CHF rather than acute infectious " process or medication-induced ILD/pneumonitis.  Pleural fluid borderline transudative.   4. Metastatic breast carcinoma with bony metastases on chemotherapy.  Liver lesions, pending biopsy on Monday.  5. Chronic immunosuppression secondary to doxorubicin (last infusion 2/8).  6. Leukocytosis, unspecified.  Worsening again.  CRP elevated but stable. Less concerned for pulm etiology as O2 needs down compared to admission, also clinically feeling better.         Plan:     Wean supplemental O2 as tolerates, goal O2 sat > 92%.      Trialing off O2 this morning.  Would need O2 walk study before discharge to see if qualifies for home O2, I told patient she might not.    CXR - report says same, but they compared it to CXR 07/2021.  In comparison to CT chest  film, there is notable improvement.     Follow-up pleural fluid studies, cytology from 2/9 - still pending.    No indication for steroids at this moment.    Defer Abx management to ID team, would recommend 7 days for pulmonary etiologies.     Leukocytosis still rising but does not appear from pulmonary origins.    Taylor spray nasal for sinus congestion/dryness.      Agree with diuresis per primary team.    Placed outpatient Pulmonary Clinic referral and discharge instructions.  Sent message to Pulm Clinic to help arrange visit.    Patient and/or family were educated on the above recommendations.   Thank you for allowing our service to participate in the care of this patient.  Please call with any questions or concerns. Pulmonary Team will sign-off.  Patient will follow-up with us in Pulmonary Clinic in 4-5 weeks, CT chest non-contrast to be done prior to visit.    Total patient care time: 25 minutes, with over 50% spent in counseling/coordination of care.      Nalini Arzate MD, MPH  Pulmonary/Critical Care Medicine  02/12/2022  8:48 AM        Subjective/Interval History:   Patient doing well this morning.  Has been on 1 L/min NC overnight.  Will try off to room  air and assess response.  She asks if she would qualify for home O2, advised would need walk study before discharge.  Having some nasal congestion.  Last COVID 19 PCR was 2/8 and negative.  Denies any chest pain/pressure.  Minimal cough.  No hemoptysis.    Review of Systems:  Pertinent items are noted in HPI.  The Review of Systems is negative other than noted in the HPI        Allergies/Medications:   Allergies:     Allergies   Allergen Reactions     Gabapentin      Other reaction(s): *Unknown  Upper body edema/swelling.  Interacted with Oncology treatment.  Upper body edema/swelling.  Interacted with Oncology treatment.       Sulfa Drugs Hives and Rash     welts         Continuous Infusions:    Scheduled Medications:    aspirin  81 mg Oral Daily     [Held by provider] dexamethasone  6 mg Intravenous Q24H     DULoxetine  30 mg Oral QAM     DULoxetine  60 mg Oral QPM     [Held by provider] enoxaparin ANTICOAGULANT  40 mg Subcutaneous Q24H     ezetimibe  10 mg Oral Daily     furosemide  40 mg Intravenous Q12H     HYDROmorphone  4 mg Oral TID     hydrOXYzine  25 mg Oral TID     ibuprofen  200 mg Oral TID     insulin aspart   Subcutaneous QAM AC     insulin aspart   Subcutaneous Daily with lunch     insulin aspart   Subcutaneous Daily with supper     insulin aspart  1-7 Units Subcutaneous TID AC     [Held by provider] insulin glargine  5 Units Subcutaneous At Bedtime     methadone  2.5 mg Oral QAM     mineral oil-hydrophilic petrolatum   Topical Daily     OLANZapine  5 mg Oral QPM     oxybutynin  5 mg Oral BID     oxybutynin ER  10 mg Oral At Bedtime     pantoprazole  40 mg Oral QAM AC     piperacillin-tazobactam  3.375 g Intravenous Q8H     polyethylene glycol  17 g Oral Daily     potassium chloride  40 mEq Oral Once     senna-docusate  1 tablet Oral Daily     simvastatin  20 mg Oral QPM     sodium chloride (PF)  3 mL Intracatheter Q8H     vancomycin  750 mg Intravenous Q12H     zolpidem  5 mg Oral At Bedtime           "  Objective:   Vitals:  /73 (BP Location: Left arm)   Pulse 101   Temp 98.7  F (37.1  C) (Oral)   Resp 18   Ht 1.651 m (5' 5\")   Wt 65.1 kg (143 lb 8 oz)   SpO2 98%   BMI 23.88 kg/m    GEN: Pleasant female, sitting in the bed in no acute distress.    HEENT: Normocephalic, atraumatic.  Extraoccular eye movements intact, anicteric sclera. Moist mucous membranes.   NECK: Supple.    PULM: Non-labored breathing.  No use of accessory muscles.  Minimal inspiratory rales bilateral bases.  CVS: Regular rate and rhythm.  Normal S1, S2.  No rubs, murmurs, or gallops.    ABDOMEN: Normoactive bowel sounds.  Non-tender to palpation.  Non-distended.    EXTREMITES:  No clubbing, cyanosis, or edema.    NEURO:  Awake.  Oriented to person, place, time and situation.  Cranial nerves 2-12 grossly intact.  Moving all extremities.      Intake/Output:  I/O last 3 completed shifts:  In: 840 [P.O.:840]  Out: 3950 [Urine:3950]        Pertinent Studies:   All laboratory data reviewed  Serum Glucose range:   Recent Labs   Lab 02/12/22  0755 02/12/22  0620 02/11/22  2049 02/11/22  1150 02/11/22  0739 02/11/22  0656   * 131* 204* 161* 127* 125     ABG: No lab results found in last 7 days.  CBC:   Recent Labs   Lab 02/12/22  0620 02/11/22  0656 02/10/22  0626 02/09/22  0851   WBC 27.3* 26.5* 19.6*  19.6* 11.1*   HGB 8.5* 8.0* 7.3* 8.6*   HCT 26.6* 25.1* 23.3* 26.9*   MCV 94 96 95 95    246 259 291   NEUTROPHIL  --  97 90 91   LYMPH  --  0 2 4   MONOCYTE  --  2 5 4   EOSINOPHIL  --  0 0 0     Chemistry:   Recent Labs   Lab 02/12/22  0620 02/11/22  1633 02/11/22  0656 02/10/22  0626 02/09/22  0851     --  138 137 141   POTASSIUM 3.1* 3.5 2.9* 3.2* 4.2   CHLORIDE 99  --  100 100 104   CO2 23  --  23 25 23   BUN 11  --  17 16 14   CR 0.61  --  0.66  0.64 0.66 0.63   GFRESTIMATED >90  --  >90  >90 >90 >90   BENJI 8.1*  --  7.7* 7.6* 8.5   MAG 1.8  --  2.0 2.0 1.5*   PROTTOTAL  --   --   --  5.8* 6.5  6.5   ALBUMIN  -- " "  --   --  2.6* 3.0*   AST  --   --   --  31 35   ALT  --   --   --  13 10   ALKPHOS  --   --   --  172* 216*   BILITOTAL  --   --   --  0.4 0.4     Coags:  No results for input(s): INR, PROTIME, PTT in the last 168 hours.    Invalid input(s): APTT  Cardiac Markers:  Recent Labs   Lab 02/09/22  0851   TROPONINI 0.07      Serum LDH, 2/10:  420.    R pleural fluid, 2/9:   - , protein 2.4, glucose 174, pH 8.0  - 23 WBC, 38% lymph, 35% \"other cells\", 25% neutrophils, 2% mono/macro    Microbiology:  Blood cultures x2, 2/8: NGTD  Legionella urine antigen, 2/9: Negative  Streptococcus urine antigen, 2/9: Negative  Left pleural fluid, 2/9: Gram stain 2+ WBC, no organisms.  Aerobic culture NGTD.  Anaerobic culture NGTD.        Cardiology/Radiology:   Cardiac: All cardiac studies reviewed by me.    EKG: Reviewed    TTE, 2/9: Summary:  1. The left ventricle is normal in size. Left ventricular systolic performance is moderately reduced. The ejection fraction is estimated to be 30-35%. 2. There is moderate global reduction in left  ventricular systolic performance. 3. There is mild aortic insufficiency. 4. There is moderate mitral insufficiency. 5. Normal right ventricular size with mildly reduced right ventricular systolic performance. 6. There is mild to moderate left atrial enlargement.  When compared to the prior real-time echocardiogram dated 23 December 2021, there has been a further diminution in left ventricular systolic performance. The degree of mitral insufficiency has increased from mild-moderate to now moderate.    Radiology: All imaging studies reviewed by me.    CXR Portable, 2/11/22:  Port-A-Cath tip in the SVC. Mild interstitial infiltrates in both lungs have not significantly changed. Would favor pneumonia.    CT chest PE study, 2/8/22:   FINDINGS: ANGIOGRAM CHEST: Pulmonary arteries are normal caliber and negative for pulmonary emboli. Thoracic aorta is negative for dissection. No CT evidence of right " heart strain. LUNGS AND PLEURA: Moderate sized bilateral pleural effusions and bibasilar atelectasis. Extensive airspace and groundglass opacities throughout both lungs. MEDIASTINUM/AXILLAE: Nonenlarged mediastinal lymph nodes. Right anterior chest wall Port-A-Cath tip in the distal SVC. CORONARY ARTERY CALCIFICATION: None. UPPER ABDOMEN: Normal. MUSCULOSKELETAL: Extensive sclerotic metastasis throughout the visualized skeleton as seen previously. Severe T10 vertebral body compression fractures unchanged. Left mastectomy and breast prosthesis.     IMPRESSION: 1.  No evidence for pulmonary emboli. 2.  Extensive airspace and groundglass opacities throughout both lungs compatible with pneumonia, including Covid 19. 3.  Moderate-sized bilateral pleural effusions and bibasilar atelectasis. 4.  Diffuse skeletal metastasis.

## 2022-02-12 NOTE — PLAN OF CARE
Assumed care 1500 to 0730. A&O x 4. Assist x 1 with a walker and gait belt. Tele is sinus tachycardia. Denies pain. Purewick in place, up to toilet.  visited in the evening at bedside. Encouraged patient to get vaccinated for COVID. Call light within reach, able to make needs known. Bed alarm on for safety.      Problem: Gas Exchange Impaired  Goal: Optimal Gas Exchange  Intervention: Optimize Oxygenation and Ventilation  Recent Flowsheet Documentation  Taken 2/11/2022 2030 by RIKY FONSECA  Head of Bed (HOB) Positioning: HOB at 20-30 degrees      Problem: Adult Inpatient Plan of Care  Goal: Absence of Hospital-Acquired Illness or Injury  Intervention: Prevent Skin Injury  Recent Flowsheet Documentation  Taken 2/11/2022 2030 by RIKY FONSECA  Body Position:   turned   position changed independently   right   left

## 2022-02-13 NOTE — PLAN OF CARE
"  Problem: Adult Inpatient Plan of Care  Goal: Plan of Care Review  Outcome: Improving  Flowsheets (Taken 2/13/2022 1314)  Plan of Care Reviewed With: patient  Progress: improving  She is eager to go home tomorrow after her liver biopsy scheduled for 0920. She was a bit teary eyed this morning, \"I'm scared of what they'll find.\" Emotional support given. Spouse at bedside, supportive. NPO at midnight. Replacing electrolytes.     Problem: Gas Exchange Impaired  Goal: Optimal Gas Exchange  Outcome: Improving  Tolerating RA. Denied RA. LS clear, diminished. Diuresing well on IV Lasix. 1200cc UOP this shift so far, Purewick in place. K+ 4.1. Mg 1.5. VSS. Tele:  bpm.   Wt Readings from Last 3 Encounters:   02/13/22 66.4 kg (146 lb 4.8 oz)   11/08/21 67.1 kg (148 lb)   08/23/21 70.8 kg (156 lb)     Problem: Infection Progression (Sepsis)  Goal: Absence of Infection Signs and Symptoms  Outcome: Improving  Intervention: Promote Recovery  Recent Flowsheet Documentation  Taken 2/13/2022 0835 by Nirmala Cook, RN  Activity Management:   up in chair   ambulated in room   ambulated to bathroom   WBC trending down, 18.2. A-febrile. ID continues to follow. IV Zosyn.     "

## 2022-02-13 NOTE — PROGRESS NOTES
Infectious Disease Progress Note    Assessment/Plan  Impression: Hypoxemia, cough for several weeks.  New infiltrates on chest CT that were not there 6 months ago.     Patient received doxorubicin for metastatic breast cancer.    RESP PANEL PCR  NEGATIVE     Recommendations:   On zosyn  Liver biopsy tomorrow       Check antigens for Legionella and pneumococcus.>>negative  Histo antigen >>neg     This may not be infectious given doxorubicin can also cause pneumonitis.    Pulmonary following      Aleshia Morin M.D.      Subjective    No cough  Some SOB with WALKING that she attributes to her mask  No fevers/cills      Objective    Vital signs in last 24 hours  Temp:  [97.5  F (36.4  C)-98.3  F (36.8  C)] 98.3  F (36.8  C)  Pulse:  [] 101  Resp:  [18-20] 20  BP: (105-116)/(60-70) 116/68  SpO2:  [95 %-98 %] 95 %  Wt Readings from Last 3 Encounters:   02/13/22 66.4 kg (146 lb 4.8 oz)   11/08/21 67.1 kg (148 lb)   08/23/21 70.8 kg (156 lb)           Intake/Output last 3 shifts  I/O last 3 completed shifts:  In: 1795 [P.O.:1560; I.V.:235]  Out: 3500 [Urine:3500]  Intake/Output this shift:  I/O this shift:  In: 486 [P.O.:360; I.V.:126]  Out: 600 [Urine:600]    Review of Systems   Pertinent items are noted in HPI., otherwise negative.    Physical Exam  Gen. appearance nontoxic  Eyes no conjunctivitis or icterus  Neck no stiffness   Heart  no edema  Lungs  no wheeze  Abdomen soft not tender  Extremities no synovitis, trace edema  Skin  no rash or emboli  Neurologic alert oriented no focal deficits      Neurologic: Grossly normal    Pertinent Labs   Lab Results: personally reviewed.     Recent Labs   Lab 02/13/22 0627 02/12/22  0620 02/11/22  0656   WBC 18.2* 27.3* 26.5*   HGB 8.2* 8.5* 8.0*   HCT 25.7* 26.6* 25.1*    263 246        Recent Labs   Lab 02/13/22  0627 02/12/22  0620 02/11/22  0656   * 136 138   CO2 24 23 23   BUN 15 11 17       Results for JULES KRUGER (MRN [id number]) as of  2/10/2022 12:44  Pleural fluid. Ref. Range 2/9/2022 17:57   Glucose Fluid Latest Units: mg/dL 174   Lactate Dehydrogenase Fluid Latest Units: U/L 141   Ph Fluid Latest Units: pH 8.0   Protein Total Fluid Latest Units: g/dL 2.4       No results found for: CULT  Collected Updated Procedure Result Status    02/09/2022 1757 02/10/2022 0148 Pleural fluid Aerobic Bacterial Culture Routine with Gram Stain [[id number]]    Pleural fluid from Pleural Cavity    Preliminary result Component Value   Gram Stain Result No organisms seen P    2+ WBC seen P    Predominantly mononuclear cells           02/09/2022 1757 02/09/2022 1916 Glucose fluid [[id number]]    Pleural fluid from Pleural Cavity    Final result Component Value Units   Glucose fluid 174 mg/dL           02/09/2022 1757 02/10/2022 0006 Lactate dehydrogenase fluid [[id number]]    Pleural fluid from Pleural Cavity    Final result Component Value Units   Lactate dehydrogenase fluid 141 U/L           02/09/2022 1757 02/09/2022 1837 Anaerobic bacterial culture [[id number]]   Pleural fluid from Pleural Cavity    In process Component Value   No component results           02/09/2022 1757 02/09/2022 1935 pH fluid [[id number]]    Pleural fluid from Pleural Cavity    Final result Component Value Units   pH Fluid 8.0 pH           02/09/2022 1757 02/09/2022 2319 Protein fluid [[id number]]    Pleural fluid from Pleural Cavity, Right    Final result Component Value Units   Protein Total Fluid 2.4 g/dL           02/09/2022 1757 02/10/2022 0903 Cytology, non-gynecologic [[id number]]   Pleural fluid from Pleural Cavity, Right    In process Component Value   No component results           02/09/2022 1757 02/10/2022 1113 Cell count with differential fluid [[id number]]    Pleural fluid from Pleural Cavity    In process Component Value   No component results           02/09/2022 1757 02/10/2022 1113 Cell Count Body Fluid [[id number]]   Pleural fluid from Pleural Cavity     In process Component Value   No component results           02/09/2022 1142 02/09/2022 1456 Legionella pneumophila antigen urine [[id number]]   Urine, Midstream    Final result Component Value   Legionella pneumophila serogroup 1 urinary antigen Negative   Suggests no recent or current infection. Infection due to Legionella cannot be ruled out, since other serogroups and species may cause disease, antigen may not be present in urine in early infection, and the level of antigen present in the urine may be below detectable limits of the test.           02/09/2022 1142 02/09/2022 1457 Streptococcus pneumoniae antigen [[id number]]   Urine, Midstream    Final result Component Value   Streptococcus pneumoniae antigen Negative   A negative Streptococcus pneumoniae antigen result does not rule out infection with Streptococcus pneumoniae.           02/09/2022 1142 02/09/2022 1150 Blastomyces Agn Quant EIA Non Blood [[id number]]   Urine, Clean Catch    In process Component Value   No component results           02/09/2022 1142 02/09/2022 1150 Histoplasma Galactomannan Antigen Quant by EIA, Urine [[id number]]   Urine, Clean Catch    In process Component Value   No component results           02/09/2022 0851 02/09/2022 1033 Histoplasma Antigen Quantitative by EIA [[id number]]   Blood from Arm, Right    In process Component Value   No component results           02/08/2022 1839 02/09/2022 2116 Blood Culture Arm, Left [[id number]]   Blood from Arm, Left    Preliminary result Component Value   Culture No growth after 1 day P           02/08/2022 1803 02/09/2022 2116 Blood Culture Portacath [[id number]]   Blood from Portacath    Preliminary result Component Value   Culture No growth after 1 day P           02/08/2022 1802 02/08/2022 1911 Symptomatic; Yes; 2/7/2022 Influenza A/B & SARS-CoV2 (COVID-19) Virus PCR Multiplex Nasopharyngeal [[id number]]    Swab from Nasopharyngeal    Final result Component Value   Influenza  A PCR Negative   Influenza B PCR Negative   SARS CoV2 PCR Negative   NEGATIVE: SARS-CoV-2 (COVID-19) RNA not detected, presumed negative.               Pertinent Radiology   Radiology Results:   Echocardiogram Complete    Result Date: 2022  [id number]^HOLLY^PEPE^[place] [address]  Name: JULES KRUGER MRN: [id number] : 1955 Study Date: 2022 10:45 AM Age: 67 yrs Gender: Female Patient Location: [facility] Reason For Study: 2nd degree AV Block Ordering Physician: PEPE BARRERA Performed By: JASMYNE  BSA: 1.7 m2 Height: 65 in Weight: 148 lb HR: 119 BP: 117/68 mmHg ______________________________________________________________________________ Procedure Complete Echo Adult. Definity (NDC #81777-927) given intravenously. ______________________________________________________________________________ Interpretation Summary  1. The left ventricle is normal in size. Left ventricular systolic performance is moderately reduced. The ejection fraction is estimated to be 30-35%. 2. There is moderate global reduction in left ventricular systolic performance. 3. There is mild aortic insufficiency. 4. There is moderate mitral insufficiency. 5. Normal right ventricular size with mildly reduced right ventricular systolic performance. 6. There is mild to moderate left atrial enlargement.  When compared to the prior real-time echocardiogram dated 2021, there has been a further diminution in left ventricular systolic performance. The degree of mitral insufficiency has increased from mild-moderate to now moderate. ______________________________________________________________________________ Left ventricle: The left ventricle is normal in size. Left ventricular systolic performance is moderately reduced. The ejection fraction is estimated to be 30-35%. There is moderate global reduction in left ventricular systolic performance. Left  ventricular wall thickness is normal.  Assessment of left atrial pressure (LAP): The cumulative findings are indeterminate in evaluating left atrial pressure.  Right ventricle: Normal right ventricular size with mildly reduced right ventricular systolic performance.  Left atrium: There is mild to moderate left atrial enlargement.  Right atrium: The right atrium is of normal size.  IVC: The IVC is of normal caliber.  Aortic valve: The aortic valve is comprised of three cusps. There is no significant aortic stenosis. There is mild aortic insufficiency.  Mitral valve: The mitral valve appears morphologically normal. There is moderate mitral insufficiency.  Tricuspid valve: The tricuspid valve is grossly morphologically normal. There is mild tricuspid insufficiency.  Pulmonic valve: The pulmonic valve is grossly morphologically normal. There is mild pulmonic insufficiency.  Thoracic aorta: The aortic root and proximal ascending aorta are of normal dimension.  Pericardium: There is no significant pericardial effusion. ______________________________________________________________________________ ______________________________________________________________________________ MMode/2D Measurements & Calculations RVDd: 4.3 cm IVSd: 0.75 cm LVIDd: 5.6 cm LVIDs: 4.4 cm LVPWd: 0.99 cm FS: 21.8 % LV mass(C)d: 182.6 grams LV mass(C)dI: 104.9 grams/m2 Ao root diam: 3.0 cm LA dimension: 4.4 cm asc Aorta Diam: 2.7 cm LA/Ao: 1.5 LVOT diam: 1.7 cm LVOT area: 2.2 cm2 LA Volume Indexed (AL/bp): 65.9 ml/m2  RWT: 0.35  Doppler Measurements & Calculations LV V1 max P.1 mmHg LV V1 max: 112.5 cm/sec LV V1 VTI: 17.2 cm SV(LVOT): 38.5 ml SI(LVOT): 22.1 ml/m2 PA acc time: 0.14 sec  ______________________________________________________________________________ Report approved by: Ashley Goodman 2022 12:49 PM       US Thoracentesis    Result Date: 2022  EXAM: 1. RIGHT THORACENTESIS 2. ULTRASOUND GUIDANCE LOCATION: [facility]  [facility] DATE/TIME: 2/9/2022 5:34 PM INDICATION: Pleural effusion. PROCEDURE: Informed consent obtained. Time out performed. The chest was prepped and draped in sterile fashion. 10 mL of 1 % lidocaine was infused into the local soft tissues. Under direct ultrasound guidance, a 5 Romanian catheter system was placed into the pleural effusion. 0.45 liters of clear yellow fluid were removed and sent to lab, if requested. Patient tolerated procedure well. Ultrasound imaging was obtained and placed in the patient's permanent medical record.     IMPRESSION: Status post right ultrasound-guided thoracentesis. Reference CPT Code: 75151    XR Chest Port 1 View    Result Date: 2/11/2022  EXAM: XR CHEST PORT 1 VIEW LOCATION: [facility] DATE/TIME: 2/11/2022 12:22 PM INDICATION: Worsening leukocytosis, evaluate for new abnormalities.  Being treated for pneumonia pulm edema currently. COMPARISON: 07/17/2021     IMPRESSION: Port-A-Cath tip in the SVC. Mild interstitial infiltrates in both lungs have not significantly changed. Would favor pneumonia.    CT Chest Pulmonary Embolism w Contrast    Result Date: 2/8/2022  EXAM: CT CHEST PULMONARY EMBOLISM W CONTRAST LOCATION: [facility] DATE/TIME: 2/8/2022 6:08 PM INDICATION: Shortness of breath. PE suspected, high prob COMPARISON: CT chest, abdomen and pelvis 07/19/2021 TECHNIQUE: CT chest pulmonary angiogram during arterial phase injection of IV contrast. Multiplanar reformats and MIP reconstructions were performed. Dose reduction techniques were used. CONTRAST: isovue 370 100ml FINDINGS: ANGIOGRAM CHEST: Pulmonary arteries are normal caliber and negative for pulmonary emboli. Thoracic aorta is negative for dissection. No CT evidence of right heart strain. LUNGS AND PLEURA: Moderate sized bilateral pleural effusions and bibasilar atelectasis. Extensive airspace and groundglass opacities throughout both lungs.  MEDIASTINUM/AXILLAE: Nonenlarged mediastinal lymph nodes. Right anterior chest wall Port-A-Cath tip in the distal SVC. CORONARY ARTERY CALCIFICATION: None. UPPER ABDOMEN: Normal. MUSCULOSKELETAL: Extensive sclerotic metastasis throughout the visualized skeleton as seen previously. Severe T10 vertebral body compression fractures unchanged. Left mastectomy and breast prosthesis.     IMPRESSION: 1.  No evidence for pulmonary emboli. 2.  Extensive airspace and groundglass opacities throughout both lungs compatible with pneumonia, including Covid 19. 3.  Moderate-sized bilateral pleural effusions and bibasilar atelectasis. 4.  Diffuse skeletal metastasis. Imaging features can be seen with (COVID-19)  pneumonia, though are nonspecific and can occur with a variety of infectious and noninfectious processes.

## 2022-02-13 NOTE — PLAN OF CARE
Assumed care 1500 to 0730. A&O x 4. Assist x 1 with gait belt and walker. Tele is sinus tachycardia. Denies pain. Purewick in place for I&O's. Dressing on sacrum changed. Call light within reach, able to make needs known. Bed alarm on for safety.      Problem: Adult Inpatient Plan of Care  Goal: Absence of Hospital-Acquired Illness or Injury  Intervention: Prevent Skin Injury  Recent Flowsheet Documentation  Taken 2/12/2022 1715 by RIKY FONSECA  Body Position: position changed independently     Problem: Gas Exchange Impaired  Goal: Optimal Gas Exchange  Intervention: Optimize Oxygenation and Ventilation  Recent Flowsheet Documentation  Taken 2/12/2022 1715 by RIKY FONSECA  Head of Bed (HOB) Positioning: HOB at 20-30 degrees

## 2022-02-13 NOTE — PROGRESS NOTES
Sepsis protocol flagged for elevated WBC. Discussed with Dr. Thompson and OK not to order this morning. WBC is trending down: 18.2. A-febrile. Already being treated with IV Zosyn. ID following. VSS. Lactic yesterday was WNL.

## 2022-02-13 NOTE — PROGRESS NOTES
[facility]    Medicine Progress Note - Hospitalist Service    Date of Admission:  2/8/2022    A/P    67-year-old with metastatic breast cancer, diabetes, chronic MSSA infection of the pelvis who presented to the ED with dizziness weakness and hypoxia.  She has had chronic cough for several weeks.  PET scan done on 2/12 showed progression of underlying breast cancer.   Patient was treated with IV antibiotics and IV Lasix s/p which hypoxia is improving. At this time suspect pneumonitis very likely here from chemo.   ID and pulmonary also consulted.     1.Acute respiratory failure, concern sepsis vs pneumonitis  -Multifactorial due to acute systolic CHF/pulmonary edema, drug-induced pneumonitis(At risk from Doxorubicin), pneumonia, pleural effusion malignant versus parapneumonic  -Patient is COVID-19 negative  -Initially treated with dexamethasone(held for days now), Zosyn and vancomycin with IV Lasix. WBC trending up now 27--finally better at 18( ID following )   -on 2/12--ID stopped vanco  -Pulmonary consulted and did not recommend continuing with dexamethasone and therefore would hold off on the same.  -Continue Zosyn iv  as per ID, respiratory labs are pending, such as checking antigens for Legionella and pneumococcus, also multiplex PCR--so far neg  -Thoracentesis per pulmonary to rule out parapneumonic versus malignant effusion  -Continue supplemental oxygen  -Echo shows EF of 30 to 35%(likely effect doxorubicin).  BNP is quite elevated at 1500.    -weaning off O2 on RA   -feels better each day     2.Breast cancer with metastasis to the liver and bone  -As per oncology notes PET scan done on 2/1 showed progression of the disease  -Patient is on third line chemo with doxorubicin--now has met her lifelong limit with this and has lower EF and cannot have it per onc--Oncology notes other options  -Awaiting oncology input regarding more chemotherapy vs goals  -Continue methadone  -CODE STATUS  discussed and patient wants to be full code  -[facility] has offered more chemo-wants to get liver bx-ordered will be done on Monday, they need hormone status of mets     3.Non-insulin-dependent diabetes  -Order sliding scale NovoLog  -Ordered diabetic diet     4.Hypomagnesemia  -Continue magnesium replacement as per protocol     5.History of GERD  - omeprazole     6.Decubitus ulcer of the sacrum  -Order wound care     7.Moderate protein calorie moderation  -Albumin of 3  -Patient has decreased p.o. intake     8.Goals of care--Abigail did and she wants full code, more chemo as goal           Diet: Combination Diet Moderate Consistent Carb (60 g CHO per Meal) Diet; 2 gm NA Diet    DVT Prophylaxis: Enoxaparin (Lovenox) SQ--I will hold this is for bx tomorrow of liver  Jiang Catheter: Not present  Central Lines: PRESENT     Cardiac Monitoring: ACTIVE order. Indication: sob  Code Status: Full Code      Disposition Plan   Expected Discharge: 02/15/2022     Anticipated discharge location:  Awaiting care coordination huddle  Delays:    liver bx tomorrow, iv antibiotics        The patient's care was discussed with the Care Coordinator/ and Patient. I called  to update today at 1651    Ivanna Thompson MD  Hospitalist Service  [facility]  Securely message with the [employer] Web Console (learn more here)  Text page via [employer] Paging/Directory         Clinically Significant Risk Factors Present on Admission                    ______________________________________________________________________    Interval History   She feels better  Less cough  Tells me she was up more yesterday in chair  She got email liver bx tomorrow in am      Data reviewed today: I reviewed all medications, new labs and imaging results over the last 24 hours. I personally reviewed no images or EKG's today.    Physical Exam   Vital Signs: Temp: 97.5  F (36.4  C) Temp src: Oral BP: 113/72 Pulse: 101   Resp: 20 SpO2: 97  % O2 Device: None (Room air)    Weight: 146 lbs 4.8 oz  Constitutional: awake, alert, cooperative and no apparent distress  Eyes: sclera clear  Respiratory: no increased work of breathing, good air exchange, no retractions and clear to auscultation  Cardiovascular: Normal apical impulse, regular rate and rhythm, normal S1 and S2, no S3 or S4, and no murmur noted  GI: normal bowel sounds, soft, non-distended and non-tender  Skin: no bruising or bleeding  Musculoskeletal: no lower extremity pitting edema present  Neurologic: Mental Status Exam:  Level of Alertness:   awake  Neuropsychiatric: General: normal, calm and normal eye contact    Data   Recent Labs   Lab 02/13/22  1137 02/13/22  1108 02/13/22  0749 02/13/22  0627 02/12/22  1656 02/12/22  1425 02/12/22  0755 02/12/22  0620 02/11/22  0739 02/11/22  0656 02/10/22  0742 02/10/22  0626 02/09/22  0900 02/09/22  0851   WBC  --   --   --  18.2*  --   --   --  27.3*  --  26.5*  --  19.6*  19.6*  --  11.1*   HGB  --   --   --  8.2*  --   --   --  8.5*  --  8.0*  --  7.3*  --  8.6*   MCV  --   --   --  93  --   --   --  94  --  96  --  95  --  95   PLT  --   --   --  252  --   --   --  263  --  246  --  259  --  291   NA  --   --   --  134*  --   --   --  136  --  138  --  137  --  141   POTASSIUM 4.1  --   --  3.4*  --  3.8  --  3.1*   < > 2.9*  --  3.2*  --  4.2   CHLORIDE  --   --   --  98  --   --   --  99  --  100  --  100  --  104   CO2  --   --   --  24  --   --   --  23  --  23  --  25  --  23   BUN  --   --   --  15  --   --   --  11  --  17  --  16  --  14   CR  --   --   --  0.64  --   --   --  0.61  --  0.66  0.64  --  0.66  --  0.63   ANIONGAP  --   --   --  12  --   --   --  14  --  15  --  12  --  14   BENJI  --   --   --  8.1*  --   --   --  8.1*  --  7.7*  --  7.6*  --  8.5   GLC  --  156* 129* 124   < >  --    < > 131*   < > 125   < > 116   < > 169*   ALBUMIN  --   --   --   --   --   --   --   --   --   --   --  2.6*  --  3.0*   PROTTOTAL  --   --    --   --   --   --   --   --   --   --   --  5.8*  --  6.5  6.5   BILITOTAL  --   --   --   --   --   --   --   --   --   --   --  0.4  --  0.4   ALKPHOS  --   --   --   --   --   --   --   --   --   --   --  172*  --  216*   ALT  --   --   --   --   --   --   --   --   --   --   --  13  --  10   AST  --   --   --   --   --   --   --   --   --   --   --  31  --  35    < > = values in this interval not displayed.

## 2022-02-14 NOTE — PROGRESS NOTES
[facility]    Medicine Progress Note - Hospitalist Service    Date of Admission:  2/8/2022    Assessment and plan    Elenita Moran using 67-year-old with metastatic breast cancer, diabetes, chronic MSSA infection of the pelvis who who was admitted due to increased hypoxic respiratory failure due to to pneumonitis possibly secondary to infectious process versus side effect of chemotherapy.  PET scan done on 2/12 showed progression of underlying breast cancer.  Hypoxia and leukocytosis improved with IV antibiotics and IV Lasix.  She also received steroid briefly. ID assisted with antibiotic recommendation.  Pulmonology is assisted with management of acute hypoxic respiratory failure.     1.Acute respiratory failure, concern sepsis vs pneumonitis  -Multifactorial due to acute systolic CHF/pulmonary edema, possible doxorubicin-induced pneumonitis, pneumonia, pleural effusion malignant versus parapneumonic effusion. Etiology could be noninfectious as per ID service.    -Received dexamethasone-discontinue  -Received Zosyn for 7 days and subsequently transitioned to cephalexin with plan to follow-up with Dr Garza next month as arranged by ID service.  -Leukocytosis has resolved.    -Off vancomycin since 2/12/2022.    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     2.Breast cancer with metastasis to the liver and bone  -As per oncology notes PET scan done on 2/1 showed progression of the disease  -Patient is on third line chemo with doxorubicin--now has met her lifelong limit with this and has lower EF and cannot have it per onc--Oncology notes other  options  -Medically stable for discharge from oncology standpoint -discussed with Dr. Marsh.  -Continue methadone  -CODE STATUS discussed and patient wants to be full code  -Liver biopsy tomorrow 2/15/2022: Discussed with radiologist Dr. Osborne     3.Non-insulin-dependent diabetes  -Continue sliding scale NovoLog       4.Hypomagnesemia  -Continue magnesium replacement as per protocol     5.History of GERD  - omeprazole     6.Decubitus ulcer of the sacrum  -Order wound care     7.Moderate protein calorie moderation  -Albumin of 3  -Patient has decreased p.o. intake     8.Goals of care--Mopha did and she wants full code, more chemo as goal           Diet: Consistent Carbohydrate Diet Moderate Consistent Carb (60 g CHO per Meal) Diet  NPO per Anesthesia Guidelines for Procedure/Surgery Except for: Meds  NPO for Medical/Clinical Reasons Except for: Meds    DVT Prophylaxis: Enoxaparin (Lovenox) subcutaneous; to be restarted after biopsy tomorrow 2/15/2022  Jiang Catheter: Not present  Central Lines: PRESENT       Cardiac Monitoring: ACTIVE order. Indication: sob  Code Status: Full Code      Disposition Plan   Expected Discharge: 02/15/2022     Anticipated discharge location:  Awaiting care coordination huddle  Delays:    liver bx tomorrow, iv antibiotics       Yesy Dsouza MD  Hospitalist Service  [facility]  Securely message with the Vocera Web Console (learn more here)  Text page via [email or web address] Paging/Directory         Clinically Significant Risk Factors Present on Admission                    ______________________________________________________________________    Interval History   No new complaints today.  She denies shortness of breath.  No longer requiring supplemental oxygen.  Tearful this morning because she was unable to go for liver biopsy.  Liver biopsy was deferred by radiologist due to concern for sepsis.  She is eager to undergo liver biopsy and subsequently go home.  She feels  better.    Physical Exam   Vital Signs: Temp: 98.4  F (36.9  C) Temp src: Oral BP: 102/66 Pulse: 96   Resp: 18 SpO2: 95 % O2 Device: None (Room air)    Weight: 146 lbs 4.8 oz  Constitutional: awake, alert, cooperative and no apparent distress  Eyes: sclera clear  Respiratory: no increased work of breathing, good air exchange, no retractions and clear to auscultation  Chest: Mediport in the right chest.  Cardiovascular: Normal apical impulse, regular rate and rhythm, normal S1 and S2, no S3 or S4, and no murmur noted  GI: normal bowel sounds, soft, non-distended and non-tender  Skin: no bruising or bleeding  Musculoskeletal: no lower extremity pitting edema present  Neurologic: Mental Status Exam:  Level of Alertness:   awake  Neuropsychiatric: General: normal, calm and normal eye contact

## 2022-02-14 NOTE — PROGRESS NOTES
SPIRITUAL HEALTH SERVICES NOTE  [facility]    SPIRITUAL ASSESSMENT    Discouraged and disappointed about canceled biopsy earlier today    Hoping to go home tomorrow    Reflecting on the support she has from family members    Didn't sleep well last night; hoping to rest this afternoon    CARE PROVIDED  Empathic listening and presence  Helped patient in processing of emotions  Normalized/validated her feelings of disappointment, discouragement, loss, and gratitude  Marie shared    FULL SPIRITUAL CARE NOTE  Follow-up visit with Elenita. She did not sleep well last night and was disappointed that her liver biopsy was canceled today. She shared that she was not notified about the cancellation until 3 hours after her scheduled appointment, which was also quite disappointing, as she had to mentally and emotionally prepare for this biopsy. Elenita shared that this liver biopsy brought up memories of her mother's decline prior to her death several years ago. She is hopeful that the biopsy will go as planned tomorrow and that she can discharge home afterwards.     Elenita talked about her , and how he has learned to take on some additional duties at home. She is grateful for his support. I acknowledged how much she has lost in recent months in terms of strength and independence and empathized with how difficult this has been. She is hoping to have in-home care for a few hours each week to take some of the burden off of her . Marie shared.     Visit Length: 35 minutes    Plan of Care: Will remain available for further support as patient/family needs/desires.    Lynn Major M.Div.      Office: [phone] (for non-urgent requests)  Please Vocera or page through [facility] for time-sensitive requests

## 2022-02-14 NOTE — PLAN OF CARE
Problem: Adult Inpatient Plan of Care  Goal: Optimal Comfort and Wellbeing  Outcome: Improving     Problem: Gas Exchange Impaired  Goal: Optimal Gas Exchange  Outcome: Improving     Problem: Pain Acute  Goal: Acceptable Pain Control and Functional Ability  Outcome: Improving     Patient is A&Ox 4. She has c/o numbness and tingling in her feet. She is an assist of 1/SBA for all cares and transfers. She is continent of bowel and has a purewick in place. LBM 2/13/22. VSS. Patient had c/o low back pain and received her scheduled Dilaudid at 0024. Dilaudid effective for pain. She remains in NSR with occasional Tachycardia throughout the shift. Sepsis protocol fired during shift but was not needed to  be ordered per Dr. Vergara. LS are clear bilaterally. Pt. denies all shortness of breath, chest pain, dizziness, and nausea. BS active in A4Q. Skin intact except for opening on sacrum but patient has a dressing that is CDI. Positive pedal/radial pulses bilaterally. Right port has IV Zosyn running at this time. Patient is lying in bed with call light in reach. Did not sleep well this shift. Staff will continue to monitor.

## 2022-02-14 NOTE — PLAN OF CARE
Problem: Adult Inpatient Plan of Care  Goal: Patient-Specific Goal (Individualized)  Outcome: No Change     Patient wanting to discharge today, was very emotional when she found it the liver biopsy was unable to be done today. Has been very anxious about it done. Emotional support given, got lunch ordered and water to drink

## 2022-02-14 NOTE — PROGRESS NOTES
Infectious Disease Progress Note    Assessment/Plan  Impression: Hypoxemia, cough for several weeks.  New infiltrates on chest CT that were not there 6 months ago.     Patient received doxorubicin for metastatic breast cancer.    RESP PANEL PCR  NEGATIVE     Recommendations:   On zosyn-today is day 7 so will stop the zosyn after next dose (due now)  Liver biopsy today  Would resume PTA cephalexin and follow up with Dr Garza in the next month (has apt scheduled today)     Check antigens for Legionella and pneumococcus.>>negative  Histo antigen >>neg     This may not be infectious given doxorubicin can also cause pneumonitis.    Pulmonary following  - ID will sign off. Please call with additional questions or change in clinical status.   Deepthi Carpenter M.D.      Subjective  First visit by me. Breathing better. Cough improving. On room air and ambulating in the room a bit. Tolerating antibiotics.       Objective    Vital signs in last 24 hours  Temp:  [97.5  F (36.4  C)-98.8  F (37.1  C)] 98.1  F (36.7  C)  Pulse:  [] 98  Resp:  [16-20] 16  BP: (107-123)/(61-76) 123/72  SpO2:  [96 %-98 %] 96 %  Wt Readings from Last 3 Encounters:   02/13/22 66.4 kg (146 lb 4.8 oz)   11/08/21 67.1 kg (148 lb)   08/23/21 70.8 kg (156 lb)           Intake/Output last 3 shifts  I/O last 3 completed shifts:  In: 1379 [P.O.:1140; I.V.:239]  Out: 6450 [Urine:6450]  Intake/Output this shift:  No intake/output data recorded.    Review of Systems   Pertinent items are noted in HPI., otherwise negative.    Physical Exam  Gen. appearance nontoxic  Eyes no conjunctivitis or icterus  Neck no stiffness   Heart  no edema  Lungs  no wheeze  Abdomen soft not tender  Extremities no synovitis, trace edema  Skin  no rash or emboli  Neurologic alert oriented no focal deficits      Neurologic: Grossly normal    Pertinent Labs   Lab Results: personally reviewed.     Recent Labs   Lab 02/14/22  0536 02/13/22  0627 02/12/22  0620   WBC 10.6 18.2*  27.3*   HGB 8.9* 8.2* 8.5*   HCT 27.8* 25.7* 26.6*    252 263        Recent Labs   Lab 02/14/22  0536 02/13/22  0627 02/12/22  0620   * 134* 136   CO2 24 24 23   BUN 17 15 11       Results for JULES KRUGER (MRN [id number]) as of 2/10/2022 12:44  Pleural fluid. Ref. Range 2/9/2022 17:57   Glucose Fluid Latest Units: mg/dL 174   Lactate Dehydrogenase Fluid Latest Units: U/L 141   Ph Fluid Latest Units: pH 8.0   Protein Total Fluid Latest Units: g/dL 2.4       No results found for: CULT  Collected Updated Procedure Result Status    02/09/2022 1757 02/10/2022 0148 Pleural fluid Aerobic Bacterial Culture Routine with Gram Stain [[id number]]    Pleural fluid from Pleural Cavity    Preliminary result Component Value   Gram Stain Result No organisms seen P    2+ WBC seen P    Predominantly mononuclear cells           02/09/2022 1757 02/09/2022 1916 Glucose fluid [[id number]]    Pleural fluid from Pleural Cavity    Final result Component Value Units   Glucose fluid 174 mg/dL           02/09/2022 1757 02/10/2022 0006 Lactate dehydrogenase fluid [[id number]]    Pleural fluid from Pleural Cavity    Final result Component Value Units   Lactate dehydrogenase fluid 141 U/L           02/09/2022 1757 02/09/2022 1837 Anaerobic bacterial culture [[id number]]   Pleural fluid from Pleural Cavity    In process Component Value   No component results           02/09/2022 1757 02/09/2022 1935 pH fluid [[id number]]    Pleural fluid from Pleural Cavity    Final result Component Value Units   pH Fluid 8.0 pH           02/09/2022 1757 02/09/2022 2319 Protein fluid [[id number]]    Pleural fluid from Pleural Cavity, Right    Final result Component Value Units   Protein Total Fluid 2.4 g/dL           02/09/2022 1757 02/10/2022 0903 Cytology, non-gynecologic [[id number]]   Pleural fluid from Pleural Cavity, Right    In process Component Value   No component results           02/09/2022 1757 02/10/2022 1113 Cell  count with differential fluid [[id number]]    Pleural fluid from Pleural Cavity    In process Component Value   No component results           02/09/2022 1757 02/10/2022 1113 Cell Count Body Fluid [[id number]]   Pleural fluid from Pleural Cavity    In process Component Value   No component results           02/09/2022 1142 02/09/2022 1456 Legionella pneumophila antigen urine [[id number]]   Urine, Midstream    Final result Component Value   Legionella pneumophila serogroup 1 urinary antigen Negative   Suggests no recent or current infection. Infection due to Legionella cannot be ruled out, since other serogroups and species may cause disease, antigen may not be present in urine in early infection, and the level of antigen present in the urine may be below detectable limits of the test.           02/09/2022 1142 02/09/2022 1457 Streptococcus pneumoniae antigen [[id number]]   Urine, Midstream    Final result Component Value   Streptococcus pneumoniae antigen Negative   A negative Streptococcus pneumoniae antigen result does not rule out infection with Streptococcus pneumoniae.           02/09/2022 1142 02/09/2022 1150 Blastomyces Agn Quant EIA Non Blood [[id number]]   Urine, Clean Catch    In process Component Value   No component results           02/09/2022 1142 02/09/2022 1150 Histoplasma Galactomannan Antigen Quant by EIA, Urine [[id number]]   Urine, Clean Catch    In process Component Value   No component results           02/09/2022 0851 02/09/2022 1033 Histoplasma Antigen Quantitative by EIA [[id number]]   Blood from Arm, Right    In process Component Value   No component results           02/08/2022 1839 02/09/2022 2116 Blood Culture Arm, Left [[id number]]   Blood from Arm, Left    Preliminary result Component Value   Culture No growth after 1 day P           02/08/2022 1803 02/09/2022 2116 Blood Culture Portacath [[id number]]   Blood from Portacath    Preliminary result Component Value    Culture No growth after 1 day P           2022 1802 2022 1911 Symptomatic; Yes; 2022 Influenza A/B & SARS-CoV2 (COVID-19) Virus PCR Multiplex Nasopharyngeal [[id number]]    Swab from Nasopharyngeal    Final result Component Value   Influenza A PCR Negative   Influenza B PCR Negative   SARS CoV2 PCR Negative   NEGATIVE: SARS-CoV-2 (COVID-19) RNA not detected, presumed negative.               Pertinent Radiology   Radiology Results:   Echocardiogram Complete    Result Date: 2022  [id number]^PEPE^[place] [address]  Name: JULES KRUGER MRN: [id number] : 1955 Study Date: 2022 10:45 AM Age: 67 yrs Gender: Female Patient Location: [facility] Reason For Study: 2nd degree AV Block Ordering Physician: PEPE BARRERA Performed By: JASMYNE  BSA: 1.7 m2 Height: 65 in Weight: 148 lb HR: 119 BP: 117/68 mmHg ______________________________________________________________________________ Procedure Complete Echo Adult. Definity (NDC #19865-189) given intravenously. ______________________________________________________________________________ Interpretation Summary  1. The left ventricle is normal in size. Left ventricular systolic performance is moderately reduced. The ejection fraction is estimated to be 30-35%. 2. There is moderate global reduction in left ventricular systolic performance. 3. There is mild aortic insufficiency. 4. There is moderate mitral insufficiency. 5. Normal right ventricular size with mildly reduced right ventricular systolic performance. 6. There is mild to moderate left atrial enlargement.  When compared to the prior real-time echocardiogram dated 2021, there has been a further diminution in left ventricular systolic performance. The degree of mitral insufficiency has increased from mild-moderate to now moderate.  ______________________________________________________________________________ Left ventricle: The left ventricle is normal in size. Left ventricular systolic performance is moderately reduced. The ejection fraction is estimated to be 30-35%. There is moderate global reduction in left ventricular systolic performance. Left ventricular wall thickness is normal.  Assessment of left atrial pressure (LAP): The cumulative findings are indeterminate in evaluating left atrial pressure.  Right ventricle: Normal right ventricular size with mildly reduced right ventricular systolic performance.  Left atrium: There is mild to moderate left atrial enlargement.  Right atrium: The right atrium is of normal size.  IVC: The IVC is of normal caliber.  Aortic valve: The aortic valve is comprised of three cusps. There is no significant aortic stenosis. There is mild aortic insufficiency.  Mitral valve: The mitral valve appears morphologically normal. There is moderate mitral insufficiency.  Tricuspid valve: The tricuspid valve is grossly morphologically normal. There is mild tricuspid insufficiency.  Pulmonic valve: The pulmonic valve is grossly morphologically normal. There is mild pulmonic insufficiency.  Thoracic aorta: The aortic root and proximal ascending aorta are of normal dimension.  Pericardium: There is no significant pericardial effusion. ______________________________________________________________________________ ______________________________________________________________________________ MMode/2D Measurements & Calculations RVDd: 4.3 cm IVSd: 0.75 cm LVIDd: 5.6 cm LVIDs: 4.4 cm LVPWd: 0.99 cm FS: 21.8 % LV mass(C)d: 182.6 grams LV mass(C)dI: 104.9 grams/m2 Ao root diam: 3.0 cm LA dimension: 4.4 cm asc Aorta Diam: 2.7 cm LA/Ao: 1.5 LVOT diam: 1.7 cm LVOT area: 2.2 cm2 LA Volume Indexed (AL/bp): 65.9 ml/m2  RWT: 0.35  Doppler Measurements & Calculations LV V1 max P.1 mmHg LV V1 max: 112.5 cm/sec LV V1 VTI: 17.2 cm  SV(LVOT): 38.5 ml SI(LVOT): 22.1 ml/m2 PA acc time: 0.14 sec  ______________________________________________________________________________ Report approved by: Ashley Goodman 02/09/2022 12:49 PM       US Thoracentesis    Result Date: 2/9/2022  EXAM: 1. RIGHT THORACENTESIS 2. ULTRASOUND GUIDANCE LOCATION: [facility] DATE/TIME: 2/9/2022 5:34 PM INDICATION: Pleural effusion. PROCEDURE: Informed consent obtained. Time out performed. The chest was prepped and draped in sterile fashion. 10 mL of 1 % lidocaine was infused into the local soft tissues. Under direct ultrasound guidance, a 5 Setswana catheter system was placed into the pleural effusion. 0.45 liters of clear yellow fluid were removed and sent to lab, if requested. Patient tolerated procedure well. Ultrasound imaging was obtained and placed in the patient's permanent medical record.     IMPRESSION: Status post right ultrasound-guided thoracentesis. Reference CPT Code: 08119    XR Chest Port 1 View    Result Date: 2/11/2022  EXAM: XR CHEST PORT 1 VIEW LOCATION: [facility] DATE/TIME: 2/11/2022 12:22 PM INDICATION: Worsening leukocytosis, evaluate for new abnormalities.  Being treated for pneumonia pulm edema currently. COMPARISON: 07/17/2021     IMPRESSION: Port-A-Cath tip in the SVC. Mild interstitial infiltrates in both lungs have not significantly changed. Would favor pneumonia.    CT Chest Pulmonary Embolism w Contrast    Result Date: 2/8/2022  EXAM: CT CHEST PULMONARY EMBOLISM W CONTRAST LOCATION: [facility] DATE/TIME: 2/8/2022 6:08 PM INDICATION: Shortness of breath. PE suspected, high prob COMPARISON: CT chest, abdomen and pelvis 07/19/2021 TECHNIQUE: CT chest pulmonary angiogram during arterial phase injection of IV contrast. Multiplanar reformats and MIP reconstructions were performed. Dose reduction techniques were used. CONTRAST: isovue 370 100ml FINDINGS: ANGIOGRAM CHEST:  Pulmonary arteries are normal caliber and negative for pulmonary emboli. Thoracic aorta is negative for dissection. No CT evidence of right heart strain. LUNGS AND PLEURA: Moderate sized bilateral pleural effusions and bibasilar atelectasis. Extensive airspace and groundglass opacities throughout both lungs. MEDIASTINUM/AXILLAE: Nonenlarged mediastinal lymph nodes. Right anterior chest wall Port-A-Cath tip in the distal SVC. CORONARY ARTERY CALCIFICATION: None. UPPER ABDOMEN: Normal. MUSCULOSKELETAL: Extensive sclerotic metastasis throughout the visualized skeleton as seen previously. Severe T10 vertebral body compression fractures unchanged. Left mastectomy and breast prosthesis.     IMPRESSION: 1.  No evidence for pulmonary emboli. 2.  Extensive airspace and groundglass opacities throughout both lungs compatible with pneumonia, including Covid 19. 3.  Moderate-sized bilateral pleural effusions and bibasilar atelectasis. 4.  Diffuse skeletal metastasis. Imaging features can be seen with (COVID-19)  pneumonia, though are nonspecific and can occur with a variety of infectious and noninfectious processes.

## 2022-02-15 PROBLEM — I50.20 HEART FAILURE WITH REDUCED EJECTION FRACTION, NYHA CLASS I (H): Status: ACTIVE | Noted: 2022-01-01

## 2022-02-15 NOTE — DISCHARGE INSTRUCTIONS
LIVER BIOPSY    1. You are required to have someone accompany you home.    2. Rest today. Do not drive or operate machinery today. Over-activity may produce nausea and dizziness.    3. You should follow your normal diet. Drink plenty of fluids. No alcoholic beverages for 24 hours. *(Alcohol may interact with the medications you received today)    4. Avoid strenuous activity or heavy lifting for 48 hours.    5. Leave bandage on today, you may remove tomorrow.    6. You may shower tomorrow. Do not soak in a bath tub, hot tub, or swim until the site is completely healed and the skin glue is off. Keep the site clean and dry.    7. Watch your biopsy site for signs of infection, increase pain, redness, swelling, or any drainage and or fever or chills.    8.  If you experience sudden weakness, dizziness, abdominal pain, shortness of breath or a temperature above 100.0 degree F for more than 24 hours, call your Doctor or go to the emergency room.           Discharge Instructions for Liver Biopsy    You had a procedure called liver biopsy. A healthcare provider used a special needle to remove a small piece of tissue from your liver. Then it was examined for signs of damage or disease. A liver biopsy is ordered after other tests have shown that your liver is not working properly. You may also have a liver biopsy when liver disease is suspected, to determine whether there is too much iron in the liver, or to rule out cancer.    Home care  Recommendations include the following:     Because you had anesthesia, you should not drive until the day after your biopsy.     Remove the bandage covering the biopsy site  48 hours after the procedure.    Rest for 6 hours and take it easy when you arrive home.    Don t shower for  24 hours after the biopsy. If you wish, you may wash yourself with a sponge or washcloth. When you are able to shower, don t scrub the site. Gently wash the area and pat it dry.    Don t lift anything heavier than   10 pounds (4.5 kg) for up to 1 week after the procedure, or as advised by your healthcare provider.    Don't do strenuous activities or exercises for up to 1 week after the procedure.    Ask your healthcare provider when you can return to work.    Don't start taking blood thinners without clear instructions from your healthcare provider.    Follow-up care  Make a follow-up appointment as directed by our staff.    When to call your healthcare provider  Call your healthcare provider immediately if you have any of the following:    Bleeding from the biopsy site    Dizziness or lightheadedness    Sudden or increased shortness of breath    Sudden chest pain    Fever of  100.4 F ( 38.0 C) or higher, or as directed by your healthcare provider    Shaking chills    Yellow eyes or skin    Increasing redness, tenderness, or swelling at the biopsy site    Drainage from the biopsy site    Opening of the biopsy site    Vomiting blood    Rectal bleeding or bloody stools    Increasing pain, with or without activity, in the liver or belly area, or pain shooting to the right shoulder    [employer] last reviewed this educational content on 6/1/2019 2000-2021 [employer]. All rights reserved. This information is not intended as a substitute for professional medical care. Always follow your healthcare professional's instructions.      Sacrum:  Cleanse with saline, pat dry  Cover with preferred silicone foam  Change every 3 days    Home care services have been arranged for you.  Agency: [facility]    Services: Home skilled nursing, physical therapy and a home health aide  Phone: [phone]  Instructions: Home Care will contact you within 24 hours to arrange the first visit. If they do not call you, please feel free to contact them. Thank you!    PULMONARY MEDICINE  (1) [facility].  Address:  [address] [place] [address] [address], [place] [address].  Phone:  [phone]. CALL TO SCHEDULE A  FOLLOW-UP VISIT IN: 4-5 weeks.   Please get a CT chest scan prior to your visit; the clinic will help you arrange this.

## 2022-02-15 NOTE — CONSULTS
HEART CARE NOTE        Thank you, Dr. Dsouza, for asking the [facility] Heart Care team to see Elenita Moran to evaluate chemotherapy induced toxicity.      Assessment/Recommendations     1. HFrEF - Doxorubicin induced  Assessment / Plan    Anthracyclinw toxicity tend to be irreversible - agree with holding further doses of doxorubicin    Near euvolemia on physical exam;denies HF symptoms of orthopnea, PND or current edema - will transition to oral diuretic regimen     GDMT as detailed below    Current Pharmacotherapy AHA Guideline-Directed Medical Therapy   Lisinopril 2.5 mg daily Lisinopril 20 mg twice daily   Metoprolol succinate 25 mg  daily Carvedilol 25 mg twice daily   Spironolactone 12.5 mg Spironolactone 25 mg once daily   Hydralazine NA Hydralazine 100 mg three times daily   Isosorbide dinitrate NA Isosorbide dinitrate 40 mg three times daily   SGLT2 inhibitor: not started Dapagliflozin or Empagliflozin 10 mg daily     2. Acute respiratory failure  Assessment / Plan    Concern for sepsis vs pneumonitis    Management per primary team - significant improvement with IV diuresis    3. DM2  Assessment / Plan    Management per primary team    4. Metastatic breast Ca  Assessment / Plan    Oncology following/managing - no plans for further doxorubicin    Clinically Significant Risk Factors Present on Admission                    History of Present Illness/Subjective    Ms. Elenita Moran is a 67 year old female with a PMHx (per ) significant for metastatic breast cancer, diabetes, chronic MSSA infection of the pelvis who who was admitted due to increased hypoxic respiratory failure due to pneumonitis possibly secondary to infectious process versus side effect of chemotherapy now found to have interval decline in LV systolic function since initiating doxirubicim    Today, Mrs. Monreal denies any acute events or complaints. She reports that her dyspnea and edema have significantly  "improved since admission and treatment. She denies any HF symptoms. Management plan as detailed above.    ECG: Personally reviewed. sinus tachycardia.    ECHO (personnaly Reviewed):  1. The left ventricle is normal in size. Left ventricular systolic performance  is moderately reduced. The ejection fraction is estimated to be 30-35%.  2. There is moderate global reduction in left ventricular systolic  performance.  3. There is mild aortic insufficiency.  4. There is moderate mitral insufficiency.  5. Normal right ventricular size with mildly reduced right ventricular  systolic performance.  6. There is mild to moderate left atrial enlargement.     When compared to the prior real-time echocardiogram dated 23 December 2021,  there has been a further diminution in left ventricular systolic performance.  The degree of mitral insufficiency has increased from mild-moderate to now  moderate.        Physical Examination Review of Systems   BP (!) 150/81 (BP Location: Right arm, Patient Position: Semi-Arciniega's)   Pulse 103   Temp 97.7  F (36.5  C) (Oral)   Resp 18   Ht 1.651 m (5' 5\")   Wt 66.4 kg (146 lb 4.8 oz)   SpO2 99%   BMI 24.35 kg/m    Body mass index is 24.35 kg/m .  Wt Readings from Last 3 Encounters:   02/13/22 66.4 kg (146 lb 4.8 oz)   11/08/21 67.1 kg (148 lb)   08/23/21 70.8 kg (156 lb)     General Appearance:   no distress, normal body habitus   ENT/Mouth: membranes moist, no oral lesions or bleeding gums.      EYES:  no scleral icterus, normal conjunctivae   Neck: no carotid bruits or thyromegaly   Chest/Lungs:   lungs are clear to auscultation, no rales or wheezing, equal chest wall expansion    Cardiovascular:   Regular. Normal first and second heart sounds with no murmurs, rubs, or gallops; the carotid, radial and posterior tibial pulses are intact, no JVD or LE edema bilaterally    Abdomen:  no organomegaly, masses, bruits, or tenderness; bowel sounds are present   Extremities: no cyanosis or " clubbing   Skin: no xanthelasma, warm.    Neurologic: alert and oriented x3, calm   Psychiatric: alert and oriented x3, calm     A complete 10 systems ROS was reviewed  And is negative except what is listed in the HPI.          Medical History  Surgical History Family History Social History   Past Medical History:   Diagnosis Date     Allergic rhinitis      Bone cancer (H) 2011    breast mets     Breast cancer (H) 2010    left mastectomy     Depression      DM (diabetes mellitus) (H)      Fibromyalgia      Hx antineoplastic chemotherapy 2010     Hx of radiation therapy 2010     Hyperlipemia      Lactose intolerance      Mini stroke (H)     R EYE     Osteoarthritis      Tobacco dependency     Past Surgical History:   Procedure Laterality Date     APPENDECTOMY       INSERT INTRACORONARY STENT  1985    R Hand     IR CHEST PORT PLACEMENT > 5 YRS OF AGE  12/12/2019     IR LUMBAR TRANSFORAMINAL EPIDURAL STEROID INJECTION  12/5/2019     IR LUMBAR TRANSFORAMINAL EPIDURAL STRD INJ  12/5/2019     IR PORT PLACEMENT >5 YEARS  12/12/2019     MAMMOPLASTY REDUCTION Right 2015    hx of left mastectomy and right breast reduction     MASTECTOMY Left 2010     OTHER SURGICAL HISTORY      biopsy of upper and right tongue     OVARIAN CYST REMOVAL       REVISE RECONSTRUCTED BREAST Left     March 11, 2015    no family history of premature coronary artery disease Social History     Socioeconomic History     Marital status:      Spouse name: Not on file     Number of children: Not on file     Years of education: Not on file     Highest education level: Not on file   Occupational History     Not on file   Tobacco Use     Smoking status: Former Smoker     Packs/day: 0.75     Years: 20.00     Pack years: 15.00     Types: Cigarettes     Smokeless tobacco: Never Used   Substance and Sexual Activity     Alcohol use: Yes     Alcohol/week: 1.0 standard drink     Comment: 1 glass of wine/week     Drug use: No     Sexual activity: Yes      Partners: Male     Birth control/protection: Post-menopausal   Other Topics Concern     Parent/sibling w/ CABG, MI or angioplasty before 65F 55M? 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  She lives with her  and 1 of her sons.         Social Determinants of Health     Financial Resource Strain: Not on file   Food Insecurity: Not on file   Transportation Needs: Not on file   Physical Activity: Not on file   Stress: Not on file   Social Connections: Not on file   Intimate Partner Violence: Not on file   Housing Stability: Not on file           Lab Results    Chemistry/lipid CBC Cardiac Enzymes/BNP/TSH/INR   Lab Results   Component Value Date    CHOL 107 11/18/2020    HDL 48 (L) 11/18/2020    TRIG 202 (H) 11/18/2020    BUN 19 02/15/2022     02/15/2022    CO2 23 02/15/2022    Lab Results   Component Value Date    WBC 5.6 02/15/2022    HGB 9.3 (L) 02/15/2022    HCT 29.5 (L) 02/15/2022    MCV 93 02/15/2022     02/15/2022    Lab Results   Component Value Date    TROPONINI 0.07 02/09/2022    BNP 1,511 (H) 02/08/2022    TSH 1.19 11/18/2020    INR 1.45 (H) 07/16/2021     Lab Results   Component Value Date    TROPONINI 0.07 02/09/2022          Weight:    Wt Readings from Last 3 Encounters:   02/13/22 66.4 kg (146 lb 4.8 oz)   11/08/21 67.1 kg (148 lb)   08/23/21 70.8 kg (156 lb)       Allergies  Allergies   Allergen Reactions     Gabapentin      Other reaction(s): *Unknown  Upper body edema/swelling.  Interacted with Oncology treatment.  Upper body edema/swelling.  Interacted with Oncology treatment.       Sulfa Drugs Hives and Rash     welts           Surgical History  Past Surgical History:   Procedure Laterality Date     APPENDECTOMY       INSERT INTRACORONARY STENT  1985    R Hand     IR CHEST PORT PLACEMENT > 5 YRS OF AGE  12/12/2019     IR LUMBAR TRANSFORAMINAL EPIDURAL STEROID INJECTION  12/5/2019     IR LUMBAR TRANSFORAMINAL EPIDURAL STRD INJ  12/5/2019      IR PORT PLACEMENT >5 YEARS  12/12/2019     MAMMOPLASTY REDUCTION Right 2015    hx of left mastectomy and right breast reduction     MASTECTOMY Left 2010     OTHER SURGICAL HISTORY      biopsy of upper and right tongue     OVARIAN CYST REMOVAL       REVISE RECONSTRUCTED BREAST Left     March 11, 2015       Social History  Tobacco:   History   Smoking Status     Former Smoker     Packs/day: 0.75     Years: 20.00     Types: Cigarettes   Smokeless Tobacco     Never Used    Alcohol:   Social History    Substance and Sexual Activity      Alcohol use: Yes        Alcohol/week: 1.0 standard drink        Comment: 1 glass of wine/week   Illicit Drugs:   History   Drug Use No       Family History  Family History   Problem Relation Age of Onset     Lung Cancer Mother      Pancreatic Cancer Mother      Kidney Cancer Maternal Grandmother      Lung Cancer Paternal Aunt      Breast Cancer Cousin           Renu Munoz MD on 2/15/2022      cc: Sandi Jones,

## 2022-02-15 NOTE — PLAN OF CARE
Occupational Therapy Discharge Summary    Reason for therapy discharge:    Discharged to home with home therapy.    Progress towards therapy goal(s). See goals on Care Plan in [facility] electronic health record for goal details.  Goals met    Therapy recommendation(s):    No further therapy is recommended.No further OT needed at this time

## 2022-02-15 NOTE — PRE-PROCEDURE
GENERAL PRE-PROCEDURE:   Procedure:  US guided liver lesion biopsy with moderate sedation  Date/Time:  2/15/2022 8:52 AM    Written consent obtained?: Yes    Risks and benefits: Risks, benefits and alternatives were discussed    Consent given by:  Patient  Patient states understanding of procedure being performed: Yes    Patient's understanding of procedure matches consent: Yes    Procedure consent matches procedure scheduled: Yes    Expected level of sedation:  Moderate  Appropriately NPO:  Yes  ASA Class:  2  Mallampati  :  Grade 2- soft palate, base of uvula, tonsillar pillars, and portion of posterior pharyngeal wall visible  Lungs:  Lungs clear with good breath sounds bilaterally  Heart:  Normal heart sounds and rate  History & Physical reviewed:  History and physical reviewed and no updates needed  Statement of review:  I have reviewed the lab findings, diagnostic data, medications, and the plan for sedation

## 2022-02-15 NOTE — PLAN OF CARE
Problem: Adult Inpatient Plan of Care  Goal: Plan of Care Review  Outcome: No Change     Went down to IR for liver biopsy and then returned to room, denied pain and discomfort at puncture site. Slept well for about 2hrs after procedure

## 2022-02-15 NOTE — PLAN OF CARE
Patient was educated on weighing herself daily at the same time each day, reducing sodium, reading labels to monitor sodium intake, limiting fluids, and increasing activity. Patient had a restful night. VSS.      Problem: Activity Intolerance (Heart Failure)  Goal: Improved Activity Tolerance  Outcome: Improving  Intervention: Optimize Activity Tolerance  Recent Flowsheet Documentation  Taken 2/14/2022 1945 by Sloane Licea, RN  Activity Management: activity adjusted per tolerance     Problem: Fluid Imbalance (Heart Failure)  Goal: Fluid Balance  Outcome: Improving

## 2022-02-15 NOTE — PLAN OF CARE
Care Management Discharge Note    Discharge Date: 02/15/2022       Discharge Disposition: Home,Home Care    Discharge Services:  skilled nursing, physical therapy, home health aide    Discharge DME:  none    Discharge Transportation: family or friend will provide    Private pay costs discussed: Not applicable    Education Provided on the Discharge Plan:  per care team  Persons Notified of Discharge Plans: patient, spouse, home care intake  Patient/Family in Agreement with the Plan: yes    Handoff Referral Completed: Yes    Additional Information:  Chart review completed.  Patient medically ready for discharge today.  IV antibiotics were changed by Infectious Disease to oral.  Therapy recommending home physical therapy.  Per previous care manager notes [facility] accepted patient for skilled nursing, physical therapy, and home health aide.  Writer called [facility] Home Intake to update of discharge.  Discharge orders faxed (okay for start of care 1-5 days post-discharge).  Spouse is providing transport home.    Bekah Marie RN

## 2022-02-15 NOTE — IR NOTE
Pt transported back to room 312 without complaints or concerns.  Pt awake and alert.  Report given to P3 charge nurse as primary RN unavailable.  P3 charge nurse denies any questions or concerns.  Pt aware to let someone know right away if she has any ABD pain.  discharge instructions for liver biopsy entered into computer printed and sent to floor in case pt discharged today.  P3 Charge nurse aware.

## 2022-02-15 NOTE — DISCHARGE SUMMARY
[facility]  Hospitalist Discharge Summary      Date of Admission:  2/8/2022  Date of Discharge:  2/15/2022  Discharging Provider: Yesy Dsouza MD  Discharge Service: Hospitalist Service    Discharge Diagnoses   Acute hypoxic respiratory failure secondary to pneumonitis possibly infectious versus chemotherapy-induced and acute exacerbation of heart failure with reduced ejection fraction    Follow-ups Needed After Discharge   Follow-up Appointments     Follow-up and recommended labs and tests       Follow up with primary care provider, Sandi Jones, within 7 days for   hospital follow- up.  The following labs/tests are recommended: Monitor   BMP and daily weights and adjust dose of diuretics as needed.         {Additional follow-up instructions/to-do's for PCP    :  Continue PT cephalexin with plan to follow-up with Dr Garza next month as arranged by ID service  Monitor BMP as well as daily weight and adjust dose of diuretics as needed  Outpatient follow-up with oncologist, Dr. Marsh  Follow-up liver biopsy results    Unresulted Labs Ordered in the Past 30 Days of this Admission     Date and Time Order Name Status Description    2/15/2022  9:35 AM Cytology, non-gynecologic In process     2/9/2022  1:00 PM Anaerobic bacterial culture Preliminary       These results will be followed up by PCP    Discharge Disposition   Discharged to home  Condition at discharge: Stable      Hospital Course   Elenita Moran is a 67-year-old woman with metastatic breast cancer, diabetes, chronic MSSA infection of the pelvis who was admitted with acute hypoxic respiratory failure due to pneumonitis possibly infectious versus chemotherapy induced an acute exacerbation of heart failure with reduced ejection fraction.      PET scan done on 2/12/22 showed progression of underlying breast cancer.  Hypoxia and leukocytosis improved with IV antibiotics.  She also received steroid briefly. ID assisted  with antibiotic recommendation.  Pulmonology is assisted with management of acute hypoxic respiratory failure.  Patient underwent liver biopsy on 2/15/2022.  Liver biopsy result is pending.  She received IV Laxis during hospital stay and subsequently transitioned to Bumex at discharge as recommended by cardiologist.  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  Cardiologist recommended holding further doses of doxorubicin in the future.    Volume status is improved and patient is no longer requiring supplemental oxygen.  Patient is feeling  better and she is clinically stable for discharge at this time.           Consultations This Hospital Stay   PHARMACY TO DOSE VANCO  INFECTIOUS DISEASES IP CONSULT  HEMATOLOGY & ONCOLOGY IP CONSULT  PULMONARY IP CONSULT  WOUND OSTOMY CONTINENCE NURSE  IP CONSULT  CARE MANAGEMENT / SOCIAL WORK IP CONSULT  PHYSICAL THERAPY ADULT IP CONSULT  OCCUPATIONAL THERAPY ADULT IP CONSULT  HEMATOLOGY & ONCOLOGY IP CONSULT  RADIOLOGY IP CONSULT  CARDIOLOGY IP CONSULT    Code Status   Full Code    Time Spent on this Encounter   I, Yesy Dsouza MD, personally saw the patient today and spent greater than 30 minutes discharging this patient.       Yesy Dsouza MD  [facility]  [address] [place], [place] [id number]  Phone: [phone]  Fax: [phone]  ______________________________________________________________________    Physical Exam   Vital Signs: Temp: 98.2  F (36.8  C) Temp src: Oral BP: 119/78 Pulse: 115   Resp: 18 SpO2: 95 % O2 Device: None (Room air)    Weight: 136 lbs 10.96 oz  Constitutional: awake, alert, cooperative and no apparent distress  Eyes: sclera clear  Respiratory: no increased work of breathing, good air exchange, no retractions and clear to auscultation  Chest: Mediport in the right chest.  Cardiovascular: Normal apical impulse, regular rate and rhythm, normal S1 and S2, no S3 or S4,  and no murmur noted  GI: normal bowel sounds, soft, non-distended and non-tender  Skin: no bruising or bleeding  Musculoskeletal: no lower extremity pitting edema present  Neurologic: Mental Status Exam:  Level of Alertness:   awake  Neuropsychiatric: General: normal, calm and normal eye contact          Primary Care Physician   Sandi Jones    Discharge Orders      Adult Pulmonary Medicine Referral      Home Care PT Referral for Hospital Discharge      Home Care OT Referral for Hospital Discharge      Reason for your hospital stay    Acute hypoxic respiratory failure secondary to pneumonitis possibly infectious versus chemotherapy-induced, acute exacerbation of heart failure with reduced ejection fraction.     Follow-up and recommended labs and tests     Follow up with primary care provider, Sandi Jones, within 7 days for hospital follow- up.  The following labs/tests are recommended: Monitor BMP and daily weights and adjust dose of diuretics as needed.     Activity    Your activity upon discharge: activity as tolerated     MD face to face encounter    Documentation of Face to Face and Certification for Home Health Services    I certify that patient: Elenita Moran is under my care and that I, or a nurse practitioner or physician's assistant working with me, had a face-to-face encounter that meets the physician face-to-face encounter requirements with this patient on: 2/15/2022.    This encounter with the patient was in whole, or in part, for the following medical condition, which is the primary reason for home health care: HFrEF, Breast cancer with metastasis to the liver and bone and Non-insulin-dependent diabetes          I certify that, based on my findings, the following services are medically necessary home health services: Nursing, Occupational Therapy, and Physical Therapy.    My clinical findings support the need for the above services because: Nurse is needed: To assess volume status and  respiratory status after changes in medications or other medical regimen., To provide assessment and oversight required in the home to assure adherence to the medical plan due to: HFrEF and metastatic breast cancer , and To teach and train about the disease and treatments., Occupational Therapy Services are needed to assess and treat cognitive ability and address ADL safety due to impairment in functional status., and Physical Therapy Services are needed to assess and treat the following functional impairments.    Further, I certify that my clinical findings support that this patient is homebound (i.e. absences from home require considerable and taxing effort and are for medical reasons or Jew services or infrequently or of short duration when for other reasons) because: Requires assistance of another person or specialized equipment to access medical services because patient: Has prohibitive pain during ambulation. and Is unable to walk greater than SEVERAL feet without rest...    Based on the above findings. I certify that this patient is confined to the home and needs intermittent skilled nursing care, physical therapy and/or speech therapy.  The patient is under my care, and I have initiated the establishment of the plan of care.  This patient will be followed by a physician who will periodically review the plan of care.  Physician/Provider to provide follow up care: Sandi Jones    Attending hospital physician (the Medicare certified [place] provider): Yesy Dsouza MD  Physician Signature: See electronic signature associated with these discharge orders.  Date: 2/15/2022     Diet    Follow this diet upon discharge: Cardiac       Significant Results and Procedures   Most Recent 3 CBC's:Recent Labs   Lab Test 02/15/22  0451 02/14/22  0536 02/13/22  0627   WBC 5.6 10.6 18.2*   HGB 9.3* 8.9* 8.2*   MCV 93 93 93    258 252     Most Recent 3 BMP's:Recent Labs   Lab Test 02/15/22  1303  02/15/22  0755 02/15/22  0657 02/15/22  0451 02/14/22  1655 02/14/22  1408 02/14/22  0747 02/14/22  0536 02/13/22  0749 02/13/22  0627   NA  --   --   --  138  --   --   --  134*  --  134*   POTASSIUM  --   --   --  3.7  --  4.3  --  3.4*   < > 3.4*   CHLORIDE  --   --   --  100  --   --   --  97*  --  98   CO2  --   --   --  23  --   --   --  24  --  24   BUN  --   --   --  19  --   --   --  17  --  15   CR  --   --   --  0.62  --   --   --  0.66  --  0.64   ANIONGAP  --   --   --  15  --   --   --  13  --  12   BENJI  --   --   --  8.9  --   --   --  8.6  --  8.1*   GLC 91 133* 140* 130*   < >  --    < > 129*   < > 124    < > = values in this interval not displayed.       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, pelvis, initial encounter for fracture; Palliative care patient; Type 2 diabetes mellitus with other specified complication, unspecified whether long term insulin use (H)      metoprolol succinate ER (TOPROL-XL) 25 MG 24 hr tablet Take 1 tablet (25 mg) by mouth daily  Qty: 30 tablet, Refills: 0    Associated Diagnoses: Heart failure with reduced ejection fraction, NYHA class I (H); Antineoplastic chemotherapy induced anemia; 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, pelvis, initial encounter for fracture; Palliative care patient; Type 2 diabetes mellitus with other specified complication, unspecified whether long term insulin use (H)      spironolactone (ALDACTONE) 25 MG tablet Take 0.5 tablets (12.5 mg) by mouth daily  Qty: 30 tablet, Refills: 0    Associated Diagnoses: Heart failure with reduced ejection fraction, NYHA class I (H); Antineoplastic chemotherapy  induced anemia; 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, pelvis, initial encounter for fracture; Palliative care patient; Type 2 diabetes mellitus with other specified complication, unspecified whether long term insulin use (H)         CONTINUE these medications which have CHANGED    Details   bumetanide (BUMEX) 1 MG tablet Take 2 tablets (2 mg) by mouth daily  Qty: 30 tablet, Refills: 0    Associated Diagnoses: Heart failure with reduced ejection fraction, NYHA class I (H); Antineoplastic chemotherapy induced anemia; 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, pelvis, initial encounter for fracture; Palliative care patient; Type 2 diabetes mellitus with other specified complication, unspecified whether long term insulin use (H)      HYDROmorphone (DILAUDID) 4 MG tablet Take 1 tablet (4 mg) by mouth 3 times daily as needed for moderate to severe pain or severe pain  Qty: 12 tablet, Refills: 0    Associated Diagnoses: Heart failure with reduced ejection fraction, NYHA class I (H); Antineoplastic chemotherapy induced anemia; 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, pelvis, initial encounter for fracture; Palliative care patient; Type 2 diabetes mellitus with other specified complication, unspecified whether long term insulin use (H)         CONTINUE these medications which have NOT CHANGED    Details   aspirin 81 MG EC tablet Take 81 mg by mouth daily      calcium carbonate (CALCIUM CARBONATE) 600 MG tablet Take 1,000 mg by mouth daily       cephALEXin (KEFLEX) 500 MG capsule Take 1 capsule (500 mg) by mouth 2 times daily  Qty: 120 capsule, Refills: 3    Associated Diagnoses: Paraspinal abscess (H)      chlorhexidine (PERIDEX) 0.12 % solution Take 15 mLs by mouth daily as needed       Cholecalciferol (VITAMIN D) 125 MCG (5000 UT) capsule Take 1 tablet by mouth daily       clobetasol (TEMOVATE) 0.05 % external ointment Apply topically twice a week      !! DULoxetine (CYMBALTA) 30 MG capsule Take 60 mg by mouth  every evening      !! DULoxetine (CYMBALTA) 30 MG capsule Take 30 mg by mouth every morning       ezetimibe-simvastatin (VYTORIN) 10-20 MG tablet Take 1 tablet by mouth daily      hydrOXYzine (VISTARIL) 25 MG capsule Take 25 mg by mouth 3 times daily With hydromorphone      IRON-VIT C-VIT B12-FOLIC ACID (GENTLE IRON) 28-60-0.008-0.4 MG CAPS Take 1 capsule by mouth daily      lidocaine (XYLOCAINE) 5 % external ointment Apply topically 4 times daily  Qty: 50 g, Refills: 0    Associated Diagnoses: Paraspinal abscess (H)      methadone (DOLOPHINE) 5 MG tablet Take 2.5 mg by mouth every morning Sometimes takes an additional 2.5mg in the afternoon if she needs it      OLANZapine (ZYPREXA) 5 MG tablet Take 5 mg by mouth every evening       omeprazole (PRILOSEC) 20 MG DR capsule Take 20 mg by mouth At Bedtime       oxybutynin (DITROPAN) 5 MG tablet Take 5 mg by mouth 2 times daily       polyethylene glycol (MIRALAX) 17 GM/Dose powder Take 17 g by mouth daily  Qty: 510 g, Refills: 1    Associated Diagnoses: Drug induced constipation      Prenatal Vit-Fe Fumarate-FA (PRENATAL MULTIVITAMIN  PLUS IRON) 27-1 MG TABS Take by mouth daily      psyllium (METAMUCIL/KONSYL) Packet Take 1 packet by mouth daily as needed for constipation      senna-docusate (SENOKOT-S/PERICOLACE) 8.6-50 MG tablet Take 1 tablet by mouth daily      vitamin (B COMPLEX) tablet Take 1 tablet by mouth daily      zolpidem (AMBIEN) 10 MG tablet Take 5 mg by mouth At Bedtime       Continuous Blood Gluc  (FREESTYLE KIMBERLY 14 DAY READER) COLETTE Use to check blood sugar at least 4 times a day or as directed      Continuous Blood Gluc Sensor (FREESTYLE KIMBERLY 14 DAY SENSOR) MISC Use as directed to test blood sugar at least 4 times a day or as directed      medical cannabis (Patient's own supply) See Admin Instructions (The purpose of this order is to document that the patient reports taking medical cannabis.  This is not a prescription, and is not used to  certify that the patient has a qualifying medical condition.)      nystatin (MYCOSTATIN) 070695 UNIT/GM external cream Apply topically 2 times daily  Qty: 30 g, Refills: 1    Associated Diagnoses: Intertrigo       !! - Potential duplicate medications found. Please discuss with provider.      STOP taking these medications       DOXOrubicin HCl Liposomal (DOXIL IV) Comments:   Reason for Stopping:         ibuprofen (ADVIL/MOTRIN) 200 MG tablet Comments:   Reason for Stopping:         oxybutynin ER (DITROPAN-XL) 10 MG 24 hr tablet Comments:   Reason for Stopping:             Allergies   Allergies   Allergen Reactions     Gabapentin      Other reaction(s): *Unknown  Upper body edema/swelling.  Interacted with Oncology treatment.  Upper body edema/swelling.  Interacted with Oncology treatment.       Morphine Visual Disturbance     Visual hallucinations     Sulfa Drugs Hives and Rash     welts

## 2022-02-15 NOTE — PLAN OF CARE
Problem: Diabetes Comorbidity  Goal: Blood Glucose Level Within Targeted Range  Outcome: No Change     Blood suagars well controlled. Patient not allowing to give ordered coverage but is dictacting what she gets. Will continue to monitor

## 2022-02-15 NOTE — PROCEDURES
[facility]    Procedure: Imaging Procedure Note    Date/Time: 2/15/2022 10:11 AM  Performed by: Evan Castro DO  Authorized by: Evan Castro DO       UNIVERSAL PROTOCOL   Site Marked: Yes  Prior Images Obtained and Reviewed:  Yes  Required items: Required blood products, implants, devices and special equipment available    Patient identity confirmed:  Verbally with patient  Patient was reevaluated immediately before administering moderate or deep sedation or anesthesia  Confirmation Checklist:  Patient's identity using two indicators, relevant allergies, procedure was appropriate and matched the consent or emergent situation and correct equipment/implants were available  Time out: Immediately prior to the procedure a time out was called    Universal Protocol: the Joint Commission Universal Protocol was followed    Preparation: Patient was prepped and draped in usual sterile fashion    ESBL (mL):  0     ANESTHESIA    Local Anesthetic: Lidocaine 1% without epinephrine  Anesthetic Total (mL):  10      SEDATION  Patient Sedated: Yes    Sedation:  Fentanyl and midazolam  Vital signs: Vital signs monitored during sedation    See dictated procedure note for full details.    PROCEDURE  Describe Procedure: EXAM:  1. PERCUTANEOUS BIOPSY LIVER  2. ULTRASOUND GUIDANCE  3. CONSCIOUS SEDATION    LOCATION: [facility]  DATE/TIME: 2/15/2022 9:03 AM    INDICATION: Metastatic HR+ breast cancer with recent progression of liver lesions. Evaluate for core biopsy.    PROCEDURE: Informed consent obtained. Site marked. Prior images reviewed. Required items made available. Patient identity was confirmed verbally and with arm band. Patient reevaluated immediately before administering sedation. Universal protocol was followed. Time out performed. The site was prepped and draped in sterile fashion. 10 mL of 1 percent lidocaine was infused into the local soft tissues. Using standard  technique and under direct ultrasound guidance, a 18 gauge biopsy needle was used to make three core biopsies. Tissue was submitted to Pathology.     The patient tolerated the procedure well. No complications.    SEDATION: Versed 2 mg. Fentanyl 100 mcg. The procedure was performed with administration intravenous conscious sedation with appropriate preoperative, intraoperative, and postoperative evaluation.    30 minutes of supervised face to face conscious sedation time was provided by a radiology nurse under my direct supervision.    IMPRESSION:  Status post US-guided biopsy liver lesion.    Patient Tolerance:  Patient tolerated the procedure well with no immediate complications  Length of time physician/provider present for 1:1 monitoring during sedation: 30

## 2022-02-16 NOTE — PLAN OF CARE
Physical Therapy Discharge Summary    Reason for therapy discharge:    Discharged to home.    Progress towards therapy goal(s). See goals on Care Plan in [facility] electronic health record for goal details.  Goals met    Therapy recommendation(s):    Continued therapy is recommended.  Rationale/Recommendations:  home PT for deconditioning.    April Edge, MARLEN 2/15/2022

## 2022-02-16 NOTE — PROGRESS NOTES
Clinic Care Coordination Contact  Care Team Conversations    Patient identified for care management outreach, however [facility] RN staff has already followed up with patient to ensure they are following up with PCP and have needs and resources met. Clinic RN will refer back to JERRELL FULLER if needed/appropriate.    VIELKA Babcock  Social Work Care Coordinator - [facility]  Care Coordination  [email or web address]  Cell Phone: [phone]  Gender pronouns: she/her  Employed by [employer]

## 2022-02-16 NOTE — TELEPHONE ENCOUNTER
F/u HF Cardiology in 5-7 days with CORE RN education at that time.  Deepa Leonardo RN BSN, CHFN

## 2022-02-17 PROBLEM — M46.20 PARASPINAL ABSCESS (H): Chronic | Status: ACTIVE | Noted: 2021-07-28

## 2022-02-17 NOTE — TELEPHONE ENCOUNTER
----- Message from Eliane Ramirez RN sent at 2/16/2022  9:46 AM CST -----    ----- Message -----  From: Deepthi Carpenter MD  Sent: 2/14/2022  10:46 AM CST  To: Sahil Garza MD, #    Hello,  Elenita was in the hospital today and missed her apt with Dr Garza. Can we please reschedule her in a month? Thanks!

## 2022-02-17 NOTE — TELEPHONE ENCOUNTER
Date: 3/7/2022 Status: Scheduled   Time: 9:00 AM Length: 20   Visit Type: RETURN [657] Copay: $0.00   Provider: Sahil Garza MD Department: [facility] INFECTIOUS DISEASE   Bill Area: Infectious Disease [facility]

## 2022-02-22 NOTE — LETTER
2/22/2022    Sandi Jones MD  [facility] [address]  [place] [address]    RE: Elenita Moran       Dear Colleague,     I had the pleasure of seeing Elenita Moran in the [facility].    HEART CARE NOTE          Assessment/Recommendations     1. HFrEF - Doxorubicin induced  Assessment / Plan    Anthracycline toxicity tends to be irreversible - agree with holding further doses of doxorubicin; Although chemo the most likely etiology, will get cardiac MRI to r/o CAD as a contributing factor     Near euvolemia on physical exam; denies HF symptoms of orthopnea, PND or current edema - no changes to regimen at this time    GDMT as detailed below - will increase metoprolol to 50 gm daily     Current Pharmacotherapy AHA Guideline-Directed Medical Therapy   Lisinopril 2.5 mg daily Lisinopril 20 mg twice daily   Metoprolol succinate 50 mg  daily Carvedilol 25 mg twice daily   Spironolactone 12.5 mg Spironolactone 25 mg once daily   Hydralazine NA Hydralazine 100 mg three times daily   Isosorbide dinitrate NA Isosorbide dinitrate 40 mg three times daily   SGLT2 inhibitor: not started Dapagliflozin or Empagliflozin 10 mg daily      2. Acute respiratory failure  Assessment / Plan    Concern for sepsis vs pneumonitis    Management per primary team - significant improvement with IV diuresis     3. DM2  Assessment / Plan    Management per primary team     4. Metastatic breast Ca  Assessment / Plan    Oncology following/managing - no plans for further doxorubicin     History of Present Illness/Subjective      Ms. Elenita Moran is a 67 year old female with a PMHx (per ) significant for metastatic breast cancer, diabetes, chronic MSSA infection of the pelvis who who was recently admitted due to increased hypoxic respiratory failure and new HFrEF (anthracyclin induced) and now presents to [facility] clinic to establish care.      Today, Mrs. Rah mejiaies  "any acute events or complaints. She denies any HF symptoms. Management plan as detailed above. We discussed HF diet and lifestyle modifications including the 2 g Na and 2L fluid restrictions. She does not currently adhere, but will do so moving forward. Patient was provided with the HF educational binder as well as a book to log his daily weights as well HF education by out CORE nurse.    ECG: Personally reviewed. sinus tachycardia.     ECHO (personnaly Reviewed):  1. The left ventricle is normal in size. Left ventricular systolic performance  is moderately reduced. The ejection fraction is estimated to be 30-35%.  2. There is moderate global reduction in left ventricular systolic  performance.  3. There is mild aortic insufficiency.  4. There is moderate mitral insufficiency.  5. Normal right ventricular size with mildly reduced right ventricular  systolic performance.  6. There is mild to moderate left atrial enlargement.     When compared to the prior real-time echocardiogram dated 23 December 2021,  there has been a further diminution in left ventricular systolic performance.  The degree of mitral insufficiency has increased from mild-moderate to now  moderate.          Physical Examination Review of Systems   /58 (BP Location: Left arm, Patient Position: Sitting, Cuff Size: Adult Large)   Pulse 104   Resp 20   Ht 1.651 m (5' 5\")   Wt 63.5 kg (140 lb)   BMI 23.30 kg/m    Body mass index is 23.3 kg/m .  Wt Readings from Last 3 Encounters:   02/22/22 63.5 kg (140 lb)   02/15/22 62 kg (136 lb 11 oz)   11/08/21 67.1 kg (148 lb)     General Appearance:   no distress, normal body habitus   ENT/Mouth: membranes moist, no oral lesions or bleeding gums.      EYES:  no scleral icterus, normal conjunctivae   Neck: no carotid bruits or thyromegaly   Chest/Lungs:   lungs are clear to auscultation, no rales or wheezing, equal chest wall expansion    Cardiovascular:   Regular. Normal first and second heart sounds " with no murmurs, rubs, or gallops; the carotid, radial and posterior tibial pulses are intact, no JVD or LE edema bilaterally    Abdomen:  no organomegaly, masses, bruits, or tenderness; bowel sounds are present   Extremities: no cyanosis or clubbing   Skin: no xanthelasma, warm.    Neurologic: alert and oriented x3, calm     Psychiatric: alert and oriented x3, calm     A complete 10 systems ROS was reviewed  And is negative except what is listed in the HPI.          Medical History  Surgical History Family History Social History   Past Medical History:   Diagnosis Date     Allergic rhinitis      Bone cancer (H) 2011    breast mets     Breast cancer (H) 2010    left mastectomy     Depression      DM (diabetes mellitus) (H)      Fibromyalgia      Hx antineoplastic chemotherapy 2010     Hx of radiation therapy 2010     Hyperlipemia      Lactose intolerance      Mini stroke (H)     R EYE     Osteoarthritis      Tobacco dependency     Past Surgical History:   Procedure Laterality Date     APPENDECTOMY       INSERT INTRACORONARY STENT  1985    R Hand     IR CHEST PORT PLACEMENT > 5 YRS OF AGE  12/12/2019     IR LUMBAR TRANSFORAMINAL EPIDURAL STEROID INJECTION  12/5/2019     IR LUMBAR TRANSFORAMINAL EPIDURAL STRD INJ  12/5/2019     IR PORT PLACEMENT >5 YEARS  12/12/2019     MAMMOPLASTY REDUCTION Right 2015    hx of left mastectomy and right breast reduction     MASTECTOMY Left 2010     OTHER SURGICAL HISTORY      biopsy of upper and right tongue     OVARIAN CYST REMOVAL       REVISE RECONSTRUCTED BREAST Left     March 11, 2015    no family history of premature coronary artery disease Social History     Socioeconomic History     Marital status:      Spouse name: Not on file     Number of children: Not on file     Years of education: Not on file     Highest education level: Not on file   Occupational History     Not on file   Tobacco Use     Smoking status: Former Smoker     Packs/day: 0.75     Years: 20.00     Pack  years: 15.00     Types: Cigarettes     Smokeless tobacco: Never Used   Substance and Sexual Activity     Alcohol use: Yes     Alcohol/week: 1.0 standard drink     Comment: 1 glass of wine/week     Drug use: No     Sexual activity: Yes     Partners: Male     Birth control/protection: Post-menopausal   Other Topics Concern     Parent/sibling w/ CABG, MI or angioplasty before 65F 55M? 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  She lives with her  and 1 of her sons.         Social Determinants of Health     Financial Resource Strain: Not on file   Food Insecurity: Not on file   Transportation Needs: Not on file   Physical Activity: Not on file   Stress: Not on file   Social Connections: Not on file   Intimate Partner Violence: Not on file   Housing Stability: Not on file           Lab Results    Chemistry/lipid CBC Cardiac Enzymes/BNP/TSH/INR   Lab Results   Component Value Date    CHOL 107 11/18/2020    HDL 48 (L) 11/18/2020    TRIG 202 (H) 11/18/2020    BUN 30 (H) 02/21/2022     02/21/2022    CO2 20 (L) 02/21/2022    Lab Results   Component Value Date    WBC 5.6 02/15/2022    HGB 9.3 (L) 02/15/2022    HCT 29.5 (L) 02/15/2022    MCV 93 02/15/2022     02/15/2022    Lab Results   Component Value Date    TROPONINI 0.07 02/09/2022    BNP 1,511 (H) 02/08/2022    TSH 1.19 11/18/2020    INR 1.45 (H) 07/16/2021     Lab Results   Component Value Date    TROPONINI 0.07 02/09/2022          Weight:    Wt Readings from Last 3 Encounters:   02/15/22 62 kg (136 lb 11 oz)   11/08/21 67.1 kg (148 lb)   08/23/21 70.8 kg (156 lb)       Allergies  Allergies   Allergen Reactions     Gabapentin      Other reaction(s): *Unknown  Upper body edema/swelling.  Interacted with Oncology treatment.  Upper body edema/swelling.  Interacted with Oncology treatment.       Morphine Visual Disturbance     Visual hallucinations     Sulfa Drugs Hives and Rash     welts           Surgical  History  Past Surgical History:   Procedure Laterality Date     APPENDECTOMY       INSERT INTRACORONARY STENT  1985    R Hand     IR CHEST PORT PLACEMENT > 5 YRS OF AGE  12/12/2019     IR LUMBAR TRANSFORAMINAL EPIDURAL STEROID INJECTION  12/5/2019     IR LUMBAR TRANSFORAMINAL EPIDURAL STRD INJ  12/5/2019     IR PORT PLACEMENT >5 YEARS  12/12/2019     MAMMOPLASTY REDUCTION Right 2015    hx of left mastectomy and right breast reduction     MASTECTOMY Left 2010     OTHER SURGICAL HISTORY      biopsy of upper and right tongue     OVARIAN CYST REMOVAL       REVISE RECONSTRUCTED BREAST Left     March 11, 2015       Social History  Tobacco:   History   Smoking Status     Former Smoker     Packs/day: 0.75     Years: 20.00     Types: Cigarettes   Smokeless Tobacco     Never Used    Alcohol:   Social History    Substance and Sexual Activity      Alcohol use: Yes        Alcohol/week: 1.0 standard drink        Comment: 1 glass of wine/week   Illicit Drugs:   History   Drug Use No       Family History  Family History   Problem Relation Age of Onset     Lung Cancer Mother      Pancreatic Cancer Mother      Kidney Cancer Maternal Grandmother      Lung Cancer Paternal Aunt      Breast Cancer Cousin           Renu Munoz MD on 2/22/2022      cc: Sandi Jones      Patient seen in clinic for HF education s/p recent hospital discharge 2-15-22.  Reviewed HF Folder that includes the  HF Sx Awareness & Action plan handout and Weight log booklet highlighting :  __X_patient s type of heart failure _X__Na management in diet  __X_importance of daily wts  _X__Fluid Guidelines, if applicable  __X_medication review and importance of compliance       Met today with Elenita and her  Denilson. Elenita currently weighs herself daily and records. They cook all their meals at home and are very aware of sodium intake but have room for improvement.     Reviewed in detail expectations for 2000 mg sodium per day. Pt currently drink Gatorade  daily and eats a lot of homemade sauerkraut and pickles. Recommended pt avoid Gatorade completely and substitute with Body Armor and limit intake of pickled/brined food. Pt verbalized understanding.     Pt does not monitor or restrict fluid intake; educated pt about importance of limiting daily fluid intake to 50-60 oz and that more than half of fluid should be water. Pt drinks a lot of soda and Gatorade.     Instructed patient in signs and sx of heart failure, reiterated when to call clinic - reviewed HF hotline # [phone] and after hours call # [phone].  Majority of time was spent reviewing: High sodium foods to avoid and when to shaina HF RN line.   Patient verbalized understanding of HF discussion.  Will continue to reinforce HF management education as needed.       Stacey Munoz MD   [facility]

## 2022-02-22 NOTE — PROGRESS NOTES
HEART CARE NOTE          Assessment/Recommendations     1. HFrEF - Doxorubicin induced  Assessment / Plan    Anthracycline toxicity tends to be irreversible - agree with holding further doses of doxorubicin; Although chemo the most likely etiology, will get cardiac MRI to r/o CAD as a contributing factor     Near euvolemia on physical exam; denies HF symptoms of orthopnea, PND or current edema - no changes to regimen at this time    GDMT as detailed below - will increase metoprolol to 50 gm daily     Current Pharmacotherapy AHA Guideline-Directed Medical Therapy   Lisinopril 2.5 mg daily Lisinopril 20 mg twice daily   Metoprolol succinate 50 mg  daily Carvedilol 25 mg twice daily   Spironolactone 12.5 mg Spironolactone 25 mg once daily   Hydralazine NA Hydralazine 100 mg three times daily   Isosorbide dinitrate NA Isosorbide dinitrate 40 mg three times daily   SGLT2 inhibitor: not started Dapagliflozin or Empagliflozin 10 mg daily      2. Acute respiratory failure  Assessment / Plan    Concern for sepsis vs pneumonitis    Management per primary team - significant improvement with IV diuresis     3. DM2  Assessment / Plan    Management per primary team     4. Metastatic breast Ca  Assessment / Plan    Oncology following/managing - no plans for further doxorubicin     History of Present Illness/Subjective      Ms. Elenita Peguerohke is a 67 year old female with a PMHx (per ) significant for metastatic breast cancer, diabetes, chronic MSSA infection of the pelvis who who was recently admitted due to increased hypoxic respiratory failure and new HFrEF (anthracyclin induced) and now presents to [facility] clinic to establish care.      Today, Mrs. Monreal denies any acute events or complaints. She denies any HF symptoms. Management plan as detailed above. We discussed HF diet and lifestyle modifications including the 2 g Na and 2L fluid restrictions. She does not currently adhere, but will do so moving  "forward. Patient was provided with the HF educational binder as well as a book to log his daily weights as well HF education by out CORE nurse.    ECG: Personally reviewed. sinus tachycardia.     ECHO (personnaly Reviewed):  1. The left ventricle is normal in size. Left ventricular systolic performance  is moderately reduced. The ejection fraction is estimated to be 30-35%.  2. There is moderate global reduction in left ventricular systolic  performance.  3. There is mild aortic insufficiency.  4. There is moderate mitral insufficiency.  5. Normal right ventricular size with mildly reduced right ventricular  systolic performance.  6. There is mild to moderate left atrial enlargement.     When compared to the prior real-time echocardiogram dated 23 December 2021,  there has been a further diminution in left ventricular systolic performance.  The degree of mitral insufficiency has increased from mild-moderate to now  moderate.          Physical Examination Review of Systems   /58 (BP Location: Left arm, Patient Position: Sitting, Cuff Size: Adult Large)   Pulse 104   Resp 20   Ht 1.651 m (5' 5\")   Wt 63.5 kg (140 lb)   BMI 23.30 kg/m    Body mass index is 23.3 kg/m .  Wt Readings from Last 3 Encounters:   02/22/22 63.5 kg (140 lb)   02/15/22 62 kg (136 lb 11 oz)   11/08/21 67.1 kg (148 lb)     General Appearance:   no distress, normal body habitus   ENT/Mouth: membranes moist, no oral lesions or bleeding gums.      EYES:  no scleral icterus, normal conjunctivae   Neck: no carotid bruits or thyromegaly   Chest/Lungs:   lungs are clear to auscultation, no rales or wheezing, equal chest wall expansion    Cardiovascular:   Regular. Normal first and second heart sounds with no murmurs, rubs, or gallops; the carotid, radial and posterior tibial pulses are intact, no JVD or LE edema bilaterally    Abdomen:  no organomegaly, masses, bruits, or tenderness; bowel sounds are present   Extremities: no cyanosis or " clubbing   Skin: no xanthelasma, warm.    Neurologic: alert and oriented x3, calm     Psychiatric: alert and oriented x3, calm     A complete 10 systems ROS was reviewed  And is negative except what is listed in the HPI.          Medical History  Surgical History Family History Social History   Past Medical History:   Diagnosis Date     Allergic rhinitis      Bone cancer (H) 2011    breast mets     Breast cancer (H) 2010    left mastectomy     Depression      DM (diabetes mellitus) (H)      Fibromyalgia      Hx antineoplastic chemotherapy 2010     Hx of radiation therapy 2010     Hyperlipemia      Lactose intolerance      Mini stroke (H)     R EYE     Osteoarthritis      Tobacco dependency     Past Surgical History:   Procedure Laterality Date     APPENDECTOMY       INSERT INTRACORONARY STENT  1985    R Hand     IR CHEST PORT PLACEMENT > 5 YRS OF AGE  12/12/2019     IR LUMBAR TRANSFORAMINAL EPIDURAL STEROID INJECTION  12/5/2019     IR LUMBAR TRANSFORAMINAL EPIDURAL STRD INJ  12/5/2019     IR PORT PLACEMENT >5 YEARS  12/12/2019     MAMMOPLASTY REDUCTION Right 2015    hx of left mastectomy and right breast reduction     MASTECTOMY Left 2010     OTHER SURGICAL HISTORY      biopsy of upper and right tongue     OVARIAN CYST REMOVAL       REVISE RECONSTRUCTED BREAST Left     March 11, 2015    no family history of premature coronary artery disease Social History     Socioeconomic History     Marital status:      Spouse name: Not on file     Number of children: Not on file     Years of education: Not on file     Highest education level: Not on file   Occupational History     Not on file   Tobacco Use     Smoking status: Former Smoker     Packs/day: 0.75     Years: 20.00     Pack years: 15.00     Types: Cigarettes     Smokeless tobacco: Never Used   Substance and Sexual Activity     Alcohol use: Yes     Alcohol/week: 1.0 standard drink     Comment: 1 glass of wine/week     Drug use: No     Sexual activity: Yes      Partners: Male     Birth control/protection: Post-menopausal   Other Topics Concern     Parent/sibling w/ CABG, MI or angioplasty before 65F 55M? 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  She lives with her  and 1 of her sons.         Social Determinants of Health     Financial Resource Strain: Not on file   Food Insecurity: Not on file   Transportation Needs: Not on file   Physical Activity: Not on file   Stress: Not on file   Social Connections: Not on file   Intimate Partner Violence: Not on file   Housing Stability: Not on file           Lab Results    Chemistry/lipid CBC Cardiac Enzymes/BNP/TSH/INR   Lab Results   Component Value Date    CHOL 107 11/18/2020    HDL 48 (L) 11/18/2020    TRIG 202 (H) 11/18/2020    BUN 30 (H) 02/21/2022     02/21/2022    CO2 20 (L) 02/21/2022    Lab Results   Component Value Date    WBC 5.6 02/15/2022    HGB 9.3 (L) 02/15/2022    HCT 29.5 (L) 02/15/2022    MCV 93 02/15/2022     02/15/2022    Lab Results   Component Value Date    TROPONINI 0.07 02/09/2022    BNP 1,511 (H) 02/08/2022    TSH 1.19 11/18/2020    INR 1.45 (H) 07/16/2021     Lab Results   Component Value Date    TROPONINI 0.07 02/09/2022          Weight:    Wt Readings from Last 3 Encounters:   02/15/22 62 kg (136 lb 11 oz)   11/08/21 67.1 kg (148 lb)   08/23/21 70.8 kg (156 lb)       Allergies  Allergies   Allergen Reactions     Gabapentin      Other reaction(s): *Unknown  Upper body edema/swelling.  Interacted with Oncology treatment.  Upper body edema/swelling.  Interacted with Oncology treatment.       Morphine Visual Disturbance     Visual hallucinations     Sulfa Drugs Hives and Rash     welts           Surgical History  Past Surgical History:   Procedure Laterality Date     APPENDECTOMY       INSERT INTRACORONARY STENT  1985    R Hand     IR CHEST PORT PLACEMENT > 5 YRS OF AGE  12/12/2019     IR LUMBAR TRANSFORAMINAL EPIDURAL STEROID INJECTION   12/5/2019     IR LUMBAR TRANSFORAMINAL EPIDURAL STRD INJ  12/5/2019     IR PORT PLACEMENT >5 YEARS  12/12/2019     MAMMOPLASTY REDUCTION Right 2015    hx of left mastectomy and right breast reduction     MASTECTOMY Left 2010     OTHER SURGICAL HISTORY      biopsy of upper and right tongue     OVARIAN CYST REMOVAL       REVISE RECONSTRUCTED BREAST Left     March 11, 2015       Social History  Tobacco:   History   Smoking Status     Former Smoker     Packs/day: 0.75     Years: 20.00     Types: Cigarettes   Smokeless Tobacco     Never Used    Alcohol:   Social History    Substance and Sexual Activity      Alcohol use: Yes        Alcohol/week: 1.0 standard drink        Comment: 1 glass of wine/week   Illicit Drugs:   History   Drug Use No       Family History  Family History   Problem Relation Age of Onset     Lung Cancer Mother      Pancreatic Cancer Mother      Kidney Cancer Maternal Grandmother      Lung Cancer Paternal Aunt      Breast Cancer Cousin           Renu Munoz MD on 2/22/2022      cc: Sandi Jones

## 2022-02-22 NOTE — PROGRESS NOTES
Patient seen in clinic for HF education s/p recent hospital discharge 2-15-22.  Reviewed HF Folder that includes the  HF Sx Awareness & Action plan handout and Weight log booklet highlighting :  __X_patient s type of heart failure _X__Na management in diet  __X_importance of daily wts  _X__Fluid Guidelines, if applicable  __X_medication review and importance of compliance       Met today with Elenita and her  Denilson. Elenita currently weighs herself daily and records. They cook all their meals at home and are very aware of sodium intake but have room for improvement.     Reviewed in detail expectations for 2000 mg sodium per day. Pt currently drink Gatorade daily and eats a lot of homemade sauerkraut and pickles. Recommended pt avoid Gatorade completely and substitute with Body Armor and limit intake of pickled/brined food. Pt verbalized understanding.     Pt does not monitor or restrict fluid intake; educated pt about importance of limiting daily fluid intake to 50-60 oz and that more than half of fluid should be water. Pt drinks a lot of soda and Gatorade.     Instructed patient in signs and sx of heart failure, reiterated when to call clinic - reviewed HF hotline # [phone] and after hours call # [phone].  Majority of time was spent reviewing: High sodium foods to avoid and when to shaina HF RN line.   Patient verbalized understanding of HF discussion.  Will continue to reinforce HF management education as needed.       Stacey Denis RN

## 2022-03-01 NOTE — TELEPHONE ENCOUNTER
Called pt and let her know that Dr. Munoz and Dr. Varma talked and would like her to have the Cardiac MRI ASAP.     Pt already has Cardiac MRI scheduled for 3-23-22 which was soonest available.    Message sent to Ana Maria in scheduling to see if there is anything available sooner.     Will Follow-up to see if appt has been made.     Renu Chaidez RN, MD Pearman, Anna M, RN  Caller: Unspecified (Today, 10:55 AM)  Thanks Stacey;   Can you follow-up with Mrs. Monreal and let her know that I spoke with Dr. Varma, and we have decided to proceed with the cardiac MRI as soon as possible to assess for any CAD that may be contributing to her current cardiac dysfunction. Please instruct her to proceed with scheduling.     ~ Carina

## 2022-03-02 NOTE — TELEPHONE ENCOUNTER
Received message from Ana Maria in scheduling that she is still working with RN staff in MRI department to get pt on schedule sooner. It will likely not be until next week that we will be able to get pt rescheduled.     Called pt and relayed that scheduling is working on getting her Cardiac MRI moved up but she likely to not hear anything until late this week/early next week. Pt very grateful for update and verbalized understanding.     Stacey Denis RN

## 2022-03-07 NOTE — IR NOTE
IR Procedure Pre-call    Spoke with: patient  Arrival and procedure time: 03/14 0700  Patient has : Yes  Patient has someone to stay overnight:Yes If angiogram must have responsible adult for 24 hours.  Patient is taking blood thinners:No   Patient has H&P within 30 days:Yes   Patient has covid test:Yes scheduled  Patient NPO 8 hours prior: Yes    Pre-call completed.   March 7, 2022 12:57 PM  Savannah Quinteros RN

## 2022-03-07 NOTE — PROGRESS NOTES
Infectious Disease Progress Note    Assessment/Plan  Impression: Widely metastatic breast cancer had been on doxorubicin chemotherapy, but developed cardiotoxicity.    Recent hospitalization for respiratory distress with negative infectious disease work-up a new finding of heart failure suspected from chemotherapy.    Also found to have metastases in her liver.    Her Port-A-Cath is eroding and will be replaced next week    Says her hip feels stable.    CRP test essentially interpretable in context of multiple medical problems    Recommendations:     Given all the dynamic comorbidities occurring, we will keep her on cephalexin twice a day.    Plan to see again in 2 months      RIDGE BARAJAS MD  [phone]      Subjective  Patient is feeling better since she left the hospital.    I saw her during recent hospitalization when she came in with respiratory distress ultimately felt to be secondary to heart failure from cardiotoxicity of chemotherapy.    Extensive infectious disease work-up at that time was negative.    At this time, she continues to have pain but says it is about the same as usual.    Denies diarrhea, has had constipation.  Denies yeast infections.  No fevers chills or sweats.      Objective    Vital signs in last 24 hours  Temp:  [98  F (36.7  C)] 98  F (36.7  C)  Pulse:  [100] 100  BP: (88)/(46) 88/46  Wt Readings from Last 3 Encounters:   02/22/22 63.5 kg (140 lb)   02/15/22 62 kg (136 lb 11 oz)   11/08/21 67.1 kg (148 lb)           Review of Systems   Pertinent items are noted in HPI., otherwise negative.    Physical Exam  General appearance: alert, appears stated age and cooperative  Head: Normocephalic, without obvious abnormality, atraumatic   Chest: Visible metal from her Port-A-Cath.  Eyes: Extraocular muscles intact, no icterus.  Neck: no adenopathy, no carotid bruit, no JVD, supple, symmetrical, trachea midline and no erythema over the catheter tract of the cath.  Patient and spouse say  this is chronic.    Extremities: Moves lower extremities  Skin: Feet not examined today.    Neurologic: Grossly normal    Pertinent Labs   CRP   Date Value Ref Range Status   2022 9.4 (H) 0.0-<0.8 mg/dL Final     CBC RESULTS: Recent Labs   Lab Test 02/15/22  0451   WBC 5.6   RBC 3.19*   HGB 9.3*   HCT 29.5*   MCV 93   MCH 29.2   MCHC 31.5   RDW 18.7*          Last Comprehensive Metabolic Panel:  Sodium   Date Value Ref Range Status   2022 136 136 - 145 mmol/L Final     Potassium   Date Value Ref Range Status   2022 4.6 3.5 - 5.0 mmol/L Final     Chloride   Date Value Ref Range Status   2022 102 98 - 107 mmol/L Final     Carbon Dioxide (CO2)   Date Value Ref Range Status   2022 20 (L) 22 - 31 mmol/L Final     Anion Gap   Date Value Ref Range Status   2022 14 5 - 18 mmol/L Final     Glucose   Date Value Ref Range Status   2022 157 (H) 70 - 125 mg/dL Final     Urea Nitrogen   Date Value Ref Range Status   2022 30 (H) 8 - 22 mg/dL Final     Creatinine   Date Value Ref Range Status   2022 0.75 0.60 - 1.10 mg/dL Final     GFR Estimate   Date Value Ref Range Status   2022 87 >60 mL/min/1.73m2 Final     Comment:     Effective 2021 eGFRcr in adults is calculated using the  CKD-EPI creatinine equation which includes age and gender (Alexei et al., NEJ, DOI: 10.1056/DCCMci1665070)   2020 >60 >60 mL/min/1.73m2 Final     GFR, ESTIMATED POCT   Date Value Ref Range Status   2022 >60 >60 mL/min/1.73m2 Final     Calcium   Date Value Ref Range Status   2022 8.8 8.5 - 10.5 mg/dL Final       Pertinent Radiology   Radiology Results:     Echocardiogram Complete    Result Date: 2022  [id number] [address]  Name: LEEANNE FRANKEL JULES M MRN: [id number] : 1955 Study Date: 2022 10:45 AM Age: 67 yrs Gender: Female Patient Location: [facility]  Reason For Study: 2nd degree AV Block Ordering Physician: PEPE BARRERA Performed By: JASMYNE  BSA: 1.7 m2 Height: 65 in Weight: 148 lb HR: 119 BP: 117/68 mmHg ______________________________________________________________________________ Procedure Complete Echo Adult. Definity (NDC #50875-167) given intravenously. ______________________________________________________________________________ Interpretation Summary  1. The left ventricle is normal in size. Left ventricular systolic performance is moderately reduced. The ejection fraction is estimated to be 30-35%. 2. There is moderate global reduction in left ventricular systolic performance. 3. There is mild aortic insufficiency. 4. There is moderate mitral insufficiency. 5. Normal right ventricular size with mildly reduced right ventricular systolic performance. 6. There is mild to moderate left atrial enlargement.  When compared to the prior real-time echocardiogram dated 23 December 2021, there has been a further diminution in left ventricular systolic performance. The degree of mitral insufficiency has increased from mild-moderate to now moderate. ______________________________________________________________________________ Left ventricle: The left ventricle is normal in size. Left ventricular systolic performance is moderately reduced. The ejection fraction is estimated to be 30-35%. There is moderate global reduction in left ventricular systolic performance. Left ventricular wall thickness is normal.  Assessment of left atrial pressure (LAP): The cumulative findings are indeterminate in evaluating left atrial pressure.  Right ventricle: Normal right ventricular size with mildly reduced right ventricular systolic performance.  Left atrium: There is mild to moderate left atrial enlargement.  Right atrium: The right atrium is of normal size.  IVC: The IVC is of normal caliber.  Aortic valve: The aortic valve is comprised of three cusps. There is no significant  aortic stenosis. There is mild aortic insufficiency.  Mitral valve: The mitral valve appears morphologically normal. There is moderate mitral insufficiency.  Tricuspid valve: The tricuspid valve is grossly morphologically normal. There is mild tricuspid insufficiency.  Pulmonic valve: The pulmonic valve is grossly morphologically normal. There is mild pulmonic insufficiency.  Thoracic aorta: The aortic root and proximal ascending aorta are of normal dimension.  Pericardium: There is no significant pericardial effusion. ______________________________________________________________________________ ______________________________________________________________________________ MMode/2D Measurements & Calculations RVDd: 4.3 cm IVSd: 0.75 cm LVIDd: 5.6 cm LVIDs: 4.4 cm LVPWd: 0.99 cm FS: 21.8 % LV mass(C)d: 182.6 grams LV mass(C)dI: 104.9 grams/m2 Ao root diam: 3.0 cm LA dimension: 4.4 cm asc Aorta Diam: 2.7 cm LA/Ao: 1.5 LVOT diam: 1.7 cm LVOT area: 2.2 cm2 LA Volume Indexed (AL/bp): 65.9 ml/m2  RWT: 0.35  Doppler Measurements & Calculations LV V1 max P.1 mmHg LV V1 max: 112.5 cm/sec LV V1 VTI: 17.2 cm SV(LVOT): 38.5 ml SI(LVOT): 22.1 ml/m2 PA acc time: 0.14 sec  ______________________________________________________________________________ Report approved by: Ashley Goodman 2022 12:49 PM       US Biopsy Liver    Result Date: 2/15/2022  EXAM: 1. PERCUTANEOUS BIOPSY LIVER 2. ULTRASOUND GUIDANCE 3. CONSCIOUS SEDATION LOCATION: [facility] DATE/TIME: 2/15/2022 9:03 AM INDICATION: Metastatic HR+ breast cancer with recent progression of liver lesions. Evaluate for core biopsy. PROCEDURE: Informed consent obtained. Site marked. Prior images reviewed. Required items made available. Patient identity was confirmed verbally and with arm band. Patient reevaluated immediately before administering sedation. Universal protocol was followed. Time out performed. The site was prepped and draped  in sterile fashion. 10 mL of 1 percent lidocaine was infused into the local soft tissues. Using standard technique and under direct ultrasound guidance, a 18 gauge biopsy needle was used to make three core biopsies. Tissue was submitted to Pathology. The patient tolerated the procedure well. No complications. SEDATION: Versed 2 mg. Fentanyl 100 mcg. The procedure was performed with administration intravenous conscious sedation with appropriate preoperative, intraoperative, and postoperative evaluation. 30 minutes of supervised face to face conscious sedation time was provided by a radiology nurse under my direct supervision.     IMPRESSION: Status post US-guided biopsy liver lesion. Reference CPT Code: 42803, 89076, 55430, 96810    US Thoracentesis    Result Date: 2/9/2022  EXAM: 1. RIGHT THORACENTESIS 2. ULTRASOUND GUIDANCE LOCATION: [facility] DATE/TIME: 2/9/2022 5:34 PM INDICATION: Pleural effusion. PROCEDURE: Informed consent obtained. Time out performed. The chest was prepped and draped in sterile fashion. 10 mL of 1 % lidocaine was infused into the local soft tissues. Under direct ultrasound guidance, a 5 Libyan catheter system was placed into the pleural effusion. 0.45 liters of clear yellow fluid were removed and sent to lab, if requested. Patient tolerated procedure well. Ultrasound imaging was obtained and placed in the patient's permanent medical record.     IMPRESSION: Status post right ultrasound-guided thoracentesis. Reference CPT Code: 05374    XR Chest Port 1 View    Result Date: 2/11/2022  EXAM: XR CHEST PORT 1 VIEW LOCATION: [facility] DATE/TIME: 2/11/2022 12:22 PM INDICATION: Worsening leukocytosis, evaluate for new abnormalities.  Being treated for pneumonia pulm edema currently. COMPARISON: 07/17/2021     IMPRESSION: Port-A-Cath tip in the SVC. Mild interstitial infiltrates in both lungs have not significantly changed. Would favor  pneumonia.    CT Chest Pulmonary Embolism w Contrast    Result Date: 2/8/2022  EXAM: CT CHEST PULMONARY EMBOLISM W CONTRAST LOCATION: [facility] DATE/TIME: 2/8/2022 6:08 PM INDICATION: Shortness of breath. PE suspected, high prob COMPARISON: CT chest, abdomen and pelvis 07/19/2021 TECHNIQUE: CT chest pulmonary angiogram during arterial phase injection of IV contrast. Multiplanar reformats and MIP reconstructions were performed. Dose reduction techniques were used. CONTRAST: isovue 370 100ml FINDINGS: ANGIOGRAM CHEST: Pulmonary arteries are normal caliber and negative for pulmonary emboli. Thoracic aorta is negative for dissection. No CT evidence of right heart strain. LUNGS AND PLEURA: Moderate sized bilateral pleural effusions and bibasilar atelectasis. Extensive airspace and groundglass opacities throughout both lungs. MEDIASTINUM/AXILLAE: Nonenlarged mediastinal lymph nodes. Right anterior chest wall Port-A-Cath tip in the distal SVC. CORONARY ARTERY CALCIFICATION: None. UPPER ABDOMEN: Normal. MUSCULOSKELETAL: Extensive sclerotic metastasis throughout the visualized skeleton as seen previously. Severe T10 vertebral body compression fractures unchanged. Left mastectomy and breast prosthesis.     IMPRESSION: 1.  No evidence for pulmonary emboli. 2.  Extensive airspace and groundglass opacities throughout both lungs compatible with pneumonia, including Covid 19. 3.  Moderate-sized bilateral pleural effusions and bibasilar atelectasis. 4.  Diffuse skeletal metastasis. Imaging features can be seen with (COVID-19)  pneumonia, though are nonspecific and can occur with a variety of infectious and noninfectious processes.

## 2022-03-11 NOTE — IR NOTE
IR Procedure Pre-call    Spoke with: Elenita  Arrival and procedure time: 0800/0900  Patient has : Yes  Patient is taking blood thinners:No   Patient has H&P within 30 days:Yes   Patient has covid test:Yes   Patient NPO 8 hours prior: Yes    Pre-call completed.   March 11, 2022 1:35 PM  Libby Gray RN

## 2022-03-14 NOTE — H&P
Interventional Radiology - History and Physical  3/14/2022    Procedure Requested: Port removal, port placement  Requesting Provider: Alec Varma MD    HPI: Elenita Moran is a 67 year old female PMH DM2 (insulin dependent), HLD, tobacco dependency, depression, and metastatic breast cancer with mets to liver and bone/bone marrow.    S/P RIGHT sided port placement, per below.  On chemotherapy.  Followed by [facility].    Per documentation review, presents to have her port removal and replaced 2/2 erosion.  Patient reports that this was first appreciated several months ago and has been progressively worsening.      Presents today for a port removal and a port placement.  Reports no fever, no significant pain at port site.  No discharge/drainage from port site.     She is followed by Dr. Garza (ID), last visit 3/7/2022.  On Keflex for an indeterminate amount of time.  Next visit in 2 months.        Imaging:   [facility] RADIOLOGY  LOCATION: [facility]  DATE: 12/12/2019     PROCEDURES:  1. SUBCUTANEOUS IMPLANTED VENOUS ACCESS PORT.  2. ULTRASOUND GUIDANCE FOR VASCULAR ACCESS.  3. FLUOROSCOPIC GUIDANCE FOR CATHETER PLACEMENT.  4. MODERATE SEDATION     INTERVENTIONAL RADIOLOGIST: Cristian Zhang MD     INDICATION: Patient is a 64-year-old female the history of metastatic breast cancer. The patient presents to Interventional Radiology for placement of a Port-A-Cath for long-term central venous access to allow for infusion and blood draws.     CONSENT: The risks, benefits and alternatives of Port placement were discussed with the patient  in detail. All questions were answered. Informed consent was given to proceed with the procedure.     MODERATE SEDATION: Versed 4 mg IV; Fentanyl 225 mcg IV. During the time out, immediately prior to the administration of medications, the patient was reassessed for adequacy to receive conscious sedation.  Under physician supervision, Versed and fentanyl      were  administered for moderate sedation. Pulse oximetry, heart rate and blood pressure were continuously monitored by an independent trained observer. The physician spent 40 minutes of face-to-face sedation time with the patient.     CONTRAST: None.  ANTIBIOTICS: 2 g of IV Ancef.  ADDITIONAL MEDICATIONS: 50 mg of IV Benadryl.     FLUOROSCOPIC TIME: 0.6 minutes.  RADIATION DOSE: Air Kerma: 8 mGy.     COMPLICATIONS: No immediate complications.     STERILE BARRIER TECHNIQUE: Maximum sterile barrier technique was used. Cutaneous antisepsis was performed at the operative site with application of 2% chlorhexidine and large sterile drape. Prior to the procedure, the  and assistant performed   hand hygiene and wore hat, mask, sterile gown, and sterile gloves during the entire procedure.     PROCEDURE: After discussing the procedure and risks, informed consent was obtained. Permanent ultrasound images were obtained of the right internal jugular vein, documenting patency and compressibility.     The right neck and upper chest were prepped and draped. After local anesthesia, the jugular vein was punctured under direct ultrasound guidance, and a small dilator was placed. A short transverse skin incision was made below the clavicle, and a pocket   was created for the port. A skin tunnel was created from the pocket to the vein entry site, and the catheter was pulled through the tunnel. The vein was dilated and the catheter inserted through a peel-away sheath, positioning the tip fluoroscopically   at   the SVC/atriocaval junction. The catheter was cut to an appropriate length. The catheter was then attached to the port itself and the port was placed within the subcutaneous pocket. The port was secured within the pocket utilizing two anchoring   sutures.   The port was flushed with heparin. The incision was closed with layered absorbable suture and covered with Dermabond. The patient tolerated the procedure, and there were no  immediate complications.     FINDINGS: Ultrasound shows an anechoic and compressible jugular vein. A permanent ultrasound image of this was saved. After placement, fluoroscopic spot images show that the tip of the catheter is at the SVC/atriocaval junction.     IMPRESSION:  1.  Uneventful venous access port placement. This port is power injector compatible.    NPO Status: [place]   Anticoagulation/Antiplatelets/Bleeding tendencies:  Aspirin 81 mg PO QD   Antibiotics: Ancef 2 g IV x1 ordered for procedure.  Keflex 500mg PO BID for an intramuscular staph infection.    Review of Systems: A comprehensive 10-point review of systems was performed. All systems were reviewed and negative with exception to those reported in the HPI.    PMH:  Past Medical History:   Diagnosis Date     Allergic rhinitis      Bone cancer (H) 2011    breast mets     Breast cancer (H) 2010    left mastectomy     Depression      DM (diabetes mellitus) (H)      Fibromyalgia      Hx antineoplastic chemotherapy 2010     Hx of radiation therapy 2010     Hyperlipemia      Lactose intolerance      Mini stroke (H)     R EYE     Osteoarthritis      Tobacco dependency        PSH:  Past Surgical History:   Procedure Laterality Date     APPENDECTOMY       INSERT INTRACORONARY STENT  1985    R Hand     IR CHEST PORT PLACEMENT > 5 YRS OF AGE  12/12/2019     IR LUMBAR TRANSFORAMINAL EPIDURAL STEROID INJECTION  12/5/2019     IR LUMBAR TRANSFORAMINAL EPIDURAL STRD INJ  12/5/2019     IR PORT PLACEMENT >5 YEARS  12/12/2019     MAMMOPLASTY REDUCTION Right 2015    hx of left mastectomy and right breast reduction     MASTECTOMY Left 2010     OTHER SURGICAL HISTORY      biopsy of upper and right tongue     OVARIAN CYST REMOVAL       REVISE RECONSTRUCTED BREAST Left     March 11, 2015       ALLERGIES:  Allergies   Allergen Reactions     Gabapentin      Other reaction(s): *Unknown  Upper body edema/swelling.  Interacted with Oncology treatment.  Upper body  edema/swelling.  Interacted with Oncology treatment.       Morphine Visual Disturbance     Visual hallucinations     Naproxen Rash     Sulfa Drugs Hives and Rash     welts         MEDICATIONS:  Current Outpatient Medications   Medication     aspirin 81 MG EC tablet     bumetanide (BUMEX) 1 MG tablet     calcium carbonate (CALCIUM CARBONATE) 600 MG tablet     cephALEXin (KEFLEX) 500 MG capsule     chlorhexidine (PERIDEX) 0.12 % solution     Cholecalciferol (VITAMIN D) 125 MCG (5000 UT) capsule     clobetasol (TEMOVATE) 0.05 % external ointment     Continuous Blood Gluc  (FREESTYLE KIMBERLY 14 DAY READER) COLETTE     Continuous Blood Gluc Sensor (FREESTYLE KIMBERLY 14 DAY SENSOR) MISC     DULoxetine (CYMBALTA) 30 MG capsule     DULoxetine (CYMBALTA) 30 MG capsule     ezetimibe-simvastatin (VYTORIN) 10-20 MG tablet     HYDROmorphone (DILAUDID) 4 MG tablet     hydrOXYzine (VISTARIL) 25 MG capsule     IRON-VIT C-VIT B12-FOLIC ACID (GENTLE IRON) 28-60-0.008-0.4 MG CAPS     LANTUS SOLOSTAR 100 UNIT/ML soln     lidocaine (XYLOCAINE) 5 % external ointment     lisinopril (ZESTRIL) 2.5 MG tablet     medical cannabis (Patient's own supply)     metoprolol succinate ER (TOPROL-XL) 50 MG 24 hr tablet     nystatin (MYCOSTATIN) 272984 UNIT/GM external cream     OLANZapine (ZYPREXA) 5 MG tablet     omeprazole (PRILOSEC) 20 MG DR capsule     oxybutynin (DITROPAN) 5 MG tablet     oxybutynin ER (DITROPAN-XL) 10 MG 24 hr tablet     polyethylene glycol (MIRALAX) 17 GM/Dose powder     Prenatal Vit-Fe Fumarate-FA (PRENATAL MULTIVITAMIN  PLUS IRON) 27-1 MG TABS     psyllium (METAMUCIL/KONSYL) Packet     senna-docusate (SENOKOT-S/PERICOLACE) 8.6-50 MG tablet     simvastatin (ZOCOR) 20 MG tablet     spironolactone (ALDACTONE) 25 MG tablet     vitamin (B COMPLEX) tablet     zolpidem (AMBIEN) 10 MG tablet     No current facility-administered medications for this encounter.     Facility-Administered Medications Ordered in Other Encounters    Medication     ceFAZolin (ANCEF) intermittent infusion 2 g in 100 mL dextrose PRE-MIX     lidocaine (LMX4) cream     lidocaine 1 % 0.1-1 mL     sodium chloride (PF) 0.9% PF flush 3 mL     sodium chloride (PF) 0.9% PF flush 3 mL     sodium chloride 0.9% 1000 mL TABLE SOLN     sodium chloride 0.9% infusion         LABS:  INR   Date Value Ref Range Status   07/16/2021 1.45 (H) 0.85 - 1.15 Final     Comment:     Effective 7/11/2021, the reference range for this assay has changed.      Hemoglobin   Date Value Ref Range Status   02/15/2022 9.3 (L) 11.7 - 15.7 g/dL Final   ]  Platelet Count   Date Value Ref Range Status   02/15/2022 240 150 - 450 10e3/uL Final     Creatinine   Date Value Ref Range Status   02/21/2022 0.75 0.60 - 1.10 mg/dL Final     Potassium   Date Value Ref Range Status   02/21/2022 4.6 3.5 - 5.0 mmol/L Final         EXAM:  There were no vitals taken for this visit.  General:  Stable.  In no acute distress.    Neuro:  A&O x 3. Moves all extremities equally.  Resp:  Lungs clear to auscultation bilaterally.  Cardio:  S1S2 and reg, without murmur, clicks or rubs  Skin:  RIGHT sided port appreciated.  Area of erosion appreciated over hub of port, 4mm.  No drainage.  No significant erythema or induration.      No rash, breakdown on LEFT neck and chest.  LEFT saline implant in place.  No other implanted devices     Pre-Sedation Assessment:  Mallampati Airway Classification:  II - Faucial pillars and soft palate may be seen, but uvula is masked by the base of the tongue  Previous reaction to anesthesia/sedation:  No  Sedation plan based on assessment: Moderate (conscious) sedation  ASA Classification: Class 3 - SEVERE SYSTEMIC DISEASE, DEFINITE FUNCTIONAL LIMITATIONS.   Code Status: FULL CODE    ASSESSMENT:  66 yo F    - Complex PMH, per above  - Metastatic breast cancer, with mets to liver and bone/bone marrow  - S/P RIGHT port placement per above.  Erosion.    Presents for port removal and port  placement    PLAN:    Proceed with port removal and port placement, with sedation    - Ancef 2 g IV x1 ordered for procedure   - continue Keflex per ID  - continue to follow ID  - Chemotherapy next week  - VS pending at time of consent, please review prior to IR procedure      Procedure, risks/benefits, details, alternatives, and sedation reviewed with patient/family and she verbalized understanding. All questions answered. OK to proceed with above radiology procedure.       ALEXANDRE STEWART NP  Interventional Radiology  [phone]

## 2022-03-14 NOTE — PROCEDURES
[facility]    Procedure: IR Procedure Note    Date/Time: 3/14/2022 10:08 AM  Performed by: Arron Jung MD  Authorized by: Arron Jung MD       UNIVERSAL PROTOCOL   Site Marked: NA  Prior Images Obtained and Reviewed:  Yes  Required items: Required blood products, implants, devices and special equipment available    Patient identity confirmed:  Verbally with patient, arm band, provided demographic data and hospital-assigned identification number  Patient was reevaluated immediately before administering moderate or deep sedation or anesthesia  Confirmation Checklist:  Patient's identity using two indicators, relevant allergies, procedure was appropriate and matched the consent or emergent situation and correct equipment/implants were available  Time out: Immediately prior to the procedure a time out was called    Universal Protocol: the Joint Commission Universal Protocol was followed    Preparation: Patient was prepped and draped in usual sterile fashion    ESBL (mL):  5     ANESTHESIA    Anesthesia: Local infiltration  Local Anesthetic:  Lidocaine 1% without epinephrine and lidocaine 1% with epinephrine  Anesthetic Total (mL):  30      SEDATION  Patient Sedated: Yes    Sedation Type:  Moderate (conscious) sedation  Vital signs: Vital signs monitored during sedation    Fluoroscopy Time: 1 minute(s)  See dictated procedure note for full details.  Findings: .    Specimens: none    Complications: None    Condition: Stable    Plan: Successful placement of new left internal jugular port and removal of right internal jugular port.       PROCEDURE    Patient Tolerance:  Patient tolerated the procedure well with no immediate complications  Length of time physician/provider present for 1:1 monitoring during sedation: 30

## 2022-03-14 NOTE — PRE-PROCEDURE
GENERAL PRE-PROCEDURE:   Procedure:  Port removal and port placement  Date/Time:  3/14/2022 8:49 AM    Written consent obtained?: Yes    Risks and benefits: Risks, benefits and alternatives were discussed    Consent given by:  Patient  Patient states understanding of procedure being performed: Yes    Patient's understanding of procedure matches consent: Yes    Procedure consent matches procedure scheduled: Yes    Expected level of sedation:  Moderate  Appropriately NPO:  Yes  ASA Class:  3  Mallampati  :  Grade 2- soft palate, base of uvula, tonsillar pillars, and portion of posterior pharyngeal wall visible  Lungs:  Lungs clear with good breath sounds bilaterally  Heart:  Normal heart sounds and rate  History & Physical reviewed:  History and physical reviewed and no updates needed  Statement of review:  I have reviewed the lab findings, diagnostic data, medications, and the plan for sedation

## 2022-03-14 NOTE — DISCHARGE INSTRUCTIONS
Port Removal Discharge Instructions:  You had your port-a-catheter removed today. Please follow the below instructions following your port removal:    Care Instructions:  - Avoid tub baths, Jacuzzi and pool soaks for 10 days.  - You may shower beginning tomorrow. Do not scrub site until well healed; pat dry.  - If you experience significant bleeding at site, apply pressure with hands above the clavicle bone, sit upright.    Seek medical assistance for any of the following:   - Uncontrolled bleeding.  - You have a fever (greater than 101 F (38.3C)).  - Purulent (yellow/green/foul smelling) drainage from previous catheter insertion site.  - Increasing redness at previous catheter insertion site.  - Increasing pain at previous catheter insertion site.  - Increasing swelling at previous catheter insertion site.    Call [facility] ([phone]) with questions or concerns.Port Placement Procedure Discharge Instructions:  You had a port placed. A port is a small medical device that is placed under the skin and is connected to a vein with a catheter (thin, flexible tube). Ports can be used to administer IV medications, fluids or blood products (including chemotherapy) or for blood lab draws. Please follow the below instructions:  Care Instructions:  - If you received sedation for your procedure, do not drive or operate heavy machinery for the rest of the day.  - You may shower beginning post procedure day #1.  Do not scrub site until well healed; pat dry.  - Avoid submerging the port site under water (tub baths, Jacuzzis, hot tubs and pools) for 10 days or until glue falls off.  - You may take over the counter pain medication for discomfort. Follow the package directions.  - Avoid heavy lifting (greater than 10 pounds) and strenuous activities for 3 days.   - If you experience significant bleeding at site, apply pressure with hands above the clavicle bone, sit upright and seek immediate medical assistance.    Call  [facility] ([phone]) if you experience the following:   - Uncontrolled bleeding from port site  - Fever (greater than 101 F (38.3C))  - Purulent (yellow/green/foul smelling) drainage from port insertion site.  - Increasing pain at port site  - Increasing redness at port site

## 2022-03-14 NOTE — IP AVS SNAPSHOT
[facility]  [address] [place] [id number]  Phone: [phone]  Fax: [phone]                                    After Visit Summary   3/14/2022    Elenita Moran   MRN: [id number]           After Visit Summary Signature Page    I have received my discharge instructions, and my questions have been answered. I have discussed any challenges I see with this plan with the nurse or doctor.    ..........................................................................................................................................  Patient/Patient Representative Signature      ..........................................................................................................................................  Patient Representative Print Name and Relationship to Patient    ..................................................               ................................................  Date                                   Time    ..........................................................................................................................................  Reviewed by Signature/Title    ...................................................              ..............................................  Date                                               Time          22EPIC Rev 08/18

## 2022-03-15 NOTE — TELEPHONE ENCOUNTER
Refills for Bumex , Spironolactone and Lisinopril sent to [facility] per pt request  Deepa Leonardo RN BSN, CHFN

## 2022-03-21 NOTE — PROGRESS NOTES
Pulmonary Clinic Outpatient Follow-Up  3/21/2022     Assessment and Plan:   #. Acute hypoxic respiratory failure, resolved.  #. Pulmonary edema & peripheral edema in setting of acutely decompensated systolic heart failure, resolved.  #. B/l GGO on lung imaging -- significantly improved. I suspect that the remnant areas of opacification correlate to areas of residual, slow-to-resolve, inflammation. She has no symptoms to suggest an active pulmonary process.       PFT for future reference if she is started on chemotherapy w/ known pulmonary toxicity s/e    RTC: VANESSA Martin MD  Pulmonary and Critical Care   ______________________________________________________________________________    CC: follow up    HPI:   Elenita Moran is a 67 year old woman with history of metastatic breast cancer w/ progression on doxorubicin, T2DM, & recent hospitalization for acute hypoxic respiratory failure in setting of decompensated systolic HF and pneumonitis NOS, presenting today for follow up of her breathing. Her imaging improved significantly.     Reports that her breathing is back to normal. Denies any cough, dyspnea at rest or w/ exertion, denies fevers, chills, or rigors. She has been well diuresed during her hospitalization & states that she is now able to see her ankles for her first time in months. She had a Port-A-Cath replaced (now is on the left), & sounds like Dr. Nicholson ([facility]) is discussing future chemotherapy options.     Reviewed imaging w/ patient & her .     REVIEW OF SYSTEMS: 10-point ROS was negative with exceptions as detailed in the HPI.    Physical Exam:  /60 (BP Location: Right arm, Patient Position: Chair, Cuff Size: Adult Regular)   Pulse 90   Resp 16   Wt 65.4 kg (144 lb 3.2 oz)   SpO2 98%   BMI 24.00 kg/m    Gen: alert, oriented, no distress  HEENT: masked, PERLL; no stridor  CV: RRR, no M/G/R  Resp: equal bilateral air entry, breath sounds clear throughout, no focal  crackles or wheezes. Talking in full sentences w/ no respiratory distress  Abd: soft, nontender, no palpable organomegaly  Skin: no apparent rashes  Ext: no cyanosis, clubbing or edema  Neuro: alert, nonfocal    Labs: personally reviewed & interpreted in EMR.   Imaging & Procedures: personally reviewed & interpreted, including formal Radiology reports in the EMR.  #. CT chest, 3/18/22:  1.  Substantial improvement in bilateral airspace opacities consistent with an improving pneumonitis.  2.  Bilateral pleural effusions have resolved. Mild residual pleural thickening in the posterior medial right base.  3.  Unchanged diffuse sclerotic bone metastases and multiple low-attenuation liver metastases    3/18/22     2/8/22          Cardiac MRI, 3/16/22:  1. Pharmacological Regadenoson stress cardiac MRI is negative for inducible myocardial ischemia.   2. No evidence of prior myocardial infarction. No focal nonvascular scarring or fibrosis. No evidence of infiltrative disease.  3. Left ventricular cavity size is moderately enlarged. Wall thickness is normal. Systolic function is moderately reduced with global hypokinesis. The quantified left ventricular ejection fraction is 36%.   4. Normal right ventricular size. Mildly reduced systolic function.  The quantified right ventricular ejection fraction is 45%.   5. Mild left atrial enlargement.  6. Mild-to-moderate functional mitral regurgitation.    MEDICAL HISTORY:  has a past medical history of Allergic rhinitis, Bone cancer (H) (2011), Breast cancer (H) (2010), Depression, DM (diabetes mellitus) (H), Fibromyalgia, antineoplastic chemotherapy (2010), radiation therapy (2010), Hyperlipemia, Lactose intolerance, Mini stroke (H), Osteoarthritis, and Tobacco dependency.  SURGICAL HISTORY:  has a past surgical history that includes IR Lumbar Transforaminal Epidural Strd Inj (12/5/2019); IR Chest Port Placement > 5 Yrs of Age (12/12/2019); Mastectomy (Left, 2010); appendectomy;  Ovarian Cyst Removal; Insert Intracoronary Stent (1985); other surgical history; Revise reconstructed breast (Left); Mammoplasty reduction (Right, 2015); Ir Lumbar Transforaminal Epidural Steroid Injection (12/5/2019); Ir Port Placement >5 Years (12/12/2019); IR Port Removal Right (3/14/2022); and IR Chest Port Placement > 5 Yrs of Age (3/14/2022).  SOCIAL HISTORY:  reports that she has quit smoking. Her smoking use included cigarettes. She has a 15.00 pack-year smoking history. She has never used smokeless tobacco. She reports current alcohol use of about 1.0 standard drink of alcohol per week. She reports that she does not use drugs.  FAMILY HISTORY: family history includes Breast Cancer in her cousin; Kidney Cancer in her maternal grandmother; Lung Cancer in her mother and paternal aunt; Pancreatic Cancer in her mother.    MEDICATIONS: personally reviewed, including EMR/Care Everywhere. Pertinent information noted & updated.   ALLERGIES:   Allergies   Allergen Reactions     Gabapentin      Other reaction(s): *Unknown  Upper body edema/swelling.  Interacted with Oncology treatment.  Upper body edema/swelling.  Interacted with Oncology treatment.       Morphine Visual Disturbance     Visual hallucinations     Naproxen Rash     Sulfa Drugs Hives and Rash     welts

## 2022-03-21 NOTE — PATIENT INSTRUCTIONS
Plan:    We will get a lung function test so your cancer doctor can have a good idea of your lung baseline before starting you on your future chemotherapy    If you have any questions or concerns, please, call our clinic at [phone].

## 2022-03-25 NOTE — PROGRESS NOTES
RESPIRATORY CARE NOTE     Patient Name: Elenita Moran  Today's Date: 3/25/2022     Complete PFT done. Pt performed tests with good effort. Test results meet ATS criteria.Albuterol neb given Results scanned into epic. Pt left in no distress.       Bee Galvez, RT

## 2022-03-28 NOTE — LETTER
3/28/2022    Sandi Jones MD  [facility] [address]  [place] [address]    RE: Elenita Moran       Dear Colleague,     I had the pleasure of seeing Elenita Moran in the [facility].    HEART CARE NOTE          Assessment/Recommendations     1. HFrEF - Doxorubicin induced  Assessment / Plan    Anthracycline toxicity tends to be irreversible - agree with holding further doses of doxorubicin; cardiac MRI negative for inducible ischemia or signs of prior infarct or infiltrative disease     Weight has coty stable at home     Near euvolemia on physical exam; denies HF symptoms of orthopnea, PND or current edema - no changes to regimen at this time    GDMT as detailed below;SBP 140s - 110s at home - no changes at this time     Current Pharmacotherapy AHA Guideline-Directed Medical Therapy   Lisinopril 2.5 mg daily Lisinopril 20 mg twice daily   Metoprolol succinate 50 mg  daily Carvedilol 25 mg twice daily   Spironolactone 12.5 mg Spironolactone 25 mg once daily   Hydralazine NA Hydralazine 100 mg three times daily   Isosorbide dinitrate NA Isosorbide dinitrate 40 mg three times daily   SGLT2 inhibitor: not started Dapagliflozin or Empagliflozin 10 mg daily      2. Acute respiratory failure  Assessment / Plan    Concern for sepsis vs pneumonitis    Management per primary team - significant improvement with IV diuresis     3. DM2  Assessment / Plan    Management per primary team     4. Metastatic breast Ca  Assessment / Plan    Oncology following/managing - no plans for further doxorubicin      History of Present Illness/Subjective      Ms. Elenita Moran is a 67 year old female with a PMHx (per ) significant for metastatic breast cancer, diabetes, chronic MSSA infection of the pelvis who who was recently admitted due to increased hypoxic respiratory failure and new HFrEF (anthracyclin induced) and now presents to [facility] to establish  "care.       Today, Mrs. Monreal denies any acute events or complaints. She denies HF symptoms as well as symptoms of lightheadedness, dizziness presyncope/syncope. Management plan as detailed above.      ECG: Personally reviewed. sinus tachycardia.     ECHO (personnaly Reviewed):  1. The left ventricle is normal in size. Left ventricular systolic performance  is moderately reduced. The ejection fraction is estimated to be 30-35%.  2. There is moderate global reduction in left ventricular systolic  performance.  3. There is mild aortic insufficiency.  4. There is moderate mitral insufficiency.  5. Normal right ventricular size with mildly reduced right ventricular  systolic performance.  6. There is mild to moderate left atrial enlargement.     When compared to the prior real-time echocardiogram dated 23 December 2021,  there has been a further diminution in left ventricular systolic performance.  The degree of mitral insufficiency has increased from mild-moderate to now  Moderate.    Cardiac MRI:  1.  Pharmacological Regadenoson stress cardiac MRI is negative for inducible myocardial ischemia.   2.  No evidence of prior myocardial infarction. No focal nonvascular scarring or fibrosis. No evidence of  infiltrative disease.  3.  Left ventricular cavity size is moderately enlarged. Wall thickness is normal. Systolic function is  moderately reduced with global hypokinesis. The quantified left ventricular ejection fraction is 36%.   4.  Normal right ventricular size. Mildly reduced systolic function.  The quantified right ventricular  ejection fraction is 45%.   5.  Mild left atrial enlargement.  6.  Mild-to-moderate functional mitral regurgitation.        Physical Examination Review of Systems   BP (!) 84/44 (BP Location: Right arm, Patient Position: Sitting, Cuff Size: Adult Regular)   Pulse 88   Resp 16   Ht 1.651 m (5' 5\")   Wt 66.2 kg (146 lb)   BMI 24.30 kg/m    Body mass index is 24.3 kg/m .  Wt Readings " from Last 3 Encounters:   03/28/22 66.2 kg (146 lb)   03/21/22 65.4 kg (144 lb 3.2 oz)   02/22/22 63.5 kg (140 lb)     General Appearance:   no distress, normal body habitus   ENT/Mouth: membranes moist, no oral lesions or bleeding gums.      EYES:  no scleral icterus, normal conjunctivae   Neck: no carotid bruits or thyromegaly   Chest/Lungs:   lungs are clear to auscultation, no rales or wheezing, equal chest wall expansion    Cardiovascular:   Regular. Normal first and second heart sounds with no murmurs, rubs, or gallops; the carotid, radial and posterior tibial pulses are intact, no JVD and trace LE edema bilaterally    Abdomen:  no organomegaly, masses, bruits, or tenderness; bowel sounds are present   Extremities: no cyanosis or clubbing   Skin: no xanthelasma, warm.    Neurologic: alert and oriented x3, calm     Psychiatric: alert and oriented x3, calm     A complete 10 systems ROS was reviewed  And is negative except what is listed in the HPI.          Medical History  Surgical History Family History Social History   Past Medical History:   Diagnosis Date     Allergic rhinitis      Bone cancer (H) 2011    breast mets     Breast cancer (H) 2010    left mastectomy     Depression      DM (diabetes mellitus) (H)      Fibromyalgia      Hx antineoplastic chemotherapy 2010     Hx of radiation therapy 2010     Hyperlipemia      Lactose intolerance      Mini stroke (H)     R EYE     Osteoarthritis      Tobacco dependency     Past Surgical History:   Procedure Laterality Date     APPENDECTOMY       INSERT INTRACORONARY STENT  1985    R Hand     IR CHEST PORT PLACEMENT > 5 YRS OF AGE  12/12/2019     IR CHEST PORT PLACEMENT > 5 YRS OF AGE  3/14/2022     IR LUMBAR TRANSFORAMINAL EPIDURAL STEROID INJECTION  12/5/2019     IR LUMBAR TRANSFORAMINAL EPIDURAL STRD INJ  12/5/2019     IR PORT PLACEMENT >5 YEARS  12/12/2019     IR PORT REMOVAL RIGHT  3/14/2022     MAMMOPLASTY REDUCTION Right 2015    hx of left mastectomy and  right breast reduction     MASTECTOMY Left 2010     OTHER SURGICAL HISTORY      biopsy of upper and right tongue     OVARIAN CYST REMOVAL       REVISE RECONSTRUCTED BREAST Left     March 11, 2015    no family history of premature coronary artery disease Social History     Socioeconomic History     Marital status:      Spouse name: Not on file     Number of children: Not on file     Years of education: Not on file     Highest education level: Not on file   Occupational History     Not on file   Tobacco Use     Smoking status: Former Smoker     Packs/day: 0.75     Years: 20.00     Pack years: 15.00     Types: Cigarettes     Smokeless tobacco: Never Used   Substance and Sexual Activity     Alcohol use: Yes     Alcohol/week: 1.0 standard drink     Comment: 1 glass of wine/week     Drug use: No     Sexual activity: Yes     Partners: Male     Birth control/protection: Post-menopausal   Other Topics Concern     Parent/sibling w/ CABG, MI or angioplasty before 65F 55M? 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  She lives with her  and 1 of her sons.         Social Determinants of Health     Financial Resource Strain: Not on file   Food Insecurity: Not on file   Transportation Needs: Not on file   Physical Activity: Not on file   Stress: Not on file   Social Connections: Not on file   Intimate Partner Violence: Not on file   Housing Stability: Not on file           Lab Results    Chemistry/lipid CBC Cardiac Enzymes/BNP/TSH/INR   Lab Results   Component Value Date    CHOL 107 11/18/2020    HDL 48 (L) 11/18/2020    TRIG 202 (H) 11/18/2020    BUN 30 (H) 02/21/2022     02/21/2022    CO2 20 (L) 02/21/2022    Lab Results   Component Value Date    WBC 5.6 02/15/2022    HGB 7.6 (L) 03/25/2022    HCT 29.5 (L) 02/15/2022    MCV 93 02/15/2022     02/15/2022    Lab Results   Component Value Date    TROPONINI 0.07 02/09/2022    BNP 1,511 (H) 02/08/2022    TSH 1.19  11/18/2020    INR 1.45 (H) 07/16/2021     Lab Results   Component Value Date    TROPONINI 0.07 02/09/2022          Weight:    Wt Readings from Last 3 Encounters:   03/21/22 65.4 kg (144 lb 3.2 oz)   02/22/22 63.5 kg (140 lb)   02/15/22 62 kg (136 lb 11 oz)       Allergies  Allergies   Allergen Reactions     Gabapentin      Other reaction(s): *Unknown  Upper body edema/swelling.         Morphine Visual Disturbance     Visual hallucinations     Naproxen Rash     Sulfa Drugs Hives and Rash     welts           Surgical History  Past Surgical History:   Procedure Laterality Date     APPENDECTOMY       INSERT INTRACORONARY STENT  1985    R Hand     IR CHEST PORT PLACEMENT > 5 YRS OF AGE  12/12/2019     IR CHEST PORT PLACEMENT > 5 YRS OF AGE  3/14/2022     IR LUMBAR TRANSFORAMINAL EPIDURAL STEROID INJECTION  12/5/2019     IR LUMBAR TRANSFORAMINAL EPIDURAL STRD INJ  12/5/2019     IR PORT PLACEMENT >5 YEARS  12/12/2019     IR PORT REMOVAL RIGHT  3/14/2022     MAMMOPLASTY REDUCTION Right 2015    hx of left mastectomy and right breast reduction     MASTECTOMY Left 2010     OTHER SURGICAL HISTORY      biopsy of upper and right tongue     OVARIAN CYST REMOVAL       REVISE RECONSTRUCTED BREAST Left     March 11, 2015       Social History  Tobacco:   History   Smoking Status     Former Smoker     Packs/day: 0.75     Years: 20.00     Types: Cigarettes   Smokeless Tobacco     Never Used    Alcohol:   Social History    Substance and Sexual Activity      Alcohol use: Yes        Alcohol/week: 1.0 standard drink        Comment: 1 glass of wine/week   Illicit Drugs:   History   Drug Use No       Family History  Family History   Problem Relation Age of Onset     Lung Cancer Mother      Pancreatic Cancer Mother      Kidney Cancer Maternal Grandmother      Lung Cancer Paternal Aunt      Breast Cancer Cousin           Renu Munoz MD on 3/28/2022      cc: Sandi Jones    Thank you for allowing me to participate in the care of your  patient.      Sincerely,     Renu Munoz MD     [facility]  cc:   Renu Munoz MD  [address] [place] [address]

## 2022-03-28 NOTE — PROGRESS NOTES
HEART CARE NOTE          Assessment/Recommendations     1. HFrEF - Doxorubicin induced  Assessment / Plan    Anthracycline toxicity tends to be irreversible - agree with holding further doses of doxorubicin; cardiac MRI negative for inducible ischemia or signs of prior infarct or infiltrative disease     Weight has coty stable at home     Near euvolemia on physical exam; denies HF symptoms of orthopnea, PND or current edema - no changes to regimen at this time    GDMT as detailed below;SBP 140s - 110s at home - no changes at this time     Current Pharmacotherapy AHA Guideline-Directed Medical Therapy   Lisinopril 2.5 mg daily Lisinopril 20 mg twice daily   Metoprolol succinate 50 mg  daily Carvedilol 25 mg twice daily   Spironolactone 12.5 mg Spironolactone 25 mg once daily   Hydralazine NA Hydralazine 100 mg three times daily   Isosorbide dinitrate NA Isosorbide dinitrate 40 mg three times daily   SGLT2 inhibitor: not started Dapagliflozin or Empagliflozin 10 mg daily      2. Acute respiratory failure  Assessment / Plan    Concern for sepsis vs pneumonitis    Management per primary team - significant improvement with IV diuresis     3. DM2  Assessment / Plan    Management per primary team     4. Metastatic breast Ca  Assessment / Plan    Oncology following/managing - no plans for further doxorubicin      History of Present Illness/Subjective      Ms. Elenita Moran is a 67 year old female with a PMHx (per ) significant for metastatic breast cancer, diabetes, chronic MSSA infection of the pelvis who who was recently admitted due to increased hypoxic respiratory failure and new HFrEF (anthracyclin induced) and now presents to [facility] to establish care.       Today, Mrs. Monreal denies any acute events or complaints. She denies HF symptoms as well as symptoms of lightheadedness, dizziness presyncope/syncope. Management plan as detailed above.      ECG: Personally reviewed. sinus  "tachycardia.     ECHO (personnaly Reviewed):  1. The left ventricle is normal in size. Left ventricular systolic performance  is moderately reduced. The ejection fraction is estimated to be 30-35%.  2. There is moderate global reduction in left ventricular systolic  performance.  3. There is mild aortic insufficiency.  4. There is moderate mitral insufficiency.  5. Normal right ventricular size with mildly reduced right ventricular  systolic performance.  6. There is mild to moderate left atrial enlargement.     When compared to the prior real-time echocardiogram dated 23 December 2021,  there has been a further diminution in left ventricular systolic performance.  The degree of mitral insufficiency has increased from mild-moderate to now  Moderate.    Cardiac MRI:  1.  Pharmacological Regadenoson stress cardiac MRI is negative for inducible myocardial ischemia.   2.  No evidence of prior myocardial infarction. No focal nonvascular scarring or fibrosis. No evidence of  infiltrative disease.  3.  Left ventricular cavity size is moderately enlarged. Wall thickness is normal. Systolic function is  moderately reduced with global hypokinesis. The quantified left ventricular ejection fraction is 36%.   4.  Normal right ventricular size. Mildly reduced systolic function.  The quantified right ventricular  ejection fraction is 45%.   5.  Mild left atrial enlargement.  6.  Mild-to-moderate functional mitral regurgitation.        Physical Examination Review of Systems   BP (!) 84/44 (BP Location: Right arm, Patient Position: Sitting, Cuff Size: Adult Regular)   Pulse 88   Resp 16   Ht 1.651 m (5' 5\")   Wt 66.2 kg (146 lb)   BMI 24.30 kg/m    Body mass index is 24.3 kg/m .  Wt Readings from Last 3 Encounters:   03/28/22 66.2 kg (146 lb)   03/21/22 65.4 kg (144 lb 3.2 oz)   02/22/22 63.5 kg (140 lb)     General Appearance:   no distress, normal body habitus   ENT/Mouth: membranes moist, no oral lesions or bleeding gums.    "   EYES:  no scleral icterus, normal conjunctivae   Neck: no carotid bruits or thyromegaly   Chest/Lungs:   lungs are clear to auscultation, no rales or wheezing, equal chest wall expansion    Cardiovascular:   Regular. Normal first and second heart sounds with no murmurs, rubs, or gallops; the carotid, radial and posterior tibial pulses are intact, no JVD and trace LE edema bilaterally    Abdomen:  no organomegaly, masses, bruits, or tenderness; bowel sounds are present   Extremities: no cyanosis or clubbing   Skin: no xanthelasma, warm.    Neurologic: alert and oriented x3, calm     Psychiatric: alert and oriented x3, calm     A complete 10 systems ROS was reviewed  And is negative except what is listed in the HPI.          Medical History  Surgical History Family History Social History   Past Medical History:   Diagnosis Date     Allergic rhinitis      Bone cancer (H) 2011    breast mets     Breast cancer (H) 2010    left mastectomy     Depression      DM (diabetes mellitus) (H)      Fibromyalgia      Hx antineoplastic chemotherapy 2010     Hx of radiation therapy 2010     Hyperlipemia      Lactose intolerance      Mini stroke (H)     R EYE     Osteoarthritis      Tobacco dependency     Past Surgical History:   Procedure Laterality Date     APPENDECTOMY       INSERT INTRACORONARY STENT  1985    R Hand     IR CHEST PORT PLACEMENT > 5 YRS OF AGE  12/12/2019     IR CHEST PORT PLACEMENT > 5 YRS OF AGE  3/14/2022     IR LUMBAR TRANSFORAMINAL EPIDURAL STEROID INJECTION  12/5/2019     IR LUMBAR TRANSFORAMINAL EPIDURAL STRD INJ  12/5/2019     IR PORT PLACEMENT >5 YEARS  12/12/2019     IR PORT REMOVAL RIGHT  3/14/2022     MAMMOPLASTY REDUCTION Right 2015    hx of left mastectomy and right breast reduction     MASTECTOMY Left 2010     OTHER SURGICAL HISTORY      biopsy of upper and right tongue     OVARIAN CYST REMOVAL       REVISE RECONSTRUCTED BREAST Left     March 11, 2015    no family history of premature coronary  artery disease Social History     Socioeconomic History     Marital status:      Spouse name: Not on file     Number of children: Not on file     Years of education: Not on file     Highest education level: Not on file   Occupational History     Not on file   Tobacco Use     Smoking status: Former Smoker     Packs/day: 0.75     Years: 20.00     Pack years: 15.00     Types: Cigarettes     Smokeless tobacco: Never Used   Substance and Sexual Activity     Alcohol use: Yes     Alcohol/week: 1.0 standard drink     Comment: 1 glass of wine/week     Drug use: No     Sexual activity: Yes     Partners: Male     Birth control/protection: Post-menopausal   Other Topics Concern     Parent/sibling w/ CABG, MI or angioplasty before 65F 55M? Not Asked   Social History Narrative    Patient works at home, owns [employer].  She lives with her  and 1 of her sons.         Social Determinants of Health     Financial Resource Strain: Not on file   Food Insecurity: Not on file   Transportation Needs: Not on file   Physical Activity: Not on file   Stress: Not on file   Social Connections: Not on file   Intimate Partner Violence: Not on file   Housing Stability: Not on file           Lab Results    Chemistry/lipid CBC Cardiac Enzymes/BNP/TSH/INR   Lab Results   Component Value Date    CHOL 107 11/18/2020    HDL 48 (L) 11/18/2020    TRIG 202 (H) 11/18/2020    BUN 30 (H) 02/21/2022     02/21/2022    CO2 20 (L) 02/21/2022    Lab Results   Component Value Date    WBC 5.6 02/15/2022    HGB 7.6 (L) 03/25/2022    HCT 29.5 (L) 02/15/2022    MCV 93 02/15/2022     02/15/2022    Lab Results   Component Value Date    TROPONINI 0.07 02/09/2022    BNP 1,511 (H) 02/08/2022    TSH 1.19 11/18/2020    INR 1.45 (H) 07/16/2021     Lab Results   Component Value Date    TROPONINI 0.07 02/09/2022          Weight:    Wt Readings from Last 3 Encounters:   03/21/22 65.4 kg (144 lb 3.2 oz)   02/22/22 63.5 kg (140 lb)   02/15/22 62 kg  (136 lb 11 oz)       Allergies  Allergies   Allergen Reactions     Gabapentin      Other reaction(s): *Unknown  Upper body edema/swelling.         Morphine Visual Disturbance     Visual hallucinations     Naproxen Rash     Sulfa Drugs Hives and Rash     welts           Surgical History  Past Surgical History:   Procedure Laterality Date     APPENDECTOMY       INSERT INTRACORONARY STENT  1985    R Hand     IR CHEST PORT PLACEMENT > 5 YRS OF AGE  12/12/2019     IR CHEST PORT PLACEMENT > 5 YRS OF AGE  3/14/2022     IR LUMBAR TRANSFORAMINAL EPIDURAL STEROID INJECTION  12/5/2019     IR LUMBAR TRANSFORAMINAL EPIDURAL STRD INJ  12/5/2019     IR PORT PLACEMENT >5 YEARS  12/12/2019     IR PORT REMOVAL RIGHT  3/14/2022     MAMMOPLASTY REDUCTION Right 2015    hx of left mastectomy and right breast reduction     MASTECTOMY Left 2010     OTHER SURGICAL HISTORY      biopsy of upper and right tongue     OVARIAN CYST REMOVAL       REVISE RECONSTRUCTED BREAST Left     March 11, 2015       Social History  Tobacco:   History   Smoking Status     Former Smoker     Packs/day: 0.75     Years: 20.00     Types: Cigarettes   Smokeless Tobacco     Never Used    Alcohol:   Social History    Substance and Sexual Activity      Alcohol use: Yes        Alcohol/week: 1.0 standard drink        Comment: 1 glass of wine/week   Illicit Drugs:   History   Drug Use No       Family History  Family History   Problem Relation Age of Onset     Lung Cancer Mother      Pancreatic Cancer Mother      Kidney Cancer Maternal Grandmother      Lung Cancer Paternal Aunt      Breast Cancer Cousin           Renu Munoz MD on 3/28/2022      cc: Sandi Jones

## 2022-04-15 NOTE — H&P
"  Interventional Radiology - History and Physical  4/15/2022    Procedure Requested: Site Check  Requesting Provider: April Nesbitt NP    HPI: Elenita BROOKE Rah Moran is a 67 year old female PMH DM2 (insulin dependent), HLD, tobacco dependency, depression, and metastatic breast cancer with mets to liver and bone/bone marrow.     S/P RIGHT sided port placement, per below.  On chemotherapy.  Followed by [facility].     Seen 3/14/2022 to have her port removal and replaced 2/2 erosion.  Patient reports that this was first appreciated several months ago and has been progressively worsening.      She is followed by Dr. Garza (ID), last visit 3/7/2022.  On Keflex for an indeterminate amount of time.  Next visit in 2 months.        Called [facility] concerned of poor healing of previous port site.  Is here for a site check.      Reports that previous RIGHT sided port site is still open.  Infusion RNs have been \"milking\" yellow straw color fluid.  No fever, no chills.  No pain.  She remains on chemotherapy.  She has a LEFT sided port suture protruding, and is wondering if I can remove this?    Her  is present.     Imaging:   [facility]  LOCATION: [facility]  DATE: 3/14/2022     PROCEDURE: PORT REMOVAL     INTERVENTIONAL RADIOLOGIST: Arron Jung MD.     INDICATION: Skin breakdown over the port site here for right sided port removal and placement of new left-sided Port.     CONSENT: The risks, benefits and alternatives of port removal were discussed with the patient  in detail. All questions were answered. Informed consent was given to proceed with the procedure.     MODERATE SEDATION: Versed 2 mg IV; Fentanyl 100 mcg IV.  Under physician supervision, Versed and fentanyl were administered for moderate sedation. Pulse oximetry, heart rate and blood pressure were continuously monitored by an independent trained   observer. The physician spent 30 minutes of face-to-face sedation time " with the patient.     CONTRAST: None  ANTIBIOTICS: None.  ADDITIONAL MEDICATIONS: None..     COMPLICATIONS: No immediate complications.     STERILE BARRIER TECHNIQUE: Maximum sterile barrier technique was used. Cutaneous antisepsis was performed at the operative site with application of 2% chlorhexidine and large sterile drape. Prior to the procedure, the  and assistant performed   hand hygiene and wore hat, mask, sterile gown, and sterile gloves during the entire procedure.     PROCEDURE:   The right  chest was prepped and draped in usual sterile fashion followed by local anesthesia with 1% Xylocaine. Through a small incision, the port was dissected free of the surrounding tissues, and the port and attached catheter were removed. Hemostasis   was achieved with manual compression. The incision was closed with layered absorbable suture and surgical glue.                                                                      IMPRESSION:    Successful venous port removal.       [facility]  LOCATION: [facility]  DATE: 3/14/2022     PROCEDURE: IMPLANTABLE VENOUS PORT PLACEMENT:  1. ULTRASOUND GUIDANCE FOR VASCULAR ACCESS. A PERMANENT IMAGE WAS STORED.  2. IMPLANTABLE VENOUS ACCESS PORT PLACEMENT.  3. FLUOROSCOPIC GUIDANCE FOR CENTRAL VENOUS ACCESS DEVICE PLACEMENT.  INTERVENTIONAL RADIOLOGIST: Arron Jung MD.     INDICATION: Skin erosion over the existing right IJ chest port site. Patient is here for removal of the right port and placement of a new left-sided IJ chest port.     CONSENT: The risks, benefits and alternatives of port placement were discussed with the patient  in detail. All questions were answered. Informed consent was given to proceed with the procedure.     MODERATE SEDATION: Versed 2 mg IV; Fentanyl 100 mcg IV.  Under physician supervision, Versed and fentanyl were administered for moderate sedation. Pulse oximetry, heart rate and blood pressure were continuously  monitored by an independent trained   observer. The physician spent 30 minutes of face-to-face sedation time with the patient.     CONTRAST: None     FLUOROSCOPIC TIME: 0.8 minutes.  RADIATION DOSE: Air Kerma: 8 mGy.     COMPLICATIONS: No immediate complications.     STERILE BARRIER TECHNIQUE: Maximum sterile barrier technique was used. Cutaneous antisepsis was performed at the operative site with application of 2% chlorhexidine and large sterile drape. Prior to the procedure, the  and assistant performed   hand hygiene and wore hat, mask, sterile gown, and sterile gloves during the entire procedure.     PROCEDURE:   Ultrasound demonstrated a patent and compressible left internal jugular vein, and an ultrasound image was obtained and placed in the patient's permanent medical record. The left neck and chest were prepped and draped in usual sterile fashion. Using   real-time ultrasound guidance, the left internal jugular vein was accessed. A subcutaneous pocket was created and irrigated with sterile normal saline. The catheter was tunneled in an antegrade fashion. Over the guidewire, a peel-away sheath was advanced   with fluoroscopic monitoring. Through the peel-away sheath, the catheter was advanced until the tip was at the cavoatrial junction. The catheter was cut to length and attached firmly to the port. The port was anchored with 2-0 Prolene in the   subcutaneous pocket.  The incisions were closed with layered absorbable suture and surgical glue.     FINDINGS:   Ultrasound shows an anechoic and compressible jugular vein. At the completion of the study, the port tip lies at the cavoatrial junction.     The central venous catheter insertion checklist was reviewed prior to placement and followed throughout the procedure.                                                                      IMPRESSION:  Successful power-injectable venous port placement.      Review of Systems: A comprehensive 10-point review  of systems was performed. All systems were reviewed and negative with exception to those reported in the HPI.    PMH:  Past Medical History:   Diagnosis Date     Allergic rhinitis      Bone cancer (H) 2011    breast mets     Breast cancer (H) 2010    left mastectomy     Depression      DM (diabetes mellitus) (H)      Fibromyalgia      Hx antineoplastic chemotherapy 2010     Hx of radiation therapy 2010     Hyperlipemia      Lactose intolerance      Mini stroke (H)     R EYE     Osteoarthritis      Tobacco dependency        PSH:  Past Surgical History:   Procedure Laterality Date     APPENDECTOMY       INSERT INTRACORONARY STENT  1985    R Hand     IR CHEST PORT PLACEMENT > 5 YRS OF AGE  12/12/2019     IR CHEST PORT PLACEMENT > 5 YRS OF AGE  3/14/2022     IR LUMBAR TRANSFORAMINAL EPIDURAL STEROID INJECTION  12/5/2019     IR LUMBAR TRANSFORAMINAL EPIDURAL STRD INJ  12/5/2019     IR PORT PLACEMENT >5 YEARS  12/12/2019     IR PORT REMOVAL RIGHT  3/14/2022     MAMMOPLASTY REDUCTION Right 2015    hx of left mastectomy and right breast reduction     MASTECTOMY Left 2010     OTHER SURGICAL HISTORY      biopsy of upper and right tongue     OVARIAN CYST REMOVAL       REVISE RECONSTRUCTED BREAST Left     March 11, 2015       ALLERGIES:  Allergies   Allergen Reactions     Gabapentin      Other reaction(s): *Unknown  Upper body edema/swelling.         Morphine Visual Disturbance     Visual hallucinations     Naproxen Rash     Sulfa Drugs Hives and Rash     welts         MEDICATIONS:  Current Outpatient Medications   Medication     aspirin 81 MG EC tablet     bumetanide (BUMEX) 1 MG tablet     calcium carbonate (CALCIUM CARBONATE) 600 MG tablet     cephALEXin (KEFLEX) 500 MG capsule     chlorhexidine (PERIDEX) 0.12 % solution     Cholecalciferol (VITAMIN D) 125 MCG (5000 UT) capsule     clobetasol (TEMOVATE) 0.05 % external ointment     Continuous Blood Gluc  ([employer] 14 DAY READER) COLETTE     Continuous Blood Gluc  Sensor (FREESTYLE KIMBERLY 14 DAY SENSOR) MISC     DULoxetine (CYMBALTA) 30 MG capsule     DULoxetine (CYMBALTA) 60 MG capsule     ezetimibe-simvastatin (VYTORIN) 10-20 MG tablet     HYDROmorphone (DILAUDID) 4 MG tablet     hydrOXYzine (VISTARIL) 25 MG capsule     Insulin Lispro (HUMALOG PEN SC)     IRON-VIT C-VIT B12-FOLIC ACID (GENTLE IRON) 28-60-0.008-0.4 MG CAPS     LANTUS SOLOSTAR 100 UNIT/ML soln     lidocaine (XYLOCAINE) 5 % external ointment     lisinopril (ZESTRIL) 2.5 MG tablet     medical cannabis (Patient's own supply)     metoprolol succinate ER (TOPROL-XL) 50 MG 24 hr tablet     nystatin (MYCOSTATIN) 547207 UNIT/GM external cream     OLANZapine (ZYPREXA) 5 MG tablet     omeprazole (PRILOSEC) 20 MG DR capsule     oxybutynin (DITROPAN) 5 MG tablet     oxybutynin ER (DITROPAN-XL) 10 MG 24 hr tablet     polyethylene glycol (MIRALAX) 17 GM/Dose powder     Prenatal Vit-Fe Fumarate-FA (PRENATAL MULTIVITAMIN  PLUS IRON) 27-1 MG TABS     psyllium (METAMUCIL/KONSYL) Packet     senna-docusate (SENOKOT-S/PERICOLACE) 8.6-50 MG tablet     simvastatin (ZOCOR) 20 MG tablet     spironolactone (ALDACTONE) 25 MG tablet     vitamin (B COMPLEX) tablet     zolpidem (AMBIEN) 10 MG tablet     No current facility-administered medications for this encounter.         LABS:  INR   Date Value Ref Range Status   07/16/2021 1.45 (H) 0.85 - 1.15 Final     Comment:     Effective 7/11/2021, the reference range for this assay has changed.      Hemoglobin   Date Value Ref Range Status   03/25/2022 7.6 (L) 11.7 - 15.7 g/dL Final   ]  Platelet Count   Date Value Ref Range Status   02/15/2022 240 150 - 450 10e3/uL Final     Creatinine   Date Value Ref Range Status   02/21/2022 0.75 0.60 - 1.10 mg/dL Final     Potassium   Date Value Ref Range Status   02/21/2022 4.6 3.5 - 5.0 mmol/L Final         EXAM:  There were no vitals taken for this visit.    RIGHT sided previous port site appreciated.  Small open area, 3-4mm in diameter.  No discharge or  drainage.  No erythema.  No induration.  Slight tenderness to palpation.  No fluid expelled.    LEFT port site with exposed suture.  Suture cut and removed.     ASSESSMENT:  68 yo F    - Previous port placement, subsequent port removal and placement.  - Concern for poor healing on RIGHT side, on chemotherapy  - Concern for exposed stitch on LEFT side    PLAN:    Continue site care on RIGHT side.  Keep covered.  Keep clean and dry.  Monitor for s/s infection.  No IR intervention today.  Offered wound care consultation given poor healing in the setting of chemotherapy, patient declined.    LEFT sided protruding stitch cut and removed.           ALEXANDRE STEWART NP  Interventional Radiology  [phone]

## 2022-05-09 NOTE — PROGRESS NOTES
Infectious Disease Progress Note    Assessment/Plan  Impression: Widely metastatic breast cancer had been on doxorubicin chemotherapy, but developed cardiotoxicity.    Recent hospitalization for respiratory distress with negative infectious disease work-up a new finding of heart failure suspected from chemotherapy.    Also found to have metastases in her liver.    Port-A-Cath was changed after last visit    Says her hip feels stable.    CRP test essentially interpretable in context of multiple medical problems    Recommendations:     Given all the dynamic comorbidities occurring, we will keep her on cephalexin twice a day.    Plan to see again in 4 months    Referral to wound clinic      RIDGE BARAJAS MD  [phone]      Subjective  Complains of losing hair with latest chemo and radiation strategies.    Says her hip is feeling stable.    Complains about decubitus ulcers and pressure sores.    Objective    Vital signs in last 24 hours  Temp:  [98.1  F (36.7  C)] 98.1  F (36.7  C)  Pulse:  [88] 88  BP: (90)/(58) 90/58  Wt Readings from Last 3 Encounters:   03/28/22 66.2 kg (146 lb)   03/21/22 65.4 kg (144 lb 3.2 oz)   02/22/22 63.5 kg (140 lb)           Review of Systems   Pertinent items are noted in HPI., otherwise negative.    Physical Exam  General appearance: alert, appears stated age and cooperative  Head: Normocephalic, without obvious abnormality, atraumatic   Chest: Previous Port-A-Cath was removed and new 1 inserted  Lungs: Normal respiratory pattern  Eyes: Extraocular muscles intact, no icterus.  Neck: no adenopathy, no carotid bruit, no JVD, supple, symmetrical, trachea midline and no erythema over the catheter tract of the cath.  Patient and spouse say this is chronic.    Extremities: Moves lower extremities  Skin: Shallow decubitus ulcer near the left gluteal area    Neurologic: Grossly normal    Pertinent Labs   CRP   Date Value Ref Range Status   02/12/2022 9.4 (H) 0.0-<0.8 mg/dL Final     Lab  Results   Component Value Date    WBC 5.6 02/15/2022     Lab Results   Component Value Date    RBC 3.19 02/15/2022     Lab Results   Component Value Date    HGB 7.6 03/25/2022     Lab Results   Component Value Date    HCT 29.5 02/15/2022     Lab Results   Component Value Date    MCV 93 02/15/2022     Lab Results   Component Value Date    MCH 29.2 02/15/2022     Lab Results   Component Value Date    MCHC 31.5 02/15/2022     Lab Results   Component Value Date    RDW 18.7 02/15/2022     Lab Results   Component Value Date     02/15/2022         Last Comprehensive Metabolic Panel:  Sodium   Date Value Ref Range Status   02/21/2022 136 136 - 145 mmol/L Final     Potassium   Date Value Ref Range Status   02/21/2022 4.6 3.5 - 5.0 mmol/L Final     Chloride   Date Value Ref Range Status   02/21/2022 102 98 - 107 mmol/L Final     Carbon Dioxide (CO2)   Date Value Ref Range Status   02/21/2022 20 (L) 22 - 31 mmol/L Final     Anion Gap   Date Value Ref Range Status   02/21/2022 14 5 - 18 mmol/L Final     Glucose   Date Value Ref Range Status   02/21/2022 157 (H) 70 - 125 mg/dL Final     Urea Nitrogen   Date Value Ref Range Status   02/21/2022 30 (H) 8 - 22 mg/dL Final     Creatinine   Date Value Ref Range Status   02/21/2022 0.75 0.60 - 1.10 mg/dL Final     GFR Estimate   Date Value Ref Range Status   02/21/2022 87 >60 mL/min/1.73m2 Final     Comment:     Effective December 21, 2021 eGFRcr in adults is calculated using the 2021 CKD-EPI creatinine equation which includes age and gender (Alexei et al., NEJM, DOI: 10.1056/DEWQuh9242945)   12/09/2020 >60 >60 mL/min/1.73m2 Final     GFR, ESTIMATED POCT   Date Value Ref Range Status   02/08/2022 >60 >60 mL/min/1.73m2 Final     Calcium   Date Value Ref Range Status   02/21/2022 8.8 8.5 - 10.5 mg/dL Final       Pertinent Radiology   Radiology Results:     IR Site Check Evaluation    Result Date: 4/20/2022  This exam was marked as non-reportable because it will not be read by a  radiologist or a [place] non-radiologist provider.

## 2022-05-10 PROBLEM — S91.302A NON HEALING LEFT HEEL WOUND: Status: ACTIVE | Noted: 2022-01-01

## 2022-05-10 PROBLEM — L89.153 STAGE III PRESSURE ULCER OF SACRAL REGION (H): Status: ACTIVE | Noted: 2022-01-01

## 2022-05-10 NOTE — LETTER
[facility]  [facility] [facility] [address]  [place], [place] [address]  [phone]      Fax [phone]    [facility]           Fax: [phone]            Customer Service: [phone]    Wound Dressing Rx and Order Form  Order Status: new  Verbal: Susan  Date: May 10, 2022     Elenita Moran  Gender: female  : 1955  [address] [facility] [address]  [phone] (home)     Medical Record: [id number]  Primary Care Provider: Sandi Jones      ICD-10-CM    1. Non healing left heel wound  S91.302A Wound care   2. Stage III pressure ulcer of sacral region (H)  L89.153 Wound Care Referral     lidocaine (XYLOCAINE) 2 % external gel     DEBRIDE SKIN/SUBQ TISSUE     Wound care         Insurance Info:  INSURER: Payor: [facility] / Plan: [employer] MEDICARE / Product Type: HMO /   Policy ID#:  [id number]  SECONDARY INSURANCE:    Secondary Policy ID#:  N/A        Physician Info:   Name:  CINDY OSPINA     Dept Address/Phones:   [address] [place] [address], [address]  [facility] [address] [phone]  Fax: [phone]    Lymphedema circumferential measurements (in cm):  No flowsheet data found.      Wound info:  Port A Cath Single 22 Left Chest wall (Active)   Number of days: 57       Wound Buttocks Pressure injury community acquired (Active)   Number of days: 90       Wound Foot Abrasion(s) (Active)   Number of days: 85       VASC Wound L buttock/gluteal fold (Active)   Pre Size Length 2 05/10/22 1300   Pre Size Width 1.1 05/10/22 1300   Pre Size Depth 0.3 05/10/22 1300   Pre Total Sq cm 2.2 05/10/22 1300   Post Size Length 2 05/10/22 1300   Post Size Width 1.1 05/10/22 1300   Post Size Depth 0.3 05/10/22 1300   Post Total Sq cm 2.2 05/10/22 1300   Undermined no 05/10/22 1300   Tunneling no 05/10/22 1300   Description slough 05/10/22 1300   Number of days: 0       VASC Wound Left heel (Active)   Pre Size Length 0.3 05/10/22 1300   Pre Size Width 1 05/10/22 1300   Pre  "Total Sq cm 0.3 05/10/22 1300   Description dry fissure 05/10/22 1300   Number of days: 0       Incision/Surgical Site 03/14/22 Right;Upper Chest (Active)   Number of days: 57        Drainage: mod  Thickness:  full  Duration of Need: 30 DAYS  Days Supply: 30 DAYS  Start Date: 5/10/22  Starter Kit: ancillary  Qualifying wound/Debridement: Yes      Dressing Type Brand Size Number of pieces Frequency of change    Primary endoform  2\"x2\" 3 2-3 TIMES PER WEEK and as needed    mepilex bordered adhesive bandage  3\"x3\" 10 2-3 TIMES PER WEEK and as needed     No substitutions preferred. Call [phone].         OK to forward to covered supplier.    Electronically Signed Physician:  CINDY OSPINA             Date: May 10, 2022    "

## 2022-05-10 NOTE — PATIENT INSTRUCTIONS
Wound Care Instructions- Left buttock    Every 2-3 days and as needed, Cleanse your gluteal wound(s) with Normal saline.    Pat Dry with non-sterile gauze    Apply Lotion to the intact skin surrounding your wound and other dry skin locations. Some good lotions include: Remedy Skin Repair Cream, Sarna, Vanicream or Cetaphil    Primary Dressing: Apply endoform into/onto the wounds    Secondary dressing: Cover with mepilex 4x4    It is ok to get your wound wet in the bath or shower          Wound Care Instructions- Left heel    1-2 TIMES PER WEEK and as needed, Cleanse your heel wound(s) with tap water.    Pat Dry with non-sterile gauze    Apply Lotion to the intact skin surrounding your wound and other dry skin locations. Some good lotions include: Remedy Skin Repair Cream, Sarna, Vanicream or Cetaphil    Primary Dressing: Apply mepilex or your preferred cover dressing into/onto the wounds    It is ok to get your wound wet in the bath or shower    Float your heels as often as you can.    Cushion:  It is recommended that you obtain a OutTrippin Cushion. It can be purchased online on Amazon, or through your local [employer] (Durable Medical Equipment) store. They come in a variety of sizes - you can choose which size works best for your chair.   You only need to purchase one as it can be transferred between chairs.             If for some reason you are not able to get your dressing(s) changed as outlined above (due to illness, lack of supplies, lack of help) please do the following: remove old, soiled dressings; wash the wounds with saline; pat dry; apply ABD pad or other absorbant pad and secure with rolled gauze; avoid tape directly on your skin; Call the clinic as soon as possible to let us know what the current issues are in receiving wound care [phone].      SEEK MEDICAL CARE IF:  You have an increase in swelling, pain, or redness around the wound.  You have an increase in the amount of pus coming from the  wound.  There is a bad smell coming from the wound.  The wound appears to be worsening/enlarging  You have a fever greater than 101.5 F      It is ok to continue current wound care treatment/products for the next 2-3 days until new wound care supplies are ordered and arrive. If longer than this please contact our office at [phone].    If you have a 2 layer or 4 layer compression wrap on these are safe to have on for ONLY 7 days. If for some reason you are not able to get the wrap(s) changed (due to illness; lack of supplies, lack of help, lack of transportation) please do the following: unwrap the old 2 or 4 layer compression wrap; avoid using scissors as you could cut your skin and cause wounds; use tubular compression when available. Call to reschedule your home care or clinic visit appointment as soon as possible.    Please NOTE: if you are 15 minutes late to your clinic appointment you will have to be rescheduled. Please call our clinic as soon as possible if you know you will not be able to get to your appointment at [phone].    If you fail to show up to 3 scheduled clinic appointments you will be dismissed from our clinic.              We want to hear from you!  In the next few weeks, you should receive a call or email to complete a survey about your visit at [facility]. Please help us improve your appointment experience by letting us know how we did today. We strive to make your experience good and value any ways in which we could do better.      We value your input and suggestions.    Thank you for choosing the [facility]!

## 2022-05-10 NOTE — PROGRESS NOTES
Wound Clinic Note          Visit date: 05/10/2022       Deonte Complaint:     Elenita Moran is a 67 year old female had concerns including L foot and buttock wound..  The patient has sacrococcygeal pressure ulcer .  She also has a left heel wound.      HISTORY OF PRESENT ILLNESS:    Elenita Moran reports the wound has been present since July 2021.  The wound began While she was in the hospital with another illness and laying in the bed for several days at a time.  She has never had other pressure ulcers on the bottom.  She does have metastatic breast cancer and is currently receiving chemotherapy.  She also has a left heel wound which developed in the last 2 to 3 weeks without a clear cause.  She has normal sensation and somewhat reduced motor function in the lower extremities.  She does use a wheelchair when she goes out of the house but is able to use a walker around the house.  She reports she spends most of the day in a lift chair.  Neither lift chair nor her wheelchair have any special pressure reduction cushion.  She confirms that she currently sleeps in a bed.  The patient was accompanied to the wound clinic for initial evaluation by her  who does her dressing changes and is also her medical power of .    The patient's  has been bandaging the heel wound with an over-the-counter bandage changed every other day.  He has been bandaging the sacral wound with a Mepilex changed every 2 to 3 days.  Both wounds have had light serous drainage.      The pateint denies fevers or chills.  They report the pain from the wound has been 4/10 and has remained about the same recently.      Today the patient reports maintaining a regular diet without special attention to protein.        The patient denies a history of smoking or chronic steroid use.  and The patient confirms having diabetes and reports the blood sugars are well controlled.         The patient has recently had some  symptoms of wound infection and is currently taking antibiotics which they have been tolerating well with no symptoms of an adverse reaction.       Problem List:   Past Medical History:   Diagnosis Date     Allergic rhinitis      Bone cancer (H) 2011    breast mets     Breast cancer (H) 2010    left mastectomy     Depression      DM (diabetes mellitus) (H)      Fibromyalgia      Hx antineoplastic chemotherapy 2010     Hx of radiation therapy 2010     Hyperlipemia      Lactose intolerance      Mini stroke (H)     R EYE     Osteoarthritis      Tobacco dependency               Family Hx: family history includes Breast Cancer in her cousin; Kidney Cancer in her maternal grandmother; Lung Cancer in her mother and paternal aunt; Pancreatic Cancer in her mother.       Surgical Hx:   Past Surgical History:   Procedure Laterality Date     APPENDECTOMY       INSERT INTRACORONARY STENT  1985    R Hand     IR CHEST PORT PLACEMENT > 5 YRS OF AGE  12/12/2019     IR CHEST PORT PLACEMENT > 5 YRS OF AGE  3/14/2022     IR LUMBAR TRANSFORAMINAL EPIDURAL STEROID INJECTION  12/5/2019     IR LUMBAR TRANSFORAMINAL EPIDURAL STRD INJ  12/5/2019     IR PORT PLACEMENT >5 YEARS  12/12/2019     IR PORT REMOVAL RIGHT  3/14/2022     IR SITE CHECK/EVALUATION  4/15/2022     MAMMOPLASTY REDUCTION Right 2015    hx of left mastectomy and right breast reduction     MASTECTOMY Left 2010     OTHER SURGICAL HISTORY      biopsy of upper and right tongue     OVARIAN CYST REMOVAL       REVISE RECONSTRUCTED BREAST Left     March 11, 2015          Allergies:    Allergies   Allergen Reactions     Gabapentin      Other reaction(s): *Unknown  Upper body edema/swelling.         Morphine Visual Disturbance     Visual hallucinations     Sulfamethoxazole-Trimethoprim      Other reaction(s): Hives     Sulfa Drugs Hives and Rash     welts                Medication History:    Current Outpatient Medications   Medication Sig     acetaminophen (TYLENOL) 500 MG tablet       aspirin 81 MG EC tablet Take 81 mg by mouth daily     blood glucose (ONETOUCH VERIO IQ) test strip      bumetanide (BUMEX) 1 MG tablet Take 2 tablets (2 mg) by mouth daily     calcium carbonate (OS-BENJI) 600 MG tablet Take 1-2 mg by mouth daily      cephALEXin (KEFLEX) 500 MG capsule Take 1 capsule (500 mg) by mouth 2 times daily     chlorhexidine (PERIDEX) 0.12 % solution Take 15 mLs by mouth daily as needed      Cholecalciferol (VITAMIN D) 125 MCG (5000 UT) capsule Take 1 tablet by mouth daily      ciprofloxacin (CIPRO) 250 MG tablet TAKE 1 tablet BY MOUTH 2 times per day. Take at least 2 hours before or 6 hours after magnesium/aluminum antacids or other calcium products     clobetasol (TEMOVATE) 0.05 % external ointment Apply topically twice a week     Continuous Blood Gluc  (FREESTYLE KIMBERLY 14 DAY READER) COLETTE Use to check blood sugar at least 4 times a day or as directed      Continuous Blood Gluc Sensor ([employer]oeMobileSTYLE KIMBERLY 14 DAY SENSOR) MISC Use as directed to test blood sugar at least 4 times a day or as directed     docusate sodium (COLACE) 100 MG capsule      DULoxetine (CYMBALTA) 30 MG capsule Take 30 mg by mouth every morning      DULoxetine (CYMBALTA) 60 MG capsule Take 1 capsule by mouth At Bedtime     ezetimibe-simvastatin (VYTORIN) 10-20 MG tablet Take 1 tablet by mouth daily     glucose (BD GLUCOSE) 4 g chewable tablet      HYDROmorphone (DILAUDID) 4 MG tablet Take 1 tablet (4 mg) by mouth 3 times daily as needed for moderate to severe pain or severe pain     hydrOXYzine (VISTARIL) 25 MG capsule Take 25 mg by mouth 3 times daily With hydromorphone     ibuprofen (ADVIL/MOTRIN) 200 MG capsule Take 400 mg by mouth every 8 hours as needed for fever     insulin glargine (BASAGLAR KWIKPEN) 100 UNIT/ML pen Inject 4 Units Subcutaneous every morning     Insulin Lispro (HUMALOG PEN SC) As needed per sliding scale     IRON-VIT C-VIT B12-FOLIC ACID 28-60-0.008-0.4 MG CAPS Take 1 capsule by mouth daily       lidocaine (XYLOCAINE) 5 % external ointment Apply topically 4 times daily (Patient taking differently: Apply topically 4 times daily as needed for moderate pain)     lisinopril (ZESTRIL) 2.5 MG tablet Take 1 tablet (2.5 mg) by mouth daily     medical cannabis (Patient's own supply) See Admin Instructions (The purpose of this order is to document that the patient reports taking medical cannabis.  This is not a prescription, and is not used to certify that the patient has a qualifying medical condition.)     metoprolol succinate ER (TOPROL-XL) 50 MG 24 hr tablet Take 1 tablet (50 mg) by mouth daily     Multiple Vitamins-Minerals (OCUVITE ADULT FORMULA) CAPS      nystatin (MYCOSTATIN) 334671 UNIT/GM external cream Apply topically 2 times daily     OLANZapine (ZYPREXA) 5 MG tablet Take 5 mg by mouth every evening      omeprazole (PRILOSEC) 20 MG DR capsule Take 20 mg by mouth At Bedtime      oxybutynin (DITROPAN) 5 MG tablet Take 5 mg by mouth 2 times daily      oxybutynin ER (DITROPAN-XL) 10 MG 24 hr tablet 10 mg every evening     polyethylene glycol (MIRALAX) 17 GM/Dose powder Take 17 g by mouth daily (Patient taking differently: Take 17 g by mouth daily As needed)     Prenatal Vit-Fe Fumarate-FA (PRENATAL MULTIVITAMIN  PLUS IRON) 27-1 MG TABS Take 1 tablet by mouth daily      psyllium (METAMUCIL/KONSYL) Packet Take 1 packet by mouth daily as needed for constipation     senna-docusate (SENOKOT-S/PERICOLACE) 8.6-50 MG tablet Take 1-2 tablets by mouth daily      spironolactone (ALDACTONE) 25 MG tablet Take 0.5 tablets (12.5 mg) by mouth daily     vitamin (B COMPLEX) tablet Take 1 tablet by mouth daily     zolpidem (AMBIEN) 10 MG tablet Take 5-10 mg by mouth At Bedtime     LANTUS SOLOSTAR 100 UNIT/ML soln 2 Units every morning  (Patient not taking: No sig reported)     LORazepam (ATIVAN) 0.5 MG tablet take 1 tablet by mouth up to 3 times per day as needed for nausea, anxiety, or insomnia (Patient not taking: No sig  reported)     Current Facility-Administered Medications   Medication     lidocaine (XYLOCAINE) 2 % external gel         Tobacco History:  reports that she has quit smoking. Her smoking use included cigarettes. She has a 15.00 pack-year smoking history. She has never used smokeless tobacco.       REVIEW OF SYMPTOMS:   The review of systems was negative except as noted in the HPI.           PHYSICAL EXAMINATION:     BP 96/60   Pulse 88            GENERAL: The patient overall appears well and is no acute distress.   HEAD: normocephalic   EYES: Sclera and conjunctiva clear   NECK: no obvious masses   LUNGS: breathing is unlabored.   EXTREMITIES: No clubbing, cyanosis or edema   SKIN: No rashes or other abnormalities except as noted under the Wound section below.   NEUROLOGICAL: Normal sensory function, decreased motor function in the lower extremities.      WOUND: The wound appears healthy with no sign of infection.   Wound bed: necrotic material at the sacral wound, there is no necrotic material at the left heel wound.  Periwound: healthy intact skin  The sacral wound is a fairly small wound consistent with a stage III sacral pressure ulcer.  The wound on the left heel looks like a crack from dry skin and is very nearly healed.      Also see below for wound details:         Ulceration(s)/Wound(s):   Please see the media tab under the chart review for pictures of the wounds.    VASC Wound L buttock/gluteal fold (Active)   Pre Size Length 2 05/10/22 1300   Pre Size Width 1.1 05/10/22 1300   Pre Size Depth 0.3 05/10/22 1300   Pre Total Sq cm 2.2 05/10/22 1300   Undermined no 05/10/22 1300   Tunneling no 05/10/22 1300   Description slough 05/10/22 1300       VASC Wound Left heel (Active)   Pre Size Length 0.3 05/10/22 1300   Pre Size Width 1 05/10/22 1300   Pre Total Sq cm 0.3 05/10/22 1300   Description dry fissure 05/10/22 1300           Recent Labs   Lab Test 02/09/22  0851 07/16/21  1054 12/18/19  0000   A1C 5.6 6.3*  5.8          Recent Labs   Lab Test 02/10/22  0626 02/09/22  0851 07/16/21  1054   ALBUMIN 2.6* 3.0* 2.8*              Procedure note: , Informed Consent:  Patient acknowledges that I have explained the patient's general medical condition to him/her.  Patient has been informed and acknowledges that I have explained the risks or complications of wound debridement including, but not limited to, scarring, damage to blood vessels or surrounding areas such as nerves and organs, allergic reactions to topical and injected anaesthetic and/or skin prep solutions.  Other risks include excessive bleeding, removal of healthy tissue, infection, pain and inflammation, and prolonged or failure to heal.  Patient acknowledges that bleeding after debridement and pain may worsen after debridement and that dead/necrotic tissue may cause bacteria and toxins to be released into the bloodstream and cause sepsis or shock.  Patient acknowledges that I have explained that the wound may be larger after debridement.  Patient acknowledges that they may need serial debridements while under care in the wound department.  Patient acknowledges that they were given an opportunity to ask questions about treatment and I have answered patient's questions.   , Anesthetized as needed. , Using a curette and/or a scalpel I performed an excisional debridement removing all necrotic material at the Sacral wound down to the level of viable subcutaneous tissue.  I obtained hemostasis with direct pressure.  The patient tolerated the procedure well. , EBL: <5 ml  and Total debridement surface area: Less than 20 cm                  ASSESSMENT:   This is a 67 year old female with a stage III sacral pressure ulcer and a left heel wound.          PLAN:   We will bandage the sacral wound with endoform and a Mepilex changed every other day.  We will bandage the left heel wound with a Mepilex or a simple bandage changed every other day.  I explained to them that the  most important thing we can do for both of the wounds is to keep pressure off the areas is much as possible.  We will give them information about how to order a Roho mosaic cushion online that they can use in her lift chair and her wheelchair to provide better pressure reduction when she is in the sitting position.  I have encouraged the patient to continue to minimize the time they spend in the wheelchair.  and The patient's heels should be floated while in bed so that nothing touches the heels.  I have explained to the patient the importance of protein intake to wound healing.  I have explained that increasing protein intake will speed wound healing.  We discussed several types of food that are high in protein and the wound care nurse gave the patient a handout that summarizes this information.  In addition to further speed wound healing I have encouraged the patient to take a protein supplement.   The patient will return to the wound clinic in one week to see me again.        45 minutes spent on the date of the encounter doing chart review, history and exam, documentation and further activities per the note      Frankie Vega MD  05/10/2022   1:58 PM   [facility]  [phone]

## 2022-05-17 NOTE — PATIENT INSTRUCTIONS
Wound Care Instructions- Left buttock    Every 2-3 days and as needed, Cleanse your gluteal wound(s) with Normal saline.    Pat Dry with non-sterile gauze    Apply Lotion to the intact skin surrounding your wound and other dry skin locations. Some good lotions include: Remedy Skin Repair Cream, Sarna, Vanicream or Cetaphil    Primary Dressing: Apply endoform into/onto the wounds    Secondary dressing: Cover with mepilex 4x4    It is ok to get your wound wet in the bath or shower          Wound Care Instructions- Left heel    1-2 TIMES PER WEEK and as needed, Cleanse your heel wound(s) with tap water.    Pat Dry with non-sterile gauze    Apply Lotion to the intact skin surrounding your wound and other dry skin locations. Some good lotions include: Remedy Skin Repair Cream, Sarna, Vanicream or Cetaphil    Primary Dressing: Apply mepilex or your preferred cover dressing into/onto the wounds    It is ok to get your wound wet in the bath or shower    Float your heels as often as you can.    Cushion:  It is recommended that you obtain a BATS Cushion. It can be purchased online on Amazon, or through your local [employer] (Durable Medical Equipment) store. They come in a variety of sizes - you can choose which size works best for your chair.   You only need to purchase one as it can be transferred between chairs.             If for some reason you are not able to get your dressing(s) changed as outlined above (due to illness, lack of supplies, lack of help) please do the following: remove old, soiled dressings; wash the wounds with saline; pat dry; apply ABD pad or other absorbant pad and secure with rolled gauze; avoid tape directly on your skin; Call the clinic as soon as possible to let us know what the current issues are in receiving wound care [phone].      SEEK MEDICAL CARE IF:  You have an increase in swelling, pain, or redness around the wound.  You have an increase in the amount of pus coming from the  wound.  There is a bad smell coming from the wound.  The wound appears to be worsening/enlarging  You have a fever greater than 101.5 F      It is ok to continue current wound care treatment/products for the next 2-3 days until new wound care supplies are ordered and arrive. If longer than this please contact our office at [phone].    If you have a 2 layer or 4 layer compression wrap on these are safe to have on for ONLY 7 days. If for some reason you are not able to get the wrap(s) changed (due to illness; lack of supplies, lack of help, lack of transportation) please do the following: unwrap the old 2 or 4 layer compression wrap; avoid using scissors as you could cut your skin and cause wounds; use tubular compression when available. Call to reschedule your home care or clinic visit appointment as soon as possible.    Please NOTE: if you are 15 minutes late to your clinic appointment you will have to be rescheduled. Please call our clinic as soon as possible if you know you will not be able to get to your appointment at [phone].    If you fail to show up to 3 scheduled clinic appointments you will be dismissed from our clinic.              We want to hear from you!  In the next few weeks, you should receive a call or email to complete a survey about your visit at [facility]. Please help us improve your appointment experience by letting us know how we did today. We strive to make your experience good and value any ways in which we could do better.      We value your input and suggestions.    Thank you for choosing the [facility]!

## 2022-05-17 NOTE — PROGRESS NOTES
Wound Clinic Note          Visit date: 05/17/2022       Cherylif Complaint:     Elenita Moran is a 67 year old female had concerns including Wound Check..  The patient has sacrococcygeal pressure ulcer .  She also has a left heel wound.      HISTORY OF PRESENT ILLNESS:    Elenita Moran reports the wound has been present since July 2021.  The wound began While she was in the hospital with another illness and laying in the bed for several days at a time.  She has never had other pressure ulcers on the bottom.  She does have metastatic breast cancer and is currently receiving chemotherapy.  She also has a left heel wound which developed in late April without a clear cause.  She has normal sensation and somewhat reduced motor function in the lower extremities.  She does use a wheelchair when she goes out of the house but is able to use a walker around the house.  She reports she spends most of the day in a lift chair.  She confirms that she currently sleeps in a bed.  The patient was accompanied to the wound clinic for initial evaluation by her  who does her dressing changes and is also her medical power of .    Today for her second visit here in the wound clinic she is again accompanied by her  who has been doing the dressing changes.  They both provide some of the interval history.  They have gotten a Roho cushion and have began using that in her lift chair, although she does not have it in her wheelchair today.    The patient's  has been bandaging the sacral wound with endoform and a Mepilex changed every 2 to 3 days, there has been light serous drainage.  He has been bandaging the left heel wound with a Mepilex bandage changed 3 times a week.  There is also been light serous drainage from this.    They have been floating the heels while she is in bed.        The pateint denies fevers or chills.  They report the pain from the wound has been 4/10 and has remained  about the same recently.      Today the patient reports maintaining a high protein diet and taking protein supplements regularly.        The patient denies a history of smoking or chronic steroid use.  and The patient confirms having diabetes and reports the blood sugars are well controlled.         The patient has not had any symptoms of infection relating to the wound recently and is not currently on antibiotics.       Problem List:   Past Medical History:   Diagnosis Date     Allergic rhinitis      Bone cancer (H) 2011    breast mets     Breast cancer (H) 2010    left mastectomy     Depression      DM (diabetes mellitus) (H)      Fibromyalgia      Hx antineoplastic chemotherapy 2010     Hx of radiation therapy 2010     Hyperlipemia      Lactose intolerance      Mini stroke (H)     R EYE     Osteoarthritis      Tobacco dependency               Family Hx: family history includes Breast Cancer in her cousin; Kidney Cancer in her maternal grandmother; Lung Cancer in her mother and paternal aunt; Pancreatic Cancer in her mother.       Surgical Hx:   Past Surgical History:   Procedure Laterality Date     APPENDECTOMY       INSERT INTRACORONARY STENT  1985    R Hand     IR CHEST PORT PLACEMENT > 5 YRS OF AGE  12/12/2019     IR CHEST PORT PLACEMENT > 5 YRS OF AGE  3/14/2022     IR LUMBAR TRANSFORAMINAL EPIDURAL STEROID INJECTION  12/5/2019     IR LUMBAR TRANSFORAMINAL EPIDURAL STRD INJ  12/5/2019     IR PORT PLACEMENT >5 YEARS  12/12/2019     IR PORT REMOVAL RIGHT  3/14/2022     IR SITE CHECK/EVALUATION  4/15/2022     MAMMOPLASTY REDUCTION Right 2015    hx of left mastectomy and right breast reduction     MASTECTOMY Left 2010     OTHER SURGICAL HISTORY      biopsy of upper and right tongue     OVARIAN CYST REMOVAL       REVISE RECONSTRUCTED BREAST Left     March 11, 2015          Allergies:    Allergies   Allergen Reactions     Gabapentin      Other reaction(s): *Unknown  Upper body edema/swelling.         Morphine  Visual Disturbance     Visual hallucinations     Sulfamethoxazole-Trimethoprim      Other reaction(s): Hives     Sulfa Drugs Hives and Rash     welts                Medication History:    Current Outpatient Medications   Medication Sig     acetaminophen (TYLENOL) 500 MG tablet      aspirin 81 MG EC tablet Take 81 mg by mouth daily     blood glucose (ONETOUCH VERIO IQ) test strip      bumetanide (BUMEX) 1 MG tablet Take 2 tablets (2 mg) by mouth daily     calcium carbonate (OS-BENJI) 600 MG tablet Take 1-2 mg by mouth daily      chlorhexidine (PERIDEX) 0.12 % solution Take 15 mLs by mouth daily as needed      Cholecalciferol (VITAMIN D) 125 MCG (5000 UT) capsule Take 1 tablet by mouth daily      cholecalciferol (VITAMIN D3) 125 mcg (5000 units) capsule See Admin Instructions     ciprofloxacin (CIPRO) 250 MG tablet TAKE 1 tablet BY MOUTH 2 times per day. Take at least 2 hours before or 6 hours after magnesium/aluminum antacids or other calcium products     clobetasol (TEMOVATE) 0.05 % external ointment Apply topically twice a week     Continuous Blood Gluc  (FREESTYLE KIMBERLY 14 DAY READER) COLETTE Use to check blood sugar at least 4 times a day or as directed      Continuous Blood Gluc Sensor (Pirate BrandsSTYLE KIMBERLY 14 DAY SENSOR) [facility] Use as directed to test blood sugar at least 4 times a day or as directed     docusate sodium (COLACE) 100 MG capsule      DULoxetine (CYMBALTA) 30 MG capsule Take 30 mg by mouth every morning      DULoxetine (CYMBALTA) 60 MG capsule Take 1 capsule by mouth At Bedtime     ezetimibe-simvastatin (VYTORIN) 10-20 MG tablet Take 1 tablet by mouth daily     glucose (BD GLUCOSE) 4 g chewable tablet      HYDROmorphone (DILAUDID) 4 MG tablet Take 1 tablet (4 mg) by mouth 3 times daily as needed for moderate to severe pain or severe pain     hydrOXYzine (VISTARIL) 25 MG capsule Take 25 mg by mouth 3 times daily With hydromorphone     ibuprofen (ADVIL/MOTRIN) 200 MG capsule Take 400 mg by mouth every 8  hours as needed for fever     insulin glargine (BASAGLAR KWIKPEN) 100 UNIT/ML pen Inject 4 Units Subcutaneous every morning     Insulin Lispro (HUMALOG PEN SC) As needed per sliding scale     IRON-VIT C-VIT B12-FOLIC ACID 28-60-0.008-0.4 MG CAPS Take 1 capsule by mouth daily      LANTUS SOLOSTAR 100 UNIT/ML soln 2 Units every morning     lidocaine (XYLOCAINE) 5 % external ointment 1 application as needed     lidocaine (XYLOCAINE) 5 % external ointment Apply topically 4 times daily (Patient taking differently: Apply topically 4 times daily as needed for moderate pain)     lisinopril (ZESTRIL) 2.5 MG tablet Take 1 tablet by mouth     lisinopril (ZESTRIL) 2.5 MG tablet Take 1 tablet (2.5 mg) by mouth daily     LORazepam (ATIVAN) 0.5 MG tablet take 1 tablet by mouth up to 3 times per day as needed for nausea, anxiety, or insomnia     medical cannabis (Patient's own supply) See Admin Instructions (The purpose of this order is to document that the patient reports taking medical cannabis.  This is not a prescription, and is not used to certify that the patient has a qualifying medical condition.)     metoprolol succinate ER (TOPROL-XL) 50 MG 24 hr tablet Take 1 tablet (50 mg) by mouth daily     Multiple Vitamins-Minerals (OCUVITE ADULT FORMULA) CAPS      nystatin (MYCOSTATIN) 534613 UNIT/GM external cream Apply topically 2 times daily     OLANZapine (ZYPREXA) 5 MG tablet Take 5 mg by mouth every evening      omeprazole (PRILOSEC) 20 MG DR capsule Take 20 mg by mouth At Bedtime      oxybutynin (DITROPAN) 5 MG tablet Take 5 mg by mouth 2 times daily      oxybutynin ER (DITROPAN-XL) 10 MG 24 hr tablet 10 mg every evening     polyethylene glycol (MIRALAX) 17 GM/Dose powder Take 17 g by mouth daily (Patient taking differently: Take 17 g by mouth daily As needed)     Prenatal Vit-Fe Fumarate-FA (PRENATAL MULTIVITAMIN  PLUS IRON) 27-1 MG TABS Take 1 tablet by mouth daily      psyllium (METAMUCIL/KONSYL) Packet Take 1 packet by  mouth daily as needed for constipation     senna-docusate (SENOKOT-S/PERICOLACE) 8.6-50 MG tablet Take 1-2 tablets by mouth daily      spironolactone (ALDACTONE) 25 MG tablet Take 0.5 tablets (12.5 mg) by mouth daily     vitamin (B COMPLEX) tablet Take 1 tablet by mouth daily     zolpidem (AMBIEN) 10 MG tablet Take 5-10 mg by mouth At Bedtime     Current Facility-Administered Medications   Medication     lidocaine (XYLOCAINE) 2 % external gel         Tobacco History:  reports that she has quit smoking. Her smoking use included cigarettes. She has a 15.00 pack-year smoking history. She has never used smokeless tobacco.       REVIEW OF SYMPTOMS:   The review of systems was negative except as noted in the HPI.           PHYSICAL EXAMINATION:     /66   Pulse 84   Resp 16            GENERAL: The patient overall appears well and is no acute distress.   HEAD: normocephalic   EYES: Sclera and conjunctiva clear   NECK: no obvious masses   LUNGS: breathing is unlabored.   EXTREMITIES: No clubbing, cyanosis or edema   SKIN: No rashes or other abnormalities except as noted under the Wound section below.   NEUROLOGICAL: Normal sensory function, decreased motor function in the lower extremities.      WOUND: The wound appears healthy with no sign of infection.   Wound bed: necrotic material at the sacral wound, there is no necrotic material at the left heel wound.  Periwound: healthy intact skin  The sacral wound is a fairly small wound consistent with a stage III sacral pressure ulcer.  Both wounds are about the same today compared with her last clinic visit.      Also see below for wound details:         Ulceration(s)/Wound(s):   Please see the media tab under the chart review for pictures of the wounds.    VASC Wound L buttock/gluteal fold (Active)   Pre Size Length 1.4 05/17/22 0912   Pre Size Width 0.8 05/17/22 0912   Pre Size Depth 0.3 05/17/22 0912   Pre Total Sq cm 1.12 05/17/22 0912   Description slough 05/17/22  0912       VAS Wound Left heel (Active)   Pre Size Length 0.3 05/17/22 0912   Pre Size Width 1 05/17/22 0912   Pre Size Depth 0.1 05/17/22 0912   Pre Total Sq cm 0.3 05/17/22 0912           Recent Labs   Lab Test 02/09/22  0851 07/16/21  1054 12/18/19  0000   A1C 5.6 6.3* 5.8          Recent Labs   Lab Test 02/10/22  0626 02/09/22  0851 07/16/21  1054   ALBUMIN 2.6* 3.0* 2.8*              Procedure note: , I had previous obtain informed consent from the patient to perform serial debridements, I confirmed this again with the patient today verbally. , Anesthetized as needed. , Using a curette and/or a scalpel I performed an excisional debridement removing all necrotic material at the Sacral wound down to the level of viable subcutaneous tissue.  I obtained hemostasis with direct pressure.  The patient tolerated the procedure well. , EBL: <5 ml  and Total debridement surface area: Less than 20 cm          There was no debridement required at the left heel wound.      ASSESSMENT:   This is a 67 year old female with a stage III sacral pressure ulcer and a left heel wound.          PLAN:   We will bandage the sacral wound with endoform and a Mepilex changed every other day.  We will bandage the left heel wound with a Mepilex or a simple bandage changed every other day.  I have encouraged him to also use the [employer] mosaic cushion in the wheelchair or any other chair where she is seated for an extended period of time.  I have encouraged the patient to continue to minimize the time they spend in the wheelchair.  and The patient's heels should be floated while in bed so that nothing touches the heels.  I have encouraged the patient to continue on their high protein diet to aid in wound healing.   The patient will return to the wound clinic in one week to see me again.        30 minutes spent on the date of the encounter doing chart review, history and exam, documentation and further activities per the note      Frankie Dial  MD Silvia  05/17/2022   9:32 AM   [facility]ound  [phone]

## 2022-05-23 PROBLEM — R60.0 EDEMA OF BOTH UPPER EXTREMITIES: Status: RESOLVED | Noted: 2019-10-17 | Resolved: 2022-01-01

## 2022-05-23 PROBLEM — R41.0 CONFUSION: Status: RESOLVED | Noted: 2021-07-16 | Resolved: 2022-01-01

## 2022-05-23 NOTE — TELEPHONE ENCOUNTER
I called Elenita with lab results.      Due to concerns of fluid retention on exam today, increase Bumex from 2 mg daily to 2 mg in the morning and 1 mg in afternoon for 3 days.    Elenita will be called on Friday to get an update on her symptoms.

## 2022-05-23 NOTE — PROGRESS NOTES
HEART CARE PROGRESS NOTE      [facility]  [phone]      Assessment/Recommendations   Assessment:    1.  Heart failure with reduced ejection fraction, ejection fraction 36%: Secondary to chemotherapy (doxorubicin) which has been stopped.  Over the past 2 months, she has noted increased lower extremity edema, abdominal bloating, and 15 pound weight gain.  Her appetite has also increased so weight gain may be related to increased caloric intake and fluid retention.  Medication titration is limited due to hypotension.    2.  Metastatic breast cancer    Plan:  1.   Heart failure medications:  - Beta blocker therapy with metoprolol succinate 50 mg daily  - ACEI therapy with lisinopril 2.5 mg daily  - Aldosterone blocker therapy with spironolactone 12.5 mg daily  - Diuretic therapy with bumetanide 2 mg daily  2.  BMP pending; after reviewing lab results, will decide about increasing Bumex due to fluid retention  3.  She was encouraged to decrease use of ibuprofen and try Tylenol  4.  Start wearing compression stockings on right leg.  She will check with wound nurse tomorrow about wearing on left leg due to left heel pressure sore.    Elenita Moran will follow up in the heart failure clinic in 2 weeks.  Echocardiogram scheduled on June 2.       History of Present Illness/Subjective    Ms. Elenita Moran is a 67 year old female seen at [facility] today for follow-up.  She has a history of heart failure with reduced ejection fraction secondary to chemotherapy, metastatic breast cancer, diabetes, and chronic MSSA infection of pelvis.  Cardiac stress MRI on March 16, 2022 was negative for ischemia and showed LVEF 36% and RV EF 45%.    Over the past 2 months, she has had increased lower extremity edema and abdominal bloating.  She has chronic dyspnea on exertion with stairs but denies any worsening of her breathing.  She denies orthopnea.  She has chronic  dizziness which is stable.  She had 2 falls in the past month but both were mechanical.  She has pressure sores on her left heel and buttocks and is being seen by wound nurse.  She denies chest pain.      Her home weight has increased about 15 pounds in the past 2 months.  She struggles to follow a low-sodium diet.  She add salt to her food.  Her  helps with cooking and does not cook with salt.          Physical Examination Review of Systems   Vitals: BP 92/52 (BP Location: Right arm, Patient Position: Sitting, Cuff Size: Adult Regular)   Pulse 87   Resp 16   Wt 74.8 kg (165 lb)   BMI 27.46 kg/m    BMI= Body mass index is 27.46 kg/m .  Wt Readings from Last 3 Encounters:   05/23/22 74.8 kg (165 lb)   03/28/22 66.2 kg (146 lb)   03/21/22 65.4 kg (144 lb 3.2 oz)       General Appearance:     Alert, cooperative and in no acute distress.   ENT/Mouth: membranes moist, no oral lesions or bleeding gums.      EYES:  no scleral icterus, normal conjunctivae   Chest/Lungs:   lungs are clear to auscultation, no rales or wheezing, respirations unlabored   Cardiovascular:   Regular. Normal first and second heart sounds, 2-3+ edema bilateral lower legs   Abdomen:   Mildly distended, bowel sounds present   Extremities: no cyanosis or clubbing   Skin: warm, dry.    Neurologic: mood and affect are appropriate, alert and oriented x3         Please refer above for cardiac ROS details.      Medical History  Surgical History Family History Social History   Past Medical History:   Diagnosis Date     Allergic rhinitis      Bone cancer (H) 2011    breast mets     Breast cancer (H) 2010    left mastectomy     Depression      DM (diabetes mellitus) (H)      Fibromyalgia      Hx antineoplastic chemotherapy 2010     Hx of radiation therapy 2010     Hyperlipemia      Lactose intolerance      Mini stroke (H)     R EYE     Osteoarthritis      Tobacco dependency      Past Surgical History:   Procedure Laterality Date     APPENDECTOMY        INSERT INTRACORONARY STENT  1985    R Hand     IR CHEST PORT PLACEMENT > 5 YRS OF AGE  12/12/2019     IR CHEST PORT PLACEMENT > 5 YRS OF AGE  3/14/2022     IR LUMBAR TRANSFORAMINAL EPIDURAL STEROID INJECTION  12/5/2019     IR LUMBAR TRANSFORAMINAL EPIDURAL STRD INJ  12/5/2019     IR PORT PLACEMENT >5 YEARS  12/12/2019     IR PORT REMOVAL RIGHT  3/14/2022     IR SITE CHECK/EVALUATION  4/15/2022     MAMMOPLASTY REDUCTION Right 2015    hx of left mastectomy and right breast reduction     MASTECTOMY Left 2010     OTHER SURGICAL HISTORY      biopsy of upper and right tongue     OVARIAN CYST REMOVAL       REVISE RECONSTRUCTED BREAST Left     March 11, 2015     Family History   Problem Relation Age of Onset     Lung Cancer Mother      Pancreatic Cancer Mother      Kidney Cancer Maternal Grandmother      Lung Cancer Paternal Aunt      Breast Cancer Cousin     Social History     Socioeconomic History     Marital status:      Spouse name: Not on file     Number of children: Not on file     Years of education: Not on file     Highest education level: Not on file   Occupational History     Not on file   Tobacco Use     Smoking status: Former Smoker     Packs/day: 0.75     Years: 20.00     Pack years: 15.00     Types: Cigarettes     Smokeless tobacco: Never Used   Substance and Sexual Activity     Alcohol use: Yes     Alcohol/week: 1.0 standard drink     Comment: 1 glass of wine/week     Drug use: No     Sexual activity: Yes     Partners: Male     Birth control/protection: Post-menopausal   Other Topics Concern     Parent/sibling w/ CABG, MI or angioplasty before 65F 55M? 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  She lives with her  and 1 of her sons.         Social Determinants of Health     Financial Resource Strain: Not on file   Food Insecurity: Not on file   Transportation Needs: Not on file   Physical Activity: Not on file   Stress: Not on file   Social  Connections: Not on file   Intimate Partner Violence: Not on file   Housing Stability: Not on file          Medications  Allergies   Current Outpatient Medications   Medication Sig Dispense Refill     ACETAMINOPHEN PO Take 1,300 mg by mouth daily       blood glucose (ONETOUCH VERIO IQ) test strip        bumetanide (BUMEX) 1 MG tablet Take 2 tablets (2 mg) by mouth daily 120 tablet 3     calcium carbonate (OS-BENJI) 600 MG tablet Take 1-2 mg by mouth daily        cephALEXin (KEFLEX) 500 MG capsule Take 500 mg by mouth 2 times daily       chlorhexidine (PERIDEX) 0.12 % solution Take 15 mLs by mouth daily as needed        Cholecalciferol (VITAMIN D) 125 MCG (5000 UT) capsule Take 1 tablet by mouth daily        clobetasol (TEMOVATE) 0.05 % external ointment Apply topically twice a week       Continuous Blood Gluc  (FREESTYLE KIMBERLY 14 DAY READER) COLETTE Use to check blood sugar at least 4 times a day or as directed        Continuous Blood Gluc Sensor (EvedSTYLE KIMBERLY 14 DAY SENSOR) MISC Use as directed to test blood sugar at least 4 times a day or as directed       docusate sodium (COLACE) 100 MG capsule daily       DULoxetine (CYMBALTA) 30 MG capsule Take 30 mg by mouth every morning        DULoxetine (CYMBALTA) 60 MG capsule Take 1 capsule by mouth At Bedtime       ezetimibe-simvastatin (VYTORIN) 10-20 MG tablet Take 1 tablet by mouth daily       glucose (BD GLUCOSE) 4 g chewable tablet        HYDROmorphone (DILAUDID) 4 MG tablet Take 1 tablet (4 mg) by mouth 3 times daily as needed for moderate to severe pain or severe pain 12 tablet 0     hydrOXYzine (VISTARIL) 25 MG capsule Take 25 mg by mouth 3 times daily With hydromorphone       ibuprofen (ADVIL/MOTRIN) 200 MG capsule Take 400 mg by mouth daily       Insulin Lispro (HUMALOG PEN SC) As needed per sliding scale       LANTUS SOLOSTAR 100 UNIT/ML soln 4 Units every morning       lidocaine (XYLOCAINE) 5 % external ointment Apply topically 4 times daily (Patient  taking differently: Apply topically 4 times daily as needed for moderate pain) 50 g 0     lisinopril (ZESTRIL) 2.5 MG tablet Take 1 tablet (2.5 mg) by mouth daily 30 tablet 3     LORazepam (ATIVAN) 0.5 MG tablet take 1 tablet by mouth up to 3 times per day as needed for nausea, anxiety, or insomnia       metoprolol succinate ER (TOPROL-XL) 50 MG 24 hr tablet Take 1 tablet (50 mg) by mouth daily 90 tablet 3     NOVOLOG FLEXPEN 100 UNIT/ML soln INJECT 1-20 UNITS UNDER THE SKIN 3 TIMES DAILY BEFORE MEALS USING SLIDING SCALE AND CARB COUNT (AVERAGE 30 UNITS A DAY) 30 DAY(S).       nystatin (MYCOSTATIN) 267278 UNIT/GM external cream Apply topically 2 times daily 30 g 1     OLANZapine (ZYPREXA) 5 MG tablet Take 5 mg by mouth every evening        omeprazole (PRILOSEC) 20 MG DR capsule Take 20 mg by mouth At Bedtime        oxybutynin (DITROPAN) 5 MG tablet Take 5 mg by mouth 2 times daily        oxybutynin ER (DITROPAN-XL) 10 MG 24 hr tablet 10 mg every evening       Prenatal Vit-Fe Fumarate-FA (PRENATAL MULTIVITAMIN  PLUS IRON) 27-1 MG TABS Take 1 tablet by mouth daily        psyllium (METAMUCIL/KONSYL) Packet Take 1 packet by mouth daily as needed for constipation       spironolactone (ALDACTONE) 25 MG tablet Take 0.5 tablets (12.5 mg) by mouth daily 90 tablet 3     vitamin (B COMPLEX) tablet Take 1 tablet by mouth daily       zolpidem (AMBIEN) 10 MG tablet Take 5-10 mg by mouth At Bedtime       medical cannabis (Patient's own supply) See Admin Instructions (The purpose of this order is to document that the patient reports taking medical cannabis.  This is not a prescription, and is not used to certify that the patient has a qualifying medical condition.) (Patient not taking: Reported on 5/23/2022)      Allergies   Allergen Reactions     Gabapentin      Other reaction(s): *Unknown  Upper body edema/swelling.         Morphine Visual Disturbance     Visual hallucinations     Sulfamethoxazole-Trimethoprim      Other  reaction(s): Hives     Sulfa Drugs Hives and Rash     welts           Lab Results    Chemistry/lipid CBC Cardiac Enzymes/BNP/TSH/INR   Recent Labs   Lab Test 11/18/20  1436   CHOL 107   HDL 48*   LDL 19   TRIG 202*     Recent Labs   Lab Test 11/18/20  1436 12/18/19  0954 02/27/19  1357   LDL 19 63 32     Recent Labs   Lab Test 02/21/22  1252      POTASSIUM 4.6   CHLORIDE 102   CO2 20*   *   BUN 30*   CR 0.75   GFRESTIMATED 87   BENJI 8.8     Recent Labs   Lab Test 02/21/22  1252 02/15/22  0451 02/14/22  0536   CR 0.75 0.62 0.66     Recent Labs   Lab Test 02/09/22  0851 07/16/21  1054 12/18/19  0000   A1C 5.6 6.3* 5.8    Recent Labs   Lab Test 03/25/22  1405 02/15/22  0451   WBC  --  5.6   HGB 7.6* 9.3*   HCT  --  29.5*   MCV  --  93   PLT  --  240     Recent Labs   Lab Test 03/25/22  1405 02/15/22  0451 02/14/22  0536   HGB 7.6* 9.3* 8.9*    Recent Labs   Lab Test 02/09/22  0851 02/08/22  1803 07/16/21  1054   TROPONINI 0.07 0.12 0.01     Recent Labs   Lab Test 02/08/22  1803 10/17/19  1525   BNP 1,511* 43     Recent Labs   Lab Test 11/18/20  1436   TSH 1.19     Recent Labs   Lab Test 07/16/21  1054 10/17/19  1525   INR 1.45* 1.11*        40 minutes spent on the date of encounter doing chart review, review of test results, interpretation with above tests and patient visit.

## 2022-05-23 NOTE — PATIENT INSTRUCTIONS
Elenita Moran,    It was a pleasure to see you today at the [facility].     My recommendations after this visit include:  - You will be called with the results of your labs later today or tomorrow and we will discuss possibly increasing bumex.    - Limit salt in your diet.   - Follow up with Elenita in 2 weeks.   - Continue to monitor for signs of retaining fluid (increasing weights, shortness of breath, swelling) and call with any concerns.   - If you have any questions or concerns, please call [phone] to talk with my nurse.    Elenita Kirk, CNP      
yes

## 2022-05-23 NOTE — LETTER
5/23/2022    Sandi Jones MD  [facility] [address]  [place] [address]    RE: Elenita Moran       Dear Colleague,     I had the pleasure of seeing Elenita Moran in the [facility].  HEART CARE PROGRESS NOTE      [facility]  [phone]      Assessment/Recommendations   Assessment:    1.  Heart failure with reduced ejection fraction, ejection fraction 36%: Secondary to chemotherapy (doxorubicin) which has been stopped.  Over the past 2 months, she has noted increased lower extremity edema, abdominal bloating, and 15 pound weight gain.  Her appetite has also increased so weight gain may be related to increased caloric intake and fluid retention.  Medication titration is limited due to hypotension.    2.  Metastatic breast cancer    Plan:  1.   Heart failure medications:  - Beta blocker therapy with metoprolol succinate 50 mg daily  - ACEI therapy with lisinopril 2.5 mg daily  - Aldosterone blocker therapy with spironolactone 12.5 mg daily  - Diuretic therapy with bumetanide 2 mg daily  2.  BMP pending; after reviewing lab results, will decide about increasing Bumex due to fluid retention  3.  She was encouraged to decrease use of ibuprofen and try Tylenol  4.  Start wearing compression stockings on right leg.  She will check with wound nurse tomorrow about wearing on left leg due to left heel pressure sore.    Elenita Moran will follow up in the heart failure clinic in 2 weeks.  Echocardiogram scheduled on June 2.       History of Present Illness/Subjective    Ms. Elenita Moran is a 67 year old female seen at [facility] today for follow-up.  She has a history of heart failure with reduced ejection fraction secondary to chemotherapy, metastatic breast cancer, diabetes, and chronic MSSA infection of pelvis.  Cardiac stress MRI on March 16, 2022 was negative for ischemia and showed LVEF  36% and RV EF 45%.    Over the past 2 months, she has had increased lower extremity edema and abdominal bloating.  She has chronic dyspnea on exertion with stairs but denies any worsening of her breathing.  She denies orthopnea.  She has chronic dizziness which is stable.  She had 2 falls in the past month but both were mechanical.  She has pressure sores on her left heel and buttocks and is being seen by wound nurse.  She denies chest pain.      Her home weight has increased about 15 pounds in the past 2 months.  She struggles to follow a low-sodium diet.  She add salt to her food.  Her  helps with cooking and does not cook with salt.          Physical Examination Review of Systems   Vitals: BP 92/52 (BP Location: Right arm, Patient Position: Sitting, Cuff Size: Adult Regular)   Pulse 87   Resp 16   Wt 74.8 kg (165 lb)   BMI 27.46 kg/m    BMI= Body mass index is 27.46 kg/m .  Wt Readings from Last 3 Encounters:   05/23/22 74.8 kg (165 lb)   03/28/22 66.2 kg (146 lb)   03/21/22 65.4 kg (144 lb 3.2 oz)       General Appearance:     Alert, cooperative and in no acute distress.   ENT/Mouth: membranes moist, no oral lesions or bleeding gums.      EYES:  no scleral icterus, normal conjunctivae   Chest/Lungs:   lungs are clear to auscultation, no rales or wheezing, respirations unlabored   Cardiovascular:   Regular. Normal first and second heart sounds, 2-3+ edema bilateral lower legs   Abdomen:   Mildly distended, bowel sounds present   Extremities: no cyanosis or clubbing   Skin: warm, dry.    Neurologic: mood and affect are appropriate, alert and oriented x3         Please refer above for cardiac ROS details.      Medical History  Surgical History Family History Social History   Past Medical History:   Diagnosis Date     Allergic rhinitis      Bone cancer (H) 2011    breast mets     Breast cancer (H) 2010    left mastectomy     Depression      DM (diabetes mellitus) (H)      Fibromyalgia      Hx  antineoplastic chemotherapy 2010     Hx of radiation therapy 2010     Hyperlipemia      Lactose intolerance      Mini stroke (H)     R EYE     Osteoarthritis      Tobacco dependency      Past Surgical History:   Procedure Laterality Date     APPENDECTOMY       INSERT INTRACORONARY STENT  1985    R Hand     IR CHEST PORT PLACEMENT > 5 YRS OF AGE  12/12/2019     IR CHEST PORT PLACEMENT > 5 YRS OF AGE  3/14/2022     IR LUMBAR TRANSFORAMINAL EPIDURAL STEROID INJECTION  12/5/2019     IR LUMBAR TRANSFORAMINAL EPIDURAL STRD INJ  12/5/2019     IR PORT PLACEMENT >5 YEARS  12/12/2019     IR PORT REMOVAL RIGHT  3/14/2022     IR SITE CHECK/EVALUATION  4/15/2022     MAMMOPLASTY REDUCTION Right 2015    hx of left mastectomy and right breast reduction     MASTECTOMY Left 2010     OTHER SURGICAL HISTORY      biopsy of upper and right tongue     OVARIAN CYST REMOVAL       REVISE RECONSTRUCTED BREAST Left     March 11, 2015     Family History   Problem Relation Age of Onset     Lung Cancer Mother      Pancreatic Cancer Mother      Kidney Cancer Maternal Grandmother      Lung Cancer Paternal Aunt      Breast Cancer Cousin     Social History     Socioeconomic History     Marital status:      Spouse name: Not on file     Number of children: Not on file     Years of education: Not on file     Highest education level: Not on file   Occupational History     Not on file   Tobacco Use     Smoking status: Former Smoker     Packs/day: 0.75     Years: 20.00     Pack years: 15.00     Types: Cigarettes     Smokeless tobacco: Never Used   Substance and Sexual Activity     Alcohol use: Yes     Alcohol/week: 1.0 standard drink     Comment: 1 glass of wine/week     Drug use: No     Sexual activity: Yes     Partners: Male     Birth control/protection: Post-menopausal   Other Topics Concern     Parent/sibling w/ CABG, MI or angioplasty before 65F 55M? Not Asked   Social History Narrative    Patient works at home, owns [email or web address].  She  lives with her  and 1 of her sons.         Social Determinants of Health     Financial Resource Strain: Not on file   Food Insecurity: Not on file   Transportation Needs: Not on file   Physical Activity: Not on file   Stress: Not on file   Social Connections: Not on file   Intimate Partner Violence: Not on file   Housing Stability: Not on file          Medications  Allergies   Current Outpatient Medications   Medication Sig Dispense Refill     ACETAMINOPHEN PO Take 1,300 mg by mouth daily       blood glucose (ONETOUCH VERIO IQ) test strip        bumetanide (BUMEX) 1 MG tablet Take 2 tablets (2 mg) by mouth daily 120 tablet 3     calcium carbonate (OS-BENJI) 600 MG tablet Take 1-2 mg by mouth daily        cephALEXin (KEFLEX) 500 MG capsule Take 500 mg by mouth 2 times daily       chlorhexidine (PERIDEX) 0.12 % solution Take 15 mLs by mouth daily as needed        Cholecalciferol (VITAMIN D) 125 MCG (5000 UT) capsule Take 1 tablet by mouth daily        clobetasol (TEMOVATE) 0.05 % external ointment Apply topically twice a week       Continuous Blood Gluc  (AgeneBioYLE KIMBERLY 14 DAY READER) COLETTE Use to check blood sugar at least 4 times a day or as directed        Continuous Blood Gluc Sensor (IFCO SystemsSTYLE KIMBERLY 14 DAY SENSOR) MISC Use as directed to test blood sugar at least 4 times a day or as directed       docusate sodium (COLACE) 100 MG capsule daily       DULoxetine (CYMBALTA) 30 MG capsule Take 30 mg by mouth every morning        DULoxetine (CYMBALTA) 60 MG capsule Take 1 capsule by mouth At Bedtime       ezetimibe-simvastatin (VYTORIN) 10-20 MG tablet Take 1 tablet by mouth daily       glucose (BD GLUCOSE) 4 g chewable tablet        HYDROmorphone (DILAUDID) 4 MG tablet Take 1 tablet (4 mg) by mouth 3 times daily as needed for moderate to severe pain or severe pain 12 tablet 0     hydrOXYzine (VISTARIL) 25 MG capsule Take 25 mg by mouth 3 times daily With hydromorphone       ibuprofen (ADVIL/MOTRIN) 200 MG  capsule Take 400 mg by mouth daily       Insulin Lispro (HUMALOG PEN SC) As needed per sliding scale       LANTUS SOLOSTAR 100 UNIT/ML soln 4 Units every morning       lidocaine (XYLOCAINE) 5 % external ointment Apply topically 4 times daily (Patient taking differently: Apply topically 4 times daily as needed for moderate pain) 50 g 0     lisinopril (ZESTRIL) 2.5 MG tablet Take 1 tablet (2.5 mg) by mouth daily 30 tablet 3     LORazepam (ATIVAN) 0.5 MG tablet take 1 tablet by mouth up to 3 times per day as needed for nausea, anxiety, or insomnia       metoprolol succinate ER (TOPROL-XL) 50 MG 24 hr tablet Take 1 tablet (50 mg) by mouth daily 90 tablet 3     NOVOLOG FLEXPEN 100 UNIT/ML soln INJECT 1-20 UNITS UNDER THE SKIN 3 TIMES DAILY BEFORE MEALS USING SLIDING SCALE AND CARB COUNT (AVERAGE 30 UNITS A DAY) 30 DAY(S).       nystatin (MYCOSTATIN) 213738 UNIT/GM external cream Apply topically 2 times daily 30 g 1     OLANZapine (ZYPREXA) 5 MG tablet Take 5 mg by mouth every evening        omeprazole (PRILOSEC) 20 MG DR capsule Take 20 mg by mouth At Bedtime        oxybutynin (DITROPAN) 5 MG tablet Take 5 mg by mouth 2 times daily        oxybutynin ER (DITROPAN-XL) 10 MG 24 hr tablet 10 mg every evening       Prenatal Vit-Fe Fumarate-FA (PRENATAL MULTIVITAMIN  PLUS IRON) 27-1 MG TABS Take 1 tablet by mouth daily        psyllium (METAMUCIL/KONSYL) Packet Take 1 packet by mouth daily as needed for constipation       spironolactone (ALDACTONE) 25 MG tablet Take 0.5 tablets (12.5 mg) by mouth daily 90 tablet 3     vitamin (B COMPLEX) tablet Take 1 tablet by mouth daily       zolpidem (AMBIEN) 10 MG tablet Take 5-10 mg by mouth At Bedtime       medical cannabis (Patient's own supply) See Admin Instructions (The purpose of this order is to document that the patient reports taking medical cannabis.  This is not a prescription, and is not used to certify that the patient has a qualifying medical condition.) (Patient not taking:  Reported on 5/23/2022)      Allergies   Allergen Reactions     Gabapentin      Other reaction(s): *Unknown  Upper body edema/swelling.         Morphine Visual Disturbance     Visual hallucinations     Sulfamethoxazole-Trimethoprim      Other reaction(s): Hives     Sulfa Drugs Hives and Rash     welts           Lab Results    Chemistry/lipid CBC Cardiac Enzymes/BNP/TSH/INR   Recent Labs   Lab Test 11/18/20  1436   CHOL 107   HDL 48*   LDL 19   TRIG 202*     Recent Labs   Lab Test 11/18/20  1436 12/18/19  0954 02/27/19  1357   LDL 19 63 32     Recent Labs   Lab Test 02/21/22  1252      POTASSIUM 4.6   CHLORIDE 102   CO2 20*   *   BUN 30*   CR 0.75   GFRESTIMATED 87   BENJI 8.8     Recent Labs   Lab Test 02/21/22  1252 02/15/22  0451 02/14/22  0536   CR 0.75 0.62 0.66     Recent Labs   Lab Test 02/09/22  0851 07/16/21  1054 12/18/19  0000   A1C 5.6 6.3* 5.8    Recent Labs   Lab Test 03/25/22  1405 02/15/22  0451   WBC  --  5.6   HGB 7.6* 9.3*   HCT  --  29.5*   MCV  --  93   PLT  --  240     Recent Labs   Lab Test 03/25/22  1405 02/15/22  0451 02/14/22  0536   HGB 7.6* 9.3* 8.9*    Recent Labs   Lab Test 02/09/22  0851 02/08/22  1803 07/16/21  1054   TROPONINI 0.07 0.12 0.01     Recent Labs   Lab Test 02/08/22  1803 10/17/19  1525   BNP 1,511* 43     Recent Labs   Lab Test 11/18/20  1436   TSH 1.19     Recent Labs   Lab Test 07/16/21  1054 10/17/19  1525   INR 1.45* 1.11*        40 minutes spent on the date of encounter doing chart review, review of test results, interpretation with above tests and patient visit.          Thank you for allowing me to participate in the care of your patient.      Sincerely,     Elenita Reyes NP     [facility]  cc:   Renu Munoz MD  [address] [place] [id number]

## 2022-05-24 NOTE — PATIENT INSTRUCTIONS
Wound Care Instructions- Left buttock    Every 2-3 days and as needed, Cleanse your gluteal wound(s) with Normal saline.    Pat Dry with non-sterile gauze    Apply Lotion to the intact skin surrounding your wound and other dry skin locations. Some good lotions include: Remedy Skin Repair Cream, Sarna, Vanicream or Cetaphil    Primary Dressing: Apply endoform into/onto the wounds    Secondary dressing: Cover with mepilex 4x4    It is ok to get your wound wet in the bath or shower          Wound Care Instructions- Left heel    1-2 TIMES PER WEEK and as needed, Cleanse your heel wound(s) with tap water.    Pat Dry with non-sterile gauze    Apply Lotion to the intact skin surrounding your wound and other dry skin locations. Some good lotions include: Remedy Skin Repair Cream, Sarna, Vanicream or Cetaphil    Primary Dressing: Apply mepilex or your preferred cover dressing into/onto the wounds    It is ok to get your wound wet in the bath or shower    Float your heels as often as you can.    Cushion:  It is recommended that you obtain a UserTesting Cushion. It can be purchased online on [employer], or through your local [employer] (Durable Medical Equipment) store. They come in a variety of sizes - you can choose which size works best for your chair.   You only need to purchase one as it can be transferred between chairs.             If for some reason you are not able to get your dressing(s) changed as outlined above (due to illness, lack of supplies, lack of help) please do the following: remove old, soiled dressings; wash the wounds with saline; pat dry; apply ABD pad or other absorbant pad and secure with rolled gauze; avoid tape directly on your skin; Call the clinic as soon as possible to let us know what the current issues are in receiving wound care [phone].      SEEK MEDICAL CARE IF:  You have an increase in swelling, pain, or redness around the wound.  You have an increase in the amount of pus coming from the  wound.  There is a bad smell coming from the wound.  The wound appears to be worsening/enlarging  You have a fever greater than 101.5 F      It is ok to continue current wound care treatment/products for the next 2-3 days until new wound care supplies are ordered and arrive. If longer than this please contact our office at [phone].    If you have a 2 layer or 4 layer compression wrap on these are safe to have on for ONLY 7 days. If for some reason you are not able to get the wrap(s) changed (due to illness; lack of supplies, lack of help, lack of transportation) please do the following: unwrap the old 2 or 4 layer compression wrap; avoid using scissors as you could cut your skin and cause wounds; use tubular compression when available. Call to reschedule your home care or clinic visit appointment as soon as possible.    Please NOTE: if you are 15 minutes late to your clinic appointment you will have to be rescheduled. Please call our clinic as soon as possible if you know you will not be able to get to your appointment at [phone].    If you fail to show up to 3 scheduled clinic appointments you will be dismissed from our clinic.              We want to hear from you!  In the next few weeks, you should receive a call or email to complete a survey about your visit at [facility]. Please help us improve your appointment experience by letting us know how we did today. We strive to make your experience good and value any ways in which we could do better.      We value your input and suggestions.    Thank you for choosing the [facility]!

## 2022-05-24 NOTE — PROGRESS NOTES
Wound Clinic Note          Visit date: 05/24/2022       Cheif Complaint:     Elenita Moran is a 67 year old female had no chief complaint listed for this encounter..  The patient has sacrococcygeal pressure ulcer .  She also has a left heel wound.      HISTORY OF PRESENT ILLNESS:    Elenita Moran reports the wound has been present since July 2021.  The wound began While she was in the hospital with another illness and laying in the bed for several days at a time.  She has never had other pressure ulcers on the bottom.  She does have metastatic breast cancer and is currently receiving chemotherapy.  She also has a left heel wound which developed in late April without a clear cause.  She has normal sensation and somewhat reduced motor function in the lower extremities.  She does use a wheelchair when she goes out of the house but is able to use a walker around the house.  She reports she spends most of the day in a lift chair.  She confirms that she currently sleeps in a bed.  The patient was accompanied to the wound clinic for initial evaluation by her  who does her dressing changes and is also her medical power of .    Today for her third visit here in the wound clinic she is again accompanied by her  who has been doing the dressing changes.  They both provide some of the interval history.  They have continued to use the Roho cushion in her lift chair and her wheelchair today.    The patient's  has been bandaging the sacral wound with endoform and a Mepilex changed every 2 to 3 days, there has been light serous drainage.  He has been bandaging the left heel wound with a Mepilex bandage changed 3 times a week.  There is also been light serous drainage from this.    They have been floating the heels while she is in bed.        The pateint denies fevers or chills.  They report the pain from the wound has been 4/10 and has remained about the same recently.       Today the patient reports maintaining a high protein diet and taking protein supplements regularly.        The patient denies a history of smoking or chronic steroid use.  and The patient confirms having diabetes and reports the blood sugars are well controlled.         The patient has not had any symptoms of infection relating to the wound recently and is not currently on antibiotics.       Problem List:   Past Medical History:   Diagnosis Date     Allergic rhinitis      Bone cancer (H) 2011    breast mets     Breast cancer (H) 2010    left mastectomy     Depression      DM (diabetes mellitus) (H)      Fibromyalgia      Hx antineoplastic chemotherapy 2010     Hx of radiation therapy 2010     Hyperlipemia      Lactose intolerance      Mini stroke (H)     R EYE     Osteoarthritis      Tobacco dependency               Family Hx: family history includes Breast Cancer in her cousin; Kidney Cancer in her maternal grandmother; Lung Cancer in her mother and paternal aunt; Pancreatic Cancer in her mother.       Surgical Hx:   Past Surgical History:   Procedure Laterality Date     APPENDECTOMY       INSERT INTRACORONARY STENT  1985    R Hand     IR CHEST PORT PLACEMENT > 5 YRS OF AGE  12/12/2019     IR CHEST PORT PLACEMENT > 5 YRS OF AGE  3/14/2022     IR LUMBAR TRANSFORAMINAL EPIDURAL STEROID INJECTION  12/5/2019     IR LUMBAR TRANSFORAMINAL EPIDURAL STRD INJ  12/5/2019     IR PORT PLACEMENT >5 YEARS  12/12/2019     IR PORT REMOVAL RIGHT  3/14/2022     IR SITE CHECK/EVALUATION  4/15/2022     MAMMOPLASTY REDUCTION Right 2015    hx of left mastectomy and right breast reduction     MASTECTOMY Left 2010     OTHER SURGICAL HISTORY      biopsy of upper and right tongue     OVARIAN CYST REMOVAL       REVISE RECONSTRUCTED BREAST Left     March 11, 2015          Allergies:    Allergies   Allergen Reactions     Gabapentin      Other reaction(s): *Unknown  Upper body edema/swelling.         Morphine Visual Disturbance     Visual  hallucinations     Sulfamethoxazole-Trimethoprim      Other reaction(s): Hives     Sulfa Drugs Hives and Rash     welts                Medication History:    Current Outpatient Medications   Medication Sig     ACETAMINOPHEN PO Take 1,300 mg by mouth daily     blood glucose (ONETOUCH VERIO IQ) test strip      bumetanide (BUMEX) 1 MG tablet Take 2 tablets (2 mg) by mouth daily     calcium carbonate (OS-BENJI) 600 MG tablet Take 1-2 mg by mouth daily      cephALEXin (KEFLEX) 500 MG capsule Take 500 mg by mouth 2 times daily     chlorhexidine (PERIDEX) 0.12 % solution Take 15 mLs by mouth daily as needed      Cholecalciferol (VITAMIN D) 125 MCG (5000 UT) capsule Take 1 tablet by mouth daily      clobetasol (TEMOVATE) 0.05 % external ointment Apply topically twice a week     Continuous Blood Gluc  (FREESTYLE KIMBERLY 14 DAY READER) COLETTE Use to check blood sugar at least 4 times a day or as directed      Continuous Blood Gluc Sensor (SiastoSTYLE KIMBERLY 14 DAY SENSOR) MISC Use as directed to test blood sugar at least 4 times a day or as directed     docusate sodium (COLACE) 100 MG capsule daily     DULoxetine (CYMBALTA) 30 MG capsule Take 30 mg by mouth every morning      DULoxetine (CYMBALTA) 60 MG capsule Take 1 capsule by mouth At Bedtime     ezetimibe-simvastatin (VYTORIN) 10-20 MG tablet Take 1 tablet by mouth daily     glucose (BD GLUCOSE) 4 g chewable tablet      HYDROmorphone (DILAUDID) 4 MG tablet Take 1 tablet (4 mg) by mouth 3 times daily as needed for moderate to severe pain or severe pain     hydrOXYzine (VISTARIL) 25 MG capsule Take 25 mg by mouth 3 times daily With hydromorphone     ibuprofen (ADVIL/MOTRIN) 200 MG capsule Take 400 mg by mouth daily     Insulin Lispro (HUMALOG PEN SC) As needed per sliding scale     LANTUS SOLOSTAR 100 UNIT/ML soln 4 Units every morning     lidocaine (XYLOCAINE) 5 % external ointment Apply topically 4 times daily (Patient taking differently: Apply topically 4 times daily as  needed for moderate pain)     lisinopril (ZESTRIL) 2.5 MG tablet Take 1 tablet (2.5 mg) by mouth daily     LORazepam (ATIVAN) 0.5 MG tablet take 1 tablet by mouth up to 3 times per day as needed for nausea, anxiety, or insomnia     medical cannabis (Patient's own supply) See Admin Instructions (The purpose of this order is to document that the patient reports taking medical cannabis.  This is not a prescription, and is not used to certify that the patient has a qualifying medical condition.) (Patient not taking: Reported on 5/23/2022)     metoprolol succinate ER (TOPROL-XL) 50 MG 24 hr tablet Take 1 tablet (50 mg) by mouth daily     NOVOLOG FLEXPEN 100 UNIT/ML soln INJECT 1-20 UNITS UNDER THE SKIN 3 TIMES DAILY BEFORE MEALS USING SLIDING SCALE AND CARB COUNT (AVERAGE 30 UNITS A DAY) 30 DAY(S).     nystatin (MYCOSTATIN) 945880 UNIT/GM external cream Apply topically 2 times daily     OLANZapine (ZYPREXA) 5 MG tablet Take 5 mg by mouth every evening      omeprazole (PRILOSEC) 20 MG DR capsule Take 20 mg by mouth At Bedtime      oxybutynin (DITROPAN) 5 MG tablet Take 5 mg by mouth 2 times daily      oxybutynin ER (DITROPAN-XL) 10 MG 24 hr tablet 10 mg every evening     Prenatal Vit-Fe Fumarate-FA (PRENATAL MULTIVITAMIN  PLUS IRON) 27-1 MG TABS Take 1 tablet by mouth daily      psyllium (METAMUCIL/KONSYL) Packet Take 1 packet by mouth daily as needed for constipation     spironolactone (ALDACTONE) 25 MG tablet Take 0.5 tablets (12.5 mg) by mouth daily     vitamin (B COMPLEX) tablet Take 1 tablet by mouth daily     zolpidem (AMBIEN) 10 MG tablet Take 5-10 mg by mouth At Bedtime     Current Facility-Administered Medications   Medication     lidocaine (XYLOCAINE) 2 % external gel         Tobacco History:  reports that she has quit smoking. Her smoking use included cigarettes. She has a 15.00 pack-year smoking history. She has never used smokeless tobacco.       REVIEW OF SYMPTOMS:   The review of systems was negative except  as noted in the HPI.           PHYSICAL EXAMINATION:     There were no vitals taken for this visit.           GENERAL: The patient overall appears well and is no acute distress.   HEAD: normocephalic   EYES: Sclera and conjunctiva clear   NECK: no obvious masses   LUNGS: breathing is unlabored.   EXTREMITIES: No clubbing, cyanosis or edema   SKIN: No rashes or other abnormalities except as noted under the Wound section below.   NEUROLOGICAL: Normal sensory function, decreased motor function in the lower extremities.      WOUND: The wound appears healthy with no sign of infection.   Wound bed: necrotic material at the sacral wound, there is no necrotic material at the left heel wound.  Periwound: healthy intact skin  The sacral wound is a fairly small wound consistent with a stage III sacral pressure ulcer.  The wound measurements for the sacral wound are at the heel wound appears to be healing well.      Also see below for wound details:         Ulceration(s)/Wound(s):   Please see the media tab under the chart review for pictures of the wounds.             Recent Labs   Lab Test 02/09/22  0851 07/16/21  1054 12/18/19  0000   A1C 5.6 6.3* 5.8          Recent Labs   Lab Test 02/10/22  0626 02/09/22  0851 07/16/21  1054   ALBUMIN 2.6* 3.0* 2.8*              Procedure note: , I had previous obtain informed consent from the patient to perform serial debridements, I confirmed this again with the patient today verbally. , Anesthetized as needed. , Using a curette and/or a scalpel I performed an excisional debridement removing all necrotic material at the Sacral wound down to the level of viable subcutaneous tissue.  I obtained hemostasis with direct pressure.  The patient tolerated the procedure well. , EBL: <5 ml  and Total debridement surface area: Less than 20 cm          There was no debridement required at the left heel wound.      ASSESSMENT:   This is a 67 year old female with a stage III sacral pressure ulcer and  a left heel wound.          PLAN:   We will bandage the sacral wound with endoform and a Mepilex changed every other day.  We will bandage the left heel wound with a Mepilex or a simple bandage changed every other day.  I have encouraged her to continue to use the Roho mosaic cushion in any chair where she is seated for an extended period of time.  I have encouraged the patient to continue to minimize the time they spend in the wheelchair.  and The patient's heels should be floated while in bed so that nothing touches the heels.  I have encouraged the patient to continue on their high protein diet to aid in wound healing.   The patient will return to the wound clinic in 2 weeks to see me again.        30 minutes spent on the date of the encounter doing chart review, history and exam, documentation and further activities per the note      Frankie Vega MD  05/24/2022   8:56 AM   [facility]  [phone]

## 2022-05-27 NOTE — TELEPHONE ENCOUNTER
Called Elenita to get an update on symptoms with increasing bumex earlier this week.  Her edema and abdominal bloating have improved but are still present.  Her weights are difficult due to weighing with a wheelchair and occasionally needing to hold on while standing for weight  However, it appears that her weight has decreased about 9 pounds.  Her weight is still above baseline by about 5 to 6 pounds.    She has no side effects of being on higher dose of Bumex.  Continue higher dose at this time.  Check BMP next week.

## 2022-06-03 NOTE — TELEPHONE ENCOUNTER
Called Elenita with stable lab results.  She continues to take Bumex 2 mg in the morning and 1 mg in afternoon.  She has lower extremity edema but denies any significant worsening.  She states she has good days and bad days with edema.  Her appetite has improved.  She continues to limit salt in her diet.  Her home weight yesterday was 159 pounds.  She feels that her weight has stabilized.    She will follow-up in clinic on June 6.

## 2022-06-06 NOTE — PATIENT INSTRUCTIONS
Elenita Moran,    It was a pleasure to see you today at the [facility].     My recommendations after this visit include:  - No changes to your medications.    - Continue to monitor for signs of retaining fluid (increasing weights, shortness of breath, swelling) and call with any concerns.   - Follow up with Elenita in 6 weeks.   - If you have any questions or concerns, please call [phone] to talk with my nurse.    Elenita Kirk, CNP

## 2022-06-06 NOTE — PROGRESS NOTES
HEART CARE PROGRESS NOTE      [facility]  [phone]      Assessment/Recommendations   Assessment:    1.  Heart failure with reduced ejection fraction, ejection fraction 36%: Secondary to chemotherapy (doxorubicin) which has been stopped.  Her fluid retention has improved since increasing Bumex 2 weeks ago.  Her weight has decreased 9 pounds.  She has chronic shortness of breath which is stable.  Lung sounds are clear.  Lower extremity edema has improved significantly.    2.  Metastatic breast cancer.  She has chemotherapy every 3 weeks.    Plan:  1.   Heart failure medications:  - Beta blocker therapy with metoprolol succinate 50 mg daily  - ACEI therapy with lisinopril 2.5 mg daily  - Aldosterone blocker therapy with spironolactone 12.5 mg daily  - Diuretic therapy with bumetanide 2 mg in the morning and 1 mg in the afternoon  2.  BMP on Elenita 3 showed stable lab results  3.  Continue current medications  4.  Low-sodium diet and daily weights    Elenita Moran will follow up in the heart failure clinic in 6 weeks.  Echocardiogram scheduled on June 14.       History of Present Illness/Subjective    Ms. Elenita Moran is a 67 year old female seen at [facility] today for follow-up.  She has a history of heart failure with reduced ejection fraction secondary to chemotherapy, metastatic breast cancer, diabetes, and chronic MSSA infection of pelvis.  Cardiac stress MRI on March 16, 2022 was negative for ischemia and showed LVEF 36% and RV EF 45%.     She noted increased fluid retention over the past 2 months.  She was seen in clinic 2 weeks ago and Bumex increased.  Her weight has decreased 9 pounds and her edema has improved.  She has chronic shortness of breath with walking upstairs.  She denies orthopnea and chest pain.      She is working on following a low-sodium diet.    She has chemotherapy every 3 weeks.        Physical Examination Review of  Systems   Vitals: /58 (BP Location: Right arm, Patient Position: Sitting, Cuff Size: Adult Regular)   Pulse 86   Resp 16   Wt 70.8 kg (156 lb)   BMI 25.96 kg/m    BMI= Body mass index is 25.96 kg/m .  Wt Readings from Last 3 Encounters:   06/06/22 70.8 kg (156 lb)   05/23/22 74.8 kg (165 lb)   03/28/22 66.2 kg (146 lb)       General Appearance:     Alert, cooperative and in no acute distress.   ENT/Mouth: membranes moist, no oral lesions or bleeding gums.      EYES:  no scleral icterus, normal conjunctivae   Chest/Lungs:   lungs are clear to auscultation, no rales or wheezing, respirations unlabored   Cardiovascular:   Regular. Normal first and second heart sounds, 1+ edema bilateral lower legs   Abdomen:   Nondistended, bowel sounds present   Extremities: no cyanosis or clubbing   Skin: warm, dry.    Neurologic: mood and affect are appropriate, alert and oriented x3         Please refer above for cardiac ROS details.      Medical History  Surgical History Family History Social History   Past Medical History:   Diagnosis Date     Allergic rhinitis      Bone cancer (H) 2011    breast mets     Breast cancer (H) 2010    left mastectomy     Depression      DM (diabetes mellitus) (H)      Fibromyalgia      Hx antineoplastic chemotherapy 2010     Hx of radiation therapy 2010     Hyperlipemia      Lactose intolerance      Mini stroke (H)     R EYE     Osteoarthritis      Tobacco dependency      Past Surgical History:   Procedure Laterality Date     APPENDECTOMY       INSERT INTRACORONARY STENT  1985    R Hand     IR CHEST PORT PLACEMENT > 5 YRS OF AGE  12/12/2019     IR CHEST PORT PLACEMENT > 5 YRS OF AGE  3/14/2022     IR LUMBAR TRANSFORAMINAL EPIDURAL STEROID INJECTION  12/5/2019     IR LUMBAR TRANSFORAMINAL EPIDURAL STRD INJ  12/5/2019     IR PORT PLACEMENT >5 YEARS  12/12/2019     IR PORT REMOVAL RIGHT  3/14/2022     IR SITE CHECK/EVALUATION  4/15/2022     MAMMOPLASTY REDUCTION Right 2015    hx of left  mastectomy and right breast reduction     MASTECTOMY Left 2010     OTHER SURGICAL HISTORY      biopsy of upper and right tongue     OVARIAN CYST REMOVAL       REVISE RECONSTRUCTED BREAST Left     March 11, 2015     Family History   Problem Relation Age of Onset     Lung Cancer Mother      Pancreatic Cancer Mother      Kidney Cancer Maternal Grandmother      Lung Cancer Paternal Aunt      Breast Cancer Cousin     Social History     Socioeconomic History     Marital status:      Spouse name: Not on file     Number of children: Not on file     Years of education: Not on file     Highest education level: Not on file   Occupational History     Not on file   Tobacco Use     Smoking status: Former Smoker     Packs/day: 0.75     Years: 20.00     Pack years: 15.00     Types: Cigarettes     Smokeless tobacco: Never Used   Substance and Sexual Activity     Alcohol use: Yes     Alcohol/week: 1.0 standard drink     Comment: 1 glass of wine/week     Drug use: No     Sexual activity: Yes     Partners: Male     Birth control/protection: Post-menopausal   Other Topics Concern     Parent/sibling w/ CABG, MI or angioplasty before 65F 55M? 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  She lives with her  and 1 of her sons.         Social Determinants of Health     Financial Resource Strain: Not on file   Food Insecurity: Not on file   Transportation Needs: Not on file   Physical Activity: Not on file   Stress: Not on file   Social Connections: Not on file   Intimate Partner Violence: Not on file   Housing Stability: Not on file          Medications  Allergies   Current Outpatient Medications   Medication Sig Dispense Refill     ACETAMINOPHEN PO Take 1,300 mg by mouth as needed       blood glucose (ONETOUCH VERIO IQ) test strip        bumetanide (BUMEX) 1 MG tablet 2 mg in morning and 1 mg afternoon 270 tablet 3     calcium carbonate (OS-BENJI) 600 MG tablet Take 1-2 mg by mouth daily         chlorhexidine (PERIDEX) 0.12 % solution Take 15 mLs by mouth daily as needed        Cholecalciferol (VITAMIN D) 125 MCG (5000 UT) capsule Take 1 tablet by mouth daily        clobetasol (TEMOVATE) 0.05 % external ointment Apply topically twice a week       Continuous Blood Gluc  (FREESTYLE KIMBERLY 14 DAY READER) COLETTE Use to check blood sugar at least 4 times a day or as directed        Continuous Blood Gluc Sensor (FREESTYLE KIMBERLY 14 DAY SENSOR) MISC Use as directed to test blood sugar at least 4 times a day or as directed       docusate sodium (COLACE) 100 MG capsule daily       DULoxetine (CYMBALTA) 30 MG capsule Take 30 mg by mouth every morning        DULoxetine (CYMBALTA) 60 MG capsule Take 1 capsule by mouth At Bedtime       ezetimibe-simvastatin (VYTORIN) 10-20 MG tablet Take 1 tablet by mouth daily       glucose (BD GLUCOSE) 4 g chewable tablet as needed       HYDROmorphone (DILAUDID) 4 MG tablet Take 1 tablet (4 mg) by mouth 3 times daily as needed for moderate to severe pain or severe pain 12 tablet 0     hydrOXYzine (VISTARIL) 25 MG capsule Take 25 mg by mouth 3 times daily With hydromorphone       ibuprofen (ADVIL/MOTRIN) 200 MG capsule Take 400 mg by mouth daily       Insulin Lispro (HUMALOG PEN SC) As needed per sliding scale       LANTUS SOLOSTAR 100 UNIT/ML soln 4 Units every morning       lidocaine (XYLOCAINE) 5 % external ointment Apply topically 4 times daily (Patient taking differently: Apply topically 4 times daily as needed for moderate pain) 50 g 0     lisinopril (ZESTRIL) 2.5 MG tablet Take 1 tablet (2.5 mg) by mouth daily 30 tablet 3     LORazepam (ATIVAN) 0.5 MG tablet take 1 tablet by mouth up to 3 times per day as needed for nausea, anxiety, or insomnia       medical cannabis (Patient's own supply) See Admin Instructions (The purpose of this order is to document that the patient reports taking medical cannabis.  This is not a prescription, and is not used to certify that the  patient has a qualifying medical condition.)       metoprolol succinate ER (TOPROL-XL) 50 MG 24 hr tablet Take 1 tablet (50 mg) by mouth daily 90 tablet 3     nystatin (MYCOSTATIN) 103336 UNIT/GM external cream Apply topically 2 times daily 30 g 1     OLANZapine (ZYPREXA) 5 MG tablet Take 5 mg by mouth every evening        omeprazole (PRILOSEC) 20 MG DR capsule Take 20 mg by mouth At Bedtime        oxybutynin (DITROPAN) 5 MG tablet Take 5 mg by mouth 2 times daily        oxybutynin ER (DITROPAN-XL) 10 MG 24 hr tablet 10 mg every evening       Prenatal Vit-Fe Fumarate-FA (PRENATAL MULTIVITAMIN  PLUS IRON) 27-1 MG TABS Take 1 tablet by mouth daily        psyllium (METAMUCIL/KONSYL) Packet Take 1 packet by mouth daily as needed for constipation       spironolactone (ALDACTONE) 25 MG tablet Take 0.5 tablets (12.5 mg) by mouth daily 90 tablet 3     vitamin (B COMPLEX) tablet Take 1 tablet by mouth daily       zolpidem (AMBIEN) 10 MG tablet Take 5-10 mg by mouth At Bedtime      Allergies   Allergen Reactions     Gabapentin      Other reaction(s): *Unknown  Upper body edema/swelling.         Morphine Visual Disturbance     Visual hallucinations     Sulfamethoxazole-Trimethoprim      Other reaction(s): Hives     Sulfa Drugs Hives and Rash     welts           Lab Results    Chemistry/lipid CBC Cardiac Enzymes/BNP/TSH/INR   Recent Labs   Lab Test 11/18/20  1436   CHOL 107   HDL 48*   LDL 19   TRIG 202*     Recent Labs   Lab Test 11/18/20  1436 12/18/19  0954 02/27/19  1357   LDL 19 63 32     Recent Labs   Lab Test 02/21/22  1252      POTASSIUM 4.6   CHLORIDE 102   CO2 20*   *   BUN 30*   CR 0.75   GFRESTIMATED 87   BENJI 8.8     Recent Labs   Lab Test 02/21/22  1252 02/15/22  0451 02/14/22  0536   CR 0.75 0.62 0.66     Recent Labs   Lab Test 02/09/22  0851 07/16/21  1054 12/18/19  0000   A1C 5.6 6.3* 5.8    Recent Labs   Lab Test 03/25/22  1405 02/15/22  0451   WBC  --  5.6   HGB 7.6* 9.3*   HCT  --  29.5*   MCV   --  93   PLT  --  240     Recent Labs   Lab Test 03/25/22  1405 02/15/22  0451 02/14/22  0536   HGB 7.6* 9.3* 8.9*    Recent Labs   Lab Test 02/09/22  0851 02/08/22  1803 07/16/21  1054   TROPONINI 0.07 0.12 0.01     Recent Labs   Lab Test 02/08/22  1803 10/17/19  1525   BNP 1,511* 43     Recent Labs   Lab Test 11/18/20  1436   TSH 1.19     Recent Labs   Lab Test 07/16/21  1054 10/17/19  1525   INR 1.45* 1.11*

## 2022-06-06 NOTE — LETTER
6/6/2022    Sandi Jones MD  [facility] [address]  [place] [address]    RE: Elenita Moran       Dear Colleague,     I had the pleasure of seeing Elenita Moran in the [facility].  HEART CARE PROGRESS NOTE      [facility]  [phone]      Assessment/Recommendations   Assessment:    1.  Heart failure with reduced ejection fraction, ejection fraction 36%: Secondary to chemotherapy (doxorubicin) which has been stopped.  Her fluid retention has improved since increasing Bumex 2 weeks ago.  Her weight has decreased 9 pounds.  She has chronic shortness of breath which is stable.  Lung sounds are clear.  Lower extremity edema has improved significantly.    2.  Metastatic breast cancer.  She has chemotherapy every 3 weeks.    Plan:  1.   Heart failure medications:  - Beta blocker therapy with metoprolol succinate 50 mg daily  - ACEI therapy with lisinopril 2.5 mg daily  - Aldosterone blocker therapy with spironolactone 12.5 mg daily  - Diuretic therapy with bumetanide 2 mg in the morning and 1 mg in the afternoon  2.  BMP on Elenita 3 showed stable lab results  3.  Continue current medications  4.  Low-sodium diet and daily weights    Elenita oMran will follow up in the heart failure clinic in 6 weeks.  Echocardiogram scheduled on June 14.       History of Present Illness/Subjective    Ms. Elenita Moran is a 67 year old female seen at [facility] today for follow-up.  She has a history of heart failure with reduced ejection fraction secondary to chemotherapy, metastatic breast cancer, diabetes, and chronic MSSA infection of pelvis.  Cardiac stress MRI on March 16, 2022 was negative for ischemia and showed LVEF 36% and RV EF 45%.     She noted increased fluid retention over the past 2 months.  She was seen in clinic 2 weeks ago and Bumex increased.  Her weight has decreased 9 pounds and her  edema has improved.  She has chronic shortness of breath with walking upstairs.  She denies orthopnea and chest pain.      She is working on following a low-sodium diet.    She has chemotherapy every 3 weeks.        Physical Examination Review of Systems   Vitals: /58 (BP Location: Right arm, Patient Position: Sitting, Cuff Size: Adult Regular)   Pulse 86   Resp 16   Wt 70.8 kg (156 lb)   BMI 25.96 kg/m    BMI= Body mass index is 25.96 kg/m .  Wt Readings from Last 3 Encounters:   06/06/22 70.8 kg (156 lb)   05/23/22 74.8 kg (165 lb)   03/28/22 66.2 kg (146 lb)       General Appearance:     Alert, cooperative and in no acute distress.   ENT/Mouth: membranes moist, no oral lesions or bleeding gums.      EYES:  no scleral icterus, normal conjunctivae   Chest/Lungs:   lungs are clear to auscultation, no rales or wheezing, respirations unlabored   Cardiovascular:   Regular. Normal first and second heart sounds, 1+ edema bilateral lower legs   Abdomen:   Nondistended, bowel sounds present   Extremities: no cyanosis or clubbing   Skin: warm, dry.    Neurologic: mood and affect are appropriate, alert and oriented x3         Please refer above for cardiac ROS details.      Medical History  Surgical History Family History Social History   Past Medical History:   Diagnosis Date     Allergic rhinitis      Bone cancer (H) 2011    breast mets     Breast cancer (H) 2010    left mastectomy     Depression      DM (diabetes mellitus) (H)      Fibromyalgia      Hx antineoplastic chemotherapy 2010     Hx of radiation therapy 2010     Hyperlipemia      Lactose intolerance      Mini stroke (H)     R EYE     Osteoarthritis      Tobacco dependency      Past Surgical History:   Procedure Laterality Date     APPENDECTOMY       INSERT INTRACORONARY STENT  1985    R Hand     IR CHEST PORT PLACEMENT > 5 YRS OF AGE  12/12/2019     IR CHEST PORT PLACEMENT > 5 YRS OF AGE  3/14/2022     IR LUMBAR TRANSFORAMINAL EPIDURAL STEROID INJECTION   12/5/2019     IR LUMBAR TRANSFORAMINAL EPIDURAL STRD INJ  12/5/2019     IR PORT PLACEMENT >5 YEARS  12/12/2019     IR PORT REMOVAL RIGHT  3/14/2022     IR SITE CHECK/EVALUATION  4/15/2022     MAMMOPLASTY REDUCTION Right 2015    hx of left mastectomy and right breast reduction     MASTECTOMY Left 2010     OTHER SURGICAL HISTORY      biopsy of upper and right tongue     OVARIAN CYST REMOVAL       REVISE RECONSTRUCTED BREAST Left     March 11, 2015     Family History   Problem Relation Age of Onset     Lung Cancer Mother      Pancreatic Cancer Mother      Kidney Cancer Maternal Grandmother      Lung Cancer Paternal Aunt      Breast Cancer Cousin     Social History     Socioeconomic History     Marital status:      Spouse name: Not on file     Number of children: Not on file     Years of education: Not on file     Highest education level: Not on file   Occupational History     Not on file   Tobacco Use     Smoking status: Former Smoker     Packs/day: 0.75     Years: 20.00     Pack years: 15.00     Types: Cigarettes     Smokeless tobacco: Never Used   Substance and Sexual Activity     Alcohol use: Yes     Alcohol/week: 1.0 standard drink     Comment: 1 glass of wine/week     Drug use: No     Sexual activity: Yes     Partners: Male     Birth control/protection: Post-menopausal   Other Topics Concern     Parent/sibling w/ CABG, MI or angioplasty before 65F 55M? Not Asked   Social History Narrative    Patient works at home, owns [employer].  She lives with her  and 1 of her sons.         Social Determinants of Health     Financial Resource Strain: Not on file   Food Insecurity: Not on file   Transportation Needs: Not on file   Physical Activity: Not on file   Stress: Not on file   Social Connections: Not on file   Intimate Partner Violence: Not on file   Housing Stability: Not on file          Medications  Allergies   Current Outpatient Medications   Medication Sig Dispense Refill     ACETAMINOPHEN PO  Take 1,300 mg by mouth as needed       blood glucose (ONETOUCH VERIO IQ) test strip        bumetanide (BUMEX) 1 MG tablet 2 mg in morning and 1 mg afternoon 270 tablet 3     calcium carbonate (OS-BENJI) 600 MG tablet Take 1-2 mg by mouth daily        chlorhexidine (PERIDEX) 0.12 % solution Take 15 mLs by mouth daily as needed        Cholecalciferol (VITAMIN D) 125 MCG (5000 UT) capsule Take 1 tablet by mouth daily        clobetasol (TEMOVATE) 0.05 % external ointment Apply topically twice a week       Continuous Blood Gluc  (FREESTYLE KIMBERLY 14 DAY READER) COLETTE Use to check blood sugar at least 4 times a day or as directed        Continuous Blood Gluc Sensor ([employer] Medical TechnologySTYLE KIMBERLY 14 DAY SENSOR) MISC Use as directed to test blood sugar at least 4 times a day or as directed       docusate sodium (COLACE) 100 MG capsule daily       DULoxetine (CYMBALTA) 30 MG capsule Take 30 mg by mouth every morning        DULoxetine (CYMBALTA) 60 MG capsule Take 1 capsule by mouth At Bedtime       ezetimibe-simvastatin (VYTORIN) 10-20 MG tablet Take 1 tablet by mouth daily       glucose (BD GLUCOSE) 4 g chewable tablet as needed       HYDROmorphone (DILAUDID) 4 MG tablet Take 1 tablet (4 mg) by mouth 3 times daily as needed for moderate to severe pain or severe pain 12 tablet 0     hydrOXYzine (VISTARIL) 25 MG capsule Take 25 mg by mouth 3 times daily With hydromorphone       ibuprofen (ADVIL/MOTRIN) 200 MG capsule Take 400 mg by mouth daily       Insulin Lispro (HUMALOG PEN SC) As needed per sliding scale       LANTUS SOLOSTAR 100 UNIT/ML soln 4 Units every morning       lidocaine (XYLOCAINE) 5 % external ointment Apply topically 4 times daily (Patient taking differently: Apply topically 4 times daily as needed for moderate pain) 50 g 0     lisinopril (ZESTRIL) 2.5 MG tablet Take 1 tablet (2.5 mg) by mouth daily 30 tablet 3     LORazepam (ATIVAN) 0.5 MG tablet take 1 tablet by mouth up to 3 times per day as needed for nausea,  anxiety, or insomnia       medical cannabis (Patient's own supply) See Admin Instructions (The purpose of this order is to document that the patient reports taking medical cannabis.  This is not a prescription, and is not used to certify that the patient has a qualifying medical condition.)       metoprolol succinate ER (TOPROL-XL) 50 MG 24 hr tablet Take 1 tablet (50 mg) by mouth daily 90 tablet 3     nystatin (MYCOSTATIN) 637988 UNIT/GM external cream Apply topically 2 times daily 30 g 1     OLANZapine (ZYPREXA) 5 MG tablet Take 5 mg by mouth every evening        omeprazole (PRILOSEC) 20 MG DR capsule Take 20 mg by mouth At Bedtime        oxybutynin (DITROPAN) 5 MG tablet Take 5 mg by mouth 2 times daily        oxybutynin ER (DITROPAN-XL) 10 MG 24 hr tablet 10 mg every evening       Prenatal Vit-Fe Fumarate-FA (PRENATAL MULTIVITAMIN  PLUS IRON) 27-1 MG TABS Take 1 tablet by mouth daily        psyllium (METAMUCIL/KONSYL) Packet Take 1 packet by mouth daily as needed for constipation       spironolactone (ALDACTONE) 25 MG tablet Take 0.5 tablets (12.5 mg) by mouth daily 90 tablet 3     vitamin (B COMPLEX) tablet Take 1 tablet by mouth daily       zolpidem (AMBIEN) 10 MG tablet Take 5-10 mg by mouth At Bedtime      Allergies   Allergen Reactions     Gabapentin      Other reaction(s): *Unknown  Upper body edema/swelling.         Morphine Visual Disturbance     Visual hallucinations     Sulfamethoxazole-Trimethoprim      Other reaction(s): Hives     Sulfa Drugs Hives and Rash     welts           Lab Results    Chemistry/lipid CBC Cardiac Enzymes/BNP/TSH/INR   Recent Labs   Lab Test 11/18/20  1436   CHOL 107   HDL 48*   LDL 19   TRIG 202*     Recent Labs   Lab Test 11/18/20  1436 12/18/19  0954 02/27/19  1357   LDL 19 63 32     Recent Labs   Lab Test 02/21/22  1252      POTASSIUM 4.6   CHLORIDE 102   CO2 20*   *   BUN 30*   CR 0.75   GFRESTIMATED 87   BENJI 8.8     Recent Labs   Lab Test 02/21/22  1252  02/15/22  0451 02/14/22  0536   CR 0.75 0.62 0.66     Recent Labs   Lab Test 02/09/22  0851 07/16/21  1054 12/18/19  0000   A1C 5.6 6.3* 5.8    Recent Labs   Lab Test 03/25/22  1405 02/15/22  0451   WBC  --  5.6   HGB 7.6* 9.3*   HCT  --  29.5*   MCV  --  93   PLT  --  240     Recent Labs   Lab Test 03/25/22  1405 02/15/22  0451 02/14/22  0536   HGB 7.6* 9.3* 8.9*    Recent Labs   Lab Test 02/09/22  0851 02/08/22  1803 07/16/21  1054   TROPONINI 0.07 0.12 0.01     Recent Labs   Lab Test 02/08/22  1803 10/17/19  1525   BNP 1,511* 43     Recent Labs   Lab Test 11/18/20  1436   TSH 1.19     Recent Labs   Lab Test 07/16/21  1054 10/17/19  1525   INR 1.45* 1.11*                  Thank you for allowing me to participate in the care of your patient.      Sincerely,     Elenita Reyes NP     [facility]  cc:   Renu Munoz MD  [address] [place] [address]

## 2022-06-07 NOTE — LETTER
[facility]  [address] [place] [address] [address], [place] [address]  [phone]      Fax [phone]    [facility]           Fax: [phone]            Customer Service: [phone]    Wound Dressing Rx and Order Form  Order Status: refill  Verbal: Susan  Date: 2022     Elenita Moran  Gender: female  : 1955  [address] [place] [address]  [phone] (home)     Medical Record: [id number]  Primary Care Provider: Sandi Jones      ICD-10-CM    1. Stage III pressure ulcer of sacral region (H)  L89.153 DEBRIDE SKIN/SUBQ TISSUE   2. Non healing left heel wound  S91.302A          Insurance Info:  INSURER: Payor: ARE / Plan: ARE MEDICARE / Product Type: HMO /   Policy ID#:  [id number]  SECONDARY INSURANCE:    Secondary Policy ID#:  N/A        Physician Info:   Name:  CINDY OSPINA     Dept Address/Phones:   [address] [place] [address] [id number] [phone]  Fax: [phone]    Lymphedema circumferential measurements (in cm):  No flowsheet data found.      Wound info:  Port A Cath Single 22 Left Chest wall (Active)   Number of days: 85       Wound Buttocks Pressure injury community acquired (Active)   Number of days: 118       Wound Foot Abrasion(s) (Active)   Number of days: 113       VASC Wound L buttock/gluteal fold (Active)   Pre Size Length 1.1 22 0900   Pre Size Width 0.7 22 0900   Pre Size Depth 0.2 22 0900   Pre Total Sq cm 0.77 22 0900   Post Size Length 1.1 22 0900   Post Size Width 0.7 22 0900   Post Size Depth 0.2 22 0900   Post Total Sq cm 0.77 22 0900   Undermined no 05/10/22 1300   Tunneling no 05/10/22 1300   Description slough 22 0912   Number of days: 28       VASC Wound Left heel (Active)   Pre Size Length 1.3 22 09   Pre Size Width 1.7 22   Pre Size Depth 0.1 22   Pre Total Sq cm 2.21 22   Description  "dry fissure 05/10/22 1300   Number of days: 28       Incision/Surgical Site 03/14/22 Right;Upper Chest (Active)   Number of days: 85        Drainage: mod  Thickness:  full  Duration of Need: 30 DAYS  Days Supply: 30 DAYS  Start Date: 6/7/22  Starter Kit: ancillary  Qualifying wound/Debridement: Yes      Dressing Type Brand Size Number of pieces Frequency of change    Primary endoform  2\"x2\" 2 2-3 TIMES PER WEEK and as needed     No substitutions preferred. Call [phone].         OK to forward to covered supplier.    Electronically Signed Physician:  CINDY OSPINA             Date: June 7, 2022    "

## 2022-06-07 NOTE — PATIENT INSTRUCTIONS
Wound Care Instructions- Left buttock    Every 2-3 days and as needed, Cleanse your gluteal wound(s) with Normal saline.    Pat Dry with non-sterile gauze    Apply Lotion to the intact skin surrounding your wound and other dry skin locations. Some good lotions include: Remedy Skin Repair Cream, Sarna, Vanicream or Cetaphil    Primary Dressing: Apply endoform into/onto the wounds    Secondary dressing: Cover with mepilex 4x4    It is ok to get your wound wet in the bath or shower          Wound Care Instructions- Left heel    1-2 TIMES PER WEEK and as needed, Cleanse your heel wound(s) with tap water.    Pat Dry with non-sterile gauze    Apply Lotion to the intact skin surrounding your wound and other dry skin locations. Some good lotions include: Remedy Skin Repair Cream, Sarna, Vanicream or Cetaphil    Primary Dressing: Apply mepilex or your preferred cover dressing into/onto the wounds    It is ok to get your wound wet in the bath or shower    Float your heels as often as you can.    Cushion:  It is recommended that you obtain a [employer] Cushion. It can be purchased online on Amazon, or through your local [employer]urable Medical Equipment) store. They come in a variety of sizes - you can choose which size works best for your chair.   You only need to purchase one as it can be transferred between chairs.             If for some reason you are not able to get your dressing(s) changed as outlined above (due to illness, lack of supplies, lack of help) please do the following: remove old, soiled dressings; wash the wounds with saline; pat dry; apply ABD pad or other absorbant pad and secure with rolled gauze; avoid tape directly on your skin; Call the clinic as soon as possible to let us know what the current issues are in receiving wound care [phone].      SEEK MEDICAL CARE IF:  You have an increase in swelling, pain, or redness around the wound.  You have an increase in the amount of pus coming from the  wound.  There is a bad smell coming from the wound.  The wound appears to be worsening/enlarging  You have a fever greater than 101.5 F      It is ok to continue current wound care treatment/products for the next 2-3 days until new wound care supplies are ordered and arrive. If longer than this please contact our office at [phone].    If you have a 2 layer or 4 layer compression wrap on these are safe to have on for ONLY 7 days. If for some reason you are not able to get the wrap(s) changed (due to illness; lack of supplies, lack of help, lack of transportation) please do the following: unwrap the old 2 or 4 layer compression wrap; avoid using scissors as you could cut your skin and cause wounds; use tubular compression when available. Call to reschedule your home care or clinic visit appointment as soon as possible.    Please NOTE: if you are 15 minutes late to your clinic appointment you will have to be rescheduled. Please call our clinic as soon as possible if you know you will not be able to get to your appointment at [phone].    If you fail to show up to 3 scheduled clinic appointments you will be dismissed from our clinic.              We want to hear from you!  In the next few weeks, you should receive a call or email to complete a survey about your visit at [facility]. Please help us improve your appointment experience by letting us know how we did today. We strive to make your experience good and value any ways in which we could do better.      We value your input and suggestions.    Thank you for choosing the [facility]!

## 2022-06-07 NOTE — PROGRESS NOTES
Wound Clinic Note          Visit date: 06/07/2022       Cheif Complaint:     Elenita Moran is a 67 year old female had concerns including sacrum wound..  The patient has sacrococcygeal pressure ulcer .  She also has a left heel wound.      HISTORY OF PRESENT ILLNESS:    Elenita Moran reports the wound has been present since July 2021.  The wound began While she was in the hospital with another illness and laying in the bed for several days at a time.  She has never had other pressure ulcers on the bottom.  She does have metastatic breast cancer and is currently receiving chemotherapy.  She also has a left heel wound which developed in late April without a clear cause.  She has normal sensation and somewhat reduced motor function in the lower extremities.  She does use a wheelchair when she goes out of the house but is able to use a walker around the house.  She reports she spends most of the day in a lift chair.  She confirms that she currently sleeps in a bed.  The patient was accompanied to the wound clinic for initial evaluation by her  who does her dressing changes and is also her medical power of .    Today she is again accompanied by her  who has been doing the dressing changes.  They both provide some of the interval history.  They have continued to use the Roho cushion in her lift chair and her wheelchair today.    The patient's  has been bandaging the sacral wound with endoform and a Mepilex changed every 2 to 3 days, there has been light serous drainage.  He has been bandaging the left heel wound with a Mepilex bandage changed 3 times a week.  There is also been light serous drainage from this.    They have been floating the heels while she is in bed.        The pateint denies fevers or chills.  They report the pain from the wound has been 3/10 and has remained about the same recently.      Today the patient reports maintaining a high protein diet and  taking protein supplements regularly.        The patient denies a history of smoking or chronic steroid use.  and The patient confirms having diabetes and reports the blood sugars are well controlled.         The patient has not had any symptoms of infection relating to the wound recently and is not currently on antibiotics.       Problem List:   Past Medical History:   Diagnosis Date     Allergic rhinitis      Bone cancer (H) 2011    breast mets     Breast cancer (H) 2010    left mastectomy     Depression      DM (diabetes mellitus) (H)      Fibromyalgia      Hx antineoplastic chemotherapy 2010     Hx of radiation therapy 2010     Hyperlipemia      Lactose intolerance      Mini stroke (H)     R EYE     Osteoarthritis      Tobacco dependency               Family Hx: family history includes Breast Cancer in her cousin; Kidney Cancer in her maternal grandmother; Lung Cancer in her mother and paternal aunt; Pancreatic Cancer in her mother.       Surgical Hx:   Past Surgical History:   Procedure Laterality Date     APPENDECTOMY       INSERT INTRACORONARY STENT  1985    R Hand     IR CHEST PORT PLACEMENT > 5 YRS OF AGE  12/12/2019     IR CHEST PORT PLACEMENT > 5 YRS OF AGE  3/14/2022     IR LUMBAR TRANSFORAMINAL EPIDURAL STEROID INJECTION  12/5/2019     IR LUMBAR TRANSFORAMINAL EPIDURAL STRD INJ  12/5/2019     IR PORT PLACEMENT >5 YEARS  12/12/2019     IR PORT REMOVAL RIGHT  3/14/2022     IR SITE CHECK/EVALUATION  4/15/2022     MAMMOPLASTY REDUCTION Right 2015    hx of left mastectomy and right breast reduction     MASTECTOMY Left 2010     OTHER SURGICAL HISTORY      biopsy of upper and right tongue     OVARIAN CYST REMOVAL       REVISE RECONSTRUCTED BREAST Left     March 11, 2015          Allergies:    Allergies   Allergen Reactions     Gabapentin      Other reaction(s): *Unknown  Upper body edema/swelling.         Morphine Visual Disturbance     Visual hallucinations     Sulfamethoxazole-Trimethoprim      Other  reaction(s): Hives     Sulfa Drugs Hives and Rash     welts                Medication History:    Current Outpatient Medications   Medication Sig     ACETAMINOPHEN PO Take 1,300 mg by mouth as needed     blood glucose (ONETOUCH VERIO IQ) test strip      bumetanide (BUMEX) 1 MG tablet 2 mg in morning and 1 mg afternoon     calcium carbonate (OS-BENJI) 600 MG tablet Take 1-2 mg by mouth daily      chlorhexidine (PERIDEX) 0.12 % solution Take 15 mLs by mouth daily as needed      Cholecalciferol (VITAMIN D) 125 MCG (5000 UT) capsule Take 1 tablet by mouth daily      clobetasol (TEMOVATE) 0.05 % external ointment Apply topically twice a week     Continuous Blood Gluc  (FREESTYLE KIMBERLY 14 DAY READER) COLETTE Use to check blood sugar at least 4 times a day or as directed      Continuous Blood Gluc Sensor ([employer] LabsSTYLE KIMBERLY 14 DAY SENSOR) MISC Use as directed to test blood sugar at least 4 times a day or as directed     docusate sodium (COLACE) 100 MG capsule daily     DULoxetine (CYMBALTA) 30 MG capsule Take 30 mg by mouth every morning      DULoxetine (CYMBALTA) 60 MG capsule Take 1 capsule by mouth At Bedtime     ezetimibe-simvastatin (VYTORIN) 10-20 MG tablet Take 1 tablet by mouth daily     glucose (BD GLUCOSE) 4 g chewable tablet as needed     HYDROmorphone (DILAUDID) 4 MG tablet Take 1 tablet (4 mg) by mouth 3 times daily as needed for moderate to severe pain or severe pain     hydrOXYzine (VISTARIL) 25 MG capsule Take 25 mg by mouth 3 times daily With hydromorphone     ibuprofen (ADVIL/MOTRIN) 200 MG capsule Take 400 mg by mouth daily     Insulin Lispro (HUMALOG PEN SC) As needed per sliding scale     LANTUS SOLOSTAR 100 UNIT/ML soln 4 Units every morning     lidocaine (XYLOCAINE) 5 % external ointment Apply topically 4 times daily (Patient taking differently: Apply topically 4 times daily as needed for moderate pain)     lisinopril (ZESTRIL) 2.5 MG tablet Take 1 tablet (2.5 mg) by mouth daily     LORazepam  (ATIVAN) 0.5 MG tablet take 1 tablet by mouth up to 3 times per day as needed for nausea, anxiety, or insomnia     medical cannabis (Patient's own supply) See Admin Instructions (The purpose of this order is to document that the patient reports taking medical cannabis.  This is not a prescription, and is not used to certify that the patient has a qualifying medical condition.)     metoprolol succinate ER (TOPROL-XL) 50 MG 24 hr tablet Take 1 tablet (50 mg) by mouth daily     nystatin (MYCOSTATIN) 910832 UNIT/GM external cream Apply topically 2 times daily     OLANZapine (ZYPREXA) 5 MG tablet Take 5 mg by mouth every evening      omeprazole (PRILOSEC) 20 MG DR capsule Take 20 mg by mouth At Bedtime      oxybutynin (DITROPAN) 5 MG tablet Take 5 mg by mouth 2 times daily      oxybutynin ER (DITROPAN-XL) 10 MG 24 hr tablet 10 mg every evening     Prenatal Vit-Fe Fumarate-FA (PRENATAL MULTIVITAMIN  PLUS IRON) 27-1 MG TABS Take 1 tablet by mouth daily      psyllium (METAMUCIL/KONSYL) Packet Take 1 packet by mouth daily as needed for constipation     spironolactone (ALDACTONE) 25 MG tablet Take 0.5 tablets (12.5 mg) by mouth daily     vitamin (B COMPLEX) tablet Take 1 tablet by mouth daily     zolpidem (AMBIEN) 10 MG tablet Take 5-10 mg by mouth At Bedtime     Current Facility-Administered Medications   Medication     lidocaine (XYLOCAINE) 2 % external gel         Tobacco History:  reports that she has quit smoking. Her smoking use included cigarettes. She has a 15.00 pack-year smoking history. She has never used smokeless tobacco.       REVIEW OF SYMPTOMS:   The review of systems was negative except as noted in the HPI.           PHYSICAL EXAMINATION:     /62   Pulse 96   Temp 97.6  F (36.4  C)   Wt 157 lb 9.6 oz (71.5 kg)   BMI 26.23 kg/m             GENERAL: The patient overall appears well and is no acute distress.   HEAD: normocephalic   EYES: Sclera and conjunctiva clear   NECK: no obvious masses   LUNGS:  breathing is unlabored.   EXTREMITIES: No clubbing, cyanosis or edema   SKIN: No rashes or other abnormalities except as noted under the Wound section below.   NEUROLOGICAL: Normal sensory function, decreased motor function in the lower extremities.      WOUND: The wound appears healthy with no sign of infection.   Wound bed: necrotic material at the sacral wound, there is no necrotic material at the left heel wound.  Periwound: healthy intact skin  The sacral wound is a fairly small wound consistent with a stage III sacral pressure ulcer.  The wound measurements for the sacral wound are smaller today.      Also see below for wound details:         Ulceration(s)/Wound(s):   Please see the media tab under the chart review for pictures of the wounds.    VASC Wound L buttock/gluteal fold (Active)   Pre Size Length 1.1 06/07/22 0900   Pre Size Width 0.7 06/07/22 0900   Pre Size Depth 0.2 06/07/22 0900   Pre Total Sq cm 0.77 06/07/22 0900       VASC Wound Left heel (Active)   Pre Size Length 1.3 06/07/22 0900   Pre Size Width 1.7 06/07/22 0900   Pre Size Depth 0.1 06/07/22 0900   Pre Total Sq cm 2.21 06/07/22 0900           Recent Labs   Lab Test 02/09/22  0851 07/16/21  1054 12/18/19  0000   A1C 5.6 6.3* 5.8          Recent Labs   Lab Test 02/10/22  0626 02/09/22  0851 07/16/21  1054   ALBUMIN 2.6* 3.0* 2.8*              Procedure note: , I had previous obtain informed consent from the patient to perform serial debridements, I confirmed this again with the patient today verbally. , Anesthetized as needed. , Using a curette and/or a scalpel I performed an excisional debridement removing all necrotic material at the Sacral wound down to the level of viable subcutaneous tissue.  I obtained hemostasis with direct pressure.  The patient tolerated the procedure well. , EBL: <5 ml  and Total debridement surface area: Less than 20 cm          There was no debridement required at the left heel wound.      ASSESSMENT:   This is a  67 year old female with a stage III sacral pressure ulcer and a left heel wound.          PLAN:   We will bandage the sacral wound with endoform and a Mepilex changed every other day.  We will bandage the left heel wound with a Mepilex or a simple bandage changed every other day.  I have encouraged her to continue to use the Roho mosaic cushion in any chair where she is seated for an extended period of time.  I have encouraged the patient to continue to minimize the time they spend in the wheelchair.  and The patient's heels should be floated while in bed so that nothing touches the heels.  I have encouraged the patient to continue on their high protein diet to aid in wound healing.   The patient will return to the wound clinic in 2 weeks to see me again.        30 minutes spent on the date of the encounter doing chart review, history and exam, documentation and further activities per the note      Frankie Vega MD  06/07/2022   9:29 AM   [facility] Vascular/Wound  [phone]

## 2022-06-21 NOTE — PROGRESS NOTES
Wound Clinic Note          Visit date: 06/21/2022       Cherylif Complaint:     Elenita Moran is a 67 year old female had concerns including sacral and Lt heel ulcers..  The patient has sacrococcygeal pressure ulcer .  She also has a left heel wound.      HISTORY OF PRESENT ILLNESS:    Elenita Moran reports the wound has been present since July 2021.  The wound began While she was in the hospital with another illness and laying in the bed for several days at a time.  She has never had other pressure ulcers on the bottom.  She does have metastatic breast cancer and is currently receiving chemotherapy.  She also has a left heel wound which developed in late April without a clear cause.  She has normal sensation and somewhat reduced motor function in the lower extremities.  She does use a wheelchair when she goes out of the house but is able to use a walker around the house.  She reports she spends most of the day in a lift chair.  She confirms that she currently sleeps in a bed.  The patient was accompanied to the wound clinic for initial evaluation by her  who does her dressing changes and is also her medical power of .    Today she is again accompanied by her  who has been doing the dressing changes.  They both provide some of the interval history.  They have continued to use the Roho cushion in her lift chair and her wheelchair today.    The patient's  has been bandaging the sacral wound with endoform and a Mepilex changed every 2 to 3 days, there has been light serous drainage.  He has been bandaging the left heel wound with a Mepilex bandage changed 3 times a week.  There is also been light serous drainage from this.    They have been floating the heels while she is in bed.        The pateint denies fevers or chills.  They report the pain from the wound has been 1/10 and has remained about the same recently.      Today the patient reports maintaining a high  protein diet and taking protein supplements regularly.        The patient denies a history of smoking or chronic steroid use.  and The patient confirms having diabetes and reports the blood sugars are well controlled.         The patient has not had any symptoms of infection relating to the wound recently and is not currently on antibiotics.       Problem List:   Past Medical History:   Diagnosis Date     Allergic rhinitis      Bone cancer (H) 2011    breast mets     Breast cancer (H) 2010    left mastectomy     Depression      DM (diabetes mellitus) (H)      Fibromyalgia      Hx antineoplastic chemotherapy 2010     Hx of radiation therapy 2010     Hyperlipemia      Lactose intolerance      Mini stroke (H)     R EYE     Osteoarthritis      Tobacco dependency               Family Hx: family history includes Breast Cancer in her cousin; Kidney Cancer in her maternal grandmother; Lung Cancer in her mother and paternal aunt; Pancreatic Cancer in her mother.       Surgical Hx:   Past Surgical History:   Procedure Laterality Date     APPENDECTOMY       INSERT INTRACORONARY STENT  1985    R Hand     IR CHEST PORT PLACEMENT > 5 YRS OF AGE  12/12/2019     IR CHEST PORT PLACEMENT > 5 YRS OF AGE  3/14/2022     IR LUMBAR TRANSFORAMINAL EPIDURAL STEROID INJECTION  12/5/2019     IR LUMBAR TRANSFORAMINAL EPIDURAL STRD INJ  12/5/2019     IR PORT PLACEMENT >5 YEARS  12/12/2019     IR PORT REMOVAL RIGHT  3/14/2022     IR SITE CHECK/EVALUATION  4/15/2022     MAMMOPLASTY REDUCTION Right 2015    hx of left mastectomy and right breast reduction     MASTECTOMY Left 2010     OTHER SURGICAL HISTORY      biopsy of upper and right tongue     OVARIAN CYST REMOVAL       REVISE RECONSTRUCTED BREAST Left     March 11, 2015          Allergies:    Allergies   Allergen Reactions     Gabapentin      Other reaction(s): *Unknown  Upper body edema/swelling.         Morphine Visual Disturbance     Visual hallucinations     Sulfamethoxazole-Trimethoprim       Other reaction(s): Hives     Sulfa Drugs Hives and Rash     welts                Medication History:    Current Outpatient Medications   Medication Sig     ACETAMINOPHEN PO Take 1,300 mg by mouth as needed     blood glucose (ONETOUCH VERIO IQ) test strip      bumetanide (BUMEX) 1 MG tablet 2 mg in morning and 1 mg afternoon     calcium carbonate (OS-BENJI) 600 MG tablet Take 1-2 mg by mouth daily      chlorhexidine (PERIDEX) 0.12 % solution Take 15 mLs by mouth daily as needed      Cholecalciferol (VITAMIN D) 125 MCG (5000 UT) capsule Take 1 tablet by mouth daily      clobetasol (TEMOVATE) 0.05 % external ointment Apply topically twice a week     Continuous Blood Gluc  (FREESTYLE KIMBERLY 14 DAY READER) COLETTE Use to check blood sugar at least 4 times a day or as directed      Continuous Blood Gluc Sensor ([employer]YLE KIMBERLY 14 DAY SENSOR) MISC Use as directed to test blood sugar at least 4 times a day or as directed     docusate sodium (COLACE) 100 MG capsule daily     DULoxetine (CYMBALTA) 30 MG capsule Take 30 mg by mouth every morning      DULoxetine (CYMBALTA) 60 MG capsule Take 1 capsule by mouth At Bedtime     ezetimibe-simvastatin (VYTORIN) 10-20 MG tablet Take 1 tablet by mouth daily     glucose (BD GLUCOSE) 4 g chewable tablet as needed     HYDROmorphone (DILAUDID) 4 MG tablet Take 1 tablet (4 mg) by mouth 3 times daily as needed for moderate to severe pain or severe pain     hydrOXYzine (VISTARIL) 25 MG capsule Take 25 mg by mouth 3 times daily With hydromorphone     ibuprofen (ADVIL/MOTRIN) 200 MG capsule Take 400 mg by mouth daily     Insulin Lispro (HUMALOG PEN SC) As needed per sliding scale     LANTUS SOLOSTAR 100 UNIT/ML soln 4 Units every morning     lidocaine (XYLOCAINE) 5 % external ointment Apply topically 4 times daily (Patient taking differently: Apply topically 4 times daily as needed for moderate pain)     lisinopril (ZESTRIL) 2.5 MG tablet Take 1 tablet (2.5 mg) by mouth daily      LORazepam (ATIVAN) 0.5 MG tablet take 1 tablet by mouth up to 3 times per day as needed for nausea, anxiety, or insomnia     medical cannabis (Patient's own supply) See Admin Instructions (The purpose of this order is to document that the patient reports taking medical cannabis.  This is not a prescription, and is not used to certify that the patient has a qualifying medical condition.)     metoprolol succinate ER (TOPROL-XL) 50 MG 24 hr tablet Take 1 tablet (50 mg) by mouth daily     nystatin (MYCOSTATIN) 218017 UNIT/GM external cream Apply topically 2 times daily     OLANZapine (ZYPREXA) 5 MG tablet Take 5 mg by mouth every evening      omeprazole (PRILOSEC) 20 MG DR capsule Take 20 mg by mouth At Bedtime      oxybutynin (DITROPAN) 5 MG tablet Take 5 mg by mouth 2 times daily      oxybutynin ER (DITROPAN-XL) 10 MG 24 hr tablet 10 mg every evening     Prenatal Vit-Fe Fumarate-FA (PRENATAL MULTIVITAMIN  PLUS IRON) 27-1 MG TABS Take 1 tablet by mouth daily      psyllium (METAMUCIL/KONSYL) Packet Take 1 packet by mouth daily as needed for constipation     spironolactone (ALDACTONE) 25 MG tablet Take 0.5 tablets (12.5 mg) by mouth daily     vitamin (B COMPLEX) tablet Take 1 tablet by mouth daily     zolpidem (AMBIEN) 10 MG tablet Take 5-10 mg by mouth At Bedtime     Current Facility-Administered Medications   Medication     lidocaine (XYLOCAINE) 2 % external gel         Tobacco History:  reports that she has quit smoking. Her smoking use included cigarettes. She has a 15.00 pack-year smoking history. She has never used smokeless tobacco.       REVIEW OF SYMPTOMS:   The review of systems was negative except as noted in the HPI.           PHYSICAL EXAMINATION:     /60   Pulse 96   Temp 97.9  F (36.6  C)   Resp 16   Wt 160 lb (72.6 kg)   BMI 26.63 kg/m             GENERAL: The patient overall appears well and is no acute distress.   HEAD: normocephalic   EYES: Sclera and conjunctiva clear   NECK: no obvious  masses   LUNGS: breathing is unlabored.   EXTREMITIES: No clubbing, cyanosis or edema   SKIN: No rashes or other abnormalities except as noted under the Wound section below.   NEUROLOGICAL: Normal sensory function, decreased motor function in the lower extremities.      WOUND: The wound appears healthy with no sign of infection.   Wound bed: necrotic material at the sacral wound, there is no necrotic material at the left heel wound.  Periwound: healthy intact skin  The sacral wound is a fairly small wound consistent with a stage III sacral pressure ulcer.  Today the sacral wound has less depth.  The left heel wound is nearly healed.      Also see below for wound details:         Ulceration(s)/Wound(s):   Please see the media tab under the chart review for pictures of the wounds.    VASC Wound L buttock/gluteal fold (Active)   Pre Size Length 1.2 06/21/22 0900   Pre Size Width 1 06/21/22 0900   Pre Size Depth 0.1 06/21/22 0900   Pre Total Sq cm 1.2 06/21/22 0900       VASC Wound Left heel (Active)   Pre Size Length 0.2 06/21/22 0900   Pre Size Width 0.8 06/21/22 0900   Pre Size Depth 0.1 06/21/22 0900   Pre Total Sq cm 0.16 06/21/22 0900           Recent Labs   Lab Test 02/09/22  0851 07/16/21  1054 12/18/19  0000   A1C 5.6 6.3* 5.8          Recent Labs   Lab Test 02/10/22  0626 02/09/22  0851 07/16/21  1054   ALBUMIN 2.6* 3.0* 2.8*              Procedure note: , I had previous obtain informed consent from the patient to perform serial debridements, I confirmed this again with the patient today verbally. , Anesthetized as needed. , Using a curette and/or a scalpel I performed an excisional debridement removing all necrotic material at the Sacral wound down to the level of viable subcutaneous tissue.  I obtained hemostasis with direct pressure.  The patient tolerated the procedure well. , EBL: <5 ml  and Total debridement surface area: Less than 20 cm          There was no debridement required at the left heel  wound.      ASSESSMENT:   This is a 67 year old female with a stage III sacral pressure ulcer and a left heel wound.          PLAN:   We will bandage the sacral wound with endoform and a Mepilex changed every other day.  If there is not more substantial wound healing progress by her next visit I may try switching to Hydrofera Blue.  We will bandage the left heel wound with a Mepilex or a simple bandage changed every other day.  I have encouraged her to continue to use the [employer] mosaic cushion in any chair where she is seated for an extended period of time.  I have encouraged the patient to continue to minimize the time they spend in the wheelchair.  and The patient's heels should be floated while in bed so that nothing touches the heels.  I have encouraged the patient to continue on their high protein diet to aid in wound healing.   The patient will return to the wound clinic in 3 weeks to see me again.        30 minutes spent on the date of the encounter doing chart review, history and exam, documentation and further activities per the note      Frankie Vega MD  06/21/2022   9:27 AM   [facility] Vascular/Wound  [phone]

## 2022-06-21 NOTE — PATIENT INSTRUCTIONS
Wound Care Instructions- Left buttock    Every 2-3 days and as needed, Cleanse your gluteal wound(s) with Normal saline.    Pat Dry with non-sterile gauze    Apply Lotion to the intact skin surrounding your wound and other dry skin locations. Some good lotions include: Remedy Skin Repair Cream, Sarna, Vanicream or Cetaphil    Primary Dressing: Apply endoform into/onto the wounds    Secondary dressing: Cover with mepilex 4x4    It is ok to get your wound wet in the bath or shower          Wound Care Instructions- Left heel    1-2 TIMES PER WEEK and as needed, Cleanse your heel wound(s) with tap water.    Pat Dry with non-sterile gauze    Apply Lotion to the intact skin surrounding your wound and other dry skin locations. Some good lotions include: Remedy Skin Repair Cream, Sarna, Vanicream or Cetaphil    Primary Dressing: Apply mepilex or your preferred cover dressing into/onto the wounds    It is ok to get your wound wet in the bath or shower    Float your heels as often as you can.    Cushion:  It is recommended that you obtain a [employer]gram Cushion. It can be purchased online on [employer], or through your local [employer] ([employer]) store. They come in a variety of sizes - you can choose which size works best for your chair.   You only need to purchase one as it can be transferred between chairs.             If for some reason you are not able to get your dressing(s) changed as outlined above (due to illness, lack of supplies, lack of help) please do the following: remove old, soiled dressings; wash the wounds with saline; pat dry; apply ABD pad or other absorbant pad and secure with rolled gauze; avoid tape directly on your skin; Call the clinic as soon as possible to let us know what the current issues are in receiving wound care [phone].      SEEK MEDICAL CARE IF:  You have an increase in swelling, pain, or redness around the wound.  You have an increase in the amount of pus coming from the  wound.  There is a bad smell coming from the wound.  The wound appears to be worsening/enlarging  You have a fever greater than 101.5 F      It is ok to continue current wound care treatment/products for the next 2-3 days until new wound care supplies are ordered and arrive. If longer than this please contact our office at [phone].    If you have a 2 layer or 4 layer compression wrap on these are safe to have on for ONLY 7 days. If for some reason you are not able to get the wrap(s) changed (due to illness; lack of supplies, lack of help, lack of transportation) please do the following: unwrap the old 2 or 4 layer compression wrap; avoid using scissors as you could cut your skin and cause wounds; use tubular compression when available. Call to reschedule your home care or clinic visit appointment as soon as possible.    Please NOTE: if you are 15 minutes late to your clinic appointment you will have to be rescheduled. Please call our clinic as soon as possible if you know you will not be able to get to your appointment at [phone].    If you fail to show up to 3 scheduled clinic appointments you will be dismissed from our clinic.              We want to hear from you!  In the next few weeks, you should receive a call or email to complete a survey about your visit at [facility]. Please help us improve your appointment experience by letting us know how we did today. We strive to make your experience good and value any ways in which we could do better.      We value your input and suggestions.    Thank you for choosing the [facility]!

## 2022-07-12 PROBLEM — S91.302A NON HEALING LEFT HEEL WOUND: Status: RESOLVED | Noted: 2022-01-01 | Resolved: 2022-01-01

## 2022-07-12 NOTE — PROGRESS NOTES
Wound Clinic Note          Visit date: 07/12/2022       Cherylif Complaint:     Elenita Moran is a 67 year old female had concerns including Wound Check (Left heel and sacrum)..  The patient has sacrococcygeal pressure ulcer .  She also has a left heel wound.      HISTORY OF PRESENT ILLNESS:    Elenita Moran reports the wound has been present since July 2021.  The wound began While she was in the hospital with another illness and laying in the bed for several days at a time.  She has never had other pressure ulcers on the bottom.  She does have metastatic breast cancer and is currently receiving chemotherapy.  She also has a left heel wound which developed in late April without a clear cause.  She has normal sensation and somewhat reduced motor function in the lower extremities.  She does use a wheelchair when she goes out of the house but is able to use a walker around the house.  She reports she spends most of the day in a lift chair.  She confirms that she currently sleeps in a bed.  The patient was accompanied to the wound clinic for initial evaluation by her  who does her dressing changes and is also her medical power of .    Today she is again accompanied by her  who has been doing the dressing changes.  They both provide some of the interval history.  They have continued to use the [employer] cushion in her lift chair and her wheelchair today.  She reports she is in the sitting position for 1 to 1-1/2 hours at a time.    The patient's  has been bandaging the sacral wound with endoform and a Mepilex changed every 2 to 3 days, there has been light serous drainage.  The patient's  reports that there is been no drainage from the left heel wound and it appears to be healed.  They have not been applying any bandages to the area for the last several days.    They have been floating the heels while she is in bed.        The pateint denies fevers or chills.  They  report the pain from the wound has been 0/10 and has remained about the same recently.      Today the patient reports maintaining a high protein diet and taking protein supplements regularly.        The patient denies a history of smoking or chronic steroid use.  and The patient confirms having diabetes and reports the blood sugars are well controlled.         The patient has not had any symptoms of infection relating to the wound recently and is not currently on antibiotics.       Problem List:   Past Medical History:   Diagnosis Date     Allergic rhinitis      Bone cancer (H) 2011    breast mets     Breast cancer (H) 2010    left mastectomy     Depression      DM (diabetes mellitus) (H)      Fibromyalgia      Hx antineoplastic chemotherapy 2010     Hx of radiation therapy 2010     Hyperlipemia      Lactose intolerance      Mini stroke (H)     R EYE     Osteoarthritis      Tobacco dependency               Family Hx: family history includes Breast Cancer in her cousin; Kidney Cancer in her maternal grandmother; Lung Cancer in her mother and paternal aunt; Pancreatic Cancer in her mother.       Surgical Hx:   Past Surgical History:   Procedure Laterality Date     APPENDECTOMY       INSERT INTRACORONARY STENT  1985    R Hand     IR CHEST PORT PLACEMENT > 5 YRS OF AGE  12/12/2019     IR CHEST PORT PLACEMENT > 5 YRS OF AGE  3/14/2022     IR LUMBAR TRANSFORAMINAL EPIDURAL STEROID INJECTION  12/5/2019     IR LUMBAR TRANSFORAMINAL EPIDURAL STRD INJ  12/5/2019     IR PORT PLACEMENT >5 YEARS  12/12/2019     IR PORT REMOVAL RIGHT  3/14/2022     IR SITE CHECK/EVALUATION  4/15/2022     MAMMOPLASTY REDUCTION Right 2015    hx of left mastectomy and right breast reduction     MASTECTOMY Left 2010     OTHER SURGICAL HISTORY      biopsy of upper and right tongue     OVARIAN CYST REMOVAL       REVISE RECONSTRUCTED BREAST Left     March 11, 2015          Allergies:    Allergies   Allergen Reactions     Gabapentin      Other  reaction(s): *Unknown  Upper body edema/swelling.         Morphine Visual Disturbance     Visual hallucinations     Sulfamethoxazole-Trimethoprim      Other reaction(s): Hives     Sulfa Drugs Hives and Rash     welts                Medication History:    Current Outpatient Medications   Medication Sig     ACETAMINOPHEN PO Take 1,300 mg by mouth as needed     blood glucose (ONETOUCH VERIO IQ) test strip      bumetanide (BUMEX) 1 MG tablet 2 mg in morning and 1 mg afternoon     calcium carbonate (OS-BENJI) 600 MG tablet Take 1-2 mg by mouth daily      cephALEXin (KEFLEX) 500 MG capsule Take 1 capsule (500 mg) by mouth 2 times daily     chlorhexidine (PERIDEX) 0.12 % solution Take 15 mLs by mouth daily as needed      Cholecalciferol (VITAMIN D) 125 MCG (5000 UT) capsule Take 1 tablet by mouth daily      clobetasol (TEMOVATE) 0.05 % external ointment Apply topically twice a week     Continuous Blood Gluc  (FREESTYLE KIMBERLY 14 DAY READER) COLETTE Use to check blood sugar at least 4 times a day or as directed      Continuous Blood Gluc Sensor (SoCATSTYLE KIMBERLY 14 DAY SENSOR) MISC Use as directed to test blood sugar at least 4 times a day or as directed     docusate sodium (COLACE) 100 MG capsule daily     DULoxetine (CYMBALTA) 30 MG capsule Take 30 mg by mouth every morning      DULoxetine (CYMBALTA) 60 MG capsule Take 1 capsule by mouth At Bedtime     ezetimibe-simvastatin (VYTORIN) 10-20 MG tablet Take 1 tablet by mouth daily     glucose (BD GLUCOSE) 4 g chewable tablet as needed     HYDROmorphone (DILAUDID) 4 MG tablet Take 1 tablet (4 mg) by mouth 3 times daily as needed for moderate to severe pain or severe pain     hydrOXYzine (VISTARIL) 25 MG capsule Take 25 mg by mouth 3 times daily With hydromorphone     ibuprofen (ADVIL/MOTRIN) 200 MG capsule Take 400 mg by mouth daily     Insulin Lispro (HUMALOG PEN SC) As needed per sliding scale     LANTUS SOLOSTAR 100 UNIT/ML soln 4 Units every morning     lidocaine  (XYLOCAINE) 5 % external ointment Apply topically 4 times daily (Patient taking differently: Apply topically 4 times daily as needed for moderate pain)     lisinopril (ZESTRIL) 2.5 MG tablet Take 1 tablet (2.5 mg) by mouth daily     LORazepam (ATIVAN) 0.5 MG tablet take 1 tablet by mouth up to 3 times per day as needed for nausea, anxiety, or insomnia     medical cannabis (Patient's own supply) See Admin Instructions (The purpose of this order is to document that the patient reports taking medical cannabis.  This is not a prescription, and is not used to certify that the patient has a qualifying medical condition.)     metoprolol succinate ER (TOPROL-XL) 50 MG 24 hr tablet Take 1 tablet (50 mg) by mouth daily     nystatin (MYCOSTATIN) 894189 UNIT/GM external cream Apply topically 2 times daily     OLANZapine (ZYPREXA) 5 MG tablet Take 5 mg by mouth every evening      omeprazole (PRILOSEC) 20 MG DR capsule Take 20 mg by mouth At Bedtime      oxybutynin (DITROPAN) 5 MG tablet Take 5 mg by mouth 2 times daily      oxybutynin ER (DITROPAN-XL) 10 MG 24 hr tablet 10 mg every evening     Prenatal Vit-Fe Fumarate-FA (PRENATAL MULTIVITAMIN  PLUS IRON) 27-1 MG TABS Take 1 tablet by mouth daily      psyllium (METAMUCIL/KONSYL) Packet Take 1 packet by mouth daily as needed for constipation     spironolactone (ALDACTONE) 25 MG tablet Take 0.5 tablets (12.5 mg) by mouth daily     vitamin (B COMPLEX) tablet Take 1 tablet by mouth daily     zolpidem (AMBIEN) 10 MG tablet Take 5-10 mg by mouth At Bedtime     Current Facility-Administered Medications   Medication     lidocaine (XYLOCAINE) 2 % external gel         Tobacco History:  reports that she has quit smoking. Her smoking use included cigarettes. She has a 15.00 pack-year smoking history. She has never used smokeless tobacco.       REVIEW OF SYMPTOMS:   The review of systems was negative except as noted in the HPI.           PHYSICAL EXAMINATION:     /64   Pulse 80    Temp 98.1  F (36.7  C)   Resp 16            GENERAL: The patient overall appears well and is no acute distress.   HEAD: normocephalic   EYES: Sclera and conjunctiva clear   NECK: no obvious masses   LUNGS: breathing is unlabored.   EXTREMITIES: No clubbing, cyanosis or edema   SKIN: No rashes or other abnormalities except as noted under the Wound section below.   NEUROLOGICAL: Normal sensory function, decreased motor function in the lower extremities.      WOUND: The wound appears healthy with no sign of infection.   Wound bed: necrotic material at the sacral wound, there is no necrotic material at the left heel wound.  Periwound: healthy intact skin  The sacral wound is a fairly small wound consistent with a stage III sacral pressure ulcer.  Today the sacral wound is smaller today.  The left heel wound is healed.      Also see below for wound details:         Ulceration(s)/Wound(s):   Please see the media tab under the chart review for pictures of the wounds.    VASC Wound L buttock/gluteal fold (Active)   Pre Size Length 1.1 07/12/22 0901   Pre Size Width 0.6 07/12/22 0901   Pre Size Depth 0.1 07/12/22 0901   Pre Total Sq cm 0.66 07/12/22 0901       VASC Wound Left heel (Active)   Description thin top cover 07/12/22 0901           Recent Labs   Lab Test 02/09/22  0851 07/16/21  1054 12/18/19  0000   A1C 5.6 6.3* 5.8          Recent Labs   Lab Test 02/10/22  0626 02/09/22  0851 07/16/21  1054   ALBUMIN 2.6* 3.0* 2.8*              Procedure note: , I had previous obtain informed consent from the patient to perform serial debridements, I confirmed this again with the patient today verbally. , Anesthetized as needed. , Using a curette and/or a scalpel I performed an excisional debridement removing all necrotic material at the Sacral wound down to the level of viable subcutaneous tissue.  I obtained hemostasis with direct pressure.  The patient tolerated the procedure well. , EBL: <5 ml  and Total debridement surface  area: Less than 20 cm          There was no debridement required at the left heel wound.      ASSESSMENT:   This is a 67 year old female with a stage III sacral pressure ulcer and a healed left heel wound.          PLAN:   We will bandage the sacral wound with endoform and a Mepilex changed every other day.  If the wound healing stalls I may try switching to Hydrofera Blue.  No further bandages are required for the left heel area.  I have encouraged her to continue to use the [employer] cushion in any chair where she is seated for an extended period of time.  I have encouraged the patient to continue to minimize the time they spend in the wheelchair.  and The patient's heels should be floated while in bed so that nothing touches the heels.  I have encouraged the patient to continue on their high protein diet to aid in wound healing.   The patient will return to the wound clinic in 1 week to see me again.        30 minutes spent on the date of the encounter doing chart review, history and exam, documentation and further activities per the note      Frankie Vega MD  07/12/2022   9:21 AM   [facility]  [phone]

## 2022-07-12 NOTE — PATIENT INSTRUCTIONS
Wound Care Instructions- Left buttock    Every 2-3 days and as needed, Cleanse your gluteal wound(s) with Normal saline.    Pat Dry with non-sterile gauze    Apply Lotion to the intact skin surrounding your wound and other dry skin locations. Some good lotions include: Remedy Skin Repair Cream, Sarna, Vanicream or Cetaphil    Primary Dressing: Apply endoform into/onto the wounds    Secondary dressing: Cover with mepilex 4x4    It is ok to get your wound wet in the bath or shower      If for some reason you are not able to get your dressing(s) changed as outlined above (due to illness, lack of supplies, lack of help) please do the following: remove old, soiled dressings; wash the wounds with saline; pat dry; apply ABD pad or other absorbant pad and secure with rolled gauze; avoid tape directly on your skin; Call the clinic as soon as possible to let us know what the current issues are in receiving wound care [phone].      SEEK MEDICAL CARE IF:  You have an increase in swelling, pain, or redness around the wound.  You have an increase in the amount of pus coming from the wound.  There is a bad smell coming from the wound.  The wound appears to be worsening/enlarging  You have a fever greater than 101.5 F      It is ok to continue current wound care treatment/products for the next 2-3 days until new wound care supplies are ordered and arrive. If longer than this please contact our office at [phone].    If you have a 2 layer or 4 layer compression wrap on these are safe to have on for ONLY 7 days. If for some reason you are not able to get the wrap(s) changed (due to illness; lack of supplies, lack of help, lack of transportation) please do the following: unwrap the old 2 or 4 layer compression wrap; avoid using scissors as you could cut your skin and cause wounds; use tubular compression when available. Call to reschedule your home care or clinic visit appointment as soon as possible.    Please NOTE: if you  are 15 minutes late to your clinic appointment you will have to be rescheduled. Please call our clinic as soon as possible if you know you will not be able to get to your appointment at [phone].    If you fail to show up to 3 scheduled clinic appointments you will be dismissed from our clinic.              We want to hear from you!  In the next few weeks, you should receive a call or email to complete a survey about your visit at [facility]. Please help us improve your appointment experience by letting us know how we did today. We strive to make your experience good and value any ways in which we could do better.      We value your input and suggestions.    Thank you for choosing the [facility]!

## 2022-07-18 NOTE — LETTER
7/18/2022    Sandi Jones MD  [facility] [address]  [place] [address]    RE: Elenita Moran       Dear Colleague,     I had the pleasure of seeing Elenita Moran in the [facility].  HEART CARE PROGRESS NOTE      [facility]  [phone]      Assessment/Recommendations   Assessment:    1.  Heart failure with reduced ejection fraction, ejection fraction 35-40%: Secondary to chemotherapy (doxorubicin) which has been stopped.  She has chronic shortness of breath which is stable.  Lung sounds are clear.  Lower extremity edema has improved.     2.  Metastatic breast cancer.  She has chemotherapy every 3 weeks.    Plan:  1.   Heart failure medications:  - Beta blocker therapy with metoprolol succinate 50 mg daily  - ACEI therapy with lisinopril 2.5 mg daily  - Aldosterone blocker therapy with spironolactone 12.5 mg daily  - Diuretic therapy with bumetanide 2 mg in the morning and 1 mg in the afternoon  2.  Continue current medications.  Medication titration is limited due to hypotension.  3.  Low-sodium diet     Elenita Moran will follow up with Dr. Munoz in September and in the heart failure clinic in November.        History of Present Illness/Subjective    Ms. Elenita Moran is a 67 year old female seen at [facility] today for follow-up.  She has a history of heart failure with reduced ejection fraction secondary to chemotherapy, metastatic breast cancer, diabetes, and chronic MSSA infection of pelvis.  Cardiac stress MRI on March 16, 2022 was negative for ischemia and showed LVEF 36% and RV EF 45%.  Echocardiogram on June 14, 2022 showed ejection fraction of 35 to 40% and abnormal diastolic function.    She denies any symptoms of acute fluid retention.  She has chronic dyspnea on exertion with strenuous activity such as walking upstairs.  She denies any worsening of her breathing.   She has occasional dizziness but denies recent falls.  Home blood pressure is typically in the 90s systolic.  She is wearing compression stockings.  She has minimal edema this morning but swelling does increase throughout the day.  She states that this is pretty typical edema has not worsened in the past month.  She denies orthopnea and chest pain.      She is working on following a low-sodium diet.  She has difficulty weighing herself at home due to stability.  Clinic weight is up 3 pounds in the past 6 weeks.    She has chemotherapy every 3 weeks.        Physical Examination Review of Systems   Vitals: BP 90/50 (BP Location: Right arm, Patient Position: Sitting, Cuff Size: Adult Regular)   Pulse 84   Resp 14   Wt 72.1 kg (159 lb)   BMI 26.46 kg/m    BMI= Body mass index is 26.46 kg/m .  Wt Readings from Last 3 Encounters:   07/18/22 72.1 kg (159 lb)   06/21/22 72.6 kg (160 lb)   06/07/22 71.5 kg (157 lb 9.6 oz)       General Appearance:     Alert, cooperative and in no acute distress.   ENT/Mouth: membranes moist, no oral lesions or bleeding gums.      EYES:  no scleral icterus, normal conjunctivae   Chest/Lungs:   lungs are clear to auscultation, no rales or wheezing, respirations unlabored   Cardiovascular:   Regular. Normal first and second heart sounds, trace edema bilateral lower legs, wearing compression stockings   Abdomen:   Nondistended, bowel sounds present   Extremities: no cyanosis or clubbing   Skin: warm, dry.    Neurologic: mood and affect are appropriate, alert and oriented x3         Please refer above for cardiac ROS details.      Medical History  Surgical History Family History Social History   Past Medical History:   Diagnosis Date     Allergic rhinitis      Bone cancer (H) 2011    breast mets     Breast cancer (H) 2010    left mastectomy     Depression      DM (diabetes mellitus) (H)      Fibromyalgia      Hx antineoplastic chemotherapy 2010     Hx of radiation therapy 2010      Hyperlipemia      Lactose intolerance      Mini stroke (H)     R EYE     Osteoarthritis      Tobacco dependency      Past Surgical History:   Procedure Laterality Date     APPENDECTOMY       INSERT INTRACORONARY STENT  1985    R Hand     IR CHEST PORT PLACEMENT > 5 YRS OF AGE  12/12/2019     IR CHEST PORT PLACEMENT > 5 YRS OF AGE  3/14/2022     IR LUMBAR TRANSFORAMINAL EPIDURAL STEROID INJECTION  12/5/2019     IR LUMBAR TRANSFORAMINAL EPIDURAL STRD INJ  12/5/2019     IR PORT PLACEMENT >5 YEARS  12/12/2019     IR PORT REMOVAL RIGHT  3/14/2022     IR SITE CHECK/EVALUATION  4/15/2022     MAMMOPLASTY REDUCTION Right 2015    hx of left mastectomy and right breast reduction     MASTECTOMY Left 2010     OTHER SURGICAL HISTORY      biopsy of upper and right tongue     OVARIAN CYST REMOVAL       REVISE RECONSTRUCTED BREAST Left     March 11, 2015     Family History   Problem Relation Age of Onset     Lung Cancer Mother      Pancreatic Cancer Mother      Kidney Cancer Maternal Grandmother      Lung Cancer Paternal Aunt      Breast Cancer Cousin     Social History     Socioeconomic History     Marital status:      Spouse name: Not on file     Number of children: Not on file     Years of education: Not on file     Highest education level: Not on file   Occupational History     Not on file   Tobacco Use     Smoking status: Former Smoker     Packs/day: 0.75     Years: 20.00     Pack years: 15.00     Types: Cigarettes     Smokeless tobacco: Never Used   Substance and Sexual Activity     Alcohol use: Yes     Alcohol/week: 1.0 standard drink     Comment: 1 glass of wine/week     Drug use: No     Sexual activity: Yes     Partners: Male     Birth control/protection: Post-menopausal   Other Topics Concern     Parent/sibling w/ CABG, MI or angioplasty before 65F 55M? 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  She lives with her  and 1 of her sons.         Social Determinants of  Health     Financial Resource Strain: Not on file   Food Insecurity: Not on file   Transportation Needs: Not on file   Physical Activity: Not on file   Stress: Not on file   Social Connections: Not on file   Intimate Partner Violence: Not on file   Housing Stability: Not on file          Medications  Allergies   Current Outpatient Medications   Medication Sig Dispense Refill     ACETAMINOPHEN PO Take 1,300 mg by mouth as needed       blood glucose (ONETOUCH VERIO IQ) test strip        bumetanide (BUMEX) 1 MG tablet 2 mg in morning and 1 mg afternoon 270 tablet 3     calcium carbonate (OS-BENJI) 600 MG tablet Take 1-2 mg by mouth daily        cephALEXin (KEFLEX) 500 MG capsule Take 1 capsule (500 mg) by mouth 2 times daily 180 capsule 0     chlorhexidine (PERIDEX) 0.12 % solution Take 15 mLs by mouth daily as needed        Cholecalciferol (VITAMIN D) 125 MCG (5000 UT) capsule Take 1 tablet by mouth daily        clobetasol (TEMOVATE) 0.05 % external ointment Apply topically twice a week       Continuous Blood Gluc  (FREESTYLE KIMBERLY 14 DAY READER) COLETTE Use to check blood sugar at least 4 times a day or as directed        Continuous Blood Gluc Sensor (Ativa MedicalSTYLE KIMBERLY 14 DAY SENSOR) MISC Use as directed to test blood sugar at least 4 times a day or as directed       docusate sodium (COLACE) 100 MG capsule Take 100 mg by mouth daily       DULoxetine (CYMBALTA) 30 MG capsule Take 30 mg by mouth every morning        DULoxetine (CYMBALTA) 60 MG capsule Take 1 capsule by mouth At Bedtime       ezetimibe-simvastatin (VYTORIN) 10-20 MG tablet Take 1 tablet by mouth daily       glucose (BD GLUCOSE) 4 g chewable tablet as needed       HYDROmorphone (DILAUDID) 4 MG tablet Take 1 tablet (4 mg) by mouth 3 times daily as needed for moderate to severe pain or severe pain 12 tablet 0     hydrOXYzine (VISTARIL) 25 MG capsule Take 25 mg by mouth 3 times daily With hydromorphone       ibuprofen (ADVIL/MOTRIN) 200 MG capsule Take 400  mg by mouth daily       Insulin Lispro (HUMALOG PEN SC) As needed per sliding scale       LANTUS SOLOSTAR 100 UNIT/ML soln 4 Units every morning       lidocaine (XYLOCAINE) 5 % external ointment Apply topically 4 times daily (Patient taking differently: Apply topically 4 times daily as needed for moderate pain) 50 g 0     lisinopril (ZESTRIL) 2.5 MG tablet Take 1 tablet (2.5 mg) by mouth daily 30 tablet 3     LORazepam (ATIVAN) 0.5 MG tablet take 1 tablet by mouth up to 3 times per day as needed for nausea, anxiety, or insomnia       medical cannabis (Patient's own supply) See Admin Instructions (The purpose of this order is to document that the patient reports taking medical cannabis.  This is not a prescription, and is not used to certify that the patient has a qualifying medical condition.)       metoprolol succinate ER (TOPROL-XL) 50 MG 24 hr tablet Take 1 tablet (50 mg) by mouth daily 90 tablet 3     nystatin (MYCOSTATIN) 837899 UNIT/GM external cream Apply topically 2 times daily (Patient taking differently: Apply topically daily as needed) 30 g 1     OLANZapine (ZYPREXA) 5 MG tablet Take 5 mg by mouth every evening        omeprazole (PRILOSEC) 20 MG DR capsule Take 20 mg by mouth At Bedtime        oxybutynin (DITROPAN) 5 MG tablet Take 5 mg by mouth every morning       oxybutynin ER (DITROPAN-XL) 10 MG 24 hr tablet Take 10 mg by mouth every evening       Prenatal Vit-Fe Fumarate-FA (PRENATAL MULTIVITAMIN  PLUS IRON) 27-1 MG TABS Take 1 tablet by mouth daily        psyllium (METAMUCIL/KONSYL) Packet Take 1 packet by mouth daily as needed for constipation       spironolactone (ALDACTONE) 25 MG tablet Take 0.5 tablets (12.5 mg) by mouth daily 90 tablet 3     vitamin (B COMPLEX) tablet Take 1 tablet by mouth daily       zolpidem (AMBIEN) 10 MG tablet Take 5-10 mg by mouth At Bedtime      Allergies   Allergen Reactions     Gabapentin      Upper body edema/swelling.         Morphine Visual Disturbance     Visual  hallucinations     Sulfamethoxazole-Trimethoprim      Other reaction(s): Hives     Sulfa Drugs Hives and Rash     welts           Lab Results    Chemistry/lipid CBC Cardiac Enzymes/BNP/TSH/INR   Recent Labs   Lab Test 11/18/20  1436   CHOL 107   HDL 48*   LDL 19   TRIG 202*     Recent Labs   Lab Test 11/18/20  1436 12/18/19  0954 02/27/19  1357   LDL 19 63 32     Recent Labs   Lab Test 02/21/22  1252      POTASSIUM 4.6   CHLORIDE 102   CO2 20*   *   BUN 30*   CR 0.75   GFRESTIMATED 87   BENJI 8.8     Recent Labs   Lab Test 02/21/22  1252 02/15/22  0451 02/14/22  0536   CR 0.75 0.62 0.66     Recent Labs   Lab Test 02/09/22  0851 07/16/21  1054 12/18/19  0000   A1C 5.6 6.3* 5.8    Recent Labs   Lab Test 03/25/22  1405 02/15/22  0451   WBC  --  5.6   HGB 7.6* 9.3*   HCT  --  29.5*   MCV  --  93   PLT  --  240     Recent Labs   Lab Test 03/25/22  1405 02/15/22  0451 02/14/22  0536   HGB 7.6* 9.3* 8.9*    Recent Labs   Lab Test 02/09/22  0851 02/08/22  1803 07/16/21  1054   TROPONINI 0.07 0.12 0.01     Recent Labs   Lab Test 02/08/22  1803 10/17/19  1525   BNP 1,511* 43     Recent Labs   Lab Test 11/18/20  1436   TSH 1.19     Recent Labs   Lab Test 07/16/21  1054 10/17/19  1525   INR 1.45* 1.11*                  Thank you for allowing me to participate in the care of your patient.      Sincerely,     Elenita Reyes NP     [facility]  cc:   Renu Munoz MD  [address] [place] [address]

## 2022-07-18 NOTE — PROGRESS NOTES
HEART CARE PROGRESS NOTE      [facility]  [phone]      Assessment/Recommendations   Assessment:    1.  Heart failure with reduced ejection fraction, ejection fraction 35-40%: Secondary to chemotherapy (doxorubicin) which has been stopped.  She has chronic shortness of breath which is stable.  Lung sounds are clear.  Lower extremity edema has improved.     2.  Metastatic breast cancer.  She has chemotherapy every 3 weeks.    Plan:  1.   Heart failure medications:  - Beta blocker therapy with metoprolol succinate 50 mg daily  - ACEI therapy with lisinopril 2.5 mg daily  - Aldosterone blocker therapy with spironolactone 12.5 mg daily  - Diuretic therapy with bumetanide 2 mg in the morning and 1 mg in the afternoon  2.  Continue current medications.  Medication titration is limited due to hypotension.  3.  Low-sodium diet     Elenita Moran will follow up with Dr. Munoz in September and in the heart failure clinic in November.        History of Present Illness/Subjective    Ms. Elenita Moran is a 67 year old female seen at [facility] today for follow-up.  She has a history of heart failure with reduced ejection fraction secondary to chemotherapy, metastatic breast cancer, diabetes, and chronic MSSA infection of pelvis.  Cardiac stress MRI on March 16, 2022 was negative for ischemia and showed LVEF 36% and RV EF 45%.  Echocardiogram on June 14, 2022 showed ejection fraction of 35 to 40% and abnormal diastolic function.    She denies any symptoms of acute fluid retention.  She has chronic dyspnea on exertion with strenuous activity such as walking upstairs.  She denies any worsening of her breathing.  She has occasional dizziness but denies recent falls.  Home blood pressure is typically in the 90s systolic.  She is wearing compression stockings.  She has minimal edema this morning but swelling does increase throughout the day.  She states that  this is pretty typical edema has not worsened in the past month.  She denies orthopnea and chest pain.      She is working on following a low-sodium diet.  She has difficulty weighing herself at home due to stability.  Clinic weight is up 3 pounds in the past 6 weeks.    She has chemotherapy every 3 weeks.        Physical Examination Review of Systems   Vitals: BP 90/50 (BP Location: Right arm, Patient Position: Sitting, Cuff Size: Adult Regular)   Pulse 84   Resp 14   Wt 72.1 kg (159 lb)   BMI 26.46 kg/m    BMI= Body mass index is 26.46 kg/m .  Wt Readings from Last 3 Encounters:   07/18/22 72.1 kg (159 lb)   06/21/22 72.6 kg (160 lb)   06/07/22 71.5 kg (157 lb 9.6 oz)       General Appearance:     Alert, cooperative and in no acute distress.   ENT/Mouth: membranes moist, no oral lesions or bleeding gums.      EYES:  no scleral icterus, normal conjunctivae   Chest/Lungs:   lungs are clear to auscultation, no rales or wheezing, respirations unlabored   Cardiovascular:   Regular. Normal first and second heart sounds, trace edema bilateral lower legs, wearing compression stockings   Abdomen:   Nondistended, bowel sounds present   Extremities: no cyanosis or clubbing   Skin: warm, dry.    Neurologic: mood and affect are appropriate, alert and oriented x3         Please refer above for cardiac ROS details.      Medical History  Surgical History Family History Social History   Past Medical History:   Diagnosis Date     Allergic rhinitis      Bone cancer (H) 2011    breast mets     Breast cancer (H) 2010    left mastectomy     Depression      DM (diabetes mellitus) (H)      Fibromyalgia      Hx antineoplastic chemotherapy 2010     Hx of radiation therapy 2010     Hyperlipemia      Lactose intolerance      Mini stroke (H)     R EYE     Osteoarthritis      Tobacco dependency      Past Surgical History:   Procedure Laterality Date     APPENDECTOMY       INSERT INTRACORONARY STENT  1985    R Hand     IR CHEST PORT  PLACEMENT > 5 YRS OF AGE  12/12/2019     IR CHEST PORT PLACEMENT > 5 YRS OF AGE  3/14/2022     IR LUMBAR TRANSFORAMINAL EPIDURAL STEROID INJECTION  12/5/2019     IR LUMBAR TRANSFORAMINAL EPIDURAL STRD INJ  12/5/2019     IR PORT PLACEMENT >5 YEARS  12/12/2019     IR PORT REMOVAL RIGHT  3/14/2022     IR SITE CHECK/EVALUATION  4/15/2022     MAMMOPLASTY REDUCTION Right 2015    hx of left mastectomy and right breast reduction     MASTECTOMY Left 2010     OTHER SURGICAL HISTORY      biopsy of upper and right tongue     OVARIAN CYST REMOVAL       REVISE RECONSTRUCTED BREAST Left     March 11, 2015     Family History   Problem Relation Age of Onset     Lung Cancer Mother      Pancreatic Cancer Mother      Kidney Cancer Maternal Grandmother      Lung Cancer Paternal Aunt      Breast Cancer Cousin     Social History     Socioeconomic History     Marital status:      Spouse name: Not on file     Number of children: Not on file     Years of education: Not on file     Highest education level: Not on file   Occupational History     Not on file   Tobacco Use     Smoking status: Former Smoker     Packs/day: 0.75     Years: 20.00     Pack years: 15.00     Types: Cigarettes     Smokeless tobacco: Never Used   Substance and Sexual Activity     Alcohol use: Yes     Alcohol/week: 1.0 standard drink     Comment: 1 glass of wine/week     Drug use: No     Sexual activity: Yes     Partners: Male     Birth control/protection: Post-menopausal   Other Topics Concern     Parent/sibling w/ CABG, MI or angioplasty before 65F 55M? 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  She lives with her  and 1 of her sons.         Social Determinants of Health     Financial Resource Strain: Not on file   Food Insecurity: Not on file   Transportation Needs: Not on file   Physical Activity: Not on file   Stress: Not on file   Social Connections: Not on file   Intimate Partner Violence: Not on file    Housing Stability: Not on file          Medications  Allergies   Current Outpatient Medications   Medication Sig Dispense Refill     ACETAMINOPHEN PO Take 1,300 mg by mouth as needed       blood glucose (ONETOUCH VERIO IQ) test strip        bumetanide (BUMEX) 1 MG tablet 2 mg in morning and 1 mg afternoon 270 tablet 3     calcium carbonate (OS-BENJI) 600 MG tablet Take 1-2 mg by mouth daily        cephALEXin (KEFLEX) 500 MG capsule Take 1 capsule (500 mg) by mouth 2 times daily 180 capsule 0     chlorhexidine (PERIDEX) 0.12 % solution Take 15 mLs by mouth daily as needed        Cholecalciferol (VITAMIN D) 125 MCG (5000 UT) capsule Take 1 tablet by mouth daily        clobetasol (TEMOVATE) 0.05 % external ointment Apply topically twice a week       Continuous Blood Gluc  (FREESTYLE KIMBERLY 14 DAY READER) COLETTE Use to check blood sugar at least 4 times a day or as directed        Continuous Blood Gluc Sensor (PaperlinksSTYLE KIMBERLY 14 DAY SENSOR) MISC Use as directed to test blood sugar at least 4 times a day or as directed       docusate sodium (COLACE) 100 MG capsule Take 100 mg by mouth daily       DULoxetine (CYMBALTA) 30 MG capsule Take 30 mg by mouth every morning        DULoxetine (CYMBALTA) 60 MG capsule Take 1 capsule by mouth At Bedtime       ezetimibe-simvastatin (VYTORIN) 10-20 MG tablet Take 1 tablet by mouth daily       glucose (BD GLUCOSE) 4 g chewable tablet as needed       HYDROmorphone (DILAUDID) 4 MG tablet Take 1 tablet (4 mg) by mouth 3 times daily as needed for moderate to severe pain or severe pain 12 tablet 0     hydrOXYzine (VISTARIL) 25 MG capsule Take 25 mg by mouth 3 times daily With hydromorphone       ibuprofen (ADVIL/MOTRIN) 200 MG capsule Take 400 mg by mouth daily       Insulin Lispro (HUMALOG PEN SC) As needed per sliding scale       LANTUS SOLOSTAR 100 UNIT/ML soln 4 Units every morning       lidocaine (XYLOCAINE) 5 % external ointment Apply topically 4 times daily (Patient taking  differently: Apply topically 4 times daily as needed for moderate pain) 50 g 0     lisinopril (ZESTRIL) 2.5 MG tablet Take 1 tablet (2.5 mg) by mouth daily 30 tablet 3     LORazepam (ATIVAN) 0.5 MG tablet take 1 tablet by mouth up to 3 times per day as needed for nausea, anxiety, or insomnia       medical cannabis (Patient's own supply) See Admin Instructions (The purpose of this order is to document that the patient reports taking medical cannabis.  This is not a prescription, and is not used to certify that the patient has a qualifying medical condition.)       metoprolol succinate ER (TOPROL-XL) 50 MG 24 hr tablet Take 1 tablet (50 mg) by mouth daily 90 tablet 3     nystatin (MYCOSTATIN) 196639 UNIT/GM external cream Apply topically 2 times daily (Patient taking differently: Apply topically daily as needed) 30 g 1     OLANZapine (ZYPREXA) 5 MG tablet Take 5 mg by mouth every evening        omeprazole (PRILOSEC) 20 MG DR capsule Take 20 mg by mouth At Bedtime        oxybutynin (DITROPAN) 5 MG tablet Take 5 mg by mouth every morning       oxybutynin ER (DITROPAN-XL) 10 MG 24 hr tablet Take 10 mg by mouth every evening       Prenatal Vit-Fe Fumarate-FA (PRENATAL MULTIVITAMIN  PLUS IRON) 27-1 MG TABS Take 1 tablet by mouth daily        psyllium (METAMUCIL/KONSYL) Packet Take 1 packet by mouth daily as needed for constipation       spironolactone (ALDACTONE) 25 MG tablet Take 0.5 tablets (12.5 mg) by mouth daily 90 tablet 3     vitamin (B COMPLEX) tablet Take 1 tablet by mouth daily       zolpidem (AMBIEN) 10 MG tablet Take 5-10 mg by mouth At Bedtime      Allergies   Allergen Reactions     Gabapentin      Upper body edema/swelling.         Morphine Visual Disturbance     Visual hallucinations     Sulfamethoxazole-Trimethoprim      Other reaction(s): Hives     Sulfa Drugs Hives and Rash     welts           Lab Results    Chemistry/lipid CBC Cardiac Enzymes/BNP/TSH/INR   Recent Labs   Lab Test 11/18/20  1436   CHOL 107    HDL 48*   LDL 19   TRIG 202*     Recent Labs   Lab Test 11/18/20  1436 12/18/19  0954 02/27/19  1357   LDL 19 63 32     Recent Labs   Lab Test 02/21/22  1252      POTASSIUM 4.6   CHLORIDE 102   CO2 20*   *   BUN 30*   CR 0.75   GFRESTIMATED 87   BENJI 8.8     Recent Labs   Lab Test 02/21/22  1252 02/15/22  0451 02/14/22  0536   CR 0.75 0.62 0.66     Recent Labs   Lab Test 02/09/22  0851 07/16/21  1054 12/18/19  0000   A1C 5.6 6.3* 5.8    Recent Labs   Lab Test 03/25/22  1405 02/15/22  0451   WBC  --  5.6   HGB 7.6* 9.3*   HCT  --  29.5*   MCV  --  93   PLT  --  240     Recent Labs   Lab Test 03/25/22  1405 02/15/22  0451 02/14/22  0536   HGB 7.6* 9.3* 8.9*    Recent Labs   Lab Test 02/09/22  0851 02/08/22  1803 07/16/21  1054   TROPONINI 0.07 0.12 0.01     Recent Labs   Lab Test 02/08/22  1803 10/17/19  1525   BNP 1,511* 43     Recent Labs   Lab Test 11/18/20  1436   TSH 1.19     Recent Labs   Lab Test 07/16/21  1054 10/17/19  1525   INR 1.45* 1.11*

## 2022-07-18 NOTE — PATIENT INSTRUCTIONS
Elenita Moran,    It was a pleasure to see you today at the [facility].     My recommendations after this visit include:  - No changes to your medications.    - See Dr. Munoz in September and Elenita in November.   - Continue to monitor for signs of retaining fluid (increasing weights, shortness of breath, swelling) and call with any concerns.  - If you have any questions or concerns, please call [phone] to talk with my nurse.    Elenita Kirk, CNP

## 2022-07-19 NOTE — PATIENT INSTRUCTIONS
Wound Care Instructions- Left buttock    Every other day and as needed, Cleanse your gluteal wound(s) with Normal saline.    Pat Dry with non-sterile gauze    Apply Lotion to the intact skin surrounding your wound and other dry skin locations. Some good lotions include: Remedy Skin Repair Cream, Sarna, Vanicream or Cetaphil    Primary Dressing:Hydrofera Blue Classic- moisten with saline so that dressing gets puffy, then apply to the wound    Secondary dressing: Cover with mepilex 4x4    It is ok to get your wound wet in the bath or shower      If for some reason you are not able to get your dressing(s) changed as outlined above (due to illness, lack of supplies, lack of help) please do the following: remove old, soiled dressings; wash the wounds with saline; pat dry; apply ABD pad or other absorbant pad and secure with rolled gauze; avoid tape directly on your skin; Call the clinic as soon as possible to let us know what the current issues are in receiving wound care [phone].      SEEK MEDICAL CARE IF:  You have an increase in swelling, pain, or redness around the wound.  You have an increase in the amount of pus coming from the wound.  There is a bad smell coming from the wound.  The wound appears to be worsening/enlarging  You have a fever greater than 101.5 F      It is ok to continue current wound care treatment/products for the next 2-3 days until new wound care supplies are ordered and arrive. If longer than this please contact our office at [phone].    If you have a 2 layer or 4 layer compression wrap on these are safe to have on for ONLY 7 days. If for some reason you are not able to get the wrap(s) changed (due to illness; lack of supplies, lack of help, lack of transportation) please do the following: unwrap the old 2 or 4 layer compression wrap; avoid using scissors as you could cut your skin and cause wounds; use tubular compression when available. Call to reschedule your home care or clinic  visit appointment as soon as possible.    Please NOTE: if you are 15 minutes late to your clinic appointment you will have to be rescheduled. Please call our clinic as soon as possible if you know you will not be able to get to your appointment at [phone].    If you fail to show up to 3 scheduled clinic appointments you will be dismissed from our clinic.              We want to hear from you!  In the next few weeks, you should receive a call or email to complete a survey about your visit at [facility]. Please help us improve your appointment experience by letting us know how we did today. We strive to make your experience good and value any ways in which we could do better.      We value your input and suggestions.    Thank you for choosing the [facility]!

## 2022-07-19 NOTE — PROGRESS NOTES
Wound Clinic Note          Visit date: 07/19/2022       Deonte Complaint:     Elenita Moran is a 67 year old female had concerns including sacrum ..  The patient has sacrococcygeal pressure ulcer .       HISTORY OF PRESENT ILLNESS:    Elenita Moran reports the wound has been present since July 2021.  The wound began While she was in the hospital with another illness and laying in the bed for several days at a time.  She has never had other pressure ulcers on the bottom.  She does have metastatic breast cancer and is currently receiving chemotherapy.  She has normal sensation and somewhat reduced motor function in the lower extremities.  She does use a wheelchair when she goes out of the house but is able to use a walker around the house.  She reports she spends most of the day in a lift chair.  She confirms that she currently sleeps in a bed.  The patient was accompanied to the wound clinic for initial evaluation by her  who does her dressing changes and is also her medical power of .    Today she is again accompanied by her  who has been doing the dressing changes.  They both provide some of the interval history.  They have continued to use the Roho cushion in her lift chair and her wheelchair today.  Today the patient and her  report that she tends to sit up in the seated position most of the day.      The patient's  has been bandaging the sacral wound with endoform and a Mepilex changed every 2 to 3 days, there has been light serous drainage.      They have been floating the heels while she is in bed.        The pateint denies fevers or chills.  They report the pain from the wound has been 0/10 and has remained about the same recently.      Today the patient reports maintaining a high protein diet and taking protein supplements regularly.        The patient denies a history of smoking or chronic steroid use.  and The patient confirms having diabetes and  reports the blood sugars are well controlled.         The patient has not had any symptoms of infection relating to the wound recently and is not currently on antibiotics.       Problem List:   Past Medical History:   Diagnosis Date     Allergic rhinitis      Bone cancer (H) 2011    breast mets     Breast cancer (H) 2010    left mastectomy     Depression      DM (diabetes mellitus) (H)      Fibromyalgia      Hx antineoplastic chemotherapy 2010     Hx of radiation therapy 2010     Hyperlipemia      Lactose intolerance      Mini stroke (H)     R EYE     Osteoarthritis      Tobacco dependency               Family Hx: family history includes Breast Cancer in her cousin; Kidney Cancer in her maternal grandmother; Lung Cancer in her mother and paternal aunt; Pancreatic Cancer in her mother.       Surgical Hx:   Past Surgical History:   Procedure Laterality Date     APPENDECTOMY       INSERT INTRACORONARY STENT  1985    R Hand     IR CHEST PORT PLACEMENT > 5 YRS OF AGE  12/12/2019     IR CHEST PORT PLACEMENT > 5 YRS OF AGE  3/14/2022     IR LUMBAR TRANSFORAMINAL EPIDURAL STEROID INJECTION  12/5/2019     IR LUMBAR TRANSFORAMINAL EPIDURAL STRD INJ  12/5/2019     IR PORT PLACEMENT >5 YEARS  12/12/2019     IR PORT REMOVAL RIGHT  3/14/2022     IR SITE CHECK/EVALUATION  4/15/2022     MAMMOPLASTY REDUCTION Right 2015    hx of left mastectomy and right breast reduction     MASTECTOMY Left 2010     OTHER SURGICAL HISTORY      biopsy of upper and right tongue     OVARIAN CYST REMOVAL       REVISE RECONSTRUCTED BREAST Left     March 11, 2015          Allergies:    Allergies   Allergen Reactions     Gabapentin      Upper body edema/swelling.         Morphine Visual Disturbance     Visual hallucinations     Sulfamethoxazole-Trimethoprim      Other reaction(s): Hives     Sulfa Drugs Hives and Rash     welts                Medication History:    Current Outpatient Medications   Medication Sig     ACETAMINOPHEN PO Take 1,300 mg by mouth  as needed     blood glucose (ONETOUCH VERIO IQ) test strip      bumetanide (BUMEX) 1 MG tablet 2 mg in morning and 1 mg afternoon     calcium carbonate (OS-BENJI) 600 MG tablet Take 1-2 mg by mouth daily      cephALEXin (KEFLEX) 500 MG capsule Take 1 capsule (500 mg) by mouth 2 times daily     chlorhexidine (PERIDEX) 0.12 % solution Take 15 mLs by mouth daily as needed      Cholecalciferol (VITAMIN D) 125 MCG (5000 UT) capsule Take 1 tablet by mouth daily      clobetasol (TEMOVATE) 0.05 % external ointment Apply topically twice a week     Continuous Blood Gluc  (FREESTYLE KIMBERLY 14 DAY READER) COLETTE Use to check blood sugar at least 4 times a day or as directed      Continuous Blood Gluc Sensor (GeoforceSTYLE KIMBERLY 14 DAY SENSOR) MISC Use as directed to test blood sugar at least 4 times a day or as directed     docusate sodium (COLACE) 100 MG capsule Take 100 mg by mouth daily     DULoxetine (CYMBALTA) 30 MG capsule Take 30 mg by mouth every morning      DULoxetine (CYMBALTA) 60 MG capsule Take 1 capsule by mouth At Bedtime     ezetimibe-simvastatin (VYTORIN) 10-20 MG tablet Take 1 tablet by mouth daily     glucose (BD GLUCOSE) 4 g chewable tablet as needed     HYDROmorphone (DILAUDID) 4 MG tablet Take 1 tablet (4 mg) by mouth 3 times daily as needed for moderate to severe pain or severe pain     hydrOXYzine (VISTARIL) 25 MG capsule Take 25 mg by mouth 3 times daily With hydromorphone     ibuprofen (ADVIL/MOTRIN) 200 MG capsule Take 400 mg by mouth daily     Insulin Lispro (HUMALOG PEN SC) As needed per sliding scale     LANTUS SOLOSTAR 100 UNIT/ML soln 4 Units every morning     lidocaine (XYLOCAINE) 5 % external ointment Apply topically 4 times daily (Patient taking differently: Apply topically 4 times daily as needed for moderate pain)     lisinopril (ZESTRIL) 2.5 MG tablet Take 1 tablet (2.5 mg) by mouth daily     LORazepam (ATIVAN) 0.5 MG tablet take 1 tablet by mouth up to 3 times per day as needed for nausea,  anxiety, or insomnia     medical cannabis (Patient's own supply) See Admin Instructions (The purpose of this order is to document that the patient reports taking medical cannabis.  This is not a prescription, and is not used to certify that the patient has a qualifying medical condition.)     metoprolol succinate ER (TOPROL-XL) 50 MG 24 hr tablet Take 1 tablet (50 mg) by mouth daily     nystatin (MYCOSTATIN) 830877 UNIT/GM external cream Apply topically 2 times daily (Patient taking differently: Apply topically daily as needed)     OLANZapine (ZYPREXA) 5 MG tablet Take 5 mg by mouth every evening      omeprazole (PRILOSEC) 20 MG DR capsule Take 20 mg by mouth At Bedtime      oxybutynin (DITROPAN) 5 MG tablet Take 5 mg by mouth every morning     oxybutynin ER (DITROPAN-XL) 10 MG 24 hr tablet Take 10 mg by mouth every evening     Prenatal Vit-Fe Fumarate-FA (PRENATAL MULTIVITAMIN  PLUS IRON) 27-1 MG TABS Take 1 tablet by mouth daily      psyllium (METAMUCIL/KONSYL) Packet Take 1 packet by mouth daily as needed for constipation     spironolactone (ALDACTONE) 25 MG tablet Take 0.5 tablets (12.5 mg) by mouth daily     vitamin (B COMPLEX) tablet Take 1 tablet by mouth daily     zolpidem (AMBIEN) 10 MG tablet Take 5-10 mg by mouth At Bedtime     Current Facility-Administered Medications   Medication     lidocaine (XYLOCAINE) 2 % external gel         Tobacco History:  reports that she has quit smoking. Her smoking use included cigarettes. She has a 15.00 pack-year smoking history. She has never used smokeless tobacco.       REVIEW OF SYMPTOMS:   The review of systems was negative except as noted in the HPI.           PHYSICAL EXAMINATION:     /60   Pulse 78   Temp 97.9  F (36.6  C)   Wt 154 lb (69.9 kg)   BMI 25.63 kg/m             GENERAL: The patient overall appears well and is no acute distress.   HEAD: normocephalic   EYES: Sclera and conjunctiva clear   NECK: no obvious masses   LUNGS: breathing is unlabored.    EXTREMITIES: No clubbing, cyanosis or edema   SKIN: No rashes or other abnormalities except as noted under the Wound section below.   NEUROLOGICAL: Normal sensory function, decreased motor function in the lower extremities.      WOUND: The wound appears healthy with no sign of infection.   Wound bed: necrotic material    Periwound: healthy intact skin  The sacral wound is a fairly small wound consistent with a stage III sacral pressure ulcer.  Today the sacral wound is about the same compared with her last clinic visit.  The left heel wound is still healed.      Also see below for wound details:         Ulceration(s)/Wound(s):   Please see the media tab under the chart review for pictures of the wounds.    VASC Wound L buttock/gluteal fold (Active)   Pre Size Length 1.3 07/19/22 0800   Pre Size Width 0.6 07/19/22 0800   Pre Size Depth 0.1 07/19/22 0800   Pre Total Sq cm 0.78 07/19/22 0800       VASC Wound Left heel (Active)   Description scabbed/thin top cover 07/19/22 0800           Recent Labs   Lab Test 02/09/22  0851 07/16/21  1054 12/18/19  0000   A1C 5.6 6.3* 5.8          Recent Labs   Lab Test 02/10/22  0626 02/09/22  0851 07/16/21  1054   ALBUMIN 2.6* 3.0* 2.8*              Procedure note: , I had previous obtain informed consent from the patient to perform serial debridements, I confirmed this again with the patient today verbally. , Anesthetized as needed. , Using a curette and/or a scalpel I performed an excisional debridement removing all necrotic material at the Sacral wound down to the level of viable subcutaneous tissue.  I obtained hemostasis with direct pressure.  The patient tolerated the procedure well. , EBL: <5 ml  and Total debridement surface area: Less than 20 cm              ASSESSMENT:   This is a 67 year old female with a stage III sacral pressure ulcer.          PLAN:   We will bandage the sacral wound with Hydrofera Blue and a Mepilex changed every other day.  I suggested that she  reduce the time that she is in the seated position throughout the day.  I recommend that she lay down on her sides for an hour at a time several times throughout the day to break up the time that she is in the seated position.  I have encouraged her to continue to use the Roho mosaic cushion in any chair where she is seated for an extended period of time.  I have encouraged the patient to continue to minimize the time they spend in the wheelchair.  and The patient's heels should be floated while in bed so that nothing touches the heels.  I have encouraged the patient to continue on their high protein diet to aid in wound healing.   The patient will return to the wound clinic in 1 week to see me again.        30 minutes spent on the date of the encounter doing chart review, history and exam, documentation and further activities per the note      Frankie Vega MD  07/19/2022   9:08 AM   [facility]  [phone]

## 2022-07-26 NOTE — PROGRESS NOTES
Wound Clinic Note          Visit date: 07/26/2022       Cheif Complaint:     Elenita Moran is a 67 year old female had concerns including sacrum wound..  The patient has sacrococcygeal pressure ulcer .       HISTORY OF PRESENT ILLNESS:    Elenita Moran reports the wound has been present since July 2021.  The wound began While she was in the hospital with another illness and laying in the bed for several days at a time.  She has never had other pressure ulcers on the bottom.  She does have metastatic breast cancer and is currently receiving chemotherapy.  She has normal sensation and somewhat reduced motor function in the lower extremities.  She does use a wheelchair when she goes out of the house but is able to use a walker around the house.  She reports she spends most of the day in a lift chair.  She confirms that she currently sleeps in a bed.  The patient was accompanied to the wound clinic for initial evaluation by her  who does her dressing changes and is also her medical power of .    Today she is again accompanied by her  who has been doing the dressing changes.  They both provide some of the interval history.  They have continued to use the Roho cushion in her lift chair and her wheelchair today.  Today the patient and her  report that she tends to sit up in the seated position most of the day.    In discussion with the patient and her  today it sounds like in addition to the lift chair the patient also sits at the dining room table for quite a bit of time during the day and she does not use the Roho cushion there.      The patient's  has been bandaging the sacral wound with Hydrofera Blue and a Mepilex changed every 2 to 3 days, there has been light serous drainage.      The pateint denies fevers or chills.  They report the pain from the wound has been 0/10 and has remained about the same recently.      Today the patient reports  maintaining a high protein diet and taking protein supplements regularly.        The patient denies a history of smoking or chronic steroid use.  and The patient confirms having diabetes and reports the blood sugars are well controlled.         The patient has not had any symptoms of infection relating to the wound recently and is not currently on antibiotics.     Over the last week she did have a urinary tract infection and has completed taking all antibiotics at this time.  She confirms that the symptoms of the urinary tract infection have resolved.    Problem List:   Past Medical History:   Diagnosis Date     Allergic rhinitis      Bone cancer (H) 2011    breast mets     Breast cancer (H) 2010    left mastectomy     Depression      DM (diabetes mellitus) (H)      Fibromyalgia      Hx antineoplastic chemotherapy 2010     Hx of radiation therapy 2010     Hyperlipemia      Lactose intolerance      Mini stroke (H)     R EYE     Osteoarthritis      Tobacco dependency               Family Hx: family history includes Breast Cancer in her cousin; Kidney Cancer in her maternal grandmother; Lung Cancer in her mother and paternal aunt; Pancreatic Cancer in her mother.       Surgical Hx:   Past Surgical History:   Procedure Laterality Date     APPENDECTOMY       INSERT INTRACORONARY STENT  1985    R Hand     IR CHEST PORT PLACEMENT > 5 YRS OF AGE  12/12/2019     IR CHEST PORT PLACEMENT > 5 YRS OF AGE  3/14/2022     IR LUMBAR TRANSFORAMINAL EPIDURAL STEROID INJECTION  12/5/2019     IR LUMBAR TRANSFORAMINAL EPIDURAL STRD INJ  12/5/2019     IR PORT PLACEMENT >5 YEARS  12/12/2019     IR PORT REMOVAL RIGHT  3/14/2022     IR SITE CHECK/EVALUATION  4/15/2022     MAMMOPLASTY REDUCTION Right 2015    hx of left mastectomy and right breast reduction     MASTECTOMY Left 2010     OTHER SURGICAL HISTORY      biopsy of upper and right tongue     OVARIAN CYST REMOVAL       REVISE RECONSTRUCTED BREAST Left     March 11, 2015           Allergies:    Allergies   Allergen Reactions     Gabapentin      Upper body edema/swelling.         Morphine Visual Disturbance     Visual hallucinations     Sulfamethoxazole-Trimethoprim      Other reaction(s): Hives     Sulfa Drugs Hives and Rash     welts                Medication History:    Current Outpatient Medications   Medication Sig     ACETAMINOPHEN PO Take 1,300 mg by mouth as needed     blood glucose (ONETOUCH VERIO IQ) test strip      bumetanide (BUMEX) 1 MG tablet 2 mg in morning and 1 mg afternoon     calcium carbonate (OS-BENJI) 600 MG tablet Take 1-2 mg by mouth daily      chlorhexidine (PERIDEX) 0.12 % solution Take 15 mLs by mouth daily as needed      Cholecalciferol (VITAMIN D) 125 MCG (5000 UT) capsule Take 1 tablet by mouth daily      clobetasol (TEMOVATE) 0.05 % external ointment Apply topically twice a week     Continuous Blood Gluc  (FREESTYLE KIMBERLY 14 DAY READER) COLETTE Use to check blood sugar at least 4 times a day or as directed      Continuous Blood Gluc Sensor (zLenseSTYLE KIMBERLY 14 DAY SENSOR) MISC Use as directed to test blood sugar at least 4 times a day or as directed     docusate sodium (COLACE) 100 MG capsule Take 100 mg by mouth daily     DULoxetine (CYMBALTA) 30 MG capsule Take 30 mg by mouth every morning      DULoxetine (CYMBALTA) 60 MG capsule Take 1 capsule by mouth At Bedtime     ezetimibe-simvastatin (VYTORIN) 10-20 MG tablet Take 1 tablet by mouth daily     glucose (BD GLUCOSE) 4 g chewable tablet as needed     HYDROmorphone (DILAUDID) 4 MG tablet Take 1 tablet (4 mg) by mouth 3 times daily as needed for moderate to severe pain or severe pain     hydrOXYzine (VISTARIL) 25 MG capsule Take 25 mg by mouth 3 times daily With hydromorphone     ibuprofen (ADVIL/MOTRIN) 200 MG capsule Take 400 mg by mouth daily     Insulin Lispro (HUMALOG PEN SC) As needed per sliding scale     LANTUS SOLOSTAR 100 UNIT/ML soln 4 Units every morning     lidocaine (XYLOCAINE) 5 % external  ointment Apply topically 4 times daily (Patient taking differently: Apply topically 4 times daily as needed for moderate pain)     lisinopril (ZESTRIL) 2.5 MG tablet Take 1 tablet (2.5 mg) by mouth daily     LORazepam (ATIVAN) 0.5 MG tablet take 1 tablet by mouth up to 3 times per day as needed for nausea, anxiety, or insomnia     medical cannabis (Patient's own supply) See Admin Instructions (The purpose of this order is to document that the patient reports taking medical cannabis.  This is not a prescription, and is not used to certify that the patient has a qualifying medical condition.)     metoprolol succinate ER (TOPROL-XL) 50 MG 24 hr tablet Take 1 tablet (50 mg) by mouth daily     nystatin (MYCOSTATIN) 126673 UNIT/GM external cream Apply topically 2 times daily (Patient taking differently: Apply topically daily as needed)     OLANZapine (ZYPREXA) 5 MG tablet Take 5 mg by mouth every evening      omeprazole (PRILOSEC) 20 MG DR capsule Take 20 mg by mouth At Bedtime      oxybutynin (DITROPAN) 5 MG tablet Take 5 mg by mouth every morning     oxybutynin ER (DITROPAN-XL) 10 MG 24 hr tablet Take 10 mg by mouth every evening     Prenatal Vit-Fe Fumarate-FA (PRENATAL MULTIVITAMIN  PLUS IRON) 27-1 MG TABS Take 1 tablet by mouth daily      psyllium (METAMUCIL/KONSYL) Packet Take 1 packet by mouth daily as needed for constipation     spironolactone (ALDACTONE) 25 MG tablet Take 0.5 tablets (12.5 mg) by mouth daily     vitamin (B COMPLEX) tablet Take 1 tablet by mouth daily     zolpidem (AMBIEN) 10 MG tablet Take 5-10 mg by mouth At Bedtime     cephALEXin (KEFLEX) 500 MG capsule Take 1 capsule (500 mg) by mouth 2 times daily     ciprofloxacin (CIPRO) 500 MG tablet Take 1 tablet by mouth     Current Facility-Administered Medications   Medication     lidocaine (XYLOCAINE) 2 % external gel         Tobacco History:  reports that she has quit smoking. Her smoking use included cigarettes. She has a 15.00 pack-year smoking  history. She has never used smokeless tobacco.       REVIEW OF SYMPTOMS:   The review of systems was negative except as noted in the HPI.           PHYSICAL EXAMINATION:     /62   Pulse 84   Resp 14   Wt 156 lb (70.8 kg)   BMI 25.96 kg/m             GENERAL: The patient overall appears well and is no acute distress.   HEAD: normocephalic   EYES: Sclera and conjunctiva clear   NECK: no obvious masses   LUNGS: breathing is unlabored.   EXTREMITIES: No clubbing, cyanosis or edema   SKIN: No rashes or other abnormalities except as noted under the Wound section below.   NEUROLOGICAL: Normal sensory function, decreased motor function in the lower extremities.      WOUND: The wound appears healthy with no sign of infection.   Wound bed: necrotic material    Periwound: healthy intact skin  The sacral wound is a fairly small wound consistent with a stage III sacral pressure ulcer.  Today the sacral wound is slightly larger compared with her last clinic visit.  The left heel wound is still healed.      Also see below for wound details:         Ulceration(s)/Wound(s):   Please see the media tab under the chart review for pictures of the wounds.    VASC Wound L buttock/gluteal fold (Active)   Pre Size Length 2.1 07/26/22 0900   Pre Size Width 0.9 07/26/22 0900   Pre Size Depth 0.3 07/26/22 0900   Pre Total Sq cm 1.89 07/26/22 0900           Recent Labs   Lab Test 02/09/22  0851 07/16/21  1054 12/18/19  0000   A1C 5.6 6.3* 5.8          Recent Labs   Lab Test 02/10/22  0626 02/09/22  0851 07/16/21  1054   ALBUMIN 2.6* 3.0* 2.8*              Procedure note: , I had previous obtain informed consent from the patient to perform serial debridements, I confirmed this again with the patient today verbally. , Anesthetized as needed. , Using a curette and/or a scalpel I performed an excisional debridement removing all necrotic material at the Sacral wound down to the level of viable subcutaneous tissue.  I obtained hemostasis  with direct pressure.  The patient tolerated the procedure well. , EBL: <5 ml  and Total debridement surface area: Less than 20 cm              ASSESSMENT:   This is a 67 year old female with a stage III sacral pressure ulcer.          PLAN:   We will bandage the sacral wound with Hydrofera Blue and a Mepilex changed every other day.  I explained to the patient and her  that the key thing we need to do here is to watch throughout the day to find any time or position where pressure is applied to the area and find ways to reduce that pressure.  Based on our conversation today my highest suspicion is that while she is sitting at the dining room table may be the problem.  I have asked her to start using the Roho cushion while she is sitting at the dining room table.  If things do not improve by her next clinic visit we may need to just decrease the time that she is sitting altogether throughout the day.    I have encouraged her to continue to use the Roho mosaic cushion in any chair where she is seated for an extended period of time.  I have encouraged the patient to continue to minimize the time they spend in the wheelchair.   I have encouraged the patient to continue on their high protein diet to aid in wound healing.   The patient will return to the wound clinic in 1 week to see me again.        30 minutes spent on the date of the encounter doing chart review, history and exam, documentation and further activities per the note      Frankie Vega MD  07/26/2022   10:02 AM   [facility]  [phone]

## 2022-07-26 NOTE — LETTER
[facility]  [address] [place] [address] [address], [place] [address]  [phone]      Fax [phone]    [facility]           Fax: [phone]            Customer Service: [phone]    Wound Dressing Rx and Order Form  Order Status: new  Verbal: ernesto  Date: 2022     Elenita Moran  Gender: female  : 1955  [address] [facility] [address]  [phone] (home)     Medical Record: [id number]  Primary Care Provider: Sandi Jones      ICD-10-CM    1. Stage III pressure ulcer of sacral region (H)  L89.153 DEBRIDE SKIN/SUBQ TISSUE         Insurance Info:  INSURER: Payor: [facility] / Plan: ARE MEDICARE / Product Type: HMO /   Policy ID#:  [id number]  SECONDARY INSURANCE:    Secondary Policy ID#:  N/A        Physician Info:   Name:  CINDY OSPINA     Dept Address/Phones:   [address] [place] [address], [address] [place] [id number] [phone]  Fax: [phone]    Lymphedema circumferential measurements (in cm):  No flowsheet data found.      Wound info:  Port A Cath Single 22 Left Chest wall (Active)   Number of days: 134       Wound Buttocks Pressure injury community acquired (Active)   Number of days: 167       Wound Foot Abrasion(s) (Active)   Number of days: 162       VASC Wound L buttock/gluteal fold (Active)   Pre Size Length 2.1 22 0900   Pre Size Width 0.9 22 0900   Pre Size Depth 0.3 22 0900   Pre Total Sq cm 1.89 22 0900   Post Size Length 2.2 22 0900   Post Size Width 1 22 0900   Post Size Depth 0.1 22 0900   Post Total Sq cm 2.1 22 0900   Undermined no 05/10/22 1300   Tunneling no 05/10/22 1300   Description slough 22 0912   Number of days: 77       Incision/Surgical Site 22 Right;Upper Chest (Active)   Number of days: 134        Drainage: moderate  Thickness:  full  Duration of Need: 30 DAYS  Days Supply: 30 DAYS  Start Date: 22  Starter Kit: ancillary  Qualifying  "wound/Debridement: Yes      Dressing Type Brand Size Number of pieces Frequency of change    Primary hydrofera blue classic  4\"x4\" 2 EVERY OTHER DAY and as needed    Saline bullets  5mL 1 box EVERY OTHER DAY and as needed     No substitutions preferred. Call [phone].         OK to forward to covered supplier.    Electronically Signed Physician:  CINDY OSPINA             Date: July 26, 2022    "

## 2022-07-26 NOTE — PATIENT INSTRUCTIONS
Wound Care Instructions- Left buttock    Every other day and as needed, Cleanse your gluteal wound(s) with Normal saline.    Pat Dry with non-sterile gauze    Apply Lotion to the intact skin surrounding your wound and other dry skin locations. Some good lotions include: Remedy Skin Repair Cream, Sarna, Vanicream or Cetaphil    Primary Dressing:Hydrofera Blue Classic- moisten with saline so that dressing gets puffy, then apply to the wound    Secondary dressing: Cover with mepilex 4x4    It is ok to get your wound wet in the bath or shower    Use the Roho cushion on every chair that you sit on.     If for some reason you are not able to get your dressing(s) changed as outlined above (due to illness, lack of supplies, lack of help) please do the following: remove old, soiled dressings; wash the wounds with saline; pat dry; apply ABD pad or other absorbant pad and secure with rolled gauze; avoid tape directly on your skin; Call the clinic as soon as possible to let us know what the current issues are in receiving wound care [phone].      SEEK MEDICAL CARE IF:  You have an increase in swelling, pain, or redness around the wound.  You have an increase in the amount of pus coming from the wound.  There is a bad smell coming from the wound.  The wound appears to be worsening/enlarging  You have a fever greater than 101.5 F      It is ok to continue current wound care treatment/products for the next 2-3 days until new wound care supplies are ordered and arrive. If longer than this please contact our office at [phone].    If you have a 2 layer or 4 layer compression wrap on these are safe to have on for ONLY 7 days. If for some reason you are not able to get the wrap(s) changed (due to illness; lack of supplies, lack of help, lack of transportation) please do the following: unwrap the old 2 or 4 layer compression wrap; avoid using scissors as you could cut your skin and cause wounds; use tubular compression when  available. Call to reschedule your home care or clinic visit appointment as soon as possible.    Please NOTE: if you are 15 minutes late to your clinic appointment you will have to be rescheduled. Please call our clinic as soon as possible if you know you will not be able to get to your appointment at [phone].    If you fail to show up to 3 scheduled clinic appointments you will be dismissed from our clinic.              We want to hear from you!  In the next few weeks, you should receive a call or email to complete a survey about your visit at [facility]. Please help us improve your appointment experience by letting us know how we did today. We strive to make your experience good and value any ways in which we could do better.      We value your input and suggestions.    Thank you for choosing the [facility]!

## 2022-08-02 NOTE — PROGRESS NOTES
Wound Clinic Note          Visit date: 08/02/2022       Cheif Complaint:     Elenita Moran is a 67 year old female had concerns including sacrum wound ..  The patient has sacrococcygeal pressure ulcer .       HISTORY OF PRESENT ILLNESS:    Elenita Moran reports the wound has been present since July 2021.  The wound began While she was in the hospital with another illness and laying in the bed for several days at a time.  She has never had other pressure ulcers on the bottom.  She does have metastatic breast cancer and is currently receiving chemotherapy.  She has normal sensation and somewhat reduced motor function in the lower extremities.  She does use a wheelchair when she goes out of the house but is able to use a walker around the house.  She reports she spends most of the day in a lift chair.  She confirms that she currently sleeps in a bed.  The patient was accompanied to the wound clinic for initial evaluation by her  who does her dressing changes and is also her medical power of .    Today she is again accompanied by her  who has been doing the dressing changes.  They both provide some of the interval history.  They have continued to use the Roho cushion in her lift chair and her wheelchair today.  Today the patient and her  report that she tends to sit up in the seated position most of the day.  She explains today that when she is at the dining room table she is sitting up straight and all the pressure is on her upper thighs and not on the area of the wound.  She still does not use the Roho cushion at the dining room table.    Today the patient reports that the Hydrofera Blue has been much more uncomfortable recently and she would prefer not to continue to use it.  Recently they have gone back to bandaging the wound with endoform and a Mepilex bandage changed every 2 to 3 days, there is been light serous drainage from the wound.      The pateint denies  fevers or chills.  They report the pain from the wound has been 0/10 and has remained about the same recently.      Today the patient reports maintaining a high protein diet and taking protein supplements regularly.        The patient denies a history of smoking or chronic steroid use.  and The patient confirms having diabetes and reports the blood sugars are well controlled.         The patient has not had any symptoms of infection relating to the wound recently and is not currently on antibiotics.         Problem List:   Past Medical History:   Diagnosis Date     Allergic rhinitis      Bone cancer (H) 2011    breast mets     Breast cancer (H) 2010    left mastectomy     Depression      DM (diabetes mellitus) (H)      Fibromyalgia      Hx antineoplastic chemotherapy 2010     Hx of radiation therapy 2010     Hyperlipemia      Lactose intolerance      Mini stroke (H)     R EYE     Osteoarthritis      Tobacco dependency               Family Hx: family history includes Breast Cancer in her cousin; Kidney Cancer in her maternal grandmother; Lung Cancer in her mother and paternal aunt; Pancreatic Cancer in her mother.       Surgical Hx:   Past Surgical History:   Procedure Laterality Date     APPENDECTOMY       INSERT INTRACORONARY STENT  1985    R Hand     IR CHEST PORT PLACEMENT > 5 YRS OF AGE  12/12/2019     IR CHEST PORT PLACEMENT > 5 YRS OF AGE  3/14/2022     IR LUMBAR TRANSFORAMINAL EPIDURAL STEROID INJECTION  12/5/2019     IR LUMBAR TRANSFORAMINAL EPIDURAL STRD INJ  12/5/2019     IR PORT PLACEMENT >5 YEARS  12/12/2019     IR PORT REMOVAL RIGHT  3/14/2022     IR SITE CHECK/EVALUATION  4/15/2022     MAMMOPLASTY REDUCTION Right 2015    hx of left mastectomy and right breast reduction     MASTECTOMY Left 2010     OTHER SURGICAL HISTORY      biopsy of upper and right tongue     OVARIAN CYST REMOVAL       REVISE RECONSTRUCTED BREAST Left     March 11, 2015          Allergies:    Allergies   Allergen Reactions      Gabapentin      Upper body edema/swelling.         Morphine Visual Disturbance     Visual hallucinations     Sulfamethoxazole-Trimethoprim      Other reaction(s): Hives     Sulfa Drugs Hives and Rash     welts                Medication History:    Current Outpatient Medications   Medication Sig     ACETAMINOPHEN PO Take 1,300 mg by mouth as needed     blood glucose (ONETOUCH VERIO IQ) test strip      bumetanide (BUMEX) 1 MG tablet 2 mg in morning and 1 mg afternoon     calcium carbonate (OS-BENJI) 600 MG tablet Take 1-2 mg by mouth daily      cephALEXin (KEFLEX) 500 MG capsule Take 1 capsule (500 mg) by mouth 2 times daily     chlorhexidine (PERIDEX) 0.12 % solution Take 15 mLs by mouth daily as needed      Cholecalciferol (VITAMIN D) 125 MCG (5000 UT) capsule Take 1 tablet by mouth daily      clobetasol (TEMOVATE) 0.05 % external ointment Apply topically twice a week     Continuous Blood Gluc  (FREESTYLE KIMBERLY 14 DAY READER) COLETTE Use to check blood sugar at least 4 times a day or as directed      Continuous Blood Gluc Sensor (FREESTYLE KIMBERLY 14 DAY SENSOR) MISC Use as directed to test blood sugar at least 4 times a day or as directed     docusate sodium (COLACE) 100 MG capsule Take 100 mg by mouth daily     DULoxetine (CYMBALTA) 30 MG capsule Take 30 mg by mouth every morning      DULoxetine (CYMBALTA) 60 MG capsule Take 1 capsule by mouth At Bedtime     ezetimibe-simvastatin (VYTORIN) 10-20 MG tablet Take 1 tablet by mouth daily     glucose (BD GLUCOSE) 4 g chewable tablet as needed     HYDROmorphone (DILAUDID) 4 MG tablet Take 1 tablet (4 mg) by mouth 3 times daily as needed for moderate to severe pain or severe pain     hydrOXYzine (VISTARIL) 25 MG capsule Take 25 mg by mouth 3 times daily With hydromorphone     ibuprofen (ADVIL/MOTRIN) 200 MG capsule Take 400 mg by mouth daily     Insulin Lispro (HUMALOG PEN SC) As needed per sliding scale     LANTUS SOLOSTAR 100 UNIT/ML soln 4 Units every morning      lidocaine (XYLOCAINE) 5 % external ointment Apply topically 4 times daily (Patient taking differently: Apply topically 4 times daily as needed for moderate pain)     lisinopril (ZESTRIL) 2.5 MG tablet Take 1 tablet (2.5 mg) by mouth daily     LORazepam (ATIVAN) 0.5 MG tablet take 1 tablet by mouth up to 3 times per day as needed for nausea, anxiety, or insomnia     medical cannabis (Patient's own supply) See Admin Instructions (The purpose of this order is to document that the patient reports taking medical cannabis.  This is not a prescription, and is not used to certify that the patient has a qualifying medical condition.)     metoprolol succinate ER (TOPROL-XL) 50 MG 24 hr tablet Take 1 tablet (50 mg) by mouth daily     nystatin (MYCOSTATIN) 218245 UNIT/GM external cream Apply topically 2 times daily (Patient taking differently: Apply topically daily as needed)     OLANZapine (ZYPREXA) 5 MG tablet Take 5 mg by mouth every evening      omeprazole (PRILOSEC) 20 MG DR capsule Take 20 mg by mouth At Bedtime      oxybutynin (DITROPAN) 5 MG tablet Take 5 mg by mouth every morning     oxybutynin ER (DITROPAN-XL) 10 MG 24 hr tablet Take 10 mg by mouth every evening     Prenatal Vit-Fe Fumarate-FA (PRENATAL MULTIVITAMIN  PLUS IRON) 27-1 MG TABS Take 1 tablet by mouth daily      psyllium (METAMUCIL/KONSYL) Packet Take 1 packet by mouth daily as needed for constipation     spironolactone (ALDACTONE) 25 MG tablet Take 0.5 tablets (12.5 mg) by mouth daily     vitamin (B COMPLEX) tablet Take 1 tablet by mouth daily     zolpidem (AMBIEN) 10 MG tablet Take 5-10 mg by mouth At Bedtime     Current Facility-Administered Medications   Medication     lidocaine (XYLOCAINE) 2 % external gel         Tobacco History:  reports that she has quit smoking. Her smoking use included cigarettes. She has a 15.00 pack-year smoking history. She has never used smokeless tobacco.       REVIEW OF SYMPTOMS:   The review of systems was negative except  as noted in the HPI.           PHYSICAL EXAMINATION:     /56   Pulse 76   Temp 97.9  F (36.6  C)   Wt 153 lb 8 oz (69.6 kg)   BMI 25.54 kg/m             GENERAL: The patient overall appears well and is no acute distress.   HEAD: normocephalic   EYES: Sclera and conjunctiva clear   NECK: no obvious masses   LUNGS: breathing is unlabored.   EXTREMITIES: No clubbing, cyanosis or edema   SKIN: No rashes or other abnormalities except as noted under the Wound section below.   NEUROLOGICAL: Normal sensory function, decreased motor function in the lower extremities.      WOUND: The wound appears healthy with no sign of infection.   Wound bed: necrotic material    Periwound: healthy intact skin  The sacral wound is a fairly small wound consistent with a stage III sacral pressure ulcer.  Today the sacral wound is about the same compared with her last clinic visit.        Also see below for wound details:         Ulceration(s)/Wound(s):   Please see the media tab under the chart review for pictures of the wounds.    VASC Wound L buttock/gluteal fold (Active)   Pre Size Length 2 08/02/22 0900   Pre Size Width 0.9 08/02/22 0900   Pre Size Depth 0.3 08/02/22 0900   Pre Total Sq cm 1.8 08/02/22 0900           Recent Labs   Lab Test 02/09/22  0851 07/16/21  1054 12/18/19  0000   A1C 5.6 6.3* 5.8          Recent Labs   Lab Test 02/10/22  0626 02/09/22  0851 07/16/21  1054   ALBUMIN 2.6* 3.0* 2.8*              Procedure note: , I had previous obtain informed consent from the patient to perform serial debridements, I confirmed this again with the patient today verbally. , Anesthetized as needed. , Using a curette and/or a scalpel I performed an excisional debridement removing all necrotic material at the Sacral wound down to the level of viable subcutaneous tissue.  I obtained hemostasis with direct pressure.  The patient tolerated the procedure well. , EBL: <5 ml  and Total debridement surface area: Less than 20 cm               ASSESSMENT:   This is a 67 year old female with a stage III sacral pressure ulcer.          PLAN:   We will bandage the sacral wound with medical honey and a Mepilex changed every other day.  I explained to the patient and her  that the key thing we need to do here is to watch throughout the day to find any time or position where pressure is applied to the area and find ways to reduce that pressure.    I have encouraged her to continue to pay attention to where the pressure is applied when she is sitting at the dining room table and if there is any chance that pressure is being applied to the wound I would recommend that she use the Roho mosaic cushion in that chair.    I have encouraged her to continue to use the Roho mosaic cushion in any chair where she is seated for an extended period of time.  I have encouraged the patient to continue to minimize the time they spend in the wheelchair.   I have encouraged the patient to continue on their high protein diet to aid in wound healing.    We also discussed the option of meeting with a plastic surgeon to discuss surgical closure options, however the patient was not interested in pursuing that option at this time.  The patient will return to the wound clinic in 1 week to see me again.        30 minutes spent on the date of the encounter doing chart review, history and exam, documentation and further activities per the note      Frankie Vega MD  08/02/2022   10:03 AM   [facility]  [phone]

## 2022-08-02 NOTE — PATIENT INSTRUCTIONS
Wound Care Instructions- Left buttock    Every other day and as needed, Cleanse your gluteal wound(s) with Normal saline.    Pat Dry with non-sterile gauze    Apply Lotion to the intact skin surrounding your wound and other dry skin locations. Some good lotions include: Remedy Skin Repair Cream, Sarna, Vanicream or Cetaphil    Primary Dressing: Medihoney alginate    Secondary dressing: Cover with mepilex 4x4    It is ok to get your wound wet in the bath or shower    Use the Roho cushion on every chair that you sit on.     If for some reason you are not able to get your dressing(s) changed as outlined above (due to illness, lack of supplies, lack of help) please do the following: remove old, soiled dressings; wash the wounds with saline; pat dry; apply ABD pad or other absorbant pad and secure with rolled gauze; avoid tape directly on your skin; Call the clinic as soon as possible to let us know what the current issues are in receiving wound care [phone].      SEEK MEDICAL CARE IF:  You have an increase in swelling, pain, or redness around the wound.  You have an increase in the amount of pus coming from the wound.  There is a bad smell coming from the wound.  The wound appears to be worsening/enlarging  You have a fever greater than 101.5 F      It is ok to continue current wound care treatment/products for the next 2-3 days until new wound care supplies are ordered and arrive. If longer than this please contact our office at [phone].    If you have a 2 layer or 4 layer compression wrap on these are safe to have on for ONLY 7 days. If for some reason you are not able to get the wrap(s) changed (due to illness; lack of supplies, lack of help, lack of transportation) please do the following: unwrap the old 2 or 4 layer compression wrap; avoid using scissors as you could cut your skin and cause wounds; use tubular compression when available. Call to reschedule your home care or clinic visit appointment as  soon as possible.    Please NOTE: if you are 15 minutes late to your clinic appointment you will have to be rescheduled. Please call our clinic as soon as possible if you know you will not be able to get to your appointment at [phone].    If you fail to show up to 3 scheduled clinic appointments you will be dismissed from our clinic.              We want to hear from you!  In the next few weeks, you should receive a call or email to complete a survey about your visit at [facility]. Please help us improve your appointment experience by letting us know how we did today. We strive to make your experience good and value any ways in which we could do better.      We value your input and suggestions.    Thank you for choosing the [facility]!

## 2022-08-02 NOTE — LETTER
[facility]  [address] [place] [address] [address], [place] [address]  [phone]      Fax [phone]    formerly [facility]           Fax: [phone]            Customer Service: [phone]    Wound Dressing Rx and Order Form  Order Status: new  Verbal: Susan  Date: 2022     Elenita Moran  Gender: female  : 1955  [address] [place] [address]  [phone] (home)     Medical Record: [id number]  Primary Care Provider: Sandi Jones      ICD-10-CM    1. Stage III pressure ulcer of sacral region (H)  L89.153 lidocaine-prilocaine (EMLA) cream     DEBRIDE SKIN/SUBQ TISSUE     Wound care         Insurance Info:  INSURER: Payor: [facility] / Plan: ARE MEDICARE / Product Type: HMO /   Policy ID#:  [id number]  SECONDARY INSURANCE:    Secondary Policy ID#:  N/A        Physician Info:   Name:  CINDY OSPINA     Dept Address/Phones:   [address] [place] [address] [id number] [phone]  Fax: [phone]    Lymphedema circumferential measurements (in cm):  No flowsheet data found.      Wound info:  Port A Cath Single 22 Left Chest wall (Active)   Number of days: 141       Wound Buttocks Pressure injury community acquired (Active)   Number of days: 174       Wound Foot Abrasion(s) (Active)   Number of days: 169       VASC Wound L buttock/gluteal fold (Active)   Pre Size Length 2 22 0900   Pre Size Width 0.9 22 0900   Pre Size Depth 0.3 22 0900   Pre Total Sq cm 1.8 22 0900   Post Size Length 2 22 0900   Post Size Width 0.9 22 0900   Post Size Depth 0.3 22 0900   Post Total Sq cm 1.8 22 0900   Undermined no 05/10/22 1300   Tunneling no 05/10/22 1300   Description slough 22 0912   Number of days: 84       Incision/Surgical Site 22 Right;Upper Chest (Active)   Number of days: 141        Drainage: moderate  Thickness:  full  Duration of Need: 30 DAYS  Days Supply: 30 DAYS  Start  "Date: 8/2/22  Starter Kit: no  Qualifying wound/Debridement: Yes      Dressing Type Brand Size Number of pieces Frequency of change    Primary Manuka honey HD supralite  4\"x5\" 1 EVERY OTHER DAY and as needed     No substitutions preferred. Call [phone].         OK to forward to covered supplier.    Electronically Signed Physician:  CINDY OSPINA             Date: August 2, 2022    "

## 2022-08-15 NOTE — PATIENT INSTRUCTIONS
Wound Care Instructions- Left buttock    Every other day and as needed, Cleanse your gluteal wound(s) with Normal saline.    Pat Dry with non-sterile gauze    Apply Lotion to the intact skin surrounding your wound and other dry skin locations. Some good lotions include: Remedy Skin Repair Cream, Sarna, Vanicream or Cetaphil    Primary Dressing: Endoform    Secondary dressing: Cover with mepilex 4x4    It is ok to get your wound wet in the bath or shower    Use the Roho cushion on every chair that you sit on.     If for some reason you are not able to get your dressing(s) changed as outlined above (due to illness, lack of supplies, lack of help) please do the following: remove old, soiled dressings; wash the wounds with saline; pat dry; apply ABD pad or other absorbant pad and secure with rolled gauze; avoid tape directly on your skin; Call the clinic as soon as possible to let us know what the current issues are in receiving wound care [phone].      SEEK MEDICAL CARE IF:  You have an increase in swelling, pain, or redness around the wound.  You have an increase in the amount of pus coming from the wound.  There is a bad smell coming from the wound.  The wound appears to be worsening/enlarging  You have a fever greater than 101.5 F      It is ok to continue current wound care treatment/products for the next 2-3 days until new wound care supplies are ordered and arrive. If longer than this please contact our office at [phone].    If you have a 2 layer or 4 layer compression wrap on these are safe to have on for ONLY 7 days. If for some reason you are not able to get the wrap(s) changed (due to illness; lack of supplies, lack of help, lack of transportation) please do the following: unwrap the old 2 or 4 layer compression wrap; avoid using scissors as you could cut your skin and cause wounds; use tubular compression when available. Call to reschedule your home care or clinic visit appointment as soon as  possible.    Please NOTE: if you are 15 minutes late to your clinic appointment you will have to be rescheduled. Please call our clinic as soon as possible if you know you will not be able to get to your appointment at [phone].    If you fail to show up to 3 scheduled clinic appointments you will be dismissed from our clinic.              We want to hear from you!  In the next few weeks, you should receive a call or email to complete a survey about your visit at [facility]. Please help us improve your appointment experience by letting us know how we did today. We strive to make your experience good and value any ways in which we could do better.      We value your input and suggestions.    Thank you for choosing the [facility]!

## 2022-08-15 NOTE — PROGRESS NOTES
Wound Clinic Note          Visit date: 08/15/2022       Deonte Complaint:     Elenita Moran is a 67 year old female had concerns including sacral wound..  The patient has sacrococcygeal pressure ulcer .       HISTORY OF PRESENT ILLNESS:    Elenita Moran reports the wound has been present since July 2021.  The wound began While she was in the hospital with another illness and laying in the bed for several days at a time.  She has never had other pressure ulcers on the bottom.  She does have metastatic breast cancer and is currently receiving chemotherapy.  She has normal sensation and somewhat reduced motor function in the lower extremities.  She does use a wheelchair when she goes out of the house but is able to use a walker around the house.  She reports she spends most of the day in a lift chair.  She confirms that she currently sleeps in a bed.  The patient was accompanied to the wound clinic for initial evaluation by her  who does her dressing changes and is also her medical power of .    Today she is again accompanied by her  who has been doing the dressing changes.  They both provide some of the interval history.  They have continued to use the Roho cushion in her lift chair and her wheelchair today.  Today the patient and her  report that she tends to sit up in the seated position most of the day.  I had previously suggested the patient try moving the Roho cushion back further in the lift chair so that when she is more in a reclined position the Roho cushion is still underneath the wound.  The patient's  reports today that they have not had a chance to do this yet.  She explains today that when she is at the dining room table she is sitting up straight and all the pressure is on her upper thighs and not on the area of the wound.      At her last clinic visit we had switched to trying medical honey however the patient and the patient's   reports today that the wound got larger while they were using the medical honey so they switched back to using the endoform.  Recently they have been bandaging the wound with endoform and a Mepilex bandage changed every 2 to 3 days and there has been light serous drainage from the wound.      The pateint denies fevers or chills.  They report the pain from the wound has been 0/10 and has remained about the same recently.      Today the patient reports maintaining a high protein diet and taking protein supplements regularly.        The patient denies a history of smoking or chronic steroid use.  and The patient confirms having diabetes and reports the blood sugars are well controlled.         The patient has not had any symptoms of infection relating to the wound recently and is not currently on antibiotics.         Problem List:   Past Medical History:   Diagnosis Date     Allergic rhinitis      Bone cancer (H) 2011    breast mets     Breast cancer (H) 2010    left mastectomy     Depression      DM (diabetes mellitus) (H)      Fibromyalgia      Hx antineoplastic chemotherapy 2010     Hx of radiation therapy 2010     Hyperlipemia      Lactose intolerance      Mini stroke (H)     R EYE     Osteoarthritis      Tobacco dependency               Family Hx: family history includes Breast Cancer in her cousin; Kidney Cancer in her maternal grandmother; Lung Cancer in her mother and paternal aunt; Pancreatic Cancer in her mother.       Surgical Hx:   Past Surgical History:   Procedure Laterality Date     APPENDECTOMY       INSERT INTRACORONARY STENT  1985    R Hand     IR CHEST PORT PLACEMENT > 5 YRS OF AGE  12/12/2019     IR CHEST PORT PLACEMENT > 5 YRS OF AGE  3/14/2022     IR LUMBAR TRANSFORAMINAL EPIDURAL STEROID INJECTION  12/5/2019     IR LUMBAR TRANSFORAMINAL EPIDURAL STRD INJ  12/5/2019     IR PORT PLACEMENT >5 YEARS  12/12/2019     IR PORT REMOVAL RIGHT  3/14/2022     IR SITE CHECK/EVALUATION  4/15/2022      MAMMOPLASTY REDUCTION Right 2015    hx of left mastectomy and right breast reduction     MASTECTOMY Left 2010     OTHER SURGICAL HISTORY      biopsy of upper and right tongue     OVARIAN CYST REMOVAL       REVISE RECONSTRUCTED BREAST Left     March 11, 2015          Allergies:    Allergies   Allergen Reactions     Gabapentin      Upper body edema/swelling.         Morphine Visual Disturbance     Visual hallucinations     Sulfamethoxazole-Trimethoprim      Other reaction(s): Hives     Sulfa Drugs Hives and Rash     welts                Medication History:    Current Outpatient Medications   Medication Sig     ACETAMINOPHEN PO Take 1,300 mg by mouth as needed     blood glucose (ONETOUCH VERIO IQ) test strip      bumetanide (BUMEX) 1 MG tablet 2 mg in morning and 1 mg afternoon     calcium carbonate (OS-BENJI) 600 MG tablet Take 1-2 mg by mouth daily      cephALEXin (KEFLEX) 500 MG capsule Take 1 capsule (500 mg) by mouth 2 times daily     chlorhexidine (PERIDEX) 0.12 % solution Take 15 mLs by mouth daily as needed      Cholecalciferol (VITAMIN D) 125 MCG (5000 UT) capsule Take 1 tablet by mouth daily      clobetasol (TEMOVATE) 0.05 % external ointment Apply topically twice a week     Continuous Blood Gluc  ([employer] KIMBERLY 14 DAY READER) COLETTE Use to check blood sugar at least 4 times a day or as directed      Continuous Blood Gluc Sensor ([employer] KIMBERLY 14 DAY SENSOR) MISC Use as directed to test blood sugar at least 4 times a day or as directed     docusate sodium (COLACE) 100 MG capsule Take 100 mg by mouth daily     DULoxetine (CYMBALTA) 30 MG capsule Take 30 mg by mouth every morning      DULoxetine (CYMBALTA) 60 MG capsule Take 1 capsule by mouth At Bedtime     ezetimibe-simvastatin (VYTORIN) 10-20 MG tablet Take 1 tablet by mouth daily     glucose (BD GLUCOSE) 4 g chewable tablet as needed     HYDROmorphone (DILAUDID) 4 MG tablet Take 1 tablet (4 mg) by mouth 3 times daily as needed for moderate to severe  pain or severe pain     hydrOXYzine (VISTARIL) 25 MG capsule Take 25 mg by mouth 3 times daily With hydromorphone     ibuprofen (ADVIL/MOTRIN) 200 MG capsule Take 400 mg by mouth daily     Insulin Lispro (HUMALOG PEN SC) As needed per sliding scale     LANTUS SOLOSTAR 100 UNIT/ML soln 4 Units every morning     lidocaine (XYLOCAINE) 5 % external ointment Apply topically 4 times daily (Patient taking differently: Apply topically 4 times daily as needed for moderate pain)     lisinopril (ZESTRIL) 2.5 MG tablet Take 1 tablet (2.5 mg) by mouth daily     LORazepam (ATIVAN) 0.5 MG tablet take 1 tablet by mouth up to 3 times per day as needed for nausea, anxiety, or insomnia     medical cannabis (Patient's own supply) See Admin Instructions (The purpose of this order is to document that the patient reports taking medical cannabis.  This is not a prescription, and is not used to certify that the patient has a qualifying medical condition.)     metoprolol succinate ER (TOPROL-XL) 50 MG 24 hr tablet Take 1 tablet (50 mg) by mouth daily     nystatin (MYCOSTATIN) 162228 UNIT/GM external cream Apply topically 2 times daily (Patient taking differently: Apply topically daily as needed)     OLANZapine (ZYPREXA) 5 MG tablet Take 5 mg by mouth every evening      omeprazole (PRILOSEC) 20 MG DR capsule Take 20 mg by mouth At Bedtime      oxybutynin (DITROPAN) 5 MG tablet Take 5 mg by mouth every morning     oxybutynin ER (DITROPAN-XL) 10 MG 24 hr tablet Take 10 mg by mouth every evening     Prenatal Vit-Fe Fumarate-FA (PRENATAL MULTIVITAMIN  PLUS IRON) 27-1 MG TABS Take 1 tablet by mouth daily      psyllium (METAMUCIL/KONSYL) Packet Take 1 packet by mouth daily as needed for constipation     spironolactone (ALDACTONE) 25 MG tablet Take 0.5 tablets (12.5 mg) by mouth daily     vitamin (B COMPLEX) tablet Take 1 tablet by mouth daily     zolpidem (AMBIEN) 10 MG tablet Take 5-10 mg by mouth At Bedtime     Current Facility-Administered  Medications   Medication     lidocaine (XYLOCAINE) 2 % external gel         Tobacco History:  reports that she has quit smoking. Her smoking use included cigarettes. She has a 15.00 pack-year smoking history. She has never used smokeless tobacco.       REVIEW OF SYMPTOMS:   The review of systems was negative except as noted in the HPI.           PHYSICAL EXAMINATION:     /50   Pulse 88   Temp 97.9  F (36.6  C)            GENERAL: The patient overall appears well and is no acute distress.   HEAD: normocephalic   EYES: Sclera and conjunctiva clear   NECK: no obvious masses   LUNGS: breathing is unlabored.   EXTREMITIES: No clubbing, cyanosis or edema   SKIN: No rashes or other abnormalities except as noted under the Wound section below.   NEUROLOGICAL: Normal sensory function, decreased motor function in the lower extremities.      WOUND: The wound appears healthy with no sign of infection.   Wound bed: necrotic material    Periwound: healthy intact skin  The sacral wound is a fairly small wound consistent with a stage III sacral pressure ulcer.  Today the sacral wound is slightly larger compared with her last clinic visit.        Also see below for wound details:         Ulceration(s)/Wound(s):   Please see the media tab under the chart review for pictures of the wounds.    VASC Wound L buttock/gluteal fold (Active)   Pre Size Length 2.1 08/15/22 1000   Pre Size Width 1.6 08/15/22 1000   Pre Size Depth 0.3 08/15/22 1000   Pre Total Sq cm 3.36 08/15/22 1000           Recent Labs   Lab Test 02/09/22  0851 07/16/21  1054 12/18/19  0000   A1C 5.6 6.3* 5.8          Recent Labs   Lab Test 02/10/22  0626 02/09/22  0851 07/16/21  1054   ALBUMIN 2.6* 3.0* 2.8*              Procedure note: , I had previous obtain informed consent from the patient to perform serial debridements, I confirmed this again with the patient today verbally. , Anesthetized as needed. , Using a curette and/or a scalpel I performed an excisional  debridement removing all necrotic material at the Sacral wound down to the level of viable subcutaneous tissue.  I obtained hemostasis with direct pressure.  The patient tolerated the procedure well. , EBL: <5 ml  and Total debridement surface area: Less than 20 cm              ASSESSMENT:   This is a 67 year old female with a stage III sacral pressure ulcer.          PLAN:   We will bandage the sacral wound with endoform and a Mepilex changed every other day.  I have encouraged the patient and her  to move the Roho cushion in the lift chair so that its back further directly under the wound when she is in the reclined position.  I have encouraged the patient to continue to reduce the time that she is applying pressure to the wound.  We discussed the option of obtaining an air mattress or having her see a plastic surgeon to discuss surgical closure options, however the patient was not interested in pursuing either of these options at this time.    I have encouraged the patient to continue to minimize the time they spend in the wheelchair.   I have encouraged the patient to continue on their high protein diet to aid in wound healing.      The patient will return to the wound clinic in 2-week to see me again.        30 minutes spent on the date of the encounter doing chart review, history and exam, documentation and further activities per the note      Frankie Vega MD  08/15/2022   10:53 AM   [facility]  [phone]

## 2022-08-31 NOTE — PATIENT INSTRUCTIONS
Wound Care Instructions- Left buttock    Every other day and as needed, Cleanse your gluteal wound(s) with Normal saline.    Pat Dry with non-sterile gauze    Apply Lotion to the intact skin surrounding your wound and other dry skin locations. Some good lotions include: Remedy Skin Repair Cream, Sarna, Vanicream or Cetaphil    Primary Dressing: Endoform    Secondary dressing: Cover with mepilex 4x4    It is ok to get your wound wet in the bath or shower    Use the Roho cushion on every chair that you sit on.     If for some reason you are not able to get your dressing(s) changed as outlined above (due to illness, lack of supplies, lack of help) please do the following: remove old, soiled dressings; wash the wounds with saline; pat dry; apply ABD pad or other absorbant pad and secure with rolled gauze; avoid tape directly on your skin; Call the clinic as soon as possible to let us know what the current issues are in receiving wound care [phone].      SEEK MEDICAL CARE IF:  You have an increase in swelling, pain, or redness around the wound.  You have an increase in the amount of pus coming from the wound.  There is a bad smell coming from the wound.  The wound appears to be worsening/enlarging  You have a fever greater than 101.5 F      It is ok to continue current wound care treatment/products for the next 2-3 days until new wound care supplies are ordered and arrive. If longer than this please contact our office at [phone].    If you have a 2 layer or 4 layer compression wrap on these are safe to have on for ONLY 7 days. If for some reason you are not able to get the wrap(s) changed (due to illness; lack of supplies, lack of help, lack of transportation) please do the following: unwrap the old 2 or 4 layer compression wrap; avoid using scissors as you could cut your skin and cause wounds; use tubular compression when available. Call to reschedule your home care or clinic visit appointment as soon as  possible.    Please NOTE: if you are 15 minutes late to your clinic appointment you will have to be rescheduled. Please call our clinic as soon as possible if you know you will not be able to get to your appointment at [phone].    If you fail to show up to 3 scheduled clinic appointments you will be dismissed from our clinic.              We want to hear from you!  In the next few weeks, you should receive a call or email to complete a survey about your visit at [facility]. Please help us improve your appointment experience by letting us know how we did today. We strive to make your experience good and value any ways in which we could do better.      We value your input and suggestions.    Thank you for choosing the [facility]!

## 2022-08-31 NOTE — LETTER
[facility]  [address] [place] [address] [address], [place] [address]  [phone]      Fax [phone]    [facility]           Fax: [phone]            Customer Service: [phone]    Wound Dressing Rx and Order Form  Order Status: refill  Verbal: Susan  Date: 2022     Elenita Moran  Gender: female  : 1955  [address] [place] [id number]  [phone] (home)     Medical Record: [id number]  Primary Care Provider: Sandi Jones      ICD-10-CM    1. Stage III pressure ulcer of sacral region (H)  L89.153 DEBRIDE SKIN/SUBQ TISSUE     Wound care         Insurance Info:  INSURER: Payor: [facility] / Plan: [employer] MEDICARE / Product Type: HMO /   Policy ID#:  [id number]  SECONDARY INSURANCE:    Secondary Policy ID#:  N/A        Physician Info:   Name:  CINDY OSPINA     Dept Address/Phones:   [address] [place] [address], [address] [address] [phone]  Fax: [phone]    Lymphedema circumferential measurements (in cm):  No flowsheet data found.      Wound info:  Port A Cath Single 22 Left Chest wall (Active)   Number of days: 170       Wound Buttocks Pressure injury community acquired (Active)   Number of days: 203       Wound Foot Abrasion(s) (Active)   Number of days: 198       VASC Wound L buttock/gluteal fold (Active)   Pre Size Length 1.8 22 1000   Pre Size Width 1.4 22 1000   Pre Size Depth 0.3 22 1000   Pre Total Sq cm 2.52 22 1000   Post Size Length 2 22 0900   Post Size Width 0.9 22 0900   Post Size Depth 0.3 22 0900   Post Total Sq cm 1.8 22 0900   Undermined no 05/10/22 1300   Tunneling no 05/10/22 1300   Description slough 22 0912   Number of days: 113       Incision/Surgical Site 22 Right;Upper Chest (Active)   Number of days: 170        Drainage: moderate  Thickness:  full  Duration of Need: 30 DAYS  Days Supply: 30 DAYS  Start Date: 22  Starter Kit:  "no  Qualifying wound/Debridement: Yes      Dressing Type Brand Size Number of pieces Frequency of change    Primary endoform  2\"x2\" 5 EVERY OTHER DAY and as needed    Square gauze  4\"x4\" 1 loaf EVERY OTHER DAY and as needed    Saline bullets  5mL 1 box EVERY OTHER DAY and as needed     No substitutions preferred. Call [phone].         OK to forward to covered supplier.    Electronically Signed Physician:  CINDY OSPINA             Date: August 31, 2022    "

## 2022-08-31 NOTE — PROGRESS NOTES
Wound Clinic Note          Visit date: 08/31/2022       Deonte Complaint:     Elenita Moran is a 67 year old female had concerns including Sacral ulcer..  The patient has sacrococcygeal pressure ulcer .       HISTORY OF PRESENT ILLNESS:    Elenita Moran reports the wound has been present since July 2021.  The wound began While she was in the hospital with another illness and laying in the bed for several days at a time.  She has never had other pressure ulcers on the bottom.  She does have metastatic breast cancer and is currently receiving chemotherapy.  She has normal sensation and somewhat reduced motor function in the lower extremities.  She does use a wheelchair when she goes out of the house but is able to use a walker around the house.  She reports she spends most of the day in a lift chair.  She confirms that she currently sleeps in a bed.  The patient was accompanied to the wound clinic for initial evaluation by her  who does her dressing changes and is also her medical power of .  We have previously tried using a medical honey bandage and Hydrofera Blue both which she could not tolerate.    Today she is again accompanied by her  who has been doing the dressing changes.  They both provide some of the interval history.  They have continued to use the Roho cushion in her lift chair and her wheelchair today.      The patient's  confirms he is continue to bandage the wound with endoform and a Mepilex bandage changed every day.  There is been light serous drainage from the wound.  There is been no difficulties with the dressing changes.    The patient confirms that they have removed the Roho cushion back further in the lift chair so that its more directly under the wound when she is in the reclined position.  The patient also reports that she has been using a pillow under one of her hips at night to help keep her off of the wound while she is  sleeping.      The pateint denies fevers or chills.  They report the pain from the wound has been 0/10 and has remained about the same recently.      Today the patient reports maintaining a high protein diet and taking protein supplements regularly.        The patient denies a history of smoking or chronic steroid use.  and The patient confirms having diabetes and reports the blood sugars are well controlled.         The patient has not had any symptoms of infection relating to the wound recently and is not currently on antibiotics.         Problem List:   Past Medical History:   Diagnosis Date     Allergic rhinitis      Bone cancer (H) 2011    breast mets     Breast cancer (H) 2010    left mastectomy     Depression      DM (diabetes mellitus) (H)      Fibromyalgia      Hx antineoplastic chemotherapy 2010     Hx of radiation therapy 2010     Hyperlipemia      Lactose intolerance      Mini stroke (H)     R EYE     Osteoarthritis      Tobacco dependency               Family Hx: family history includes Breast Cancer in her cousin; Kidney Cancer in her maternal grandmother; Lung Cancer in her mother and paternal aunt; Pancreatic Cancer in her mother.       Surgical Hx:   Past Surgical History:   Procedure Laterality Date     APPENDECTOMY       INSERT INTRACORONARY STENT  1985    R Hand     IR CHEST PORT PLACEMENT > 5 YRS OF AGE  12/12/2019     IR CHEST PORT PLACEMENT > 5 YRS OF AGE  3/14/2022     IR LUMBAR TRANSFORAMINAL EPIDURAL STEROID INJECTION  12/5/2019     IR LUMBAR TRANSFORAMINAL EPIDURAL STRD INJ  12/5/2019     IR PORT PLACEMENT >5 YEARS  12/12/2019     IR PORT REMOVAL RIGHT  3/14/2022     IR SITE CHECK/EVALUATION  4/15/2022     MAMMOPLASTY REDUCTION Right 2015    hx of left mastectomy and right breast reduction     MASTECTOMY Left 2010     OTHER SURGICAL HISTORY      biopsy of upper and right tongue     OVARIAN CYST REMOVAL       REVISE RECONSTRUCTED BREAST Left     March 11, 2015          Allergies:     Allergies   Allergen Reactions     Gabapentin      Upper body edema/swelling.         Morphine Visual Disturbance     Visual hallucinations     Sulfamethoxazole-Trimethoprim      Other reaction(s): Hives     Sulfa Drugs Hives and Rash     welts                Medication History:    Current Outpatient Medications   Medication Sig     ACETAMINOPHEN PO Take 1,300 mg by mouth as needed     blood glucose (ONETOUCH VERIO IQ) test strip      bumetanide (BUMEX) 1 MG tablet 2 mg in morning and 1 mg afternoon     calcium carbonate (OS-BENJI) 600 MG tablet Take 1-2 mg by mouth daily      cephALEXin (KEFLEX) 500 MG capsule Take 1 capsule (500 mg) by mouth 2 times daily     chlorhexidine (PERIDEX) 0.12 % solution Take 15 mLs by mouth daily as needed      Cholecalciferol (VITAMIN D) 125 MCG (5000 UT) capsule Take 1 tablet by mouth daily      clobetasol (TEMOVATE) 0.05 % external ointment Apply topically twice a week     Continuous Blood Gluc  (FREESTYLE KIMBERLY 14 DAY READER) COLETTE Use to check blood sugar at least 4 times a day or as directed      Continuous Blood Gluc Sensor (VortalYLE KIMBERLY 14 DAY SENSOR) MISC Use as directed to test blood sugar at least 4 times a day or as directed     docusate sodium (COLACE) 100 MG capsule Take 100 mg by mouth daily     DULoxetine (CYMBALTA) 30 MG capsule Take 30 mg by mouth every morning      DULoxetine (CYMBALTA) 60 MG capsule Take 1 capsule by mouth At Bedtime     ezetimibe-simvastatin (VYTORIN) 10-20 MG tablet Take 1 tablet by mouth daily     glucose (BD GLUCOSE) 4 g chewable tablet as needed     HYDROmorphone (DILAUDID) 4 MG tablet Take 1 tablet (4 mg) by mouth 3 times daily as needed for moderate to severe pain or severe pain     hydrOXYzine (VISTARIL) 25 MG capsule Take 25 mg by mouth 3 times daily With hydromorphone     ibuprofen (ADVIL/MOTRIN) 200 MG capsule Take 400 mg by mouth daily     Insulin Lispro (HUMALOG PEN SC) As needed per sliding scale     LANTUS SOLOSTAR 100  UNIT/ML soln 4 Units every morning     lidocaine (XYLOCAINE) 5 % external ointment Apply topically 4 times daily (Patient taking differently: Apply topically 4 times daily as needed for moderate pain)     lisinopril (ZESTRIL) 2.5 MG tablet Take 1 tablet (2.5 mg) by mouth daily     LORazepam (ATIVAN) 0.5 MG tablet take 1 tablet by mouth up to 3 times per day as needed for nausea, anxiety, or insomnia     medical cannabis (Patient's own supply) See Admin Instructions (The purpose of this order is to document that the patient reports taking medical cannabis.  This is not a prescription, and is not used to certify that the patient has a qualifying medical condition.)     metoprolol succinate ER (TOPROL-XL) 50 MG 24 hr tablet Take 1 tablet (50 mg) by mouth daily     nystatin (MYCOSTATIN) 087448 UNIT/GM external cream Apply topically 2 times daily (Patient taking differently: Apply topically daily as needed)     OLANZapine (ZYPREXA) 5 MG tablet Take 5 mg by mouth every evening      omeprazole (PRILOSEC) 20 MG DR capsule Take 20 mg by mouth At Bedtime      oxybutynin (DITROPAN) 5 MG tablet Take 5 mg by mouth every morning     oxybutynin ER (DITROPAN-XL) 10 MG 24 hr tablet Take 10 mg by mouth every evening     Prenatal Vit-Fe Fumarate-FA (PRENATAL MULTIVITAMIN  PLUS IRON) 27-1 MG TABS Take 1 tablet by mouth daily      psyllium (METAMUCIL/KONSYL) Packet Take 1 packet by mouth daily as needed for constipation     spironolactone (ALDACTONE) 25 MG tablet Take 0.5 tablets (12.5 mg) by mouth daily     vitamin (B COMPLEX) tablet Take 1 tablet by mouth daily     zolpidem (AMBIEN) 10 MG tablet Take 5-10 mg by mouth At Bedtime     Current Facility-Administered Medications   Medication     lidocaine (XYLOCAINE) 2 % external gel         Tobacco History:  reports that she has quit smoking. Her smoking use included cigarettes. She has a 15.00 pack-year smoking history. She has never used smokeless tobacco.       REVIEW OF SYMPTOMS:   The  review of systems was negative except as noted in the HPI.           PHYSICAL EXAMINATION:     /60   Pulse 76   Temp 97  F (36.1  C)   Resp 16   Wt 154 lb (69.9 kg)   BMI 25.63 kg/m             GENERAL: The patient overall appears well and is no acute distress.   HEAD: normocephalic   EYES: Sclera and conjunctiva clear   NECK: no obvious masses   LUNGS: breathing is unlabored.   EXTREMITIES: No clubbing, cyanosis or edema   SKIN: No rashes or other abnormalities except as noted under the Wound section below.   NEUROLOGICAL: Normal sensory function, decreased motor function in the lower extremities.      WOUND: The wound appears healthy with no sign of infection.   Wound bed: necrotic material    Periwound: healthy intact skin  The sacral wound is a fairly small wound consistent with a stage III sacral pressure ulcer.  Today the sacral wound is smaller compared with her last clinic visit.        Also see below for wound details:         Ulceration(s)/Wound(s):   Please see the media tab under the chart review for pictures of the wounds.    VASC Wound L buttock/gluteal fold (Active)   Pre Size Length 1.8 08/31/22 1000   Pre Size Width 1.4 08/31/22 1000   Pre Size Depth 0.3 08/31/22 1000   Pre Total Sq cm 2.52 08/31/22 1000           Recent Labs   Lab Test 02/09/22  0851 07/16/21  1054 12/18/19  0000   A1C 5.6 6.3* 5.8          Recent Labs   Lab Test 02/10/22  0626 02/09/22  0851 07/16/21  1054   ALBUMIN 2.6* 3.0* 2.8*              Procedure note: , I had previous obtain informed consent from the patient to perform serial debridements, I confirmed this again with the patient today verbally. , Anesthetized as needed. , Using a curette and/or a scalpel I performed an excisional debridement removing all necrotic material at the Sacral wound down to the level of viable subcutaneous tissue.  I obtained hemostasis with direct pressure.  The patient tolerated the procedure well. , EBL: <5 ml  and Total debridement  surface area: Less than 20 cm              ASSESSMENT:   This is a 67 year old female with a stage III sacral pressure ulcer.          PLAN:   We will bandage the sacral wound with endoform and a Mepilex changed every other day.  I have encouraged the patient to continue to be vigilant to make sure there is no pressure applied to the wound area throughout the day and the night.    I have encouraged the patient to continue to minimize the time they spend in the wheelchair.   I have encouraged the patient to continue on their high protein diet to aid in wound healing.      The patient will return to the wound clinic in 2-week to see me again.        30 minutes spent on the date of the encounter doing chart review, history and exam, documentation and further activities per the note      Frankie Vega MD  08/31/2022   10:20 AM   [facility] Vascular/Wound  [phone]

## 2022-09-09 NOTE — TELEPHONE ENCOUNTER
----- Message from Renu Munoz MD sent at 9/9/2022  2:58 PM CDT -----  Regarding: Follow-up  Please call Elenita next Wednesday (9/14/22) to follow-up on edema and HF symptoms since increasing bumex to 2 mg BID.    Thanks;  ~ Carina

## 2022-09-09 NOTE — PROGRESS NOTES
HEART CARE NOTE          Assessment/Recommendations     1. HFrEF - Doxorubicin induced  Assessment / Plan    NICM; Anthracycline toxicity (usually irreversible); cardiac MRI negative for inducible ischemia or signs of prior infarct or infiltrative disease     Hypervolemic with LE edema on physical exam - will increase bumex to 2mg BID, and [facility] will call on Wednesday to follow-up     Current Pharmacotherapy AHA Guideline-Directed Medical Therapy   Lisinopril 2.5 mg daily Lisinopril 20 mg twice daily   Metoprolol succinate 50 mg  daily Carvedilol 25 mg twice daily   Spironolactone 12.5 mg Spironolactone 25 mg once daily   Hydralazine NA Hydralazine 100 mg three times daily   Isosorbide dinitrate NA Isosorbide dinitrate 40 mg three times daily   SGLT2 inhibitor: not started Dapagliflozin or Empagliflozin 10 mg daily      2. DM2  Assessment / Plan    Management per primary team     3. Metastatic breast Ca  Assessment / Plan    Oncology following/managing - no plans for further doxorubicin    History of Present Illness/Subjective      Ms. Elenita Moran is a 67 year old female with a PMHx (per ) significant for metastatic breast cancer, diabetes, chronic MSSA infection of the pelvis who who was recently admitted due to increased hypoxic respiratory failure and new HFrEF (anthracyclin induced) and now presents to [facility] clinic to establish care.       Today, Mrs. Monreal denies any acute events or complaints, but does have some fluid retention; Management plan as detailed above.      ECG: Personally reviewed. sinus tachycardia.     ECHO (personnaly Reviewed):  1. The left ventricle is normal in size. Left ventricular systolic performance  is moderately reduced. The ejection fraction is estimated to be 30-35%.  2. There is moderate global reduction in left ventricular systolic  performance.  3. There is mild aortic insufficiency.  4. There is moderate mitral insufficiency.  5. Normal right ventricular  size with mildly reduced right ventricular  systolic performance.  6. There is mild to moderate left atrial enlargement.     When compared to the prior real-time echocardiogram dated 23 December 2021,  there has been a further diminution in left ventricular systolic performance.  The degree of mitral insufficiency has increased from mild-moderate to now  Moderate.     Cardiac MRI:  1.  Pharmacological Regadenoson stress cardiac MRI is negative for inducible myocardial ischemia.   2.  No evidence of prior myocardial infarction. No focal nonvascular scarring or fibrosis. No evidence of  infiltrative disease.  3.  Left ventricular cavity size is moderately enlarged. Wall thickness is normal. Systolic function is  moderately reduced with global hypokinesis. The quantified left ventricular ejection fraction is 36%.   4.  Normal right ventricular size. Mildly reduced systolic function.  The quantified right ventricular  ejection fraction is 45%.   5.  Mild left atrial enlargement.  6.  Mild-to-moderate functional mitral regurgitation.    MUGA:    The left ventricular ejection fraction is 49.00%.    The measured left ventricular ejection fraction on the prior study date of 10/25/2010 was 68 %.          Physical Examination Review of Systems   /42 (BP Location: Right arm, Patient Position: Sitting, Cuff Size: Adult Regular)   Pulse 88   Resp 16   Wt 70.2 kg (154 lb 11.2 oz)   BMI 25.74 kg/m    Body mass index is 25.74 kg/m .  Wt Readings from Last 3 Encounters:   09/09/22 70.2 kg (154 lb 11.2 oz)   08/31/22 69.9 kg (154 lb)   08/02/22 69.6 kg (153 lb 8 oz)     General Appearance:   no distress, normal body habitus   ENT/Mouth: membranes moist, no oral lesions or bleeding gums.      EYES:  no scleral icterus, normal conjunctivae   Neck: no carotid bruits or thyromegaly   Chest/Lungs:   lungs are clear to auscultation, no rales or wheezing, equal chest wall expansion    Cardiovascular:   Regular. Normal first and  second heart sounds with no murmurs, rubs, or gallops; the carotid, radial and posterior tibial pulses are intact, + JVD and LE edema bilaterally    Abdomen:  no organomegaly, masses, bruits, or tenderness; bowel sounds are present   Extremities: no cyanosis or clubbing   Skin: no xanthelasma, warm.    Neurologic: alert and oriented x3, calm     Psychiatric: alert and oriented x3, calm     A complete 10 systems ROS was reviewed  And is negative except what is listed in the HPI.          Medical History  Surgical History Family History Social History   Past Medical History:   Diagnosis Date     Allergic rhinitis      Bone cancer (H) 2011    breast mets     Breast cancer (H) 2010    left mastectomy     Depression      DM (diabetes mellitus) (H)      Fibromyalgia      Hx antineoplastic chemotherapy 2010     Hx of radiation therapy 2010     Hyperlipemia      Lactose intolerance      Mini stroke (H)     R EYE     Osteoarthritis      Tobacco dependency     Past Surgical History:   Procedure Laterality Date     APPENDECTOMY       INSERT INTRACORONARY STENT  1985    R Hand     IR CHEST PORT PLACEMENT > 5 YRS OF AGE  12/12/2019     IR CHEST PORT PLACEMENT > 5 YRS OF AGE  3/14/2022     IR LUMBAR TRANSFORAMINAL EPIDURAL STEROID INJECTION  12/5/2019     IR LUMBAR TRANSFORAMINAL EPIDURAL STRD INJ  12/5/2019     IR PORT PLACEMENT >5 YEARS  12/12/2019     IR PORT REMOVAL RIGHT  3/14/2022     IR SITE CHECK/EVALUATION  4/15/2022     MAMMOPLASTY REDUCTION Right 2015    hx of left mastectomy and right breast reduction     MASTECTOMY Left 2010     OTHER SURGICAL HISTORY      biopsy of upper and right tongue     OVARIAN CYST REMOVAL       REVISE RECONSTRUCTED BREAST Left     March 11, 2015    no family history of premature coronary artery disease Social History     Socioeconomic History     Marital status:      Spouse name: Not on file     Number of children: Not on file     Years of education: Not on file     Highest education  level: Not on file   Occupational History     Not on file   Tobacco Use     Smoking status: Former Smoker     Packs/day: 0.75     Years: 20.00     Pack years: 15.00     Types: Cigarettes     Smokeless tobacco: Never Used   Substance and Sexual Activity     Alcohol use: Yes     Alcohol/week: 1.0 standard drink     Comment: 1 glass of wine/week     Drug use: No     Sexual activity: Yes     Partners: Male     Birth control/protection: Post-menopausal   Other Topics Concern     Parent/sibling w/ CABG, MI or angioplasty before 65F 55M? Not Asked   Social History Narrative    Patient works at home, owns [employer].  She lives with her  and 1 of her sons.         Social Determinants of Health     Financial Resource Strain: Not on file   Food Insecurity: Not on file   Transportation Needs: Not on file   Physical Activity: Not on file   Stress: Not on file   Social Connections: Not on file   Intimate Partner Violence: Not on file   Housing Stability: Not on file           Lab Results    Chemistry/lipid CBC Cardiac Enzymes/BNP/TSH/INR   Lab Results   Component Value Date    CHOL 94 07/28/2022    HDL 26 (L) 07/28/2022    TRIG 141 07/28/2022    BUN 47.8 (H) 07/28/2022     (L) 07/28/2022    CO2 21 (L) 07/28/2022    Lab Results   Component Value Date    WBC 5.6 02/15/2022    HGB 7.6 (L) 03/25/2022    HCT 29.5 (L) 02/15/2022    MCV 93 02/15/2022     02/15/2022    Lab Results   Component Value Date    TROPONINI 0.07 02/09/2022    BNP 1,511 (H) 02/08/2022    TSH 1.19 11/18/2020    INR 1.45 (H) 07/16/2021     Lab Results   Component Value Date    TROPONINI 0.07 02/09/2022          Weight:    Wt Readings from Last 3 Encounters:   08/31/22 69.9 kg (154 lb)   08/02/22 69.6 kg (153 lb 8 oz)   07/26/22 70.8 kg (156 lb)       Allergies  Allergies   Allergen Reactions     Gabapentin      Upper body edema/swelling.         Morphine Visual Disturbance     Visual hallucinations     Sulfamethoxazole-Trimethoprim       Other reaction(s): Hives     Sulfa Drugs Hives and Rash     welts           Surgical History  Past Surgical History:   Procedure Laterality Date     APPENDECTOMY       INSERT INTRACORONARY STENT  1985    R Hand     IR CHEST PORT PLACEMENT > 5 YRS OF AGE  12/12/2019     IR CHEST PORT PLACEMENT > 5 YRS OF AGE  3/14/2022     IR LUMBAR TRANSFORAMINAL EPIDURAL STEROID INJECTION  12/5/2019     IR LUMBAR TRANSFORAMINAL EPIDURAL STRD INJ  12/5/2019     IR PORT PLACEMENT >5 YEARS  12/12/2019     IR PORT REMOVAL RIGHT  3/14/2022     IR SITE CHECK/EVALUATION  4/15/2022     MAMMOPLASTY REDUCTION Right 2015    hx of left mastectomy and right breast reduction     MASTECTOMY Left 2010     OTHER SURGICAL HISTORY      biopsy of upper and right tongue     OVARIAN CYST REMOVAL       REVISE RECONSTRUCTED BREAST Left     March 11, 2015       Social History  Tobacco:   History   Smoking Status     Former Smoker     Packs/day: 0.75     Years: 20.00     Types: Cigarettes   Smokeless Tobacco     Never Used    Alcohol:   Social History    Substance and Sexual Activity      Alcohol use: Yes        Alcohol/week: 1.0 standard drink        Comment: 1 glass of wine/week   Illicit Drugs:   History   Drug Use No       Family History  Family History   Problem Relation Age of Onset     Lung Cancer Mother      Pancreatic Cancer Mother      Kidney Cancer Maternal Grandmother      Lung Cancer Paternal Aunt      Breast Cancer Cousin           Renu Munoz MD on 9/9/2022      cc: Sandi Jones

## 2022-09-09 NOTE — LETTER
9/9/2022    Sandi Jones MD  [facility] [address]  [place] [address]    RE: Elenita Moran       Dear Colleague,     I had the pleasure of seeing Elenita Moran in the [facility].    HEART CARE NOTE          Assessment/Recommendations     1. HFrEF - Doxorubicin induced  Assessment / Plan    NICM; Anthracycline toxicity (usually irreversible); cardiac MRI negative for inducible ischemia or signs of prior infarct or infiltrative disease     Hypervolemic with LE edema on physical exam - will increase bumex to 2mg BID, and [facility] will call on Wednesday to follow-up     Current Pharmacotherapy AHA Guideline-Directed Medical Therapy   Lisinopril 2.5 mg daily Lisinopril 20 mg twice daily   Metoprolol succinate 50 mg  daily Carvedilol 25 mg twice daily   Spironolactone 12.5 mg Spironolactone 25 mg once daily   Hydralazine NA Hydralazine 100 mg three times daily   Isosorbide dinitrate NA Isosorbide dinitrate 40 mg three times daily   SGLT2 inhibitor: not started Dapagliflozin or Empagliflozin 10 mg daily      2. DM2  Assessment / Plan    Management per primary team     3. Metastatic breast Ca  Assessment / Plan    Oncology following/managing - no plans for further doxorubicin    History of Present Illness/Subjective      Ms. Elenita Moran is a 67 year old female with a PMHx (per ) significant for metastatic breast cancer, diabetes, chronic MSSA infection of the pelvis who who was recently admitted due to increased hypoxic respiratory failure and new HFrEF (anthracyclin induced) and now presents to [facility] to establish care.       Today, Mrs. Monreal denies any acute events or complaints, but does have some fluid retention; Management plan as detailed above.      ECG: Personally reviewed. sinus tachycardia.     ECHO (personnaly Reviewed):  1. The left ventricle is normal in size. Left ventricular systolic performance  is moderately  reduced. The ejection fraction is estimated to be 30-35%.  2. There is moderate global reduction in left ventricular systolic  performance.  3. There is mild aortic insufficiency.  4. There is moderate mitral insufficiency.  5. Normal right ventricular size with mildly reduced right ventricular  systolic performance.  6. There is mild to moderate left atrial enlargement.     When compared to the prior real-time echocardiogram dated 23 December 2021,  there has been a further diminution in left ventricular systolic performance.  The degree of mitral insufficiency has increased from mild-moderate to now  Moderate.     Cardiac MRI:  1.  Pharmacological Regadenoson stress cardiac MRI is negative for inducible myocardial ischemia.   2.  No evidence of prior myocardial infarction. No focal nonvascular scarring or fibrosis. No evidence of  infiltrative disease.  3.  Left ventricular cavity size is moderately enlarged. Wall thickness is normal. Systolic function is  moderately reduced with global hypokinesis. The quantified left ventricular ejection fraction is 36%.   4.  Normal right ventricular size. Mildly reduced systolic function.  The quantified right ventricular  ejection fraction is 45%.   5.  Mild left atrial enlargement.  6.  Mild-to-moderate functional mitral regurgitation.    MUGA:    The left ventricular ejection fraction is 49.00%.    The measured left ventricular ejection fraction on the prior study date of 10/25/2010 was 68 %.          Physical Examination Review of Systems   /42 (BP Location: Right arm, Patient Position: Sitting, Cuff Size: Adult Regular)   Pulse 88   Resp 16   Wt 70.2 kg (154 lb 11.2 oz)   BMI 25.74 kg/m    Body mass index is 25.74 kg/m .  Wt Readings from Last 3 Encounters:   09/09/22 70.2 kg (154 lb 11.2 oz)   08/31/22 69.9 kg (154 lb)   08/02/22 69.6 kg (153 lb 8 oz)     General Appearance:   no distress, normal body habitus   ENT/Mouth: membranes moist, no oral lesions or  bleeding gums.      EYES:  no scleral icterus, normal conjunctivae   Neck: no carotid bruits or thyromegaly   Chest/Lungs:   lungs are clear to auscultation, no rales or wheezing, equal chest wall expansion    Cardiovascular:   Regular. Normal first and second heart sounds with no murmurs, rubs, or gallops; the carotid, radial and posterior tibial pulses are intact, + JVD and LE edema bilaterally    Abdomen:  no organomegaly, masses, bruits, or tenderness; bowel sounds are present   Extremities: no cyanosis or clubbing   Skin: no xanthelasma, warm.    Neurologic: alert and oriented x3, calm     Psychiatric: alert and oriented x3, calm     A complete 10 systems ROS was reviewed  And is negative except what is listed in the HPI.          Medical History  Surgical History Family History Social History   Past Medical History:   Diagnosis Date     Allergic rhinitis      Bone cancer (H) 2011    breast mets     Breast cancer (H) 2010    left mastectomy     Depression      DM (diabetes mellitus) (H)      Fibromyalgia      Hx antineoplastic chemotherapy 2010     Hx of radiation therapy 2010     Hyperlipemia      Lactose intolerance      Mini stroke (H)     R EYE     Osteoarthritis      Tobacco dependency     Past Surgical History:   Procedure Laterality Date     APPENDECTOMY       INSERT INTRACORONARY STENT  1985    R Hand     IR CHEST PORT PLACEMENT > 5 YRS OF AGE  12/12/2019     IR CHEST PORT PLACEMENT > 5 YRS OF AGE  3/14/2022     IR LUMBAR TRANSFORAMINAL EPIDURAL STEROID INJECTION  12/5/2019     IR LUMBAR TRANSFORAMINAL EPIDURAL STRD INJ  12/5/2019     IR PORT PLACEMENT >5 YEARS  12/12/2019     IR PORT REMOVAL RIGHT  3/14/2022     IR SITE CHECK/EVALUATION  4/15/2022     MAMMOPLASTY REDUCTION Right 2015    hx of left mastectomy and right breast reduction     MASTECTOMY Left 2010     OTHER SURGICAL HISTORY      biopsy of upper and right tongue     OVARIAN CYST REMOVAL       REVISE RECONSTRUCTED BREAST Left     March 11,  2015    no family history of premature coronary artery disease Social History     Socioeconomic History     Marital status:      Spouse name: Not on file     Number of children: Not on file     Years of education: Not on file     Highest education level: Not on file   Occupational History     Not on file   Tobacco Use     Smoking status: Former Smoker     Packs/day: 0.75     Years: 20.00     Pack years: 15.00     Types: Cigarettes     Smokeless tobacco: Never Used   Substance and Sexual Activity     Alcohol use: Yes     Alcohol/week: 1.0 standard drink     Comment: 1 glass of wine/week     Drug use: No     Sexual activity: Yes     Partners: Male     Birth control/protection: Post-menopausal   Other Topics Concern     Parent/sibling w/ CABG, MI or angioplasty before 65F 55M? 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  She lives with her  and 1 of her sons.         Social Determinants of Health     Financial Resource Strain: Not on file   Food Insecurity: Not on file   Transportation Needs: Not on file   Physical Activity: Not on file   Stress: Not on file   Social Connections: Not on file   Intimate Partner Violence: Not on file   Housing Stability: Not on file           Lab Results    Chemistry/lipid CBC Cardiac Enzymes/BNP/TSH/INR   Lab Results   Component Value Date    CHOL 94 07/28/2022    HDL 26 (L) 07/28/2022    TRIG 141 07/28/2022    BUN 47.8 (H) 07/28/2022     (L) 07/28/2022    CO2 21 (L) 07/28/2022    Lab Results   Component Value Date    WBC 5.6 02/15/2022    HGB 7.6 (L) 03/25/2022    HCT 29.5 (L) 02/15/2022    MCV 93 02/15/2022     02/15/2022    Lab Results   Component Value Date    TROPONINI 0.07 02/09/2022    BNP 1,511 (H) 02/08/2022    TSH 1.19 11/18/2020    INR 1.45 (H) 07/16/2021     Lab Results   Component Value Date    TROPONINI 0.07 02/09/2022          Weight:    Wt Readings from Last 3 Encounters:   08/31/22 69.9 kg (154 lb)    08/02/22 69.6 kg (153 lb 8 oz)   07/26/22 70.8 kg (156 lb)       Allergies  Allergies   Allergen Reactions     Gabapentin      Upper body edema/swelling.         Morphine Visual Disturbance     Visual hallucinations     Sulfamethoxazole-Trimethoprim      Other reaction(s): Hives     Sulfa Drugs Hives and Rash     welts           Surgical History  Past Surgical History:   Procedure Laterality Date     APPENDECTOMY       INSERT INTRACORONARY STENT  1985    R Hand     IR CHEST PORT PLACEMENT > 5 YRS OF AGE  12/12/2019     IR CHEST PORT PLACEMENT > 5 YRS OF AGE  3/14/2022     IR LUMBAR TRANSFORAMINAL EPIDURAL STEROID INJECTION  12/5/2019     IR LUMBAR TRANSFORAMINAL EPIDURAL STRD INJ  12/5/2019     IR PORT PLACEMENT >5 YEARS  12/12/2019     IR PORT REMOVAL RIGHT  3/14/2022     IR SITE CHECK/EVALUATION  4/15/2022     MAMMOPLASTY REDUCTION Right 2015    hx of left mastectomy and right breast reduction     MASTECTOMY Left 2010     OTHER SURGICAL HISTORY      biopsy of upper and right tongue     OVARIAN CYST REMOVAL       REVISE RECONSTRUCTED BREAST Left     March 11, 2015       Social History  Tobacco:   History   Smoking Status     Former Smoker     Packs/day: 0.75     Years: 20.00     Types: Cigarettes   Smokeless Tobacco     Never Used    Alcohol:   Social History    Substance and Sexual Activity      Alcohol use: Yes        Alcohol/week: 1.0 standard drink        Comment: 1 glass of wine/week   Illicit Drugs:   History   Drug Use No       Family History  Family History   Problem Relation Age of Onset     Lung Cancer Mother      Pancreatic Cancer Mother      Kidney Cancer Maternal Grandmother      Lung Cancer Paternal Aunt      Breast Cancer Cousin         Renu Munoz MD on 9/9/2022      cc: Sandi Jones      Thank you for allowing me to participate in the care of your patient.      Sincerely,     Renu Munoz MD     [facility]  cc:   Elenita  Amy, NP  [address], [address]  [place] [address]

## 2022-09-09 NOTE — PATIENT INSTRUCTIONS
Thank you for allowing the Heart Care clinic to be a part of your care. Please pay close attention to the following medications as they have been changed during this visit.    1.) Please increase your afternoon bumetanide (Bumex) to 2 mg. So, you should now be taking Bumex 2 mg two times daily.

## 2022-09-12 NOTE — TELEPHONE ENCOUNTER
"Pt was called to get an update on status since increase in bumex to 2mg BID. When asked how Pt is doing, they stated, \"staying close to the bathroom. I'm doing okay. I think it helped. My SOB comes and goes, depends on what I do. Legs have been really good the last few days. I haven't noticed a difference in energy levels. My appetite is still iffy. Food is not looking good to me.\" When asked is lack of appetite is due to feeling full all the time, Pt stated no. Overall feels better.    NITA Kwong, as Dr. Munoz is out of office    Frieda GILMORE RN  BSN    "

## 2022-09-12 NOTE — PROGRESS NOTES
Infectious Disease Progress Note    Assessment/Plan  Impression: Widely metastatic breast cancer had been on doxorubicin chemotherapy, but developed cardiotoxicity.    Recent hospitalization for respiratory distress with negative infectious disease work-up a new finding of heart failure suspected from chemotherapy.    Also found to have metastases in her liver.    Port-A-Cath was changed after last visit    Says her hip feels stable.  Has chronic pain    CRP test essentially interpretable in context of multiple medical problems    Recommendations:     Given all the dynamic comorbidities occurring, we will keep her on cephalexin twice a day.    Plan to see again in 6 months        RIDGE BARAJAS MD  [phone]      Subjective    Pain is stable.    Has new lesion in thoracic spine and lesions in the liver felt to be from her malignancy.  She started on new therapy regimen under the direction of Dr. Varma.  She is also going to be getting some radiation therapy.    Objective    Vital signs in last 24 hours  Pulse:  [60] 60  BP: (138)/(56) 138/56  Wt Readings from Last 3 Encounters:   09/12/22 71.9 kg (158 lb 9.6 oz)   09/09/22 70.2 kg (154 lb 11.2 oz)   08/31/22 69.9 kg (154 lb)           Review of Systems   Pertinent items are noted in HPI., otherwise negative.    Physical Exam  General appearance: alert, appears stated age and cooperative  Head: Normocephalic, without obvious abnormality, atraumatic   Chest: Previous Port-A-Cath was removed and new 1 inserted  Lungs: Normal respiratory pattern  Eyes: Extraocular muscles intact, no icterus.  Neck: no adenopathy, no carotid bruit, no JVD, supple, symmetrical, trachea midline and no erythema over the catheter tract of the cath.  Patient and spouse say this is chronic.    Extremities: Moves lower extremities  Skin: Per report from spouse, wound is improving.    Neurologic: Grossly normal    Pertinent Labs   CRP   Date Value Ref Range Status   02/12/2022 9.4 (H)  0.0-<0.8 mg/dL Final     Lab Results   Component Value Date    WBC 5.6 02/15/2022     Lab Results   Component Value Date    RBC 3.19 02/15/2022     Lab Results   Component Value Date    HGB 7.6 03/25/2022     Lab Results   Component Value Date    HCT 29.5 02/15/2022     Lab Results   Component Value Date    MCV 93 02/15/2022     Lab Results   Component Value Date    MCH 29.2 02/15/2022     Lab Results   Component Value Date    MCHC 31.5 02/15/2022     Lab Results   Component Value Date    RDW 18.7 02/15/2022     Lab Results   Component Value Date     02/15/2022         Last Comprehensive Metabolic Panel:  Sodium   Date Value Ref Range Status   07/28/2022 134 (L) 136 - 145 mmol/L Final     Potassium   Date Value Ref Range Status   07/28/2022 5.0 3.4 - 5.3 mmol/L Final   06/02/2022 4.5 3.5 - 5.0 mmol/L Final     Chloride   Date Value Ref Range Status   07/28/2022 100 98 - 107 mmol/L Final   06/02/2022 102 98 - 107 mmol/L Final     Carbon Dioxide (CO2)   Date Value Ref Range Status   07/28/2022 21 (L) 22 - 29 mmol/L Final   06/02/2022 25 22 - 31 mmol/L Final     Anion Gap   Date Value Ref Range Status   07/28/2022 13 7 - 15 mmol/L Final   06/02/2022 11 5 - 18 mmol/L Final     Glucose   Date Value Ref Range Status   07/28/2022 134 (H) 70 - 99 mg/dL Final   06/02/2022 143 (H) 70 - 125 mg/dL Final     Urea Nitrogen   Date Value Ref Range Status   07/28/2022 47.8 (H) 8.0 - 23.0 mg/dL Final   06/02/2022 32 (H) 8 - 22 mg/dL Final     Creatinine   Date Value Ref Range Status   07/28/2022 1.34 (H) 0.51 - 0.95 mg/dL Final     GFR Estimate   Date Value Ref Range Status   07/28/2022 43 (L) >60 mL/min/1.73m2 Final     Comment:     Effective December 21, 2021 eGFRcr in adults is calculated using the 2021 CKD-EPI creatinine equation which includes age and gender ( et al., NEJ, DOI: 10.1056/SYXVpk6826689)   12/09/2020 >60 >60 mL/min/1.73m2 Final     GFR, ESTIMATED POCT   Date Value Ref Range Status   02/08/2022 >60 >60  mL/min/1.73m2 Final     Calcium   Date Value Ref Range Status   07/28/2022 7.8 (L) 8.8 - 10.2 mg/dL Final       Pertinent Radiology   Radiology Results:     NM Heart MUGA Rest    Result Date: 8/23/2022    The left ventricular ejection fraction is 49.00%.   The measured left ventricular ejection fraction on the prior study date of 10/25/2010 was 68 %.

## 2022-09-12 NOTE — TELEPHONE ENCOUNTER
Pt was called with recs. Stated understanding and agreement. Labs Wednesday.    Frieda GILMORE RN  BSN

## 2022-09-14 PROBLEM — L89.313 DECUBITUS ULCER OF RIGHT ISCHIAL AREA, STAGE III (H): Status: ACTIVE | Noted: 2022-01-01

## 2022-09-14 NOTE — PROGRESS NOTES
Wound Clinic Note          Visit date: 09/14/2022       Deonte Complaint:     Elenita Moran is a 67 year old female had concerns including Wound Check..  The patient has sacrococcygeal pressure ulcer .       HISTORY OF PRESENT ILLNESS:    Elenita Moran reports the wound has been present since July 2021.  The wound began While she was in the hospital with another illness and laying in the bed for several days at a time.  She has never had other pressure ulcers on the bottom.  She does have metastatic breast cancer and is currently receiving chemotherapy.  She has normal sensation and somewhat reduced motor function in the lower extremities.  She does use a wheelchair when she goes out of the house but is able to use a walker around the house.  She reports she spends most of the day in a lift chair.  She confirms that she currently sleeps in a bed.  The patient was accompanied to the wound clinic for initial evaluation by her  who does her dressing changes and is also her medical power of .  We have previously tried using a medical honey bandage and Hydrofera Blue both which she could not tolerate.      Today she is again accompanied by her  who has been doing the dressing changes.  They both provide some of the interval history.  They have continued to use the Roho cushion in her lift chair and her wheelchair today.     Today the patient reports that she is started on a new chemotherapy agent which has really been zapping her energy.  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  The patient's  also reports that there has been a new wound which is opened up on the right buttock area and he has been applying the same bandage regiment to this as the other wound.    He has been bandaging both wounds with endoform and a Mepilex bandage changed every other day.  There has been light serous drainage from the wounds.  There is been no  difficulties with the dressing changes.        The patient's  confirms he is continue to bandage the wound with endoform and a Mepilex bandage changed every day.  There is been light serous drainage from the wound.  There is been no difficulties with the dressing changes.      The pateint denies fevers or chills.  They report the pain from the wound has been 0/10 and has remained about the same recently.      Today the patient reports maintaining a high protein diet and taking protein supplements regularly.        The patient denies a history of smoking or chronic steroid use.  and The patient confirms having diabetes and reports the blood sugars are well controlled.         She has not had any signs or symptoms of infection recently.  However she is on chronic antibiotics due to a soft tissue infection she had about a year ago in the sacral area.  She just saw her Infectious disease specialist on September 19, 2022.        Problem List:   Past Medical History:   Diagnosis Date     Allergic rhinitis      Bone cancer (H) 2011    breast mets     Breast cancer (H) 2010    left mastectomy     Depression      DM (diabetes mellitus) (H)      Fibromyalgia      Hx antineoplastic chemotherapy 2010     Hx of radiation therapy 2010     Hyperlipemia      Lactose intolerance      Mini stroke (H)     R EYE     Osteoarthritis      Tobacco dependency               Family Hx: family history includes Breast Cancer in her cousin; Kidney Cancer in her maternal grandmother; Lung Cancer in her mother and paternal aunt; Pancreatic Cancer in her mother.       Surgical Hx:   Past Surgical History:   Procedure Laterality Date     APPENDECTOMY       INSERT INTRACORONARY STENT  1985    R Hand     IR CHEST PORT PLACEMENT > 5 YRS OF AGE  12/12/2019     IR CHEST PORT PLACEMENT > 5 YRS OF AGE  3/14/2022     IR LUMBAR TRANSFORAMINAL EPIDURAL STEROID INJECTION  12/5/2019     IR LUMBAR TRANSFORAMINAL EPIDURAL STRD INJ  12/5/2019     IR PORT  PLACEMENT >5 YEARS  12/12/2019     IR PORT REMOVAL RIGHT  3/14/2022     IR SITE CHECK/EVALUATION  4/15/2022     MAMMOPLASTY REDUCTION Right 2015    hx of left mastectomy and right breast reduction     MASTECTOMY Left 2010     OTHER SURGICAL HISTORY      biopsy of upper and right tongue     OVARIAN CYST REMOVAL       REVISE RECONSTRUCTED BREAST Left     March 11, 2015          Allergies:    Allergies   Allergen Reactions     Gabapentin      Upper body edema/swelling.         Morphine Visual Disturbance     Visual hallucinations     Sulfamethoxazole-Trimethoprim      Other reaction(s): Hives     Sulfa Drugs Hives and Rash     welts                Medication History:    Current Outpatient Medications   Medication Sig     ACETAMINOPHEN PO Take 1,300 mg by mouth as needed     blood glucose (ONETOUCH VERIO IQ) test strip      bumetanide (BUMEX) 2 MG tablet Take 1 tablet (2 mg) by mouth 2 times daily     calcium carbonate (OS-BENJI) 600 MG tablet Take 1-2 mg by mouth daily      cephALEXin (KEFLEX) 500 MG capsule Take 1 capsule (500 mg) by mouth 2 times daily     chlorhexidine (PERIDEX) 0.12 % solution Take 15 mLs by mouth daily as needed     Cholecalciferol (VITAMIN D) 125 MCG (5000 UT) capsule Take 1 tablet by mouth daily      clobetasol (TEMOVATE) 0.05 % external ointment Apply topically twice a week     Continuous Blood Gluc  (FREESTYLE KIMBERLY 14 DAY READER) COLETTE Use to check blood sugar at least 4 times a day or as directed      Continuous Blood Gluc Sensor (HabbitsSTYLE KIMBERLY 14 DAY SENSOR) MISC Use as directed to test blood sugar at least 4 times a day or as directed     docusate sodium (COLACE) 100 MG capsule Take 100 mg by mouth daily     DULoxetine (CYMBALTA) 30 MG capsule Take 30 mg by mouth every morning      DULoxetine (CYMBALTA) 60 MG capsule Take 1 capsule by mouth At Bedtime     ezetimibe-simvastatin (VYTORIN) 10-20 MG tablet Take 1 tablet by mouth daily     glucose (BD GLUCOSE) 4 g chewable tablet as  needed     HYDROmorphone (DILAUDID) 4 MG tablet Take 1 tablet (4 mg) by mouth 3 times daily as needed for moderate to severe pain or severe pain     hydrOXYzine (VISTARIL) 25 MG capsule Take 25 mg by mouth 3 times daily With hydromorphone     ibuprofen (ADVIL/MOTRIN) 200 MG capsule Take 400 mg by mouth daily     Insulin Glargine (BASAGLAR KWIKPEN SC) Inject 6 Units Subcutaneous daily Uses as directed     lidocaine (XYLOCAINE) 5 % external ointment Apply topically 4 times daily (Patient taking differently: Apply topically 4 times daily as needed for moderate pain)     lisinopril (ZESTRIL) 2.5 MG tablet Take 1 tablet (2.5 mg) by mouth daily     LORazepam (ATIVAN) 0.5 MG tablet take 1 tablet by mouth up to 3 times per day as needed for nausea, anxiety, or insomnia     medical cannabis (Patient's own supply) See Admin Instructions (The purpose of this order is to document that the patient reports taking medical cannabis.  This is not a prescription, and is not used to certify that the patient has a qualifying medical condition.)     metoprolol succinate ER (TOPROL-XL) 50 MG 24 hr tablet Take 1 tablet (50 mg) by mouth daily     NOVOLOG FLEXPEN 100 UNIT/ML soln INJECT 1-20 UNITS UNDER THE SKIN 3 TIMES DAILY BEFORE MEALS USING SLIDING SCALE AND CARB COUNT (AVERAGE 30 UNITS A DAY) 30 DAY(S).     nystatin (MYCOSTATIN) 085641 UNIT/GM external cream Apply topically 2 times daily (Patient taking differently: Apply topically daily as needed)     OLANZapine (ZYPREXA) 5 MG tablet Take 5 mg by mouth every evening      omeprazole (PRILOSEC) 20 MG DR capsule Take 20 mg by mouth At Bedtime      oxybutynin (DITROPAN) 5 MG tablet Take 5 mg by mouth every morning     oxybutynin ER (DITROPAN-XL) 10 MG 24 hr tablet Take 10 mg by mouth every evening     Prenatal Vit-Fe Fumarate-FA (PRENATAL MULTIVITAMIN  PLUS IRON) 27-1 MG TABS Take 1 tablet by mouth daily      psyllium (METAMUCIL/KONSYL) Packet Take 1 packet by mouth daily as needed for  constipation     spironolactone (ALDACTONE) 25 MG tablet Take 0.5 tablets (12.5 mg) by mouth daily     vitamin (B COMPLEX) tablet Take 1 tablet by mouth daily     zolpidem (AMBIEN) 10 MG tablet Take 5-10 mg by mouth At Bedtime     Current Facility-Administered Medications   Medication     lidocaine (XYLOCAINE) 2 % external gel         Tobacco History:  reports that she has quit smoking. Her smoking use included cigarettes. She has a 15.00 pack-year smoking history. She has never used smokeless tobacco.       REVIEW OF SYMPTOMS:   The review of systems was negative except as noted in the HPI.           PHYSICAL EXAMINATION:     /62   Pulse 96   Temp 97.8  F (36.6  C)            GENERAL: The patient overall appears well and is no acute distress.   HEAD: normocephalic   EYES: Sclera and conjunctiva clear   NECK: no obvious masses   LUNGS: breathing is unlabored.   EXTREMITIES: No clubbing, cyanosis or edema   SKIN: No rashes or other abnormalities except as noted under the Wound section below.   NEUROLOGICAL: Normal sensory function, decreased motor function in the lower extremities.      WOUND: The wound appears healthy with no sign of infection.   Wound bed: necrotic material at the sacral wound.  Periwound: healthy intact skin  The sacral wound is a fairly small wound consistent with a stage III sacral pressure ulcer.  Today the sacral wound is about the same compared with her last clinic visit.    She does have a new fairly small wound over the right ischial tuberosity consistent with a stage III pressure ulcer.  There is no necrotic material here.        Also see below for wound details:         Ulceration(s)/Wound(s):   Please see the media tab under the chart review for pictures of the wounds.    VASC Wound L buttock/gluteal fold (Active)   Pre Size Length 2.2 09/14/22 0900   Pre Size Width 1.6 09/14/22 0900   Pre Size Depth 0.3 09/14/22 0900   Pre Total Sq cm 3.52 09/14/22 0900       VASC Wound right  buttock (Active)   Pre Size Length 0.5 09/14/22 0900   Pre Size Width 0.5 09/14/22 0900   Pre Size Depth 0.1 09/14/22 0900   Pre Total Sq cm 0.25 09/14/22 0900           Recent Labs   Lab Test 02/09/22  0851 07/16/21  1054 12/18/19  0000   A1C 5.6 6.3* 5.8          Recent Labs   Lab Test 02/10/22  0626 02/09/22  0851 07/16/21  1054   ALBUMIN 2.6* 3.0* 2.8*              Procedure note: , I had previous obtain informed consent from the patient to perform serial debridements, I confirmed this again with the patient today verbally. , Anesthetized as needed. , Using a curette and/or a scalpel I performed an excisional debridement removing all necrotic material at the Sacral wound down to the level of viable subcutaneous tissue.  I obtained hemostasis with direct pressure.  The patient tolerated the procedure well. , EBL: <5 ml  and Total debridement surface area: Less than 20 cm      There was no debridement required at the right ischial tuberosity wound.      ASSESSMENT:   This is a 67 year old female with a stage III sacral pressure ulcer and a stage III right ischial tuberosity pressure ulcer.          PLAN:   We will bandage both wounds with endoform and a Mepilex changed every other day.  I have encouraged the patient to continue to be vigilant to make sure there is no pressure applied to the wound areas throughout the day and the night.  I offered to order them an air mattress to maximize her pressure reduction surfaces.  However the patient was not interested in pursuing this option at this time.  I explained to the patient and her  that I think with the new chemotherapy agent and the stress on her body from her cancer her body is just not able to heal right now.  Nonetheless we will continue to try to support the healing in every way that we can.    I have encouraged the patient to continue to minimize the time they spend in the wheelchair.   I have encouraged the patient to continue on their high protein  diet to aid in wound healing.      The patient will return to the wound clinic in 2-week to see me again.        30 minutes spent on the date of the encounter doing chart review, history and exam, documentation and further activities per the note      Frankie Vega MD  09/14/2022   10:08 AM   [facility]  [phone]

## 2022-09-14 NOTE — PATIENT INSTRUCTIONS
Wound Care Instructions- Left buttock    Every other day and as needed, Cleanse your gluteal wound(s) with Normal saline.    Pat Dry with non-sterile gauze    Apply Lotion to the intact skin surrounding your wound and other dry skin locations. Some good lotions include: Remedy Skin Repair Cream, Sarna, Vanicream or Cetaphil    Primary Dressing: Endoform    Secondary dressing: Cover with mepilex 4x4    It is ok to get your wound wet in the bath or shower    Use the Roho cushion on every chair that you sit on.     If for some reason you are not able to get your dressing(s) changed as outlined above (due to illness, lack of supplies, lack of help) please do the following: remove old, soiled dressings; wash the wounds with saline; pat dry; apply ABD pad or other absorbant pad and secure with rolled gauze; avoid tape directly on your skin; Call the clinic as soon as possible to let us know what the current issues are in receiving wound care [phone].      SEEK MEDICAL CARE IF:  You have an increase in swelling, pain, or redness around the wound.  You have an increase in the amount of pus coming from the wound.  There is a bad smell coming from the wound.  The wound appears to be worsening/enlarging  You have a fever greater than 101.5 F      It is ok to continue current wound care treatment/products for the next 2-3 days until new wound care supplies are ordered and arrive. If longer than this please contact our office at [phone].    If you have a 2 layer or 4 layer compression wrap on these are safe to have on for ONLY 7 days. If for some reason you are not able to get the wrap(s) changed (due to illness; lack of supplies, lack of help, lack of transportation) please do the following: unwrap the old 2 or 4 layer compression wrap; avoid using scissors as you could cut your skin and cause wounds; use tubular compression when available. Call to reschedule your home care or clinic visit appointment as soon as  possible.    Please NOTE: if you are 15 minutes late to your clinic appointment you will have to be rescheduled. Please call our clinic as soon as possible if you know you will not be able to get to your appointment at [phone].    If you fail to show up to 3 scheduled clinic appointments you will be dismissed from our clinic.              We want to hear from you!  In the next few weeks, you should receive a call or email to complete a survey about your visit at [facility]. Please help us improve your appointment experience by letting us know how we did today. We strive to make your experience good and value any ways in which we could do better.      We value your input and suggestions.    Thank you for choosing the [facility]!

## 2022-09-28 NOTE — LETTER
[facility]  [address] [place] [address] [address], [place] [address]  [phone]      Fax [phone]    [facility]           Fax: [phone]            Customer Service: [phone]    Wound Dressing Rx and Order Form  Order Status: new  Verbal: Susan  Date: 2022     Elenita Moran  Gender: female  : 1955  [address] [facility] [address]  [phone] (home)     Medical Record: [id number]  Primary Care Provider: aSndi Jones      ICD-10-CM    1. Stage III pressure ulcer of sacral region (H)  L89.153 DEBRIDE SKIN/SUBQ TISSUE   2. Decubitus ulcer of right ischial area, stage III (H)  L89.313          Insurance Info:  INSURER: Payor: ARE / Plan: ARE MEDICARE / Product Type: HMO /   Policy ID#:  [id number]  SECONDARY INSURANCE:    Secondary Policy ID#:  N/A        Physician Info:   Name:  CINDY OSPINA     Dept Address/Phones:   [address] [place] [address], [address] [address] [phone]  Fax: [phone]    Lymphedema circumferential measurements (in cm):  No flowsheet data found.      Wound info:  Port A Cath Single 22 Left Chest wall (Active)   Number of days: 198       Wound Buttocks Pressure injury community acquired (Active)   Number of days: 231       Wound Foot Abrasion(s) (Active)   Number of days: 226       VASC Wound L buttock/gluteal fold (Active)   Pre Size Length 2.5 22 0900   Pre Size Width 1 22 0900   Pre Size Depth 1 22 0900   Pre Total Sq cm 2.5 22 0900   Post Size Length 2.5 22 0900   Post Size Width 1 22 0900   Post Size Depth 1 22 0900   Post Total Sq cm 2.5 22 0900   Undermined no 05/10/22 1300   Tunneling no 05/10/22 1300   Description slough 22 0912   Number of days: 141       VASC Wound right buttock (Active)   Pre Size Length 0.5 22 0900   Pre Size Width 0.5 22 09   Pre Size Depth 0.1 22 09   Pre Total Sq cm 0.25  "09/28/22 0900   Number of days: 14       Incision/Surgical Site 03/14/22 Right;Upper Chest (Active)   Number of days: 198        Drainage: moderate  Thickness:  full  Duration of Need: 30 DAYS  Days Supply: 30 DAYS  Start Date: 9/28/22  Starter Kit: ancillary  Qualifying wound/Debridement: Yes      Dressing Type Brand Size Number of pieces Frequency of change    Primary melbisorb plus  4\"x4\" 10 EVERY OTHER DAY and as needed    comfeel plus transparent thin  hydrocolloid  comfeel/Coloplast 4\"x4\" 15 EVERY OTHER DAY and as needed     No substitutions preferred. Call [phone].         OK to forward to covered supplier.    Electronically Signed Physician:  CINDY OSPINA             Date: September 28, 2022    "

## 2022-09-28 NOTE — PATIENT INSTRUCTIONS
Wound Care Instructions    Clinic email: [email or web address]    Every other day and as needed, Cleanse your gluteal wound(s) with Normal saline.    Pat Dry with non-sterile gauze    Apply Lotion to the intact skin surrounding your wound and other dry skin locations. Some good lotions include: Remedy Skin Repair Cream, Sarna, Vanicream or Cetaphil    Primary Dressing: pack wound with aquacel AG    Secondary dressing: Cover with hydrocolloid    It is ok to get your wound wet in the bath or shower    Use the Roho cushion on every chair that you sit on.     If for some reason you are not able to get your dressing(s) changed as outlined above (due to illness, lack of supplies, lack of help) please do the following: remove old, soiled dressings; wash the wounds with saline; pat dry; apply ABD pad or other absorbant pad and secure with rolled gauze; avoid tape directly on your skin; Call the clinic as soon as possible to let us know what the current issues are in receiving wound care [phone].      SEEK MEDICAL CARE IF:  You have an increase in swelling, pain, or redness around the wound.  You have an increase in the amount of pus coming from the wound.  There is a bad smell coming from the wound.  The wound appears to be worsening/enlarging  You have a fever greater than 101.5 F      It is ok to continue current wound care treatment/products for the next 2-3 days until new wound care supplies are ordered and arrive. If longer than this please contact our office at [phone].    If you have a 2 layer or 4 layer compression wrap on these are safe to have on for ONLY 7 days. If for some reason you are not able to get the wrap(s) changed (due to illness; lack of supplies, lack of help, lack of transportation) please do the following: unwrap the old 2 or 4 layer compression wrap; avoid using scissors as you could cut your skin and cause wounds; use tubular compression when available. Call to reschedule your home  care or clinic visit appointment as soon as possible.    Please NOTE: if you are 15 minutes late to your clinic appointment you will have to be rescheduled. Please call our clinic as soon as possible if you know you will not be able to get to your appointment at [phone].    If you fail to show up to 3 scheduled clinic appointments you will be dismissed from our clinic.              We want to hear from you!  In the next few weeks, you should receive a call or email to complete a survey about your visit at [facility]. Please help us improve your appointment experience by letting us know how we did today. We strive to make your experience good and value any ways in which we could do better.      We value your input and suggestions.    Thank you for choosing the [facility]!

## 2022-09-28 NOTE — PROGRESS NOTES
Wound Clinic Note          Visit date: 09/28/2022       Deonte Complaint:     Elenita Moran is a 67 year old female had concerns including RECHECK..  The patient has sacrococcygeal pressure ulcer and a right ischial tuberosity ulcer.       HISTORY OF PRESENT ILLNESS:    Elenita Moran reports the wound has been present since July 2021.  The wound began While she was in the hospital with another illness and laying in the bed for several days at a time.  She has never had other pressure ulcers on the bottom.  She does have metastatic breast cancer and is currently receiving chemotherapy.  She has had extensive radiation therapy to her pelvis previously.  She has normal sensation and somewhat reduced motor function in the lower extremities.  She does use a wheelchair when she goes out of the house but is able to use a walker around the house.  She reports she spends most of the day in a lift chair.  She confirms that she currently sleeps in a bed.  The patient was accompanied to the wound clinic for initial evaluation by her  who does her dressing changes and is also her medical power of .    Today she is again accompanied by her  who has been doing the dressing changes.  They both provide some of the interval history.  The patient's  reports that since the last clinic visit with me the sacral wound became larger and deeper.  She has had quite severe anemia and is just received 2 units of blood.  She is also continue to receive the newer chemotherapy agent which she reports is still causing her energy to be zapped.    They have continued to use the Roho cushion in her lift chair and her wheelchair today.     The patient will be receiving radiation therapy to the thoracic spine to address the new thoracic metastases.    Since the sacral wound opened up and became deeper the patient's  switched from using the endoform to Hydrofera Blue.  He reports with  Hydrofera Blue he believes the wound has gotten somewhat smaller but there has been more drainage which sounds like its been moderate serous drainage.  The right buttock wound has been doing fine and has been bandaging that with endoform and a Mepilex changed every other day.    She reports she has been in the sitting position for 3 to 4 hours at a time.    The pateint denies fevers or chills.  They report the pain from the wound has been 0/10 and has remained about the same recently.      Today the patient reports maintaining a high protein diet and taking protein supplements regularly.        The patient denies a history of smoking or chronic steroid use.  and The patient confirms having diabetes and reports the blood sugars are well controlled.         She has not had any signs or symptoms of infection recently.  However she is on chronic antibiotics due to a soft tissue infection she had about a year ago in the sacral area.  She last saw her Infectious disease specialist on September 19, 2022.        Problem List:   Past Medical History:   Diagnosis Date     Allergic rhinitis      Bone cancer (H) 2011    breast mets     Breast cancer (H) 2010    left mastectomy     Depression      DM (diabetes mellitus) (H)      Fibromyalgia      Hx antineoplastic chemotherapy 2010     Hx of radiation therapy 2010     Hyperlipemia      Lactose intolerance      Mini stroke (H)     R EYE     Osteoarthritis      Tobacco dependency               Family Hx: family history includes Breast Cancer in her cousin; Kidney Cancer in her maternal grandmother; Lung Cancer in her mother and paternal aunt; Pancreatic Cancer in her mother.       Surgical Hx:   Past Surgical History:   Procedure Laterality Date     APPENDECTOMY       INSERT INTRACORONARY STENT  1985    R Hand     IR CHEST PORT PLACEMENT > 5 YRS OF AGE  12/12/2019     IR CHEST PORT PLACEMENT > 5 YRS OF AGE  3/14/2022     IR LUMBAR TRANSFORAMINAL EPIDURAL STEROID INJECTION   12/5/2019     IR LUMBAR TRANSFORAMINAL EPIDURAL STRD INJ  12/5/2019     IR PORT PLACEMENT >5 YEARS  12/12/2019     IR PORT REMOVAL RIGHT  3/14/2022     IR SITE CHECK/EVALUATION  4/15/2022     MAMMOPLASTY REDUCTION Right 2015    hx of left mastectomy and right breast reduction     MASTECTOMY Left 2010     OTHER SURGICAL HISTORY      biopsy of upper and right tongue     OVARIAN CYST REMOVAL       REVISE RECONSTRUCTED BREAST Left     March 11, 2015          Allergies:    Allergies   Allergen Reactions     Gabapentin      Upper body edema/swelling.         Morphine Visual Disturbance     Visual hallucinations     Sulfamethoxazole-Trimethoprim      Other reaction(s): Hives     Sulfa Drugs Hives and Rash     welts                Medication History:    Current Outpatient Medications   Medication Sig     ACETAMINOPHEN PO Take 1,300 mg by mouth as needed     blood glucose (ONETOUCH VERIO IQ) test strip      bumetanide (BUMEX) 2 MG tablet Take 1 tablet (2 mg) by mouth 2 times daily     calcium carbonate (OS-BENJI) 600 MG tablet Take 1-2 mg by mouth daily      cephALEXin (KEFLEX) 500 MG capsule Take 1 capsule (500 mg) by mouth 2 times daily     chlorhexidine (PERIDEX) 0.12 % solution Take 15 mLs by mouth daily as needed     Cholecalciferol (VITAMIN D) 125 MCG (5000 UT) capsule Take 1 tablet by mouth daily      clobetasol (TEMOVATE) 0.05 % external ointment Apply topically twice a week     Continuous Blood Gluc  (FREESTYLE KIMBERLY 14 DAY READER) COLETTE Use to check blood sugar at least 4 times a day or as directed      Continuous Blood Gluc Sensor (FREESTYLE KIMBERLY 14 DAY SENSOR) MISC Use as directed to test blood sugar at least 4 times a day or as directed     docusate sodium (COLACE) 100 MG capsule Take 100 mg by mouth daily     DULoxetine (CYMBALTA) 30 MG capsule Take 30 mg by mouth every morning      DULoxetine (CYMBALTA) 60 MG capsule Take 1 capsule by mouth At Bedtime     ezetimibe-simvastatin (VYTORIN) 10-20 MG tablet  Take 1 tablet by mouth daily     glucose (BD GLUCOSE) 4 g chewable tablet as needed     HYDROmorphone (DILAUDID) 4 MG tablet Take 1 tablet (4 mg) by mouth 3 times daily as needed for moderate to severe pain or severe pain     hydrOXYzine (VISTARIL) 25 MG capsule Take 25 mg by mouth 3 times daily With hydromorphone     ibuprofen (ADVIL/MOTRIN) 200 MG capsule Take 400 mg by mouth daily     Insulin Glargine (BASAGLAR KWIKPEN SC) Inject 6 Units Subcutaneous daily Uses as directed     lidocaine (XYLOCAINE) 5 % external ointment Apply topically 4 times daily (Patient taking differently: Apply topically 4 times daily as needed for moderate pain)     lisinopril (ZESTRIL) 2.5 MG tablet Take 1 tablet (2.5 mg) by mouth daily     LORazepam (ATIVAN) 0.5 MG tablet take 1 tablet by mouth up to 3 times per day as needed for nausea, anxiety, or insomnia     medical cannabis (Patient's own supply) See Admin Instructions (The purpose of this order is to document that the patient reports taking medical cannabis.  This is not a prescription, and is not used to certify that the patient has a qualifying medical condition.)     metoprolol succinate ER (TOPROL-XL) 50 MG 24 hr tablet Take 1 tablet (50 mg) by mouth daily     NOVOLOG FLEXPEN 100 UNIT/ML soln INJECT 1-20 UNITS UNDER THE SKIN 3 TIMES DAILY BEFORE MEALS USING SLIDING SCALE AND CARB COUNT (AVERAGE 30 UNITS A DAY) 30 DAY(S).     nystatin (MYCOSTATIN) 931380 UNIT/GM external cream Apply topically 2 times daily (Patient taking differently: Apply topically daily as needed)     OLANZapine (ZYPREXA) 5 MG tablet Take 5 mg by mouth every evening      omeprazole (PRILOSEC) 20 MG DR capsule Take 20 mg by mouth At Bedtime      oxybutynin (DITROPAN) 5 MG tablet Take 5 mg by mouth every morning     oxybutynin ER (DITROPAN-XL) 10 MG 24 hr tablet Take 10 mg by mouth every evening     Prenatal Vit-Fe Fumarate-FA (PRENATAL MULTIVITAMIN  PLUS IRON) 27-1 MG TABS Take 1 tablet by mouth daily       psyllium (METAMUCIL/KONSYL) Packet Take 1 packet by mouth daily as needed for constipation     spironolactone (ALDACTONE) 25 MG tablet Take 0.5 tablets (12.5 mg) by mouth daily     vitamin (B COMPLEX) tablet Take 1 tablet by mouth daily     zolpidem (AMBIEN) 10 MG tablet Take 5-10 mg by mouth At Bedtime     Current Facility-Administered Medications   Medication     lidocaine (XYLOCAINE) 2 % external gel         Tobacco History:  reports that she has quit smoking. Her smoking use included cigarettes. She has a 15.00 pack-year smoking history. She has never used smokeless tobacco.       REVIEW OF SYMPTOMS:   The review of systems was negative except as noted in the HPI.           PHYSICAL EXAMINATION:     /70   Pulse 110   Temp 98.4  F (36.9  C)   Wt 208 lb 14.4 oz (94.8 kg)   SpO2 98%   BMI 34.76 kg/m             GENERAL: The patient overall appears well and is no acute distress.   HEAD: normocephalic   EYES: Sclera and conjunctiva clear   NECK: no obvious masses   LUNGS: breathing is unlabored.   EXTREMITIES: No clubbing, cyanosis or edema   SKIN: No rashes or other abnormalities except as noted under the Wound section below.   NEUROLOGICAL: Normal sensory function, decreased motor function in the lower extremities.      WOUND: The wound appears healthy with no sign of infection.   Wound bed: necrotic material at the sacral wound.  Periwound: healthy intact skin  The sacral wound is consistent with a stage III sacral pressure ulcer.  Today the sacral wound is slightly larger but it is quite a bit deeper and there is also undermining.    The smaller right ischial tuberosity wound appears to be healing well.        Also see below for wound details:         Ulceration(s)/Wound(s):   Please see the media tab under the chart review for pictures of the wounds.    VASC Wound L buttock/gluteal fold (Active)   Pre Size Length 2.5 09/28/22 0900   Pre Size Width 1 09/28/22 0900   Pre Size Depth 1 09/28/22 0900   Pre  Total Sq cm 2.5 09/28/22 0900       VASC Wound right buttock (Active)   Pre Size Length 0.5 09/28/22 0900   Pre Size Width 0.5 09/28/22 0900   Pre Size Depth 0.1 09/28/22 0900   Pre Total Sq cm 0.25 09/28/22 0900           Recent Labs   Lab Test 02/09/22  0851 07/16/21  1054 12/18/19  0000   A1C 5.6 6.3* 5.8          Recent Labs   Lab Test 02/10/22  0626 02/09/22  0851 07/16/21  1054   ALBUMIN 2.6* 3.0* 2.8*              Procedure note: , I had previous obtain informed consent from the patient to perform serial debridements, I confirmed this again with the patient today verbally. , Anesthetized as needed. , Using a curette and/or a scalpel I performed an excisional debridement removing all necrotic material at the Sacral wound down to the level of viable subcutaneous tissue.  I obtained hemostasis with direct pressure.  The patient tolerated the procedure well. , EBL: <5 ml  and Total debridement surface area: Less than 20 cm      There was no debridement required at the right ischial tuberosity wound.      ASSESSMENT:   This is a 67 year old female with a stage III sacral pressure ulcer and a stage III right ischial tuberosity pressure ulcer.          PLAN:   We will bandage the sacral wound with alginate and a hydrocolloid dressing changed every other day.  We will bandage the right ischial tuberosity wound with endoform and a Mepilex bandage changed every other day.  I have explained to the patient and her  that it is now more important than ever that she keep pressure off of the sacral area.  I recommend that she try to keep pressure off of the sacral area at all times.  I also again offered to get them an air mattress to improve pressure reduction to the area however the patient was not interested in pursuing this option.    I have encouraged the patient to continue to minimize the time they spend in the wheelchair.   I have encouraged the patient to continue on their high protein diet to aid in wound  healing.      The patient will return to the wound clinic in 2-week to see me again.        30 minutes spent on the date of the encounter doing chart review, history and exam, documentation and further activities per the note      Frankie Vega MD  09/28/2022   10:16 AM   [facility]ound  [phone]

## 2022-10-10 NOTE — LETTER
[facility]  [address] [place] [address] [address], [place] [address]  [phone]      Fax [phone]    [facility]           Fax: [phone]            Customer Service: [phone]    Wound Dressing Rx and Order Form  Order Status: new  Verbal: Susan  Date: October 10, 2022     Elenita Moran  Gender: female  : 1955  [address] [facility] [address]  [phone] (home)     Medical Record: [id number]  Primary Care Provider: Sandi Jones      ICD-10-CM    1. Stage III pressure ulcer of sacral region (H)  L89.153 DEBRIDE SKIN/SUBQ TISSUE     Wound care      2. Decubitus ulcer of right ischial area, stage III (H)  L89.313 Wound care            Insurance Info:  INSURER: Payor: ARE / Plan: [employer] MEDICARE / Product Type: HMO /   Policy ID#:  [id number]  SECONDARY INSURANCE:    Secondary Policy ID#:  N/A        Physician Info:   Name:  CINDY OSPINA     Dept Address/Phones:   [address] [place] [address], [address] [address] [phone]  Fax: [phone]    Lymphedema circumferential measurements (in cm):  No flowsheet data found.      Wound info:  Port A Cath Single 22 Left Chest wall (Active)   Number of days: 210       Wound Buttocks Pressure injury community acquired (Active)   Number of days: 243       Wound Foot Abrasion(s) (Active)   Number of days: 238       VASC Wound L buttock/gluteal fold (Active)   Pre Size Length 3 10/10/22 0900   Pre Size Width 2 10/10/22 0900   Pre Size Depth 1 10/10/22 0900   Pre Total Sq cm 6 10/10/22 0900   Post Size Length 2.5 22 0900   Post Size Width 1 22 0900   Post Size Depth 1 22 0900   Post Total Sq cm 2.5 22 0900   Undermined no 05/10/22 1300   Tunneling no 05/10/22 1300   Description new epithelial 10/10/22 0900   Number of days: 153       VASC Wound right buttock (Active)   Pre Size Length 3.5 10/10/22 0900   Pre Size Width 0.5 10/10/22 0900   Pre Size Depth 0.9 10/10/22  "0900   Pre Total Sq cm 1.75 10/10/22 0900   Post Size Length 3.5 10/10/22 0900   Post Size Width 0.7 10/10/22 0900   Post Size Depth 0.9 10/10/22 0900   Undermined 1cm 6oclock-9 oclock 10/10/22 0900   Number of days: 26       Incision/Surgical Site 03/14/22 Right;Upper Chest (Active)   Number of days: 210        Drainage: moderate  Thickness:  full  Duration of Need: 30 DAYS  Days Supply: 30 DAYS  Start Date: 10/10/22  Starter Kit: no  Qualifying wound/Debridement: Yes      Dressing Type Brand Size Number of pieces Frequency of change    Primary cavilon no-sting skin barrier 3M #[id number] 1mL 1 box EVERY OTHER DAY and as needed    tegaderm hydrocolloid  4\"x4\" 15 EVERY OTHER DAY and as needed     No substitutions preferred. Call [phone].         OK to forward to covered supplier.    Electronically Signed Physician:  CINDY OSPINA             Date: October 10, 2022    "

## 2022-10-10 NOTE — PROGRESS NOTES
Wound Clinic Note          Visit date: 10/10/2022       Cherylif Complaint:     Elenita Moran is a 67 year old female had concerns including RECHECK (Sacrum/)..  The patient has sacrococcygeal pressure ulcer and a right ischial tuberosity ulcer.       HISTORY OF PRESENT ILLNESS:    Elenita Moran reports the wound has been present since July 2021.  The wound began While she was in the hospital with another illness and laying in the bed for several days at a time.  She has never had other pressure ulcers on the bottom.  She does have metastatic breast cancer and is currently receiving chemotherapy.  She has had extensive radiation therapy to her pelvis previously.  She has normal sensation and somewhat reduced motor function in the lower extremities.  She does use a wheelchair when she goes out of the house but is able to use a walker around the house.  She reports she spends most of the day in a lift chair.  She confirms that she currently sleeps in a bed.  The patient was accompanied to the wound clinic for initial evaluation by her  who does her dressing changes and is also her medical power of .    Today she is again accompanied by her  who has been doing the dressing changes.  They both provide some of the interval history.  The patient and her  report that since her last clinic visit with me the patient's  has gotten a air overlay mattress that goes over her recliner and she has been sleeping on this.  The patient reports this is much more comfortable and she feels like she is floating.  The patient's  reports that she has been sleeping on this for about the last week.    She is also continue to receive the newer chemotherapy agent.    Today she reports she has completed all of her radiation therapy sessions for her thoracic spine metastases.    At her last clinic visit I suggested that they try bandaging the sacral wound with alginate and a  hydrocolloid dressing which may stay in place better because of her urinary incontinence.  The patient's  reports that unfortunately the hydrocolloid did not stick well due to the urinary incontinence.  He is therefore switched back to using alginate and a Mepilex bandage changed twice a day.  There is been moderate serous drainage from the wound.    The newer right ischial tuberosity wound has been healing well and has not had any drainage recently.    She reports she has been in the sitting position for 3 to 4 hours at a time.    The pateint denies fevers or chills.  They report the pain from the wound has been 0/10 and has remained about the same recently.      Today the patient reports maintaining a high protein diet and taking protein supplements regularly.        The patient denies a history of smoking or chronic steroid use.  and The patient confirms having diabetes and reports the blood sugars are well controlled.         She has not had any signs or symptoms of infection recently.  However she is on chronic antibiotics due to a soft tissue infection she had about a year ago in the sacral area.  She last saw her Infectious disease specialist on September 19, 2022.        Problem List:   Past Medical History:   Diagnosis Date     Allergic rhinitis      Bone cancer (H) 2011    breast mets     Breast cancer (H) 2010    left mastectomy     Depression      DM (diabetes mellitus) (H)      Fibromyalgia      Hx antineoplastic chemotherapy 2010     Hx of radiation therapy 2010     Hyperlipemia      Lactose intolerance      Mini stroke     R EYE     Osteoarthritis      Tobacco dependency               Family Hx: family history includes Breast Cancer in her cousin; Kidney Cancer in her maternal grandmother; Lung Cancer in her mother and paternal aunt; Pancreatic Cancer in her mother.       Surgical Hx:   Past Surgical History:   Procedure Laterality Date     APPENDECTOMY       INSERT INTRACORONARY STENT  1985     R Hand     IR CHEST PORT PLACEMENT > 5 YRS OF AGE  12/12/2019     IR CHEST PORT PLACEMENT > 5 YRS OF AGE  3/14/2022     IR LUMBAR TRANSFORAMINAL EPIDURAL STEROID INJECTION  12/5/2019     IR LUMBAR TRANSFORAMINAL EPIDURAL STRD INJ  12/5/2019     IR PORT PLACEMENT >5 YEARS  12/12/2019     IR PORT REMOVAL RIGHT  3/14/2022     IR SITE CHECK/EVALUATION  4/15/2022     MAMMOPLASTY REDUCTION Right 2015    hx of left mastectomy and right breast reduction     MASTECTOMY Left 2010     OTHER SURGICAL HISTORY      biopsy of upper and right tongue     OVARIAN CYST REMOVAL       REVISE RECONSTRUCTED BREAST Left     March 11, 2015          Allergies:    Allergies   Allergen Reactions     Gabapentin      Upper body edema/swelling.         Morphine Visual Disturbance     Visual hallucinations     Sulfamethoxazole-Trimethoprim      Other reaction(s): Hives     Sulfa Drugs Hives and Rash     welts                Medication History:    Current Outpatient Medications   Medication Sig     ACETAMINOPHEN PO Take 1,300 mg by mouth as needed     B-D U/F 31G X 8 MM insulin pen needle USE 4 TIMES DAILY     blood glucose (ONETOUCH VERIO IQ) test strip      bumetanide (BUMEX) 2 MG tablet Take 1 tablet (2 mg) by mouth 2 times daily     calcium carbonate (OS-BENJI) 600 MG tablet Take 1-2 mg by mouth daily      cephALEXin (KEFLEX) 500 MG capsule Take 1 capsule (500 mg) by mouth 2 times daily     chlorhexidine (PERIDEX) 0.12 % solution Take 15 mLs by mouth daily as needed     Cholecalciferol (VITAMIN D) 125 MCG (5000 UT) capsule Take 1 tablet by mouth daily      clobetasol (TEMOVATE) 0.05 % external ointment Apply topically twice a week     Continuous Blood Gluc  (FREESTYLE KIMBERLY 14 DAY READER) COLETTE Use to check blood sugar at least 4 times a day or as directed      Continuous Blood Gluc Sensor (FREESTYLE KIMBERLY 14 DAY SENSOR) MISC Use as directed to test blood sugar at least 4 times a day or as directed     docusate sodium (COLACE) 100 MG  capsule Take 100 mg by mouth daily     DULoxetine (CYMBALTA) 30 MG capsule Take 30 mg by mouth every morning      DULoxetine (CYMBALTA) 60 MG capsule Take 1 capsule by mouth At Bedtime     ezetimibe-simvastatin (VYTORIN) 10-20 MG tablet Take 1 tablet by mouth daily     glucose (BD GLUCOSE) 4 g chewable tablet as needed     HYDROmorphone (DILAUDID) 4 MG tablet Take 1 tablet (4 mg) by mouth 3 times daily as needed for moderate to severe pain or severe pain     hydrOXYzine (VISTARIL) 25 MG capsule Take 25 mg by mouth 3 times daily With hydromorphone     ibuprofen (ADVIL/MOTRIN) 200 MG capsule Take 400 mg by mouth daily     Insulin Glargine (BASAGLAR KWIKPEN SC) Inject 6 Units Subcutaneous daily Uses as directed     lidocaine (XYLOCAINE) 5 % external ointment Apply topically 4 times daily (Patient taking differently: Apply topically 4 times daily as needed for moderate pain)     lisinopril (ZESTRIL) 2.5 MG tablet Take 1 tablet (2.5 mg) by mouth daily     LORazepam (ATIVAN) 0.5 MG tablet take 1 tablet by mouth up to 3 times per day as needed for nausea, anxiety, or insomnia     medical cannabis (Patient's own supply) See Admin Instructions (The purpose of this order is to document that the patient reports taking medical cannabis.  This is not a prescription, and is not used to certify that the patient has a qualifying medical condition.)     metoprolol succinate ER (TOPROL-XL) 50 MG 24 hr tablet Take 1 tablet (50 mg) by mouth daily     NOVOLOG FLEXPEN 100 UNIT/ML soln INJECT 1-20 UNITS UNDER THE SKIN 3 TIMES DAILY BEFORE MEALS USING SLIDING SCALE AND CARB COUNT (AVERAGE 30 UNITS A DAY) 30 DAY(S).     nystatin (MYCOSTATIN) 116317 UNIT/GM external cream Apply topically 2 times daily (Patient taking differently: Apply topically daily as needed)     OLANZapine (ZYPREXA) 5 MG tablet Take 5 mg by mouth every evening      omeprazole (PRILOSEC) 20 MG DR capsule Take 20 mg by mouth At Bedtime      oxybutynin (DITROPAN) 5 MG  tablet Take 5 mg by mouth every morning     oxybutynin ER (DITROPAN-XL) 10 MG 24 hr tablet Take 10 mg by mouth every evening     Prenatal Vit-Fe Fumarate-FA (PRENATAL MULTIVITAMIN  PLUS IRON) 27-1 MG TABS Take 1 tablet by mouth daily      psyllium (METAMUCIL/KONSYL) Packet Take 1 packet by mouth daily as needed for constipation     spironolactone (ALDACTONE) 25 MG tablet Take 0.5 tablets (12.5 mg) by mouth daily     vitamin (B COMPLEX) tablet Take 1 tablet by mouth daily     zolpidem (AMBIEN) 10 MG tablet Take 5-10 mg by mouth At Bedtime     Current Facility-Administered Medications   Medication     lidocaine (XYLOCAINE) 2 % external gel         Tobacco History:  reports that she has quit smoking. Her smoking use included cigarettes. She has a 15.00 pack-year smoking history. She has never used smokeless tobacco.       REVIEW OF SYMPTOMS:   The review of systems was negative except as noted in the HPI.           PHYSICAL EXAMINATION:     Pulse 91   Temp 97.9  F (36.6  C)   Resp 16   Wt 207 lb 8 oz (94.1 kg)   SpO2 98%   BMI 34.53 kg/m             GENERAL: The patient overall appears well and is no acute distress.   HEAD: normocephalic   EYES: Sclera and conjunctiva clear   NECK: no obvious masses   LUNGS: breathing is unlabored.   EXTREMITIES: No clubbing, cyanosis or edema   SKIN: No rashes or other abnormalities except as noted under the Wound section below.   NEUROLOGICAL: Normal sensory function, decreased motor function in the lower extremities.      WOUND: The wound appears healthy with no sign of infection.   Wound bed: necrotic material at the sacral wound.  Periwound: healthy intact skin  The sacral wound is consistent with a stage III sacral pressure ulcer.  Today the sacral wound is slightly larger.    The smaller right ischial tuberosity wound is healing well and is nearly healed.        Also see below for wound details:         Ulceration(s)/Wound(s):   Please see the media tab under the chart  review for pictures of the wounds.    VASC Wound L buttock/gluteal fold (Active)   Pre Size Length 3 10/10/22 0900   Pre Size Width 2 10/10/22 0900   Pre Size Depth 1 10/10/22 0900   Pre Total Sq cm 6 10/10/22 0900       VASC Wound right buttock (Active)   Pre Size Length 3.5 10/10/22 0900   Pre Size Width 0.5 10/10/22 0900   Pre Size Depth 0.1 10/10/22 0900   Pre Total Sq cm 1.75 10/10/22 0900   Undermined 1cm 6oclock-9 oclock 10/10/22 0900           Recent Labs   Lab Test 02/09/22  0851 07/16/21  1054 12/18/19  0000   A1C 5.6 6.3* 5.8          Recent Labs   Lab Test 02/10/22  0626 02/09/22  0851 07/16/21  1054   ALBUMIN 2.6* 3.0* 2.8*              Procedure note: , I had previous obtain informed consent from the patient to perform serial debridements, I confirmed this again with the patient today verbally. , Anesthetized as needed. , Using a curette and/or a scalpel I performed an excisional debridement removing all necrotic material at the Sacral wound down to the level of viable subcutaneous tissue.  I obtained hemostasis with direct pressure.  The patient tolerated the procedure well. , EBL: <5 ml  and Total debridement surface area: Less than 20 cm      There was no debridement required at the right ischial tuberosity wound.      ASSESSMENT:   This is a 67 year old female with a stage III sacral pressure ulcer and a stage III right ischial tuberosity pressure ulcer.          PLAN:   We will bandage the sacral wound with alginate and a hydrocolloid dressing changed every other day.  We will try adding Skin-Prep to the regimen to help the hydrocolloid stick better.  If this does not work they will continue to use the Mepilex bandage changed once or twice a day and as needed.  We will bandage the right ischial tuberosity wound with a Mepilex bandage changed every other day.  I have explained to the patient and her  that the new air overlay mattress that she has been sleeping on may help improve the  pressure reduction to the area.  I expect an intervention like this would take 2 weeks to see a difference in the wound measurements.  I also did offer again to order a full-thickness air mattress for them.  I did explain that a full-thickness air mattress does provide better pressure reduction than an air overlay patient.  They prefer to stick with the air overlay cushion for now.    I have again explained to the patient and her  that it is now more important than ever that she keep pressure off of the sacral area.  I recommend that she try to keep pressure off of the sacral area at all times.    I have encouraged the patient to continue to minimize the time they spend in the wheelchair.   I have encouraged the patient to continue on their high protein diet to aid in wound healing.      The patient will return to the wound clinic in 2-week to see me again.        30 minutes spent on the date of the encounter doing chart review, history and exam, documentation and further activities per the note      Frankie Vega MD  10/10/2022   10:00 AM   [facility]/Wound  [phone]

## 2022-10-10 NOTE — PATIENT INSTRUCTIONS
Wound Care Instructions    Clinic email: [email or web address]    Every other day and as needed, Cleanse your gluteal wound(s) with Normal saline.    Pat Dry with non-sterile gauze    Apply cavilon no-sting skin barrier around the wound, allow to air dry    Primary Dressing: pack wound with aquacel AG    Secondary dressing: Cover with hydrocolloid    It is ok to get your wound wet in the bath or shower    Use the Roho cushion on every chair that you sit on.     If for some reason you are not able to get your dressing(s) changed as outlined above (due to illness, lack of supplies, lack of help) please do the following: remove old, soiled dressings; wash the wounds with saline; pat dry; apply ABD pad or other absorbant pad and secure with rolled gauze; avoid tape directly on your skin; Call the clinic as soon as possible to let us know what the current issues are in receiving wound care [phone].      SEEK MEDICAL CARE IF:  You have an increase in swelling, pain, or redness around the wound.  You have an increase in the amount of pus coming from the wound.  There is a bad smell coming from the wound.  The wound appears to be worsening/enlarging  You have a fever greater than 101.5 F      It is ok to continue current wound care treatment/products for the next 2-3 days until new wound care supplies are ordered and arrive. If longer than this please contact our office at [phone].    If you have a 2 layer or 4 layer compression wrap on these are safe to have on for ONLY 7 days. If for some reason you are not able to get the wrap(s) changed (due to illness; lack of supplies, lack of help, lack of transportation) please do the following: unwrap the old 2 or 4 layer compression wrap; avoid using scissors as you could cut your skin and cause wounds; use tubular compression when available. Call to reschedule your home care or clinic visit appointment as soon as possible.    Please NOTE: if you are 15 minutes late  to your clinic appointment you will have to be rescheduled. Please call our clinic as soon as possible if you know you will not be able to get to your appointment at [phone].    If you fail to show up to 3 scheduled clinic appointments you will be dismissed from our clinic.              We want to hear from you!  In the next few weeks, you should receive a call or email to complete a survey about your visit at [facility]. Please help us improve your appointment experience by letting us know how we did today. We strive to make your experience good and value any ways in which we could do better.      We value your input and suggestions.    Thank you for choosing the [facility]!

## 2022-10-18 PROBLEM — I62.9 INTRACRANIAL HEMORRHAGE (H): Status: ACTIVE | Noted: 2022-01-01

## 2022-10-18 NOTE — PROGRESS NOTES
Neurosurgery progress note:    Plan:  1. give 2u platelets  2. Keppra 1000mg IV  3. HOB >30 degrees  4. Keep patient NPO, hold anticoagulation  5. Transfer patient to the  or [facility] for further monitoring, potential for surgical intervention if needed. Personally called SOC to start the process. Cannot stay at [facility].       HPI:  Per Dr Fransisco Kwong is a 66yo F hx chronic anemia, chf, diabetes and widely metastatic breast cancer s/p mastectomy 2010 (mets to liver, spine, pelvis, femurs and cerebellum) treated with chemo and radiation followed by Dr Nicholson, who presents to [facility] ED today with altered mental status and new expressive aphasia over the last 24hrs. Several seeks of confusion, possible unwitnessed fall 2 wks ago. Not on any bloodthinners, but hx thrombocytopenia. Exam reported as: answers yes/no, nods, but largely nonverbal. NEFF x4, oriented x1. aptt 39, inr 1.28, platelets 48. Urinalysis concerning for uti. BP has improved with abx and fluids being given. Head CT demonstrates a mixed density subdural hematoma. 8.3mm left frontal region, 8.2mm left parietal region, 3.3mm over the posterior left temporal region. Some associated mass effect with 2mm left to right shift. There is a brain MRI in the system from march 2022 showing diffuse patchymeningeal enhancement, five mets in the cerebellar hemispheres, and diffuse osseous mets.    DNR/DNI but family does want neurosurgical intervention for SDH if needed. Will arrange transfer for this patient.    Rebecca CHAVEZ-C  [facility]surgery  O. [phone]

## 2022-10-18 NOTE — PROGRESS NOTES
Stroke Neuro Attending asked for PPx IV keppra 500 BID.    This was ordered by fellow as directed.    Cerebelli ordered for overnight.     Akbar Jung MD PGY5 Stroke Fellow

## 2022-10-18 NOTE — ED NOTES
EMERGENCY DEPARTMENT SIGN OUT NOTE      IMPRESSION  1. Subdural hematoma    2. Altered mental status, unspecified altered mental status type    3. Acute renal failure, unspecified acute renal failure type (H)    4. Hyperkalemia    5. Hyponatremia    6. Thrombocytopenia (H)    7. Urinary tract infection without hematuria, site unspecified    8. Chronic anemia    9. Hypotension, unspecified hypotension type    10. Metastatic breast cancer (H)    11. Elevated troponin    12. Type 2 diabetes mellitus without complication, with long-term current use of insulin (H)          ED COURSE AND MEDICAL DECISION MAKING  67-year-old female who is currently undergoing chemotherapy for metastatic breast cancer who was brought into the ED by her  for altered mental status was tractotomy by Dr. Moody.  The patient was found to have a subdural hematoma that was 8 x 8 x 3 cm with a slight midline shift.  The patient's case had been discussed with midlevel neurosurgery provider here at [facility] who was attempting to transfer the patient over to the [facility] or [facility].  In addition to this the patient was also noted to have multiple complicating medical issues which were addressed by Dr. Moody.     The patient was noted to have acute renal failure and hyperkalemia.  She was also hyponatremic.  Urinalysis shows that the patient likely has urinary tract infection.  Chest x-ray shows a left lower lobe opacities concerning for pneumonia.  The patient's troponin was elevated at 183.  Patient was also noted to be thrombocytopenic.  She was also noted to be persistently hypotensive.    The patient was given a liter normal saline to treat her hyponatremia and hypotension.  The patient was also given calcium gluconate and sodium bicarb for her hyperkalemia.  The patient's potassium improved from 7 down to 6.2 with these medications.      The patient was given IV Keppra and Decadron to treat her subdural  hematoma.  2 units of platelets were ordered.  However, the platelets were not given to the patient prior to transfer.    I received a call from the admitting hospitalist at [facility].  I received a report that patient was going to be taken to the OR because she had a 2-hour window to undergo surgery.  The patient was initially excepted by Dr. Barba.      The  was updated with the plan for transfer to [facility] for emergent surgery.  Although she is DNR/DNI the  was in agreement with the plan for surgery and transfer.      I then received a call from the intensivist at [facility] because the patient was going to the ICU and not directly to the OR.  The patient's case was then discussed with the accepting intensivist Dr. Lr.   At the time of transfer the patient was still hypotensive but she was otherwise hemodynamic stable with O2 sats in the upper 90s on room air.  Because of the narrow 2-hour OR window the patient was sent via EMS with lights and sirens.    Patient was signed out to me by Dr Jaleel Moody at 2:35 PM.  3:14 PM Consulted with Rebecca Dixon NP, with neurosurgery at the [facility].  3:27 PM Spoke with Dr. Vail, [facility] hospitalist, about the case. They accepted the admission.  4:01 PM Consulted with Dr. Ramey, intensivist, who accepts the patient to the ICU.    In brief, Elenita Moran is a 67 year old female who initially presented for evaluation of altered mental status.      Per , patient arrives for evaluation of increasing confusing over the last couple of weeks. Patient has been non verbal over 48 hours which is unusual. The patient has leg swelling that has been building up within the 48 hours. Patient is incoherently talking.     At time of sign out, disposition was pending neurosurgery consult and disposition.      ED MEDS  Medications   albuterol (PROVENTIL) neb solution 2.5 mg (has no administration in time range)    0.9% sodium chloride BOLUS (0 mLs Intravenous Stopped 10/18/22 1159)   dexamethasone PF (DECADRON) injection 10 mg (10 mg Intravenous Given 10/18/22 1058)   piperacillin-tazobactam (ZOSYN) 3.375 g vial to attach to  mL bag (3.375 g Intravenous Given 10/18/22 1210)   furosemide (LASIX) injection 10 mg (10 mg Intravenous Given 10/18/22 1150)   sodium bicarbonate 8.4 % injection 50 mEq (50 mEq Intravenous Given 10/18/22 1146)   calcium gluconate 10 % injection 1 g (0 g Intravenous Stopped 10/18/22 1210)   dextrose 50 % injection 50 mL (50 mLs Intravenous Given 10/18/22 1229)   insulin regular 1 unit/mL injection 10 Units (10 Units Intravenous Given 10/18/22 1228)   levETIRAcetam (KEPPRA) 1,000 mg in sodium chloride 0.9 % 100 mL intermittent infusion (1,000 mg Intravenous New Bag 10/18/22 1453)       LAB  Labs Ordered and Resulted from Time of ED Arrival to Time of ED Departure   ROUTINE UA WITH MICROSCOPIC REFLEX TO CULTURE - Abnormal       Result Value    Color Urine Yellow      Appearance Urine Cloudy (*)     Glucose Urine 50 (*)     Bilirubin Urine Negative      Ketones Urine Negative      Specific Gravity Urine 1.011      Blood Urine 0.5 mg/dL (*)     pH Urine 6.0      Protein Albumin Urine 70 (*)     Urobilinogen Urine <2.0      Nitrite Urine Negative      Leukocyte Esterase Urine 500 Octavio/uL (*)     WBC Clumps Urine Present (*)     RBC Urine 62 (*)     WBC Urine >182 (*)    COMPREHENSIVE METABOLIC PANEL - Abnormal    Sodium 123 (*)     Potassium 7.0 (*)     Chloride 89 (*)     Carbon Dioxide (CO2) 14 (*)     Anion Gap 20 (*)     Urea Nitrogen 110.2 (*)     Creatinine 3.25 (*)     Calcium 8.2 (*)     Glucose 122 (*)     Alkaline Phosphatase 564 (*)      (*)     ALT 99 (*)     Protein Total 5.6 (*)     Albumin 2.8 (*)     Bilirubin Total 0.3      GFR Estimate 15 (*)    CBC WITH PLATELETS AND DIFFERENTIAL - Abnormal    WBC Count 6.6      RBC Count 2.71 (*)     Hemoglobin 8.0 (*)     Hematocrit 24.0 (*)      MCV 89      MCH 29.5      MCHC 33.3      RDW 15.8 (*)     Platelet Count 48 (*)    TROPONIN T, HIGH SENSITIVITY - Abnormal    Troponin T, High Sensitivity 183 (*)    DIFFERENTIAL - Abnormal    % Neutrophils 97      % Lymphocytes 1      % Monocytes 1      % Eosinophils 1      % Basophils 0      Absolute Neutrophils 6.4      Absolute Lymphocytes 0.1 (*)     Absolute Monocytes 0.1      Absolute Eosinophils 0.1      Absolute Basophils 0.0      RBC Morphology Confirmed RBC Indices      Platelet Assessment        Value: Automated Count Confirmed. Platelet morphology is normal.    Spherocytes Slight (*)    POTASSIUM - Abnormal    Potassium 6.2 (*)    INR - Abnormal    INR 1.28 (*)    PARTIAL THROMBOPLASTIN TIME - Abnormal    aPTT 39 (*)    GLUCOSE BY METER - Abnormal    GLUCOSE BY METER POCT 163 (*)    LACTIC ACID WHOLE BLOOD - Normal    Lactic Acid 0.9     COVID-19 VIRUS (CORONAVIRUS) BY PCR - Normal    SARS CoV2 PCR Negative     NT PROBNP INPATIENT - Normal    N terminal Pro BNP Inpatient 834     GLUCOSE BY METER - Normal    GLUCOSE BY METER POCT 97     GLUCOSE BY METER - Normal    GLUCOSE BY METER POCT 90     GLUCOSE MONITOR NURSING POCT   PREPARE PHERESED PLATELETS (UNIT)    Blood Component Type Platelets      Product Code Y4082N89      Unit Status Ready for issue      Unit Number [id number]      CODING SYSTEM [id number]     PREPARE PHERESED PLATELETS (UNIT)    Blood Component Type Platelets      Product Code F3432L03      Unit Status Ready for issue      Unit Number [id number]      CODING SYSTEM [id number]     PREPARE PHERESED PLATELETS (UNIT)   TYPE AND SCREEN, ADULT   BLOOD CULTURE   URINE CULTURE   ABO/RH TYPE AND SCREEN         RADIOLOGY    CT Head w/o Contrast   Final Result   IMPRESSION:   1.  Since the prior brain MRI 03/25/2022 and even the PET/CT 08/25/2022, interval development of mixed density left holohemispheric subdural collection with some areas of increased density. The subdural collection measures  8.3 mm over the left frontal    convexity, 8.2 mm over the left parietal convexity and 3.3 mm over the posterior left temporal convexity.   2.  There is some associated mass effect with 2 mm midline shift to the right at the level of the lateral ventricles.    3.  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   4.  Mild diffuse cerebral parenchymal volume loss. Presumed chronic hypertensive/microvascular ischemic white matter changes.         Discussed the findings with Dr.DANIEL KRAUSE at 12:32 PM. 10/18/2022.      XR Chest Port 1 View   Final Result   IMPRESSION: There is subtle airspace opacity in the left lower lung which could represent pneumonia or asymmetric pulmonary edema. There is mild diffuse interstitial prominence which is stable. The heart size and pulmonary vascularity are normal. There    is an infusion port catheter from a left internal jugular approach with tip at junction superior vena cava and right atrium. No no pneumothorax.            Trevin Coker DO  Emergency Medicine  [facility] EMERGENCY DEPARTMENT  [address] [place] [id number] [phone]     Trevin Coker DO  10/18/22 8803

## 2022-10-18 NOTE — PROGRESS NOTES
Pt to ICU at 1645 via EMS from [facility] ER.    Hospitalist giorgio, neurosurg pagesherry.  Pt alert but non-verbal.  Follows some commands (squeeze hands, nod head) but exam is not consistent.  Afebrile.   on arrival.  Difficult to get BP, pt has some limb spasticity and one reading can be 61/40 and another taken a minute later can be 92/40 per cuff on R upper arm (has limb alert band on L arm).  LS clear but diminished.  SpO2 also difficult due to peripheral coolness (pt pale, slow capillary refil) but has been seen in the 90's.  2L NC placed on pt.    Has an accessed Port on chest, good blood return, flushes easily.  BS hypoactive  Wearing diaper on arrival, soiled with urine and also having some vaginal bleeding; cleaned up and new diaper on.  Has full thickness wound near coccyx with about 2cm opening; also some smaller superficial wounds on L buttock.  Foam dressings applied to both.  Hospitalist notified, WOCN consulted.   K high, platelets low; MD Fountain aware.    Infusing platelets now.     Denilson called shortly after pt arrival and he was told that pt was being settled and examined and there was not a plan for any procedures at the time of his call.

## 2022-10-18 NOTE — ED TRIAGE NOTES
Patient arrives by private car with her  for evaluation of increasing confusion over the last couple weeks. Patient is non verbal in triage,  reports that is new over the last 48 hours.   Patient has history of breast cancer with metastasis.  Last chemo was October 11

## 2022-10-18 NOTE — ED PROVIDER NOTES
EMERGENCY DEPARTMENT ENCOUNTER      NAME: Elenita Moran  AGE: 67 year old female  YOB: 1955  MRN: [id number]  EVALUATION DATE & TIME: 10/18/2022 10:29 AM    PCP: Sandi Jones    ED PROVIDER: Jaleel Moody M.D.      Chief Complaint   Patient presents with     Altered Mental Status         FINAL IMPRESSION:  1.  Acute altered mental status.  2.  Acute hypotension.  3.  Chronic breast cancer with metastases to bone and brain.  4.  Acute left subdural hematoma.  5.  Acute dehydration/acute renal failure.  6.  Hyperkalemia.    ED COURSE & MEDICAL DECISION MAKING:    10:41 AM I met with the patient to gather history and to perform my initial exam. We discussed plans for the ED course, including diagnostic testing and treatment. PPE worn: cloth mask. Post status,  states DNR DNI.  Patient with altered mental status for at least the last week.  Nonverbal over the last 48 hours.  Presentation of initial blood pressure low at 69/40, currently 104/57.  Confirmed DNR/DNI status with .  Orders begun.  12:32 PM I spoke with [facility] about patients head scan results. They stated patient has an 8 ml hematoma and 2 ml midline shift, no increase mets or edema. I ordered INR.   2:14 PM.  Chronic anemia with hemoglobin 8.0.  Thrombocytopenia with platelets 48,000.  97% neutrophils.  Sodium down to 123 and potassium up to 7.0.  Bicarb of 14.  BUN and creatinine 110 and 3.3.  Mild elevation of liver enzymes.  Lactate normal.  Blood culture sent.  COVID-negative.  INR 1.28 PTT 39.  Troponin 183 which is elevated.  .  Repeat potassium down to 6.2.  EKG in sinus rhythm.  Patient received Zosyn for possible MI Decadron for possible metastasis with edema.  Verified DNR/DNI status.  However discussion with the  he would be amenable to neurosurgery evacuation of a subdural hematoma if they felt it was indicated.  Patient received Lasix IV.  Phone call out to neurosurgery.   Tentatively, patient will be signed out to the afternoon ED physician to follow-up on phone calls and disposition.    Critical care time exclusive of procedures: 30 minutes.      Pertinent Labs & Imaging studies reviewed. (See chart for details)    67 year old female presents to the Emergency Department for evaluation of altered mental status.    At the conclusion of the encounter I discussed the results of all of the tests and the disposition. The questions were answered. The patient or family acknowledged understanding and was agreeable with the care plan.      minutes of critical care time: 30 minutes.    Medical Decision Making    Supplemental history from: Caregiver    External Record(s) Reviewed: Inpatient Record    Differential Diagnosis: See MDM charting for differential considered.  Differential diagnosis multitude medicine includes brain metastases with brain swelling, stroke, electrolyte abnormality, infection, and so forth.    I performed an independent interpretation of the: EKG: See my documentation.    Discussed with radiology regarding test interpretation: CT Results    Discussion of management with another provider: Hospitalist, neurosurgery    The following testing was considered but ultimately not selected: MRI Imaging     I considered prescription management with: N/A    The patient's care impacted: None and Cancer/Chemotherapy    Consideration of Admission/Observation: N/A - Patient admitted or discharged without consideration for admission    Care significantly affected by Social Determinants of Health including: N/A      MEDICATIONS GIVEN IN THE EMERGENCY:  Medications   0.9% sodium chloride BOLUS (has no administration in time range)       NEW PRESCRIPTIONS STARTED AT TODAY'S ER VISIT  New Prescriptions    No medications on file          =================================================================    HPI    Patient information was obtained from:     Use of : N/A          Elenita Moran is a 67 year old female with a pertinent history of bone cancer, breast cancer, diabetes, stage II pressure ulcer of sacral region, anemia, lymphedema, and hyperlipidemia, and who presents to this ED via walk in  for evaluation of altered mental status.     Per , patient arrives for evaluation of increasing confusing over the last couple of weeks. Patient has been non verbal over 48 hours which is unusual. The patient has leg swelling that has been building up within the 48 hours. Patient is incoherently talking.     Patient denies abdominal pain.     Noted,  states post status DNR DNI for patient.     Patient does not identify any waxing or waning symptoms otherwise, exacerbating or alleviating features,associated symptoms except as mentioned. She denies any pain related complaints.    REVIEW OF SYSTEMS   Review of Systems   Cardiovascular: Positive for leg swelling.   Gastrointestinal: Negative for abdominal pain.   Neurological:        Positive for confusion   All other systems reviewed and are negative.       PAST MEDICAL HISTORY:  Past Medical History:   Diagnosis Date     Allergic rhinitis      Bone cancer (H) 2011    breast mets     Breast cancer (H) 2010    left mastectomy     Depression      DM (diabetes mellitus) (H)      Fibromyalgia      Hx antineoplastic chemotherapy 2010     Hx of radiation therapy 2010     Hyperlipemia      Lactose intolerance      Mini stroke     R EYE     Osteoarthritis      Tobacco dependency        PAST SURGICAL HISTORY:  Past Surgical History:   Procedure Laterality Date     APPENDECTOMY       INSERT INTRACORONARY STENT  1985    R Hand     IR CHEST PORT PLACEMENT > 5 YRS OF AGE  12/12/2019     IR CHEST PORT PLACEMENT > 5 YRS OF AGE  3/14/2022     IR LUMBAR TRANSFORAMINAL EPIDURAL STEROID INJECTION  12/5/2019     IR LUMBAR TRANSFORAMINAL EPIDURAL STRD INJ  12/5/2019     IR PORT PLACEMENT >5 YEARS  12/12/2019     IR PORT REMOVAL RIGHT   3/14/2022     IR SITE CHECK/EVALUATION  4/15/2022     MAMMOPLASTY REDUCTION Right 2015    hx of left mastectomy and right breast reduction     MASTECTOMY Left 2010     OTHER SURGICAL HISTORY      biopsy of upper and right tongue     OVARIAN CYST REMOVAL       REVISE RECONSTRUCTED BREAST Left     March 11, 2015           CURRENT MEDICATIONS:    ACETAMINOPHEN PO  B-D U/F 31G X 8 MM insulin pen needle  blood glucose (ONETOUCH VERIO IQ) test strip  bumetanide (BUMEX) 2 MG tablet  calcium carbonate (OS-BENJI) 600 MG tablet  cephALEXin (KEFLEX) 500 MG capsule  chlorhexidine (PERIDEX) 0.12 % solution  Cholecalciferol (VITAMIN D) 125 MCG (5000 UT) capsule  clobetasol (TEMOVATE) 0.05 % external ointment  Continuous Blood Gluc  (FREESTYLE KIMBERLY 14 DAY READER) COLETTE  Continuous Blood Gluc Sensor (ClearwireSTYLE KIMBERLY 14 DAY SENSOR) MISC  docusate sodium (COLACE) 100 MG capsule  DULoxetine (CYMBALTA) 30 MG capsule  DULoxetine (CYMBALTA) 60 MG capsule  ezetimibe-simvastatin (VYTORIN) 10-20 MG tablet  glucose (BD GLUCOSE) 4 g chewable tablet  HYDROmorphone (DILAUDID) 4 MG tablet  hydrOXYzine (VISTARIL) 25 MG capsule  ibuprofen (ADVIL/MOTRIN) 200 MG capsule  Insulin Glargine (BASAGLAR KWIKPEN SC)  lidocaine (XYLOCAINE) 5 % external ointment  lisinopril (ZESTRIL) 2.5 MG tablet  LORazepam (ATIVAN) 0.5 MG tablet  medical cannabis (Patient's own supply)  metoprolol succinate ER (TOPROL-XL) 50 MG 24 hr tablet  NOVOLOG FLEXPEN 100 UNIT/ML soln  nystatin (MYCOSTATIN) 505344 UNIT/GM external cream  OLANZapine (ZYPREXA) 5 MG tablet  omeprazole (PRILOSEC) 20 MG DR capsule  oxybutynin (DITROPAN) 5 MG tablet  oxybutynin ER (DITROPAN-XL) 10 MG 24 hr tablet  Prenatal Vit-Fe Fumarate-FA (PRENATAL MULTIVITAMIN  PLUS IRON) 27-1 MG TABS  psyllium (METAMUCIL/KONSYL) Packet  spironolactone (ALDACTONE) 25 MG tablet  vitamin (B COMPLEX) tablet  zolpidem (AMBIEN) 10 MG tablet        ALLERGIES:  Allergies   Allergen Reactions     Gabapentin      Upper  body edema/swelling.         Morphine Visual Disturbance     Visual hallucinations     Sulfamethoxazole-Trimethoprim      Other reaction(s): Hives     Sulfa Drugs Hives and Rash     welts         FAMILY HISTORY:  Family History   Problem Relation Age of Onset     Lung Cancer Mother      Pancreatic Cancer Mother      Kidney Cancer Maternal Grandmother      Lung Cancer Paternal Aunt      Breast Cancer Cousin        SOCIAL HISTORY:   Social History     Socioeconomic History     Marital status:      Spouse name: None     Number of children: None     Years of education: None     Highest education level: None   Tobacco Use     Smoking status: Former     Packs/day: 0.75     Years: 20.00     Pack years: 15.00     Types: Cigarettes     Smokeless tobacco: Never   Substance and Sexual Activity     Alcohol use: Yes     Alcohol/week: 1.0 standard drink     Comment: 1 glass of wine/week     Drug use: No     Sexual activity: Yes     Partners: Male     Birth control/protection: Post-menopausal   Social History Narrative    Patient works at home, 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  She lives with her  and 1 of her sons.       No current drugs, alcohol, or tobacco.  Former smoker.    VITALS:  BP 90/64   Pulse 96   Resp 20     PHYSICAL EXAM    Vital Signs:  BP 90/64   Pulse 96   Resp 20   General:  On entering the room she is very ill-appearing.  Blood pressure increased to 104/57.  Nonverbal but follows commands and nods yes or no to questions.  Neck:  Neck supple with full range of motion and nontender.    Back:  Back and spine are nontender.  No costovertebral angle tenderness.    HEENT:  Oropharynx clear with moist mucous membranes.  HEENT unremarkable.    Pulmonary:  Chest clear to auscultation without rhonchi rales or wheezing.    Cardiovascular:  Cardiac regular rate and rhythm without murmurs rubs or gallops.    Abdomen:  Abdomen soft nontender.  There is no rebound or guarding.    Muskuloskeletal:  she moves all 4  without any difficulty and has normal neurovascular exams.  Extremities without clubbing, cyanosis.  3-4+ pitting edema both ankles.  Legs and calves are nontender.    Neuro:  she is alert and oriented ×1 and moves all extremities symmetrically.    Psych:  Normal affect.    Skin:  Unremarkable and warm and dry.       LAB:  All pertinent labs reviewed and interpreted.  Labs Ordered and Resulted from Time of ED Arrival to Time of ED Departure   COMPREHENSIVE METABOLIC PANEL - Abnormal       Result Value    Sodium 123 (*)     Potassium 7.0 (*)     Chloride 89 (*)     Carbon Dioxide (CO2) 14 (*)     Anion Gap 20 (*)     Urea Nitrogen 110.2 (*)     Creatinine 3.25 (*)     Calcium 8.2 (*)     Glucose 122 (*)     Alkaline Phosphatase 564 (*)      (*)     ALT 99 (*)     Protein Total 5.6 (*)     Albumin 2.8 (*)     Bilirubin Total 0.3      GFR Estimate 15 (*)    CBC WITH PLATELETS AND DIFFERENTIAL - Abnormal    WBC Count 6.6      RBC Count 2.71 (*)     Hemoglobin 8.0 (*)     Hematocrit 24.0 (*)     MCV 89      MCH 29.5      MCHC 33.3      RDW 15.8 (*)     Platelet Count 48 (*)    TROPONIN T, HIGH SENSITIVITY - Abnormal    Troponin T, High Sensitivity 183 (*)    DIFFERENTIAL - Abnormal    % Neutrophils 97      % Lymphocytes 1      % Monocytes 1      % Eosinophils 1      % Basophils 0      Absolute Neutrophils 6.4      Absolute Lymphocytes 0.1 (*)     Absolute Monocytes 0.1      Absolute Eosinophils 0.1      Absolute Basophils 0.0      RBC Morphology Confirmed RBC Indices      Platelet Assessment        Value: Automated Count Confirmed. Platelet morphology is normal.    Spherocytes Slight (*)    POTASSIUM - Abnormal    Potassium 6.2 (*)    INR - Abnormal    INR 1.28 (*)    PARTIAL THROMBOPLASTIN TIME - Abnormal    aPTT 39 (*)    GLUCOSE BY METER - Abnormal    GLUCOSE BY METER POCT 163 (*)    LACTIC ACID WHOLE BLOOD - Normal    Lactic Acid 0.9     COVID-19 VIRUS (CORONAVIRUS) BY PCR - Normal    SARS CoV2 PCR Negative      NT PROBNP INPATIENT - Normal    N terminal Pro BNP Inpatient 834     GLUCOSE BY METER - Normal    GLUCOSE BY METER POCT 97     GLUCOSE MONITOR NURSING POCT   ROUTINE UA WITH MICROSCOPIC REFLEX TO CULTURE   BLOOD CULTURE       RADIOLOGY:  Reviewed all pertinent imaging. Please see official radiology report.  CT Head w/o Contrast   Final Result   IMPRESSION:   1.  Since the prior brain MRI 03/25/2022 and even the PET/CT 08/25/2022, interval development of mixed density left holohemispheric subdural collection with some areas of increased density. The subdural collection measures 8.3 mm over the left frontal    convexity, 8.2 mm over the left parietal convexity and 3.3 mm over the posterior left temporal convexity.   2.  There is some associated mass effect with 2 mm midline shift to the right at the level of the lateral ventricles.    3.  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   4.  Mild diffuse cerebral parenchymal volume loss. Presumed chronic hypertensive/microvascular ischemic white matter changes.         Discussed the findings with Dr.DANIEL KRAUSE at 12:32 PM. 10/18/2022.      XR Chest Port 1 View   Final Result   IMPRESSION: There is subtle airspace opacity in the left lower lung which could represent pneumonia or asymmetric pulmonary edema. There is mild diffuse interstitial prominence which is stable. The heart size and pulmonary vascularity are normal. There    is an infusion port catheter from a left internal jugular approach with tip at junction superior vena cava and right atrium. No no pneumothorax.                 EKG:    Normal sinus rhythm 96, first-degree block, left axis deviation, low voltages, questionable old inferior and lateral wall MIs.  Very similar to previous EKG from March.    I have independently reviewed and interpreted the EKG(s) documented above.    PROCEDURES:         I, Autumn Her, am  serving as a scribe to document services personally performed by Dr. Moody based on my observation and the provider's statements to me. I, Jaleel Moody MD attest that Mavis Her is acting in a scribe capacity, has observed my performance of the services and has documented them in accordance with my direction.    Jaleel Moody M.D.  Emergency Medicine  [facility] EMERGENCY DEPARTMENT  [address] [place] [id number]  [phone]  Dept: [phone]          Jaleel Moody MD  10/18/22 1415       Jaleel Moody MD  10/18/22 1417

## 2022-10-18 NOTE — H&P
[facility]    History and Physical - Hospitalist Service       Date of Admission:  10/18/2022    Assessment & Plan      Elenita Moran is a 67 year old female admitted on 10/18/2022.  Past history of metastatic breast cancer on immunotherapy and radiation, HFrEF, lymphedema, DM II, chronic MSSA infection of pelvis, chronic decubitus ulcer as well as other chronic medical issues who presents with acute encephalopathy, found to have subdural fluid collection.         Acute metabolic encephalopathy   reports increased difficulty with gait/balance since fall 10 days prior, in past 48 hours increasingly confused with difficulty with speech and essentially non-verbal morning of presentation.  Possibly due to subdural fluid collection vs marked electrolyte abnormalities (uremia, hyponatremia, hyperkalemia) vs intracranial mets vs infection.   - management of various issues as below    Suspected traumatic SDH  Outside CT shows left holohemispheric subdural fluid collection with 2 mm midline shift.   reports fall on Oct 8 (unknown if any head trauma).    - discussed with Neurosurgery, no plan for surgical intervention (unclear where this miscommunication occurred), no need for empiric Keppra (Neurocrit in agreement)  - discussed with Neurocrit, recommend Ceribell monitoring overnight  - q2h neuro checks   - seizure precautions  - goal SBP <150, head of bed 30 degrees  - repeat head CT in AM  - SLP eval; NPO pending eval    Thrombocytopenia  Abnormal coags  Admission platelet 38, INR 1.27 and PTT 38.  Unclear if nutritional vs impaired synthetic function of liver due to mets.   - type and screen, transfuse 1 pack platelets  - goal platelet >100K per NSG  - Vit K 1 mg IV  - Onc consult as above    Sacral pressure injury, stage 4, present on admission  Multiple sacral pressures injury, stage 2-3, present on admission  Chronic MSSA infection of pelvis on chronic suppressive  abx  Possible UTI  Possible pneumonia  * Followed by [facility] for chronic pressure injury.   reports this has been increasing in size over past week as she has spent more time in her chair.    * Admission UA with >182 WBC, leukocyte esterase.  * Outside CXR with mild left basilar opacity  - continue zosyn for now; ultimately will need to resume PTA abx  - follow blood and urine culture  - trend fever curve and WBC  - check procal  - WOC consult, will likely need Surgical assessment    Hyponatremia  Hyperkalemia  Uremia  Anion gap metabolic acidosis  KATHLEEN  Cr as low as 0.8 in June 2022, on most recent check Sept 2022 was 1.13.  Admission Na 127, K 7.2, bicarb 16 (gap down to 14), .   reporting minimal oral intake over past 2 days.  Edema on exam, but suspect intravascularly dry with hypoalbuminemia and poor oral intake.    -  ml/hr  - shift potassium with insulin  - unable to give binder as not safe for PO currently  - monitor K q2h, Na q4h  - Nephrology consult    Stage IV metastatic breast cancer (mets to brain, liver, bone) s/p mastectomy  Elevated LFT's  Followed by Dr. Varma of [facility].  Currently on immunotherapy and receiving radiation to spine (completed brain radiation for cerebellar mets).  Recent baseline LFT's unknown, suspect due to liver mets.  No signs of tenderness on exam.    - repeat LFT's in AM  - [facility] consult    HFrEF due to doxorubicin  TTE 6/2022 with EF 35-40% with global LV hypokinesis.  Outside trop 183, suspect demand ischemia.    - trend trop  - echo   - tele  - monitor I/O's and daily weights    DM II with peripheral neuropathy  - low dose ssi  - check A1C    Chronic anemia  Baseline hgb about 8 g/dL.   - monitor    Depression  - await med rec, will hold any sedating meds due to encephalopathy       Diet: NPO for Medical/Clinical Reasons Except for: No Exceptions    DVT Prophylaxis: Pneumatic Compression Devices  Jiang Catheter: Not present  Central Lines:  PRESENT     Cardiac Monitoring: ACTIVE order. Indication: ICU  Code Status: No CPR- Do NOT Intubate      Clinically Significant Risk Factors Present on Admission        # Hyperkalemia: Highest K = 7 mmol/L (Ref range: 3.4-5.3) in last 2 days, will monitor as appropriate  # Hyponatremia: Lowest Na = 123 mmol/L (Ref range: 136-145) in last 2 days, will monitor as appropriate     # Anion Gap Metabolic Acidosis: Highest Anion Gap = 20 mmol/L (Ref range: 7-15) in last 2 days, will monitor and treat as appropriate  # Hypoalbuminemia: Lowest albumin = 2.8 g/dL (Ref range: 3.5-5.2) at 10/18/2022 10:41 AM, will monitor as appropriate  # Coagulation Defect: INR = 1.28 (Ref range: 0.85 - 1.15) and/or PTT = 39 Seconds (Ref range: 22 - 38 Seconds), will monitor for bleeding  # Thrombocytopenia: Lowest platelets = 48 (Ref range: 150-450) in last 2 days, will monitor for bleeding  # Acute Kidney Injury, unspecified: based on a >150% or 0.3 mg/dL increase in last creatinine compared to past 90 day average, will monitor renal function  # Hypertension: home medication list includes antihypertensive(s)  # Chronic systolic heart failure: echo within the past year with EF <40%             Disposition Plan      Expected Discharge Date: 10/20/2022                The patient's care was discussed with the Bedside Nurse, Patient, Patient's Family and Neurosurg and Neurocrit Consultant.    Freeman Vail MD  Hospitalist Service  [facility]  Securely message with the Vocera Web Console (learn more here)  Text page via [email or web address] Paging/Directory         ______________________________________________________________________    Chief Complaint   Altered mental status     History is obtained from discussion with her  via phone, chart review and discussion with ED provider.  Patient only intermittently responds yes/no to simple questions and unable to provide any history.     History of Present Illness   Elenita Monreal  "Luisa is a 67 year old female who presents with altered mental status.   reports she has had a difficult 1.5 years.  She has stage IV cancer with mets to the cerebellum, liver and bone and recently hospitalized with a chronic MSSA infection of the pelvis for which she is on chronic suppressive antibiotics.  Since that hospitalization she has been using a wheelchair and walker for mobility.  In the past 2 weeks he has noted decreased ability to function.  He reports on Oct 8 she got up during the night to use the bathroom and had a \"really hard fall\", though was not sure if she hit her head.  Since the fall, has been having more difficulty with balance and control of her large muscles.  He providers questioned whether this change was due to progression of cancer with mets to cerebellum (sounds as though he may have forgotten to report this fall previously).  In the past 48 hours she has been losing the ability to find words, construct sentences, and having significant difficulty transferring from powerlift chair to wheelchair using her walker.  She woke at 4 am this morning, confused, disoriented, and having lost all verbal ability leading to presentation to ED.    He also reports the pressure ulcer on her sacrum has been growing over the past week as is spending most of her time in her powerlift chair or wheelchair.  He states she was doing well with oral intake until 4-5 days ago.  Has had minimal food intake in this time though he states she did well with water up until the past 48 hours.       Currently on immunootherapy, he reports she does have recent issues with \"chemo brain\".  Receiving Enhertu ( trastuzumab deruxtecan), last received Oct 11.  She had MRI on Thursday, and they are awaiting results (not available in our system).  Is receiving radiation therapy to T5, most recently on 10/6.  Completed brain radiation this summer, he believes in July.      She was initially seen at outside ED where " work-up revealed numerous lab abnormalities and CT showed subdural fluid collection.  Sign-out from ED was that patient needed to transfer emergency for surgical intervention within next 2 hours, however, after arriving at [facility], Neurosurgery reports that was never their plan.      Review of Systems    Review of systems not obtained due to patient factors - mental status    Past Medical History    I have reviewed this patient's medical history and updated it with pertinent information if needed.   Past Medical History:   Diagnosis Date     Allergic rhinitis      Bone cancer (H) 2011    breast mets     Breast cancer (H) 2010    left mastectomy     Depression      DM (diabetes mellitus) (H)      Fibromyalgia      Hx antineoplastic chemotherapy 2010     Hx of radiation therapy 2010     Hyperlipemia      Lactose intolerance      Mini stroke     R EYE     Osteoarthritis      Tobacco dependency        Past Surgical History   I have reviewed this patient's surgical history and updated it with pertinent information if needed.  Past Surgical History:   Procedure Laterality Date     APPENDECTOMY       INSERT INTRACORONARY STENT  1985    R Hand     IR CHEST PORT PLACEMENT > 5 YRS OF AGE  12/12/2019     IR CHEST PORT PLACEMENT > 5 YRS OF AGE  3/14/2022     IR LUMBAR TRANSFORAMINAL EPIDURAL STEROID INJECTION  12/5/2019     IR LUMBAR TRANSFORAMINAL EPIDURAL STRD INJ  12/5/2019     IR PORT PLACEMENT >5 YEARS  12/12/2019     IR PORT REMOVAL RIGHT  3/14/2022     IR SITE CHECK/EVALUATION  4/15/2022     MAMMOPLASTY REDUCTION Right 2015    hx of left mastectomy and right breast reduction     MASTECTOMY Left 2010     OTHER SURGICAL HISTORY      biopsy of upper and right tongue     OVARIAN CYST REMOVAL       REVISE RECONSTRUCTED BREAST Left     March 11, 2015       Social History   I have reviewed this patient's social history and updated it with pertinent information if needed.  Social History     Tobacco Use     Smoking status:  Former     Packs/day: 0.75     Years: 20.00     Pack years: 15.00     Types: Cigarettes     Smokeless tobacco: Never   Substance Use Topics     Alcohol use: Yes     Alcohol/week: 1.0 standard drink     Comment: 1 glass of wine/week     Drug use: No       Family History   I have reviewed this patient's family history and updated it with pertinent information if needed.  Family History   Problem Relation Age of Onset     Lung Cancer Mother      Pancreatic Cancer Mother      Kidney Cancer Maternal Grandmother      Lung Cancer Paternal Aunt      Breast Cancer Cousin        Prior to Admission Medications   Prior to Admission Medications   Prescriptions Last Dose Informant Patient Reported? Taking?   ACETAMINOPHEN PO   Yes No   Sig: Take 1,300 mg by mouth as needed   B-D U/F 31G X 8 MM insulin pen needle   Yes No   Sig: USE 4 TIMES DAILY   Cholecalciferol (VITAMIN D) 125 MCG (5000 UT) capsule   Yes No   Sig: Take 1 tablet by mouth daily    Continuous Blood Gluc  (FREESTYLE KIMBERLY 14 DAY READER) COLETTE   Yes No   Sig: Use to check blood sugar at least 4 times a day or as directed    Continuous Blood Gluc Sensor (FREESTYLE KIMBERLY 14 DAY SENSOR) MISC   Yes No   Sig: Use as directed to test blood sugar at least 4 times a day or as directed   DULoxetine (CYMBALTA) 30 MG capsule   Yes No   Sig: Take 30 mg by mouth every morning    DULoxetine (CYMBALTA) 60 MG capsule   Yes No   Sig: Take 1 capsule by mouth At Bedtime   HYDROmorphone (DILAUDID) 4 MG tablet   No No   Sig: Take 1 tablet (4 mg) by mouth 3 times daily as needed for moderate to severe pain or severe pain   Insulin Glargine (BASAGLAR KWIKPEN SC)   Yes No   Sig: Inject 6 Units Subcutaneous daily Uses as directed   LORazepam (ATIVAN) 0.5 MG tablet   Yes No   Sig: take 1 tablet by mouth up to 3 times per day as needed for nausea, anxiety, or insomnia   NOVOLOG FLEXPEN 100 UNIT/ML soln   Yes No   Sig: INJECT 1-20 UNITS UNDER THE SKIN 3 TIMES DAILY BEFORE MEALS USING  SLIDING SCALE AND CARB COUNT (AVERAGE 30 UNITS A DAY) 30 DAY(S).   OLANZapine (ZYPREXA) 5 MG tablet   Yes No   Sig: Take 5 mg by mouth every evening    Prenatal Vit-Fe Fumarate-FA (PRENATAL MULTIVITAMIN  PLUS IRON) 27-1 MG TABS   Yes No   Sig: Take 1 tablet by mouth daily    blood glucose (ONETOUCH VERIO IQ) test strip   Yes No   bumetanide (BUMEX) 2 MG tablet   No No   Sig: Take 1 tablet (2 mg) by mouth 2 times daily   calcium carbonate (OS-BENJI) 600 MG tablet   Yes No   Sig: Take 1-2 mg by mouth daily    cephALEXin (KEFLEX) 500 MG capsule   No No   Sig: Take 1 capsule (500 mg) by mouth 2 times daily   chlorhexidine (PERIDEX) 0.12 % solution   Yes No   Sig: Take 15 mLs by mouth daily as needed   clobetasol (TEMOVATE) 0.05 % external ointment   Yes No   Sig: Apply topically twice a week   docusate sodium (COLACE) 100 MG capsule   Yes No   Sig: Take 100 mg by mouth daily   ezetimibe-simvastatin (VYTORIN) 10-20 MG tablet   Yes No   Sig: Take 1 tablet by mouth daily   glucose (BD GLUCOSE) 4 g chewable tablet   Yes No   Sig: as needed   hydrOXYzine (VISTARIL) 25 MG capsule   Yes No   Sig: Take 25 mg by mouth 3 times daily With hydromorphone   ibuprofen (ADVIL/MOTRIN) 200 MG capsule   Yes No   Sig: Take 400 mg by mouth daily   lidocaine (XYLOCAINE) 5 % external ointment   No No   Sig: Apply topically 4 times daily   Patient taking differently: Apply topically 4 times daily as needed for moderate pain   lisinopril (ZESTRIL) 2.5 MG tablet   No No   Sig: Take 1 tablet (2.5 mg) by mouth daily   medical cannabis (Patient's own supply)   Yes No   Sig: See Admin Instructions (The purpose of this order is to document that the patient reports taking medical cannabis.  This is not a prescription, and is not used to certify that the patient has a qualifying medical condition.)   metoprolol succinate ER (TOPROL-XL) 50 MG 24 hr tablet   No No   Sig: Take 1 tablet (50 mg) by mouth daily   nystatin (MYCOSTATIN) 472178 UNIT/GM external  cream   No No   Sig: Apply topically 2 times daily   Patient taking differently: Apply topically daily as needed   omeprazole (PRILOSEC) 20 MG DR capsule   Yes No   Sig: Take 20 mg by mouth At Bedtime    oxybutynin (DITROPAN) 5 MG tablet   Yes No   Sig: Take 5 mg by mouth every morning   oxybutynin ER (DITROPAN-XL) 10 MG 24 hr tablet   Yes No   Sig: Take 10 mg by mouth every evening   psyllium (METAMUCIL/KONSYL) Packet   Yes No   Sig: Take 1 packet by mouth daily as needed for constipation   spironolactone (ALDACTONE) 25 MG tablet   No No   Sig: Take 0.5 tablets (12.5 mg) by mouth daily   vitamin (B COMPLEX) tablet   Yes No   Sig: Take 1 tablet by mouth daily   zolpidem (AMBIEN) 10 MG tablet   Yes No   Sig: Take 5-10 mg by mouth At Bedtime      Facility-Administered Medications Last Administration Doses Remaining   lidocaine (XYLOCAINE) 2 % external gel 7/26/2022  9:06 AM         Allergies   Allergies   Allergen Reactions     Gabapentin      Upper body edema/swelling.         Morphine Visual Disturbance     Visual hallucinations     Sulfamethoxazole-Trimethoprim      Other reaction(s): Hives     Sulfa Drugs Hives and Rash     welts         Physical Exam   Vital Signs:                    Weight: 0 lbs 0 oz    General Appearance: somewhat frail female in NAD, lying in bed  Eyes: PERRL, sclera anicteric  HEENT: mucous membranes dry, no neck LAD  Respiratory: lungs CTAB, no wheezes or crackles, no tachypnea   Cardiovascular: RRR, normal s1/s2 without murmur  GI: abdomen soft, no signs of tenderness, nondistended, normal bowel sounds  Lymph/Hematologic: 2+ pitting bilateral lower extremity edema   Skin: stage 4 decubitus ulcer with some surrounding eschar  Musculoskeletal: extremities well perfused  Neurologic: awake, occasionally responds to yes/no questions (stated yes when asked if name was Elenita), CN III-XII intact, moving all extremities and strength appears symmetric, able to follow only simple commands    Psychiatric: unable to assess     Data   Data reviewed today: I reviewed all medications, new labs and imaging results over the last 24 hours. I personally reviewed no images or EKG's today.    Recent Labs   Lab 10/18/22  2132 10/18/22  1737 10/18/22  1652 10/18/22  1341 10/18/22  1312 10/18/22  1302 10/18/22  1041   WBC  --  5.8  --   --   --   --  6.6   HGB  --  7.1*  --   --   --   --  8.0*   MCV  --  89  --   --   --   --  89   PLT  --  38*  --   --   --   --  48*   INR  --  1.27*  --   --   --   --  1.28*   NA  --  127*  --   --   --   --  123*   POTASSIUM  --  7.2*  --   --  6.2*  --  7.0*   CHLORIDE  --  97  --   --   --   --  89*   CO2  --  16*  --   --   --   --  14*   BUN  --  121*  --   --   --   --  110.2*   CR  --  3.32*  --   --   --   --  3.25*   ANIONGAP  --  14  --   --   --   --  20*   BENJI  --  8.4*  --   --   --   --  8.2*   * 143* 106*   < >  --    < > 122*   ALBUMIN  --  1.9*  --   --   --   --  2.8*   PROTTOTAL  --  5.5*  --   --   --   --  5.6*   BILITOTAL  --  0.5  --   --   --   --  0.3   ALKPHOS  --  468*  --   --   --   --  564*   ALT  --  93*  --   --   --   --  99*   AST  --  143*  --   --   --   --  154*    < > = values in this interval not displayed.     Recent Results (from the past 24 hour(s))   XR Chest Port 1 View    Narrative    EXAM: XR CHEST PORT 1 VIEW  LOCATION: [facility]  DATE/TIME: 10/18/2022 10:53 AM    INDICATION: low bp with ams  COMPARISON: 02/11/2022.      Impression    IMPRESSION: There is subtle airspace opacity in the left lower lung which could represent pneumonia or asymmetric pulmonary edema. There is mild diffuse interstitial prominence which is stable. The heart size and pulmonary vascularity are normal. There   is an infusion port catheter from a left internal jugular approach with tip at junction superior vena cava and right atrium. No no pneumothorax.   CT Head w/o Contrast    Narrative    EXAM: CT HEAD W/O  CONTRAST  LOCATION: [facility]  DATE/TIME: 10/18/2022 11:44 AM    INDICATION: AMS, h o breast CA with mets.  COMPARISON: Brain MRI 03/25/2022, PET/CT 08/25/2022.  TECHNIQUE: Routine CT Head without IV contrast. Multiplanar reformats. Dose reduction techniques were used.    FINDINGS:  INTRACRANIAL CONTENTS: Interval development of mixed density left holohemispheric subdural collection with some areas of increased density. The subdural collection measures 8.3 mm over the left frontal convexity, 8.2 mm over the left parietal convexity   and 3.3 mm over the posterior left temporal convexity. There is some associated mass effect with 2 mm midline shift to the right at the level of the lateral ventricles. Mild diffuse cerebral parenchymal volume loss. The basilar cisterns are patent.   Moderate periventricular and scattered foci of deep white matter hypodensities involving both cerebral hemispheres. No CT evidence for an acute infarct. No cerebellar tonsillar ectopia. Patient's known small cerebellar metastasis and diffuse   pachymeningeal thickening are better seen on the prior brain MRI 03/25/2022.    VISUALIZED ORBITS/SINUSES/MASTOIDS: No intraorbital abnormality. No paranasal sinus mucosal disease. No middle ear or mastoid effusion.    BONES/SOFT TISSUES: Diffuse sclerotic appearance of the bones of the area of the cervical spine.      Impression    IMPRESSION:  1.  Since the prior brain MRI 03/25/2022 and even the PET/CT 08/25/2022, interval development of mixed density left holohemispheric subdural collection with some areas of increased density. The subdural collection measures 8.3 mm over the left frontal   convexity, 8.2 mm over the left parietal convexity and 3.3 mm over the posterior left temporal convexity.  2.  There is some associated mass effect with 2 mm midline shift to the right at the level of the lateral ventricles.   3.  Patient's known small cerebellar metastasis and diffuse  pachymeningeal thickening are better seen on the prior brain MRI 03/25/2022.  4.  Mild diffuse cerebral parenchymal volume loss. Presumed chronic hypertensive/microvascular ischemic white matter changes.      Discussed the findings with Dr.DANIEL KRAUSE at 12:32 PM. 10/18/2022.

## 2022-10-18 NOTE — ED NOTES
"ER NURSING STATUS NOTE  Elenita Moran  MRN: [id number]       Current length of ER stay: ~4ish hours    Back story: ER visit on 10/18/22 for c/o AMS.     Medical Hx:   Past Medical History:   Diagnosis Date     Allergic rhinitis      Bone cancer (H) 2011    breast mets     Breast cancer (H) 2010    left mastectomy     Depression      DM (diabetes mellitus) (H)      Fibromyalgia      Hx antineoplastic chemotherapy 2010     Hx of radiation therapy 2010     Hyperlipemia      Lactose intolerance      Mini stroke     R EYE     Osteoarthritis      Tobacco dependency         Current status:    Neuro: New behaviors: mildly aggressive, hitting out at .  He thinks she's hallucinating, \"it's like she's not here.\"  Does not follow.  Head CT +.    Cardiac: on cardiac monitor, NSR 90s.  Has elevated trops.  Shifted potassium, blood sugars maintaining.    Respiratory: on RA, sats are in upper 90s.  COVID negative.    Digestive: Doesn't appear to feel the urge to want to vomit.  Is not guarding abdomen.  Urinary: Purewick in place.  Recently treated for UTI.  Pt straight cathed for urine in ED.  +UTI.  +Vaginal bleeding with clots, \"that's been going on for a long time,\" per .  Has vaginal area edema.    Mobility: Needs assistance with bed mobility.  Writer has not seen her out of bed.    Skin: has BLE severe pitting edema.    IV Access site: port accessed  Psych: restless  Current living status: lives with  in their home.    VS obtained: /42   Pulse 97   Resp 22   SpO2 97% .    Plan: Admission    "

## 2022-10-19 NOTE — CONSULTS
"[facility]    Stroke Consult Note    Reason for Consult: SDH, encephalopathy    Chief Complaint: No chief complaint on file.       HPI  Elenita Moran is a 67 year old female with PMH breast cancer with brain, liver and bone mets on immunotherapy and radiation, HFrEF, DM2, chronic MSSA infection of the pelvis, chronic anemia, depression. She presented to [facility] ED 10/18 for evaluation of altered mental status and increasing difficulty using her legs for the past few weeks, then increasing language difficulty in the 48 hours leading up to presentation and on the day of presentation was non-verbal.  states 10/18 she was also having random, nonrhythmic jerking/flailing movements of the extremities, eyes were open but appeared like \"no one was home\". Head CT on admit showed interval development of mixed density L holohemispheric subdural fluid collection with 2 mg midline shift.  reports that she had an unwitnessed fall on October 8 when she got up to go to the bathroom during the night, unclear whether she hit her head. She was noted to have coagulopathy with platelets 48, INR 1.28, aptt 39. Neurosurgery was contacted and recommended giving 2U platelets and transferring to [facility] or [facility]. Pt also noted to have KATHLEEN, hyperkalemia, hyponatremia, elevated LFTs, hypotension, UA suggestive of UTI, CXR with LLL opacity concerning for pneumonia.    Repeat head CT 10/19 stable. On exam today she is alert, states she cannot recall anything about the past few days, she is responding to questions appropriately with fluent speech, follows commands, no focal weakness. She began having epistaxis after blowing her nose while I was examining her. Up until recently she has been able to ambulate with a walker though this has been increasingly difficult and in the past week has really only been able to use the walker to transfer to the wheelchair, does have a L foot drop for which she " has a brace at home.    Per oncology note: Follow up MRI 10/13/22 showing interim regression of 2 punctate enhancing foci in the cerebellum.  Also notes was expansion of L subdural fluid collection, now measuring 6 mm, and thickening of overlying diffuse pachymeningeal enhancement.    Evaluation Summarized  Head CT/brain MRI Brain MRI 3/25/2022: 5 metastases involving bilateral cerebellar hemispheres, diffuse osseous mets, diffuse pachymeningeal enhancement    Head CT 10/19/2022: stable L sided mixed attenuation SDH, max thickness 8 mm at frontoparietal junction, stable mass effect without shift of midline structures     Impression  1. L hemispheric mixed density SDH - unwitnessed fall reported on 10/8, also has significant coagulopathy with thrombocytopenia    2. Altered mental status/speech disturbance - consider focal L hemispheric seizures especially given improvement since starting Keppra, likely also contribution from metabolic and infectious abnormalities, recent brain MRI 10/13 reportedly showed 2 new punctate foci of cerebellar enhancement.    Recommendations  - Appreciate neurosurgery consultation, have recommended transfusing platelets to goal >100,000  - Start cEEG monitoring- depending on what the tracing looks like and her clinical course this may only be continued for today  - Continue Keppra 500 mg BID  - OK to transfer to station [id number] with Neurochecks and Vital Signs every q2 hours  - Consider repeat brain MRI though one was just done on 10/13  - General neurology should be consulted once transferred to the floor for continued evaluation and management of altered mental status and possible seizures  - Systolic BP Goal: < 150  - Head of bed elevated  - Telemetry, EKG  - Bedside Glucose Monitoring  - Nutrition: per nursing  - PT/OT/SLP  - Euthermia, Euglycemia  - Goal normonatremia  - Oncology consulted    ADDENDUM: EEG monitoring from to day reviewed. No seizures. Will discontinue EEG monitoring.  "Continue Keppra 500 mg BID    Patient Follow-up    - final recommendation pending work-up    Thank you for this consult. We will continue to follow while in the ICU. General neurology should be consulted for continued management once she is transferred out of ICU.    Danni Rust PA-C  Vascular Neurology    10/19/2022 9:19 AM  To page me or covering stroke neurology team member, click here: AMCOM   Choose \"On Call\" tab at top, then search dropdown box for \"Neurology Adult\", select location, press Enter, then look for stroke/neuro ICU/telestroke.    _____________________________________________________    Clinically Significant Risk Factors Present on Admission        # Hyperkalemia: Highest K = 7.2 mmol/L (Ref range: 3.4-5.3) in last 2 days, will monitor as appropriate  # Hyponatremia: Lowest Na = 123 mmol/L (Ref range: 136-145) in last 2 days, will monitor as appropriate   # Hypercalcemia: corrected calcium is >10.1, will monitor as appropriate   # Anion Gap Metabolic Acidosis: Highest Anion Gap = 20 mmol/L (Ref range: 7-15) in last 2 days, will monitor and treat as appropriate  # Hypoalbuminemia: Lowest albumin = 1.9 g/dL (Ref range: 3.5-5.2) at 10/18/2022  5:37 PM, will monitor as appropriate  # Coagulation Defect: INR = 1.27 (Ref range: 0.85 - 1.15) and/or PTT = 38 Seconds (Ref range: 22 - 38 Seconds), will monitor for bleeding  # Thrombocytopenia: Lowest platelets = 38 (Ref range: 150-450) in last 2 days, will monitor for bleeding   # Acute Kidney Injury, unspecified: based on a >150% or 0.3 mg/dL increase in last creatinine compared to past 90 day average, will monitor renal function  # CKD, Stage 4 (GFR 15-29): Will monitor and treat as appropriate  - last Cr =  2.94 mg/dL (Ref range: 0.52 - 1.04 mg/dL)  - last GFR = 17 mL/min/1.73m2 (Ref range: >60 mL/min/1.73m2)     Past Medical History   Past Medical History:   Diagnosis Date     Allergic rhinitis      Bone cancer (H) 2011    breast mets     Breast cancer " (H) 2010    left mastectomy     Depression      DM (diabetes mellitus) (H)      Fibromyalgia      Hx antineoplastic chemotherapy 2010     Hx of radiation therapy 2010     Hyperlipemia      Lactose intolerance      Mini stroke     R EYE     Osteoarthritis      Tobacco dependency      Past Surgical History   Past Surgical History:   Procedure Laterality Date     APPENDECTOMY       INSERT INTRACORONARY STENT  1985    R Hand     IR CHEST PORT PLACEMENT > 5 YRS OF AGE  12/12/2019     IR CHEST PORT PLACEMENT > 5 YRS OF AGE  3/14/2022     IR LUMBAR TRANSFORAMINAL EPIDURAL STEROID INJECTION  12/5/2019     IR LUMBAR TRANSFORAMINAL EPIDURAL STRD INJ  12/5/2019     IR PORT PLACEMENT >5 YEARS  12/12/2019     IR PORT REMOVAL RIGHT  3/14/2022     IR SITE CHECK/EVALUATION  4/15/2022     MAMMOPLASTY REDUCTION Right 2015    hx of left mastectomy and right breast reduction     MASTECTOMY Left 2010     OTHER SURGICAL HISTORY      biopsy of upper and right tongue     OVARIAN CYST REMOVAL       REVISE RECONSTRUCTED BREAST Left     March 11, 2015     Medications   Home Meds  Prior to Admission medications    Medication Sig Start Date End Date Taking? Authorizing Provider   ACETAMINOPHEN PO Take 1,300 mg by mouth as needed 7/28/21   Reported, Patient   B-D U/F 31G X 8 MM insulin pen needle USE 4 TIMES DAILY 9/22/22   Reported, Patient   blood glucose (ONETOUCH VERIO IQ) test strip     Reported, Patient   bumetanide (BUMEX) 2 MG tablet Take 1 tablet (2 mg) by mouth 2 times daily 9/9/22   Renu Munoz MD   calcium carbonate (OS-BENJI) 600 MG tablet Take 1-2 mg by mouth daily     Reported, Patient   cephALEXin (KEFLEX) 500 MG capsule Take 1 capsule (500 mg) by mouth 2 times daily 9/12/22   Sahil Garza MD   chlorhexidine (PERIDEX) 0.12 % solution Take 15 mLs by mouth daily as needed 7/5/21   Reported, Patient   Cholecalciferol (VITAMIN D) 125 MCG (5000 UT) capsule Take 1 tablet by mouth daily     Unknown, Entered By History    clobetasol (TEMOVATE) 0.05 % external ointment Apply topically twice a week    Unknown, Entered By History   Continuous Blood Gluc  (FREESTYLE KIMBERLY 14 DAY READER) COLETTE Use to check blood sugar at least 4 times a day or as directed     Reported, Patient   Continuous Blood Gluc Sensor (FREESTYLE KIMBERLY 14 DAY SENSOR) MISC Use as directed to test blood sugar at least 4 times a day or as directed    Reported, Patient   docusate sodium (COLACE) 100 MG capsule Take 100 mg by mouth daily 7/28/21   Reported, Patient   DULoxetine (CYMBALTA) 30 MG capsule Take 30 mg by mouth every morning     Unknown, Entered By History   DULoxetine (CYMBALTA) 60 MG capsule Take 1 capsule by mouth At Bedtime 3/25/22   Reported, Patient   ezetimibe-simvastatin (VYTORIN) 10-20 MG tablet Take 1 tablet by mouth daily 11/22/21   Reported, Patient   glucose (BD GLUCOSE) 4 g chewable tablet as needed    Reported, Patient   HYDROmorphone (DILAUDID) 4 MG tablet Take 1 tablet (4 mg) by mouth 3 times daily as needed for moderate to severe pain or severe pain 2/15/22   Yesy Dsouza MD   hydrOXYzine (VISTARIL) 25 MG capsule Take 25 mg by mouth 3 times daily With hydromorphone 8/15/21   Reported, Patient   ibuprofen (ADVIL/MOTRIN) 200 MG capsule Take 400 mg by mouth daily    Reported, Patient   Insulin Glargine (BASAGLAR KWIKPEN SC) Inject 6 Units Subcutaneous daily Uses as directed    Reported, Patient   lidocaine (XYLOCAINE) 5 % external ointment Apply topically 4 times daily  Patient taking differently: Apply topically 4 times daily as needed for moderate pain 7/28/21   Kuldip Cortes MD   lisinopril (ZESTRIL) 2.5 MG tablet Take 1 tablet (2.5 mg) by mouth daily 7/5/22   Renu Munoz MD   LORazepam (ATIVAN) 0.5 MG tablet take 1 tablet by mouth up to 3 times per day as needed for nausea, anxiety, or insomnia 4/9/22   Reported, Patient   medical cannabis (Patient's own supply) See Admin Instructions (The purpose of this order  is to document that the patient reports taking medical cannabis.  This is not a prescription, and is not used to certify that the patient has a qualifying medical condition.)    Unknown, Entered By History   metoprolol succinate ER (TOPROL-XL) 50 MG 24 hr tablet Take 1 tablet (50 mg) by mouth daily 2/22/22   Renu Munoz MD   NOVOLOG FLEXPEN 100 UNIT/ML soln INJECT 1-20 UNITS UNDER THE SKIN 3 TIMES DAILY BEFORE MEALS USING SLIDING SCALE AND CARB COUNT (AVERAGE 30 UNITS A DAY) 30 DAY(S). 8/29/22   Reported, Patient   nystatin (MYCOSTATIN) 749001 UNIT/GM external cream Apply topically 2 times daily  Patient taking differently: Apply topically daily as needed 10/11/21   Sahil Garza MD   OLANZapine (ZYPREXA) 5 MG tablet Take 5 mg by mouth every evening  9/21/21   Reported, Patient   omeprazole (PRILOSEC) 20 MG DR capsule Take 20 mg by mouth At Bedtime     Reported, Patient   oxybutynin (DITROPAN) 5 MG tablet Take 5 mg by mouth every morning    Unknown, Entered By History   oxybutynin ER (DITROPAN-XL) 10 MG 24 hr tablet Take 10 mg by mouth every evening 2/25/22   Reported, Patient   Prenatal Vit-Fe Fumarate-FA (PRENATAL MULTIVITAMIN  PLUS IRON) 27-1 MG TABS Take 1 tablet by mouth daily     Reported, Patient   psyllium (METAMUCIL/KONSYL) Packet Take 1 packet by mouth daily as needed for constipation    Unknown, Entered By History   spironolactone (ALDACTONE) 25 MG tablet Take 0.5 tablets (12.5 mg) by mouth daily 3/28/22   Renu Munoz MD   vitamin (B COMPLEX) tablet Take 1 tablet by mouth daily    Reported, Patient   zolpidem (AMBIEN) 10 MG tablet Take 5-10 mg by mouth At Bedtime    Unknown, Entered By History       Scheduled Meds    insulin aspart  1-4 Units Subcutaneous Q4H     levETIRAcetam  500 mg Intravenous Q12H     piperacillin-tazobactam  2.25 g Intravenous Q8H       Infusion Meds    NaCl         PRN Meds  acetaminophen **OR** acetaminophen, acetaminophen, bisacodyl, glucose **OR**  "dextrose **OR** glucagon, polyethylene glycol, prochlorperazine **OR** prochlorperazine **OR** prochlorperazine, senna-docusate **OR** senna-docusate    Allergies   Allergies   Allergen Reactions     Gabapentin      Upper body edema/swelling.         Morphine Visual Disturbance     Visual hallucinations     Sulfamethoxazole-Trimethoprim      Other reaction(s): Hives     Sulfa Drugs Hives and Rash     welts       Family History   Family History   Problem Relation Age of Onset     Lung Cancer Mother      Pancreatic Cancer Mother      Kidney Cancer Maternal Grandmother      Lung Cancer Paternal Aunt      Breast Cancer Cousin      Social History   Social History     Tobacco Use     Smoking status: Former     Packs/day: 0.75     Years: 20.00     Pack years: 15.00     Types: Cigarettes     Smokeless tobacco: Never   Substance Use Topics     Alcohol use: Not Currently     Alcohol/week: 1.0 standard drink     Types: 1 Standard drinks or equivalent per week     Comment: 1 glass of wine/week     Drug use: No       Review of Systems   The 10 point Review of Systems is negative other than noted in the HPI or here.        PHYSICAL EXAMINATION   Temp:  [97.6  F (36.4  C)-98.2  F (36.8  C)] 97.6  F (36.4  C)  Pulse:  [] 98  Resp:  [8-45] 15  BP: ()/(40-95) 124/53  SpO2:  [72 %-99 %] 91 %    General Exam  General:  patient lying in bed without any acute distress , appears frail   HEENT:  normocephalic/atraumatic, having epistaxis  Extremities:  bilateral pitting pedal edema      Neuro Exam  Mental Status:  Alert, follows commands, answers questions appropriately, can name objects, some perseveration, oriented to person/month  Cranial Nerves:  visual fields intact, Pupils equal, sluggish reaction on the R, pt reports decreased L eye vision from prior \"stroke to the eye\", EOMI with normal smooth pursuit, facial sensation intact and symmetric, facial movements symmetric, hearing not formally tested but intact to " conversation, no dysarthria, tongue protrusion midline  Motor:  normal muscle tone and bulk, no abnormal movements, able to move all limbs spontaneously, no pronator drift, antigravity BLEs, bilateral foot drop  Sensory:  light touch sensation intact and symmetric throughout upper and lower extremities  Coordination:  no incoordination out of proportion to weakness  Station/Gait:  deferred    Dysphagia Screen  Per Nursing      Imaging  I personally reviewed all imaging; relevant findings per HPI.    Labs Data   CBC  Recent Labs   Lab 10/19/22  0420 10/18/22  1737 10/18/22  1041   WBC 5.8 5.8 6.6   RBC 2.49* 2.43* 2.71*   HGB 7.2* 7.1* 8.0*   HCT 21.6* 21.5* 24.0*   PLT 43* 38* 48*     Basic Metabolic Panel   Recent Labs   Lab 10/19/22  0422 10/19/22  0110 10/19/22  0030 10/18/22  2316 10/18/22  2132 10/18/22  1737 10/18/22  1302 10/18/22  1041   NA  --   --  131* 126*  --  127*  --  123*   POTASSIUM  --   --  5.5* 6.3*  --  7.2*   < > 7.0*   CHLORIDE  --   --  98  --   --  97  --  89*   CO2  --   --  18*  --   --  16*  --  14*   BUN  --   --  116*  --   --  121*  --  110.2*   CR  --   --  2.94*  --   --  3.32*  --  3.25*   * 309* 391*  --    < > 143*   < > 122*   BENJI  --   --  8.7  --   --  8.4*  --  8.2*    < > = values in this interval not displayed.     Liver Panel  Recent Labs   Lab 10/19/22  0030 10/18/22  1737 10/18/22  1041   PROTTOTAL 5.2* 5.5* 5.6*   ALBUMIN 2.0* 1.9* 2.8*   BILITOTAL 0.8 0.5 0.3   ALKPHOS 437* 468* 564*   * 143* 154*   ALT 86* 93* 99*     INR    Recent Labs   Lab Test 10/18/22  1737 10/18/22  1041 07/16/21  1054   INR 1.27* 1.28* 1.45*      Lipid Profile    Recent Labs   Lab Test 07/28/22  0935 11/18/20  1436 12/18/19  0954   CHOL 94 107 140   HDL 26* 48* 34*   LDL 40 19 63   TRIG 141 202* 217*     A1C    Recent Labs   Lab Test 10/18/22  2316 02/09/22  0851 07/16/21  1054   A1C 6.4* 5.6 6.3*     Troponin    Recent Labs   Lab 10/18/22  1737 10/18/22  1041   CTROPT 164* 183*           Stroke Consult Data Data   This was a non-emergent, non-telestroke consult.  I personally examined and evaluated the patient today. At the time of my evaluation and management the patient was in critical condition today due to SDH, altered mental status, possible seizures, thrombocytopenia, KATHLEEN. I personally managed history, exam, initiation of EEG monitoring. Key decisions made today included continue Keppra, EEG monitoring, ok to transfer out of ICUI. I spent a total of 70 minutes providing critical care services, evaluating the patient, directing care and reviewing laboratory values and radiologic reports.

## 2022-10-19 NOTE — CONSULTS
[facility] Consultation    Elenita Moran MRN# [id number]   YOB: 1955 Age: 67 year old   Date of Admission: 10/18/2022  Requesting physician: Dr. aVil  Reason for consult: Metastatic breast cancer, subdural hematoma           Assessment and Plan:   Primary Oncologist: Dr. Varma    Metastatic breast cancer with bone, liver, brain, bone marrow mets  - extensive prior treatment  - currently on Enhertu (Fam-trastuzumab deruxtecan-nxki) Q21 days. Cycle 3 administered on 10/11/22.   Potential side effects include pancytopenia, severe pneumonitis and LV dysfunction.  - 10/6/22 received a single fraction SBRT to T5 lesion  - 04/02/22 cerebellar stereotactic radiosurgery,.   - Follow up MRI 10/13/22 showing interim regression of 2 punctate enhancing foci in the cerebellum.  Also notes was expansion of L subdural fluid collection, now measuring 6 mm, and thickening of overlying diffuse pachymeningeal enhancement.     Encephalopathy  - increased difficulties witht gait and balance for past 10 days.  Over past two days, has had increasing difficulties with speech and was non-verbal upon presentation.  - Seen in ED at [place] on 10/18 where MRI showed A mixed density left holohemispheric subdural collection with some areas of increased density.  The collection measures 8.3 mm over the left frontal convex city, 8.2 mm over the left parietal convexity is 3.3 mm over the posterior left temporal convexity.  There is associated mass-effect with a 2 mm midline shift to the right at the level of the lateral ventricles.  This scan in the ER was compared to 3/25/2022  - transferred to [facility] for neurosurgery eval  -  reports fall on 10/8. Unknown if head trauma occurred.     Thrombocytopenia  - likely due to current cancer treatment +/- acute illness +/- malignancy  - platelet count upon admission 38,000 (were 288k in clinic on 10/11/22.    - INR 1.27, PTT 38  - has received vitamin k 1 mg  - per  neurosurgery, goal platelet count 100K    Elevated LFTs   - most recent labs in clinic on 10/11/22 show alk phos 661, , ALT 50  - could be treatment related (about 40% LFT abnormalities with Enhurtu) or metastatic disease (innumberable liver mets noted on 8/25/22 PET )    KATHLEEN  - creat 3.25 upon admission up from 1.02 in our office on 10/11/22  - dehydration may be contributing  - nephrology is following.  - UOP is good.   - Bumex, spironolactone and lisinopril on hold     Possible UTI  Possible pneumonia  - cultures pending  - on zosyn    I discussed the patient's current acute health issues with her primary oncologist, Dr. Varma, by phone.  She has experienced slight improvement in speech overnight.  Her metabolic derangements are a bit improved.  If she continues to improve, he favors follow up CT scan CAP to assess respones to her current cancer treatment, Enhurtu.  This could take place on either an inpatient or outpatient basis.  If symptoms do not continue to improve or if scans reveal disease progression, he favored  Hospice involvement.      Discussed with Dr. Varma, Dr. Bean.    Code status: DNR/DNI    KRYSTLE Hatfield, AOCNP  Nurse Practitioner  [facility]  [phone]             Chief Complaint:   No chief complaint on file.           History of Present Illness:   ONCOLOGIC HISTORY:    1.     The patient had presented with multifocal tumor in the left breast, 7 x 6 x 4 cm, in August 2010.    2.     The patient underwent left breast mastectomy. Her tumor was ER positive 63%, AZ positive 17%, and HER-2 negative by FISH.    3.     The patient was treated with Adriamycin plus Cytoxan, followed by weekly Taxol.    4.     In May 2011, the patient had started anastrozole 1 mg orally daily.    5.     The patient was found to have a bony metastasis and the patient underwent radiation from July 2011 until October 2011 for L1, L2, and L3 vertebral bodies.    6.     In January 2015, PET scan  showed no evidence of malignancy.    7.  On January 31, 2017 patient has started second line hormonal treatment with Faslodex plus Ibrance.    8.  The patient also underwent CyberKnife to painful L3 vertebral body mass she completed 2400 cGy in 4 fractions on 3/28/2017.  Prior to that she was also treated to a right rib lesion as well as T4, T6-T7 and a posterior left-sided rib lesion.  The latter received 3000 cGy in 10 fractions completing those treatments 3/14/2017..    9. She had disease progression in March 2019 per PET imaging and therapy was changed to everolimus and exemestane on 4/24/19.    10. From 12/6/2019 until 3/30/2020 she was treated with 1st line chemo (refused PIQRAY) and has completed 6 cycles of Abraxane and but discontinue due to neuropathy. She had partial response to treatment.     11. On 3/30/2020 she started 3rd line hormonal treatment: Piqray orally daily plus Faslodex.     12. On 9/24/2020 CT chest abdomen and pelvis Stable appearance of the sacral fractures, left superior and inferior pubic rami fracture, and T10 vertebral body fracture and left 11th rib posteriorly.    13. On 2/9/2021 PET scan demonstrated progressive disease with renewed metabolic activity in a few scattered skeletal metastases. Significant muscular FDG uptake, which decreases the bioavailability of FDG for tumor. As a result, the scan likely underestimates the extent of disease activity. Healing fractures in the pelvis and spine with a presumed small hematoma overlying the sacral fractures.    14. On 2/23/2021 she started Xeloda orally due to progression.     15. On 4/23/2021 PET scan showed mild progression in anterolateral 5th rib and possible liver lesion.     16. On 5/3/2021 she has started Doxil 50 mg/m2 IV every 4 weeks    17. On 7/19/2021 CT CAP showed mild fullness in right paraspinal muscles. left obturator ring fracture. complex B/L sacral ala fractures.     18. On 7/21/2021 MRI of pelvis showed two complex  fluid collections along fractured left superior and inferior pubic rami    19. On 2021 PET/CT showed . While there is persistent FDG avid disease throughout the osseous structures, there is resolution of the right hepatic lobe lesion suggesting partial response to therapy. worsening inflammatory change associated with the pathologically fractured left anterior pubic ramus and right sacrum/iliac bone with development of right gluteal and right paraspinal intramuscular hematomas.    20.  On 2021 PET scan revealed stable bony metastases. No worsening.    21.  On 2021 PET scan revealed a new liver metastases bony metastases are stable or better.    22.  On February 15, 2022 CT-guided biopsy of liver lesion revealed HER-2 positive breast cancer ER/IN negative.    23.  On 2022 MRI of brain revealed 3-4 mm suspicious cerebellar lesions.    24.  On 2022 PET scan revealed substantial partial favorable treatment response of metastatic breast cancer involving the liver, skeleton and right pleura.  No new metastases.    25.  On 2022 PET scan revealed decrease in metabolic activity and right pleural thickening.  However there is reactivation of innumerable lesions through liver parenchyma and lesion in T5 vertebral body.  This is suspicious for progressive disease.    26.  33 began Enhertu (Fam-trastuzumab deruxtecan-nxki) Q21 days. Cycle 3 administered on 10/11/22.             Physical Exam:   Vitals were reviewed  Blood pressure 102/63, pulse 107, temperature 97.5  F (36.4  C), resp. rate 16, weight 76 kg (167 lb 8.8 oz), SpO2 94 %.  Temperatures:  Current - Temp: 97.5  F (36.4  C); Max - Temp  Av.8  F (36.6  C)  Min: 97.5  F (36.4  C)  Max: 98.2  F (36.8  C)  Respiration range: Resp  Av  Min: 8  Max: 45  Pulse range: Pulse  Av.7  Min: 83  Max: 110  Blood pressure range: Systolic (24hrs), Av , Min:61 , Max:129   ; Diastolic (24hrs), Av, Min:40, Max:95    Pulse  oximetry range: SpO2  Av.2 %  Min: 72 %  Max: 99 %    Intake/Output Summary (Last 24 hours) at 10/19/2022 0951  Last data filed at 10/19/2022 0845  Gross per 24 hour   Intake 2223.33 ml   Output 1600 ml   Net 623.33 ml       GENERAL: No acute distress. Currently in the midst of EEG monitoring  SKIN: No rashes or jaundice.  HEENT: Normocephalic, atraumatic. Eyes anicteric. Oropharynx is clear.  LYMPH: No palpable lymphadenopathy in the cervical, supraclavicular, axillary, or inguinal regions.  HEART: Regular rate and rhythm with no murmurs.  LUNGS: Clear bilaterally.  ABDOMEN: Soft, nontender, nondistended with no palpable hepatosplenomegaly.  EXTREMITIES: No clubbing, cyanosis. Bilateral LE edema, pitting  MENTAL: Alert and oriented to person, place. Able to tell me about her children.   NEURO: follows commands              Past Medical History:   I have reviewed this patient's past medical history  Past Medical History:   Diagnosis Date     Allergic rhinitis      Bone cancer (H) 2011    breast mets     Breast cancer (H) 2010    left mastectomy     Depression      DM (diabetes mellitus) (H)      Fibromyalgia      Hx antineoplastic chemotherapy 2010     Hx of radiation therapy 2010     Hyperlipemia      Lactose intolerance      Mini stroke     R EYE     Osteoarthritis      Tobacco dependency              Past Surgical History:   I have reviewed this patient's past surgical history  Past Surgical History:   Procedure Laterality Date     APPENDECTOMY       INSERT INTRACORONARY STENT      R Hand     IR CHEST PORT PLACEMENT > 5 YRS OF AGE  2019     IR CHEST PORT PLACEMENT > 5 YRS OF AGE  3/14/2022     IR LUMBAR TRANSFORAMINAL EPIDURAL STEROID INJECTION  2019     IR LUMBAR TRANSFORAMINAL EPIDURAL STRD INJ  2019     IR PORT PLACEMENT >5 YEARS  2019     IR PORT REMOVAL RIGHT  3/14/2022     IR SITE CHECK/EVALUATION  4/15/2022     MAMMOPLASTY REDUCTION Right     hx of left mastectomy and  right breast reduction     MASTECTOMY Left 2010     OTHER SURGICAL HISTORY      biopsy of upper and right tongue     OVARIAN CYST REMOVAL       REVISE RECONSTRUCTED BREAST Left     March 11, 2015               Social History:   I have reviewed this patient's social history  Social History     Tobacco Use     Smoking status: Former     Packs/day: 0.75     Years: 20.00     Pack years: 15.00     Types: Cigarettes     Smokeless tobacco: Never   Substance Use Topics     Alcohol use: Not Currently     Alcohol/week: 1.0 standard drink     Types: 1 Standard drinks or equivalent per week     Comment: 1 glass of wine/week             Family History:   I have reviewed this patient's family history  Family History   Problem Relation Age of Onset     Lung Cancer Mother      Pancreatic Cancer Mother      Kidney Cancer Maternal Grandmother      Lung Cancer Paternal Aunt      Breast Cancer Cousin              Allergies:     Allergies   Allergen Reactions     Gabapentin      Upper body edema/swelling.         Morphine Visual Disturbance     Visual hallucinations     Sulfamethoxazole-Trimethoprim      Other reaction(s): Hives     Sulfa Drugs Hives and Rash     welts               Medications:   I have reviewed this patient's current medications  Facility-Administered Medications Prior to Admission   Medication Dose Route Frequency Provider Last Rate Last Admin     lidocaine (XYLOCAINE) 2 % external gel   Topical Q4H PRN Frankie Vega MD   1 mL at 07/26/22 0906     Medications Prior to Admission   Medication Sig Dispense Refill Last Dose     ACETAMINOPHEN PO Take 1,300 mg by mouth 3 times daily Alternates with Ibuprofen        aspirin 81 MG EC tablet Take 81 mg by mouth daily        bumetanide (BUMEX) 2 MG tablet Take 2 mg by mouth daily as needed Can take midday if needed for fluid retention/swelling        cephALEXin (KEFLEX) 500 MG capsule Take 1 capsule (500 mg) by mouth 2 times daily 180 capsule 3      Cholecalciferol  (VITAMIN D) 125 MCG (5000 UT) capsule Take 1 tablet by mouth daily         clobetasol (TEMOVATE) 0.05 % external ointment Apply topically daily as needed        docusate sodium (COLACE) 100 MG capsule Take 100 mg by mouth daily        DULoxetine (CYMBALTA) 30 MG capsule Take 30 mg by mouth every morning         DULoxetine (CYMBALTA) 60 MG capsule Take 1 capsule by mouth At Bedtime        ezetimibe-simvastatin (VYTORIN) 10-20 MG tablet Take 1 tablet by mouth At Bedtime        HYDROmorphone (DILAUDID) 4 MG tablet Take 1 tablet (4 mg) by mouth 3 times daily as needed for moderate to severe pain or severe pain (Patient taking differently: Take 4 mg by mouth 3 times daily AM - Midday - PM) 12 tablet 0      hydrOXYzine (VISTARIL) 25 MG capsule Take 25 mg by mouth 3 times daily With hydromorphone        ibuprofen (ADVIL/MOTRIN) 200 MG capsule Take 400 mg by mouth 3 times daily Alternating with acetaminophen        insulin aspart (NOVOLOG FLEXPEN) 100 UNIT/ML pen Inject Subcutaneous 3 times daily (with meals) Sliding scale dose based on blood glucose and diet        Insulin Glargine (BASAGLAR KWIKPEN SC) Inject 8 Units Subcutaneous every morning Uses as directed        Iron Combinations (IRON COMPLEX PO) Take 1 tablet by mouth daily Ferrous biogluconate supplement        lidocaine (XYLOCAINE) 5 % external ointment Apply topically 4 times daily (Patient taking differently: Apply topically 4 times daily as needed for moderate pain) 50 g 0      lisinopril (ZESTRIL) 2.5 MG tablet Take 1 tablet (2.5 mg) by mouth daily 30 tablet 3      MAGNESIUM PO Take by mouth daily OTC supplement        metoprolol succinate ER (TOPROL-XL) 50 MG 24 hr tablet Take 1 tablet (50 mg) by mouth daily 90 tablet 3      Multiple Vitamins-Minerals (ICAPS PO) Take 1 capsule by mouth daily        nystatin (MYCOSTATIN) 867753 UNIT/GM external cream Apply topically 2 times daily (Patient taking differently: Apply topically daily as needed) 30 g 1       OLANZapine (ZYPREXA) 5 MG tablet Take 5 mg by mouth every evening         omeprazole (PRILOSEC) 20 MG DR capsule Take 20 mg by mouth At Bedtime         oxybutynin (DITROPAN) 5 MG tablet Take 5 mg by mouth 2 times daily AM and Midday        oxybutynin ER (DITROPAN-XL) 10 MG 24 hr tablet Take 10 mg by mouth every evening        POTASSIUM CHLORIDE PO Take by mouth daily OTC supplement        Prenatal Vit-Fe Fumarate-FA (PRENATAL MULTIVITAMIN  PLUS IRON) 27-1 MG TABS Take 1 tablet by mouth daily         psyllium (METAMUCIL/KONSYL) Packet Take 1 packet by mouth daily as needed for constipation        spironolactone (ALDACTONE) 25 MG tablet Take 0.5 tablets (12.5 mg) by mouth daily 90 tablet 3      vitamin (B COMPLEX) tablet Take 1 tablet by mouth daily        zolpidem (AMBIEN) 10 MG tablet Take 10 mg by mouth At Bedtime        B-D U/F 31G X 8 MM insulin pen needle USE 4 TIMES DAILY        blood glucose (ONETOUCH VERIO IQ) test strip         bumetanide (BUMEX) 2 MG tablet Take 1 tablet (2 mg) by mouth 2 times daily (Patient taking differently: Take 2 mg by mouth daily) 180 tablet 3      Continuous Blood Gluc  (FREESTYLE KIMBERLY 14 DAY READER) COLETTE Use to check blood sugar at least 4 times a day or as directed         Continuous Blood Gluc Sensor (FREESTYLE KIMBERLY 14 DAY SENSOR) MISC Use as directed to test blood sugar at least 4 times a day or as directed        glucose (BD GLUCOSE) 4 g chewable tablet as needed        LORazepam (ATIVAN) 0.5 MG tablet take 1 tablet by mouth up to 3 times per day as needed for nausea, anxiety, or insomnia (Patient not taking: Reported on 10/19/2022)   Not Taking     medical cannabis (Patient's own supply) See Admin Instructions (The purpose of this order is to document that the patient reports taking medical cannabis.  This is not a prescription, and is not used to certify that the patient has a qualifying medical condition.) (Patient not taking: Reported on 10/19/2022)   Not Taking      prochlorperazine (COMPAZINE) 10 MG tablet Take 10 mg by mouth every 6 hours as needed for nausea or vomiting (Patient not taking: Reported on 10/19/2022)   Not Taking     Current Facility-Administered Medications Ordered in Epic   Medication Dose Route Frequency Last Rate Last Admin     acetaminophen (TYLENOL) tablet 650 mg  650 mg Oral Q4H PRN        Or     acetaminophen (TYLENOL) solution 650 mg  650 mg Per NG tube Q4H PRN         acetaminophen (TYLENOL) Suppository 650 mg  650 mg Rectal Q4H PRN         bisacodyl (DULCOLAX) suppository 10 mg  10 mg Rectal Daily PRN         glucose gel 15-30 g  15-30 g Oral Q15 Min PRN        Or     dextrose 50 % injection 25-50 mL  25-50 mL Intravenous Q15 Min PRN        Or     glucagon injection 1 mg  1 mg Subcutaneous Q15 Min PRN         insulin aspart (NovoLOG) injection (RAPID ACTING)  1-4 Units Subcutaneous Q4H   1 Units at 10/19/22 0812     levETIRAcetam (KEPPRA) intermittent infusion 500 mg  500 mg Intravenous Q12H   500 mg at 10/19/22 0345     piperacillin-tazobactam (ZOSYN) 2.25 g vial to attach to  ml bag  2.25 g Intravenous Q8H   2.25 g at 10/19/22 0346     polyethylene glycol (MIRALAX) Packet 17 g  17 g Oral Daily PRN         prochlorperazine (COMPAZINE) injection 5 mg  5 mg Intravenous Q6H PRN        Or     prochlorperazine (COMPAZINE) tablet 5 mg  5 mg Oral Q6H PRN        Or     prochlorperazine (COMPAZINE) suppository 12.5 mg  12.5 mg Rectal Q12H PRN         senna-docusate (SENOKOT-S/PERICOLACE) 8.6-50 MG per tablet 1 tablet  1 tablet Oral BID PRN        Or     senna-docusate (SENOKOT-S/PERICOLACE) 8.6-50 MG per tablet 2 tablet  2 tablet Oral BID PRN         sodium chloride 0.45% infusion   Intravenous Continuous 75 mL/hr at 10/19/22 0859 Rate Change at 10/19/22 0859     No current [facility]-ordered outpatient medications on file.             Review of Systems:     The 10 point Review of Systems is negative other than noted in the HPI.            Data:   Data    Results for orders placed or performed during the hospital encounter of 10/18/22 (from the past 24 hour(s))   Glucose by meter   Result Value Ref Range    GLUCOSE BY METER POCT 106 (H) 70 - 99 mg/dL   Comprehensive metabolic panel   Result Value Ref Range    Sodium 127 (L) 133 - 144 mmol/L    Potassium 7.2 (HH) 3.4 - 5.3 mmol/L    Chloride 97 94 - 109 mmol/L    Carbon Dioxide (CO2) 16 (L) 20 - 32 mmol/L    Anion Gap 14 3 - 14 mmol/L    Urea Nitrogen 121 (H) 7 - 30 mg/dL    Creatinine 3.32 (H) 0.52 - 1.04 mg/dL    Calcium 8.4 (L) 8.5 - 10.1 mg/dL    Glucose 143 (H) 70 - 99 mg/dL    Alkaline Phosphatase 468 (H) 40 - 150 U/L     (H) 0 - 45 U/L    ALT 93 (H) 0 - 50 U/L    Protein Total 5.5 (L) 6.8 - 8.8 g/dL    Albumin 1.9 (L) 3.4 - 5.0 g/dL    Bilirubin Total 0.5 0.2 - 1.3 mg/dL    GFR Estimate 15 (L) >60 mL/min/1.73m2   Magnesium   Result Value Ref Range    Magnesium 2.3 1.6 - 2.3 mg/dL   Phosphorus   Result Value Ref Range    Phosphorus 8.7 (H) 2.5 - 4.5 mg/dL   CBC with platelets   Result Value Ref Range    WBC Count 5.8 4.0 - 11.0 10e3/uL    RBC Count 2.43 (L) 3.80 - 5.20 10e6/uL    Hemoglobin 7.1 (L) 11.7 - 15.7 g/dL    Hematocrit 21.5 (L) 35.0 - 47.0 %    MCV 89 78 - 100 fL    MCH 29.2 26.5 - 33.0 pg    MCHC 33.0 31.5 - 36.5 g/dL    RDW 15.8 (H) 10.0 - 15.0 %    Platelet Count 38 (LL) 150 - 450 10e3/uL   Lactic acid whole blood   Result Value Ref Range    Lactic Acid 0.6 (L) 0.7 - 2.0 mmol/L   Partial thromboplastin time   Result Value Ref Range    aPTT 38 22 - 38 Seconds   INR   Result Value Ref Range    INR 1.27 (H) 0.85 - 1.15   Troponin T, High Sensitivity   Result Value Ref Range    Troponin T, High Sensitivity 164 (HH) <=14 ng/L   Procalcitonin   Result Value Ref Range    Procalcitonin 8.07 (HH) <0.05 ng/mL   Prepare pheresed platelets (unit)   Result Value Ref Range    ISSUE DATE AND TIME [id number]     Blood Component Type Platelets     Product Code T9336R28     Unit Status Transfused      Unit Number [id number]     UNIT ABO/RH O+     CODING SYSTEM [id number]     UNIT TYPE ISBT 5100    Glucose by meter   Result Value Ref Range    GLUCOSE BY METER POCT 247 (H) 70 - 99 mg/dL   Glucose by meter   Result Value Ref Range    GLUCOSE BY METER POCT 221 (H) 70 - 99 mg/dL   Hemoglobin A1c   Result Value Ref Range    Hemoglobin A1C 6.4 (H) 0.0 - 5.6 %   ABO/Rh type and screen    Narrative    The following orders were created for panel order ABO/Rh type and screen.  Procedure                               Abnormality         Status                     ---------                               -----------         ------                     Adult Type and Screen[[id number]]                            Final result                 Please view results for these tests on the individual orders.   Potassium   Result Value Ref Range    Potassium 6.3 (HH) 3.4 - 5.3 mmol/L   Sodium   Result Value Ref Range    Sodium 126 (L) 133 - 144 mmol/L   Adult Type and Screen   Result Value Ref Range    ABO/RH(D) A POS     Antibody Screen Negative Negative    SPECIMEN EXPIRATION DATE [id number]    Comprehensive metabolic panel   Result Value Ref Range    Sodium 131 (L) 133 - 144 mmol/L    Potassium 5.5 (H) 3.4 - 5.3 mmol/L    Chloride 98 94 - 109 mmol/L    Carbon Dioxide (CO2) 18 (L) 20 - 32 mmol/L    Anion Gap 15 (H) 3 - 14 mmol/L    Urea Nitrogen 116 (H) 7 - 30 mg/dL    Creatinine 2.94 (H) 0.52 - 1.04 mg/dL    Calcium 8.7 8.5 - 10.1 mg/dL    Glucose 391 (H) 70 - 99 mg/dL    Alkaline Phosphatase 437 (H) 40 - 150 U/L     (H) 0 - 45 U/L    ALT 86 (H) 0 - 50 U/L    Protein Total 5.2 (L) 6.8 - 8.8 g/dL    Albumin 2.0 (L) 3.4 - 5.0 g/dL    Bilirubin Total 0.8 0.2 - 1.3 mg/dL    GFR Estimate 17 (L) >60 mL/min/1.73m2   Glucose by meter   Result Value Ref Range    GLUCOSE BY METER POCT 309 (H) 70 - 99 mg/dL   CBC with platelets   Result Value Ref Range    WBC Count 5.8 4.0 - 11.0 10e3/uL    RBC Count 2.49 (L) 3.80 - 5.20 10e6/uL     Hemoglobin 7.2 (L) 11.7 - 15.7 g/dL    Hematocrit 21.6 (L) 35.0 - 47.0 %    MCV 87 78 - 100 fL    MCH 28.9 26.5 - 33.0 pg    MCHC 33.3 31.5 - 36.5 g/dL    RDW 15.4 (H) 10.0 - 15.0 %    Platelet Count 43 (LL) 150 - 450 10e3/uL   Magnesium   Result Value Ref Range    Magnesium 2.4 (H) 1.6 - 2.3 mg/dL   Phosphorus   Result Value Ref Range    Phosphorus 7.2 (H) 2.5 - 4.5 mg/dL   Glucose by meter   Result Value Ref Range    GLUCOSE BY METER POCT 226 (H) 70 - 99 mg/dL   CT Head w/o Contrast    Narrative    EXAM: CT HEAD W/O CONTRAST  LOCATION: [facility]  DATE/TIME: 10/19/2022 4:48 AM    INDICATION: follow up SDH  COMPARISON: 10/18/2022  TECHNIQUE: Routine CT Head without IV contrast. Multiplanar reformats. Dose reduction techniques were used.    FINDINGS:  Stable left sided mixed attenuation subdural hematoma extending over the left cerebral convexity with maximal thickness of 8 mm at the frontoparietal junction. Stable associated mass effect without shift of midline structures. No evidence of interval   acute intracranial hemorrhage, infarct or hydrocephalus.       Impression    IMPRESSION:  1.  No significant change.   Prepare pheresed platelets (unit)   Result Value Ref Range    Blood Component Type Platelets     Product Code [id number]     Unit Status Transfused     Unit Number [id number]     CODING SYSTEM [id number]     ISSUE DATE AND TIME [id number]     UNIT ABO/RH A+     UNIT TYPE ISBT 6200    Glucose by meter   Result Value Ref Range    GLUCOSE BY METER POCT 162 (H) 70 - 99 mg/dL

## 2022-10-19 NOTE — CONSULTS
[facility]    Neurology Consultation    Elenita Moran MRN# [id number]   YOB: 1955 Age: 67 year old    Code Status:No CPR- Do NOT Intubate   Date of Admission: 10/18/2022  Date of Consult:10/19/2022                                                                                       Assessment and Plan:                                         #.   -- This is a 67-year-old patient with a history for metastatic breast cancer to brain, bone and liver who is presently on immunotherapy and presented with altered mental status, speech impairment and gait unsteadiness.  #.   -- CT scan of the head demonstrating significant subdural fluid collection over the left cerebral convexity, with mixed signal, in the setting of possible thrombocytopenia, hypocoagulable state and report of possible recent fall.  Neurosurgery recommended platelet transfusion and close monitoring.  #.   -- Concerns regarding seizure-like activity, although prolonged video EEG monitoring did not reveal any seizure-like activity or electrographic seizures.  --- Altered mental status, encephalopathy, multifactorial in etiology.  --- Report of gait instability, again could be multifactorial, subdural fluid collection, bilateral cerebellar metastatic lesions and chemotherapy induced peripheral neuropathy.  Recommendations:  1.  Continue seizure precautions  2.  Continue Keppra 500 mg twice daily  3.  Continue vital signs with neurochecks every 4 hours.  4.  Ativan as needed for breakthrough seizures.  5.  PT/OT/speech as tolerated.    #. PT/OT/Speech  --Start         Reason for consult: This neurological consultation was requested by Dr. Villarreal to assess this patient for possible seizures.       Chief Complaint:   The information was obtained from the patient and review of her medical records.  Ms. Monreal is a 67-year-old patient who has a history for MSSA pelvic abscess and metastatic breast cancer with  known metastases to brain, liver and bone and is being followed by oncology, ongoing salvage chemotherapy treatment with immunotherapy and radiation.  History significant for the fact that there have been concerns regarding thrombocytopenia, platelet count of 48 with a coagulopathy, INR of 1.28 and report of a fall which occurred several days prior to her presentation.  She was seen at [facility] on 10/18 because of concerns regarding altered mental status and giving a history for several weeks with concerns regarding increasing difficulty with gait and balance and difficulty with her speech which triggered her visit to the ED on 10/18 when it was reported that she also had involuntary movements of the extremities with flailing movements during which patient was noted to have a blank stare, no report of urinary incontinence no prior history for seizures.  This visit triggered the head CT which was concerning for a left holohemispheric subdural collection with areas of increased density.  The subdural collection measured at 8.3 mm over the left frontal convexity and 8.2 mm over the left parietal convexity and 3.3 mm over the posterior left temporal convexity with associated mass-effect with 2 mm midline shift to the right at the level of the lateral ventricles.  She was transferred to [facility] for neurosurgical evaluation and was monitored in the ICU where prolonged EEG video monitoring did not reveal any seizure activity and monitoring was discontinued.  Neurosurgery did not recommend surgical intervention.       History of Present Illness:     A past history significant for the fact that she had multifocal tumor in the left breast August 2010 and underwent left breast mastectomy, tumor ER +63%, OK +17% and H ER 2 negative.  She was treated with Adriamycin plus Cytoxan followed by Taxol and in May 2011 she started anastrozole.  In 2011 she was noted to have bony metastases and underwent underwent radiation and in  January 2015 PET scan showed no evidence of malignancy.  She underwent CyberKnife to painful L3 vertebral body mass completed in 2017 and had disease progression in March 2019 and pet imaging and therapy was changed .  As treatment progressed she developed peripheral neuropathy and in February 2021 PET scan demonstrated progressive disease and renewed metabolic activity in few scattered metastases and by February 2022 guided biopsy of the liver revealed H ER 2 positive breast cancer and February 2022 MRI of the brain revealed 3 to 4 mm suspicious cerebellar lesions.  She began Enhertu treatment 8/2022           Past Medical History:     Past Medical History:   Diagnosis Date     Allergic rhinitis      Bone cancer (H) 2011    breast mets     Breast cancer (H) 2010    left mastectomy     Depression      DM (diabetes mellitus) (H)      Fibromyalgia      Hx antineoplastic chemotherapy 2010     Hx of radiation therapy 2010     Hyperlipemia      Lactose intolerance      Mini stroke     R EYE     Osteoarthritis      Tobacco dependency          Past Surgical History:     Past Surgical History:   Procedure Laterality Date     APPENDECTOMY       INSERT INTRACORONARY STENT  1985    R Hand     IR CHEST PORT PLACEMENT > 5 YRS OF AGE  12/12/2019     IR CHEST PORT PLACEMENT > 5 YRS OF AGE  3/14/2022     IR LUMBAR TRANSFORAMINAL EPIDURAL STEROID INJECTION  12/5/2019     IR LUMBAR TRANSFORAMINAL EPIDURAL STRD INJ  12/5/2019     IR PORT PLACEMENT >5 YEARS  12/12/2019     IR PORT REMOVAL RIGHT  3/14/2022     IR SITE CHECK/EVALUATION  4/15/2022     MAMMOPLASTY REDUCTION Right 2015    hx of left mastectomy and right breast reduction     MASTECTOMY Left 2010     OTHER SURGICAL HISTORY      biopsy of upper and right tongue     OVARIAN CYST REMOVAL       REVISE RECONSTRUCTED BREAST Left     March 11, 2015          Social History:     Social History     Socioeconomic History     Marital status:      Spouse name: None     Number of  children: None     Years of education: None     Highest education level: None   Tobacco Use     Smoking status: Former     Packs/day: 0.75     Years: 20.00     Pack years: 15.00     Types: Cigarettes     Smokeless tobacco: Never   Substance and Sexual Activity     Alcohol use: Not Currently     Alcohol/week: 1.0 standard drink     Types: 1 Standard drinks or equivalent per week     Comment: 1 glass of wine/week     Drug use: No     Sexual activity: Yes     Partners: Male     Birth control/protection: Post-menopausal   Social History Narrative    Patient works at home, owns [employer].  She lives with her  and 1 of her sons.         Patient denies smoking, no significant alcohol intake, denies illicit drugs use       Family History:     Family History   Problem Relation Age of Onset     Lung Cancer Mother      Pancreatic Cancer Mother      Kidney Cancer Maternal Grandmother      Lung Cancer Paternal Aunt      Breast Cancer Cousin      Reviewed and not felt to be contributory.        Home Medications:     Prior to Admission Medications   Prescriptions Last Dose Informant Patient Reported? Taking?   ACETAMINOPHEN PO   Yes Yes   Sig: Take 1,300 mg by mouth 3 times daily Alternates with Ibuprofen   B-D U/F 31G X 8 MM insulin pen needle   Yes No   Sig: USE 4 TIMES DAILY   Cholecalciferol (VITAMIN D) 125 MCG (5000 UT) capsule   Yes Yes   Sig: Take 1 tablet by mouth daily    Continuous Blood Gluc  (FREESTYLE KIMBERLY 14 DAY READER) COLETTE   Yes No   Sig: Use to check blood sugar at least 4 times a day or as directed    Continuous Blood Gluc Sensor (FREESTYLE KIMBERLY 14 DAY SENSOR) MISC   Yes No   Sig: Use as directed to test blood sugar at least 4 times a day or as directed   DULoxetine (CYMBALTA) 30 MG capsule   Yes Yes   Sig: Take 30 mg by mouth every morning    DULoxetine (CYMBALTA) 60 MG capsule   Yes Yes   Sig: Take 1 capsule by mouth At Bedtime   HYDROmorphone (DILAUDID) 4 MG tablet   No Yes   Sig: Take  1 tablet (4 mg) by mouth 3 times daily as needed for moderate to severe pain or severe pain   Patient taking differently: Take 4 mg by mouth 3 times daily AM - Midday - PM   Insulin Glargine (BASAGLAR KWIKPEN SC)   Yes Yes   Sig: Inject 8 Units Subcutaneous every morning Uses as directed   Iron Combinations (IRON COMPLEX PO)   Yes Yes   Sig: Take 1 tablet by mouth daily Ferrous biogluconate supplement   LORazepam (ATIVAN) 0.5 MG tablet Not Taking  Yes No   Sig: take 1 tablet by mouth up to 3 times per day as needed for nausea, anxiety, or insomnia   Patient not taking: Reported on 10/19/2022   MAGNESIUM PO   Yes Yes   Sig: Take by mouth daily OTC supplement   Multiple Vitamins-Minerals (ICAPS PO)   Yes Yes   Sig: Take 1 capsule by mouth daily   OLANZapine (ZYPREXA) 5 MG tablet   Yes Yes   Sig: Take 5 mg by mouth every evening    POTASSIUM CHLORIDE PO   Yes Yes   Sig: Take by mouth daily OTC supplement   Prenatal Vit-Fe Fumarate-FA (PRENATAL MULTIVITAMIN  PLUS IRON) 27-1 MG TABS   Yes Yes   Sig: Take 1 tablet by mouth daily    aspirin 81 MG EC tablet   Yes Yes   Sig: Take 81 mg by mouth daily   blood glucose (ONETOUCH VERIO IQ) test strip   Yes No   bumetanide (BUMEX) 2 MG tablet   No No   Sig: Take 1 tablet (2 mg) by mouth 2 times daily   Patient taking differently: Take 2 mg by mouth daily   bumetanide (BUMEX) 2 MG tablet   Yes Yes   Sig: Take 2 mg by mouth daily as needed Can take midday if needed for fluid retention/swelling   cephALEXin (KEFLEX) 500 MG capsule   No Yes   Sig: Take 1 capsule (500 mg) by mouth 2 times daily   clobetasol (TEMOVATE) 0.05 % external ointment   Yes Yes   Sig: Apply topically daily as needed   docusate sodium (COLACE) 100 MG capsule   Yes Yes   Sig: Take 100 mg by mouth daily   ezetimibe-simvastatin (VYTORIN) 10-20 MG tablet   Yes Yes   Sig: Take 1 tablet by mouth At Bedtime   glucose (BD GLUCOSE) 4 g chewable tablet   Yes No   Sig: as needed   hydrOXYzine (VISTARIL) 25 MG capsule    Yes Yes   Sig: Take 25 mg by mouth 3 times daily With hydromorphone   ibuprofen (ADVIL/MOTRIN) 200 MG capsule   Yes Yes   Sig: Take 400 mg by mouth 3 times daily Alternating with acetaminophen   insulin aspart (NOVOLOG FLEXPEN) 100 UNIT/ML pen   Yes Yes   Sig: Inject Subcutaneous 3 times daily (with meals) Sliding scale dose based on blood glucose and diet   lidocaine (XYLOCAINE) 5 % external ointment   No Yes   Sig: Apply topically 4 times daily   Patient taking differently: Apply topically 4 times daily as needed for moderate pain   lisinopril (ZESTRIL) 2.5 MG tablet   No Yes   Sig: Take 1 tablet (2.5 mg) by mouth daily   medical cannabis (Patient's own supply) Not Taking  Yes No   Sig: See Admin Instructions (The purpose of this order is to document that the patient reports taking medical cannabis.  This is not a prescription, and is not used to certify that the patient has a qualifying medical condition.)   Patient not taking: Reported on 10/19/2022   metoprolol succinate ER (TOPROL-XL) 50 MG 24 hr tablet   No Yes   Sig: Take 1 tablet (50 mg) by mouth daily   nystatin (MYCOSTATIN) 125267 UNIT/GM external cream   No Yes   Sig: Apply topically 2 times daily   Patient taking differently: Apply topically daily as needed   omeprazole (PRILOSEC) 20 MG DR capsule   Yes Yes   Sig: Take 20 mg by mouth At Bedtime    oxybutynin (DITROPAN) 5 MG tablet   Yes Yes   Sig: Take 5 mg by mouth 2 times daily AM and Midday   oxybutynin ER (DITROPAN-XL) 10 MG 24 hr tablet   Yes Yes   Sig: Take 10 mg by mouth every evening   prochlorperazine (COMPAZINE) 10 MG tablet Not Taking  Yes No   Sig: Take 10 mg by mouth every 6 hours as needed for nausea or vomiting   Patient not taking: Reported on 10/19/2022   psyllium (METAMUCIL/KONSYL) Packet   Yes Yes   Sig: Take 1 packet by mouth daily as needed for constipation   spironolactone (ALDACTONE) 25 MG tablet   No Yes   Sig: Take 0.5 tablets (12.5 mg) by mouth daily   vitamin (B COMPLEX)  tablet   Yes Yes   Sig: Take 1 tablet by mouth daily   zolpidem (AMBIEN) 10 MG tablet   Yes Yes   Sig: Take 10 mg by mouth At Bedtime      Facility-Administered Medications Last Administration Doses Remaining   lidocaine (XYLOCAINE) 2 % external gel 7/26/2022  9:06 AM              Allergy:     Allergies   Allergen Reactions     Gabapentin      Upper body edema/swelling.         Morphine Visual Disturbance     Visual hallucinations     Sulfamethoxazole-Trimethoprim      Other reaction(s): Hives     Sulfa Drugs Hives and Rash     welts            Inpatient Medications:   Scheduled Meds:    insulin aspart  1-4 Units Subcutaneous Q4H     levETIRAcetam  500 mg Intravenous Q12H     piperacillin-tazobactam  2.25 g Intravenous Q8H     sodium chloride (PF)  3 mL Intracatheter Q8H     PRN Meds: acetaminophen **OR** acetaminophen, acetaminophen, bisacodyl, glucose **OR** dextrose **OR** glucagon, lidocaine 4%, lidocaine (buffered or not buffered), nitroGLYcerin, polyethylene glycol, prochlorperazine **OR** prochlorperazine **OR** prochlorperazine, senna-docusate **OR** senna-docusate, sodium chloride (PF)        Review of Systems    The Review of Systems is negative other than noted in the HPI  CONSTITUTIONAL: negative for fever, chills, change in weight  INTEGUMENTARY/SKIN: no rash or obvious new lesions  ENT/MOUTH: no sore throat, new sinus pain or nasal drainage, no neck mass noted  RESP: No pleuretic pain, No cough, no hemoptysis, No SOB   CV: negative for chest pain, palpitations or peripheral edema  GI: no nausea, vomiting, change in stools  : no dysuria or hematuria  MUSCULOSKELETAL: no myalgias, arthralgias or join efffusion  ENDOCRINE: no history of polyuria, polydyspsia or symptoms of thyroid dysfunction  PSYCHIATRIC: no change in mood stable  LYMPHATIC: no new lymphadenopathy  HEME: no bleeding or easy bruisability  NEURO: see HPI       Physical Exam:   Physical Exam   Vitals:  Height:Data Unavailable  Weight:167  lbs 8.79 oz   Temp: 97.3  F (36.3  C) Temp src: Oral BP: 106/49 Pulse: 105   Resp: 19 SpO2: 90 % O2 Device: Oxymask Oxygen Delivery: 3 LPM  General Appearance:  No acute distress  Neuro:       Mental Status Exam:    She was awake and alert, somewhat slow to respond to simple questions but was oriented to time place and person did follow simple and complex commands quite well and fund of knowledge appears to appear to be appropriate, however, time she was slow to respond verbally.       Cranial Nerves: Pupils were equal and sluggishly reactive to light, there was no ptosis, visual fields intact confrontation, there was no facial asymmetry, tongue was midline and symmetrical palatine movements and the rest of the cranial nerve examination 2-12 was intact.       Motor:   There was normal muscle bulk and tone in all 4 extremities and her grasp were symmetric at 4+5, in the lower extremities she had diffuse weakness, mild, in the proximal and distal muscles at 4/5          Reflexes: There was universal hyperreflexia, the patellar and ankle reflexes reflexes were trace, plantars were equivocal due to withdrawal       Sensory: Decreased appreciation of vibratory sensation both great toes and fading impairment to pinprick over the dorsum of the feet                   Coordination:   There was no finger-to-nose or heel-to-shin dysmetria       Gait: Deferred  Neck: no nuchal rigidity, normal thyroid. No carotid bruits.    Cardiovascular: Regular rate and rhythm, no m/r/g  Lungs: Clear to auscultation  Abdomen: Soft, not tender, not distended  Extremities: No clubbing, no cyanosis, no edema       Data:   ROUTINE IP LABS   CBC RESULTS:     Recent Labs   Lab 10/19/22  1207 10/19/22  1007 10/19/22  0420 10/18/22  1737   WBC 5.5  --  5.8 5.8   RBC 2.03*  --  2.49* 2.43*   HGB 6.0*  --  7.2* 7.1*   HCT 17.1*  --  21.6* 21.5*   PLT 94* 103* 43* 38*     Basic Metabolic Panel:   Recent Labs   Lab Test 10/19/22  1207 10/19/22  1206  10/19/22  1007 10/19/22  0810 10/19/22  0110 10/19/22  0030 10/18/22  2132 10/18/22  1737     --  134  --   --  131*   < > 127*   POTASSIUM 5.5*  --  5.5*  --   --  5.5*   < > 7.2*   CHLORIDE 104  --   --   --   --  98  --  97   CO2 19*  --   --   --   --  18*  --  16*   *  --   --   --   --  116*  --  121*   CR 2.49*  --   --   --   --  2.94*  --  3.32*   * 208*  --  162*   < > 391*   < > 143*   BENJI 8.9  --   --   --   --  8.7  --  8.4*    < > = values in this interval not displayed.     Liver panel:  Recent Labs   Lab Test 10/19/22  0030 10/18/22  1737 10/18/22  1041 02/10/22  0626 02/09/22  0851   PROTTOTAL 5.2* 5.5* 5.6* 5.8* 6.5  6.5   ALBUMIN 2.0* 1.9* 2.8* 2.6* 3.0*   BILITOTAL 0.8 0.5 0.3 0.4 0.4   ALKPHOS 437* 468* 564* 172* 216*   * 143* 154* 31 35   ALT 86* 93* 99* 13 10     Lipid Profile:  Recent Labs   Lab Test 07/28/22  0935 11/18/20  1436 12/18/19  0954 02/27/19  1357 01/29/18  1103   CHOL 94 107 140 127 170   HDL 26* 48* 34* 42* 51   LDL 40 19 63 32 64   TRIG 141 202* 217* 263* 273*     Thyroid Panel:  Recent Labs   Lab Test 11/18/20  1436 10/25/19  1015 10/17/19  1525 02/27/19  1357   TSH 1.19 2.42 1.30 0.70      Vitamin B12:   Recent Labs   Lab Test 02/27/19  1357   B12 471           IMAGING:   All imaging studies were reviewed personally  CT head:   -10/17/2022-Mixed density left holohemispheric subdural collection with some areas of increased density. The subdural collection measures 8.3 mm over the left frontal   convexity, 8.2 mm over the left parietal convexity and 3.3 mm over the posterior left temporal convexity.  -  Associated mass effect with 2 mm midline shift to the right at the level of the lateral ventricles.   -small cerebellar metastasis and diffuse pachymeningeal thickening are better seen on the prior brain MRI 03/25/2022.  - Mild diffuse cerebral parenchymal volume loss with Presumed chronic hypertensive/microvascular ischemic white matter.       Prolonged video EEG monitoring was abnormal due to the presence of moderate generalized slowing of the background activities, findings consistent with moderately diffuse encephalopathy.     60 minutes evaluation and management.     Melanie Gold M.D.  [facility], [facility].  Office [phone]

## 2022-10-19 NOTE — CONSULTS
Consult Date: 10/18/2022    IMPRESSION:  A 67-year-old female presenting with a small acute on chronic left sided subdural hematoma.    PLAN:  We do not have any plan for neurosurgical intervention.  Recommend medical management including platelet transfusion for goal platelets of 100,000.  Repeat head CT tomorrow morning.  Blood pressure less than 150.  Every 2 hour neuro checks overnight.  Keppra per Neurology. Head of bed 30 degrees if able.      TYPE OF VISIT:  The neurosurgical service was consulted to see Mrs. Hardin for subdural hematoma.    HISTORY OF PRESENT ILLNESS:  Ms. Monreal is a 67-year-old female with a past medical history of breast cancer with metastasis to the bone and brain who presents as a transfer from outside hospital for subdural hematoma.  Apparently, the patient lives at home with family and she was brought into the Emergency Room for increasing confusion over the last several weeks.  She has been nonverbal for over 48 hours, which is unusual.  Head CT at [place] showed a small mixed density subdural hematoma and she was transferred to [facility].  The patient is nonverbal and unable to give any history.    PAST MEDICAL HISTORY:  Breast cancer with metastasis, fibromyalgia, diabetes, hyperlipidemia, osteoporosis, tobacco dependency.    PAST SURGICAL HISTORY:  Appendectomy, mammoplasty.    SOCIAL HISTORY:  Apparently , lives at home with her .  Former smoker.    MEDICATIONS:  Reviewed per the electronic medical record, not including any anticoagulation.    ALLERGIES:  GABAPENTIN, MORPHINE, SULFA.    PHYSICAL EXAMINATION:    VITAL SIGNS:  The temperature is 98.2, blood pressure is 80/44, pulse is 76, respiratory rate is 16.  GENERAL:  She is sleeping upon entering the room, she wakes up easily to voice.  She is nonverbal, but she does follow some commands.  She will lift her arms up in the air without a pronator drift.  She will squeeze hands.  She will  wiggle her feet, but does not lift her legs up in the air.  She has 2+ pitting edema to the lower extremities.  Pupils are equal, round, and reactive at 4 mm for a [place] coma scale of 10-11.    IMAGING STUDIES:  Head CT performed earlier today, which shows mixed density left sided subdural hematoma measuring 8 mm.  There is minimal shift of 2-3 mm.    LABORATORY DATA:  Significant thrombocytopenia with platelet count 38,000, hemoglobin 7.1.  INR is 1.27.  Lactic acid is normal.  Sodium is 123, potassium 7.      TOTAL TIME SPENT WITH THE PATIENT:  70 minutes, including 50 minutes of counseling and coordination of care.      Discussed with Dr. Hou.    Dictated by KEN PARISH        D: 10/18/2022   T: 10/18/2022   MT: SHILA    Name:     JULES KRUGERDaryl  MRN:      [id number]        Account:      [id number]   :      1955           Consult Date: 10/18/2022     Document: [id number]

## 2022-10-19 NOTE — PROGRESS NOTES
Repeat K improved, but still very high at 6.3. Further administration if IV insulin and Bicarbonate ordered to be followed by STAT K repeat check.

## 2022-10-19 NOTE — CONSULTS
Nephrology Initial Consult  October 19, 2022      Elenita Moran MRN:[id number] YOB: 1955  Date of Admission:10/18/2022  Primary care provider: Sandi Jones  Requesting physician: Freeman Vail MD    ASSESSMENT AND RECOMMENDATIONS:   1 acute renal failure-severe. last known creatinine of 1.1 in September.  Possible contributors includes prerenal state with poor oral intake, concurrent use of bumetanide, spironolactone, lisinopril, ?  Ibuprofen  -Responding well to IV fluid.  Nonoliguric.    2 critical hyperkalemia-secondary to severe acute renal failure, concurrent use of oral KCl, spironolactone, lisinopril  -Being medically managed.  Potassium down to 5.5.    3 presumed traumatic subdural hematoma-nonsurgical management per neurosurgery    4 hyponatremia-sodium 123 on presentation.  Up to 133 this morning.    5 metastatic breast cancer with mets to bone/liver/brain-on [place] q. 21 days.  Last dose on 10/11/2022    6 thrombocytopenia-likely related to chemotherapy.    Discussion-  -excellent urine output.  Expect kidney function will continue to improve.  -Continue to hold Bumex/spironolactone/lisinopril  -Sodium is corrected 10 mEq over the last 14 hours, slightly over recommended limit.  Currently on a half NS at 75 cc an hour.  -Recheck sodium at 6 PM.  Plan to reverse with D5 water if serum sodium is above 135  -Potassium stable at 5.5.  Continue to monitor and treat medically as necessary.    Thank you for the consult. Will continue to follow along with you .        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  No plans for neurosurgical intervention.    Last known creatinine was 1.1 in September.  She presented with significant metabolic abnormality including severe KATHLEEN with a creatinine of 3.2, critical hyperkalemia with a potassium of 7, sodium of 123, high anion gap metabolic acidosis with bicarb of 14, phosphorus of 8.7, mildly elevated liver enzymes with normal bilirubin, high procalcitonin, normal lactate  CBC showed significant thrombocytopenia with platelet count of 40s, hemoglobin of 7-8 and has chronically been around the range  Urinalysis shows major pyuria with high leukocyte esterase, WBC clumps, nitrate negative and cloudy urine sample  Chest x-ray showed mild left basilar opacity  She received IV fluid overnight as well as medical management of hyperkalemia with appropriate response.  More than 1 L urine output.  Potassium down to 5.5.  Sodium improved to 131.  Bicarb is up to 18.    Home med review suggests Bumex 1 mg twice daily, ibuprofen up to 400 mg 3 times daily, lisinopril 2.5 mg daily, oral KCl ? Dose, spironolactone 12.5 mg daily  On evaluation, she is awake and oriented to hospital but not to time place or person.    PAST MEDICAL HISTORY:  As listed in HPI.  Cannot obtain from patient.  She is confused.      MEDICATIONS:  PTA Meds  Prior to Admission medications    Medication Sig Last Dose Taking? Auth Provider Long Term End Date   ACETAMINOPHEN PO Take 1,300 mg by mouth 3 times daily Alternates with Ibuprofen  Yes Reported, Patient     aspirin 81 MG EC tablet Take 81 mg by mouth daily  Yes Unknown, Entered By History     bumetanide (BUMEX) 2 MG tablet Take 2 mg by mouth daily as needed Can take midday if needed for fluid retention/swelling  Yes Unknown, Entered By History Yes    cephALEXin (KEFLEX) 500 MG capsule  Take 1 capsule (500 mg) by mouth 2 times daily  Yes Sahil Garza MD No    Cholecalciferol (VITAMIN D) 125 MCG (5000 UT) capsule Take 1 tablet by mouth daily   Yes Unknown, Entered By History     clobetasol (TEMOVATE) 0.05 % external ointment Apply topically daily as needed  Yes Unknown, Entered By History     docusate sodium (COLACE) 100 MG capsule Take 100 mg by mouth daily  Yes Reported, Patient     DULoxetine (CYMBALTA) 30 MG capsule Take 30 mg by mouth every morning   Yes Unknown, Entered By History Yes    DULoxetine (CYMBALTA) 60 MG capsule Take 1 capsule by mouth At Bedtime  Yes Reported, Patient Yes    ezetimibe-simvastatin (VYTORIN) 10-20 MG tablet Take 1 tablet by mouth At Bedtime  Yes Reported, Patient No    HYDROmorphone (DILAUDID) 4 MG tablet Take 1 tablet (4 mg) by mouth 3 times daily as needed for moderate to severe pain or severe pain  Patient taking differently: Take 4 mg by mouth 3 times daily AM - Midday - PM  Yes Yesy Dsouza MD     hydrOXYzine (VISTARIL) 25 MG capsule Take 25 mg by mouth 3 times daily With hydromorphone  Yes Reported, Patient     ibuprofen (ADVIL/MOTRIN) 200 MG capsule Take 400 mg by mouth 3 times daily Alternating with acetaminophen  Yes Reported, Patient No    insulin aspart (NOVOLOG FLEXPEN) 100 UNIT/ML pen Inject Subcutaneous 3 times daily (with meals) Sliding scale dose based on blood glucose and diet  Yes Unknown, Entered By History     Insulin Glargine (BASAGLAR KWIKPEN SC) Inject 8 Units Subcutaneous every morning Uses as directed  Yes Reported, Patient No    Iron Combinations (IRON COMPLEX PO) Take 1 tablet by mouth daily Ferrous biogluconate supplement  Yes Unknown, Entered By History     lidocaine (XYLOCAINE) 5 % external ointment Apply topically 4 times daily  Patient taking differently: Apply topically 4 times daily as needed for moderate pain  Yes Kuldip Cortes MD     lisinopril (ZESTRIL) 2.5 MG tablet Take 1 tablet (2.5 mg) by mouth daily  Yes  Renu Munoz MD Yes    MAGNESIUM PO Take by mouth daily OTC supplement  Yes Unknown, Entered By History     metoprolol succinate ER (TOPROL-XL) 50 MG 24 hr tablet Take 1 tablet (50 mg) by mouth daily  Yes Renu Munoz MD Yes    Multiple Vitamins-Minerals (ICAPS PO) Take 1 capsule by mouth daily  Yes Unknown, Entered By History     nystatin (MYCOSTATIN) 085652 UNIT/GM external cream Apply topically 2 times daily  Patient taking differently: Apply topically daily as needed  Yes Sahil Garza MD     OLANZapine (ZYPREXA) 5 MG tablet Take 5 mg by mouth every evening   Yes Reported, Patient     omeprazole (PRILOSEC) 20 MG DR capsule Take 20 mg by mouth At Bedtime   Yes Reported, Patient     oxybutynin (DITROPAN) 5 MG tablet Take 5 mg by mouth 2 times daily AM and Midday  Yes Unknown, Entered By History     oxybutynin ER (DITROPAN-XL) 10 MG 24 hr tablet Take 10 mg by mouth every evening  Yes Reported, Patient     POTASSIUM CHLORIDE PO Take by mouth daily OTC supplement  Yes Unknown, Entered By History     Prenatal Vit-Fe Fumarate-FA (PRENATAL MULTIVITAMIN  PLUS IRON) 27-1 MG TABS Take 1 tablet by mouth daily   Yes Reported, Patient     psyllium (METAMUCIL/KONSYL) Packet Take 1 packet by mouth daily as needed for constipation  Yes Unknown, Entered By History     spironolactone (ALDACTONE) 25 MG tablet Take 0.5 tablets (12.5 mg) by mouth daily  Yes Renu Munoz MD Yes    vitamin (B COMPLEX) tablet Take 1 tablet by mouth daily  Yes Reported, Patient     zolpidem (AMBIEN) 10 MG tablet Take 10 mg by mouth At Bedtime  Yes Unknown, Entered By History     B-D U/F 31G X 8 MM insulin pen needle USE 4 TIMES DAILY   Reported, Patient     blood glucose (ONETOUCH VERIO IQ) test strip    Reported, Patient     bumetanide (BUMEX) 2 MG tablet Take 1 tablet (2 mg) by mouth 2 times daily  Patient taking differently: Take 2 mg by mouth daily   Renu Munoz MD Yes    Continuous Blood Gluc   (FREESTYLE KIMBERLY 14 DAY READER) COLETTE Use to check blood sugar at least 4 times a day or as directed    Reported, Patient     Continuous Blood Gluc Sensor (FREESTYLE KIMBERLY 14 DAY SENSOR) MISC Use as directed to test blood sugar at least 4 times a day or as directed   Reported, Patient     glucose (BD GLUCOSE) 4 g chewable tablet as needed   Reported, Patient     LORazepam (ATIVAN) 0.5 MG tablet take 1 tablet by mouth up to 3 times per day as needed for nausea, anxiety, or insomnia  Patient not taking: Reported on 10/19/2022 Not Taking  Reported, Patient     medical cannabis (Patient's own supply) See Admin Instructions (The purpose of this order is to document that the patient reports taking medical cannabis.  This is not a prescription, and is not used to certify that the patient has a qualifying medical condition.)  Patient not taking: Reported on 10/19/2022 Not Taking  Unknown, Entered By History     prochlorperazine (COMPAZINE) 10 MG tablet Take 10 mg by mouth every 6 hours as needed for nausea or vomiting  Patient not taking: Reported on 10/19/2022 Not Taking  Unknown, Entered By History        Current Meds    insulin aspart  1-4 Units Subcutaneous Q4H     levETIRAcetam  500 mg Intravenous Q12H     piperacillin-tazobactam  2.25 g Intravenous Q8H     Infusion Meds    NaCl 75 mL/hr at 10/19/22 0859       ALLERGIES:    Allergies   Allergen Reactions     Gabapentin      Upper body edema/swelling.         Morphine Visual Disturbance     Visual hallucinations     Sulfamethoxazole-Trimethoprim      Other reaction(s): Hives     Sulfa Drugs Hives and Rash     welts         REVIEW OF SYSTEMS:  Cannot obtain from patient.  She is confused.    SOCIAL HISTORY:   Cannot obtain from patient.  She is confused.    FAMILY MEDICAL HISTORY:   Family History   Problem Relation Age of Onset     Lung Cancer Mother      Pancreatic Cancer Mother      Kidney Cancer Maternal Grandmother      Lung Cancer Paternal Aunt      Breast Cancer  Cousin      Reviewed with patient on 10/19/2022     PHYSICAL EXAM:   Temp  Av.8  F (36.6  C)  Min: 97.5  F (36.4  C)  Max: 98.2  F (36.8  C)      Pulse  Av.7  Min: 83  Max: 110 Resp  Av  Min: 8  Max: 45  SpO2  Av.2 %  Min: 72 %  Max: 99 %       /63   Pulse 107   Temp 97.5  F (36.4  C)   Resp 16   Wt 76 kg (167 lb 8.8 oz)   SpO2 94%   BMI 27.88 kg/m     Date 10/19/22 0700 - 10/20/22 0659   Shift 1867-1228 [id number] 9550-0321 24 Hour Total   INTAKE   I.V. 200   200   Blood Components 300   300   Shift Total(mL/kg) 500(6.58)   500(6.58)   OUTPUT   Urine 600   600   Shift Total(mL/kg) 600(7.89)   600(7.89)   Weight (kg) 76 76 76 76      Admit Weight: 76.2 kg (167 lb 15.9 oz)     GENERAL APPEARANCE: Awake.  Oriented to hospital.  Not to place person.  EYES: no scleral icterus, pupils equal  HENT: NC/AT,  mouth  without ulcers or lesions  Lymphatics: no cervical or supraclavicular LAD  Endo: no moon facies, no goiter  Pulmonary: Clear anteriorly  CV: regular rhythm, normal rate, no rub   - Edema +, pitting  GI: soft, nontender,   MS: no evidence of inflammation in joints  SKIN: no rash, warm, dry, no cyanosis  NEURO: face symmetric, moving all 4 extremities    LABS:   CMP  Recent Labs   Lab 10/19/22  0810 10/19/22  0422 10/19/22  0420 10/19/22  0110 10/19/22  0030 10/18/22  2316 10/18/22  2132 10/18/22  1737 10/18/22  1341 10/18/22  1312 10/18/22  1302 10/18/22  1041   NA  --   --   --   --  131* 126*  --  127*  --   --   --  123*   POTASSIUM  --   --   --   --  5.5* 6.3*  --  7.2*  --  6.2*  --  7.0*   CHLORIDE  --   --   --   --  98  --   --  97  --   --   --  89*   CO2  --   --   --   --  18*  --   --  16*  --   --   --  14*   ANIONGAP  --   --   --   --  15*  --   --  14  --   --   --  20*   * 226*  --  309* 391*  --    < > 143*   < >  --    < > 122*   BUN  --   --   --   --  116*  --   --  121*  --   --   --  110.2*   CR  --   --   --   --  2.94*  --   --  3.32*  --   --   --   3.25*   GFRESTIMATED  --   --   --   --  17*  --   --  15*  --   --   --  15*   BENJI  --   --   --   --  8.7  --   --  8.4*  --   --   --  8.2*   MAG  --   --  2.4*  --   --   --   --  2.3  --   --   --   --    PHOS  --   --  7.2*  --   --   --   --  8.7*  --   --   --   --    PROTTOTAL  --   --   --   --  5.2*  --   --  5.5*  --   --   --  5.6*   ALBUMIN  --   --   --   --  2.0*  --   --  1.9*  --   --   --  2.8*   BILITOTAL  --   --   --   --  0.8  --   --  0.5  --   --   --  0.3   ALKPHOS  --   --   --   --  437*  --   --  468*  --   --   --  564*   AST  --   --   --   --  124*  --   --  143*  --   --   --  154*   ALT  --   --   --   --  86*  --   --  93*  --   --   --  99*    < > = values in this interval not displayed.     CBC  Recent Labs   Lab 10/19/22  0420 10/18/22  1737 10/18/22  1041   HGB 7.2* 7.1* 8.0*   WBC 5.8 5.8 6.6   RBC 2.49* 2.43* 2.71*   HCT 21.6* 21.5* 24.0*   MCV 87 89 89   MCH 28.9 29.2 29.5   MCHC 33.3 33.0 33.3   RDW 15.4* 15.8* 15.8*   PLT 43* 38* 48*     INR  Recent Labs   Lab 10/18/22  1737 10/18/22  1312 10/18/22  1041   INR 1.27*  --  1.28*   PTT 38 39*  --      ABGNo lab results found in last 7 days.   URINE STUDIES  Recent Labs   Lab Test 10/18/22  1348 07/16/21  1538 12/03/19  1907 10/17/19  1820   COLOR Yellow Light Yellow Yellow Yellow   APPEARANCE Cloudy* Clear Clear Clear   URINEGLC 50* Negative Negative Negative   URINEBILI Negative Negative Negative Negative   URINEKETONE Negative 10* 20 mg/dL* Negative   SG 1.011 1.008 1.023 1.011   UBLD 0.5 mg/dL* 0.03 mg/dL* Negative Negative   URINEPH 6.0 5.5 5.0 5.0   PROTEIN 70* 30* Trace* Negative   UROBILINOGEN  --   --  2.0 E.U./dL <2.0 E.U./dL   NITRITE Negative Negative Negative Negative   LEUKEST 500 Octavio/uL* Negative Negative Negative   RBCU 62* 1 0-2  --    WBCU >182* 6* 0-5  --        IMAGING:  Personally reviewed the images and findings are as listed in HPI     Leta Dooley MD

## 2022-10-19 NOTE — PROGRESS NOTES
[facility]    Medicine Progress Note - Hospitalist Service    Date of Admission:  10/18/2022    67 year old female with pmh of metastatic breast cancer (cerebeller    Assessment & Plan   Acute metabolic encephalopathy   reports increased difficulty with gait/balance since fall 10 days prior, in past 48 hours increasingly confused with difficulty with speech and essentially non-verbal morning of presentation.  Possibly due to subdural fluid collection vs marked electrolyte abnormalities (uremia, hyponatremia, hyperkalemia) vs intracranial mets vs infection.   - management of various issues as below     Suspected traumatic SDH  Outside CT shows left holohemispheric subdural fluid collection with 2 mm midline shift.   reports fall on Oct 8 (unknown if any head trauma).    Neurosurgery and neurocritical care consulted, recommended expectant management  Follow-up CT head stable  - continuous eeg monitoring per neurology  - started on keppra  - q2h neuro checks currently.   - seizure precautions  - goal SBP <150, head of bed 30 degrees  - goal platelets of > 100, conditional order placed  - SLP eval; NPO pending eval  - neurosurgery and neurocritical care consulted  - mental status improving, from hospitalist perspective could transfer to neurology floor with continuous eeg monitoring     Thrombocytopenia  Abnormal coags  Admission platelet 38, INR 1.27 and PTT 38.  Unclear if nutritional vs impaired synthetic function of liver due to mets.   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   reports this has been  increasing in size over past week as she has spent more time in her chair.    * Admission UA with >182 WBC, leukocyte esterase.  * Outside CXR with mild left basilar opacity  * procalcitonin of 8, unclear significance in the setting of significant renal dysfunction  - continue zosyn  - follow blood and urine culture  - [facility] consult, will likely need Surgical assessment     Hyponatremia  Hyperkalemia  Uremia  Anion gap metabolic acidosis  KATHLEEN  Cr as low as 0.8 in June 2022, on most recent check Sept 2022 was 1.13.  Admission Na 127, K 7.2, bicarb 16 (gap down to 14), .   reporting minimal oral intake over past 2 days.  Edema on exam, but suspect intravascularly dry with hypoalbuminemia and poor oral intake.    shifted potassium with insulin overnight  -  1/2 NS at 100 an hour  - repeat bmps q6  - unable to give binder as not safe for PO currently  -q6 bmps  - Nephrology consult     Stage IV metastatic breast cancer (mets to brain, liver, bone) s/p mastectomy  Elevated LFT's  Followed by Dr. Varma of [facility].  Currently on immunotherapy and receiving radiation to spine (completed brain radiation for cerebellar mets).  Recent baseline LFT's unknown, suspect due to liver mets.  No signs of tenderness on exam.    - repeat LFT's in AM  - [facility] consult     HFrEF due to doxorubicin  Type 2 nstemi due to demand supply mismatch  TTE 6/2022 with EF 35-40% with global LV hypokinesis.  Outside trop 183, suspect demand ischemia.    - trend trop  - echo   - tele  - monitor I/O's and daily weights     DM II with peripheral neuropathy  - low dose ssi  - check A1C     Chronic anemia  Baseline hgb about 8 g/dL.   - monitor, transfuse if < 7, conditional order placed     Depression  - await med rec, will hold any sedating meds due to encephalopathy       Diet: NPO for Medical/Clinical Reasons Except for: No Exceptions    DVT Prophylaxis: Pneumatic Compression Devices  Jiang Catheter: Not present  Central Lines: PRESENT      Cardiac Monitoring: ACTIVE order. Indication: ICU  Code Status: No CPR- Do NOT Intubate      Disposition Plan      Expected Discharge Date: 10/20/2022                The patient's care was discussed with the Patient.    Chaparro Walter DO  Hospitalist Service  [facility]  Securely message with the Vocera Web Console (learn more here)  Text page via [employer] Paging/Directory         Clinically Significant Risk Factors Present on Admission        # Hyperkalemia: Highest K = 7.2 mmol/L (Ref range: 3.4-5.3) in last 2 days, will monitor as appropriate  # Hyponatremia: Lowest Na = 123 mmol/L (Ref range: 136-145) in last 2 days, will monitor as appropriate   # Hypercalcemia: corrected calcium is >10.1, will monitor as appropriate   # Anion Gap Metabolic Acidosis: Highest Anion Gap = 20 mmol/L (Ref range: 7-15) in last 2 days, will monitor and treat as appropriate  # Hypoalbuminemia: Lowest albumin = 1.9 g/dL (Ref range: 3.5-5.2) at 10/18/2022  5:37 PM, will monitor as appropriate  # Coagulation Defect: INR = 1.27 (Ref range: 0.85 - 1.15) and/or PTT = 38 Seconds (Ref range: 22 - 38 Seconds), will monitor for bleeding  # Thrombocytopenia: Lowest platelets = 38 (Ref range: 150-450) in last 2 days, will monitor for bleeding  # Acute Kidney Injury, unspecified: based on a >150% or 0.3 mg/dL increase in last creatinine compared to past 90 day average, will monitor renal function  # Hypertension: home medication list includes antihypertensive(s)  # Chronic systolic heart failure: echo within the past year with EF <40%   # Acute Respiratory Failure: Documented O2 saturation < 91%.  Continue supplemental oxygen as needed            ______________________________________________________________________    Interval History   Records reviewed. SDH hematoma stable based on CT this AM. Broad range of consultants will be weighing in.    More awake and alert today, denies pain. confused    Data reviewed  today: I reviewed all medications, new labs and imaging results over the last 24 hours. I personally reviewed head CT: left subdural hematoma with 2 mm of shift, stable on repeat CT    Physical Exam   Vital Signs: Temp: 97.6  F (36.4  C) Temp src: Oral BP: 124/53 Pulse: 98   Resp: 15 SpO2: 91 % O2 Device: Nasal cannula Oxygen Delivery: 2 LPM  Weight: 167 lbs 8.79 oz      General Appearance: awake and alert, but confused. Follows commands. tearful  HEENT: mucous membranes dry, no neck LAD  Respiratory: lungs CTAB, no wheezes or crackles, no tachypnea   Cardiovascular: RRR, normal s1/s2 without murmur  GI: abdomen soft, no signs of tenderness, nondistended, normal bowel sounds  Lymph/Hematologic: 2+ pitting bilateral lower extremity edema   Skin: stage 4 decubitus ulcer with some surrounding eschar  Musculoskeletal: extremities well perfused  Neurologic: awake, responds to questions, oriented to place and person, not date or context. Able to move all extremities on command, but did not follow for formal muscle strength testing. Face symmetric  Psychiatric: unable to assess                Data   Recent Labs   Lab 10/19/22  0810 10/19/22  0422 10/19/22  0420 10/19/22  0110 10/19/22  0030 10/18/22  2316 10/18/22  2132 10/18/22  1737 10/18/22  1302 10/18/22  1041   WBC  --   --  5.8  --   --   --   --  5.8  --  6.6   HGB  --   --  7.2*  --   --   --   --  7.1*  --  8.0*   MCV  --   --  87  --   --   --   --  89  --  89   PLT  --   --  43*  --   --   --   --  38*  --  48*   INR  --   --   --   --   --   --   --  1.27*  --  1.28*   NA  --   --   --   --  131* 126*  --  127*  --  123*   POTASSIUM  --   --   --   --  5.5* 6.3*  --  7.2*   < > 7.0*   CHLORIDE  --   --   --   --  98  --   --  97  --  89*   CO2  --   --   --   --  18*  --   --  16*  --  14*   BUN  --   --   --   --  116*  --   --  121*  --  110.2*   CR  --   --   --   --  2.94*  --   --  3.32*  --  3.25*   ANIONGAP  --   --   --   --  15*  --   --  14  --  20*    BENJI  --   --   --   --  8.7  --   --  8.4*  --  8.2*   * 226*  --  309* 391*  --    < > 143*   < > 122*   ALBUMIN  --   --   --   --  2.0*  --   --  1.9*  --  2.8*   PROTTOTAL  --   --   --   --  5.2*  --   --  5.5*  --  5.6*   BILITOTAL  --   --   --   --  0.8  --   --  0.5  --  0.3   ALKPHOS  --   --   --   --  437*  --   --  468*  --  564*   ALT  --   --   --   --  86*  --   --  93*  --  99*   AST  --   --   --   --  124*  --   --  143*  --  154*    < > = values in this interval not displayed.     Recent Results (from the past 24 hour(s))   XR Chest Port 1 View    Narrative    EXAM: XR CHEST PORT 1 VIEW  LOCATION: [facility] [place]  DATE/TIME: 10/18/2022 10:53 AM    INDICATION: low bp with ams  COMPARISON: 02/11/2022.      Impression    IMPRESSION: There is subtle airspace opacity in the left lower lung which could represent pneumonia or asymmetric pulmonary edema. There is mild diffuse interstitial prominence which is stable. The heart size and pulmonary vascularity are normal. There   is an infusion port catheter from a left internal jugular approach with tip at junction superior vena cava and right atrium. No no pneumothorax.   CT Head w/o Contrast    Narrative    EXAM: CT HEAD W/O CONTRAST  LOCATION: [facility] [place]  DATE/TIME: 10/18/2022 11:44 AM    INDICATION: AMS, h o breast CA with mets.  COMPARISON: Brain MRI 03/25/2022, PET/CT 08/25/2022.  TECHNIQUE: Routine CT Head without IV contrast. Multiplanar reformats. Dose reduction techniques were used.    FINDINGS:  INTRACRANIAL CONTENTS: Interval development of mixed density left holohemispheric subdural collection with some areas of increased density. The subdural collection measures 8.3 mm over the left frontal convexity, 8.2 mm over the left parietal convexity   and 3.3 mm over the posterior left temporal convexity. There is some associated mass effect with 2 mm midline shift to the right at the level of the  lateral ventricles. Mild diffuse cerebral parenchymal volume loss. The basilar cisterns are patent.   Moderate periventricular and scattered foci of deep white matter hypodensities involving both cerebral hemispheres. No CT evidence for an acute infarct. No cerebellar tonsillar ectopia. Patient's known small cerebellar metastasis and diffuse   pachymeningeal thickening are better seen on the prior brain MRI 03/25/2022.    VISUALIZED ORBITS/SINUSES/MASTOIDS: No intraorbital abnormality. No paranasal sinus mucosal disease. No middle ear or mastoid effusion.    BONES/SOFT TISSUES: Diffuse sclerotic appearance of the bones of the area of the cervical spine.      Impression    IMPRESSION:  1.  Since the prior brain MRI 03/25/2022 and even the PET/CT 08/25/2022, interval development of mixed density left holohemispheric subdural collection with some areas of increased density. The subdural collection measures 8.3 mm over the left frontal   convexity, 8.2 mm over the left parietal convexity and 3.3 mm over the posterior left temporal convexity.  2.  There is some associated mass effect with 2 mm midline shift to the right at the level of the lateral ventricles.   3.  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  4.  Mild diffuse cerebral parenchymal volume loss. Presumed chronic hypertensive/microvascular ischemic white matter changes.      Discussed the findings with Dr.DANIEL KRAUSE at 12:32 PM. 10/18/2022.   CT Head w/o Contrast    Narrative    EXAM: CT HEAD W/O CONTRAST  LOCATION: [facility]  DATE/TIME: 10/19/2022 4:48 AM    INDICATION: follow up SDH  COMPARISON: 10/18/2022  TECHNIQUE: Routine CT Head without IV contrast. Multiplanar reformats. Dose reduction techniques were used.    FINDINGS:  Stable left sided mixed attenuation subdural hematoma extending over the left cerebral convexity with maximal  thickness of 8 mm at the frontoparietal junction. Stable associated mass effect without shift of midline structures. No evidence of interval   acute intracranial hemorrhage, infarct or hydrocephalus.       Impression    IMPRESSION:  1.  No significant change.

## 2022-10-19 NOTE — PROGRESS NOTES
[facility]    Neurosurgery  Daily Note    Assessment & Plan      67-year-old female with history of breast cancer with mets to bone and brace transferred to [facility] on 10/18/22 nonverbal with imaging evidence of small left sided mixed density SDH with maximal thickness 8mm. Stable on rpt head CT today. Denies head pain, nausea, vomiting, new weakness. Platelet 43 k this AM- further platelet transfusion ordered per hospitalist team.     EXAM: CT HEAD W/O CONTRAST  LOCATION: [facility]  DATE/TIME: 10/19/2022 4:48 AM     INDICATION: follow up SDH  COMPARISON: 10/18/2022  TECHNIQUE: Routine CT Head without IV contrast. Multiplanar reformats. Dose reduction techniques were used.     FINDINGS:  Stable left sided mixed attenuation subdural hematoma extending over the left cerebral convexity with maximal thickness of 8 mm at the frontoparietal junction. Stable associated mass effect without shift of midline structures. No evidence of interval   acute intracranial hemorrhage, infarct or hydrocephalus.                                                                       IMPRESSION:  1.  No significant change    Plan:  -OK to transfer to 7th floor with q2h neuro checks from nsgy standpoint  -No surgical intervention indicated at this time   -HOB30  -SBP<150  -Neuro checks q2 hours   -goal platelets of 100,000    -Continue supportive and symptomatic treatment  -Start or continue physical therapy  -Pain control measures  -Plans reviewed with Dr. Hou   -Jose or page with questions    Maral Tee PA-C  [facility] Neurosurgery  [address]  [address] [place] [id number]    Tel [phone]  Pager [phone]    Principal Problem:    Intracranial hemorrhage (H)      Maral Tee PA-C    Interval History   Stable.  Doing well.  Improving slowly.   Physical Exam   Temp: 97.5  F (36.4  C) Temp src: Axillary BP: 102/63 Pulse: 107   Resp:  16 SpO2: 99 % O2 Device: Oxymask Oxygen Delivery: 6 LPM  Vitals:    10/18/22 1700 10/19/22 0430   Weight: 167 lb 15.9 oz (76.2 kg) 167 lb 8.8 oz (76 kg)     Vital Signs with Ranges  Temp:  [97.5  F (36.4  C)-98.2  F (36.8  C)] 97.5  F (36.4  C)  Pulse:  [] 107  Resp:  [8-45] 16  BP: ()/(40-95) 102/63  SpO2:  [72 %-99 %] 99 %  I/O last 3 completed shifts:  In: 1723.33 [I.V.:1423.33]  Out: 1000 [Urine:1000]    Awake, alert, oriented to person and place, disoriented to time   II-XII grossly intact  NEFF symmetrically. Will wiggle toes and ankles, will not lift legs off bed  Negative pronator drift   Negative clonus       Medications     NaCl 75 mL/hr at 10/19/22 0859        insulin aspart  1-4 Units Subcutaneous Q4H     levETIRAcetam  500 mg Intravenous Q12H     piperacillin-tazobactam  2.25 g Intravenous Q8H       Plans discussed with Dr. Hou who was in agreement with plans    Maral [facility] Neurosurgery  [address]  [address] [place] [address]    Tel [phone]  Pager [phone]

## 2022-10-19 NOTE — PROGRESS NOTES
SLP Bedside Swallow Evaluation  10/19/22 8470   Appointment Info   Signing Clinician's Name / Credentials (SLP) Clara Hummel MA [facility] SLP   General Information   Onset of Illness/Injury or Date of Surgery 10/18/22   Referring Physician Dr. Vail   Pertinent History of Current Problem Dx: SDH; Hx includes DM2, Breast CA with mets to the brain, liver, bone, CHF.  Difficulty talking reported 10/18.   General Observations Decreased recall, pt able to provide name and , decreased word retrieval and formulation in conversation.  Decreased awareness and attention impacted swallow safety.  Pt had nose bleeding during the evaluation.  RN assisted pt.   Type of Evaluation   Type of Evaluation Swallow Evaluation   Oral Motor   Oral Musculature generally intact  (? oral apraxia)   Dentition (Oral Motor)   Dentition (Oral Motor) adequate dentition   Vocal Quality/Secretion Management (Oral Motor)   Comment, Vocal Quality/Secretion Management (Oral Motor) hoarse   General Swallowing Observations   Respiratory Support (General Swallowing Observations) nasal cannula   Current Diet/Method of Nutritional Intake (General Swallowing Observations, NIS) NPO   Swallowing Evaluation Clinical swallow evaluation   Clinical Swallow Evaluation   Feeding Assistance frequent cues/help required   Clinical Swallow Evaluation Textures Trialed thin liquids;mildly thick liquids;pureed;solid foods;soft & bite-sized   Clinical Swallow Eval: Thin Liquid Texture Trial   Mode of Presentation, Thin Liquids cup   Volume of Liquid or Food Presented sips x 5   Oral Phase of Swallow premature pharyngeal entry   Pharyngeal Phase of Swallow reduction in laryngeal movement  (delay)   Diagnostic Statement shiver/gasp  - pt repeating she is cold and declined more initially after 2nd trial, decreased awareness/coordiination, throat clear, increased SOB after 3 additional sips; belch x 1   Clinical Swallow Eval: Mildly Thick Liquids   Mode of Presentation  spoon   Volume Presented tsps x 3, sips x 2   Oral Phase WFL   Pharyngeal Phase reduction in laryngeal movement  (delay)   Diagnostic Statement no signs of aspiration, belch after trials   Clinical Swallow Evaluation: Puree Solid Texture Trial   Mode of Presentation, Puree spoon   Volume of Puree Presented tsp x 1   Oral Phase, Puree WFL   Pharyngeal Phase, Puree reduction in laryngeal movement  (delay)   Diagnostic Statement no overt signs of aspiration   Clinical Swallow Eval: Soft & Bite Sized   Mode of Presentation spoon   Volume Presented bites x 2   Oral Phase residue in oral cavity  (decreased oral awareness)   Pharyngeal Phase reduction in laryngeal movement;repeated swallows  (delay)   Diagnostic Statement pt distracted, decreased coordination   Clinical Swallow Evaluation: Solid Food Texture Trial   Mode of Presentation fed by clinician   Volume Presented bite x 1   Oral Phase residue in oral cavity  (decreased oral awareness)   Pharyngeal Phase   (delay)   Diagnostic Statement cue needed to swallow, pt distracted, mild decreased coordination, alternating need to clear oral cavity   Esophageal Phase of Swallow   Patient reports or presents with symptoms of esophageal dysphagia Yes   Esophageal comments Pt belching after some trials   Swallowing Recommendations   Diet Consistency Recommendations mildly thick liquids (level 2);soft & bite-sized (level 6)   Supervision Level for Intake 1:1 supervision needed   Mode of Delivery Recommendations bolus size, small;slow rate of intake;no straws   Swallowing Maneuver Recommendations alternate food and liquid intake   Monitoring/Assistance Required (Eating/Swallowing) monitor for cough or change in vocal quality with intake;check mouth frequently for oral residue/pocketing   Recommended Feeding/Eating Techniques (Swallow Eval) maintain upright posture during/after eating for 30 minutes   Medication Administration Recommendations, Swallowing (SLP) Meds with puree    Instrumental Assessment Recommendations   (to be determined)   Clinical Impression   Criteria for Skilled Therapeutic Interventions Met (SLP Eval) Yes, treatment indicated   SLP Diagnosis Mild-moderate oral-pharyngeal dysphagia, possible esophageal dysphagia   Risks & Benefits of therapy have been explained evaluation/treatment results reviewed;care plan/treatment goals reviewed;risks/benefits reviewed;current/potential barriers reviewed;participants voiced agreement with care plan;participants included;patient   Clinical Impression Comments Mild-moderate oral-pharyngeal dysphagia, possible esophageal dysphagia, observed at bedside.  Deficits/risk factors include decreased oral awareness and attention, cues needed to swallow at times, decreased oral coordination, delayed swallows, mild decreased elevation, belching x 2, and throat clearing/increased SOB during thin liquid trials. Recommend cautious initiation of an IDDSI Diet level 4 puree and mildly thick liquids by tsp wtih 1:1 supervision/assist, cue/verify swallows, sit at 90 degrees, stay up for 30 minutes after, check for oral residue, alternate textures.  Hold po if aspiration signs/decreased respiratory status noted.  Plan to continue swallow Tx to assess diet tolerance, train strategies, and trial diet upgrades as indicated.   SLP Total Evaluation Time   Eval: oral/pharyngeal swallow function, clinical swallow Minutes (76337) 20   SLP Discharge Planning   SLP Plan assess po tolerance/upgrades   SLP Discharge Recommendation home with assist;home with home care speech therapy;Transitional Care Facility   SLP Rationale for DC Rec 24 hour assist at discharge due to decreased cognitive-linguistic function and swallow below baseline   SLP Brief overview of current status  Mild-moderate oral-pharyngeal dysphagia, possible esophageal dysphagia;  Rec: IDDSI Diet level 4 puree and mildly thick liquids by tsp wtih 1:1 supervision/assist, cue/verify swallows, sit at  90 degrees, stay up for 30 minutes after, check for oral residue, alternate textures.  Hold po if aspiration signs/decreased respiratory status noted   Total Session Time   Total Session Time (sum of timed and untimed services) 35

## 2022-10-19 NOTE — PHARMACY-ADMISSION MEDICATION HISTORY
Pharmacy Medication History  Admission medication history interview status for the 10/18/2022  admission is complete. See EPIC admission navigator for prior to admission medications     Location of Interview: Phone  Medication history sources: Patient's family/friend (Spouse Denilson)    Significant changes made to the medication list:  Added daily ASA 81 mg and some supplements. Pt takes Hydromorphone TID not PRN    In the past week, patient estimated taking medication this percent of the time: greater than 90%    Additional medication history information:   Pt recently finished an RX for Levaquin for UTI    Medication reconciliation completed by provider prior to medication history? No    Time spent in this activity: 40 minutes    Prior to Admission medications    Medication Sig Last Dose Taking? Auth Provider Long Term End Date   ACETAMINOPHEN PO Take 1,300 mg by mouth 3 times daily Alternates with Ibuprofen  Yes Reported, Patient     aspirin 81 MG EC tablet Take 81 mg by mouth daily  Yes Unknown, Entered By History     bumetanide (BUMEX) 2 MG tablet Take 2 mg by mouth daily as needed Can take midday if needed for fluid retention/swelling  Yes Unknown, Entered By History Yes    cephALEXin (KEFLEX) 500 MG capsule Take 1 capsule (500 mg) by mouth 2 times daily  Yes Sahil Garza MD No    Cholecalciferol (VITAMIN D) 125 MCG (5000 UT) capsule Take 1 tablet by mouth daily   Yes Unknown, Entered By History     clobetasol (TEMOVATE) 0.05 % external ointment Apply topically daily as needed  Yes Unknown, Entered By History     docusate sodium (COLACE) 100 MG capsule Take 100 mg by mouth daily  Yes Reported, Patient     DULoxetine (CYMBALTA) 30 MG capsule Take 30 mg by mouth every morning   Yes Unknown, Entered By History Yes    DULoxetine (CYMBALTA) 60 MG capsule Take 1 capsule by mouth At Bedtime  Yes Reported, Patient Yes    ezetimibe-simvastatin (VYTORIN) 10-20 MG tablet Take 1 tablet by mouth At Bedtime  Yes  Reported, Patient No    HYDROmorphone (DILAUDID) 4 MG tablet Take 1 tablet (4 mg) by mouth 3 times daily as needed for moderate to severe pain or severe pain  Patient taking differently: Take 4 mg by mouth 3 times daily AM - Midday - PM  Yes Yesy Dsouza MD     hydrOXYzine (VISTARIL) 25 MG capsule Take 25 mg by mouth 3 times daily With hydromorphone  Yes Reported, Patient     ibuprofen (ADVIL/MOTRIN) 200 MG capsule Take 400 mg by mouth 3 times daily Alternating with acetaminophen  Yes Reported, Patient No    insulin aspart (NOVOLOG FLEXPEN) 100 UNIT/ML pen Inject Subcutaneous 3 times daily (with meals) Sliding scale dose based on blood glucose and diet  Yes Unknown, Entered By History     Insulin Glargine (BASAGLAR KWIKPEN SC) Inject 8 Units Subcutaneous every morning Uses as directed  Yes Reported, Patient No    Iron Combinations (IRON COMPLEX PO) Take 1 tablet by mouth daily Ferrous biogluconate supplement  Yes Unknown, Entered By History     lidocaine (XYLOCAINE) 5 % external ointment Apply topically 4 times daily  Patient taking differently: Apply topically 4 times daily as needed for moderate pain  Yes Kuldip Cortes MD     lisinopril (ZESTRIL) 2.5 MG tablet Take 1 tablet (2.5 mg) by mouth daily  Yes Renu Munoz MD Yes    MAGNESIUM PO Take by mouth daily OTC supplement  Yes Unknown, Entered By History     metoprolol succinate ER (TOPROL-XL) 50 MG 24 hr tablet Take 1 tablet (50 mg) by mouth daily  Yes Renu Munoz MD Yes    Multiple Vitamins-Minerals (ICAPS PO) Take 1 capsule by mouth daily  Yes Unknown, Entered By History     nystatin (MYCOSTATIN) 805027 UNIT/GM external cream Apply topically 2 times daily  Patient taking differently: Apply topically daily as needed  Yes Sahil Garza MD     OLANZapine (ZYPREXA) 5 MG tablet Take 5 mg by mouth every evening   Yes Reported, Patient     omeprazole (PRILOSEC) 20 MG DR capsule Take 20 mg by mouth At Bedtime   Yes Reported,  Patient     oxybutynin (DITROPAN) 5 MG tablet Take 5 mg by mouth 2 times daily AM and Midday  Yes Unknown, Entered By History     oxybutynin ER (DITROPAN-XL) 10 MG 24 hr tablet Take 10 mg by mouth every evening  Yes Reported, Patient     POTASSIUM CHLORIDE PO Take by mouth daily OTC supplement  Yes Unknown, Entered By History     Prenatal Vit-Fe Fumarate-FA (PRENATAL MULTIVITAMIN  PLUS IRON) 27-1 MG TABS Take 1 tablet by mouth daily   Yes Reported, Patient     psyllium (METAMUCIL/KONSYL) Packet Take 1 packet by mouth daily as needed for constipation  Yes Unknown, Entered By History     spironolactone (ALDACTONE) 25 MG tablet Take 0.5 tablets (12.5 mg) by mouth daily  Yes Renu Munoz MD Yes    vitamin (B COMPLEX) tablet Take 1 tablet by mouth daily  Yes Reported, Patient     zolpidem (AMBIEN) 10 MG tablet Take 10 mg by mouth At Bedtime  Yes Unknown, Entered By History     B-D U/F 31G X 8 MM insulin pen needle USE 4 TIMES DAILY   Reported, Patient     blood glucose (ONETOUCH VERIO IQ) test strip    Reported, Patient     bumetanide (BUMEX) 2 MG tablet Take 1 tablet (2 mg) by mouth 2 times daily  Patient taking differently: Take 2 mg by mouth daily   Renu Munoz MD Yes    Continuous Blood Gluc  ([employer] CardioYLE KIMBERLY 14 DAY READER) COLETTE Use to check blood sugar at least 4 times a day or as directed    Reported, Patient     Continuous Blood Gluc Sensor (PodaddiesSTYLE KIMBERLY 14 DAY SENSOR) MISC Use as directed to test blood sugar at least 4 times a day or as directed   Reported, Patient     glucose (BD GLUCOSE) 4 g chewable tablet as needed   Reported, Patient     LORazepam (ATIVAN) 0.5 MG tablet take 1 tablet by mouth up to 3 times per day as needed for nausea, anxiety, or insomnia  Patient not taking: Reported on 10/19/2022 Not Taking  Reported, Patient     medical cannabis (Patient's own supply) See Admin Instructions (The purpose of this order is to document that the patient reports taking medical  cannabis.  This is not a prescription, and is not used to certify that the patient has a qualifying medical condition.)  Patient not taking: Reported on 10/19/2022 Not Taking  Unknown, Entered By History     prochlorperazine (COMPAZINE) 10 MG tablet Take 10 mg by mouth every 6 hours as needed for nausea or vomiting  Patient not taking: Reported on 10/19/2022 Not Taking  Unknown, Entered By History         The information provided in this note is only as accurate as the sources available at the time of update(s)

## 2022-10-19 NOTE — SUMMARY OF CARE
[facility] Intensive Care Unit   Nursing Note                                                       Neuro:  PERRL.  Moves all extremities.  Follows commands.   Cardiovascular:  RRR.  Hemodynamically stable.  BP cuff and PulseOx read sporadically and inconsistently.  Pulmonary:  Tolerating NC @ 2L.  Oxygen saturation > 92  GI/:  Brisk UOP.  Endocrine: Glucose well controlled.  K shift with insulin and D50 x2 overnight  Skin:  Intact except incisional areas.  Existing Decub over coccyx Protective mepilex applied to sacrum.  Restraints:  Not in use or necessary.  AM labs noted; electrolytes replaced as indicated.  Family updated a bedside  Continue to monitor closely.      ROUTINE IP LABS (Last four results)  BMPRecent Labs   Lab 10/19/22  0422 10/19/22  0110 10/19/22  0030 10/18/22  2316 10/18/22  2251 10/18/22  2132 10/18/22  1737 10/18/22  1341 10/18/22  1312 10/18/22  1302 10/18/22  1041   NA  --   --  131* 126*  --   --  127*  --   --   --  123*   POTASSIUM  --   --  5.5* 6.3*  --   --  7.2*  --  6.2*  --  7.0*   CHLORIDE  --   --  98  --   --   --  97  --   --   --  89*   BENJI  --   --  8.7  --   --   --  8.4*  --   --   --  8.2*   CO2  --   --  18*  --   --   --  16*  --   --   --  14*   BUN  --   --  116*  --   --   --  121*  --   --   --  110.2*   CR  --   --  2.94*  --   --   --  3.32*  --   --   --  3.25*   * 309* 391*  --  221*   < > 143*   < >  --    < > 122*    < > = values in this interval not displayed.     CBC  Recent Labs   Lab 10/19/22  0420 10/18/22  1737 10/18/22  1041   WBC 5.8 5.8 6.6   RBC 2.49* 2.43* 2.71*   HGB 7.2* 7.1* 8.0*   HCT 21.6* 21.5* 24.0*   MCV 87 89 89   MCH 28.9 29.2 29.5   MCHC 33.3 33.0 33.3   RDW 15.4* 15.8* 15.8*   PLT 43* 38* 48*     INR  Recent Labs   Lab 10/18/22  1737 10/18/22  1041   INR 1.27* 1.28*     PTT  Recent Labs   Lab 10/18/22  1737 10/18/22  1312   PTT 38 39*     LACTIC ACID  Lactic Acid (mmol/L)   Date Value   10/18/2022 0.6 (L)   10/18/2022  0.9   02/12/2022 1.1   02/10/2022 1.8         Intake/Output Summary (Last 24 hours) at 10/19/2022 0634  Last data filed at 10/19/2022 0600  Gross per 24 hour   Intake 1723.33 ml   Output 1000 ml   Net 723.33 ml       Garrett Griffin MSN RN PHN CCRN  [facility]  Intensive Care Unit

## 2022-10-20 NOTE — PROGRESS NOTES
Antimicrobial Stewardship Team Note    Antimicrobial Stewardship Program - A joint venture between [facility] and [facility] to optimize antibiotic management.     Patient: Elenita Moran  MRN: [id number]  Allergies: Gabapentin, Morphine, Sulfamethoxazole-trimethoprim, and Sulfa drugs    Brief Summary: Elenita Moran is a 67 year old female admitted on 10/18/22 with acute encephalopathy, found to have subdural fluid collection suspected 2/2 to fall on 10/8 as  reports increased difficulty with gait/balance since fall 10 days prior. 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    On admission, she was started on Zosyn for indication of CAP, SSTI, and UTI. Urinalysis on admission with >182 WBC, 62 RBC, and positive for LE, urine culture finalized with no growth. Blood cultures negative, and COVID-19 PCR collected on 10/18 negative, repeated again on 10/20 resulted as positive. No chest imaging. WBC count normal at 5.8 on admission, now slightly leukopenic at 3.1 today. Procalcitonin 8.07 on admission in the setting of metastatic breast cancer. She is stable on room air with no fevers this hospitalization.         Active Anti-infective Medications   (From admission, onward)                 Start     Stop    10/20/22 1100  piperacillin-tazobactam  2.25 g,   Intravenous,   EVERY 6 HOURS        Community Acquired Pneumonia, Skin and Soft Tissue Infection, Urinary Tract Infection        --                  Assessment: Acute encephalopathy 2/2 subdural hematoma.  Patient presented with increased difficulty with gait/balance in the previous 10 days after a fall, found to have a subdural fluid collection. She was started on Zosyn with concern for possible pneumonia, SSTI, or UTI. Urinalysis  was concerning for possible infection but urine culture resulted with no growth. Patient is stable on room air with normal WBC count and no fevers indicating low likelihood of pneumonia or other infection. Elevated procalcitonin cannot be used as a reliable infectious marker in the setting of metastatic cancer. She has completed adequate empiric course of Zosyn while cultures were obtained, recommend stopping at this time in the absence of clear infectious source. Would resume PTA cephalexin for MSSA suppression. Given new COVID-19 PCR positive result, may be reasonable to treat with 3 day course of remdesivir given her high risk for progression, although she has borderline eGFR and LFTs which will need to be monitored.     Recommendations:  Stop Zosyn.  Resume PTA cephalexin for suppressive MSSA treatment.  Consider 3-day course of remdesivir for COVID-19 positive given high risk for progression.    Discussed with ID Staff MD Florence Moran, PharmD, BCIDP    Vital Signs/Clinical Features:  Vitals         10/18 0700  10/19 0659 10/19 0700  10/20 0659 10/20 0700  10/20 1313   Most Recent      Temp ( F) 97.6 -  98    97.3 -  99.3    97.5 -  99.1     97.8 (36.6) 10/20 1235    Pulse 83 -  110    98 -  127    113 -  122     113 10/20 1235    Resp 8 -  28 12 - 25 16 -  22     16 10/20 1235    BP 61/40 -  129/95    68/59 -  142/73    109/61 -  126/75     109/61 10/20 1235    SpO2 (%) 72 -  99    87 -  100      96     96 10/20 1235            Labs  Estimated Creatinine Clearance: 32.9 mL/min (A) (based on SCr of 1.69 mg/dL (H)).  Recent Labs   Lab Test 10/18/22  1737 10/19/22  0030 10/19/22  1207 10/19/22  1745 10/20/22  0027 10/20/22  0644   CR 3.32* 2.94* 2.49* 2.26* 2.08* 1.69*       Recent Labs   Lab Test 07/26/21  0657 07/27/21  0634 08/12/21  1330 08/19/21  1415 08/26/21  1400 09/02/21  1215 02/11/22  0656 02/12/22  0620 10/18/22  1041 10/18/22  1737 10/19/22  0420 10/19/22  1007 10/19/22  1207  10/19/22  1745 10/20/22  0644   WBC 12.3*   < > 9.2 24.5* 5.6   < > 26.5*   < > 6.6 5.8 5.8  --  5.5 4.9 3.1*   ANEU 11.2*  --  8.5* 23.0* 4.9  --  25.7*  --  6.4  --   --   --   --   --   --    ALYM 0.7*  --  0.2* 0.2* 0.2*  --  0.0*  --  0.1*  --   --   --   --   --   --    MEE 0.2  --  0.4 0.5 0.1  --  0.5  --  0.1  --   --   --   --   --   --    AEOS 0.1  --  0.2 0.7 0.4  --  0.0  --  0.1  --   --   --   --   --   --    HGB 7.6*   < > 8.0* 7.9* 7.3*   < > 8.0*   < > 8.0* 7.1* 7.2*  --  6.0* 7.6* 7.6*   HCT 23.7*   < > 25.8* 25.5* 24.2*   < > 25.1*   < > 24.0* 21.5* 21.6*  --  17.1* 22.0* 22.2*   MCV 93   < > 95 95 94   < > 96   < > 89 89 87  --  84 85 88      < > 347 385 133*   < > 246   < > 48* 38* 43* 103* 94* 68* 72*    < > = values in this interval not displayed.       Recent Labs   Lab Test 07/16/21  1054 08/05/21  1330 09/16/21  1300 02/09/22  0851 02/10/22  0626 10/18/22  1041 10/18/22  1737 10/19/22  0030   BILITOTAL 0.8  --   --  0.4 0.4 0.3 0.5 0.8   ALKPHOS 157*  --   --  216* 172* 564* 468* 437*   ALBUMIN 2.8*  --   --  3.0* 2.6* 2.8* 1.9* 2.0*   AST 23   < > 24 35 31 154* 143* 124*   ALT 36  --   --  10 13 99* 93* 86*    < > = values in this interval not displayed.       Recent Labs   Lab Test 12/03/19  1650 12/03/19  2223 07/21/21  0736 07/22/21  0703 07/24/21  0656 07/25/21  0653 08/05/21  1330 09/09/21  1255 09/16/21  1300 11/08/21  0941 12/13/21  1025 02/08/22  1803 02/09/22  0851 02/10/22  0933 02/11/22  0656 02/12/22  0620 02/12/22  0923 10/18/22  1041 10/18/22  1737 10/20/22  0748   PCAL  --    < > 0.99*  --  0.52* 0.44  --   --   --   --   --  0.13 0.13  --   --   --   --   --  8.07*  --    LACT 2.7*   < >  --   --   --   --   --   --   --   --   --  1.5  --  1.8  --   --  1.1 0.9 0.6* 1.2   CRP 3.9*   < >  --    < >  --   --    < > 9.1* 10.6* 7.4* 5.9*  --   --   --  9.9* 9.4*  --   --   --   --    SED 63*  --   --   --   --   --   --   --   --   --   --   --   --   --   --   --    --   --   --   --     < > = values in this interval not displayed.       Recent Labs   Lab Test 21  1400 02/10/22  2034   VANCOMYCIN  --  15.7   AMIKACIN <3  --        Culture Results:  7-Day Micro Results       Procedure Component Value Units Date/Time    Urine Culture [[id number]] Collected: 10/18/22 1348    Order Status: Completed Lab Status: Final result Updated: 10/20/22 0849    Specimen: Urine, Catheter      Culture No Growth    Blood Culture Peripheral Blood [[id number]]  (Normal) Collected: 10/18/22 1041    Order Status: Completed Lab Status: Preliminary result Updated: 10/19/22 1446    Specimen: Peripheral Blood      Culture No growth after 1 day            Recent Labs   Lab Test 10/17/19  1820 19  1907 21  1538 10/18/22  1348   URINEPH 5.0 5.0 5.5 6.0   NITRITE Negative Negative Negative Negative   LEUKEST Negative Negative Negative 500 Octavio/uL*   WBCU  --  0-5 6* >182*             Recent Labs   Lab Test 22  1015   IFLUA Not Detected   FLUAH1 Not Detected   FLUAH3 Not Detected   YY1109 Not Detected   IFLUB Not Detected   RSVA Not Detected   RSVB Not Detected   PIV1 Not Detected   PIV2 Not Detected   PIV3 Not Detected   HMPV Not Detected       Recent Labs   Lab Test 21  1424   CDBPCT Negative       Imaging: Echocardiogram Complete    Result Date: 10/19/2022  [id number]^WATSON^MERYL  [facility] [address] [place] [address]  Name: JULES KRUGER MRN: [id number] : 1955 Study Date: 10/19/2022 10:58 AM Age: 67 yrs Gender: Female Patient Location: [facility] Reason For Study: CHF Ordering Physician: MERYL CHAUDHARI Referring Physician: Verna Junior Performed By: Davon Hui  BSA: 1.8 m2 Height: 65 in Weight: 167 lb HR: 111 BP: 100/59 mmHg ______________________________________________________________________________ Procedure Complete Portable Echo Adult. Optison (NDC #1292-0824) given  intravenously. ______________________________________________________________________________ Interpretation Summary  The left ventricle is normal in size. Diastolic Doppler findings (E/E' ratio and/or other parameters) suggest left ventricular filling pressures are normal. The visual ejection fraction is 40-45%. No significant valvular heart disease. IVC diameter <2.1 cm collapsing >50% with sniff suggests a normal RA pressure of 3 mmHg.  Technically difficult study, reduces sensitivity and specificity of findings. ______________________________________________________________________________ Left Ventricle The left ventricle is normal in size. There is normal left ventricular wall thickness. Diastolic Doppler findings (E/E' ratio and/or other parameters) suggest left ventricular filling pressures are normal. The visual ejection fraction is 40-45%.  Right Ventricle The right ventricle is normal in size and function.  Atria Normal left atrial size. Right atrial size is normal. There is no color Doppler evidence of an atrial shunt.  Mitral Valve The mitral valve leaflets are moderately thickened. There is mild (1+) mitral regurgitation.  Tricuspid Valve There is trace tricuspid regurgitation. The right ventricular systolic pressure is approximated at 14.4 mmHg plus the right atrial pressure. IVC diameter <2.1 cm collapsing >50% with sniff suggests a normal RA pressure of 3 mmHg.  Aortic Valve There is trivial trileaflet aortic sclerosis. No aortic regurgitation is present.  Pulmonic Valve There is trace pulmonic valvular regurgitation.  Vessels Normal size aorta. The aortic root is normal size.  Pericardium There is no pericardial effusion.  Rhythm The rhythm was sinus tachycardia. ______________________________________________________________________________ MMode/2D Measurements & Calculations IVSd: 0.86 cm  LVIDd: 4.9 cm LVIDs: 2.6 cm LVPWd: 0.88 cm FS: 47.9 % LV mass(C)d: 147.0 grams LV mass(C)dI: 80.2 grams/m2  Ao root diam: 3.3 cm LA dimension: 2.8 cm asc Aorta Diam: 3.2 cm LA/Ao: 0.83 LVOT diam: 2.0 cm LVOT area: 3.2 cm2 RWT: 0.36  Doppler Measurements & Calculations MV E max david: 36.0 cm/sec MV A max david: 100.1 cm/sec MV E/A: 0.36 MV dec slope: 401.1 cm/sec2 PA acc time: 0.08 sec TR max david: 190.0 cm/sec TR max P.4 mmHg E/E' av.7 Lateral E/e': 2.7 Medial E/e': 6.8  ______________________________________________________________________________ Report approved by: Ashley Ortega 10/19/2022 01:00 PM       XR Chest Port 1 View    Result Date: 10/18/2022  EXAM: XR CHEST PORT 1 VIEW LOCATION: [facility] DATE/TIME: 10/18/2022 10:53 AM INDICATION: low bp with ams COMPARISON: 2022.     IMPRESSION: There is subtle airspace opacity in the left lower lung which could represent pneumonia or asymmetric pulmonary edema. There is mild diffuse interstitial prominence which is stable. The heart size and pulmonary vascularity are normal. There is an infusion port catheter from a left internal jugular approach with tip at junction superior vena cava and right atrium. No no pneumothorax.    CT Head w/o Contrast    Result Date: 10/19/2022  EXAM: CT HEAD W/O CONTRAST LOCATION: [facility] DATE/TIME: 10/19/2022 4:48 AM INDICATION: follow up SDH COMPARISON: 10/18/2022 TECHNIQUE: Routine CT Head without IV contrast. Multiplanar reformats. Dose reduction techniques were used. FINDINGS: Stable left sided mixed attenuation subdural hematoma extending over the left cerebral convexity with maximal thickness of 8 mm at the frontoparietal junction. Stable associated mass effect without shift of midline structures. No evidence of interval acute intracranial hemorrhage, infarct or hydrocephalus.     IMPRESSION: 1.  No significant change.    CT Head w/o Contrast    Result Date: 10/18/2022  EXAM: CT HEAD W/O CONTRAST LOCATION: [facility] DATE/TIME: 10/18/2022 11:44  AM INDICATION: AMS, h o breast CA with mets. COMPARISON: Brain MRI 03/25/2022, PET/CT 08/25/2022. TECHNIQUE: Routine CT Head without IV contrast. Multiplanar reformats. Dose reduction techniques were used. FINDINGS: INTRACRANIAL CONTENTS: Interval development of mixed density left holohemispheric subdural collection with some areas of increased density. The subdural collection measures 8.3 mm over the left frontal convexity, 8.2 mm over the left parietal convexity and 3.3 mm over the posterior left temporal convexity. There is some associated mass effect with 2 mm midline shift to the right at the level of the lateral ventricles. Mild diffuse cerebral parenchymal volume loss. The basilar cisterns are patent. Moderate periventricular and scattered foci of deep white matter hypodensities involving both cerebral hemispheres. No CT evidence for an acute infarct. No cerebellar tonsillar ectopia. 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 VISUALIZED ORBITS/SINUSES/MASTOIDS: No intraorbital abnormality. No paranasal sinus mucosal disease. No middle ear or mastoid effusion. BONES/SOFT TISSUES: Diffuse sclerotic appearance of the bones of the area of the cervical spine.     IMPRESSION: 1.  Since the prior brain MRI 03/25/2022 and even the PET/CT 08/25/2022, interval development of mixed density left holohemispheric subdural collection with some areas of increased density. The subdural collection measures 8.3 mm over the left frontal convexity, 8.2 mm over the left parietal convexity and 3.3 mm over the posterior left temporal convexity. 2.  There is some associated mass effect with 2 mm midline shift to the right at the level of the lateral ventricles. 3.  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 4.  Mild diffuse cerebral parenchymal volume loss.  Presumed chronic hypertensive/microvascular ischemic white matter changes. Discussed the findings with Dr.DANIEL KRAUSE at 12:32 PM. 10/18/2022.    EEG Video 2-12 HRS Ummonitored    Result Date: 10/19/2022  EEG Video 2-12 HRS Ummonitored Result VIDEO EEG DATE: 10/19/2022 VIDEO EEG LOG: [facility]  LTV DAY ONE VIDEO EEG DAY#: 1 VIDEO EEG SOURCE FILE DURATION: 5 HRS 42 MINS PATIENT INFORMATION: Elenita Moran is a 67 year old year old female who presents with AMS. EEG is being done to rule out subclinical seizures . TECHNICAL SUMMARY: This is a video-EEG monitoring study. EEG was recorded from 23 scalp electrodes placed according to the 10-20 international system. Additional electrodes were utilized for referencing, artifact detection, and recording from other cerebral regions. Qualified technicians attached EEG electrodes, set up the study, monitored and reviewed EEG recordings, and disconnected EEG electrodes when appropriate. Video was continuously recorded. Video was reviewed for clinical correlation and to assist with EEG interpretations. BACKGROUND ACTIVITIES: During this EEG recording, there is moderate generalized slowing of the background activities throughout the recording with mostly theta activities.  Occasional brief bursts of generalized delta activities were also observed. No posterior dominant rhythm was observed. Sleep stages were recorded with poorly formed sleep architectures. Hyperventilation and photic stimulation were not performed. EEG is reactive to external stimuli.  INTERICTAL ACTIVITIES: No epileptiform activities were observed during this recording.  ICTAL ACTIVITIES: There are no clinical or electrographic seizures during this recording.  Impression:  This is an abnormal EEG due to the presence of moderate generalized slowing of the background activities. Findings are consistent with moderate diffuse encephalopathy of which the etiology is non-specific.  No epileptiform activities  or seizures were observed. Anusha Zurita MD EPILEPSY STAFF     EEG  Minutes    Result Date: 10/19/2022  EEG  Minutes Result VIDEO EEG DATE: 10/18/22 VIDEO EEG LOG: [facility] VIDEO EEG DAY#: 1 VIDEO EEG SOURCE FILE DURATION: 01 hours and 14 minutes PATIENT INFORMATION: Elenita Moran  is a 67 year old female who presents with AMS. EEG is being done to rule out subclinical seizures . TECHNICAL SUMMARY: Ambulatory EEG was recorded from 10 scalp electrodes placed according to the 10-20 international system and additional electrodes for referencing, to record from other cerebral regions and  monitor EKG as appropriate. Electrocerebral activity was recorded onto a portable storage device worn by the patient. Patient was instructed to press an event marker when events of interest occurred and to note what transpired in a dairy provided for that purpose. Electrodes were attached and study screened and annotated by qualified EEG technologists. Interpreting physician reviewed recording at study termination prior to determining whether further recording was necessary.  Description of recording: The background of this EEG consisted of moderate generalized slowing with mostly theta activities. Hyperventilation and photic stimulations are not performed.  No epileptiform discharges, focal slowing, or electrographic seizures were noted throughout the recording.  Impression: Abnormal due to the presence of moderate generalized slowing of the background activities. Finding are consistent with moderate diffuse encephalopathy of which the etiology is non-specific.  Comment: If there is still persistent suspicion for continued seizure-like activity, would advise obtaining an electroencephalogram with the 10-20 international system for improved spatial resolution and parasagittal coverage.

## 2022-10-20 NOTE — PROGRESS NOTES
[facility]    Neurology Progress Note    Elenita Moran MRN# [id number]   YOB: 1955 Age: 67 year old    Code Status:Full Code   Date of Admission: 10/18/2022  Date of Consult:10/20/2022                                                                                 Interval history   This patient is a 67 year old female patient with a history for metastatic breast cancer who is currently on Enhurt infusions, last infusion 10/11/2022 and was complaining of gait imbalance over the past 10 days and presented following a fall with gait impairment and speech impairment.This visit triggered the head CT which was concerning for a left holohemispheric subdural collection with areas of increased density.  The subdural collection measured at 8.3 mm over the left frontal convexity and 8.2 mm over the left parietal convexity and 3.3 mm over the posterior left temporal convexity with associated mass-effect with 2 mm midline shift to the right at the level of the lateral ventricles.t and abnormal changes on CT concerning for left convexity subdural hematoma, with mixed density, stable on serial imaging studies followed by neurosurgery.  She remains neurologically stable, no witnessed seizure activity on Keppra.  Patient had no specific complaints today denied headache or visual changes.    She has had ongoing altered mental status, encephalopathy, most likely multifactorial in etiology.  A past history significant for the fact that she had multifocal tumor in the left breast August 2010 and underwent left breast mastectomy, tumor ER +63%, IA +17% and H ER 2 negative.  She was treated with Adriamycin plus Cytoxan followed by Taxol and in May 2011 she started anastrozole.  In 2011 she was noted to have bony metastases and underwent underwent radiation and in January 2015 PET scan showed no evidence of malignancy.  She underwent CyberKnife to painful L3 vertebral body mass completed in  2017 and had disease progression in March 2019 and pet imaging and therapy was changed .  As treatment progressed she developed peripheral neuropathy and in February 2021 PET scan demonstrated progressive disease and renewed metabolic activity in few scattered metastases and by February 2022 guided biopsy of the liver revealed H ER 2 positive breast cancer and February 2022 MRI of the brain revealed 3 to 4 mm suspicious cerebellar lesions.  She began Enhertu treatment 8/2022            Past Medical History:     Past Medical History:   Diagnosis Date     Allergic rhinitis      Bone cancer (H) 2011    breast mets     Breast cancer (H) 2010    left mastectomy     Depression      DM (diabetes mellitus) (H)      Fibromyalgia      Hx antineoplastic chemotherapy 2010     Hx of radiation therapy 2010     Hyperlipemia      Lactose intolerance      Mini stroke     R EYE     Osteoarthritis      Tobacco dependency          Past Surgical History:     Past Surgical History:   Procedure Laterality Date     APPENDECTOMY       INSERT INTRACORONARY STENT  1985    R Hand     IR CHEST PORT PLACEMENT > 5 YRS OF AGE  12/12/2019     IR CHEST PORT PLACEMENT > 5 YRS OF AGE  3/14/2022     IR LUMBAR TRANSFORAMINAL EPIDURAL STEROID INJECTION  12/5/2019     IR LUMBAR TRANSFORAMINAL EPIDURAL STRD INJ  12/5/2019     IR PORT PLACEMENT >5 YEARS  12/12/2019     IR PORT REMOVAL RIGHT  3/14/2022     IR SITE CHECK/EVALUATION  4/15/2022     MAMMOPLASTY REDUCTION Right 2015    hx of left mastectomy and right breast reduction     MASTECTOMY Left 2010     OTHER SURGICAL HISTORY      biopsy of upper and right tongue     OVARIAN CYST REMOVAL       REVISE RECONSTRUCTED BREAST Left     March 11, 2015          Social History:     Social History     Socioeconomic History     Marital status:      Spouse name: None     Number of children: None     Years of education: None     Highest education level: None   Tobacco Use     Smoking status: Former      Packs/day: 0.75     Years: 20.00     Pack years: 15.00     Types: Cigarettes     Smokeless tobacco: Never   Substance and Sexual Activity     Alcohol use: Not Currently     Alcohol/week: 1.0 standard drink     Types: 1 Standard drinks or equivalent per week     Comment: 1 glass of wine/week     Drug use: No     Sexual activity: Yes     Partners: Male     Birth control/protection: Post-menopausal   Social History Narrative    Patient works at home, owns [employer].  She lives with her  and 1 of her sons.         Patient denies smoking, no significant alcohol intake, denies illicit drugs use       Family History:     Family History   Problem Relation Age of Onset     Lung Cancer Mother      Pancreatic Cancer Mother      Kidney Cancer Maternal Grandmother      Lung Cancer Paternal Aunt      Breast Cancer Cousin      Reviewed and not felt to be contributory.        Home Medications:     Prior to Admission Medications   Prescriptions Last Dose Informant Patient Reported? Taking?   ACETAMINOPHEN PO   Yes Yes   Sig: Take 1,300 mg by mouth 3 times daily Alternates with Ibuprofen   B-D U/F 31G X 8 MM insulin pen needle   Yes No   Sig: USE 4 TIMES DAILY   Cholecalciferol (VITAMIN D) 125 MCG (5000 UT) capsule   Yes Yes   Sig: Take 1 tablet by mouth daily    Continuous Blood Gluc  (FREESTYLE KIMBERLY 14 DAY READER) COLETTE   Yes No   Sig: Use to check blood sugar at least 4 times a day or as directed    Continuous Blood Gluc Sensor (FREESTYLE KIMBERLY 14 DAY SENSOR) MISC   Yes No   Sig: Use as directed to test blood sugar at least 4 times a day or as directed   DULoxetine (CYMBALTA) 30 MG capsule   Yes Yes   Sig: Take 30 mg by mouth every morning    DULoxetine (CYMBALTA) 60 MG capsule   Yes Yes   Sig: Take 1 capsule by mouth At Bedtime   HYDROmorphone (DILAUDID) 4 MG tablet   No Yes   Sig: Take 1 tablet (4 mg) by mouth 3 times daily as needed for moderate to severe pain or severe pain   Patient taking differently:  Take 4 mg by mouth 3 times daily AM - Midday - PM   Insulin Glargine (BASAGLAR KWIKPEN SC)   Yes Yes   Sig: Inject 8 Units Subcutaneous every morning Uses as directed   Iron Combinations (IRON COMPLEX PO)   Yes Yes   Sig: Take 1 tablet by mouth daily Ferrous biogluconate supplement   LORazepam (ATIVAN) 0.5 MG tablet Not Taking  Yes No   Sig: take 1 tablet by mouth up to 3 times per day as needed for nausea, anxiety, or insomnia   Patient not taking: Reported on 10/19/2022   MAGNESIUM PO   Yes Yes   Sig: Take by mouth daily OTC supplement   Multiple Vitamins-Minerals (ICAPS PO)   Yes Yes   Sig: Take 1 capsule by mouth daily   OLANZapine (ZYPREXA) 5 MG tablet   Yes Yes   Sig: Take 5 mg by mouth every evening    POTASSIUM CHLORIDE PO   Yes Yes   Sig: Take by mouth daily OTC supplement   Prenatal Vit-Fe Fumarate-FA (PRENATAL MULTIVITAMIN  PLUS IRON) 27-1 MG TABS   Yes Yes   Sig: Take 1 tablet by mouth daily    aspirin 81 MG EC tablet   Yes Yes   Sig: Take 81 mg by mouth daily   blood glucose (ONETOUCH VERIO IQ) test strip   Yes No   bumetanide (BUMEX) 2 MG tablet   No No   Sig: Take 1 tablet (2 mg) by mouth 2 times daily   Patient taking differently: Take 2 mg by mouth daily   bumetanide (BUMEX) 2 MG tablet   Yes Yes   Sig: Take 2 mg by mouth daily as needed Can take midday if needed for fluid retention/swelling   cephALEXin (KEFLEX) 500 MG capsule   No Yes   Sig: Take 1 capsule (500 mg) by mouth 2 times daily   clobetasol (TEMOVATE) 0.05 % external ointment   Yes Yes   Sig: Apply topically daily as needed   docusate sodium (COLACE) 100 MG capsule   Yes Yes   Sig: Take 100 mg by mouth daily   ezetimibe-simvastatin (VYTORIN) 10-20 MG tablet   Yes Yes   Sig: Take 1 tablet by mouth At Bedtime   glucose (BD GLUCOSE) 4 g chewable tablet   Yes No   Sig: as needed   hydrOXYzine (VISTARIL) 25 MG capsule   Yes Yes   Sig: Take 25 mg by mouth 3 times daily With hydromorphone   ibuprofen (ADVIL/MOTRIN) 200 MG capsule   Yes Yes    Sig: Take 400 mg by mouth 3 times daily Alternating with acetaminophen   insulin aspart (NOVOLOG FLEXPEN) 100 UNIT/ML pen   Yes Yes   Sig: Inject Subcutaneous 3 times daily (with meals) Sliding scale dose based on blood glucose and diet   lidocaine (XYLOCAINE) 5 % external ointment   No Yes   Sig: Apply topically 4 times daily   Patient taking differently: Apply topically 4 times daily as needed for moderate pain   lisinopril (ZESTRIL) 2.5 MG tablet   No Yes   Sig: Take 1 tablet (2.5 mg) by mouth daily   medical cannabis (Patient's own supply) Not Taking  Yes No   Sig: See Admin Instructions (The purpose of this order is to document that the patient reports taking medical cannabis.  This is not a prescription, and is not used to certify that the patient has a qualifying medical condition.)   Patient not taking: Reported on 10/19/2022   metoprolol succinate ER (TOPROL-XL) 50 MG 24 hr tablet   No Yes   Sig: Take 1 tablet (50 mg) by mouth daily   nystatin (MYCOSTATIN) 049861 UNIT/GM external cream   No Yes   Sig: Apply topically 2 times daily   Patient taking differently: Apply topically daily as needed   omeprazole (PRILOSEC) 20 MG DR capsule   Yes Yes   Sig: Take 20 mg by mouth At Bedtime    oxybutynin (DITROPAN) 5 MG tablet   Yes Yes   Sig: Take 5 mg by mouth 2 times daily AM and Midday   oxybutynin ER (DITROPAN-XL) 10 MG 24 hr tablet   Yes Yes   Sig: Take 10 mg by mouth every evening   prochlorperazine (COMPAZINE) 10 MG tablet Not Taking  Yes No   Sig: Take 10 mg by mouth every 6 hours as needed for nausea or vomiting   Patient not taking: Reported on 10/19/2022   psyllium (METAMUCIL/KONSYL) Packet   Yes Yes   Sig: Take 1 packet by mouth daily as needed for constipation   spironolactone (ALDACTONE) 25 MG tablet   No Yes   Sig: Take 0.5 tablets (12.5 mg) by mouth daily   vitamin (B COMPLEX) tablet   Yes Yes   Sig: Take 1 tablet by mouth daily   zolpidem (AMBIEN) 10 MG tablet   Yes Yes   Sig: Take 10 mg by mouth At  Bedtime      Facility-Administered Medications Last Administration Doses Remaining   lidocaine (XYLOCAINE) 2 % external gel 7/26/2022  9:06 AM              Allergy:     Allergies   Allergen Reactions     Gabapentin      Upper body edema/swelling.         Morphine Visual Disturbance     Visual hallucinations     Sulfamethoxazole-Trimethoprim      Other reaction(s): Hives     Sulfa Drugs Hives and Rash     welts            Inpatient Medications:   Scheduled Meds:    remdesivir  200 mg Intravenous Once    Followed by     sodium chloride 0.9%  50 mL Intravenous Once     [START ON 10/21/2022] remdesivir  100 mg Intravenous Q24H    And     [START ON 10/21/2022] sodium chloride 0.9%  50 mL Intravenous Q24H     aspirin  81 mg Oral Daily     docusate sodium  100 mg Oral Daily     [START ON 10/21/2022] DULoxetine  30 mg Oral QAM     DULoxetine  60 mg Oral At Bedtime     insulin aspart  1-4 Units Subcutaneous Q4H     levETIRAcetam  500 mg Intravenous Q12H     oxybutynin  5 mg Oral BID     oxybutynin ER  10 mg Oral QPM     pantoprazole  40 mg Oral At Bedtime     piperacillin-tazobactam  2.25 g Intravenous Q6H     sodium chloride (PF)  3 mL Intracatheter Q8H     PRN Meds: acetaminophen **OR** acetaminophen, acetaminophen, bisacodyl, glucose **OR** dextrose **OR** glucagon, HYDROmorphone, lidocaine 4%, lidocaine, lidocaine (buffered or not buffered), naloxone **OR** naloxone **OR** naloxone **OR** naloxone, nitroGLYcerin, ondansetron **OR** ondansetron, polyethylene glycol, prochlorperazine **OR** prochlorperazine **OR** prochlorperazine, senna-docusate **OR** senna-docusate, sodium chloride (PF)        Review of Systems    The Review of Systems is negative other than noted in the HPI  CONSTITUTIONAL: negative for fever, chills, change in weight  INTEGUMENTARY/SKIN: no rash or obvious new lesions  ENT/MOUTH: no sore throat, new sinus pain or nasal drainage, no neck mass noted  RESP: No pleuretic pain, No cough, no hemoptysis, No  SOB   CV: negative for chest pain, palpitations or peripheral edema  GI: no nausea, vomiting, change in stools  : no dysuria or hematuria  MUSCULOSKELETAL: no myalgias, arthralgias or join efffusion  ENDOCRINE: no history of polyuria, polydyspsia or symptoms of thyroid dysfunction  PSYCHIATRIC: no change in mood stable  LYMPHATIC: no new lymphadenopathy  HEME: no bleeding or easy bruisability  NEURO: see HPI       Physical Exam:   Physical Exam   Vitals:  Height:Data Unavailable  Weight:167 lbs 8.79 oz   Temp: 97.8  F (36.6  C) Temp src: Axillary BP: 117/68 Pulse: 116   Resp: 16 SpO2: 96 % O2 Device: None (Room air)    General Appearance:  No acute distress  Neuro:       Mental Status Exam: She was awake and alert, with a slight flat affect, but was quite cooperative with fluent speech, sometimes slow to respond to questions,  although she did this appropriately and was oriented to time place and person and fund of knowledge appeared to be appropriate.       Cranial Nerves:   Visual fields were intact to confrontation, there was a slight drooping of the right corner of the mouth, otherwise cranial nerves II through XII are intact.       Motor: The grasp were symmetrical at 4/5, she appeared to be diffusely weak, but was able to elevate both legs against gravity, not against resistance, there was no weakness in the dorsiflexors or plantar flexors of the feet.              Gait: Not assessed  Neck: no nuchal rigidity, normal thyroid. No carotid bruits.    Cardiovascular: Regular rate and rhythm, no m/r/g  Lungs: Clear to auscultation  Abdomen: Soft, not tender, not distended  Extremities: No clubbing, no cyanosis, no edema       Data:   ROUTINE IP LABS   CBC RESULTS:     Recent Labs   Lab 10/20/22  0644 10/19/22  1745 10/19/22  1207   WBC 3.1* 4.9 5.5   RBC 2.53* 2.59* 2.03*   HGB 7.6* 7.6* 6.0*   HCT 22.2* 22.0* 17.1*   PLT 72* 68* 94*     Basic Metabolic Panel:   Recent Labs   Lab Test 10/20/22  1140  10/20/22  0842 10/20/22  0644 10/20/22  0358 10/20/22  0027 10/19/22  2010 10/19/22  1745   NA  --   --  137  --  135  --  135   POTASSIUM  --   --  5.1  --  5.3  --  5.5*   CHLORIDE  --   --  110*  --  107  --  104   CO2  --   --  20  --  19*  --  19*   BUN  --   --  75*  --  83*  --  92*   CR  --   --  1.69*  --  2.08*  --  2.26*   * 137* 142*   < > 202*   < > 266*   BENJI  --   --  9.0  --  8.7  --  8.8    < > = values in this interval not displayed.     Liver panel:  Recent Labs   Lab Test 10/19/22  0030 10/18/22  1737 10/18/22  1041 02/10/22  0626 02/09/22  0851   PROTTOTAL 5.2* 5.5* 5.6* 5.8* 6.5  6.5   ALBUMIN 2.0* 1.9* 2.8* 2.6* 3.0*   BILITOTAL 0.8 0.5 0.3 0.4 0.4   ALKPHOS 437* 468* 564* 172* 216*   * 143* 154* 31 35   ALT 86* 93* 99* 13 10     Lipid Profile:  Recent Labs   Lab Test 07/28/22  0935 11/18/20  1436 12/18/19  0954 02/27/19  1357 01/29/18  1103   CHOL 94 107 140 127 170   HDL 26* 48* 34* 42* 51   LDL 40 19 63 32 64   TRIG 141 202* 217* 263* 273*     Thyroid Panel:  Recent Labs   Lab Test 11/18/20  1436 10/25/19  1015 10/17/19  1525 02/27/19  1357   TSH 1.19 2.42 1.30 0.70      Vitamin B12:   Recent Labs   Lab Test 02/27/19  1357   B12 471        IMAGING:   All imaging studies were reviewed personally  CT head:   -10/17/2022-Mixed density left holohemispheric subdural collection with some areas of increased density. The subdural collection measures 8.3 mm over the left frontal   convexity, 8.2 mm over the left parietal convexity and 3.3 mm over the posterior left temporal convexity.  -  Associated mass effect with 2 mm midline shift to the right at the level of the lateral ventricles.   -small cerebellar metastasis and diffuse pachymeningeal thickening are better seen on the prior brain MRI 03/25/2022.  - Mild diffuse cerebral parenchymal volume loss with Presumed chronic hypertensive/microvascular ischemic white matter.       Prolonged video EEG monitoring was abnormal due to the  presence of moderate generalized slowing of the background activities, findings consistent with moderately diffuse encephalopathy.                Assessment and Plan:                                         #.   -- This is a 67-year-old patient with a history for metastatic breast cancer to brain, bone and liver who is presently on immunotherapy and presented with altered mental status, speech impairment and gait unsteadiness.  #.   -- CT scan of the head demonstrating significant subdural fluid collection over the left cerebral convexity, with mixed signal, in the setting of possible thrombocytopenia, hypocoagulable state and report of possible recent fall.  Neurosurgery recommended platelet transfusion and close monitoring.  #.   -- Concerns regarding seizure-like activity, although prolonged video EEG monitoring did not reveal any seizure-like activity or electrographic seizures.  --- Altered mental status, encephalopathy, multifactorial in etiology.  --- Report of gait instability, again could be multifactorial, subdural fluid collection, bilateral cerebellar metastatic lesions and chemotherapy induced peripheral neuropathy.  Recommendations:  1.  Continue seizure precautions  2.  Continue Keppra 500 mg twice daily  3.  Continue vital signs with neurochecks every 4 hours.  4.  Ativan as needed for breakthrough seizures.  5.  PT/OT/speech as tolerated.     #. PT/OT/Speech  --as tolerated          Time: 30 minutes evaluation and management.     Melanie Gold M.D.  [facility]  Office [phone]

## 2022-10-20 NOTE — PLAN OF CARE
Pt here with L SDH and encephalopathy post fall. A&Ox2, disoriented to time and situation. Neuros stable, mild expressive aphasia, baseline L blurry vision, moves all extremities, BLE 3/5, denies numb/tingling. SBP<150. Tele sinus tach. Pureed diet, mild thick liquids. Takes pills crushed in applesauce. Up with A2 lift. Denies pain. Pt scoring green on the Aggression Stop Light Tool. Plan pt received 1 U platlets this shift, recheck 0600. Platelet goal >100k, HGB goal >7 last check 7.6. Discharge pending clinical improvements.    Pt has sacral wound, per ICU RN WOC saw pt today and will place orders for wound care but completed dressing change today. Pt also with L foot skin tear. Pt has perineal bleeding/clotting that pt says is a chronic issue that her OBGYN is aware of and treating.

## 2022-10-20 NOTE — PROGRESS NOTES
HPI Comments: Pt presents today with c/o L pain since yesterday. Pain is confined to the lateral surface and denies of any known injury, no swelling or prior hx of similar sx. Pt has remote hx of brain aneurysm and has been on a L foot \" AFO\" form of support since age 12. Denies of any hip pain. Pain is always \" when i walk on it\" and is described as throbbing and had taken 3 tylenol without relief. Secondary concerns. \" i will like to get my prescription refilled for metfromin as well\"   Pt has recently lost his insurance and was not able to pay for this medications. Patient is a 45 y.o. male presenting with foot pain and medication refill. Foot Pain    Pertinent negatives include no numbness, no back pain and no neck pain. Medication Refill   Pertinent negatives include no chest pain and no abdominal pain. Past Medical History:   Diagnosis Date    Brain aneurysm     at 12years of age   [facility] ([address]     Hypertension        Past Surgical History:   Procedure Laterality Date    HX INTRACRANIAL ANEURYSM REPAIR           History reviewed. No pertinent family history. Social History     Social History    Marital status: SINGLE     Spouse name: N/A    Number of children: N/A    Years of education: N/A     Occupational History    Not on file. Social History Main Topics    Smoking status: Never Smoker    Smokeless tobacco: Never Used    Alcohol use No    Drug use: No    Sexual activity: Not on file     Other Topics Concern    Not on file     Social History Narrative         ALLERGIES: Review of patient's allergies indicates no known allergies. Review of Systems   Constitutional: Negative for chills and fever. HENT: Negative for ear discharge and ear pain. Respiratory: Negative for cough and chest tightness. Cardiovascular: Negative for chest pain. Gastrointestinal: Negative for abdominal distention, abdominal pain and blood in stool.    Genitourinary: Negative [facility]    Neurosurgery  Daily Note    Assessment & Plan   67-year-old female with history of breast cancer with mets to bone and brace transferred to [facility] on 10/18/22 nonverbal with imaging evidence of small left sided mixed density SDH with maximal thickness 8mm. Stable on rpt head CT yesterday.,   Alert and oriented, although states she was confused earlier this am.   No headache, no n/v.     Plan:  -Advance activity as tolerated  -Continue supportive and symptomatic treatment  -no surgical intervention.   -goal platelets 100k.     Micah Levine PA-C    Interval History   Stable.     Physical Exam   Temp: 98.9  F (37.2  C) Temp src: Axillary BP: 126/75 Pulse: (!) 122   Resp: 18 SpO2: 96 % O2 Device: None (Room air) Oxygen Delivery: 3 LPM  Vitals:    10/18/22 1700 10/19/22 0430   Weight: 76.2 kg (167 lb 15.9 oz) 76 kg (167 lb 8.8 oz)     Vital Signs with Ranges  Temp:  [97.3  F (36.3  C)-99.3  F (37.4  C)] 98.9  F (37.2  C)  Pulse:  [105-127] 122  Resp:  [13-25] 18  BP: ()/(38-75) 126/75  SpO2:  [90 %-100 %] 96 %  I/O last 3 completed shifts:  In: 2437.92 [I.V.:867.92]  Out: 2900 [Urine:2900]    Alert and oriented.  Moves all extremities equally.        Medications     NaCl 75 mL/hr at 10/20/22 0032        insulin aspart  1-4 Units Subcutaneous Q4H     levETIRAcetam  500 mg Intravenous Q12H     piperacillin-tazobactam  2.25 g Intravenous Q6H     sodium chloride (PF)  3 mL Intracatheter Q8H           Micah Levine PA-C  [facility]  [address]  [address]  [place] [address]    Tel [phone]  Pager [phone]     for flank pain, frequency, genital sores and hematuria. Musculoskeletal: Positive for arthralgias and neck stiffness. Negative for back pain, gait problem, joint swelling, myalgias and neck pain. Neurological: Negative for tremors, weakness and numbness. Vitals:    05/18/17 0957 05/18/17 1119   BP: (!) 156/108 (!) 158/106   Pulse: 81    Resp: 15    Temp: 98.5 °F (36.9 °C)    SpO2: 100%    Weight: 99.8 kg (220 lb)             Physical Exam   Constitutional: He is oriented to person, place, and time. He appears well-developed and well-nourished. HENT:   Head: Normocephalic and atraumatic. Eyes: Conjunctivae and EOM are normal. Pupils are equal, round, and reactive to light. Neck: Normal range of motion. Cardiovascular: Normal rate and regular rhythm. Pulmonary/Chest: Effort normal and breath sounds normal.   Abdominal: Soft. Bowel sounds are normal.   Musculoskeletal: Normal range of motion. Examination for L foot with no laceration or ulcer noted. Able to flex and extend without difficulty. NTTP to palpation over the MT including the lateral surface of L foot. NTTP over the plantar surface. Neurological: He is alert and oriented to person, place, and time. Skin: Skin is warm. Psychiatric: He has a normal mood and affect. His behavior is normal. Judgment and thought content normal.        MDM  Number of Diagnoses or Management Options  Elevated blood pressure reading:   Left foot pain:   Medication refill:   Diagnosis management comments: Will refill metformin and recommend trying [facility] for 4 dollar formulary,   Pt has established orthopedic for regular check up for \" L foot support\" will recommend follow up with them. ED Course       Procedures        DISCHARGE NOTE:  11:20 AM    Isa Weir's  results have been reviewed with him. He has been counseled regarding his diagnosis, treatment, and plan.   He verbally conveys understanding and agreement of the signs, symptoms, diagnosis, treatment and prognosis and additionally agrees to follow up as discussed. He also agrees with the care-plan and conveys that all of his questions have been answered. I have also provided discharge instructions for him that include: educational information regarding their diagnosis and treatment, and list of reasons why they would want to return to the ED prior to their follow-up appointment, should his condition change. CLINICAL IMPRESSION:    1. Medication refill    2. Left foot pain    3. Elevated blood pressure reading        AFTER VISIT PLAN:    Current Discharge Medication List      CONTINUE these medications which have CHANGED    Details   metFORMIN (GLUCOPHAGE) 500 mg tablet Take 1 Tab by mouth two (2) times daily (with meals) for 30 days. Qty: 60 Tab, Refills: 1      lisinopril (PRINIVIL, ZESTRIL) 20 mg tablet Take 1 Tab by mouth daily. Qty: 30 Tab, Refills: 1         CONTINUE these medications which have NOT CHANGED    Details   montelukast (SINGULAIR) 10 mg tablet Take 1 Tab by mouth daily. Qty: 20 Tab, Refills: 0              Follow-up Information     Follow up With Details Comments Contact Khushi Nelson MD In 2 days  [address] [address]  [phone]      orthopedic  Schedule an appointment as soon as possible for a visit in 1 day  please follow up wtih your orthopedic doctor   Dr Angele Mcardle.             Written by Faina Martinez PA-C

## 2022-10-20 NOTE — PLAN OF CARE
Reason for Admission: [facility] Hx breast CA with mets    Cognitive/Mentation: A/Ox 2, disoriented to situation and time  Neuros/CMS: Intact ex general weakness, BLE 3/5, pupils sluggish,  VS: stable, low grade temp x 1.   .  GI: BS positiv,  flatus, last BM 10/20. InContinent.  : . Pure wick  Pulmonary: LS diminished.  Pain: bottom sore from redness in perinium.     Drains/Lines: port a cath  Skin: dry, redness in heels and coccyx and perinium  Activity: Assist x 2 with lift.  Diet: level 4 pureed with mild thick liquids. Takes pills crushed in applesauce.       Discharge: pending    Aggression Stoplight Tool: green    End of shift summary: patient was tested for covid today again after we found out her  had it and she tested positive today, patient had BM x3, very red bottom, wound care to coccyx done today, patient received platlets today a 1 time dose of remdesivir. Poor appetite, episodes of nausea.

## 2022-10-20 NOTE — PROGRESS NOTES
[facility] Consultation    Elenita Moran MRN# [id number]   YOB: 1955 Age: 67 year old   Date of Admission: 10/18/2022  Requesting physician: Dr. Vail  Reason for consult: Metastatic breast cancer, subdural hematoma           Assessment and Plan:   Primary Oncologist: Dr. Varma    Metastatic breast cancer with bone, liver, brain, bone marrow mets  - extensive prior treatment  - currently on Enhertu (Fam-trastuzumab deruxtecan-nxki) Q21 days. Cycle 3 administered on 10/11/22.   Potential side effects include pancytopenia, severe pneumonitis and LV dysfunction.  - 10/6/22 received a single fraction SBRT to T5 lesion  - 04/02/22 cerebellar stereotactic radiosurgery,.   - Follow up MRI 10/13/22 showing interim regression of 2 punctate enhancing foci in the cerebellum.  Also notes was expansion of L subdural fluid collection, now measuring 6 mm, and thickening of overlying diffuse pachymeningeal enhancement.     Encephalopathy  - increased difficulties witht gait and balance for past 10 days.  Over past two days, has had increasing difficulties with speech and was non-verbal upon presentation.  - Seen in ED at [facility] on 10/18 where MRI showed A mixed density left holohemispheric subdural collection with some areas of increased density.  The collection measures 8.3 mm over the left frontal convex city, 8.2 mm over the left parietal convexity is 3.3 mm over the posterior left temporal convexity.  There is associated mass-effect with a 2 mm midline shift to the right at the level of the lateral ventricles.  This scan in the ER was compared to 3/25/2022  - transferred to [facility] for neurosurgery eval  -  reports fall on 10/8. Unknown if head trauma occurred.     Thrombocytopenia  - likely due to current cancer treatment +/- acute illness +/- malignancy  - platelet count upon admission 38,000 (were 288k in clinic on 10/11/22.    - INR 1.27, PTT 38  - has received vitamin k 1 mg  - per  neurosurgery, goal platelet count 100K  - Plt 10/20 72k -> 1 unit platelet given     Elevated LFTs   - most recent labs in clinic on 10/11/22 show alk phos 661, , ALT 50  - could be treatment related (about 40% LFT abnormalities with Enhurtu) or metastatic disease (innumberable liver mets noted on 8/25/22 PET )    KATHLEEN  - creat 3.25 upon admission up from 1.02 in our office on 10/11/22  - dehydration may be contributing  - nephrology is following.  - UOP is good.   - Bumex, spironolactone and lisinopril on hold     Possible UTI  Possible pneumonia  - cultures pending  - on zosyn    PLAN  - Continues with improved speech, work with OT/PT   - If she continues to improve, will follow this with restaging CT scans   - If she has disease progression, Dr. Varma favors comfort / supportive measures   - Seizure precautions and Keppra per neuro/neurosurgery   - Continue platelet transfusion, recheck platelet count 1 hour after transfusions finish. Will order repeat now. Maintain platelets >100k per neurosurgery with CNS bleed.     Code status: DNR/DNI    Patrick Dreyer, DO  [facility]              Chief Complaint:   No chief complaint on file.           History of Present Illness:   Interval history: She reports she is still on bedrest, but able to move all her extremities. Her speech is coherent and fluent. She has no acute complaints. She received 1 unit of platelets this morning.     ONCOLOGIC HISTORY:    1.     The patient had presented with multifocal tumor in the left breast, 7 x 6 x 4 cm, in August 2010.    2.     The patient underwent left breast mastectomy. Her tumor was ER positive 63%, OR positive 17%, and HER-2 negative by FISH.    3.     The patient was treated with Adriamycin plus Cytoxan, followed by weekly Taxol.    4.     In May 2011, the patient had started anastrozole 1 mg orally daily.    5.     The patient was found to have a bony metastasis and the patient underwent radiation from July 2011  until October 2011 for L1, L2, and L3 vertebral bodies.    6.     In January 2015, PET scan showed no evidence of malignancy.    7.  On January 31, 2017 patient has started second line hormonal treatment with Faslodex plus Ibrance.    8.  The patient also underwent CyberKnife to painful L3 vertebral body mass she completed 2400 cGy in 4 fractions on 3/28/2017.  Prior to that she was also treated to a right rib lesion as well as T4, T6-T7 and a posterior left-sided rib lesion.  The latter received 3000 cGy in 10 fractions completing those treatments 3/14/2017..    9. She had disease progression in March 2019 per PET imaging and therapy was changed to everolimus and exemestane on 4/24/19.    10. From 12/6/2019 until 3/30/2020 she was treated with 1st line chemo (refused PIQRAY) and has completed 6 cycles of Abraxane and but discontinue due to neuropathy. She had partial response to treatment.     11. On 3/30/2020 she started 3rd line hormonal treatment: Piqray orally daily plus Faslodex.     12. On 9/24/2020 CT chest abdomen and pelvis Stable appearance of the sacral fractures, left superior and inferior pubic rami fracture, and T10 vertebral body fracture and left 11th rib posteriorly.    13. On 2/9/2021 PET scan demonstrated progressive disease with renewed metabolic activity in a few scattered skeletal metastases. Significant muscular FDG uptake, which decreases the bioavailability of FDG for tumor. As a result, the scan likely underestimates the extent of disease activity. Healing fractures in the pelvis and spine with a presumed small hematoma overlying the sacral fractures.    14. On 2/23/2021 she started Xeloda orally due to progression.     15. On 4/23/2021 PET scan showed mild progression in anterolateral 5th rib and possible liver lesion.     16. On 5/3/2021 she has started Doxil 50 mg/m2 IV every 4 weeks    17. On 7/19/2021 CT CAP showed mild fullness in right paraspinal muscles. left obturator ring  fracture. complex B/L sacral ala fractures.     18. On 2021 MRI of pelvis showed two complex fluid collections along fractured left superior and inferior pubic rami    19. On 2021 PET/CT showed . While there is persistent FDG avid disease throughout the osseous structures, there is resolution of the right hepatic lobe lesion suggesting partial response to therapy. worsening inflammatory change associated with the pathologically fractured left anterior pubic ramus and right sacrum/iliac bone with development of right gluteal and right paraspinal intramuscular hematomas.    20.  On 2021 PET scan revealed stable bony metastases. No worsening.    21.  On 2021 PET scan revealed a new liver metastases bony metastases are stable or better.    22.  On February 15, 2022 CT-guided biopsy of liver lesion revealed HER-2 positive breast cancer ER/SD negative.    23.  On 2022 MRI of brain revealed 3-4 mm suspicious cerebellar lesions.    24.  On 2022 PET scan revealed substantial partial favorable treatment response of metastatic breast cancer involving the liver, skeleton and right pleura.  No new metastases.    25.  On 2022 PET scan revealed decrease in metabolic activity and right pleural thickening.  However there is reactivation of innumerable lesions through liver parenchyma and lesion in T5 vertebral body.  This is suspicious for progressive disease.    26.  33 began Enhertu (Fam-trastuzumab deruxtecan-nxki) Q21 days. Cycle 3 administered on 10/11/22.             Physical Exam:   Vitals were reviewed  Blood pressure 116/83, pulse 116, temperature 97.5  F (36.4  C), temperature source Axillary, resp. rate 16, weight 76 kg (167 lb 8.8 oz), SpO2 96 %.  Temperatures:  Current - Temp: 97.5  F (36.4  C); Max - Temp  Av.8  F (36.6  C)  Min: 97.5  F (36.4  C)  Max: 98.2  F (36.8  C)  Respiration range: Resp  Av  Min: 8  Max: 45  Pulse range: Pulse  Av.7  Min: 83  Max:  110  Blood pressure range: Systolic (24hrs), Av , Min:61 , Max:129   ; Diastolic (24hrs), Av, Min:40, Max:95    Pulse oximetry range: SpO2  Av.2 %  Min: 72 %  Max: 99 %    Intake/Output Summary (Last 24 hours) at 10/19/2022 0951  Last data filed at 10/19/2022 0845  Gross per 24 hour   Intake 2223.33 ml   Output 1600 ml   Net 623.33 ml       GENERAL: No acute distress. Currently in the midst of EEG monitoring  SKIN: No rashes or jaundice.  HEENT: Normocephalic, atraumatic. Eyes anicteric. Oropharynx is clear.  LYMPH: No palpable lymphadenopathy in the cervical, supraclavicular, axillary, or inguinal regions.  HEART: Regular rate and rhythm with no murmurs.  LUNGS: Clear bilaterally.  ABDOMEN: Soft, nontender, nondistended with no palpable hepatosplenomegaly.  EXTREMITIES: No clubbing, cyanosis. Bilateral LE edema, pitting  MENTAL: Alert and oriented to person, place. Able to tell me about her children.   NEURO: follows commands              Past Medical History:   I have reviewed this patient's past medical history  Past Medical History:   Diagnosis Date     Allergic rhinitis      Bone cancer (H) 2011    breast mets     Breast cancer (H) 2010    left mastectomy     Depression      DM (diabetes mellitus) (H)      Fibromyalgia      Hx antineoplastic chemotherapy 2010     Hx of radiation therapy 2010     Hyperlipemia      Lactose intolerance      Mini stroke     R EYE     Osteoarthritis      Tobacco dependency              Past Surgical History:   I have reviewed this patient's past surgical history  Past Surgical History:   Procedure Laterality Date     APPENDECTOMY       INSERT INTRACORONARY STENT      R Hand     IR CHEST PORT PLACEMENT > 5 YRS OF AGE  2019     IR CHEST PORT PLACEMENT > 5 YRS OF AGE  3/14/2022     IR LUMBAR TRANSFORAMINAL EPIDURAL STEROID INJECTION  2019     IR LUMBAR TRANSFORAMINAL EPIDURAL STRD INJ  2019     IR PORT PLACEMENT >5 YEARS  2019     IR PORT REMOVAL  RIGHT  3/14/2022     IR SITE CHECK/EVALUATION  4/15/2022     MAMMOPLASTY REDUCTION Right 2015    hx of left mastectomy and right breast reduction     MASTECTOMY Left 2010     OTHER SURGICAL HISTORY      biopsy of upper and right tongue     OVARIAN CYST REMOVAL       REVISE RECONSTRUCTED BREAST Left     March 11, 2015               Social History:   I have reviewed this patient's social history  Social History     Tobacco Use     Smoking status: Former     Packs/day: 0.75     Years: 20.00     Pack years: 15.00     Types: Cigarettes     Smokeless tobacco: Never   Substance Use Topics     Alcohol use: Not Currently     Alcohol/week: 1.0 standard drink     Types: 1 Standard drinks or equivalent per week     Comment: 1 glass of wine/week             Family History:   I have reviewed this patient's family history  Family History   Problem Relation Age of Onset     Lung Cancer Mother      Pancreatic Cancer Mother      Kidney Cancer Maternal Grandmother      Lung Cancer Paternal Aunt      Breast Cancer Cousin              Allergies:     Allergies   Allergen Reactions     Gabapentin      Upper body edema/swelling.         Morphine Visual Disturbance     Visual hallucinations     Sulfamethoxazole-Trimethoprim      Other reaction(s): Hives     Sulfa Drugs Hives and Rash     welts               Medications:   I have reviewed this patient's current medications  Facility-Administered Medications Prior to Admission   Medication Dose Route Frequency Provider Last Rate Last Admin     lidocaine (XYLOCAINE) 2 % external gel   Topical Q4H PRN Frankie Vega MD   1 mL at 07/26/22 0906     Medications Prior to Admission   Medication Sig Dispense Refill Last Dose     ACETAMINOPHEN PO Take 1,300 mg by mouth 3 times daily Alternates with Ibuprofen        aspirin 81 MG EC tablet Take 81 mg by mouth daily        bumetanide (BUMEX) 2 MG tablet Take 2 mg by mouth daily as needed Can take midday if needed for fluid retention/swelling         cephALEXin (KEFLEX) 500 MG capsule Take 1 capsule (500 mg) by mouth 2 times daily 180 capsule 3      Cholecalciferol (VITAMIN D) 125 MCG (5000 UT) capsule Take 1 tablet by mouth daily         clobetasol (TEMOVATE) 0.05 % external ointment Apply topically daily as needed        docusate sodium (COLACE) 100 MG capsule Take 100 mg by mouth daily        DULoxetine (CYMBALTA) 30 MG capsule Take 30 mg by mouth every morning         DULoxetine (CYMBALTA) 60 MG capsule Take 1 capsule by mouth At Bedtime        ezetimibe-simvastatin (VYTORIN) 10-20 MG tablet Take 1 tablet by mouth At Bedtime        HYDROmorphone (DILAUDID) 4 MG tablet Take 1 tablet (4 mg) by mouth 3 times daily as needed for moderate to severe pain or severe pain (Patient taking differently: Take 4 mg by mouth 3 times daily AM - Midday - PM) 12 tablet 0      hydrOXYzine (VISTARIL) 25 MG capsule Take 25 mg by mouth 3 times daily With hydromorphone        ibuprofen (ADVIL/MOTRIN) 200 MG capsule Take 400 mg by mouth 3 times daily Alternating with acetaminophen        insulin aspart (NOVOLOG FLEXPEN) 100 UNIT/ML pen Inject Subcutaneous 3 times daily (with meals) Sliding scale dose based on blood glucose and diet        Insulin Glargine (BASAGLAR KWIKPEN SC) Inject 8 Units Subcutaneous every morning Uses as directed        Iron Combinations (IRON COMPLEX PO) Take 1 tablet by mouth daily Ferrous biogluconate supplement        lidocaine (XYLOCAINE) 5 % external ointment Apply topically 4 times daily (Patient taking differently: Apply topically 4 times daily as needed for moderate pain) 50 g 0      lisinopril (ZESTRIL) 2.5 MG tablet Take 1 tablet (2.5 mg) by mouth daily 30 tablet 3      MAGNESIUM PO Take by mouth daily OTC supplement        metoprolol succinate ER (TOPROL-XL) 50 MG 24 hr tablet Take 1 tablet (50 mg) by mouth daily 90 tablet 3      Multiple Vitamins-Minerals (ICAPS PO) Take 1 capsule by mouth daily        nystatin (MYCOSTATIN) 678419 UNIT/GM  external cream Apply topically 2 times daily (Patient taking differently: Apply topically daily as needed) 30 g 1      OLANZapine (ZYPREXA) 5 MG tablet Take 5 mg by mouth every evening         omeprazole (PRILOSEC) 20 MG DR capsule Take 20 mg by mouth At Bedtime         oxybutynin (DITROPAN) 5 MG tablet Take 5 mg by mouth 2 times daily AM and Midday        oxybutynin ER (DITROPAN-XL) 10 MG 24 hr tablet Take 10 mg by mouth every evening        POTASSIUM CHLORIDE PO Take by mouth daily OTC supplement        Prenatal Vit-Fe Fumarate-FA (PRENATAL MULTIVITAMIN  PLUS IRON) 27-1 MG TABS Take 1 tablet by mouth daily         psyllium (METAMUCIL/KONSYL) Packet Take 1 packet by mouth daily as needed for constipation        spironolactone (ALDACTONE) 25 MG tablet Take 0.5 tablets (12.5 mg) by mouth daily 90 tablet 3      vitamin (B COMPLEX) tablet Take 1 tablet by mouth daily        zolpidem (AMBIEN) 10 MG tablet Take 10 mg by mouth At Bedtime        B-D U/F 31G X 8 MM insulin pen needle USE 4 TIMES DAILY        blood glucose (ONETOUCH VERIO IQ) test strip         bumetanide (BUMEX) 2 MG tablet Take 1 tablet (2 mg) by mouth 2 times daily (Patient taking differently: Take 2 mg by mouth daily) 180 tablet 3      Continuous Blood Gluc  (FREESTYLE KIMBERLY 14 DAY READER) COLETTE Use to check blood sugar at least 4 times a day or as directed         Continuous Blood Gluc Sensor (FREESTYLE KIMBERLY 14 DAY SENSOR) MISC Use as directed to test blood sugar at least 4 times a day or as directed        glucose (BD GLUCOSE) 4 g chewable tablet as needed        LORazepam (ATIVAN) 0.5 MG tablet take 1 tablet by mouth up to 3 times per day as needed for nausea, anxiety, or insomnia (Patient not taking: Reported on 10/19/2022)   Not Taking     medical cannabis (Patient's own supply) See Admin Instructions (The purpose of this order is to document that the patient reports taking medical cannabis.  This is not a prescription, and is not used to  certify that the patient has a qualifying medical condition.) (Patient not taking: Reported on 10/19/2022)   Not Taking     prochlorperazine (COMPAZINE) 10 MG tablet Take 10 mg by mouth every 6 hours as needed for nausea or vomiting (Patient not taking: Reported on 10/19/2022)   Not Taking     Current Facility-Administered Medications Ordered in Epic   Medication Dose Route Frequency Last Rate Last Admin     acetaminophen (TYLENOL) tablet 650 mg  650 mg Oral Q4H PRN        Or     acetaminophen (TYLENOL) solution 650 mg  650 mg Per NG tube Q4H PRN         acetaminophen (TYLENOL) Suppository 650 mg  650 mg Rectal Q4H PRN         bisacodyl (DULCOLAX) suppository 10 mg  10 mg Rectal Daily PRN         glucose gel 15-30 g  15-30 g Oral Q15 Min PRN        Or     dextrose 50 % injection 25-50 mL  25-50 mL Intravenous Q15 Min PRN        Or     glucagon injection 1 mg  1 mg Subcutaneous Q15 Min PRN         insulin aspart (NovoLOG) injection (RAPID ACTING)  1-4 Units Subcutaneous Q4H   1 Units at 10/19/22 0812     levETIRAcetam (KEPPRA) intermittent infusion 500 mg  500 mg Intravenous Q12H   500 mg at 10/19/22 0345     piperacillin-tazobactam (ZOSYN) 2.25 g vial to attach to  ml bag  2.25 g Intravenous Q8H   2.25 g at 10/19/22 0346     polyethylene glycol (MIRALAX) Packet 17 g  17 g Oral Daily PRN         prochlorperazine (COMPAZINE) injection 5 mg  5 mg Intravenous Q6H PRN        Or     prochlorperazine (COMPAZINE) tablet 5 mg  5 mg Oral Q6H PRN        Or     prochlorperazine (COMPAZINE) suppository 12.5 mg  12.5 mg Rectal Q12H PRN         senna-docusate (SENOKOT-S/PERICOLACE) 8.6-50 MG per tablet 1 tablet  1 tablet Oral BID PRN        Or     senna-docusate (SENOKOT-S/PERICOLACE) 8.6-50 MG per tablet 2 tablet  2 tablet Oral BID PRN         sodium chloride 0.45% infusion   Intravenous Continuous 75 mL/hr at 10/19/22 0859 Rate Change at 10/19/22 0859     No current [facility]-ordered outpatient medications on file.              Review of Systems:     The 10 point Review of Systems is negative other than noted in the HPI.            Data:   Data   Results for orders placed or performed during the hospital encounter of 10/18/22 (from the past 24 hour(s))   Prepare red blood cells (unit)   Result Value Ref Range    Blood Component Type Red Blood Cells     Product Code [id number]     Unit Status Transfused     Unit Number [id number]     CROSSMATCH Compatible     CODING SYSTEM [id number]     ISSUE DATE AND TIME [id number]     UNIT ABO/RH A+     UNIT TYPE ISBT 6200    EEG Video 2-12 HRS Ummonitored    Narrative    EEG Video 2-12 HRS Ummonitored Result    VIDEO EEG DATE: 10/19/2022  VIDEO EEG LOG: [facility]  LTV DAY ONE  VIDEO EEG DAY#: 1  VIDEO EEG SOURCE FILE DURATION: 5 HRS 42 MINS    PATIENT INFORMATION: Elenita Moran is a 67 year old year old   female who presents with AMS. EEG is being done to rule out subclinical   seizures .     TECHNICAL SUMMARY: This is a video-EEG monitoring study. EEG was recorded   from 23 scalp electrodes placed according to the 10-20 international   system. Additional electrodes were utilized for referencing, artifact   detection, and recording from other cerebral regions. Qualified   technicians attached EEG electrodes, set up the study, monitored and   reviewed EEG recordings, and disconnected EEG electrodes when appropriate.   Video was continuously recorded. Video was reviewed for clinical   correlation and to assist with EEG interpretations.     BACKGROUND ACTIVITIES: During this EEG recording, there is moderate   generalized slowing of the background activities throughout the recording   with mostly theta activities.  Occasional brief bursts of generalized   delta activities were also observed. No posterior dominant rhythm was   observed. Sleep stages were recorded with poorly formed sleep   architectures. Hyperventilation and photic stimulation were not performed.    EEG is reactive to external  stimuli.     INTERICTAL ACTIVITIES: No epileptiform activities were observed during   this recording.     ICTAL ACTIVITIES: There are no clinical or electrographic seizures during   this recording.     Impression:  This is an abnormal EEG due to the presence of moderate   generalized slowing of the background activities. Findings are consistent   with moderate diffuse encephalopathy of which the etiology is   non-specific.  No epileptiform activities or seizures were observed.      Anusha Zurita MD  EPILEPSY STAFF      Glucose by meter   Result Value Ref Range    GLUCOSE BY METER POCT 227 (H) 70 - 99 mg/dL   Basic metabolic panel   Result Value Ref Range    Sodium 135 133 - 144 mmol/L    Potassium 5.5 (H) 3.4 - 5.3 mmol/L    Chloride 104 94 - 109 mmol/L    Carbon Dioxide (CO2) 19 (L) 20 - 32 mmol/L    Anion Gap 12 3 - 14 mmol/L    Urea Nitrogen 92 (H) 7 - 30 mg/dL    Creatinine 2.26 (H) 0.52 - 1.04 mg/dL    Calcium 8.8 8.5 - 10.1 mg/dL    Glucose 266 (H) 70 - 99 mg/dL    GFR Estimate 23 (L) >60 mL/min/1.73m2   CBC with platelets   Result Value Ref Range    WBC Count 4.9 4.0 - 11.0 10e3/uL    RBC Count 2.59 (L) 3.80 - 5.20 10e6/uL    Hemoglobin 7.6 (L) 11.7 - 15.7 g/dL    Hematocrit 22.0 (L) 35.0 - 47.0 %    MCV 85 78 - 100 fL    MCH 29.3 26.5 - 33.0 pg    MCHC 34.5 31.5 - 36.5 g/dL    RDW 15.5 (H) 10.0 - 15.0 %    Platelet Count 68 (L) 150 - 450 10e3/uL   Glucose by meter   Result Value Ref Range    GLUCOSE BY METER POCT 241 (H) 70 - 99 mg/dL   Glucose by meter   Result Value Ref Range    GLUCOSE BY METER POCT 197 (H) 70 - 99 mg/dL   Basic metabolic panel   Result Value Ref Range    Sodium 135 133 - 144 mmol/L    Potassium 5.3 3.4 - 5.3 mmol/L    Chloride 107 94 - 109 mmol/L    Carbon Dioxide (CO2) 19 (L) 20 - 32 mmol/L    Anion Gap 9 3 - 14 mmol/L    Urea Nitrogen 83 (H) 7 - 30 mg/dL    Creatinine 2.08 (H) 0.52 - 1.04 mg/dL    Calcium 8.7 8.5 - 10.1 mg/dL    Glucose 202 (H) 70 - 99 mg/dL    GFR Estimate 26 (L) >60  mL/min/1.73m2   Glucose by meter   Result Value Ref Range    GLUCOSE BY METER POCT 168 (H) 70 - 99 mg/dL   Basic metabolic panel   Result Value Ref Range    Sodium 137 133 - 144 mmol/L    Potassium 5.1 3.4 - 5.3 mmol/L    Chloride 110 (H) 94 - 109 mmol/L    Carbon Dioxide (CO2) 20 20 - 32 mmol/L    Anion Gap 7 3 - 14 mmol/L    Urea Nitrogen 75 (H) 7 - 30 mg/dL    Creatinine 1.69 (H) 0.52 - 1.04 mg/dL    Calcium 9.0 8.5 - 10.1 mg/dL    Glucose 142 (H) 70 - 99 mg/dL    GFR Estimate 33 (L) >60 mL/min/1.73m2   CBC with platelets   Result Value Ref Range    WBC Count 3.1 (L) 4.0 - 11.0 10e3/uL    RBC Count 2.53 (L) 3.80 - 5.20 10e6/uL    Hemoglobin 7.6 (L) 11.7 - 15.7 g/dL    Hematocrit 22.2 (L) 35.0 - 47.0 %    MCV 88 78 - 100 fL    MCH 30.0 26.5 - 33.0 pg    MCHC 34.2 31.5 - 36.5 g/dL    RDW 15.3 (H) 10.0 - 15.0 %    Platelet Count 72 (L) 150 - 450 10e3/uL   Lactic Acid STAT   Result Value Ref Range    Lactic Acid 1.2 0.7 - 2.0 mmol/L   CONDITIONAL Prepare pheresed platelets (unit)   Result Value Ref Range    Blood Component Type Platelets     Product Code [id number]     Unit Status Transfused     Unit Number [id number]     CODING SYSTEM [id number]     ISSUE DATE AND TIME [id number]     UNIT ABO/RH A+     UNIT TYPE ISBT 6200    Glucose by meter   Result Value Ref Range    GLUCOSE BY METER POCT 137 (H) 70 - 99 mg/dL   Asymptomatic COVID-19 Virus (Coronavirus) by PCR Nasopharyngeal    Specimen: Nasopharyngeal; Swab   Result Value Ref Range    SARS CoV2 PCR Positive (A) Negative    Narrative    Testing was performed using the Xpert Xpress SARS-CoV-2 Assay on the   Cepheid Gene-Xpert Instrument Systems. Additional information about   this Emergency Use Authorization (EUA) assay can be found via the Lab   Guide. This test should be ordered for the detection of SARS-CoV-2 in   individuals who meet SARS-CoV-2 clinical and/or epidemiological   criteria. Test performance is unknown in asymptomatic patients. This   test is for  in vitro diagnostic use under the FDA EUA for   laboratories certified under CLIA to perform high complexity testing.   This test has not been FDA cleared or approved. A negative result   does not rule out the presence of PCR inhibitors in the specimen or   target RNA in concentration below the limit of detection for the   assay. The possibility of a false negative should be considered if   the patient's recent exposure or clinical presentation suggests   COVID-19. This test was validated by the [facility]. This laboratory is certified under the Clinical Laboratory Improvement Amendments of 1988 (CLIA-88) as qualified to perform high complexity laboratory testing.     Glucose by meter   Result Value Ref Range    GLUCOSE BY METER POCT 145 (H) 70 - 99 mg/dL     Total time spent: 35 minutes.

## 2022-10-20 NOTE — PLAN OF CARE
[id number]  Pt here with L SDH, AMS, recent fall. A&Ox2, , disoriented to situation and time, confused. Neuros are, mild expressive aphasia,  R pupil sluggish, generalized weakness,  BLE 3/5.  VSS. Tele sinus tachy. Pureed diet, mildly liquids. Takes pills crushed in applesauce. L port, patent and intact. Sodium chloride 0.45% infusion at 75 ml/hr. Up with A2/lift, bedrest this shift, T&Repo.  Denies pain. Pt scoring yellow on the Aggression Stop Light Tool. Plan is to continue to monitor Hgb and platelet. Discharge pending.

## 2022-10-20 NOTE — PROGRESS NOTES
Nephrology Progress Note  10/20/2022         ASSESSMENT AND RECOMMENDATIONS:   1 acute renal failure-severe. last known creatinine of 1.1 in September.  Possible contributors includes prerenal state with poor oral intake, concurrent use of bumetanide, spironolactone, lisinopril, ?  Ibuprofen  -Responding well to IV fluid.  Nonoliguric.    Cr down to 1.6 from peak of 3.3       2  hyperkalemia-secondary to severe acute renal failure, concurrent use of oral KCl, spironolactone, lisinopril  -Being medically managed.  Potassium down to 5.1     3 presumed traumatic subdural hematoma-nonsurgical management per neurosurgery     4 hyponatremia-sodium 123 on presentation.  Up to 133 this morning.  - now normal      5 metastatic breast cancer with mets to bone/liver/brain-on Enhertu q. 21 days.  Last dose on 10/11/2022     6 thrombocytopenia-likely related to chemotherapy.     Discussion-    Rapidly resolving KATHLEEN .  Expect kidney function will continue to improve.  -Continue to hold Bumex/spironolactone/lisinopril  - Titrate IVF down to off if pt able to keep up with oral intake.     Will sign off. Plz call back with questions.     Leta Dooley MD  [facility] - Nephrology   [phone]      Interval History :   Seen / examined.   Much more awake today . AAO X3  Afebrile  Kidney function continues to improve . 2.9 l UOP    Review of Systems:   A 4 point review of systems was negative except as noted above.  Notably: poor appetite. no nausea or vomiting or diarrhea.  no confusion,  no fever or chills    Physical Exam:   I/O last 3 completed shifts:  In: 2437.92 [I.V.:867.92]  Out: 2900 [Urine:2900]    /61   Pulse 113   Temp 97.8  F (36.6  C) (Axillary)   Resp 16   Wt 76 kg (167 lb 8.8 oz)   SpO2 96%   BMI 27.88 kg/m      GENERAL APPEARANCE: alert and no distress  Pulmonary: Clear anteriorly  CV: regular rhythm, normal rate, no rub   - Edema +, pitting  GI: soft, nontender,   MS: no evidence of  inflammation in joints  SKIN: no rash, warm, dry, no cyanosis  NEURO: face symmetric, moving all 4 extremities    Labs:   All labs reviewed by me  Electrolytes/Renal - Recent Labs   Lab Test 10/20/22  1140 10/20/22  0842 10/20/22  0644 10/20/22  0358 10/20/22  0027 10/19/22  2010 10/19/22  1745 10/19/22  0422 10/19/22  0420 10/18/22  2132 10/18/22  1737 02/15/22  0657 02/15/22  0451   NA  --   --  137  --  135  --  135   < >  --    < > 127*   < > 138   POTASSIUM  --   --  5.1  --  5.3  --  5.5*   < >  --    < > 7.2*   < > 3.7   CHLORIDE  --   --  110*  --  107  --  104   < >  --    < > 97   < > 100   CO2  --   --  20  --  19*  --  19*   < >  --    < > 16*   < > 23   BUN  --   --  75*  --  83*  --  92*   < >  --    < > 121*   < > 19   CR  --   --  1.69*  --  2.08*  --  2.26*   < >  --    < > 3.32*   < > 0.62   * 137* 142*   < > 202*   < > 266*   < >  --    < > 143*   < > 130*   BENJI  --   --  9.0  --  8.7  --  8.8   < >  --    < > 8.4*   < > 8.9   MAG  --   --   --   --   --   --   --   --  2.4*  --  2.3  --  1.9   PHOS  --   --   --   --   --   --   --   --  7.2*  --  8.7*  --   --     < > = values in this interval not displayed.       CBC -   Recent Labs   Lab Test 10/20/22  0644 10/19/22  1745 10/19/22  1207   WBC 3.1* 4.9 5.5   HGB 7.6* 7.6* 6.0*   PLT 72* 68* 94*       LFTs -   Recent Labs   Lab Test 10/19/22  0030 10/18/22  1737 10/18/22  1041   ALKPHOS 437* 468* 564*   BILITOTAL 0.8 0.5 0.3   ALT 86* 93* 99*   * 143* 154*   PROTTOTAL 5.2* 5.5* 5.6*   ALBUMIN 2.0* 1.9* 2.8*       I  Current Medications:    insulin aspart  1-4 Units Subcutaneous Q4H     levETIRAcetam  500 mg Intravenous Q12H     piperacillin-tazobactam  2.25 g Intravenous Q6H     sodium chloride (PF)  3 mL Intracatheter Q8H       NaCl 75 mL/hr at 10/20/22 0032     Leta Dooley MD

## 2022-10-20 NOTE — PROGRESS NOTES
[facility]    Medicine Progress Note - Hospitalist Service    Date of Admission:  10/18/2022    67 year old female with pmh of metastatic breast cancer (cerebeller    Assessment & Plan   Acute metabolic encephalopathy   reports increased difficulty with gait/balance since fall 10 days prior, in past 48 hours increasingly confused with difficulty with speech and essentially non-verbal morning of presentation.  Possibly due to subdural fluid collection vs marked electrolyte abnormalities (uremia, hyponatremia, hyperkalemia) vs intracranial mets vs infection.   - management of various issues as below     Suspected traumatic SDH  Outside CT shows left holohemispheric subdural fluid collection with 2 mm midline shift.   reports fall on Oct 8 (unknown if any head trauma).    Neurosurgery and neurocritical care consulted, recommended expectant management  Follow-up CT head stable  - started on keppra  - q2h neuro checks currently.   - seizure precautions  - goal SBP <150, head of bed 30 degrees  - goal platelets of > 100, conditional order placed. May need to discuss with neurosurgery, not certain she will be able to maintain this  - SLP eval  - neurosurgery and neurology consulted    Mild covid 19 infection  No hypoxemia  Plan  - 3 days remdesivir  - special precautions     Thrombocytopenia  Abnormal coags  Admission platelet 38, INR 1.27 and PTT 38.  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   reports this has been increasing in size over past week  as she has spent more time in her chair.    * Admission UA with >182 WBC, leukocyte esterase.  * Outside CXR with mild left basilar opacity  * procalcitonin of 8, unclear significance in the setting of significant renal dysfunction  - continue zosyn for today as we follow-up on the cultures  - follow blood and urine culture  - WOC consult, will likely need Surgical assessment     Hyponatremia  Hyperkalemia  Uremia  Anion gap metabolic acidosis  KATHLEEN  Cr as low as 0.8 in June 2022, on most recent check Sept 2022 was 1.13.  Admission Na 127, K 7.2, bicarb 16 (gap down to 14), .   reporting minimal oral intake over past 2 days.  Edema on exam, but suspect intravascularly dry with hypoalbuminemia and poor oral intake.    shifted potassium with insulin overnight  -  1/2 NS a t75  - repeat bmps daily  - Nephrology consult     Stage IV metastatic breast cancer (mets to brain, liver, bone) s/p mastectomy  Elevated LFT's  Followed by Dr. Varma of [facility].  Currently on immunotherapy and receiving radiation to spine (completed brain radiation for cerebellar mets).  Recent baseline LFT's unknown, suspect due to liver mets.  No signs of tenderness on exam.    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  Outside trop 183, suspect demand ischemia.    Echocardiogram with EF of 40-45%  - monitor I/O's and daily weights     DM II with peripheral neuropathy  - low dose ssi  - check A1C     Chronic anemia  Baseline hgb about 8 g/dL.   - monitor, transfuse if < 7, conditional order placed     Depression  - resume cymbalta       Diet: Combination Diet Moderate Consistent Carb (60 g CHO per Meal) Diet; Mildly Thick (level 2); Pureed Diet (level 4); Mildly Thick (level 2); No Straws    DVT Prophylaxis: Pneumatic Compression Devices  Jiang Catheter: Not present  Central Lines: PRESENT       Cardiac Monitoring: ACTIVE order.  Indication: Electrolyte Imbalance (24 hours)- Magnesium <1.3 mg/ml; Potassium < =2.8 or > 5.5 mg/ml  Code Status: Full Code      Disposition Plan      Expected Discharge Date: 10/23/2022                The patient's care was discussed with the Patient and her     Chaparro Walter, DO  Hospitalist Service  [facility]  Securely message with the [employer] Web Console (learn more here)  Text page via [employer] Paging/Directory         Clinically Significant Risk Factors        # Hyperkalemia: Highest K = 7.2 mmol/L (Ref range: 3.4-5.3) in last 2 days, will monitor as appropriate  # Hyponatremia: Lowest Na = 126 mmol/L (Ref range: 136-145) in last 2 days, will monitor as appropriate   # Hypercalcemia: corrected calcium is >10.1, will monitor as appropriate    # Hypoalbuminemia: Lowest albumin = 1.9 g/dL (Ref range: 3.5-5.2) at 10/18/2022  5:37 PM, will monitor as appropriate   # Thrombocytopenia: Lowest platelets = 38 (Ref range: 150-450) in last 2 days, will monitor for bleeding                 ______________________________________________________________________    Interval History   Less confused today. Moving all extremities. Positive for COVID    Data reviewed today: I reviewed all medications, new labs and imaging results over the last 24 hours. I personally reviewed head CT: left subdural hematoma with 2 mm of shift, stable on repeat CT    Physical Exam   Vital Signs: Temp: 97.8  F (36.6  C) Temp src: Axillary BP: 109/61 Pulse: 113   Resp: 16 SpO2: 96 % O2 Device: None (Room air)    Weight: 167 lbs 8.79 oz      General Appearance: awake and alert, but confused. Follows commands. tearful  Respiratory: lungs CTAB, no wheezes or crackles, no tachypnea   Cardiovascular: RRR, normal s1/s2 without murmur  GI: abdomen soft, no signs of tenderness, nondistended, normal bowel sounds  Lymph/Hematologic: 1+ pitting bilateral lower extremity edema   Skin: deferred; stage 4 decubitus ulcer with  some surrounding eschar  Neurologic: awake, responds to questions, oriented to place and person, not date or context. Able to move all extremities on command, but did not follow for formal muscle strength testing. Face symmetric               Data   Recent Labs   Lab 10/20/22  1140 10/20/22  0842 10/20/22  0644 10/20/22  0358 10/20/22  0027 10/19/22  2010 10/19/22  1745 10/19/22  1722 10/19/22  1207 10/19/22  0110 10/19/22  0030 10/18/22  2132 10/18/22  1737 10/18/22  1302 10/18/22  1041   WBC  --   --  3.1*  --   --   --  4.9  --  5.5   < >  --   --  5.8  --  6.6   HGB  --   --  7.6*  --   --   --  7.6*  --  6.0*   < >  --   --  7.1*  --  8.0*   MCV  --   --  88  --   --   --  85  --  84   < >  --   --  89  --  89   PLT  --   --  72*  --   --   --  68*  --  94*   < >  --   --  38*  --  48*   INR  --   --   --   --   --   --   --   --   --   --   --   --  1.27*  --  1.28*   NA  --   --  137  --  135  --  135  --  133   < > 131*   < > 127*  --  123*   POTASSIUM  --   --  5.1  --  5.3  --  5.5*  --  5.5*   < > 5.5*   < > 7.2*   < > 7.0*   CHLORIDE  --   --  110*  --  107  --  104  --  104  --  98  --  97   < > 89*   CO2  --   --  20  --  19*  --  19*  --  19*  --  18*  --  16*   < > 14*   BUN  --   --  75*  --  83*  --  92*  --  104*  --  116*  --  121*   < > 110.2*   CR  --   --  1.69*  --  2.08*  --  2.26*  --  2.49*  --  2.94*  --  3.32*  --  3.25*   ANIONGAP  --   --  7  --  9  --  12  --  10  --  15*  --  14   < > 20*   BENJI  --   --  9.0  --  8.7  --  8.8  --  8.9  --  8.7  --  8.4*  --  8.2*   * 137* 142*   < > 202*   < > 266*   < > 220*   < > 391*   < > 143*   < > 122*   ALBUMIN  --   --   --   --   --   --   --   --   --   --  2.0*  --  1.9*  --  2.8*   PROTTOTAL  --   --   --   --   --   --   --   --   --   --  5.2*  --  5.5*  --  5.6*   BILITOTAL  --   --   --   --   --   --   --   --   --   --  0.8  --  0.5  --  0.3   ALKPHOS  --   --   --   --   --   --   --   --   --   --  437*  --  468*  --  564*    ALT  --   --   --   --   --   --   --   --   --   --  86*  --  93*  --  99*   AST  --   --   --   --   --   --   --   --   --   --  124*  --  143*  --  154*    < > = values in this interval not displayed.     No results found for this or any previous visit (from the past 24 hour(s)).

## 2022-10-20 NOTE — CONSULTS
[facility]  WO Nurse Inpatient Wound Assessment     Reason for consultation: Evaluate and treat  Sacral CAPI and Rt buttocks woiunds      Assessment  Sacrum: Unstageable CAPI, Deepening ecchymosis to lindsay-ulcer skin possibly s/s fall   Rt buttock: linear line of superficial wound. ? Etiology. No local s/s infection    Treatment Plan  Wound care: Sacral CAPI  1. Change dressing daily and prn  2. Clean wound with MicroKlenz. Pat lindsay-wound skin dry  3. Cut a strip of Aqua cell AG  4. Using end of Q-tip, pack strip of Aqua cell AG into wound bed and  .5cm ring on undermining   5. Cover with Mepilex sacral   6. Rt buttock: cover with Mepilex border   PIP:   -Diligent turning and heel suspension.   -Consider pulsate  - Mobilize when medically indicated  -HOB below 30 degrees if not medcially contraindicated  -dietitian following  - Zhao Risk     Orders In Epic  Recommended provider order: NA  WOC Nurse follow-up plan: weekly  Nursing to notify the Provider(s) and re-consult the WO Nurse if wound(s) deteriorates or new skin concern.  Patient History  According to provider note(s):   The information was obtained from the patient and review of her medical records.  Ms. Monreal is a 67-year-old patient who has a history for MSSA pelvic abscess and metastatic breast cancer with known metastases to brain, liver and bone and is being followed by oncology, ongoing salvage chemotherapy treatment with immunotherapy and radiation.  History significant for the fact that there have been concerns regarding thrombocytopenia, platelet count of 48 with a coagulopathy, INR of 1.28 and report of a fall which occurred several days prior to her presentation.  She was seen at [facility] on 10/18 because of concerns regarding altered mental status and giving a history for several weeks with concerns regarding increasing difficulty with gait and balance and difficulty with her speech which triggered her  visit to the ED on 10/18 when it was reported that she also had involuntary movements of the extremities with flailing movements during which patient was noted to have a blank stare, no report of urinary incontinence no prior history for seizures.  This visit triggered the head CT which was concerning for a left holohemispheric subdural collection with areas of increased density.  The subdural collection measured at 8.3 mm over the left frontal convexity and 8.2 mm over the left parietal convexity and 3.3 mm over the posterior left temporal convexity with associated mass-effect with 2 mm midline shift to the right at the level of the lateral ventricles.  She was transferred to [facility] for neurosurgical evaluation and was monitored in the ICU where prolonged EEG video monitoring did not reveal any seizure activity and monitoring was discontinued.  Objective Data  Containment of urine/stool: PureWick for UIC and brief for FIC    Active Diet Order:  Orders Placed This Encounter      Combination Diet Moderate Consistent Carb (60 g CHO per Meal) Diet; Mildly Thick (level 2); Pureed Diet (level 4); Mildly Thick (level 2); No Straws    Output:   I/O last 3 completed shifts:  In: 3242.92 [I.V.:1992.92]  Out: 3900 [Urine:3900]    Risk Assessment:   Sensory Perception: 3-->slightly limited  Moisture: 3-->occasionally moist  Activity: 1-->bedfast  Mobility: 2-->very limited  Nutrition: 2-->probably inadequate  Friction and Shear: 2-->potential problem  Zhao Score: 13                          Labs: Recent Labs   Lab 10/19/22  1745 10/19/22  0420 10/19/22  0030 10/18/22  2316 10/18/22  1737   ALBUMIN  --   --  2.0*  --  1.9*   HGB 7.6*   < >  --   --  7.1*   INR  --   --   --   --  1.27*   WBC 4.9   < >  --   --  5.8   A1C  --   --   --  6.4*  --     < > = values in this interval not displayed.       Physical Exam  Skin inspection: Sacrum and Rt buttock    #1  Wound Location:sacrum  Date of last photo: 10-19-22                                    Measurements (length x width x depth, in cm):  3.0cm  x 1.5cm  x 1.0cm   Wound Base: 100% thick white slugh  Tunneling: NA  Undermining: .5cm circumferentially around wound bed  Palpation of the wound bed: frim  Periwound skin: deeper ecchymosis extending from 9-3 o'clock with lighter scattered areas of ecchymosis - probably from fall   Temperature: warm   Drainage: small sero-sang  Odor: none  Pain:responds with Nods yes to tenderness    #2: Wound locations:  Rt buttock  Date of last WOC photo: 10-19-22      Linear wounds on Rt buttock, not mirrored on Lt. Wounds all superficial. No drainage, NO local s/s infection    Interventions  Current support surface: atmos air  Current off-loading measures: Isolibrium  Visual inspection of wound(s) completed  Wound Care: completed  Supplies:  Pt room and supply room   Education provided: Updated on wound condition  Discussed plan of care with pt and nursing    Aurelia Black RN, CWOCN

## 2022-10-21 NOTE — PROGRESS NOTES
[facility]    Medicine Progress Note - Hospitalist Service    Date of Admission:  10/18/2022    67 year old female with pmh of metastatic breast cancer (cerebeller    Assessment & Plan   Acute metabolic encephalopathy   reports increased difficulty with gait/balance since fall 10 days prior, in past 48 hours increasingly confused with difficulty with speech and essentially non-verbal morning of presentation.  Possibly due to subdural fluid collection vs marked electrolyte abnormalities (uremia, hyponatremia, hyperkalemia) vs intracranial mets vs infection.   - management of various issues as below     Suspected traumatic SDH  Outside CT shows left holohemispheric subdural fluid collection with 2 mm midline shift.   reports fall on Oct 8 (unknown if any head trauma).    Neurosurgery and neurocritical care consulted, recommended expectant management  Follow-up CT head stable  - started on keppra  - q2h neuro checks currently.   - seizure precautions  - goal SBP <150, head of bed 30 degrees  - goal platelets of > 100, conditional orders placed, receiving multiple transfusions for platelets and blood  - SLP eval  - neurosurgery and neurology consulted    Mild covid 19 infection  No hypoxemia  Plan  - 3 days remdesivir to prevent progression  - special precautions     Thrombocytopenia  Abnormal coags  Admission platelet 38, INR 1.27 and PTT 38.  due to chemo, impaired production,  - transfused 4 packs of platelets so far  - goal platelet >100K per NSG, conditional orders placed, q6 hour plt evaluation. 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   reports this has been increasing  in size over past week as she has spent more time in her chair.    * Admission UA with >182 WBC, leukocyte esterase.  * Outside CXR with mild left basilar opacity  * procalcitonin of 8, unclear significance in the setting of significant renal dysfunction  * culture data negative, no respiratory complaints  Plan  - stop zosyn, monitor infectious parameters     Hyponatremia  Hyperkalemia  Uremia  Anion gap metabolic acidosis  KATHLEEN  Cr as low as 0.8 in June 2022, on most recent check Sept 2022 was 1.13.  Admission Na 127, K 7.2, bicarb 16 (gap down to 14), .   reporting minimal oral intake over past 2 days.  Edema on exam, but suspect intravascularly dry with hypoalbuminemia and poor oral intake.    shifted potassium with insulin overnight  -  1/2 NS at 50 ml daily  - repeat bmps daily  - Nephrology consult     Stage IV metastatic breast cancer (mets to brain, liver, bone) s/p mastectomy  Elevated LFT's  Followed by Dr. Varma of [facility].  Currently on immunotherapy and receiving radiation to spine (completed brain radiation for cerebellar mets).  Recent baseline LFT's unknown, suspect due to liver mets.  No signs of tenderness on exam.    - [facility] consult     HFrEF due to doxorubicin  Type 2 nstemi due to demand supply mismatch  TTE 6/2022 with EF 35-40% with global LV hypokinesis.  Outside trop 183, suspect demand ischemia.    Echocardiogram with EF of 40-45%  - monitor I/O's and daily weights     DM II with peripheral neuropathy  - low dose ssi  - check A1C     Chronic anemia  Baseline hgb about 8 g/dL.   - monitor, transfuse if < 7, conditional order placed, prBC x2 ordered     Depression  - resume cymbalta       Diet: Combination Diet Moderate Consistent Carb (60 g CHO per Meal) Diet; Mildly Thick (level 2); Pureed Diet (level 4); Mildly Thick (level 2); No Straws    DVT Prophylaxis: Pneumatic Compression Devices  Jiang Catheter: Not present  Central Lines: PRESENT       Cardiac Monitoring: ACTIVE order.  Indication: Electrolyte Imbalance (24 hours)- Magnesium <1.3 mg/ml; Potassium < =2.8 or > 5.5 mg/ml  Code Status: Full Code      Disposition Plan      Expected Discharge Date: 10/24/2022                The patient's care was discussed with the Patient and her     60 total minutes spent in complex critical patient    Chaparro Walter DO  Hospitalist Service  [facility]  Securely message with the Vocera Web Console (learn more here)  Text page via [employer] Paging/Directory         Clinically Significant Risk Factors        # Hyperkalemia: Highest K = 5.5 mmol/L (Ref range: 3.4-5.3) in last 2 days, will monitor as appropriate  # Hyponatremia: Lowest Na = 133 mmol/L (Ref range: 136-145) in last 2 days, will monitor as appropriate      # Hypoalbuminemia: Lowest albumin = 1.9 g/dL (Ref range: 3.5-5.2) at 10/18/2022  5:37 PM, will monitor as appropriate   # Thrombocytopenia: Lowest platelets = 54 (Ref range: 150-450) in last 2 days, will monitor for bleeding                 ______________________________________________________________________    Interval History   Less confused today. Moving all extremities. Positive for COVID    Data reviewed today: I reviewed all medications, new labs and imaging results over the last 24 hours. I personally reviewed head CT: left subdural hematoma with 2 mm of shift, stable on repeat CT    Physical Exam   Vital Signs: Temp: 97.7  F (36.5  C) Temp src: Oral BP: 102/61 Pulse: 114   Resp: 20 SpO2: 95 % O2 Device: None (Room air)    Weight: 167 lbs 8.79 oz      General Appearance: awake and alert, but confused. Follows commands. tearful  Respiratory: lungs CTAB, no wheezes or crackles, no tachypnea   Cardiovascular: RRR, normal s1/s2 without murmur  GI: abdomen soft, no signs of tenderness, nondistended, normal bowel sounds  Lymph/Hematologic: 1+ pitting bilateral lower extremity edema   Skin: deferred; stage 4 decubitus ulcer with some surrounding  eschar  Neurologic: awake, responds to questions, oriented to place and person, not date or context. Able to move all extremities on command, but did not follow for formal muscle strength testing. Face symmetric               Data   Recent Labs   Lab 10/21/22  0808 10/21/22  0541 10/21/22  0402 10/21/22  0127 10/21/22  0016 10/20/22  2108 10/20/22  0842 10/20/22  0644 10/19/22  2010 10/19/22  1745 10/19/22  0110 10/19/22  0030 10/18/22  2132 10/18/22  1737 10/18/22  1302 10/18/22  1041   WBC  --  3.1*  --   --   --   --   --  3.1*  --  4.9   < >  --   --  5.8  --  6.6   HGB  --  7.0*  --   --   --   --   --  7.6*  --  7.6*   < >  --   --  7.1*  --  8.0*   MCV  --  86  --   --   --   --   --  88  --  85   < >  --   --  89  --  89   PLT  --  57*  57*  --  61*  --  54*  --  72*  --  68*   < >  --   --  38*  --  48*   INR  --   --   --   --   --   --   --   --   --   --   --   --   --  1.27*  --  1.28*   NA  --  135  --   --   --  135  --  137   < > 135   < > 131*   < > 127*  --  123*   POTASSIUM  --  4.4  --   --   --  4.8  --  5.1   < > 5.5*   < > 5.5*   < > 7.2*   < > 7.0*   CHLORIDE  --  108  --   --   --  108  --  110*   < > 104   < > 98  --  97   < > 89*   CO2  --  20  --   --   --  20  --  20   < > 19*   < > 18*  --  16*   < > 14*   BUN  --  47*  --   --   --  55*  --  75*   < > 92*   < > 116*  --  121*   < > 110.2*   CR  --  1.18*  --   --   --  1.36*  --  1.69*   < > 2.26*   < > 2.94*  --  3.32*  --  3.25*   ANIONGAP  --  7  --   --   --  7  --  7   < > 12   < > 15*  --  14   < > 20*   BENJI  --  8.6  --   --   --  8.6  --  9.0   < > 8.8   < > 8.7  --  8.4*  --  8.2*   * 134* 133*  --    < > 178*   < > 142*   < > 266*   < > 391*   < > 143*   < > 122*   ALBUMIN  --   --   --   --   --   --   --   --   --   --   --  2.0*  --  1.9*  --  2.8*   PROTTOTAL  --   --   --   --   --   --   --   --   --   --   --  5.2*  --  5.5*  --  5.6*   BILITOTAL  --   --   --   --   --   --   --   --   --   --   --  0.8  --   0.5  --  0.3   ALKPHOS  --   --   --   --   --   --   --   --   --   --   --  437*  --  468*  --  564*   ALT  --   --   --   --   --   --   --   --   --   --   --  86*  --  93*  --  99*   AST  --   --   --   --   --   --   --   --   --   --   --  124*  --  143*  --  154*    < > = values in this interval not displayed.     No results found for this or any previous visit (from the past 24 hour(s)).

## 2022-10-21 NOTE — PROGRESS NOTES
Neurosurgery progress note    Patient states she is feeling much better today, reports she is able to move her lower extremities well, she is speaking clearly, following commands appropriately.    Exam    Alert, follows commands, no acute distress  Bilateral upper extremities with bilateral pronator drift  Finger-nose slow and accurate bilaterally  Extraocular wounds intact  Moving all extremities with appropriate strength  Pupils equal and reactive to light and accommodation      Labs  Platelet count this morning was 57,000    Imaging    Head CT scan on 10/19/2022 was stable compared to the previous day, demonstrated mixed attenuation left cerebral convexity subdural hematoma.  Stable mass-effect without midline shift.    Assessment    Left subdural hematoma  Thrombocytopenia  History of breast cancer with metastatic lesions in the bone, liver bone marrow and brain    Plan    Platelet goals can be relaxed to be greater than 50,000  We will plan for a head CT scan in 2 days on 10/23/2022 for reevaluation    Discussed with Dr. Hou

## 2022-10-21 NOTE — PROGRESS NOTES
[facility] Consultation    Elenita Moran MRN# [id number]   YOB: 1955 Age: 67 year old   Date of Admission: 10/18/2022  Requesting physician: Dr. Vail  Reason for consult: Metastatic breast cancer, subdural hematoma           Assessment and Plan:   Primary Oncologist: Dr. Varma    Metastatic breast cancer with bone, liver, brain, bone marrow mets  - extensive prior treatment  - currently on Enhertu (Fam-trastuzumab deruxtecan-nxki) Q21 days. Cycle 3 administered on 10/11/22.   Potential side effects include pancytopenia, pneumonitis and LV dysfunction.  - 10/6/22 received a single fraction SBRT to T5 lesion  - 04/02/22 cerebellar stereotactic radiosurgery,.   - Follow up MRI 10/13/22 showing interim regression of 2 punctate enhancing foci in the cerebellum.  Also notes was expansion of L subdural fluid collection, now measuring 6 mm, and thickening of overlying diffuse pachymeningeal enhancement.  - She has had 3 cycles of Enhertu and would need reassessment and candidacy for further treatments.  We have updated her primary oncologist.  Plan is to continue restorative cares at this time.  If she has evidence of clinical deterioration or disease progression need to readdress goals of care.  Transition to comfort cares/hospice not unreasonable at that juncture.      Encephalopathy  - increased difficulties with gait and balance for past 10 days.  Over past two days, has had increasing difficulties with speech and was non-verbal upon presentation.  - Seen in ED at [place] on 10/18 where MRI showed A mixed density left holohemispheric subdural collection with some areas of increased density.  The collection measures 8.3 mm over the left frontal convex city, 8.2 mm over the left parietal convexity is 3.3 mm over the posterior left temporal convexity.  There is associated mass-effect with a 2 mm midline shift to the right at the level of the lateral ventricles.  This scan in the ER was  compared to 3/25/2022  - transferred to [facility] for neurosurgery eval  -  reports fall on 10/8. Unknown if head trauma occurred.   - anti-seizure precautions/Keppra     Pancytopenia  Thrombocytopenia  Normocytic anemia  - likely due to current cancer treatment +/- acute illness +/- malignancy  - platelet count upon admission 38,000 (were 288k in clinic on 10/11/22.    - INR 1.27, PTT 38  - has received vitamin k 1 mg  - per neurosurgery, goal platelet count 100K  - conditional orders placed, q6 hour platelet count evaluation. If she is < 50 she will receive 2, otherwise 1 from   - CBC today showed Hb of 7g/dl and platelet counts at 57,000.  Will receive 1 unit of PRBC and 1 unit of platelet transfusion followed by posttransfusion CBC check    Elevated LFTs   - most recent labs in clinic on 10/11/22 show alk phos 661, , ALT 50  - could be treatment related (about 40% LFT abnormalities with Enhurtu) or metastatic disease (innumberable liver mets noted on 8/25/22 PET )    KATHLEEN  - creat 3.25 upon admission up from 1.02 in our office on 10/11/22  - dehydration may be contributing  - nephrology is following.  - UOP is good. Serum creatinine improving  - Bumex, spironolactone and lisinopril on hold     ID  - tested positive for COVID-19 on 10/20/2022  - Blood and urine cultures showing no growth   - On Remdesivir    Talked to her over the phone today.     Osmani Bean MD  [facility]              Chief Complaint:   No chief complaint on file.           History of Present Illness:       ONCOLOGIC HISTORY:    1.     The patient had presented with multifocal tumor in the left breast, 7 x 6 x 4 cm, in August 2010.    2.     The patient underwent left breast mastectomy. Her tumor was ER positive 63%, MA positive 17%, and HER-2 negative by FISH.    3.     The patient was treated with Adriamycin plus Cytoxan, followed by weekly Taxol.    4.     In May 2011, the patient had started anastrozole 1 mg orally  daily.    5.     The patient was found to have a bony metastasis and the patient underwent radiation from July 2011 until October 2011 for L1, L2, and L3 vertebral bodies.    6.     In January 2015, PET scan showed no evidence of malignancy.    7.  On January 31, 2017 patient has started second line hormonal treatment with Faslodex plus Ibrance.    8.  The patient also underwent CyberKnife to painful L3 vertebral body mass she completed 2400 cGy in 4 fractions on 3/28/2017.  Prior to that she was also treated to a right rib lesion as well as T4, T6-T7 and a posterior left-sided rib lesion.  The latter received 3000 cGy in 10 fractions completing those treatments 3/14/2017..    9. She had disease progression in March 2019 per PET imaging and therapy was changed to everolimus and exemestane on 4/24/19.    10. From 12/6/2019 until 3/30/2020 she was treated with 1st line chemo (refused PIQRAY) and has completed 6 cycles of Abraxane and but discontinue due to neuropathy. She had partial response to treatment.     11. On 3/30/2020 she started 3rd line hormonal treatment: Piqray orally daily plus Faslodex.     12. On 9/24/2020 CT chest abdomen and pelvis Stable appearance of the sacral fractures, left superior and inferior pubic rami fracture, and T10 vertebral body fracture and left 11th rib posteriorly.    13. On 2/9/2021 PET scan demonstrated progressive disease with renewed metabolic activity in a few scattered skeletal metastases. Significant muscular FDG uptake, which decreases the bioavailability of FDG for tumor. As a result, the scan likely underestimates the extent of disease activity. Healing fractures in the pelvis and spine with a presumed small hematoma overlying the sacral fractures.    14. On 2/23/2021 she started Xeloda orally due to progression.     15. On 4/23/2021 PET scan showed mild progression in anterolateral 5th rib and possible liver lesion.     16. On 5/3/2021 she has started Doxil 50 mg/m2 IV  every 4 weeks    17. On 2021 CT CAP showed mild fullness in right paraspinal muscles. left obturator ring fracture. complex B/L sacral ala fractures.     18. On 2021 MRI of pelvis showed two complex fluid collections along fractured left superior and inferior pubic rami    19. On 2021 PET/CT showed . While there is persistent FDG avid disease throughout the osseous structures, there is resolution of the right hepatic lobe lesion suggesting partial response to therapy. worsening inflammatory change associated with the pathologically fractured left anterior pubic ramus and right sacrum/iliac bone with development of right gluteal and right paraspinal intramuscular hematomas.    20.  On 2021 PET scan revealed stable bony metastases. No worsening.    21.  On 2021 PET scan revealed a new liver metastases bony metastases are stable or better.    22.  On February 15, 2022 CT-guided biopsy of liver lesion revealed HER-2 positive breast cancer ER/[place] negative.    23.  On 2022 MRI of brain revealed 3-4 mm suspicious cerebellar lesions.    24.  On 2022 PET scan revealed substantial partial favorable treatment response of metastatic breast cancer involving the liver, skeleton and right pleura.  No new metastases.    25.  On 2022 PET scan revealed decrease in metabolic activity and right pleural thickening.  However there is reactivation of innumerable lesions through liver parenchyma and lesion in T5 vertebral body.  This is suspicious for progressive disease.    26.  33 began Enhertu (Fam-trastuzumab deruxtecan-nxki) Q21 days. Cycle 3 administered on 10/11/22.             Physical Exam:   Vitals were reviewed  Blood pressure 121/76, pulse 117, temperature 98.3  F (36.8  C), temperature source Oral, resp. rate 17, weight 76 kg (167 lb 8.8 oz), SpO2 98 %.  Temperatures:  Current - Temp: 97.5  F (36.4  C); Max - Temp  Av.8  F (36.6  C)  Min: 97.5  F (36.4  C)  Max: 98.2  F  (36.8  C)  Respiration range: Resp  Av  Min: 8  Max: 45  Pulse range: Pulse  Av.7  Min: 83  Max: 110  Blood pressure range: Systolic (24hrs), Av , Min:61 , Max:129   ; Diastolic (24hrs), Av, Min:40, Max:95    Pulse oximetry range: SpO2  Av.2 %  Min: 72 %  Max: 99 %    Intake/Output Summary (Last 24 hours) at 10/19/2022 0951  Last data filed at 10/19/2022 0845  Gross per 24 hour   Intake 2223.33 ml   Output 1600 ml   Net 623.33 ml       Physical exam not performed.                Past Medical History:   I have reviewed this patient's past medical history  Past Medical History:   Diagnosis Date     Allergic rhinitis      Bone cancer (H)     breast mets     Breast cancer (H)     left mastectomy     Depression      DM (diabetes mellitus) (H)      Fibromyalgia      Hx antineoplastic chemotherapy      Hx of radiation therapy      Hyperlipemia      Lactose intolerance      Mini stroke     R EYE     Osteoarthritis      Tobacco dependency              Past Surgical History:   I have reviewed this patient's past surgical history  Past Surgical History:   Procedure Laterality Date     APPENDECTOMY       INSERT INTRACORONARY STENT      R Hand     IR CHEST PORT PLACEMENT > 5 YRS OF AGE  2019     IR CHEST PORT PLACEMENT > 5 YRS OF AGE  3/14/2022     IR LUMBAR TRANSFORAMINAL EPIDURAL STEROID INJECTION  2019     IR LUMBAR TRANSFORAMINAL EPIDURAL STRD INJ  2019     IR PORT PLACEMENT >5 YEARS  2019     IR PORT REMOVAL RIGHT  3/14/2022     IR SITE CHECK/EVALUATION  4/15/2022     MAMMOPLASTY REDUCTION Right     hx of left mastectomy and right breast reduction     MASTECTOMY Left      OTHER SURGICAL HISTORY      biopsy of upper and right tongue     OVARIAN CYST REMOVAL       REVISE RECONSTRUCTED BREAST Left     2015               Social History:   I have reviewed this patient's social history  Social History     Tobacco Use     Smoking status: Former      Packs/day: 0.75     Years: 20.00     Pack years: 15.00     Types: Cigarettes     Smokeless tobacco: Never   Substance Use Topics     Alcohol use: Not Currently     Alcohol/week: 1.0 standard drink     Types: 1 Standard drinks or equivalent per week     Comment: 1 glass of wine/week             Family History:   I have reviewed this patient's family history  Family History   Problem Relation Age of Onset     Lung Cancer Mother      Pancreatic Cancer Mother      Kidney Cancer Maternal Grandmother      Lung Cancer Paternal Aunt      Breast Cancer Cousin              Allergies:     Allergies   Allergen Reactions     Gabapentin      Upper body edema/swelling.         Morphine Visual Disturbance     Visual hallucinations     Sulfamethoxazole-Trimethoprim      Other reaction(s): Hives     Sulfa Drugs Hives and Rash     welts               Medications:   I have reviewed this patient's current medications  Facility-Administered Medications Prior to Admission   Medication Dose Route Frequency Provider Last Rate Last Admin     lidocaine (XYLOCAINE) 2 % external gel   Topical Q4H PRN Frankie Vega MD   1 mL at 07/26/22 0906     Medications Prior to Admission   Medication Sig Dispense Refill Last Dose     ACETAMINOPHEN PO Take 1,300 mg by mouth 3 times daily Alternates with Ibuprofen        aspirin 81 MG EC tablet Take 81 mg by mouth daily        bumetanide (BUMEX) 2 MG tablet Take 2 mg by mouth daily as needed Can take midday if needed for fluid retention/swelling        cephALEXin (KEFLEX) 500 MG capsule Take 1 capsule (500 mg) by mouth 2 times daily 180 capsule 3      Cholecalciferol (VITAMIN D) 125 MCG (5000 UT) capsule Take 1 tablet by mouth daily         clobetasol (TEMOVATE) 0.05 % external ointment Apply topically daily as needed        docusate sodium (COLACE) 100 MG capsule Take 100 mg by mouth daily        DULoxetine (CYMBALTA) 30 MG capsule Take 30 mg by mouth every morning         DULoxetine (CYMBALTA) 60  MG capsule Take 1 capsule by mouth At Bedtime        ezetimibe-simvastatin (VYTORIN) 10-20 MG tablet Take 1 tablet by mouth At Bedtime        HYDROmorphone (DILAUDID) 4 MG tablet Take 1 tablet (4 mg) by mouth 3 times daily as needed for moderate to severe pain or severe pain (Patient taking differently: Take 4 mg by mouth 3 times daily AM - Midday - PM) 12 tablet 0      hydrOXYzine (VISTARIL) 25 MG capsule Take 25 mg by mouth 3 times daily With hydromorphone        ibuprofen (ADVIL/MOTRIN) 200 MG capsule Take 400 mg by mouth 3 times daily Alternating with acetaminophen        insulin aspart (NOVOLOG FLEXPEN) 100 UNIT/ML pen Inject Subcutaneous 3 times daily (with meals) Sliding scale dose based on blood glucose and diet        Insulin Glargine (BASAGLAR KWIKPEN SC) Inject 8 Units Subcutaneous every morning Uses as directed        Iron Combinations (IRON COMPLEX PO) Take 1 tablet by mouth daily Ferrous biogluconate supplement        lidocaine (XYLOCAINE) 5 % external ointment Apply topically 4 times daily (Patient taking differently: Apply topically 4 times daily as needed for moderate pain) 50 g 0      lisinopril (ZESTRIL) 2.5 MG tablet Take 1 tablet (2.5 mg) by mouth daily 30 tablet 3      MAGNESIUM PO Take by mouth daily OTC supplement        metoprolol succinate ER (TOPROL-XL) 50 MG 24 hr tablet Take 1 tablet (50 mg) by mouth daily 90 tablet 3      Multiple Vitamins-Minerals (ICAPS PO) Take 1 capsule by mouth daily        nystatin (MYCOSTATIN) 632004 UNIT/GM external cream Apply topically 2 times daily (Patient taking differently: Apply topically daily as needed) 30 g 1      OLANZapine (ZYPREXA) 5 MG tablet Take 5 mg by mouth every evening         omeprazole (PRILOSEC) 20 MG DR capsule Take 20 mg by mouth At Bedtime         oxybutynin (DITROPAN) 5 MG tablet Take 5 mg by mouth 2 times daily AM and Midday        oxybutynin ER (DITROPAN-XL) 10 MG 24 hr tablet Take 10 mg by mouth every evening        POTASSIUM  CHLORIDE PO Take by mouth daily OTC supplement        Prenatal Vit-Fe Fumarate-FA (PRENATAL MULTIVITAMIN  PLUS IRON) 27-1 MG TABS Take 1 tablet by mouth daily         psyllium (METAMUCIL/KONSYL) Packet Take 1 packet by mouth daily as needed for constipation        spironolactone (ALDACTONE) 25 MG tablet Take 0.5 tablets (12.5 mg) by mouth daily 90 tablet 3      vitamin (B COMPLEX) tablet Take 1 tablet by mouth daily        zolpidem (AMBIEN) 10 MG tablet Take 10 mg by mouth At Bedtime        B-D U/F 31G X 8 MM insulin pen needle USE 4 TIMES DAILY        blood glucose (ONETOUCH VERIO IQ) test strip         bumetanide (BUMEX) 2 MG tablet Take 1 tablet (2 mg) by mouth 2 times daily (Patient taking differently: Take 2 mg by mouth daily) 180 tablet 3      Continuous Blood Gluc  (FREESTYLE KIMBERLY 14 DAY READER) COLETTE Use to check blood sugar at least 4 times a day or as directed         Continuous Blood Gluc Sensor (FREESTYLE KIMBERLY 14 DAY SENSOR) MISC Use as directed to test blood sugar at least 4 times a day or as directed        glucose (BD GLUCOSE) 4 g chewable tablet as needed        LORazepam (ATIVAN) 0.5 MG tablet take 1 tablet by mouth up to 3 times per day as needed for nausea, anxiety, or insomnia (Patient not taking: Reported on 10/19/2022)   Not Taking     medical cannabis (Patient's own supply) See Admin Instructions (The purpose of this order is to document that the patient reports taking medical cannabis.  This is not a prescription, and is not used to certify that the patient has a qualifying medical condition.) (Patient not taking: Reported on 10/19/2022)   Not Taking     prochlorperazine (COMPAZINE) 10 MG tablet Take 10 mg by mouth every 6 hours as needed for nausea or vomiting (Patient not taking: Reported on 10/19/2022)   Not Taking     Current Facility-Administered Medications Ordered in Epic   Medication Dose Route Frequency Last Rate Last Admin     acetaminophen (TYLENOL) tablet 650 mg  650 mg  Oral Q4H PRN        Or     acetaminophen (TYLENOL) solution 650 mg  650 mg Per NG tube Q4H PRN         acetaminophen (TYLENOL) Suppository 650 mg  650 mg Rectal Q4H PRN         bisacodyl (DULCOLAX) suppository 10 mg  10 mg Rectal Daily PRN         glucose gel 15-30 g  15-30 g Oral Q15 Min PRN        Or     dextrose 50 % injection 25-50 mL  25-50 mL Intravenous Q15 Min PRN        Or     glucagon injection 1 mg  1 mg Subcutaneous Q15 Min PRN         insulin aspart (NovoLOG) injection (RAPID ACTING)  1-4 Units Subcutaneous Q4H   1 Units at 10/19/22 0812     levETIRAcetam (KEPPRA) intermittent infusion 500 mg  500 mg Intravenous Q12H   500 mg at 10/19/22 0345     piperacillin-tazobactam (ZOSYN) 2.25 g vial to attach to  ml bag  2.25 g Intravenous Q8H   2.25 g at 10/19/22 0346     polyethylene glycol (MIRALAX) Packet 17 g  17 g Oral Daily PRN         prochlorperazine (COMPAZINE) injection 5 mg  5 mg Intravenous Q6H PRN        Or     prochlorperazine (COMPAZINE) tablet 5 mg  5 mg Oral Q6H PRN        Or     prochlorperazine (COMPAZINE) suppository 12.5 mg  12.5 mg Rectal Q12H PRN         senna-docusate (SENOKOT-S/PERICOLACE) 8.6-50 MG per tablet 1 tablet  1 tablet Oral BID PRN        Or     senna-docusate (SENOKOT-S/PERICOLACE) 8.6-50 MG per tablet 2 tablet  2 tablet Oral BID PRN         sodium chloride 0.45% infusion   Intravenous Continuous 75 mL/hr at 10/19/22 0859 Rate Change at 10/19/22 0859     No current Epic-ordered outpatient medications on file.             Review of Systems:     The 10 point Review of Systems is negative other than noted in the HPI.            Data:   Data   Results for orders placed or performed during the hospital encounter of 10/18/22 (from the past 24 hour(s))   Glucose by meter   Result Value Ref Range    GLUCOSE BY METER POCT 145 (H) 70 - 99 mg/dL   Basic metabolic panel   Result Value Ref Range    Sodium      Potassium      Chloride      Carbon Dioxide (CO2)      Anion Gap      Urea  Nitrogen      Creatinine      Calcium      Glucose      GFR Estimate     Glucose by meter   Result Value Ref Range    GLUCOSE BY METER POCT 147 (H) 70 - 99 mg/dL   Glucose by meter   Result Value Ref Range    GLUCOSE BY METER POCT 166 (H) 70 - 99 mg/dL   Platelet count   Result Value Ref Range    Platelet Count 54 (L) 150 - 450 10e3/uL   Basic metabolic panel   Result Value Ref Range    Sodium 135 133 - 144 mmol/L    Potassium 4.8 3.4 - 5.3 mmol/L    Chloride 108 94 - 109 mmol/L    Carbon Dioxide (CO2) 20 20 - 32 mmol/L    Anion Gap 7 3 - 14 mmol/L    Urea Nitrogen 55 (H) 7 - 30 mg/dL    Creatinine 1.36 (H) 0.52 - 1.04 mg/dL    Calcium 8.6 8.5 - 10.1 mg/dL    Glucose 178 (H) 70 - 99 mg/dL    GFR Estimate 42 (L) >60 mL/min/1.73m2   CONDITIONAL Prepare pheresed platelets (unit)   Result Value Ref Range    Blood Component Type Platelets     Product Code D9672T96     Unit Status Transfused     Unit Number [id number]     CODING SYSTEM [id number]     ISSUE DATE AND TIME [id number]     UNIT ABO/RH A+     UNIT TYPE ISBT 6200    Glucose by meter   Result Value Ref Range    GLUCOSE BY METER POCT 137 (H) 70 - 99 mg/dL   Platelet count   Result Value Ref Range    Platelet Count 61 (L) 150 - 450 10e3/uL   Lactic Acid STAT   Result Value Ref Range    Lactic Acid 1.2 0.7 - 2.0 mmol/L   Glucose by meter   Result Value Ref Range    GLUCOSE BY METER POCT 133 (H) 70 - 99 mg/dL   Basic metabolic panel   Result Value Ref Range    Sodium 135 133 - 144 mmol/L    Potassium 4.4 3.4 - 5.3 mmol/L    Chloride 108 94 - 109 mmol/L    Carbon Dioxide (CO2) 20 20 - 32 mmol/L    Anion Gap 7 3 - 14 mmol/L    Urea Nitrogen 47 (H) 7 - 30 mg/dL    Creatinine 1.18 (H) 0.52 - 1.04 mg/dL    Calcium 8.6 8.5 - 10.1 mg/dL    Glucose 134 (H) 70 - 99 mg/dL    GFR Estimate 50 (L) >60 mL/min/1.73m2   Platelet count   Result Value Ref Range    Platelet Count 57 (L) 150 - 450 10e3/uL   CBC with platelets   Result Value Ref Range    WBC Count 3.1 (L) 4.0 - 11.0  10e3/uL    RBC Count 2.30 (L) 3.80 - 5.20 10e6/uL    Hemoglobin 7.0 (L) 11.7 - 15.7 g/dL    Hematocrit 19.8 (L) 35.0 - 47.0 %    MCV 86 78 - 100 fL    MCH 30.4 26.5 - 33.0 pg    MCHC 35.4 31.5 - 36.5 g/dL    RDW 15.5 (H) 10.0 - 15.0 %    Platelet Count 57 (L) 150 - 450 10e3/uL   CONDITIONAL Prepare red blood cells (unit)   Result Value Ref Range    Blood Component Type Red Blood Cells     Product Code C6438J95     Unit Status Transfused     Unit Number [id number]     CROSSMATCH Compatible     CODING SYSTEM [id number]     ISSUE DATE AND TIME [id number]     UNIT ABO/RH A+     UNIT TYPE ISBT 6200    Glucose by meter   Result Value Ref Range    GLUCOSE BY METER POCT 126 (H) 70 - 99 mg/dL   CONDITIONAL Prepare pheresed platelets (unit)   Result Value Ref Range    Blood Component Type Platelets     Product Code C9601R77     Unit Status Ready for issue     Unit Number [id number]     CODING SYSTEM [id number]      Total time spent: 35 minutes.

## 2022-10-21 NOTE — PLAN OF CARE
Pt here with L SDH and encephalopathy. A&Ox3, disoriented to time. Neuros stable, mild expressive aphasia, generalized weakness, moves all extremities, BLE 3/5, denies numb/tingling, R pupil sluggish. SBP<150. Tele sinus tach. Pureed diet, mild thick liquids. Takes pills crushed in applesauce. Up with A2 lift. Denies pain. Pt scoring green on the Aggression Stop Light Tool. Plan pt received 1 U PRBC and 1 U platlets this shift, recheck ordered for 10/22 0600. Platelet goal >50k, HGB goal >7 platelet recheck pending, next hgb recheck 1800. PT/OT eval pending. Discharge pending clinical improvements.

## 2022-10-21 NOTE — PLAN OF CARE
Goal Outcome Evaluation:      Plan of Care Reviewed With: patient    Overall Patient Progress: improvingOverall Patient Progress: improving       Reason for Admission: L SDH & Encephalopathy    Cognitive/Mentation: A/Ox person, place, situation. Disoriented x time. Forgetful at times.   Neuros/CMS: Intact ex dysarthric/aphasic speech, generalized weakness with BLE 38/5 strength. R pupil sluggish.   VS: VSS. SBP goal<150.   Tele: .  GI: BS active, + flatus, last BM 10/21/2022. Incontinent.  : Purwick in place. Incontinent.  Pulmonary: LS diminished.  Pain: Intermittent back pain, improves with reposition and rest and PRN tylenol.     Drains/Lines: L chest port infusing 0.45%NS @50ml/hr.  Skin: LLE 3+ edema, RLE 2+ edema. WOC following- sacral wound covered with mepilex packed with aquacell.  Activity: Assist x 2 with lift. T/R q2h.  Diet: Minced and moist with thin liquids. Takes pills crushed in applesauce.     Therapies recs: Pending  Discharge: Pending    Aggression Stoplight Tool: GREEN    End of shift summary: Diet advanced to minced and moist with thin liquids. Received 1U of PRBC and 1U of Platlets on day shift. At 1800 lab redraw Hgb was 8.2 and Plt was 67k.      ambulatory

## 2022-10-21 NOTE — PROGRESS NOTES
[facility]  [facility]  Neurology Daily Note                Interval History:   Vital signs and neurochecks have been stable  Patient offered no acute complaints and appeared to be more alert and interactive, with no reported seizure activity.  COVID-positive 10/20/2022.This patient is a 67 year old female patient with a history for metastatic breast cancer who is currently on Enhurt infusions, last infusion 10/11/2022 and was complaining of gait imbalance over the past 10 days and presented following a fall with gait impairment and speech impairment.This visit triggered the head CT which was concerning for a left holohemispheric subdural collection with areas of increased density.  The subdural collection measured at 8.3 mm over the left frontal convexity and 8.2 mm over the left parietal convexity and 3.3 mm over the posterior left temporal convexity with associated mass-effect with 2 mm midline shift to the right at the level of the lateral ventricles.t and abnormal changes on CT concerning for left convexity subdural hematoma, with mixed density, stable on serial imaging studies followed by neurosurgery.  She remains neurologically stable, no witnessed seizure activity on Keppra.  Patient had no specific complaints today denied headache or visual changes.     She has had ongoing altered mental status, encephalopathy, most likely multifactorial in etiology.  A past history significant for the fact that she had multifocal tumor in the left breast August 2010 and underwent left breast mastectomy, tumor ER +63%, SC +17% and H ER 2 negative.  She was treated with Adriamycin plus Cytoxan followed by Taxol and in May 2011 she started anastrozole.  In 2011 she was noted to have bony metastases and underwent underwent radiation and in January 2015 PET scan showed no evidence of malignancy.  She underwent CyberKnife to painful L3 vertebral body mass completed in 2017 and had disease  progression in March 2019 and pet imaging and therapy was changed .  As treatment progressed she developed peripheral neuropathy and in February 2021 PET scan demonstrated progressive disease and renewed metabolic activity in few scattered metastases and by February 2022 guided biopsy of the liver revealed H ER 2 positive breast cancer and February 2022 MRI of the brain revealed 3 to 4 mm suspicious cerebellar lesions.  She began Enhertu treatment 8/2022         Review of Systems:   The 10 point Review of Systems is negative other than noted in the HPI       Medications:   Scheduled Meds:    remdesivir  100 mg Intravenous Q24H    And     sodium chloride 0.9%  50 mL Intravenous Q24H     aspirin  81 mg Oral Daily     docusate sodium  100 mg Oral Daily     DULoxetine  30 mg Oral QAM     DULoxetine  60 mg Oral At Bedtime     insulin aspart  1-4 Units Subcutaneous Q4H     levETIRAcetam  500 mg Intravenous Q12H     oxybutynin  5 mg Oral BID     oxybutynin ER  10 mg Oral QPM     pantoprazole  40 mg Oral At Bedtime     sodium chloride (PF)  3 mL Intracatheter Q8H     PRN Meds: acetaminophen **OR** acetaminophen, acetaminophen, bisacodyl, glucose **OR** dextrose **OR** glucagon, HYDROmorphone, lidocaine 4%, lidocaine, lidocaine (buffered or not buffered), naloxone **OR** naloxone **OR** naloxone **OR** naloxone, nitroGLYcerin, ondansetron **OR** ondansetron, polyethylene glycol, prochlorperazine **OR** prochlorperazine **OR** prochlorperazine, senna-docusate **OR** senna-docusate, sodium chloride (PF)        Physical Exam:   Vitals: Blood pressure 118/77, pulse 113, temperature 97.3  F (36.3  C), temperature source Axillary, resp. rate 23, weight 76 kg (167 lb 8.8 oz), SpO2 96 %.   She was very alert today with fluent speech there was no dysarthria and she was oriented to time place and person able to follow simple and complex commands quite well.  Lungs: Clear to auscultation    Cardiovascular: Regular rate and rhythm, no  m/r/g the speech was fluent, there was no dysarthria and she was oriented to time place and person able to follow simple and complex commands quite well.  Lungs: Clear to auscultation  Abdomen: Soft, not tender, not destended  Extremities: No clubbing, no cyanosis, no edema       Data:   ROUTINE IP LABS (Last 3results)  CBC RESULTS:     Recent Labs   Lab 10/21/22  0541 10/21/22  0127 10/20/22  2108 10/20/22  0644 10/19/22  1745   WBC 3.1*  --   --  3.1* 4.9   RBC 2.30*  --   --  2.53* 2.59*   HGB 7.0*  --   --  7.6* 7.6*   HCT 19.8*  --   --  22.2* 22.0*   PLT 57*  57* 61* 54* 72* 68*     Basic Metabolic Panel:  Recent Labs   Lab 10/21/22  1134 10/21/22  0808 10/21/22  0541 10/21/22  0016 10/20/22  2108 10/20/22  0842 10/20/22  0644   NA  --   --  135  --  135  --  137   POTASSIUM  --   --  4.4  --  4.8  --  5.1   CHLORIDE  --   --  108  --  108  --  110*   CO2  --   --  20  --  20  --  20   BUN  --   --  47*  --  55*  --  75*   CR  --   --  1.18*  --  1.36*  --  1.69*   * 126* 134*   < > 178*   < > 142*   BENJI  --   --  8.6  --  8.6  --  9.0    < > = values in this interval not displayed.     INR:  Recent Labs   Lab 10/18/22  1737 10/18/22  1041   INR 1.27* 1.28*      Lipid Profile:  Recent Labs   Lab Test 07/28/22  0935 11/18/20  1436   CHOL 94 107   HDL 26* 48*   LDL 40 19   TRIG 141 202*     TSH:  TSH   Date Value Ref Range Status   11/18/2020 1.19 0.30 - 5.00 uIU/mL Final   ,   Vitamin B12:   Lab Results   Component Value Date    B12 471 02/27/2019      A1C:   Lab Results   Component Value Date    A1C 6.4 10/18/2022    A1C 5.8 12/18/2019               Assessment and Plan:                                         #.   -- This is a 67-year-old patient with a history for metastatic breast cancer to brain, bone and liver who is presently on immunotherapy and presented with altered mental status, speech impairment and gait unsteadiness.  Mental status has improved, patient has remained neurologically stable,  testing COVID-positive 10/20/2022.  #.   -- CT scan of the head demonstrating significant subdural fluid collection over the left cerebral convexity, with mixed signal, in the setting of possible thrombocytopenia, hypocoagulable state and report of possible recent fall.  Neurosurgery recommended platelet transfusion and close monitoring.  #.   -- Concerns regarding seizure-like activity, although prolonged video EEG monitoring did not reveal any seizure-like activity or electrographic seizures.  --- Altered mental status, encephalopathy, multifactorial in etiology.  --- Report of gait instability, again could be multifactorial, subdural fluid collection, bilateral cerebellar metastatic lesions and chemotherapy induced peripheral neuropathy.  Recommendations:  1.  Continue seizure precautions  2.  Continue Keppra 500 mg twice daily  3.  Continue vital signs with neurochecks every 4 hours.  4.  Ativan as needed for breakthrough seizures.  5.  PT/OT/speech as tolerated.     #. PT/OT/Speech  --as tolerated           Time: 30 minutes evaluation and management.      Melanie Gold M.D.  [facility], [facility].  Office [phone]

## 2022-10-21 NOTE — PROGRESS NOTES
Pt here with suspected ICH. A&O x4, forgetful. Neuros with generalized weakness BLE 2/5, R pupil slugglish, mild dysarthria. VSS on RA, SBP <150. Tele NSR. Pureed diet, mildly thick liquids. Takes pills crushed. Up with Ax2 and lift. Incontinent of B+B. Purewick removed d/t incontinent stool and redness in labia. Soft, small BM x1. Refusing repositioning at times despite education. Episode of nausea x1, zofran given with relief. Denies pain. Pt scoring green on the Aggression Stop Light Tool. Plan for platelet and RBC transfusion. Discharge pending.

## 2022-10-21 NOTE — PLAN OF CARE
Pt is A&O. D/o situation. Neuros: generalized weakness. BLE 2/5 and +2/3 edema. VSS. Tele. Pureed diet with mildly thick liquid: Modcho. Up with lift. Meplix CDI. Incontinent of bowel and bladder. Purewick in place. Loose BMx1. Denies pain. Platelet infusion started. Pt scoring green on the aggression stoplight tool. Discharge pending continue to monitor.

## 2022-10-21 NOTE — PROGRESS NOTES
10/21/22 6142   Appointment Info   Signing Clinician's Name / Credentials (PT) Trudi Arnold,PT   Living Environment   People in Home spouse;child(isaiah), dependent  (son)   Current Living Arrangements house   Home Accessibility stairs within home;stairs to enter home   Number of Stairs, Main Entrance   (1+1)   Stair Railings, Main Entrance none   Number of Stairs, Within Home, Primary seven  (Patient has a chair lift.)   Living Environment Comments Split level-Patients needs are met on upper level and she has a stair lift.  does the laundy.  A with  bathing, he gets the clothes out of the closet but she dresses herself. toilets I. Has a 4ww and a transport chair for going outside the house. Reports she sleeps in a chair.   Self-Care   Usual Activity Tolerance fair   Current Activity Tolerance poor   Equipment Currently Used at Home tub bench;walker, rolling;wheelchair, manual   Activity/Exercise/Self-Care Comment Patint goes out for MD appts and occassionally for MD appts. When she goes out on the deck she uses her w/c.   General Information   Onset of Illness/Injury or Date of Surgery 10/18/22   Referring Physician Freeman Vail   Patient/Family Therapy Goals Statement (PT) To go home.   Pertinent History of Current Problem (include personal factors and/or comorbidities that impact the POC) Per chart,  reports increased difficulty with gait/balance since fall 10 days prior, in past 48 hours increasingly confused with difficulty with speech and essentially non-verbal morning of presentation.  Possibly due to subdural fluid collection vs marked electrolyte abnormalities (uremia, hyponatremia, hyperkalemia) vs intracranial mets vs infection.   Existing Precautions/Restrictions fall   Cognition   Affect/Mental Status (Cognition) WNL   Orientation Status (Cognition) oriented to;person;place;situation   Cognitive Status Comments Forgetful and some PLOF information contradictory.   Pain Assessment    Patient Currently in Pain Yes, see Vital Sign flowsheet  (In her back.)   Range of Motion (ROM)   ROM Comment Bilateral DF to neutral.   Strength (Manual Muscle Testing)   Strength Comments 0/5 bilateral DF, 4/5 quads, 2-/5 hip flexors, 2/5 hip abd/add on right, 2/5 on left. Bilateral UE strength 4/5   Bed Mobility   Comment, (Bed Mobility) Sup to sit with max A to move LE's to edge of bed and then to get to full sitting position.   Transfers   Comment, (Transfers) Sit to stand with max A of 2 with use of ww.   Balance   Balance Comments Good sitting balanace, Needs max A of 2 with support of ww to maintain standing balance.   Clinical Impression   Criteria for Skilled Therapeutic Intervention Yes, treatment indicated   PT Diagnosis (PT) Impaired functional independence   Influenced by the following impairments Decreased strength especially bilateral DF, decreased ankle ROM   Functional limitations due to impairments Needs assist for all functional mobilty   Clinical Presentation (PT Evaluation Complexity) Evolving/Changing   Clinical Presentation Rationale >3 factors affecting mobility   Clinical Decision Making (Complexity) moderate complexity   Planned Therapy Interventions (PT) balance training;bed mobility training;gait training;transfer training   Risk & Benefits of therapy have been explained patient;evaluation/treatment results reviewed;care plan/treatment goals reviewed   PT Total Evaluation Time   PT Eval, Moderate Complexity Minutes (92910) 18   Physical Therapy Goals   PT Frequency Daily   PT Predicted Duration/Target Date for Goal Attainment 10/30/22   PT Goals Bed Mobility;Transfers;PT Goal 1   Interventions   Interventions Quick Adds Therapeutic Activity;Therapeutic Procedure   Therapeutic Activity   Therapeutic Activities: dynamic activities to improve functional performance Minutes (21988) 40   Symptoms Noted During/After Treatment Fatigue   Treatment Detail/Skilled Intervention Patient moves very  slowly. Educated on technique for sup to sit. Required max A to move each LE to edge of bed and then max A to get trunk to sitting position. Tolerated sitting for approx 15 min. and able to maintain sitting balance with just supervison. Needed education on where to place feet and needed max A to place feet in correct position. Performed sit to stand with max Assist of 2. Max A to maintain standing balance-tends to lean backwards and to the left. Sit to sup with max A of 2. Left in supine and informed nurse patient needed to be cleaned up.   PT Discharge Planning   PT Plan Try standing/transfers with use of SS   PT Discharge Recommendation (DC Rec) Transitional Care Facility   PT Rationale for DC Rec Patient currently well below baseline. Currently requiring A of 2 for bed mobility and for sit to stand. Unable to transfer or amb. Will benefit from TCU to address strength, balance and mobility deficits prior to discharging to home.   Total Session Time   Timed Code Treatment Minutes 40   Total Session Time (sum of timed and untimed services) 58

## 2022-10-21 NOTE — PROVIDER NOTIFICATION
Neurosurgery PA called with order updates.  Okay to adjust platelet order to keep above 50. Updates to neuro check, vital sign and I/O orders  Anthony ROGERS/Valeria GIRON

## 2022-10-22 NOTE — PROGRESS NOTES
"SPIRITUAL HEALTH SERVICES Progress Note  [facility]  Unit Neuro    Saw pt Elenita Moran per admission request.    Ptnt stated \"I'm really depressed today.\" She expressed sadness in her worsening condition and concern over her death. \"I don't know how I'm going to prepare Denilson.\" I provided compassionate touch and empathic listening through supporting and affirming her emotions. Visit was cut short, because she wanted to nap.     I plan to have the on-call  follow-up. SHS remain available.     BROOKE Francisco.Div  Chaplain Resident   Yljud-[phone]   "

## 2022-10-22 NOTE — PROGRESS NOTES
[facility] Consultation    Elenita Moran MRN# [id number]   YOB: 1955 Age: 67 year old   Date of Admission: 10/18/2022  Requesting physician: Dr. Vail  Reason for consult: Metastatic breast cancer, subdural hematoma           Assessment and Plan:   Primary Oncologist: Dr. Varma    Metastatic breast cancer with bone, liver, brain, bone marrow mets  - extensive prior treatment  - currently on Enhertu (Fam-trastuzumab deruxtecan-nxki) Q21 days. Cycle 3 administered on 10/11/22.   Potential side effects include pancytopenia, pneumonitis and LV dysfunction.  - 10/6/22 received a single fraction SBRT to T5 lesion  - 04/02/22 cerebellar stereotactic radiosurgery,.   - Follow up MRI 10/13/22 showing interim regression of 2 punctate enhancing foci in the cerebellum.  Also notes was expansion of L subdural fluid collection, now measuring 6 mm, and thickening of overlying diffuse pachymeningeal enhancement.  - She has had 3 cycles of Enhertu and would need reassessment and candidacy for further treatments.  We have updated her primary oncologist.  Plan is to continue restorative cares at this time.  If she has evidence of clinical deterioration or disease progression need to readdress goals of care.  Transition to comfort cares/hospice not unreasonable at that juncture.      Encephalopathy  - increased difficulties with gait and balance for past 10 days.  Over past two days, has had increasing difficulties with speech and was non-verbal upon presentation.  - Seen in ED at [facility] on 10/18 where MRI showed A mixed density left holohemispheric subdural collection with some areas of increased density.  The collection measures 8.3 mm over the left frontal convex city, 8.2 mm over the left parietal convexity is 3.3 mm over the posterior left temporal convexity.  There is associated mass-effect with a 2 mm midline shift to the right at the level of the lateral ventricles.  This scan in the ER was  compared to 3/25/2022  - transferred to [facility] for neurosurgery eval  -  reports fall on 10/8. Unknown if head trauma occurred.   - anti-seizure precautions/Keppra     Pancytopenia  Thrombocytopenia  Normocytic anemia  - likely due to current cancer treatment +/- acute illness +/- malignancy  - platelet count upon admission 38,000 (were 288k in clinic on 10/11/22.    - INR 1.27, PTT 38  - has received vitamin k 1 mg  - per neurosurgery, goal platelet count 50K  - conditional transfusion orders are in place.  Goal is to maintain platelet counts over 50,000  - CBC today showed Hb of 7.7g/dl and platelet counts at 74,000.  Received 1 unit of platelet transfusion followed by posttransfusion CBC check today    Elevated LFTs   - most recent labs in clinic on 10/11/22 show alk phos 661, , ALT 50  - could be treatment related (about 40% LFT abnormalities with Enhurtu) or metastatic disease (innumberable liver mets noted on 8/25/22 PET )    KATHLEEN  - creat 3.25 upon admission up from 1.02 in our office on 10/11/22  - dehydration may be contributing  - nephrology is following.  - UOP is good. Serum creatinine has normalized  - Bumex, spironolactone and lisinopril on hold     ID  - tested positive for COVID-19 on 10/20/2022  - Blood and urine cultures showing no growth   - On Remdesivir    Talked to her over the phone today.     Osmani Bean MD  [facility]              Chief Complaint:   No chief complaint on file.           History of Present Illness:       ONCOLOGIC HISTORY:    1.     The patient had presented with multifocal tumor in the left breast, 7 x 6 x 4 cm, in August 2010.    2.     The patient underwent left breast mastectomy. Her tumor was ER positive 63%, NE positive 17%, and HER-2 negative by FISH.    3.     The patient was treated with Adriamycin plus Cytoxan, followed by weekly Taxol.    4.     In May 2011, the patient had started anastrozole 1 mg orally daily.    5.     The patient was found  to have a bony metastasis and the patient underwent radiation from July 2011 until October 2011 for L1, L2, and L3 vertebral bodies.    6.     In January 2015, PET scan showed no evidence of malignancy.    7.  On January 31, 2017 patient has started second line hormonal treatment with Faslodex plus Ibrance.    8.  The patient also underwent CyberKnife to painful L3 vertebral body mass she completed 2400 cGy in 4 fractions on 3/28/2017.  Prior to that she was also treated to a right rib lesion as well as T4, T6-T7 and a posterior left-sided rib lesion.  The latter received 3000 cGy in 10 fractions completing those treatments 3/14/2017..    9. She had disease progression in March 2019 per PET imaging and therapy was changed to everolimus and exemestane on 4/24/19.    10. From 12/6/2019 until 3/30/2020 she was treated with 1st line chemo (refused PIQRAY) and has completed 6 cycles of Abraxane and but discontinue due to neuropathy. She had partial response to treatment.     11. On 3/30/2020 she started 3rd line hormonal treatment: Piqray orally daily plus Faslodex.     12. On 9/24/2020 CT chest abdomen and pelvis Stable appearance of the sacral fractures, left superior and inferior pubic rami fracture, and T10 vertebral body fracture and left 11th rib posteriorly.    13. On 2/9/2021 PET scan demonstrated progressive disease with renewed metabolic activity in a few scattered skeletal metastases. Significant muscular FDG uptake, which decreases the bioavailability of FDG for tumor. As a result, the scan likely underestimates the extent of disease activity. Healing fractures in the pelvis and spine with a presumed small hematoma overlying the sacral fractures.    14. On 2/23/2021 she started Xeloda orally due to progression.     15. On 4/23/2021 PET scan showed mild progression in anterolateral 5th rib and possible liver lesion.     16. On 5/3/2021 she has started Doxil 50 mg/m2 IV every 4 weeks    17. On 7/19/2021 CT CAP  showed mild fullness in right paraspinal muscles. left obturator ring fracture. complex B/L sacral ala fractures.     18. On 2021 MRI of pelvis showed two complex fluid collections along fractured left superior and inferior pubic rami    19. On 2021 PET/CT showed . While there is persistent FDG avid disease throughout the osseous structures, there is resolution of the right hepatic lobe lesion suggesting partial response to therapy. worsening inflammatory change associated with the pathologically fractured left anterior pubic ramus and right sacrum/iliac bone with development of right gluteal and right paraspinal intramuscular hematomas.    20.  On 2021 PET scan revealed stable bony metastases. No worsening.    21.  On 2021 PET scan revealed a new liver metastases bony metastases are stable or better.    22.  On February 15, 2022 CT-guided biopsy of liver lesion revealed HER-2 positive breast cancer ER/SD negative.    23.  On 2022 MRI of brain revealed 3-4 mm suspicious cerebellar lesions.    24.  On 2022 PET scan revealed substantial partial favorable treatment response of metastatic breast cancer involving the liver, skeleton and right pleura.  No new metastases.    25.  On 2022 PET scan revealed decrease in metabolic activity and right pleural thickening.  However there is reactivation of innumerable lesions through liver parenchyma and lesion in T5 vertebral body.  This is suspicious for progressive disease.    26.  33 began Enhertu (Fam-trastuzumab deruxtecan-nxki) Q21 days. Cycle 3 administered on 10/11/22.             Physical Exam:   Vitals were reviewed  Blood pressure 109/72, pulse 113, temperature 98.8  F (37.1  C), temperature source Oral, resp. rate 20, weight 76 kg (167 lb 8.8 oz), SpO2 98 %.  Temperatures:  Current - Temp: 97.5  F (36.4  C); Max - Temp  Av.8  F (36.6  C)  Min: 97.5  F (36.4  C)  Max: 98.2  F (36.8  C)  Respiration range: Resp  Av   Min: 8  Max: 45  Pulse range: Pulse  Av.7  Min: 83  Max: 110  Blood pressure range: Systolic (24hrs), Av , Min:61 , Max:129   ; Diastolic (24hrs), Av, Min:40, Max:95    Pulse oximetry range: SpO2  Av.2 %  Min: 72 %  Max: 99 %    Intake/Output Summary (Last 24 hours) at 10/19/2022 0951  Last data filed at 10/19/2022 0845  Gross per 24 hour   Intake 2223.33 ml   Output 1600 ml   Net 623.33 ml       Physical exam not performed.                Past Medical History:   I have reviewed this patient's past medical history  Past Medical History:   Diagnosis Date     Allergic rhinitis      Bone cancer (H) 2011    breast mets     Breast cancer (H) 2010    left mastectomy     Depression      DM (diabetes mellitus) (H)      Fibromyalgia      Hx antineoplastic chemotherapy 2010     Hx of radiation therapy      Hyperlipemia      Lactose intolerance      Mini stroke     R EYE     Osteoarthritis      Tobacco dependency              Past Surgical History:   I have reviewed this patient's past surgical history  Past Surgical History:   Procedure Laterality Date     APPENDECTOMY       INSERT INTRACORONARY STENT      R Hand     IR CHEST PORT PLACEMENT > 5 YRS OF AGE  2019     IR CHEST PORT PLACEMENT > 5 YRS OF AGE  3/14/2022     IR LUMBAR TRANSFORAMINAL EPIDURAL STEROID INJECTION  2019     IR LUMBAR TRANSFORAMINAL EPIDURAL STRD INJ  2019     IR PORT PLACEMENT >5 YEARS  2019     IR PORT REMOVAL RIGHT  3/14/2022     IR SITE CHECK/EVALUATION  4/15/2022     MAMMOPLASTY REDUCTION Right     hx of left mastectomy and right breast reduction     MASTECTOMY Left      OTHER SURGICAL HISTORY      biopsy of upper and right tongue     OVARIAN CYST REMOVAL       REVISE RECONSTRUCTED BREAST Left     2015               Social History:   I have reviewed this patient's social history  Social History     Tobacco Use     Smoking status: Former     Packs/day: 0.75     Years: 20.00     Pack  years: 15.00     Types: Cigarettes     Smokeless tobacco: Never   Substance Use Topics     Alcohol use: Not Currently     Alcohol/week: 1.0 standard drink     Types: 1 Standard drinks or equivalent per week     Comment: 1 glass of wine/week             Family History:   I have reviewed this patient's family history  Family History   Problem Relation Age of Onset     Lung Cancer Mother      Pancreatic Cancer Mother      Kidney Cancer Maternal Grandmother      Lung Cancer Paternal Aunt      Breast Cancer Cousin              Allergies:     Allergies   Allergen Reactions     Gabapentin      Upper body edema/swelling.         Morphine Visual Disturbance     Visual hallucinations     Sulfamethoxazole-Trimethoprim      Other reaction(s): Hives     Sulfa Drugs Hives and Rash     welts               Medications:   I have reviewed this patient's current medications  Facility-Administered Medications Prior to Admission   Medication Dose Route Frequency Provider Last Rate Last Admin     lidocaine (XYLOCAINE) 2 % external gel   Topical Q4H PRN Frankie Vega MD   1 mL at 07/26/22 0906     Medications Prior to Admission   Medication Sig Dispense Refill Last Dose     ACETAMINOPHEN PO Take 1,300 mg by mouth 3 times daily Alternates with Ibuprofen        aspirin 81 MG EC tablet Take 81 mg by mouth daily        bumetanide (BUMEX) 2 MG tablet Take 2 mg by mouth daily as needed Can take midday if needed for fluid retention/swelling        cephALEXin (KEFLEX) 500 MG capsule Take 1 capsule (500 mg) by mouth 2 times daily 180 capsule 3      Cholecalciferol (VITAMIN D) 125 MCG (5000 UT) capsule Take 1 tablet by mouth daily         clobetasol (TEMOVATE) 0.05 % external ointment Apply topically daily as needed        docusate sodium (COLACE) 100 MG capsule Take 100 mg by mouth daily        DULoxetine (CYMBALTA) 30 MG capsule Take 30 mg by mouth every morning         DULoxetine (CYMBALTA) 60 MG capsule Take 1 capsule by mouth At  Bedtime        ezetimibe-simvastatin (VYTORIN) 10-20 MG tablet Take 1 tablet by mouth At Bedtime        HYDROmorphone (DILAUDID) 4 MG tablet Take 1 tablet (4 mg) by mouth 3 times daily as needed for moderate to severe pain or severe pain (Patient taking differently: Take 4 mg by mouth 3 times daily AM - Midday - PM) 12 tablet 0      hydrOXYzine (VISTARIL) 25 MG capsule Take 25 mg by mouth 3 times daily With hydromorphone        ibuprofen (ADVIL/MOTRIN) 200 MG capsule Take 400 mg by mouth 3 times daily Alternating with acetaminophen        insulin aspart (NOVOLOG FLEXPEN) 100 UNIT/ML pen Inject Subcutaneous 3 times daily (with meals) Sliding scale dose based on blood glucose and diet        Insulin Glargine (BASAGLAR KWIKPEN SC) Inject 8 Units Subcutaneous every morning Uses as directed        Iron Combinations (IRON COMPLEX PO) Take 1 tablet by mouth daily Ferrous biogluconate supplement        lidocaine (XYLOCAINE) 5 % external ointment Apply topically 4 times daily (Patient taking differently: Apply topically 4 times daily as needed for moderate pain) 50 g 0      lisinopril (ZESTRIL) 2.5 MG tablet Take 1 tablet (2.5 mg) by mouth daily 30 tablet 3      MAGNESIUM PO Take by mouth daily OTC supplement        metoprolol succinate ER (TOPROL-XL) 50 MG 24 hr tablet Take 1 tablet (50 mg) by mouth daily 90 tablet 3      Multiple Vitamins-Minerals (ICAPS PO) Take 1 capsule by mouth daily        nystatin (MYCOSTATIN) 955196 UNIT/GM external cream Apply topically 2 times daily (Patient taking differently: Apply topically daily as needed) 30 g 1      OLANZapine (ZYPREXA) 5 MG tablet Take 5 mg by mouth every evening         omeprazole (PRILOSEC) 20 MG DR capsule Take 20 mg by mouth At Bedtime         oxybutynin (DITROPAN) 5 MG tablet Take 5 mg by mouth 2 times daily AM and Midday        oxybutynin ER (DITROPAN-XL) 10 MG 24 hr tablet Take 10 mg by mouth every evening        POTASSIUM CHLORIDE PO Take by mouth daily OTC  supplement        Prenatal Vit-Fe Fumarate-FA (PRENATAL MULTIVITAMIN  PLUS IRON) 27-1 MG TABS Take 1 tablet by mouth daily         psyllium (METAMUCIL/KONSYL) Packet Take 1 packet by mouth daily as needed for constipation        spironolactone (ALDACTONE) 25 MG tablet Take 0.5 tablets (12.5 mg) by mouth daily 90 tablet 3      vitamin (B COMPLEX) tablet Take 1 tablet by mouth daily        zolpidem (AMBIEN) 10 MG tablet Take 10 mg by mouth At Bedtime        B-D U/F 31G X 8 MM insulin pen needle USE 4 TIMES DAILY        blood glucose (ONETOUCH VERIO IQ) test strip         bumetanide (BUMEX) 2 MG tablet Take 1 tablet (2 mg) by mouth 2 times daily (Patient taking differently: Take 2 mg by mouth daily) 180 tablet 3      Continuous Blood Gluc  (FREESTYLE KIMBERLY 14 DAY READER) COLETTE Use to check blood sugar at least 4 times a day or as directed         Continuous Blood Gluc Sensor (GoWarSTYLE KIMBERLY 14 DAY SENSOR) MISC Use as directed to test blood sugar at least 4 times a day or as directed        glucose (BD GLUCOSE) 4 g chewable tablet as needed        LORazepam (ATIVAN) 0.5 MG tablet take 1 tablet by mouth up to 3 times per day as needed for nausea, anxiety, or insomnia (Patient not taking: Reported on 10/19/2022)   Not Taking     medical cannabis (Patient's own supply) See Admin Instructions (The purpose of this order is to document that the patient reports taking medical cannabis.  This is not a prescription, and is not used to certify that the patient has a qualifying medical condition.) (Patient not taking: Reported on 10/19/2022)   Not Taking     prochlorperazine (COMPAZINE) 10 MG tablet Take 10 mg by mouth every 6 hours as needed for nausea or vomiting (Patient not taking: Reported on 10/19/2022)   Not Taking     Current Facility-Administered Medications Ordered in Epic   Medication Dose Route Frequency Last Rate Last Admin     acetaminophen (TYLENOL) tablet 650 mg  650 mg Oral Q4H PRN        Or      acetaminophen (TYLENOL) solution 650 mg  650 mg Per NG tube Q4H PRN         acetaminophen (TYLENOL) Suppository 650 mg  650 mg Rectal Q4H PRN         bisacodyl (DULCOLAX) suppository 10 mg  10 mg Rectal Daily PRN         glucose gel 15-30 g  15-30 g Oral Q15 Min PRN        Or     dextrose 50 % injection 25-50 mL  25-50 mL Intravenous Q15 Min PRN        Or     glucagon injection 1 mg  1 mg Subcutaneous Q15 Min PRN         insulin aspart (NovoLOG) injection (RAPID ACTING)  1-4 Units Subcutaneous Q4H   1 Units at 10/19/22 0812     levETIRAcetam (KEPPRA) intermittent infusion 500 mg  500 mg Intravenous Q12H   500 mg at 10/19/22 0345     piperacillin-tazobactam (ZOSYN) 2.25 g vial to attach to  ml bag  2.25 g Intravenous Q8H   2.25 g at 10/19/22 0346     polyethylene glycol (MIRALAX) Packet 17 g  17 g Oral Daily PRN         prochlorperazine (COMPAZINE) injection 5 mg  5 mg Intravenous Q6H PRN        Or     prochlorperazine (COMPAZINE) tablet 5 mg  5 mg Oral Q6H PRN        Or     prochlorperazine (COMPAZINE) suppository 12.5 mg  12.5 mg Rectal Q12H PRN         senna-docusate (SENOKOT-S/PERICOLACE) 8.6-50 MG per tablet 1 tablet  1 tablet Oral BID PRN        Or     senna-docusate (SENOKOT-S/PERICOLACE) 8.6-50 MG per tablet 2 tablet  2 tablet Oral BID PRN         sodium chloride 0.45% infusion   Intravenous Continuous 75 mL/hr at 10/19/22 0859 Rate Change at 10/19/22 0859     No current Epic-ordered outpatient medications on file.             Review of Systems:     The 10 point Review of Systems is negative other than noted in the HPI.            Data:   Data   Results for orders placed or performed during the hospital encounter of 10/18/22 (from the past 24 hour(s))   Platelet count   Result Value Ref Range    Platelet Count 77 (L) 150 - 450 10e3/uL   Glucose by meter   Result Value Ref Range    GLUCOSE BY METER POCT 154 (H) 70 - 99 mg/dL   Platelet count   Result Value Ref Range    Platelet Count 67 (L) 150 - 450  10e3/uL   Hemoglobin   Result Value Ref Range    Hemoglobin 8.2 (L) 11.7 - 15.7 g/dL   Glucose by meter   Result Value Ref Range    GLUCOSE BY METER POCT 135 (H) 70 - 99 mg/dL   Glucose by meter   Result Value Ref Range    GLUCOSE BY METER POCT 121 (H) 70 - 99 mg/dL   Platelet count   Result Value Ref Range    Platelet Count 35 (LL) 150 - 450 10e3/uL   CONDITIONAL Prepare pheresed platelets (unit)   Result Value Ref Range    Blood Component Type Platelets     Product Code J6890I02     Unit Status Transfused     Unit Number [id number]     CODING SYSTEM TRXK829     ISSUE DATE AND TIME [id number]     UNIT ABO/RH A+     UNIT TYPE ISBT 6200    Glucose by meter   Result Value Ref Range    GLUCOSE BY METER POCT 121 (H) 70 - 99 mg/dL   Basic metabolic panel   Result Value Ref Range    Sodium 137 133 - 144 mmol/L    Potassium 3.1 (L) 3.4 - 5.3 mmol/L    Chloride 112 (H) 94 - 109 mmol/L    Carbon Dioxide (CO2) 17 (L) 20 - 32 mmol/L    Anion Gap 8 3 - 14 mmol/L    Urea Nitrogen 29 7 - 30 mg/dL    Creatinine 0.73 0.52 - 1.04 mg/dL    Calcium 7.0 (L) 8.5 - 10.1 mg/dL    Glucose 115 (H) 70 - 99 mg/dL    GFR Estimate 90 >60 mL/min/1.73m2   CBC with platelets   Result Value Ref Range    WBC Count 3.5 (L) 4.0 - 11.0 10e3/uL    RBC Count 2.62 (L) 3.80 - 5.20 10e6/uL    Hemoglobin 7.7 (L) 11.7 - 15.7 g/dL    Hematocrit 22.9 (L) 35.0 - 47.0 %    MCV 87 78 - 100 fL    MCH 29.4 26.5 - 33.0 pg    MCHC 33.6 31.5 - 36.5 g/dL    RDW 14.9 10.0 - 15.0 %    Platelet Count 74 (L) 150 - 450 10e3/uL   Platelet count   Result Value Ref Range    Platelet Count 74 (L) 150 - 450 10e3/uL   Glucose by meter   Result Value Ref Range    GLUCOSE BY METER POCT 128 (H) 70 - 99 mg/dL     Total time spent: 35 minutes.

## 2022-10-22 NOTE — PROGRESS NOTES
[facility]    Hospitalist Progress Note    Date of Service (when I saw the patient): 10/22/2022    Assessment & Plan   Elenitaestiven Moran is a 67 year old female who was admitted on 10/18/2022.    Acute metabolic encephalopathy   reports increased difficulty with gait/balance since fall 10 days prior, in past 48 hours increasingly confused with difficulty with speech and essentially non-verbal morning of presentation.  Possibly due to subdural fluid collection vs marked electrolyte abnormalities (uremia, hyponatremia, hyperkalemia) vs intracranial mets vs infection.   - management of various issues as below  Neurology consulted thought to be secondary to multifactorial with subdural hematoma, underlying cerebellar mets from metastatic breast cancer, COVID infection contributing  Mental status slowly improving  Patient is able yo talk  normally today and is able to answer my questions  Started on Keppra 500 mg twice daily by Neurology      Suspected traumatic SDH  Outside CT shows left holohemispheric subdural fluid collection with 2 mm midline shift.   reports fall on Oct 8 (unknown if any head trauma).    Neurosurgery and neurocritical care consulted, recommended expectant management  Follow-up CT head stable  - started on keppra  - q2h neuro checks currently.   - seizure precautions  - goal SBP <150, head of bed 30 degrees  - goal platelets of > 50, conditional orders placed, receiving multiple transfusions for platelets and blood  - SLP eval  - neurosurgery and neurology consulted  Neurosurgery is planning on repeating CT scan tomorrow  Mental status improving since admission     Mild covid 19 infection  No hypoxemia  Plan  - 3 days remdesivir to prevent progression  - special precautions     Thrombocytopenia  Abnormal coags  Admission platelet 38, INR 1.27 and PTT 38.  due to chemo, impaired production,  - transfused 4 packs of platelets so far  - goal platelet >50K  per NSG, conditional orders placed, q6 hour plt evaluation. 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   reports this has been increasing in size over past week as she has spent more time in her chair.    * Admission UA with >182 WBC, leukocyte esterase.  * Outside CXR with mild left basilar opacity  * procalcitonin of 8, unclear significance in the setting of significant renal dysfunction  * culture data negative, no respiratory complaints  Plan  - stop zosyn, monitor infectious parameters     Hyponatremia  Hyperkalemia improved now with mild hypokalemia with potassium of 3.1  Uremia  Anion gap metabolic acidosis  KATHLEEN  Cr as low as 0.8 in June 2022, on most recent check Sept 2022 was 1.13.  Admission Na 127, K 7.2, bicarb 16 (gap down to 14), .   reporting minimal oral intake over past 2 days.  Edema on exam, but suspect intravascularly dry with hypoalbuminemia and poor oral intake.    shifted potassium with insulin overnight  -  1/2 NS at 50 ml daily  - repeat bmps daily  - Nephrology consult placed on admission and they are following.  Appreciate assistance     Stage IV metastatic breast cancer (mets to brain, liver, bone) s/p mastectomy  Elevated LFT's  Followed by Dr. Varma of [facility].  Currently on immunotherapy and receiving radiation to spine (completed brain radiation for cerebellar mets).  Recent baseline LFT's unknown, suspect due to liver mets.  No signs of tenderness on exam.    - MOHPA consulted. Appreciate assistance      HFrEF due to doxorubicin  Type 2 nstemi due to demand supply mismatch  TTE 6/2022 with EF 35-40% with global LV hypokinesis.  Outside trop  183, suspect demand ischemia.    Echocardiogram with EF of 40-45%  - monitor I/O's and daily weights     DM II with peripheral neuropathy  - low dose ssi  Hemoglobin A1c at 6.4% indicating good control     Chronic anemia  Baseline hgb about 8 g/dL.   - monitor, transfuse if < 7, conditional order placed, prBC x2 ordered     Depression  - resume cymbalta           Diet: Combination Diet Moderate Consistent Carb (60 g CHO per Meal) Diet; Mildly Thick (level 2); Pureed Diet (level 4); Mildly Thick (level 2); No Straws    DVT Prophylaxis: Pneumatic Compression Devices  Jiang Catheter: Not present  Central Lines: PRESENT        Cardiac Monitoring: ACTIVE order. Indication: Electrolyte Imbalance (24 hours)- Magnesium <1.3 mg/ml; Potassium < =2.8 or > 5.5 mg/ml  Code Status: Full Code          Disposition Plan        Expected Discharge Date:  In the next 2 to 3 days        Code Status: Full Code    Discussed with patient and neurosurgery team today    Kendra Singh MD, MD  [phone] (P)      Interval History   Patient is resting comfortably in bed.  Alert awake and talking normally today mental status much improved..  Denies any shortness of breath.  Platelet count at 74K    -Data reviewed today: I reviewed all new labs and imaging results over the last 24 hours. I personally reviewed all the imaging and labs since admission reviewed    Physical Exam   Temp: 98.4  F (36.9  C) Temp src: Oral BP: 118/79 Pulse: 92   Resp: 16 SpO2: 98 % O2 Device: None (Room air)    Vitals:    10/18/22 1700 10/19/22 0430   Weight: 76.2 kg (167 lb 15.9 oz) 76 kg (167 lb 8.8 oz)     Vital Signs with Ranges  Temp:  [97.3  F (36.3  C)-100  F (37.8  C)] 98.4  F (36.9  C)  Pulse:  [] 92  Resp:  [16-24] 16  BP: (109-131)/(72-84) 118/79  SpO2:  [95 %-99 %] 98 %  I/O last 3 completed shifts:  In: 957 [P.O.:240]  Out: -     Constitutional: Awake, alert, cooperative, no apparent distress  Respiratory: Clear to auscultation bilaterally, no  crackles or wheezing  Cardiovascular: Regular rate and rhythm, normal S1 and S2, and no murmur noted  GI: Normal bowel sounds, soft, non-distended, non-tender  Skin/Integumen: No rashes, no cyanosis, no edema  Other:     Medications     NaCl 50 mL/hr at 10/21/22 3012       remdesivir  100 mg Intravenous Q24H    And     sodium chloride 0.9%  50 mL Intravenous Q24H     docusate sodium  100 mg Oral Daily     DULoxetine  30 mg Oral QAM     DULoxetine  60 mg Oral At Bedtime     insulin aspart  1-4 Units Subcutaneous Q4H     levETIRAcetam  500 mg Intravenous Q12H     oxybutynin  5 mg Oral BID     oxybutynin ER  10 mg Oral QPM     pantoprazole  40 mg Oral At Bedtime     potassium chloride  40 mEq Oral or Feeding Tube Once     sodium chloride (PF)  3 mL Intracatheter Q8H       Data   Recent Labs   Lab 10/22/22  0755 10/22/22  0649 10/22/22  0445 10/22/22  0127 10/21/22  2033 10/21/22  1756 10/21/22  0808 10/21/22  0541 10/21/22  0016 10/20/22  2108 10/20/22  0842 10/20/22  0644 10/19/22  0110 10/19/22  0030 10/18/22  2132 10/18/22  1737 10/18/22  1302 10/18/22  1041   WBC  --  3.5*  --   --   --   --   --  3.1*  --   --   --  3.1*   < >  --   --  5.8  --  6.6   HGB  --  7.7*  --   --   --  8.2*  --  7.0*  --   --   --  7.6*   < >  --   --  7.1*  --  8.0*   MCV  --  87  --   --   --   --   --  86  --   --   --  88   < >  --   --  89  --  89   PLT  --  74*  74*  --  35*  --  67*   < > 57*  57*   < > 54*  --  72*   < >  --   --  38*  --  48*   INR  --   --   --   --   --   --   --   --   --   --   --   --   --   --   --  1.27*  --  1.28*   NA  --  137  --   --   --   --   --  135  --  135  --  137   < > 131*   < > 127*  --  123*   POTASSIUM  --  3.1*  --   --   --   --   --  4.4  --  4.8  --  5.1   < > 5.5*   < > 7.2*   < > 7.0*   CHLORIDE  --  112*  --   --   --   --   --  108  --  108  --  110*   < > 98  --  97   < > 89*   CO2  --  17*  --   --   --   --   --  20  --  20  --  20   < > 18*  --  16*   < > 14*   BUN  --  29   --   --   --   --   --  47*  --  55*  --  75*   < > 116*  --  121*   < > 110.2*   CR  --  0.73  --   --   --   --   --  1.18*  --  1.36*  --  1.69*   < > 2.94*  --  3.32*  --  3.25*   ANIONGAP  --  8  --   --   --   --   --  7  --  7  --  7   < > 15*  --  14   < > 20*   BENJI  --  7.0*  --   --   --   --   --  8.6  --  8.6  --  9.0   < > 8.7  --  8.4*  --  8.2*   * 115* 121*  --    < >  --    < > 134*   < > 178*   < > 142*   < > 391*   < > 143*   < > 122*   ALBUMIN  --   --   --   --   --   --   --   --   --   --   --   --   --  2.0*  --  1.9*  --  2.8*   PROTTOTAL  --   --   --   --   --   --   --   --   --   --   --   --   --  5.2*  --  5.5*  --  5.6*   BILITOTAL  --   --   --   --   --   --   --   --   --   --   --   --   --  0.8  --  0.5  --  0.3   ALKPHOS  --   --   --   --   --   --   --   --   --   --   --   --   --  437*  --  468*  --  564*   ALT  --   --   --   --   --   --   --   --   --   --   --   --   --  86*  --  93*  --  99*   AST  --   --   --   --   --   --   --   --   --   --   --   --   --  124*  --  143*  --  154*    < > = values in this interval not displayed.       No results found for this or any previous visit (from the past 24 hour(s)).

## 2022-10-22 NOTE — PLAN OF CARE
Pt here with L SDH and encephalopathy. A&Ox4. Neuros stable, mild expressive aphasia, BLE 3/5, denies numb/tingling, R pupil sluggish. SBP<150. Tele sinus tach. Pureed diet, mild thick liquids. Takes pills crushed in applesauce. Up with A2 lift. Denies pain. Pt scoring green on the Aggression Stop Light Tool. Plan Platelet goal >50k, HGB goal >7. Hgb 7.7; platelet 71, next recheck 1800. Repeat CT 10/23 AM. PT recommending discharge to TCU. Discharge pending medical stability.    K replaced, recheck 1800.

## 2022-10-22 NOTE — PROGRESS NOTES
Neurosurgery progress note     Patient states she;s doing well.  Denies complaints.   On exam, she's sitting up eating breakfast, awake and alert, moving all four extremities well.      Plt ct 74k today     Imaging     Head CT scan on 10/19/2022 was stable compared to the previous day, demonstrated mixed attenuation left cerebral convexity subdural hematoma.  Stable mass-effect without midline shift.     PLAN:  Repeat head CT tomorrow.  Platelet goal 50k    INGRID Arevalo  [facility] Neurosurgery  [address] [address]    Tel [phone]  Pager [phone]

## 2022-10-22 NOTE — PROVIDER NOTIFICATION
"MD Notification    Notified Person: MD    Notified Person Name: Dr. Evan Johnston    Notification Date/Time: 10/22/2022 0112    Notification Interaction: [employer] Web    Purpose of Notification:  \"FYI Patient has a critical lab of Platelets of 48, will transfuse per order. Thanks\"    Orders Received:    Comments:    "

## 2022-10-22 NOTE — PROGRESS NOTES
10/22/22 7852   Appointment Info   Signing Clinician's Name / Credentials (OT) Mulu Gil   Living Environment   People in Home spouse;child(isaiah), dependent   Current Living Arrangements house   Home Accessibility stairs within home;stairs to enter home   Number of Stairs, Main Entrance other (see comments)  (1+1)   Stair Railings, Main Entrance none   Number of Stairs, Within Home, Primary seven  (stair lift)   Transportation Anticipated family or friend will provide   Living Environment Comments Split level-Patients needs are met on upper level and she has a stair lift.  does the laundy.  A with  bathing, he gets the clothes out of the closet but she dresses herself. toilets I. Has a 4ww and a transport chair for going outside the house. Reports she sleeps in a chair.   Self-Care   Usual Activity Tolerance fair   Current Activity Tolerance poor   Equipment Currently Used at Home tub bench;walker, rolling;wheelchair, manual   Fall history within last six months yes   Activity/Exercise/Self-Care Comment Uses FWW and w/c in home,  assists at baseline.   General Information   Onset of Illness/Injury or Date of Surgery 10/18/22   Referring Physician Chaparro Walter, DO   Additional Occupational Profile Info/Pertinent History of Current Problem  reports increased difficulty with gait/balance since fall 10 days prior, in past 48 hours increasingly confused with difficulty with speech and essentially non-verbal morning of presentation.  Possibly due to subdural fluid collection vs marked electrolyte abnormalities (uremia, hyponatremia, hyperkalemia) vs intracranial mets vs infection.   Existing Precautions/Restrictions fall   Cognitive Status Examination   Orientation Status orientation to person, place and time   Follows Commands follows one-step commands;over 90% accuracy   Cognitive Status Comments Emotionally labile this date, pt reports very depressed about situation.  Wishes family could come see her.   Pain Assessment   Patient Currently in Pain Yes, see Vital Sign flowsheet   Posture   Posture forward head position;protracted shoulders;kyphosis   Range of Motion Comprehensive   General Range of Motion bilateral upper extremity ROM WFL   Strength Comprehensive (MMT)   General Manual Muscle Testing (MMT) Assessment upper extremity strength deficits identified   Comment, General Manual Muscle Testing (MMT) Assessment Generalized weakness   Bed Mobility   Bed Mobility supine-sit   Supine-Sit Nash (Bed Mobility) moderate assist (50% patient effort)  (HOB elevated)   Transfers   Transfers bed-chair transfer   Transfer Comments clyde porter   Clinical Impression   Criteria for Skilled Therapeutic Interventions Met (OT) Yes, treatment indicated   OT Diagnosis impaired functional mobility and self cares   OT Problem List-Impairments impacting ADL problems related to;activity tolerance impaired;balance;cognition;mobility;range of motion (ROM);strength   Assessment of Occupational Performance 3-5 Performance Deficits   Identified Performance Deficits toileting, dressing, functional mobility   Planned Therapy Interventions (OT) ADL retraining;ROM;strengthening;transfer training;progressive activity/exercise   Clinical Decision Making Complexity (OT) moderate complexity   Risk & Benefits of therapy have been explained evaluation/treatment results reviewed;care plan/treatment goals reviewed   OT Total Evaluation Time   OT Eval, Moderate Complexity Minutes (98377) 10   OT Goals   Therapy Frequency (OT) Daily   OT Predicted Duration/Target Date for Goal Attainment 10/29/22   OT Goals Hygiene/Grooming;Lower Body Dressing;Toilet Transfer/Toileting   OT: Hygiene/Grooming supervision/stand-by assist   OT: Lower Body Dressing Supervision/stand-by assist   OT: Toilet Transfer/Toileting Minimal assist   Therapeutic Procedures/Exercise   Therapeutic Procedure: strength, endurance, ROM,  flexibillity minutes (53180) 15   Symptoms Noted During/After Treatment fatigue   Treatment Detail/Skilled Intervention Once sitting up in chair, pt completed the following for B UE exercises 1x10: overhead press, chest press, scapular retraction. Tolerates well.   Therapeutic Activities   Therapeutic Activity Minutes (69684) 15   Symptoms noted during/after treatment fatigue   Treatment Detail/Skilled Intervention Clyde steady with min A x2. B LE weakness noted. Up in chair at end of session, Good sitting balance.   OT Discharge Planning   OT Plan functional transfers (clyde steady), assess LB Dressing, standing tolerance   OT Discharge Recommendation (DC Rec) home with assist;home with home care occupational therapy;Transitional Care Facility   OT Rationale for DC Rec Pt very adamet about going home, would to make significant functional improvement for this to be a reality.  would need to come for caregiver training to ensure safety in home environment ( has COVID right now). Pt well below her baseline of mod I with mobility and self-cares. Home OT to increase independence with self-cares. May need TCU pending progress   OT Brief overview of current status Clyde steady for transfers d/t LB weakness.

## 2022-10-22 NOTE — PLAN OF CARE
Goal Outcome Evaluation:    Overall Patient Progress: No change:    Pt here with L SDH and encephalopathy. A&Ox3-4, Neuros stable, generalized weakness, expressive aphasia, BLE 2-3/5, denies numb/tingling, R pupil sluggish. CMS: Intact. VSS. SBP<150. Tele:  ST with inverted T-waves. Pulmonary: LS Diminished. Resp. 24, unlabored. Wet unproductive cough overnight.  fecal incontinence X 1 overnight.SKin: Mepilex placed over sacrum.   Pureed diet, thin liquids. Takes pills crushed in applesauce. Up with A2 lift. Denies pain. Pt scoring green on the Aggression Stop Light Tool. 0600. Platelet goal >50k, platelet recheck pending. PT/OT eval pending. Discharge pending medical stability.     Shift Summary: Neuros Unchanged. Platelet placement received. Platelet at 35. Conditional orders released for Platelet transfusion. Pt transfused of 1 unit Platelets with no adverse reactions.

## 2022-10-22 NOTE — PROVIDER NOTIFICATION
" Paged Dr. Johnston, \"[phone] KKB: FYI, critical lab result received of platelet count at 35 - orders to transfuse for <50 already in place.\"    Notified bedside RN, pt to be transfused.  "

## 2022-10-22 NOTE — PLAN OF CARE
Goal Outcome Evaluation:      Plan of Care Reviewed With: patient    Overall Patient Progress: decliningOverall Patient Progress: declining       Reason for Admission: L SDH & Encephalopathy     Cognitive/Mentation: A/Ox 4. Forgetful at times.   Neuros/CMS: Intact ex dysarthric/aphasic speech, generalized weakness with BLE 3/5 strength. R pupil sluggish.   VS: Tachycardic, PRN IV metoprolol given 1x for HR>120. SBP goal<150.   Tele: .  GI: BS active, + flatus, last BM 10/22/2022. Incontinent.  : Purwick in place. Incontinent.  Pulmonary: LS diminished.  Pain: None.      Drains/Lines: L chest port infusing 0.45%NS @50ml/hr.  Skin: LLE 3+ edema, RLE 2+ edema. WOC following- sacral wound covered with mepilex packed with aquacell, changed once this shift.  Activity: Assist x 2 with Sejal steady. T/R q2h.  Diet: Minced and moist with thin liquids. Takes pills crushed in applesauce.      Therapies recs: Pending  Discharge: Pending     Aggression Stoplight Tool: GREEN     End of shift summary: At 1800 labs Hgb was 13.3 and Plt was 48k, 1U of Platlets administered, Plt lab redraw at 0000. HR>120, PRN IV Metoprolol and PO scheduled Metoprolol ordered BID, PRN IV Metoprolol given 1x.

## 2022-10-22 NOTE — PROVIDER NOTIFICATION
"MD Notification    Notified Person: MD    Notified Person Name: Dr. Kendra Singh    Notification Date/Time: 10/22/2022    Notification Interaction: [employer] Messaging    Purpose of Notification:  \"FYI, Patient has HR running in the 130s, patient expressed she is feeling fine and physically better then she was yesterday. Thanks\"    Orders Received: Metoprolol PO scheduled and IV PRN        "

## 2022-10-23 NOTE — PROGRESS NOTES
Chart check    Plt ct 75k    No new issues.    NS will sign off.    Plan for follow up in our office in one month with head CT day of appt    INGRID Arevalo  [facility]surgery  [address] [address]    Tel [phone]  Pager [phone]

## 2022-10-23 NOTE — PROGRESS NOTES
Oncology chart check for Dr. Varma:    Platelet counts dropped to 48k yesterday and receive subsequent 1 unit of platelet transfusion.  Platelet counts at 75,000 today.  Hb 8.5g/dl.  Please see note from 10/22/2022.  No new recommendations for today.

## 2022-10-23 NOTE — PLAN OF CARE
Pt here with SDH, encephalopathy.  A/O x 4.  BLE weakness.  R pupil sluggish.  Unable HTS.  Vitals stable.  Sinus tach.  LS clear.  covid precautions.  Lasix given today.  Tolerating reg thin diet.  Pure wick in place.  Denies pain.  Up with clyde steady.  Pressure ulcer dressing changed. Portacath saline locked.  K replaced.  Last platelet 75.  Redraw at 1800.  Green on aggression stoplight.

## 2022-10-23 NOTE — PROGRESS NOTES
[facility]  [facility]  Neurology Daily Note     11/17/11 8:05 AM   1.             Interval History:      Patient is followed by neurosurgery indicating that head CT of 10/19/2022 was stable compared to previous dated with mixed attenuation and over the left cerebral convexity, subdural hematoma which was stable with mass-effect without midline shift, however, because of low platelet count, oncology recommended platelet transfusion.  Vital signs of been stable, no witnessed seizure activity.    COVID-positive 10/20/2022.This patient is a 67 year old female patient with a history for metastatic breast cancer who is currently on Enhurt infusions, last infusion 10/11/2022 and was complaining of gait imbalance over the past 10 days and presented following a fall with gait impairment and speech impairment.This visit triggered the head CT which was concerning for a left holohemispheric subdural collection with areas of increased density.  The subdural collection measured at 8.3 mm over the left frontal convexity and 8.2 mm over the left parietal convexity and 3.3 mm over the posterior left temporal convexity with associated mass-effect with 2 mm midline shift to the right at the level of the lateral ventricles.t and abnormal changes on CT concerning for left convexity subdural hematoma, with mixed density, stable on serial imaging studies followed by neurosurgery.  She remains neurologically stable, no witnessed seizure activity on Keppra.  Patient had no specific complaints today denied headache or visual changes.     She has had ongoing altered mental status, encephalopathy, most likely multifactorial in etiology.  A past history significant for the fact that she had multifocal tumor in the left breast August 2010 and underwent left breast mastectomy, tumor ER +63%, NC +17% and H ER 2 negative.  She was treated with Adriamycin plus Cytoxan followed by Taxol and in May 2011 she  started anastrozole.  In 2011 she was noted to have bony metastases and underwent underwent radiation and in January 2015 PET scan showed no evidence of malignancy.  She underwent CyberKnife to painful L3 vertebral body mass completed in 2017 and had disease progression in March 2019 and pet imaging and therapy was changed .  As treatment progressed she developed peripheral neuropathy and in February 2021 PET scan demonstrated progressive disease and renewed metabolic activity in few scattered metastases and by February 2022 guided biopsy of the liver revealed H ER 2 positive breast cancer and February 2022 MRI of the brain revealed 3 to 4 mm suspicious cerebellar lesions.  She began Enhertu treatment 8/2022            Review of Systems:   The 10 point Review of Systems is negative other than noted in the HPI       Medications:   Scheduled Meds:    docusate sodium  100 mg Oral Daily     DULoxetine  30 mg Oral QAM     DULoxetine  60 mg Oral At Bedtime     insulin aspart  1-4 Units Subcutaneous Q4H     levETIRAcetam  500 mg Oral BID     metoprolol tartrate  25 mg Oral BID     oxybutynin  5 mg Oral BID     oxybutynin ER  10 mg Oral QPM     pantoprazole  40 mg Oral At Bedtime     sodium chloride (PF)  3 mL Intracatheter Q8H     PRN Meds: acetaminophen **OR** acetaminophen, acetaminophen, bisacodyl, glucose **OR** dextrose **OR** glucagon, HYDROmorphone, lidocaine 4%, lidocaine, lidocaine (buffered or not buffered), metoprolol, naloxone **OR** naloxone **OR** naloxone **OR** naloxone, nitroGLYcerin, ondansetron **OR** ondansetron, polyethylene glycol, prochlorperazine **OR** prochlorperazine **OR** prochlorperazine, senna-docusate **OR** senna-docusate, sodium chloride (PF)        Physical Exam:   Vitals: Blood pressure 122/82, pulse 110, temperature 98.5  F (36.9  C), temperature source Oral, resp. rate 18, weight 76 kg (167 lb 8.8 oz), SpO2 99 %.  General Appearance: She was in no apparent respiratory  distress  Neuro:       Mental Status Exam:   She was alert with fluent speech and was oriented to time place and person but was very tearful and expressed thoughts of frustration regarding her health.            Motor:   Her grasp were symmetrical at 4+/5 and she was able to elevate both legs against gravity but was diffusely weak in the lower extremities, proximally and distally at 4+/5.         Cardiovascular: Regular rate and rhythm, no m/r/g  Lungs: Clear to auscultation  Abdomen: Soft, not tender, not destended  Extremities: No clubbing, no cyanosis, no edema       Data:   ROUTINE IP LABS (Last 3results)  CBC RESULTS:     Recent Labs   Lab 10/23/22  1745 10/23/22  1123 10/23/22  0548 10/23/22  0033 10/22/22  1750 10/22/22  1251 10/22/22  0649 10/21/22  1418 10/21/22  0541   WBC  --   --  3.8*  --   --   --  3.5*  --  3.1*   RBC  --   --  2.91*  --   --   --  2.62*  --  2.30*   HGB  --   --  8.5*  --  13.3  --  7.7*   < > 7.0*   HCT  --   --  25.2*  --   --   --  22.9*  --  19.8*   PLT 68* 75* 78*  78*   < > 48*   < > 74*  74*   < > 57*  57*    < > = values in this interval not displayed.     Basic Metabolic Panel:  Recent Labs   Lab 10/23/22  1552 10/23/22  1130 10/23/22  1123 10/23/22  0816 10/23/22  0548 10/22/22  2028 10/22/22  1750 10/22/22  0755 10/22/22  0649 10/21/22  0808 10/21/22  0541   NA  --   --   --   --  138  --   --   --  137  --  135   POTASSIUM  --   --  4.2  --  3.1*  --  4.0  --  3.1*  --  4.4   CHLORIDE  --   --   --   --  112*  --   --   --  112*  --  108   CO2  --   --   --   --  15*  --   --   --  17*  --  20   BUN  --   --   --   --  21  --   --   --  29  --  47*   CR  --   --   --   --  0.54  --   --   --  0.73  --  1.18*   * 165*  --  127* 113*   < >  --    < > 115*   < > 134*   BENJI  --   --   --   --  6.6*  --   --   --  7.0*  --  8.6    < > = values in this interval not displayed.     INR:  Recent Labs   Lab 10/18/22  4837 10/18/22  1041   INR 1.27* 1.28*      Lipid  Profile:  Recent Labs   Lab Test 07/28/22  0935 11/18/20  1436   CHOL 94 107   HDL 26* 48*   LDL 40 19   TRIG 141 202*     TSH:  TSH   Date Value Ref Range Status   11/18/2020 1.19 0.30 - 5.00 uIU/mL Final   ,   Vitamin B12:   Lab Results   Component Value Date    B12 471 02/27/2019      A1C:   Lab Results   Component Value Date    A1C 6.4 10/18/2022    A1C 5.8 12/18/2019     IMAGING:   All imaging studies were reviewed personally  CT head:   -10/17/2022-Mixed density left holohemispheric subdural collection with some areas of increased density. The subdural collection measures 8.3 mm over the left frontal   convexity, 8.2 mm over the left parietal convexity and 3.3 mm over the posterior left temporal convexity.  -  Associated mass effect with 2 mm midline shift to the right at the level of the lateral ventricles.   -small cerebellar metastasis and diffuse pachymeningeal thickening are better seen on the prior brain MRI 03/25/2022.  - Mild diffuse cerebral parenchymal volume loss with Presumed chronic hypertensive/microvascular ischemic white matter.       Prolonged video EEG monitoring was abnormal due to the presence of moderate generalized slowing of the background activities, findings consistent with moderately diffuse encephalopathy.              Assessment and Plan:                                         #.   -- This is a 67-year-old patient with a history for metastatic breast cancer to brain, bone and liver who is presently on immunotherapy and presented with altered mental status, speech impairment and gait unsteadiness.  Mental status has improved, patient has remained neurologically stable, testing COVID-positive 10/20/2022.  However, today she expressed that she was overwhelmed, and was quite tearful.  -#.   -- CT scan of the head demonstrating significant subdural fluid collection over the left cerebral convexity, with mixed signal, in the setting of possible thrombocytopenia, hypocoagulable state and  report of possible recent fall.  Neurosurgery recommended platelet transfusion and close monitoring.  #.   -- Concerns regarding seizure-like activity, although prolonged video EEG monitoring did not reveal any seizure-like activity or electrographic seizures.  --- Altered mental status, encephalopathy, multifactorial in etiology.  --- Report of gait instability, again could be multifactorial, subdural fluid collection, bilateral cerebellar metastatic lesions and chemotherapy induced peripheral neuropathy.  Recommendations:  1.  Continue seizure precautions  2.  Continue Keppra 500 mg twice daily  3.  Continue vital signs with neurochecks every 4 hours.  4.  Ativan as needed for breakthrough seizures.  5.  PT/OT/speech as tolerated.     #. PT/OT/Speech  --as tolerated  Please reconsult neurology if indicated, or call with questions or concerns.        Time: 30 minutes evaluation and management.      Melanie Gold M.D.  [facility]  Office [phone]

## 2022-10-23 NOTE — PROGRESS NOTES
[facility]    Hospitalist Progress Note    Date of Service (when I saw the patient): 10/23/2022    Assessment & Plan   Elenita COX Rah Moran is a 67 year old female who was admitted on 10/18/2022.    Acute metabolic encephalopathy   reports increased difficulty with gait/balance since fall 10 days prior, in past 48 hours increasingly confused with difficulty with speech and essentially non-verbal morning of presentation.  Possibly due to subdural fluid collection vs marked electrolyte abnormalities (uremia, hyponatremia, hyperkalemia) vs intracranial mets vs infection.   - management of various issues as below  Neurology consulted thought to be secondary to multifactorial with subdural hematoma, underlying cerebellar mets from metastatic breast cancer, COVID infection, Acute kidney injury secondary dehydration and multiple electrolyte abnormalities contributing  Mental status much improved now  Patient is able yo talk  normally today and is able to answer my questions  Started on Keppra 500 mg twice daily by Neurology      Suspected traumatic SDH  Outside CT shows left holohemispheric subdural fluid collection with 2 mm midline shift.   reports fall on Oct 8 (unknown if any head trauma).    Neurosurgery and neurocritical care consulted, recommended expectant management  Follow-up CT head stable  - started on keppra  - q2h neuro checks currently.   - seizure precautions  - goal SBP <150, head of bed 30 degrees  - goal platelets of > 50, conditional orders placed, receiving multiple transfusions for platelets and blood  - SLP eval  - neurosurgery and neurology consulted  Neurosurgery repeat CT head on 10/23  showed stable subdural hematoma   Mental status improved  since admission     Mild covid 19 infection  No hypoxemia  Plan  - 3 days remdesivir to prevent progression  - special precautions     Thrombocytopenia  Abnormal coags  Admission platelet 38, INR 1.27 and PTT 38.   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   reports this has been increasing in size over past week as she has spent more time in her chair.    * Admission UA with >182 WBC, leukocyte esterase.  * Outside CXR with mild left basilar opacity  * procalcitonin of 8, unclear significance in the setting of significant renal dysfunction  * culture data negative, no respiratory complaints  Plan  - stop zosyn, monitor infectious parameters     Hyponatremia  Hyperkalemia improved now with mild hypokalemia with potassium of 3.1  Uremia  Anion gap metabolic acidosis  KATHLEEN  Cr as low as 0.8 in June 2022, on most recent check Sept 2022 was 1.13.  Admission Na 127, K 7.2, bicarb 16 (gap down to 14), .   reporting minimal oral intake over past 2 days.  Edema on exam, but suspect intravascularly dry with hypoalbuminemia and poor oral intake.    shifted potassium with insulin overnight   repeat bmps daily  - Nephrology consult placed on admission and they are following.  Appreciate assistance  Creatinine normalized now      Stage IV metastatic breast cancer (mets to brain, liver, bone) s/p mastectomy  Elevated LFT's  Followed by Dr. Varma of [facility].  Currently on immunotherapy and receiving radiation to spine (completed brain radiation for cerebellar mets).  Recent baseline LFT's unknown, suspect due to liver mets.  No signs of tenderness on exam.    - MOHPA consulted. 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  Outside trop 183, suspect demand ischemia.    Echocardiogram with EF of 40-45%  - monitor I/O's and daily weights  Started on metoprolol 25mg PO BID and IV PRN   Heart rate better controlled today  Not a candidate for anticoagulation due to low platelets and intracranial bleed     DM II with peripheral neuropathy  - low dose ssi  Hemoglobin A1c at 6.4% indicating good control     Chronic anemia  Baseline hgb about 8 g/dL.   - monitor, transfuse if < 7, conditional order placed, prBC x2 ordered     Depression  - resume cymbalta           Diet: Combination Diet Moderate Consistent Carb (60 g CHO per Meal) Diet; Mildly Thick (level 2); Pureed Diet (level 4); Mildly Thick (level 2); No Straws    DVT Prophylaxis: Pneumatic Compression Devices  Jiang Catheter: Not present  Central Lines: PRESENT        Cardiac Monitoring: ACTIVE order. Indication: Electrolyte Imbalance (24 hours)- Magnesium <1.3 mg/ml; Potassium < =2.8 or > 5.5 mg/ml  Code Status: Full Code          Disposition Plan        Expected Discharge Date:  In the next 2 to 3 days needs TCU on discharge         Code Status: Full Code    Discussed with patient and physical therapy team today    Kendra Singh MD, MD  [phone] (P)      Interval History   Patient is resting comfortably in chair.  Alert awake and talking normally today mental status much improved..  Denies any shortness of breath.  Repeat CT scan showed stable subdural hematoma today.  No other acute events since yesterday    -Data reviewed today: I reviewed all new labs and imaging results over the last 24 hours. I personally reviewed all the imaging and labs since admission reviewed    Physical Exam   Temp: 97  F (36.1  C) Temp src: Oral BP: 104/72 Pulse: 70   Resp: 18 SpO2: 98 % O2 Device: None (Room air)    Vitals:    10/18/22 1700 10/19/22 0430   Weight: 76.2 kg (167 lb 15.9 oz) 76 kg (167 lb 8.8 oz)     Vital Signs with Ranges  Temp:  [97  F (36.1  C)-98.7  F (37.1   C)] 97  F (36.1  C)  Pulse:  [] 70  Resp:  [14-24] 18  BP: (103-125)/(65-97) 104/72  SpO2:  [97 %-99 %] 98 %  I/O last 3 completed shifts:  In: 0   Out: 1750 [Urine:1750]    Constitutional: Awake, alert, cooperative, no apparent distress  Respiratory: Bibasilar crackles heard on auscultation, no  wheezing  Cardiovascular: Regular rate and rhythm, normal S1 and S2, and no murmur noted  GI: Normal bowel sounds, soft, non-distended, non-tender  Skin/Integumen: No rashes, no cyanosis, no edema  Other:     Medications       docusate sodium  100 mg Oral Daily     DULoxetine  30 mg Oral QAM     DULoxetine  60 mg Oral At Bedtime     insulin aspart  1-4 Units Subcutaneous Q4H     levETIRAcetam  500 mg Oral BID     metoprolol tartrate  25 mg Oral BID     oxybutynin  5 mg Oral BID     oxybutynin ER  10 mg Oral QPM     pantoprazole  40 mg Oral At Bedtime     sodium chloride (PF)  3 mL Intracatheter Q8H       Data   Recent Labs   Lab 10/23/22  1130 10/23/22  1123 10/23/22  0816 10/23/22  0548 10/23/22  0511 10/23/22  0033 10/22/22  2028 10/22/22  1750 10/22/22  0755 10/22/22  0649 10/21/22  0808 10/21/22  0541 10/19/22  0110 10/19/22  0030 10/18/22  2132 10/18/22  1737 10/18/22  1302 10/18/22  1041   WBC  --   --   --  3.8*  --   --   --   --   --  3.5*  --  3.1*   < >  --   --  5.8  --  6.6   HGB  --   --   --  8.5*  --   --   --  13.3  --  7.7*   < > 7.0*   < >  --   --  7.1*  --  8.0*   MCV  --   --   --  87  --   --   --   --   --  87  --  86   < >  --   --  89  --  89   PLT  --  75*  --  78*  78*  --  87*  --  48*   < > 74*  74*   < > 57*  57*   < >  --   --  38*  --  48*   INR  --   --   --   --   --   --   --   --   --   --   --   --   --   --   --  1.27*  --  1.28*   NA  --   --   --  138  --   --   --   --   --  137  --  135   < > 131*   < > 127*  --  123*   POTASSIUM  --  4.2  --  3.1*  --   --   --  4.0  --  3.1*  --  4.4   < > 5.5*   < > 7.2*   < > 7.0*   CHLORIDE  --   --   --  112*  --   --   --   --   --   112*  --  108   < > 98  --  97   < > 89*   CO2  --   --   --  15*  --   --   --   --   --  17*  --  20   < > 18*  --  16*   < > 14*   BUN  --   --   --  21  --   --   --   --   --  29  --  47*   < > 116*  --  121*   < > 110.2*   CR  --   --   --  0.54  --   --   --   --   --  0.73  --  1.18*   < > 2.94*  --  3.32*  --  3.25*   ANIONGAP  --   --   --  11  --   --   --   --   --  8  --  7   < > 15*  --  14   < > 20*   BENJI  --   --   --  6.6*  --   --   --   --   --  7.0*  --  8.6   < > 8.7  --  8.4*  --  8.2*   *  --  127* 113*   < >  --    < >  --    < > 115*   < > 134*   < > 391*   < > 143*   < > 122*   ALBUMIN  --   --   --   --   --   --   --   --   --   --   --   --   --  2.0*  --  1.9*  --  2.8*   PROTTOTAL  --   --   --   --   --   --   --   --   --   --   --   --   --  5.2*  --  5.5*  --  5.6*   BILITOTAL  --   --   --   --   --   --   --   --   --   --   --   --   --  0.8  --  0.5  --  0.3   ALKPHOS  --   --   --   --   --   --   --   --   --   --   --   --   --  437*  --  468*  --  564*   ALT  --   --   --   --   --   --   --   --   --   --   --   --   --  86*  --  93*  --  99*   AST  --   --   --   --   --   --   --   --   --   --   --   --   --  124*  --  143*  --  154*    < > = values in this interval not displayed.       Recent Results (from the past 24 hour(s))   CT Head w/o Contrast    Narrative    EXAM: CT HEAD WITHOUT CONTRAST  LOCATION: [facility]  DATE/TIME: 10/23/2022, 6:18 AM    INDICATION: Left SDH follow-up.  COMPARISON: Several prior comparisons, most recent head CT 10/19/2022.  TECHNIQUE: Routine CT Head without IV contrast. Multiplanar reformats. Dose reduction techniques were used.    FINDINGS:  INTRACRANIAL CONTENTS: Overall, no significant change in size or appearance of left-sided subdural collection since head CT 10/19/2022. The subdural collection is predominantly hypodense, but there is layering hyperdense components within the collection.   The  subdural collection measures up to 0.9 cm in width on the coronal images. Mild mass effect on the left cerebral hemisphere and left lateral ventricle. No significant midline shift. No herniation. Ventricular size is within normal limits without   evidence of hydrocephalus. Mild patchy periventricular white matter hypodensities which are nonspecific, but most likely related to chronic microvascular ischemic disease.    VISUALIZED ORBITS/SINUSES/MASTOIDS: No intraorbital abnormality. No paranasal sinus mucosal disease. No middle ear or mastoid effusion.    BONES/SOFT TISSUES: No acute abnormality.      Impression    IMPRESSION:  1.  No significant change in appearance of the left-sided subdural hematoma since head CT 10/19/2022. Persistent slight mass effect on the left cerebral hemisphere and left lateral ventricle without midline shift or herniation.

## 2022-10-23 NOTE — PLAN OF CARE
Goal Outcome Evaluation:    Overall Patient Progress: Declining    Pt here with L SDH and encephalopathy. A&Ox4. Neuros stable, mild expressive aphasia, dysarthric, BLE 3/5, R pupil sluggish. CMS intact. VSS on RA. SBP<150. Tele: sinus tach. GI: BS+, Fecal incontinence. LBM 10/23/2022. : Incontinent, Purewick in place and patent. Drains/Lines: L chest port infusing 0.45%NS @50ml/hr. Minced and moist, diet, mild thick liquids. Takes pills crushed in applesauce. Activity: T/R q2h, Up with A2 lift. Denies pain. Pt scoring green on the Aggression Stop Light Tool. Plan Platelet goal >50k, HGB goal >7.  Repeat CT complete.  Therapies: Pending. Discharge pending medical stability.    Shift Summary: Platelet lab drawn: Result 87. Redraw 0600. Potassium managed  by RN. Redraw this AM.

## 2022-10-23 NOTE — PLAN OF CARE
Goal Outcome Evaluation:      Plan of Care Reviewed With: patient    Overall Patient Progress: improvingOverall Patient Progress: improving       Reason for Admission: L SDH & Encephalopathy     Cognitive/Mentation: A/Ox 4. Forgetful at times.   Neuros/CMS: Intact ex generalized weakness with BLE 3/5 strength. R pupil sluggish.   VS: Tachycardic but w/in parameters of HR<120. SBP goal<150.   Tele: ST.  GI: BS active, + flatus, last BM 10/23/2022. Incontinent.  : Purwick in place. Incontinent.  Pulmonary: LS diminished.  Pain: None.      Drains/Lines: L chest port S.L.  Skin: BLE 1+ edema. WOC following- sacral wound covered with mepilex packed with aquacell.  Activity: Assist x 2 with Sejal steady. T/R q2h.  Diet: Regular diet with thin liquids. Takes pills whole.      Therapies recs: Pending  Discharge: Pending     Aggression Stoplight Tool: GREEN     End of shift summary: Diet advanced to regular and taking pills whole with water. At 1800 labs Plt was 68k, Plt lab redraw at 0000.

## 2022-10-24 NOTE — PROGRESS NOTES
[facility] Consultation    Elenita Moran MRN# [id number]   YOB: 1955 Age: 67 year old   Date of Admission: 10/18/2022  Requesting physician: Dr. Vail  Reason for consult: Metastatic breast cancer, subdural hematoma           Assessment and Plan:   Primary Oncologist: Dr. Varma    Metastatic breast cancer with bone, liver, brain, bone marrow mets  - extensive prior treatment  - currently on Enhertu (Fam-trastuzumab deruxtecan-nxki) Q21 days. Cycle 3 administered on 10/11/22.   Potential side effects include pancytopenia, pneumonitis and LV dysfunction.  - 10/6/22 received a single fraction SBRT to T5 lesion  - 04/02/22 cerebellar stereotactic radiosurgery,.   - Follow up MRI 10/13/22 showing interim regression of 2 punctate enhancing foci in the cerebellum.  Also notes was expansion of L subdural fluid collection, now measuring 6 mm, and thickening of overlying diffuse pachymeningeal enhancement.  - She has had 3 cycles of Enhertu and would need reassessment and candidacy for further treatments.  We have updated her primary oncologist.  Plan is to continue restorative cares at this time.  If she has evidence of clinical deterioration or disease progression need to readdress goals of care.  Transition to comfort cares/hospice not unreasonable at that juncture.      Encephalopathy  - increased difficulties with gait and balance for past 10 days.  Over past two days, has had increasing difficulties with speech and was non-verbal upon presentation.  - Seen in ED at [place] on 10/18 where MRI showed A mixed density left holohemispheric subdural collection with some areas of increased density.  The collection measures 8.3 mm over the left frontal convex city, 8.2 mm over the left parietal convexity is 3.3 mm over the posterior left temporal convexity.  There is associated mass-effect with a 2 mm midline shift to the right at the level of the lateral ventricles.  This scan in the ER was  compared to 3/25/2022  - [facility]  -  reports fall on 10/8. Unknown if head trauma occurred.   - anti-seizure precautions/Keppra     Pancytopenia  Thrombocytopenia  Normocytic anemia  - likely due to current cancer treatment +/- acute illness +/- malignancy  - platelet count upon admission 38,000 (were 288k in clinic on 10/11/22.    - INR 1.27, PTT 38  - has received vitamin k 1 mg  - per neurosurgery, goal platelet count 50K  - conditional transfusion orders are in place.  Goal is to maintain platelet counts over 50,000  - CBC today showed Hb of 8.5g/dl and platelet counts at 123,000.  Received 1 unit of platelet transfusion for platelet count of 50,000 followed by posttransfusion CBC check today    Elevated LFTs   - most recent labs in clinic on 10/11/22 show alk phos 661, , ALT 50  - could be treatment related (about 40% LFT abnormalities with Enhurtu) or metastatic disease (innumberable liver mets noted on 8/25/22 PET )    KATHLEEN  - creat 3.25 upon admission up from 1.02 in our office on 10/11/22  - dehydration may be contributing  - nephrology is following.  - UOP is good. Serum creatinine has normalized  - Bumex, spironolactone and lisinopril on hold     ID  - tested positive for COVID-19 on 10/20/2022  - Blood and urine cultures showing no growth   - On Remdesivir    Talked to her over the phone today.     Osmani Bean MD  [facility]              Chief Complaint:   No chief complaint on file.           History of Present Illness:       ONCOLOGIC HISTORY:    1.     The patient had presented with multifocal tumor in the left breast, 7 x 6 x 4 cm, in August 2010.    2.     The patient underwent left breast mastectomy. Her tumor was ER positive 63%, MS positive 17%, and HER-2 negative by FISH.    3.     The patient was treated with Adriamycin plus Cytoxan, followed by weekly Taxol.    4.     In May 2011, the patient had started anastrozole 1 mg orally  daily.    5.     The patient was found to have a bony metastasis and the patient underwent radiation from July 2011 until October 2011 for L1, L2, and L3 vertebral bodies.    6.     In January 2015, PET scan showed no evidence of malignancy.    7.  On January 31, 2017 patient has started second line hormonal treatment with Faslodex plus Ibrance.    8.  The patient also underwent CyberKnife to painful L3 vertebral body mass she completed 2400 cGy in 4 fractions on 3/28/2017.  Prior to that she was also treated to a right rib lesion as well as T4, T6-T7 and a posterior left-sided rib lesion.  The latter received 3000 cGy in 10 fractions completing those treatments 3/14/2017..    9. She had disease progression in March 2019 per PET imaging and therapy was changed to everolimus and exemestane on 4/24/19.    10. From 12/6/2019 until 3/30/2020 she was treated with 1st line chemo (refused PIQRAY) and has completed 6 cycles of Abraxane and but discontinue due to neuropathy. She had partial response to treatment.     11. On 3/30/2020 she started 3rd line hormonal treatment: Piqray orally daily plus Faslodex.     12. On 9/24/2020 CT chest abdomen and pelvis Stable appearance of the sacral fractures, left superior and inferior pubic rami fracture, and T10 vertebral body fracture and left 11th rib posteriorly.    13. On 2/9/2021 PET scan demonstrated progressive disease with renewed metabolic activity in a few scattered skeletal metastases. Significant muscular FDG uptake, which decreases the bioavailability of FDG for tumor. As a result, the scan likely underestimates the extent of disease activity. Healing fractures in the pelvis and spine with a presumed small hematoma overlying the sacral fractures.    14. On 2/23/2021 she started Xeloda orally due to progression.     15. On 4/23/2021 PET scan showed mild progression in anterolateral 5th rib and possible liver lesion.     16. On 5/3/2021 she has started Doxil 50 mg/m2 IV  every 4 weeks    17. On 2021 CT CAP showed mild fullness in right paraspinal muscles. left obturator ring fracture. complex B/L sacral ala fractures.     18. On 2021 MRI of pelvis showed two complex fluid collections along fractured left superior and inferior pubic rami    19. On 2021 PET/CT showed . While there is persistent FDG avid disease throughout the osseous structures, there is resolution of the right hepatic lobe lesion suggesting partial response to therapy. worsening inflammatory change associated with the pathologically fractured left anterior pubic ramus and right sacrum/iliac bone with development of right gluteal and right paraspinal intramuscular hematomas.    20.  On 2021 PET scan revealed stable bony metastases. No worsening.    21.  On 2021 PET scan revealed a new liver metastases bony metastases are stable or better.    22.  On February 15, 2022 CT-guided biopsy of liver lesion revealed HER-2 positive breast cancer ER/AZ negative.    23.  On 2022 MRI of brain revealed 3-4 mm suspicious cerebellar lesions.    24.  On 2022 PET scan revealed substantial partial favorable treatment response of metastatic breast cancer involving the liver, skeleton and right pleura.  No new metastases.    25.  On 2022 PET scan revealed decrease in metabolic activity and right pleural thickening.  However there is reactivation of innumerable lesions through liver parenchyma and lesion in T5 vertebral body.  This is suspicious for progressive disease.    26.  33 began Enhertu (Fam-trastuzumab deruxtecan-nxki) Q21 days. Cycle 3 administered on 10/11/22.             Physical Exam:   Vitals were reviewed  Blood pressure 98/61, pulse 104, temperature 99.2  F (37.3  C), temperature source Oral, resp. rate 24, weight 76 kg (167 lb 8.8 oz), SpO2 95 %.  Temperatures:  Current - Temp: 97.5  F (36.4  C); Max - Temp  Av.8  F (36.6  C)  Min: 97.5  F (36.4  C)  Max: 98.2  F  (36.8  C)  Respiration range: Resp  Av  Min: 8  Max: 45  Pulse range: Pulse  Av.7  Min: 83  Max: 110  Blood pressure range: Systolic (24hrs), Av , Min:61 , Max:129   ; Diastolic (24hrs), Av, Min:40, Max:95    Pulse oximetry range: SpO2  Av.2 %  Min: 72 %  Max: 99 %    Intake/Output Summary (Last 24 hours) at 10/19/2022 0951  Last data filed at 10/19/2022 0845  Gross per 24 hour   Intake 2223.33 ml   Output 1600 ml   Net 623.33 ml       Physical exam not performed.                Past Medical History:   I have reviewed this patient's past medical history  Past Medical History:   Diagnosis Date     Allergic rhinitis      Bone cancer (H)     breast mets     Breast cancer (H)     left mastectomy     Depression      DM (diabetes mellitus) (H)      Fibromyalgia      Hx antineoplastic chemotherapy      Hx of radiation therapy      Hyperlipemia      Lactose intolerance      Mini stroke     R EYE     Osteoarthritis      Tobacco dependency              Past Surgical History:   I have reviewed this patient's past surgical history  Past Surgical History:   Procedure Laterality Date     APPENDECTOMY       INSERT INTRACORONARY STENT      R Hand     IR CHEST PORT PLACEMENT > 5 YRS OF AGE  2019     IR CHEST PORT PLACEMENT > 5 YRS OF AGE  3/14/2022     IR LUMBAR TRANSFORAMINAL EPIDURAL STEROID INJECTION  2019     IR LUMBAR TRANSFORAMINAL EPIDURAL STRD INJ  2019     IR PORT PLACEMENT >5 YEARS  2019     IR PORT REMOVAL RIGHT  3/14/2022     IR SITE CHECK/EVALUATION  4/15/2022     MAMMOPLASTY REDUCTION Right     hx of left mastectomy and right breast reduction     MASTECTOMY Left      OTHER SURGICAL HISTORY      biopsy of upper and right tongue     OVARIAN CYST REMOVAL       REVISE RECONSTRUCTED BREAST Left     2015               Social History:   I have reviewed this patient's social history  Social History     Tobacco Use     Smoking status: Former      Packs/day: 0.75     Years: 20.00     Pack years: 15.00     Types: Cigarettes     Smokeless tobacco: Never   Substance Use Topics     Alcohol use: Not Currently     Alcohol/week: 1.0 standard drink     Types: 1 Standard drinks or equivalent per week     Comment: 1 glass of wine/week             Family History:   I have reviewed this patient's family history  Family History   Problem Relation Age of Onset     Lung Cancer Mother      Pancreatic Cancer Mother      Kidney Cancer Maternal Grandmother      Lung Cancer Paternal Aunt      Breast Cancer Cousin              Allergies:     Allergies   Allergen Reactions     Gabapentin      Upper body edema/swelling.         Morphine Visual Disturbance     Visual hallucinations     Sulfamethoxazole-Trimethoprim      Other reaction(s): Hives     Sulfa Drugs Hives and Rash     welts               Medications:   I have reviewed this patient's current medications  Facility-Administered Medications Prior to Admission   Medication Dose Route Frequency Provider Last Rate Last Admin     lidocaine (XYLOCAINE) 2 % external gel   Topical Q4H PRN Frankie Vega MD   1 mL at 07/26/22 0906     Medications Prior to Admission   Medication Sig Dispense Refill Last Dose     ACETAMINOPHEN PO Take 1,300 mg by mouth 3 times daily Alternates with Ibuprofen        aspirin 81 MG EC tablet Take 81 mg by mouth daily        bumetanide (BUMEX) 2 MG tablet Take 2 mg by mouth daily as needed Can take midday if needed for fluid retention/swelling        cephALEXin (KEFLEX) 500 MG capsule Take 1 capsule (500 mg) by mouth 2 times daily 180 capsule 3      Cholecalciferol (VITAMIN D) 125 MCG (5000 UT) capsule Take 1 tablet by mouth daily         clobetasol (TEMOVATE) 0.05 % external ointment Apply topically daily as needed        docusate sodium (COLACE) 100 MG capsule Take 100 mg by mouth daily        DULoxetine (CYMBALTA) 30 MG capsule Take 30 mg by mouth every morning         DULoxetine (CYMBALTA) 60  MG capsule Take 1 capsule by mouth At Bedtime        ezetimibe-simvastatin (VYTORIN) 10-20 MG tablet Take 1 tablet by mouth At Bedtime        HYDROmorphone (DILAUDID) 4 MG tablet Take 1 tablet (4 mg) by mouth 3 times daily as needed for moderate to severe pain or severe pain (Patient taking differently: Take 4 mg by mouth 3 times daily AM - Midday - PM) 12 tablet 0      hydrOXYzine (VISTARIL) 25 MG capsule Take 25 mg by mouth 3 times daily With hydromorphone        ibuprofen (ADVIL/MOTRIN) 200 MG capsule Take 400 mg by mouth 3 times daily Alternating with acetaminophen        insulin aspart (NOVOLOG FLEXPEN) 100 UNIT/ML pen Inject Subcutaneous 3 times daily (with meals) Sliding scale dose based on blood glucose and diet        Insulin Glargine (BASAGLAR KWIKPEN SC) Inject 8 Units Subcutaneous every morning Uses as directed        Iron Combinations (IRON COMPLEX PO) Take 1 tablet by mouth daily Ferrous biogluconate supplement        lidocaine (XYLOCAINE) 5 % external ointment Apply topically 4 times daily (Patient taking differently: Apply topically 4 times daily as needed for moderate pain) 50 g 0      lisinopril (ZESTRIL) 2.5 MG tablet Take 1 tablet (2.5 mg) by mouth daily 30 tablet 3      MAGNESIUM PO Take by mouth daily OTC supplement        metoprolol succinate ER (TOPROL-XL) 50 MG 24 hr tablet Take 1 tablet (50 mg) by mouth daily 90 tablet 3      Multiple Vitamins-Minerals (ICAPS PO) Take 1 capsule by mouth daily        nystatin (MYCOSTATIN) 403430 UNIT/GM external cream Apply topically 2 times daily (Patient taking differently: Apply topically daily as needed) 30 g 1      OLANZapine (ZYPREXA) 5 MG tablet Take 5 mg by mouth every evening         omeprazole (PRILOSEC) 20 MG DR capsule Take 20 mg by mouth At Bedtime         oxybutynin (DITROPAN) 5 MG tablet Take 5 mg by mouth 2 times daily AM and Midday        oxybutynin ER (DITROPAN-XL) 10 MG 24 hr tablet Take 10 mg by mouth every evening        POTASSIUM  CHLORIDE PO Take by mouth daily OTC supplement        Prenatal Vit-Fe Fumarate-FA (PRENATAL MULTIVITAMIN  PLUS IRON) 27-1 MG TABS Take 1 tablet by mouth daily         psyllium (METAMUCIL/KONSYL) Packet Take 1 packet by mouth daily as needed for constipation        spironolactone (ALDACTONE) 25 MG tablet Take 0.5 tablets (12.5 mg) by mouth daily 90 tablet 3      vitamin (B COMPLEX) tablet Take 1 tablet by mouth daily        zolpidem (AMBIEN) 10 MG tablet Take 10 mg by mouth At Bedtime        B-D U/F 31G X 8 MM insulin pen needle USE 4 TIMES DAILY        blood glucose (ONETOUCH VERIO IQ) test strip         bumetanide (BUMEX) 2 MG tablet Take 1 tablet (2 mg) by mouth 2 times daily (Patient taking differently: Take 2 mg by mouth daily) 180 tablet 3      Continuous Blood Gluc  (FREESTYLE KIMBERLY 14 DAY READER) COLETTE Use to check blood sugar at least 4 times a day or as directed         Continuous Blood Gluc Sensor (FREESTYLE KIMBERLY 14 DAY SENSOR) MISC Use as directed to test blood sugar at least 4 times a day or as directed        glucose (BD GLUCOSE) 4 g chewable tablet as needed        LORazepam (ATIVAN) 0.5 MG tablet take 1 tablet by mouth up to 3 times per day as needed for nausea, anxiety, or insomnia (Patient not taking: Reported on 10/19/2022)   Not Taking     medical cannabis (Patient's own supply) See Admin Instructions (The purpose of this order is to document that the patient reports taking medical cannabis.  This is not a prescription, and is not used to certify that the patient has a qualifying medical condition.) (Patient not taking: Reported on 10/19/2022)   Not Taking     prochlorperazine (COMPAZINE) 10 MG tablet Take 10 mg by mouth every 6 hours as needed for nausea or vomiting (Patient not taking: Reported on 10/19/2022)   Not Taking     Current Facility-Administered Medications Ordered in Epic   Medication Dose Route Frequency Last Rate Last Admin     acetaminophen (TYLENOL) tablet 650 mg  650 mg  Oral Q4H PRN        Or     acetaminophen (TYLENOL) solution 650 mg  650 mg Per NG tube Q4H PRN         acetaminophen (TYLENOL) Suppository 650 mg  650 mg Rectal Q4H PRN         bisacodyl (DULCOLAX) suppository 10 mg  10 mg Rectal Daily PRN         glucose gel 15-30 g  15-30 g Oral Q15 Min PRN        Or     dextrose 50 % injection 25-50 mL  25-50 mL Intravenous Q15 Min PRN        Or     glucagon injection 1 mg  1 mg Subcutaneous Q15 Min PRN         insulin aspart (NovoLOG) injection (RAPID ACTING)  1-4 Units Subcutaneous Q4H   1 Units at 10/19/22 0812     levETIRAcetam (KEPPRA) intermittent infusion 500 mg  500 mg Intravenous Q12H   500 mg at 10/19/22 0345     piperacillin-tazobactam (ZOSYN) 2.25 g vial to attach to  ml bag  2.25 g Intravenous Q8H   2.25 g at 10/19/22 0346     polyethylene glycol (MIRALAX) Packet 17 g  17 g Oral Daily PRN         prochlorperazine (COMPAZINE) injection 5 mg  5 mg Intravenous Q6H PRN        Or     prochlorperazine (COMPAZINE) tablet 5 mg  5 mg Oral Q6H PRN        Or     prochlorperazine (COMPAZINE) suppository 12.5 mg  12.5 mg Rectal Q12H PRN         senna-docusate (SENOKOT-S/PERICOLACE) 8.6-50 MG per tablet 1 tablet  1 tablet Oral BID PRN        Or     senna-docusate (SENOKOT-S/PERICOLACE) 8.6-50 MG per tablet 2 tablet  2 tablet Oral BID PRN         sodium chloride 0.45% infusion   Intravenous Continuous 75 mL/hr at 10/19/22 0859 Rate Change at 10/19/22 0859     No current Epic-ordered outpatient medications on file.             Review of Systems:     The 10 point Review of Systems is negative other than noted in the HPI.            Data:   Data   Results for orders placed or performed during the hospital encounter of 10/18/22 (from the past 24 hour(s))   Platelet count   Result Value Ref Range    Platelet Count 75 (L) 150 - 450 10e3/uL   Potassium   Result Value Ref Range    Potassium 4.2 3.4 - 5.3 mmol/L   Glucose by meter   Result Value Ref Range    GLUCOSE BY METER POCT 165  (H) 70 - 99 mg/dL   Glucose by meter   Result Value Ref Range    GLUCOSE BY METER POCT 124 (H) 70 - 99 mg/dL   Platelet count   Result Value Ref Range    Platelet Count 68 (L) 150 - 450 10e3/uL   Glucose by meter   Result Value Ref Range    GLUCOSE BY METER POCT 139 (H) 70 - 99 mg/dL   Glucose by meter   Result Value Ref Range    GLUCOSE BY METER POCT 133 (H) 70 - 99 mg/dL   Platelet count   Result Value Ref Range    Platelet Count 50 (L) 150 - 450 10e3/uL   Glucose by meter   Result Value Ref Range    GLUCOSE BY METER POCT 124 (H) 70 - 99 mg/dL   Glucose by meter   Result Value Ref Range    GLUCOSE BY METER POCT 124 (H) 70 - 99 mg/dL   Platelet count   Result Value Ref Range    Platelet Count 123 (L) 150 - 450 10e3/uL   CBC with platelets   Result Value Ref Range    WBC Count 2.4 (L) 4.0 - 11.0 10e3/uL    RBC Count 2.55 (L) 3.80 - 5.20 10e6/uL    Hemoglobin 8.5 (L) 11.7 - 15.7 g/dL    Hematocrit 23.7 (L) 35.0 - 47.0 %    MCV 93 78 - 100 fL    MCH 33.3 (H) 26.5 - 33.0 pg    MCHC 35.9 31.5 - 36.5 g/dL    RDW 16.8 (H) 10.0 - 15.0 %    Platelet Count 123 (L) 150 - 450 10e3/uL   Basic metabolic panel   Result Value Ref Range    Sodium 133 133 - 144 mmol/L    Potassium 3.9 3.4 - 5.3 mmol/L    Chloride 104 94 - 109 mmol/L    Carbon Dioxide (CO2) 22 20 - 32 mmol/L    Anion Gap 7 3 - 14 mmol/L    Urea Nitrogen 25 7 - 30 mg/dL    Creatinine 0.67 0.52 - 1.04 mg/dL    Calcium 8.0 (L) 8.5 - 10.1 mg/dL    Glucose 132 (H) 70 - 99 mg/dL    GFR Estimate >90 >60 mL/min/1.73m2     Total time spent: 35 minutes.

## 2022-10-24 NOTE — PROGRESS NOTES
"[facility] Progress Note       73     Saw pt Elenita Moran per referral from morning rounds.     Illness Narrative - Elenita was awake and seated in her bedside chair during this visit. She noted that she arrived at [facility] due to a brain bleed. She also noted that this diagnosis is in addition to her already prolonged journey with cancer.     Distress - Elenita's illness, compounded with her cancer, have been causing her a great deal of distress. Additionally, she notes that her ability to read and write have been impacted during this hospitalization. This has been distressing for Elenita as she has been an avid reader in the past and hopes to get back to doing that again. She's been attempting to practice both and is hoping for improvement.     Coping - In conversation on coping mechanisms, Elenita notes that she has been receiving multiple phone calls a day from her  (\"Denilson\") and that these have been helpful for her in dealing with the stress of this admission. Additionally, she said that she has been trying to pass time by doing various leg exercises that can help her build strength. This gives her a sense of agency as she navigates her recovery. At the end of our conversation, Elenita said that she \"can use all the prayers\" she can get. I offered a moment of prayer with Elenita at the bedside.     Meaning-Making - Elenita noted that her diagnosis and treatment plan is complicated as she has already been on a long journey with cancer. She was tearful in talking about how this admission has led her closer to \"the big snehal\" and how she wonders why things keep getting \"thrown\" at her as her diagnosis list continues to lengthen. I provided emotional support for Elenita and affirmed these feelings, noting that they were normal.     Plan - I will continue to follow this pt while on unit 73.  support is welcomed by the pt. Please consult if any immediate needs arise.      ------  Brian Coker, " June  Resident   Pager: [phone]

## 2022-10-24 NOTE — PLAN OF CARE
Goal Outcome Evaluation:      Plan of Care Reviewed With: patient  [id number]: Covid Positive. Patient alert and oriented, very tearful today, empathetic listening provided, fredy also consulted today. VSS on room air, tele sinus tach. Neuro shows sluggish pupils, BLE weakness and unable to perform dorsiflexion. Stood up with assistance  from PT today, up in chair this shift. Purwik in place, large incontinent BM this shift. Discharge pending placement.

## 2022-10-24 NOTE — PLAN OF CARE
Goal Outcome Evaluation:    Overall Patient Progress: No change     Reason for Admission: L SDH & Encephalopathy     Cognitive/Mentation: A/Ox 4. Neuros/CMS: Intact ex generalized weakness with BLE 3/5 strength. R pupil sluggish.   VS: Stable on RA  Tele: SR/ST  GI: BS active, last BM 10/24/2022. Incontinent.  : Purwick in place. Incontinent.  Pulmonary: LS diminished.  Pain: Denies  Drains/Lines: L chest port. Infusing platelets at 250 ml /hr. Flushed  Skin: BLE 1+ edema. WOC following- sacral wound covered with mepilex packed with aquacell.  Activity: Assist x 2 with Sejal steady. T/R q2h.  Diet: Regular diet with thin liquids. Takes pills whole.      Therapies recs: Pending  Discharge: Pending Covid Recovery.      Aggression Stoplight Tool: GREEN     End of shift summary:  At 0130 labs Plt was 50k, Transfusion required per orders.  No reaction. BMP and CBC with Plt rescheduled for 0745 s/p 1hr for Plt transfusion.

## 2022-10-24 NOTE — PROGRESS NOTES
[facility]    Hospitalist Progress Note    Date of Service (when I saw the patient): 10/24/2022    Assessment & Plan   Elenitaestiven Moran is a 67 year old female who was admitted on 10/18/2022.    Acute metabolic encephalopathy   reports increased difficulty with gait/balance since fall 10 days prior, in past 48 hours increasingly confused with difficulty with speech and essentially non-verbal morning of presentation.  Possibly due to subdural fluid collection vs marked electrolyte abnormalities (uremia, hyponatremia, hyperkalemia) vs intracranial mets vs infection.   - management of various issues as below  Neurology consulted thought to be secondary to multifactorial with subdural hematoma, underlying cerebellar mets from metastatic breast cancer, COVID infection, Acute kidney injury secondary dehydration and multiple electrolyte abnormalities contributing  Mental status much improved now  Patient is able to talk  normally today and is able to answer my questions  Started on Keppra 500 mg twice daily by Neurology   And surgery signed off currently     Suspected traumatic SDH  Outside CT shows left holohemispheric subdural fluid collection with 2 mm midline shift.   reports fall on Oct 8 (unknown if any head trauma).    Neurosurgery and neurocritical care consulted, recommended expectant management  Follow-up CT head stable  - started on keppra  - q2h neuro checks currently.   - seizure precautions  - goal SBP <150, head of bed 30 degrees  - goal platelets of > 50, conditional orders placed, receiving multiple transfusions for platelets and blood  - SLP eval  - neurosurgery and neurology consulted  Neurosurgery repeat CT head on 10/23  showed stable subdural hematoma   Mental status improved  since admission  Neurosurgery signed off and recommended follow-up with outpatient with repeat CT scan in 4 weeks     Mild covid 19 infection  No hypoxemia  Plan  - 3 days  remdesivir to prevent progression completed  - special precautions  Remains on room air     Thrombocytopenia  Abnormal coags  Admission platelet 38, INR 1.27 and PTT 38.  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   reports this has been increasing in size over past week as she has spent more time in her chair.    * Admission UA with >182 WBC, leukocyte esterase.  * Outside CXR with mild left basilar opacity  * procalcitonin of 8, unclear significance in the setting of significant renal dysfunction  * culture data negative, no respiratory complaints  Plan  - stop zosyn, monitor infectious parameters     Hyponatremia  Hyperkalemia improved now with mild hypokalemia with potassium of 3.1  Uremia  Anion gap metabolic acidosis  KATHLEEN  Cr as low as 0.8 in June 2022, on most recent check Sept 2022 was 1.13.  Admission Na 127, K 7.2, bicarb 16 (gap down to 14), .   reporting minimal oral intake over past 2 days.  Edema on exam, but suspect intravascularly dry with hypoalbuminemia and poor oral intake.    shifted potassium with insulin overnight   repeat bmps daily  - Nephrology consult placed on admission and they are following.  Appreciate assistance  Creatinine normalized now      Stage IV metastatic breast cancer (mets to brain, liver, bone) s/p mastectomy  Elevated LFT's  Followed by Dr. Varma of [facility].  Currently on immunotherapy and receiving radiation to spine (completed brain radiation for cerebellar mets).  Recent baseline LFT's unknown, suspect due to liver mets.  No signs of tenderness on exam.    - MOHPA consulted. Appreciate  assistance      HFrEF due to doxorubicin  Type 2 nstemi due to demand supply mismatch  Sinus tachycardia  TTE 6/2022 with EF 35-40% with global LV hypokinesis.  Outside trop 183, suspect demand ischemia.    Echocardiogram with EF of 40-45%  - monitor I/O's and daily weights  Started on metoprolol 25mg PO BID and IV PRN   Heart rate better controlled today       DM II with peripheral neuropathy  - low dose ssi  Hemoglobin A1c at 6.4% indicating good control     Chronic anemia  Baseline hgb about 8 g/dL.   - monitor, transfuse if < 7, conditional order placed, prBC x2 ordered     Depression  - resume cymbalta           Diet: Combination Diet Moderate Consistent Carb (60 g CHO per Meal) Diet; Mildly Thick (level 2); Pureed Diet (level 4); Mildly Thick (level 2); No Straws    DVT Prophylaxis: Pneumatic Compression Devices  Jiang Catheter: Not present  Central Lines: PRESENT        Cardiac Monitoring: ACTIVE order. Indication: Electrolyte Imbalance (24 hours)- Magnesium <1.3 mg/ml; Potassium < =2.8 or > 5.5 mg/ml  Code Status: Full Code          Disposition Plan        Expected Discharge Date:  Patient is medically ready for discharge other than COVID-positive status needing contact precautions.  Needs TCU on discharge    Code Status: Full Code    Discussed with patient and bedside RN today    Kendra Singh MD, MD  [phone] (P)      Interval History   Patient is resting comfortably in chair.  Alert awake and talking normally today mental status much improved..  Denies any shortness of breath.    No other acute events since yesterday    -Data reviewed today: I reviewed all new labs and imaging results over the last 24 hours. I personally reviewed all the imaging and labs since admission reviewed    Physical Exam   Temp: 97  F (36.1  C) Temp src: Oral BP: 94/60 Pulse: 113   Resp: 20 SpO2: 97 % O2 Device: None (Room air)    Vitals:    10/18/22 1700 10/19/22 0430   Weight: 76.2 kg (167 lb 15.9 oz) 76 kg (167 lb 8.8 oz)      Vital Signs with Ranges  Temp:  [96.8  F (36  C)-99.2  F (37.3  C)] 97  F (36.1  C)  Pulse:  [] 113  Resp:  [18-24] 20  BP: ()/(58-82) 94/60  SpO2:  [94 %-99 %] 97 %  I/O last 3 completed shifts:  In: 540 [P.O.:540]  Out: 650 [Urine:650]    Constitutional: Awake, alert, cooperative, no apparent distress  Respiratory: Bibasilar crackles heard on auscultation, no  wheezing  Cardiovascular: Regular rate and rhythm, normal S1 and S2, and no murmur noted  GI: Normal bowel sounds, soft, non-distended, non-tender  Skin/Integumen: No rashes, no cyanosis, no edema  Other:     Medications       docusate sodium  100 mg Oral Daily     DULoxetine  30 mg Oral QAM     DULoxetine  60 mg Oral At Bedtime     heparin  5-10 mL Intracatheter Q28 Days     heparin lock flush  5-10 mL Intracatheter Q24H     insulin aspart  1-4 Units Subcutaneous Q4H     levETIRAcetam  500 mg Oral BID     metoprolol tartrate  25 mg Oral BID     oxybutynin  5 mg Oral BID     oxybutynin ER  10 mg Oral QPM     pantoprazole  40 mg Oral At Bedtime     sodium chloride (PF)  10-20 mL Intracatheter Q28 Days     sodium chloride (PF)  3 mL Intracatheter Q8H       Data   Recent Labs   Lab 10/24/22  1118 10/24/22  0813 10/24/22  0756 10/24/22  0406 10/24/22  0041 10/23/22  1956 10/23/22  1745 10/23/22  1130 10/23/22  1123 10/23/22  0816 10/23/22  0548 10/22/22  2028 10/22/22  1750 10/22/22  0755 10/22/22  0649 10/19/22  0110 10/19/22  0030 10/18/22  2132 10/18/22  1737 10/18/22  1302 10/18/22  1041   WBC  --  2.4*  --   --   --   --   --   --   --   --  3.8*  --   --   --  3.5*   < >  --   --  5.8  --  6.6   HGB  --  8.5*  --   --   --   --   --   --   --   --  8.5*  --  13.3  --  7.7*   < >  --   --  7.1*  --  8.0*   MCV  --  93  --   --   --   --   --   --   --   --  87  --   --   --  87   < >  --   --  89  --  89   PLT  --  123*  123*  --   --  50*  --  68*  --  75*  --  78*  78*   < > 48*   < > 74*  74*   < >  --   --  38*  --  48*   INR  --   --    --   --   --   --   --   --   --   --   --   --   --   --   --   --   --   --  1.27*  --  1.28*   NA  --  133  --   --   --   --   --   --   --   --  138  --   --   --  137   < > 131*   < > 127*  --  123*   POTASSIUM  --  3.9  --   --   --   --   --   --  4.2  --  3.1*  --  4.0  --  3.1*   < > 5.5*   < > 7.2*   < > 7.0*   CHLORIDE  --  104  --   --   --   --   --   --   --   --  112*  --   --   --  112*   < > 98  --  97   < > 89*   CO2  --  22  --   --   --   --   --   --   --   --  15*  --   --   --  17*   < > 18*  --  16*   < > 14*   BUN  --  25  --   --   --   --   --   --   --   --  21  --   --   --  29   < > 116*  --  121*   < > 110.2*   CR  --  0.67  --   --   --   --   --   --   --   --  0.54  --   --   --  0.73   < > 2.94*  --  3.32*  --  3.25*   ANIONGAP  --  7  --   --   --   --   --   --   --   --  11  --   --   --  8   < > 15*  --  14   < > 20*   BENJI  --  8.0*  --   --   --   --   --   --   --   --  6.6*  --   --   --  7.0*   < > 8.7  --  8.4*  --  8.2*   * 132* 124*   < >  --    < >  --    < >  --    < > 113*   < >  --    < > 115*   < > 391*   < > 143*   < > 122*   ALBUMIN  --   --   --   --   --   --   --   --   --   --   --   --   --   --   --   --  2.0*  --  1.9*  --  2.8*   PROTTOTAL  --   --   --   --   --   --   --   --   --   --   --   --   --   --   --   --  5.2*  --  5.5*  --  5.6*   BILITOTAL  --   --   --   --   --   --   --   --   --   --   --   --   --   --   --   --  0.8  --  0.5  --  0.3   ALKPHOS  --   --   --   --   --   --   --   --   --   --   --   --   --   --   --   --  437*  --  468*  --  564*   ALT  --   --   --   --   --   --   --   --   --   --   --   --   --   --   --   --  86*  --  93*  --  99*   AST  --   --   --   --   --   --   --   --   --   --   --   --   --   --   --   --  124*  --  143*  --  154*    < > = values in this interval not displayed.       No results found for this or any previous visit (from the past 24 hour(s)).

## 2022-10-25 NOTE — PROGRESS NOTES
[facility]  WOC Nurse Inpatient Wound Assessment     Reason for visit: Evaluate and treat  Sacral CAPI and Rt buttocks woiunds      Assessment  Sacrum: Unstageable CAPI  s/s fall - evolving with eschar lip over wound bed  Rt buttock: linear line of superficial wound unknown Etiology. No local s/s infection- improving    Treatment Plan  Wound care: Sacral CAPI  1. Change dressing daily and prn  2. Clean wound with MicroKlenz. Pat lindsay-wound skin dry  3. Cut a strip of Aqua cell AG  4. Using end of Q-tip, pack strip of Aqua cell AG into wound bed and ensure to pack into undermining   5. Cover with Mepilex sacral   6. Rt buttock: cover with Mepilex border   PIP:   -Diligent turning and heel suspension.   -Consider pulsate  - Mobilize when medically indicated  -HOB below 30 degrees if not medcially contraindicated  -dietitian following  - Zhao Risk     Orders In Epic  Recommended provider order: Surgical consult... likely able to perform bedside debridement  WOC Nurse follow-up plan: weekly  Nursing to notify the Provider(s) and re-consult the WOC Nurse if wound(s) deteriorates or new skin concern.  Patient History  According to provider note(s):   The information was obtained from the patient and review of her medical records.  Ms. Monreal is a 67-year-old patient who has a history for MSSA pelvic abscess and metastatic breast cancer with known metastases to brain, liver and bone and is being followed by oncology, ongoing salvage chemotherapy treatment with immunotherapy and radiation.  History significant for the fact that there have been concerns regarding thrombocytopenia, platelet count of 48 with a coagulopathy, INR of 1.28 and report of a fall which occurred several days prior to her presentation.  She was seen at [facility] on 10/18 because of concerns regarding altered mental status and giving a history for several weeks with concerns regarding increasing difficulty with gait  and balance and difficulty with her speech which triggered her visit to the ED on 10/18 when it was reported that she also had involuntary movements of the extremities with flailing movements during which patient was noted to have a blank stare, no report of urinary incontinence no prior history for seizures.  This visit triggered the head CT which was concerning for a left holohemispheric subdural collection with areas of increased density.  The subdural collection measured at 8.3 mm over the left frontal convexity and 8.2 mm over the left parietal convexity and 3.3 mm over the posterior left temporal convexity with associated mass-effect with 2 mm midline shift to the right at the level of the lateral ventricles.  She was transferred to [facility] for neurosurgical evaluation and was monitored in the ICU where prolonged EEG video monitoring did not reveal any seizure activity and monitoring was discontinued.  Objective Data  Containment of urine/stool: PureWick for UIC and brief for FIC    Active Diet Order:  Orders Placed This Encounter      Combination Diet Moderate Consistent Carb (60 g CHO per Meal) Diet; Regular Diet    Output:   I/O last 3 completed shifts:  In: 240 [P.O.:240]  Out: 2325 [Urine:2325]    Risk Assessment:   Sensory Perception: 3-->slightly limited  Moisture: 3-->occasionally moist  Activity: 2-->chairfast  Mobility: 3-->slightly limited  Nutrition: 3-->adequate  Friction and Shear: 2-->potential problem  Zhao Score: 16                          Labs:   Recent Labs   Lab 10/24/22  0813 10/19/22  0420 10/19/22  0030 10/18/22  2316 10/18/22  1737   ALBUMIN  --   --  2.0*  --  1.9*   HGB 8.5*   < >  --   --  7.1*   INR  --   --   --   --  1.27*   WBC 2.4*   < >  --   --  5.8   A1C  --   --   --  6.4*  --     < > = values in this interval not displayed.       Physical Exam  Skin inspection: Sacrum and Rt buttock    #1  Wound Location:sacrum  Date of last photo: 10-25-22    10-19-22                                  Measurements (length x width x depth, in cm):  4.0cm  x 5cm  x 0.8cm   Wound Base: Eschar covering majority of wound bed. White slough noted to depth of wound  Tunneling: NA  Undermining: Up to 1cm circumferentially around wound bed  Palpation of the wound bed: firm  Periwound skin:Intact, redness blanchable   Temperature: warm   Drainage: small sero-sang  Odor: none  Pain:moderate to palpation. Slow gentle cares utilized    #2: Wound locations:  Rt buttock  Date of last WOC photo: 10/25/22    10-19-22      Linear wounds on Rt buttock, not mirrored on Lt. Wounds all superficial. No drainage, NO local s/s infection    Interventions  Current support surface: Pulsate mattress  Current off-loading measures: Pillow and pulsate mattress  Visual inspection of wound(s) completed  Wound Care: completed  Supplies:  Pt room and supply room   Education provided: Updated on wound condition to RN and patient  Discussed plan of care with pt and nursing    Lindsay Rouse RN, CWOCN

## 2022-10-25 NOTE — PROGRESS NOTES
CLINICAL NUTRITION SERVICES  -  ASSESSMENT NOTE      Recommendations Ordered by Registered Dietitian (RD):   - ordered Glucerna BID   Future/Additional Recommendations:   - watch for special precautions iso status and opportunity for NFPA   Malnutrition:   % Weight Loss:  None noted  % Intake:  </= 50% for >/= 5 days (severe malnutrition) - 50% intakes this admit x7 days  Subcutaneous Fat Loss:  Unable to assess - covid+  Muscle Loss:  Unable to assess - covid+  Fluid Retention:  Trace to mild bilateral leg and generalized edema    Malnutrition Diagnosis: Unable to determine due to lack of NFPA        REASON FOR ASSESSMENT  Elenita Moran is a 67 year old female seen by Registered Dietitian for LOS      NUTRITION HISTORY  - Information obtained from chart review and discussion with pt via phone  - PMH of metastatic breast cancer on immunotherapy and radiation, HFrEF, lymphedema, DM II, chronic MSSA infection of pelvis, chronic decubitus ulcer   - per H&P,  states she was doing well with oral intake until 4-5 days ago.  Has had minimal food intake in this time though he states she did well with water up until the past 48 hours.   - per discussion with pt, pt reported she was eating TID meals and had a good appetite at home. She believes she has been losing wt, her UBW is 150#. She drinks chocolate protien shakes at home from [employer].    CURRENT NUTRITION ORDERS  Diet Order: Moderate Consistent Carbohydrate    Current Intake/Tolerance:  - per discussion with pt via phone, pt reported her appetite has been very poor this admit. She has a change in taste from covid, no changes in her smell. She has been ordering BID meals. RD attempted to encourage pt to try for TID meals but pt reported she is unable to do that at this time. Pt requested protein drinks, discussed options with pt and will order chocolate Glucerna with lunch and HS snack. Encouraged pt to order more if she would like.   - per health  "touch, pt receiving 2-3 meals/day  - per nursing flow sheet, 50% intakes documented  - per chart review, pt has poor appetite      NUTRITION FOCUSED PHYSICAL ASSESSMENT FOR DIAGNOSING MALNUTRITION)  No: Patient not available       - special precautions iso d/t covid+          ANTHROPOMETRICS  Height: 5' 5\" (165.1 cm)  Weight: 167 lbs 8.79 oz  Body mass index is 27.88 kg/m .  Weight Status:  Overweight BMI 25-29.9  IBW: 61.8 kg  % IBW: 123%  Weight History: unclear wt hx, does appear to be trending up  10/19/22 : 76 kg (167 lb 8.8 oz)  10/10/22 : 94.1 kg (207 lb 8 oz) --> accurate?  09/28/22 : 94.8 kg (208 lb 14.4 oz) --> accurate?  09/12/22 : 71.9 kg (158 lb 9.6 oz)  09/09/22 : 70.2 kg (154 lb 11.2 oz)  08/31/22 : 69.9 kg (154 lb)  08/02/22 : 69.6 kg (153 lb 8 oz)  07/26/22 : 70.8 kg (156 lb)  07/19/22 : 69.9 kg (154 lb)  07/18/22 : 72.1 kg (159 lb)  06/21/22 : 72.6 kg (160 lb)  06/07/22 : 71.5 kg (157 lb 9.6 oz)  06/06/22 : 70.8 kg (156 lb)  05/23/22 : 74.8 kg (165 lb)  03/28/22 : 66.2 kg (146 lb)  03/21/22 : 65.4 kg (144 lb 3.2 oz)  02/22/22 : 63.5 kg (140 lb)  02/15/22 : 62 kg (136 lb 11 oz)  11/08/21 : 67.1 kg (148 lb)    LABS  Labs reviewed: -164    MEDICATIONS  Medications reviewed: colace, insulin aspart (LSSI)    PER CHART REVIEW:  General:  - pt has mild expressive aphasia  - 10/24: Patient is medically ready for discharge other than COVID-positive status needing contact precautions.  Needs TCU on discharge  - 10/21, per oncology: Plan is to continue restorative cares at this time.  If she has evidence of clinical deterioration or disease progression need to readdress goals of care.  Transition to comfort cares/hospice not unreasonable at that juncture.    - 10/20: covid+  - 10/20, per MD: If she has disease progression, Dr. Varma favors comfort / supportive measures   - 10/19, per neurology: pt with Altered mental status, encephalopathy, multifactorial in etiology    GI/Nutrition: BM x2 today and x1 " yesterday    Skin:  - 10/19: WOC following  #1  Wound Location: sacrum  #2: Wound locations: Rt buttock      ASSESSED NUTRITION NEEDS PER APPROVED PRACTICE GUIDELINES:  Dosing Weight: 65.4 kg (adjusted, based on most recent wt this admit)  Estimated Energy Needs: 1962+ kcals (30+ Kcal/Kg)  Justification: increased needs r/t CA dx with BMI in mind  Estimated Protein Needs: 78-98 grams protein (1.2-1.5 g pro/Kg)  Justification: preservation of lean body mass and increased needs r/t CA dx  Estimated Fluid Needs: 1 mL/Kcal  Justification: maintenance OR per provider pending fluid status    MALNUTRITION:  % Weight Loss:  None noted  % Intake:  </= 50% for >/= 5 days (severe malnutrition) - 50% intakes this admit x7 days  Subcutaneous Fat Loss:  Unable to assess - covid+  Muscle Loss:  Unable to assess - covid+  Fluid Retention:  Trace to mild bilateral leg and generalized edema    Malnutrition Diagnosis: Unable to determine due to lack of NFPA     NUTRITION DIAGNOSIS:  Inadequate oral intake related to poor appetite and altered sense of taste secondary to COVID as evidenced by 50% intakes documented this admit, need for ONS      NUTRITION INTERVENTIONS  Recommendations / Nutrition Prescription  - ordered Glucerna BID  - Nutrition education: Provided education on RD role in care. Discussed importance of PO intake and options for ONS.       Implementation  Nutrition education   Medical Food Supplement      Nutrition Goals  Patient to consume >75% of nutritionally adequate meals BID with supplements over the next 5-7 days.    MONITORING AND EVALUATION:  Progress towards goals will be monitored and evaluated per protocol and Practice Guidelines      Anneliese Sexton, RD, LD

## 2022-10-25 NOTE — PLAN OF CARE
Pt here with L SDH and encephalopathy. A&Ox4. Neuros stable, BLE 3/5, denies numb/tingling, sluggish pupils. SBP<150. Tele sinus tach. Mod carb diet, thin liquids. Takes pills whole. Up with A2 clyde steady. PRN tylenol x1 for 5/10 HA, pt comfortable at this time. Pt scoring green on the Aggression Stop Light Tool. Plan Platelet goal >50k, HGB goal >7. Hgb 8.5; platelet 97, next recheck 0000. Therapies recommending discharge to TCU. Discharge pending placement.     K 3.9, recheck tomorrow AM

## 2022-10-25 NOTE — PLAN OF CARE
Pt here with encephalopathy and L SDH. A&O x4. Neuros with large sluggish pupils, slow speech, BLE weakness. BP soft, other VSS. Tele NSR. Mod carb diet, thin liquids. Takes pills whole. Up with A2 sera steady, T/R. Purewick in place for incontinence. R chest port heparin locked. Tylenol given x1 for back pain. Pt scoring green on the Aggression Stop Light Tool. Discharge pending to TCU.

## 2022-10-25 NOTE — PLAN OF CARE
Goal Outcome Evaluation:      Plan of Care Reviewed With: patient      [id number]: Patient alert and oriented, VSS on room air, tylenol given x1 for lower back pain, effective. Neuros intact except for sluggish pupils and BLE weakness. Up in chair with Sejal steady, purwik in place for incontinence. Wound care done and Port dressing and needle done this shift. Discharge pending covid recovery.

## 2022-10-25 NOTE — PROGRESS NOTES
[facility]ology Progress Note     Primary Oncologist/Hematologist:  Dr. Varma          Assessment and Plan:     Primary Oncologist: Dr. Varma     Metastatic breast cancer with bone, liver, brain, bone marrow mets  - extensive prior treatment  - currently on Enhertu (Fam-trastuzumab deruxtecan-nxki) Q21 days. Cycle 3 administered on 10/11/22.   Potential side effects include pancytopenia, pneumonitis and LV dysfunction.  - 10/6/22 received a single fraction SBRT to T5 lesion  - 04/02/22 cerebellar stereotactic radiosurgery,.   - Follow up MRI 10/13/22 showing interim regression of 2 punctate enhancing foci in the cerebellum.  Also notes was expansion of L subdural fluid collection, now measuring 6 mm, and thickening of overlying diffuse pachymeningeal enhancement.  - She has had 3 cycles of Enhertu and would need reassessment and candidacy for further treatments.  We have updated her primary oncologist.  Plan is to continue restorative cares at this time. Anticipate outpatient CT CAP.  If she has evidence of clinical deterioration or disease progression need to readdress goals of care.  Transition to comfort cares/hospice not unreasonable at that juncture.       Encephalopathy  - increased difficulties with gait and balance for past 10 days.  Over past two days, has had increasing difficulties with speech and was non-verbal upon presentation.  - Seen in ED at [facility] on 10/18 where MRI showed A mixed density left holohemispheric subdural collection with some areas of increased density.  The collection measures 8.3 mm over the left frontal convex city, 8.2 mm over the left parietal convexity is 3.3 mm over the posterior left temporal convexity.  There is associated mass-effect with a 2 mm midline shift to the right at the level of the lateral ventricles.  This scan in the ER was compared to 3/25/2022  - transferred to [facility] for neurosurgery eval  -  reports fall on 10/8. Unknown if head trauma  occurred.   - anti-seizure precautions/Keppra      Pancytopenia  Thrombocytopenia  Normocytic anemia  - likely due to current cancer treatment +/- acute illness +/- malignancy  - platelet count upon admission 38,000 (were 288k in clinic on 10/11/22.    - INR 1.27, PTT 38  - has received vitamin k 1 mg  - per neurosurgery, goal platelet count 50K  - conditional transfusion orders are in place.  Goal is to maintain platelet counts over 50,000  - Platelets today are 76K     Elevated LFTs   - most recent labs in clinic on 10/11/22 show alk phos 661, , ALT 50  - could be treatment related (about 40% LFT abnormalities with Enhurtu) or metastatic disease (innumberable liver mets noted on 8/25/22 PET )     KATHLEEN  - creat 3.25 upon admission up from 1.02 in our office on 10/11/22  - dehydration may be contributing  - nephrology is following.  - UOP is good. Serum creatinine has normalized  - Bumex, spironolactone and lisinopril on hold      ID  - tested positive for COVID-19 on 10/20/2022  - Blood and urine cultures showing no growth   - On Remdesivir      Sridhar García, APRN, AOCNP  Nurse Practitioner  [facility]  [phone]          Interval History:     Feeling discouraged today but is dramatically better than when I saw her last week.  Today, we reviewed the plan for outpatient follow up with Dr. Varma.                Review of Systems:     The 5 point Review of Systems is negative other than noted in the HPI                Medications:   Scheduled Medications    docusate sodium  100 mg Oral Daily     DULoxetine  30 mg Oral QAM     DULoxetine  60 mg Oral At Bedtime     heparin  5-10 mL Intracatheter Q28 Days     heparin lock flush  5-10 mL Intracatheter Q24H     insulin aspart  1-4 Units Subcutaneous Q4H     levETIRAcetam  500 mg Oral BID     metoprolol tartrate  25 mg Oral BID     oxybutynin  5 mg Oral BID     oxybutynin ER  10 mg Oral QPM     pantoprazole  40 mg Oral At Bedtime     sodium chloride  (PF)  10-20 mL Intracatheter Q28 Days     sodium chloride (PF)  3 mL Intracatheter Q8H     zolpidem  5 mg Oral At Bedtime     PRN Medications  acetaminophen **OR** acetaminophen, acetaminophen, bisacodyl, glucose **OR** dextrose **OR** glucagon, heparin lock flush, HYDROmorphone, lidocaine 4%, lidocaine, lidocaine (buffered or not buffered), metoprolol, naloxone **OR** naloxone **OR** naloxone **OR** naloxone, nitroGLYcerin, ondansetron **OR** ondansetron, polyethylene glycol, prochlorperazine **OR** prochlorperazine **OR** prochlorperazine, senna-docusate **OR** senna-docusate, sodium chloride (PF), sodium chloride (PF), sodium chloride (PF)               Physical Exam:   Vitals were reviewed  Blood pressure 107/68, pulse 116, temperature 98.3  F (36.8  C), temperature source Oral, resp. rate 18, weight 76 kg (167 lb 8.8 oz), SpO2 99 %.  Wt Readings from Last 4 Encounters:   10/19/22 76 kg (167 lb 8.8 oz)   10/10/22 94.1 kg (207 lb 8 oz)   09/28/22 94.8 kg (208 lb 14.4 oz)   09/12/22 71.9 kg (158 lb 9.6 oz)       I/O last 3 completed shifts:  In: 480 [P.O.:480]  Out: 1125 [Urine:1125]                Data:   All laboratory data and imaging studies reviewed.    CMP  Recent Labs   Lab 10/25/22  0751 10/25/22  0548 10/25/22  0400 10/25/22  0005 10/24/22  2357 10/24/22  1118 10/24/22  0813 10/23/22  1130 10/23/22  1123 10/23/22  0816 10/23/22  0548 10/22/22  0755 10/22/22  0649 10/21/22  0808 10/21/22  0541 10/19/22  0422 10/19/22  0420 10/19/22  0110 10/19/22  0030 10/18/22  2132 10/18/22  1737   NA  --   --   --   --   --   --  133  --   --   --  138  --  137  --  135   < >  --   --  131*   < > 127*   POTASSIUM  --  3.8  --   --   --   --  3.9  --  4.2  --  3.1*   < > 3.1*  --  4.4   < >  --   --  5.5*   < > 7.2*   CHLORIDE  --   --   --   --   --   --  104  --   --   --  112*  --  112*  --  108   < >  --   --  98  --  97   CO2  --   --   --   --   --   --  22  --   --   --  15*  --  17*  --  20   < >  --   --  18*   --  16*   ANIONGAP  --   --   --   --   --   --  7  --   --   --  11  --  8  --  7   < >  --   --  15*  --  14   *  --  132* 133* 120*   < > 132*   < >  --    < > 113*   < > 115*   < > 134*   < >  --    < > 391*   < > 143*   BUN  --   --   --   --   --   --  25  --   --   --  21  --  29  --  47*   < >  --   --  116*  --  121*   CR  --   --   --   --   --   --  0.67  --   --   --  0.54  --  0.73  --  1.18*   < >  --   --  2.94*  --  3.32*   GFRESTIMATED  --   --   --   --   --   --  >90  --   --   --  >90  --  90  --  50*   < >  --   --  17*  --  15*   BENJI  --   --   --   --   --   --  8.0*  --   --   --  6.6*  --  7.0*  --  8.6   < >  --   --  8.7  --  8.4*   MAG  --   --   --   --   --   --   --   --   --   --   --   --   --   --   --   --  2.4*  --   --   --  2.3   PHOS  --   --   --   --   --   --   --   --   --   --   --   --   --   --   --   --  7.2*  --   --   --  8.7*   PROTTOTAL  --   --   --   --   --   --   --   --   --   --   --   --   --   --   --   --   --   --  5.2*  --  5.5*   ALBUMIN  --   --   --   --   --   --   --   --   --   --   --   --   --   --   --   --   --   --  2.0*  --  1.9*   BILITOTAL  --   --   --   --   --   --   --   --   --   --   --   --   --   --   --   --   --   --  0.8  --  0.5   ALKPHOS  --   --   --   --   --   --   --   --   --   --   --   --   --   --   --   --   --   --  437*  --  468*   AST  --   --   --   --   --   --   --   --   --   --   --   --   --   --   --   --   --   --  124*  --  143*   ALT  --   --   --   --   --   --   --   --   --   --   --   --   --   --   --   --   --   --  86*  --  93*    < > = values in this interval not displayed.     CBC  Recent Labs   Lab 10/25/22  0548 10/25/22  0010 10/24/22  1830 10/24/22  1534 10/24/22  0813 10/23/22  1123 10/23/22  0548 10/23/22  0033 10/22/22  1750 10/22/22  1251 10/22/22  0649 10/21/22  1418 10/21/22  0541   WBC  --   --   --   --  2.4*  --  3.8*  --   --   --  3.5*  --  3.1*   RBC  --   --   --   --  2.55*   --  2.91*  --   --   --  2.62*  --  2.30*   HGB  --   --   --   --  8.5*  --  8.5*  --  13.3  --  7.7*   < > 7.0*   HCT  --   --   --   --  23.7*  --  25.2*  --   --   --  22.9*  --  19.8*   MCV  --   --   --   --  93  --  87  --   --   --  87  --  86   MCH  --   --   --   --  33.3*  --  29.2  --   --   --  29.4  --  30.4   MCHC  --   --   --   --  35.9  --  33.7  --   --   --  33.6  --  35.4   RDW  --   --   --   --  16.8*  --  14.6  --   --   --  14.9  --  15.5*   PLT 76* 92* 97* 114* 123*  123*   < > 78*  78*   < > 48*   < > 74*  74*   < > 57*  57*    < > = values in this interval not displayed.     INR  Recent Labs   Lab 10/18/22  1737   INR 1.27*           PARMJIT Metzger  Nurse Practitioner  [facility]  [phone]

## 2022-10-25 NOTE — PROGRESS NOTES
[facility]    Hospitalist Progress Note    Date of Service (when I saw the patient): 10/25/2022    Assessment & Plan   Elenitaestiven Moran is a 67 year old female who was admitted on 10/18/2022.    Acute metabolic encephalopathy   reports increased difficulty with gait/balance since fall 10 days prior, in past 48 hours increasingly confused with difficulty with speech and essentially non-verbal morning of presentation.  Possibly due to subdural fluid collection vs marked electrolyte abnormalities (uremia, hyponatremia, hyperkalemia) vs intracranial mets vs infection.   - management of various issues as below  Neurology consulted thought to be secondary to multifactorial with subdural hematoma, underlying cerebellar mets from metastatic breast cancer, COVID infection, Acute kidney injury secondary dehydration and multiple electrolyte abnormalities contributing  Mental status much improved now  Patient is able to talk  normally today and is able to answer my questions  Started on Keppra 500 mg twice daily by Neurology   And neuro surgery, neurology signed off currently     Suspected traumatic SDH  Outside CT shows left holohemispheric subdural fluid collection with 2 mm midline shift.   reports fall on Oct 8 (unknown if any head trauma).    Neurosurgery and neurocritical care consulted, recommended expectant management  Follow-up CT head stable  - started on keppra  - q2h neuro checks currently.   - seizure precautions  - goal SBP <150, head of bed 30 degrees  - goal platelets of > 50, conditional orders placed, receiving multiple transfusions for platelets and blood  - SLP eval  - neurosurgery and neurology consulted  Neurosurgery repeat CT head on 10/23  showed stable subdural hematoma   Mental status improved  since admission  Neurosurgery signed off and recommended follow-up with outpatient with repeat CT scan head in 4 weeks     Mild covid 19 infection  No  hypoxemia  Plan  - 3 days remdesivir to prevent progression completed  - special precautions  Remains on room air     Thrombocytopenia  Abnormal coags  Admission platelet 38, INR 1.27 and PTT 38.  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   reports this has been increasing in size over past week as she has spent more time in her chair.    * Admission UA with >182 WBC, leukocyte esterase.  * Outside CXR with mild left basilar opacity  * procalcitonin of 8, unclear significance in the setting of significant renal dysfunction  * culture data negative, no respiratory complaints  Plan  - stopped  zosyn     Hyponatremia  Hyperkalemia improved now with mild hypokalemia with potassium of 3.1  Uremia  Anion gap metabolic acidosis  KATHLEEN  Cr as low as 0.8 in June 2022, on most recent check Sept 2022 was 1.13.  Admission Na 127, K 7.2, bicarb 16 (gap down to 14), .   reporting minimal oral intake over past 2 days.  Edema on exam, but suspect intravascularly dry with hypoalbuminemia and poor oral intake.    shifted potassium with insulin overnight   repeat bmps daily  - Nephrology consult placed on admission and they are following.  Appreciate assistance  Creatinine normalized now      Stage IV metastatic breast cancer (mets to brain, liver, bone) s/p mastectomy  Elevated LFT's  Followed by Dr. Varma of [facility].  Currently on immunotherapy and receiving radiation to spine (completed brain radiation for cerebellar  mets).  Recent baseline LFT's unknown, suspect due to liver mets.  No signs of tenderness on exam.    - MOHPA consulted. Appreciate assistance      HFrEF due to doxorubicin  Type 2 nstemi due to demand supply mismatch  Sinus tachycardia  TTE 6/2022 with EF 35-40% with global LV hypokinesis.  Outside trop 183, suspect demand ischemia.    Echocardiogram with EF of 40-45%  - monitor I/O's and daily weights  Started on metoprolol 25mg PO BID and IV PRN   Heart rate better controlled now on metoprolol       DM II with peripheral neuropathy  - low dose ssi  Hemoglobin A1c at 6.4% indicating good control     Chronic anemia  Baseline hgb about 8 g/dL.   - monitor, transfuse if < 7, conditional order placed, prBC x2 ordered     Depression  - resume cymbalta  Insomnia;  Patient requested Ambien to help sleep at ordered for her        Diet: Combination Diet Moderate Consistent Carb (60 g CHO per Meal) Diet; Mildly Thick (level 2); Pureed Diet (level 4); Mildly Thick (level 2); No Straws    DVT Prophylaxis: Pneumatic Compression Devices  Jiang Catheter: Not present  Central Lines: PRESENT        Cardiac Monitoring: ACTIVE order. Indication: Electrolyte Imbalance (24 hours)- Magnesium <1.3 mg/ml; Potassium < =2.8 or > 5.5 mg/ml  Code Status: Full Code          Disposition Plan        Expected Discharge Date:  Patient is medically ready for discharge other than COVID-positive status needing contact precautions.  Needs TCU on discharge    Code Status: Full Code    Discussed with patient and bedside RN today    Kendra Singh MD, MD  [phone] (P)      Interval History   Patient is resting comfortably in chair.  Alert awake and talking normally today mental status much improved..  Denies any shortness of breath.    No other acute events since yesterday    -Data reviewed today: I reviewed all new labs and imaging results over the last 24 hours. I personally reviewed all the imaging and labs since admission reviewed    Physical  Exam   Temp: 98.3  F (36.8  C) Temp src: Oral BP: 107/68 Pulse: 116   Resp: 18 SpO2: 99 % O2 Device: None (Room air)    Vitals:    10/18/22 1700 10/19/22 0430   Weight: 76.2 kg (167 lb 15.9 oz) 76 kg (167 lb 8.8 oz)     Vital Signs with Ranges  Temp:  [97.5  F (36.4  C)-99.4  F (37.4  C)] 98.3  F (36.8  C)  Pulse:  [] 116  Resp:  [16-18] 18  BP: ()/(60-73) 107/68  SpO2:  [93 %-99 %] 99 %  I/O last 3 completed shifts:  In: 480 [P.O.:480]  Out: 1125 [Urine:1125]    Constitutional: Awake, alert, cooperative, no apparent distress  Respiratory: CTA b/l , no  wheezing  Cardiovascular: Regular rate and rhythm, normal S1 and S2, and no murmur noted  GI: Normal bowel sounds, soft, non-distended, non-tender  Skin/Integumen: No rashes, no cyanosis, no edema  Other:     Medications       docusate sodium  100 mg Oral Daily     DULoxetine  30 mg Oral QAM     DULoxetine  60 mg Oral At Bedtime     heparin  5-10 mL Intracatheter Q28 Days     heparin lock flush  5-10 mL Intracatheter Q24H     insulin aspart  1-4 Units Subcutaneous Q4H     levETIRAcetam  500 mg Oral BID     metoprolol tartrate  25 mg Oral BID     oxybutynin  5 mg Oral BID     oxybutynin ER  10 mg Oral QPM     pantoprazole  40 mg Oral At Bedtime     sodium chloride (PF)  10-20 mL Intracatheter Q28 Days     sodium chloride (PF)  3 mL Intracatheter Q8H     zolpidem  5 mg Oral At Bedtime       Data   Recent Labs   Lab 10/25/22  1201 10/25/22  1157 10/25/22  0751 10/25/22  0548 10/25/22  0400 10/25/22  0010 10/24/22  1118 10/24/22  0813 10/23/22  1130 10/23/22  1123 10/23/22  0816 10/23/22  0548 10/22/22  2028 10/22/22  1750 10/22/22  0755 10/22/22  0649 10/19/22  0110 10/19/22  0030 10/18/22  2132 10/18/22  3617   WBC  --   --   --   --   --   --   --  2.4*  --   --   --  3.8*  --   --   --  3.5*   < >  --   --  5.8   HGB  --   --   --   --   --   --   --  8.5*  --   --   --  8.5*  --  13.3  --  7.7*   < >  --   --  7.1*   MCV  --   --   --   --   --   --   --   93  --   --   --  87  --   --   --  87   < >  --   --  89   PLT 77*  --   --  76*  --  92*   < > 123*  123*   < > 75*  --  78*  78*   < > 48*   < > 74*  74*   < >  --   --  38*   INR  --   --   --   --   --   --   --   --   --   --   --   --   --   --   --   --   --   --   --  1.27*   NA  --   --   --   --   --   --   --  133  --   --   --  138  --   --   --  137   < > 131*   < > 127*   POTASSIUM  --   --   --  3.8  --   --   --  3.9  --  4.2  --  3.1*  --  4.0  --  3.1*   < > 5.5*   < > 7.2*   CHLORIDE  --   --   --   --   --   --   --  104  --   --   --  112*  --   --   --  112*   < > 98  --  97   CO2  --   --   --   --   --   --   --  22  --   --   --  15*  --   --   --  17*   < > 18*  --  16*   BUN  --   --   --   --   --   --   --  25  --   --   --  21  --   --   --  29   < > 116*  --  121*   CR  --   --   --   --   --   --   --  0.67  --   --   --  0.54  --   --   --  0.73   < > 2.94*  --  3.32*   ANIONGAP  --   --   --   --   --   --   --  7  --   --   --  11  --   --   --  8   < > 15*  --  14   BENJI  --   --   --   --   --   --   --  8.0*  --   --   --  6.6*  --   --   --  7.0*   < > 8.7  --  8.4*   GLC  --  164* 113*  --  132*  --    < > 132*   < >  --    < > 113*   < >  --    < > 115*   < > 391*   < > 143*   ALBUMIN  --   --   --   --   --   --   --   --   --   --   --   --   --   --   --   --   --  2.0*  --  1.9*   PROTTOTAL  --   --   --   --   --   --   --   --   --   --   --   --   --   --   --   --   --  5.2*  --  5.5*   BILITOTAL  --   --   --   --   --   --   --   --   --   --   --   --   --   --   --   --   --  0.8  --  0.5   ALKPHOS  --   --   --   --   --   --   --   --   --   --   --   --   --   --   --   --   --  437*  --  468*   ALT  --   --   --   --   --   --   --   --   --   --   --   --   --   --   --   --   --  86*  --  93*   AST  --   --   --   --   --   --   --   --   --   --   --   --   --   --   --   --   --  124*  --  143*    < > = values in this interval not displayed.       No  results found for this or any previous visit (from the past 24 hour(s)).

## 2022-10-25 NOTE — PLAN OF CARE
Goal Outcome Evaluation:      Plan of Care Reviewed With: patient    Overall Patient Progress: no changeOverall Patient Progress: no change    Outcome Evaluation: pt reports poor appetite and PO intake this admit, 50% intakes documented. encouraged PO intake with pt via phone. ordered Glucerna BID.    Anneliese Sexton RD, LD

## 2022-10-26 NOTE — PLAN OF CARE
Pt here with L SDH and encephalopathy. A&Ox4. Neuros stable, BLE 3/5, sluggish pupils. SBP<150. Tele sinus tach. Mod carb diet, thin liquids. Takes pills whole. Up with A2 clyde steady. Denies pain. Pt scoring green on the Aggression Stop Light Tool. Plan platelet goal >50k, platelet 70, next recheck 0000. Plan surgery consult for possible sacral wound debridement. Therapies recommending discharge to TCU. Discharge pending placement.     K 3.8, recheck tomorrow AM.

## 2022-10-26 NOTE — CONSULTS
[facility] General Surgery Consultation    Elenita Moran MRN# [id number]   YOB: 1955 Age: 67 year old      Date of Admission:  10/18/2022  Date of Consult: 10/26/2022         Assessment and Plan:   Patient is a 67 year old female with sacral decubitus ulcer    PLAN:  - Sacral decubitus ulcer necrotic eschar was debrided at bedside by Dr. Lopez. Patient tolerated this well.   - Wound was covered with mepilex dressing. Further wound cares per [facility] nurse, appreciate assistance.   - General Surgery will sign off at this time please call if questions or concerns.         Requesting Physician:      Dr. Singh        Chief Complaint:   No chief complaint on file.         History of Present Illness:   Elenita Moran is a 67 year old female who was admitted on 10/18 for acute metabolic encephalopathy secondary to fall with subdural hematoma and cerebellar mets from metastatic breast cancer. She tested positive for COVID19 infection on 10/20 and remains in the hospital while awaiting TCU that will take patient with contact precautions. Wound care saw patient yesterday for her chronic stage IV sacral decubitus ulcer and recommended surgical consultation due to eschar overlying the wound. She has chronic MSSA infection of pelvis on chronic suppressive antibiotics. Per hospitalist note, she does follow with wound clinic for this pressure injury as well as others. No recent debridement of the wound. No foul odor, purulent drainage, or recent concerns of soft tissue infection of the wound. She is currently not anticoagulated. Patient does not offer any history.          Physical Exam:   Blood pressure 105/69, pulse 87, temperature 98.4  F (36.9  C), temperature source Oral, resp. rate 16, weight 76 kg (167 lb 8.8 oz), SpO2 99 %.  167 lbs 8.79 oz  General: Vital signs reviewed, in no apparent distress  Eyes: Anicteric  HENT: Normocephalic  Respiratory: Breathing nonlabored  Buttock:  necrotic eschar overlying sacral decubitus ulcer, excisional debridement with iris scissors performed by Dr. Lopez at bedside, approximately 4cm x 5cm x 1cm in depth, erythema to rim of ulcer, blanchable, no foul odor or purulence  Musculoskeletal: No gross deformities  Integumentary: Warm and dry         Past Medical History:     Past Medical History:   Diagnosis Date     Allergic rhinitis      Bone cancer (H) 2011    breast mets     Breast cancer (H) 2010    left mastectomy     Depression      DM (diabetes mellitus) (H)      Fibromyalgia      Hx antineoplastic chemotherapy 2010     Hx of radiation therapy 2010     Hyperlipemia      Lactose intolerance      Mini stroke     R EYE     Osteoarthritis      Tobacco dependency             Past Surgical History:     Past Surgical History:   Procedure Laterality Date     APPENDECTOMY       INSERT INTRACORONARY STENT  1985    R Hand     IR CHEST PORT PLACEMENT > 5 YRS OF AGE  12/12/2019     IR CHEST PORT PLACEMENT > 5 YRS OF AGE  3/14/2022     IR LUMBAR TRANSFORAMINAL EPIDURAL STEROID INJECTION  12/5/2019     IR LUMBAR TRANSFORAMINAL EPIDURAL STRD INJ  12/5/2019     IR PORT PLACEMENT >5 YEARS  12/12/2019     IR PORT REMOVAL RIGHT  3/14/2022     IR SITE CHECK/EVALUATION  4/15/2022     MAMMOPLASTY REDUCTION Right 2015    hx of left mastectomy and right breast reduction     MASTECTOMY Left 2010     OTHER SURGICAL HISTORY      biopsy of upper and right tongue     OVARIAN CYST REMOVAL       REVISE RECONSTRUCTED BREAST Left     March 11, 2015            Current Medications:           docusate sodium  100 mg Oral Daily     DULoxetine  30 mg Oral QAM     DULoxetine  60 mg Oral At Bedtime     heparin  5-10 mL Intracatheter Q28 Days     heparin lock flush  5-10 mL Intracatheter Q24H     insulin aspart  1-4 Units Subcutaneous Q4H     levETIRAcetam  500 mg Oral BID     metoprolol tartrate  25 mg Oral BID     oxybutynin  5 mg Oral BID     oxybutynin ER  10 mg Oral QPM     pantoprazole   40 mg Oral At Bedtime     sodium chloride (PF)  10-20 mL Intracatheter Q28 Days     sodium chloride (PF)  3 mL Intracatheter Q8H     zolpidem  5 mg Oral At Bedtime       acetaminophen **OR** acetaminophen, acetaminophen, bisacodyl, glucose **OR** dextrose **OR** glucagon, heparin lock flush, HYDROmorphone, lidocaine 4%, lidocaine, lidocaine (buffered or not buffered), metoprolol, naloxone **OR** naloxone **OR** naloxone **OR** naloxone, nitroGLYcerin, ondansetron **OR** ondansetron, polyethylene glycol, prochlorperazine **OR** prochlorperazine **OR** prochlorperazine, senna-docusate **OR** senna-docusate, sodium chloride (PF), sodium chloride (PF), sodium chloride (PF)         Home Medications:     Prior to Admission medications    Medication Sig Last Dose Taking? Auth Provider Long Term End Date   ACETAMINOPHEN PO Take 1,300 mg by mouth 3 times daily Alternates with Ibuprofen  Yes Reported, Patient     aspirin 81 MG EC tablet Take 81 mg by mouth daily  Yes Unknown, Entered By History     bumetanide (BUMEX) 2 MG tablet Take 2 mg by mouth daily as needed Can take midday if needed for fluid retention/swelling  Yes Unknown, Entered By History Yes    cephALEXin (KEFLEX) 500 MG capsule Take 1 capsule (500 mg) by mouth 2 times daily  Yes Sahil Garza MD No    Cholecalciferol (VITAMIN D) 125 MCG (5000 UT) capsule Take 1 tablet by mouth daily   Yes Unknown, Entered By History     clobetasol (TEMOVATE) 0.05 % external ointment Apply topically daily as needed  Yes Unknown, Entered By History     docusate sodium (COLACE) 100 MG capsule Take 100 mg by mouth daily  Yes Reported, Patient     DULoxetine (CYMBALTA) 30 MG capsule Take 30 mg by mouth every morning   Yes Unknown, Entered By History Yes    DULoxetine (CYMBALTA) 60 MG capsule Take 1 capsule by mouth At Bedtime  Yes Reported, Patient Yes    ezetimibe-simvastatin (VYTORIN) 10-20 MG tablet Take 1 tablet by mouth At Bedtime  Yes Reported, Patient No    HYDROmorphone  (DILAUDID) 4 MG tablet Take 1 tablet (4 mg) by mouth 3 times daily as needed for moderate to severe pain or severe pain  Patient taking differently: Take 4 mg by mouth 3 times daily AM - Midday - PM  Yes Yesy Dsouza MD     hydrOXYzine (VISTARIL) 25 MG capsule Take 25 mg by mouth 3 times daily With hydromorphone  Yes Reported, Patient     ibuprofen (ADVIL/MOTRIN) 200 MG capsule Take 400 mg by mouth 3 times daily Alternating with acetaminophen  Yes Reported, Patient No    insulin aspart (NOVOLOG FLEXPEN) 100 UNIT/ML pen Inject Subcutaneous 3 times daily (with meals) Sliding scale dose based on blood glucose and diet  Yes Unknown, Entered By History     Insulin Glargine (BASAGLAR KWIKPEN SC) Inject 8 Units Subcutaneous every morning Uses as directed  Yes Reported, Patient No    Iron Combinations (IRON COMPLEX PO) Take 1 tablet by mouth daily Ferrous biogluconate supplement  Yes Unknown, Entered By History     lidocaine (XYLOCAINE) 5 % external ointment Apply topically 4 times daily  Patient taking differently: Apply topically 4 times daily as needed for moderate pain  Yes Kuldip Cortes MD     MAGNESIUM PO Take by mouth daily OTC supplement  Yes Unknown, Entered By History     metoprolol succinate ER (TOPROL-XL) 50 MG 24 hr tablet Take 1 tablet (50 mg) by mouth daily  Yes Renu Munoz MD Yes    Multiple Vitamins-Minerals (ICAPS PO) Take 1 capsule by mouth daily  Yes Unknown, Entered By History     nystatin (MYCOSTATIN) 441062 UNIT/GM external cream Apply topically 2 times daily  Patient taking differently: Apply topically daily as needed  Yes Sahil Garza MD     OLANZapine (ZYPREXA) 5 MG tablet Take 5 mg by mouth every evening   Yes Reported, Patient     omeprazole (PRILOSEC) 20 MG DR capsule Take 20 mg by mouth At Bedtime   Yes Reported, Patient     oxybutynin (DITROPAN) 5 MG tablet Take 5 mg by mouth 2 times daily AM and Midday  Yes Unknown, Entered By History     oxybutynin ER  (DITROPAN-XL) 10 MG 24 hr tablet Take 10 mg by mouth every evening  Yes Reported, Patient     POTASSIUM CHLORIDE PO Take by mouth daily OTC supplement  Yes Unknown, Entered By History     Prenatal Vit-Fe Fumarate-FA (PRENATAL MULTIVITAMIN  PLUS IRON) 27-1 MG TABS Take 1 tablet by mouth daily   Yes Reported, Patient     psyllium (METAMUCIL/KONSYL) Packet Take 1 packet by mouth daily as needed for constipation  Yes Unknown, Entered By History     spironolactone (ALDACTONE) 25 MG tablet Take 0.5 tablets (12.5 mg) by mouth daily  Yes Renu Munoz MD Yes    vitamin (B COMPLEX) tablet Take 1 tablet by mouth daily  Yes Reported, Patient     zolpidem (AMBIEN) 10 MG tablet Take 10 mg by mouth At Bedtime  Yes Unknown, Entered By History     B-D U/F 31G X 8 MM insulin pen needle USE 4 TIMES DAILY   Reported, Patient     blood glucose (ONETOUCH VERIO IQ) test strip    Reported, Patient     bumetanide (BUMEX) 2 MG tablet Take 1 tablet (2 mg) by mouth 2 times daily  Patient taking differently: Take 2 mg by mouth daily   Renu Munoz MD Yes    Continuous Blood Gluc  (RocketOnYLE KIMBERLY 14 DAY READER) COLETTE Use to check blood sugar at least 4 times a day or as directed    Reported, Patient     Continuous Blood Gluc Sensor (PairySTYLE KIMBERLY 14 DAY SENSOR) MISC Use as directed to test blood sugar at least 4 times a day or as directed   Reported, Patient     glucose (BD GLUCOSE) 4 g chewable tablet as needed   Reported, Patient     lisinopril (ZESTRIL) 2.5 MG tablet Take 1 tablet (2.5 mg) by mouth daily   Renu Munoz MD Yes    LORazepam (ATIVAN) 0.5 MG tablet take 1 tablet by mouth up to 3 times per day as needed for nausea, anxiety, or insomnia  Patient not taking: Reported on 10/19/2022 Not Taking  Reported, Patient     medical cannabis (Patient's own supply) See Admin Instructions (The purpose of this order is to document that the patient reports taking medical cannabis.  This is not a prescription,  and is not used to certify that the patient has a qualifying medical condition.)  Patient not taking: Reported on 10/19/2022 Not Taking  Unknown, Entered By History     prochlorperazine (COMPAZINE) 10 MG tablet Take 10 mg by mouth every 6 hours as needed for nausea or vomiting  Patient not taking: Reported on 10/19/2022 Not Taking  Unknown, Entered By History              Allergies:     Allergies   Allergen Reactions     Gabapentin      Upper body edema/swelling.         Morphine Visual Disturbance     Visual hallucinations     Sulfamethoxazole-Trimethoprim      Other reaction(s): Hives     Sulfa Drugs Hives and Rash     welts              Family History:     Family History   Problem Relation Age of Onset     Lung Cancer Mother      Pancreatic Cancer Mother      Kidney Cancer Maternal Grandmother      Lung Cancer Paternal Aunt      Breast Cancer Cousin            Social History:   Elenita Moran  reports that she has quit smoking. Her smoking use included cigarettes. She has a 15.00 pack-year smoking history. She has never used smokeless tobacco. She reports that she does not currently use alcohol after a past usage of about 1.0 standard drink per week. She reports that she does not use drugs.          Review of Systems:   The 12 point Review of Systems is negative other than noted in the HPI.         Labs/Imaging   All new lab and imaging data was reviewed.   Recent Labs   Lab 10/26/22  0646 10/26/22  0029 10/25/22  1754 10/24/22  1534 10/24/22  0813 10/23/22  1123 10/23/22  0548   WBC 1.1*  --   --   --  2.4*  --  3.8*   HGB 7.9*  --   --   --  8.5*  --  8.5*   HCT 22.3*  --   --   --  23.7*  --  25.2*   MCV 85  --   --   --  93  --  87   PLT 73*  73* 61* 70*   < > 123*  123*   < > 78*  78*    < > = values in this interval not displayed.     Recent Labs   Lab 10/26/22  1210 10/26/22  0723 10/26/22  0646 10/25/22  0751 10/25/22  0548 10/24/22  1118 10/24/22  0813 10/23/22  0816 10/23/22  0548   NA  --    --  136  --   --   --  133  --  138   POTASSIUM  --   --  3.7  --  3.8  --  3.9   < > 3.1*   CHLORIDE  --   --  107  --   --   --  104  --  112*   CO2  --   --  21  --   --   --  22  --  15*   ANIONGAP  --   --  8  --   --   --  7  --  11   * 127* 145*   < >  --    < > 132*   < > 113*   BUN  --   --  17  --   --   --  25  --  21   CR  --   --  0.64  --   --   --  0.67  --  0.54   GFRESTIMATED  --   --  >90  --   --   --  >90  --  >90   BENJI  --   --  7.8*  --   --   --  8.0*  --  6.6*    < > = values in this interval not displayed.       I have personally reviewed the imaging studies-   n/a      Mulu Gonzalez PA-C    60 minutes spent on date of the encounter doing patient visit, chart review, and documentation.

## 2022-10-26 NOTE — PLAN OF CARE
Pt here with Encephalopathy/L. SDH and is COVID+. A&Ox4. Neuros with BLE weakness scoring 3/5, sluggish pupils, slow/clear speech. VSS on RA. Tele NSR, borderline ST. MOD CHO diet, thin liquids. Takes pills whole with water. Up with A2 SS. Denies pain. Pt scoring green on the Aggression Stop Light Tool. Plan for surgical consult to evaluate CAPI, last platelet level at 61, last hemoglobin 8.5 - conditional transfusion orders in place. Discharge pending, likely TCU.

## 2022-10-26 NOTE — PLAN OF CARE
Reason for Admission: encephlopathy, L SDH, COVID    Cognitive/Mentation: A/Ox 4  Neuros/CMS: Intact ex BLE 3/5m sluggish pupils, slow speech  VS: VSS.   Tele: NSR.  GI: BS active x4, passing flatus. Continent.  : Voiding adequate amounts. inContinent.  Pulmonary: LS diminished throughout.  Pain: denies.     Drains/Lines: port accessed  Skin: wound on coccyx, debrided today. Dressing change by RN  Activity: Assist x 2 with sarasteady.  Diet: regular with thin liquids. Takes pills whole with water.     Therapies recs: TCU  Discharge: Pending    Aggression Stoplight Tool: green

## 2022-10-26 NOTE — PROGRESS NOTES
[facility]    Hospitalist Progress Note    Date of Service (when I saw the patient): 10/26/2022    Assessment & Plan   Elenita oMran is a 67 year old female who was admitted on 10/18/2022.    #Acute metabolic encephalopathy 2/2   #Suspected traumatic SDH   reports increased difficulty with gait/balance since fall 10 days prior, in past 48 hours increasingly confused with difficulty with speech and essentially non-verbal morning of presentation.  Possibly due to subdural fluid collection vs marked electrolyte abnormalities (uremia, hyponatremia, hyperkalemia) vs intracranial mets vs infection. Outside CT shows left holohemispheric subdural fluid collection with 2 mm midline shift.   reports fall on Oct 8 (unknown if any head trauma).    - Neurology, NSGY, Neurocritical care consulted - expectant management, no surgical interventions at this time  - started on Keppra 500mg BID  - q2h neuro checks  - seizure precautions  - goal SBP <150, head of bed 30 degrees  - goal platelets of > 50, conditional orders placed, receiving multiple transfusions for platelets and blood  - SLP eval     #Mild covid 19 infection  No hypoxemia  - 3 days remdesivir to prevent progression completed  - special precautions    #Thrombocytopenia  #Abnormal coags  Admission platelet 38, INR 1.27 and PTT 38.  due to chemo, impaired production  - goal platelet >50K per NSG  Transfuse PRN for platelets<50K   - Vit K 1 mg IV given in edition on admission  - Onc consulted and are following  - Platelet count stable at 77k today  - Aspirin held     #Sacral pressure injury, stage 4, present on admission  Multiple sacral pressures injury, stage 2-3, present on admission  Chronic MSSA infection of pelvis on chronic suppressive abx  -Followed by Wound Clinic for chronic pressure injury.   reports this has been increasing in size over past week as she has spent more time in her chair.    -Admission UA  with >182 WBC, leukocyte esterase.  -Outside CXR with mild left basilar opacity  -procalcitonin of 8, unclear significance in the setting of significant renal dysfunction  -culture data negative, no respiratory complaints  -s/p empiric zosyn       #Hyponatremia  #Hyperkalemia improved now with mild hypokalemia with potassium of 3.1  #Uremia  #Anion gap metabolic acidosis  #KATHLEEN  Cr as low as 0.8 in June 2022, on most recent check Sept 2022 was 1.13.  Admission Na 127, K 7.2, bicarb 16 (gap down to 14), .   reporting minimal oral intake over past 2 days.  Edema on exam, but suspect intravascularly dry with hypoalbuminemia and poor oral intake.    shifted potassium with insulin overnight  - repeat bmps daily  - Nephrology consult placed on admission and they are following.  Appreciate assistance  Creatinine normalized now      #Stage IV metastatic breast cancer (mets to brain, liver, bone) s/p mastectomy  Elevated LFT's  [facility].  Currently on immunotherapy and receiving radiation to spine (completed brain radiation for cerebellar mets).  Recent baseline LFT's unknown, suspect due to liver mets.  No signs of tenderness on exam.    - [facility] consulted. Appreciate assistance      #HFrEF due to doxorubicin  #Type 2 MI due to supply demand mismatch  #Sinus tachycardia  TTE 6/2022 with EF 35-40% with global LV hypokinesis.  Outside trop 183, suspect demand ischemia.    Echocardiogram with EF of 40-45%  - monitor I/O's and daily weights  - Started on metoprolol 25mg PO BID and IV PRN     #DM II with peripheral neuropathy  - low dose ssi  Hemoglobin A1c at 6.4% indicating good control     #Chronic anemia  Baseline hgb about 8 g/dL.   - monitor, transfuse if < 7, conditional order placed, prBC x2 ordered     #Depression  - resume cymbalta    #Insomnia  Patient requested Ambien to help sleep at ordered for her        Diet: Combination Diet Moderate Consistent Carb (60 g CHO per Meal) Diet;  Mildly Thick (level 2); Pureed Diet (level 4); Mildly Thick (level 2); No Straws    DVT Prophylaxis: Pneumatic Compression Devices  Jiang Catheter: Not present  Central Lines: PRESENT        Cardiac Monitoring: ACTIVE order. Indication: Electrolyte Imbalance (24 hours)- Magnesium <1.3 mg/ml; Potassium < =2.8 or > 5.5 mg/ml  Code Status: Full Code          Disposition Plan  Expected Discharge Date:  Patient is medically ready for discharge other than COVID-positive status needing contact precautions.  Needs TCU on discharge    Code Status: Full Code    Discussed with patient and bedside RN today    Guillermina Soliatrio MD  [phone] (P)    Interval History   Patient in no acute distress this morning.  Denies any chest pain/sob/NVD.    -Data reviewed today: I reviewed all new labs and imaging results over the last 24 hours. I personally reviewed all the imaging and labs since admission reviewed    Physical Exam   Temp: 98.7  F (37.1  C) Temp src: Oral BP: 103/63 Pulse: 99   Resp: 17 SpO2: 98 % O2 Device: None (Room air)    Vitals:    10/18/22 1700 10/19/22 0430   Weight: 76.2 kg (167 lb 15.9 oz) 76 kg (167 lb 8.8 oz)     Vital Signs with Ranges  Temp:  [97.6  F (36.4  C)-98.9  F (37.2  C)] 98.7  F (37.1  C)  Pulse:  [] 99  Resp:  [17-18] 17  BP: (100-110)/(63-68) 103/63  SpO2:  [96 %-98 %] 98 %  I/O last 3 completed shifts:  In: 360 [P.O.:360]  Out: 2100 [Urine:2100]    Constitutional: Awake, alert, cooperative, no apparent distress  Respiratory: CTA b/l , no  wheezing  Cardiovascular: Regular rate and rhythm, normal S1 and S2, and no murmur noted  GI: Normal bowel sounds, soft, non-distended, non-tender  Skin/Integumen: No rashes, no cyanosis, no edema  Other:     Medications       docusate sodium  100 mg Oral Daily     DULoxetine  30 mg Oral QAM     DULoxetine  60 mg Oral At Bedtime     heparin  5-10 mL Intracatheter Q28 Days     heparin lock flush  5-10 mL Intracatheter Q24H     insulin aspart  1-4 Units  Subcutaneous Q4H     levETIRAcetam  500 mg Oral BID     metoprolol tartrate  25 mg Oral BID     oxybutynin  5 mg Oral BID     oxybutynin ER  10 mg Oral QPM     pantoprazole  40 mg Oral At Bedtime     sodium chloride (PF)  10-20 mL Intracatheter Q28 Days     sodium chloride (PF)  3 mL Intracatheter Q8H     zolpidem  5 mg Oral At Bedtime       Data   Recent Labs   Lab 10/26/22  0646 10/26/22  0456 10/26/22  0029 10/26/22  0007 10/25/22  2013 10/25/22  1754 10/25/22  0751 10/25/22  0548 10/24/22  1118 10/24/22  0813 10/23/22  1130 10/23/22  1123 10/23/22  0816 10/23/22  0548 10/22/22  0755 10/22/22  0649   WBC 1.1*  --   --   --   --   --   --   --   --  2.4*  --   --   --  3.8*  --  3.5*   HGB 7.9*  --   --   --   --   --   --   --   --  8.5*  --   --   --  8.5*   < > 7.7*   MCV 85  --   --   --   --   --   --   --   --  93  --   --   --  87  --  87   PLT 73*  73*  --  61*  --   --  70*   < > 76*   < > 123*  123*   < > 75*  --  78*  78*   < > 74*  74*   NA  --   --   --   --   --   --   --   --   --  133  --   --   --  138  --  137   POTASSIUM  --   --   --   --   --   --   --  3.8  --  3.9  --  4.2  --  3.1*   < > 3.1*   CHLORIDE  --   --   --   --   --   --   --   --   --  104  --   --   --  112*  --  112*   CO2  --   --   --   --   --   --   --   --   --  22  --   --   --  15*  --  17*   BUN  --   --   --   --   --   --   --   --   --  25  --   --   --  21  --  29   CR  --   --   --   --   --   --   --   --   --  0.67  --   --   --  0.54  --  0.73   ANIONGAP  --   --   --   --   --   --   --   --   --  7  --   --   --  11  --  8   BENJI  --   --   --   --   --   --   --   --   --  8.0*  --   --   --  6.6*  --  7.0*   GLC  --  150*  --  179* 166*  --    < >  --    < > 132*   < >  --    < > 113*   < > 115*    < > = values in this interval not displayed.       No results found for this or any previous visit (from the past 24 hour(s)).

## 2022-10-26 NOTE — PROGRESS NOTES
Chart Check:    No new recommendations from our team today.   Is medically ready for discharge. Awaiting TCU placement  Patient has appt in our [facility] [place] office on Nov 1 at 9:30 am. She should be out of COVID isolation by that time (tested positive 10/20/22).  She should keep that appt if possible.        Sridhar DALTON, [facility]  [phone] (office) or [phone] (cell)

## 2022-10-26 NOTE — TELEPHONE ENCOUNTER
PATIENT NAME: Elentia Moran   YOB: 1955   MRN: [id number]  Date of CONSULT:  10/18/22  Diagnosis: SDH       FOLLOW-UP PLAN:   Next Visit: 1 month 11/23/22  Provider: JUDITH on Dr. Ritchie clinic day    Imaging: head CT prior   DISPOSITION:  inpatient       ADDITIONAL INSTRUCTIONS FOR MEDICAL STAFF: per Narda, this pt is to follow up in [place]     Sejal Vázquez RN

## 2022-10-27 NOTE — PLAN OF CARE
Pt here with encephalopathy, SDH and is COVID +. Alert and oriented x3, d/o to date. VSS on RA. Pupils 5 mm and sluggish, LEs 3/5 strength, neuros otherwise intact. Trace edema to LEs. Lung sounds clear. C/o pain in L buttock - decreased with prn tylenol, dilaudid and repositioning. Coccyx wound care completed per [facility] orders. Tolerating mod carb diet. Repositioned q 2 hrs (pt in recliner for 3 hrs - weight shifted. RN provided education re importance of regular repositioning to allow wound healing). Tolerating regular diet. Transfers with clyde steady and asst of 2.

## 2022-10-27 NOTE — PROGRESS NOTES
[facility]    Hospitalist Progress Note    Date of Service (when I saw the patient): 10/27/2022    Assessment & Plan   Elenita Moran is a 67 year old female who was admitted on 10/18/2022.    #Acute metabolic encephalopathy 2/2   #Suspected traumatic SDH   reports increased difficulty with gait/balance since fall 10 days prior, in past 48 hours increasingly confused with difficulty with speech and essentially non-verbal morning of presentation.  Possibly due to subdural fluid collection vs marked electrolyte abnormalities (uremia, hyponatremia, hyperkalemia) vs intracranial mets vs infection. Outside CT shows left holohemispheric subdural fluid collection with 2 mm midline shift.   reports fall on Oct 8 (unknown if any head trauma).    - Neurology, NSGY, Neurocritical care consulted - expectant management, no surgical interventions at this time  - started on Keppra 500mg BID  - seizure precautions  - goal SBP <150, head of bed 30 degrees  - goal platelets of > 50, conditional orders placed, receiving multiple transfusions for platelets and blood  - SLP eval     #Mild covid 19 infection  No hypoxemia  - 3 days remdesivir to prevent progression completed  - special precautions    #Thrombocytopenia  #Abnormal coags  Admission platelet 38, INR 1.27 and PTT 38.  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   reports this has been increasing in size over past week as she has spent more  time in her chair.    -Admission UA with >182 WBC, leukocyte esterase.  -Outside CXR with mild left basilar opacity  -procalcitonin of 8, unclear significance in the setting of significant renal dysfunction  -culture data negative, no respiratory complaints  -s/p empiric zosyn       #Hyponatremia  #Hyperkalemia improved now with mild hypokalemia with potassium of 3.1  #Uremia  #Anion gap metabolic acidosis  #KATHLEEN  Cr as low as 0.8 in June 2022, on most recent check Sept 2022 was 1.13.  Admission Na 127, K 7.2, bicarb 16 (gap down to 14), .   reporting minimal oral intake over past 2 days.  Edema on exam, but suspect intravascularly dry with hypoalbuminemia and poor oral intake.    shifted potassium with insulin overnight  - repeat bmps daily  - Nephrology consult placed on admission and they are following.  Appreciate assistance  Creatinine normalized now      #Stage IV metastatic breast cancer (mets to brain, liver, bone) s/p mastectomy  Elevated LFT's  Followed by Dr. [facility] of [facility].  Currently on immunotherapy and receiving radiation to spine (completed brain radiation for cerebellar mets).  Recent baseline LFT's unknown, suspect due to liver mets.  No signs of tenderness on exam.    [facility]. Appreciate assistance      #HFrEF due to doxorubicin  #Type 2 MI due to supply demand mismatch  #Sinus tachycardia  TTE 6/2022 with EF 35-40% with global LV hypokinesis.  Outside trop 183, suspect demand ischemia.    Echocardiogram with EF of 40-45%  - monitor I/O's and daily weights  - Started on metoprolol 25mg PO BID and IV PRN     #DM II with peripheral neuropathy  - low dose ssi  Hemoglobin A1c at 6.4% indicating good control     #Chronic anemia  Baseline hgb about 8 g/dL.   - monitor, transfuse if < 7, conditional order placed, prBC x2 ordered     #Depression  - resume cymbalta    #Insomnia  Patient requested Ambien to help sleep at ordered for her        Diet: Combination Diet Moderate  Consistent Carb (60 g CHO per Meal) Diet; Mildly Thick (level 2); Pureed Diet (level 4); Mildly Thick (level 2); No Straws    DVT Prophylaxis: Pneumatic Compression Devices  Jiang Catheter: Not present  Central Lines: PRESENT        Cardiac Monitoring: ACTIVE order. Indication: Electrolyte Imbalance (24 hours)- Magnesium <1.3 mg/ml; Potassium < =2.8 or > 5.5 mg/ml  Code Status: Full Code          Disposition Plan  Expected Discharge Date:  Patient is medically ready for discharge other than COVID-positive status needing contact precautions.  Needs TCU on discharge    Code Status: Full Code    Discussed with patient and bedside RN today    Kendra Singh MD  [phone] (P)    Interval History   Patient in no acute distress this morning.  Denies any chest pain/sob/NVD.    -Data reviewed today: I reviewed all new labs and imaging results over the last 24 hours. I personally reviewed all the imaging and labs since admission reviewed    Physical Exam   Temp: 99  F (37.2  C) Temp src: Oral BP: 107/70 Pulse: 110   Resp: 16 SpO2: 95 % O2 Device: None (Room air)    Vitals:    10/18/22 1700 10/19/22 0430 10/26/22 1939   Weight: 76.2 kg (167 lb 15.9 oz) 76 kg (167 lb 8.8 oz) 76 kg (167 lb 8.8 oz)     Vital Signs with Ranges  Temp:  [98.2  F (36.8  C)-99  F (37.2  C)] 99  F (37.2  C)  Pulse:  [] 110  Resp:  [15-17] 16  BP: ()/(57-75) 107/70  SpO2:  [95 %-98 %] 95 %  I/O last 3 completed shifts:  In: 557 [P.O.:240]  Out: 600 [Urine:600]    Constitutional: Awake, alert, cooperative, no apparent distress  Respiratory: CTA b/l , no  wheezing  Cardiovascular: Regular rate and rhythm, normal S1 and S2, and no murmur noted  GI: Normal bowel sounds, soft, non-distended, non-tender  Skin/Integumen: No rashes, no cyanosis, no edema  Other:     Medications       docusate sodium  100 mg Oral Daily     DULoxetine  30 mg Oral QAM     DULoxetine  60 mg Oral At Bedtime     heparin  5-10 mL Intracatheter Q28 Days     heparin lock  flush  5-10 mL Intracatheter Q24H     insulin aspart  1-4 Units Subcutaneous Q4H     levETIRAcetam  500 mg Oral BID     metoprolol tartrate  25 mg Oral BID     oxybutynin  5 mg Oral BID     oxybutynin ER  10 mg Oral QPM     pantoprazole  40 mg Oral At Bedtime     sodium chloride (PF)  10-20 mL Intracatheter Q28 Days     sodium chloride (PF)  3 mL Intracatheter Q8H     zolpidem  5 mg Oral At Bedtime       Data   Recent Labs   Lab 10/27/22  0723 10/27/22  0636 10/27/22  0409 10/27/22  0121 10/27/22  0015 10/26/22  2102 10/26/22  1838 10/26/22  0723 10/26/22  0646 10/25/22  0751 10/25/22  0548 10/24/22  1118 10/24/22  0813 10/23/22  0816 10/23/22  0548   WBC  --   --   --   --   --   --   --   --  1.1*  --   --   --  2.4*  --  3.8*   HGB  --   --   --   --   --   --   --   --  7.9*  --   --   --  8.5*  --  8.5*   MCV  --   --   --   --   --   --   --   --  85  --   --   --  93  --  87   PLT  --  53*  --  52*  --   --  68*   < > 73*  73*   < > 76*   < > 123*  123*   < > 78*  78*   NA  --   --   --   --   --   --   --   --  136  --   --   --  133  --  138   POTASSIUM  --  3.9  --   --   --   --   --   --  3.7  --  3.8  --  3.9   < > 3.1*   CHLORIDE  --   --   --   --   --   --   --   --  107  --   --   --  104  --  112*   CO2  --   --   --   --   --   --   --   --  21  --   --   --  22  --  15*   BUN  --   --   --   --   --   --   --   --  17  --   --   --  25  --  21   CR  --   --   --   --   --   --   --   --  0.64  --   --   --  0.67  --  0.54   ANIONGAP  --   --   --   --   --   --   --   --  8  --   --   --  7  --  11   BENJI  --   --   --   --   --   --   --   --  7.8*  --   --   --  8.0*  --  6.6*   *  --  122*  --  119*   < >  --    < > 145*   < >  --    < > 132*   < > 113*    < > = values in this interval not displayed.       No results found for this or any previous visit (from the past 24 hour(s)).

## 2022-10-27 NOTE — PLAN OF CARE
Goal Outcome Evaluation:      Plan of Care Reviewed With: patient    Reason for Admission: Traumatic SDH, encephalopathy, Covid +, known to have metastatic breast cancer.     Cognitive/Mentation: A/Ox 3-4, forgetful at times  Neuros/CMS: Intact ex generalized weakness  VS: Stable on RA - soft BPs, asymptomatic   Tele: NSR  GI: BS active, no BM this shift  : Incontinent - purewick in place  Pulmonary: LS clear/diminished  Pain: reported back pain - offered tylenol, repositioning, heat/cold, pt declined    Drains/Lines: Chest port - hep locked  Skin: Preexisting coccyx wound - wound care orders in place  Activity: Assist x2/GB/SS  Diet: Mod CHO     Therapies recs: tcu  Discharge: pending placement    Aggression Stoplight Tool: Green    End of shift summary: Pt stable this shift - no changes.

## 2022-10-27 NOTE — PROGRESS NOTES
Chart Check:    I stopped by to briefly talk to Elenita. She was sleepy after analgesics and a session of OT. Notes reviewed. No new recommendations. Further systemic therapy seems like it might be challenging due to weakness and wounds. She is due  to follow up with Dr. Varma next week Tuesday. If she goes to TCU this will likely be delayed.     Geovany DALTON, CNP  [facility]  [phone] (office) or [phone] (cell)

## 2022-10-27 NOTE — PLAN OF CARE
Speech Language Therapy Discharge Summary    Reason for therapy discharge:    All goals and outcomes met, no further needs identified.    Progress towards therapy goal(s). See goals on Care Plan in [facility] electronic health record for goal details.  Goals met    Therapy recommendation(s):    No further therapy is recommended.  Meal follow up with breakfast. Pt reported no recent dysphagia symptoms and good appetite. BLT sandwich and fresh fruit assessed. Even with distractions/talking, no appreciable dysphagia and no overt s/sx of aspiration. Pt reported speech/language difficulty on admission has also resolved. No further SLP services indicated at this time.

## 2022-10-27 NOTE — PLAN OF CARE
Pt here with Encephalopathy/L. SDH and is COVID+. A&Ox4. Neuros with BLE weakness scoring 3/5, sluggish pupils, slow/clear speech. VSS on RA. Tele NSR. MOD CHO diet, thin liquids. Takes pills whole with water. Up with A2 SS/GB. Incontinent of urine, PW in place. Skin with coccyx wound, WOC orders in place. Denies pain. Pt scoring green on the Aggression Stop Light Tool. L. Chest port heparin locked. Last platelet level at 52, last hemoglobin 7.9 - conditional transfusion orders in place. Discharge pending, likely TCU.

## 2022-10-28 NOTE — PLAN OF CARE
Reason for Admission: SDH, Encephalopathy, COVID +  Cognitive/Mentation: A/Ox 3, forgetful.   Neuros/CMS: Stable with generalized weakness. BLE 2/5. Pupils large and sluggish.   VS: VSS on RA   Tele: NSR  GI: BS audible, + flatus, no BM this shift. Incontinent.  : Purewick in place, good output. Incontinent.  Pulmonary: LS diminished  Pain: Buttocks pain, improved with prn Dilaudid and repositioning.    Drains/Lines: Chest port, Heparin locked.   Skin: Coccyx wound injury CDI. T/R.   Activity: Assist x 2 with clyde-steady.  Diet: Mod carb diet with thin liquids. Takes pills whole.   Therapies recs: TCU  Discharge: Pending placement  Aggression Stoplight Tool: Green  End of shift summary: No changes this shift.

## 2022-10-28 NOTE — PROGRESS NOTES
[facility]    Hospitalist Progress Note    Date of Service (when I saw the patient): 10/28/2022    Assessment & Plan   Elenita Moran is a 67 year old female who was admitted on 10/18/2022.    #Acute metabolic encephalopathy 2/2   #Suspected traumatic SDH   reports increased difficulty with gait/balance since fall 10 days prior, in past 48 hours increasingly confused with difficulty with speech and essentially non-verbal morning of presentation.  Possibly due to subdural fluid collection vs marked electrolyte abnormalities (uremia, hyponatremia, hyperkalemia) vs intracranial mets vs infection. Outside CT shows left holohemispheric subdural fluid collection with 2 mm midline shift.   reports fall on Oct 8 (unknown if any head trauma).    - Neurology, NSGY, Neurocritical care consulted - expectant management, no surgical interventions at this time  - started on Keppra 500mg BID  - seizure precautions  - goal SBP <150, head of bed 30 degrees  - goal platelets of > 50, conditional orders placed, receiving multiple transfusions for platelets and blood  On moderate carb controlled diet per Speech path      #Mild covid 19 infection  No hypoxemia  - 3 days remdesivir to prevent progression completed  - special precautions    #Thrombocytopenia  #Abnormal coags  Admission platelet 38, INR 1.27 and PTT 38.  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   reports this has been increasing in size  over past week as she has spent more time in her chair.    -Admission UA with >182 WBC, leukocyte esterase.  -Outside CXR with mild left basilar opacity  -procalcitonin of 8, unclear significance in the setting of significant renal dysfunction  -culture data negative, no respiratory complaints  -s/p empiric zosyn       #Hyponatremia  #Hyperkalemia improved now with mild hypokalemia with potassium of 3.1  #Uremia  #Anion gap metabolic acidosis  #KATHLEEN  Cr as low as 0.8 in June 2022, on most recent check Sept 2022 was 1.13.  Admission Na 127, K 7.2, bicarb 16 (gap down to 14), .   reporting minimal oral intake over past 2 days.  Edema on exam, but suspect intravascularly dry with hypoalbuminemia and poor oral intake.    shifted potassium with insulin overnight  - repeat bmps daily  - Nephrology consult placed on admission and they are following.  Appreciate assistance  Creatinine normalized now      #Stage IV metastatic breast cancer (mets to brain, liver, bone) s/p mastectomy  Elevated LFT's  Followed by Dr. Varma of [facility].  Currently on immunotherapy and receiving radiation to spine (completed brain radiation for cerebellar mets).  Recent baseline LFT's unknown, suspect due to liver mets.  No signs of tenderness on exam.    - [facility]. Appreciate assistance      #HFrEF due to doxorubicin  #Type 2 MI due to supply demand mismatch  #Sinus tachycardia  TTE 6/2022 with EF 35-40% with global LV hypokinesis.  Outside trop 183, suspect demand ischemia.    Echocardiogram with EF of 40-45%  - monitor I/O's and daily weights  - Started on metoprolol 25mg PO BID and IV PRN     #DM II with peripheral neuropathy  - low dose ssi  Hemoglobin A1c at 6.4% indicating good control     #Chronic anemia  Baseline hgb about 8 g/dL.   - monitor, transfuse if < 7, conditional order placed, prBC x2 ordered     #Depression  - resume cymbalta    #Insomnia  Patient requested Ambien to help sleep at ordered for  her        Diet: Combination Diet Moderate Consistent Carb (60 g CHO per Meal) Diet; Mildly Thick (level 2); Pureed Diet (level 4); Mildly Thick (level 2); No Straws    DVT Prophylaxis: Pneumatic Compression Devices  Jiang Catheter: Not present  Central Lines: PRESENT        Cardiac Monitoring: ACTIVE order. Indication: Electrolyte Imbalance (24 hours)- Magnesium <1.3 mg/ml; Potassium < =2.8 or > 5.5 mg/ml  Code Status: Full Code          Disposition Plan  Expected Discharge Date:  Patient is medically ready for discharge other than COVID-positive status needing contact precautions.  Needs TCU on discharge    Code Status: Full Code    Discussed with patient and bedside RN today    Kendra Singh MD  [phone] (P)    Interval History   Patient in no acute distress this morning.  Denies any chest pain/sob/NVD.    -Data reviewed today: I reviewed all new labs and imaging results over the last 24 hours. I personally reviewed all the imaging and labs since admission reviewed    Physical Exam   Temp: 100.1  F (37.8  C) Temp src: Oral BP: 94/52 Pulse: 65   Resp: 20 SpO2: 97 % O2 Device: None (Room air)    Vitals:    10/18/22 1700 10/19/22 0430 10/26/22 1939   Weight: 76.2 kg (167 lb 15.9 oz) 76 kg (167 lb 8.8 oz) 76 kg (167 lb 8.8 oz)     Vital Signs with Ranges  Temp:  [97.1  F (36.2  C)-100.1  F (37.8  C)] 100.1  F (37.8  C)  Pulse:  [] 65  Resp:  [16-20] 20  BP: ()/(52-71) 94/52  SpO2:  [97 %-100 %] 97 %  I/O last 3 completed shifts:  In: -   Out: 900 [Urine:900]    Constitutional: Awake, alert, cooperative, no apparent distress  Respiratory: CTA b/l , no  wheezing  Cardiovascular: Regular rate and rhythm, normal S1 and S2, and no murmur noted  GI: Normal bowel sounds, soft, non-distended, non-tender  Skin/Integumen: No rashes, no cyanosis, no edema  Other:     Medications       docusate sodium  100 mg Oral Daily     DULoxetine  30 mg Oral QAM     DULoxetine  60 mg Oral At Bedtime     heparin  5-10 mL  Intracatheter Q28 Days     heparin lock flush  5-10 mL Intracatheter Q24H     levETIRAcetam  500 mg Oral BID     metoprolol tartrate  25 mg Oral BID     oxybutynin  5 mg Oral BID     oxybutynin ER  10 mg Oral QPM     pantoprazole  40 mg Oral At Bedtime     sodium chloride (PF)  10-20 mL Intracatheter Q28 Days     sodium chloride (PF)  3 mL Intracatheter Q8H     zolpidem  5 mg Oral At Bedtime       Data   Recent Labs   Lab 10/28/22  0821 10/28/22  0648 10/28/22  0450 10/28/22  0102 10/27/22  0723 10/27/22  0636 10/27/22  0409 10/27/22  0121 10/26/22  0723 10/26/22  0646 10/24/22  1118 10/24/22  0813 10/23/22  0816 10/23/22  0548   WBC  --  1.1*  --   --   --   --   --   --   --  1.1*  --  2.4*  --  3.8*   HGB  --  7.2*  --   --   --   --   --   --   --  7.9*  --  8.5*  --  8.5*   MCV  --  89  --   --   --   --   --   --   --  85  --  93  --  87   PLT  --  55*  --   --   --  53*  --  52*   < > 73*  73*   < > 123*  123*   < > 78*  78*   NA  --   --   --   --   --   --   --   --   --  136  --  133  --  138   POTASSIUM  --  4.1  --   --   --  3.9  --   --   --  3.7   < > 3.9   < > 3.1*   CHLORIDE  --   --   --   --   --   --   --   --   --  107  --  104  --  112*   CO2  --   --   --   --   --   --   --   --   --  21  --  22  --  15*   BUN  --   --   --   --   --   --   --   --   --  17  --  25  --  21   CR  --   --   --   --   --   --   --   --   --  0.64  --  0.67  --  0.54   ANIONGAP  --   --   --   --   --   --   --   --   --  8  --  7  --  11   BENJI  --   --   --   --   --   --   --   --   --  7.8*  --  8.0*  --  6.6*   *  --  159* 158*   < >  --    < >  --    < > 145*   < > 132*   < > 113*    < > = values in this interval not displayed.       No results found for this or any previous visit (from the past 24 hour(s)).

## 2022-10-28 NOTE — PROGRESS NOTES
Chart Check:    No new recommendations. Will go to TCU after Covid restrictions completed. Her follow up with Dr. Varma will be canceled for next week. Cancer therapy will be on hold while at TCU. Will anticipate follow up with Dr. Varma after TCU. If her performance status improves, could consider resumption of chemotherapy.    Geovany DALTON, CNP  [facility]  [phone] (office) or [phone] (cell)

## 2022-10-28 NOTE — PLAN OF CARE
0700--1500    Pt here with SDH and encephalopathy. Hx breast cancer with mets to brain. A/Ox4; forgetful. Generalized weakness. BLE 2-3/5. Pupils large and sluggish. VSS RA ex for soft Bps. SBP <150. Tele NS. Incontinent. Purewick in place, lower output. LS diminished. Stage 4 ulcer on coccyx--wound cares done. A2/sliding scale. Turn and repositioning. MOD/CHO; thin liquids. Pills whole. Tylenol PRN. Chest port heparin locked. Platelets >50, 55 today. Discharge pending placement, likely will need TCU.     COVID positive precautions maintained.

## 2022-10-29 NOTE — PLAN OF CARE
Pt here with metabolic encephalopathy and SDH s/p fall, hx significant for breast cancer with brain mets. Disoriented to time, place, situation. Neuros intact, large sluggish pupils, bilateral upper extremities 4/5, lower extremities 2/5. Tachycardic, goal SBP less than 150. Tele sinus tachy. Stage 4 pressure ulcer on coccyx. IDDSI level mod carb diet with thin liquids, declined lunch today. Takes pills crushed in applesauce. Up with Ax2, SS. Incontinent of bowel and bladder. Denies pain. Pt scoring green on the Aggression Stop Light Tool. Plan discharge to TCU once covid isolation complete. Hospitalist notified this am as patient more somnolent than baseline, unable to answer questions appropriately, CT ordered for this afternoon.

## 2022-10-29 NOTE — PLAN OF CARE
Pt here with SDH and encephalopathy.Covid precautions. Hx of breast cancer w mets to brain.  A&Ox4. Forgetful, hypoactive mood. Generalized weakness. BLE 2/5. BUE 4/5. Pupils large and sluggish. VSS onRA. SBP <150. LS diminished. Tele sinus tachy. Mod carb diet, thin liquids. Takes pills whole. Up with 2 SS. Tylenonl prn for pain. Pt has stage 4 ulcer on coccyx. T/r Q2. Wound care done daily. Chest port heparin locked. Incontinent, +flatus, Purewick in place. Pt scoring green on the Aggression Stop Light Tool. Goal of plt >50. Plan TCU after covid restriction. Discharge pending.

## 2022-10-29 NOTE — PROGRESS NOTES
[facility]    Hospitalist Progress Note    Date of Service (when I saw the patient): 10/29/2022    Assessment & Plan   Elenitaestiven Moran is a 67 year old female who was admitted on 10/18/2022.    #Acute metabolic encephalopathy 2/2   #Suspected traumatic SDH   reports increased difficulty with gait/balance since fall 10 days prior, in past 48 hours increasingly confused with difficulty with speech and essentially non-verbal morning of presentation.  Possibly due to subdural fluid collection vs marked electrolyte abnormalities (uremia, hyponatremia, hyperkalemia) vs intracranial mets vs infection. Outside CT shows left holohemispheric subdural fluid collection with 2 mm midline shift.   reports fall on Oct 8 (unknown if any head trauma).    - Neurology, NSGY, Neurocritical care consulted - expectant management, no surgical interventions at this time  - started on Keppra 500mg BID  - seizure precautions  - goal SBP <150, head of bed 30 degrees  - goal platelets of > 50, conditional orders placed, receiving multiple transfusions for platelets and blood  On moderate carb controlled diet per Speech path   Pt is more somnolent today CT head reordered   ambien dose reduced from 5mg to 2.5mg daily        #Mild covid 19 infection  No hypoxemia  - 3 days remdesivir to prevent progression completed  - special precautions    #Thrombocytopenia  #Abnormal coags  Admission platelet 38, INR 1.27 and PTT 38.  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   reports this has been increasing in size over past week as she has spent more time in her chair.    -Admission UA with >182 WBC, leukocyte esterase.  -Outside CXR with mild left basilar opacity  -procalcitonin of 8, unclear significance in the setting of significant renal dysfunction  -culture data negative, no respiratory complaints  -s/p empiric zosyn  S/p bedside debridement by surgery        #Hyponatremia  #Hyperkalemia improved now with mild hypokalemia with potassium of 3.1  #Uremia  #Anion gap metabolic acidosis  #KATHLEEN  Cr as low as 0.8 in June 2022, on most recent check Sept 2022 was 1.13.  Admission Na 127, K 7.2, bicarb 16 (gap down to 14), .   reporting minimal oral intake over past 2 days.  Edema on exam, but suspect intravascularly dry with hypoalbuminemia and poor oral intake.    shifted potassium with insulin overnight  - repeat bmps daily  - Nephrology consult placed on admission and they are following.  Appreciate assistance  Creatinine normalized now      #Stage IV metastatic breast cancer (mets to brain, liver, bone) s/p mastectomy  Elevated LFT's  Followed by Dr. Varma of [facility].  Currently on immunotherapy and receiving radiation to spine (completed brain radiation for cerebellar mets).  Recent baseline LFT's unknown, suspect due to liver mets.  No signs of tenderness on exam.    [facility] App[facility]iate [facility]      #HFrEF due to doxorubicin  #Type 2 MI due to supply demand mismatch  #Sinus tachycardia  TTE 6/2022 with EF 35-40% with global LV hypokinesis.  Outside trop 183, suspect demand ischemia.    Echocardiogram with EF of 40-45%  - monitor I/O's and daily weights  - Started on metoprolol 25mg PO BID and IV PRN     #DM II with peripheral neuropathy  - low dose ssi  Hemoglobin A1c at 6.4% indicating good control     #Chronic anemia  Baseline hgb about 8 g/dL.   - monitor, transfuse if < 7, conditional  order placed, prBC x2 ordered     #Depression  - resume cymbalta    #Insomnia  Patient requested Ambien to help sleep at ordered for her        Diet: Combination Diet Moderate Consistent Carb (60 g CHO per Meal) Diet; Mildly Thick (level 2); Pureed Diet (level 4); Mildly Thick (level 2); No Straws    DVT Prophylaxis: Pneumatic Compression Devices  Jiang Catheter: Not present  Central Lines: PRESENT        Cardiac Monitoring: ACTIVE order. Indication: Electrolyte Imbalance (24 hours)- Magnesium <1.3 mg/ml; Potassium < =2.8 or > 5.5 mg/ml  Code Status: Full Code          Disposition Plan  Expected Discharge Date:  Patient is medically ready for discharge other than COVID-positive status needing contact precautions.  Needs TCU on discharge    Code Status: Full Code    Discussed with patient and bedside RN and updated  Denilson over the phone  today    Kendra Singh MD  [phone] (P)    Interval History   Patient in no acute distress this morning. Somnolent responds to verbal stimuli and wakes up   Denies any chest pain/sob/NVD.    -Data reviewed today: I reviewed all new labs and imaging results over the last 24 hours. I personally reviewed all the imaging and labs since admission reviewed    Physical Exam   Temp: 97.6  F (36.4  C) Temp src: Axillary BP: 127/74 Pulse: 104   Resp: 20 SpO2: 99 % O2 Device: None (Room air) Oxygen Delivery: 2 LPM  Vitals:    10/18/22 1700 10/19/22 0430 10/26/22 1939   Weight: 76.2 kg (167 lb 15.9 oz) 76 kg (167 lb 8.8 oz) 76 kg (167 lb 8.8 oz)     Vital Signs with Ranges  Temp:  [97.6  F (36.4  C)-99  F (37.2  C)] 97.6  F (36.4  C)  Pulse:  [] 104  Resp:  [20] 20  BP: (101-127)/(53-74) 127/74  SpO2:  [95 %-100 %] 99 %  I/O last 3 completed shifts:  In: 320 [P.O.:320]  Out: 1200 [Urine:1200]    Constitutional: Awake, alert, cooperative, no apparent distress  Respiratory: CTA b/l , no  wheezing  Cardiovascular: Regular rate and rhythm, normal S1 and S2, and no murmur noted  GI:  Normal bowel sounds, soft, non-distended, non-tender  Skin/Integumen: No rashes, no cyanosis, no edema  Other:     Medications       docusate sodium  100 mg Oral Daily     DULoxetine  30 mg Oral QAM     DULoxetine  60 mg Oral At Bedtime     heparin  5-10 mL Intracatheter Q28 Days     heparin lock flush  5-10 mL Intracatheter Q24H     levETIRAcetam  500 mg Oral BID     metoprolol tartrate  25 mg Oral BID     oxybutynin  5 mg Oral BID     oxybutynin ER  10 mg Oral QPM     pantoprazole  40 mg Oral At Bedtime     sodium chloride (PF)  10-20 mL Intracatheter Q28 Days     sodium chloride (PF)  3 mL Intracatheter Q8H     zolpidem  2.5 mg Oral At Bedtime       Data   Recent Labs   Lab 10/29/22  0541 10/29/22  0537 10/28/22  2140 10/28/22  1611 10/28/22  0821 10/28/22  0648 10/27/22  0723 10/27/22  0636 10/26/22  0723 10/26/22  0646 10/24/22  1118 10/24/22  0813 10/23/22  0816 10/23/22  0548   WBC 1.5*  --   --   --   --  1.1*  --   --   --  1.1*  --  2.4*  --  3.8*   HGB 7.2*  --   --   --   --  7.2*  --   --   --  7.9*  --  8.5*  --  8.5*   MCV 90  --   --   --   --  89  --   --   --  85  --  93  --  87   PLT 55*  --   --   --   --  55*  --  53*   < > 73*  73*   < > 123*  123*   < > 78*  78*   NA  --   --   --   --   --   --   --   --   --  136  --  133  --  138   POTASSIUM 4.2  --   --   --   --  4.1  --  3.9  --  3.7   < > 3.9   < > 3.1*   CHLORIDE  --   --   --   --   --   --   --   --   --  107  --  104  --  112*   CO2  --   --   --   --   --   --   --   --   --  21  --  22  --  15*   BUN  --   --   --   --   --   --   --   --   --  17  --  25  --  21   CR  --   --   --   --   --   --   --   --   --  0.64  --  0.67  --  0.54   ANIONGAP  --   --   --   --   --   --   --   --   --  8  --  7  --  11   BENJI  --   --   --   --   --   --   --   --   --  7.8*  --  8.0*  --  6.6*   GLC  --  140* 126* 166*   < >  --    < >  --    < > 145*   < > 132*   < > 113*    < > = values in this interval not displayed.       No results  found for this or any previous visit (from the past 24 hour(s)).

## 2022-10-29 NOTE — PROVIDER NOTIFICATION
"Text sent to hospitalist at 1118 \"FYI per CT pt will not be seen until this afternoon for imaging.\"     "

## 2022-10-29 NOTE — PLAN OF CARE
Goal Outcome Evaluation:       Pt here with SDH and encephalopathy. A&O x4. Neuros Large and sluggish pupils, forgetful, BLE 2/5, generalized weakness. VSS, SBP parameters <150. Tele S Tachy. Mod carb diet, thin liquids. Takes pills whole with water. Up with A2 SS. Lower back pain at 6/10, Hydromorphone given and affective. Pt scoring green on the Aggression Stop Light Tool. Plan to monitor platelets, goal >50, chest port heparin lock, Covid positive and precautions maintained, Coccyx pressure ulcer with daily wound care, dressing ICD, purewick in place, needs Turning and repo to relief pressure on coccyx. Discharge to TCU after Covid restrictions.

## 2022-10-29 NOTE — PROGRESS NOTES
Chart Check:    67-year-old lady with history of metastatic breast cancer.  Admitted after a fall with difficulty in gait and balance was found to have traumatic subdural hemorrhage.  Also positive for COVID.    Previously extensively treated for metastatic breast cancer most recent treatment was [facility].  Currently treatment is on hold.     No new recommendations. Will go to TCU after Covid restrictions completed. Her follow up with Dr. Varma will be canceled for next week. Cancer therapy will be on hold while at TCU. Will anticipate follow up with Dr. Varma after TCU. If her performance status improves, could consider resumption of chemotherapy.  Jesse Asif   [place] [facility]

## 2022-10-30 NOTE — PLAN OF CARE
Pt here with SDH, encephalopathy, hx of breast cancer with mets to brain. Alert and oriented. Neuros intact, bilateral upper 4/5, bilateral lower 2/5, generalized weakness, pupils are large and sluggish, slow to respond. VSS, hypotensive, AM BP meds held. Tele sinus tachy. IDDSI level moderate carb diet with thin liquids. Takes pills whole. Up with Ax2, SS. Incontinent of bowel and bladder, BM this AM, pure-wick intact. Reporting pain in bilateral hips, prn Dilaudid and hot packs given. Pt scoring green on the Aggression Stop Light Tool. Covid isolation lifted today. Plan discharge . Discharge to TCU, pending.

## 2022-10-30 NOTE — PLAN OF CARE
Goal Outcome Evaluation:  Pt here with SDH and encephalopathy.Covid precautions. Hx of breast cancer w mets to brain. Very drowsy/lethargic at start of shift and came more arousable. A&Ox4. Forgetful, hypoactive mood. Generalized weakness. BLE 2/5. BUE 4/5. Pupils large and sluggish. VSS on RA. SBP <150. LS diminished. Tele NSR, tachy at times. Mod carb diet, thin liquids. Took pills whole H20. Up with 2 SS. Tylenonl prn for pain. Pt has stage 4 ulcer on coccyx. Mepilex w drainage. T/r Q2. Chest port heparin locked. Incontinent, +flatus, Purewick in place. Pt scoring green on the Aggression Stop Light Tool. Goal of plt >50. HGB >7. Plan TCU after covid restriction lifted.

## 2022-10-30 NOTE — PROGRESS NOTES
[facility]    Hospitalist Progress Note    Date of Service (when I saw the patient): 10/30/2022    Assessment & Plan   Elenita Moran is a 67 year old female who was admitted on 10/18/2022.    #Acute metabolic encephalopathy 2/2   #Suspected traumatic SDH   reports increased difficulty with gait/balance since fall 10 days prior, in past 48 hours increasingly confused with difficulty with speech and essentially non-verbal morning of presentation.  Possibly due to subdural fluid collection vs marked electrolyte abnormalities (uremia, hyponatremia, hyperkalemia) vs intracranial mets vs infection. Outside CT shows left holohemispheric subdural fluid collection with 2 mm midline shift.   reports fall on Oct 8 (unknown if any head trauma).    - Neurology, NSGY, Neurocritical care consulted - expectant management, no surgical interventions at this time  - started on Keppra 500mg BID  - seizure precautions  - goal SBP <160 now , head of bed 30 degrees  - goal platelets of > 50, conditional orders placed, received multiple transfusions for platelets and blood  On moderate carb controlled diet per Speech path   Pt is more somnolent on 10/29  CT head repeat showed no new changes and stable   ambien dose reduced from 5mg to 2.5mg daily   Pt more alert and awake now        #Mild covid 19 infection  No hypoxemia  - 3 days remdesivir to prevent progression completed  - special precautions  covid recovered on 10/30 10days from postive on 10/20/22     #Thrombocytopenia  #Abnormal coags  Admission platelet 38, INR 1.27 and PTT 38.  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   reports this has been increasing in size over past week as she has spent more time in her chair.    -Admission UA with >182 WBC, leukocyte esterase.  -Outside CXR with mild left basilar opacity  -procalcitonin of 8, unclear significance in the setting of significant renal dysfunction  -culture data negative, no respiratory complaints  -s/p empiric zosyn  S/p bedside debridement by surgery        #Hyponatremia  #Hyperkalemia improved now with mild hypokalemia with potassium of 3.1  #Uremia  #Anion gap metabolic acidosis  #KATHLEEN  Cr as low as 0.8 in June 2022, on most recent check Sept 2022 was 1.13.  Admission Na 127, K 7.2, bicarb 16 (gap down to 14), .   reporting minimal oral intake over past 2 days.  Edema on exam, but suspect intravascularly dry with hypoalbuminemia and poor oral intake.    shifted potassium with insulin overnight  - repeat bmps daily  - Nephrology consult placed on admission and they are following.  Appreciate assistance  Creatinine normalized now   Pt c/o congestion in chest she was on BUmex 2mg BID PTA started on lasix 40mg PO daily on 10/30/22      #Stage IV metastatic breast cancer (mets to brain, liver, bone) s/p mastectomy  Elevated LFT's  Follow[facility] of [facility].  Currently on immunotherapy and receiving radiation to spine (completed brain radiation for cerebellar mets).  Recent baseline LFT's unknown, suspect due to liver mets.  No signs of tenderness on exam.    [facility]   After TCU recovery out pt follow up with Oncology recommended      #HFrEF due to doxorubicin  #Type 2 MI due to supply demand mismatch  #Sinus tachycardia  TTE 6/2022 with EF 35-40% with global LV hypokinesis.  Outside trop 183, suspect demand ischemia.     Echocardiogram with EF of 40-45%  - monitor I/O's and daily weights  - Started on metoprolol 25mg PO BID     #DM II with peripheral neuropathy  - low dose ssi  Hemoglobin A1c at 6.4% indicating good control     #Chronic anemia  Baseline hgb about 8 g/dL.   - monitor, transfuse if < 7, conditional order placed, prBC x2 ordered     #Depression  - resume cymbalta    #Insomnia  Patient requested Ambien to help sleep at ordered for her        Diet: Combination Diet Moderate Consistent Carb (60 g CHO per Meal) Diet; Mildly Thick (level 2); Pureed Diet (level 4); Mildly Thick (level 2); No Straws    DVT Prophylaxis: Pneumatic Compression Devices  Jiang Catheter: Not present  Central Lines: PRESENT        Cardiac Monitoring: ACTIVE order. Indication: Electrolyte Imbalance (24 hours)- Magnesium <1.3 mg/ml; Potassium < =2.8 or > 5.5 mg/ml  Code Status: Full Code          Disposition Plan  Expected Discharge Date:  Patient is medically ready for discharge other than COVID-positive status needing contact precautions.  Needs TCU on discharge    Code Status: Full Code    Discussed with patient and bedside RN and updated  Denilson over the phone on 10/29/22      Kendra Singh MD  [phone] (P)    Interval History   Patient in no acute distress this morning.c/o feeling congested and coughing up mucus . Denies any chest pain/sob/NVD.    -Data reviewed today: I reviewed all new labs and imaging results over the last 24 hours. I personally reviewed all the imaging and labs since admission reviewed    Physical Exam   Temp: 98.5  F (36.9  C) Temp src: Oral BP: 98/63 Pulse: 106   Resp: 18 SpO2: 97 % O2 Device: None (Room air)    Vitals:    10/18/22 1700 10/19/22 0430 10/26/22 1939   Weight: 76.2 kg (167 lb 15.9 oz) 76 kg (167 lb 8.8 oz) 76 kg (167 lb 8.8 oz)     Vital Signs with Ranges  Temp:  [97.1  F (36.2  C)-98.5  F (36.9  C)] 98.5  F (36.9  C)  Pulse:  [] 106  Resp:  [16-20] 18  BP: ()/(53-74) 98/63  SpO2:  [95  %-98 %] 97 %  I/O last 3 completed shifts:  In: 180 [P.O.:180]  Out: 1400 [Urine:1400]    Constitutional: Awake, alert, cooperative, no apparent distress  Respiratory: CTA b/l , no  wheezing  Cardiovascular: Regular rate and rhythm, normal S1 and S2, and no murmur noted  GI: Normal bowel sounds, soft, non-distended, non-tender  Skin/Integumen: No rashes, no cyanosis, no edema  Other:     Medications       docusate sodium  100 mg Oral Daily     DULoxetine  30 mg Oral QAM     DULoxetine  60 mg Oral At Bedtime     furosemide  40 mg Oral Daily     guaiFENesin  600 mg Oral BID     heparin  5-10 mL Intracatheter Q28 Days     heparin lock flush  5-10 mL Intracatheter Q24H     levETIRAcetam  500 mg Oral BID     metoprolol tartrate  25 mg Oral BID     oxybutynin  5 mg Oral BID     oxybutynin ER  10 mg Oral QPM     pantoprazole  40 mg Oral At Bedtime     sodium chloride (PF)  10-20 mL Intracatheter Q28 Days     sodium chloride (PF)  3 mL Intracatheter Q8H     zolpidem  2.5 mg Oral At Bedtime       Data   Recent Labs   Lab 10/30/22  0551 10/30/22  0239 10/29/22  2235 10/29/22  0541 10/28/22  0821 10/28/22  0648 10/27/22  0723 10/27/22  0636 10/26/22  0723 10/26/22  0646 10/24/22  1118 10/24/22  0813   WBC  --   --   --  1.5*  --  1.1*  --   --   --  1.1*  --  2.4*   HGB  --   --   --  7.2*  --  7.2*  --   --   --  7.9*  --  8.5*   MCV  --   --   --  90  --  89  --   --   --  85  --  93   PLT  --   --   --  55*  --  55*  --  53*   < > 73*  73*   < > 123*  123*     --   --   --   --   --   --   --   --  136  --  133   POTASSIUM 4.2  --   --  4.2  --  4.1  --  3.9  --  3.7   < > 3.9   CHLORIDE 102  --   --   --   --   --   --   --   --  107  --  104   CO2 20  --   --   --   --   --   --   --   --  21  --  22   BUN 19  --   --   --   --   --   --   --   --  17  --  25   CR 0.60  --   --   --   --   --   --   --   --  0.64  --  0.67   ANIONGAP 12  --   --   --   --   --   --   --   --  8  --  7   BENJI 7.7*  --   --   --   --    --   --   --   --  7.8*  --  8.0*   * 133* 136*  --    < >  --    < >  --    < > 145*   < > 132*    < > = values in this interval not displayed.       Recent Results (from the past 24 hour(s))   CT Head w/o Contrast    Narrative    EXAM: CT HEAD W/O CONTRAST  LOCATION: [facility]  DATE/TIME: 10/29/2022 5:13 PM    INDICATION: Lethargy with recent fall and sub dural hematoma  COMPARISON:  10/23/2022, 10/13/2022.  TECHNIQUE: Routine CT Head without IV contrast. Multiplanar reformats. Dose reduction techniques were used.    FINDINGS:  INTRACRANIAL CONTENTS: Evolving small left convexity subdural hematoma.  No progressive mass effect.  No CT evidence of acute infarct. Mild presumed chronic small vessel ischemic changes. Mild generalized volume loss. No hydrocephalus.     VISUALIZED ORBITS/SINUSES/MASTOIDS: No intraorbital abnormality. No paranasal sinus mucosal disease. No middle ear or mastoid effusion.    BONES/SOFT TISSUES: No acute abnormality.      Impression    IMPRESSION:  1.  No significant change since 10/23/2022.  2.  Evolving small left convexity subdural hematoma without progressive mass effect or new hemorrhage.

## 2022-10-31 NOTE — PROVIDER NOTIFICATION
Paged \[facility]\"" ist regarding  patient critical hemoglobin level of 6.7 , please advice

## 2022-10-31 NOTE — PROVIDER NOTIFICATION
"Text page sent to hospitalist: \"Pt's bp 73/39. She is being transfused 1 unit PRBC. I have increased rate of transfusion to 150/hr. Any other suggestions/orders?\"  "

## 2022-10-31 NOTE — PROGRESS NOTES
"[facility] Progress Note       73      Completed follow-up visit with Elenita at the bedside.     During this visit, Elenita strained to talk and stated that \"I lost my voice.\" She recently came off off COVID isolation and said that her  had been in to see her, which was helpful as she continues to cope with this hospitalization.    I offered a prayer for Elenita and agreed to continue following up with her. Please consult  if any immediate needs arise.      ------  Brian Coker M.Div.  Resident   Pager: [phone]   "

## 2022-10-31 NOTE — PROGRESS NOTES
[facility]    Hospitalist Progress Note    Date of Service (when I saw the patient): 10/31/2022    Assessment & Plan   Elenita COX Rah Moran is a 67 year old female who was admitted on 10/18/2022.    #Acute metabolic encephalopathy 2/2   #Suspected traumatic SDH   reports increased difficulty with gait/balance since fall 10 days prior, in past 48 hours increasingly confused with difficulty with speech and essentially non-verbal morning of presentation.  Possibly due to subdural fluid collection vs marked electrolyte abnormalities (uremia, hyponatremia, hyperkalemia) vs intracranial mets vs infection. Outside CT shows left holohemispheric subdural fluid collection with 2 mm midline shift.   reports fall on Oct 8 (unknown if any head trauma).    plan  - Neurology, NSGY, Neurocritical care consulted - expectant management, no surgical interventions at this time  - started on Keppra 500mg BID  - goal SBP <160 now , head of bed 30 degrees  - goal platelets of > 50, conditional orders placed, received multiple transfusions for platelets and blood  - On moderate carb controlled diet per Speech path   - Pt more alert and awake now after reduction of her ambien    #acute on Chronic anemia  Baseline hgb about 8 g/dL.   - transfuse 1 unit 10/31  - labs for reversible causes including iron deficiency, b12, folate  - repeat hgb this afternoon       #Mild covid 19 infection  No hypoxemia  - completed 3 days of ppx remdesivir  - off special precautions 10/30    #Thrombocytopenia  #Abnormal coags  Admission platelet 38, INR 1.27 and PTT 38.  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   reports this has been increasing in size over past week as she has spent more time in her chair.    - Admission UA with >182 WBC, leukocyte esterase.  - Outside CXR with mild left basilar opacity  -procalcitonin of 8, unclear significance in the setting of significant renal dysfunction  - s/p empiric zosyn  - S/p bedside debridement by surgery        #Hyponatremia  #Hyperkalemia improved now with mild hypokalemia with potassium of 3.1  #Uremia  #Anion gap metabolic acidosis  #KATHLEEN  Cr as low as 0.8 in June 2022, on most recent check Sept 2022 was 1.13.  Admission Na 127, K 7.2, bicarb 16 (gap down to 14), .   reporting minimal oral intake over past 2 days.  Edema on exam, but suspect intravascularly dry with hypoalbuminemia and poor oral intake.    shifted potassium with insulin overnight  - repeat bmps daily  - Nephrology consult placed on admission and they are following.  Appreciate assistance  - Creatinine normalized now      #Stage IV metastatic breast cancer (mets to brain, liver, bone) s/p mastectomy  Elevated LFT's  Followed by Dr. Varma of [facility].  Currently on immunotherapy and receiving radiation to spine (completed brain radiation for cerebellar mets).  Recent baseline LFT's unknown, suspect due to liver mets.  No signs of tenderness on exam.    - [facility] consulted. Appreciate assistance   - After TCU recovery out pt follow up with Oncology recommended      #HFrEF due to doxorubicin  #Type 2 MI due to supply demand mismatch  #Sinus tachycardia  TTE 6/2022 with EF 35-40% with global LV hypokinesis.  Outside trop 183, suspect demand ischemia.    Echocardiogram with EF of 40-45%  - monitor I/O's and daily weights  - Started on metoprolol 25mg PO BID     #DM II with peripheral neuropathy  - low dose  ssi  - Hemoglobin A1c at 6.4% indicating good control          #Depression  - resume cymbalta    #Insomnia  Patient requested Ambien to help sleep at ordered for her, reduced dose due to excissive somnolence        Diet: Combination Diet Moderate Consistent Carb (60 g CHO per Meal) Diet; Mildly Thick (level 2); Pureed Diet (level 4); Mildly Thick (level 2); No Straws    DVT Prophylaxis: Pneumatic Compression Devices  Jiang Catheter: Not present  Central Lines: PRESENT        Cardiac Monitoring: ACTIVE order. Indication: Electrolyte Imbalance (24 hours)- Magnesium <1.3 mg/ml; Potassium < =2.8 or > 5.5 mg/ml  Code Status: Full Code          Disposition Plan  Expected Discharge Date: when TCU and hemoglobin stable    Code Status: Full Code    Discussed with patient and bedside RN and updated  Denilson over the phone on 10/29/22      Chaparro Walter DO  Hospitalist [facility]  Pager: [phone]      Interval History   hgb of 6.7. no obvious signs of bleeding. Patient soft spoken, but not confused, speech is understandable    Has back pain, unchanged and chronic, worse with the hospital bed    No cp, sob, n/v/d    -Data reviewed today: I reviewed all new labs and imaging results over the last 24 hours. I personally reviewed all the imaging and labs since admission reviewed    Physical Exam   Temp: 98  F (36.7  C) Temp src: Axillary BP: 98/59 Pulse: 97   Resp: 16 SpO2: 100 % O2 Device: None (Room air)    Vitals:    10/18/22 1700 10/19/22 0430 10/26/22 1939   Weight: 76.2 kg (167 lb 15.9 oz) 76 kg (167 lb 8.8 oz) 76 kg (167 lb 8.8 oz)     Vital Signs with Ranges  Temp:  [97.9  F (36.6  C)-99.9  F (37.7  C)] 98  F (36.7  C)  Pulse:  [] 97  Resp:  [16-18] 16  BP: ()/(57-65) 98/59  SpO2:  [92 %-100 %] 100 %  I/O last 3 completed shifts:  In: -   Out: 1100 [Urine:1100]    Constitutional: Awake, alert, cooperative, no apparent distress  Respiratory: CTA b/l , no  wheezing  Cardiovascular: Regular rate and rhythm,  normal S1 and S2, and no murmur noted  GI: Normal bowel sounds, soft, non-distended, non-tender  Skin/Integumen: No rashes, no cyanosis, no edema  Other:     Medications       docusate sodium  100 mg Oral Daily     DULoxetine  30 mg Oral QAM     DULoxetine  60 mg Oral At Bedtime     furosemide  40 mg Oral Daily     guaiFENesin  600 mg Oral BID     heparin  5-10 mL Intracatheter Q28 Days     heparin lock flush  5-10 mL Intracatheter Q24H     levETIRAcetam  500 mg Oral BID     metoprolol tartrate  25 mg Oral BID     oxybutynin  5 mg Oral BID     oxybutynin ER  10 mg Oral QPM     pantoprazole  40 mg Oral At Bedtime     sodium chloride (PF)  10-20 mL Intracatheter Q28 Days     sodium chloride (PF)  3 mL Intracatheter Q8H     zolpidem  2.5 mg Oral At Bedtime       Data   Recent Labs   Lab 10/31/22  0602 10/30/22  0551 10/30/22  0239 10/29/22  2235 10/29/22  0541 10/28/22  0821 10/28/22  0648 10/26/22  0723 10/26/22  0646   WBC 3.1*  --   --   --  1.5*  --  1.1*  --  1.1*   HGB 6.7*  --   --   --  7.2*  --  7.2*  --  7.9*   MCV 93  --   --   --  90  --  89  --  85   PLT 58*  --   --   --  55*  --  55*   < > 73*  73*   * 134  --   --   --   --   --   --  136   POTASSIUM 3.7 4.2  --   --  4.2  --  4.1   < > 3.7   CHLORIDE 100 102  --   --   --   --   --   --  107   CO2 23 20  --   --   --   --   --   --  21   BUN 19 19  --   --   --   --   --   --  17   CR 0.68 0.60  --   --   --   --   --   --  0.64   ANIONGAP 9 12  --   --   --   --   --   --  8   BENJI 7.8* 7.7*  --   --   --   --   --   --  7.8*   * 121* 133*   < >  --    < >  --    < > 145*    < > = values in this interval not displayed.       No results found for this or any previous visit (from the past 24 hour(s)).

## 2022-10-31 NOTE — PLAN OF CARE
Pt admitted with encephalopathy and a SDH, she developed COVID during the stay and is now COVID recovered. Alert and oriented x4. Hypotensive - MD notified. BP improved after receiving 1 unit PRBC for Hgb < 7. VS otherwise WDL on RA. Tele NSR. Neuros intact. Lung sounds diminished, coarse crackles in bilateral lower lobes. Has loose, productive cough. C/o pain 4/10 in low back - improved with PRN tylenol and repositioning. Had large, soft stool incontinence. Skin in inner thighs and groan red/macerated. Requested antifungal cream and recommend discontinuing use of external catheter. Coccyx wound care completed per orders. Repositioned q 2 hrs. Transfers with asst of 2 and clyde steady or lift.

## 2022-10-31 NOTE — PLAN OF CARE
Goal Outcome Evaluation:      Plan of Care Reviewed With: patient    Overall Patient Progress: no changeOverall Patient Progress: no change    Outcome Evaluation: Diet: Moderate CHO + Glucerna BID. Fair appetite, dislikes the Glucerna.   Will make pt Room Service with Assist - she will be seen daily for meal ordering.  Will cancel the Glucerna supplement - encouraged her to have  bring in her preferred brand.  Daily multivitamin for healing

## 2022-10-31 NOTE — PLAN OF CARE
Reason for Admission: SDH, encephalopathy    Cognitive/Mentation: A/Ox 4  Neuros/CMS: Intact ex slow to respond, whispers, lethargic, gen weakness, pupils large/sluggish  VS: stable on RA.   Tele: ST.  GI: BS normoactive, + flatus, last BM 10/31. inContinent.  : purewick. inContinent.  Pulmonary: LS diminished. Productive cough  Pain: denied.     Drains/Lines: cheat port - heparin locked  Skin: stage 4 pressure injury on coccyx. UTV this shift, foam dressing in place.  Activity: Assist x 2 with SS.  Diet: mod CHO with thin liquids. Takes pills whole.     Therapies recs: TCU  Discharge: pending    End of shift summary: pt very lethargic this shift. Turn and repo. Labs unit collected this am - results pending.

## 2022-11-01 NOTE — CONSULTS
Care Management Initial Consult    General Information  Assessment completed with: Spouse or significant other,  Denilson  Type of CM/SW Visit: Initial Assessment    Primary Care Provider verified and updated as needed: Yes   Readmission within the last 30 days:        Reason for Consult: discharge planning  Advance Care Planning: Advance Care Planning Reviewed: present on chart          Communication Assessment  Patient's communication style: spoken language (English or Bilingual)    Hearing Difficulty or Deaf: yes   Wear Glasses or Blind: yes    Cognitive  Cognitive/Neuro/Behavioral: .WDL except, speech  Level of Consciousness: alert  Arousal Level: opens eyes spontaneously  Orientation: oriented x 4  Mood/Behavior: calm, cooperative, hypoactive (quiet, withdrawn)  Best Language: 0 - No aphasia  Speech: whispers    Living Environment:   People in home: spouse, child(isaiah), dependent   Denilson  Current living Arrangements: house      Able to return to prior arrangements: other (see comments) (After TCU)       Family/Social Support:  Care provided by: spouse/significant other  Provides care for: no one  Marital Status:   , Children  Denilson       Description of Support System: Supportive, Involved    Support Assessment: Adequate family and caregiver support, Adequate social supports    Current Resources:   Patient receiving home care services:       Community Resources:    Equipment currently used at home: walker, rolling, other (see comments) (stair lift)  Supplies currently used at home:      Employment/Financial:  Employment Status:          Financial Concerns: No concerns identified   Referral to Financial Worker: No       Lifestyle & Psychosocial Needs:  Social Determinants of Health     Tobacco Use: Medium Risk     Smoking Tobacco Use: Former     Smokeless Tobacco Use: Never     Passive Exposure: Not on file   Alcohol Use: Not on file   Financial Resource Strain: Not on file   Food  "Insecurity: Not on file   Transportation Needs: Not on file   Physical Activity: Not on file   Stress: Not on file   Social Connections: Not on file   Intimate Partner Violence: Not on file   Depression: Not on file   Housing Stability: Not on file       Functional Status:  Prior to admission patient needed assistance:              Mental Health Status:          Chemical Dependency Status:                Values/Beliefs:  Spiritual, Cultural Beliefs, Hoahaoism Practices, Values that affect care: no               Additional Information:  Per care management/social work consult for discharge planning. Patient admitted on 10/18/22 presenting with acute encephalopathy, found to have a subdural fluid collection. Writer reviewed chart and placed call to patient's  per patient request as she has the soft speech. Writer spoke with patient's  Denilson. \"Patient and spouse live in a Split level-Patients needs are met on upper level and she has a stair lift.  does the laundy.  A with bathing, he gets the clothes out of the closet but she dresses herself. toilets I. Has a 4ww and a transport chair for going outside the house. Reports she sleeps in a chair.\" Denilson states he cares for her 24/7. Writer reviewed therapy recommendation for TCU at discharge - Denilson in agreement with this as he recognizes he needs more assistance at this time. Writer educated on what  TCU is and medicare benefits as well as facilities near their home in [place]. Denilson gave permission for writer to send out referrals - sent via [facility] for bed availability. Educated that patient will likely be considered medically stable for discharge tomorrow pending bed availability - Denilson understood.     Will continue to follow.     SAMEER Borden, LGSW    [facility]    "

## 2022-11-01 NOTE — PROGRESS NOTES
[facility]    Medicine Progress Note - Hospitalist Service    Date of Admission:  10/18/2022    Assessment & Plan   Elenita Moran is a 67 year old female who was admitted on 10/18/2022.    #Acute metabolic encephalopathy 2/2   #Suspected traumatic SDH   reports increased difficulty with gait/balance since fall 10 days prior, in past 48 hours increasingly confused with difficulty with speech and essentially non-verbal morning of presentation.  Possibly due to subdural fluid collection vs marked electrolyte abnormalities (uremia, hyponatremia, hyperkalemia) vs intracranial mets vs infection. Outside CT shows left holohemispheric subdural fluid collection with 2 mm midline shift.   reports fall on Oct 8 (unknown if any head trauma).    plan  - Neurology, NSGY, Neurocritical care consulted - expectant management, no surgical interventions at this time  - started on Keppra 500mg BID  - goal SBP <160 now , head of bed 30 degrees  - goal platelets of > 50, conditional orders placed, received multiple transfusions for platelets and blood  - On moderate carb controlled diet per Speech path   - Pt more alert and awake now after reduction of her ambien    #acute on Chronic anemia  Baseline hgb about 8 g/dL.   -S/p transfusion of 1 unit 10/31, hemoglobin stable today, recheck tomorrow  - labs for reversible causes including iron deficiency, b12, folate         #Mild covid 19 infection  No hypoxemia  - completed 3 days of ppx remdesivir  - off special precautions 10/30    #Thrombocytopenia  #Abnormal coags  Admission platelet 38, INR 1.27 and PTT 38.  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   reports this has been increasing in size over past week as she has spent more time in her chair.    - Admission UA with >182 WBC, leukocyte esterase.  - Outside CXR with mild left basilar opacity  -procalcitonin of 8, unclear significance in the setting of significant renal dysfunction  - s/p empiric zosyn  - S/p bedside debridement by surgery        #Hyponatremia  #Hyperkalemia improved now with mild hypokalemia with potassium of 3.1  #Uremia  #Anion gap metabolic acidosis  #KATHLEEN  Cr as low as 0.8 in June 2022, on most recent check Sept 2022 was 1.13.  Admission Na 127, K 7.2, bicarb 16 (gap down to 14), .   reporting minimal oral intake over past 2 days.  Edema on exam, but suspect intravascularly dry with hypoalbuminemia and poor oral intake.    shifted potassium with insulin overnight  - repeat bmps daily  - Nephrology consult placed on admission and they are following.  Appreciate assistance  - Creatinine normalized now      #Stage IV metastatic breast cancer (mets to brain, liver, bone) s/p mastectomy  Elevated LFT's  Followed by Dr. [facility]ma of [facility].  Currently on immunotherapy and receiving radiation to spine (completed brain radiation for cerebellar mets).  Recent baseline LFT's unknown, suspect due to liver mets.  No signs of tenderness on exam.    [facility] Appreciate [facility]   - After TCU recovery out pt follow up with Oncology recommended      #HFrEF due to doxorubicin  #Type 2 MI due to supply demand mismatch  #Sinus tachycardia  TTE 6/2022 with EF 35-40% with global LV hypokinesis.  Outside trop 183, suspect demand ischemia.    Echocardiogram with EF of 40-45%  - monitor I/O's and daily weights  - Started on metoprolol 25mg PO BID     #DM II with peripheral  neuropathy  - low dose ssi  - Hemoglobin A1c at 6.4% indicating good control          #Depression  - resume cymbalta    #Insomnia  Patient requested Ambien to help sleep at ordered for her, reduced dose due to excissive somnolence        Diet: Combination Diet Moderate Consistent Carb (60 g CHO per Meal) Diet; Mildly Thick (level 2); Pureed Diet (level 4); Mildly Thick (level 2); No Straws    DVT Prophylaxis: Pneumatic Compression Devices  Jiang Catheter: Not present  Central Lines: PRESENT        Cardiac Monitoring: ACTIVE order. Indication: Electrolyte Imbalance (24 hours)- Magnesium <1.3 mg/ml; Potassium < =2.8 or > 5.5 mg/ml  Code Status: Full Code                        Diet: Combination Diet Moderate Consistent Carb (60 g CHO per Meal) Diet; Regular Diet  Room Service    DVT Prophylaxis: Pneumatic Compression Devices and Ambulate every shift  Jiang Catheter: Not present  Central Lines: PRESENT     Cardiac Monitoring: None  Code Status: Full Code      Disposition Plan      Expected Discharge Date: 11/03/2022    Discharge Delays: Placement - TCU    Discharge Comments: COVID +        The patient's care was discussed with the Bedside Nurse and Patient.    Radha Purvis MD  Hospitalist Service  [facility]  Securely message with the Vocera Web Console (learn more here)  Text page via [employer] Paging/Directory         Clinically Significant Risk Factors        # Hypokalemia: Lowest K = 3.3 mmol/L (Ref range: 3.4-5.3) in last 2 days, will replace as needed  # Hyponatremia: Lowest Na = 132 mmol/L (Ref range: 136-145) in last 2 days, will monitor as appropriate      # Hypoalbuminemia: Lowest albumin = 1.9 g/dL (Ref range: 3.5-5.2) at 10/18/2022  5:37 PM, will monitor as appropriate   # Thrombocytopenia: Lowest platelets = 58 (Ref range: 150-450) in last 2 days, will monitor for bleeding                ______________________________________________________________________    Interval History      Patient complains of feeling weak.  Having cough.  Denies any chest pain shortness of breath or headache.    Data reviewed today: I reviewed all medications, new labs and imaging results over the last 24 hours. I personally reviewed no images or EKG's today.    Physical Exam   Vital Signs: Temp: 98  F (36.7  C) Temp src: Axillary BP: 107/60 Pulse: 94   Resp: 20 SpO2: 99 % O2 Device: None (Room air)    Weight: 167 lbs 8.79 oz  General Appearance: no distress  Respiratory: Lungs clear  Cardiovascular: Rate controlled  GI: Nontender  Skin: No rash  Extremities: No edema      Data   Recent Labs   Lab 11/01/22  1342 11/01/22  1150 11/01/22  0513 10/31/22  1607 10/31/22  0602 10/30/22  0551 10/29/22  2235 10/29/22  0541   WBC  --   --  5.2  --  3.1*  --   --  1.5*   HGB  --   --  8.5* 8.3* 6.7*  --   --  7.2*   MCV  --   --  92  --  93  --   --  90   PLT  --   --  58*  --  58*  --   --  55*   NA  --   --  134  --  132* 134  --   --    POTASSIUM 3.5  --  3.3*  --  3.7 4.2  --  4.2   CHLORIDE  --   --  100  --  100 102  --   --    CO2  --   --  22  --  23 20  --   --    BUN  --   --  15  --  19 19  --   --    CR  --   --  0.59  --  0.68 0.60  --   --    ANIONGAP  --   --  12  --  9 12  --   --    BENJI  --   --  7.8*  --  7.8* 7.7*  --   --    GLC  --  199* 129*  --  125* 121*   < >  --     < > = values in this interval not displayed.     No results found for this or any previous visit (from the past 24 hour(s)).  Medications       docusate sodium  100 mg Oral Daily     DULoxetine  30 mg Oral QAM     DULoxetine  60 mg Oral At Bedtime     [Held by provider] furosemide  40 mg Oral Daily     guaiFENesin  600 mg Oral BID     heparin  5-10 mL Intracatheter Q28 Days     heparin lock flush  5-10 mL Intracatheter Q24H     levETIRAcetam  500 mg Oral BID     [Held by provider] lisinopril  2.5 mg Oral Daily     metoprolol tartrate  25 mg Oral BID     multivitamin w/minerals  1 tablet Oral Daily     nystatin   Topical BID      oxybutynin  5 mg Oral BID     oxybutynin ER  10 mg Oral QPM     pantoprazole  40 mg Oral At Bedtime     sodium chloride (PF)  10 mL Intracatheter Q8H     sodium chloride (PF)  10-20 mL Intracatheter Q28 Days     zolpidem  2.5 mg Oral At Bedtime

## 2022-11-01 NOTE — PLAN OF CARE
Reason for Admission: SDH and metabolic encephalopathy    Cognitive/Mentation: A/Ox 4  Neuros/CMS: Intact ex weakness in lower extremities, whispering speech  VS: stable.   Tele: SR.  GI: BS active, passing flatus, no BM this shift  :incontinent.  Pulmonary: LS coarse crackles.  Pain: denies.     Drains/Lines: chest port  Skin: scattered bruising, stage 3 coccyx wound.  Activity: Assist x 2 with clyde steady or lift.  Diet: mod carb with thin liquids. Takes pills whole.     Therapies recs:TCU   Discharge: pending    Aggression Stoplight Tool: green     End of shift summary: Patient received 1unit PRBC for low hemoglobin on days yesterday. Recheck came back within limits. No purewick due to redness in groin area. Potassium replaced at 0630.

## 2022-11-01 NOTE — PROVIDER NOTIFICATION
"Paged Dr. Purvis, \"pt has having frequent loose stools since last night. Do you want to check for c.diff? if not can she get antidiarrheal meds \"  "

## 2022-11-01 NOTE — PLAN OF CARE
Goal Outcome Evaluation:    A/o x4. VSS. RA. Tele Sinus tach. Speech soft whispers. Neuros unchanged. Tolerating diet, tray set up. Dry cough/ productive at times. Robitussin given x1. Turn and repo q2. Wound care dressing CDI. HGB recheck WDL after blood transfusion this morning. Plan for TCU when medically ready and placement found. Care Coordinator/Social Work consults placed today by provider.

## 2022-11-01 NOTE — PROGRESS NOTES
I stopped and visited with Elenita today.     She was admitted after a fall with difficulty in gait and balance was found to have traumatic subdural hemorrhage.  Also positive for COVID.     Previously extensively treated for metastatic breast cancer most recent treatment was [facility].  Currently treatment is on hold.      No new recommendations. CBC today is relatively stable. Will go to TCU after Covid restrictions completed. Her follow up with Dr. Varma will be canceled for this week. Cancer therapy will be on hold while at TCU. Will anticipate follow up with Dr. Varma after TCU. If her performance status improves, could consider resumption of chemotherapy.      Sridhar DALTON, CNP  [facility]  [phone] (office) or [phone] (cell)

## 2022-11-01 NOTE — PROGRESS NOTES
[facility] Inpatient     WOC Nurse BRIEF Note (Detailed Note to Follow)    Reason for Visit Follow up sacral wound and IAD    Patient's chart: Evaluated  Focused assessment: Completed  Plan of care: Below, also entered in Nursing Orders (with supply order numbers)   Supplies:  Request sent to [facility],  order numbers also provided in Nursing orders       Sacral 11/1/2022    Buttocks/perianal 11/1/2022    Sacral wound care for RN: Daily and PRN   1. Cleanse wound with  MicroKlenz #281194  2. Surrounding skin: Dry completely, then apply and protect with cavalon no sting barrier wipe #161129 or lollypop #259376 (allow to dry)  3. Use a cotton tip applicator or tongue depressor to apply:Plurogel to wound (important to gently fill open area (undermining) under the skin of the out aspect of wound  4. Cover with:  silicone foam dressing Mepilex  x4 #318867.    Buttocks, perianal, perineal, wound groin  1.  Cleanse with incontinent cleanser (Fiorella spray # 611563) or Gaurang comfort shield wipes (wipes preferred)   2. Groin, perineal apply antifungal cream  3. Buttocks and perianal apply (Critic Aid WHITE thick paste #27803)  after each each incontinent episode  4. Avoid briefs and diapers, use air-permiable underpads, minimize layers under the patient so patient can benefit from low air loss mattress  4. If MD orders dignishild containment device, please continue to provide above care around the device    Pressure Injury prevention (RN-please order supplies if not in room)  1. Turn every 2 hours, side to side avoid supine on low air loss (pulsate) mattress  2.   Float heels off bed with use of pillows under legs, if ineffective, order offloading boots: Heel Lift boots (Prevalon #677328)  4.   Prevent sliding and shear by limiting HOB to 30 degrees or less unless contraindicated, use knee gatch first if not contraindicated  5.   If sitting, use pressure redistribution chair cushion (#608017)if unable to  shift weight every hour, please limit sitting to an hour at a time  6.   Optimize nutrition     Josefa Valadez MS RN CWOCN    Dept. Pager: [phone]  Dept. Office Number: [phone]

## 2022-11-01 NOTE — PROGRESS NOTES
[facility]  WOC Nurse Inpatient Wound Assessment     Reason for visit: Follow up: Sacral CAPI,  Rt buttocks wounds, new IAD      Assessment  Sacral PI:(prolonged sitting according to EMR) Basically uncharged from last WOC Visit despite recent bedside debridement 10/26/2022,  Predominantly eschar. Dimensions and undermining persists without change  from last WOC Nurse visit however,according to chart review and serial photos has had steady deterioration since at least July. No local signs of infection but can't rule out osteo given duration of wound and overall patient condition  Right buttock wounds: Deteriorating unknown etiology, suspicious of herpetic lesions due known risk factors, shape, distribution pattern:(multiple shallow lesions unilateral/ dermatomal)-there have multiplied in number since WOC assessment and photo 10/19/2022 with 2 clustered blisters 0.25 cm   - MD notified via EPIC sticky  Perianal, perineal, groin: Significant incontinence associated dermatitis (IAD) and satellite lesions consistant with candidiasis. Patient has multiple watery stools   Treatment Plan  Sacral wound care for RN: Daily and PRN -Rationale: to promote autolysis as long as she is making WBC, has not local signs on infection, and remains full code  1. Cleanse wound with  MicroKlenz #405388  2. Surrounding skin: Dry completely, then apply and protect with cavalon no sting barrier wipe #299361 or lollypop #785205 (allow to dry)  3. Use a cotton tip applicator or tongue depressor to apply:Plurogel to wound (important to gently fill open area (undermining) under the skin of the out aspect of wound  4. Cover with:  silicone foam dressing Mepilex 4 x4 #[id number].    Buttocks, perianal, perineal, wound groin  1.  Cleanse with incontinent cleanser (Fiorella spray # 001005) or Gaurang comfort shield wipes (wipes preferred)   2. Groin, perineal apply antifungal cream  3. Buttocks and perianal apply (Critic Aid WHITE  thick paste #55872)  after each each incontinent episode  4. Avoid briefs and diapers, use air-permiable underpads, minimize layers under the patient so patient can benefit from low air loss mattress  5. If MD orders dignishild containment device, please continue to provide above care around the device    Pressure Injury prevention (RN-please order supplies if not in room)  1. Turn every 2 hours, side to side avoid supine on low air loss (pulsate) mattress  2.   Float heels off bed with use of pillows under legs, if ineffective, order offloading boots: Heel Lift boots (Prevalon #322193)  3.   Prevent sliding and shear by limiting HOB to 30 degrees or less unless contraindicated, use knee gatch first if not contraindicated  4.  If sitting, use pressure redistribution chair cushion (#155501); if unable to shift weight every hour, please limit sitting to an hour at a time  5.   Optimize nutrition     Orders In Epic  Recommended provider order: Evaluate for possible fecal containment device, consider ruling out HSV/HZV   WOC Nurse follow-up plan: weekly  Nursing to notify the Provider(s) and re-consult the WOC Nurse if wound(s) deteriorates or new skin concern.  Patient History  According to provider note(s): 67 year old female who was admitted on 10/18/2022. With Acute metabolic encephalopathy 2/2 and Suspected traumatic SDH from fall (according to  10 days prior to admission. TCU pending. Also positive for COVID. Previously extensively treated for metastatic breast cancer most recent treatment was Enhertu.  Currently treatment is on hold.  Cancer therapy will be on hold while at TCU. If her performance status improves, could consider resumption of chemotherapy.  Objective Data  Containment of urine/stool: air permeable SAP pads    Active Diet Order:  Orders Placed This Encounter      Combination Diet Moderate Consistent Carb (60 g CHO per Meal) Diet; Regular Diet    Output:   I/O last 3 completed shifts:  In:  240 [P.O.:240]  Out: 200 [Urine:200]    WOC Nurse Risk Assessment:   Sensory Perception: 3-->slightly limited, diabetic LE neuropathy  Moisture: 1-->very moist due to diarrhea  Activity: 1-->Bedfast  Mobility: 2-->very limited  Nutrition: 1-->inadequate  (malnutrition per RD)  Friction and Shear: 2-->potential problem  Zhao Score: 11                          Labs:   Recent Labs   Lab 11/01/22  0513   HGB 8.5*   WBC 5.2       Physical Exam  Focused Skin inspection: Sacrum, coccyx,buttocks, perianal, perianal, groin    Summary: Basically uncharged from last WOC Visit despite recent bedside debridement 10/26/2022,  Predominantly eschar. Dimensions and undermining persists without change  from last WOC Nurse visit however,according to chart review and serial photos has had steady deterioration since at least July. No local signs of infection but can't rule out osteo given duration of wound and overall patient condition    #1  Wound Location:sacrum   photo: 10-25-22     11/1/2022                              Measurements (length x width x depth, in cm):  4.0cm  x 5cm  x 1cm   Wound Base: Predominantly eschar. 1 cm round yellow adherent nonviable tissue at 6 o'clock  Tunneling: NA  Undermining: Up to 1-2 cm circumferentially   Palpation of the wound bed: firm  Periwound skin:Intact, redness has decreased in tone and remains blanchable   Temperature: normal similar intact buttocks  Drainage: none-completely dry  Odor: none  Pain:moderate to palpation. Slow gentle cares utilized    #2: Wound locations:  Rt buttock   WOC photo: 10/19/22     11/1/2022     Right buttock wounds: Deteriorating unknown etiology, suspicious of herpetic lesions due known risk factors, shape, distribution pattern:(multiple shallow lesions unilateral/ dermatomal)-there have multiplied in number since WOC assessment and photo 10/19/2022 with 2 clustered blisters 0.25 cm   - MD notified via EPIC sticky     Perianal, perineal, groin: Significant  incontinence associated dermatitis (IAD) and satellite lesions consistant with candidiasis. Patient has multiple watery stools     Interventions  Current support surface: Pulsate mattress  Current off-loading measures: Pillow and pulsate mattress  Visual inspection of wound(s) completed  Wound Care: completed  Supplies:  Pt room and supply room   Education provided: Updated on wound condition to RN and patient  Discussed plan of care with pt and nursing    Josefa Valadez MS, RN CWOCN    Dept. Pager: [phone]  Dept. Office Number: [phone]

## 2022-11-02 NOTE — PROGRESS NOTES
[facility]    Medicine Progress Note - Hospitalist Service    Date of Admission:  10/18/2022    Assessment & Plan   Elenita Moran is a 67 year old female who was admitted on 10/18/2022.    #Acute metabolic encephalopathy 2/2   #Suspected traumatic SDH   reports increased difficulty with gait/balance since fall 10 days prior, in past 48 hours increasingly confused with difficulty with speech and essentially non-verbal morning of presentation.  Possibly due to subdural fluid collection vs marked electrolyte abnormalities (uremia, hyponatremia, hyperkalemia) vs intracranial mets vs infection. Outside CT shows left holohemispheric subdural fluid collection with 2 mm midline shift.   reports fall on Oct 8 (unknown if any head trauma).    plan  - Neurology, NSGY, Neurocritical care consulted - expectant management, no surgical interventions at this time  - started on Keppra 500mg BID  - goal SBP <160 now , head of bed 30 degrees  - goal platelets of > 50, conditional orders placed, received multiple transfusions for platelets and blood  - On moderate carb controlled diet per Speech path   - Pt more alert and awake now after reduction of her ambien    #acute on Chronic anemia  Baseline hgb about 8 g/dL.   -S/p transfusion of 1 unit 10/31, hemoglobin stable today, recheck tomorrow  - labs for reversible causes including iron deficiency, b12, folate         #Mild covid 19 infection  No hypoxemia  - completed 3 days of ppx remdesivir  - off special precautions 10/30    Persistent cough  Chest x-ray ordered today shows left lower lobe infiltrate  Will treat with oral Levaquin for 5 days    #Thrombocytopenia  #Abnormal coags  Admission platelet 38, INR 1.27 and PTT 38.  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   reports this has been increasing in size over past week as she has spent more time in her chair.    - Admission UA with >182 WBC, leukocyte esterase.  - Outside CXR with mild left basilar opacity  -procalcitonin of 8, unclear significance in the setting of significant renal dysfunction  - s/p empiric zosyn  - S/p bedside debridement by surgery     Add Valtrex for treatment for herpetic lesions     #Hyponatremia  #Hyperkalemia improved now with mild hypokalemia with potassium of 3.1  #Uremia  #Anion gap metabolic acidosis  #KATHLEEN  Cr as low as 0.8 in June 2022, on most recent check Sept 2022 was 1.13.  Admission Na 127, K 7.2, bicarb 16 (gap down to 14), .   reporting minimal oral intake over past 2 days.  Edema on exam, but suspect intravascularly dry with hypoalbuminemia and poor oral intake.    shifted potassium with insulin overnight  - repeat bmps daily  - Nephrology consult placed on admission and they are following.  Appreciate assistance  - Creatinine normalized now      #Stage IV metastatic breast cancer (mets to brain, liver, bone) s/p mastectomy  Elevated LFT's  Followed by Dr. Varma of [facility].  Currently on immunotherapy and receiving radiation to spine (completed brain radiation for cerebellar mets).  Recent baseline LFT's unknown, suspect due to liver mets.  No signs of tenderness on exam.    - [facility] consulted. Appreciate assistance   - After TCU recovery out pt follow up with Oncology recommended      #HFrEF due to doxorubicin  #Type 2 MI due to supply demand mismatch  #Sinus tachycardia  TTE 6/2022 with EF 35-40% with global LV hypokinesis.  Outside trop 183,  suspect demand ischemia.    Echocardiogram with EF of 40-45%  - monitor I/O's and daily weights  - Started on metoprolol 25mg PO BID     #DM II with peripheral neuropathy  - low dose ssi  - Hemoglobin A1c at 6.4% indicating good control          #Depression  - resume cymbalta    #Insomnia  Patient requested Ambien to help sleep at ordered for her, reduced dose due to excissive somnolence        Diet: Combination Diet Moderate Consistent Carb (60 g CHO per Meal) Diet; Mildly Thick (level 2); Pureed Diet (level 4); Mildly Thick (level 2); No Straws    DVT Prophylaxis: Pneumatic Compression Devices  Jiang Catheter: Not present  Central Lines: PRESENT        Cardiac Monitoring: ACTIVE order. Indication: Electrolyte Imbalance (24 hours)- Magnesium <1.3 mg/ml; Potassium < =2.8 or > 5.5 mg/ml  Code Status: Full Code                        Diet: Combination Diet Moderate Consistent Carb (60 g CHO per Meal) Diet; Regular Diet  Room Service    DVT Prophylaxis: Pneumatic Compression Devices and Ambulate every shift  Jiang Catheter: Not present  Central Lines: PRESENT       Cardiac Monitoring: None  Code Status: Full Code      Disposition Plan      Expected Discharge Date: 11/03/2022    Discharge Delays: Placement - TCU    Discharge Comments: COVID +        The patient's care was discussed with the Bedside Nurse and Patient.    Radha Purvis MD  Hospitalist Service  [facility]  Securely message with the Vocera Web Console (learn more here)  Text page via [employer] Paging/Directory         Clinically Significant Risk Factors        # Hypokalemia: Lowest K = 3.3 mmol/L (Ref range: 3.4-5.3) in last 2 days, will replace as needed  # Hyponatremia: Lowest Na = 128 mmol/L (Ref range: 136-145) in last 2 days, will monitor as appropriate      # Hypoalbuminemia: Lowest albumin = 1.9 g/dL (Ref range: 3.5-5.2) at 10/18/2022  5:37 PM, will monitor as appropriate   # Thrombocytopenia: Lowest platelets = 58 (Ref  range: 150-450) in last 2 days, will monitor for bleeding                ______________________________________________________________________    Interval History   Complaining of increasing cough.  States she is having a lot of yellow phlegm.  Denies any headache or sinus pressure.    Wound care nurse has noted worsening of the gluteal rash with herpetic lesions noted.    Data reviewed today: I reviewed all medications, new labs and imaging results over the last 24 hours. I personally reviewed no images or EKG's today.    Physical Exam   Vital Signs: Temp: 98.5  F (36.9  C) Temp src: Oral BP: 99/59 Pulse: 95   Resp: 18 SpO2: 97 % O2 Device: None (Room air)    Weight: 167 lbs 8.79 oz  General Appearance: no distress  Respiratory: Lungs clear  Cardiovascular: Rate controlled  GI: Nontender  Skin: No rash  Extremities: No edema      Data   Recent Labs   Lab 11/02/22  1336 11/01/22  2122 11/01/22  1712 11/01/22  1342 11/01/22  1150 11/01/22  0513 10/31/22  1607 10/31/22  0602   WBC 5.6  --   --   --   --  5.2  --  3.1*   HGB 8.4*  --   --   --   --  8.5* 8.3* 6.7*   MCV 91  --   --   --   --  92  --  93   PLT 58*  --   --   --   --  58*  --  58*   *  --   --   --   --  134  --  132*   POTASSIUM 3.5  --   --  3.5  --  3.3*  --  3.7   CHLORIDE 100  --   --   --   --  100  --  100   CO2 23  --   --   --   --  22  --  23   BUN 13  --   --   --   --  15  --  19   CR 0.58  --   --   --   --  0.59  --  0.68   ANIONGAP 5  --   --   --   --  12  --  9   BENJI 8.0*  --   --   --   --  7.8*  --  7.8*   * 219* 129*  --    < > 129*  --  125*    < > = values in this interval not displayed.     Recent Results (from the past 24 hour(s))   XR Chest 2 Views    Narrative    CHEST TWO VIEWS 11/2/2022 12:18 PM     HISTORY: Cough.    COMPARISON: None.       Impression    IMPRESSION: Left lower lobe infiltrate. Remaining lungs clear of  infiltrate. The cardiac silhouette is not enlarged. Pulmonary  vasculature is unremarkable.       Medications       alteplase  1 mg Intravenous Once     docusate sodium  100 mg Oral Daily     DULoxetine  30 mg Oral QAM     DULoxetine  60 mg Oral At Bedtime     [Held by provider] furosemide  40 mg Oral Daily     guaiFENesin  600 mg Oral BID     heparin  5-10 mL Intracatheter Q28 Days     heparin lock flush  5-10 mL Intracatheter Q24H     levETIRAcetam  500 mg Oral BID     [Held by provider] lisinopril  2.5 mg Oral Daily     metoprolol tartrate  25 mg Oral BID     multivitamin w/minerals  1 tablet Oral Daily     nystatin   Topical BID     oxybutynin  5 mg Oral BID     oxybutynin ER  10 mg Oral QPM     pantoprazole  40 mg Oral At Bedtime     sodium chloride (PF)  10-20 mL Intracatheter Q28 Days     valACYclovir  1,000 mg Oral TID     zolpidem  2.5 mg Oral At Bedtime

## 2022-11-02 NOTE — PROGRESS NOTES
[facility] chart check     She was admitted after a fall with difficulty in gait and balance was found to have traumatic subdural hemorrhage.  Also positive for COVID.     Previously extensively treated for metastatic breast cancer most recent treatment was Enhertu.  Currently treatment is on hold.      No new recommendations. CBC today pending. Will go to TCU after Covid restrictions completed. Her follow up with Dr. Varma will be canceled for this week. Cancer therapy will be on hold while at TCU. Will anticipate follow up with Dr. Varma after TCU. If her performance status improves, could consider resumption of chemotherapy.      Patrick Dreyer, DO   [facility]

## 2022-11-02 NOTE — PLAN OF CARE
Goal Outcome Evaluation:      Plan of Care Reviewed With: patient    Overall Patient Progress: no changeOverall Patient Progress: no change       Reason for Admission: SHD, metabolic encephalopathy     Cognitive/Mentation: A&O x4  Neuros/CMS: Intact ex generalized weakness, BUE 4/5, BLE 2-3/5, whispers  VS: Stable on RA   Tele: NSR  GI: BS active, + flatus. Incontinent.  : Voiding. Incontinent.  Pulmonary: LS clear.  Pain: Denies     Drains/Lines: L port HL, needle and dressing changed  Skin: Stage 4 Wound on coccyx packed and covered with mepilex, open sores below this on sacrum also covered with mepilex, perineum excoriated--barrier cream applied and no brief in bed  Activity: Assist x2 Sejal Steady and GB  Diet: MCHO with thin liquids. Takes pills whole one at a time.      Therapies recs: TCU  Discharge: today or tomorrow     Aggression Stoplight Tool: Green     End of shift summary: No acute changes overnight. C. Diff negative. Vascular access consult requested, no blood return. Unable to draw labs.

## 2022-11-02 NOTE — DISCHARGE INSTRUCTIONS
Sacral wound care for RN: Daily and PRN -Rationale: to promote autolysis as long as she is making WBC, has not local signs on infection, and remains full code  Cleanse wound with  wound cleanser  Surrounding skin: Dry completely, then apply and protect with no sting barrier wipe   Use a cotton tip applicator or tongue depressor to apply: hydrogel (eg)Plurogel to wound (important to gently fill open area (undermining) under the skin of the out aspect of wound  Cover with:  silicone foam dressing     Buttocks, perianal, perineal, wound groin  1.  Cleanse with incontinent cleanser (eg Fiorella spray )   2. Groin, perineal apply antifungal cream  3. Buttocks and perianal apply durable barrier ointment or paste (eg Critic Aid WHITE thick paste)  after each each incontinent episode  4. Avoid briefs and diapers, use air-permiable underpads, minimize layers under the patient so patient can benefit from low air loss mattress  5. If MD orders dignishild containment device, please continue to provide above care around the device    Pressure Injury prevention   Turn every 2 hours, side to side avoid supine on low air loss (e.g. pulsate) mattress  2.   Float heels off bed with use of pillows under legs, if ineffective, order offloading boots: Heel Lift boots   3.   Prevent sliding and shear by limiting HOB to 30 degrees or less unless contraindicated, use knee gatch first if not contraindicated  4.  If sitting, use pressure redistribution chair cushion; if unable to shift weight every hour, please limit sitting to an hour at a time  5.   Optimize nutrition

## 2022-11-02 NOTE — PLAN OF CARE
Goal Outcome Evaluation:      Plan of Care Reviewed With: patient    Overall Patient Progress: no changeOverall Patient Progress: no change         Cognitive/Mentation: A/Ox 4  Neuros/CMS: Intact ex generalized weakness, BLE 3/5  VS: stable ex one soft BP--positional-improved upon recheck.   GI: BS active, + flatus, last BM today--very frequent large loose stools throughout the day--rectal tube ordered however stools improving--held off placing at this time. Incontinent.  : Voiding. Incontinent.  Pulmonary: LS clear/diminished, coarse cough--productive with phlegm. Robitussin given  Pain: buttocks pain--improved with repositioning.     Drains/Lines: Port  Skin: buttocks would, redness/excoriation/rash on lindsay area--miconazole cream applied. WOC RN following  Activity: Assist x 1-2 with clyde MARSHALL.  Diet: MCHO with thin liquids. Takes pills whole one at a time.     Therapies recs: TCU  Discharge: tomorrow vs Thursday    Aggression Stoplight Tool: green    End of shift summary: stool sample pending.

## 2022-11-02 NOTE — PLAN OF CARE
Goal Outcome Evaluation:      Plan of Care Reviewed With: patient    Overall Patient Progress: no changeOverall Patient Progress: no change       Cognitive/Mentation: A/Ox 4  Neuros/CMS: Intact ex generalized weakness, BLE 3/5  VS: stable softer BP after metoprolol then improves.   GI: BS active, + flatus, last BM today. Incontinent.  : Voiding. Incontinent.  Pulmonary: LS clear/diminished, coarse cough--productive with phlegm. Robitussin given x2  Pain: buttocks pain--dilaudid given x1. Declined tylenol. Repositioning.      Drains/Lines: Port--alteplase given to restore blood return  Skin: buttocks would, redness/excoriation/rash on lindsay area--miconazole cream applied. WOC RN following  Activity: Assist x 1-2 with clyde MARSHALL.  Diet: MCHO with thin liquids. Takes pills whole one at a time.      Therapies recs: TCU  Discharge: pending     Aggression Stoplight Tool: green     End of shift summary: Chest x-ray done. Antibiotics started

## 2022-11-03 NOTE — PROGRESS NOTES
General Surgery    WOC RN concerned about eschar overlying chronic stage IV sacral decubitus ulcer.  This was debrided at bedside 10/26.  Discussed with Dr. Solorio. Plan for I&D in OR tomorrow.     Of note- recent recovery from COVID and h/o MSSA infection of pelvis on chronic suppressive antibiotics.     Abby Johnson PA-C

## 2022-11-03 NOTE — PROGRESS NOTES
A/O  X 4, vss, a-febrile, c/o generalized pain, tylenol helping, lungs are diminished, patient has frequent productive cough, patient has generalized upper and lower extremity weakness 3/5, PT following, up with A2 and clyde steady, baseline numbness and tingling bilateral lower extremities, tele SR, h/o breast CA with mets to the bones and brain, chemo therapy on hold pending clinical improvement.

## 2022-11-03 NOTE — PROGRESS NOTES
Chart Check:    No new recommendations. Please reach out if we can be of assistance going forward. The plan will remain for Elenita to follow up with Dr. Varma after TCU. All chemotherapy will remain on hold while she is working on improving her overall health status.     Geovany DALTON, [facility]  [phone] (office) or [phone] (cell)

## 2022-11-03 NOTE — PLAN OF CARE
Goal Outcome Evaluation:      Plan of Care Reviewed With: patient    Overall Patient Progress: no changeOverall Patient Progress: no change    Outcome Evaluation: Continues to eat only 25-50% of meals, intermittent use of ONS at bedside. Encouraged oral intake with small, frequent meals/snacks.    Anali Frank RD, LD

## 2022-11-03 NOTE — PROGRESS NOTES
Goal Outcome Evaluation:       Plan of Care Reviewed With: patient     Overall Patient Progress: no change  Overall Patient Progress: no change     Cognitive/Mentation: A/Ox 4  Neuros/CMS: Intact ex generalized weakness, BLE 3/5  VS: VSS on RA, has had softer blood pressures after taking scheduled metoprolol per report.   GI: BS active, + flatus, last BM today. Incontinent.  : Voiding. Incontinent. No brief in bed.  Pulmonary: LS clear/diminished, coarse cough--productive with phlegm.  Pain: buttocks pain-- PO dilaudid given x1 for 8/10 pain, improved. Declined tylenol. Repositioning.      Drains/Lines: Port,  Heparin locked.  Skin: buttocks would, redness/excoriation/rash on lindsay area--miconazole cream applied. WOC RN following. Reposition side to side only with no brief in bed. New mepilex applied to coccyx.   Activity: Assist x 2 with clyde MARSHALL.  Diet: MCHO with thin liquids. Takes pills whole one at a time.      Therapies recs: TCU  Discharge: pending     Aggression Stoplight Tool: green     End of shift summary: No acute changes overnight.

## 2022-11-03 NOTE — PROGRESS NOTES
[facility]    Medicine Progress Note - Hospitalist Service    Date of Admission:  10/18/2022    Assessment & Plan   Elenita Moran is a 67 year old female who was admitted on 10/18/2022.    #Acute metabolic encephalopathy 2/2   #Suspected traumatic SDH   reports increased difficulty with gait/balance since fall 10 days prior, in past 48 hours increasingly confused with difficulty with speech and essentially non-verbal morning of presentation.  Possibly due to subdural fluid collection vs marked electrolyte abnormalities (uremia, hyponatremia, hyperkalemia) vs intracranial mets vs infection. Outside CT shows left holohemispheric subdural fluid collection with 2 mm midline shift.   reports fall on Oct 8 (unknown if any head trauma).    plan  - Neurology, NSGY, Neurocritical care consulted - expectant management, no surgical interventions at this time  - started on Keppra 500mg BID  - goal SBP <160 now , head of bed 30 degrees  - goal platelets of > 50, conditional orders placed, received multiple transfusions for platelets and blood  - On moderate carb controlled diet per Speech path   - Pt more alert and awake now after reduction of her ambien    #acute on Chronic anemia  Baseline hgb about 8 g/dL.   -S/p transfusion of 1 unit 10/31, hemoglobin stable today, recheck tomorrow  - labs for reversible causes including iron deficiency, b12, folate         #Mild covid 19 infection  No hypoxemia  - completed 3 days of ppx remdesivir  - off special precautions 10/30    Persistent cough  Cough is improving  Chest x-ray ordered yesterday showed left lower lobe infiltrate  Will treat with oral Levaquin for 5 days    #Thrombocytopenia  #Abnormal coags  Admission platelet 38, INR 1.27 and PTT 38.  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   reports this has been increasing in size over past week as she has spent more time in her chair.    - Admission UA with >182 WBC, leukocyte esterase.  - Outside CXR with mild left basilar opacity  -procalcitonin of 8, unclear significance in the setting of significant renal dysfunction  - s/p empiric zosyn  - S/p bedside debridement by surgery      Valtrex for treatment for herpetic lesions     #Hyponatremia  #Hyperkalemia improved now with mild hypokalemia with potassium of 3.1  #Uremia  #Anion gap metabolic acidosis  #KATHLEEN  Cr as low as 0.8 in June 2022, on most recent check Sept 2022 was 1.13.  Admission Na 127, K 7.2, bicarb 16 (gap down to 14), .   reporting minimal oral intake over past 2 days.  Edema on exam, but suspect intravascularly dry with hypoalbuminemia and poor oral intake.    shifted potassium with insulin overnight  - repeat bmps daily  - Nephrology consult placed on admission and they are following.  Appreciate assistance  - Creatinine normalized now      #Stage IV metastatic breast cancer (mets to brain, liver, bone) s/p mastectomy  Elevated LFT's  Followed by Dr. Varma of [facility].  Currently on immunotherapy and receiving radiation to spine (completed brain radiation for cerebellar mets).  Recent baseline LFT's unknown, suspect due to liver mets.  No signs of tenderness on exam.    [facility]. Appreciate assistance   - After TCU recovery out pt follow up with Oncology recommended      #HFrEF due to doxorubicin  #Type 2 MI due to supply demand mismatch  #Sinus tachycardia  TTE 6/2022 with EF 35-40% with global LV  hypokinesis.  Outside trop 183, suspect demand ischemia.    Echocardiogram with EF of 40-45%  - monitor I/O's and daily weights  - Started on metoprolol 25mg PO BID     #DM II with peripheral neuropathy  - low dose ssi  - Hemoglobin A1c at 6.4% indicating good control          #Depression  - resume cymbalta    #Insomnia  Patient requested Ambien to help sleep at ordered for her, reduced dose due to excissive somnolence             Diet: Combination Diet Moderate Consistent Carb (60 g CHO per Meal) Diet; Regular Diet  Room Service    DVT Prophylaxis: Pneumatic Compression Devices and Ambulate every shift  Jiang Catheter: Not present  Central Lines: PRESENT       Cardiac Monitoring: None  Code Status: Full Code      Disposition Plan      Expected Discharge Date: 11/05/2022    Discharge Delays: Placement - TCU    Discharge Comments: COVID +        The patient's care was discussed with the Bedside Nurse and Patient.    Radha Purvis MD  Hospitalist Service  [facility]  Securely message with the Vocera Web Console (learn more here)  Text page via [employer] Paging/Directory         Clinically Significant Risk Factors         # Hyponatremia: Lowest Na = 128 mmol/L (Ref range: 136-145) in last 2 days, will monitor as appropriate      # Hypoalbuminemia: Lowest albumin = 1.9 g/dL (Ref range: 3.5-5.2) at 10/18/2022  5:37 PM, will monitor as appropriate   # Thrombocytopenia: Lowest platelets = 58 (Ref range: 150-450) in last 2 days, will monitor for bleeding         # Moderate Malnutrition: based on nutrition assessment        ______________________________________________________________________    Interval History     Feeling better today.  States cough is better  Denies any dizziness.    Data reviewed today: I reviewed all medications, new labs and imaging results over the last 24 hours. I personally reviewed no images or EKG's today.    Physical Exam   Vital Signs: Temp: 97.2  F (36.2  C) Temp  src: Axillary BP: 102/57 Pulse: 96   Resp: 16 SpO2: 99 % O2 Device: None (Room air)    Weight: 167 lbs 8.79 oz  General Appearance: no distress  Respiratory: Lungs clear  Cardiovascular: Rate controlled  GI: Nontender  Skin: No rash  Extremities: No edema      Data   Recent Labs   Lab 11/02/22  1336 11/01/22  2122 11/01/22  1712 11/01/22  1342 11/01/22  1150 11/01/22  0513 10/31/22  1607 10/31/22  0602   WBC 5.6  --   --   --   --  5.2  --  3.1*   HGB 8.4*  --   --   --   --  8.5* 8.3* 6.7*   MCV 91  --   --   --   --  92  --  93   PLT 58*  --   --   --   --  58*  --  58*   *  --   --   --   --  134  --  132*   POTASSIUM 3.5  --   --  3.5  --  3.3*  --  3.7   CHLORIDE 100  --   --   --   --  100  --  100   CO2 23  --   --   --   --  22  --  23   BUN 13  --   --   --   --  15  --  19   CR 0.58  --   --   --   --  0.59  --  0.68   ANIONGAP 5  --   --   --   --  12  --  9   BENJI 8.0*  --   --   --   --  7.8*  --  7.8*   * 219* 129*  --    < > 129*  --  125*    < > = values in this interval not displayed.     No results found for this or any previous visit (from the past 24 hour(s)).  Medications       alteplase  1 mg Intravenous Once     docusate sodium  100 mg Oral Daily     DULoxetine  30 mg Oral QAM     DULoxetine  60 mg Oral At Bedtime     [Held by provider] furosemide  40 mg Oral Daily     guaiFENesin  600 mg Oral BID     heparin  5-10 mL Intracatheter Q28 Days     heparin lock flush  5-10 mL Intracatheter Q24H     levETIRAcetam  500 mg Oral BID     levofloxacin  500 mg Oral Daily     [Held by provider] lisinopril  2.5 mg Oral Daily     metoprolol tartrate  25 mg Oral BID     multivitamin w/minerals  1 tablet Oral Daily     nystatin   Topical BID     oxybutynin  5 mg Oral BID     oxybutynin ER  10 mg Oral QPM     pantoprazole  40 mg Oral At Bedtime     sodium chloride (PF)  10-20 mL Intracatheter Q28 Days     valACYclovir  1,000 mg Oral TID     zolpidem  2.5 mg Oral At Bedtime

## 2022-11-03 NOTE — PROGRESS NOTES
CLINICAL NUTRITION SERVICES - REASSESSMENT NOTE      Malnutrition: (10/31)  % Weight Loss:  None noted  % Intake:  <75% for > 7 days (moderate malnutrition)  Subcutaneous Fat Loss:  None observed  Muscle Loss:  Temporal region - mild depletion and Clavicle bone region  - mild/moderate depletion  Fluid Retention:  None noted     Malnutrition Diagnosis: Moderate malnutrition  In Context of:  Acute illness or injury       EVALUATION OF PROGRESS TOWARD GOALS   Diet:  Moderate Consistent Carb (60 grams/meal)   Room Service w/ Assist     Intake/Tolerance:    Patient continues to consume ~25-50% of her meals.   Visited patient in room this morning who states her appetite is pretty much the same. Has several Glucerna supplements at bedside and requested I put one on ice for her to sip on.   Receiving meal ordering assistance daily from nutrition associate.   Receiving daily MVI+M for micronutrients.     Recent Labs   Lab 11/02/22  1336 11/01/22  2122 11/01/22  1712 11/01/22  1150 11/01/22  0513 10/31/22  0602   * 219* 129* 199* 129* 125*         ASSESSED NUTRITION NEEDS:  Dosing Weight: 65.4 kg (adjusted, based on most recent wt this admit)  Estimated Energy Needs: 1962+ kcals (30+ Kcal/Kg)  Justification: increased needs r/t CA dx with BMI in mind  Estimated Protein Needs: 78-98 grams protein (1.2-1.5 g pro/Kg)  Justification: preservation of lean body mass and increased needs r/t CA dx  Estimated Fluid Needs: 1 mL/Kcal  Justification: maintenance OR per provider pending fluid status      NEW FINDINGS:   Chart reviewed  Discharge to TCU once placement found  Chemo on hold until after TCU stay     11/1: WOCN =  Sacral PI - unchanged from last visit   Right buttock wounds - deteriorating (suspicious for herpes lesions)    Previous Goals:   Pt to consume 75% meals consistently  Evaluation: Not met    Previous Nutrition Diagnosis:   Inadequate oral intake related to fair appetite and dislike of nutrition supplement as  evidenced by pt eating ~50% meals on average and not taking Glucerna supplement  Evaluation: No change      CURRENT NUTRITION DIAGNOSIS  Inadequate oral intake related to fair appetite as evidenced by consuming 25-50% of meals daily with intermittent use of ONS     INTERVENTIONS  Recommendations / Nutrition Prescription  MCHO diet  Continue ONS use at bedside as patient tolerates   Daily MVI+M for micronutrients     Implementation  No new interventions at this time - provided oral encouragement     Goals  Pt to consume 75% meals BID consistently at minimum       MONITORING AND EVALUATION:  Progress towards goals will be monitored and evaluated per protocol and Practice Guidelines      Anali Frank RD, LD

## 2022-11-04 NOTE — PROGRESS NOTES
[facility]  WOC Nurse Inpatient Wound Assessment     Reason for visit: Follow up: Sacral CAPI,  Rt buttocks wounds, new IAD      Assessment  Sacral PI:(prolonged sitting according to EMR) Basically uncharged from last WOC Visit despite recent bedside debridement 10/26/2022,  Predominantly eschar with undermining. Dimensions and undermining persists without change  from last WOC Nurse visit however,according to chart review and serial photos has had steady deterioration since at least July. No local signs of infection but can't rule out osteo given duration of wound and overall patient condition. Discussed with Abby from Surgery on 11/3 who will take pt to the OR 11/4.    Right buttock wounds: Deteriorating unknown etiology, suspicious of herpetic lesions due known risk factors, shape, distribution pattern:(multiple shallow lesions unilateral/ dermatomal)-there have multiplied in number since WOC assessment and photo 10/19/2022 with 2 clustered blisters 0.25 cm   - MD notified via EPIC sticky  Perianal, perineal, groin: Significant incontinence associated dermatitis (IAD) and satellite lesions consistant with candidiasis. Patient has multiple watery stools   Treatment Plan  Sacral wound care for RN: Daily and PRN -Rationale: to promote autolysis  1. Cleanse wound with  MicroKlenz #[id number]  2. Surrounding skin: Dry completely, then apply and protect with cavalon no sting barrier wipe #[id number] or lollypop #[id number] (allow to dry)  3. Use a cotton tip applicator or tongue depressor to apply:Plurogel to wound (important to gently fill open area (undermining) under the skin of the out aspect of wound  4. Cover with:  silicone foam dressing Mepilex 4 x4 #[id number].    Buttocks, perianal, perineal, wound groin  1.  Cleanse with incontinent cleanser (Fiorella spray # [id number]) or Gaurang comfort shield wipes (wipes preferred)   2. Groin, perineal apply antifungal cream  3. Buttocks and perianal apply (Critic Aid  WHITE thick paste #49822)  after each each incontinent episode  4. Avoid briefs and diapers, use air-permiable underpads, minimize layers under the patient so patient can benefit from low air loss mattress  5. If MD orders dignishild containment device, please continue to provide above care around the device    Pressure Injury prevention (RN-please order supplies if not in room)  1. Turn every 2 hours, side to side avoid supine on low air loss (pulsate) mattress  2.   Float heels off bed with use of pillows under legs, if ineffective, order offloading boots: Heel Lift boots (Prevalon #[id number])  3.   Prevent sliding and shear by limiting HOB to 30 degrees or less unless contraindicated, use knee gatch first if not contraindicated  4.  If sitting, use pressure redistribution chair cushion (#933253); if unable to shift weight every hour, please limit sitting to an hour at a time  5.   Optimize nutrition     Orders In Epic  Recommended provider order: Evaluate for possible fecal containment device, consider ruling out HSV/HZV   WOC Nurse follow-up plan: weekly  Nursing to notify the Provider(s) and re-consult the WOC Nurse if wound(s) deteriorates or new skin concern.  Patient History  According to provider note(s): 67 year old female who was admitted on 10/18/2022. With Acute metabolic encephalopathy 2/2 and Suspected traumatic SDH from fall (according to  10 days prior to admission. TCU pending. Also positive for COVID. Previously extensively treated for metastatic breast cancer most recent treatment was [facility].  Currently treatment is on hold.  Cancer therapy will be on hold while at TCU. If her performance status improves, could consider resumption of chemotherapy.  Objective Data  Containment of urine/stool: air permeable SAP pads    Active Diet Order:  Orders Placed This Encounter      Combination Diet Moderate Consistent Carb (60 g CHO per Meal) Diet; Regular Diet    Output:   I/O last 3 completed  shifts:  In: 600 [P.O.:600]  Out: -     WOC Nurse Risk Assessment:   Sensory Perception: 3-->slightly limited, diabetic LE neuropathy  Moisture: 1-->very moist due to diarrhea  Activity: 1-->Bedfast  Mobility: 2-->very limited  Nutrition: 1-->inadequate  (malnutrition per RD)  Friction and Shear: 2-->potential problem  Zhao Score: 11                          Labs:   Recent Labs   Lab 11/02/22  1336   HGB 8.4*   WBC 5.6       Physical Exam  Focused Skin inspection: Sacrum, coccyx,buttocks, perianal, perianal, groin    Summary: Basically uncharged from last WOC Visit despite recent bedside debridement 10/26/2022,  Predominantly eschar. Dimensions and undermining persists without change  from last WOC Nurse visit however,according to chart review and serial photos has had steady deterioration since at least July. No local signs of infection but can't rule out osteo given duration of wound and overall patient condition    #1  Wound Location:sacrum   photo: 10-25-22     11/1/2022        11/3 Close up photo-Sacrum    Measurements (length x width x depth, in cm):  5cm  x 5cm  x 1cm   Wound Base: Predominantly eschar. 1 cm round yellow adherent nonviable tissue at 6 o'clock  Tunneling: NA  Undermining: Up to 1-2 cm circumferentially underneath eschar  Palpation of the wound bed: firm  Periwound skin:Intact, redness has decreased in tone and remains blanchable   Temperature: normal similar intact buttocks  Drainage: none-completely dry  Odor: none  Pain:moderate to palpation. Slow gentle cares utilized    #2: Wound locations:  Rt buttock   WOC photo: 10/19/22     11/1/2022         11/3/22    Right buttock wounds: Deteriorating unknown etiology, suspicious of herpetic lesions due known risk factors, shape, distribution pattern:(multiple shallow lesions unilateral/ dermatomal)-there have multiplied in number since WOC assessment and photo 10/19/2022 with 2 clustered blisters 0.25 cm   - MD notified via EPIC laila      Perianal, perineal, groin: Significant incontinence associated dermatitis (IAD) and satellite lesions consistant with candidiasis. Patient has multiple watery stools     Interventions  Current support surface: Pulsate mattress  Current off-loading measures: Pillow and pulsate mattress  Visual inspection of wound(s) completed  Wound Care: completed  Supplies:  Pt room and supply room   Education provided: Updated on wound condition to RN and patient  Discussed plan of care with pt and nursing    Priyank Puga RN CWOCN  Dept. Pager: [phone]  Dept. Office Number: [phone]

## 2022-11-04 NOTE — PLAN OF CARE
Goal Outcome Evaluation:      Plan of Care Reviewed With: patient    Overall Patient Progress: no changeOverall Patient Progress: no change    Reason for Admission: L SDH & Encephalopathy     Cognitive/Mentation: A/Ox 4. Forgetful at times.   Neuros/CMS: Intact ex generalized weakness with BLE 3/5 strength.   VS: VSS.   GI: BS active, + flatus, last BM 11/2/2022. Incontinent.  : Purwick in place. Incontinent.  Pulmonary: LS diminished. Frequent cough- PRN Robitussin and scheduled Mucinex given.  Pain: None.      Drains/Lines: L chest port heparin locked.  Skin: BLE 2+ edema. Sacral wound Stage 4- I&D procedure completed.  Activity: Assist x 2 with Sejal steady. T/R q2h.  Diet: Regular diet with thin liquids. Takes pills whole.      Therapies recs: TCU  Discharge: Pending     Aggression Stoplight Tool: GREEN     End of shift summary: Frequent cough- PRN Robitussin and scheduled Mucinex given. Pt had I&D of Stage 4 sacral wound completed.

## 2022-11-04 NOTE — PROGRESS NOTES
[facility]    General Surgery  Short Progress Note    Patient underwent excisional debridement of necrotic tissue from decubiti ulcer in the sacrum. No complications. Wound cares as below until evaluation by [facility] nurse on Monday. General Surgery will sign off, call if questions or concerns.    Sacral wound care for RN: Daily and PRN   1. Cleanse wound with  MicroKlenz #[id number]   2. Surrounding skin: Dry completely, then apply and protect with cavalon no sting barrier wipe #[id number] or lollypop #[id number] (allow to dry)   3. Use a cotton tip applicator to pack inferior wound with Vashe moistened gauze and lay rest of Vashe moistened gauze over wound bed  4. Cover with silicone foam dressing Mepilex  x4 #[id number].    Mulu Gonzalez PA-C

## 2022-11-04 NOTE — PLAN OF CARE
Goal Outcome Evaluation:       Alert, oriented times 4. Neuro's with generalized weakness. VSS. Tele ST. Speaks softly. Patient had I&D this morning for sore on coccyx. Dilaudid given for pain. Incontinent of bladder, purewick in place, changed. Turned and repositioned. On a mod carb diet, appetite fair.

## 2022-11-04 NOTE — BRIEF OP NOTE
[facility]    Brief Operative Note    Pre-operative diagnosis: Stage III pressure ulcer of sacral region (H) [L89.153]  Post-operative diagnosis Same as pre-operative diagnosis    Procedure: Procedure(s):  INCISION AND DRAINAGE, SACRUM  Surgeon: Surgeon(s) and Role:     * Burak Solorio MD - Primary     * Mulu Gonzalez PA-C - Assisting  Anesthesia: MAC   Estimated Blood Loss: minimal, see Op Note    Drains: None  Specimens: * No specimens in log *  Findings:   Sharply debrided necrotic wound base with scalpel. No purulence to inferior tunneling. Placed Vashe moistened gauze in wound.  Complications: None.  Implants: * No implants in log *

## 2022-11-04 NOTE — ANESTHESIA PREPROCEDURE EVALUATION
Anesthesia Pre-Procedure Evaluation    Patient: Elenita Moran   MRN: [id number] : 1955        Procedure : Procedure(s):  INCISION AND DRAINAGE, SACRUM          Past Medical History:   Diagnosis Date     Allergic rhinitis      Bone cancer (H) 2011    breast mets     Breast cancer (H) 2010    left mastectomy     Depression      DM (diabetes mellitus) (H)      Fibromyalgia      Hx antineoplastic chemotherapy 2010     Hx of radiation therapy 2010     Hyperlipemia      Lactose intolerance      Mini stroke     R EYE     Osteoarthritis      Tobacco dependency       Past Surgical History:   Procedure Laterality Date     APPENDECTOMY       INSERT INTRACORONARY STENT  1985    R Hand     IR CHEST PORT PLACEMENT > 5 YRS OF AGE  2019     IR CHEST PORT PLACEMENT > 5 YRS OF AGE  3/14/2022     IR LUMBAR TRANSFORAMINAL EPIDURAL STEROID INJECTION  2019     IR LUMBAR TRANSFORAMINAL EPIDURAL STRD INJ  2019     IR PORT PLACEMENT >5 YEARS  2019     IR PORT REMOVAL RIGHT  3/14/2022     IR SITE CHECK/EVALUATION  4/15/2022     MAMMOPLASTY REDUCTION Right     hx of left mastectomy and right breast reduction     MASTECTOMY Left      OTHER SURGICAL HISTORY      biopsy of upper and right tongue     OVARIAN CYST REMOVAL       REVISE RECONSTRUCTED BREAST Left     2015      Allergies   Allergen Reactions     Gabapentin      Upper body edema/swelling.         Morphine Visual Disturbance     Visual hallucinations     Sulfamethoxazole-Trimethoprim      Other reaction(s): Hives     Sulfa Drugs Hives and Rash     welts        Social History     Tobacco Use     Smoking status: Former     Packs/day: 0.75     Years: 20.00     Pack years: 15.00     Types: Cigarettes     Smokeless tobacco: Never   Substance Use Topics     Alcohol use: Not Currently     Alcohol/week: 1.0 standard drink     Types: 1 Standard drinks or equivalent per week     Comment: 1 glass of wine/week      Wt Readings from Last 1  Encounters:   10/26/22 76 kg (167 lb 8.8 oz)        Anesthesia Evaluation   Pt has had prior anesthetic.     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 Obesity,     Psychiatric/Substance Use:     (+) psychiatric history depression     Infectious Disease:       Malignancy: Comment: Stage IV metastatic breast cancer (mets to brain, liver, bone) s/p mastectomy    Admitted on 10/18/2022. With Acute metabolic encephalopathy 2/2 and Suspected traumatic SDH from fall (according to  10 days prior to admission. TCU pending. Also positive for COVID. Previously extensively treated for metastatic breast cancer most recent treatment was [facility].  Currently treatment is on hold.  Cancer therapy will be on hold while at TCU. If her performance status improves, could consider resumption of chemotherapy  (+) Malignancy, History of Breast.    Other:            Physical Exam    Airway        Mallampati: II   TM distance: > 3 FB   Neck ROM: full   Mouth opening: > 3 cm    Respiratory Devices and Support         Dental           Cardiovascular   cardiovascular exam normal          Pulmonary       Comment: crackles    (+) decreased breath sounds           OUTSIDE LABS:  CBC:   Lab Results   Component Value Date    WBC 5.6 11/02/2022    WBC 5.2 11/01/2022    HGB 8.4 (L) 11/02/2022    HGB 8.5 (L) 11/01/2022    HCT 24.0 (L) 11/02/2022    HCT 25.2 (L)  11/01/2022    PLT 58 (L) 11/02/2022    PLT 58 (L) 11/01/2022     BMP:   Lab Results   Component Value Date     (L) 11/04/2022     (L) 11/02/2022    POTASSIUM 3.9 11/04/2022    POTASSIUM 3.5 11/02/2022    CHLORIDE 99 11/04/2022    CHLORIDE 100 11/02/2022    CO2 20 11/04/2022    CO2 23 11/02/2022    BUN 16 11/04/2022    BUN 13 11/02/2022    CR 0.57 11/04/2022    CR 0.58 11/02/2022    GLC 95 11/04/2022     (H) 11/04/2022     COAGS:   Lab Results   Component Value Date    PTT 38 10/18/2022    INR 1.27 (H) 10/18/2022     POC: No results found for: BGM, HCG, HCGS  HEPATIC:   Lab Results   Component Value Date    ALBUMIN 2.0 (L) 10/19/2022    PROTTOTAL 5.2 (L) 10/19/2022    ALT 86 (H) 10/19/2022     (H) 10/19/2022    ALKPHOS 437 (H) 10/19/2022    BILITOTAL 0.8 10/19/2022    NEAL 25 07/16/2021     OTHER:   Lab Results   Component Value Date    LACT 1.2 10/21/2022    A1C 6.4 (H) 10/18/2022    BENJI 7.8 (L) 11/04/2022    PHOS 2.7 11/02/2022    MAG 2.4 (H) 10/19/2022    LIPASE 30 09/16/2019    TSH 1.19 11/18/2020    CRP 9.4 (H) 02/12/2022    SED 63 (H) 12/03/2019       Anesthesia Plan    ASA Status:  4   NPO Status:  NPO Appropriate    Anesthesia Type: MAC.     - Reason for MAC: immobility needed, straight local not clinically adequate              Consents    Anesthesia Plan(s) and associated risks, benefits, and realistic alternatives discussed. Questions answered and patient/representative(s) expressed understanding.    - Discussed:     - Discussed with:  Patient      - Extended Intubation/Ventilatory Support Discussed: No.      - Patient is DNR/DNI Status: No    Use of blood products discussed: Yes.     - Discussed with: Patient.     - Consented: consented to blood products            Reason for refusal: other.     Postoperative Care    Pain management: IV analgesics, Oral pain medications, Multi-modal analgesia.   PONV prophylaxis: Ondansetron (or other 5HT-3), Dexamethasone or Solumedrol,  Background Propofol Infusion     Comments:                Cristian Valles, DO

## 2022-11-04 NOTE — PROVIDER NOTIFICATION
"MD Notification    Notified Person: MD    Notified Person Name: Dr. Glory Pearl    Notification Date/Time: 11/4/2022 0910    Notification Interaction: web based paging    Purpose of Notification:  \"Pt going down for I&D of sacrum procedure in 20min. Pre op concerned about sodium lab being 128. Would you like her on fluids? Thanks\"    Orders Received: NS 100ml/hr continuous    Comments: Unable to start before pt brought to pre-op d/t not being approved by pharmacy    "

## 2022-11-04 NOTE — PLAN OF CARE
Goal Outcome Evaluation:    DATE & TIME: 11/03/22, [id number]  Summary: Subdural hematoma, hs of Breast cancer with mets   Cognitive Concerns/ Orientation : A&O x 3, ds to situation , generalized upper and lower extremity weakness 3/5, baseline numbness and tingling bilateral lower extremities  BEHAVIOR & AGGRESSION TOOL COLOR: GREEN  ABNL VS/O2: VSS on RA, frequent cough   MOBILITY: A x 2, sliding scale, turn and repo   PAIN MANAGMENT: Denies   DIET: Mod carb  BOWEL/BLADDER: Incont B/B, no brief in bed   DRAIN/DEVICES: L chest port HL   SKIN: Pressure ulcer to coccyx, dressing changed this AM, CDI   TESTS/PROCEDURES: Wound I&D tomorrow   D/C DAY/GOALS/PLACE: TBD- TCU pending

## 2022-11-04 NOTE — PROGRESS NOTES
[facility]    HOSPITALIST PROGRESS NOTE :   --------------------------------------------------    Date of Admission:  10/18/2022    Cumulative Summary: Elenita Moran is a 67 year old female who was admitted on 10/18/2022 with past medical history significant for the stage IV metastatic breast cancer with metastasis to brain and liver bone, s/p mastectomy, heart failure with reduced ejection fraction secondary to doxorubicin, Tumor diabetes mellitus, depression and is stage IV sacral pressure ulcer who was admitted to the hospital secondary to acute metabolic encephalopathy and was found to have traumatic subdural hemorrhage.    Assessment & Plan     Acute metabolic encephalopathy most likely secondary to below:  Suspected traumatic SDH   reports increased difficulty with gait/balance since fall 10 days prior to admission, 48 hours before the presentation patient was increasingly confused with difficulty with speech and was essentially nonverbal on the morning of presentation to the ER.  CT scan of the head showed left holohemispheric subdural fluid collection with 2 mm midline shift.  On further questioning has been reported fall on October 8, unknown if patient had any head trauma at that time.  Her presentation was thought to be secondary to combination of subdural fluid collections along with marked electrolyte abnormalities as patient presented with uremia, hyponatremia and hyperkalemia along with possible intracranial metastasis versus infection.    --Patient care was assumed this morning, patient was seen and examined  --After hospitalization, patient was evaluated by neurology and neurosurgery along with neuro critical care, no surgical interventions are recommended at this point.  --Has been a started on Keppra 500 mg p.o. twice daily  -- Goal SBP <160 now , head of bed 30 degrees  -- Goal platelets of > 50, conditional orders placed, received multiple transfusions  for platelets and blood  -- On moderate carb controlled diet per Speech path   -- Pt more alert and awake now after reduction of her Ambien     Acute on Chronic anemia  Baseline hgb about 8 g/dL.   -- S/p transfusion of 1 unit 10/31, hemoglobin has been stable since   -- labs for reversible causes including iron deficiency, b12, folate , in normal range , Vitmain B 12 is slightly towards the higher range      Mild covid 19 infection, completed 10 days Isolation   No hypoxemia  - completed 3 days of prophylactic Remdesivir  - off special precautions 10/30     Left lower lobe pneumonia associated with cough  -- Patient will be completing Levaquin for 5 days, done with 3 doses     Thrombocytopenia  Abnormal coags  Admission platelet 38, INR 1.27 and PTT 38.  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   reported this has been increasing in size over past week as she has spent more time in her chair.    -- Admission UA with >182 WBC, leukocyte esterase.  -- Outside CXR with mild left basilar opacity  -- procalcitonin of 8, unclear significance in the setting of significant renal dysfunction  -- S/P empiric zosyn  -- S/p bedside debridement by surgery   -- As there was concern for eschar overlying chronic a stage IV sacral decubitus ulcer, Dr. Solorio is planning I&D in OR today, highly appreciate their help.  --Continue on Valtrex 1000 mg p.o. 3 times daily.     Hyponatremia  Hyperkalemia improved now with mild  hypokalemia with potassium of 3.1  Uremia  Anion gap metabolic acidosis  KAHTLEEN  Cr as low as 0.8 in June 2022, on most recent check Sept 2022 was 1.13.  Admission Na 127, K 7.2, bicarb 16 (gap down to 14), .   reporting minimal oral intake over past 2 days.  Edema on exam, but suspect intravascularly dry with hypoalbuminemia and poor oral intake.    shifted potassium with insulin overnight    -- repeat BMP daily  -- Nephrology consult placed on admission , initially followed, now they have signed off  -- Creatinine normalized now      Stage IV metastatic breast cancer (mets to brain, liver, bone) s/p mastectomy  Elevated LFT's  Followed by Dr. Varma of [facility].  Currently on immunotherapy and receiving radiation to spine (completed brain radiation for cerebellar mets).  Recent baseline LFT's unknown, suspect due to liver mets.  No signs of tenderness on exam.      -- [facility] consulted. 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  Outside trop 183, suspect demand ischemia.    Echocardiogram with EF of 40-45%    -- Continue to  monitor I/O's and daily weights  -- Started on Metoprolol 25mg PO BID      DM II with peripheral neuropathy  -- Continue low dose SSI  -- Hemoglobin A1c at 6.4% indicating good control      Depression  -- Continue PTA cymbalta     Insomnia  -- Patient requested Ambien to help sleep at ordered for her, reduced dose due to excissive somnolence, tolerating low dose     Clinically Significant Risk Factors         # Hyponatremia: Lowest Na = 128 mmol/L (Ref range: 136-145) in last 2 days, will monitor as appropriate      # Hypoalbuminemia: Lowest albumin = 1.9 g/dL (Ref range: 3.5-5.2) at 10/18/2022  5:37 PM, will monitor as appropriate   # Thrombocytopenia: Lowest platelets = 58 (Ref  range: 150-450) in last 2 days, will monitor for bleeding         # Moderate Malnutrition: based on nutrition assessment        Diet: Room Service  NPO per Anesthesia Guidelines for Procedure/Surgery Except for: Meds    Jiang Catheter: Not present  DVT Prophylaxis: Pneumatic compression stockings, ambulate every shift, avoiding Lovenox at this point as the platelet counts is less than 100,000.   Code Status: Full Code    The patient's care was discussed with the Patient.    Disposition Plan     Expected Discharge Date: 11/05/2022    Discharge Delays: Placement - TCU    Discharge Comments: COVID +     Entered: Glory Pearl MD 11/04/2022, 8:41 AM     Glory Pearl MD, MD, FACP  Text Page (7am - 6pm)    ----------------------------------------------------------------------------------------------------------------------    Interval History   Patient consult care was assumed this morning, patient was seen and examined, patient is denying any chest pain, palpitations or shortness of breath.  Currently patient is n.p.o. she is planned to undergo incision and drainage of the wound in the OR.  Patient is denying any new complaints.    -Data reviewed today: I reviewed all new labs and imaging results over the last 24 hours.    I personally reviewed no images or EKG's today.    Physical Exam   Temp: 97.9  F (36.6  C) Temp src: Oral BP: 96/59 Pulse: 99   Resp: 18 SpO2: 97 % O2 Device: None (Room air)    Vitals:    10/18/22 1700 10/19/22 0430 10/26/22 1939   Weight: 76.2 kg (167 lb 15.9 oz) 76 kg (167 lb 8.8 oz) 76 kg (167 lb 8.8 oz)     Vital Signs with Ranges  Temp:  [97.3  F (36.3  C)-98.1  F (36.7  C)] 97.9  F (36.6  C)  Pulse:  [] 99  Resp:  [16-18] 18  BP: ()/(54-68) 96/59  SpO2:  [96 %-98 %] 97 %  I/O last 3 completed shifts:  In: 240 [P.O.:240]  Out: -     GENERAL: Alert , awake and oriented. NAD. Conversational, appropriate.   HEENT: Normocephalic. EOMI. No icterus or injection. Nares normal.   LUNGS: Clear  to auscultation. No dyspnea at rest.   HEART: Regular rate. Extremities perfused.   ABDOMEN: Soft, nontender, and nondistended. Positive bowel sounds.   EXTREMITIES: No LE edema noted.   SKIN: Patient does have sacral wound along with right buttock wounds, some satellite lesions consistent with candidiasis of the also been noticed in the perineal area.  NEUROLOGIC: Moves extremities x4 on command. No acute focal neurologic abnormalities noted.     Medications       alteplase  1 mg Intravenous Once     docusate sodium  100 mg Oral Daily     DULoxetine  30 mg Oral QAM     DULoxetine  60 mg Oral At Bedtime     [Held by provider] furosemide  40 mg Oral Daily     guaiFENesin  600 mg Oral BID     heparin  5-10 mL Intracatheter Q28 Days     heparin lock flush  5-10 mL Intracatheter Q24H     levETIRAcetam  500 mg Oral BID     levofloxacin  500 mg Oral Daily     [Held by provider] lisinopril  2.5 mg Oral Daily     metoprolol tartrate  25 mg Oral BID     multivitamin w/minerals  1 tablet Oral Daily     nystatin   Topical BID     oxybutynin  5 mg Oral BID     oxybutynin ER  10 mg Oral QPM     pantoprazole  40 mg Oral At Bedtime     sodium chloride (PF)  10-20 mL Intracatheter Q28 Days     valACYclovir  1,000 mg Oral TID     zolpidem  2.5 mg Oral At Bedtime       Data   Recent Labs   Lab 11/04/22  0614 11/02/22  1336 11/01/22  2122 11/01/22  1712 11/01/22  1342 11/01/22  1150 11/01/22  0513 10/31/22  1607 10/31/22  0602   WBC  --  5.6  --   --   --   --  5.2  --  3.1*   HGB  --  8.4*  --   --   --   --  8.5* 8.3* 6.7*   MCV  --  91  --   --   --   --  92  --  93   PLT  --  58*  --   --   --   --  58*  --  58*   * 128*  --   --   --   --  134  --  132*   POTASSIUM 3.9 3.5  --   --  3.5  --  3.3*  --  3.7   CHLORIDE 99 100  --   --   --   --  100  --  100   CO2 20 23  --   --   --   --  22  --  23   BUN 16 13  --   --   --   --  15  --  19   CR 0.57 0.58  --   --   --   --  0.59  --  0.68   ANIONGAP 9 5  --   --   --   --   12  --  9   BENJI 7.8* 8.0*  --   --   --   --  7.8*  --  7.8*   * 133* 219*   < >  --    < > 129*  --  125*    < > = values in this interval not displayed.       Imaging:   No results found for this or any previous visit (from the past 24 hour(s)).

## 2022-11-04 NOTE — PLAN OF CARE
Pt here with SDH.Hx: Breast cancer w mets. A&Ox3; disoriented to situation .Slow speech; whispers. Gen. Weakness. BUE  4/5; BLE 3/5. VSS on RA. Tele SR. NPO diet d/t I &D procedure in the am. Clear liquids allowed until 0800. Give morning meds. Takes pills whole. Up with 2 SS. T/R q2. Incontinent. No BM this shift. BLE edema- legs elevated. Stage 4 wound on coccyx. Mepilex intact. Chest port, heparin locked. .Denies pain. Pt scoring green on the Aggression Stop Light Tool. Plan TCU. Discharge pending.    CHG bath done for I & D

## 2022-11-04 NOTE — ANESTHESIA POSTPROCEDURE EVALUATION
Patient: Elenita Moran    Procedure: Procedure(s):  INCISION AND DRAINAGE, SACRUM       Anesthesia Type:  MAC    Note:  Disposition: Inpatient   Postop Pain Control: Uneventful            Sign Out: Well controlled pain   PONV: No   Neuro/Psych: Uneventful            Sign Out: Acceptable/Baseline neuro status   Airway/Respiratory: Uneventful            Sign Out: Acceptable/Baseline resp. status   CV/Hemodynamics: Uneventful            Sign Out: Acceptable CV status; No obvious hypovolemia; No obvious fluid overload   Other NRE: NONE   DID A NON-ROUTINE EVENT OCCUR? No           Last vitals:  Vitals Value Taken Time   /67 11/04/22 1520   Temp     Pulse 98 11/04/22 1525   Resp 13 11/04/22 1524   SpO2 97 % 11/04/22 1529   Vitals shown include unvalidated device data.    Electronically Signed By: Cristian Valles DO  November 4, 2022  5:58 PM

## 2022-11-04 NOTE — ANESTHESIA CARE TRANSFER NOTE
Patient: Elenita Moran    Procedure: Procedure(s):  INCISION AND DRAINAGE, SACRUM       Diagnosis: Stage III pressure ulcer of sacral region (H) [L89.153]  Diagnosis Additional Information: No value filed.    Anesthesia Type:   MAC     Note:    Oropharynx: oropharynx clear of all foreign objects and spontaneously breathing  Level of Consciousness: awake  Oxygen Supplementation: face mask  Level of Supplemental Oxygen (L/min / FiO2): 6  Independent Airway: airway patency satisfactory and stable  Dentition: dentition unchanged  Vital Signs Stable: post-procedure vital signs reviewed and stable  Report to RN Given: handoff report given  Patient transferred to: PACU    Handoff Report: Identifed the Patient, Identified the Reponsible Provider, Reviewed the pertinent medical history, Discussed the surgical course, Reviewed Intra-OP anesthesia mangement and issues during anesthesia, Set expectations for post-procedure period and Allowed opportunity for questions and acknowledgement of understanding      Vitals:  Vitals Value Taken Time   BP     Temp     Pulse 93 11/04/22 1440   Resp 16 11/04/22 1440   SpO2 99 % 11/04/22 1440   Vitals shown include unvalidated device data.    Electronically Signed By: KRYSTLE Wagner CRNA  November 4, 2022  2:41 PM

## 2022-11-04 NOTE — OP NOTE
Preoperative diagnosis: Necrotic tissue on decubiti ulcer in the sacrum  Postoperative diagnosis: Same  Operative procedure: Excisional debridement of necrotic tissue from decubiti ulcer in the sacrum  Surgeon:  Burak Solorio M.D.  Assistant: Mluu Gonzalez PA-C, The physicians assistant was medically necessary for their expertise in hemostasis, suturing, and retraction.    Anesthesia: MAC  EBL: Minimal  Events:  The procedure was started with Elenita Moran in the right decubiti position, her lower back and buttocks were prepped and draped in the usual sterile fashion.  Utilizing sharp dissection followed by electrocautery dissection all the necrotic tissue covering her decubiti ulcer was excised.  Once this was achieved the remaining areas of the wound that had some fibrinous material covering it were also debrided bluntly.  Hemostasis was secured with cautery.  The wound was irrigated and then packed with Vashe soak gauze.  All counts correct.  No immediate complications.

## 2022-11-05 NOTE — PROGRESS NOTES
[facility]    HOSPITALIST PROGRESS NOTE :   --------------------------------------------------    Date of Admission:  10/18/2022    Cumulative Summary: Elenita Moran is a 67 year old female who was admitted on 10/18/2022 with past medical history significant for the stage IV metastatic breast cancer with metastasis to brain and liver bone, s/p mastectomy, heart failure with reduced ejection fraction secondary to doxorubicin, Tumor diabetes mellitus, depression and is stage IV sacral pressure ulcer who was admitted to the hospital secondary to acute metabolic encephalopathy and was found to have traumatic subdural hemorrhage.    Assessment & Plan     Acute metabolic encephalopathy most likely secondary to below:  Suspected traumatic SDH   reports increased difficulty with gait/balance since fall 10 days prior to admission, 48 hours before the presentation patient was increasingly confused with difficulty with speech and was essentially nonverbal on the morning of presentation to the ER.  CT scan of the head showed left holohemispheric subdural fluid collection with 2 mm midline shift.  On further questioning has been reported fall on October 8, unknown if patient had any head trauma at that time.  Her presentation was thought to be secondary to combination of subdural fluid collections along with marked electrolyte abnormalities as patient presented with uremia, hyponatremia and hyperkalemia along with possible intracranial metastasis versus infection.    -- Patient was seen and examined , feeling well , denying any complaints   -- During hospitalization, patient was evaluated by neurology and neurosurgery along with neuro critical care, no surgical interventions are recommended at this point.  --Has been a started on Keppra 500 mg p.o. twice daily  -- Goal SBP <160 now , head of bed 30 degrees  -- Goal platelets of > 50, conditional orders placed, received multiple  transfusions for platelets and blood  -- On moderate carb controlled diet per Speech path   -- Pt more alert and awake now after reduction of her Ambien, got tearful when discussed with her regarding sliding scale looking into discharge plan and she might not be able to leave over the weekend , emotional support was provided      Acute on Chronic anemia  Baseline hgb about 8 g/dL.   -- S/p transfusion of 1 unit 10/31, hemoglobin has been stable since   -- labs for reversible causes including iron deficiency, b12, folate , in normal range , Vitmain B 12 is slightly towards the higher range   -- Hgb stable around 8.3 this morning after undergoing I&D yesterday      Mild covid 19 infection, completed 10 days Isolation   No hypoxemia  - completed 3 days of prophylactic Remdesivir  - off special precautions 10/30     Left lower lobe pneumonia associated with cough  -- Patient will be completing Levaquin for 5 days, done with 3 doses     Thrombocytopenia  Abnormal coags  Admission platelet 38, INR 1.27 and PTT 38.  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   reported this has been increasing in size over past week as she has spent more time in her chair.    -- Admission UA with >182 WBC, leukocyte esterase.  -- Outside CXR with mild left basilar opacity  -- Procalcitonin of 8, unclear significance in the setting of significant renal dysfunction  -- S/P completion of empiric zosyn  -- S/p bedside  debridement by surgery   -- As there was concern for eschar overlying chronic a stage IV sacral decubitus ulcer, Dr. Solorio is planning I&D in OR on 11/04, highly appreciate their help.  -- general surgery has signed off and recommending wound care follow up on Monday  -- review their note regarding the wound care   --Continue on Valtrex 1000 mg p.o. 3 times daily.     Hyponatremia  Hyperkalemia improved now with mild hypokalemia with potassium of 3.1  Uremia  Anion gap metabolic acidosis  KATHLEEN  Cr as low as 0.8 in June 2022, on most recent check Sept 2022 was 1.13.  Admission Na 127, K 7.2, bicarb 16 (gap down to 14), .   reporting minimal oral intake over past 2 days.  Edema on exam, but suspect intravascularly dry with hypoalbuminemia and poor oral intake.    shifted potassium with insulin overnight    -- Repeat BMP daily, sodium is in normal range this morning , will avoid BW tomorrow and start doing blood work intermittently   -- Will discontinue IV fluids   -- Nephrology consult placed on admission , initially followed, now they have signed off  -- Creatinine normalized now      Stage IV metastatic breast cancer (mets to brain, liver, bone) s/p mastectomy  Elevated LFT's  Followed by Dr. Varma of [facility].  Currently on immunotherapy and receiving radiation to spine (completed brain radiation for cerebellar mets).  Recent baseline LFT's unknown, suspect due to liver mets.  No signs of tenderness on exam.      -- [facility] consulted. 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  Outside trop 183, suspect demand ischemia.    Echocardiogram with EF of 40-45%    -- Continue to  monitor I/O's and daily weights  -- Started on Metoprolol 25mg PO BID      DM II with peripheral neuropathy  -- Continue low dose  "SSI  -- Hemoglobin A1c at 6.4% indicating good control      Depression  -- Continue PTA cymbalta     Insomnia  -- Patient requested Ambien to help sleep at ordered for her, reduced dose due to excissive somnolence, tolerating low dose     Clinically Significant Risk Factors         # Hyponatremia: Lowest Na = 128 mmol/L (Ref range: 136-145) in last 2 days, will monitor as appropriate      # Hypoalbuminemia: Lowest albumin = 1.9 g/dL (Ref range: 3.5-5.2) at 10/18/2022  5:37 PM, will monitor as appropriate   # Thrombocytopenia: Lowest platelets = 70 (Ref range: 150-450) in last 2 days, will monitor for bleeding        # Overweight: Estimated body mass index is 27.88 kg/m  as calculated from the following:    Height as of this encounter: 1.651 m (5' 5\").    Weight as of this encounter: 76 kg (167 lb 8.8 oz).   # Moderate Malnutrition: based on nutrition assessment        Diet: Room Service  Combination Diet Moderate Consistent Carb (60 g CHO per Meal) Diet; Regular Diet    Jiang Catheter: Not present  DVT Prophylaxis: Pneumatic compression stockings, ambulate every shift, avoiding Lovenox at this point as the platelet counts is less than 100,000.   Code Status: Full Code    The patient's care was discussed with the Patient.    Disposition Plan      Expected Discharge Date: 11/07/2022, 12:00 PM  Discharge Delays: Placement - TCU    Discharge Comments: COVID +     Entered: Glory Pearl MD 11/05/2022, 7:41 AM     Glory Pearl MD, MD, FACP  Text Page (7am - 6pm)    ----------------------------------------------------------------------------------------------------------------------    Interval History      Patient was seen and examined, sitting in chair, denying any complaints.  Discussed the lab results with patient, sodium seems improved, creatinine is in normal range.  Discussed with her regarding plan for wound care follow-up for her sacral wound on Monday.  Updated her that social workers and care coordinators are " working on safe disposition planning, they have sent referrals to multiple places but have not heard from them, I also discussed the plan of care with  who are thinking that they might know receive further information from the facilities on Monday morning.  Patient is denying any new complaints.    -Data reviewed today: I reviewed all new labs and imaging results over the last 24 hours.    I personally reviewed no images or EKG's today.    Physical Exam   Temp: 96.8  F (36  C) Temp src: Axillary BP: 92/56 Pulse: 96   Resp: 18 SpO2: 97 % O2 Device: None (Room air) Oxygen Delivery: 1 LPM  Vitals:    10/18/22 1700 10/19/22 0430 10/26/22 1939   Weight: 76.2 kg (167 lb 15.9 oz) 76 kg (167 lb 8.8 oz) 76 kg (167 lb 8.8 oz)     Vital Signs with Ranges  Temp:  [96.8  F (36  C)-98.8  F (37.1  C)] 96.8  F (36  C)  Pulse:  [] 96  Resp:  [14-22] 18  BP: ()/(53-68) 92/56  SpO2:  [97 %-100 %] 97 %  I/O last 3 completed shifts:  In: 300 [I.V.:300]  Out: 1775 [Urine:1775]    GENERAL: Alert , awake and oriented. NAD. Conversational, appropriate.   HEENT: Normocephalic. EOMI. No icterus or injection. Nares normal.   LUNGS: Clear to auscultation. No dyspnea at rest.   HEART: Regular rate. Extremities perfused.   ABDOMEN: Soft, nontender, and nondistended. Positive bowel sounds.   EXTREMITIES: No LE edema noted.   SKIN: Patient does have sacral wound along with right buttock wounds, some satellite lesions consistent with candidiasis of the also been noticed in the perineal area.  NEUROLOGIC: Moves extremities x4 on command. No acute focal neurologic abnormalities noted.     Medications     sodium chloride 100 mL/hr at 11/04/22 1025       alteplase  1 mg Intravenous Once     docusate sodium  100 mg Oral Daily     DULoxetine  30 mg Oral QAM     DULoxetine  60 mg Oral At Bedtime     [Held by provider] furosemide  40 mg Oral Daily     guaiFENesin  600 mg Oral BID     heparin  5-10 mL Intracatheter Q28 Days      heparin lock flush  5-10 mL Intracatheter Q24H     levETIRAcetam  500 mg Oral BID     levofloxacin  500 mg Oral Daily     [Held by provider] lisinopril  2.5 mg Oral Daily     metoprolol tartrate  25 mg Oral BID     multivitamin w/minerals  1 tablet Oral Daily     nystatin   Topical BID     oxybutynin  5 mg Oral BID     oxybutynin ER  10 mg Oral QPM     pantoprazole  40 mg Oral At Bedtime     sodium chloride (PF)  10-20 mL Intracatheter Q28 Days     valACYclovir  1,000 mg Oral TID     zolpidem  2.5 mg Oral At Bedtime       Data   Recent Labs   Lab 11/05/22  0643 11/04/22  1132 11/04/22  0614 11/02/22  1336 11/01/22  1150 11/01/22  0513   WBC 6.7  --   --  5.6  --  5.2   HGB 8.3*  --   --  8.4*  --  8.5*   MCV 96  --   --  91  --  92   PLT 70*  --   --  58*  --  58*     --  128* 128*  --  134   POTASSIUM 3.9  --  3.9 3.5   < > 3.3*   CHLORIDE 104  --  99 100  --  100   CO2 21  --  20 23  --  22   BUN 14  --  16 13  --  15   CR 0.59  --  0.57 0.58  --  0.59   ANIONGAP 10  --  9 5  --  12   BENJI 7.8*  --  7.8* 8.0*  --  7.8*   * 95 134* 133*   < > 129*    < > = values in this interval not displayed.       Imaging:   No results found for this or any previous visit (from the past 24 hour(s)).

## 2022-11-05 NOTE — PROGRESS NOTES
Orientation/Cognitive: A&Ox4  Mobility Level/Assist Equipment: 2A lift T/R  Fall Risk (Y/N): Y  Behavior Concerns: N/A  Pain Management: Denies pain   Tele/VS/O2: VSS  ABNL Lab/BG: hgb 8.3  Diet: Mod carb    Bowel/Bladder: Incontinent purewick in place  Skin Concerns: Buttocks wound changed this morning   Drains/Devices: L upper port heparin locked  Tests/Procedures for next shift: N/A  Anticipated DC date & active delays: TBD  Patient Stated Goal for Today: no goal stated

## 2022-11-05 NOTE — PLAN OF CARE
Reason for Admission: SDH, breast cancer with mets. COVID recovered.   Cognitive/Mentation: A/Ox 3-4, forgetful.   Neuros/CMS: Stable with generalized weakness. BUE 4/5, BLE 3/5. Slow, whispered speech.   VS: VSS on RA    Tele: NSR  GI: BS audible, + flatus, no BM this shift. Incontinent.  : Purewick in place, good output. Incontinent.  Pulmonary: LS coarse, frequent cough.   Pain: Coccyx wound pain, prn Tylenol and Dilaudid given.  Drains/Lines: L chest port patent- Heparin locked.   Skin: Intact with redness in perineum area. Coccyx wound dressing CDI. BLE edema- legs elevated.   Activity: Assist x 2 with lift device.  Diet: Mod carb diet with thin liquids. Takes pills whole.   Therapies recs: TCU  Discharge: Pending placement   Aggression Stoplight Tool: Green  End of shift summary: No changes this shift.

## 2022-11-06 NOTE — PLAN OF CARE
Goal Outcome Evaluation:       A/Ox4. Confusion at times. VSS on RA. Bottom pain- prn tylenol. Frequent weak cough- PRN robitussin. Soft BP this morning 91/56 held metoprolol d/t parameters. L chest port heparin locked. Whispered speech. Up to chair this morning A2 w/ lift. Incontinent of bowel/bladder- purewick. One BM this shift. Regular diet. T/R Q2. Sadness today because she is wanting to discharge and wants to see family. WOC to see pt tomorrow to have discharge plan for wounds. Wound care done today. Hopefully to hear back from TCU placements tomorrow.

## 2022-11-06 NOTE — PROGRESS NOTES
Care Management Follow Up    Length of Stay (days): 19    Expected Discharge Date: 11/07/2022     Concerns to be Addressed:       Patient plan of care discussed at interdisciplinary rounds: Yes    Anticipated Discharge Disposition: Transitional Care     Anticipated Discharge Services: Transportation Services  Anticipated Discharge DME:      Patient/family educated on Medicare website which has current facility and service quality ratings: yes  Education Provided on the Discharge Plan:    Patient/Family in Agreement with the Plan: yes    Referrals Placed by CM/SW:    Private pay costs discussed: Not applicable    Additional Information:  SW followed up on referral, left VM requesting a return call.       VIELKA Calvert

## 2022-11-06 NOTE — PLAN OF CARE
Pt here with SDH, breast cancer w mets. A&Ox4; forgetful. Neuros BUE 4/5 BLE 3/5, N/T distal extremities. Slow whispered speech. VSS. Regular diet, thin liquids. Takes pills whole. Tele NSR. Up with A2 lift. Denies pain. Pt scoring green on the Aggression Stop Light Tool. L chest port heparin locked. PW in place. Discharge pending placement.

## 2022-11-06 NOTE — PLAN OF CARE
Reason for Admission: traumatic subdural hemorrhage     Cognitive/Mentation: A/Ox 4, forgetful  Neuros/CMS: Intact ex generalized moderate weakness, numbness and tingling in extremities  VS: Stable.  GI: BS +, + flatus, last BM 11/5. Incontinent.  : Voiding adequately. Incontinent, purewick in use.  Pulmonary: LS diminished and coarse.  CV: BLE +2 edema  Pain: Sacral wound pain.     Skin: Stage IV sacral pressure wound, scattered buttocks pressure wounds, blanchable redness around groin.  L chest port - heparin locked.  Activity: Assist x 2 with lift.  Diet: Regular with thin liquids. Takes pills whole.     Aggression Stoplight Score: Green  Therapies recs: TCU   Discharge: Awaiting TCU placement    End of shift summary: Patient remained in bed throughout shift.  Wound care completed per WOC orders.  Patient was tearful during cares, asking what the plan for her was.  Later during the shift, she began crying and saying she didn't want to be a burden, emotional support and reassurance was provided with some improvement.  Seizure precautions maintained.

## 2022-11-06 NOTE — PROGRESS NOTES
[facility]    HOSPITALIST PROGRESS NOTE :   --------------------------------------------------    Date of Admission:  10/18/2022    Cumulative Summary: Elenita Moran is a 67 year old female who was admitted on 10/18/2022 with past medical history significant for the stage IV metastatic breast cancer with metastasis to brain and liver bone, s/p mastectomy, heart failure with reduced ejection fraction secondary to doxorubicin, Tumor diabetes mellitus, depression and is stage IV sacral pressure ulcer who was admitted to the hospital secondary to acute metabolic encephalopathy and was found to have traumatic subdural hemorrhage.    Assessment & Plan     Acute metabolic encephalopathy most likely secondary to below:  Suspected traumatic SDH   reports increased difficulty with gait/balance since fall 10 days prior to admission, 48 hours before the presentation patient was increasingly confused with difficulty with speech and was essentially nonverbal on the morning of presentation to the ER.  CT scan of the head showed left holohemispheric subdural fluid collection with 2 mm midline shift.  On further questioning has been reported fall on October 8, unknown if patient had any head trauma at that time.  Her presentation was thought to be secondary to combination of subdural fluid collections along with marked electrolyte abnormalities as patient presented with uremia, hyponatremia and hyperkalemia along with possible intracranial metastasis versus infection.    -- Patient was seen and examined , feeling well, denying any complains other than dry nose , will order Afrin for Bilateral nares   -- During hospitalization, patient was evaluated by neurology and neurosurgery along with neuro critical care, no surgical interventions are recommended at this point.  --Has been a started on Keppra 500 mg p.o. twice daily  -- Goal SBP <160 now , head of bed 30 degrees  -- Goal platelets of >  50, conditional orders placed, received multiple transfusions for platelets and blood  -- On moderate carb controlled diet per Speech path   -- Pt more alert and awake now after reduction of her Ambien, got tearful when discussed with her regarding sliding scale looking into discharge plan and she might not be able to leave over the weekend , emotional support was provided      Acute on Chronic anemia  Baseline hgb about 8 g/dL.   -- S/p transfusion of 1 unit 10/31, hemoglobin has been stable since   -- labs for reversible causes including iron deficiency, b12, folate , in normal range , Vitmain B 12 is slightly towards the higher range   -- Hgb stable around 8.3 this morning after undergoing I&D yesterday      Mild covid 19 infection, completed 10 days Isolation   No hypoxemia  - completed 3 days of prophylactic Remdesivir  - off special precautions 10/30     Left lower lobe pneumonia associated with cough  -- Patient will be completing Levaquin for 5 days, done with 3 doses     Thrombocytopenia  Abnormal coags  Admission platelet 38, INR 1.27 and PTT 38.  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   reported this has been increasing in size over past week as she has spent more time in her chair.    -- Admission UA with >182 WBC, leukocyte esterase.  -- Outside CXR with mild left basilar opacity  -- Procalcitonin of 8, unclear significance in the setting of significant renal dysfunction  -- S/P  completion of empiric zosyn  -- S/p bedside debridement by surgery   -- As there was concern for eschar overlying chronic a stage IV sacral decubitus ulcer, Dr. Solorio is planning I&D in OR on 11/04, highly appreciate their help.  -- general surgery has signed off and recommending wound care follow up on Monday  -- review their note regarding the wound care   --Continue on Valtrex 1000 mg p.o. 3 times daily.     Hyponatremia  Hyperkalemia improved now with mild hypokalemia with potassium of 3.1  Uremia  Anion gap metabolic acidosis  KATHLEEN  Cr as low as 0.8 in June 2022, on most recent check Sept 2022 was 1.13.  Admission Na 127, K 7.2, bicarb 16 (gap down to 14), .   reporting minimal oral intake over past 2 days.  Edema on exam, but suspect intravascularly dry with hypoalbuminemia and poor oral intake.    shifted potassium with insulin overnight    -- Repeat BMP daily, sodium is in normal range this morning , will avoid BW tomorrow and start doing blood work intermittently   -- Will discontinue IV fluids   -- Nephrology consult placed on admission , initially followed, now they have signed off  -- Creatinine normalized now      Stage IV metastatic breast cancer (mets to brain, liver, bone) s/p mastectomy  Elevated LFT's  Followed by Dr. Varma of [facility].  Currently on immunotherapy and receiving radiation to spine (completed brain radiation for cerebellar mets).  Recent baseline LFT's unknown, suspect due to liver mets.  No signs of tenderness on exam.      -- [facility] consulted. 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  Outside trop 183, suspect demand ischemia.    Echocardiogram with EF of 40-45%    -- Continue to  monitor I/O's and daily weights  -- Started on Metoprolol 25mg PO BID , will decrease  "the dose to 12.5 mg po BID due to soft blood pressure      DM II with peripheral neuropathy  -- Continue low dose SSI  -- Hemoglobin A1c at 6.4% indicating good control      Depression  -- Continue PTA cymbalta     Insomnia  -- Patient requested Ambien to help sleep at ordered for her, reduced dose due to excissive somnolence, tolerating low dose     Clinically Significant Risk Factors         # Hyponatremia: Lowest Na = 135 mmol/L (Ref range: 136-145) in last 2 days, will monitor as appropriate      # Hypoalbuminemia: Lowest albumin = 1.9 g/dL (Ref range: 3.5-5.2) at 10/18/2022  5:37 PM, will monitor as appropriate   # Thrombocytopenia: Lowest platelets = 70 (Ref range: 150-450) in last 2 days, will monitor for bleeding        # Overweight: Estimated body mass index is 27.88 kg/m  as calculated from the following:    Height as of this encounter: 1.651 m (5' 5\").    Weight as of this encounter: 76 kg (167 lb 8.8 oz).   # Moderate Malnutrition: based on nutrition assessment        Diet: Room Service  Combination Diet Moderate Consistent Carb (60 g CHO per Meal) Diet; Regular Diet    Jiang Catheter: Not present  DVT Prophylaxis: Pneumatic compression stockings, ambulate every shift, avoiding Lovenox at this point as the platelet counts is less than 100,000.   Code Status: Full Code    The patient's care was discussed with the Patient.    Disposition Plan      Expected Discharge Date: 11/07/2022, 12:00 PM  Discharge Delays: Placement - TCU    Discharge Comments: COVID +     Entered: Glory Pearl MD 11/06/2022, 8:36 AM     Glory Pearl MD, MD, FACP  Text Page (7am - 6pm)    ----------------------------------------------------------------------------------------------------------------------    Interval History      Patient was seen and examined, sitting in bed, denying any new complaints.  Aware about plan to be evaluated by wound care tomorrow morning.  Hoping to be discharged tomorrow, aware that  are " working on the plan     -Data reviewed today: I reviewed all new labs and imaging results over the last 24 hours.    I personally reviewed no images or EKG's today.    Physical Exam   Temp: 98.8  F (37.1  C) Temp src: Axillary BP: 91/56 Pulse: 96   Resp: 20 SpO2: 100 % O2 Device: None (Room air)    Vitals:    10/18/22 1700 10/19/22 0430 10/26/22 1939   Weight: 76.2 kg (167 lb 15.9 oz) 76 kg (167 lb 8.8 oz) 76 kg (167 lb 8.8 oz)     Vital Signs with Ranges  Temp:  [97.3  F (36.3  C)-98.8  F (37.1  C)] 98.8  F (37.1  C)  Pulse:  [74-98] 96  Resp:  [16-20] 20  BP: ()/(56-76) 91/56  SpO2:  [95 %-100 %] 100 %  I/O last 3 completed shifts:  In: 720 [P.O.:720]  Out: 1550 [Urine:1550]    GENERAL: Alert , awake and oriented. NAD. Conversational, appropriate.   HEENT: Normocephalic. EOMI. No icterus or injection. Nares normal.   LUNGS: Clear to auscultation. No dyspnea at rest.   HEART: Regular rate. Extremities perfused.   ABDOMEN: Soft, nontender, and nondistended. Positive bowel sounds.   EXTREMITIES: No LE edema noted.   SKIN: Patient does have sacral wound along with right buttock wounds, some satellite lesions consistent with candidiasis of the also been noticed in the perineal area.  NEUROLOGIC: Moves extremities x4 on command. No acute focal neurologic abnormalities noted.     Medications       alteplase  1 mg Intravenous Once     docusate sodium  100 mg Oral Daily     DULoxetine  30 mg Oral QAM     DULoxetine  60 mg Oral At Bedtime     [Held by provider] furosemide  40 mg Oral Daily     guaiFENesin  600 mg Oral BID     heparin  5-10 mL Intracatheter Q28 Days     heparin lock flush  5-10 mL Intracatheter Q24H     levETIRAcetam  500 mg Oral BID     [Held by provider] lisinopril  2.5 mg Oral Daily     metoprolol tartrate  25 mg Oral BID     multivitamin w/minerals  1 tablet Oral Daily     nystatin   Topical BID     oxybutynin  5 mg Oral BID     oxybutynin ER  10 mg Oral QPM     pantoprazole  40 mg Oral At Bedtime      sodium chloride (PF)  10-20 mL Intracatheter Q28 Days     valACYclovir  1,000 mg Oral TID     zolpidem  2.5 mg Oral At Bedtime       Data   Recent Labs   Lab 11/05/22  0643 11/04/22  1132 11/04/22  0614 11/02/22  1336 11/01/22  1150 11/01/22  0513   WBC 6.7  --   --  5.6  --  5.2   HGB 8.3*  --   --  8.4*  --  8.5*   MCV 96  --   --  91  --  92   PLT 70*  --   --  58*  --  58*     --  128* 128*  --  134   POTASSIUM 3.9  --  3.9 3.5   < > 3.3*   CHLORIDE 104  --  99 100  --  100   CO2 21  --  20 23  --  22   BUN 14  --  16 13  --  15   CR 0.59  --  0.57 0.58  --  0.59   ANIONGAP 10  --  9 5  --  12   BENJI 7.8*  --  7.8* 8.0*  --  7.8*   * 95 134* 133*   < > 129*    < > = values in this interval not displayed.       Imaging:   No results found for this or any previous visit (from the past 24 hour(s)).

## 2022-11-07 NOTE — PROGRESS NOTES
"SP[facility] Progress Note       73     Saw pt Elenita Moran per [facility] consult.       Illness Narrative - Per my previous note on 10/24, [facility]. She also has noted having a complicated journey with cancer.        Distress - In addition to her complicated illness, Elenita mentioned great distress surrounding her prolonged hospitalization. She kept saying that \"I wish I could get out of here\" and that \"I don't want to die here.\" She noted that she hopes that an appropriate care center can be found for her to eventually discharge to. [facility] continued [facility] and the difficulty in placement is disappointing to her. I provided emotional support in reflecting on this at the bedside.       Coping - Prayer is very meaningful for Elenita and I offered a prayer at the end of this visit. I also offered her a small wooden cross that she could hold on to and that could remind her of God's presence in moments of distress. She also has her  \"Denilson\" supporting her and noted that he would be here this evening.      Meaning-Making - She continues to have difficulty making meaning out of this experience and was tearful in discussing her illness.       Plan - I continue to follow Elenita while admitted to [facility]. Please consult if any immediate needs arise.      ------  Brian Coker M.Div.  Resident   Pager: [phone]   "

## 2022-11-07 NOTE — PROGRESS NOTES
[facility]    HOSPITALIST PROGRESS NOTE :   --------------------------------------------------    Date of Admission:  10/18/2022    Cumulative Summary: Elenita Moran is a 67 year old female who was admitted on 10/18/2022 with past medical history significant for the stage IV metastatic breast cancer with metastasis to brain and liver bone, s/p mastectomy, heart failure with reduced ejection fraction secondary to doxorubicin, Tumor diabetes mellitus, depression and is stage IV sacral pressure ulcer who was admitted to the hospital secondary to acute metabolic encephalopathy and was found to have traumatic subdural hemorrhage.    Assessment & Plan     Acute metabolic encephalopathy most likely secondary to below:  Suspected traumatic SDH   reports increased difficulty with gait/balance since fall 10 days prior to admission, 48 hours before the presentation patient was increasingly confused with difficulty with speech and was essentially nonverbal on the morning of presentation to the ER.  CT scan of the head showed left holohemispheric subdural fluid collection with 2 mm midline shift.  On further questioning has been reported fall on October 8, unknown if patient had any head trauma at that time.  Her presentation was thought to be secondary to combination of subdural fluid collections along with marked electrolyte abnormalities as patient presented with uremia, hyponatremia and hyperkalemia along with possible intracranial metastasis versus infection.    -- Patient was seen and examined, slightly lightheaded earlier this morning , bit now feels better  -- Continue Afrin for Bilateral nares   -- During hospitalization, patient was evaluated by neurology and neurosurgery along with neuro critical care, no surgical interventions are recommended at this point.  --Has been a started on Keppra 500 mg p.o. twice daily  -- Goal SBP <160 now , head of bed 30 degrees  -- Goal  platelets of > 50, conditional orders placed, received multiple transfusions for platelets and blood  -- On moderate carb controlled diet per Speech path   --  Very Sad and gets tearful when discuss about disposition plan as she is eager to be discharged      Acute on Chronic anemia  Baseline hgb about 8 g/dL.   -- S/p transfusion of 1 unit 10/31, hemoglobin has been stable since   -- labs for reversible causes including iron deficiency, b12, folate , in normal range , Vitmain B 12 is slightly towards the higher range   -- Hgb stable around 8.3 this morning after undergoing I&D yesterday      Mild covid 19 infection, completed 10 days Isolation   No hypoxemia  - completed 3 days of prophylactic Remdesivir  - off special precautions 10/30     Left lower lobe pneumonia associated with cough  -- Patient has completed Levaquin for 5 days      Thrombocytopenia  Abnormal coags  Admission platelet 38, INR 1.27 and PTT 38.  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   reported this has been increasing in size over past week as she has spent more time in her chair.    -- Admission UA with >182 WBC, leukocyte esterase.  -- Outside CXR with mild left basilar opacity  -- Procalcitonin of 8, unclear significance in the setting of significant renal dysfunction  -- S/P completion of empiric zosyn  -- S/p bedside debridement by surgery   -- As there was concern for eschar overlying chronic a stage IV sacral  decubitus ulcer, Dr. Solorio is planning I&D in OR on 11/04, highly appreciate their help.  -- general surgery has signed off and recommending wound care follow up on Monday  -- review their note regarding the wound care   --Continue on Valtrex 1000 mg p.o. 3 times daily.  -- Wound care would be following up with patient on Thursday if still in hospital  -- currently discharge recommendations are in place      Hyponatremia  Hyperkalemia improved now with mild hypokalemia with potassium of 3.1  Uremia  Anion gap metabolic acidosis  KATHLEEN  Cr as low as 0.8 in June 2022, on most recent check Sept 2022 was 1.13.  Admission Na 127, K 7.2, bicarb 16 (gap down to 14), .   reporting minimal oral intake over past 2 days.  Edema on exam, but suspect intravascularly dry with hypoalbuminemia and poor oral intake.    shifted potassium with insulin overnight    -- Repeat BMP daily, sodium is in normal range this morning , will avoid BW tomorrow and start doing blood work intermittently   -- check BMP tomorrow morning   -- off IV fluids   -- Nephrology consult placed on admission , initially followed, now they have signed off  -- Creatinine normalized now      Stage IV metastatic breast cancer (mets to brain, liver, bone) s/p mastectomy  Elevated LFT's  Followed by Dr. Varma of [facility].  Currently on immunotherapy and receiving radiation to spine (completed brain radiation for cerebellar mets).  Recent baseline LFT's unknown, suspect due to liver mets.  No signs of tenderness on exam.      -- [facility] consulted. 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  Outside trop 183, suspect demand ischemia.    Echocardiogram with EF of 40-45%    -- Continue to  monitor I/O's and daily weights  -- Patient was started on Metoprolol  "25mg PO BID , decreased dose to 12.5 mg po BID due to soft blood pressure   -- will change holding parameters and will make them more liberal      DM II with peripheral neuropathy  -- Continue low dose SSI  -- Hemoglobin A1c at 6.4% indicating good control      Depression  -- Continue PTA cymbalta     Insomnia  -- Patient requested Ambien to help sleep at ordered for her, reduced dose due to excissive somnolence, tolerating low dose     Clinically Significant Risk Factors              # Hypoalbuminemia: Lowest albumin = 1.9 g/dL (Ref range: 3.5-5.2) at 10/18/2022  5:37 PM, will monitor as appropriate          # Overweight: Estimated body mass index is 27.88 kg/m  as calculated from the following:    Height as of this encounter: 1.651 m (5' 5\").    Weight as of this encounter: 76 kg (167 lb 8.8 oz).   # Moderate Malnutrition: based on nutrition assessment        Diet: Room Service  Combination Diet Moderate Consistent Carb (60 g CHO per Meal) Diet; Regular Diet    Jiang Catheter: Not present  DVT Prophylaxis: Pneumatic compression stockings, ambulate every shift, avoiding Lovenox at this point as the platelet counts is less than 100,000.   Code Status: Full Code    The patient's care was discussed with the Patient.    Disposition Plan      Expected Discharge Date: 11/08/2022, 12:00 PM Patient is medically stable for discharge , appreciate SW and CC looking into placement options   Discharge Delays: Placement - TCU    Discharge Comments: COVID +     Entered: Glory Pearl MD 11/07/2022, 8:42 AM     Glory Pearl MD, MD, FACP  Text Page (7am - 6pm)    ----------------------------------------------------------------------------------------------------------------------    Interval History      Patient was seen and examined earlier, sitting in bed, denying any new complaints.  Feels sad about the fact that she is a still in the hospital, really hoping to be discharged from the hospital.  Patient is denying otherwise any " chest pain, palpitations or shortness of breath, felt slightly lightheaded earlier this morning when she stood up but it has resolved now.  We discussed about changing the parameters of her beta-blockers    -Data reviewed today: I reviewed all new labs and imaging results over the last 24 hours.    I personally reviewed no images or EKG's today.    Physical Exam   Temp: 97  F (36.1  C) Temp src: Oral BP: 105/76 Pulse: 102   Resp: 18 SpO2: 94 % O2 Device: None (Room air)    Vitals:    10/18/22 1700 10/19/22 0430 10/26/22 1939   Weight: 76.2 kg (167 lb 15.9 oz) 76 kg (167 lb 8.8 oz) 76 kg (167 lb 8.8 oz)     Vital Signs with Ranges  Temp:  [97  F (36.1  C)-99  F (37.2  C)] 97  F (36.1  C)  Pulse:  [] 102  Resp:  [18-20] 18  BP: (105-124)/(64-76) 105/76  SpO2:  [94 %-100 %] 94 %  I/O last 3 completed shifts:  In: 540 [P.O.:540]  Out: 1125 [Urine:1125]    GENERAL: Alert , awake and oriented. NAD. Conversational, appropriate.   HEENT: Normocephalic. EOMI. No icterus or injection. Nares normal.   LUNGS: Clear to auscultation. No dyspnea at rest.   HEART: Regular rate. Extremities perfused.   ABDOMEN: Soft, nontender, and nondistended. Positive bowel sounds.   EXTREMITIES: No LE edema noted.   SKIN: Patient does have sacral wound along with right buttock wounds, some satellite lesions consistent with candidiasis of the also been noticed in the perineal area.  NEUROLOGIC: Moves extremities x4 on command. No acute focal neurologic abnormalities noted.     Medications       alteplase  1 mg Intravenous Once     docusate sodium  100 mg Oral Daily     DULoxetine  30 mg Oral QAM     DULoxetine  60 mg Oral At Bedtime     [Held by provider] furosemide  40 mg Oral Daily     guaiFENesin  600 mg Oral BID     heparin  5-10 mL Intracatheter Q28 Days     heparin lock flush  5-10 mL Intracatheter Q24H     levETIRAcetam  500 mg Oral BID     [Held by provider] lisinopril  2.5 mg Oral Daily     metoprolol tartrate  12.5 mg Oral BID      multivitamin w/minerals  1 tablet Oral Daily     nystatin   Topical BID     oxybutynin  5 mg Oral BID     oxybutynin ER  10 mg Oral QPM     pantoprazole  40 mg Oral At Bedtime     sodium chloride (PF)  10-20 mL Intracatheter Q28 Days     valACYclovir  1,000 mg Oral TID     zolpidem  2.5 mg Oral At Bedtime       Data   Recent Labs   Lab 11/05/22  0643 11/04/22  1132 11/04/22  0614 11/02/22  1336 11/01/22  1150 11/01/22  0513   WBC 6.7  --   --  5.6  --  5.2   HGB 8.3*  --   --  8.4*  --  8.5*   MCV 96  --   --  91  --  92   PLT 70*  --   --  58*  --  58*     --  128* 128*  --  134   POTASSIUM 3.9  --  3.9 3.5   < > 3.3*   CHLORIDE 104  --  99 100  --  100   CO2 21  --  20 23  --  22   BUN 14  --  16 13  --  15   CR 0.59  --  0.57 0.58  --  0.59   ANIONGAP 10  --  9 5  --  12   BENJI 7.8*  --  7.8* 8.0*  --  7.8*   * 95 134* 133*   < > 129*    < > = values in this interval not displayed.       Imaging:   No results found for this or any previous visit (from the past 24 hour(s)).

## 2022-11-07 NOTE — PROVIDER NOTIFICATION
"Paged Dr. Pearl, \"Called and talked to [facility], she stated they wouldn't be seeing her until Thursday. However she said that the [facility] order from 11/4/22 by surgery is what they would continue to do & no changes in that plan\"  "

## 2022-11-07 NOTE — PROVIDER NOTIFICATION
"Paged Dr. Pearl, \"FYI pt tearful & stating she is depressed. Emotional support provided & wheeled her around the unit to help improve mood. I see she is on antidepressant. From talking to her it sounds like length of hospital stay & overall health is what she is depressed about. I put in a spiritual health consult. Any other interventions?      ADDENDUM: Dr. Pearl will review pts meds & consider psych consult if she doesn't leave today    Paged Dr. Pearl, \"sounds good. Also she report some dizziness that has been ongoing today whether sitting or standing. None at rest. Soft BP this afternoon. Otherwise neuros unchanged. Just has a hard time standing up when getting her on the clyde steady just now. \"    ADDENDUM 2: Metoprolol adjusted  "

## 2022-11-07 NOTE — PLAN OF CARE
Pt here with SDH, breast cancer w mets. A&Ox4; forgetful. Neuros BUE 4/5 BLE 3/5, N/T distal extremities. Slow whispered speech. VSS. Regular diet, thin liquids. Takes pills whole. Tele NSR. Up with A2 lift. Denies pain. Pt scoring green on the Aggression Stop Light Tool. L chest port heparin locked. PW in place. x1 BM Discharge pending placement.

## 2022-11-07 NOTE — PROGRESS NOTES
Care Management Follow Up    Length of Stay (days): 20    Expected Discharge Date: 11/09/2022     Concerns to be Addressed:       Patient plan of care discussed at interdisciplinary rounds: Yes    Anticipated Discharge Disposition: Transitional Care     Anticipated Discharge Services: Transportation Services  Anticipated Discharge DME:      Patient/family educated on Medicare website which has current facility and service quality ratings: yes  Education Provided on the Discharge Plan:    Patient/Family in Agreement with the Plan: yes    Referrals Placed by CM/SW:    Private pay costs discussed: Not applicable    Additional Information:  Writer received call from patients spouse who was very frustrated with not having a place for patient to discharge to. Writer explained that patient has high medical needs and beds in general are hard to find. Patients spouse would like to expand the search. Writer sent more referrals to [facility], [facility], [facility], and [facility].     SW will continue to follow    Addendum: [facility] has no beds available.     VIELKA Croft

## 2022-11-08 NOTE — PROGRESS NOTES
Nauseous this AM. Prior shift RN gave zofran. Pt still feeling nauseous--compazine given. Held AM meds & will reassess compazine effect.

## 2022-11-08 NOTE — PLAN OF CARE
Goal Outcome Evaluation:      Plan of Care Reviewed With: patient, spouse    Overall Patient Progress: no changeOverall Patient Progress: no change         Cognitive/Mentation: A/Ox 4  Neuros/CMS: Intact ex generalized weakness, BLE 3/5  VS: stable ex soft BP at times.   GI: BS active, + flatus, last BM today. Incontinent.  : Voiding. Incontinent. Purewick in place  Pulmonary: LS diminished at bases. Robitussin given x2  Pain: Premedicated with dilaudid prior to coccyx dressing change which helped improve pt from 8/10 with dressing change to 4/10 pain during todays dressing change.      Drains/Lines: Port  Skin: buttocks would, redness/excoriation/rash on lindsay area. Wound on coccyx changed  Activity: Assist x 2 with clyde MARSHALL.  Diet: Regular with thin liquids. Takes pills whole one at a time.      Therapies recs: TCU  Discharge: pending bed availability     Aggression Stoplight Tool: green     End of shift summary: tearful intermittently today. Emotional support provided.

## 2022-11-08 NOTE — PLAN OF CARE
Cognitive/Mentation: A/Ox 4  Neuros/CMS: Intact ex generalized weakness, BLE 3/5  VS: VSS ex soft BP at times. On RA.  GI: BS active, + flatus, last BM yesterday. Incontinent.  : Voiding. Incontinent. Purewick in place  Pulmonary: LS diminished at. Pt denied need for PRN Robitussin overnight.  Pain: Pt denied pain overnight.     Drains/Lines: Port  Skin: buttocks wound - see WOC note/order, redness/excoriation/rash on lindsay area.     Activity: Ax2 w/clyde steady & GB.  Diet: Reg w/ thin liquids. Takes pills whole one at a time.      Therapies recs: TCU  Discharge: pending bed availability     Aggression Stoplight Tool: green     End of shift summary: Pt slept intermittently overnight, no changes this shift.

## 2022-11-08 NOTE — PROGRESS NOTES
Palliative consult received. Due to palliative consult volume received in the last 24hrs, pt will not be evaluated by our team until Wednesday 11/9 or Thursday 11/10. Please call with urgent needs. Thanks.    KRYSTLE Mcintosh [facility]  Contact information available via [facility] Paging/Directory

## 2022-11-08 NOTE — PLAN OF CARE
Goal Outcome Evaluation:      Plan of Care Reviewed With: patient    Overall Patient Progress: no changeOverall Patient Progress: no change    Outcome Evaluation: visited with pt. pt reports continued poor appetite and PO intake. encouraged pt to continue wtih TID meals and try for >50% intakes. pt declined ONS and snacks.    Anneliese Sexton RD, LD

## 2022-11-08 NOTE — PROGRESS NOTES
Care Management Follow Up    Length of Stay (days): 21    Expected Discharge Date: 11/09/2022     Concerns to be Addressed:       Patient plan of care discussed at interdisciplinary rounds: Yes    Anticipated Discharge Disposition: Transitional Care     Anticipated Discharge Services: Transportation Services  Anticipated Discharge DME:      Patient/family educated on Medicare website which has current facility and service quality ratings: yes  Education Provided on the Discharge Plan:    Patient/Family in Agreement with the Plan: yes    Referrals Placed by CM/SW:    Private pay costs discussed: Not applicable    Additional Information:  Writer called to follow up on pending referrals     [facility]  [facility] No open beds    [facility]     JERRELL will continue to follow      VIELKA Croft

## 2022-11-08 NOTE — PLAN OF CARE
Goal Outcome Evaluation:      Plan of Care Reviewed With: patient    Overall Patient Progress: no changeOverall Patient Progress: no change       Cognitive/Mentation: A/Ox 4  Neuros/CMS: Intact ex generalized weakness, BLE 3/5  VS: stable ex tachycardic  GI: BS active, + flatus, last BM yesterday. Incontinent.  : Voiding. Incontinent. Purewick in place  Pulmonary: LS diminished at bases.Cough.   Pain: buttocks pain--dilaudid given     Drains/Lines: Port  Skin: buttocks would, redness/excoriation/rash on lindsay area. Wound on coccyx   Activity: Assist x 2 with clyde MARSHALL.  Diet: Regular with thin liquids. Takes pills whole one at a time.      Therapies recs: TCU  Discharge: pending bed availability     Aggression Stoplight Tool: green     End of shift summary: nauseous this AM--improved after PO compazine. Nauseous again in the afternoon--ODT zofran given.

## 2022-11-08 NOTE — PROGRESS NOTES
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    Assessment & Plan     Acute metabolic encephalopathy most likely secondary to below:  Suspected traumatic SDH   reports increased difficulty with gait/balance since fall 10 days prior to admission, 48 hours before the presentation patient was increasingly confused with difficulty with speech and was essentially nonverbal on the morning of presentation to the ER.  CT scan of the head showed left holohemispheric subdural fluid collection with 2 mm midline shift.  On further questioning has been reported fall on October 8, unknown if patient had any head trauma at that time.  Her presentation was thought to be secondary to combination of subdural fluid collections along with marked electrolyte abnormalities as patient presented with uremia, hyponatremia and hyperkalemia along with possible intracranial metastasis versus infection.    -- Patient was seen and examined, feeling well, questions regarding discharge, discussed with her that care coordinators and social workers are working diligently on her discharge plan and they have sent referrals to numerous facilities.  -- Continue Afrin for Bilateral nares   -- During hospitalization, patient was evaluated by neurology and neurosurgery along with neuro critical care, no surgical interventions are  recommended at this point.  --Has been a started on Keppra 500 mg p.o. twice daily  -- Goal SBP <160 now , head of bed 30 degrees  -- Goal platelets of > 50, conditional orders placed, received multiple transfusions for platelets and blood  -- On moderate carb controlled diet per Speech path   --  Very Sad and gets tearful when discuss about disposition plan as she is eager to be discharged      Acute on Chronic anemia  Baseline hgb about 8 g/dL.   -- S/p transfusion of 1 unit 10/31, hemoglobin has been stable since   -- labs for reversible causes including iron deficiency, b12, folate , in normal range , Vitmain B 12 is slightly towards the higher range   -- Hgb stable around 8.3 this morning after undergoing I&D yesterday      Mild covid 19 infection, completed 10 days Isolation   No hypoxemia  - completed 3 days of prophylactic Remdesivir  - off special precautions 10/30     Left lower lobe pneumonia associated with cough  -- Patient has completed Levaquin for 5 days      Thrombocytopenia  Abnormal coags  Admission platelet 38, INR 1.27 and PTT 38.  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   reported this has been increasing in size over past week as she has spent more time in her chair.    -- Admission UA with >182 WBC, leukocyte esterase.  -- Outside CXR with mild left basilar opacity  -- Procalcitonin of 8, unclear significance in the setting of significant renal dysfunction  -- S/P  completion of empiric zosyn  -- S/p bedside debridement by surgery   -- As there was concern for eschar overlying chronic a stage IV sacral decubitus ulcer, Dr. Solorio is planning I&D in OR on 11/04, highly appreciate their help.  -- general surgery has signed off and recommending wound care follow up on Monday  -- review their note regarding the wound care   --Continue on Valtrex 1000 mg p.o. 3 times daily.  -- Wound care would be following up with patient on Thursday if still in hospital  -- currently discharge recommendations are in place      Hyponatremia  Hyperkalemia improved now with mild hypokalemia with potassium of 3.1  Uremia  Anion gap metabolic acidosis  KATHLEEN  Cr as low as 0.8 in June 2022, on most recent check Sept 2022 was 1.13.  Admission Na 127, K 7.2, bicarb 16 (gap down to 14), .   reporting minimal oral intake over past 2 days.  Edema on exam, but suspect intravascularly dry with hypoalbuminemia and poor oral intake.    shifted potassium with insulin overnight    -- Repeat BMP daily, sodium is in normal range this morning , will avoid BW tomorrow and start doing blood work intermittently   -- check BMP tomorrow morning   -- off IV fluids   -- Nephrology consult placed on admission , initially followed, now they have signed off  -- Creatinine normalized now      Stage IV metastatic breast cancer (mets to brain, liver, bone) s/p mastectomy  Elevated LFT's  Followed by [facility].  Currently on immunotherapy and receiving radiation to spine (completed brain radiation for cerebellar mets).  Recent baseline LFT's unknown, suspect due to liver mets.  No signs of tenderness on exam.      [facility] Appreciate assistance   -- After TCU recovery , patient is recommended to have a follow up with oncology  -- Requested palliative team consultation to address symptom management associated with malignancy as patient is feeling depressed, mostly tearful at the time of my  "evaluation and would benefit from some supportive management.  --Patient has also been receiving SSRI, discussed the case with palliative team who will kindly evaluate the patient tomorrow and if needed then will let the primary team know if patient also needs to be seen by psychiatry.     HFrEF due to doxorubicin  Type 2 MI due to supply demand mismatch  Sinus tachycardia  TTE 6/2022 with EF 35-40% with global LV hypokinesis.  Outside trop 183, suspect demand ischemia.    Echocardiogram with EF of 40-45%    -- Continue to  monitor I/O's and daily weights  -- Patient was started on Metoprolol 25mg PO BID , decreased dose to 12.5 mg po BID due to soft blood pressure   -- will change holding parameters and will make them more liberal      DM II with peripheral neuropathy  -- Continue low dose SSI  -- Hemoglobin A1c at 6.4% indicating good control      Depression  -- Continue PTA cymbalta     Insomnia  -- Patient requested Ambien to help sleep at ordered for her, reduced dose due to excissive somnolence, tolerating low dose     Clinically Significant Risk Factors              # Hypoalbuminemia: Lowest albumin = 1.9 g/dL (Ref range: 3.5-5.2) at 10/18/2022  5:37 PM, will monitor as appropriate   # Thrombocytopenia: Lowest platelets = 84 (Ref range: 150-450) in last 2 days, will monitor for bleeding        # Overweight: Estimated body mass index is 27.88 kg/m  as calculated from the following:    Height as of this encounter: 1.651 m (5' 5\").    Weight as of this encounter: 76 kg (167 lb 8.8 oz).   # Moderate Malnutrition: based on nutrition assessment        Diet: Room Service  Combination Diet Moderate Consistent Carb (60 g CHO per Meal) Diet; Regular Diet    Jiang Catheter: Not present  DVT Prophylaxis: Pneumatic compression stockings, ambulate every shift, avoiding Lovenox at this point as the platelet counts is less than 100,000.   Code Status: Full Code    The patient's care was discussed with the " Patient.    Disposition Plan      Expected Discharge Date: 11/08/2022, 12:00 PM Patient is medically stable for discharge , appreciate SW and CC looking into placement options   Discharge Delays: Placement - TCU    Discharge Comments: COVID +     Entered: Glory Pearl MD 11/08/2022, 7:53 AM     Glory Pearl MD, MD, [facility]  Text Page (7am - 6pm)    ----------------------------------------------------------------------------------------------------------------------    Interval History      Patient was seen and examined, lying in bed, denying any new complaints.  Again feels sad and depressed about the fact that she is a still in the hospital, hoping to be discharged from the hospital soon, discussed with her that I have been discussing with care coordinators in her case on daily basis and they have sent numerous referrals many of the facilities have not been able to accept her due to high complexity of care.    -Data reviewed today: I reviewed all new labs and imaging results over the last 24 hours.    I personally reviewed no images or EKG's today.    Physical Exam   Temp: 97.7  F (36.5  C) Temp src: Oral BP: 101/65 Pulse: 72   Resp: 16 SpO2: 98 % O2 Device: None (Room air)    Vitals:    10/18/22 1700 10/19/22 0430 10/26/22 1939   Weight: 76.2 kg (167 lb 15.9 oz) 76 kg (167 lb 8.8 oz) 76 kg (167 lb 8.8 oz)     Vital Signs with Ranges  Temp:  [97.3  F (36.3  C)-98.3  F (36.8  C)] 97.7  F (36.5  C)  Pulse:  [] 72  Resp:  [16-18] 16  BP: ()/(62-68) 101/65  SpO2:  [95 %-98 %] 98 %  I/O last 3 completed shifts:  In: 240 [P.O.:240]  Out: 850 [Urine:850]    GENERAL: Alert , awake and oriented. NAD. Conversational, appropriate.   HEENT: Normocephalic. EOMI. No icterus or injection. Nares normal.   LUNGS: Clear to auscultation. No dyspnea at rest.   HEART: Regular rate. Extremities perfused.   ABDOMEN: Soft, nontender, and nondistended. Positive bowel sounds.   EXTREMITIES: No LE edema noted.   SKIN: Patient does  have sacral wound along with right buttock wounds, some satellite lesions consistent with candidiasis of the also been noticed in the perineal area.  NEUROLOGIC: Moves extremities x4 on command. No acute focal neurologic abnormalities noted.     Medications       alteplase  1 mg Intravenous Once     docusate sodium  100 mg Oral Daily     DULoxetine  30 mg Oral QAM     DULoxetine  60 mg Oral At Bedtime     [Held by provider] furosemide  40 mg Oral Daily     guaiFENesin  600 mg Oral BID     heparin  5-10 mL Intracatheter Q28 Days     heparin lock flush  5-10 mL Intracatheter Q24H     levETIRAcetam  500 mg Oral BID     [Held by provider] lisinopril  2.5 mg Oral Daily     metoprolol tartrate  12.5 mg Oral BID     multivitamin w/minerals  1 tablet Oral Daily     nystatin   Topical BID     oxybutynin  5 mg Oral BID     oxybutynin ER  10 mg Oral QPM     pantoprazole  40 mg Oral At Bedtime     sodium chloride (PF)  10-20 mL Intracatheter Q28 Days     valACYclovir  1,000 mg Oral TID     zolpidem  2.5 mg Oral At Bedtime       Data   Recent Labs   Lab 11/08/22  0651 11/05/22  0643 11/04/22  1132 11/04/22  0614 11/02/22  1336   WBC 10.6 6.7  --   --  5.6   HGB 10.1* 8.3*  --   --  8.4*   MCV 97 96  --   --  91   PLT 84* 70*  --   --  58*   NA  --  135  --  128* 128*   POTASSIUM  --  3.9  --  3.9 3.5   CHLORIDE  --  104  --  99 100   CO2  --  21  --  20 23   BUN  --  14  --  16 13   CR  --  0.59  --  0.57 0.58   ANIONGAP  --  10  --  9 5   BENJI  --  7.8*  --  7.8* 8.0*   GLC  --  113* 95 134* 133*       Imaging:   No results found for this or any previous visit (from the past 24 hour(s)).

## 2022-11-09 NOTE — PROGRESS NOTES
"[facility] Nurse Inpatient Assessment     Consulted for: sacral         Areas Assessed:      Areas visualized during today's visit: Focused: and Sacrum/coccyx    Wound location: sacrum and buttock     Last photo: 11/9  Wound due to: Pressure Injury community acquired, post debridement 10/26, stage 4 pressure injury   Wound history/plan of care: found covered with nugauze and mepilex   Wound base:  eschar, slough and bone     Palpation of the wound bed: firm      Drainage: small     Description of drainage: serosanguinous     Measurements (length x width x depth, in cm): 6  x 5  x  1 cm      Tunneling: N/A     Undermining: up to 1 cm from circumferential   Periwound skin: Rash, suspect herpatic lesions, improved based on prior assessment photos        Color: pink      Temperature: normal   Odor: none  Pain: no grimacing or signs of discomfort , no complaint of pain, complaint of thirst   Treatment goal: Protection, support moist wound healing   STATUS: unchanged  Supplies ordered: gathered and at bedside       Treatment Plan:      Wound care  Start:  11/09/22 1800,   2 TIMES DAILY,   Routine        Comments: Location: buttock   Care: provided BID by primary RN   1. Provide incontinence care every 2 hours with routine turns, apply camoseptine ointment to perianal BID and PRN   2. Check to ensure mepilex / treatment is in place over sacrum BID   3. Change dressing to sacrum daily, apply Plurogel ( # [id number]) onto Adaptic gauze then press into wound, cover with 4x4\" mepilex to secure, change daily         Skin care precautions  Start:  11/09/22 1700,   EFFECTIVE NOW,   Routine        Comments: Pressure Injury Prevention (PIP) Plan:   If patient is declining pressure injury prevention interventions: Explore reason why and address patient's concerns, Educate on pressure injury risk and prevention intervention(s), If patient is still declining, document \"informed refusal\" , and Ensure Care " team is aware ( provider, charge nurse, etc)   Mattress: Follow bed algorithm, reassess daily and order specialty mattress, if indicated.   HOB: Maintain at or below 30 degrees, unless contraindicated   Repositioning in bed: Every 1-2 hours , Left/right positioning; avoid supine, and Raise foot of bed prior to raising head of bed, to reduce patient sliding down (shear)   Heels: Keep elevated off mattress   Chair positioning: Chair cushion (#[id number]) , Assist patient to reposition hourly, and Do NOT use a donut for sitting (this increases pressure to smaller area and creates a higher potential for injury)     If patient has a buttock pressure injury, or high risk for PI use chair cushion or SPS.   Moisture Management: Perineal cleansing /protection: Follow Incontinence Protocol, Avoid brief in bed, Clean and dry skin folds with bathing , and Moisturize dry skin   Under Devices: Inspect skin under all medical devices during skin inspection , Ensure tubes are stabilized without tension, and Ensure patient is not lying on medical devices or equipment when repositioned   Ask provider to discontinue device when no longer needed.        11/09/22 1651  menthol-zinc oxide (CALMOSEPTINE) 0.44-20.6 % ointment OINT  Topical,   2 TIMES DAILY               Orders: Reviewed and Updated    RECOMMEND PRIMARY TEAM ORDER: None, at this time  Education provided: plan of care  Discussed plan of care with: Patient and nursing assistant   WO nurse follow-up plan: weekly  Notify WOC if wound(s) deteriorate.  Nursing to notify the Provider(s) and re-consult the WO Nurse if new skin concern.    DATA:     Current support surface: Standard  pulsate  Containment of urine/stool: Incontinence Protocol and Purewick external catheter   BMI: Body mass index is 27.88 kg/m .   Active diet order: Orders Placed This Encounter      Combination Diet Moderate Consistent Carb (60 g CHO per Meal) Diet; Regular Diet     Output: I/O last 3 completed  shifts:  In: 480 [P.O.:480]  Out: 1125 [Urine:1125]     Labs: Recent Labs   Lab 11/08/22  0651   HGB 10.1*   WBC 10.6     Pressure injury risk assessment:   Sensory Perception: 3-->slightly limited  Moisture: 3-->occasionally moist  Activity: 2-->chairfast  Mobility: 2-->very limited  Nutrition: 4-->excellent  Friction and Shear: 1-->problem  Zhao Score: 15    Qi ANDERSON   Dept. Pager: [phone]  Dept. Office Number: [phone]

## 2022-11-09 NOTE — CONSULTS
[facility]  Palliative Care Consultation Note    Patient: Elenita Moran  Date of Admission:  10/18/2022    Requesting Clinician / Team: Dr. Pearl/Hospitalist   Reason for consult: Symptom management, Patient and family support    Recommendations:    Pt does have notable situational frustration, disappointment, and discouragement from her protracted hospital stay. She names that this will improve once she can get out of the hospital     No specific role for our team at this time. Pt will benefit from reengaging with her outpatient palliative team (Dr. Gayatri Chaves at [facility]) and her therapist, once discharge is affirmed     These recommendations have been discussed with unit JERRELL Elizabeth.    KRYSTLE Mcintosh [facility]  Contact information available via [facility] Paging/Directory      Thank you for the opportunity to participate in the care of this patient and family. Our team: does not plan on following further, however do not hesitate to call or re-consult if we can be of further assistance to the patient/family.     During regular M-F work hours (3885-2288) -- if you are not sure who specifically to contact -- please contact us on [facility] Smart Web.     After regular work hours and on weekends/holidays, you can call our answering service at [phone].     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, depression, SDH, protracted hospital stay     Assessments:  Elenita Moran is a 67 year old female with PMH significant for stage IV metastatic breast cancer with metastasis to brain, liver, bone s/p mastectomy currently on enhurtu and s/p SBRT to T5 lesion and stereotactic radiation to cerebellar mets,  heart failure with reduced ejection fraction secondary to doxorubicin, diabetes mellitus, depression, and 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 She has completed isolation and treatment for a mild covid-19 infection and LLL PNA. She is s/p bedside debridement of her sacral pressure injury on 10/26.     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 Introduced myself and our services; assistance in pain and symptom management, emotional and spiritual support, and complex medical decision making.    Elenita is seen resting in bed. She has a very weak, quiet vocal quality. She has poor eye contact. She is intermittently tearful in exploring the frustration, disappointment, and discouragement of her protracted hospital stay. She anticipates this will improve once she is discharged.     Elenita works with palliative care (Dr. Gayatri Chaves) through [facility]. She also has a therapist.     She does not name any worries or concerns.    She is hoping to be discharged.     She and I cannot identify any palliative needs at this time.     Prognosis, Goals, & Planning:      Functional Status just prior to hospitalization: Not assessed      Prognosis, Goals, and/or Advance Care Planning were addressed today: No      Patient's decision making preferences: not assessed          Patient has decision-making capacity today for complex decisions: Yes            I have concerns about the patient/family's health literacy today: No           Patient has a completed Health Care Directive: Yes, and on file.      Code status: Full Code    Coping, Meaning, & Spirituality:   Mood, coping,  and/or meaning in the context of serious illness were addressed today: Yes    Social:     Key family / caregivers: Supportive  Denilson    History of Present Illness:  History gathered today from: patient, medical chart, medical team members, unit team members, health care directive/s    Elenita Westderek Moran is a 67 year old female with PMH significant for stage IV metastatic breast cancer with metastasis to brain, liver, bone s/p mastectomy currently on enhurtu and s/p SBRT to T5 lesion and stereotactic radiation to cerebellar mets, heart failure with reduced ejection fraction secondary to doxorubicin, diabetes mellitus, depression, and 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 She has completed isolation and treatment for a mild covid-19 infection and LLL PNA. She is s/p bedside debridement of her sacral pressure injury on 10/26.     Key Palliative Symptom Data:  We are not helping to manage these symptoms currently in this patient.    Patient is on opioids: bowels not assessed today.    ROS:  Comprehensive ROS is reviewed and is negative except as here & per HPI: N/A     Past Medical History:  Past Medical History:   Diagnosis Date     Allergic rhinitis      Bone cancer (H) 2011    breast mets     Breast cancer (H) 2010    left mastectomy     Depression      DM (diabetes mellitus) (H)      Fibromyalgia      Hx antineoplastic chemotherapy 2010     Hx of radiation therapy 2010     Hyperlipemia      Lactose intolerance      Mini stroke     R EYE     Osteoarthritis      Tobacco dependency         Past Surgical History:  Past Surgical History:   Procedure Laterality Date     APPENDECTOMY       INSERT INTRACORONARY STENT  1985    R Hand     IR CHEST PORT PLACEMENT > 5 YRS OF AGE  12/12/2019     IR CHEST PORT PLACEMENT > 5 YRS OF AGE  3/14/2022     IR  LUMBAR TRANSFORAMINAL EPIDURAL STEROID INJECTION  12/5/2019     IR LUMBAR TRANSFORAMINAL EPIDURAL STRD INJ  12/5/2019     IR PORT PLACEMENT >5 YEARS  12/12/2019     IR PORT REMOVAL RIGHT  3/14/2022     IR SITE CHECK/EVALUATION  4/15/2022     IRRIGATION AND DEBRIDEMENT BUTTOCKS Bilateral 11/4/2022    Procedure: INCISION AND DRAINAGE, SACRUM;  Surgeon: Burak Solorio MD;  Location: [facility] OR     MAMMOPLASTY REDUCTION Right 2015    hx of left mastectomy and right breast reduction     MASTECTOMY Left 2010     OTHER SURGICAL HISTORY      biopsy of upper and right tongue     OVARIAN CYST REMOVAL       REVISE RECONSTRUCTED BREAST Left     March 11, 2015         Family History:  Family History   Problem Relation Age of Onset     Lung Cancer Mother      Pancreatic Cancer Mother      Kidney Cancer Maternal Grandmother      Lung Cancer Paternal Aunt      Breast Cancer Cousin          Allergies:  Allergies   Allergen Reactions     Gabapentin      Upper body edema/swelling.         Morphine Visual Disturbance     Visual hallucinations     Sulfamethoxazole-Trimethoprim      Other reaction(s): Hives     Sulfa Drugs Hives and Rash     welts          Medications:  I have reviewed this patient's medication profile and medications from this hospitalization.   Noted scheduled meds are:  Colace   Duloxetine 30mg PO QAM   Mucinex   Keppra 500mg PO BID   Oxybutynin 5mg PO BID and 10mg PO at bedtime   PPI  Valtrex 1g PO TID   Ambien 2.5mg PO at bedtime     Noted PRN meds are:  Dilaudid 2mg PO TID PRN pain     Physical Exam:  Vital Signs: Temp: 98.4  F (36.9  C) Temp src: Oral BP: 122/60 Pulse: 80   Resp: 16 SpO2: 94 % O2 Device: None (Room air)    Weight: 167 lbs 8.79 oz  CONSTITUTIONAL: Chronically ill woman seen resting in bed in NAD, weak, quiet vocal quality, intermittently tearful in exploring her situation, poor eye contact     Data reviewed:  Recent imaging reviewed, my comments on pertinents:   Results for orders placed or performed  during the hospital encounter of 10/18/22   CT Head w/o Contrast    Impression    IMPRESSION:  1.  No significant change.   CT Head w/o Contrast    Impression    IMPRESSION:  1.  No significant change in appearance of the left-sided subdural hematoma since head CT 10/19/2022. Persistent slight mass effect on the left cerebral hemisphere and left lateral ventricle without midline shift or herniation.     CT Head w/o Contrast    Impression    IMPRESSION:  1.  No significant change since 10/23/2022.  2.  Evolving small left convexity subdural hematoma without progressive mass effect or new hemorrhage.   XR Chest 2 Views    Impression    IMPRESSION: Left lower lobe infiltrate. Remaining lungs clear of  infiltrate. The cardiac silhouette is not enlarged. Pulmonary  vasculature is unremarkable.     ASH PEREZ MD         SYSTEM ID:  [id number]   Echocardiogram Complete   Result Value Ref Range    LVEF  40-45%        Recent lab data reviewed, my comments on pertinents:   Na 130  K 4.6  Creat 0.6  WBC 10.6  Hgb 10.1  Plt 84

## 2022-11-09 NOTE — PLAN OF CARE
Goal Outcome Evaluation:      Plan of Care Reviewed With: patient    Overall Patient Progress: no changeOverall Patient Progress: no change       Cognitive/Mentation: A/Ox 4  Neuros/CMS: Intact ex generalized weakness, BLE 3/5  VS: stable ex tachycardic  GI: BS active, + flatus, last BM yesterday. Incontinent.  : Voiding. Incontinent. Purewick in place  Pulmonary: LS diminished at bases.Cough.   Pain: denies      Drains/Lines: Port  Skin: buttocks would, redness/excoriation/rash on lindsay area. Wound on coccyx   Activity: Assist x 2 with clyde MARSHALL.  Diet: Regular with thin liquids. Takes pills whole one at a time.      Therapies recs: TCU  Discharge: pending bed availability     Aggression Stoplight Tool: green     End of shift summary: No nausea today. Pt continues to feel down.       ADDENDUM 7121-8120: No changes. Wound changed by WOC. Dilaudid given x1 for buttocks pain.

## 2022-11-09 NOTE — PLAN OF CARE
Cognitive/Mentation: A/Ox 4  Neuros/CMS: Intact ex generalized weakness, BLE 3/5  VS: VSS ex soft BP at times. Evening dose of metoprolol held for low SBP. On RA.  GI: BS active, + flatus, last BM yesterday. Incontinent.  : Voiding. Incontinent. Purewick in place  Pulmonary: LS diminished at. Pt denied need for PRN Robitussin overnight.  Pain: Pt denied pain overnight.     Drains/Lines: Port, WDL. Hep locked.  Skin: buttocks wound - see WOC note/order, redness/excoriation/rash on lindsay area.      Activity: Ax2 w/cldye steady & GB. Pt sat up to edge of bed for a few minutes overnight.  Diet: Reg w/ thin liquids. Takes pills whole one at a time.      Therapies recs: TCU  Discharge: pending bed availability     Aggression Stoplight Tool: green     End of shift summary: Pt slept intermittently overnight, no changes this shift. Plan for palliative consult.

## 2022-11-09 NOTE — PROGRESS NOTES
[facility]    Medicine Progress Note - Hospitalist Service    Date of Admission:  10/18/2022    Assessment & Plan   Elenita Moran is a 67 year old female who was admitted on 10/18/2022 with past medical history significant for the stage IV metastatic breast cancer with metastasis to brain and liver bone, s/p mastectomy, heart failure with reduced ejection fraction secondary to doxorubicin, Tumor diabetes mellitus, depression and is stage IV sacral pressure ulcer who was admitted to the hospital secondary to acute metabolic encephalopathy and was found to have traumatic subdural hemorrhage.         Assessment & Plan     Acute metabolic encephalopathy most likely secondary to below:  Suspected traumatic SDH   reports increased difficulty with gait/balance since fall 10 days prior to admission, 48 hours before the presentation patient was increasingly confused with difficulty with speech and was essentially nonverbal on the morning of presentation to the ER.  CT scan of the head showed left holohemispheric subdural fluid collection with 2 mm midline shift.  On further questioning has been reported fall on October 8, unknown if patient had any head trauma at that time.  Her presentation was thought to be secondary to combination of subdural fluid collections along with marked electrolyte abnormalities as patient presented with uremia, hyponatremia and hyperkalemia along with possible intracranial metastasis versus infection.  Plan   -- During hospitalization, patient was evaluated by neurology and neurosurgery along with neuro critical care, no surgical interventions are recommended at this point.  -- Has been started on Keppra 500 mg p.o. twice daily  -- Goal SBP <160 now , head of bed 30 degrees  -- Goal platelets of > 50, conditional orders placed, received multiple transfusions for platelets and blood  -- On moderate carb controlled diet per Speech path   -- Very Sad and  gets tearful when discuss about disposition plan as she is eager to be discharged   -- Continue Afrin for Bilateral nares      Acute on Chronic anemia  Baseline hgb about 8 g/dL.   -- S/p transfusion of 1 unit 10/31, hemoglobin has been stable since   -- labs for reversible causes including iron deficiency, b12, folate , in normal range , Vitmain B 12 is slightly towards the higher range   -- Hgb stable around 8.3 this morning after undergoing I&D yesterday      Mild covid 19 infection, completed 10 days Isolation   No hypoxemia  - completed 3 days of prophylactic Remdesivir  - off special precautions 10/30     Left lower lobe pneumonia associated with cough  -- Patient has completed Levaquin for 5 days      Thrombocytopenia  Abnormal coags  Admission platelet 38, INR 1.27 and PTT 38.  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   reported this has been increasing in size over past week as she has spent more time in her chair.    -- Admission UA with >182 WBC, leukocyte esterase.  -- Outside CXR with mild left basilar opacity  -- Procalcitonin of 8, unclear significance in the setting of significant renal dysfunction  -- S/P completion of empiric zosyn  -- S/p bedside debridement by surgery   -- As there was concern for eschar overlying chronic a stage IV sacral decubitus ulcer, Dr. Solorio is planning I&D in OR on 11/04, highly appreciate their help.  -- general surgery has signed off and recommending  wound care follow up on Monday  -- review their note regarding the wound care   --Continue on Valtrex 1000 mg p.o. 3 times daily.  -- Wound care would be following up with patient on Thursday if still in hospital  -- currently discharge recommendations are in place      Hyponatremia  Hyperkalemia improved now with mild hypokalemia with potassium of 3.1  Uremia  Anion gap metabolic acidosis  KATHLEEN  Cr as low as 0.8 in June 2022, on most recent check Sept 2022 was 1.13.  Admission Na 127, K 7.2, bicarb 16 (gap down to 14), .   reporting minimal oral intake over past 2 days.  Edema on exam, but suspect intravascularly dry with hypoalbuminemia and poor oral intake.    shifted potassium with insulin overnight     -- Repeat BMP daily, sodium is in normal range this morning , will avoid BW tomorrow and start doing blood work intermittently   -- check BMP tomorrow morning   -- off IV fluids   -- Nephrology consult placed on admission , initially followed, now they have signed off  -- Creatinine normalized now      Stage IV metastatic breast cancer (mets to brain, liver, bone) s/p mastectomy  Elevated LFT's  Followed by Dr. Varma of [facility].  Currently on immunotherapy and receiving radiation to spine (completed brain radiation for cerebellar mets).  Recent baseline LFT's unknown, suspect due to liver mets.  No signs of tenderness on exam.       -- [facility] consulted. Appreciate assistance   -- After TCU recovery , patient is recommended to have a follow up with oncology  -- Requested palliative team consultation to address symptom management associated with malignancy as patient is feeling depressed, mostly tearful at the time of my evaluation and would benefit from some supportive management.  --Patient has also been receiving SSRI, discussed the case with palliative team who will kindly evaluate the patient tomorrow and if needed then will let the primary team know if patient also needs to be seen by  "psychiatry.     HFrEF due to doxorubicin  Type 2 MI due to supply demand mismatch  Sinus tachycardia  TTE 6/2022 with EF 35-40% with global LV hypokinesis.  Outside trop 183, suspect demand ischemia.    Echocardiogram with EF of 40-45%     -- Continue to  monitor I/O's and daily weights  -- Patient was started on Metoprolol 25mg PO BID , decreased dose to 12.5 mg po BID due to soft blood pressure   -- will change holding parameters and will make them more liberal      DM II with peripheral neuropathy  -- Continue low dose SSI  -- Hemoglobin A1c at 6.4% indicating good control      Depression  -- Continue PTA cymbalta     Insomnia  -- Patient requested Ambien to help sleep at ordered for her, reduced dose due to excissive somnolence, tolerating low dose              Diet: Room Service  Combination Diet Moderate Consistent Carb (60 g CHO per Meal) Diet; Regular Diet    DVT Prophylaxis: Pneumatic Compression Devices  Jiang Catheter: Not present  Central Lines: PRESENT     Cardiac Monitoring: None  Code Status: Full Code      Disposition Plan      Expected Discharge Date: 11/11/2022, 12:00 PM  Discharge Delays: Placement - TCU    Discharge Comments: COVID +        The patient's care was discussed with the Patient.    Theresa Hernandez MD  Hospitalist Service  [facility]  Securely message with the Vocera Web Console (learn more here)  Text page via [facility] Paging/Directory         Clinically Significant Risk Factors         # Hyponatremia: Lowest Na = 130 mmol/L (Ref range: 136-145) in last 2 days, will monitor as appropriate      # Hypoalbuminemia: Lowest albumin = 1.9 g/dL (Ref range: 3.5-5.2) at 10/18/2022  5:37 PM, will monitor as appropriate   # Thrombocytopenia: Lowest platelets = 84 (Ref range: 150-450) in last 2 days, will monitor for bleeding        # Overweight: Estimated body mass index is 27.88 kg/m  as calculated from the following:    Height as of this encounter: 1.651 m (5' 5\").    Weight " as of this encounter: 76 kg (167 lb 8.8 oz).   # Moderate Malnutrition: based on nutrition assessment        ______________________________________________________________________    Interval History   No complaints  Pending placement.     Data reviewed today: I reviewed all medications, new labs and imaging results over the last 24 hours. I personally reviewed no images or EKG's today.    Physical Exam   Vital Signs: Temp: 99.5  F (37.5  C) Temp src: Oral BP: 122/84 Pulse: 110   Resp: 16 SpO2: 95 % O2 Device: None (Room air)    Weight: 167 lbs 8.79 oz  General Appearance: Well appearing for stated age.  Respiratory: CTAB, no rales or ronchi  Cardiovascular: S1, S2 normal, no murmurs  GI: non-tender on palpation, BS present      Data   Recent Labs   Lab 11/08/22  0651 11/05/22  0643 11/04/22  1132 11/04/22  0614   WBC 10.6 6.7  --   --    HGB 10.1* 8.3*  --   --    MCV 97 96  --   --    PLT 84* 70*  --   --    * 135  --  128*   POTASSIUM 4.6 3.9  --  3.9   CHLORIDE 100 104  --  99   CO2 17* 21  --  20   BUN 15 14  --  16   CR 0.60 0.59  --  0.57   ANIONGAP 13 10  --  9   BENJI 8.3* 7.8*  --  7.8*   * 113* 95 134*     No results found for this or any previous visit (from the past 24 hour(s)).

## 2022-11-10 NOTE — PLAN OF CARE
Goal Outcome Evaluation:     Pt here with Acute metabolic encephalopathy 2* traumatic SDH, also community acquired pneumonia & pressure injury.  Pt lethargic but oriented x4 and follows commands, whisper voice, 1-4 word but appropriate responses. Very polite. Turned & repositioned q2h, calmoseptine applied, coccyx dressing intact. A&O. Neuros intact except weak 3/5 UE, 2/5 LE. VSS. Tele SR/ST. Mod CHO diet, thin liquids. Takes pills whole with water; pt took 20 minutes to drink 1 cup water with supervision this AM to ensure no spilling--moved very very slowly but no intervention needed to prevent spill, later drank 1 cup coffee (was not interested in anything else off breakfast tray), afternoon drank 1 full supplement.  Not up to chair today, very lethargic--affirmed abnormal by spouse, MD updated. LE edema, MD adding 1 dose lasix.  Denies pain / no dilaudid given on day shift (prior shift gave at 0600).  Pt scoring green on the Aggression Stop Light Tool. Palliative, WOC, gen surg following. Discharge pending placement--slow process.

## 2022-11-10 NOTE — PROGRESS NOTES
Orientation/Cognitive: A&Ox4  Mobility Level/Assist Equipment: 2A lift T/R  Fall Risk (Y/N): Y  Behavior Concerns: N/A  Pain Management: Denies pain   Tele/VS/O2: HR elevated MD notified and BP soft  ABNL Lab/BG:    Diet: Mod carb  Refused dinner   Bowel/Bladder: Incontinent purewick in place changed this evening   Skin Concerns: Buttocks wound changed this evening, redness on groin fold and abdominal fold   Drains/Devices: L upper port heparin locked  Tests/Procedures for next shift: N/A  Anticipated DC date & active delays: TBD  Patient Stated Goal for Today: no goal stated

## 2022-11-10 NOTE — PROGRESS NOTES
[facility]    Medicine Progress Note - Hospitalist Service    Date of Admission:  10/18/2022    Assessment & Plan   Elenita Moran is a 67 year old female who was admitted on 10/18/2022 with past medical history significant for the stage IV metastatic breast cancer with metastasis to brain and liver bone, s/p mastectomy, heart failure with reduced ejection fraction secondary to doxorubicin, Tumor diabetes mellitus, depression and is stage IV sacral pressure ulcer who was admitted to the hospital secondary to acute metabolic encephalopathy and was found to have traumatic subdural hemorrhage.         Assessment & Plan     Acute metabolic encephalopathy most likely secondary to below:  Suspected traumatic SDH  * reports increased difficulty with gait/balance since fall 10 days prior to admission, 48 hours before the presentation patient was increasingly confused with difficulty with speech and was essentially nonverbal on the morning of presentation to the ER.  *CT scan of the head showed left holohemispheric subdural fluid collection with 2 mm midline shift.  On further questioning has been reported fall on October 8, unknown if patient had any head trauma at that time.  Her presentation was thought to be secondary to combination of subdural fluid collections along with marked electrolyte abnormalities as patient presented with uremia, hyponatremia and hyperkalemia along with possible intracranial metastasis versus infection.  Plan   -- During hospitalization, patient was evaluated by neurology and neurosurgery along with neuro critical care, no surgical interventions are recommended at this point.  -- Has been started on Keppra 500 mg p.o. twice daily  -- Goal SBP <160 now , head of bed 30 degrees  -- Goal platelets of > 50, conditional orders placed, received multiple transfusions for platelets and blood  -- On moderate carb controlled diet per Speech path   -- Very Sad and  gets tearful when discuss about disposition plan as she is eager to be discharged   -- Continue Afrin for Bilateral nares      Acute on Chronic anemia  Baseline hgb about 8 g/dL.   -- S/p transfusion of 1 unit 10/31, hemoglobin has been stable since   -- labs for reversible causes including iron deficiency, b12, folate , in normal range , Vitmain B 12 is slightly towards the higher range   -- Hgb stable around 8.3 this morning after undergoing I&D yesterday      Mild covid 19 infection, completed 10 days Isolation   No hypoxemia  - completed 3 days of prophylactic Remdesivir  - off special precautions 10/30     Left lower lobe pneumonia associated with cough  -- completed Levaquin for 5 days      Thrombocytopenia  Abnormal coags  Admission platelet 38, INR 1.27 and PTT 38.  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   reported this has been increasing in size over past week as she has spent more time in her chair.    -- Admission UA with >182 WBC, leukocyte esterase.  -- Outside CXR with mild left basilar opacity  -- Procalcitonin of 8, unclear significance in the setting of significant renal dysfunction  -- S/P completion of empiric zosyn  -- S/p bedside debridement by surgery   -- As there was concern for eschar overlying chronic a stage IV sacral decubitus ulcer, Dr. Solorio is planning I&D in OR on 11/04, highly appreciate their help.  -- general surgery has signed off and recommending wound care  follow up on Monday  -- review their note regarding the wound care   --Continue on Valtrex 1000 mg p.o. 3 times daily.  -- Wound care would be following up with patient on Thursday if still in hospital  -- currently discharge recommendations are in place      Hyponatremia  Hyperkalemia improved now with mild hypokalemia with potassium of 3.1  Uremia  Anion gap metabolic acidosis  KATHLEEN  Cr as low as 0.8 in June 2022, on most recent check Sept 2022 was 1.13.  Admission Na 127, K 7.2, bicarb 16 (gap down to 14), .   reporting minimal oral intake over past 2 days.  Edema on exam, but suspect intravascularly dry with hypoalbuminemia and poor oral intake.    shifted potassium with insulin overnight     -- Nephrology consult placed on admission , initially followed, now they have signed off  -- Creatinine normalized now   -- Na now trending down 11/08, will restart on lasix that has been on hold.   -- Monitor Na daily     Stage IV metastatic breast cancer (mets to brain, liver, bone) s/p mastectomy  Elevated LFT's  Followed by Dr. Varma of [facility].  Currently on immunotherapy and receiving radiation to spine (completed brain radiation for cerebellar mets).  Recent baseline LFT's unknown, suspect due to liver mets.  No signs of tenderness on exam.       -- [facility] consulted. Appreciate assistance   -- After TCU recovery , patient is recommended to have a follow up with oncology  -- Requested palliative team consultation to address symptom management associated with malignancy as patient is feeling depressed, mostly tearful at the time of my evaluation and would benefit from some supportive management.  --Patient has also been receiving SSRI, discussed the case with palliative team who will kindly evaluate the patient tomorrow and if needed then will let the primary team know if patient also needs to be seen by psychiatry.     HFrEF due to doxorubicin  Type 2 MI due to supply demand mismatch  Sinus tachycardia  TTE  "6/2022 with EF 35-40% with global LV hypokinesis.  Outside trop 183, suspect demand ischemia.    Echocardiogram with EF of 40-45%     -- Continue to  monitor I/O's and daily weights  -- Patient was started on Metoprolol 25mg PO BID , decreased dose to 12.5 mg po BID due to soft blood pressure   -- will change holding parameters and will make them more liberal      DM II with peripheral neuropathy  -- Continue low dose SSI  -- Hemoglobin A1c at 6.4% indicating good control      Depression  -- Continue PTA cymbalta     Insomnia  -- Patient requested Ambien to help sleep at ordered for her, reduced dose due to excissive somnolence, tolerating low dose              Diet: Room Service  Combination Diet Moderate Consistent Carb (60 g CHO per Meal) Diet; Regular Diet    DVT Prophylaxis: Pneumatic Compression Devices  Jiang Catheter: Not present  Central Lines: PRESENT       Cardiac Monitoring: None  Code Status: Full Code      Disposition Plan      Expected Discharge Date: 11/11/2022    Discharge Delays: Placement - TCU    Discharge Comments: COVID +        The patient's care was discussed with the Patient.    Theresa Hernandez MD  Hospitalist Service  [facility]  Securely message with the Vocera Web Console (learn more here)  Text page via [employer] Paging/Directory         Clinically Significant Risk Factors              # Hypoalbuminemia: Lowest albumin = 1.9 g/dL (Ref range: 3.5-5.2) at 10/18/2022  5:37 PM, will monitor as appropriate          # Overweight: Estimated body mass index is 27.88 kg/m  as calculated from the following:    Height as of this encounter: 1.651 m (5' 5\").    Weight as of this encounter: 76 kg (167 lb 8.8 oz).   # Moderate Malnutrition: based on nutrition assessment        ______________________________________________________________________    Interval History   No complaints  Pending placement.     Data reviewed today: I reviewed all medications, new labs and imaging results " over the last 24 hours. I personally reviewed no images or EKG's today.    Physical Exam   Vital Signs: Temp: 98.4  F (36.9  C) Temp src: Axillary BP: 108/70 Pulse: 104   Resp: 16 SpO2: 99 % O2 Device: None (Room air)    Weight: 167 lbs 8.79 oz  General Appearance: Well appearing for stated age.  Respiratory: CTAB, no rales or ronchi  Cardiovascular: S1, S2 normal, no murmurs  GI: non-tender on palpation, BS present      Data   Recent Labs   Lab 11/10/22  0550 11/08/22  0651 11/05/22  0643 11/04/22  1132 11/04/22  0614   WBC  --  10.6 6.7  --   --    HGB  --  10.1* 8.3*  --   --    MCV  --  97 96  --   --    PLT  --  84* 70*  --   --    NA  --  130* 135  --  128*   POTASSIUM 5.1 4.6 3.9  --  3.9   CHLORIDE  --  100 104  --  99   CO2  --  17* 21  --  20   BUN  --  15 14  --  16   CR  --  0.60 0.59  --  0.57   ANIONGAP  --  13 10  --  9   BENJI  --  8.3* 7.8*  --  7.8*   GLC  --  159* 113* 95 134*     No results found for this or any previous visit (from the past 24 hour(s)).

## 2022-11-10 NOTE — PLAN OF CARE
Reason for Admission: SDH   Cognitive/Mentation: A/Ox 4, forgetful  Neuros/CMS: Stable with generalized weakness, BLE 3/5.  VS: VSS on RA, HR tachy   Tele: ST  GI: BS audible, + flatus, no BM this shift. Incontinent.  : Purewick in place, good output.  Pulmonary: LS diminished, prn Robitussin available.   Pain: Sacral wound pain, prn Dilaudid given and repositioned.   Drains/Lines: L chest port Heparin locked.   Skin: Sacral wound dressing CDI with minimal drainage- see WOC note/order, redness/excoriation/rash on lindsay area. T/R.   Activity: Assist x 2 with clyde steady.  Diet: Mod carb diet with thin liquids. Takes pills whole.   Therapies recs: TCU  Discharge: Pending placement   Aggression Stoplight Tool: Green  End of shift summary: No changes this shift.

## 2022-11-11 NOTE — PROGRESS NOTES
[facility]    On November 10, 2022 received referral from ARMEN Low.  I reviewed the chart of Elenita Moran for potential admission to [facility] pending clinical readiness and bed availability.      The patient appears to be clinically appropriate for [facility], however she will require prior authorization for LTACH admission from her insurance (I will do this).     Will continue to follow for appropriateness and bed availability.    Susan Whaley, PT  Rehab Liaison/  [facility] [place],  Transitional Care Unit and LTACH  11/11/2022    11:07 AM    [phone]

## 2022-11-11 NOTE — PHARMACY-VANCOMYCIN DOSING SERVICE
"Pharmacy Vancomycin Initial Note  Date of Service 2022  Patient's  1955  67 year old, female    Indication: Sepsis    Current estimated CrCl = Estimated Creatinine Clearance: 88.4 mL/min (based on SCr of 0.63 mg/dL).    Creatinine for last 3 days  11/10/2022:  5:50 AM Creatinine 0.81 mg/dL  2022:  9:53 AM Creatinine 0.63 mg/dL    Recent Vancomycin Level(s) for last 3 days  No results found for requested labs within last 72 hours.      Vancomycin IV Administrations (past 72 hours)      No vancomycin orders with administrations in past 72 hours.                Nephrotoxins and other renal medications (From now, onward)    Start     Dose/Rate Route Frequency Ordered Stop    22 1500  vancomycin (VANCOCIN) 1,250 mg in 0.9% NaCl 250 mL intermittent infusion         1,250 mg  over 90 Minutes Intravenous ONCE 22 1431      22 1430  piperacillin-tazobactam (ZOSYN) 4.5 g vial to attach to  mL bag        Note to Pharmacy: For [facility], [facility] and [facility]: For Zosyn-naive patients, use the \"Zosyn initial dose + extended infusion\" order panel.    4.5 g  over 30 Minutes Intravenous EVERY 6 HOURS 22 1426      22 1230  [Held by provider]  lisinopril (ZESTRIL) tablet 2.5 mg        (Held by provider since 2022 at 1205 by Radha Purvis MD.Hold Reason: Change in Vitals)   Note to Pharmacy: PTA Sig:Take 1 tablet (2.5 mg) by mouth daily      2.5 mg Oral DAILY 22 1205      10/30/22 1030  furosemide (LASIX) tablet 40 mg         40 mg Oral DAILY 10/30/22 1021            Contrast Orders - past 72 hours (72h ago, onward)    None          [employer] Prediction of Planned Initial Vancomycin Regimen       Loading dose: 1250 mg at 15:00 2022.  Regimen: 1000 mg IV every 12 hours.  Start time: 14:35 on 2022  Exposure target: AUC24 (range)400-600 mg/L.hr   AUC24,ss: 495 mg/L.hr  Probability of AUC24 > 400: 72 %  Ctrough,ss: 15.3 mg/L  Probability of Ctrough,ss > 20: 28 " %  Probability of nephrotoxicity (Lodise LENNY 2009): 11 %     Plan:  1. Start vancomycin  As above   2. Vancomycin monitoring method: AUC  3. Vancomycin therapeutic monitoring goal: 400-600 mg*h/L  4. Pharmacy will check vancomycin levels as appropriate in 1-3 Days.    5. Serum creatinine levels will be ordered daily for the first week of therapy and at least twice weekly for subsequent weeks.      Precious Booth [facility]

## 2022-11-11 NOTE — PLAN OF CARE
Reason for Admission: SDH   Cognitive/Mentation: A/Ox 2, forgetful; seems to be having some hallucinations.   Neuros/CMS: Stable with generalized weakness, BLE 3/5; BUE 2/5.  L droop at rest. VS: VSS on RA, HR tachy. Tele: ST  GI: BS audible, + flatus, no BM this shift. Incontinent; purewick in place, good output. UA sent down. Pulmonary: LS diminished.  Denies pain.  L chest port Heparin locked.   Skin: Sacral wound dressing CDI with minimal drainage- see WOC note/order, redness/excoriation/rash on lindsay area. T/R.    Mod carb diet with thin liquids. Takes pills whole; difficulty today. CTM.

## 2022-11-11 NOTE — PROVIDER NOTIFICATION
MD Notification    Notified Person: MD    Notified Person Name: Chaparro Gutierrez    Notification Date/Time:6:40pm     Notification Interaction: web page    Purpose of Notification:  BP98/65     Orders Received:    Comments:

## 2022-11-11 NOTE — PROGRESS NOTES
[facility]    Medicine Progress Note - Hospitalist Service    Date of Admission:  10/18/2022    Assessment & Plan   Elenita Moran is a 67 year old female who was admitted on 10/18/2022 with past medical history significant for the stage IV metastatic breast cancer with metastasis to brain and liver bone, s/p mastectomy, heart failure with reduced ejection fraction secondary to doxorubicin, Tumor diabetes mellitus, depression and is stage IV sacral pressure ulcer who was admitted to the hospital secondary to acute metabolic encephalopathy and was found to have traumatic subdural hemorrhage.         Assessment & Plan     Acute metabolic encephalopathy most likely secondary to below:  Suspected traumatic SDH  * reports increased difficulty with gait/balance since fall 10 days prior to admission, 48 hours before the presentation patient was increasingly confused with difficulty with speech and was essentially nonverbal on the morning of presentation to the ER.  *CT scan of the head showed left holohemispheric subdural fluid collection with 2 mm midline shift.  On further questioning has been reported fall on October 8, unknown if patient had any head trauma at that time.  Her presentation was thought to be secondary to combination of subdural fluid collections along with marked electrolyte abnormalities as patient presented with uremia, hyponatremia and hyperkalemia along with possible intracranial metastasis versus infection.  Plan   -- During hospitalization, patient was evaluated by neurology and neurosurgery along with neuro critical care, no surgical interventions are recommended at this point.  -- Has been started on Keppra 500 mg p.o. twice daily  -- Goal SBP <160 now , head of bed 30 degrees  -- Goal platelets of > 50, conditional orders placed, received multiple transfusions for platelets and blood  -- On moderate carb controlled diet per Speech path   -- Very Sad and  gets tearful when discuss about disposition plan as she is eager to be discharged   -- Continue Afrin for Bilateral nares     Probable sepsis  Likely 2/2 sacral wound  -- WBC 15K, now with soft pressures  -- Wound was debrided on 11/04, per nurse its looking worse  -- will reconsult wound care  -- start broadspectrum abx  -- blood cultures.     Acute on Chronic anemia  Baseline hgb about 8 g/dL.   -- S/p transfusion of 1 unit 10/31, hemoglobin has been stable since   -- labs for reversible causes including iron deficiency, b12, folate , in normal range , Vitmain B 12 is slightly towards the higher range   -- Hgb stable around 8.3 this morning after undergoing I&D yesterday      Mild covid 19 infection, completed 10 days Isolation   No hypoxemia  - completed 3 days of prophylactic Remdesivir  - off special precautions 10/30     Left lower lobe pneumonia associated with cough  -- completed Levaquin for 5 days      Thrombocytopenia  Abnormal coags  Admission platelet 38, INR 1.27 and PTT 38.  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   reported this has been increasing in size over past week as she has spent more time in her chair.    -- Admission UA with >182 WBC, leukocyte esterase.  -- Outside CXR with mild left basilar opacity  -- Procalcitonin of 8, unclear significance in the setting of significant renal dysfunction  -- S/P completion of empiric zosyn  -- S/p bedside debridement by surgery   -- As  there was concern for eschar overlying chronic a stage IV sacral decubitus ulcer, Dr. Solorio is planning I&D in OR on 11/04, highly appreciate their help.  -- general surgery has signed off and recommending wound care follow up on Monday  -- review their note regarding the wound care   --Continue on Valtrex 1000 mg p.o. 3 times daily.  -- Wound care would be following up with patient on Thursday if still in hospital  -- currently discharge recommendations are in place      Hyponatremia  Hyperkalemia improved now with mild hypokalemia with potassium of 3.1  Uremia  Anion gap metabolic acidosis  KATHLEEN  Cr as low as 0.8 in June 2022, on most recent check Sept 2022 was 1.13.  Admission Na 127, K 7.2, bicarb 16 (gap down to 14), .   reporting minimal oral intake over past 2 days.  Edema on exam, but suspect intravascularly dry with hypoalbuminemia and poor oral intake.    shifted potassium with insulin overnight     -- Nephrology consult placed on admission , initially followed, now they have signed off  -- Creatinine normalized now   -- Na now trending down 11/08, will restart on lasix that has been on hold.   -- Monitor Na daily     Stage IV metastatic breast cancer (mets to brain, liver, bone) s/p mastectomy  Elevated LFT's  Followed by Dr. Varma of [facility].  Currently on immunotherapy and receiving radiation to spine (completed brain radiation for cerebellar mets).  Recent baseline LFT's unknown, suspect due to liver mets.  No signs of tenderness on exam.       -- [facility] consulted. Appreciate assistance   -- After TCU recovery , patient is recommended to have a follow up with oncology  -- Requested palliative team consultation to address symptom management associated with malignancy as patient is feeling depressed, mostly tearful at the time of my evaluation and would benefit from some supportive management.  --Patient has also been receiving SSRI, discussed the case with palliative team who will kindly  evaluate the patient tomorrow and if needed then will let the primary team know if patient also needs to be seen by psychiatry.     HFrEF due to doxorubicin  Type 2 MI due to supply demand mismatch  Sinus tachycardia  TTE 6/2022 with EF 35-40% with global LV hypokinesis.  Outside trop 183, suspect demand ischemia.    Echocardiogram with EF of 40-45%     -- Continue to  monitor I/O's and daily weights  -- Patient was started on Metoprolol 25mg PO BID , decreased dose to 12.5 mg po BID due to soft blood pressure   -- will change holding parameters and will make them more liberal      DM II with peripheral neuropathy  -- Continue low dose SSI  -- Hemoglobin A1c at 6.4% indicating good control      Depression  -- Continue PTA cymbalta     Insomnia  -- Patient requested Ambien to help sleep at ordered for her, reduced dose due to excissive somnolence, tolerating low dose              Diet: Room Service  Combination Diet Moderate Consistent Carb (60 g CHO per Meal) Diet; Regular Diet    DVT Prophylaxis: Pneumatic Compression Devices  Jiang Catheter: Not present  Central Lines: PRESENT       Cardiac Monitoring: ACTIVE order. Indication: Tachyarrhythmias, acute (48 hours)  Code Status: Full Code      Disposition Plan      Expected Discharge Date: 11/12/2022    Discharge Delays: Placement - TCU    Discharge Comments: COVID +        The patient's care was discussed with the Patient.    Theresa Hernandez MD  Hospitalist Service  [facility]  Securely message with the Vocera Web Console (learn more here)  Text page via [employer] Paging/Directory         Clinically Significant Risk Factors         # Hyponatremia: Lowest Na = 127 mmol/L (Ref range: 136-145) in last 2 days, will monitor as appropriate      # Hypoalbuminemia: Lowest albumin = 1.9 g/dL (Ref range: 3.5-5.2) at 10/18/2022  5:37 PM, will monitor as appropriate   # Thrombocytopenia: Lowest platelets = 82 (Ref range: 150-450) in last 2 days, will monitor  "for bleeding        # Overweight: Estimated body mass index is 27.88 kg/m  as calculated from the following:    Height as of this encounter: 1.651 m (5' 5\").    Weight as of this encounter: 76 kg (167 lb 8.8 oz).   # Moderate Malnutrition: based on nutrition assessment        ______________________________________________________________________    Interval History   Patient more lethargic and hallucinating per nursing.     Data reviewed today: I reviewed all medications, new labs and imaging results over the last 24 hours. I personally reviewed no images or EKG's today.    Physical Exam   Vital Signs: Temp: 97.4  F (36.3  C) Temp src: Axillary BP: 112/66 Pulse: 116   Resp: 16 SpO2: 100 % O2 Device: None (Room air)    Weight: 167 lbs 8.79 oz  General Appearance: Well appearing for stated age.  Respiratory: CTAB, no rales or ronchi  Cardiovascular: S1, S2 normal, no murmurs  GI: non-tender on palpation, BS present      Data   Recent Labs   Lab 11/10/22  2020 11/10/22  0550 11/08/22  0651 11/05/22  0643   WBC 15.2*  --  10.6 6.7   HGB 9.2*  --  10.1* 8.3*   MCV 98  --  97 96   PLT 82*  --  84* 70*   NA  --  127* 130* 135   POTASSIUM  --  5.2  5.1 4.6 3.9   CHLORIDE  --  99 100 104   CO2  --  16* 17* 21   BUN  --  21 15 14   CR  --  0.81 0.60 0.59   ANIONGAP  --  12 13 10   BENJI  --  8.0* 8.3* 7.8*   GLC  --  156* 159* 113*     No results found for this or any previous visit (from the past 24 hour(s)).  "

## 2022-11-11 NOTE — PLAN OF CARE
Reason for Admission: SDH   Cognitive/Mentation: A/Ox 4, forgetful  Neuros/CMS: Stable with generalized weakness, BLE 3/5. L droop at rest/sleeping.   VS: VSS on RA, HR tachy   Tele: ST  GI: BS audible, + flatus, no BM this shift. Incontinent.  : Purewick in place, good output.  Pulmonary: LS diminished, prn Robitussin available.   Pain: Sacral wound pain, prn Dilaudid given and repositioned.   Drains/Lines: L chest port Heparin locked.   Skin: Sacral wound dressing CDI with minimal drainage- see WOC note/order, redness/excoriation/rash on lindsay area. T/R.   Activity: Assist x 2 with clyde steady.  Diet: Mod carb diet with thin liquids. Takes pills whole.   Therapies recs: TCU  Discharge: Pending placement   Aggression Stoplight Tool: Green  End of shift summary: No changes this shift.

## 2022-11-11 NOTE — PROGRESS NOTES
Care Management Follow Up    Length of Stay (days): 24    Expected Discharge Date: 11/12/2022     Concerns to be Addressed:  Discharge Planning     Patient plan of care discussed at interdisciplinary rounds: Yes    Anticipated Discharge Disposition: [facility]     Anticipated Discharge Services:   Anticipated Discharge DME:      Patient/family educated on Medicare website which has current facility and service quality ratings: yes  Education Provided on the Discharge Plan:    Patient/Family in Agreement with the Plan: yes    Referrals Placed by CM/SW:  Referral had been previously sent to [facility]  Private pay costs discussed:       Additional Information:  Writer informed by Liaguevara Davis from [facility] that patient is clinically appropriate for LTACH. She will need a prior Insurance Authorization and LTACH will follow along for bed availability and getting the Authorization. Writer spoke with patient's  Denilson for the first time. Introduced self and the role as a Care Coordinator. Writer explained the Philosophy of an LTACH and informed that patient has met the guidelines and was clinically appropriate for an LTACH. Writer also informed that [facility] in [place] would also be an option. Denilson felt that the affiliation with [facility] and the perfect location of where [facility] is located, he would much prefer patient going to [facility]. Writer called and informed Susan from [facility] that patient's spouse, Denilson has agreed to [facility]. Susan will persue the Insurance Authorization and call with bed availability.  Denilson also requested to speak with the hospitalist for an update on patient. Will contact Dr Hernandez to call spouse. Case Management will continue to follow along for discharge planning.             Zehra Moody, RN  Care Coordinator  [phone]

## 2022-11-11 NOTE — PROVIDER NOTIFICATION
MD Notification    Notified Person: MD    Notified Person Name: Chaparro Gutierrez    Notification Date/Time: 7:12pm    Notification Interaction: web page    Purpose of Notification:  BP98/65     Orders Received:    Comments:

## 2022-11-12 NOTE — PLAN OF CARE
Goal Outcome Evaluation:      Plan of Care Reviewed With: patient    SDH after a fall. Disoriented to time,situation, place. Neuros/CMS - arouses to voice; slow to respond; whispers responses; flat affect/withdrawn; letting her needs be known at times; moving upper extremities/very weak; severe edema in legs/plus 4 edema/supported with pillows/pcds utilized.  RA, dry intermittent cough/shallow breathing. SBP low/md made aware/bolus provided. Tele - SR/. Moderate carb diet/poor intake & appetite/consumed a few bites with breakfast & half ensure at lunch/meds whole with water/oral cares provided. Bedrest with 2 assist/ceiling lift utilized/pulsate mattress on/turned & repositioned.  Incontinent of urine/purwik utilized most of shift/removed afternoon due to redness/no bm today. Denies pain. Pt scoring yellow on the Aggression Stop Light Tool due to confusion. Wound care provided to sacral wound/dressing clean & dry/WOC re-consulted & message left for RN to see. Left chest port running at TKO between antibiotics/patent. Hemoglobin 8.0 (md aware/chronic). Anticipate TCU or LTC needs at discharge.

## 2022-11-12 NOTE — PROVIDER NOTIFICATION
Paged Hospitalist re: labs & blood pressure update. fyi, hemoglobin 8.0 last evening, bp lower this am, slightly tachy. Will await advisement.    MD paged right back and aware/orders placed.

## 2022-11-12 NOTE — PLAN OF CARE
Disoriented to time and situation, flat affect, hypoactive. Hypotensive, otherwise VSS on RA. Tele: SR. Q2h turn/repo. Modcarb diet, carb count, total feed. Port to L chestwall. Pressure ulcer on sacrum, dressing CDI.  BLE 2/5, BUE 3/5. Shallow breaths, lungs clear. Pt denies pain. Purewick in place. -BM, +BS, -flatus. Continue plan of care.

## 2022-11-12 NOTE — PROVIDER NOTIFICATION
Paged Hospitalist re: updates/iv fluid clarification.  fyi, left message for [facility] nurse, no call back yet. dressing changed on sacral/purulent/odorous/redressed; bolus completed. Is it ok to run iv tko at 10 ml/hr into chest port. Will await advisement.

## 2022-11-12 NOTE — PROGRESS NOTES
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         Assessment & Plan     Acute metabolic encephalopathy most likely secondary to below:  Suspected traumatic SDH  * reports increased difficulty with gait/balance since fall 10 days prior to admission, 48 hours before the presentation patient was increasingly confused with difficulty with speech and was essentially nonverbal on the morning of presentation to the ER.  *CT scan of the head showed left holohemispheric subdural fluid collection with 2 mm midline shift.  On further questioning has been reported fall on October 8, unknown if patient had any head trauma at that time.  Her presentation was thought to be secondary to combination of subdural fluid collections along with marked electrolyte abnormalities as patient presented with uremia, hyponatremia and hyperkalemia along with possible intracranial metastasis versus infection.  Plan   -- During hospitalization, patient was evaluated by neurology and neurosurgery along with neuro critical care, no surgical interventions are recommended at this point.  -- Has been started on Keppra 500 mg p.o. twice daily  -- Goal SBP <160 now , head of bed 30 degrees  -- Goal platelets of > 50, conditional orders placed, received multiple transfusions for platelets and blood  -- On moderate carb controlled diet per Speech path   -- Very Sad and  gets tearful when discuss about disposition plan as she is eager to be discharged   -- Continue Afrin for Bilateral nares     Probable sepsis  Likely 2/2 sacral wound  -- UA minimally positive  -- WBC now trended down  -- Wound was debrided on 11/04, per nurse, its looking worse now and malodorous.  [facility] re-consulted.    -- cont vanc and zosyn  For now  -- blood cultures NGTD, follow culture results   -- Patient hypotensive to the 80s this a.m., will give 500 mils saline bolus.    Acute on Chronic anemia  Baseline hgb about 8 g/dL.   -- S/p transfusion of 1 unit 10/31, hemoglobin has been stable since   -- labs for reversible causes including iron deficiency, b12, folate , in normal range , Vitmain B 12 is slightly towards the higher range     Mild covid 19 infection, completed 10 days Isolation   No hypoxemia  - completed 3 days of prophylactic Remdesivir  - off special precautions 10/30     Left lower lobe pneumonia associated with cough  -- completed Levaquin for 5 days      Thrombocytopenia  Abnormal coags  Admission platelet 38, INR 1.27 and PTT 38.  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   reported this has been increasing in size over past week as she has spent more time in her chair.    -- Admission UA with >182 WBC, leukocyte esterase.  -- Outside CXR with mild left basilar opacity  -- Procalcitonin of 8, unclear significance in the setting of significant renal dysfunction  -- S/P completion  of empiric zosyn  -- S/p bedside debridement by surgery   -- As there was concern for eschar overlying chronic a stage IV sacral decubitus ulcer, Dr. Solorio is planning I&D in OR on 11/04, highly appreciate their help.  -- general surgery has signed off and recommending wound care follow up on Monday  -- review their note regarding the wound care   --Continue on Valtrex 1000 mg p.o. 3 times daily.  -- Wound care would be following up with patient on Thursday if still in hospital  -- currently discharge recommendations are in place      Hyponatremia  Hyperkalemia improved now with mild hypokalemia with potassium of 3.1  Uremia  Anion gap metabolic acidosis  KATHLEEN  Cr as low as 0.8 in June 2022, on most recent check Sept 2022 was 1.13.  Admission Na 127, K 7.2, bicarb 16 (gap down to 14), .   reporting minimal oral intake over past 2 days.  Edema on exam, but suspect intravascularly dry with hypoalbuminemia and poor oral intake.    shifted potassium with insulin overnight     -- Nephrology consult placed on admission , initially followed, now they have signed off  -- Creatinine normalized now   -- Na now trending down 11/08, trending back up with fluids added.     Stage IV metastatic breast cancer (mets to brain, liver, bone) s/p mastectomy  Elevated LFT's  Followed by [facility] of [facility].  Currently on immunotherapy and receiving radiation to spine (completed brain radiation for cerebellar mets).  Recent baseline LFT's unknown, suspect due to liver mets.  No signs of tenderness on exam.       -- EVA consulted. Appreciate assistance   -- After TCU recovery , patient is recommended to have a follow up with oncology  -- Requested palliative team consultation to address symptom management associated with malignancy as patient is feeling depressed, mostly tearful at the time of my evaluation and would benefit from some supportive management.  --Patient has also been receiving SSRI, discussed the case with  palliative team who will kindly evaluate the patient tomorrow and if needed then will let the primary team know if patient also needs to be seen by psychiatry.     HFrEF due to doxorubicin  Type 2 MI due to supply demand mismatch  Sinus tachycardia  TTE 6/2022 with EF 35-40% with global LV hypokinesis.  Outside trop 183, suspect demand ischemia.    Echocardiogram with EF of 40-45%     -- Continue to  monitor I/O's and daily weights  -- Patient was started on Metoprolol 25mg PO BID , dose initially decreased to 12.5 mg po BID due to soft blood pressure, now held due to hypotension     DM II with peripheral neuropathy  -- Continue low dose SSI  -- Hemoglobin A1c at 6.4% indicating good control      Depression  -- Continue PTA cymbalta     Insomnia  -- Patient requested Ambien to help sleep at ordered for her, reduced dose due to excissive somnolence, tolerating low dose              Diet: Room Service  Combination Diet Moderate Consistent Carb (60 g CHO per Meal) Diet; Regular Diet    DVT Prophylaxis: Pneumatic Compression Devices  Jiang Catheter: Not present  Central Lines: PRESENT       Cardiac Monitoring: ACTIVE order. Indication: Tachyarrhythmias, acute (48 hours)  Code Status: Full Code      Disposition Plan      Expected Discharge Date: 11/14/2022    Discharge Delays: Placement - TCU    Discharge Comments: LTACH.        The patient's care was discussed with the Patient.    Theresa Hernandez MD  Hospitalist Service  [facility]  Securely message with the Vocera Web Console (learn more here)  Text page via [employer] Paging/Directory         Clinically Significant Risk Factors         # Hyponatremia: Lowest Na = 127 mmol/L (Ref range: 136-145) in last 2 days, will monitor as appropriate      # Hypoalbuminemia: Lowest albumin = 1.9 g/dL (Ref range: 3.5-5.2) at 10/18/2022  5:37 PM, will monitor as appropriate   # Thrombocytopenia: Lowest platelets = 62 (Ref range: 150-450) in last 2 days, will monitor  "for bleeding        # Overweight: Estimated body mass index is 27.88 kg/m  as calculated from the following:    Height as of this encounter: 1.651 m (5' 5\").    Weight as of this encounter: 76 kg (167 lb 8.8 oz).   # Moderate Malnutrition: based on nutrition assessment        ______________________________________________________________________    Interval History   Patient continues to be lethargic, not so much hallucinating today.  She is able to follow commands.  Son and  at bedside-updated.    Data reviewed today: I reviewed all medications, new labs and imaging results over the last 24 hours. I personally reviewed no images or EKG's today.    Physical Exam   Vital Signs: Temp: 97.1  F (36.2  C) Temp src: Oral BP: 91/58 Pulse: 102   Resp: 18 SpO2: 96 % O2 Device: None (Room air)    Weight: 167 lbs 8.79 oz  General Appearance: Well appearing for stated age.  Respiratory: CTAB, no rales or ronchi  Cardiovascular: S1, S2 normal, no murmurs  GI: non-tender on palpation, BS present      Data   Recent Labs   Lab 11/12/22  0532 11/11/22  2109 11/11/22  0953 11/10/22  2020 11/10/22  0550 11/08/22  0651   WBC  --  9.9  --  15.2*  --  10.6   HGB  --  8.0*  --  9.2*  --  10.1*   MCV  --  97  --  98  --  97   PLT  --  62*  --  82*  --  84*   NA  --   --  127*  --  127* 130*   POTASSIUM  --   --  4.9  --  5.2  5.1 4.6   CHLORIDE  --   --  98  --  99 100   CO2  --   --  18*  --  16* 17*   BUN  --   --  27  --  21 15   CR 0.82  --  0.63  --  0.81 0.60   ANIONGAP  --   --  11  --  12 13   BENJI  --   --  7.7*  --  8.0* 8.3*   GLC  --   --  154*  --  156* 159*     No results found for this or any previous visit (from the past 24 hour(s)).  "

## 2022-11-13 NOTE — PLAN OF CARE
[id number]: Pt is A&Ox2, oriented to self and situation only. Pt slow to respond, lethargic, whispers responses. Neuros intact ex. generalized weakness, 2/5 strength to BLE, 3/5 strength to BUE. VSS on RA ex. soft BP and tachycardia 100s-110s. Lung sounds coarse and diminished in bases, weak nonproductive cough. Up w/2 and ceiling lift, incontinent of bowel and bladder. Mod CHO diet. CMS intact, foam dressing to sacral pressure injury CDI. No PRN pain medications given this shift. L chest port TKO. Will continue to follow plan of care.

## 2022-11-13 NOTE — PLAN OF CARE
Alert to self only, VSS on RA, with exception of Soft Bps. Neuros remain intact. Tele- Sinus Tach. Poor oral intake, but requesting lots of water. A2 with lift, repos as tolerated. Incontinent of bladder and bowel, purewick not in place d/t skin break down. Mepilex on coccyx, barrier cream applied to perineum. Awaiting reassement by WOC team. L chest port TKO with int abx. Discharge pending at this time

## 2022-11-13 NOTE — PROVIDER NOTIFICATION
Paged Hospitalist re: critical value.  Vanco levels 27, reported from lab now. Will await further advisement from MD.

## 2022-11-13 NOTE — PROGRESS NOTES
[facility]    Medicine Progress Note - Hospitalist Service    Date of Admission:  10/18/2022    Assessment & Plan   Elenita Moran is a 67 year old female who was admitted on 10/18/2022 with past medical history significant for the stage IV metastatic breast cancer with metastasis to brain and liver bone, s/p mastectomy, heart failure with reduced ejection fraction secondary to doxorubicin, Tumor diabetes mellitus, depression and is stage IV sacral pressure ulcer who was admitted to the hospital secondary to acute metabolic encephalopathy and was found to have traumatic subdural hemorrhage.  Seen by neurology and neurosurgery with no surgical interventions.  While pending placement patient developed sepsis on 11/11/2022.  Source is likely due to her sacral ulcers which have noted to be worse despite debridement this admission.  Started back on broad-spectrum antibiotics and wound care reconsulted.  May need general surgery to be reconsulted also but will await wound evaluation.        Assessment & Plan  Acute metabolic encephalopathy most likely secondary to below:  Suspected traumatic SDH  * reports increased difficulty with gait/balance since fall 10 days prior to admission, 48 hours before the presentation patient was increasingly confused with difficulty with speech and was essentially nonverbal on the morning of presentation to the ER.  *CT scan of the head showed left holohemispheric subdural fluid collection with 2 mm midline shift.  On further questioning has been reported fall on October 8, unknown if patient had any head trauma at that time.  Her presentation was thought to be secondary to combination of subdural fluid collections along with marked electrolyte abnormalities as patient presented with uremia, hyponatremia and hyperkalemia along with possible intracranial metastasis versus infection.  Plan   -- During hospitalization, patient was evaluated by  neurology and neurosurgery along with neuro critical care, no surgical interventions are recommended at this point.  -- Has been started on Keppra 500 mg p.o. twice daily  -- Goal SBP <160 now , head of bed 30 degrees  -- Goal platelets of > 50, conditional orders placed, received multiple transfusions for platelets and blood  -- On moderate carb controlled diet per Speech path   -- Very Sad and gets tearful when discuss about disposition plan as she is eager to be discharged   -- Continue Afrin for Bilateral nares     Sepsis  Septic shock  Likely 2/2 sacral wound  *UA minimally positive so less likely culprit.  *Wound was debrided on 11/04, and per nurse, its looking worse now and quite malodorous.   * blood cultures NGTD  *Patient hypotensive to the 80s intermittently this a.m., has also been receiving intermittent normal saline boluses.  We will continue on maintenance fluids for now.  Noted history of systolic heart failure with EF of 40 to 45%  Plan  -- cont vanc and zosyn  For now; pharmacy dosing vancomycin appreciated.  -- WOC re-consulted.  -- Continue IV fluids.    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   reported this has been increasing in size over past week as she has spent more time in her chair.    * Completed a course of empiric zosyn  *  S/p bedside debridement by surgery   * As there was concern for eschar overlying chronic a stage IV sacral decubitus ulcer, Dr. Solorio performed an I&D in OR on 11/04  * General surgery now signed off and recommended wound care follow up ; please see their notes   *Patient developed sepsis/septic shock on 11/11/2022.  Likely source is her sacral ulcer which has been described as malodorous at this  time during dressing changes.  Plan  --Wound care reconsulted.  Will possibly need general surgery consult, follow-up wound care recommendations respiratory  --Continue broad-spectrum antibiotics-Vanco and Zosyn for now.  -- Continue on Valtrex 1000 mg p.o. 3 times daily.  -- Continue dressing changes.    Acute on Chronic anemia  Baseline hgb about 8 g/dL.   -- S/p transfusion of 1 unit 10/31, hemoglobin has been stable since   -- labs for reversible causes including iron deficiency, b12, folate , in normal range , Vitmain B 12 is slightly towards the higher range     Mild covid 19 infection, completed 10 days Isolation   No hypoxemia  - completed 3 days of prophylactic Remdesivir  - off special precautions 10/30     Left lower lobe pneumonia associated with cough  -- completed Levaquin for 5 days      Thrombocytopenia  Abnormal coags  Admission platelet 38, INR 1.27 and PTT 38.  due to chemo, impaired production  - goal platelet >50K per NSG  Transfuse PRN for platelets<50K    Plan  -- Vit K 1 mg IV given on admission  -- Platelet counts have been stable  -- Oncology was consulted, see below  -- Patient has been off aspirin     Hyponatremia  Hyperkalemia improved now with mild hypokalemia with potassium of 3.1  Uremia  Anion gap metabolic acidosis  KATHLEEN  Cr as low as 0.8 in June 2022, on most recent check Sept 2022 was 1.13.  Admission Na 127, K 7.2, bicarb 16 (gap down to 14), .   reporting minimal oral intake over past 2 days.  Edema on exam, but suspect intravascularly dry with hypoalbuminemia and poor oral intake.    shifted potassium with insulin overnight     -- Nephrology consult placed on admission , initially followed, now they have signed off  -- Creatinine normalized now   -- Na now trending down 11/08, trending back up with fluids added.     Stage IV metastatic breast cancer (mets to brain, liver, bone) s/p mastectomy  Elevated LFT's  Followed by Dr. Varma of [facility].  Currently on  immunotherapy and receiving radiation to spine (completed brain radiation for cerebellar mets).  Recent baseline LFT's unknown, suspect due to liver mets.  No signs of tenderness on exam.    -- MOHPA consulted. Appreciate assistance   -- After TCU recovery , patient is recommended to have a follow up with oncology  -- Requested palliative team consultation to address symptom management associated with malignancy as patient is feeling depressed    HFrEF due to doxorubicin  Type 2 MI due to supply demand mismatch  Sinus tachycardia  TTE 6/2022 with EF 35-40% with global LV hypokinesis.  Outside trop 183, suspect demand ischemia.    Echocardiogram with EF of 40-45%  Plan   -- Continue to  monitor I/O's and daily weights  -- Patient was started on Metoprolol 25mg PO BID , dose initially decreased to 12.5 mg po BID due to soft blood pressure, now held due to hypotension     DM II with peripheral neuropathy  -- Continue low dose SSI  -- Hemoglobin A1c at 6.4% indicating good control      Depression  -- Continue PTA cymbalta     Insomnia  -- Patient requested Ambien to help sleep at ordered for her, reduced dose due to excissive somnolence, tolerating low dose           Diet: Room Service  Combination Diet Moderate Consistent Carb (60 g CHO per Meal) Diet; Regular Diet    DVT Prophylaxis: Pneumatic Compression Devices  Jiang Catheter: Not present  Central Lines: PRESENT       Cardiac Monitoring: ACTIVE order. Indication: Tachyarrhythmias, acute (48 hours)  Code Status: Full Code      Disposition Plan      Expected Discharge Date: 11/14/2022    Discharge Delays: Placement - TCU    Discharge Comments: LTACH        The patient's care was discussed with the Patient.    Theresa Hernandez MD  Hospitalist Service  [facility]  Securely message with the Vocera Web Console (learn more here)  Text page via [employer] Paging/Directory         Clinically Significant Risk Factors         # Hyponatremia: Lowest Na = 127  "mmol/L (Ref range: 136-145) in last 2 days, will monitor as appropriate      # Hypoalbuminemia: Lowest albumin = 1.9 g/dL (Ref range: 3.5-5.2) at 10/18/2022  5:37 PM, will monitor as appropriate   # Thrombocytopenia: Lowest platelets = 62 (Ref range: 150-450) in last 2 days, will monitor for bleeding        # Overweight: Estimated body mass index is 27.88 kg/m  as calculated from the following:    Height as of this encounter: 1.651 m (5' 5\").    Weight as of this encounter: 76 kg (167 lb 8.8 oz).   # Moderate Malnutrition: based on nutrition assessment        ______________________________________________________________________    Interval History   Patient more awake this a.m., asking for her cell phone wanting to call her .  Also asking for water stating she is very thirsty.  Much improved clinically than she was yesterday.  Will continue current management.    Data reviewed today: I reviewed all medications, new labs and imaging results over the last 24 hours. I personally reviewed no images or EKG's today.    Physical Exam   Vital Signs: Temp: 98  F (36.7  C) Temp src: Axillary BP: (!) 87/59 Pulse: 118   Resp: 14 SpO2: 98 % O2 Device: None (Room air)    Weight: 167 lbs 8.79 oz  General Appearance: Well appearing for stated age.  Respiratory: CTAB, no rales or ronchi  Cardiovascular: S1, S2 normal, no murmurs  GI: non-tender on palpation, BS present      Data   Recent Labs   Lab 11/13/22  0438 11/12/22  0532 11/11/22  2109 11/11/22  0953 11/10/22  2020 11/10/22  0550 11/08/22  0651   WBC  --   --  9.9  --  15.2*  --  10.6   HGB  --   --  8.0*  --  9.2*  --  10.1*   MCV  --   --  97  --  98  --  97   PLT  --   --  62*  --  82*  --  84*   NA  --  128*  --  127*  --  127* 130*   POTASSIUM 3.7 4.0  --  4.9  --  5.2  5.1 4.6   CHLORIDE  --  100  --  98  --  99 100   CO2  --  19*  --  18*  --  16* 17*   BUN  --  30  --  27  --  21 15   CR 0.70 0.80  0.82  --  0.63  --  0.81 0.60   ANIONGAP  --  9  --  11  --  " 12 13   BENJI  --  7.4*  --  7.7*  --  8.0* 8.3*   GLC  --  177*  --  154*  --  156* 159*     No results found for this or any previous visit (from the past 24 hour(s)).

## 2022-11-13 NOTE — PHARMACY-VANCOMYCIN DOSING SERVICE
"Pharmacy Vancomycin Note  Date of Service 2022  Patient's  1955   67 year old, female    Indication: Sepsis  Day of Therapy: 3  Current vancomycin regimen:  1000 mg IV q12h  Current vancomycin monitoring method: AUC  Current vancomycin therapeutic monitoring goal: 400-600 mg*h/L    InsightRX Prediction of Current Vancomycin Regimen  Loading dose: N/A  Regimen: 1000 mg IV every 12 hours.  Start time: 15:21 on 2022  Exposure target: AUC24 (range)400-600 mg/L.hr   AUC24,ss: 726 mg/L.hr  Probability of AUC24 > 400: 100 %  Ctrough,ss: 23.8 mg/L  Probability of Ctrough,ss > 20: 78 %  Probability of nephrotoxicity (Lodise LENNY ): 25 %    Current estimated CrCl = Estimated Creatinine Clearance: 79.5 mL/min (based on SCr of 0.7 mg/dL).    Creatinine for last 3 days  2022:  9:53 AM Creatinine 0.63 mg/dL  2022:  5:32 AM Creatinine 0.82 mg/dL;  5:32 AM Creatinine 0.80 mg/dL  2022:  4:38 AM Creatinine 0.70 mg/dL;  4:38 AM Creatinine 0.68 mg/dL    Recent Vancomycin Levels (past 3 days)  2022:  1:15 PM Vancomycin 27.0 mg/L    Vancomycin IV Administrations (past 72 hours)                   vancomycin (VANCOCIN) 1000 mg in dextrose 5% 200 mL PREMIX (mg) 1,000 mg New Bag 22 0436     1,000 mg New Bag 22 1706     1,000 mg New Bag  0323    vancomycin (VANCOCIN) 1,250 mg in 0.9% NaCl 250 mL intermittent infusion (mg) 1,250 mg New Bag 22 1530                Nephrotoxins and other renal medications (From now, onward)    Start     Dose/Rate Route Frequency Ordered Stop    22 0500  vancomycin (VANCOCIN) 1,250 mg in 0.9% NaCl 250 mL intermittent infusion         1,250 mg  over 90 Minutes Intravenous EVERY 24 HOURS 22 1428      22 1430  piperacillin-tazobactam (ZOSYN) 4.5 g vial to attach to  mL bag        Note to Pharmacy: For [facility], [facility] and [facility]: For Zosyn-naive patients, use the \"Zosyn initial dose + extended infusion\" order panel.    4.5 g  over 30 " Minutes Intravenous EVERY 6 HOURS 11/11/22 1426      11/01/22 1230  [Held by provider]  lisinopril (ZESTRIL) tablet 2.5 mg        (Held by provider since Tue 11/1/2022 at 1205 by Radha Purvis MD.Hold Reason: Change in Vitals)   Note to Pharmacy: PTA Sig:Take 1 tablet (2.5 mg) by mouth daily      2.5 mg Oral DAILY 11/01/22 1205      10/30/22 1030  [Held by provider]  furosemide (LASIX) tablet 40 mg        (Held by provider since Sat 11/12/2022 at 0914 by Theresa Browne MD.Hold Reason: Abnormal Electrolytes)    40 mg Oral DAILY 10/30/22 1021               Contrast Orders - past 72 hours (72h ago, onward)    None          Interpretation of levels and current regimen:  Vancomycin level is reflective of AUC greater than 600    Has serum creatinine changed greater than 50% in last 72 hours: No    Urine output:  unable to determine    Renal Function: Improving    InsightRX Prediction of Planned New Vancomycin Regimen  Loading dose: N/A  Regimen: 1250 mg IV every 24 hours.  Start time: 15:21 on 11/13/2022  Exposure target: AUC24 (range)400-600 mg/L.hr   AUC24,ss: 473 mg/L.hr  Probability of AUC24 > 400: 82 %  Ctrough,ss: 12.7 mg/L  Probability of Ctrough,ss > 20: 5 %  Probability of nephrotoxicity (Lodise LENNY 2009): 8 %      Plan:  1. Decrease Dose to 1250 mg IV q24h  2. Vancomycin monitoring method: AUC  3. Vancomycin therapeutic monitoring goal: 400-600 mg*h/L  4. Pharmacy will check vancomycin levels as appropriate in 1-3 Days.  5. Serum creatinine levels will be ordered daily for the first week of therapy and at least twice weekly for subsequent weeks.    Latasha Ponce [facility]

## 2022-11-13 NOTE — PLAN OF CARE
"Pt here with SDH after fall. Disoriented. No changes to neuro status. Used call light appropriately. Tele sinus tach. No complaints of pain. Turned and repositioned. Incontinent. IV Antibiotics. Will continue to monitor.    /66   Pulse 110   Temp 98  F (36.7  C) (Axillary)   Resp 16   Ht 1.651 m (5' 5\")   Wt 76 kg (167 lb 8.8 oz)   SpO2 93%   BMI 27.88 kg/m      "

## 2022-11-14 NOTE — PLAN OF CARE
Goal Outcome Evaluation:       Pt is A&Ox3, oriented to self , situation & month/year. Pt whispers responses. Neuros intact ex. generalized weakness, 2/5 strength to BLE, 3/5 strength to BUE. VSS on RA. Lung sounds fine crackles posteriorly up to scapula. Up w/2 and ceiling lift, incontinent of bowel and bladder. Purewick in place.  Mod CHO diet. Good oral fluid intake. CMS intact, foam dressing to sacral pressure injury CDI. Barrier cream to perineal area. Denied pain this shift. L chest port intact. Continue to follow plan of care.

## 2022-11-14 NOTE — PROGRESS NOTES
Care Management Follow Up    Length of Stay (days): 27    Expected Discharge Date: 11/16/2022     Concerns to be Addressed:       Patient plan of care discussed at interdisciplinary rounds: Yes    Anticipated Discharge Disposition: LTACH     Anticipated Discharge Services: Transportation Services  Anticipated Discharge DME:      Patient/family educated on Medicare website which has current facility and service quality ratings: yes  Education Provided on the Discharge Plan:    Patient/Family in Agreement with the Plan: yes    Referrals Placed by CM/SW:    Private pay costs discussed: Not applicable    Additional Information:  Per Hospitalist, patient not medically ready for discharge today, may be ready in 2 days.  Updated [place] [facility].  They have received insurance authorization.    Tammie Gonzales RN, BSN, PHN  Inpatient Care Coordination  [facility]  Phone: [phone]

## 2022-11-14 NOTE — PROGRESS NOTES
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  Patient underwent bedside debridement of decubitus ulcer on 10/26/2022 followed by excisional debridement of the ulcer in the operating room on 11/4/2022.    -Medical management per primary team  -Continue antibiotics per infectious disease recommendations  -Agree with sacral MRI to further assess for osteomyelitis  -No evidence of significant undrained fluid collection on exam.  Minimal necrotic tissue at wound base and wound periphery.  Will plan for bedside debridement tomorrow.  Will obtain tissue for cultures.  -Local wound cares per WOCN.  Recommend consideration of collagenase for enzymatic debridement of wound base.  -General surgery to follow          Interval History:   Review of the patient's chart indicates that she developed sepsis on 11/11/2022.  This was thought to be secondary to her sacral ulcer.  General surgery was requested to reevaluate her wound.  Patient has been afebrile.           Physical Exam:   Temp: 97.6  F (36.4  C) Temp src: Axillary BP: 131/85 Pulse: 98   Resp: 18 SpO2: 100 % O2 Device: None (Room air)      I/O last 3 completed shifts:  In: 1515.67 [P.O.:760; I.V.:755.67]  Out: 1400 [Urine:1400]      Constitutional: alert and no distress   Respiratory: Breathing unlabored  Integumentary: Open sacral wound with minimal necrotic tissue at periphery and some superficial necrotic tissue at wound base with palpable underlying bone, no significant purulent drainage, no evidence of undrained fluid  collection      Data   Recent Labs   Lab 11/14/22 0613 11/13/22 2109 11/13/22  0438 11/12/22  0532 11/11/22 2109 11/11/22 0953 11/10/22  2020 11/10/22  0550 11/08/22  0651   WBC  --   --   --   --  9.9  --  15.2*  --  10.6   HGB  --   --   --   --  8.0*  --  9.2*  --  10.1*   MCV  --   --   --   --  97  --  98  --  97   PLT  --   --   --   --  62*  --  82*  --  84*     --  130* 128*  --    < >  --    < > 130*   POTASSIUM 3.4  3.4  --  3.6  3.7 4.0  --    < >  --    < > 4.6   CHLORIDE 106  --  101 100  --    < >  --    < > 100   CO2 18*  --  18* 19*  --    < >  --    < > 17*   BUN 22  --  27 30  --    < >  --    < > 15   CR 0.60  0.58  --  0.68  0.70 0.80  0.82  --    < >  --    < > 0.60   ANIONGAP 11  --  11 9  --    < >  --    < > 13   BENJI 7.7*  --  7.7* 7.4*  --    < >  --    < > 8.3*   * 134* 141* 177*  --    < >  --    < > 159*    < > = values in this interval not displayed.       mEi Hernandez MD

## 2022-11-14 NOTE — PROGRESS NOTES
"[facility] Nurse Inpatient Assessment     Consulted for: sacral       Areas Assessed:      Areas visualized during today's visit: Focused: and Sacrum/coccyx    Wound location: sacrum and buttock     Last photo: 11/14  Wound due to: Pressure Injury community acquired, post debridement 10/26, stage 4 pressure injury, now with superimposed deep tissue injury   Wound history/plan of care: found covered with nugauze and mepilex   Wound base:  eschar, slough and bone, purple nonblanchable tissue to wound base and wound edges and periwound skin      Palpation of the wound bed: firm      Drainage: small     Description of drainage: serosanguinous     Measurements (length x width x depth, in cm): 7.5  x 7.5  x  1 cm      Tunneling: N/A     Undermining: up to 1 cm from circumferential   Periwound skin: Rash, suspect herpatic lesions, stable based on prior assessment       Color: pink      Temperature: normal   Odor: none  Pain: no grimacing or signs of discomfort , no complaint of pain, complaint of thirst   Treatment goal: Protection, support moist wound healing   STATUS: deteriorating  Supplies ordered: gathered and at bedside       Treatment Plan:         11/09/22 1651  menthol-zinc oxide (CALMOSEPTINE) 0.44-20.6 % ointment OINT  Topical,   2 TIMES DAILY                Wound care  2 TIMES DAILY        Comments: Location: buttock   Care: provided BID by primary RN   1. Provide incontinence care every 2 hours with routine turns, apply camoseptine ointment to perianal BID and PRN   2. Check to ensure mepilex / treatment is in place over sacrum BID   3. Change dressing to sacrum daily, after cleansing with Vashe solution ( #[id number]) and 4x4\" gauze pat dry, cover with sacral mepilex         Skin care precautions  EFFECTIVE NOW        Comments: Pressure Injury Prevention (PIP) Plan:   If patient is declining pressure injury prevention interventions: Explore reason why and address patient's " "concerns, Educate on pressure injury risk and prevention intervention(s), If patient is still declining, document \"informed refusal\" , and Ensure Care team is aware ( provider, charge nurse, etc)   Mattress: Follow bed algorithm, reassess daily and order specialty mattress, if indicated.   HOB: Maintain at or below 30 degrees, unless contraindicated   Repositioning in bed: Every 1-2 hours , Left/right positioning; avoid supine, and Raise foot of bed prior to raising head of bed, to reduce patient sliding down (shear)   Heels: Keep elevated off mattress   Chair positioning: Chair cushion (#[id number]) , Assist patient to reposition hourly, and Do NOT use a donut for sitting (this increases pressure to smaller area and creates a higher potential for injury)     If patient has a buttock pressure injury, or high risk for PI use chair cushion or SPS.   Moisture Management: Perineal cleansing /protection: Follow Incontinence Protocol, Avoid brief in bed, Clean and dry skin folds with bathing , and Moisturize dry skin   Under Devices: Inspect skin under all medical devices during skin inspection , Ensure tubes are stabilized without tension, and Ensure patient is not lying on medical devices or equipment when repositioned   Ask provider to discontinue device when no longer needed.            Orders: Reviewed and Updated    RECOMMEND PRIMARY TEAM ORDER: None, at this time  Education provided: plan of care  Discussed plan of care with: Patient and Nurse  WOC nurse follow-up plan: weekly  Notify WOC if wound(s) deteriorate.  Nursing to notify the Provider(s) and re-consult the WOC Nurse if new skin concern.    DATA:     Current support surface: Standard  pulsate  Containment of urine/stool: Incontinence Protocol and Purewick external catheter   BMI: Body mass index is 27.88 kg/m .   Active diet order: Orders Placed This Encounter      Combination Diet Moderate Consistent Carb (60 g CHO per Meal) Diet; Regular Diet     Output: I/O " last 3 completed shifts:  In: 1515.67 [P.O.:760; I.V.:755.67]  Out: 1400 [Urine:1400]     Labs:   Recent Labs   Lab 11/13/22  0438 11/12/22  0532 11/11/22  2109   HGB  --   --  8.0*   WBC  --   --  9.9   .0*   < >  --     < > = values in this interval not displayed.     Pressure injury risk assessment:   Sensory Perception: 4-->no impairment  Moisture: 3-->occasionally moist  Activity: 1-->bedfast  Mobility: 1-->completely immobile  Nutrition: 2-->probably inadequate  Friction and Shear: 2-->potential problem  Zhao Score: 13    Qi TAVARESPARISH   Dept. Pager: [phone]  Dept. Office Number: [phone]

## 2022-11-14 NOTE — PLAN OF CARE
Pt. Alert and oriented x2. Vital signs stable on RA. Assist of 2 turn ad repo q2h. Tolerating mod carb diet. Lung sounds crackles to bases. Bowel sounds +, STEPHEN flatus. BM (-) this shift, adequate urine output via purewick. Skin: wound to coccyx - dressing C/D/I, bruising to BUE. Denies nausea.

## 2022-11-14 NOTE — CONSULTS
[facility]    Infectious Disease Consultation     Date of Admission:  10/18/2022  Date of Consult : 11/14/22    Assessment:  67 Year old chronically ill female with prolonged hospitalization for encephalopathy, subdural hematoma, now with sepsis syndrome likely related to infected sacral decubitus ulcer with osteomyelitis.    -Chronic sacral decubitus ulcer with necrotic base and exposed bone with concern for osteomyelitis  -Leukocytosis and sepsis syndrome likely related to sacral osteomyelitis  -Anemia and thrombocytopenia  -Hyponatremia  -LLL pneumonia on CXR 11/2 treated with Levaquin  -SHD with encephalopathy  -Chronic debility  -Stage IV metastatic breast cancer (mets to brain, liver, bone) s/p mastectomy. On immunotherapy. 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 consider MRI sacrum to assess for osteomyelitis  2. Surgical evaluation for debridement, biopsy for cxs  3. Follow blood cxs  4. Check CXR  5. Maintain on Vancomycin (pharmacy to adjust dose given supra therapeutic levels), continue Zosyn    Recommendations were discussed with the hospitalist service    Liz Serrano MD    Reason for Consult   I was asked to evaluate this patient for evaluation of sepsis    Primary Care Physician   Sanid Jones    Chief Complaint   Sepsis syndrome, chronic decubitus ulcer    History is obtained from the patient and medical records    History of Present Illness   Elenita Moran is a 67 year old female with  stage IV metastatic breast cancer with metastasis to brain and liver bone, s/p mastectomy, heart failure 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  She was seen by neurology and neurosurgery with no surgical interventions.      She was treated for pneumonia with levaquin during hospital stay. has now developed sepsis syndrome with leukocytosis to 15.2K on 11/15, hypotension and CRP elevation to 249. She remains afebrile. Blood cxs have remained negative. She has been maintained on broad-spectrum antibiotics. She has had a sacral decubitus ulcer since February whihc has worsened with development of necrotic base and exposed bone wound team is following patient.  She is maintained on Vancomycin and Zosyn, vancomycin level was supra therapeutic at 27 yesterday. ID has been asked to assist with further management    She denies ano other focal complaints, denies abdominal pain , urinary symptoms , cough or shortness of breath, no diarrhea.    Past Medical History   I have reviewed this patient's medical history and updated it with pertinent information if needed.   Past Medical History:   Diagnosis Date     Allergic rhinitis      Bone cancer (H) 2011    breast mets     Breast cancer (H) 2010    left mastectomy     Depression      DM (diabetes mellitus) (H)      Fibromyalgia      Hx antineoplastic chemotherapy 2010     Hx of radiation therapy 2010     Hyperlipemia      Lactose intolerance      Mini stroke     R EYE     Osteoarthritis      Tobacco dependency        Past Surgical History   I have reviewed this patient's surgical history and updated it with pertinent information if needed.  Past Surgical History:   Procedure Laterality Date     APPENDECTOMY       INSERT INTRACORONARY STENT  1985    R Hand     IR CHEST PORT PLACEMENT > 5 YRS OF AGE  12/12/2019     IR CHEST PORT PLACEMENT > 5 YRS OF AGE  3/14/2022     IR LUMBAR TRANSFORAMINAL EPIDURAL STEROID INJECTION  12/5/2019     IR LUMBAR TRANSFORAMINAL EPIDURAL STRD INJ  12/5/2019     IR PORT PLACEMENT >5 YEARS  12/12/2019     IR PORT REMOVAL RIGHT  3/14/2022     IR SITE CHECK/EVALUATION  4/15/2022      IRRIGATION AND DEBRIDEMENT BUTTOCKS Bilateral 11/4/2022    Procedure: INCISION AND DRAINAGE, SACRUM;  Surgeon: Burak Solorio MD;  Location: [facility] OR     MAMMOPLASTY REDUCTION Right 2015    hx of left mastectomy and right breast reduction     MASTECTOMY Left 2010     OTHER SURGICAL HISTORY      biopsy of upper and right tongue     OVARIAN CYST REMOVAL       REVISE RECONSTRUCTED BREAST Left     March 11, 2015       Prior to Admission Medications   Prior to Admission Medications   Prescriptions Last Dose Informant Patient Reported? Taking?   ACETAMINOPHEN PO   Yes Yes   Sig: Take 1,300 mg by mouth 3 times daily Alternates with Ibuprofen   B-D U/F 31G X 8 MM insulin pen needle   Yes No   Sig: USE 4 TIMES DAILY   Cholecalciferol (VITAMIN D) 125 MCG (5000 UT) capsule   Yes Yes   Sig: Take 1 tablet by mouth daily    Continuous Blood Gluc  (FREESTYLE KIMBERLY 14 DAY READER) COLETTE   Yes No   Sig: Use to check blood sugar at least 4 times a day or as directed    Continuous Blood Gluc Sensor (FREESTYLE KIMBERLY 14 DAY SENSOR) MISC   Yes No   Sig: Use as directed to test blood sugar at least 4 times a day or as directed   DULoxetine (CYMBALTA) 30 MG capsule   Yes Yes   Sig: Take 30 mg by mouth every morning    DULoxetine (CYMBALTA) 60 MG capsule   Yes Yes   Sig: Take 1 capsule by mouth At Bedtime   HYDROmorphone (DILAUDID) 4 MG tablet   No Yes   Sig: Take 1 tablet (4 mg) by mouth 3 times daily as needed for moderate to severe pain or severe pain   Patient taking differently: Take 4 mg by mouth 3 times daily AM - Midday - PM   Insulin Glargine (BASAGLAR KWIKPEN SC)   Yes Yes   Sig: Inject 8 Units Subcutaneous every morning Uses as directed   Iron Combinations (IRON COMPLEX PO)   Yes Yes   Sig: Take 1 tablet by mouth daily Ferrous biogluconate supplement   LORazepam (ATIVAN) 0.5 MG tablet Not Taking  Yes No   Sig: take 1 tablet by mouth up to 3 times per day as needed for nausea, anxiety, or insomnia   Patient not taking:  Reported on 10/19/2022   MAGNESIUM PO   Yes Yes   Sig: Take by mouth daily OTC supplement   Multiple Vitamins-Minerals (ICAPS PO)   Yes Yes   Sig: Take 1 capsule by mouth daily   OLANZapine (ZYPREXA) 5 MG tablet   Yes Yes   Sig: Take 5 mg by mouth every evening    POTASSIUM CHLORIDE PO   Yes Yes   Sig: Take by mouth daily OTC supplement   Prenatal Vit-Fe Fumarate-FA (PRENATAL MULTIVITAMIN  PLUS IRON) 27-1 MG TABS   Yes Yes   Sig: Take 1 tablet by mouth daily    aspirin 81 MG EC tablet   Yes Yes   Sig: Take 81 mg by mouth daily   blood glucose (ONETOUCH VERIO IQ) test strip   Yes No   bumetanide (BUMEX) 2 MG tablet   No No   Sig: Take 1 tablet (2 mg) by mouth 2 times daily   Patient taking differently: Take 2 mg by mouth daily   bumetanide (BUMEX) 2 MG tablet   Yes Yes   Sig: Take 2 mg by mouth daily as needed Can take midday if needed for fluid retention/swelling   cephALEXin (KEFLEX) 500 MG capsule   No Yes   Sig: Take 1 capsule (500 mg) by mouth 2 times daily   clobetasol (TEMOVATE) 0.05 % external ointment   Yes Yes   Sig: Apply topically daily as needed   docusate sodium (COLACE) 100 MG capsule   Yes Yes   Sig: Take 100 mg by mouth daily   ezetimibe-simvastatin (VYTORIN) 10-20 MG tablet   Yes Yes   Sig: Take 1 tablet by mouth At Bedtime   glucose (BD GLUCOSE) 4 g chewable tablet   Yes No   Sig: as needed   hydrOXYzine (VISTARIL) 25 MG capsule   Yes Yes   Sig: Take 25 mg by mouth 3 times daily With hydromorphone   ibuprofen (ADVIL/MOTRIN) 200 MG capsule   Yes Yes   Sig: Take 400 mg by mouth 3 times daily Alternating with acetaminophen   insulin aspart (NOVOLOG FLEXPEN) 100 UNIT/ML pen   Yes Yes   Sig: Inject Subcutaneous 3 times daily (with meals) Sliding scale dose based on blood glucose and diet   lidocaine (XYLOCAINE) 5 % external ointment   No Yes   Sig: Apply topically 4 times daily   Patient taking differently: Apply topically 4 times daily as needed for moderate pain   lisinopril (ZESTRIL) 2.5 MG tablet    No No   Sig: Take 1 tablet (2.5 mg) by mouth daily   medical cannabis (Patient's own supply) Not Taking  Yes No   Sig: See Admin Instructions (The purpose of this order is to document that the patient reports taking medical cannabis.  This is not a prescription, and is not used to certify that the patient has a qualifying medical condition.)   Patient not taking: Reported on 10/19/2022   metoprolol succinate ER (TOPROL-XL) 50 MG 24 hr tablet   No Yes   Sig: Take 1 tablet (50 mg) by mouth daily   nystatin (MYCOSTATIN) 476326 UNIT/GM external cream   No Yes   Sig: Apply topically 2 times daily   Patient taking differently: Apply topically daily as needed   omeprazole (PRILOSEC) 20 MG DR capsule   Yes Yes   Sig: Take 20 mg by mouth At Bedtime    oxybutynin (DITROPAN) 5 MG tablet   Yes Yes   Sig: Take 5 mg by mouth 2 times daily AM and Midday   oxybutynin ER (DITROPAN-XL) 10 MG 24 hr tablet   Yes Yes   Sig: Take 10 mg by mouth every evening   prochlorperazine (COMPAZINE) 10 MG tablet Not Taking  Yes No   Sig: Take 10 mg by mouth every 6 hours as needed for nausea or vomiting   Patient not taking: Reported on 10/19/2022   psyllium (METAMUCIL/KONSYL) Packet   Yes Yes   Sig: Take 1 packet by mouth daily as needed for constipation   spironolactone (ALDACTONE) 25 MG tablet   No Yes   Sig: Take 0.5 tablets (12.5 mg) by mouth daily   vitamin (B COMPLEX) tablet   Yes Yes   Sig: Take 1 tablet by mouth daily   zolpidem (AMBIEN) 10 MG tablet   Yes Yes   Sig: Take 10 mg by mouth At Bedtime      Facility-Administered Medications Last Administration Doses Remaining   lidocaine (XYLOCAINE) 2 % external gel 7/26/2022  9:06 AM         Allergies   Allergies   Allergen Reactions     Gabapentin      Upper body edema/swelling.         Morphine Visual Disturbance     Visual hallucinations     Sulfamethoxazole-Trimethoprim      Other reaction(s): Hives     Sulfa Drugs Hives and Rash     welts         Immunization History   Immunization  History   Administered Date(s) Administered     DTaP / Hep B / IPV 05/08/2018     FLU 6-35 months 09/24/2009     FLUAD(HD)65+ QUAD 10/14/2021     Flu, Unspecified 09/05/2019     HepA-Adult 04/13/2016, 08/20/2018     HepB-Adult 04/13/2016, 06/13/2016, 08/20/2018     Influenza (IIV3) PF 09/28/2010, 09/13/2011, 09/20/2012, 10/30/2013, 12/01/2014     Influenza Vaccine, 6+MO IM (QUADRIVALENT W/PRESERVATIVES) 10/12/2015, 10/17/2016, 10/25/2017, 09/12/2018, 09/05/2019     Pneumococcal 23 valent 07/24/2017     TD (ADULT, 7+) 04/27/1996, 06/13/2013       Social History   I have reviewed this patient's social history and updated it with pertinent information if needed. Elenita Magañanaraderek Luisa  reports that she has quit smoking. Her smoking use included cigarettes. She has a 15.00 pack-year smoking history. She has never used smokeless tobacco. She reports that she does not currently use alcohol after a past usage of about 1.0 standard drink per week. She reports that she does not use drugs.    Family History   I have reviewed this patient's family history and updated it with pertinent information if needed.   Family History   Problem Relation Age of Onset     Lung Cancer Mother      Pancreatic Cancer Mother      Kidney Cancer Maternal Grandmother      Lung Cancer Paternal Aunt      Breast Cancer Cousin        Review of Systems   The 10 point Review of Systems is negative other than noted in the HPI or here.     Physical Exam   Temp: 97.6  F (36.4  C) Temp src: Axillary BP: 131/85 Pulse: 98   Resp: 18 SpO2: 100 % O2 Device: None (Room air)    Vital Signs with Ranges  Temp:  [96.3  F (35.7  C)-97.7  F (36.5  C)] 97.6  F (36.4  C)  Pulse:  [] 98  Resp:  [16-18] 18  BP: (111-131)/(72-85) 131/85  SpO2:  [91 %-100 %] 100 %  167 lbs 8.79 oz  Body mass index is 27.88 kg/m .    GENERAL APPEARANCE:  Awake. Chronically ill appearing female  EYES: Eyes grossly normal to inspection  NECK: no adsupple  RESP: non labored  breathing  MS: sacral decubitus ulcer with necrotic base and palpable bone  SKIN: no rash    Data   All laboratory data reviewed  Component      Latest Ref Rng & Units 11/14/2022   Sodium      133 - 144 mmol/L 135   Potassium      3.4 - 5.3 mmol/L 3.4   Chloride      94 - 109 mmol/L 106   Carbon Dioxide      20 - 32 mmol/L 18 (L)   Anion Gap      3 - 14 mmol/L 11   Urea Nitrogen      7 - 30 mg/dL 22   Creatinine      0.52 - 1.04 mg/dL 0.60   Calcium      8.5 - 10.1 mg/dL 7.7 (L)   Glucose      70 - 99 mg/dL 128 (H)     Component      Latest Ref Rng & Units 11/13/2022   Vancomycin Level      mg/L 27.0 (HH)     Component      Latest Ref Rng & Units 11/13/2022   CRP Inflammation      0.0 - 8.0 mg/L 251.0 (H)     Component      Latest Ref Rng & Units 11/11/2022   WBC      4.0 - 11.0 10e3/uL 9.9   RBC Count      3.80 - 5.20 10e6/uL 2.44 (L)   Hemoglobin      11.7 - 15.7 g/dL 8.0 (L)   Hematocrit      35.0 - 47.0 % 23.7 (L)   MCV      78 - 100 fL 97   MCH      26.5 - 33.0 pg 32.8   MCHC      31.5 - 36.5 g/dL 33.8   RDW      10.0 - 15.0 % 25.1 (H)   Platelet Count      150 - 450 10e3/uL 62 (L)     Component      Latest Ref Rng & Units 11/11/2022   Lactic Acid      0.7 - 2.0 mmol/L 1.1     Component      Latest Ref Rng & Units 11/11/2022   Procalcitonin      <0.05 ng/mL 4.96 (H)     Microbiology  11/11 blood cx negative

## 2022-11-14 NOTE — PROGRESS NOTES
"CLINICAL NUTRITION SERVICES - REASSESSMENT NOTE      RECOMMENDATIONS FOR MD/PROVIDER TO ORDER:   Recommend consideration of nutrition support if within GOC. Pt has had poor PO intake throughout admit x28 days   Recommendations Ordered by Registered Dietitian (RD):   - ordered updated wt  - ordered 10 am Glucerna (chocolate)  - left sticky note for treatment team: \"Pt reported she has had difficulty eating her meals d/t various interruptions. If possible, please allow at least 30-45 minutes of uninterrupted time at mealtimes when pt receives tray. Thank you!\"   Malnutrition:   % Weight Loss:  Unable to assess   % Intake:  </= 50% for >/= 5 days (severe malnutrition)  Subcutaneous Fat Loss:  Orbital region moderate depletion  Muscle Loss:  Temporal region mild depletion, Clavicle bone region moderate depletion and Dorsal hand region moderate depletion  Fluid Retention:  Mild to severe BLE and sacrum edema    Malnutrition Diagnosis: Severe malnutrition in the context of -  Acute illness or injury  Chronic illness or disease       EVALUATION OF PROGRESS TOWARD GOALS   Diet:  MOD CHO  Room service w/ assist     Intake/Tolerance:    - visited with pt this AM. Pt reported she does not like the Ensure enlives she has received but does like Glucerna. Requested glucerna to be sent. Prefers chocolate. Pt reported she has had difficulty eating her meals at meal time as she has \"too much going on.\" when asked to explain, pt reported she is interrupted to much during meal times and does not get the chance to eat her meals. Left stick note for care team to allow uninterrupted time (when possible) for pt to eat. Pt reported she needs a \"better attitude.\" RD provided encouragement to pt.   - per nursing flow sheet, Eating 25-50% meals  - per health touch, pt continues to receive TID meals   - per chart review, pt occasionally refuses meals     ASSESSED NUTRITION NEEDS:  Dosing Weight: 65.4 kg (adjusted, based on most recent wt this " admit)  Estimated Energy Needs: 1962+ kcals (30+ Kcal/Kg)  Justification: increased needs r/t CA dx with BMI in mind  Estimated Protein Needs: 78-98 grams protein (1.2-1.5 g pro/Kg)  Justification: preservation of lean body mass and increased needs r/t CA dx  Estimated Fluid Needs: 1 mL/Kcal  Justification: maintenance OR per provider pending fluid status      NEW FINDINGS:   General:   - A/O x3  - pt remains very lethargic. Some reports of hallucinations   - still pending LTACH placement    Weight: last wt on 10/26 --> ordered updated wt    Labs: K+ 3.3 (L), -177    Medications: colace, lasix, protonix    GI: 6x BM yesterday, 1x today     Skin:  - 11/14: Pt had MRI last night to determine if sepsis is caused by coccyx wound. MRI determined no osteomyelitis.  - WOC following, 11/14  Wound location: sacrum and buttock   Wound due to: Pressure Injury community acquired, post debridement 10/26, stage 4 pressure injury, now with superimposed deep tissue injury   STATUS: deteriorating    Previous Goals:   Patient to consume >75% of nutritionally adequate meals TID over the next 5-7 days.  Evaluation: Not met  Maintain wt >76 kg over the next 5-7 days.  Evaluation: Unable to evaluate    Previous Nutrition Diagnosis:   Inadequate oral intake related to poor appetite as evidenced by variable intakes of 25-75% of meals throughout admit x21 days  Evaluation: No change    MALNUTRITION  % Weight Loss:  Unable to assess   % Intake:  </= 50% for >/= 5 days (severe malnutrition)  Subcutaneous Fat Loss:  Orbital region moderate depletion  Muscle Loss:  Temporal region mild depletion, Clavicle bone region moderate depletion and Dorsal hand region moderate depletion  Fluid Retention:  Mild to severe BLE and sacrum edema    Malnutrition Diagnosis: Severe malnutrition in the context of -  Acute illness or injury  Chronic illness or disease    CURRENT NUTRITION DIAGNOSIS  Inadequate oral intake related to poor appetite as  "evidenced by variable intakes of 25-75% of meals throughout admit x28 days    INTERVENTIONS  Recommendations / Nutrition Prescription  - ordered updated wt  - ordered 10 am Glucerna (chocolate)  - left sticky note for treatment team: \"Pt reported she has had difficulty eating her meals d/t various interruptions. If possible, please allow at least 30-45 minutes of uninterrupted time at mealtimes when pt receives tray. Thank you!\"    Implementation  Medical Food Supplement  Collaboration and Referral of Nutrition care - sticky note     Goals  Patient to consume >75% of nutritionally adequate meals TID with supplements over the next 5-7 days.      MONITORING AND EVALUATION:  Progress towards goals will be monitored and evaluated per protocol and Practice Guidelines      Anneliese Sexton RD, LD              "

## 2022-11-14 NOTE — PROGRESS NOTES
[facility]    Medicine Progress Note - Hospitalist Service    Date of Admission:  10/18/2022    Assessment & Plan   Elenita Moran is a 67 year old female who was admitted on 10/18/2022 with past medical history significant for the stage IV metastatic breast cancer with metastasis to brain and liver bone, s/p mastectomy, heart failure with reduced ejection fraction secondary to doxorubicin, type 2 diabetes mellitus, depression and stage IV sacral pressure ulcer who was admitted to the hospital secondary to acute metabolic encephalopathy and was found to have traumatic subdural hemorrhage.  Seen by neurology and neurosurgery with no surgical interventions.  While pending placement patient developed sepsis on 11/11/2022.  Source is likely due to her sacral ulcers which have noted to be worse despite debridement this admission.  Started back on broad-spectrum antibiotics and wound care reconsulted.          Assessment & Plan  Acute metabolic encephalopathy most likely secondary to below:  Suspected traumatic SDH  * reports increased difficulty with gait/balance since fall 10 days prior to admission, 48 hours before the presentation patient was increasingly confused with difficulty with speech and was essentially nonverbal on the morning of presentation to the ER.  *CT scan of the head showed left holohemispheric subdural fluid collection with 2 mm midline shift.  * On further questioning has been reported fall on October 8, unknown if patient had any head trauma at that time.  * Her presentation was thought to be secondary to combination of subdural fluid collections along with marked electrolyte abnormalities as patient presented with uremia, hyponatremia and hyperkalemia along with possible intracranial metastasis versus infection.  * During hospitalization, patient was evaluated by neurology and neurosurgery along with neuro critical care, no surgical interventions are  recommended at this point.    -- Has been started on Keppra 500 mg p.o. twice daily  -- Goal SBP <160 now , head of bed 30 degrees  -- Goal platelets of > 50, conditional orders placed, received multiple transfusions for platelets and blood  -- On moderate carb controlled diet per Speech path   -- Very Sad and gets tearful when discuss about disposition plan as she is eager to be discharged     Sepsis  Septic shock  Likely 2/2 sacral wound  *UA minimally positive so less likely culprit.  *Wound was debrided on 11/04, and per nurse, its looking worse now and quite malodorous.   * blood cultures NGTD  * Patient hypotensive to the 80s intermittently 11/12-11/13, receiving intermittent normal saline boluses.     -- blood culture ngtd, will discontinue vanco  -- continue zosyn, will consult ID for assistance with abx as also has chronic MSSA infection  -- per chart review 11/14; does not appear she was resumed on her chronic suppressive Keflex after Zosyn was stopped on 10/21 raising question of whether he acute decompensation could be related to untreated chronic infection  -- consult to Gen Surg as wound again has some areas of eschar around periphery  -- WOC re-consulted.  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   reported this has been increasing in size over past week as she has spent more time in her chair.    * Completed a course of empiric zosyn  *  S/p bedside debridement by surgery   * As there was concern for eschar overlying chronic a stage IV sacral decubitus ulcer, Dr. Solorio performed an I&D in OR on 11/04  *  General surgery now signed off and recommended wound care follow up ; please see their notes   *Patient developed sepsis/septic shock on 11/11/2022.  Likely source is her sacral ulcer which has been described as malodorous at this time during dressing changes.    -- Gen Surg consult as above  -- continue Zosyn as above  -- Continue dressing changes.    Acute on Chronic anemia  Baseline hgb about 8 g/dL.   * S/p transfusion of 1 unit 10/31  * labs for reversible causes including iron deficiency, b12, folate , in normal range , Vitmain B 12 is slightly towards the higher range   -- hgb stable    Mild covid 19 infection, completed 10 days Isolation 10/30  No hypoxemia  * completed 3 days of prophylactic Remdesivir     Left lower lobe pneumonia associated with cough, resolved  * completed Levaquin for 5 days on 11/6     Thrombocytopenia  Abnormal coags  Admission platelet 38, INR 1.27 and PTT 38.  due to chemo, impaired production  * Vit K 1 mg IV given on admission  -- goal platelet >50K per NSG; transfuse PRN for platelets<50K   -- Platelet counts have been stable  -- Oncology was consulted, see below  -- Patient has been off aspirin     Hyponatremia, resolved  Hyperkalemia with subsequent hypokalemia, resolved  Uremia, resolved  Anion gap metabolic acidosis  KATHLEEN, resolved  Cr as low as 0.8 in June 2022, on most recent check Sept 2022 was 1.13.  Admission Na 127, K 7.2, bicarb 16 (gap down to 14), .   reporting minimal oral intake over past 2 days.  Edema on exam, but suspect intravascularly dry with hypoalbuminemia and poor oral intake.    shifted potassium with insulin overnight     * Nephrology consult placed on admission , initially followed, now they have signed off  * intermittent hyponatremia during course of stay  -- Creatinine improved to 0.6  -- Na wnl, monitor with discontinuation of IVF     Stage IV metastatic breast cancer (mets to brain, liver, bone) s/p mastectomy  Elevated LFT's  Followed  by Dr. Varma of [facility].  Currently on immunotherapy and receiving radiation to spine (completed brain radiation for cerebellar mets).  Recent baseline LFT's unknown, suspect due to liver mets.  No signs of tenderness on exam.    * Palliative Care consulted, recommend follow up with outpatient Palliative Team through [facility]  -- EVA consulted. Appreciate assistance   -- After TCU recovery , patient is recommended to have a follow up with oncology    HFrEF due to doxorubicin  Type 2 MI due to supply demand mismatch  Sinus tachycardia  TTE 6/2022 with EF 35-40% with global LV hypokinesis.  Outside trop 183, suspect demand ischemia.    Echocardiogram with EF of 40-45%    -- Continue to  monitor I/O's and daily weights  -- metoprolol and lisinopril on hold due to soft pressures; currently normotensive, will consider resumption tomorrow     DM II with peripheral neuropathy  -- Continue low dose SSI  -- Hemoglobin A1c at 6.4% indicating good control      Depression  -- Continue PTA cymbalta     Insomnia  -- Patient requested Ambien to help sleep at ordered for her, reduced dose due to excissive somnolence, tolerating low dose           Diet: Room Service  Combination Diet Moderate Consistent Carb (60 g CHO per Meal) Diet; Regular Diet    DVT Prophylaxis: Pneumatic Compression Devices  Jiang Catheter: Not present  Central Lines: PRESENT       Cardiac Monitoring: ACTIVE order. Indication: Tachyarrhythmias, acute (48 hours)  Code Status: Full Code      Disposition Plan      Expected Discharge Date: 11/16/2022    Discharge Delays: Placement - TCU    Discharge Comments: LTACH        The patient's care was discussed with the Bedside Nurse, Patient and Patient's Family.    Freeman Vail MD  Hospitalist Service  [facility]  Securely message with the Vocera Web Console (learn more here)  Text page via [employer] Paging/Directory         Clinically Significant Risk Factors         # Hyponatremia: Lowest Na = 130  "mmol/L (Ref range: 136-145) in last 2 days, will monitor as appropriate      # Hypoalbuminemia: Lowest albumin = 1.9 g/dL (Ref range: 3.5-5.2) at 10/18/2022  5:37 PM, will monitor as appropriate          # Overweight: Estimated body mass index is 27.88 kg/m  as calculated from the following:    Height as of this encounter: 1.651 m (5' 5\").    Weight as of this encounter: 76 kg (167 lb 8.8 oz).   # Moderate Malnutrition: based on nutrition assessment        ______________________________________________________________________    Interval History   Primarily frustrated over length of stay, does not report any other complaints.  Denies any dyspnea, chest pain/pressure, fever/chills or pain complaints.      Data reviewed today: I reviewed all medications, new labs and imaging results over the last 24 hours. I personally reviewed no images or EKG's today.    Physical Exam   Vital Signs: Temp: 96.9  F (36.1  C) Temp src: Oral BP: 115/78 Pulse: 117   Resp: 18 SpO2: 98 % O2 Device: None (Room air)    Weight: 167 lbs 8.79 oz  General Appearance: lying in bed in NAD  Respiratory: CTAB, no rales or ronchi  Cardiovascular: mildly tachycardic, regular rhythm, normal S1, S2 normal, no murmurs  GI: abdomen soft, nontender, mildly distended, normal bowel sounds  Skin:  Decubitus ulcer with areas of eschar/necrosis around perimeter, other small areas of ulceration of buttocks  Neuro: alert and appropriate, oriented to person, place, year and situation, cranial nerves grossly intact       Data   Recent Labs   Lab 11/14/22  0613 11/13/22 2109 11/13/22  0438 11/12/22  0532 11/11/22 2109 11/11/22  0953 11/10/22  2020 11/10/22  0550 11/08/22  0651   WBC  --   --   --   --  9.9  --  15.2*  --  10.6   HGB  --   --   --   --  8.0*  --  9.2*  --  10.1*   MCV  --   --   --   --  97  --  98  --  97   PLT  --   --   --   --  62*  --  82*  --  84*     --  130* 128*  --    < >  --    < > 130*   POTASSIUM 3.4  3.4  --  3.6  3.7 4.0  --  "   < >  --    < > 4.6   CHLORIDE 106  --  101 100  --    < >  --    < > 100   CO2 18*  --  18* 19*  --    < >  --    < > 17*   BUN 22  --  27 30  --    < >  --    < > 15   CR 0.60  0.58  --  0.68  0.70 0.80  0.82  --    < >  --    < > 0.60   ANIONGAP 11  --  11 9  --    < >  --    < > 13   BENJI 7.7*  --  7.7* 7.4*  --    < >  --    < > 8.3*   * 134* 141* 177*  --    < >  --    < > 159*    < > = values in this interval not displayed.     No results found for this or any previous visit (from the past 24 hour(s)).

## 2022-11-14 NOTE — PLAN OF CARE
Goal Outcome Evaluation:      Plan of Care Reviewed With: patient    Overall Patient Progress: decliningOverall Patient Progress: declining    Reason for Admission: L SDH & Encephalopathy     Cognitive/Mentation: A/Ox 3. Disoriented x time. Forgetful at times. Whispers when talks.  Neuros/CMS: Intact ex generalized weakness with BLE 3/5 strength, L facial droop.  VS: Tachycardic, HR>120- PRN IV Metoprolol given 1x. SBP goal<160.  GI: BS active, + flatus, last BM 11/14/2022. Incontinent.  : Purwick in place. Incontinent.  Pulmonary: LS diminished/fine crackles. Infrequent dry, nonproductive cough- scheduled Mucinex given.  Pain: 9/10 Coccyx pain from MRI, one time dose of IV dilaudid given.     Drains/Lines: L chest port.  Skin: BLE 3-4+ pitting edema-MD notified, Lymphedema therapy consulted. Sacral wound Stage 4- Infectious diseases and Gen surg reconsulted.  Activity: Assist x 2 with Sejal porter. T/R q2h.  Diet: Regular diet with thin liquids. Takes pills whole.      Therapies recs: LTACH  Discharge: Pending     Aggression Stoplight Tool: GREEN     End of shift summary: BLE 3-4+ pitting edema-MD notified, Lymphedema therapy consulted. Sacral wound Stage 4- Infectious diseases and Gen surg reconsulted. Coccyx MRI completed and pending. 9/10 Coccyx pain from MRI, one time dose of IV dilaudid given. Tachycardic, HR>120- PRN IV Metoprolol given 1x.

## 2022-11-15 NOTE — PHARMACY-CONSULT NOTE
"Pharmacy Vancomycin Initial Note  Date of Service November 15, 2022  Patient's  1955  67 year old, female    Indication: Skin and Soft Tissue Infection    Current estimated CrCl = Estimated Creatinine Clearance: 88.4 mL/min (based on SCr of 0.63 mg/dL).    Creatinine for last 3 days  2022:  4:38 AM Creatinine 0.70 mg/dL;  4:38 AM Creatinine 0.68 mg/dL  2022:  6:13 AM Creatinine 0.58 mg/dL;  6:13 AM Creatinine 0.60 mg/dL  11/15/2022:  6:32 AM Creatinine 0.63 mg/dL    Recent Vancomycin Level(s) for last 3 days  2022:  1:15 PM Vancomycin 27.0 mg/L      Vancomycin IV Administrations (past 72 hours)                   vancomycin (VANCOCIN) 1,250 mg in 0.9% NaCl 250 mL intermittent infusion (mg) 1,250 mg New Bag 22 0510    vancomycin (VANCOCIN) 1000 mg in dextrose 5% 200 mL PREMIX (mg) 1,000 mg New Bag 22 0436     1,000 mg New Bag 22 1706                Nephrotoxins and other renal medications (From now, onward)    Start     Dose/Rate Route Frequency Ordered Stop    22 1430  piperacillin-tazobactam (ZOSYN) 4.5 g vial to attach to  mL bag        Note to Pharmacy: For [facility], [facility] and [facility]: For Zosyn-naive patients, use the \"Zosyn initial dose + extended infusion\" order panel.    4.5 g  over 30 Minutes Intravenous EVERY 6 HOURS 22 1426      22 1230  [Held by provider]  lisinopril (ZESTRIL) tablet 2.5 mg        (Held by provider since 2022 at 1205 by Radha Purvis MD.Hold Reason: Change in Vitals)   Note to Pharmacy: PTA Sig:Take 1 tablet (2.5 mg) by mouth daily      2.5 mg Oral DAILY 22 1205      10/30/22 1030  [Held by provider]  furosemide (LASIX) tablet 40 mg        (Held by provider since Sat 2022 at 0914 by Theresa Browne MD.Hold Reason: Abnormal Electrolytes)    40 mg Oral DAILY 10/30/22 1021            Contrast Orders - past 72 hours (72h ago, onward)    Start     Dose/Rate Route Frequency Stop    22 1900  gadobutrol " (GADAVIST) injection 7 mL         7 mL Intravenous ONCE 11/14/22 1834          Insight[employer] Prediction of Planned Initial Vancomycin Regimen    Loading dose: N/A  Regimen: 1250 mg IV every 24 hours.  Start time: 12:08 on 11/15/2022  Exposure target: AUC24 (range)400-600 mg/L.hr   AUC24,ss: 422 mg/L.hr  Probability of AUC24 > 400: 63 %  Ctrough,ss: 11 mg/L  Probability of Ctrough,ss > 20: 1 %  Probability of nephrotoxicity (Lodise LENNY 2009): 7 %        Plan:  1. Resume vancomycin 1250 mg IV q24h.   2. Vancomycin monitoring method: AUC  3. Vancomycin therapeutic monitoring goal: 400-600 mg*h/L  4. Pharmacy will check vancomycin levels as appropriate in 1-3 Days.    5. Serum creatinine levels will be ordered a minimum of twice weekly.      YUDY CAPUTO [facility]

## 2022-11-15 NOTE — PLAN OF CARE
Reason for Admission: SDH, sepsis    Cognitive/Mentation: A/Ox 3, disoriented to time  Neuros/CMS: Intact ex forgetful, whispering, generalized weakness  VS: tachy at times.  GI: BS active, passing flatus, last BM 11/14/2022. Incontinent.  : Incontinent. Purewick  Pulmonary: LS diminished. Infrequent cough  Pain: Coccyx pain at times.     Drains/Lines: L chest port  Skin: stage 4 coccyx wound, edema in lower extremities  Activity: Assist x 2 with lift.  Diet: regular with thin liquids. Takes pills whole.     Therapies recs: LTACH  Discharge: pending    Aggression Stoplight Tool: green    End of shift summary: Pt had MRI last night to determine if sepsis is caused by coccyx wound. MRI determined no osteomyelitis. Note states bedside debridement today. Patient also has hx of metastatic breast cancer to the brain, liver, and bone.

## 2022-11-15 NOTE — PROCEDURES
Procedure:    After obtaining verbal consent, patient was placed in the right lateral decubitus position.  Her sacral wound was prepped with ChloraPrep.  The necrotic tissue at the wound periphery and wound base was excised utilizing an 11 blade scalpel.  No pockets of infected fluid were encountered.  Excised tissue was sent for aerobic and anaerobic culture.  Bleeding was controlled utilizing pressure.  Gauze and Mepilex dressing was applied.  Patient tolerated the procedure well.    Emi Hernandez MD

## 2022-11-15 NOTE — PROGRESS NOTES
[facility]    Infectious Disease Progress Note    Date of Service : 11/15/2022     Assessment:  67 Year old chronically ill female with prolonged hospitalization for encephalopathy, subdural hematoma, now with sepsis syndrome likely related to infected sacral decubitus ulcer with osteomyelitis.     -Chronic sacral decubitus ulcer with necrotic base and exposed bone with concern for osteomyelitis  -Leukocytosis and sepsis syndrome likely related to sacral osteomyelitis  -Anemia and thrombocytopenia  -Hyponatremia  -LLL pneumonia on CXR 11/2 treated with Levaquin  -SHD with encephalopathy  -Chronic debility  -Stage IV metastatic breast cancer (mets to brain, liver, bone) s/p mastectomy. On immunotherapy. 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 Bedside debridement is planned by the surgical team , please send cultures  2. Maintain on IV antibiotics - vancomycin, zosyn  3. Likely transition to oral antibiotics once cx data is available.  4. Wound care    Liz Serrano MD    Interval History   Patient is sitting out in a chair today, very somnolent however, falls asleep while trying to answer questions. Unable to speak much. MRI noted no osteomyelitis. Has remained afebrile, CRP improving.    Physical Exam   Temp: 98.3  F (36.8  C) Temp src: Axillary BP: 119/78 Pulse: 109   Resp: 18 SpO2: 100 % O2 Device: None (Room air)    Vitals:    10/18/22 1700 10/19/22 0430 10/26/22 1939   Weight: 76.2 kg (167 lb 15.9 oz) 76 kg (167 lb 8.8 oz) 76 kg (167 lb 8.8 oz)     Vital Signs with Ranges  Temp:  [97.2  F (36.2  C)-98.3  F (36.8  C)] 98.3  F (36.8  C)  Pulse:  [] 109  Resp:  [18-20] 18  BP: (112-128)/(72-86) 119/78  SpO2:  [97 %-100 %] 100 %    Constitutional: chronically ill appearing patient, very somnolent  Lungs: Clear to auscultation bilaterally, no  crackles or wheezing  Cardiovascular: S1S2  Abdomen: Normal bowel sounds, soft, non-distended, non-tender  Skin: No rash    Other:    Medications       sodium chloride 0.9%  500 mL Intravenous Once     alteplase  1 mg Intravenous Once     bumetanide  2 mg Oral BID     docusate sodium  100 mg Oral Daily     DULoxetine  30 mg Oral QAM     DULoxetine  60 mg Oral At Bedtime     [Held by provider] furosemide  40 mg Oral Daily     guaiFENesin  600 mg Oral BID     heparin  5-10 mL Intracatheter Q28 Days     heparin lock flush  5-10 mL Intracatheter Q24H     levETIRAcetam  500 mg Oral BID     [Held by provider] lisinopril  2.5 mg Oral Daily     menthol-zinc oxide   Topical BID     [Held by provider] metoprolol tartrate  12.5 mg Oral BID     multivitamin w/minerals  1 tablet Oral Daily     oxybutynin  5 mg Oral BID     oxybutynin ER  10 mg Oral QPM     pantoprazole  40 mg Oral At Bedtime     piperacillin-tazobactam  4.5 g Intravenous Q6H     sodium chloride (PF)  10-20 mL Intracatheter Q28 Days     spironolactone  12.5 mg Oral Daily     vancomycin  1,250 mg Intravenous Q24H     zolpidem  2.5 mg Oral At Bedtime       Data   All microbiology laboratory data reviewed.  Recent Labs   Lab Test 11/15/22  0632 11/11/22  2109 11/10/22  2020   WBC 14.1* 9.9 15.2*   HGB 8.0* 8.0* 9.2*   HCT 23.3* 23.7* 27.0*   MCV 98 97 98   PLT 73* 62* 82*     Recent Labs   Lab Test 11/15/22  0632 11/14/22  0613 11/13/22  0438   CR 0.63 0.60  0.58 0.68  0.70     Recent Labs   Lab Test 12/03/19  1650   SED 63*     Microbiology  11/11 blood cx negative     Imaging  11/14 MRI pelvic bones  EXAM: MRI PELVIS WITHOUT AND WITH IV CONTRAST  LOCATION: [facility]  DATE/TIME: 11/14/2022 6:42 PM     INDICATION: Sacral decubitus ulcer with exposed bone, concern for osteomyelitis. Metastatic breast cancer.  COMPARISON: None.     TECHNIQUE: Routine MRI pelvis without and with IV contrast. Axial, sagittal, and coronal high-resolution T2  and post gadolinium T1 with fat saturation.   CONTRAST: 7mL Gadavist     FINDINGS: There is a decubitus ulceration superficial to the mid sacrum. No associated fluid collection. No associated marrow abnormality or bone destruction to suggest osteomyelitis.     There are extensive marrow replacing lesions compatible with metastases.     There is a small nonenhancing focus within the left posterior paraspinous musculature at the L5 level which corresponds to a focus of soft tissue calcification on the recent CT PET.     There are likely chronic fractures of both sacral ala, and chronic displaced fractures of the left pubic rami, better demonstrated on the prior CT PET scan. Small effusions of both SI joints, and associated synovitis.     Diffuse subcutaneous and muscular edema, and a small amount of ascites.                                                                      IMPRESSION:  1.  Decubitus ulcer superficial to the sacrum.  2.  No evidence of associated osteomyelitis.  3.  Extensive osseous metastatic disease.

## 2022-11-15 NOTE — PLAN OF CARE
Goal Outcome Evaluation:      Plan of Care Reviewed With: patient    Overall Patient Progress: decliningOverall Patient Progress: declining    Outcome Evaluation: visited with pt. continued poor appetite and PO intake. will order daily glucerna per pt request. recommend consideration of nutrition support if within GOC. pt has had poor intake all admit x28 days    Anneliese Sexton RD, LD

## 2022-11-15 NOTE — PROVIDER NOTIFICATION
"MD Notification    Notified Person: MD    Notified Person Name: Dr. Velezalex Bloodmadalyn    Notification Date/Time: 11/15/2022 9130    Notification Interaction: [employer] Messaging    Purpose of Notification: \"I got a call from lab stating they can only get pathology on the sample collected due to the fluid in the collection container. Is that okay? They stated if you wanted aerobic and anaerobic samples, the collection had to be without fluid. Thanks\"      Comments: States they don't need the pathology, will come back tomorrow and collect more for aerobic and anaerobic samples.    "

## 2022-11-15 NOTE — PROGRESS NOTES
[facility]    Medicine Progress Note - Hospitalist Service    Date of Admission:  10/18/2022    Assessment & Plan   Elenita Moran is a 67 year old female who was admitted on 10/18/2022 with past medical history significant for the stage IV metastatic breast cancer with metastasis to brain and liver bone, s/p mastectomy, heart failure with reduced ejection fraction secondary to doxorubicin, type 2 diabetes mellitus, depression and stage IV sacral pressure ulcer who was admitted to the hospital secondary to acute metabolic encephalopathy and was found to have traumatic subdural hemorrhage.  Seen by neurology and neurosurgery with no surgical interventions.  While pending placement patient developed sepsis on 11/11/2022.  Source is likely due to her sacral ulcers which have noted to be worse despite debridement this admission.  Started back on broad-spectrum antibiotics and wound care reconsulted.          Acute metabolic encephalopathy most likely secondary to below:  Suspected traumatic SDH  * reports increased difficulty with gait/balance since fall 10 days prior to admission, 48 hours before the presentation patient was increasingly confused with difficulty with speech and was essentially nonverbal on the morning of presentation to the ER.  *CT scan of the head showed left holohemispheric subdural fluid collection with 2 mm midline shift.  * On further questioning has been reported fall on October 8, unknown if patient had any head trauma at that time.  * Her presentation was thought to be secondary to combination of subdural fluid collections along with marked electrolyte abnormalities as patient presented with uremia, hyponatremia and hyperkalemia along with possible intracranial metastasis versus infection.  * During hospitalization, patient was evaluated by neurology and neurosurgery along with neuro critical care, no surgical interventions are recommended at this  point.    -- Has been started on Keppra 500 mg p.o. twice daily  -- Goal SBP <160 now, head of bed 30 degrees  -- Goal platelets of > 50, conditional orders placed, received multiple transfusions for platelets and blood  -- On moderate carb controlled diet per Speech path     Sepsis  Septic shock  Likely 2/2 sacral wound  *UA minimally positive so less likely culprit.  *Wound was debrided on 11/04, and per nurse, its looking worse now and quite malodorous.   * MRI pelvis 11/14 negative for osteomyelitis, notes extensive osseous metastatic disease  * per chart review 11/14; does not appear she was resumed on her chronic suppressive Keflex after Zosyn was stopped on 10/21 raising question of whether he acute decompensation could be related to untreated chronic infection  * Patient hypotensive to the 80s intermittently 11/12-11/13, receiving intermittent normal saline boluses.     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   reported this has been increasing in size over past week as she has spent more time in her chair.    * Completed a course of empiric zosyn  *  S/p bedside debridement 10/26   * As there was concern for eschar overlying chronic a stage IV sacral decubitus ulcer, Dr. Solorio performed an I&D in OR on 11/04  *Patient developed sepsis/septic shock on 11/11/2022.  Likely source is her sacral ulcer which has been described as malodorous at this time during dressing  changes.    -- Gen Surg consult as above  -- continue Zosyn as above; will ultimately need resumption of PTA Keflex for chronic MSSA infection  -- Continue dressing changes; WOC following    Acute on Chronic anemia  Baseline hgb about 8 g/dL.   * S/p transfusion of 1 unit 10/31  * labs for reversible causes including iron deficiency, b12, folate , in normal range , Vitmain B 12 is slightly towards the higher range   -- hgb stable about 8 g/dL    Mild covid 19 infection, resolved, completed 10 days Isolation 10/30  No hypoxemia  * completed 3 days of prophylactic Remdesivir     Left lower lobe pneumonia, resolved  * completed Levaquin for 5 days on 11/6     Thrombocytopenia  Abnormal coags  Admission platelet 38, INR 1.27 and PTT 38.  due to chemo, impaired production  * Vit K 1 mg IV given on admission  -- goal platelet >50K per NSG; transfuse PRN for platelets<50K   -- Platelet counts have been stable  -- Oncology was consulted, see below  -- Patient has been off aspirin     Hyponatremia, resolved  Hyperkalemia with subsequent hypokalemia  Uremia, resolved  Anion gap metabolic acidosis  KATHLEEN, resolved  Cr as low as 0.8 in June 2022, on most recent check Sept 2022 was 1.13.  Admission Na 127, K 7.2, bicarb 16 (gap down to 14), .   reporting minimal oral intake over past 2 days.  Edema on exam, but suspect intravascularly dry with hypoalbuminemia and poor oral intake.    shifted potassium with insulin overnight     * Nephrology consult placed on admission , initially followed, now they have signed off  * intermittent hyponatremia during course of stay  -- Creatinine improved to 0.6; monitor with resumption of diuresis  -- needs K replacement 11/15     Stage IV metastatic breast cancer (mets to brain, liver, bone) s/p mastectomy  Elevated LFT's  Followed by Dr. Varma of [facility].  Currently on immunotherapy and receiving radiation to spine (completed brain radiation for cerebellar mets).  Recent  baseline LFT's unknown, suspect due to liver mets.  No signs of tenderness on exam.    * Palliative Care consulted, recommend follow up with outpatient Palliative Team through [facility]  -- [facility] consulted. Appreciate assistance   -- After TCU recovery , patient is recommended to have a follow up with oncology    HFrEF due to doxorubicin  Type 2 MI due to supply demand mismatch  Sinus tachycardia  TTE 6/2022 with EF 35-40% with global LV hypokinesis.  Outside trop 183, suspect demand ischemia.    Echocardiogram with EF of 40-45%    -- Continue to  monitor I/O's and daily weights  -- metoprolol and lisinopril on hold due to soft pressures; currently normotensive  -- resume PTA spironolactone and bumex 2 mg bid given marked edema; monitor output and daily weights     DM II with peripheral neuropathy  -- Continue low dose SSI  -- Hemoglobin A1c at 6.4% indicating good control      Depression  -- Continue PTA cymbalta     Insomnia  -- Patient requested Ambien to help sleep at ordered for her, reduced dose due to excissive somnolence, tolerating low dose           Diet: Room Service  Combination Diet Moderate Consistent Carb (60 g CHO per Meal) Diet; Regular Diet    DVT Prophylaxis: Pneumatic Compression Devices  Jiang Catheter: Not present  Central Lines: PRESENT       Cardiac Monitoring: None  Code Status: Full Code      Disposition Plan      Expected Discharge Date: 11/16/2022        Discharge Comments: LTACH.        The patient's care was discussed with the Bedside Nurse and Patient.    Freeman Vail MD  Hospitalist Service  [facility]  Securely message with the Vocera Web Console (learn more here)  Text page via [employer] Paging/Directory         Clinically Significant Risk Factors        # Hypokalemia: Lowest K = 3.3 mmol/L (Ref range: 3.4-5.3) in last 2 days, will replace as needed  # Hyponatremia: Lowest Na = 135 mmol/L (Ref range: 136-145) in last 2 days, will monitor as appropriate      #  "Hypoalbuminemia: Lowest albumin = 1.9 g/dL (Ref range: 3.5-5.2) at 10/18/2022  5:37 PM, will monitor as appropriate   # Thrombocytopenia: Lowest platelets = 73 (Ref range: 150-450) in last 2 days, will monitor for bleeding        # Overweight: Estimated body mass index is 27.88 kg/m  as calculated from the following:    Height as of this encounter: 1.651 m (5' 5\").    Weight as of this encounter: 76 kg (167 lb 8.8 oz).   # Moderate Malnutrition: based on nutrition assessment        ______________________________________________________________________    Interval History   Reports some decubitus pain when lying in bed but otherwise denies pain.  No dyspnea, no chest pain/pressure, no fever/chills.  Appetite is \"lousy\".       Data reviewed today: I reviewed all medications, new labs and imaging results over the last 24 hours. I personally reviewed no images or EKG's today.    Physical Exam   Vital Signs: Temp: 97.2  F (36.2  C) Temp src: Axillary BP: 115/72 Pulse: 100   Resp: 20 SpO2: 99 % O2 Device: None (Room air)    Weight: 167 lbs 8.79 oz  General Appearance: sitting up in chair in NAD  Respiratory: few bibasilar crackles, no wheezing or tachyhpnea  Cardiovascular: mildly tachycardic, regular rhythm, normal S1, S2 normal, no murmurs  GI: abdomen soft, nontender, mildly distended, normal bowel sounds  Skin:  Decubitus ulcer not examined today; 4+ pitting lower extremity edema  Neuro: alert and appropriate, formal orientation not assessed today      Data   Recent Labs   Lab 11/15/22  0632 11/14/22  0613 11/13/22 2109 11/13/22  0438 11/12/22  0532 11/11/22 2109 11/11/22 0953 11/10/22  2020   WBC 14.1*  --   --   --   --  9.9  --  15.2*   HGB 8.0*  --   --   --   --  8.0*  --  9.2*   MCV 98  --   --   --   --  97  --  98   PLT 73*  --   --   --   --  62*  --  82*    135  --  130*   < >  --    < >  --    POTASSIUM 3.3* 3.4  3.4  --  3.6  3.7   < >  --    < >  --    CHLORIDE 107 106  --  101   < >  --    < > "  --    CO2 16* 18*  --  18*   < >  --    < >  --    BUN 22 22  --  27   < >  --    < >  --    CR 0.63 0.60  0.58  --  0.68  0.70   < >  --    < >  --    ANIONGAP 13 11  --  11   < >  --    < >  --    BENJI 8.1* 7.7*  --  7.7*   < >  --    < >  --    * 128* 134* 141*   < >  --    < >  --     < > = values in this interval not displayed.     Recent Results (from the past 24 hour(s))   MR Pelvis Bone wo & w Contrast    Narrative    EXAM: MRI PELVIS WITHOUT AND WITH IV CONTRAST  LOCATION: [facility]  DATE/TIME: 11/14/2022 6:42 PM    INDICATION: Sacral decubitus ulcer with exposed bone, concern for osteomyelitis. Metastatic breast cancer.  COMPARISON: None.    TECHNIQUE: Routine MRI pelvis without and with IV contrast. Axial, sagittal, and coronal high-resolution T2 and post gadolinium T1 with fat saturation.   CONTRAST: 7mL Gadavist    FINDINGS: There is a decubitus ulceration superficial to the mid sacrum. No associated fluid collection. No associated marrow abnormality or bone destruction to suggest osteomyelitis.    There are extensive marrow replacing lesions compatible with metastases.    There is a small nonenhancing focus within the left posterior paraspinous musculature at the L5 level which corresponds to a focus of soft tissue calcification on the recent CT PET.    There are likely chronic fractures of both sacral ala, and chronic displaced fractures of the left pubic rami, better demonstrated on the prior CT PET scan. Small effusions of both SI joints, and associated synovitis.    Diffuse subcutaneous and muscular edema, and a small amount of ascites.      Impression    IMPRESSION:  1.  Decubitus ulcer superficial to the sacrum.  2.  No evidence of associated osteomyelitis.  3.  Extensive osseous metastatic disease.

## 2022-11-15 NOTE — PROGRESS NOTES
Cross Cover Note    Called re MRI results.    IMPRESSION:  1.  Decubitus ulcer superficial to the sacrum.  2.  No evidence of associated osteomyelitis.  3.  Extensive osseous metastatic disease. (known metastatic breast cancer)    No changes to plan of care this evening. ID following.    Holly Portillo PA-C

## 2022-11-15 NOTE — PROGRESS NOTES
"Notified Person: [facility] Department Pager    Notification Date/Time: 11/15/2022 [id number]    Notification Interaction: Web-based paging    Purpose of Notification: \"Can I please get a call back regarding the patients care orders? Have questions regarding her wound care. Also please refer to general surgery's note regarding recommendations on wounds care. Thanks\"    Comments: Will call general surgery regarding recommendations.    "

## 2022-11-15 NOTE — PLAN OF CARE
Goal Outcome Evaluation:      Plan of Care Reviewed With: patient    Overall Patient Progress: decliningOverall Patient Progress: declining    Reason for Admission: L SDH & Encephalopathy, Sepsis     Cognitive/Mentation: A/Ox 3. Disoriented x time. Forgetful at times. Whispers when talks.  Neuros/CMS: Intact ex generalized weakness with BUE 3/5 strength, BLE 2/5 strength, L facial droop.  VS: Tachycardic but w/in parameters of HR goal<20. SBP goal<160.  GI: BS active, + flatus, last BM 11/15/2022. Incontinent.  : Purwick in place. Incontinent.  Pulmonary: LS diminished. Infrequent dry, nonproductive cough- scheduled Mucinex given.  Pain: 10/10 pain after sacral wound debridement- pain improved w/ PRN tylenol and PRN PO Dilaudid.     Drains/Lines: L chest port- unable to pull from port (MD notified).  Skin: BLE 3-4+ pitting edema-MD notified, Lymphedema therapy consulted. Sacral wound Stage 4- Infectious diseases and Gen surg reconsulted.  Activity: Assist x 2 with Sejal german. T/R q2h.  Diet: Regular diet with thin liquids. Takes pills whole.      Therapies recs: LTACH accepted  Discharge: Pending     Aggression Stoplight Tool: GREEN     End of shift summary: General surgery completed bedside sacral wound debridement- 10/10 pain after, PRN tylenol and PO Dilaudid given. Potassium 3.3, replaced and lab redraw pending.    L chest port- unable to pull from port (MD notified)- requested alteplase for vascular access device.

## 2022-11-15 NOTE — PROGRESS NOTES
[facility]    General Surgery  Daily Note       Assessment and Plan:   Elenita Moran is a 67 year old female with past medical history significant for metastatic breast cancer, now with stage IV sacral decubitus ulcer, status post bedside debridement 10/26/2022 and excisional debridement in the operating room on 11/4/2022.  MRI completed 11/14/2022 with no evidence of osteomyelitis.    -Medical management per primary team  -Antibiotics per infectious disease  -Local wound cares per WOCN  -We will proceed with bedside debridement of necrotic tissue at wound periphery  -General surgery to follow        Interval History:   Patient remains stable.  Afebrile.  White blood cell count increased but CRP and procalcitonin decreased.  Pelvic MRI without evidence of osteomyelitis.           Physical Exam:   Temp: 97.2  F (36.2  C) Temp src: Axillary BP: 115/72 Pulse: 100   Resp: 20 SpO2: 99 % O2 Device: None (Room air)      I/O last 3 completed shifts:  In: 120 [P.O.:120]  Out: 1250 [Urine:1250]      Constitutional: alert and no distress   Respiratory: Breathing unlabored  Integumentary: Sacral wound with necrotic tissue at wound periphery and wound base, no evidence of purulent drainage      Data   Recent Labs   Lab 11/15/22  0632 11/14/22  0613 11/13/22 2109 11/13/22  0438 11/12/22  0532 11/11/22 2109 11/11/22  0953 11/10/22  2020   WBC 14.1*  --   --   --   --  9.9  --  15.2*   HGB 8.0*  --   --   --   --  8.0*  --  9.2*   MCV 98  --   --   --   --  97  --  98   PLT 73*  --   --   --   --  62*  --  82*    135  --  130*   < >  --    < >  --    POTASSIUM 3.3* 3.4  3.4  --  3.6  3.7   < >  --    < >  --    CHLORIDE 107 106  --  101   < >  --    < >  --    CO2 16* 18*  --  18*   < >  --    < >  --    BUN 22 22  --  27   < >  --    < >  --    CR 0.63 0.60  0.58  --  0.68  0.70   < >  --    < >  --    ANIONGAP 13 11  --  11   < >  --    < >  --    BENJI 8.1* 7.7*  --  7.7*   < >  --     < >  --    * 128* 134* 141*   < >  --    < >  --     < > = values in this interval not displayed.     MRI PELVIS WITHOUT AND WITH IV CONTRAST  LOCATION: [facility]  DATE/TIME: 11/14/2022 6:42 PM    FINDINGS: There is a decubitus ulceration superficial to the mid sacrum. No associated fluid collection. No associated marrow abnormality or bone destruction to suggest osteomyelitis.     There are extensive marrow replacing lesions compatible with metastases.     There is a small nonenhancing focus within the left posterior paraspinous musculature at the L5 level which corresponds to a focus of soft tissue calcification on the recent CT PET.     There are likely chronic fractures of both sacral ala, and chronic displaced fractures of the left pubic rami, better demonstrated on the prior CT PET scan. Small effusions of both SI joints, and associated synovitis.     Diffuse subcutaneous and muscular edema, and a small amount of ascites.                                                                      IMPRESSION:  1.  Decubitus ulcer superficial to the sacrum.  2.  No evidence of associated osteomyelitis.  3.  Extensive osseous metastatic disease.    Emi Hernandez MD

## 2022-11-16 NOTE — PROGRESS NOTES
[facility]    Infectious Disease Progress Note    Date of Service : 11/16/2022     Assessment:  67 Year old chronically ill female with prolonged hospitalization for encephalopathy, subdural hematoma, now with sepsis syndrome related to infected sacral decubitus ulcer without evidence of osteomyelitis. No other apparent focus of infection     -Chronic sacral decubitus ulcer with necrotic base and exposed bone without osteomyelitis on imaging  -Leukocytosis and sepsis syndrome resolved  -Anemia and thrombocytopenia  -Hyponatremia  -LLL pneumonia on CXR 11/2 treated with Levaquin  -SHD with encephalopathy  -Chronic debility  -Stage IV metastatic breast cancer (mets to brain, liver, bone) s/p mastectomy. On immunotherapy. 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 S/p Bedside debridement . No culture data available.  2. Discontinue Vancomycin, continue zosyn  3. Plan oral transition soon  4. Wound care  Discussed with the hospitalist service    Liz Serrano MD    Interval History   Alert this morning, sitting out in a chair, feels ok, does report pain at her decubitus ulcer site. Tolerating antibiotics without side effects    Physical Exam   Temp: 97.2  F (36.2  C) Temp src: Axillary BP: 125/84 Pulse: 111   Resp: 16 SpO2: 100 % O2 Device: None (Room air)    Vitals:    10/19/22 0430 10/26/22 1939 11/15/22 1355   Weight: 76 kg (167 lb 8.8 oz) 76 kg (167 lb 8.8 oz) 79.4 kg (175 lb 0.7 oz)     Vital Signs with Ranges  Temp:  [96.2  F (35.7  C)-98.3  F (36.8  C)] 97.2  F (36.2  C)  Pulse:  [] 111  Resp:  [16-18] 16  BP: (108-128)/(73-84) 125/84  SpO2:  [98 %-100 %] 100 %    Constitutional: chronically ill appearing patient  Lungs: Clear to auscultation bilaterally, no crackles or wheezing  Cardiovascular: S1S2  Abdomen: Normal bowel sounds, soft,  non-distended, non-tender  Skin: No rash, chest port in place without surrounding erythema    Other:    Medications       sodium chloride 0.9%  500 mL Intravenous Once     alteplase  2 mg Intravenous Once     bumetanide  2 mg Oral BID     docusate sodium  100 mg Oral Daily     DULoxetine  30 mg Oral QAM     DULoxetine  60 mg Oral At Bedtime     [Held by provider] furosemide  40 mg Oral Daily     guaiFENesin  600 mg Oral BID     heparin  5-10 mL Intracatheter Q28 Days     heparin lock flush  5-10 mL Intracatheter Q24H     levETIRAcetam  500 mg Oral BID     [Held by provider] lisinopril  2.5 mg Oral Daily     menthol-zinc oxide   Topical BID     [Held by provider] metoprolol tartrate  12.5 mg Oral BID     multivitamin w/minerals  1 tablet Oral Daily     oxybutynin  5 mg Oral BID     oxybutynin ER  10 mg Oral QPM     pantoprazole  40 mg Oral At Bedtime     piperacillin-tazobactam  4.5 g Intravenous Q6H     sodium chloride (PF)  10-20 mL Intracatheter Q28 Days     spironolactone  12.5 mg Oral Daily     vancomycin  1,250 mg Intravenous Q24H     zolpidem  2.5 mg Oral At Bedtime       Data   All microbiology laboratory data reviewed.  Recent Labs   Lab Test 11/16/22  0614 11/15/22  0632 11/11/22  2109   WBC 10.6 14.1* 9.9   HGB 7.7* 8.0* 8.0*   HCT 23.0* 23.3* 23.7*   * 98 97   PLT 77* 73* 62*     Recent Labs   Lab Test 11/16/22  0614 11/15/22  0632 11/14/22  0613   CR 0.71 0.63 0.60  0.58     Recent Labs   Lab Test 12/03/19  1650   SED 63*     Imaging  11/14 MRI pelvic bones  EXAM: MRI PELVIS WITHOUT AND WITH IV CONTRAST  LOCATION: [facility]  DATE/TIME: 11/14/2022 6:42 PM     INDICATION: Sacral decubitus ulcer with exposed bone, concern for osteomyelitis. Metastatic breast cancer.  COMPARISON: None.     TECHNIQUE: Routine MRI pelvis without and with IV contrast. Axial, sagittal, and coronal high-resolution T2 and post gadolinium T1 with fat saturation.   CONTRAST: 7mL  Gadavist     FINDINGS: There is a decubitus ulceration superficial to the mid sacrum. No associated fluid collection. No associated marrow abnormality or bone destruction to suggest osteomyelitis.     There are extensive marrow replacing lesions compatible with metastases.     There is a small nonenhancing focus within the left posterior paraspinous musculature at the L5 level which corresponds to a focus of soft tissue calcification on the recent CT PET.     There are likely chronic fractures of both sacral ala, and chronic displaced fractures of the left pubic rami, better demonstrated on the prior CT PET scan. Small effusions of both SI joints, and associated synovitis.     Diffuse subcutaneous and muscular edema, and a small amount of ascites.                                                                      IMPRESSION:  1.  Decubitus ulcer superficial to the sacrum.  2.  No evidence of associated osteomyelitis.  3.  Extensive osseous metastatic disease.

## 2022-11-16 NOTE — PLAN OF CARE
Goal Outcome Evaluation:      Plan of Care Reviewed With: patient    Overall Patient Progress: no changeOverall Patient Progress: no change     Reason for Admission: SDH, metabolic encephalopathy, sepsis likely d/t sacral pressure ulcer     Cognitive/Mentation: Difficult to assess, pt speaking very little, lethargic  Neuros/CMS: Intact ex generalized weakness 3/5, does not follow many commands  VS: Tachycardic   GI: BS active, + flatus. Incontinent. LBM 11/15.  : Voiding. Incontinent. PW in place.   Pulmonary: LS diminished.  Pain: No complaints.     Drains/Lines: L port, needle changed 11/16.  Skin: Stage 4 sacral pressure injury, bruising, excoriation of perineum  Activity: Assist x2 Lift, T/R Q2H  Diet: MCHO with thin liquids. Takes pills whole, one at a time.      Therapies recs: LTC  Discharge: Pending medical clearance     Aggression Stoplight Tool: Green     End of shift summary: Sepsis protocol completed, insignificant results. Debridement completed yesterday, plan to do another one today to collect tissue samples. If able to plan ahead, pt would benefit from being pre-medicated for pain, as she had 10/10 pain and was tearful after procedure. Look out for care conference planning.

## 2022-11-16 NOTE — PROGRESS NOTES
General Surgery:    Attempted to see patient multiple times today for wound check.  Unfortunately, patient was either meeting with family members or up in the chair.  We will plan to see patient tomorrow for wound check and to obtain additional wound tissue for culture.    Emi Hernandez MD

## 2022-11-16 NOTE — PROGRESS NOTES
"[facility]    Medicine Progress Note - Hospitalist Service    Date of Admission:  10/18/2022    Assessment & Plan   Elenita Moran is a 67 year old female who was admitted on 10/18/2022 with past medical history significant for the stage IV metastatic breast cancer with metastasis to brain and liver bone, s/p mastectomy, heart failure with reduced ejection fraction secondary to doxorubicin, type 2 diabetes mellitus, depression and stage IV sacral pressure ulcer who was admitted to the hospital secondary to acute metabolic encephalopathy and was found to have traumatic subdural hemorrhage.  Seen by neurology and neurosurgery with no surgical interventions.  While pending placement patient developed sepsis on 11/11/2022.  Source is likely due to her sacral ulcers which have noted to be worse despite debridement this admission.  Started back on broad-spectrum antibiotics and wound care reconsulted.         Goals of care  On 11/16, discussed potential discharge to LTACH in near future once antibiotic plan finalized and other medical issues remain stable.  She indicates she does not feel she has the \"fight\" to keep going, stating that her  wishes her to.  Also indicates she may be more interested in a comfort approach to plan of care but would like to speak more with her family about this.  She states she just very badly wants to be home for a day or two.  During prior discussions during hospital stay, her plan was to become stronger and resume treatment of her cancer.  I discussed my concern that she would likely need to regain significant strength before this would happen, and given her very poor appetite, am concerned about her ability to recover.  -- discussed with Oncology who will re-visit her today to discuss any potential treatment going forward (their last note indicates that if she were to decline, transition to hospice is not unreasonable)  -- updated  regarding " this discussion and we will meet tomorrow at 1400 with he and her sister at bedside for further goals of care discussion     Acute metabolic encephalopathy most likely secondary to below:  Suspected traumatic SDH  * reports increased difficulty with gait/balance since fall 10 days prior to admission, 48 hours before the presentation patient was increasingly confused with difficulty with speech and was essentially nonverbal on the morning of presentation to the ER.  *CT scan of the head showed left holohemispheric subdural fluid collection with 2 mm midline shift.  * On further questioning has been reported fall on October 8, unknown if patient had any head trauma at that time.  * Her presentation was thought to be secondary to combination of subdural fluid collections along with marked electrolyte abnormalities as patient presented with uremia, hyponatremia and hyperkalemia along with possible intracranial metastasis versus infection.  * During hospitalization, patient was evaluated by neurology and neurosurgery along with neuro critical care, no surgical interventions are recommended at this point.    -- Has been started on Keppra 500 mg p.o. twice daily  -- Goal SBP <160 now  -- Goal platelets of > 50, conditional orders placed, received multiple transfusions for platelets and blood but none recently  -- On moderate carb controlled diet per Speech path     Sepsis  Septic shock  Likely 2/2 sacral wound  *UA minimally positive so less likely culprit.  *Wound was debrided on 11/04, and per nurse, its looking worse now and quite malodorous.   * MRI pelvis 11/14 negative for osteomyelitis, notes extensive osseous metastatic disease  * CXR 11/15 showed left basilar consolidation slightly more dense than prior but reports no respiratory symptoms and has no hypoxia  * per chart review 11/14; does not appear she was resumed on her chronic suppressive Keflex after Zosyn was stopped on 10/21 raising question of  whether he acute decompensation could be related to untreated chronic infection  * Patient hypotensive to the 80s intermittently 11/12-11/13, receiving intermittent normal saline boluses.   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   reported this has been increasing in size over past week as she has spent more time in her chair.    * Completed a course of empiric zosyn  *  S/p bedside debridement 10/26   * As there was concern for eschar overlying chronic a stage IV sacral decubitus ulcer, Dr. Solorio performed an I&D in OR on 11/04  *Patient developed sepsis/septic shock on 11/11/2022.  Likely source is her sacral ulcer which has been described as malodorous at this time during dressing changes.    -- Gen Surg consult as above  -- continue Zosyn as above; will ultimately need resumption of PTA Keflex for chronic MSSA infection  -- Continue dressing changes; WOC following    Acute on Chronic anemia  Baseline hgb about 8 g/dL.   * S/p transfusion of 1 unit 10/31  * labs for reversible causes including iron deficiency, b12, folate , in normal range , Vitmain B 12 is slightly towards the higher range   -- hgb stable about 8 g/dL    Mild covid 19 infection, resolved, completed 10 days Isolation 10/30  No hypoxemia  * completed 3 days of prophylactic Remdesivir     Left lower lobe pneumonia, resolved  * completed Levaquin for 5 days on 11/6     Thrombocytopenia  Abnormal  coags  Admission platelet 38, INR 1.27 and PTT 38.  due to chemo, impaired production  * Vit K 1 mg IV given on admission  -- goal platelet >50K per NSG; transfuse PRN for platelets<50K   -- Platelet counts have been stable  -- Oncology was consulted, see below  -- Patient has been off aspirin     Hyponatremia, resolved  Hyperkalemia with subsequent hypokalemia  Uremia, resolved  Anion gap metabolic acidosis  KATHLEEN, resolved  Cr as low as 0.8 in June 2022, on most recent check Sept 2022 was 1.13.  Admission Na 127, K 7.2, bicarb 16 (gap down to 14), .   reporting minimal oral intake over past 2 days.  Edema on exam, but suspect intravascularly dry with hypoalbuminemia and poor oral intake.    shifted potassium with insulin overnight     * Nephrology consult placed on admission , initially followed, now they have signed off  * intermittent hyponatremia during course of stay  -- Creatinine stable at 0.7; monitor with resumption of diuresis     Stage IV metastatic breast cancer (mets to brain, liver, bone) s/p mastectomy  Elevated LFT's  Followed by Dr. Varma of [facility].  Currently on immunotherapy and receiving radiation to spine (completed brain radiation for cerebellar mets).  Recent baseline LFT's unknown, suspect due to liver mets.  No signs of tenderness on exam.    * Palliative Care consulted, recommend follow up with outpatient Palliative Team through [facility]  -- [facility] consulted. Appreciate assistance   -- After TCU recovery , patient is recommended to have a follow up with oncology    HFrEF due to doxorubicin  Type 2 MI due to supply demand mismatch  Sinus tachycardia  TTE 6/2022 with EF 35-40% with global LV hypokinesis.  Outside trop 183, suspect demand ischemia.    Echocardiogram with EF of 40-45%    -- Continue to  monitor I/O's and daily weights  -- metoprolol and lisinopril on hold due to soft pressures; currently normotensive  -- resumed PTA spironolactone 11/15  -- minimal net diuresis with  "oral bumex, will give 2 mg IV to see if this improves response  -- monitor output and daily weights     DM II with peripheral neuropathy  -- Continue low dose SSI  -- Hemoglobin A1c at 6.4% indicating good control      Depression  -- Continue PTA cymbalta     Insomnia  -- Patient requested Ambien to help sleep at ordered for her, reduced dose due to excissive somnolence, tolerating low dose           Diet: Room Service  Combination Diet Moderate Consistent Carb (60 g CHO per Meal) Diet; Regular Diet  Snacks/Supplements Adult: Glucerna; Between Meals    DVT Prophylaxis: Pneumatic Compression Devices  Jiang Catheter: Not present  Central Lines: PRESENT       Cardiac Monitoring: None  Code Status: Full Code      Disposition Plan      Expected Discharge Date: 11/20/2022        Discharge Comments: LTACH.        The patient's care was discussed with the Bedside Nurse and Patient.    Freeman Vail MD  Hospitalist Service  [facility]  Securely message with the Vocera Web Console (learn more here)  Text page via [employer] Paging/Directory         Clinically Significant Risk Factors        # Hypokalemia: Lowest K = 3.3 mmol/L (Ref range: 3.4-5.3) in last 2 days, will replace as needed       # Hypoalbuminemia: Lowest albumin = 1.9 g/dL (Ref range: 3.5-5.2) at 10/18/2022  5:37 PM, will monitor as appropriate   # Thrombocytopenia: Lowest platelets = 73 (Ref range: 150-450) in last 2 days, will monitor for bleeding        # Overweight: Estimated body mass index is 29.13 kg/m  as calculated from the following:    Height as of this encounter: 1.651 m (5' 5\").    Weight as of this encounter: 79.4 kg (175 lb 0.7 oz).   # Severe Malnutrition: based on nutrition assessment        ______________________________________________________________________    Interval History   Having some ongoing decubitus pain at ulcer site.  Very poor appetite.  Denies lightheadedness or dyspnea.  No fever/chills.  Tearful during " discussion regarding goals of care, noting that she has too many issues ongoing and does not feel she has the fight to keep going.       Data reviewed today: I reviewed all medications, new labs and imaging results over the last 24 hours. I personally reviewed no images or EKG's today.    Physical Exam   Vital Signs: Temp: 97.2  F (36.2  C) Temp src: Axillary BP: 125/84 Pulse: 111   Resp: 16 SpO2: 100 % O2 Device: None (Room air)    Weight: 175 lbs .72 oz  General Appearance: sitting up in chair in NAD  Respiratory: lungs CTAB, no wheezing or tachyhpnea  Cardiovascular: mildly tachycardic, regular rhythm, normal S1, S2 normal, no murmurs  GI: abdomen soft, nontender, mildly distended, normal bowel sounds  Skin:  Decubitus ulcer not examined today; 4+ pitting lower extremity edema to abdomen  Neuro: alert and appropriate, formal orientation not assessed today      Data   Recent Labs   Lab 11/16/22  0614 11/15/22  2002 11/15/22  0632 11/14/22 0613 11/12/22  0532 11/11/22  2109   WBC 10.6  --  14.1*  --   --  9.9   HGB 7.7*  --  8.0*  --   --  8.0*   *  --  98  --   --  97   PLT 77*  --  73*  --   --  62*     --  136 135   < >  --    POTASSIUM 3.6 3.5 3.3* 3.4  3.4   < >  --    CHLORIDE 108  --  107 106   < >  --    CO2 19*  --  16* 18*   < >  --    BUN 25  --  22 22   < >  --    CR 0.71  --  0.63 0.60  0.58   < >  --    ANIONGAP 9  --  13 11   < >  --    BENJI 7.9*  --  8.1* 7.7*   < >  --    *  --  126* 128*   < >  --     < > = values in this interval not displayed.     No results found for this or any previous visit (from the past 24 hour(s)).

## 2022-11-16 NOTE — PROGRESS NOTES
[facility]ology Progress Note     Primary Oncologist/Hematologist:  Dr. Varma          Assessment and Plan:       Primary Oncologist: Dr. Varma     Metastatic breast cancer with bone, liver, brain, bone marrow mets  - extensive prior treatment  - currently on Enhertu (Fam-trastuzumab deruxtecan-nxki) Q21 days. Cycle 3 administered on 10/11/22.   Potential side effects include pancytopenia, pneumonitis and LV dysfunction.  Treatment on hold with current hospitalization.   - 10/6/22 received a single fraction SBRT to T5 lesion  - 04/02/22 cerebellar stereotactic radiosurgery,.   - Follow up MRI 10/13/22 showing interim regression of 2 punctate enhancing foci in the cerebellum.  Also notes was expansion of L subdural fluid collection, now measuring 6 mm, and thickening of overlying diffuse pachymeningeal enhancement.  - She has had 3 cycles of Enhertu and would need reassessment and candidacy for further treatments.  We have updated her primary oncologist.  There were tentative plans to proceed to outpt CT scans to see if she was responding to treatment.  Dr. Varma felt that If she has evidence of clinical deterioration or disease progression transition to comfort cares/hospice would not be unreasonable at that juncture.    - Clearly Elenita has deteriorated since I saw her last on 10/25/22.  She verbalizes weariness and is aware that she may not get stronger.  I do not think she will improve to the point that we could consider additional treatment for her cancer.      Encephalopathy  - increased difficulties with gait and balance for the 10 days prior to admission..  Symptoms then escalated and she had increasing difficulties with speech and was non-verbal upon presentation.  - Seen in ED at [facility] on 10/18 where MRI showed A mixed density left holohemispheric subdural collection with some areas of increased density.  The collection measures 8.3 mm over the left frontal convex city, 8.2 mm over the left  parietal convexity is 3.3 mm over the posterior left temporal convexity.  There is associated mass-effect with a 2 mm midline shift to the right at the level of the lateral ventricles.  This scan in the ER was compared to 3/25/2022  - transferred to [facility] for neurosurgery eval  -  reports fall on 10/8. Unknown if head trauma occurred.   - anti-seizure precautions/Keppra   - symptoms improved.  No surgical intervention recommended.      Sepsis  Septic shock  Likely 2/2 sacral wound  -  Wound was debrided on 11/04, and per nurse, its looking worse now and quite malodorous.   - MRI pelvis 11/14 negative for osteomyelitis, notes extensive osseous metastatic disease  - ID following. On vanco, zosyn  - general surgery following. Bedside debridement on 11/15.      Goals of care  - today, Elenita reports feelings of weariness and an understanding that she may not get stronger and is unlikely to recover to the point that she could get additional treatment for her cancer.  I spoke to her  Denilson by phone and shared my concerns. He believes it may be time for hospice and looks forward to clarifying goals in the family conference scheduled for tomorrow.        KRYSTLE Hatfield, AOCNP  Nurse Practitioner  [facility]  [phone]          Interval History:     Quiet and withdrawn.                Review of Systems:     The 5 point Review of Systems is negative other than noted in the HPI                Medications:   Scheduled Medications    [START ON 11/17/2022] bumetanide  2 mg Intravenous Q24H     docusate sodium  100 mg Oral Daily     DULoxetine  30 mg Oral QAM     DULoxetine  60 mg Oral At Bedtime     guaiFENesin  600 mg Oral BID     heparin  5-10 mL Intracatheter Q28 Days     heparin lock flush  5-10 mL Intracatheter Q24H     levETIRAcetam  500 mg Oral BID     [Held by provider] lisinopril  2.5 mg Oral Daily     menthol-zinc oxide   Topical BID     [Held by provider] metoprolol tartrate  12.5 mg Oral  "BID     multivitamin w/minerals  1 tablet Oral Daily     oxybutynin  5 mg Oral BID     oxybutynin ER  10 mg Oral QPM     pantoprazole  40 mg Oral At Bedtime     piperacillin-tazobactam  4.5 g Intravenous Q6H     sodium chloride (PF)  10-20 mL Intracatheter Q28 Days     spironolactone  12.5 mg Oral Daily     zolpidem  2.5 mg Oral At Bedtime     PRN Medications  acetaminophen **OR** acetaminophen, acetaminophen, bisacodyl, glucose **OR** dextrose **OR** glucagon, guaiFENesin, heparin lock flush, HYDROmorphone, lidocaine 4%, lidocaine, lidocaine (buffered or not buffered), metoprolol, naloxone **OR** naloxone **OR** naloxone **OR** naloxone, nitroGLYcerin, ondansetron **OR** ondansetron, polyethylene glycol, prochlorperazine **OR** prochlorperazine **OR** prochlorperazine, senna-docusate **OR** senna-docusate, sodium chloride, sodium chloride (PF), sodium chloride (PF)               Physical Exam:   Vitals were reviewed  Blood pressure 125/84, pulse 111, temperature 97.2  F (36.2  C), temperature source Axillary, resp. rate 16, height 1.651 m (5' 5\"), weight 79.4 kg (175 lb 0.7 oz), SpO2 100 %.  Wt Readings from Last 4 Encounters:   11/15/22 79.4 kg (175 lb 0.7 oz)   10/10/22 94.1 kg (207 lb 8 oz)   09/28/22 94.8 kg (208 lb 14.4 oz)   09/12/22 71.9 kg (158 lb 9.6 oz)       I/O last 3 completed shifts:  In: 1650 [P.O.:1650]  Out: 2250 [Urine:2250]                 Data:   All laboratory data and imaging studies reviewed.    CMP  Recent Labs   Lab 11/16/22  0614 11/15/22  2002 11/15/22  0632 11/14/22  0613 11/13/22 2109 11/13/22  0438     --  136 135  --  130*   POTASSIUM 3.6 3.5 3.3* 3.4  3.4  --  3.6  3.7   CHLORIDE 108  --  107 106  --  101   CO2 19*  --  16* 18*  --  18*   ANIONGAP 9  --  13 11  --  11   *  --  126* 128* 134* 141*   BUN 25  --  22 22  --  27   CR 0.71  --  0.63 0.60  0.58  --  0.68  0.70   GFRESTIMATED >90  --  >90 >90  >90  --  >90  >90   BENJI 7.9*  --  8.1* 7.7*  --  7.7* "     CBC  Recent Labs   Lab 11/16/22  0614 11/15/22  0632 11/11/22  2109 11/10/22  2020   WBC 10.6 14.1* 9.9 15.2*   RBC 2.28* 2.37* 2.44* 2.76*   HGB 7.7* 8.0* 8.0* 9.2*   HCT 23.0* 23.3* 23.7* 27.0*   * 98 97 98   MCH 33.8* 33.8* 32.8 33.3*   MCHC 33.5 34.3 33.8 34.1   RDW 27.1* 26.7* 25.1* 25.2*   PLT 77* 73* 62* 82*     INRNo lab results found in last 7 days.        VISHNU MetzgerP  Nurse Practitioner  [facility]  [phone]

## 2022-11-16 NOTE — PROVIDER NOTIFICATION
"MD Notification    Notified Person: MD    Notified Person Name:  Tara Luna    Notification Date/Time: 11/15/2022 7675    Notification Interaction:  [employer] Web     Purpose of Notification: \"Patients L chest port is unable to have blood pulled from for blood unit collect. Can I have an order for alteplase for vascular access device? Thanks\"    Orders Received: Alteplase ordered.     Addendum: This nurse did not administer alteplase, as good blood return was noted when re-assessed port.         "

## 2022-11-16 NOTE — PROGRESS NOTES
Coccyx dressing change  Moderate sero sang drainage noted, cleaned with Vashe, new dressing applied. Time spent: 5 minutes.

## 2022-11-17 NOTE — PLAN OF CARE
Reason for Admission: SDH, metabolic encephalopathy, sepsis likely d/t sacral pressure ulcer     Cognitive/Mentation: Difficult to assess, pt speaking very little  Neuros/CMS: Intact ex generalized weakness 2-3/5, does not follow many commands  VS: Tachycardiac   GI: BS active, + flatus. Incontinent. LBM 11/16.  : Voiding. Incontinent. PW in place.   Pulmonary: LS diminished.  Pain: Sacral pain     Drains/Lines: L port  Skin: Stage 4 sacral pressure injury, bruising, excoriation of perineum  Activity: Assist x2 Lift, T/R Q2H  Diet: MCHO with thin liquids. Takes pills whole, one at a time.      Therapies recs: LTC  Discharge: Pending medical clearance     Aggression Stoplight Tool: Green     End of shift summary: No acute changes overnight.

## 2022-11-17 NOTE — PROVIDER NOTIFICATION
"Message sent to general surgery  \" Patient had debridement of coccyx wound on 11/15. It was reported to me a sample was not obtained, surgeon would be back to do that on 11/16. It was not done yesterday. Is this still the plan \"  "

## 2022-11-17 NOTE — PROGRESS NOTES
[facility]    General Surgery  Daily Note       Assessment and Plan:   Elenita Moran is a 67 year old female with past medical history significant for metastatic breast cancer, now with stage IV sacral decubitus ulcer, status post bedside debridement 10/26/2022 and excisional debridement in the operating room on 11/4/2022.  MRI completed 11/14/2022 with no evidence of osteomyelitis.  Repeat bedside debridement completed 11/15/2022.    -Medical management per primary team  -Antibiotics per infectious disease  -Case previously discussed with [facility] nurse -agree with local wound cares per WO recommendations  -Follow-up tissue cultures (tissue sent for aerobic and anaerobic culture)  -General surgery to follow peripherally, please call with questions/concerns        Interval History:   Patient remains stable.  Afebrile.  White blood cell count slightly increased today.           Physical Exam:   Temp: 97.7  F (36.5  C) Temp src: Axillary BP: 122/76 Pulse: 106   Resp: 16 SpO2: 93 % O2 Device: None (Room air)      I/O last 3 completed shifts:  In: 860 [P.O.:660; I.V.:200]  Out: 750 [Urine:750]      Constitutional: alert and no distress   Respiratory: Breathing unlabored  Integumentary: Sacral wound with minimal necrotic tissue at wound periphery and wound base, no evidence of purulent drainage; minimal necrotic tissue at wound periphery prepped with ChloraPrep and excised utilizing 11 blade scalpel, tissue sent for aerobic and anaerobic culture      Data   Recent Labs   Lab 11/17/22  0621 11/16/22  0614 11/15/22  2002 11/15/22  0632   WBC 12.1* 10.6  --  14.1*   HGB 7.4* 7.7*  --  8.0*   * 101*  --  98   PLT 73* 77*  --  73*    136  --  136   POTASSIUM 3.4 3.6 3.5 3.3*   CHLORIDE 105 108  --  107   CO2 17* 19*  --  16*   BUN 27 25  --  22   CR 0.78 0.71  --  0.63   ANIONGAP 12 9  --  13   BENJI 7.6* 7.9*  --  8.1*   * 129*  --  126*       Emi Hernandez MD

## 2022-11-17 NOTE — PROGRESS NOTES
[facility] H[facility]ology Progress Note     Primary Oncologist/Hematologist:  Dr. Varma          Assessment and Plan:     Primary Oncologist: Dr. Varma     Metastatic breast cancer with bone, liver, brain, bone marrow mets  - extensive prior treatment  - currently on Enhertu (Fam-trastuzumab deruxtecan-nxki) Q21 days. Cycle 3 administered on 10/11/22.   Potential side effects include pancytopenia, pneumonitis and LV dysfunction.  Treatment on hold with current hospitalization.   - 10/6/22 received a single fraction SBRT to T5 lesion  - 04/02/22 cerebellar stereotactic radiosurgery,.   - Follow up MRI 10/13/22 showing interim regression of 2 punctate enhancing foci in the cerebellum.  Also notes was expansion of L subdural fluid collection, now measuring 6 mm, and thickening of overlying diffuse pachymeningeal enhancement.  - She has had 3 cycles of Enhertu and would need reassessment and candidacy for further treatments.  We have updated her primary oncologist.  There were tentative plans to proceed to outpt CT scans to see if she was responding to treatment.  Dr. Varma felt that If she has evidence of clinical deterioration or disease progression transition to comfort cares/hospice would not be unreasonable at that juncture.    - Clearly Elenita has deteriorated since I saw her last on 10/25/22.  She verbalizes weariness and is aware that she may not get stronger.  I do not think she will improve to the point that we could consider additional treatment for her cancer.      Encephalopathy  - increased difficulties with gait and balance for the 10 days prior to admission..  Symptoms then escalated and she had increasing difficulties with speech and was non-verbal upon presentation.  - Seen in ED at [facility] on 10/18 where MRI showed A mixed density left holohemispheric subdural collection with some areas of increased density.  The collection measures 8.3 mm over the left frontal convex city, 8.2 mm over the left  parietal convexity is 3.3 mm over the posterior left temporal convexity.  There is associated mass-effect with a 2 mm midline shift to the right at the level of the lateral ventricles.  This scan in the ER was compared to 3/25/2022  - transferred to [facility] for neurosurgery eval  -  reports fall on 10/8. Unknown if head trauma occurred.   - anti-seizure precautions/Keppra   - symptoms improved.  No surgical intervention recommended.      Sepsis  Septic shock  Likely 2/2 sacral wound  -  Wound was debrided on 11/04, and per nurse, its looking worse now and quite malodorous.   - MRI pelvis 11/14 negative for osteomyelitis, notes extensive osseous metastatic disease  - ID following. On vanco, zosyn  - general surgery following. Bedside debridement on 11/15.      Goals of care  - today, Elenita reports feelings of weariness and an understanding that she may not get stronger and is unlikely to recover to the point that she could get additional treatment for her cancer.  I spoke to her  Denilson by phone on 11/16 and shared my concerns. He believes it may be time for hospice and looks forward to clarifying goals in the family conference scheduled for later today.  Elenita indicates today that she wants to go home, even if for a short time.  She has items/ treasures in her home that she wants to gift to family members before she dies. She became tearful when describing her desire to go home.        KRYSTLE Hatfield, AOCNP  Nurse Practitioner  [facility]  [phone]          Interval History:     More alert today.  Very soft-spoken.                Review of Systems:     The 5 point Review of Systems is negative other than noted in the HPI                Medications:   Scheduled Medications    bumetanide  2 mg Intravenous Q24H     docusate sodium  100 mg Oral Daily     DULoxetine  30 mg Oral QAM     DULoxetine  60 mg Oral At Bedtime     guaiFENesin  600 mg Oral BID     heparin  5-10 mL Intracatheter Q28  "Days     heparin lock flush  5-10 mL Intracatheter Q24H     levETIRAcetam  500 mg Oral BID     [Held by provider] lisinopril  2.5 mg Oral Daily     menthol-zinc oxide   Topical BID     [Held by provider] metoprolol tartrate  12.5 mg Oral BID     multivitamin w/minerals  1 tablet Oral Daily     oxybutynin  5 mg Oral BID     oxybutynin ER  10 mg Oral QPM     pantoprazole  40 mg Oral At Bedtime     piperacillin-tazobactam  4.5 g Intravenous Q6H     sodium chloride (PF)  10-20 mL Intracatheter Q28 Days     spironolactone  12.5 mg Oral Daily     zolpidem  2.5 mg Oral At Bedtime     PRN Medications  acetaminophen **OR** acetaminophen, acetaminophen, bisacodyl, glucose **OR** dextrose **OR** glucagon, guaiFENesin, heparin lock flush, HYDROmorphone, lidocaine 4%, lidocaine, lidocaine (buffered or not buffered), metoprolol, naloxone **OR** naloxone **OR** naloxone **OR** naloxone, nitroGLYcerin, ondansetron **OR** ondansetron, polyethylene glycol, prochlorperazine **OR** prochlorperazine **OR** prochlorperazine, senna-docusate **OR** senna-docusate, sodium chloride, sodium chloride (PF), sodium chloride (PF)               Physical Exam:   Vitals were reviewed  Blood pressure 122/76, pulse 106, temperature 97.7  F (36.5  C), temperature source Axillary, resp. rate 16, height 1.651 m (5' 5\"), weight 79.4 kg (175 lb 0.7 oz), SpO2 93 %.  Wt Readings from Last 4 Encounters:   11/15/22 79.4 kg (175 lb 0.7 oz)   10/10/22 94.1 kg (207 lb 8 oz)   09/28/22 94.8 kg (208 lb 14.4 oz)   09/12/22 71.9 kg (158 lb 9.6 oz)       I/O last 3 completed shifts:  In: 860 [P.O.:660; I.V.:200]  Out: 750 [Urine:750]               Data:   All laboratory data and imaging studies reviewed.    CMP  Recent Labs   Lab 11/17/22  0621 11/16/22  0614 11/15/22  2002 11/15/22  0632 11/14/22  0613    136  --  136 135   POTASSIUM 3.4 3.6 3.5 3.3* 3.4  3.4   CHLORIDE 105 108  --  107 106   CO2 17* 19*  --  16* 18*   ANIONGAP 12 9  --  13 11   * 129*  " --  126* 128*   BUN 27 25  --  22 22   CR 0.78 0.71  --  0.63 0.60  0.58   GFRESTIMATED 83 >90  --  >90 >90  >90   BENJI 7.6* 7.9*  --  8.1* 7.7*     CBC  Recent Labs   Lab 11/17/22  0621 11/16/22  0614 11/15/22  0632 11/11/22  2109   WBC 12.1* 10.6 14.1* 9.9   RBC 2.11* 2.28* 2.37* 2.44*   HGB 7.4* 7.7* 8.0* 8.0*   HCT 21.3* 23.0* 23.3* 23.7*   * 101* 98 97   MCH 35.1* 33.8* 33.8* 32.8   MCHC 34.7 33.5 34.3 33.8   RDW 26.9* 27.1* 26.7* 25.1*   PLT 73* 77* 73* 62*     INRNo lab results found in last 7 days.        Sridhar DALTON, VISHNUP  Nurse Practitioner  [facility]  [phone]

## 2022-11-17 NOTE — PLAN OF CARE
Goal Outcome Evaluation:      Plan of Care Reviewed With: patient    Overall Patient Progress: no changeOverall Patient Progress: no change         Here with SDH, sepsis. Neuros stable. Voice is soft, whispery. Generalized weakness, UEs 4/5, LEs 1/5. Dilaudid for coccyx pain, possible further tissue sample today. Fair appetite, 25% of meals. Refuses supplement due to sweetness. Purewick in place for incontinence. Small incontinent BM this am. See note for specifics on coccyx wound. Up to chair with lift. Dishcharge to LTAC when ready.

## 2022-11-17 NOTE — PROGRESS NOTES
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  Seen by neurology and neurosurgery with no surgical interventions.  While pending placement patient developed sepsis on 11/11/2022.  Source is likely due to her sacral ulcers which have noted to be worse despite debridement this admission.  Started back on broad-spectrum antibiotics and wound care reconsulted.         Goals of care  See progress note 11/16, but in summary, voicing some desire to stop active treatment and enroll in hospice.  Oncology followed up 11/16 and feel it is unlikely that she will regain enough strength to be considered for further cancer treatment.    -- met with  and patient's sister at bedside 11/17; she is able to recall discussion with oncology yesterday regarding no further cancer treatment.  She reiterates how tired she is.  Discussed that if she never is strong enough for further cancer treatment, this will continue to progress and ultimately take her life.  Discussed option of transitioning care to one that focuses exclusively on her comfort to ensure she remains comfortable for what time she has left.   prompted her on what she would like to do and she currently states she needs time to think about it.  Acknowledged this is a very difficult decision to make and she can take her time in making this consideration.  As she follows with Palliative Care  in Oncology clinic, asked if their involvement to help make this decision would be helpful, which she felt it would be.   -- spoke with sister privately outside patient's room.  She acknowledges her sister's decline and feels transition to hospice would be best as she does not want to see her suffer any longer.  Encouraged her to discuss this with patient, as she may be looking for loved ones to say its ok to transition goals.  -- discussed code status and she in favor switching to DNR/DNI  -- will ask Palliative to follow up to assist with further goals of care discussion     Acute metabolic encephalopathy most likely secondary to below:  Suspected traumatic SDH  * reports increased difficulty with gait/balance since fall 10 days prior to admission, 48 hours before the presentation patient was increasingly confused with difficulty with speech and was essentially nonverbal on the morning of presentation to the ER.  *CT scan of the head showed left holohemispheric subdural fluid collection with 2 mm midline shift.  * On further questioning has been reported fall on October 8, unknown if patient had any head trauma at that time.  * Her presentation was thought to be secondary to combination of subdural fluid collections along with marked electrolyte abnormalities as patient presented with uremia, hyponatremia and hyperkalemia along with possible intracranial metastasis versus infection.  * During hospitalization, patient was evaluated by neurology and neurosurgery along with neuro critical care, no surgical interventions are recommended at this point.    -- Has been started on Keppra 500 mg p.o. twice daily  -- Goal SBP <160 now  -- Goal platelets of > 50, conditional orders placed, received multiple transfusions for platelets and blood but none recently  -- On moderate carb controlled diet per Speech path     Sepsis  Septic shock  Likely 2/2 sacral wound  *UA minimally positive so less likely culprit.  *Wound  was debrided on 11/04, and per nurse, its looking worse now and quite malodorous.   * MRI pelvis 11/14 negative for osteomyelitis, notes extensive osseous metastatic disease  * CXR 11/15 showed left basilar consolidation slightly more dense than prior but reports no respiratory symptoms and has no hypoxia  * per chart review 11/14; does not appear she was resumed on her chronic suppressive Keflex after Zosyn was stopped on 10/21 raising question of whether he acute decompensation could be related to untreated chronic infection  * Patient hypotensive to the 80s intermittently 11/12-11/13, receiving intermittent normal saline boluses.   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   reported this has been increasing in size over past week as she has spent more time in her chair.    * Completed a course of empiric zosyn  *  S/p bedside debridement 10/26   * As there was concern for eschar overlying chronic a stage IV sacral decubitus ulcer, Dr. Solorio performed an I&D in OR on 11/04  *Patient developed sepsis/septic shock on 11/11/2022.  Likely source is her sacral ulcer which has been described as malodorous at this time during dressing changes.    -- Gen Surg consult as above  -- continue Zosyn as above; will ultimately need resumption of PTA Keflex for chronic MSSA infection  -- Continue dressing changes; WOC following    Acute on Chronic anemia  Baseline hgb about 8 g/dL.   * S/p  transfusion of 1 unit 10/31  * labs for reversible causes including iron deficiency, b12, folate , in normal range , Vitmain B 12 is slightly towards the higher range   -- hgb stable about 8 g/dL    Mild covid 19 infection, resolved, completed 10 days Isolation 10/30  No hypoxemia  * completed 3 days of prophylactic Remdesivir     Left lower lobe pneumonia, resolved  * completed Levaquin for 5 days on 11/6     Thrombocytopenia  Abnormal coags  Admission platelet 38, INR 1.27 and PTT 38.  due to chemo, impaired production  * Vit K 1 mg IV given on admission  -- goal platelet >50K per NSG; transfuse PRN for platelets<50K   -- Platelet counts have been stable  -- Oncology was consulted, see below  -- Patient has been off aspirin     Hyponatremia, resolved  Hyperkalemia with subsequent hypokalemia  Uremia, resolved  Anion gap metabolic acidosis  KATHLEEN, resolved  Cr as low as 0.8 in June 2022, on most recent check Sept 2022 was 1.13.  Admission Na 127, K 7.2, bicarb 16 (gap down to 14), .   reporting minimal oral intake over past 2 days.  Edema on exam, but suspect intravascularly dry with hypoalbuminemia and poor oral intake.    shifted potassium with insulin overnight     * Nephrology consult placed on admission , initially followed, now they have signed off  * intermittent hyponatremia during course of stay  -- Creatinine stable at 0.7; monitor with resumption of diuresis     Stage IV metastatic breast cancer (mets to brain, liver, bone) s/p mastectomy  Elevated LFT's  Followed by Dr. Varma of [facility].  Currently on immunotherapy and receiving radiation to spine (completed brain radiation for cerebellar mets).  Recent baseline LFT's unknown, suspect due to liver mets.  No signs of tenderness on exam.    * Palliative Care consulted, recommend follow up with outpatient Palliative Team through [facility]  -- [facility] consulted. Appreciate assistance   -- After TCU recovery , patient is recommended to have a follow up  with oncology    HFrEF due to doxorubicin  Type 2 MI due to supply demand mismatch  Sinus tachycardia  TTE 6/2022 with EF 35-40% with global LV hypokinesis.  Outside trop 183, suspect demand ischemia.    Echocardiogram with EF of 40-45%    -- Continue to  monitor I/O's and daily weights  -- metoprolol and lisinopril on hold due to soft pressures; currently normotensive  -- resumed PTA spironolactone 11/15  -- minimal net diuresis with oral bumex, will give 2 mg IV to see if this improves response  -- monitor output and daily weights     DM II with peripheral neuropathy  -- Continue low dose SSI  -- Hemoglobin A1c at 6.4% indicating good control      Depression  -- Continue PTA cymbalta     Insomnia  -- Patient requested Ambien to help sleep at ordered for her, reduced dose due to excissive somnolence, tolerating low dose           Diet: Room Service  Combination Diet Moderate Consistent Carb (60 g CHO per Meal) Diet; Regular Diet    DVT Prophylaxis: Pneumatic Compression Devices  Jiang Catheter: Not present  Central Lines: PRESENT       Cardiac Monitoring: None  Code Status: Full Code      Disposition Plan         The patient's care was discussed with the Bedside Nurse, Patient and Patient's Family.    Time Spent on this Encounter   I spent 40 minutes on the unit/floor managing the care of Elenita Moran. Over 50% of my time was spent on the following:   - Counseling the patient and/or family regarding: diagnostic results, prognosis and risks and benefits of treatment options  - Coordination of care with the: nurse    See goals of care discussion as documented above.     MD Freeman Castillo MD  Hospitalist Service  [facility]  Securely message with the Vocera Web Console (learn more here)  Text page via [employer] Paging/Directory         Clinically Significant Risk Factors         # Hyponatremia: Lowest Na = 134 mmol/L (Ref range: 136-145) in last 2 days, will monitor  "as appropriate      # Hypoalbuminemia: Lowest albumin = 1.9 g/dL (Ref range: 3.5-5.2) at 10/18/2022  5:37 PM, will monitor as appropriate   # Thrombocytopenia: Lowest platelets = 73 (Ref range: 150-450) in last 2 days, will monitor for bleeding        # Overweight: Estimated body mass index is 29.13 kg/m  as calculated from the following:    Height as of this encounter: 1.651 m (5' 5\").    Weight as of this encounter: 79.4 kg (175 lb 0.7 oz).   # Severe Malnutrition: based on nutrition assessment        ______________________________________________________________________    Interval History   Reports ongoing pain at ulcer site.  No dyspnea.   Goals of care discussion as above.     Data reviewed today: I reviewed all medications, new labs and imaging results over the last 24 hours. I personally reviewed no images or EKG's today.    Physical Exam   Vital Signs: Temp: 97.7  F (36.5  C) Temp src: Axillary BP: 122/76 Pulse: 106   Resp: 16 SpO2: 93 % O2 Device: None (Room air)    Weight: 175 lbs .72 oz  General Appearance: lying in bed in NAD  Respiratory: respirations non-labored on room air  Cardiovascular:   GI:   Skin:  Decubitus ulcer not examined today  Neuro:  Awake and alert, overall appropriate but occasionally seemed slow to respond, cranial nerves grossly intact      Data   Recent Labs   Lab 11/17/22  0621 11/16/22  0614 11/15/22  2002 11/15/22  0632   WBC 12.1* 10.6  --  14.1*   HGB 7.4* 7.7*  --  8.0*   * 101*  --  98   PLT 73* 77*  --  73*    136  --  136   POTASSIUM 3.4 3.6 3.5 3.3*   CHLORIDE 105 108  --  107   CO2 17* 19*  --  16*   BUN 27 25  --  22   CR 0.78 0.71  --  0.63   ANIONGAP 12 9  --  13   BENJI 7.6* 7.9*  --  8.1*   * 129*  --  126*     No results found for this or any previous visit (from the past 24 hour(s)).  "

## 2022-11-17 NOTE — PROGRESS NOTES
Coccyx dressing change  Moderate serous drainage noted, cleaned with Vashe, new dressing applied. Time spent: 5 minutes.

## 2022-11-17 NOTE — PLAN OF CARE
Goal Outcome Evaluation:      Plan of Care Reviewed With: patient    Overall Patient Progress: no changeOverall Patient Progress: no change         Here with SDH, sepsis. Neuros stable. Voice is soft, whispery. Generalized weakness, UEs 4/5, LEs 1/5. Dilaudid for coccyx pain. Fair appetite, 25% of meals. Purewick in place for incontinence. Coccyx dressing changed, moderate serous drainage noted. MD/family care conference today at 1400. Up to chair with lift. Discharge to LTAC when ready

## 2022-11-17 NOTE — PROGRESS NOTES
[facility]    Infectious Disease Progress Note    Date of Service: 11/17/2022     Assessment:  67 Year old chronically ill female with prolonged hospitalization for encephalopathy, subdural hematoma, now with sepsis syndrome related to infected sacral decubitus ulcer without evidence of osteomyelitis. No other apparent focus of infection     -Chronic sacral decubitus ulcer with necrotic base and exposed bone without osteomyelitis on imaging  -Leukocytosis and sepsis syndrome resolved  -Anemia and thrombocytopenia  -Hyponatremia  -LLL pneumonia on CXR 11/2 treated with Levaquin  -SHD with encephalopathy  -Chronic debility  -Stage IV metastatic breast cancer (mets to brain, liver, bone) s/p mastectomy. On immunotherapy. 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 Follow tissue cxs (may remain suppressed since patient has been on antibiotics)  2  Continue zosyn. Further antibiotic modification based on cx data, oral transition soon. Has had bedside debridement  3. Wound care    Liz Serrano MD    Interval History   Feels ill today, has been vomiting, appears weak, has remained afebrile, slight leukocytosis today,  at bedside    Physical Exam   Temp: 97.7  F (36.5  C) Temp src: Axillary BP: 122/76 Pulse: 106   Resp: 16 SpO2: 93 % O2 Device: None (Room air)    Vitals:    10/19/22 0430 10/26/22 1939 11/15/22 1355   Weight: 76 kg (167 lb 8.8 oz) 76 kg (167 lb 8.8 oz) 79.4 kg (175 lb 0.7 oz)     Vital Signs with Ranges  Temp:  [97.4  F (36.3  C)-97.7  F (36.5  C)] 97.7  F (36.5  C)  Pulse:  [104-116] 106  Resp:  [16] 16  BP: (107-128)/(67-84) 122/76  SpO2:  [93 %-97 %] 93 %    Constitutional: chronically ill appearing patient  Lungs: no labored  breathing  Cardiovascular: S1S2  Skin: No rash, chest port in place without surrounding erythema    Other:    Medications       bumetanide  2 mg Intravenous BID     docusate sodium  100 mg Oral Daily     DULoxetine  30 mg Oral QAM     DULoxetine  60 mg Oral At Bedtime     guaiFENesin  600 mg Oral BID     heparin  5-10 mL Intracatheter Q28 Days     heparin lock flush  5-10 mL Intracatheter Q24H     levETIRAcetam  500 mg Oral BID     [Held by provider] lisinopril  2.5 mg Oral Daily     menthol-zinc oxide   Topical BID     [Held by provider] metoprolol tartrate  12.5 mg Oral BID     multivitamin w/minerals  1 tablet Oral Daily     oxybutynin  5 mg Oral BID     oxybutynin ER  10 mg Oral QPM     pantoprazole  40 mg Oral At Bedtime     piperacillin-tazobactam  4.5 g Intravenous Q6H     sodium chloride (PF)  10-20 mL Intracatheter Q28 Days     spironolactone  12.5 mg Oral Daily     zolpidem  2.5 mg Oral At Bedtime       Data   All microbiology laboratory data reviewed.  Recent Labs   Lab Test 11/17/22 0621 11/16/22 0614 11/15/22  0632   WBC 12.1* 10.6 14.1*   HGB 7.4* 7.7* 8.0*   HCT 21.3* 23.0* 23.3*   * 101* 98   PLT 73* 77* 73*     Recent Labs   Lab Test 11/17/22 0621 11/16/22  0614 11/15/22  0632   CR 0.78 0.71 0.63     Recent Labs   Lab Test 12/03/19  1650   SED 63*       Imaging  11/14 MRI pelvic bones  EXAM: MRI PELVIS WITHOUT AND WITH IV CONTRAST  LOCATION: [facility]  DATE/TIME: 11/14/2022 6:42 PM     INDICATION: Sacral decubitus ulcer with exposed bone, concern for osteomyelitis. Metastatic breast cancer.  COMPARISON: None.     TECHNIQUE: Routine MRI pelvis without and with IV contrast. Axial, sagittal, and coronal high-resolution T2 and post gadolinium T1 with fat saturation.   CONTRAST: 7mL Gadavist     FINDINGS: There is a decubitus ulceration superficial to the mid sacrum. No associated fluid collection. No associated marrow abnormality or bone destruction to suggest  osteomyelitis.     There are extensive marrow replacing lesions compatible with metastases.     There is a small nonenhancing focus within the left posterior paraspinous musculature at the L5 level which corresponds to a focus of soft tissue calcification on the recent CT PET.     There are likely chronic fractures of both sacral ala, and chronic displaced fractures of the left pubic rami, better demonstrated on the prior CT PET scan. Small effusions of both SI joints, and associated synovitis.     Diffuse subcutaneous and muscular edema, and a small amount of ascites.                                                                      IMPRESSION:  1.  Decubitus ulcer superficial to the sacrum.  2.  No evidence of associated osteomyelitis.  3.  Extensive osseous metastatic disease.

## 2022-11-18 NOTE — PROGRESS NOTES
[facility]    Medicine Progress Note - Hospitalist Service    Date of Admission:  10/18/2022    Assessment & Plan   Elenita Moran is a 67 year old female who was admitted on 10/18/2022 with past medical history significant for the stage IV metastatic breast cancer with metastasis to brain and liver bone, s/p mastectomy, heart failure with reduced ejection fraction secondary to doxorubicin, type 2 diabetes mellitus, depression and stage IV sacral pressure ulcer who was admitted to the hospital secondary to acute metabolic encephalopathy and was found to have traumatic subdural hemorrhage.  Seen by neurology and neurosurgery with no surgical interventions.  While pending placement patient developed sepsis on 11/11/2022.  Source is likely due to her sacral ulcers which have noted to be worse despite debridement this admission.  Started back on broad-spectrum antibiotics and wound care reconsulted.         Goals of care  * See progress note 11/16, but in summary, voicing some desire to stop active treatment and enroll in hospice.  Oncology followed up 11/16 and feel it is unlikely that she will regain enough strength to be considered for further cancer treatment.    * See progress note 11/17; in summary, she requested time to continue considering her options and requested Palliative follow up; switched code status to DNR/DNI    -- again today reports she needs more time to think, states she has not spoken any more with her family (particularly her ) regarding transition to hospice; her outside palliative provider plans to call them to discuss but she states they have not heard from her yet     Acute metabolic encephalopathy most likely secondary to below:  Suspected traumatic SDH  * reports increased difficulty with gait/balance since fall 10 days prior to admission, 48 hours before the presentation patient was increasingly confused with difficulty with speech and was  essentially nonverbal on the morning of presentation to the ER.  *CT scan of the head showed left holohemispheric subdural fluid collection with 2 mm midline shift.  * On further questioning has been reported fall on October 8, unknown if patient had any head trauma at that time.  * Her presentation was thought to be secondary to combination of subdural fluid collections along with marked electrolyte abnormalities as patient presented with uremia, hyponatremia and hyperkalemia along with possible intracranial metastasis versus infection.  * During hospitalization, patient was evaluated by neurology and neurosurgery along with neuro critical care, no surgical interventions are recommended at this point.    -- Has been started on Keppra 500 mg p.o. twice daily  -- Goal SBP <160 now  -- Goal platelets of > 50, conditional orders placed, received multiple transfusions for platelets and blood but none recently  -- On moderate carb controlled diet per Speech path     Sepsis  Septic shock  Likely 2/2 sacral wound  *UA minimally positive so less likely culprit.  *Wound was debrided on 11/04, and per nurse, its looking worse now and quite malodorous.   * MRI pelvis 11/14 negative for osteomyelitis, notes extensive osseous metastatic disease  * CXR 11/15 showed left basilar consolidation slightly more dense than prior but reports no respiratory symptoms and has no hypoxia  * per chart review 11/14; does not appear she was resumed on her chronic suppressive Keflex after Zosyn was stopped on 10/21 raising question of whether he acute decompensation could be related to untreated chronic infection  * Patient hypotensive to the 80s intermittently 11/12-11/13, received intermittent normal saline boluses followed by maintenance fluids, now discontinued    -- blood culture ngtd  -- tissue culture from 11/17 only positive for Candida thus far; monitor  -- continue zosyn through 11/20; ID recs appreciated   -- CRP stable 11/18; monitor  intermittently    Sacral pressure injury, stage 4, present on admission s/p bedside debridement 10/26/22 and intra-op debridement 11/4  Multiple sacral pressures injury, stage 2-3, present on admission  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   reported this has been increasing in size over past week as she has spent more time in her chair.    * Completed a course of empiric zosyn  *  S/p bedside debridement 10/26   * As there was concern for eschar overlying chronic a stage IV sacral decubitus ulcer, Dr. Solorio performed an I&D in OR on 11/04  *Patient developed sepsis/septic shock on 11/11/2022.  Likely source is her sacral ulcer which has been described as malodorous at this time during dressing changes.  * Gen Surg repeated bedside debridement 11/15    -- Gen Surg signed off, continue wound cares as per [facility] recommendations  -- continue Zosyn as above; will ultimately need resumption of PTA Keflex for chronic MSSA infection    Acute on Chronic anemia  Baseline hgb about 8 g/dL.   * S/p transfusion of 1 unit 10/31  * labs for reversible causes including iron deficiency, b12, folate , in normal range , Vitmain B 12 is slightly towards the higher range   -- hgb stable about 8 g/dL    Mild covid 19 infection, resolved, completed 10 days Isolation 10/30  No hypoxemia  * completed 3 days of prophylactic Remdesivir     Left lower lobe pneumonia, resolved  * completed Levaquin for 5 days on 11/6     Thrombocytopenia  Abnormal coags  Admission platelet 38, INR 1.27 and PTT 38.  due to chemo, impaired production  * Vit K 1 mg IV given on admission  -- goal platelet >50K per NSG; transfuse PRN for platelets<50K   -- Platelet counts have been stable  -- Oncology was consulted, see below  -- Patient has been off aspirin     Hyponatremia, resolved  Hyperkalemia with subsequent hypokalemia  Uremia, resolved  Anion gap metabolic  acidosis  KATHLEEN, resolved  Cr as low as 0.8 in June 2022, on most recent check Sept 2022 was 1.13.  Admission Na 127, K 7.2, bicarb 16 (gap down to 14), .   reporting minimal oral intake over past 2 days.  Edema on exam, but suspect intravascularly dry with hypoalbuminemia and poor oral intake.    shifted potassium with insulin overnight     * Nephrology consult placed on admission , initially followed, now they have signed off  * intermittent hyponatremia during course of stay  -- Creatinine as low as 0.6-0.7 on 11/14; Cr up to 0.8 today and K down to 2.9 so will hold further diuresis at this time, likely intravascularly dry  -- replace K per protocol     Stage IV metastatic breast cancer (mets to brain, liver, bone) s/p mastectomy  Elevated LFT's  Followed by Dr. Varma of [facility].  Currently on immunotherapy and receiving radiation to spine (completed brain radiation for cerebellar mets).  Recent baseline LFT's unknown, suspect due to liver mets.  No signs of tenderness on exam.    * Palliative Care consulted, recommend follow up with outpatient Palliative Team through [facility]  -- EVA consulted. Appreciate assistance   -- After TCU recovery , patient is recommended to have a follow up with oncology    HFrEF due to doxorubicin  Type 2 MI due to supply demand mismatch  Sinus tachycardia  TTE 6/2022 with EF 35-40% with global LV hypokinesis.  Outside trop 183, suspect demand ischemia.    Echocardiogram with EF of 40-45%    -- Continue to  monitor I/O's and daily weights  -- metoprolol and lisinopril on hold due to soft pressures; currently normotensive  -- resumed PTA spironolactone 11/15  -- hold further torsemide as above  -- monitor output and daily weights     DM II with peripheral neuropathy  -- Continue low dose SSI  -- Hemoglobin A1c at 6.4% indicating good control      Depression  -- Continue PTA cymbalta     Insomnia  -- Patient requested Ambien to help sleep at ordered for her, reduced dose due to  "excissive somnolence, tolerating low dose           Diet: Room Service  Combination Diet Moderate Consistent Carb (60 g CHO per Meal) Diet; Regular Diet    DVT Prophylaxis: Pneumatic Compression Devices  Jiang Catheter: Not present  Central Lines: PRESENT       Cardiac Monitoring: None  Code Status: No CPR- Do NOT Intubate      Disposition Plan         The patient's care was discussed with the Bedside Nurse, Patient and Patient's Family.    Freeman Vail MD  Hospitalist Service  [facility]  Securely message with the Vocera Web Console (learn more here)  Text page via [facility] Paging/Directory         Clinically Significant Risk Factors        # Hypokalemia: Lowest K = 2.9 mmol/L (Ref range: 3.4-5.3) in last 2 days, will replace as needed  # Hyponatremia: Lowest Na = 133 mmol/L (Ref range: 136-145) in last 2 days, will monitor as appropriate      # Hypoalbuminemia: Lowest albumin = 1.9 g/dL (Ref range: 3.5-5.2) at 10/18/2022  5:37 PM, will monitor as appropriate   # Thrombocytopenia: Lowest platelets = 73 (Ref range: 150-450) in last 2 days, will monitor for bleeding        # Overweight: Estimated body mass index is 29.13 kg/m  as calculated from the following:    Height as of this encounter: 1.651 m (5' 5\").    Weight as of this encounter: 79.4 kg (175 lb 0.7 oz).   # Severe Malnutrition: based on nutrition assessment        ______________________________________________________________________    Interval History   Denies any dyspnea despite being placed on oxygen overnight.  No significant pain.  Remains undecided about hospice.      Data reviewed today: I reviewed all medications, new labs and imaging results over the last 24 hours. I personally reviewed no images or EKG's today.    Physical Exam   Vital Signs: Temp: 98  F (36.7  C) Temp src: Axillary BP: 108/69 Pulse: 104   Resp: 20 SpO2: 95 % O2 Device: None (Room air) Oxygen Delivery: 1 LPM  Weight: 175 lbs .72 oz  General Appearance: " lying in bed in NAD  Respiratory: lungs CTAB, no wheezes or crackles  Cardiovascular: RRR, normal s1/s2 without murmur  GI: abdomen soft, normal bowel sounds  Skin:  Decubitus ulcer not examined today  Neuro:  Awake and alert, overall appropriate       Data   Recent Labs   Lab 11/18/22 0641 11/17/22 0621 11/16/22 0614 11/15/22  2002 11/15/22  0632   WBC  --  12.1* 10.6  --  14.1*   HGB  --  7.4* 7.7*  --  8.0*   MCV  --  101* 101*  --  98   PLT  --  73* 77*  --  73*    134 136  --  136   POTASSIUM 2.9* 3.4 3.6   < > 3.3*   CHLORIDE 102 105 108  --  107   CO2 17* 17* 19*  --  16*   BUN 27 27 25  --  22   CR 0.86 0.78 0.71  --  0.63   ANIONGAP 14 12 9  --  13   BENJI 7.6* 7.6* 7.9*  --  8.1*   * 128* 129*  --  126*    < > = values in this interval not displayed.     No results found for this or any previous visit (from the past 24 hour(s)).

## 2022-11-18 NOTE — PROGRESS NOTES
[facility] Nurse Inpatient Assessment     Consulted for: sacral       Areas Assessed:      Areas visualized during today's visit: Focused: and Sacrum/coccyx    Wound location: sacrum and buttock     Last photo: 11/18 11/14      Wound due to: Pressure Injury community acquired, post debridement 10/26, stage 4 pressure injury, now with superimposed deep tissue injury with another bedside debridement 11/16  Wound history/plan of care: found covered with nugauze and mepilex   Wound base:  eschar, slough and bone, purple nonblanchable tissue from 6-9 o clock on wound edge approximately 1.5cm extending from wound     Palpation of the wound bed: firm      Drainage: small     Description of drainage: serosanguinous/bloody     Measurements (length x width x depth, in cm): Open wound bed 6.5cm x 5.5 x 0.7cm, including purple nonblanchable tissue 7.5  x 7 cm      Tunneling: N/A     Undermining: up to 0.7 cm from 4-8 o clocck  Periwound skin:  suspect herpatic lesions, improving based on prior assessment       Color: pink      Temperature: normal   Odor: none  Pain: no grimacing or signs of discomfort , no complaint of pain, complaint of thirst   Treatment goal: Protection, support moist wound healing   STATUS: initial assessment s/p debridement  Supplies ordered: gathered and at bedside       Treatment Plan:     Sacral wound care: Daily  1. Cleanse with vashe ( # [id number]) soaked gauze for 10 minutes.  2. Apply No sting barrier film to periwound skin.  3. Lightly moisten hydrofera blue ( # [id number]) with normal saline. And apply to open wound bed.  4. Secure with Mepilex Sacral. Time and date  Ensure following PIP plan     Wound care  2 TIMES DAILY        Comments: Location: buttock   Care: provided BID by primary RN   1. Provide incontinence care every 2 hours with routine turns, apply camoseptine ointment to perianal BID and PRN   2. Check to ensure treatment is in place over sacrum      "     Skin care precautions          Comments: Pressure Injury Prevention (PIP) Plan:   If patient is declining pressure injury prevention interventions: Explore reason why and address patient's concerns, Educate on pressure injury risk and prevention intervention(s), If patient is still declining, document \"informed refusal\" , and Ensure Care team is aware ( provider, charge nurse, etc)   Mattress: Follow bed algorithm, reassess daily and order specialty mattress, if indicated.   HOB: Maintain at or below 30 degrees, unless contraindicated   Repositioning in bed: Every 1-2 hours , Left/right positioning; avoid supine, and Raise foot of bed prior to raising head of bed, to reduce patient sliding down (shear)   Heels: Keep elevated off mattress   Chair positioning: Chair cushion (#[id number]) , Assist patient to reposition hourly, and Do NOT use a donut for sitting (this increases pressure to smaller area and creates a higher potential for injury)     If patient has a buttock pressure injury, or high risk for PI use chair cushion or SPS.   Moisture Management: Perineal cleansing /protection: Follow Incontinence Protocol, Avoid brief in bed, Clean and dry skin folds with bathing , and Moisturize dry skin   Under Devices: Inspect skin under all medical devices during skin inspection , Ensure tubes are stabilized without tension, and Ensure patient is not lying on medical devices or equipment when repositioned   Ask provider to discontinue device when no longer needed.            Orders: Reviewed and Updated    RECOMMEND PRIMARY TEAM ORDER: None, at this time  Education provided: plan of care  Discussed plan of care with: Patient and Nurse  WOC nurse follow-up plan: 1-2x weekly  Notify WOC if wound(s) deteriorate.  Nursing to notify the Provider(s) and re-consult the WOC Nurse if new skin concern.    DATA:     Current support surface: Standard  pulsate  Containment of urine/stool: Incontinence Protocol and Purewick external " catheter   BMI: Body mass index is 29.13 kg/m .   Active diet order: Orders Placed This Encounter      Combination Diet Moderate Consistent Carb (60 g CHO per Meal) Diet; Regular Diet     Output: I/O last 3 completed shifts:  In: 403 [P.O.:303; I.V.:100]  Out: 2300 [Urine:2300]     Labs:   Recent Labs   Lab 11/18/22  0641 11/17/22  0621   HGB  --  7.4*   WBC  --  12.1*   .0*  --      Pressure injury risk assessment:   Sensory Perception: 4-->no impairment  Moisture: 3-->occasionally moist  Activity: 2-->chairfast  Mobility: 2-->very limited  Nutrition: 2-->probably inadequate  Friction and Shear: 2-->potential problem  Zhao Score: 15    Lindsay TAVARESPARISH   Dept. Pager: [phone]  Dept. Office Number: [phone]

## 2022-11-18 NOTE — PROGRESS NOTES
Chart Check:    No new recommendations. Please reach out if we can be of further assistance.     Geovany DALTON, CNP  [facility]  [phone] (office) or [phone] (cell)

## 2022-11-18 NOTE — PLAN OF CARE
Goal Outcome Evaluation: not progressing     Reason for Admission: SDH    Cognitive/Mentation: A/Ox 4  Neuros/CMS: Intact ex UEs 4/5, Les 1/5, slow movements, whispering speech, numbness in legs and R shoulder.  VS: stable   GI: BS + flatus, last BM, Incontinent.  : Incontinent, PW  Pulmonary: LS clear, equal, diminished   Pain: denies    Drains/Lines: L chest port  Skin: intact ex, edema 3-4 up to stomach area & Coccyx pressure wound. Scattered bruising  Activity: Assist x 2 with lift  Diet: reg with thin liquids. Takes pills whole 3 to 4 at a time     Therapies recs: pending   Discharge: pending     Aggression Stoplight Tool: green     End of shift summary: No acute changes ex patient started up throwing up water when layed flat for repositioning- keep bed at an angle. Denied nausea medication. Patient has foam and gauze on pressure wound. She did not eat much of her dinner.

## 2022-11-18 NOTE — PROGRESS NOTES
[facility]    Infectious Disease Progress Note    Date of Service: 11/18/2022     Assessment:  67 Year old chronically ill female with prolonged hospitalization for encephalopathy, subdural hematoma, now with sepsis syndrome related to infected sacral decubitus ulcer without evidence of osteomyelitis. No other apparent focus of infection     -Chronic sacral decubitus ulcer with necrotic base and exposed bone without osteomyelitis on imaging  -Leukocytosis and sepsis syndrome resolved  -Anemia and thrombocytopenia  -Hyponatremia  -LLL pneumonia on CXR 11/2 treated with Levaquin  -SHD with encephalopathy  -Chronic debility  -Stage IV metastatic breast cancer (mets to brain, liver, bone) s/p mastectomy. On immunotherapy. 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 Tissue cx with yeast signifies skin chelsea, does not require treatment  2  Treat with Zosyn for 10 days until 11/20 then discontinue antibiotics  3. Wound care and off loading  No additional recommendations, ID will sign off    Liz Serrano MD    Interval History   Remained afebrile, wound is improving slowly, surrounding erythema has resolved, bedside debridement was done by surgery , cxs showing yeast only     Physical Exam   Temp: 98  F (36.7  C) Temp src: Oral BP: 100/66 Pulse: 110   Resp: 20 SpO2: 97 % O2 Device: None (Room air) Oxygen Delivery: 1 LPM  Vitals:    10/19/22 0430 10/26/22 1939 11/15/22 1355   Weight: 76 kg (167 lb 8.8 oz) 76 kg (167 lb 8.8 oz) 79.4 kg (175 lb 0.7 oz)     Vital Signs with Ranges  Temp:  [97.4  F (36.3  C)-98.1  F (36.7  C)] 98  F (36.7  C)  Pulse:  [] 110  Resp:  [20] 20  BP: (100-129)/(66-82) 100/66  SpO2:  [94 %-98 %] 97 %    Constitutional: chronically ill appearing  patient  Lungs: no labored breathing  Cardiovascular: S1S2  Skin: No rash, chest port in place without surrounding erythema  MS : LE edema, wound images viewed in media    Other:    Medications       bumetanide  2 mg Intravenous BID     docusate sodium  100 mg Oral Daily     DULoxetine  30 mg Oral QAM     DULoxetine  60 mg Oral At Bedtime     guaiFENesin  600 mg Oral BID     heparin  5-10 mL Intracatheter Q28 Days     heparin lock flush  5-10 mL Intracatheter Q24H     levETIRAcetam  500 mg Oral BID     [Held by provider] lisinopril  2.5 mg Oral Daily     menthol-zinc oxide   Topical BID     [Held by provider] metoprolol tartrate  12.5 mg Oral BID     multivitamin w/minerals  1 tablet Oral Daily     oxybutynin  5 mg Oral BID     oxybutynin ER  10 mg Oral QPM     pantoprazole  40 mg Oral At Bedtime     piperacillin-tazobactam  4.5 g Intravenous Q6H     potassium chloride  10 mEq Intravenous Q1H     sodium chloride (PF)  10-20 mL Intracatheter Q28 Days     spironolactone  12.5 mg Oral Daily     zolpidem  2.5 mg Oral At Bedtime       Data   All microbiology laboratory data reviewed.  Recent Labs   Lab Test 11/17/22  0621 11/16/22  0614 11/15/22  0632   WBC 12.1* 10.6 14.1*   HGB 7.4* 7.7* 8.0*   HCT 21.3* 23.0* 23.3*   * 101* 98   PLT 73* 77* 73*     Recent Labs   Lab Test 11/18/22  0641 11/17/22  0621 11/16/22  0614   CR 0.86 0.78 0.71     Recent Labs   Lab Test 12/03/19  1650   SED 63*

## 2022-11-18 NOTE — PROVIDER NOTIFICATION
"Paged Dr. Vail, \"Pt Left toes purple--took off lymphedema wraps with some improvement in color however still purple tinged on toes--checked DP/PT pulse with dopper which was normal & cap refill <3 secs. Okay to leave lymp wraps off for now. \"  "

## 2022-11-18 NOTE — PROGRESS NOTES
Palliative reconsult received to revisit goals of care. Reviewed with [facility] team. Pt's outpatient palliative MD, Dr. Chaves, will be reaching out to patient via phone. Dr. Vail updated. Thanks.    KRYSTLE Mcintosh [facility]  Contact information available via [facility] Paging/Directory

## 2022-11-18 NOTE — PROGRESS NOTES
"[facility]  Palliative Care Social Work Note:    Patient Info:  Elenita Moran is a 67 year old female with complex medical history including metastatic breast cancer with mets to brain and liver and bone, HF, depression, and sacral pressure ulcer and sepsis. Per chart review she has had ongoing guals of care conversations with her medical providers and in most recent conversations with Kristin García has voiced wonders about shifting to a hospice plan of care but has asked for time to think this over as she talks to providers and family.     Brief summary of visit: Visited with Elenita and her fmaily twice today -- at first attempt her  was not present and she asked if I could come back later int he day.  Returned later,  was present.  Elenita shared that she has been \"Trying ot have convesrations with Denilson but it's been difficult\".  She then asked her brother who was in the room to step out \"I just want to visit with Denilson for a while\".  I asked if I could be of help today in asking some of her questions and having a conversation with Denilson and she declined saying she just wanted to visit with him for right now.     It is possible that her palliative care MD from [facility] will be in contact to help with these conversations as well. Otherwise I will plan to follow up next week    Date of Admission: 10/18/2022    Reason for consult: Patient and family support    Sources of information: Patient  Family member brother,      Recommendations & Plan:  Will continue to be available for goals of care support.      These recommendations have been discussed with patient, family, palliative team, Dr. Chaves .        Relationships & Support:  Aspects of relationships and support assessed today:    Identified family members: , brother    Professional supports:     Family coping:     Bereavement Risk concerns:           Goals, Decision Making & Advance Care " Planning:   Prognosis, Goals, and/or Advance Care Planning were assessed today: No  If yes, brief summary of discussion:   Preferred language:   Patient's decision making preferences: not assessed  I have concerns about the patient/family's health literacy today: No  Patient has a completed Health Care Directive: Yes, and on file.  Code status per chart review: No CPR / No Intubation    Key Palliative Symptom Data:  We are not helping to manage these symptoms currently in this patient.      Clinical Social Work Interventions:   Assessment of palliative specific issues    Introduction of Palliative clinical social work interventions  Facilitation of processing of thoughts/feelings  Family communication facilitated      SAMEER Salazar, [facility]   Palliative Care Clinical   Ph: [phone]

## 2022-11-18 NOTE — PLAN OF CARE
Reason for Admission: SDH    Cognitive/Mentation: A/Ox 3  Neuros/CMS: Intact ex generalized weakness, forgetful  VS: stable. 1L NC.  GI: BS active, passing flatus, no BM this shift. Incontinent.  : Incontinent.  Pulmonary: LS diminished.  Pain: denies.     Drains/Lines: chest port  Skin: stage 4 coccyx wound, scattered bruising  Activity: Assist x 2 with lift.  Diet: regular with thin liquids. Takes pills whole.     Therapies recs: LTAC  Discharge: pending    Aggression Stoplight Tool: green    End of shift summary: Pt now stating that she wants to go home realizing that her condition is deteriorating. Palliative care follows for oncology and plan on meeting with patient today to discuss.

## 2022-11-19 NOTE — PLAN OF CARE
Goal Outcome Evaluation:      Plan of Care Reviewed With: patient, sibling    Overall Patient Progress: decliningOverall Patient Progress: declining         More alert today. Withdrawn this AM--improvement in mood with brother at bedside. Ox3, d/o time, reorientation provided. BUE 3/5. BLE 1/5.  L toes now more pink/purple tinged & cap refill less than 3 still. Pitting edema in legs, abdomen, back. Very poor intake--couple bites of yogurt with staff & 25% of lunch with family. Ax2 Lift. MCHO regular diet, pill whole with pudding.  Heparin lock port. Weaned to RA. Pt tearful this AM stating she wants to go home--Dr. Vail updated.     ADDENDUM [id number]: Pt now comfort cares. Updated pt on medications available to her. Emotional support provided.  at bedside. Pt tearful expressing her 12 year kraft with cancer & stated feeling scared.

## 2022-11-19 NOTE — PLAN OF CARE
Goal Outcome Evaluation:      Plan of Care Reviewed With: patient    Overall Patient Progress: decliningOverall Patient Progress: declining         Alert. Confused. BUE 3/5. BLE 1/5. Limited neuro exam d/t participation intermittently. L toes still purple but cap refill less than 3. Pitting edema in legs, abdomen, back. Dr Vail aware. Coccyx dressing changed by WOC. Very poor intake--couple bites of yogurt. Ax2 Lift. MCHO regular diet, pill whole with pudding.  Potassium replaced. Heparin lock port.

## 2022-11-19 NOTE — PLAN OF CARE
No acute changes this shift.  A&Ox3-4, pt confused to situation overnight. Neuros unchanged, pt does not participate in entire neuro exam, BUE 3/5, BLE 1/5, quiet, whispered speech. VSS with soft SBPs, on 1L NC SPO2 mid 90s. Reg mod CHO diet, thin liquids. Takes pills whole w/ pudding. Up with Ax2 lift, T/R q2h. PRN tylenol given x2 for pain. Mepilex in place on coccyx, pt has pressure wound. Pitting edema to legs, abdomen, back. Pt scoring green on the Aggression Stop Light Tool. Potassium replaced last evening. K+ 3.6 this AM, recheck ordered for tomorrow. Discharge pending.

## 2022-11-19 NOTE — PROGRESS NOTES
[facility]    Medicine Progress Note - Hospitalist Service    Date of Admission:  10/18/2022    Assessment & Plan   Elenita Moran is a 67 year old female who was admitted on 10/18/2022 with past medical history significant for the stage IV metastatic breast cancer with metastasis to brain and liver bone, s/p mastectomy, heart failure with reduced ejection fraction secondary to doxorubicin, type 2 diabetes mellitus, depression and stage IV sacral pressure ulcer who was admitted to the hospital secondary to acute metabolic encephalopathy and was found to have traumatic subdural hemorrhage.  Seen by neurology and neurosurgery with no surgical interventions.  While pending placement patient developed sepsis on 11/11/2022.  Source is likely due to her sacral ulcers which have noted to be worse despite debridement this admission.  Started back on broad-spectrum antibiotics and wound care reconsulted.         Goals of care  * See progress note 11/16, but in summary, voicing some desire to stop active treatment and enroll in hospice.  Oncology followed up 11/16 and feel it is unlikely that she will regain enough strength to be considered for further cancer treatment.    * See progress note 11/17; in summary, she requested time to continue considering her options and requested Palliative follow up; switched code status to DNR/DNI    -- met with patient and  at bedside 11/19, she states she wishes to transition to comfort care/hospice;  present and agrees with her wishes.  Discussed arranging informational hospice meeting with liaison (unclear if would be tomorrow vs Monday) and briefly discussed potential of home hospice vs nursing facility hospice  -- discussed antibiotics and she is in favor of discontinuing these  -- will continue PTA antidepressant, PPI, etc  -- note that her  is coming Monday, 11/21 and she wishes to be mentally alert for this visit, even if it  means being in more pain by holding meds  -- updated SW regarding arranging hospice informational meeting and start of discharge planning     Acute metabolic encephalopathy most likely secondary to below:  Suspected traumatic SDH  * reports increased difficulty with gait/balance since fall 10 days prior to admission, 48 hours before the presentation patient was increasingly confused with difficulty with speech and was essentially nonverbal on the morning of presentation to the ER.  *CT scan of the head showed left holohemispheric subdural fluid collection with 2 mm midline shift.  * On further questioning has been reported fall on October 8, unknown if patient had any head trauma at that time.  * Her presentation was thought to be secondary to combination of subdural fluid collections along with marked electrolyte abnormalities as patient presented with uremia, hyponatremia and hyperkalemia along with possible intracranial metastasis versus infection.  * During hospitalization, patient was evaluated by neurology and neurosurgery along with neuro critical care, no surgical interventions are recommended at this point.    -- Has been started on Keppra 500 mg p.o. twice daily  -- Goal SBP <160 now  -- Goal platelets of > 50, conditional orders placed, received multiple transfusions for platelets and blood but none recently  -- On moderate carb controlled diet per Speech path     Sepsis  Septic shock  Likely 2/2 sacral wound  *UA minimally positive so less likely culprit.  *Wound was debrided on 11/04, and per nurse, its looking worse now and quite malodorous.   * MRI pelvis 11/14 negative for osteomyelitis, notes extensive osseous metastatic disease  * CXR 11/15 showed left basilar consolidation slightly more dense than prior but reports no respiratory symptoms and has no hypoxia  * per chart review 11/14; does not appear she was resumed on her chronic suppressive Keflex after Zosyn was stopped on 10/21 raising  question of whether he acute decompensation could be related to untreated chronic infection  * Patient hypotensive to the 80s intermittently 11/12-11/13, received intermittent normal saline boluses followed by maintenance fluids, now discontinued    -- blood culture ngtd  -- tissue culture from 11/17 only positive for Candida thus far; monitor  -- discontinue antibiotics as above    Sacral pressure injury, stage 4, present on admission s/p bedside debridement 10/26/22 and intra-op debridement 11/4  Multiple sacral pressures injury, stage 2-3, present on admission  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   reported this has been increasing in size over past week as she has spent more time in her chair.    * Completed a course of empiric zosyn  *  S/p bedside debridement 10/26   * As there was concern for eschar overlying chronic a stage IV sacral decubitus ulcer, Dr. Solorio performed an I&D in OR on 11/04  *Patient developed sepsis/septic shock on 11/11/2022.  Likely source is her sacral ulcer which has been described as malodorous at this time during dressing changes.  * Gen Surg repeated bedside debridement 11/15    -- Gen Surg signed off, continue wound cares as per WOC recommendations  -- stop abx as above    Acute on Chronic anemia  Baseline hgb about 8 g/dL.   * S/p transfusion of 1 unit 10/31  * labs for reversible causes including iron deficiency, b12, folate , in normal range , Vitmain B 12 is slightly towards the higher range   -- hgb stable about 8 g/dL    Mild covid 19 infection, resolved, completed 10 days Isolation 10/30  No hypoxemia  * completed 3 days of prophylactic Remdesivir     Left lower lobe pneumonia, resolved  * completed Levaquin for 5 days on 11/6     Thrombocytopenia  Abnormal coags  Admission platelet 38, INR 1.27 and PTT 38.  due to chemo, impaired production  * Vit K 1 mg IV given on  admission  -- goal platelet >50K per NSG; transfuse PRN for platelets<50K   -- Platelet counts have been stable  -- Oncology was consulted, see below  -- Patient has been off aspirin     Hyponatremia, resolved  Hyperkalemia with subsequent hypokalemia  Uremia, resolved  Anion gap metabolic acidosis  KATHLEEN, resolved  Cr as low as 0.8 in June 2022, on most recent check Sept 2022 was 1.13.  Admission Na 127, K 7.2, bicarb 16 (gap down to 14), .   reporting minimal oral intake over past 2 days.  Edema on exam, but suspect intravascularly dry with hypoalbuminemia and poor oral intake.    shifted potassium with insulin overnight     * Nephrology consult placed on admission , initially followed, now they have signed off  * intermittent hyponatremia during course of stay  -- Creatinine as low as 0.6-0.7 on 11/14; hold diuretics with transition to comfort care     Stage IV metastatic breast cancer (mets to brain, liver, bone) s/p mastectomy  Elevated LFT's  Followed by Dr. Varma of [facility].  Currently on immunotherapy and receiving radiation to spine (completed brain radiation for cerebellar mets).  Recent baseline LFT's unknown, suspect due to liver mets.  No signs of tenderness on exam.    * Palliative Care consulted, recommend follow up with outpatient Palliative Team through [facility]  -- [facility] consulted. Appreciate assistance   -- transitioning to hospice as above    HFrEF due to doxorubicin  Type 2 MI due to supply demand mismatch  Sinus tachycardia  TTE 6/2022 with EF 35-40% with global LV hypokinesis.  Outside trop 183, suspect demand ischemia.    Echocardiogram with EF of 40-45%    -- metoprolol and lisinopril on hold due to soft pressures; currently normotensive  -- stop spironolactone      DM II with peripheral neuropathy  Hemoglobin A1c at 6.4% indicating good control      Depression  -- Continue PTA cymbalta     Insomnia  -- continue ambien (dose reduced to avoid excess somnolence)           Diet: Room  "Service  Combination Diet Moderate Consistent Carb (60 g CHO per Meal) Diet; Regular Diet    DVT Prophylaxis: None as comfort care  Jiang Catheter: Not present  Central Lines: PRESENT       Cardiac Monitoring: None  Code Status: No CPR- Do NOT Intubate      Disposition Plan         The patient's care was discussed with the Bedside Nurse, Patient and Patient's Family.    Freeman Vail MD  Hospitalist Service  [facility]  Securely message with the Vocera Web Console (learn more here)  Text page via [employer] Paging/Directory         Clinically Significant Risk Factors        # Hypokalemia: Lowest K = 2.9 mmol/L (Ref range: 3.4-5.3) in last 2 days, will replace as needed  # Hyponatremia: Lowest Na = 133 mmol/L (Ref range: 136-145) in last 2 days, will monitor as appropriate      # Hypoalbuminemia: Lowest albumin = 1.9 g/dL (Ref range: 3.5-5.2) at 10/18/2022  5:37 PM, will monitor as appropriate   # Thrombocytopenia: Lowest platelets = 72 (Ref range: 150-450) in last 2 days, will monitor for bleeding        # Overweight: Estimated body mass index is 29.13 kg/m  as calculated from the following:    Height as of this encounter: 1.651 m (5' 5\").    Weight as of this encounter: 79.4 kg (175 lb 0.7 oz).   # Severe Malnutrition: based on nutrition assessment        ______________________________________________________________________    Interval History   She reports she is ready to transition to hospice.  Agrees with discontinuation of antibiotics.  Wants to continue antidepressant, PPI.  States she wants to be mentally alert for  visit on Monday, even if this means being in increased pain or discomfort.     Data reviewed today: I reviewed all medications, new labs and imaging results over the last 24 hours. I personally reviewed no images or EKG's today.    Physical Exam   Vital Signs: Temp: 97.7  F (36.5  C) Temp src: Axillary BP: 124/78 Pulse: 106   Resp: 16 SpO2: 100 % O2 Device: Nasal " cannula Oxygen Delivery: 1 LPM  Weight: 175 lbs .72 oz  General Appearance: lying in bed in NAD  Respiratory: lungs CTAB, no wheezes or crackles  Cardiovascular: RRR, normal s1/s2 without murmur  GI: abdomen soft, normal bowel sounds  Skin:  Decubitus ulcer not examined today  Neuro:  Awake and alert, overall appropriate, soft-spoken      Data   Recent Labs   Lab 11/19/22  0541 11/18/22  2115 11/18/22  0641 11/17/22  0621 11/16/22  0614   WBC 16.4*  --   --  12.1* 10.6   HGB 7.3*  --   --  7.4* 7.7*   *  --   --  101* 101*   PLT 72*  --   --  73* 77*     --  133 134 136   POTASSIUM 3.6  3.6 3.1* 2.9* 3.4 3.6   CHLORIDE 105  --  102 105 108   CO2 18*  --  17* 17* 19*   BUN 27  --  27 27 25   CR 0.87  --  0.86 0.78 0.71   ANIONGAP 14  --  14 12 9   BENJI 7.6*  --  7.6* 7.6* 7.9*   *  --  135* 128* 129*     No results found for this or any previous visit (from the past 24 hour(s)).

## 2022-11-19 NOTE — PROGRESS NOTES
ONCOLOGY CC:    History of metastatic breast cancer, heavily pretreated follows with Dr. Varma.   - Noted DNR/DNI  - Noted plans for hospice and comfort care  - Appreciate care of entire team

## 2022-11-20 NOTE — PROGRESS NOTES
No changes this shift. Pt transitioned to comfort cares yesterday. Pt alert overnight, PRN tylenol given for pain, PRN ativan given x1 for anxiety, pt didn't sleep much but denied need for additional PRN comfort meds.  Reg mod CHO diet, thin liquids. Takes pills whole one at a time w/ water or pudding. Ax2 lift, T/R q2h. Mepilex in place on coccyx pressure wound. Pitting edema to legs, abdomen, back. Discharge pending.

## 2022-11-20 NOTE — PROGRESS NOTES
Care Management Follow Up  Length of Stay (days): 33    Expected Discharge Date: 11/21/2022     Concerns to be Addressed:       Patient plan of care discussed at interdisciplinary rounds: Yes    Anticipated Discharge Disposition:hospice   Anticipated Discharge Services: Transportation Services  Anticipated Discharge DME:      Patient/family educated on Medicare website which has current facility and service quality ratings: yes  Education Provided on the Discharge Plan:    Patient/Family in Agreement with the Plan: yes    Referrals Placed by CM/SW:    Private pay costs discussed:     Additional Information:  SW met with pt and pt's brother who is visiting. SW will return for further discussion regarding discharge planning once pt's  returns after 3:00pm today. Pt placed on comfort care yesterday .

## 2022-11-20 NOTE — PROGRESS NOTES
[facility]    Medicine Progress Note - Hospitalist Service    Date of Admission:  10/18/2022    Assessment & Plan   Elenita Moran is a 67 year old female who was admitted on 10/18/2022.  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  Seen by neurology and neurosurgery with no surgical interventions.  Renal function and lytes gradually improved and returned to baseline, as did mental status.  While pending placement she developed sepsis on 11/11/2022 with source likely due to her sacral ulcers which have noted to be worse despite debridement this admission.  Sepsis improved with re-initiation of broad-spectrum antibiotics with plan to discharge to [facility], however, she indicated she was reconsidering her goals of care.  Following several conversations and Oncology feeling it would be very unlikely she recovers to point of being eligible for additional cancer treatment, she elected to transition to comfort care/hospice on 11/19.        Hospice care  * See progress notes 11/16-11/19 for details, but decided to transition to hospice care 11/19.  -- continue comfort measures  -- note she wishes to be alert and interactive for visit from her  on afternoon of 11/21, even if this means being in more discomfort by avoiding meds - please hold sedating meds starting 11/21 or use reduced dosing  -- SW to arrange hospice informational meeting  -- only briefly discussed home hospice and nursing facility hospice as options on 11/19; have had no further discussion regarding  which route family would like to pursue    Suspected traumatic SDH  * admission CT scan of the head showed left holohemispheric subdural fluid collection with 2 mm midline shift.  History notable for fall in early October.  * managed non-operatively  -- continue empiric Keppra for now, though seizure risk likely low at this point in time    Depression  -- Continue PTA cymbalta per her request    GERD  -- continue PPI per her request    Other hospital problems no longer being actively managed (see progress note 11/19 for summary):  Acute metabolic encephalopathy most likely secondary to SDH  Septic shock suspected due to sacral wound  Sacral pressure injury, stage 4, present on admission s/p bedside debridement 10/26/22 and intra-op debridement 11/4, repeat bedside debridement 11/15  Multiple sacral pressures injury, stage 2-3, present on admission  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  Acute on chronic anemia  Mild covid 19 infection, resolved, completed 10 days Isolation 10/30  Left lower lobe pneumonia, resolved  Hyponatremia, resolved  Hyperkalemia with subsequent hypokalemia  Uremia, resolved  Anion gap metabolic acidosis, resolved  KATHLEEN, resolved  Thrombocytopenia  Abnormal coags  Stage IV metastatic breast cancer (mets to brain, liver, bone) s/p mastectomy  Elevated LFT's  HFrEF due to doxorubicin  Type 2 MI due to supply demand mismatch  Sinus tachycardia  DM II with peripheral neuropathy  Insomnia          Diet: Room Service  Combination Diet Regular Diet    DVT Prophylaxis: None as comfort care  Jiang Catheter: Not present  Central Lines: PRESENT       Cardiac Monitoring: None  Code Status: No CPR- Do NOT Intubate      Disposition Plan      Expected Discharge Date: 11/21/2022        Discharge Comments: now comfort cares      The patient's care was discussed with the Bedside Nurse, Patient and Patient's Family.   updated at bedside 11/20.     Freeman Vail MD  Hospitalist  "[facility]  Securely message with the [employer] Web Console (learn more here)  Text page via [employer] Paging/Directory         Clinically Significant Risk Factors        # Hypokalemia: Lowest K = 3.1 mmol/L (Ref range: 3.4-5.3) in last 2 days, will replace as needed       # Hypoalbuminemia: Lowest albumin = 1.9 g/dL (Ref range: 3.5-5.2) at 10/18/2022  5:37 PM, will monitor as appropriate   # Thrombocytopenia: Lowest platelets = 72 (Ref range: 150-450) in last 2 days, will monitor for bleeding        # Overweight: Estimated body mass index is 29.13 kg/m  as calculated from the following:    Height as of this encounter: 1.651 m (5' 5\").    Weight as of this encounter: 79.4 kg (175 lb 0.7 oz).   # Severe Malnutrition: based on nutrition assessment        ______________________________________________________________________    Interval History   Denies any pain, feeling thirsty and would like to sit up and have some breakfast.     Data reviewed today: I reviewed all medications, new labs and imaging results over the last 24 hours. I personally reviewed no images or EKG's today.    Physical Exam   Vital Signs: Temp: 97.5  F (36.4  C) Temp src: Axillary BP: 113/74 Pulse: 114   Resp: 18 SpO2: 97 % O2 Device: None (Room air) Oxygen Delivery: 1 LPM  Weight: 175 lbs .72 oz  General Appearance: lying in bed in NAD  Respiratory: respirations non-labored on room air  Cardiovascular:   GI:   Skin:  Decubitus ulcer not examined today  Neuro:  Very soft-spoken and at time difficult to understand, cranial nerves grossly intact      Data   Recent Labs   Lab 11/19/22  0541 11/18/22 2115 11/18/22  0641 11/17/22  0621 11/16/22  0614   WBC 16.4*  --   --  12.1* 10.6   HGB 7.3*  --   --  7.4* 7.7*   *  --   --  101* 101*   PLT 72*  --   --  73* 77*     --  133 134 136   POTASSIUM 3.6  3.6 3.1* 2.9* 3.4 3.6   CHLORIDE 105  --  102 105 108   CO2 18*  --  17* 17* 19*   BUN 27  --  27 27 25   CR " 0.87  --  0.86 0.78 0.71   ANIONGAP 14  --  14 12 9   BENJI 7.6*  --  7.6* 7.6* 7.9*   *  --  135* 128* 129*     No results found for this or any previous visit (from the past 24 hour(s)).

## 2022-11-21 NOTE — CONSULTS
[facility]    Consult Note - [facility]    ______________________________________________________________________    [facility] Contact Number: [phone]    - Providers: Please contact [facility] with changes in orders or clinical plan of care   - Nursing: Please contact [facility] with significant changes in patient condition    Hospice will notify the care team (including the hospitalist) to confirm date of inpatient hospice (GIP) admission.    New Epic encounter will not be created until hospice completes admission.   ______________________________________________________________________        Hospice Diagnosis: Intracranial Hemorrhage    Indication for Inpatient Hospice: Respiratory Distress AEB respirations >20;     Goals for Hospital Discharge: Once stabilized will discuss with family options for discharge planning    Plan of Care Discussed with the Following:   - Nurse: - Hanna Crocker R   - Hospitalist/Rounding Provider:  Edgardo Portillo MD    Hospitalist - Internal Medicine    Since 11/20/2022    [phone]       - Elenita's Family/Preferred Contact: Denilson spouse [phone]  - Hospice Provider: Dr. Jack Adler    Summary of Visit (includes assessment, medications and any new orders):   Mentation: Patient was not able to carrry on a conversation with writer. She was gracious in allowing me to perform assessment. She was unable to answer questions asked during encounter.   I/O: Pure wick in place. She has boni colored urine. She is currently on a fluid restriction. She was attempting to drink from a pitcher and wasn't having much success with taking in water. She is taking in minimal amounts of food.   Respiratory: Oxygen sats @ 100% room air; left lungs crackles and labored. Unproductive cough with audible congestion. Respirations 23 which is evident of respiratory distress.   Skin: Bilateral pitting edema up to back  noted. Sacral ulcer noted in chart which may make facility placement challenging. Writer did not inspect ulcer at this encounter.   Pycho-social: Spouse is interested in hospice and would like her to remain in hospital setting. Writer left a message to contact via cell phone to tenatively sign patient onto [facility] hospice program.       Anna Reyez RN  [phone]

## 2022-11-21 NOTE — PROGRESS NOTES
Care Management Follow Up    Length of Stay (days): 34    Expected Discharge Date: 11/22/2022     Concerns to be Addressed:       Patient plan of care discussed at interdisciplinary rounds: Yes    Anticipated Discharge Disposition: Hospice  Anticipated Discharge Services: Transportation Services  Anticipated Discharge DME:      Patient/family educated on Medicare website which has current facility and service quality ratings: yes  Education Provided on the Discharge Plan:  Yes  SW met with  to discuss hospice options briefly. states that he is comfortable with pt staying here. SW explained that unless pt meets certain criteria, in patient hospice here is not an option.  SW called [facility] to make referral and have intake RN speak with  regarding their services and answer questions.    Patient/Family in Agreement with the Plan: yes    Referrals Placed by CM/SW: [facility].( [phone] Dahlia Paged and awaiting return call   Private pay costs discussed: yes    Additional Information:  SW spoke with hospitalist to see if [facility] can assess pt . Hospitalist ordered review of pt for GIP.    VIELKA Cole  [facility]  Care Transitions  [phone]

## 2022-11-21 NOTE — PLAN OF CARE
Comfort cares maintained. Pt very lethargic overnight. Quiet, whispered speech. Takes pills whole w/ pudding. Up with Ax2 lift, T/R as needed and for comfort. Dressing in place on coccyx, pt has significant pressure wound. Pt unable to take all of PO nighttime meds d/t lethargy. No PRNs given as pt slept and appeared comfortable, denied pain. Pt wishes to be as alert as possible today for visit from her .

## 2022-11-21 NOTE — PLAN OF CARE
Physical Therapy Discharge Summary    Reason for therapy discharge:    Patient transitioned to hospice cares.    Progress towards therapy goal(s). See goals on Care Plan in Epic electronic health record for goal details.  Goals not met.  Barriers to achieving goals:   limited tolerance for therapy.    Therapy recommendation(s):    No further therapy is recommended.

## 2022-11-21 NOTE — PROGRESS NOTES
Referral Received - [facility]     ______________________________________________________________________ Providers: Please contact [facility] with changes in orders or clinical plan of care   Nursing: Please contact [facility] with significant changes in patient condition  ______________________________________________________________________    [facility] would like to thank you for the [facility]  Hospice referral.    Referral received.and initial insurance information sent to  Hospice intake.    We are reviewing your patient's eligibility with intake team and medical director at this time.    Our plan is to visit your patient as soon as possible. We will update the Charge Nurse on the unit with [facility] eligibility.        PIO Alanis on 11/21/2022 at 1:00 PM  [phone]

## 2022-11-21 NOTE — PROGRESS NOTES
Chart reviewed  Note pt has transitioned to comfort cares  Diet: Regular  Pt eating very small amounts  Will provide meals per pt request    Radha Bruce RD, LD  Clinical Dietitian - [facility]   Pager - [phone]

## 2022-11-21 NOTE — PROGRESS NOTES
Chart Check:      Note transition to comfort measures.  Pt's primary oncologist, , was updated.   Please reach out to us with any questions or concerns.     Sridhar DALTON, [facility]  [phone] (office) or [phone] (cell)

## 2022-11-21 NOTE — PLAN OF CARE
Goal Outcome Evaluation:      Plan of Care Reviewed With: patient, spouse, sibling    Overall Patient Progress: decliningOverall Patient Progress: declining         Alert at times. Sleeping throughout the day. Pt requested to sleep & decreased interruptions. Up in chair visiting with family today for a few hours. Dilaudid given x1 for pain. 1 hour later pt feeling restless with some hallucinations--redirected & ativan given x1 which helped per pt.  Pale & cool extremities. Pt reported this morning feeling scared--pts  is coming tomorrow. Emotional support provided.

## 2022-11-21 NOTE — PROGRESS NOTES
[facility]    Medicine Progress Note - Hospitalist Service    Date of Admission:  10/18/2022    Assessment & Plan   Elenita Moran is a 67 year old female who was admitted on 10/18/2022.  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  Seen by neurology and neurosurgery with no surgical interventions.  Renal function and lytes gradually improved and returned to baseline, as did mental status.  While pending placement she developed sepsis on 11/11/2022 with source likely due to her sacral ulcers which have noted to be worse despite debridement this admission.  Sepsis improved with re-initiation of broad-spectrum antibiotics with plan to discharge to [facility], however, she indicated she was reconsidering her goals of care.  Following several conversations and Oncology feeling it would be very unlikely she recovers to point of being eligible for additional cancer treatment, she elected to transition to comfort care/hospice on 11/19.        Hospice care  * See progress notes 11/16-11/19 for details, but decided to transition to hospice care 11/19.  -- continue comfort measures  -- note she wishes to be alert and interactive for visit from her  on afternoon of 11/21, even if this means being in more discomfort by avoiding meds - please hold sedating meds starting 11/21 or use reduced dosing  -- SW to arrange hospice informational meeting  -- will consult [facility] hospice to see if she is a candidate. SW to continue for safe disposition     Suspected traumatic SDH  *  admission CT scan of the head showed left holohemispheric subdural fluid collection with 2 mm midline shift.  History notable for fall in early October.  * managed non-operatively  -- continue empiric Keppra for now, though seizure risk likely low at this point in time    Depression  -- Continue PTA cymbalta per her request    GERD  -- continue PPI per her request    Other hospital problems no longer being actively managed (see progress note 11/19 for summary):  Acute metabolic encephalopathy most likely secondary to SDH  Septic shock suspected due to sacral wound  Sacral pressure injury, stage 4, present on admission s/p bedside debridement 10/26/22 and intra-op debridement 11/4, repeat bedside debridement 11/15  Multiple sacral pressures injury, stage 2-3, present on admission  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  Acute on chronic anemia  Mild covid 19 infection, resolved, completed 10 days Isolation 10/30  Left lower lobe pneumonia, resolved  Hyponatremia, resolved  Hyperkalemia with subsequent hypokalemia  Uremia, resolved  Anion gap metabolic acidosis, resolved  KATHLEEN, resolved  Thrombocytopenia  Abnormal coags  Stage IV metastatic breast cancer (mets to brain, liver, bone) s/p mastectomy  Elevated LFT's  HFrEF due to doxorubicin  Type 2 MI due to supply demand mismatch  Sinus tachycardia  DM II with peripheral neuropathy  Insomnia          Diet: Combination Diet Regular Diet  Room Service    DVT Prophylaxis: None as comfort care  Jiang Catheter: Not present  Central Lines: PRESENT       Cardiac Monitoring: None  Code Status: No CPR- Do NOT Intubate      Disposition Plan      Expected Discharge Date: 11/22/2022        Discharge Comments: now comfort cares      The patient's care was discussed with the Bedside Nurse, Patient and Patient's Family.   updated at bedside 11/20.     Edgardo Portillo MD  Hospitalist Service  [facility]  Securely message with  "the [employer] Web Console (learn more here)  Text page via [employer] Paging/Directory         Clinically Significant Risk Factors              # Hypoalbuminemia: Lowest albumin = 1.9 g/dL (Ref range: 3.5-5.2) at 10/18/2022  5:37 PM, will monitor as appropriate          # Overweight: Estimated body mass index is 29.13 kg/m  as calculated from the following:    Height as of this encounter: 1.651 m (5' 5\").    Weight as of this encounter: 79.4 kg (175 lb 0.7 oz).   # Severe Malnutrition: based on nutrition assessment        ______________________________________________________________________    Interval History   Patient seen and evaluated in her room, awake alert and in no distress, denies any pain.     Data reviewed today: I reviewed all medications, new labs and imaging results over the last 24 hours. I personally reviewed no images or EKG's today.    Physical Exam   Vital Signs:           Resp: 14        Weight: 175 lbs .72 oz  General Appearance: lying in bed in NAD  Respiratory: respirations non-labored on room air  Cardiovascular: S1 and S2 normal   GI: distended abdomen, not tender   Skin:  Decubitus ulcer not examined today  Neuro:  Very soft-spoken and at time difficult to understand, cranial nerves grossly intact      Data   Recent Labs   Lab 11/19/22  0541 11/18/22  2115 11/18/22  0641 11/17/22  0621 11/16/22  0614   WBC 16.4*  --   --  12.1* 10.6   HGB 7.3*  --   --  7.4* 7.7*   *  --   --  101* 101*   PLT 72*  --   --  73* 77*     --  133 134 136   POTASSIUM 3.6  3.6 3.1* 2.9* 3.4 3.6   CHLORIDE 105  --  102 105 108   CO2 18*  --  17* 17* 19*   BUN 27  --  27 27 25   CR 0.87  --  0.86 0.78 0.71   ANIONGAP 14  --  14 12 9   BENJI 7.6*  --  7.6* 7.6* 7.9*   *  --  135* 128* 129*     No results found for this or any previous visit (from the past 24 hour(s)).  "

## 2022-11-21 NOTE — PLAN OF CARE
Occupational Therapy Discharge Summary    Reason for therapy discharge:    Transitioned to hospice service    Progress towards therapy goal(s). See goals on Care Plan in [facility] electronic health record for goal details.  Goals not met.  Barriers to achieving goals:   limited tolerance for therapy.    Therapy recommendation(s):    No further therapy is recommended.

## 2022-11-22 NOTE — PROGRESS NOTES
SPIRITUAL HEALTH SERVICES Progress Note       73     Saw pt Elenita Moran per follow-up plan of care.     Illness Narrative - Elenita is now on GIPH and was speaking intermittently in a whisper during this visit.     Distress - Primary area of distress continues to be Elenita's current condition as she has now been placed on inpatient hospice. Pt also expressed a desire for food during this visit and her  requested that she be provided applesauce. I verbally relayed this request to the bedside RN immediately after the visit.    Coping - Elenita has her spouse and son present and supporting her at the bedside. Elenita also identifies as Restorationist and had their local [place]  in to provide the eucharist and support yesterday. Both family and spiritual support is helping the patient to cope.     Meaning-Making -  In reflection on this hospitalization, Denilson (Elenita's spouse) noted that this has been a long 12+ year journey with her cancer diagnosis. I reminded family of the availability of  support and how to contact.      Plan - I continue to follow this pt while admitted to . Please consult if any immediate needs arise.     ------  Brian Coker M.Div.  Resident   Pager: [phone]

## 2022-11-22 NOTE — H&P
[facility]    History and Physical  Hospitalist       Date of Admission:  11/21/2022    Assessment & Plan      Elenita Moran is a 67 year old female who was admitted on 10/18/2022.  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  Seen by neurology and neurosurgery with no surgical interventions.  Renal function and lytes gradually improved and returned to baseline, as did mental status.  While pending placement she developed sepsis on 11/11/2022 with source likely due to her sacral ulcers which have noted to be worse despite debridement this admission.  Sepsis improved with re-initiation of broad-spectrum antibiotics with plan to discharge to [facility], however, she indicated she was reconsidering her goals of care.  Following several conversations and Oncology feeling it would be very unlikely she recovers to point of being eligible for additional cancer treatment, she elected to transition to comfort care/hospice on 11/19, [facility] hospice consulted on 11/21 and accepted and patient is now transition to [facility] hospice at this time.         Hospice care  * See progress notes 11/16-11/19 for details, but decided to transition to hospice care 11/19.  -- continue comfort measures  -- note she wishes to be alert and interactive for visit from her  on afternoon of 11/21, even if this means being in more discomfort by avoiding meds - please hold sedating meds starting 11/21 or use reduced dosing  -- [facility] hospice consulted and determine to be appropriate candidate, transition  to [facility] hospice      Suspected traumatic SDH  * admission CT scan of the head showed left holohemispheric subdural fluid collection with 2 mm midline shift.  History notable for fall in early October.  * managed non-operatively  -- continue empiric Keppra for now, though seizure risk likely low at this point in time     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  Acute on chronic anemia  Mild covid 19 infection, resolved, completed 10 days Isolation 10/30  Left lower lobe pneumonia, resolved  Hyponatremia, resolved  Hyperkalemia with subsequent hypokalemia  Uremia, resolved  Anion gap metabolic acidosis, resolved  KATHLEEN, resolved  Thrombocytopenia  Abnormal coags  Stage IV metastatic breast cancer (mets to brain, liver, bone) s/p mastectomy  Elevated LFT's  HFrEF due to doxorubicin  Type 2 MI due to supply demand mismatch  Sinus tachycardia  DM II with peripheral neuropathy  Insomnia              Diet: Combination Diet Regular Diet     DVT Prophylaxis: Pneumatic Compression Devices  Code Status: Comfort Care    Disposition: Expected discharge awaiting safe disposition     Edgardo Portillo MD, MD    Primary Care Physician   Sandi Jones    Chief Complaint   Hospice care     History is obtained from the patient    History of Present Illness      Elenita Moran is a 67 year old female who was admitted on 10/18/2022.  Past medical history significant for the stage IV metastatic  breast cancer with metastasis to brain and liver bone, s/p mastectomy, heart failure with reduced ejection fraction secondary to doxorubicin, type 2 diabetes mellitus, depression and stage IV sacral pressure ulcer who was admitted to the hospital secondary to acute metabolic encephalopathy and was found to have traumatic subdural hemorrhage as well as significant KATHLEEN with multiple electrolyte abnormalities.  Seen by neurology and neurosurgery with no surgical interventions.  Renal function and lytes gradually improved and returned to baseline, as did mental status.  While pending placement she developed sepsis on 11/11/2022 with source likely due to her sacral ulcers which have noted to be worse despite debridement this admission.  Sepsis improved with re-initiation of broad-spectrum antibiotics with plan to discharge to [facility], however, she indicated she was reconsidering her goals of care.  Following several conversations and Oncology feeling it would be very unlikely she recovers to point of being eligible for additional cancer treatment, she elected to transition to comfort care/hospice on 11/19, [facility] hospice consulted on 11/21 and accepted and patient is now transition to [facility] hospice at this time.        Past Medical History    I have reviewed this patient's medical history and updated it with pertinent information if needed.   Past Medical History:   Diagnosis Date     Allergic rhinitis      Bone cancer (H) 2011    breast mets     Breast cancer (H) 2010    left mastectomy     Depression      DM (diabetes mellitus) (H)      Fibromyalgia      Hx antineoplastic chemotherapy 2010     Hx of radiation therapy 2010     Hyperlipemia      Lactose intolerance      Mini stroke     R EYE     Osteoarthritis      Tobacco dependency        Past Surgical History   I have reviewed this patient's surgical history and updated it with pertinent information if needed.  Past Surgical History:   Procedure Laterality Date      APPENDECTOMY       INSERT INTRACORONARY STENT  1985    R Hand     IR CHEST PORT PLACEMENT > 5 YRS OF AGE  12/12/2019     IR CHEST PORT PLACEMENT > 5 YRS OF AGE  3/14/2022     IR LUMBAR TRANSFORAMINAL EPIDURAL STEROID INJECTION  12/5/2019     IR LUMBAR TRANSFORAMINAL EPIDURAL STRD INJ  12/5/2019     IR PORT PLACEMENT >5 YEARS  12/12/2019     IR PORT REMOVAL RIGHT  3/14/2022     IR SITE CHECK/EVALUATION  4/15/2022     IRRIGATION AND DEBRIDEMENT BUTTOCKS Bilateral 11/4/2022    Procedure: INCISION AND DRAINAGE, SACRUM;  Surgeon: Burak Solorio MD;  Location: [facility] OR     MAMMOPLASTY REDUCTION Right 2015    hx of left mastectomy and right breast reduction     MASTECTOMY Left 2010     OTHER SURGICAL HISTORY      biopsy of upper and right tongue     OVARIAN CYST REMOVAL       REVISE RECONSTRUCTED BREAST Left     March 11, 2015       Prior to Admission Medications   Prior to Admission Medications   Prescriptions Last Dose Informant Patient Reported? Taking?   ACETAMINOPHEN PO   Yes No   Sig: Take 1,300 mg by mouth 3 times daily Alternates with Ibuprofen   B-D U/F 31G X 8 MM insulin pen needle   Yes No   Sig: USE 4 TIMES DAILY   Cholecalciferol (VITAMIN D) 125 MCG (5000 UT) capsule   Yes No   Sig: Take 1 tablet by mouth daily    Continuous Blood Gluc  (FREESTYLE KIMBERLY 14 DAY READER) COLETTE   Yes No   Sig: Use to check blood sugar at least 4 times a day or as directed    Continuous Blood Gluc Sensor (FREESTYLE KIMBERLY 14 DAY SENSOR) MISC   Yes No   Sig: Use as directed to test blood sugar at least 4 times a day or as directed   DULoxetine (CYMBALTA) 30 MG capsule   Yes No   Sig: Take 30 mg by mouth every morning    DULoxetine (CYMBALTA) 60 MG capsule   Yes No   Sig: Take 1 capsule by mouth At Bedtime   HYDROmorphone (DILAUDID) 4 MG tablet   No No   Sig: Take 1 tablet (4 mg) by mouth 3 times daily as needed for moderate to severe pain or severe pain   Patient taking differently: Take 4 mg by mouth 3 times daily AM - Midday -  PM   Insulin Glargine (BASAGLAR KWIKPEN SC)   Yes No   Sig: Inject 8 Units Subcutaneous every morning Uses as directed   Iron Combinations (IRON COMPLEX PO)   Yes No   Sig: Take 1 tablet by mouth daily Ferrous biogluconate supplement   LORazepam (ATIVAN) 0.5 MG tablet   Yes No   Sig: take 1 tablet by mouth up to 3 times per day as needed for nausea, anxiety, or insomnia   Patient not taking: Reported on 10/19/2022   MAGNESIUM PO   Yes No   Sig: Take by mouth daily OTC supplement   Multiple Vitamins-Minerals (ICAPS PO)   Yes No   Sig: Take 1 capsule by mouth daily   OLANZapine (ZYPREXA) 5 MG tablet   Yes No   Sig: Take 5 mg by mouth every evening    POTASSIUM CHLORIDE PO   Yes No   Sig: Take by mouth daily OTC supplement   Prenatal Vit-Fe Fumarate-FA (PRENATAL MULTIVITAMIN  PLUS IRON) 27-1 MG TABS   Yes No   Sig: Take 1 tablet by mouth daily    aspirin 81 MG EC tablet   Yes No   Sig: Take 81 mg by mouth daily   blood glucose (ONETOUCH VERIO IQ) test strip   Yes No   bumetanide (BUMEX) 2 MG tablet   No No   Sig: Take 1 tablet (2 mg) by mouth 2 times daily   Patient taking differently: Take 2 mg by mouth daily   bumetanide (BUMEX) 2 MG tablet   Yes No   Sig: Take 2 mg by mouth daily as needed Can take midday if needed for fluid retention/swelling   cephALEXin (KEFLEX) 500 MG capsule   No No   Sig: Take 1 capsule (500 mg) by mouth 2 times daily   clobetasol (TEMOVATE) 0.05 % external ointment   Yes No   Sig: Apply topically daily as needed   docusate sodium (COLACE) 100 MG capsule   Yes No   Sig: Take 100 mg by mouth daily   ezetimibe-simvastatin (VYTORIN) 10-20 MG tablet   Yes No   Sig: Take 1 tablet by mouth At Bedtime   glucose (BD GLUCOSE) 4 g chewable tablet   Yes No   Sig: as needed   hydrOXYzine (VISTARIL) 25 MG capsule   Yes No   Sig: Take 25 mg by mouth 3 times daily With hydromorphone   ibuprofen (ADVIL/MOTRIN) 200 MG capsule   Yes No   Sig: Take 400 mg by mouth 3 times daily Alternating with acetaminophen    insulin aspart (NOVOLOG FLEXPEN) 100 UNIT/ML pen   Yes No   Sig: Inject Subcutaneous 3 times daily (with meals) Sliding scale dose based on blood glucose and diet   lidocaine (XYLOCAINE) 5 % external ointment   No No   Sig: Apply topically 4 times daily   Patient taking differently: Apply topically 4 times daily as needed for moderate pain   lisinopril (ZESTRIL) 2.5 MG tablet   No No   Sig: Take 1 tablet (2.5 mg) by mouth daily   medical cannabis (Patient's own supply)   Yes No   Sig: See Admin Instructions (The purpose of this order is to document that the patient reports taking medical cannabis.  This is not a prescription, and is not used to certify that the patient has a qualifying medical condition.)   Patient not taking: Reported on 10/19/2022   metoprolol succinate ER (TOPROL-XL) 50 MG 24 hr tablet   No No   Sig: Take 1 tablet (50 mg) by mouth daily   nystatin (MYCOSTATIN) 826192 UNIT/GM external cream   No No   Sig: Apply topically 2 times daily   Patient taking differently: Apply topically daily as needed   omeprazole (PRILOSEC) 20 MG DR capsule   Yes No   Sig: Take 20 mg by mouth At Bedtime    oxybutynin (DITROPAN) 5 MG tablet   Yes No   Sig: Take 5 mg by mouth 2 times daily AM and Midday   oxybutynin ER (DITROPAN-XL) 10 MG 24 hr tablet   Yes No   Sig: Take 10 mg by mouth every evening   prochlorperazine (COMPAZINE) 10 MG tablet   Yes No   Sig: Take 10 mg by mouth every 6 hours as needed for nausea or vomiting   Patient not taking: Reported on 10/19/2022   psyllium (METAMUCIL/KONSYL) Packet   Yes No   Sig: Take 1 packet by mouth daily as needed for constipation   spironolactone (ALDACTONE) 25 MG tablet   No No   Sig: Take 0.5 tablets (12.5 mg) by mouth daily   vitamin (B COMPLEX) tablet   Yes No   Sig: Take 1 tablet by mouth daily   zolpidem (AMBIEN) 10 MG tablet   Yes No   Sig: Take 10 mg by mouth At Bedtime      Facility-Administered Medications Last Administration Doses Remaining   lidocaine  (XYLOCAINE) 2 % external gel 7/26/2022  9:06 AM         Allergies   Allergies   Allergen Reactions     Gabapentin      Upper body edema/swelling.         Morphine Visual Disturbance     Visual hallucinations     Sulfamethoxazole-Trimethoprim      Other reaction(s): Hives     Sulfa Drugs Hives and Rash     welts         Social History   I have reviewed this patient's social history and updated it with pertinent information if needed. Elenita Moran  reports that she has quit smoking. Her smoking use included cigarettes. She has a 15.00 pack-year smoking history. She has never used smokeless tobacco. She reports that she does not currently use alcohol after a past usage of about 1.0 standard drink per week. She reports that she does not use drugs.    Family History   I have reviewed this patient's family history and updated it with pertinent information if needed.   Family History   Problem Relation Age of Onset     Lung Cancer Mother      Pancreatic Cancer Mother      Kidney Cancer Maternal Grandmother      Lung Cancer Paternal Aunt      Breast Cancer Cousin        Review of Systems   Review of systems was not needed on this patient    Physical Exam                      Vital Signs with Ranges     0 lbs 0 oz    Constitutional: awake, in no distress.   Eyes: Conjunctiva and pupils examined and normal.  HEENT: drymucous membrane  Respiratory:diminished air entry, no crackles or wheezing.  Cardiovascular: Regular rate and rhythm, normal S1 and S2, and no murmur noted.  GI: Soft but distended, non-tender,  Lymph/Hematologic: No anterior cervical or supraclavicular adenopathy.  Skin: No rashes, no cyanosis, no edema.  Musculoskeletal: No joint swelling, erythema or tenderness.  Neurologic: no focal deficit.   Psychiatric: Alert, oriented to person, place and time, no obvious anxiety or depression.    Data   Data reviewed today:  I personally reviewed no images or EKG's today.  Recent Labs   Lab 11/19/22  5862  11/18/22 2115 11/18/22 0641 11/17/22 0621 11/16/22 0614   WBC 16.4*  --   --  12.1* 10.6   HGB 7.3*  --   --  7.4* 7.7*   *  --   --  101* 101*   PLT 72*  --   --  73* 77*     --  133 134 136   POTASSIUM 3.6  3.6 3.1* 2.9* 3.4 3.6   CHLORIDE 105  --  102 105 108   CO2 18*  --  17* 17* 19*   BUN 27  --  27 27 25   CR 0.87  --  0.86 0.78 0.71   ANIONGAP 14  --  14 12 9   BENJI 7.6*  --  7.6* 7.6* 7.9*   *  --  135* 128* 129*       No results found for this or any previous visit (from the past 24 hour(s)).

## 2022-11-22 NOTE — PROGRESS NOTES
Pt here with SDH- transitioned to Hospice care. A/Ox 4. Slow and whispered speech, more talkative this morning. Denies pain, but has prn's available for comfort. Incontinent or bowel/bladder. Purewick in place with good output, no BM this shift. L chest port patent, Heparin locked. Intermittently refusing repositioning. Up assist x 2 with lift device. Regular diet, thin liquids- poor appetite. Takes pills whole with water- does need assistance with cup/straw. Pt scoring green on the Aggression Stop Light Tool.

## 2022-11-22 NOTE — CONSULTS
[facility]    Progress Note - [facility]    ______________________________________________________________________    [facility] Contact Number: [phone]    - Providers: Please contact [facility] with changes in orders or clinical plan of care   - Nursing: Please contact [facility] with significant changes in patient condition  ______________________________________________________________________        Plan of Care Discussed with the Following:   - Nurses:  Rosa Kasper RN (Tel. vocera)  - Hospitalist/Rounding Provider:   Edgardo Portillo MD    Hospitalist - Internal Medicine    Since 11/21/2022    [phone]       - Elenita's Family/Preferred Contact:Denilson spouse  - Hospice Provider: Dr. Jack Adler    Summary of Visit (includes assessment, medications and any new orders):   Patient was found up in recliner next to bed and ready to be placed back into her bed. Staff provided personal cares and after this patient was very fatigued. Writer visited briefly and to check in with family. Notable pitting edema continues to exist. Her feet are also notably more dark purple during this encounter. No use of PRN medications as patient is not reporting pain.  from hospice deferred visit due to patients fatigue level.     Discharge planning: Due to multiple wounds placement out of hospital remain challenging which qualifies her for GIP hospice care in hospital setting.        Anna Reyez, RN  [phone]

## 2022-11-22 NOTE — PROGRESS NOTES
"Pt here and moved to [facility] hospice this evening. A&O 4. Pt whispers when does decide to speak. Pt very quiet and flat this morning but did become more vocal after meeting with  and hospice RN. Regular diet, thin liquids- poor appetite. Takes pills whole with water- does need some assistance with cup and straw. Up with Ax2 lift. Josefa pain but RN was able to give PRN this evening for comfort. Pt scoring green on the Aggression Stop Light Tool. RN provided therapeutic touch this evening with \"sap day\". Warm wash cloth to face and hands, lotion message to hands, arms, feet and legs. RN gave facial message and head rub to calm and relax Pt for the evening.     "

## 2022-11-23 NOTE — PROGRESS NOTES
[facility]    Medicine Progress Note - Hospitalist Service    Date of Admission:  10/18/2022    Assessment & Plan   Elenita Moran is a 67 year old female who was admitted on 10/18/2022.  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  Seen by neurology and neurosurgery with no surgical interventions.  Renal function and lytes gradually improved and returned to baseline, as did mental status.  While pending placement she developed sepsis on 11/11/2022 with source likely due to her sacral ulcers which have noted to be worse despite debridement this admission.  Sepsis improved with re-initiation of broad-spectrum antibiotics with plan to discharge to [facility]ACH, however, she indicated she was reconsidering her goals of care.  Following several conversations and Oncology feeling it would be very unlikely she recovers to point of being eligible for additional cancer treatment, she elected to transition to comfort care/hospice on 11/19.        Hospice care/Comfort care only   * See progress notes 11/16-11/19 for details, but decided to transition to hospice care 11/19.  -- continue comfort measures  -- note she wishes to be alert and interactive for visit from her  on afternoon of 11/21, even if this means being in more discomfort by avoiding meds - please hold sedating meds starting 11/21 or use reduced dosing  -- SW to arrange hospice informational meeting  -- [facility] consulted and patient is now transition to [facility].  She remain comfortable at this  time, noticed toes cyanosis and mottling of foot   Continue comfort base management at this time, overall poor prognosis, need to look for disposition     Suspected traumatic SDH  * admission CT scan of the head showed left holohemispheric subdural fluid collection with 2 mm midline shift.  History notable for fall in early October.  * managed non-operatively  -- continue empiric Keppra for now, though seizure risk likely low at this point in time    Depression  -- Continue PTA cymbalta per her request    GERD  -- continue PPI per her request    Other hospital problems no longer being actively managed (see progress note 11/19 for summary):  Acute metabolic encephalopathy most likely secondary to SDH  Septic shock suspected due to sacral wound  Sacral pressure injury, stage 4, present on admission s/p bedside debridement 10/26/22 and intra-op debridement 11/4, repeat bedside debridement 11/15  Multiple sacral pressures injury, stage 2-3, present on admission  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  Acute on chronic anemia  Mild covid 19 infection, resolved, completed 10 days Isolation 10/30  Left lower lobe pneumonia, resolved  Hyponatremia, resolved  Hyperkalemia with subsequent hypokalemia  Uremia, resolved  Anion gap metabolic acidosis, resolved  KATHLEEN, resolved  Thrombocytopenia  Abnormal coags  Stage IV metastatic breast cancer (mets to brain, liver, bone) s/p mastectomy  Elevated LFT's  HFrEF due to doxorubicin  Type 2 MI due to supply demand mismatch  Sinus tachycardia  DM II with peripheral neuropathy  Insomnia          Diet:      DVT Prophylaxis: None as comfort care  Jiang Catheter: Not present  Central Lines: PRESENT       Cardiac Monitoring: None  Code Status: No CPR- Do NOT Intubate      Disposition Plan      Expected Discharge Date: 11/25/2022              The patient's care was discussed with the Bedside Nurse, Patient and Patient's Family.   updated at bedside 11/20.  "    Edgardo Portillo MD  Hospitalist Service  [facility]  Securely message with the [employer] Web Console (learn more here)  Text page via [employer] Paging/Directory         Clinically Significant Risk Factors                       # Overweight: Estimated body mass index is 29.13 kg/m  as calculated from the following:    Height as of 11/4/22: 1.651 m (5' 5\").    Weight as of 11/15/22: 79.4 kg (175 lb 0.7 oz)., PRESENT ON ADMISSION         ______________________________________________________________________    Interval History   Patient sleeping comfortably, did woke up and offer no complaints.     Data reviewed today: I reviewed all medications, new labs and imaging results over the last 24 hours. I personally reviewed no images or EKG's today.    Physical Exam   Vital Signs:           Resp: 16        Weight: 0 lbs 0 oz  General Appearance: lying in bed in no acute distress.   Respiratory: respirations non-labored on room air  Cardiovascular: S1 and S2 normal   GI: distended abdomen, not tender   Skin:  Has 3+ LE edema, noticed some cyanosis of the toes   Neuro:  Very soft-spoken and at time difficult to understand, cranial nerves grossly intact      Data   Recent Labs   Lab 11/19/22  0541 11/18/22  2115 11/18/22  0641 11/17/22  0621   WBC 16.4*  --   --  12.1*   HGB 7.3*  --   --  7.4*   *  --   --  101*   PLT 72*  --   --  73*     --  133 134   POTASSIUM 3.6  3.6 3.1* 2.9* 3.4   CHLORIDE 105  --  102 105   CO2 18*  --  17* 17*   BUN 27  --  27 27   CR 0.87  --  0.86 0.78   ANIONGAP 14  --  14 12   BENJI 7.6*  --  7.6* 7.6*   *  --  135* 128*     No results found for this or any previous visit (from the past 24 hour(s)).  "

## 2022-11-23 NOTE — PROGRESS NOTES
[facility]    Progress Note - [facility]    ______________________________________________________________________    [facility] Contact Number: [phone]    - Providers: Please contact [facility] with changes in orders or clinical plan of care   - Nursing: Please contact [facility] with significant changes in patient condition  ______________________________________________________________________        Plan of Care Discussed with the Following:     - Hospitalist/Rounding Provider: Dr. Lindsey Kwong's Family/Preferred Contact: No family present during visit, preferred contact is spouse Denilson   - Hospice Provider: Jack Adler     Summary of Visit (includes assessment, medications and any new orders):   Patient asleep in recliner upon writers arrival.  Patient endorses ongoing fatigue and denies pain, shortness of breath or nausea.  Please continue to monitor for any increasing discomfort with wound care, repositioning, or cares and plan to premedicate appropriately. No medication changes at this time.   Hospice will continue to visit daily.      Ariella Simon RN  [facility]

## 2022-11-24 NOTE — PROGRESS NOTES
"[facility]    Progress Note - [facility]    ______________________________________________________________________    [facility] Contact Number: [phone]    - Providers: Please contact [facility] with changes in orders or clinical plan of care   - Nursing: Please contact [facility] with significant changes in patient condition  ______________________________________________________________________        Plan of Care Discussed with the Following:   - Nurse: Santo   - Hospitalist/Rounding Provider: Dr. Lindsey Kwong's Family/Preferred Contact: Spouse Denilson, not present in room during today's visit.   - Hospice Provider: Jack Isely     Summary of Visit (includes assessment, medications and any new orders):     Visit completed for daily GIP assessment. Elenita is sleeping in bed upon arrival, she does wake to voice but drifts off to sleep multiple times throughout visit.  Denies pain but does endorse feeling  \"uneasy sometimes\"  We explored this statement further, within Elenita's current ability, and what she is describing appears to be anxiety. Elenita does report that lorazepam was effective when administered prior to sleep on 11/23 and would like to continue utilizing this medication to help promote calm and rest.  Please continue to administer ativan at regular intervals throughout the next 24 hours, if effective, could consider scheduling. Appreciate bedside care and collaboration.      Ariella Simon RN  [facility]   "

## 2022-11-24 NOTE — PROGRESS NOTES
[facility]    Medicine Progress Note - Hospitalist Service    Date of Admission:  10/18/2022    Assessment & Plan   Elenita Moran is a 67 year old female who was admitted on 10/18/2022.  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  Seen by neurology and neurosurgery with no surgical interventions.  Renal function and lytes gradually improved and returned to baseline, as did mental status.  While pending placement she developed sepsis on 11/11/2022 with source likely due to her sacral ulcers which have noted to be worse despite debridement this admission.  Sepsis improved with re-initiation of broad-spectrum antibiotics with plan to discharge to [facility], however, she indicated she was reconsidering her goals of care.  Following several conversations and Oncology feeling it would be very unlikely she recovers to point of being eligible for additional cancer treatment, she elected to transition to comfort care/hospice on 11/19.        Hospice care/Comfort care only   * See progress notes 11/16-11/19 for details, but decided to transition to hospice care 11/19.  -- continue comfort measures  -- note she wishes to be alert and interactive for visit from her  on afternoon of 11/21, even if this means being in more discomfort by avoiding meds - please hold sedating meds starting 11/21 or use reduced dosing  -- SW to arrange hospice informational meeting  -- MADELINE consulted and patient is now transition to General inpatient hospice.  She remain comfortable at this  time, noticed toes cyanosis and mottling of foot   Continue comfort base management at this time, overall poor prognosis, need to look for disposition     Suspected traumatic SDH  * admission CT scan of the head showed left holohemispheric subdural fluid collection with 2 mm midline shift.  History notable for fall in early October.  * managed non-operatively  -- continue empiric Keppra for now, though seizure risk likely low at this point in time    Depression  -- Continue PTA cymbalta per her request    GERD  -- continue PPI per her request    Other hospital problems no longer being actively managed (see progress note 11/19 for summary):  Acute metabolic encephalopathy most likely secondary to SDH  Septic shock suspected due to sacral wound  Sacral pressure injury, stage 4, present on admission s/p bedside debridement 10/26/22 and intra-op debridement 11/4, repeat bedside debridement 11/15  Multiple sacral pressures injury, stage 2-3, present on admission  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  Acute on chronic anemia  Mild covid 19 infection, resolved, completed 10 days Isolation 10/30  Left lower lobe pneumonia, resolved  Hyponatremia, resolved  Hyperkalemia with subsequent hypokalemia  Uremia, resolved  Anion gap metabolic acidosis, resolved  KATHLEEN, resolved  Thrombocytopenia  Abnormal coags  Stage IV metastatic breast cancer (mets to brain, liver, bone) s/p mastectomy  Elevated LFT's  HFrEF due to doxorubicin  Type 2 MI due to supply demand mismatch  Sinus tachycardia  DM II with peripheral neuropathy  Insomnia          Diet:      DVT Prophylaxis: None as comfort care  Jiang Catheter: Not present  Central Lines: PRESENT       Cardiac Monitoring: None  Code Status: No CPR- Do NOT Intubate      Disposition Plan     Expected Discharge Date: 11/25/2022              The patient's care was discussed with the Bedside Nurse, Patient and Patient's Family.   updated at bedside 11/20.  "    Edgardo Portillo MD  Hospitalist Service  [facility]  Securely message with the [employer] Web Console (learn more here)  Text page via [employer] Paging/Directory         Clinically Significant Risk Factors                       # Overweight: Estimated body mass index is 29.13 kg/m  as calculated from the following:    Height as of 11/4/22: 1.651 m (5' 5\").    Weight as of 11/15/22: 79.4 kg (175 lb 0.7 oz)., PRESENT ON ADMISSION         ______________________________________________________________________    Interval History   Patient sleeping comfortably this morning, did not wake her up.     Data reviewed today: I reviewed all medications, new labs and imaging results over the last 24 hours. I personally reviewed no images or EKG's today.    Physical Exam   Vital Signs:           Resp: 14        Weight: 0 lbs 0 oz  General Appearance: lying in bed in no acute distress.   Respiratory: respirations non-labored on room air  Cardiovascular: S1 and S2 normal   GI: distended abdomen, not tender   Skin:  Has 3+ LE edema, noticed some cyanosis of the toes   Neuro:  No focal deficit.       Data   Recent Labs   Lab 11/19/22  0541 11/18/22  2115 11/18/22  0641   WBC 16.4*  --   --    HGB 7.3*  --   --    *  --   --    PLT 72*  --   --      --  133   POTASSIUM 3.6  3.6 3.1* 2.9*   CHLORIDE 105  --  102   CO2 18*  --  17*   BUN 27  --  27   CR 0.87  --  0.86   ANIONGAP 14  --  14   BENJI 7.6*  --  7.6*   *  --  135*     No results found for this or any previous visit (from the past 24 hour(s)).  "

## 2022-11-24 NOTE — PROGRESS NOTES
Pt on hospice care. Pt denies pain, appears comfortable. Ativan given x1 to promote rest and ease anxiety. T/R PRN. Regular diet, minimal intake.

## 2022-11-25 NOTE — PROGRESS NOTES
"Pt is inpatient hospice. Resting quietly, appears asleep. Shallow breaths. Generalized edema. Cool extremities. Whispered \"no\" when asked if in pain, \"yes\" when asked if she would like medication given for anxiety. Tolerated liquid medications and a few spoonfuls of applesauce. Does not keep eyes open. Incontinence care, oral care, and turn/repopsition done at that time.  is now at bedside, agrees that pt looks comfortable.  will call for cares if pt awakens/responds to him but would otherwise like her left undisturbed at this time.  "

## 2022-11-25 NOTE — PROGRESS NOTES
" at bedside, met with hospice staff. Pt turned for comfort at 1300. Pt shakes head \"no\" when asked if she has pain or anxiety. Took small sips of water, 3 small bites of applesauce, and allowed RN to complete oral cares/apply mouth moisturizer.  "

## 2022-11-25 NOTE — PROGRESS NOTES
[facility]    Medicine Progress Note - Hospitalist Service    Date of Admission:  10/18/2022    Assessment & Plan   Elenita Moran is a 67 year old female who was admitted on 10/18/2022.  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  Seen by neurology and neurosurgery with no surgical interventions.  Renal function and lytes gradually improved and returned to baseline, as did mental status.  While pending placement she developed sepsis on 11/11/2022 with source likely due to her sacral ulcers which have noted to be worse despite debridement this admission.  Sepsis improved with re-initiation of broad-spectrum antibiotics with plan to discharge to [facility], however, she indicated she was reconsidering her goals of care.  Following several conversations and Oncology feeling it would be very unlikely she recovers to point of being eligible for additional cancer treatment, she elected to transition to comfort care/hospice on 11/19.        Hospice care/Comfort care only   * See progress notes 11/16-11/19 for details, but decided to transition to hospice care 11/19.  -- continue comfort measures  -- note she wishes to be alert and interactive for visit from her  on afternoon of 11/21, even if this means being in more discomfort by avoiding meds - please hold sedating meds starting 11/21 or use reduced dosing  -- SW to arrange hospice informational meeting  -- MADELINE consulted and patient is now transition to General inpatient hospice.  She remain comfortable at this  time, noticed toes cyanosis and mottling of foot   Continue comfort base management at this time, overall poor prognosis, remain comfortable at this time.     Suspected traumatic SDH  * admission CT scan of the head showed left holohemispheric subdural fluid collection with 2 mm midline shift.  History notable for fall in early October.  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  Acute on chronic anemia  Mild covid 19 infection, resolved, completed 10 days Isolation 10/30  Left lower lobe pneumonia, resolved  Hyponatremia, resolved  Hyperkalemia with subsequent hypokalemia  Uremia, resolved  Anion gap metabolic acidosis, resolved  KATHLEEN, resolved  Thrombocytopenia  Abnormal coags  Stage IV metastatic breast cancer (mets to brain, liver, bone) s/p mastectomy  Elevated LFT's  HFrEF due to doxorubicin  Type 2 MI due to supply demand mismatch  Sinus tachycardia  DM II with peripheral neuropathy  Insomnia          Diet:      DVT Prophylaxis: None as comfort care  Jiang Catheter: Not present  Central Lines: PRESENT       Cardiac Monitoring: None  Code Status: No CPR- Do NOT Intubate      Disposition Plan     Expected Discharge Date: 11/26/2022              The patient's care was discussed with the Bedside Nurse, Patient and Patient's Family.   updated at bedside 11/20.     Edgardo Portillo MD  Hospitalist Service  [facility]  "[facility]  Securely message with the [employer] Web Console (learn more here)  Text page via [employer] Paging/Directory         Clinically Significant Risk Factors                       # Overweight: Estimated body mass index is 29.13 kg/m  as calculated from the following:    Height as of 11/4/22: 1.651 m (5' 5\").    Weight as of 11/15/22: 79.4 kg (175 lb 0.7 oz)., PRESENT ON ADMISSION         ______________________________________________________________________    Interval History   Patient seen with the RN today, offer no complaints. Remain comfortable at this time.     Data reviewed today: I reviewed all medications, new labs and imaging results over the last 24 hours. I personally reviewed no images or EKG's today.    Physical Exam   Vital Signs: Temp: 96.8  F (36  C) Temp src: Axillary BP: 119/78     Resp: 16        Weight: 0 lbs 0 oz  General Appearance: awake, in no distress.   Respiratory: respirations non-labored on room air  Cardiovascular: S1 and S2 normal   GI: distended abdomen, not tender   Skin:  Has 3+ LE edema, cyanosis noticed previously is now improve   Neuro:  No focal deficit.       Data   Recent Labs   Lab 11/19/22  0541 11/18/22  2115   WBC 16.4*  --    HGB 7.3*  --    *  --    PLT 72*  --      --    POTASSIUM 3.6  3.6 3.1*   CHLORIDE 105  --    CO2 18*  --    BUN 27  --    CR 0.87  --    ANIONGAP 14  --    BENJI 7.6*  --    *  --      No results found for this or any previous visit (from the past 24 hour(s)).  "

## 2022-11-25 NOTE — PROGRESS NOTES
Pt is inpatient hospice. Rested comfortably all shift, denied pain, denied multiple offers for pain meds/anxiety meds. Very little appetite - only a couple sips of water. Very quiet and withdrawn. Edematous from the waist down to her feet. Incontinent of urine. Turned and repositioned when pt would allow. Spouse at the bedside for a short time this evening.

## 2022-11-26 NOTE — PROGRESS NOTES
[facility]    Progress Note - [facility]    ______________________________________________________________________    [facility]  Contact Number: [phone]    - Providers: Please contact [facility] with changes in orders or clinical plan of care   - Nursing: Please contact [facility] with significant changes in patient condition  ______________________________________________________________________        Plan of Care Discussed with the Following:   - Nurse: Mirtha  - Hospitalist/Rounding Provider:  Dr. Lindsey Nichols Family/Preferred Contact:Spouse Denilson  - Hospice Provider: Jack CORREA Eligibility: Eligible     Family bereavement risk: low to moderate risk    /cremation facility: D    Education provided: EOL disease progression and what symptoms might arise.  We discussed the use of medications for optimal comfort during this time.     Pertinent assessment information: patient is increasingly lethargic, essentially nonverbal but able to nod yes or no when awake.  Denies pain, no nonverbal signs present during assessment.  Patient appears to be transitioning at this time.  Continue frequent oral care and gentle repositioning for comfort.  Appreciate bedside nursing care.     Hospice recommendations:  Continued use of PRN lorazepam is indicated for anxiety.  Would recommend continuing to administer ativan at regular intervals.      Soledad Simon RN  [facility]

## 2022-11-26 NOTE — PROGRESS NOTES
[facility]    Medicine Progress Note - Hospitalist Service    Date of Admission:  10/18/2022    Assessment & Plan   Elenita Moran is a 67 year old female who was admitted on 10/18/2022.  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  Seen by neurology and neurosurgery with no surgical interventions.  Renal function and lytes gradually improved and returned to baseline, as did mental status.  While pending placement she developed sepsis on 11/11/2022 with source likely due to her sacral ulcers which have noted to be worse despite debridement this admission.  Sepsis improved with re-initiation of broad-spectrum antibiotics with plan to discharge to [facility]ACH, however, she indicated she was reconsidering her goals of care.  Following several conversations and Oncology feeling it would be very unlikely she recovers to point of being eligible for additional cancer treatment, she elected to transition to comfort care/hospice on 11/19.        Hospice care/Comfort care only   * See progress notes 11/16-11/19 for details, but decided to transition to hospice care 11/19.  -- continue comfort measures  -- note she wishes to be alert and interactive for visit from her  on afternoon of 11/21, even if this means being in more discomfort by avoiding meds - please hold sedating meds starting 11/21 or use reduced dosing  -- SW to arrange hospice informational meeting  -- MADELINE consulted and patient is now transition to General inpatient hospice.  She remain comfortable at this  time, noticed toes cyanosis and mottling of foot few days back no more cyanosis at this time.     Continue comfort base management at this time, overall poor prognosis, remain comfortable at this time.     Suspected traumatic SDH  * admission CT scan of the head showed left holohemispheric subdural fluid collection with 2 mm midline shift.  History notable for fall in early October.  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  Acute on chronic anemia  Mild covid 19 infection, resolved, completed 10 days Isolation 10/30  Left lower lobe pneumonia, resolved  Hyponatremia, resolved  Hyperkalemia with subsequent hypokalemia  Uremia, resolved  Anion gap metabolic acidosis, resolved  KATHLEEN, resolved  Thrombocytopenia  Abnormal coags  Stage IV metastatic breast cancer (mets to brain, liver, bone) s/p mastectomy  Elevated LFT's  HFrEF due to doxorubicin  Type 2 MI due to supply demand mismatch  Sinus tachycardia  DM II with peripheral neuropathy  Insomnia          Diet: Regular Diet Adult    DVT Prophylaxis: None as comfort care  Jiang Catheter: Not present  Central Lines: PRESENT       Cardiac Monitoring: None  Code Status: No CPR- Do NOT Intubate      Disposition Plan      Expected Discharge Date: 11/27/2022              The patient's care was discussed with the Bedside Nurse, Patient and Patient's Family.   updated at bedside  "11/20.     Edgardo Portillo MD  Hospitalist Service  [facility]  Securely message with the [employer] [facility] Web Console (learn more here)  Text page via [employer] Paging/Directory         Clinically Significant Risk Factors                       # Overweight: Estimated body mass index is 29.13 kg/m  as calculated from the following:    Height as of 11/4/22: 1.651 m (5' 5\").    Weight as of 11/15/22: 79.4 kg (175 lb 0.7 oz).          ______________________________________________________________________    Interval History   Patient awake this morning, offer no complaints, on asking denies any pain and breathing comfortably     Data reviewed today: I reviewed all medications, new labs and imaging results over the last 24 hours. I personally reviewed no images or EKG's today.    Physical Exam   Vital Signs:                    Weight: 0 lbs 0 oz  General Appearance: awake, in no distress.   Respiratory: respirations non-labored on room air  Cardiovascular: S1 and S2 normal   GI: distended abdomen, not tender   Skin:  Has 3+ LE edema, cyanosis noticed previously is now improve   Neuro:  No focal deficit.       Data   No lab results found in last 7 days.  No results found for this or any previous visit (from the past 24 hour(s)).  "

## 2022-11-27 NOTE — PROGRESS NOTES
[facility]    Medicine Progress Note - Hospitalist Service    Date of Admission:  10/18/2022    Assessment & Plan   Elenita Moran is a 67 year old female who was admitted on 10/18/2022.  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  Seen by neurology and neurosurgery with no surgical interventions.  Renal function and lytes gradually improved and returned to baseline, as did mental status.  While pending placement she developed sepsis on 11/11/2022 with source likely due to her sacral ulcers which have noted to be worse despite debridement this admission.  Sepsis improved with re-initiation of broad-spectrum antibiotics with plan to discharge to [facility]ACH, however, she indicated she was reconsidering her goals of care.  Following several conversations and Oncology feeling it would be very unlikely she recovers to point of being eligible for additional cancer treatment, she elected to transition to comfort care/hospice on 11/19.        Hospice care/Comfort care only   * See progress notes 11/16-11/19 for details, but decided to transition to hospice care 11/19.  -- continue comfort measures  -- note she wishes to be alert and interactive for visit from her  on afternoon of 11/21, even if this means being in more discomfort by avoiding meds - please hold sedating meds starting 11/21 or use reduced dosing  -- SW to arrange hospice informational meeting  -- MADELINE consulted and patient is now transition to General inpatient hospice.  She remain comfortable at this  time, noticed toes cyanosis and mottling of foot few days back no more cyanosis at this time.     Continue comfort base management at this time, overall poor prognosis, remain comfortable at this time.     Suspected traumatic SDH  * admission CT scan of the head showed left holohemispheric subdural fluid collection with 2 mm midline shift.  History notable for fall in early October.  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  Acute on chronic anemia  Mild covid 19 infection, resolved, completed 10 days Isolation 10/30  Left lower lobe pneumonia, resolved  Hyponatremia, resolved  Hyperkalemia with subsequent hypokalemia  Uremia, resolved  Anion gap metabolic acidosis, resolved  KATHLEEN, resolved  Thrombocytopenia  Abnormal coags  Stage IV metastatic breast cancer (mets to brain, liver, bone) s/p mastectomy  Elevated LFT's  HFrEF due to doxorubicin  Type 2 MI due to supply demand mismatch  Sinus tachycardia  DM II with peripheral neuropathy  Insomnia          Diet: Regular Diet Adult    DVT Prophylaxis: None as comfort care  Jiang Catheter: Not present  Central Lines: PRESENT       Cardiac Monitoring: None  Code Status: No CPR- Do NOT Intubate      Disposition Plan      Expected Discharge Date: 11/28/2022,  3:00 PM            The patient's care was discussed with the Bedside Nurse, Patient and Patient's Family.   updated at  "bedside 11/20.     Edgardo Portillo MD  Hospitalist Service  [facility]  Securely message with the [employer] Web Console (learn more here)  Text page via [employer] Paging/Directory         Clinically Significant Risk Factors                       # Overweight: Estimated body mass index is 29.13 kg/m  as calculated from the following:    Height as of 11/4/22: 1.651 m (5' 5\").    Weight as of 11/15/22: 79.4 kg (175 lb 0.7 oz).          ______________________________________________________________________    Interval History   Patient sleeping at the time of my visit, did not woke her up     Data reviewed today: I reviewed all medications, new labs and imaging results over the last 24 hours. I personally reviewed no images or EKG's today.    Physical Exam   Vital Signs:                    Weight: 0 lbs 0 oz  General Appearance: sleeping comfortably,  in no distress.   Respiratory: respirations non-labored on room air  Cardiovascular: S1 and S2 normal   GI: distended abdomen, not tender   Skin:  Has 3+ LE edema, cyanosis noticed previously is now improve   Neuro:  No focal deficit.       Data   No lab results found in last 7 days.  No results found for this or any previous visit (from the past 24 hour(s)).  "

## 2022-11-27 NOTE — CONSULTS
[facility]    Progress Note - [facility]    ______________________________________________________________________    [facility]  Contact Number: [phone]    - Providers: Please contact [facility] with changes in orders or clinical plan of care   - Nursing: Please contact [facility] with significant changes in patient condition  ______________________________________________________________________   [facility]    Progress Note - [facility]    ______________________________________________________________________    [facility]  Contact Number: [phone]    - Providers: Please contact [facility] with changes in orders or clinical plan of care   - Nursing: Please contact [facility] with significant changes in patient condition  ______________________________________________________________________        Plan of Care Discussed with the Following:   - Nurse: Ellie Shaikh  - Hospitalist/Ben Provider:  Edgardo Portillo MD    Hospitalist - Internal Medicine    Since 2022    [phone]       - Estela's Family/Preferred Contact: Denilson   - Hospice Provider: Dr. Kuldip Cortes    Family bereavement risk: spouse moderate    /cremation facility: undetermined provider, plan for cremation     Education provided: discussed progression of disease noted since signon to hospice on 22    Pertinent assessment information: More lethargic today noted by providers and writer.    Hospice recommendations: continue to monitor need for pain and reposition every 2 hours to offload areas where wounds are located.     Discharge Planning: Finding placement is challenging due to comprehensive wound care and holiday week. Patient is transitioning and may not be stable for transport out of hospital on .    Anna Reyez [facility]  [phone]

## 2022-11-27 NOTE — PROGRESS NOTES
SPIRITUAL HEALTH SERVICES  SPIRITUAL ASSESSMENT Progress Note  [facility] [employer]     REFERRAL SOURCE: Request for support    Sat at the bedside with Elenita, who awakened to my voice. Read scripture and prayed with her.   Updated bedside nurse and inpatient hospice nurse regarding my visit.     PLAN: [facility] remains available for support.    Kim Izaguirre  Associate   Pager [phone]  Phone [phone]

## 2022-11-28 NOTE — PROGRESS NOTES
"IP hospice maintained, admitted for traumatic SDH with Hx stage IV metastatic breast cancer with metastasis to brain and liver Lethargic, sleeping between cares, arouses to voice, very minimal whispered speech, slow to respond, responded \"no\" one time only, shallow breathing. Nonverbal signs indicating no pain. T&R for comfort. Reg diet, too lethargic for oral intake, oral care give. Oral Dilaudid given x1. Sacral dressing changed, hx of sacral pressure injury. Incont of B&B, little to no urine output, last BM yesterday. L chest portacath patent.       "

## 2022-11-28 NOTE — CONSULTS
[facility]    Progress Note - [facility]    ______________________________________________________________________    [facility]  Contact Number: [phone]    - Providers: Please contact [facility] with changes in orders or clinical plan of care   - Nursing: Please contact [facility] with significant changes in patient condition  ______________________________________________________________________        Plan of Care Discussed with the Following:   - Nurse:Candis Monae RN  - Hospitalist/Rounding Provider:   Edgardo Portillo MD    Hospitalist - Internal Medicine    Since 2022    [phone]       - Patient's Family/Preferred Contact: Denilson spouse  - Hospice Provider:Dr. Kuldip Cortes    [facility] Eligibility: Yes    Family bereavement risk: Moderate risk for spouse    /cremation facility: Denilson has not finalized plan as he wanted input from Elenita    Education provided: N/A    Pertinent assessment information: Patient has been comfortable and has not used any PRN medications. She does has numerous wounds which make community placement a challenge. She has been eating less with a few sips of liquids.    Hospice recommendations: Continue maintaining dignity and assessments to ensure she remains comfortable while searching for placement in the community.     Horacio Reyez RN  [phone]

## 2022-11-28 NOTE — PROGRESS NOTES
[facility]    Medicine Progress Note - Hospitalist Service    Date of Admission:  10/18/2022    Assessment & Plan   Elenita Moran is a 67 year old female who was admitted on 10/18/2022.  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  Seen by neurology and neurosurgery with no surgical interventions.  Renal function and lytes gradually improved and returned to baseline, as did mental status.  While pending placement she developed sepsis on 11/11/2022 with source likely due to her sacral ulcers which have noted to be worse despite debridement this admission.  Sepsis improved with re-initiation of broad-spectrum antibiotics with plan to discharge to [facility], however, she indicated she was reconsidering her goals of care.  Following several conversations and Oncology feeling it would be very unlikely she recovers to point of being eligible for additional cancer treatment, she elected to transition to comfort care/hospice on 11/19.        Hospice care/Comfort care only   * See progress notes 11/16-11/19 for details, but decided to transition to hospice care 11/19.  -- continue comfort measures  -- note she wishes to be alert and interactive for visit from her  on afternoon of 11/21, even if this means being in more discomfort by avoiding meds - please hold sedating meds starting 11/21 or use reduced dosing  -- SW to arrange hospice informational meeting  -- MADELINE consulted and patient is now transition to General inpatient hospice.  She remain comfortable at this  time, noticed toes cyanosis and mottling of foot few days back no more cyanosis at this time.     Continue comfort base management at this time, overall poor prognosis, remain comfortable at this time.     Suspected traumatic SDH  * admission CT scan of the head showed left holohemispheric subdural fluid collection with 2 mm midline shift.  History notable for fall in early October.  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  Acute on chronic anemia  Mild covid 19 infection, resolved, completed 10 days Isolation 10/30  Left lower lobe pneumonia, resolved  Hyponatremia, resolved  Hyperkalemia with subsequent hypokalemia  Uremia, resolved  Anion gap metabolic acidosis, resolved  KATHLEEN, resolved  Thrombocytopenia  Abnormal coags  Stage IV metastatic breast cancer (mets to brain, liver, bone) s/p mastectomy  Elevated LFT's  HFrEF due to doxorubicin  Type 2 MI due to supply demand mismatch  Sinus tachycardia  DM II with peripheral neuropathy  Insomnia          Diet: Regular Diet Adult    DVT Prophylaxis: None as comfort care  Jiang Catheter: Not present  Central Lines: PRESENT       Cardiac Monitoring: None  Code Status: No CPR- Do NOT Intubate      Disposition Plan      Expected Discharge Date: 11/29/2022        Discharge Comments: [facility]      The patient's care was discussed with the Bedside Nurse, Patient and Patient's  "Family.   updated at bedside 11/20.     Edgardo Portillo MD  Hospitalist Service  [facility]  Securely message with the [employer] Web Console (learn more here)  Text page via [email or web address] Paging/Directory         Clinically Significant Risk Factors                       # Overweight: Estimated body mass index is 29.13 kg/m  as calculated from the following:    Height as of 11/4/22: 1.651 m (5' 5\").    Weight as of 11/15/22: 79.4 kg (175 lb 0.7 oz).          ______________________________________________________________________    Interval History   Patient awake, but lethargic and unable to answer most of the questions, remain comfortable overall.     Data reviewed today: I reviewed all medications, new labs and imaging results over the last 24 hours. I personally reviewed no images or EKG's today.    Physical Exam   Vital Signs:                    Weight: 0 lbs 0 oz  General Appearance: lethargic, tired, in no distress.   Respiratory: respirations non-labored on room air  Cardiovascular: S1 and S2 normal   GI: distended abdomen, not tender   Skin:  Has 3+ LE edema, cyanosis noticed previously is now resolved.   Neuro: not examine       Data   No lab results found in last 7 days.  No results found for this or any previous visit (from the past 24 hour(s)).  "

## 2022-11-29 NOTE — PROGRESS NOTES
Writer paged Dr. Portillo to notify, plan for patient to discharge home with hospice at 11am tomorrow. IP hospice nurse requested to have 3 days of comfort meds to be filled here and to be sent with patient. She will have it in her note.

## 2022-11-29 NOTE — PROGRESS NOTES
Writer spoke with [facility] regarding possible transfer for pt. Yamil said she cannot guarantee if Elenita's would be able to receive appropriate wound care at [facility] but that her case can be reviewed. Writer will meet with  later today to discuss possible transfer. It has been noted that  does not want her to leave the hospital. Writer will also present the ABN today and inform him of room and board rate should he want to keep her at [facility].    Susan Amaro, [facility]

## 2022-11-29 NOTE — PROGRESS NOTES
[facility]    Social Work Progress Note - [facility]    ______________________________________________________________________    [facility] 24/7 Contact Number: [phone]    - Providers: Please contact [facility] with changes in orders or clinical plan of care   - Nursing: Please contact [facility] with significant changes in patient condition  - Social Work: Please contact [facility] for discharge phanning/updates  ______________________________________________________________________      Pt will return home tomorrow with home hospice for [facility]. EMS Transport scheduled for 1100. Please ensure pt has three days worth of medication to take with her.    Plan of Care Discussed with the Following:   - Nurse: Mauricio  - Hospitalist/Rounding Provider: Dr Susanne Kwong's Family/Preferred Contact: Denilson Ernst  - Hospice Provider: Dr Nayely Am[facility], [facility]  [phone]

## 2022-11-29 NOTE — PROGRESS NOTES
[facility]    Medicine Progress Note - Hospitalist Service    Date of Admission:  10/18/2022    Assessment & Plan   Elenita Moran is a 67 year old female who was admitted on 10/18/2022.  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  Seen by neurology and neurosurgery with no surgical interventions.  Renal function and lytes gradually improved and returned to baseline, as did mental status.  While pending placement she developed sepsis on 11/11/2022 with source likely due to her sacral ulcers which have noted to be worse despite debridement this admission.  Sepsis improved with re-initiation of broad-spectrum antibiotics with plan to discharge to [facility], however, she indicated she was reconsidering her goals of care.  Following several conversations and Oncology feeling it would be very unlikely she recovers to point of being eligible for additional cancer treatment, she elected to transition to comfort care/hospice on 11/19.        Hospice care/Comfort care only   * See progress notes 11/16-11/19 for details, but decided to transition to hospice care 11/19.  -- continue comfort measures  -- note she wishes to be alert and interactive for visit from her  on afternoon of 11/21, even if this means being in more discomfort by avoiding meds - please hold sedating meds starting 11/21 or use reduced dosing  -- SW to arrange hospice informational meeting  -- MADELINE consulted and patient is now transition to General inpatient hospice.  She remain comfortable at this  time, noticed toes cyanosis and mottling of foot few days back no more cyanosis at this time.     Continue comfort base management at this time, overall poor prognosis, remain comfortable at this time.     Suspected traumatic SDH  * admission CT scan of the head showed left holohemispheric subdural fluid collection with 2 mm midline shift.  History notable for fall in early October.  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  Acute on chronic anemia  Mild covid 19 infection, resolved, completed 10 days Isolation 10/30  Left lower lobe pneumonia, resolved  Hyponatremia, resolved  Hyperkalemia with subsequent hypokalemia  Uremia, resolved  Anion gap metabolic acidosis, resolved  KATHLEEN, resolved  Thrombocytopenia  Abnormal coags  Stage IV metastatic breast cancer (mets to brain, liver, bone) s/p mastectomy  Elevated LFT's  HFrEF due to doxorubicin  Type 2 MI due to supply demand mismatch  Sinus tachycardia  DM II with peripheral neuropathy  Insomnia          Diet: Regular Diet Adult    DVT Prophylaxis: None as comfort care  Jiang Catheter: Not present  Central Lines: PRESENT       Cardiac Monitoring: None  Code Status: No CPR- Do NOT Intubate      Disposition Plan      Expected Discharge Date: 11/30/2022        Discharge Comments: [facility]      The patient's care was discussed with the Bedside Nurse, Patient and Patient's  "Family.   updated at bedside 11/20.     Edgardo Portillo MD  Hospitalist Service  [facility]  Securely message with the "[employer] Web Console (learn more here)  Text page via [employer] Paging/Directory         Clinically Significant Risk Factors                       # Overweight: Estimated body mass index is 29.13 kg/m  as calculated from the following:    Height as of 11/4/22: 1.651 m (5' 5\").    Weight as of 11/15/22: 79.4 kg (175 lb 0.7 oz).          ______________________________________________________________________    Interval History   Patient resting comfortably, unable to answer any questions.     Data reviewed today: I reviewed all medications, new labs and imaging results over the last 24 hours. I personally reviewed no images or EKG's today.    Physical Exam   Vital Signs:                    Weight: 0 lbs 0 oz  General Appearance: lethargic, tired, in no distress.   Respiratory: respirations non-labored on room air  Cardiovascular: S1 and S2 normal   GI: distended abdomen, not tender   Skin:  Has 3+ LE edema, cyanosis noticed previously is now resolved.   Neuro: not examine       Data   No lab results found in last 7 days.  No results found for this or any previous visit (from the past 24 hour(s)).  "

## 2022-11-29 NOTE — PROGRESS NOTES
SPIRITUAL HEALTH SERVICES Progress Note     SH  73     Visited with Elenita at the bedside due to follow-up plan of care.     Elenita opened her eyes at the sound of my voice, but had difficulty speaking and was unable to engage in conversation. I offered support at the bedside and will continue following while on unit 73.     remains available to support this pt and family. Please page if any emergent needs arise.      ------  Brian Coker M.Div.  Resident   Pager: [phone]

## 2022-11-29 NOTE — PROGRESS NOTES
IP hospice, admitted for traumatic SDH with Hx stage IV metastatic breast cancer with metastasis to brain and liver, plans to discharge to home with hospice tomorrow at 1100. Pt Lethargic, sleeping between cares, arouses to voice, whispered speech, slow to respond. Shallow breathing. PRN Dilaudid and ativan given. T&R for comfort. Oral care give. Sacral dressing changed, hx of sacral pressure injury. Incont of B&B, last BM yesterday 11/27. L chest portacath patent.

## 2022-11-29 NOTE — CONSULTS
[facility]    Progress Note - [facility]    ______________________________________________________________________    [facility] Contact Number: [phone]    - Providers: Please contact [facility] with changes in orders or clinical plan of care   - Nursing: Please contact [facility] with significant changes in patient condition  ______________________________________________________________________        Plan of Care Discussed with the Following:   - Nurse: IRMA Burciaga  - Hospitalist/Rounding Provider: Dr. Lindsey Kwong's Family/Preferred Contact: Spouse Denilson  - Hospice Provider: Dr. Jack Adler    Summary of Visit (includes assessment, medications and any new orders):   [facility] daily visit assessment.  Denilson was present at bedside.  Patient appears comfortable, non verbal, eyes open, unable to make needs known. Patient is repositioned every 2 hours, sacral dressing CDI. Denilson reports patient has declined since yesterday. Patient is unable to respond to questions, mouth swabs are used for comfort, no oral intake. Patient has had 2 PRN doses of Hydromorphone since midnight with effective results. Hospice will continue to visit patient daily and support family. Hospice SW will continue to work on discharge plan.        Radha Tinsley, RN

## 2022-11-29 NOTE — PROGRESS NOTES
[facility]    Progress Note - [facility]    ______________________________________________________________________    [facility] Contact Number: [phone]    - Providers: Please contact [facility] with changes in orders or clinical plan of care   - Nursing: Please contact [facility] with significant changes in patient condition  ______________________________________________________________________        Plan of Care Discussed with the Following:   - Nurse: IRMA Burciaga  - Hospitalist/Rounding Provider: Dr. Lindsey Kwong's Family/Preferred Contact: Spouse Denilson  - Hospice Provider: Jack Tamayo    Summary of Visit (includes assessment, medications and any new orders):   [facility] daily visit assessment.  Visit made with Hospice [facility].  Patient was resting comfortable. Patient opened eyes to voice and touch. Patient has had 1 PRN dose of Lorazepam and 1 PRN dose of Hydromorphone with effective results since midnight. 2 plus pitting edema noted on LE and feet, no oral intake, mouth swabs used for comfort. HR 68, RR 16.  Patient appears stable for transport. Denilson would like patient to discharge home. Plan is for patient to discharge back home tomorrow, 11/30/2022. [facility] has scheduled stretcher transport for 11:00 am. Discharging MD please order 3 days of  comfort medications, have them filled at the discharge pharmacy and send with patient upon discharge. In addition, send 3 days of wound care supplies with patient when transported. IRMA Burciaga is aware of this. Medical equipment, Hospital bed with 1/2 rails, JUDITH overlay mattress, and OBT, has been ordered for delivery before 12:00 pm tomorrow, prior to discharge. Spouse Denilson is aware of this. [facility] hospice RN will meet patient and Spouse at home between 2:00 to 3:00 to review hospice services and medications. Coordinated discharge plan with IRMA Burciaga and Spouse Denilson.        Radha Tinsley RN

## 2022-11-30 NOTE — DISCHARGE SUMMARY
[facility]    Discharge Summary  Hospitalist    Date of Admission:  11/21/2022  Date of Discharge:  11/30/2022 12:26 PM  Discharging Provider: Edgardo Portillo MD, MD  Date of Service (when I saw the patient): 11/30/22    Discharge Diagnoses   Please refer below     History of Present Illness   Elenita Moran is an 67 year old female who presented with confusion     Hospital Course   Elenita Moran is a 67 year old female who was admitted on 10/18/2022.  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  Seen by neurology and neurosurgery with no surgical interventions.  Renal function and lytes gradually improved and returned to baseline, as did mental status.  While pending placement she developed sepsis on 11/11/2022 with source likely due to her sacral ulcers which have noted to be worse despite debridement this admission.  Sepsis improved with re-initiation of broad-spectrum antibiotics with plan to discharge to [facility], however, she indicated she was reconsidering her goals of care.  Following several conversations and Oncology feeling it would be very unlikely she recovers to point of being eligible for additional cancer treatment, she elected to transition to comfort care/hospice on 11/19.     Final Discharge Diagnosis and Hospital Course         Hospice care/Comfort care only   * See progress notes 11/16-11/19 for details, but decided to transition to hospice care 11/19.  -- continue comfort measures  -- note she wishes to be alert and interactive for  visit from her  on afternoon of 11/21, even if this means being in more discomfort by avoiding meds - please hold sedating meds starting 11/21 or use reduced dosing    -- GIP consulted and patient is now transition to General inpatient hospice.  She remain comfortable at this time, noticed toes cyanosis and mottling of foot few days back no more cyanosis at this time.  She remain comfortable and will be discharge home with home hospice.       Continue comfort base management at this time, overall poor prognosis, remain comfortable at this time.      Suspected traumatic SDH  * admission CT scan of the head showed left holohemispheric subdural fluid collection with 2 mm midline shift.  History notable for fall in early October.  * managed non-operatively  --started on empiric Keppra, but not able to take it by mouth any more, stop it on discharge.      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  Acute on chronic anemia  Mild covid 19 infection, resolved, completed 10 days Isolation 10/30  Left lower lobe pneumonia, resolved  Hyponatremia, resolved  Hyperkalemia with subsequent hypokalemia  Uremia, resolved  Anion gap metabolic acidosis, resolved  KATHLEEN, resolved  Thrombocytopenia  Abnormal coags  Stage IV metastatic breast cancer (mets to brain, liver, bone) s/p mastectomy  Elevated LFT's  HFrEF due to doxorubicin  Type 2 MI due to supply demand mismatch  Sinus tachycardia  DM II  with peripheral neuropathy  Insomnia            Edgardo Portillo MD, MD    Significant Results and Procedures       Pending Results   These results will be followed up by PCP  Unresulted Labs Ordered in the Past 30 Days of this Admission     Date and Time Order Name Status Description    10/23/2022  8:30 AM Prepare pheresed platelets (unit) Preliminary           Code Status   Comfort Care       Primary Care Physician   Sandi Jones    Physical Exam             Resp: 12        There were no vitals filed for this visit.  Vital Signs with Ranges  Resp:  [12-18] 12  No intake/output data recorded.    Constitutional: fatigued  Eyes: Lids and lashes normal, pupils equal, round and reactive to light, extra ocular muscles intact, sclera clear, conjunctiva normal  Respiratory: diminished air entry bilaterally   Cardiovascular: Normal apical impulse, regular rate and rhythm, normal S1 and S2, no S3 or S4, and no murmur noted    Discharge Disposition   Discharged to home  Condition at discharge: Stable    Consultations This Hospital Stay   PHARMACY IP CONSULT  PHARMACY IP CONSULT    Time Spent on this Encounter   I, Edgardo Portillo MD, personally saw the patient today and spent greater than 30 minutes discharging this patient.    Discharge Orders      Reason for your hospital stay    summary):  Acute metabolic encephalopathy most likely secondary to SDH  Septic shock suspected due to sacral wound  Sacral pressure injury, stage 4, present on admission s/p bedside debridement 10/26/22 and intra-op debridement 11/4, repeat bedside debridement 11/15  Multiple sacral pressures injury, stage 2-3, present on admission  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     Follow-up and recommended labs and tests     Follow up with hospice     Activity    Your activity upon discharge: activity as tolerated     Discharge Instructions    Discharge home with hospice     Diet    Follow this diet upon discharge: Orders Placed  This Encounter      Regular Diet Adult     Discharge Medications   Discharge Medication List as of 11/30/2022 11:53 AM      START taking these medications    Details   artificial saliva (BIOTENE MT) SOLN solution Take 2 mLs (2 sprays) by mouth every hour as needed for dry mouth, Disp-44.3 mL, R-0, E-Prescribe      atropine 1 % ophthalmic solution Place 2 drops under the tongue every 4 hours as needed for secretions, Disp-5 mL, R-0, E-Prescribe      carboxymethylcellulose PF (REFRESH PLUS) 0.5 % ophthalmic solution Place 1 drop into both eyes every hour as needed for dry eyes, Disp-1 each, R-0, E-Prescribe      HYDROmorphone, STANDARD CONC, (DILAUDID) 1 MG/ML oral solution Take 1 mL (1 mg) by mouth every 2 hours as needed for breakthrough pain or pain (dyspnea), Disp-60 mL, R-0, Local Print      LORazepam (ATIVAN) 2 MG/ML (HIGH CONC) oral solution Place 0.5 mLs (1 mg) under the tongue every 4 hours as needed for anxiety, Disp-10 mL, R-0, Local Print      ondansetron (ZOFRAN ODT) 4 MG ODT tab Take 1 tablet (4 mg) by mouth every 6 hours as needed for nausea or vomiting, Disp-20 tablet, R-0, E-Prescribe         CONTINUE these medications which have CHANGED    Details   acetaminophen (TYLENOL) 325 MG/10.15ML solution Take 20.3 mLs (650 mg) by mouth every 6 hours as needed for mild pain or fever (temperature over 100 F), Disp-300 mL, R-0, E-Prescribe         CONTINUE these medications which have NOT CHANGED    Details   B-D U/F 31G X 8 MM insulin pen needle USE 4 TIMES DAILYDAWHistorical      blood glucose (ONETOUCH VERIO IQ) test strip Historical      Continuous Blood Gluc  (FREESTYLE KIMBERLY 14 DAY READER) COLETTE Use to check blood sugar at least 4 times a day or as directed , Historical      Continuous Blood Gluc Sensor (FREESTYLE KIMBERLY 14 DAY SENSOR) MISC Use as directed to test blood sugar at least 4 times a day or as directed, Historical      DULoxetine (CYMBALTA) 60 MG capsule Take 1 capsule by mouth At  Bedtime, Historical      medical cannabis (Patient's own supply) See Admin Instructions (The purpose of this order is to document that the patient reports taking medical cannabis.  This is not a prescription, and is not used to certify that the patient has a qualifying medical condition.), Historical      OLANZapine (ZYPREXA) 5 MG tablet Take 5 mg by mouth every evening , Historical         STOP taking these medications       aspirin 81 MG EC tablet Comments:   Reason for Stopping:         bumetanide (BUMEX) 2 MG tablet Comments:   Reason for Stopping:         bumetanide (BUMEX) 2 MG tablet Comments:   Reason for Stopping:         cephALEXin (KEFLEX) 500 MG capsule Comments:   Reason for Stopping:         Cholecalciferol (VITAMIN D) 125 MCG (5000 UT) capsule Comments:   Reason for Stopping:         clobetasol (TEMOVATE) 0.05 % external ointment Comments:   Reason for Stopping:         docusate sodium (COLACE) 100 MG capsule Comments:   Reason for Stopping:         ezetimibe-simvastatin (VYTORIN) 10-20 MG tablet Comments:   Reason for Stopping:         glucose (BD GLUCOSE) 4 g chewable tablet Comments:   Reason for Stopping:         HYDROmorphone (DILAUDID) 4 MG tablet Comments:   Reason for Stopping:         hydrOXYzine (VISTARIL) 25 MG capsule Comments:   Reason for Stopping:         ibuprofen (ADVIL/MOTRIN) 200 MG capsule Comments:   Reason for Stopping:         insulin aspart (NOVOLOG FLEXPEN) 100 UNIT/ML pen Comments:   Reason for Stopping:         Insulin Glargine (BASAGLAR KWIKPEN SC) Comments:   Reason for Stopping:         Iron Combinations (IRON COMPLEX PO) Comments:   Reason for Stopping:         lidocaine (XYLOCAINE) 5 % external ointment Comments:   Reason for Stopping:         lisinopril (ZESTRIL) 2.5 MG tablet Comments:   Reason for Stopping:         LORazepam (ATIVAN) 0.5 MG tablet Comments:   Reason for Stopping:         MAGNESIUM PO Comments:   Reason for Stopping:         metoprolol succinate ER  (TOPROL-XL) 50 MG 24 hr tablet Comments:   Reason for Stopping:         Multiple Vitamins-Minerals (ICAPS PO) Comments:   Reason for Stopping:         nystatin (MYCOSTATIN) 328051 UNIT/GM external cream Comments:   Reason for Stopping:         omeprazole (PRILOSEC) 20 MG DR capsule Comments:   Reason for Stopping:         oxybutynin (DITROPAN) 5 MG tablet Comments:   Reason for Stopping:         oxybutynin ER (DITROPAN-XL) 10 MG 24 hr tablet Comments:   Reason for Stopping:         POTASSIUM CHLORIDE PO Comments:   Reason for Stopping:         Prenatal Vit-Fe Fumarate-FA (PRENATAL MULTIVITAMIN  PLUS IRON) 27-1 MG TABS Comments:   Reason for Stopping:         prochlorperazine (COMPAZINE) 10 MG tablet Comments:   Reason for Stopping:         psyllium (METAMUCIL/KONSYL) Packet Comments:   Reason for Stopping:         spironolactone (ALDACTONE) 25 MG tablet Comments:   Reason for Stopping:         vitamin (B COMPLEX) tablet Comments:   Reason for Stopping:         zolpidem (AMBIEN) 10 MG tablet Comments:   Reason for Stopping:             Allergies   Allergies   Allergen Reactions     Gabapentin      Upper body edema/swelling.         Morphine Visual Disturbance     Visual hallucinations     Sulfamethoxazole-Trimethoprim      Other reaction(s): Hives     Sulfa Drugs Hives and Rash     welts       Data   Most Recent 3 CBC's:Recent Labs   Lab Test 11/19/22  0541 11/17/22  0621 11/16/22  0614   WBC 16.4* 12.1* 10.6   HGB 7.3* 7.4* 7.7*   * 101* 101*   PLT 72* 73* 77*      Most Recent 3 BMP's:  Recent Labs   Lab Test 11/19/22  0541 11/18/22 2115 11/18/22 0641 11/17/22 0621     --  133 134   POTASSIUM 3.6  3.6 3.1* 2.9* 3.4   CHLORIDE 105  --  102 105   CO2 18*  --  17* 17*   BUN 27  --  27 27   CR 0.87  --  0.86 0.78   ANIONGAP 14  --  14 12   BENJI 7.6*  --  7.6* 7.6*   *  --  135* 128*     Most Recent 2 LFT's:  Recent Labs   Lab Test 10/19/22  0030 10/18/22  1737   * 143*   ALT 86* 93*    ALKPHOS 437* 468*   BILITOTAL 0.8 0.5     Most Recent INR's and Anticoagulation Dosing History:  Anticoagulation Dose History     Recent Dosing and Labs Latest Ref Rng & Units 10/17/2019 7/16/2021 10/18/2022 10/18/2022    INR 0.85 - 1.15 1.11(H) 1.45(H) 1.28(H) 1.27(H)        Most Recent 3 Troponin's:No lab results found.  Most Recent Cholesterol Panel:  Recent Labs   Lab Test 07/28/22  0935   CHOL 94   LDL 40   HDL 26*   TRIG 141     Most Recent 6 Bacteria Isolates From Any Culture (See EPIC Reports for Culture Details):No lab results found.  Most Recent TSH, T4 and A1c Labs:  Recent Labs   Lab Test 10/18/22  2316 07/16/21  1054 11/18/20  1436   TSH  --   --  1.19   A1C 6.4*   < >  --     < > = values in this interval not displayed.     Results for orders placed or performed during the hospital encounter of 10/18/22   CT Head w/o Contrast    Narrative    EXAM: CT HEAD W/O CONTRAST  LOCATION: [facility]  DATE/TIME: 10/19/2022 4:48 AM    INDICATION: follow up SDH  COMPARISON: 10/18/2022  TECHNIQUE: Routine CT Head without IV contrast. Multiplanar reformats. Dose reduction techniques were used.    FINDINGS:  Stable left sided mixed attenuation subdural hematoma extending over the left cerebral convexity with maximal thickness of 8 mm at the frontoparietal junction. Stable associated mass effect without shift of midline structures. No evidence of interval   acute intracranial hemorrhage, infarct or hydrocephalus.       Impression    IMPRESSION:  1.  No significant change.   CT Head w/o Contrast    Narrative    EXAM: CT HEAD WITHOUT CONTRAST  LOCATION: [facility]  DATE/TIME: 10/23/2022, 6:18 AM    INDICATION: Left SDH follow-up.  COMPARISON: Several prior comparisons, most recent head CT 10/19/2022.  TECHNIQUE: Routine CT Head without IV contrast. Multiplanar reformats. Dose reduction techniques were used.    FINDINGS:  INTRACRANIAL CONTENTS: Overall, no significant  change in size or appearance of left-sided subdural collection since head CT 10/19/2022. The subdural collection is predominantly hypodense, but there is layering hyperdense components within the collection.   The subdural collection measures up to 0.9 cm in width on the coronal images. Mild mass effect on the left cerebral hemisphere and left lateral ventricle. No significant midline shift. No herniation. Ventricular size is within normal limits without   evidence of hydrocephalus. Mild patchy periventricular white matter hypodensities which are nonspecific, but most likely related to chronic microvascular ischemic disease.    VISUALIZED ORBITS/SINUSES/MASTOIDS: No intraorbital abnormality. No paranasal sinus mucosal disease. No middle ear or mastoid effusion.    BONES/SOFT TISSUES: No acute abnormality.      Impression    IMPRESSION:  1.  No significant change in appearance of the left-sided subdural hematoma since head CT 10/19/2022. Persistent slight mass effect on the left cerebral hemisphere and left lateral ventricle without midline shift or herniation.     CT Head w/o Contrast    Narrative    EXAM: CT HEAD W/O CONTRAST  LOCATION: [facility]  DATE/TIME: 10/29/2022 5:13 PM    INDICATION: Lethargy with recent fall and sub dural hematoma  COMPARISON:  10/23/2022, 10/13/2022.  TECHNIQUE: Routine CT Head without IV contrast. Multiplanar reformats. Dose reduction techniques were used.    FINDINGS:  INTRACRANIAL CONTENTS: Evolving small left convexity subdural hematoma.  No progressive mass effect.  No CT evidence of acute infarct. Mild presumed chronic small vessel ischemic changes. Mild generalized volume loss. No hydrocephalus.     VISUALIZED ORBITS/SINUSES/MASTOIDS: No intraorbital abnormality. No paranasal sinus mucosal disease. No middle ear or mastoid effusion.    BONES/SOFT TISSUES: No acute abnormality.      Impression    IMPRESSION:  1.  No significant change since 10/23/2022.  2.   Evolving small left convexity subdural hematoma without progressive mass effect or new hemorrhage.   XR Chest 2 Views    Narrative    CHEST TWO VIEWS 11/2/2022 12:18 PM     HISTORY: Cough.    COMPARISON: None.       Impression    IMPRESSION: Left lower lobe infiltrate. Remaining lungs clear of  infiltrate. The cardiac silhouette is not enlarged. Pulmonary  vasculature is unremarkable.     ASH PEREZ MD         SYSTEM ID:  [id number]   MR Pelvis Bone wo & w Contrast    Narrative    EXAM: MRI PELVIS WITHOUT AND WITH IV CONTRAST  LOCATION: [facility]  DATE/TIME: 11/14/2022 6:42 PM    INDICATION: Sacral decubitus ulcer with exposed bone, concern for osteomyelitis. Metastatic breast cancer.  COMPARISON: None.    TECHNIQUE: Routine MRI pelvis without and with IV contrast. Axial, sagittal, and coronal high-resolution T2 and post gadolinium T1 with fat saturation.   CONTRAST: 7mL Gadavist    FINDINGS: There is a decubitus ulceration superficial to the mid sacrum. No associated fluid collection. No associated marrow abnormality or bone destruction to suggest osteomyelitis.    There are extensive marrow replacing lesions compatible with metastases.    There is a small nonenhancing focus within the left posterior paraspinous musculature at the L5 level which corresponds to a focus of soft tissue calcification on the recent CT PET.    There are likely chronic fractures of both sacral ala, and chronic displaced fractures of the left pubic rami, better demonstrated on the prior CT PET scan. Small effusions of both SI joints, and associated synovitis.    Diffuse subcutaneous and muscular edema, and a small amount of ascites.      Impression    IMPRESSION:  1.  Decubitus ulcer superficial to the sacrum.  2.  No evidence of associated osteomyelitis.  3.  Extensive osseous metastatic disease.   XR Chest Port 1 View    Narrative    CHEST ONE VIEW  11/15/2022 12:47 PM     HISTORY: Recent sepsis with no chest  x-ray obtained; assess for  infiltrate.    COMPARISON: 2022      Impression    IMPRESSION: Consolidation at the left base is slightly more dense than  previous. Right lung clear.    ASH PEREZ MD         SYSTEM ID:  [id number]   Echocardiogram Complete     Value    LVEF  40-45%    Narrative    [id number]  [id number]  [id number]  [id number]^WATSON^MERYL     [facility]  Echocardiography Laboratory  [address] [place] [address]     Name: JULES KRUGER  MRN: [id number]  : 1955  Study Date: 10/19/2022 10:58 AM  Age: 67 yrs  Gender: Female  Patient Location: [facility]  Reason For Study: CHF  Ordering Physician: MERYL CHAUDHARI  Referring Physician: Verna Junior  Performed By: Davon Hui     BSA: 1.8 m2  Height: 65 in  Weight: 167 lb  HR: 111  BP: 100/59 mmHg  ______________________________________________________________________________  Procedure  Complete Portable Echo Adult. Optison (NDC #[id number]) given intravenously.  ______________________________________________________________________________  Interpretation Summary     The left ventricle is normal in size.  Diastolic Doppler findings (E/E' ratio and/or other parameters) suggest left  ventricular filling pressures are normal.  The visual ejection fraction is 40-45%.  No significant valvular heart disease.  IVC diameter <2.1 cm collapsing >50% with sniff suggests a normal RA pressure  of 3 mmHg.     Technically difficult study, reduces sensitivity and specificity of findings.  ______________________________________________________________________________  Left Ventricle  The left ventricle is normal in size. There is normal left ventricular wall  thickness. Diastolic Doppler findings (E/E' ratio and/or other parameters)  suggest left ventricular filling pressures are normal. The visual ejection  fraction is 40-45%.     Right Ventricle  The right ventricle is normal in size and function.     Atria  Normal left  atrial size. Right atrial size is normal. There is no color  Doppler evidence of an atrial shunt.     Mitral Valve  The mitral valve leaflets are moderately thickened. There is mild (1+) mitral  regurgitation.     Tricuspid Valve  There is trace tricuspid regurgitation. The right ventricular systolic  pressure is approximated at 14.4 mmHg plus the right atrial pressure. IVC  diameter <2.1 cm collapsing >50% with sniff suggests a normal RA pressure of 3  mmHg.     Aortic Valve  There is trivial trileaflet aortic sclerosis. No aortic regurgitation is  present.     Pulmonic Valve  There is trace pulmonic valvular regurgitation.     Vessels  Normal size aorta. The aortic root is normal size.     Pericardium  There is no pericardial effusion.     Rhythm  The rhythm was sinus tachycardia.  ______________________________________________________________________________  MMode/2D Measurements & Calculations  IVSd: 0.86 cm     LVIDd: 4.9 cm  LVIDs: 2.6 cm  LVPWd: 0.88 cm  FS: 47.9 %  LV mass(C)d: 147.0 grams  LV mass(C)dI: 80.2 grams/m2  Ao root diam: 3.3 cm  LA dimension: 2.8 cm  asc Aorta Diam: 3.2 cm  LA/Ao: 0.83  LVOT diam: 2.0 cm  LVOT area: 3.2 cm2  RWT: 0.36     Doppler Measurements & Calculations  MV E max david: 36.0 cm/sec  MV A max david: 100.1 cm/sec  MV E/A: 0.36  MV dec slope: 401.1 cm/sec2  PA acc time: 0.08 sec  TR max david: 190.0 cm/sec  TR max P.4 mmHg  E/E' av.7  Lateral E/e': 2.7  Medial E/e': 6.8     ______________________________________________________________________________  Report approved by: Ashley Ortega 10/19/2022 01:00 PM             Most Recent 3 CBC's:Recent Labs   Lab Test 2214   WBC 16.4* 12.1* 10.6   HGB 7.3* 7.4* 7.7*   * 101* 101*   PLT 72* 73* 77*     Most Recent 3 BMP's:Recent Labs   Lab Test 22     --  133 134   POTASSIUM 3.6  3.6 3.1* 2.9* 3.4   CHLORIDE 105  --  102  105   CO2 18*  --  17* 17*   BUN 27  --  27 27   CR 0.87  --  0.86 0.78   ANIONGAP 14  --  14 12   BENJI 7.6*  --  7.6* 7.6*   *  --  135* 128*     Most Recent 2 LFT's:Recent Labs   Lab Test 10/19/22  0030 10/18/22  1737   * 143*   ALT 86* 93*   ALKPHOS 437* 468*   BILITOTAL 0.8 0.5

## 2022-11-30 NOTE — PROGRESS NOTES
Pt IP hospice following traumatic SDH with breast cancer w/ mets. Lethargic throughout morning. No verbal response. Cold in upper extremities, legs remain warm. Weight shifting provided x1. Oral cares provided. No nonverbal indicators of pain. Pt left via stretcher with EMS team at approx. 1220. All belongings, AVS, H&P packet, and medications brought with EMS team, along with the Mepilex dressings that were requested for PI tx.

## 2022-11-30 NOTE — DISCHARGE SUMMARY
[facility]    Discharge Summary  Hospitalist    Date of Admission:  10/18/2022  Date of Discharge:  11/21/2022  5:59 PM  Discharging Provider: Edgardo Portillo MD, MD  Date of Service (when I saw the patient): 11/21/2022    Discharge Diagnoses   Please refer below     History of Present Illness   Elenita Moran is an 67 year old female who presented with confusion     Hospital Course   Elenita Moran is an 67 year old female who presented with confusion            Hospital Course     Elenita Moran is a 67 year old female who was admitted on 10/18/2022.  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  Seen by neurology and neurosurgery with no surgical interventions.  Renal function and lytes gradually improved and returned to baseline, as did mental status.  While pending placement she developed sepsis on 11/11/2022 with source likely due to her sacral ulcers which have noted to be worse despite debridement this admission.  Sepsis improved with re-initiation of broad-spectrum antibiotics with plan to discharge to [facility], however, she indicated she was reconsidering her goals of care.  Following several conversations and Oncology feeling it would be very unlikely she recovers to point of being eligible for additional cancer treatment, she elected to transition to comfort care/hospice on 11/19.      Final Discharge Diagnosis and Hospital Course         Hospice care/Comfort care only   * See progress notes 11/16-11/19 for details, but decided to  transition to hospice care 11/19.  -- continue comfort measures  -- note she wishes to be alert and interactive for visit from her  on afternoon of 11/21, even if this means being in more discomfort by avoiding meds - please hold sedating meds starting 11/21 or use reduced dosing     -- GIP consulted and patient is now transition to General inpatient hospice.  She remain comfortable at this time, noticed toes cyanosis and mottling of foot few days back no more cyanosis at this time.  She remain comfortable and will be discharge home with home hospice.       Continue comfort base management at this time, overall poor prognosis, remain comfortable at this time.      Suspected traumatic SDH  * admission CT scan of the head showed left holohemispheric subdural fluid collection with 2 mm midline shift.  History notable for fall in early October.  * managed non-operatively  --started on empiric Keppra, but not able to take it by mouth any more, stop it on discharge.      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  Acute on chronic anemia  Mild covid 19 infection, resolved, completed 10 days Isolation 10/30  Left lower lobe pneumonia, resolved  Hyponatremia, resolved  Hyperkalemia with subsequent hypokalemia  Uremia, resolved  Anion gap metabolic acidosis, resolved  KATHLEEN, resolved  Thrombocytopenia  Abnormal coags  Stage IV metastatic breast cancer (mets to brain, liver, bone) s/p  mastectomy  Elevated LFT's  HFrEF due to doxorubicin  Type 2 MI due to supply demand mismatch  Sinus tachycardia  DM II with peripheral neuropathy  Insomnia              Edgardo Portillo MD, MD  Edgardo Portillo MD, MD    Significant Results and Procedures       Pending Results   These results will be followed up by PCP  Unresulted Labs Ordered in the Past 30 Days of this Admission     Date and Time Order Name Status Description    10/23/2022  8:30 AM Prepare pheresed platelets (unit) Preliminary     10/18/2022  2:30 PM Prepare pheresed platelets (unit) Preliminary     10/18/2022  2:30 PM Prepare pheresed platelets (unit) Preliminary           Code Status   Comfort Care       Primary Care Physician   Sandi Jones    Physical Exam                      Vitals:    10/19/22 0430 10/26/22 1939 11/15/22 1355   Weight: 76 kg (167 lb 8.8 oz) 76 kg (167 lb 8.8 oz) 79.4 kg (175 lb 0.7 oz)     Vital Signs with Ranges  Resp:  [12-18] 12  No intake/output data recorded.    Constitutional: fatigued  Eyes: Lids and lashes normal, pupils equal, round and reactive to light, extra ocular muscles intact, sclera clear, conjunctiva normal  Respiratory: diminished air entry bilaterally   Cardiovascular: S1 and S2 normal   GI: soft but mildly distended.     Discharge Disposition   Transferred to [facility]   Condition at discharge: Guarded    Consultations This Hospital Stay   NEUROSURGERY IP CONSULT  NEUROLOGY CRITICAL CARE ADULT IP CONSULT  WOUND OSTOMY CONTINENCE NURSE  IP CONSULT  SPEECH LANGUAGE PATH ADULT IP CONSULT  HEMATOLOGY & ONCOLOGY IP CONSULT  NEUROLOGY CRITICAL CARE ADULT IP CONSULT  NEPHROLOGY IP CONSULT  WOUND OSTOMY CONTINENCE NURSE  IP CONSULT  NEUROLOGY IP CONSULT  PHYSICAL THERAPY ADULT IP CONSULT  OCCUPATIONAL THERAPY ADULT IP CONSULT  SURGERY GENERAL IP CONSULT  CARE MANAGEMENT / SOCIAL WORK IP CONSULT  VASCULAR ACCESS ADULT IP CONSULT  SPIRITUAL HEALTH SERVICES IP CONSULT  PALLIATIVE CARE ADULT IP CONSULT  PHARMACY TO  DOSE VANCO  WOUND OSTOMY CONTINENCE NURSE  IP CONSULT  SURGERY GENERAL IP CONSULT  INFECTIOUS DISEASES IP CONSULT  LYMPHEDEMA THERAPY IP CONSULT  PHARMACY TO DOSE VANCO  PALLIATIVE CARE ADULT IP CONSULT  [facility] INPATIENT HOSPICE ADULT CONSULT    Time Spent on this Encounter   Edgardo SARMIENTO MD, personally saw the patient today and spent greater than 30 minutes discharging this patient.    Discharge Orders      CT Head w/o contrast*     Medication Therapy Management Referral      Follow-up and recommended labs and tests     Please follow up at [facility] in 1 month with a head CT prior.  Please call the clinic at [phone] to schedule your appointment.     Follow Up    Pt to keep visit at [facility] on 11/1/22 at 9:30 am.  Please call [phone] if the appointment needs to be rescheduled.     Discharge Medications   Discharge Medication List as of 11/21/2022  5:59 PM      START taking these medications    Details   lisinopril (ZESTRIL) 2.5 MG tablet Take 1 tablet (2.5 mg) by mouth daily, Disp-30 tablet, R-3, E-Prescribe         CONTINUE these medications which have NOT CHANGED    Details   B-D U/F 31G X 8 MM insulin pen needle USE 4 TIMES DAILYDAWHistorical      blood glucose (ONETOUCH VERIO IQ) test strip Historical      Continuous Blood Gluc  (FREESTYLE KIMBERLY 14 DAY READER) COLETTE Use to check blood sugar at least 4 times a day or as directed , Historical      Continuous Blood Gluc Sensor (FREESTYLE KIMBERLY 14 DAY SENSOR) MISC Use as directed to test blood sugar at least 4 times a day or as directed, Historical      !! DULoxetine (CYMBALTA) 60 MG capsule Take 1 capsule by mouth At Bedtime, Historical      medical cannabis (Patient's own supply) See Admin Instructions (The purpose of this order is to document that the patient reports taking medical cannabis.  This is not a prescription, and is not used to certify that the patient has a qualifying medical condition.), Historical       OLANZapine (ZYPREXA) 5 MG tablet Take 5 mg by mouth every evening , Historical      ACETAMINOPHEN PO Take 1,300 mg by mouth 3 times daily Alternates with Ibuprofen, Historical      aspirin 81 MG EC tablet Take 81 mg by mouth daily, Historical      !! bumetanide (BUMEX) 2 MG tablet Take 2 mg by mouth daily as needed Can take midday if needed for fluid retention/swelling, Historical      !! bumetanide (BUMEX) 2 MG tablet Take 1 tablet (2 mg) by mouth 2 times daily, Disp-180 tablet, R-3, E-Prescribe      cephALEXin (KEFLEX) 500 MG capsule Take 1 capsule (500 mg) by mouth 2 times daily, Disp-180 capsule, R-3, E-Prescribe      Cholecalciferol (VITAMIN D) 125 MCG (5000 UT) capsule Take 1 tablet by mouth daily , Historical      clobetasol (TEMOVATE) 0.05 % external ointment Apply topically daily as neededHistorical      docusate sodium (COLACE) 100 MG capsule Take 100 mg by mouth daily, Historical      !! DULoxetine (CYMBALTA) 30 MG capsule Take 30 mg by mouth every morning , Historical      ezetimibe-simvastatin (VYTORIN) 10-20 MG tablet Take 1 tablet by mouth At Bedtime, Historical      glucose (BD GLUCOSE) 4 g chewable tablet as needed, Historical      HYDROmorphone (DILAUDID) 4 MG tablet Take 1 tablet (4 mg) by mouth 3 times daily as needed for moderate to severe pain or severe pain, Disp-12 tablet, R-0, Local Print      hydrOXYzine (VISTARIL) 25 MG capsule Take 25 mg by mouth 3 times daily With hydromorphone, Historical      ibuprofen (ADVIL/MOTRIN) 200 MG capsule Take 400 mg by mouth 3 times daily Alternating with acetaminophen, Historical      insulin aspart (NOVOLOG FLEXPEN) 100 UNIT/ML pen Inject Subcutaneous 3 times daily (with meals) Sliding scale dose based on blood glucose and diet, Historical      Insulin Glargine (BASAGLAR KWIKPEN SC) Inject 8 Units Subcutaneous every morning Uses as directed, Historical      Iron Combinations (IRON COMPLEX PO) Take 1 tablet by mouth daily Ferrous biogluconate  supplement, Historical      lidocaine (XYLOCAINE) 5 % external ointment Apply topically 4 times dailyDisp-50 g, [id number]      LORazepam (ATIVAN) 0.5 MG tablet take 1 tablet by mouth up to 3 times per day as needed for nausea, anxiety, or insomnia, Historical      MAGNESIUM PO Take by mouth daily OTC supplement, Historical      metoprolol succinate ER (TOPROL-XL) 50 MG 24 hr tablet Take 1 tablet (50 mg) by mouth daily, Disp-90 tablet, R-3, E-Prescribe      Multiple Vitamins-Minerals (ICAPS PO) Take 1 capsule by mouth daily, Historical      nystatin (MYCOSTATIN) 295518 UNIT/GM external cream Apply topically 2 times dailyDisp-30 g, B-[id number]-Inlgerflm      omeprazole (PRILOSEC) 20 MG DR capsule Take 20 mg by mouth At Bedtime , Historical      oxybutynin (DITROPAN) 5 MG tablet Take 5 mg by mouth 2 times daily AM and Midday, Historical      oxybutynin ER (DITROPAN-XL) 10 MG 24 hr tablet Take 10 mg by mouth every evening, Historical      POTASSIUM CHLORIDE PO Take by mouth daily OTC supplement, Historical      Prenatal Vit-Fe Fumarate-FA (PRENATAL MULTIVITAMIN  PLUS IRON) 27-1 MG TABS Take 1 tablet by mouth daily , Historical      prochlorperazine (COMPAZINE) 10 MG tablet Take 10 mg by mouth every 6 hours as needed for nausea or vomiting, Historical      psyllium (METAMUCIL/KONSYL) Packet Take 1 packet by mouth daily as needed for constipation, Historical      spironolactone (ALDACTONE) 25 MG tablet Take 0.5 tablets (12.5 mg) by mouth daily, Disp-90 tablet, R-3, E-Prescribe      vitamin (B COMPLEX) tablet Take 1 tablet by mouth daily, Historical      zolpidem (AMBIEN) 10 MG tablet Take 10 mg by mouth At Bedtime, Historical       !! - Potential duplicate medications found. Please discuss with provider.        Allergies   Allergies   Allergen Reactions     Gabapentin      Upper body edema/swelling.         Morphine Visual Disturbance     Visual hallucinations     Sulfamethoxazole-Trimethoprim      Other reaction(s): Hives      Sulfa Drugs Hives and Rash     welts       Data   Most Recent 3 CBC's:Recent Labs   Lab Test 11/19/22  0541 11/17/22  0621 11/16/22  0614   WBC 16.4* 12.1* 10.6   HGB 7.3* 7.4* 7.7*   * 101* 101*   PLT 72* 73* 77*      Most Recent 3 BMP's:  Recent Labs   Lab Test 11/19/22  0541 11/18/22  2115 11/18/22  0641 11/17/22  0621     --  133 134   POTASSIUM 3.6  3.6 3.1* 2.9* 3.4   CHLORIDE 105  --  102 105   CO2 18*  --  17* 17*   BUN 27  --  27 27   CR 0.87  --  0.86 0.78   ANIONGAP 14  --  14 12   BENJI 7.6*  --  7.6* 7.6*   *  --  135* 128*     Most Recent 2 LFT's:  Recent Labs   Lab Test 10/19/22  0030 10/18/22  1737   * 143*   ALT 86* 93*   ALKPHOS 437* 468*   BILITOTAL 0.8 0.5     Most Recent INR's and Anticoagulation Dosing History:  Anticoagulation Dose History     Recent Dosing and Labs Latest Ref Rng & Units 10/17/2019 7/16/2021 10/18/2022 10/18/2022    INR 0.85 - 1.15 1.11(H) 1.45(H) 1.28(H) 1.27(H)        Most Recent 3 Troponin's:No lab results found.  Most Recent Cholesterol Panel:  Recent Labs   Lab Test 07/28/22  0935   CHOL 94   LDL 40   HDL 26*   TRIG 141     Most Recent 6 Bacteria Isolates From Any Culture (See EPIC Reports for Culture Details):No lab results found.  Most Recent TSH, T4 and A1c Labs:  Recent Labs   Lab Test 10/18/22  2316 07/16/21  1054 11/18/20  1436   TSH  --   --  1.19   A1C 6.4*   < >  --     < > = values in this interval not displayed.     Results for orders placed or performed during the hospital encounter of 10/18/22   CT Head w/o Contrast    Narrative    EXAM: CT HEAD W/O CONTRAST  LOCATION: [facility]  DATE/TIME: 10/19/2022 4:48 AM    INDICATION: follow up SDH  COMPARISON: 10/18/2022  TECHNIQUE: Routine CT Head without IV contrast. Multiplanar reformats. Dose reduction techniques were used.    FINDINGS:  Stable left sided mixed attenuation subdural hematoma extending over the left cerebral convexity with maximal thickness of  8 mm at the frontoparietal junction. Stable associated mass effect without shift of midline structures. No evidence of interval   acute intracranial hemorrhage, infarct or hydrocephalus.       Impression    IMPRESSION:  1.  No significant change.   CT Head w/o Contrast    Narrative    EXAM: CT HEAD WITHOUT CONTRAST  LOCATION: [facility]  DATE/TIME: 10/23/2022, 6:18 AM    INDICATION: Left SDH follow-up.  COMPARISON: Several prior comparisons, most recent head CT 10/19/2022.  TECHNIQUE: Routine CT Head without IV contrast. Multiplanar reformats. Dose reduction techniques were used.    FINDINGS:  INTRACRANIAL CONTENTS: Overall, no significant change in size or appearance of left-sided subdural collection since head CT 10/19/2022. The subdural collection is predominantly hypodense, but there is layering hyperdense components within the collection.   The subdural collection measures up to 0.9 cm in width on the coronal images. Mild mass effect on the left cerebral hemisphere and left lateral ventricle. No significant midline shift. No herniation. Ventricular size is within normal limits without   evidence of hydrocephalus. Mild patchy periventricular white matter hypodensities which are nonspecific, but most likely related to chronic microvascular ischemic disease.    VISUALIZED ORBITS/SINUSES/MASTOIDS: No intraorbital abnormality. No paranasal sinus mucosal disease. No middle ear or mastoid effusion.    BONES/SOFT TISSUES: No acute abnormality.      Impression    IMPRESSION:  1.  No significant change in appearance of the left-sided subdural hematoma since head CT 10/19/2022. Persistent slight mass effect on the left cerebral hemisphere and left lateral ventricle without midline shift or herniation.     CT Head w/o Contrast    Narrative    EXAM: CT HEAD W/O CONTRAST  LOCATION: [facility]  DATE/TIME: 10/29/2022 5:13 PM    INDICATION: Lethargy with recent fall and sub  dural hematoma  COMPARISON:  10/23/2022, 10/13/2022.  TECHNIQUE: Routine CT Head without IV contrast. Multiplanar reformats. Dose reduction techniques were used.    FINDINGS:  INTRACRANIAL CONTENTS: Evolving small left convexity subdural hematoma.  No progressive mass effect.  No CT evidence of acute infarct. Mild presumed chronic small vessel ischemic changes. Mild generalized volume loss. No hydrocephalus.     VISUALIZED ORBITS/SINUSES/MASTOIDS: No intraorbital abnormality. No paranasal sinus mucosal disease. No middle ear or mastoid effusion.    BONES/SOFT TISSUES: No acute abnormality.      Impression    IMPRESSION:  1.  No significant change since 10/23/2022.  2.  Evolving small left convexity subdural hematoma without progressive mass effect or new hemorrhage.   XR Chest 2 Views    Narrative    CHEST TWO VIEWS 11/2/2022 12:18 PM     HISTORY: Cough.    COMPARISON: None.       Impression    IMPRESSION: Left lower lobe infiltrate. Remaining lungs clear of  infiltrate. The cardiac silhouette is not enlarged. Pulmonary  vasculature is unremarkable.     ASH PEREZ MD         SYSTEM ID:  [id number]   MR Pelvis Bone wo & w Contrast    Narrative    EXAM: MRI PELVIS WITHOUT AND WITH IV CONTRAST  LOCATION: [facility]  DATE/TIME: 11/14/2022 6:42 PM    INDICATION: Sacral decubitus ulcer with exposed bone, concern for osteomyelitis. Metastatic breast cancer.  COMPARISON: None.    TECHNIQUE: Routine MRI pelvis without and with IV contrast. Axial, sagittal, and coronal high-resolution T2 and post gadolinium T1 with fat saturation.   CONTRAST: 7mL Gadavist    FINDINGS: There is a decubitus ulceration superficial to the mid sacrum. No associated fluid collection. No associated marrow abnormality or bone destruction to suggest osteomyelitis.    There are extensive marrow replacing lesions compatible with metastases.    There is a small nonenhancing focus within the left posterior paraspinous  musculature at the L5 level which corresponds to a focus of soft tissue calcification on the recent CT PET.    There are likely chronic fractures of both sacral ala, and chronic displaced fractures of the left pubic rami, better demonstrated on the prior CT PET scan. Small effusions of both SI joints, and associated synovitis.    Diffuse subcutaneous and muscular edema, and a small amount of ascites.      Impression    IMPRESSION:  1.  Decubitus ulcer superficial to the sacrum.  2.  No evidence of associated osteomyelitis.  3.  Extensive osseous metastatic disease.   XR Chest Port 1 View    Narrative    CHEST ONE VIEW  11/15/2022 12:47 PM     HISTORY: Recent sepsis with no chest x-ray obtained; assess for  infiltrate.    COMPARISON: 2022      Impression    IMPRESSION: Consolidation at the left base is slightly more dense than  previous. Right lung clear.    ASH PEREZ MD         SYSTEM ID:  [id number]   Echocardiogram Complete     Value    LVEF  40-45%    Narrative    [id number]  [id number]  [id number]  [id number]^WATSON^MERYL     [facility]  Echocardiography Laboratory  [address] [place] [address]     Name: JULES KRUGER BROOKE  MRN: [id number]  : 1955  Study Date: 10/19/2022 10:58 AM  Age: 67 yrs  Gender: Female  Patient Location: [facility]  Reason For Study: CHF  Ordering Physician: MERYL CHAUDHARI  Referring Physician: Verna Junior  Performed By: Davon Hui     BSA: 1.8 m2  Height: 65 in  Weight: 167 lb  HR: 111  BP: 100/59 mmHg  ______________________________________________________________________________  Procedure  Complete Portable Echo Adult. Optison (NDC #[id number]) given intravenously.  ______________________________________________________________________________  Interpretation Summary     The left ventricle is normal in size.  Diastolic Doppler findings (E/E' ratio and/or other parameters) suggest left  ventricular filling pressures are normal.  The visual  ejection fraction is 40-45%.  No significant valvular heart disease.  IVC diameter <2.1 cm collapsing >50% with sniff suggests a normal RA pressure  of 3 mmHg.     Technically difficult study, reduces sensitivity and specificity of findings.  ______________________________________________________________________________  Left Ventricle  The left ventricle is normal in size. There is normal left ventricular wall  thickness. Diastolic Doppler findings (E/E' ratio and/or other parameters)  suggest left ventricular filling pressures are normal. The visual ejection  fraction is 40-45%.     Right Ventricle  The right ventricle is normal in size and function.     Atria  Normal left atrial size. Right atrial size is normal. There is no color  Doppler evidence of an atrial shunt.     Mitral Valve  The mitral valve leaflets are moderately thickened. There is mild (1+) mitral  regurgitation.     Tricuspid Valve  There is trace tricuspid regurgitation. The right ventricular systolic  pressure is approximated at 14.4 mmHg plus the right atrial pressure. IVC  diameter <2.1 cm collapsing >50% with sniff suggests a normal RA pressure of 3  mmHg.     Aortic Valve  There is trivial trileaflet aortic sclerosis. No aortic regurgitation is  present.     Pulmonic Valve  There is trace pulmonic valvular regurgitation.     Vessels  Normal size aorta. The aortic root is normal size.     Pericardium  There is no pericardial effusion.     Rhythm  The rhythm was sinus tachycardia.  ______________________________________________________________________________  MMode/2D Measurements & Calculations  IVSd: 0.86 cm     LVIDd: 4.9 cm  LVIDs: 2.6 cm  LVPWd: 0.88 cm  FS: 47.9 %  LV mass(C)d: 147.0 grams  LV mass(C)dI: 80.2 grams/m2  Ao root diam: 3.3 cm  LA dimension: 2.8 cm  asc Aorta Diam: 3.2 cm  LA/Ao: 0.83  LVOT diam: 2.0 cm  LVOT area: 3.2 cm2  RWT: 0.36     Doppler Measurements & Calculations  MV E max david: 36.0 cm/sec  MV A max david: 100.1  cm/sec  MV E/A: 0.36  MV dec slope: 401.1 cm/sec2  PA acc time: 0.08 sec  TR max david: 190.0 cm/sec  TR max P.4 mmHg  E/E' av.7  Lateral E/e': 2.7  Medial E/e': 6.8     ______________________________________________________________________________  Report approved by: Ashley Ortega 10/19/2022 01:00 PM

## 2022-11-30 NOTE — PROGRESS NOTES
"Pt IP hospice, has traumatic SDH and metastatic breast cancer. Pt lethargic overnight, arouses to voice and whispers \"yes/no\" occasionally. T/R for comfort, pt incontinent. Oral cares provided. Pt denied pain until 6am- started to softly moan. Given PO liquid dilaudid. Plan to discharge home on hospice at 11am.  "

## 2023-04-19 NOTE — PROGRESS NOTES
Care Management Follow Up    Length of Stay (days): 16    Expected Discharge Date: 11/05/2022     Concerns to be Addressed:       Patient plan of care discussed at interdisciplinary rounds: Yes    Anticipated Discharge Disposition: Transitional Care     Anticipated Discharge Services: Transportation Services  Anticipated Discharge DME:      Patient/family educated on Medicare website which has current facility and service quality ratings: yes  Education Provided on the Discharge Plan:    Patient/Family in Agreement with the Plan: yes    Referrals Placed by CM/SW:    Private pay costs discussed:     Additional Information:  SW made follow up calls to all remaining TCU referrals that have not responded and left voice messages regarding referral. Return calls requested.      VIELKA Cole  [facility]  Care Transitions  [phone]           Patient examined, record reviewed, agree with findings and recommendations as documented above.

## 2023-07-17 NOTE — PLAN OF CARE
Pt on hospice care. Speaks with a whisper, difficult to understand,extra time provided for understanding patient's needs. Denies pain. Oral cares complete. Food and water offered. T/R q2h done for comfort and with consideration of stage 4 pressure ulcer over sacrum/coccyx. No BM this shift. Purewick in place with adequate output.                         
"Date & Time: 11/27 0700-1900  Diagnosis: IP Hospice  Mobility: A2 + lift  Diet: Regular - no appetite  Pain Management: Denies, appears comfortable, sleeping   Bowel & Bladder: Incontinent   Skin: Large stage 4 sacral wound   IV Access/Drips/Fluids: R port HL   Consults: Hospice, hospitalist   Discharge Plan: Pending   Other: Shallow breathing, not tracking, says \"no\" or \"I am comfortable\" when asked if in pain    "
"Pt on hospice care. Tearful at the beginning of the shift prior to 's arrival. Pt up in chair x1, resting between cares. Denies pain. Takes pills whole in pudding. Regular diet, needs assistance with eating. Wound dressing change completed. T&R as needed. Requested \"sleeping pill\" for the evening, given ativan x1.    "
[id number]  IP hospice, pt here with traumatic SDH after fall, hx of metastatic breast cancer. Cool extremities, shallow breaths. Whispered speech. Oral cares performed. Turned/repositioned for comfort. Incontinent x2. Sacrum mepilex intact, small amount of drainage. Pt resting comfortably between cares. Plan to discharge home with hospice tomorrow 1100.      
Continuous comfort care was provided such as reposition, oral care, incontinence care and pain assessment.   Patient was flat and quit during my shift. Refused to take her Cymbalta and Keppra. Does  not even want to open her mouth for water and oral care. She just looked at me when asked questions.   
Goal Outcome Evaluation:      Plan of Care Reviewed With: patient, spouse, child    Overall Patient Progress: no changeOverall Patient Progress: no change     Inpatient hospice. Pt comfortable throughout the day--declined pain meds or additional comfort meds. Still able to take scheduled pills with pudding. Regular diet--needs assistance with eating. Sat up in the chair for a few hours. Family present at bedside throughout the day which improved pts emotional state. Was tearful in the morning prior to visitors. Emotional support provided.       
Goal Outcome Evaluation:    Pt is inpatient hospice. Sleeping/lethargic all shift, wakes up briefly with turns/cares but goes back to sleep. Shakes head or whispers no when offered pain/anxiety medication, declined offers all shift. Incontinent of urine x1, sacral mepilex changed. 2 daughters at the bedside for a short time this morning.     
Goal Outcome Evaluation:    Pt is inpatient hospice. Sleeping/lethargic all shift, wakes up briefly with turns/cares but goes back to sleep. Usually shakes head or whispers no when offered pain/anxiety medication,  at the bedside this evening and requested dilaudid be given. Incontinent of urine, sacral mepilex changed. Minimal intake this shift aside from a few small sips of water.    
Goal Outcome Evaluation:  Summary Inpatient hospice (stage IV metastatic breast cancer with metastasis to brain and liver bone, s/p mastectomy, heart failure with reduced ejection fraction secondary to doxorubicin,stage IV sacral pressure ulcer who was admitted to the hospital secondary to acute metabolic encephalopathy and was found to have traumatic subdural hemorrhage as well as significant KATHLEEN with multiple electrolyte abnormalities)   DATE & TIME: 11/25/2022 [id number]  Cognitive Concerns/ Orientation : Alt & O X 2-3   BEHAVIOR & AGGRESSION TOOL COLOR: Green    MOBILITY: Bedrest T/R for comfort   PAIN MANAGMENT: Dilaudid   DIET: reg diet , offered and pt only took a couple sips of water   BOWEL/BLADDER: incontinent no BM this shift    SKIN: PI on sacrum applied new mepilex as blood was seeping out sides                      
Here for SDH. Pt is inpatient hospice. Rested comfortably all shift, denied pain, denied multiple offers for pain meds by shaking head no. Very quiet and withdrawn. Edema from the waist down to her feet. Incontinent of bowel and bladder. Stage 4 sacral ulcer. Turned and repositioned when pt would allow. Very minimal urine output.                          
Here for SDH. Pt is inpatient hospice. Rested comfortably all shift, denied pain, denied multiple offers for pain meds/anxiety meds. Very quiet and withdrawn. Edema from the waist down to her feet. Incontinent of bowel and bladder. Very little output. No BM this shift Stage 4 sacral ulcer. Turned and repositioned when pt would allow.                         
Here for SDH. Pt is inpatient hospice. Rested comfortably all shift, pain meds given 1x. Very quiet and withdrawn. Edema from the waist down to her feet. Incontinent of bowel and bladder. Stage 4 sacral ulcer. Turned and repositioned when pt would allow. Very minimal urine output.                           
Pt here for SDH. Pt is inpatient hospice since 11/19. Resting between cares. No nonverbal signs of pain. Lethargic. Pt alert enough to shake or nod head occasionally overnight when asked about pain. Shallow breaths. +3 edema BLE. Stage 4 sacral ulcer, dressing intact. Turned and repositioned when pt would allow and as needed for cares. Minimal urine output, incontinent, no BM this shift. PO cares performed as patient would allow. L chest portacath patent, heparin locked. Discharge pending hospice placement.     
None

## 2023-09-12 NOTE — ED NOTES
[facility] ED Handoff Report    ED Chief Complaint: SOB    ED Diagnosis:  (J96.01) Acute respiratory failure with hypoxia (H)  Comment: Pt came in last evening with acute SOB when recieviing chemo treatment  Plan: Pt now on 2 liters NC        PMH:    Past Medical History:   Diagnosis Date     Allergic rhinitis      Bone cancer (H) 2011    breast mets     Breast cancer (H) 2010    left mastectomy     Depression      DM (diabetes mellitus) (H)      Fibromyalgia      Hx antineoplastic chemotherapy 2010     Hx of radiation therapy 2010     Hyperlipemia      Lactose intolerance      Mini stroke (H)     R EYE     Osteoarthritis      Tobacco dependency         Code Status:  Full Code     Falls Risk: No Band: Not applicable    Current Living Situation/Residence: lives with a significant other     Elimination Status: Continent: Yes     Activity Level: SBA    Patients Preferred Language:  English     Needed: No    Vital Signs:  /72   Pulse 119   Temp 98.8  F (37.1  C) (Temporal)   Resp (!) 5   Wt 67.1 kg (148 lb)   SpO2 99%   BMI 24.63 kg/m       Cardiac Rhythm: SR/ST    Pain Score: 0/10    Is the Patient Confused:  No    Last Food or Drink: 02/09/22 at 1230    Focused Assessment:  Pt became hypoxic with SOB during chemo treatment yesterday.  Has received diuretic, purwick in place and large UO. Is breathing much better--on 2 liters NC with sats in the high 90s.  CT negative for PE, does show plueral effusion.  Is to have a US thoracentesis to drain some fluid to check for cancer/infection today.  Pt aware.  Pt has a hx of breast cancer with mets.  Has a portacath accessed plus 18 gauge in the AC.  Eating and drinking and tolerating well.      Tests Performed: Done: Labs and Imaging    Treatments Provided:  Diuretic/antibiotics    Family Dynamics/Concerns: No    Family Updated On Visitor Policy: No    Plan of Care Communicated to Family: Yes    Who Was Updated about Plan of Care: pt  is  being updated by patient via phone    Belongings Checklist Done and Signed by Patient: Yes    Medications sent with patient: none    Covid: symptomatic, negative    Additional Information: Pt is alert and oriented, sweet, weepy off and on, emotional at times.  Is really missing her  (not allowed in ED) will be happy he can come when she is inpatient.     RN: Tsering Rain   2/9/2022 2:10 PM      Negative

## 2024-10-16 NOTE — PATIENT INSTRUCTIONS
"Xray right knee  Blood work at [facility]- fasting 6-8 weeks    Increase Levothyroxine 100mcg daily     Cologuard will come in the mail    Patient Education     Routine physical for adults   The Basics   Written by the doctors and editors at [facility]   What is a physical? -- A physical is a routine visit, or \"check-up,\" with your doctor. You might also hear it called a \"wellness visit\" or \"preventive visit.\"  During each visit, the doctor will:   Ask about your physical and mental health   Ask about your habits, behaviors, and lifestyle   Do an exam   Give you vaccines if needed   Talk to you about any medicines you take   Give advice about your health   Answer your questions  Getting regular check-ups is an important part of taking care of your health. It can help your doctor find and treat any problems you have. But it's also important for preventing health problems.  A routine physical is different from a \"sick visit.\" A sick visit is when you see a doctor because of a health concern or problem. Since physicals are scheduled ahead of time, you can think about what you want to ask the doctor.  How often should I get a physical? -- It depends on your age and health. In general, for people age 21 years and older:   If you are younger than 50 years, you might be able to get a physical every 3 years.   If you are 50 years or older, your doctor might recommend a physical every year.  If you have an ongoing health condition, like diabetes or high blood pressure, your doctor will probably want to see you more often.  What happens during a physical? -- In general, each visit will include:   Physical exam - The doctor or nurse will check your height, weight, heart rate, and blood pressure. They will also look at your eyes and ears. They will ask about how you are feeling and whether you have any symptoms that bother you.   Medicines - It's a good idea to bring a list of all the medicines you take to each doctor visit. Your " Thank you for allowing the [facility] to be a part of your care. Please pay close attention to the following medications as they have been changed during this visit.    1.) Please increase your metoprolol succinate to 50 mg (two 25 mg tablets) one time daily    "doctor will talk to you about your medicines and answer any questions. Tell them if you are having any side effects that bother you. You should also tell them if you are having trouble paying for any of your medicines.   Habits and behaviors - This includes:   Your diet   Your exercise habits   Whether you smoke, drink alcohol, or use drugs   Whether you are sexually active   Whether you feel safe at home  Your doctor will talk to you about things you can do to improve your health and lower your risk of health problems. They will also offer help and support. For example, if you want to quit smoking, they can give you advice and might prescribe medicines. If you want to improve your diet or get more physical activity, they can help you with this, too.   Lab tests, if needed - The tests you get will depend on your age and situation. For example, your doctor might want to check your:   Cholesterol   Blood sugar   Iron level   Vaccines - The recommended vaccines will depend on your age, health, and what vaccines you already had. Vaccines are very important because they can prevent certain serious or deadly infections.   Discussion of screening - \"Screening\" means checking for diseases or other health problems before they cause symptoms. Your doctor can recommend screening based on your age, risk, and preferences. This might include tests to check for:   Cancer, such as breast, prostate, cervical, ovarian, colorectal, prostate, lung, or skin cancer   Sexually transmitted infections, such as chlamydia and gonorrhea   Mental health conditions like depression and anxiety  Your doctor will talk to you about the different types of screening tests. They can help you decide which screenings to have. They can also explain what the results might mean.   Answering questions - The physical is a good time to ask the doctor or nurse questions about your health. If needed, they can refer you to other doctors or specialists, " too.  Adults older than 65 years often need other care, too. As you get older, your doctor will talk to you about:   How to prevent falling at home   Hearing or vision tests   Memory testing   How to take your medicines safely   Making sure that you have the help and support you need at home  All topics are updated as new evidence becomes available and our peer review process is complete.  This topic retrieved from [employer] on: May 02, 2024.  Topic 415052 Version 1.0  Release: 32.4.3 - C32.122  © 2024 Up[employer]Date, Inc. and/or its affiliates. All rights reserved.  Consumer Information Use and Disclaimer   Disclaimer: This generalized information is a limited summary of diagnosis, treatment, and/or medication information. It is not meant to be comprehensive and should be used as a tool to help the user understand and/or assess potential diagnostic and treatment options. It does NOT include all information about conditions, treatments, medications, side effects, or risks that may apply to a specific patient. It is not intended to be medical advice or a substitute for the medical advice, diagnosis, or treatment of a health care provider based on the health care provider's examination and assessment of a patient's specific and unique circumstances. Patients must speak with a health care provider for complete information about their health, medical questions, and treatment options, including any risks or benefits regarding use of medications. This information does not endorse any treatments or medications as safe, effective, or approved for treating a specific patient. UpToDate, Inc. and its affiliates disclaim any warranty or liability relating to this information or the use thereof.The use of this information is governed by the Terms of Use, available at https://www.wolterskluwer.com/en/know/clinical-effectiveness-terms. 2024© UpToDate, Inc. and its affiliates and/or licensors. All rights reserved.  Copyright   © 2024  [employer] and/or its affiliates. All rights reserved.

## 2024-11-05 NOTE — TELEPHONE ENCOUNTER
Please order vitamin D level in 3 months.    Received call from pt stating that her Oncologist Dr. Varma was requesting a call from Dr. Munoz to help guide her care regarding chemo and her HF.     Pt provided contact information: [facility]  [phone].     Called [facility] and got Dr. Varma cell number: [phone].     Routing to Dr. Munoz to review.     Stacey Denis RN

## (undated) DEVICE — GLOVE BIOGEL PI MICRO SZ 7.5 48575

## (undated) DEVICE — ESU GROUND PAD UNIVERSAL W/O CORD

## (undated) DEVICE — SPONGE LAP 18X18" X8435

## (undated) DEVICE — ESU ELEC BLADE NN-STCK PLUMEPEN PRO 360D 10FT PLPRO4020

## (undated) DEVICE — PREP SKIN SCRUB TRAY 4461A

## (undated) DEVICE — LINEN TOWEL PACK X5 5464

## (undated) DEVICE — SOL NACL 0.9% IRRIG 1000ML BOTTLE 2F7124

## (undated) DEVICE — DRAPE MINOR PROCEDURE LAP 29496

## (undated) DEVICE — GLOVE BIOGEL PI MICRO INDICATOR UNDERGLOVE SZ 7.5 48975

## (undated) DEVICE — DECANTER VIAL 2006S

## (undated) DEVICE — DRSG ABDOMINAL 07 1/2X8" 7197D

## (undated) DEVICE — PACK MINOR SBA15MIFSE

## (undated) DEVICE — SOL WATER IRRIG 1000ML BOTTLE 2F7114

## (undated) RX ORDER — EPINEPHRINE 1 MG/ML
INJECTION, SOLUTION INTRAMUSCULAR; SUBCUTANEOUS
Status: DISPENSED
Start: 2022-01-01

## (undated) RX ORDER — PROPOFOL 10 MG/ML
INJECTION, EMULSION INTRAVENOUS
Status: DISPENSED
Start: 2022-01-01

## (undated) RX ORDER — FENTANYL CITRATE 50 UG/ML
INJECTION, SOLUTION INTRAMUSCULAR; INTRAVENOUS
Status: DISPENSED
Start: 2022-01-01

## (undated) RX ORDER — DEXMEDETOMIDINE HYDROCHLORIDE 4 UG/ML
INJECTION, SOLUTION INTRAVENOUS
Status: DISPENSED
Start: 2022-01-01

## (undated) RX ORDER — ONDANSETRON 2 MG/ML
INJECTION INTRAMUSCULAR; INTRAVENOUS
Status: DISPENSED
Start: 2022-01-01

## (undated) RX ORDER — HEPARIN SODIUM (PORCINE) LOCK FLUSH IV SOLN 100 UNIT/ML 100 UNIT/ML
SOLUTION INTRAVENOUS
Status: DISPENSED
Start: 2022-01-01

## (undated) RX ORDER — LIDOCAINE HYDROCHLORIDE 20 MG/ML
INJECTION, SOLUTION EPIDURAL; INFILTRATION; INTRACAUDAL; PERINEURAL
Status: DISPENSED
Start: 2022-01-01

## (undated) RX ORDER — BUPIVACAINE HYDROCHLORIDE 2.5 MG/ML
INJECTION, SOLUTION EPIDURAL; INFILTRATION; INTRACAUDAL
Status: DISPENSED
Start: 2022-01-01

## (undated) RX ORDER — ACETAMINOPHEN 325 MG/1
TABLET ORAL
Status: DISPENSED
Start: 2022-01-01

## (undated) RX ORDER — LIDOCAINE HYDROCHLORIDE AND EPINEPHRINE 10; 10 MG/ML; UG/ML
INJECTION, SOLUTION INFILTRATION; PERINEURAL
Status: DISPENSED
Start: 2022-01-01

## (undated) RX ORDER — LIDOCAINE HYDROCHLORIDE 10 MG/ML
INJECTION, SOLUTION INFILTRATION; PERINEURAL
Status: DISPENSED
Start: 2022-01-01

## (undated) RX ORDER — DEXAMETHASONE SODIUM PHOSPHATE 4 MG/ML
INJECTION, SOLUTION INTRA-ARTICULAR; INTRALESIONAL; INTRAMUSCULAR; INTRAVENOUS; SOFT TISSUE
Status: DISPENSED
Start: 2022-01-01

## (undated) RX ORDER — LIDOCAINE HYDROCHLORIDE 10 MG/ML
INJECTION, SOLUTION EPIDURAL; INFILTRATION; INTRACAUDAL; PERINEURAL
Status: DISPENSED
Start: 2022-01-01